# Patient Record
Sex: FEMALE | Race: WHITE | NOT HISPANIC OR LATINO | Employment: FULL TIME | URBAN - METROPOLITAN AREA
[De-identification: names, ages, dates, MRNs, and addresses within clinical notes are randomized per-mention and may not be internally consistent; named-entity substitution may affect disease eponyms.]

---

## 2017-01-26 ENCOUNTER — OFFICE VISIT (OUTPATIENT)
Dept: FAMILY MEDICINE | Facility: CLINIC | Age: 42
End: 2017-01-26

## 2017-01-26 VITALS
OXYGEN SATURATION: 95 % | HEART RATE: 119 BPM | HEIGHT: 70 IN | DIASTOLIC BLOOD PRESSURE: 74 MMHG | WEIGHT: 233.4 LBS | SYSTOLIC BLOOD PRESSURE: 107 MMHG | BODY MASS INDEX: 33.41 KG/M2

## 2017-01-26 DIAGNOSIS — G47.00 INSOMNIA, UNSPECIFIED TYPE: ICD-10-CM

## 2017-01-26 DIAGNOSIS — R11.2 NAUSEA AND VOMITING IN ADULT: ICD-10-CM

## 2017-01-26 DIAGNOSIS — F41.9 ANXIETY: ICD-10-CM

## 2017-01-26 DIAGNOSIS — M54.50 LUMBAGO: ICD-10-CM

## 2017-01-26 DIAGNOSIS — N10 ACUTE PYELONEPHRITIS: Primary | ICD-10-CM

## 2017-01-26 DIAGNOSIS — Z87.442 HISTORY OF RENAL STONE: ICD-10-CM

## 2017-01-26 LAB
BILIRUBIN UR: ABNORMAL
BLOOD UR: ABNORMAL
CLARITY, URINE: CLEAR
COLOR UR: YELLOW
GLUCOSE URINE: ABNORMAL
KETONES UR QL: NEGATIVE
LEUKOCYTE ESTERASE UR: NEGATIVE
NITRITE UR QL STRIP: POSITIVE
PH UR STRIP: 5.5 [PH] (ref 5–7)
PROTEIN UR: ABNORMAL
SP GR UR STRIP: >=1.03
UROBILINOGEN UR STRIP-ACNC: ABNORMAL

## 2017-01-26 RX ORDER — ZOLPIDEM TARTRATE 5 MG/1
5 TABLET ORAL
Qty: 30 TABLET | Refills: 1 | Status: SHIPPED | OUTPATIENT
Start: 2017-01-26 | End: 2017-04-11

## 2017-01-26 RX ORDER — HYDROXYZINE HYDROCHLORIDE 25 MG/1
25-50 TABLET, FILM COATED ORAL EVERY 6 HOURS PRN
Qty: 60 TABLET | Refills: 1 | Status: SHIPPED | OUTPATIENT
Start: 2017-01-26 | End: 2017-10-30

## 2017-01-26 ASSESSMENT — ANXIETY QUESTIONNAIRES
3. WORRYING TOO MUCH ABOUT DIFFERENT THINGS: MORE THAN HALF THE DAYS
6. BECOMING EASILY ANNOYED OR IRRITABLE: NOT AT ALL
5. BEING SO RESTLESS THAT IT IS HARD TO SIT STILL: NEARLY EVERY DAY
GAD7 TOTAL SCORE: 16
7. FEELING AFRAID AS IF SOMETHING AWFUL MIGHT HAPPEN: NEARLY EVERY DAY
IF YOU CHECKED OFF ANY PROBLEMS ON THIS QUESTIONNAIRE, HOW DIFFICULT HAVE THESE PROBLEMS MADE IT FOR YOU TO DO YOUR WORK, TAKE CARE OF THINGS AT HOME, OR GET ALONG WITH OTHER PEOPLE: VERY DIFFICULT
1. FEELING NERVOUS, ANXIOUS, OR ON EDGE: MORE THAN HALF THE DAYS
2. NOT BEING ABLE TO STOP OR CONTROL WORRYING: NEARLY EVERY DAY

## 2017-01-26 ASSESSMENT — ENCOUNTER SYMPTOMS
HEMATURIA: 0
FLANK PAIN: 1
CARDIOVASCULAR NEGATIVE: 1
ACTIVITY CHANGE: 1
COUGH: 1
VOMITING: 1
DIZZINESS: 1
BACK PAIN: 1
ALLERGIC/IMMUNOLOGIC NEGATIVE: 1
BLOOD IN STOOL: 0
APPETITE CHANGE: 1
ENDOCRINE NEGATIVE: 1
SLEEP DISTURBANCE: 1
FATIGUE: 1
DIFFICULTY URINATING: 0
FEVER: 1
NAUSEA: 1
EYES NEGATIVE: 1
NERVOUS/ANXIOUS: 1
DYSURIA: 0

## 2017-01-26 ASSESSMENT — PATIENT HEALTH QUESTIONNAIRE - PHQ9: 5. POOR APPETITE OR OVEREATING: NEARLY EVERY DAY

## 2017-01-26 NOTE — Clinical Note
MEDICAL EXCUSE FORM        Darlene Dasilvalivan saw me on 1/27/2017     The employee should be excused from work for the following day(s) because of illness:   1/26/17    COMMENTS: Can return to work when Darlene is feeling well enough.          Thank you for your understanding,           Sulema Meza,MSN,FNP [facility]  [address] [place]

## 2017-01-26 NOTE — PROGRESS NOTES
"      HPI:       Darlene Hudson is a 41 year old who presents for the following  Patient presents with:  Flu: flu, vommiting for 3 days, diarrhea, lower back pain      Most recently has not wanted to use opioids for pain management as she \"needs to be able to work, and those create a problem with too much fatigue.  The flexeril doesn't help, and the ibuprofen (600mg every 2 hours, she claims) just takes the edge off of the pain.\"   I'm having so much pain since I started this vomiting, I'm starting to wonder if I shouldn't take some stronger pain meds, anyway.\"        Problem, Medication and Allergy Lists were reviewed and are current.  Patient is an established patient of this clinic.    PMH: Chronic low back pain, substance abuse hx, chronic pain agreement, abnormal pap smear, moderate persistent asthma, tobacco abuse, episodic alcohol abuse, chronic sinusitis, allergic rhinitis    Medications include:  Fluticasone 250 mcg inhaler 2 puffs BID  Albuterol 108 mcg/ACT inhaler 2 puffs q 6 hours PRN'  Albuterol 2.5mg/3ML 0.083% nebulizer solution inhale 2.5mg q 4 hours as needed  Ibuprofen 600mg po q 6 hours PRN for moderate pain  zlpidem 5 mg tablet Take 1 tablet nightly as needed for sleep.       Review of Systems:   Review of Systems   Constitutional: Positive for fever, activity change, appetite change and fatigue.   HENT: Negative.    Eyes: Negative.    Respiratory: Positive for cough.    Cardiovascular: Negative.    Gastrointestinal: Positive for nausea and vomiting. Negative for blood in stool.   Endocrine: Negative.    Genitourinary: Positive for flank pain. Negative for dysuria, hematuria, vaginal discharge, difficulty urinating and pelvic pain.   Musculoskeletal: Positive for back pain.   Skin: Negative.    Allergic/Immunologic: Negative.    Neurological: Positive for dizziness. Negative for syncope.        Dizzy with position changes   Psychiatric/Behavioral: Positive for sleep disturbance. The patient is " "nervous/anxious.              Physical Exam:   Patient Vitals for the past 24 hrs:   BP Pulse SpO2 Height Weight   01/26/17 1420 107/74 mmHg 119 95 % 5' 9.5\" (176.5 cm) 233 lb 6.4 oz (105.87 kg)     Body mass index is 33.98 kg/(m^2).       Physical Exam   Constitutional: She is oriented to person, place, and time. She appears well-developed and well-nourished. She appears distressed.   HENT:   Head: Normocephalic.   Right Ear: External ear normal.   Left Ear: External ear normal.   Nose: Nose normal.   Eyes: EOM are normal. Pupils are equal, round, and reactive to light. Right eye exhibits no discharge. Left eye exhibits no discharge.   Neck: Normal range of motion. Neck supple. No thyromegaly present.   Cardiovascular: Normal rate, regular rhythm, normal heart sounds and intact distal pulses.  Exam reveals no gallop.    No murmur heard.  Pulmonary/Chest: Effort normal and breath sounds normal. No respiratory distress. She has no wheezes. She has no rales.   Abdominal: Soft. Bowel sounds are normal. She exhibits no distension. There is tenderness. There is no rebound and no guarding.   Generalized tenderness, BS normal and +.  No specific CVA tenderness, has bilateral flank pain.   Musculoskeletal: Normal range of motion. She exhibits no edema.   Lymphadenopathy:     She has no cervical adenopathy.   Neurological: She is alert and oriented to person, place, and time. She exhibits normal muscle tone. Coordination normal.   Skin: Skin is warm and dry. No rash noted. No erythema.   Psychiatric: Her behavior is normal.   PH2-2 score                                 1/26/17                                                           2  ACE-7 score                                1/26/17                                                          16     Making completing tasks and getting along with others                                        Very Difficult      Results:      Results from the last 24 hours  Results for orders " placed or performed in visit on 01/26/17 (from the past 24 hour(s))   Urinalysis, Micro If ([facility])   Result Value Ref Range    Specific Gravity Urine >=1.030 1.005 - 1.030    pH Urine 5.5 4.5 - 8.0    Leukocyte Esterase UR Negative NEGATIVE    Nitrite Urine Positive (A) NEGATIVE    Protein UR 1+ (A) NEGATIVE    Glucose Urine Trace (A) NEGATIVE    Ketones Urine Negative NEGATIVE    Urobilinogen mg/dL 1.0 E.U./dL (A) 0.2 E.U./dL    Bilirubin UR 1+ (A) NEGATIVE    Blood UR 3+ (A) NEGATIVE    Color Urine Yellow     Clarity, urine Clear      Assessment and Plan     (N10) Acute pyelonephritis  (primary encounter diagnosis)  Comment: UTI, suspect pyelonephritis or renal calculi. See result above, no micro or UC possible as quantity of urine insufficient.  Plan: Urinalysis, Micro If ([facility])        No oral treatment,i.e., antibiotics w/ extra fluids by mouth currently possible.  Patient acknowledges this and prefers to present to [facility] ED for assessment, IV fluids and initial treatment for infection-set up imaging to  Assess for renal calculi or other abnormalities.  Family will bring Darlene to [facility] ED now by car   NP phoned [facility] to report labs, symptoms and suspicions of upper tract infection and/or renal calculi, together with vomiting and inability to take po fluids/food without vomiting.  Triage staff will give report to covering MD prior to admit to ED.  (R11.2) Nausea and vomiting in adult  Comment: Urine concentrated, skin turgor poor, tachycardic.Paitne states she has been unable to keep water or food down for 3 days or more  Suggests dehydration, and secondary cause such as renal calculi, an/or upper tract  UTI. See result above, no micro or UC possible as quantity of urine insufficient.  Plan: Urinalysis, Micro If ([facility])            (M54.5) Lumbago  Comment: Described severe pain across the lower back, bilaterally. Has chronic pain in lower back, has hx substance abuse and chronic pain  contract.  Pain more severe past 3 days w/vomiting-cannot accurately describe her pain which varies in intensity, and she is accustomed to denying it's severity  Plan: Urinalysis, Micro If ([facility]), diclofenac         (VOLTAREN) 1 % GEL topical gel            (Z87.442) History of renal stone  Comment: Patient felt like this before, years ago w/kidney stone-is wondering if that is happening again.  Plan: Urinalysis, Micro If ([facility])        See results-     (F41.9) Anxiety  Comment: Experiences daily, hydroxyzine helps manage on PRN basis.  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  Low dose ambien helps her get 4 hours of sleep during the day consistently and she wishes ot continue it.  Plan: zolpidem (AMBIEN) 5 MG tablet, she agreed.      Refilled for #30, 1 refill. Suggest she plan to use this as a temporary measure only.      Medications Discontinued During This Encounter   Medication Reason     clarithromycin (BIAXIN) 500 MG tablet Therapy completed     hydrOXYzine (VISTARIL) 50 MG capsule Stopped by Patient     guaiFENesin-codeine (ROBITUSSIN AC) 100-10 MG/5ML SOLN solution Therapy completed     metroNIDAZOLE (METROGEL VAGINAL) 0.75 % vaginal gel Medication Reconciliation Clean Up     cyclobenzaprine (FLEXERIL) 5 MG tablet Not filled/taken by Patient     benzonatate (TESSALON) 100 MG capsule Medication Reconciliation Clean Up     ondansetron (ZOFRAN ODT) 4 MG disintegrating tablet Stopped by Patient     nicotine (NICOTROL) 10 MG inhaler Stopped by Patient     Options for treatment and follow-up care were reviewed with the patient. Darlene Hudson  engaged in the decision making process and verbalized understanding of the options discussed and agreed with the final plan.  30 minutes was spent on this visit, >50% counseling and  coordination of care re: UTI/kidney stones, pain and management, vomiting and dehydration, referral to ED.    Sulema Meza, PEPE CNP

## 2017-01-27 ENCOUNTER — TELEPHONE (OUTPATIENT)
Dept: FAMILY MEDICINE | Facility: CLINIC | Age: 42
End: 2017-01-27

## 2017-01-27 PROBLEM — M54.50 CHRONIC LOW BACK PAIN: Status: ACTIVE | Noted: 2017-01-27

## 2017-01-27 PROBLEM — J30.9 ATOPIC RHINITIS: Status: ACTIVE | Noted: 2017-01-27

## 2017-01-27 PROBLEM — Z72.0 TOBACCO USE: Status: ACTIVE | Noted: 2017-01-27

## 2017-01-27 PROBLEM — J45.40 MODERATE PERSISTENT ASTHMA: Status: ACTIVE | Noted: 2017-01-27

## 2017-01-27 PROBLEM — G89.29 CHRONIC LOW BACK PAIN: Status: ACTIVE | Noted: 2017-01-27

## 2017-01-27 ASSESSMENT — ANXIETY QUESTIONNAIRES: GAD7 TOTAL SCORE: 16

## 2017-01-27 NOTE — TELEPHONE ENCOUNTER
[facility] phone call message- general phone call:    Reason for call: Patient is calling stating that she is currently in [facility] still was admitted last night. States that they did a blood count for her there at hospital. Started her on antibiotics and fluids going through her via IV. Still waiting to see the doctor. Possibly kidney infection/bladder infection/pancreaitis. Blood pressure down and white blood count is up. Can call her at [phone] (hospital room). Please call the patient.     Return call needed: Yes    OK to leave a message on voice mail? Yes    Primary language: English      needed? No    Call taken on January 27, 2017 at 9:42 AM by Kirstie Gates

## 2017-02-02 ENCOUNTER — OFFICE VISIT (OUTPATIENT)
Dept: FAMILY MEDICINE | Facility: CLINIC | Age: 42
End: 2017-02-02

## 2017-02-02 VITALS — OXYGEN SATURATION: 94 % | HEART RATE: 136 BPM | TEMPERATURE: 98.2 F | WEIGHT: 240 LBS | BODY MASS INDEX: 34.95 KG/M2

## 2017-02-02 DIAGNOSIS — K85.00 IDIOPATHIC ACUTE PANCREATITIS, UNSPECIFIED COMPLICATION STATUS: Primary | ICD-10-CM

## 2017-02-02 LAB
% GRANULOCYTES: 82.7 %G (ref 40–75)
ALBUMIN SERPL BCP-MCNC: 3 G/DL (ref 3.5–5)
ALP SERPL-CCNC: 169 U/L (ref 45–120)
ALT SERPL W/O P-5'-P-CCNC: 108 U/L (ref 0–45)
AMYLASE SERPL-CCNC: 17 U/L (ref 5–120)
ANION GAP SERPL CALCULATED.3IONS-SCNC: 10 MMOL/L (ref 5–18)
AST SERPL-CCNC: 98 U/L (ref 0–40)
BILIRUB SERPL-MCNC: 0.5 MG/DL (ref 0–1)
BILIRUBIN UR: ABNORMAL
BLOOD UR: ABNORMAL
BUN SERPL-MCNC: 12 MG/DL (ref 8–22)
CALCIUM SERPL-MCNC: 9.2 MG/DL (ref 8.5–10.5)
CHLORIDE SERPL-SCNC: 108 MMOL/L (ref 98–107)
CO2 SERPL-SCNC: 20 MMOL/L (ref 22–31)
CREAT SERPL-MCNC: 0.81 MG/DL (ref 0.6–1.1)
GLUCOSE SERPL-MCNC: 106 MG/DL (ref 70–125)
GLUCOSE URINE: NEGATIVE
GRANULOCYTES #: 12.7 K/UL (ref 1.6–8.3)
HCT VFR BLD AUTO: 39.9 % (ref 35–47)
HEMOGLOBIN: 12.8 G/DL (ref 11.7–15.7)
KETONES UR QL: ABNORMAL
LEUKOCYTE ESTERASE UR: NEGATIVE
LIPASE SERPL-CCNC: 6 U/L (ref 0–52)
LYMPHOCYTES # BLD AUTO: 1.8 K/UL (ref 0.8–5.3)
LYMPHOCYTES NFR BLD AUTO: 11.5 %L (ref 20–48)
MCH RBC QN AUTO: 30.8 PG (ref 26.5–35)
MCHC RBC AUTO-ENTMCNC: 32.1 G/DL (ref 32–36)
MCV RBC AUTO: 95.8 FL (ref 78–100)
MID #: 0.9 K/UL (ref 0–2.2)
MID %: 5.8 %M (ref 0–20)
NITRITE UR QL STRIP: NEGATIVE
PH UR STRIP: 5 [PH] (ref 5–7)
PLATELET # BLD AUTO: 289 K/UL (ref 150–450)
POTASSIUM SERPL-SCNC: 3.9 MMOL/L (ref 3.5–5)
PROT SERPL-MCNC: 6.3 G/DL (ref 6–8)
PROTEIN UR: ABNORMAL
RBC # BLD AUTO: 4.16 M/UL (ref 3.8–5.2)
SODIUM SERPL-SCNC: 138 MMOL/L (ref 136–145)
SP GR UR STRIP: >=1.03
UROBILINOGEN UR STRIP-ACNC: ABNORMAL
WBC # BLD AUTO: 15.4 K/UL (ref 4–11)

## 2017-02-02 RX ORDER — OXYCODONE AND ACETAMINOPHEN 10; 325 MG/1; MG/1
1-2 TABLET ORAL EVERY 6 HOURS PRN
Qty: 30 TABLET | Refills: 0 | Status: SHIPPED | OUTPATIENT
Start: 2017-02-02 | End: 2017-02-24

## 2017-02-02 NOTE — PATIENT INSTRUCTIONS
Follow up in 1 week    Your medication list is printed, please keep this with you, it is helpful to bring this current list to any other medical appointments, the emergency room or hospital.    If you had lab testing today and your results are reassuring or normal they will be be mailed to you within 7 days.     If the lab tests need quick action we will call you with the results.   The phone number we will call with results is # [phone] (home) . If this is not the best number please call our clinic and change the number.    If you need any refills please call your pharmacy and they will contact us.    If you have any further concerns or wish to schedule another appointment you must call our office during normal business hours  [phone] (8-5:00 M-F)  If you have urgent medical questions that cannot wait  you may also call [phone] at any time of day.  If you have a medical emergency please call [phone].    Thank you for coming to [facility].

## 2017-02-02 NOTE — PROGRESS NOTES
Hospitalization Follow-up Visit         [facility]       Hospital Follow-up Visit:    Hospital:  [facility]   Date of Admission: 1/27/2017  Date of Discharge: 1/30/2017  Reason(s) for Admission: pancreatitis            Problems taking medications regularly:  Has finished the dilaudid in three days, had a four day prescription.        Post Discharge Medication Reconciliation: discharge medications reconciled, continue medications without change.       Problems adhering to non-medication therapy:  History of high narcotic use       Medications reviewed by: by myself. Findings include duilaudi four days supply from hospital.    Summary of hospitalization:  [facility] hospital discharge summary reviewed    Diagnostic Tests/Treatments reviewed.  Follow up needed: will recheck lipase, UA to ensure sucessful therapy  Other Healthcare Providers Involved in Patient s Care:         None  Update since discharge: Pt reports pain at thte time of discharge. This has continued without futher relief. Complains of some dizziness, but has been taking too much dilaudid.      Plan of care communicated with patient                       Review of Systems:   CONSTITUTIONAL: no fatigue, no unexpected change in weight  SKIN: no worrisome rashes, no worrisome moles, no worrisome lesions  EYES: no acute vision problems or changes  ENT: no ear problems, no mouth problems, no throat problems  RESP: no significant cough, no shortness of breath  CV: no chest pain, no palpitations, no new or worsening peripheral edema  GI: no nausea, no vomiting, no constipation, no diarrhea              PMHX:   Current Medications:   Current Outpatient Prescriptions   Medication Sig Dispense Refill     oxyCODONE-acetaminophen (PERCOCET)  MG per tablet Take 1-2 tablets by mouth every 6 hours as needed for moderate to severe pain maximum 8 tablet(s) per day 30 tablet 0     zolpidem (AMBIEN) 5 MG tablet Take 1 tablet (5 mg) by mouth nightly as needed for sleep 30 tablet  "1     hydrOXYzine (ATARAX) 25 MG tablet Take 1-2 tablets (25-50 mg) by mouth every 6 hours as needed for itching 60 tablet 1     diclofenac (VOLTAREN) 1 % GEL topical gel Apply 4 grams to knees or 2 grams to hands four times daily using enclosed dosing card. 100 g 1     fluticasone (FLOVENT DISKUS) 250 MCG/BLIST AEPB Inhaler Inhale 2 puffs into the lungs 2 times daily 1 Inhaler 0     ibuprofen (ADVIL/MOTRIN) 600 MG tablet Take 1 tablet (600 mg) by mouth every 6 hours as needed for moderate pain 100 tablet 0     albuterol (PROAIR HFA, PROVENTIL HFA, VENTOLIN HFA) 108 (90 BASE) MCG/ACT inhaler Inhale 2 puffs into the lungs every 6 hours as needed for shortness of breath / dyspnea or wheezing 3 Inhaler 1     zolpidem (AMBIEN) 5 MG tablet Take 1 tablet (5 mg) by mouth nightly as needed for sleep 30 tablet 3     albuterol (2.5 MG/3ML) 0.083% nebulizer solution Inhale 2.5 mg into the lungs         Existing Problems  Patient Active Problem List   Diagnosis     Abnormal cytology finding     Nondependent alcohol abuse, episodic drinking behavior     Anxiety state     Controlled substance agreement signed     Low back pain     Chronic sinusitis     Cocaine abuse     Major depressive disorder, recurrent episode, moderate (H)     Asthma     Tobacco use disorder     Noninflammatory disorder of vagina     Pap smear for cervical cancer screening     Abdominal pain     Abnormal cervical Papanicolaou smear     Panic disorder with agoraphobia     Atopic rhinitis     Chronic low back pain     Depression     Moderate persistent asthma     Other long term (current) drug therapy     Tobacco use       Allergies:  Allergies   Allergen Reactions     Lidocaine Other (See Comments)     \"my jaw stopped moving\"     Penicillins Hives, Rash and Shortness Of Breath     Lidocaine-Epinephrine [Epinephrine-Lidocaine-Na Metabisulfite] Other (See Comments)     Severe jaw cramping, double vision       Previous labs:  Lab Results   Component Value Date "    HGB 12.8 02/02/2017    HCT 39.9 02/02/2017               Review of Systems:    CONSTITUTIONAL: no fatigue, no unexpected change in weight  SKIN: no worrisome rashes, no worrisome moles, no worrisome lesions  EYES: no acute vision problems or changes  ENT: no ear problems, no mouth problems, no throat problems  RESP: no significant cough, no shortness of breath  CV: no chest pain, no palpitations, no new or worsening peripheral edema  GI: no nausea, no vomiting, no constipation, no diarrhea          Physical Exam:     Filed Vitals:    02/02/17 1110   Pulse: 136   Temp: 98.2  F (36.8  C)   TempSrc: Oral   Weight: 240 lb (108.863 kg)   SpO2: 94%     Body mass index is 34.95 kg/(m^2).    GENERAL:alert, well hydrated, sitting and standing due to continued back and flank pain.  EYES: Eyes grossly normal to inspection, extraocular movements - intact, and PERRL  HENT: Nose- normal; Mouth- no ulcers, no lesions  NECK: no tenderness, no adenopathy, no asymmetry, no masses, no stiffness;   RESP: lungs scattered rhonchi, no rales,no wheezes  CV: regular rates and rhythm, normal S1 S2, no S3 or S4 and no murmur, no click or rub -  ABDOMEN: soft, pain subjectively localizing to the right side along flank extending in a dermatome near T7. No urine pain. No CVA tenderness. No costochondral pain.              Labs and Procedures     Office Visit on 01/26/2017   Component Date Value Ref Range Status     Specific Gravity Urine 01/26/2017 >=1.030  1.005 - 1.030 Final     pH Urine 01/26/2017 5.5  4.5 - 8.0 Final     Leukocyte Esterase UR 01/26/2017 Negative  NEGATIVE Final     Nitrite Urine 01/26/2017 Positive* NEGATIVE Final     Protein UR 01/26/2017 1+* NEGATIVE Final     Glucose Urine 01/26/2017 Trace* NEGATIVE Final     Ketones Urine 01/26/2017 Negative  NEGATIVE Final     Urobilinogen mg/dL 01/26/2017 1.0 E.U./dL* 0.2 E.U./dL Final     Bilirubin UR 01/26/2017 1+* NEGATIVE Final     Blood UR 01/26/2017 3+* NEGATIVE Final      Color Urine 01/26/2017 Yellow   Final     Clarity, urine 01/26/2017 Clear   Final              Assessment and Plan     1.Acute pancreatitis while in hospital - with lipase over 650.  Pt denies drinking ETOH, and denies any other drug use.  Had a mild sore throat earlier in the week, but this got better.  Treated in the hospital with dilaudid, and sent home with four day supply.  Used this in 3 days, and back today with complain of pain. She states this is different from the previous history of chronic back pain.   2. Acute cystitis. Currently on levofoxin from hospital.    3. Will evaluate state of pancreatitis with repeat lipase/amylase.  This may not reflect severity of pain patient is experiencing.    4. WIll recheck urine to ensure over UTI.   5. Have provided percosett for out patient management of pancreatitis.  Will not refill dilaudid at this time. Provided 30 tablets, 1-2 every 6 hours. Continue resting, return to hosspital if pain uncontrolled.      6. Recheck in one week.         E&M code to be billed if TCM cannot be: 21736    Type of decision making: Moderate complexity (74164)      Options for treatment and follow-up care were reviewed with the patient  Darlene Hudson   engaged in the decision making process and verbalized understanding of the options discussed and agreed with the final plan.      Oscar Aparicio MD

## 2017-02-02 NOTE — MR AVS SNAPSHOT
After Visit Summary   2/2/2017    Darlene Hudson    MRN: [id number]           Patient Information     Date Of Birth          1975        Visit Information        Provider Department      2/2/2017 11:00 AM Oscar Aparicio MD [facility]        Today's Diagnoses     Idiopathic acute pancreatitis, unspecified complication status    -  1       Care Instructions    Follow up in 1 week    Your medication list is printed, please keep this with you, it is helpful to bring this current list to any other medical appointments, the emergency room or hospital.    If you had lab testing today and your results are reassuring or normal they will be be mailed to you within 7 days.     If the lab tests need quick action we will call you with the results.   The phone number we will call with results is # [phone] (home) . If this is not the best number please call our clinic and change the number.    If you need any refills please call your pharmacy and they will contact us.    If you have any further concerns or wish to schedule another appointment you must call our office during normal business hours  [phone] (8-5:00 M-F)  If you have urgent medical questions that cannot wait  you may also call [phone] at any time of day.  If you have a medical emergency please call [phone].    Thank you for coming to [facility].          Follow-ups after your visit        Who to contact     Please call your clinic at [phone] to:    Ask questions about your health    Make or cancel appointments    Discuss your medicines    Learn about your test results    Speak to your doctor   If you have compliments or concerns about an experience at your clinic, or if you wish to file a complaint, please contact [facility] Patient Relations at [phone] or email us at [email or web address]         Additional Information About Your Visit        Care EveryWhere ID      This is your Care EveryWhere ID. This could be used by other organizations to access your [facility] medical records  [id number]        Your Vitals Were     Pulse Temperature Pulse Oximetry             136 98.2  F (36.8  C) (Oral) 94%          Blood Pressure from Last 3 Encounters:   01/26/17 107/74   12/14/16 127/81   12/13/16 121/86    Weight from Last 3 Encounters:   02/02/17 240 lb (108.863 kg)   01/26/17 233 lb 6.4 oz (105.87 kg)   12/14/16 232 lb 3.2 oz (105.325 kg)              We Performed the Following     Amylase ([facility])     CBC with Diff Plt ([facility])     Comprehensive Metabolic ([facility]) - Results > 1 hr     Lipase ([facility])     Urinalysis([facility])     Urine Culture ([facility])          Today's Medication Changes          These changes are accurate as of: 2/2/17 12:02 PM.  If you have any questions, ask your nurse or doctor.               Start taking these medicines.        Dose/Directions    oxyCODONE-acetaminophen  MG per tablet   Commonly known as:  PERCOCET   Used for:  Idiopathic acute pancreatitis, unspecified complication status   Started by:  Oscar Aparicio MD        Dose:  1-2 tablet   Take 1-2 tablets by mouth every 6 hours as needed for moderate to severe pain maximum 8 tablet(s) per day   Quantity:  30 tablet   Refills:  0            Where to get your medicines      Some of these will need a paper prescription and others can be bought over the counter.  Ask your nurse if you have questions.     Bring a paper prescription for each of these medications    - oxyCODONE-acetaminophen  MG per tablet             Primary Care Provider Office Phone # Fax #    Anaid Vogel [phone]       [facility] [address] [place] [address]        Thank you!     Thank you for choosing [facility]  for your care. Our goal is always to provide you with excellent care. Hearing back from our patients is one way we can continue to  improve our services. Please take a few minutes to complete the written survey that you may receive in the mail after your visit with us. Thank you!             Your Updated Medication List - Protect others around you: Learn how to safely use, store and throw away your medicines at www.disposemymeds.org.          This list is accurate as of: 2/2/17 12:02 PM.  Always use your most recent med list.                   Brand Name Dispense Instructions for use    * albuterol (2.5 MG/3ML) 0.083% neb solution      Inhale 2.5 mg into the lungs       * albuterol 108 (90 BASE) MCG/ACT Inhaler    PROAIR HFA/PROVENTIL HFA/VENTOLIN HFA    3 Inhaler    Inhale 2 puffs into the lungs every 6 hours as needed for shortness of breath / dyspnea or wheezing       diclofenac 1 % Gel topical gel    VOLTAREN    100 g    Apply 4 grams to knees or 2 grams to hands four times daily using enclosed dosing card.       fluticasone 250 MCG/BLIST Aepb Inhaler    FLOVENT DISKUS    1 Inhaler    Inhale 2 puffs into the lungs 2 times daily       hydrOXYzine 25 MG tablet    ATARAX    60 tablet    Take 1-2 tablets (25-50 mg) by mouth every 6 hours as needed for itching       ibuprofen 600 MG tablet    ADVIL/MOTRIN    100 tablet    Take 1 tablet (600 mg) by mouth every 6 hours as needed for moderate pain       oxyCODONE-acetaminophen  MG per tablet    PERCOCET    30 tablet    Take 1-2 tablets by mouth every 6 hours as needed for moderate to severe pain maximum 8 tablet(s) per day       * zolpidem 5 MG tablet    AMBIEN    30 tablet    Take 1 tablet (5 mg) by mouth nightly as needed for sleep       * zolpidem 5 MG tablet    AMBIEN    30 tablet    Take 1 tablet (5 mg) by mouth nightly as needed for sleep       * Notice:  This list has 4 medication(s) that are the same as other medications prescribed for you. Read the directions carefully, and ask your doctor or other care provider to review them with you.

## 2017-02-03 LAB — CULTURE: NORMAL

## 2017-02-05 ENCOUNTER — TRANSFERRED RECORDS (OUTPATIENT)
Dept: HEALTH INFORMATION MANAGEMENT | Facility: CLINIC | Age: 42
End: 2017-02-05

## 2017-02-07 ENCOUNTER — TELEPHONE (OUTPATIENT)
Dept: FAMILY MEDICINE | Facility: CLINIC | Age: 42
End: 2017-02-07

## 2017-02-07 NOTE — TELEPHONE ENCOUNTER
I was calling patient about her lab results and to get her in clinic for a f/u on urine infection.  She said she is at [facility] due to some fluids in her heart.  She also said that [facility] was trying to see if her PCP can do down there to see her.  Informed patient I will tasked to Dr. Vogel since she wants to talk to her any ways.  Informed patient our provider might not have privilege to go down there.  CXIong

## 2017-02-07 NOTE — PROGRESS NOTES
Quick Note:    Please call patient and send results letter  The infection does not seem to be responding to the antibiotics very well. Please return to clinic to be rechecked.  ______

## 2017-02-08 NOTE — TELEPHONE ENCOUNTER
----- Message from Anaid Vogel MD sent at 2/7/2017  3:08 PM CST -----       Please let patient know that I do not have privelidges to see patients at [facility] but that I can see her records when she is discharged from the hospital.  i hope she is feeling better soon       2/8/17 LM on VM for patient as above. CXIong

## 2017-02-20 ENCOUNTER — OFFICE VISIT (OUTPATIENT)
Dept: FAMILY MEDICINE | Facility: CLINIC | Age: 42
End: 2017-02-20

## 2017-02-20 VITALS
SYSTOLIC BLOOD PRESSURE: 125 MMHG | HEART RATE: 112 BPM | OXYGEN SATURATION: 95 % | WEIGHT: 234 LBS | DIASTOLIC BLOOD PRESSURE: 87 MMHG | BODY MASS INDEX: 34.06 KG/M2 | TEMPERATURE: 97.9 F

## 2017-02-20 DIAGNOSIS — J18.9 PNEUMONIA OF RIGHT LOWER LOBE DUE TO INFECTIOUS ORGANISM: ICD-10-CM

## 2017-02-20 DIAGNOSIS — I31.39 PERICARDIAL EFFUSION: Primary | ICD-10-CM

## 2017-02-20 DIAGNOSIS — G89.18 CHEST WALL PAIN FOLLOWING SURGERY: ICD-10-CM

## 2017-02-20 DIAGNOSIS — R07.89 CHEST WALL PAIN FOLLOWING SURGERY: ICD-10-CM

## 2017-02-20 RX ORDER — AMITRIPTYLINE HYDROCHLORIDE 50 MG/1
50 TABLET ORAL AT BEDTIME
Qty: 30 TABLET | Refills: 11 | COMMUNITY
Start: 2017-02-20 | End: 2017-04-13

## 2017-02-20 RX ORDER — COLCHICINE 0.6 MG/1
0.6 TABLET ORAL 2 TIMES DAILY
Qty: 60 TABLET | Refills: 2 | COMMUNITY
Start: 2017-02-20 | End: 2017-03-20

## 2017-02-20 RX ORDER — AMOXICILLIN 250 MG
CAPSULE ORAL
Qty: 60 TABLET | Refills: 0 | COMMUNITY
Start: 2017-02-20 | End: 2017-03-02

## 2017-02-20 RX ORDER — GABAPENTIN 300 MG/1
CAPSULE ORAL
Qty: 180 CAPSULE | Refills: 5 | COMMUNITY
Start: 2017-02-20 | End: 2017-04-11

## 2017-02-20 RX ORDER — PROCHLORPERAZINE MALEATE 5 MG
5 TABLET ORAL EVERY 6 HOURS PRN
Qty: 20 TABLET | Refills: 0 | COMMUNITY
Start: 2017-02-20 | End: 2017-03-20

## 2017-02-20 RX ORDER — MECOBALAMIN 5000 MCG
TABLET,DISINTEGRATING ORAL
Qty: 30 CAPSULE | Refills: 1 | COMMUNITY
Start: 2017-02-20 | End: 2017-10-30

## 2017-02-20 RX ORDER — OXYCODONE AND ACETAMINOPHEN 5; 325 MG/1; MG/1
1 TABLET ORAL EVERY 4 HOURS PRN
Qty: 30 TABLET | Refills: 0 | Status: SHIPPED | OUTPATIENT
Start: 2017-02-20 | End: 2017-03-20

## 2017-02-20 NOTE — NURSING NOTE
ACT not given and done today due to recent discharge from hospital. Had issues with breathing due to heart and wasn't sure if was asthma or not.   ---Catalino,CMA

## 2017-02-20 NOTE — MR AVS SNAPSHOT
After Visit Summary   2/20/2017    Darlene Hudson    MRN: [id number]           Patient Information     Date Of Birth          1975        Visit Information        Provider Department      2/20/2017 1:00 PM Oscar Aparicio MD [facility]        Today's Diagnoses     Pericardial effusion    -  1    Chest wall pain following surgery          Care Instructions    ~ Make an appointment to see your primary doctor (Dr. Vogel) for a follow up of your asthma and PAP smear.     ~ Repeat chest x-ray today.     ~ Will refill your pain medications for you. Take one tablet every 6 hours as needed.     ~~~~~~~~~~~~~~~~~~~~~~~~~~~~~~~~~~~~~~~~~~~~~~~~~~~~  Your medication list is printed, please keep this with you, it is helpful to bring this current list to any other medical appointments, the emergency room or hospital.    If you had lab testing today and your results are reassuring or normal they will be be mailed to you within 7 days.     If the lab tests need quick action we will call you with the results.   The phone number we will call with results is # [phone] (home) . If this is not the best number please call our clinic and change the number.    If you need any refills please call your pharmacy and they will contact us.    If you have any further concerns or wish to schedule another appointment you must call our office during normal business hours  [phone] (8-5:00 M-F)  If you have urgent medical questions that cannot wait  you may also call [phone] at any time of day.  If you have a medical emergency please call [phone].    Thank you for coming to [facility].          Follow-ups after your visit        Who to contact     Please call your clinic at [phone] to:    Ask questions about your health    Make or cancel appointments    Discuss your medicines    Learn about your test results    Speak to your doctor   If you have compliments or concerns about an experience  at your clinic, or if you wish to file a complaint, please contact [facility] Patient Relations at [phone] or email us at [email or web address]         Additional Information About Your Visit        Care EveryWhere ID     This is your Care EveryWhere ID. This could be used by other organizations to access your [facility] medical records  [id number]        Your Vitals Were     Pulse Temperature Last Period Pulse Oximetry BMI (Body Mass Index)       112 97.9  F (36.6  C) (Oral) 02/09/2017 95% 34.06 kg/m2        Blood Pressure from Last 3 Encounters:   02/20/17 125/87   01/26/17 107/74   12/14/16 127/81    Weight from Last 3 Encounters:   02/20/17 234 lb (106.1 kg)   02/02/17 240 lb (108.9 kg)   01/26/17 233 lb 6.4 oz (105.9 kg)              We Performed the Following     XR CHEST 2 VW          Today's Medication Changes          These changes are accurate as of: 2/20/17  2:04 PM.  If you have any questions, ask your nurse or doctor.               These medicines have changed or have updated prescriptions.        Dose/Directions    albuterol 108 (90 BASE) MCG/ACT Inhaler   Commonly known as:  PROAIR HFA/PROVENTIL HFA/VENTOLIN HFA   This may have changed:  Another medication with the same name was removed. Continue taking this medication, and follow the directions you see here.   Used for:  Acute bronchitis, unspecified organism   Changed by:  Anaid Vogel MD        Dose:  2 puff   Inhale 2 puffs into the lungs every 6 hours as needed for shortness of breath / dyspnea or wheezing   Quantity:  3 Inhaler   Refills:  1       zolpidem 5 MG tablet   Commonly known as:  AMBIEN   This may have changed:  Another medication with the same name was removed. Continue taking this medication, and follow the directions you see here.   Used for:  Insomnia, unspecified type   Changed by:  Sulema Meza APRN CNP        Dose:  5 mg   Take 1 tablet (5 mg) by mouth nightly as  needed for sleep   Quantity:  30 tablet   Refills:  1                Primary Care Provider Office Phone # Fax #    Anaid Ann Vogel [phone]       [facility] [address] [place] [address]        Thank you!     Thank you for choosing [facility]  for your care. Our goal is always to provide you with excellent care. Hearing back from our patients is one way we can continue to improve our services. Please take a few minutes to complete the written survey that you may receive in the mail after your visit with us. Thank you!             Your Updated Medication List - Protect others around you: Learn how to safely use, store and throw away your medicines at www.disposemymeds.org.          This list is accurate as of: 2/20/17  2:04 PM.  Always use your most recent med list.                   Brand Name Dispense Instructions for use    albuterol 108 (90 BASE) MCG/ACT Inhaler    PROAIR HFA/PROVENTIL HFA/VENTOLIN HFA    3 Inhaler    Inhale 2 puffs into the lungs every 6 hours as needed for shortness of breath / dyspnea or wheezing       amitriptyline 50 MG tablet    ELAVIL    30 tablet    Take 1 tablet (50 mg) by mouth At Bedtime       COLCRYS 0.6 MG tablet   Generic drug:  colchicine     60 tablet    Take 1 tablet (0.6 mg) by mouth 2 times daily For 90 days       fluticasone 250 MCG/BLIST Aepb Inhaler    FLOVENT DISKUS    1 Inhaler    Inhale 2 puffs into the lungs 2 times daily       gabapentin 300 MG capsule    NEURONTIN    180 capsule    Take 2 capsules by mouth three times a day.       hydrOXYzine 25 MG tablet    ATARAX    60 tablet    Take 1-2 tablets (25-50 mg) by mouth every 6 hours as needed for itching       ibuprofen 600 MG tablet    ADVIL/MOTRIN    100 tablet    Take 1 tablet (600 mg) by mouth every 6 hours as needed for moderate pain       LANsoprazole 15 MG CR capsule    PREVACID    30 capsule    Take one capsule by mouth everyday for 30 days to prevent  gastritis from NSAIDS       oxyCODONE-acetaminophen  MG per tablet    PERCOCET    30 tablet    Take 1-2 tablets by mouth every 6 hours as needed for moderate to severe pain maximum 8 tablet(s) per day       prochlorperazine 5 MG tablet    COMPAZINE    20 tablet    Take 1 tablet (5 mg) by mouth every 6 hours as needed for nausea or vomiting       senna-docusate 8.6-50 MG per tablet    SENOKOT-S;PERICOLACE    60 tablet    Take one to two tablets by mouth twice a day. May take less or stop if stools are too loose or frequent. For constipation       zolpidem 5 MG tablet    AMBIEN    30 tablet    Take 1 tablet (5 mg) by mouth nightly as needed for sleep

## 2017-02-20 NOTE — LETTER
March 1, 2017      Darlene Hudson  [address] [place] [address]        Dear Darlene,    The previous CT was reviewed, and still showed no pericardial effusion, so this must have occurred very quickly. Still some fluid in right chest as discussed at visit. Will check this at next visit to ensure it improves.     If you have any questions, please call the clinic to make an appointment.    Sincerely,    Oscar Aparicio MD

## 2017-02-20 NOTE — PROGRESS NOTES
HPI:       Darlene Hudson is a 41 year old  female who presents for followup after recent hospitalization for pericardial effusion.  Patient was seen recently following discharge from the hospital for presumed pancreatitis of unknown cause. Patient was seen here for followup, and the following day began to have difficulty breathing.  She returned to the [facility] ER where she was found to have a pericardial effusion, and admitted for surgical drainage of the pericardial effusion.  A CT scan one week prior had demonstated no evidence of pericardial effusion according to hospital notes, and the patient was not complaining of any difficulty breathing ather last outpatient visit.   Patient had a rain placed into pericardial sac which stopped draining on the second day and the patient was released. Patient returns now feeling much better, and is here for routine followup from hospital.  Apparently patient was told yesterday that there was a bacterial growing from the culture, and she would be informed by [facility] if she needed to start an antibiotic. Patient has not heard anything further from the hospital.   No shortness of breath at this time per patient.  No fevers. No abdominal pain. Discussed blood tests we had from the previous visit, indicating an infection was present.    Patient complains of pain from the surgical wound for the pericardial window. This occurs intermittently, mostly when she is lifting or stressing the wound. Patient wants to return to work, and has agreed not to lift or stress wound. She has agreed that she will not use more than one Percoset every 4-6 hours, and will use further pain control only when necessary for sleeping and work.Patient has a hsitoryof substance abuse, but has not been abusing for many years. She is aware of the potential for abuse for the Perocset, but agrees that she needs to use this at this time.     Discussed her behavior at the hospital, which apparently nearly  resulted in leaving AMA.  Further discussed chronic pain clinics, and the problems she has had with attending their support.  Patient is at high risk for abuse, but with the history of recent acute post surgical pain, will provide pain relief for acute pain at this time.   Patient was apparently diagnosed with pneumonia in the hosptal and treated with antibiotics.  Is not on an additional antibiotic at this time.                PMHX:   Current Medications:   Current Outpatient Prescriptions   Medication Sig Dispense Refill     oxyCODONE-acetaminophen (PERCOCET)  MG per tablet Take 1-2 tablets by mouth every 6 hours as needed for moderate to severe pain maximum 8 tablet(s) per day 30 tablet 0     zolpidem (AMBIEN) 5 MG tablet Take 1 tablet (5 mg) by mouth nightly as needed for sleep 30 tablet 1     hydrOXYzine (ATARAX) 25 MG tablet Take 1-2 tablets (25-50 mg) by mouth every 6 hours as needed for itching 60 tablet 1     diclofenac (VOLTAREN) 1 % GEL topical gel Apply 4 grams to knees or 2 grams to hands four times daily using enclosed dosing card. 100 g 1     fluticasone (FLOVENT DISKUS) 250 MCG/BLIST AEPB Inhaler Inhale 2 puffs into the lungs 2 times daily 1 Inhaler 0     ibuprofen (ADVIL/MOTRIN) 600 MG tablet Take 1 tablet (600 mg) by mouth every 6 hours as needed for moderate pain 100 tablet 0     albuterol (PROAIR HFA, PROVENTIL HFA, VENTOLIN HFA) 108 (90 BASE) MCG/ACT inhaler Inhale 2 puffs into the lungs every 6 hours as needed for shortness of breath / dyspnea or wheezing 3 Inhaler 1     zolpidem (AMBIEN) 5 MG tablet Take 1 tablet (5 mg) by mouth nightly as needed for sleep 30 tablet 3     albuterol (2.5 MG/3ML) 0.083% nebulizer solution Inhale 2.5 mg into the lungs         Existing Problems  Patient Active Problem List   Diagnosis     Abnormal cytology finding     Nondependent alcohol abuse, episodic drinking behavior     Anxiety state     Controlled substance agreement signed     Low back pain      "Chronic sinusitis     Cocaine abuse     Major depressive disorder, recurrent episode, moderate (H)     Asthma     Tobacco use disorder     Noninflammatory disorder of vagina     Pap smear for cervical cancer screening     Abdominal pain     Abnormal cervical Papanicolaou smear     Panic disorder with agoraphobia     Atopic rhinitis     Chronic low back pain     Depression     Moderate persistent asthma     Other long term (current) drug therapy     Tobacco use       Allergies:  Allergies   Allergen Reactions     Lidocaine Other (See Comments)     \"my jaw stopped moving\"     Penicillins Hives, Rash and Shortness Of Breath     Lidocaine-Epinephrine [Epinephrine-Lidocaine-Na Metabisulfite] Other (See Comments)     Severe jaw cramping, double vision       Previous labs:  Lab Results   Component Value Date    HGB 12.8 02/02/2017    HCT 39.9 02/02/2017     02/02/2017    BUN 12 02/02/2017               Review of Systems:    CONSTITUTIONAL: no fatigue, no unexpected change in weight  SKIN: no worrisome rashes, no worrisome moles, no worrisome lesions  EYES: no acute vision problems or changes  ENT: no ear problems, no mouth problems, no throat problems  RESP: as above  CV: no chest pain, no palpitations, no new or worsening peripheral edema  GI: no nausea, no vomiting, no constipation, no diarrhea          Physical Exam:     Vitals:    02/20/17 1259   BP: 125/87   Pulse: 112   Temp: 97.9  F (36.6  C)   TempSrc: Oral   SpO2: 95%   Weight: 234 lb (106.1 kg)     Body mass index is 34.06 kg/(m^2).    GENERAL:alert, well hydrated, no distress  EYES: Eyes grossly normal to inspection, extraocular movements - intact, and PERRL  HENT: ear canals- normal; TMs- normal; Nose- normal; Mouth- no ulcers, no lesions  NECK: no tenderness, no adenopathy, no asymmetry, no masses, no stiffness; No evidence of JVD. No bruits.     RESP: lungs: decreased breath sounds right lower lobe.rhonchi with some rales bilaterally about 2/3 the way " up lung.  Recent wounds healing well without drainage.       - no rales, no rhonchi, no wheezes  CV: regular rates and rhythm, normal S1 S2, no S3 or S4 and no murmur, no click or rub -  ABDOMEN: soft, no tenderness, no  hepatosplenomegaly, no masses, normal bowel sounds             Labs and Procedures     Office Visit on 02/02/2017   Component Date Value Ref Range Status     Specific Gravity Urine 02/02/2017 >=1.030  1.005 - 1.030 Final     pH Urine 02/02/2017 5.0  4.5 - 8.0 Final     Leukocyte Esterase UR 02/02/2017 Negative  NEGATIVE Final     Nitrite Urine 02/02/2017 Negative  NEGATIVE Final     Protein UR 02/02/2017 2+* NEGATIVE Final     Glucose Urine 02/02/2017 Negative  NEGATIVE Final     Ketones Urine 02/02/2017 Trace* NEGATIVE Final     Urobilinogen mg/dL 02/02/2017 1.0 E.U./dL* 0.2 E.U./dL Final     Bilirubin UR 02/02/2017 1+* NEGATIVE Final     Blood UR 02/02/2017 3+* NEGATIVE Final     Culture 02/02/2017 SEE RESULTS BELOW   Final    Comment: CULTURE, URINE   SOURCE: Urine, Random   CULTURE RESULTS:    No Growth       Lipase 02/02/2017 6  0 - 52 U/L Final     WBC 02/02/2017 15.4* 4.0 - 11.0 K/uL Final     Lymphocytes # 02/02/2017 1.8  0.8 - 5.3 K/uL Final     % Lymphocytes 02/02/2017 11.5* 20.0 - 48.0 %L Final     Mid # 02/02/2017 0.9  0.0 - 2.2 K/uL Final     Mid % 02/02/2017 5.8  0.0 - 20.0 %M Final     GRANULOCYTES # 02/02/2017 12.7* 1.6 - 8.3 K/uL Final     % Granulocytes 02/02/2017 82.7* 40.0 - 75.0 %G Final     RBC 02/02/2017 4.16  3.80 - 5.20 M/uL Final     Hemoglobin 02/02/2017 12.8  11.7 - 15.7 g/dL Final     Hematocrit 02/02/2017 39.9  35.0 - 47.0 % Final     MCV 02/02/2017 95.8  78.0 - 100.0 fL Final     MCH 02/02/2017 30.8  26.5 - 35.0 pg Final     MCHC 02/02/2017 32.1  32.0 - 36.0 g/dL Final     Platelets 02/02/2017 289.0  150.0 - 450.0 K/uL Final     Sodium 02/02/2017 138  136 - 145 mmol/L Final     Potassium 02/02/2017 3.9  3.5 - 5.0 mmol/L Final     Chloride 02/02/2017 108* 98 - 107  mmol/L Final     CO2, Total 02/02/2017 20* 22 - 31 mmol/L Final     Anion Gap 02/02/2017 10  5 - 18 mmol/L Final     Glucose 02/02/2017 106  70 - 125 mg/dL Final     Urea Nitrogen 02/02/2017 12  8 - 22 mg/dL Final     Creatinine 02/02/2017 0.81  0.60 - 1.10 mg/dL Final     GFR Estimate If Black 02/02/2017 >60  >60 mL/min/1.73m2 Final     GFR Estimate 02/02/2017 >60  >60 mL/min/1.73m2 Final     Bilirubin Total 02/02/2017 0.5  0.0 - 1.0 mg/dL Final     Calcium 02/02/2017 9.2  8.5 - 10.5 mg/dL Final     Protein Total 02/02/2017 6.3  6.0 - 8.0 g/dL Final     Albumin 02/02/2017 3.0* 3.5 - 5.0 g/dL Final     Alkaline Phosphatase 02/02/2017 169* 45 - 120 U/L Final     AST (SGOT) 02/02/2017 98* 0 - 40 U/L Final     ALT (SGPT) 02/02/2017 108* 0 - 45 U/L Final     Amylase 02/02/2017 17  5 - 120 U/L Final              Assessment and Plan     1.Apprears to have had an acute pneumonia that subsequently led to a pericardial effusion, resulting in difficulty breathing. Patient underwent emergency drainage with pericardial window.  Etiology - still not identified from [facility], although cultures pending.   2. Doing better at this time.  Will treat for acute surgical pain.   3. Chest Xray today demonstrates left sided pleural effusion.  Patient said that some fluid ws present on the left at this hospital. Will recheck patient in one weeks time for improvement in the pleural effusion. Will consider drainage with original surgeons if this is not resolving.    4. Will not treat at this time for infection. This appears to have resolved, and was apparently treated in the hospital.  If patient develops fevers, chills, or shortness of breath, have advised patient to seek further help immediately.          Options for treatment and follow-up care were reviewed with the patient and/or guardian. Darlene Hudson and/or guardian engaged in the decision making process and verbalized understanding of the options discussed and agreed with the final  plan.    Oscar Aparicio MD

## 2017-02-20 NOTE — PATIENT INSTRUCTIONS
~ Make an appointment to see your primary doctor (Dr. Vogel) for a follow up of your asthma and PAP smear.     ~ Repeat chest x-ray today.     ~ Will refill your pain medications for you. Take one tablet every 6 hours as needed.     ~~~~~~~~~~~~~~~~~~~~~~~~~~~~~~~~~~~~~~~~~~~~~~~~~~~~  Your medication list is printed, please keep this with you, it is helpful to bring this current list to any other medical appointments, the emergency room or hospital.    If you had lab testing today and your results are reassuring or normal they will be be mailed to you within 7 days.     If the lab tests need quick action we will call you with the results.   The phone number we will call with results is # [phone] (home) . If this is not the best number please call our clinic and change the number.    If you need any refills please call your pharmacy and they will contact us.    If you have any further concerns or wish to schedule another appointment you must call our office during normal business hours  [phone] (8-5:00 M-F)  If you have urgent medical questions that cannot wait  you may also call [phone] at any time of day.  If you have a medical emergency please call [phone].    Thank you for coming to [facility].

## 2017-02-20 NOTE — NURSING NOTE
Patient will come back for asthma check. No ACT, AAP done today. Also due for PAP and patient also informed.   ---CThao,CMA

## 2017-02-24 ENCOUNTER — OFFICE VISIT (OUTPATIENT)
Dept: FAMILY MEDICINE | Facility: CLINIC | Age: 42
End: 2017-02-24

## 2017-02-24 VITALS
WEIGHT: 239 LBS | BODY MASS INDEX: 34.22 KG/M2 | HEART RATE: 114 BPM | HEIGHT: 70 IN | DIASTOLIC BLOOD PRESSURE: 92 MMHG | SYSTOLIC BLOOD PRESSURE: 127 MMHG | OXYGEN SATURATION: 96 % | TEMPERATURE: 98.5 F | RESPIRATION RATE: 20 BRPM

## 2017-02-24 DIAGNOSIS — N10 ACUTE PYELONEPHRITIS: ICD-10-CM

## 2017-02-24 DIAGNOSIS — K85.00 IDIOPATHIC ACUTE PANCREATITIS, UNSPECIFIED COMPLICATION STATUS: ICD-10-CM

## 2017-02-24 DIAGNOSIS — I30.0 IDIOPATHIC PERICARDITIS, UNSPECIFIED CHRONICITY: Primary | ICD-10-CM

## 2017-02-24 LAB
ALBUMIN SERPL BCP-MCNC: 2.9 G/DL (ref 3.5–5)
ALP SERPL-CCNC: 134 U/L (ref 45–120)
ALT SERPL W/O P-5'-P-CCNC: 13 U/L (ref 0–45)
AMPHETAMINES QUAL: NEGATIVE
AST SERPL-CCNC: 15 U/L (ref 0–40)
BARBITURATES QUAL URINE: NEGATIVE
BENZODIAZEPINE QUAL URINE: NEGATIVE
BILIRUB DIRECT SERPL-MCNC: <0.1 MG/DL (ref 0–0.5)
BILIRUB SERPL-MCNC: 0.2 MG/DL (ref 0–1)
BUN SERPL-MCNC: 11 MG/DL (ref 5–24)
BUPRENORPHINE QUAL URINE: NEGATIVE
CALCIUM SERPL-MCNC: 9.2 MG/DL (ref 8.5–10.4)
CANNABINOIDS UR QL SCN: NEGATIVE
CHLORIDE SERPLBLD-SCNC: 102 MMOL/L (ref 94–109)
CO2 SERPL-SCNC: 25 MMOL/L (ref 20–32)
COCAINE QUAL URINE: NEGATIVE
CREAT SERPL-MCNC: 0.7 MG/DL (ref 0.6–1.3)
EGFR CALCULATED (BLACK REFERENCE): 117.5 ML/MIN
EGFR CALCULATED (NON BLACK REFERENCE): 97.1 ML/MIN
GLUCOSE SERPL-MCNC: 96 MG/DL (ref 60–109)
HCT VFR BLD AUTO: 35.8 % (ref 35–47)
HEMOGLOBIN: 11.2 G/DL (ref 11.7–15.7)
LIPASE SERPL-CCNC: 14 U/L (ref 0–52)
MCH RBC QN AUTO: 29.5 PG (ref 26.5–35)
MCHC RBC AUTO-ENTMCNC: 31.3 G/DL (ref 32–36)
MCV RBC AUTO: 94.2 FL (ref 78–100)
METHAMPHETAMINE: NEGATIVE
METHODONE QUAL: NEGATIVE
MORPHINE QUAL: NEGATIVE
OXYCODONE QUAL: POSITIVE
PLATELET # BLD AUTO: 577 K/UL (ref 150–450)
POTASSIUM SERPL-SCNC: 4 MMOL/L (ref 3.4–5.3)
PROT SERPL-MCNC: 6.6 G/DL (ref 6–8)
RBC # BLD AUTO: 3.8 M/UL (ref 3.8–5.2)
SODIUM SERPL-SCNC: 141 MMOL/L (ref 133–144)
TEMPERATURE OF URINE WAS BETWEEN 90-100 DEGREES F: YES
WBC # BLD AUTO: 10.1 K/UL (ref 4–11)

## 2017-02-24 RX ORDER — OXYCODONE AND ACETAMINOPHEN 10; 325 MG/1; MG/1
1-2 TABLET ORAL EVERY 6 HOURS PRN
Qty: 30 TABLET | Refills: 0 | Status: SHIPPED | OUTPATIENT
Start: 2017-02-24 | End: 2017-04-11

## 2017-02-24 NOTE — PROGRESS NOTES
HPI:       Darlene Hudson is a 41 year old  female who presents to address the following concerns:    Patient presents to follow up hospitalization.  See note from Markus 2/20/17.  In short patient admitted to [facility] with SOB found to have a pericardial effusion and tamponade requiring pericardial window placement.  Patient continues to have some discomfort across her back, stomach upset.  She reports that her breathing is stable and in fact somewhat improved since visit with Dr Aparicio 2/20/17.  Patient has follow up scheduled with cardiology next  Week.  Denies fevers, sweats, chills, lightheadedness, presyncopal sx.  Patient has been off work since hospitalization and this has been very stressful for her.  Patient goal is to rehab and get back to work.    At visit with Dr Aparicio 2/20 patient had a follow up chest x-ray given known pleural effusion which was present at time of discharge from hospital.  Xray 2/20 demonstrates continued effusion.  No evidence of infection at that time and antibiotics were not initiated.  Patient denies sx of infection on today's visit.               PMHX:     Patient Active Problem List   Diagnosis     Abnormal cytology finding     Nondependent alcohol abuse, episodic drinking behavior     Anxiety state     Controlled substance agreement signed     Low back pain     Chronic sinusitis     Cocaine abuse     Major depressive disorder, recurrent episode, moderate (H)     Asthma     Tobacco use disorder     Noninflammatory disorder of vagina     Pap smear for cervical cancer screening     Abdominal pain     Abnormal cervical Papanicolaou smear     Panic disorder with agoraphobia     Atopic rhinitis     Chronic low back pain     Depression     Moderate persistent asthma     Other long term (current) drug therapy     Tobacco use       Current Outpatient Prescriptions   Medication Sig Dispense Refill     oxyCODONE-acetaminophen (PERCOCET) 5-325 MG per tablet Take 1  "tablet by mouth every 4 hours as needed for pain maximum 8 tablet(s) per day 30 tablet 0     gabapentin (NEURONTIN) 300 MG capsule Take 2 capsules by mouth three times a day. 180 capsule 5     senna-docusate (SENOKOT-S;PERICOLACE) 8.6-50 MG per tablet Take one to two tablets by mouth twice a day. May take less or stop if stools are too loose or frequent. For constipation 60 tablet 0     LANsoprazole (PREVACID) 15 MG CR capsule Take one capsule by mouth everyday for 30 days to prevent gastritis from NSAIDS 30 capsule 1     prochlorperazine (COMPAZINE) 5 MG tablet Take 1 tablet (5 mg) by mouth every 6 hours as needed for nausea or vomiting 20 tablet 0     colchicine (COLCRYS) 0.6 MG tablet Take 1 tablet (0.6 mg) by mouth 2 times daily For 90 days 60 tablet 2     amitriptyline (ELAVIL) 50 MG tablet Take 1 tablet (50 mg) by mouth At Bedtime 30 tablet 11     oxyCODONE-acetaminophen (PERCOCET)  MG per tablet Take 1-2 tablets by mouth every 6 hours as needed for moderate to severe pain maximum 8 tablet(s) per day 30 tablet 0     zolpidem (AMBIEN) 5 MG tablet Take 1 tablet (5 mg) by mouth nightly as needed for sleep 30 tablet 1     hydrOXYzine (ATARAX) 25 MG tablet Take 1-2 tablets (25-50 mg) by mouth every 6 hours as needed for itching 60 tablet 1     fluticasone (FLOVENT DISKUS) 250 MCG/BLIST AEPB Inhaler Inhale 2 puffs into the lungs 2 times daily 1 Inhaler 0     ibuprofen (ADVIL/MOTRIN) 600 MG tablet Take 1 tablet (600 mg) by mouth every 6 hours as needed for moderate pain 100 tablet 0     albuterol (PROAIR HFA, PROVENTIL HFA, VENTOLIN HFA) 108 (90 BASE) MCG/ACT inhaler Inhale 2 puffs into the lungs every 6 hours as needed for shortness of breath / dyspnea or wheezing 3 Inhaler 1          Allergies   Allergen Reactions     Lidocaine Other (See Comments)     \"my jaw stopped moving\"     Penicillins Hives, Rash and Shortness Of Breath     Lidocaine-Epinephrine [Epinephrine-Lidocaine-Na Metabisulfite] Other (See " "Comments)     Severe jaw cramping, double vision       No results found for this or any previous visit (from the past 24 hour(s)).    Current Outpatient Prescriptions   Medication     oxyCODONE-acetaminophen (PERCOCET) 5-325 MG per tablet     gabapentin (NEURONTIN) 300 MG capsule     senna-docusate (SENOKOT-S;PERICOLACE) 8.6-50 MG per tablet     LANsoprazole (PREVACID) 15 MG CR capsule     prochlorperazine (COMPAZINE) 5 MG tablet     colchicine (COLCRYS) 0.6 MG tablet     amitriptyline (ELAVIL) 50 MG tablet     oxyCODONE-acetaminophen (PERCOCET)  MG per tablet     zolpidem (AMBIEN) 5 MG tablet     hydrOXYzine (ATARAX) 25 MG tablet     fluticasone (FLOVENT DISKUS) 250 MCG/BLIST AEPB Inhaler     ibuprofen (ADVIL/MOTRIN) 600 MG tablet     albuterol (PROAIR HFA, PROVENTIL HFA, VENTOLIN HFA) 108 (90 BASE) MCG/ACT inhaler     No current facility-administered medications for this visit.               Review of Systems:   ROS as described above.           Physical Exam:     Vitals:    02/24/17 1326 02/24/17 1332   BP: (!) 126/91 (!) 127/92   Pulse: 114    Resp: 20    Temp: 98.5  F (36.9  C)    SpO2: 96%    Weight: 239 lb (108.4 kg)    Height: 5' 9.5\" (176.5 cm)      Body mass index is 34.79 kg/(m^2).    GEN: patient sitting comfortably in NAD  HEEN: Head is atraumatic, normocephalic, eyes anicteric, mucous membranes moist  CV: mild tachycardia.  HR regular without extra sounds  PULM: Decreased breath sound left lower lobe but otherwise clear.   ABD: soft, nontender, bowel sounds present  NEURO: Alert and oriented x3.  No focal motor abnormalities.  Face symmetric.  PSYCH: appropriate  SKIN: No rashes, bruising, or other lesions    Results for orders placed or performed in visit on 02/24/17   Rapid Urine Drug Screen ([facility])   Result Value Ref Range    Amphetamines Qual NEGATIVE NEGATIVE    Barbiturates Qual Urine NEGATIVE NEGATIVE    Buprenorphine Qual Urine NEGATIVE NEGATIVE    Benzodiazepine Qual Urine NEGATIVE " NEGATIVE    Cocaine Qual Urine NEGATIVE NEGATIVE    Cannabinoids Qual Urine NEGATIVE NEGATIVE    Methamphetamine Qual NEGATIVE NEGATIVE    Methadone Qual NEGATIVE NEGATIVE    Morphine Qual NEGATIVE NEGATIVE    Oxycodone Qual POSITIVE (A) NEGATIVE    Temperature of Urine was Between  Degrees F YES YES   Urine Culture ([facility])   Result Value Ref Range    Culture SEE RESULTS BELOW     Narrative    Test performed by:  [facility]  [address] [place] [address]   Basic Metabolic Panel ([facility]) - Results < 1 hr   Result Value Ref Range    Glucose 96.0 60.0 - 109.0 mg/dL    Urea Nitrogen 11.0 5.0 - 24.0 mg/dL    Creatinine 0.7 0.6 - 1.3 mg/dL    Sodium 141.0 133.0 - 144.0 mmol/L    Potassium 4.0 3.4 - 5.3 mmol/L    Chloride 102.0 94.0 - 109.0 mmol/L    Carbon Dioxide 25.0 20.0 - 32.0 mmol/L    Calcium 9.2 8.5 - 10.4 mg/dL    eGFR Calculated (Non Black Reference) 97.1 >60.0 mL/min    eGFR Calculated (Black Reference) 117.5 >60.0 mL/min   Lipase ([facility])   Result Value Ref Range    Lipase 14 0 - 52 U/L    Narrative    Test performed by:  [facility]  [address] [place] [address]   Hepatic Profile ([facility])   Result Value Ref Range    Bilirubin Total 0.2 0.0 - 1.0 mg/dL    Bilirubin Direct <0.1 <=0.5 mg/dL    Protein Total 6.6 6.0 - 8.0 g/dL    Albumin 2.9 (L) 3.5 - 5.0 g/dL    Alkaline Phosphatase 134 (H) 45 - 120 U/L    AST (SGOT) 15 0 - 40 U/L    ALT (SGPT) 13 0 - 45 U/L    Narrative    Test performed by:  [facility]  [address] [place] [address]   CBC with Plt ([facility])   Result Value Ref Range    WBC 10.1 4.0 - 11.0 K/uL    RBC 3.80 3.80 - 5.20 M/uL    Hemoglobin 11.2 (L) 11.7 - 15.7 g/dL    Hematocrit 35.8 35.0 - 47.0 %    MCV 94.2 78.0 - 100.0 fL    MCH 29.5 26.5 - 35.0 pg    MCHC 31.3 (L) 32.0 - 36.0 g/dL    Platelets 577.0 (H) 150.0 - 450.0 K/uL     CXR: sm right pericardial effusion.  Stable.      Assessment and Plan     1) Recent  pericardial effusion with tamponade s/p pericarial window: sx stable today.  Has repeat ECHO scheduled for next week with cardiology visit to follow.  Recommend that patient keep this appointment.   2) Pleural effusion: stable from visit 2/2017.  Likely 2/2 PC effusion s/p window.  Recommend follow up until resolution.  No sx of resp infection today requiring abx.  Hepatic profile and CBC WNL today.   3) Pain: patient with hx chronic pain.  Reporting ongoing pain s/p procedure.  U tox as expected and sm rx opiates prescribed given recent procedure.  Patient historically managed by pain clinic, with continued pain would need to go back to such arrangement.  High risk given concerns in hospital regarding alcohol use (denies current use) and reported hx dependence  4) UTI; screened for uti today given patient desire and hx UTI in hospital.  This was negative.     Options for treatment and follow-up care were reviewed with the patient and/or guardian. Darlene Hudson and/or guardian engaged in the decision making process and verbalized understanding of the options discussed and agreed with the final plan.    Anaid Vogel MD

## 2017-02-24 NOTE — MR AVS SNAPSHOT
After Visit Summary   2/24/2017    Darlene Hudson    MRN: [id number]           Patient Information     Date Of Birth          1975        Visit Information        Provider Department      2/24/2017 1:20 PM Anaid Vogel MD [facility]        Today's Diagnoses     Idiopathic pericarditis, unspecified chronicity    -  1    Acute pyelonephritis        Idiopathic acute pancreatitis, unspecified complication status          Care Instructions        Your medication list is printed, please keep this with you, it is helpful to bring this current list to any other medical appointments, the emergency room or hospital.    If you had lab testing today and your results are reassuring or normal they will be be mailed to you within 7 days.     If the lab tests need quick action we will call you with the results.   The phone number we will call with results is # [phone] (home) . If this is not the best number please call our clinic and change the number.    If you need any refills please call your pharmacy and they will contact us.    If you have any further concerns or wish to schedule another appointment you must call our office during normal business hours  [phone] (8-5:00 M-F)  If you have urgent medical questions that cannot wait  you may also call [phone] at any time of day.  If you have a medical emergency please call [phone].    Thank you for coming to [facility].          Follow-ups after your visit        Your next 10 appointments already scheduled     Mar 23, 2017 10:00 AM CDT   Return Visit with Oscar Aparicio MD   [facility] ([facility])    [address] [place] [id number]   [phone]              Who to contact     Please call your clinic at [phone] to:    Ask questions about your health    Make or cancel appointments    Discuss your medicines    Learn about your test results    Speak to your doctor   If you have  "compliments or concerns about an experience at your clinic, or if you wish to file a complaint, please contact [facility] Patient Relations at [phone] or email us at [email or web address]         Additional Information About Your Visit        Care EveryWhere ID     This is your Care EveryWhere ID. This could be used by other organizations to access your [facility] medical records  [id number]        Your Vitals Were     Pulse Temperature Respirations Height Last Period Pulse Oximetry    114 98.5  F (36.9  C) 20 5' 9.5\" (176.5 cm) 02/09/2017 96%    BMI (Body Mass Index)                   34.79 kg/m2            Blood Pressure from Last 3 Encounters:   03/16/17 (!) 138/97   03/02/17 117/85   02/24/17 (!) 127/92    Weight from Last 3 Encounters:   03/16/17 245 lb 12.8 oz (111.5 kg)   03/02/17 237 lb (107.5 kg)   02/24/17 239 lb (108.4 kg)              We Performed the Following     Basic Metabolic Panel ([facility]) - Results < 1 hr     CBC with Plt ([facility])     Hepatic Profile ([facility])     Lipase ([facility])     Rapid Urine Drug Screen ([facility] )     Urine Culture ([facility])     XR CHEST 2 VW          Where to get your medicines      Some of these will need a paper prescription and others can be bought over the counter.  Ask your nurse if you have questions.     Bring a paper prescription for each of these medications     oxyCODONE-acetaminophen  MG per tablet          Primary Care Provider Office Phone # Fax #    Anaid Ann Vogel [phone]       [facility] [address] [facility] [address]        Thank you!     Thank you for choosing [facility]  for your care. Our goal is always to provide you with excellent care. Hearing back from our patients is one way we can continue to improve our services. Please take a few minutes to complete the written survey that you may receive in the mail after your visit with us. Thank you!   "           Your Updated Medication List - Protect others around you: Learn how to safely use, store and throw away your medicines at www.disposemymeds.org.          This list is accurate as of: 2/24/17 11:59 PM.  Always use your most recent med list.                   Brand Name Dispense Instructions for use    amitriptyline 50 MG tablet    ELAVIL    30 tablet    Take 1 tablet (50 mg) by mouth At Bedtime       COLCRYS 0.6 MG tablet   Generic drug:  colchicine     60 tablet    Take 1 tablet (0.6 mg) by mouth 2 times daily For 90 days       gabapentin 300 MG capsule    NEURONTIN    180 capsule    Take 2 capsules by mouth three times a day.       hydrOXYzine 25 MG tablet    ATARAX    60 tablet    Take 1-2 tablets (25-50 mg) by mouth every 6 hours as needed for itching       ibuprofen 600 MG tablet    ADVIL/MOTRIN    100 tablet    Take 1 tablet (600 mg) by mouth every 6 hours as needed for moderate pain       LANsoprazole 15 MG CR capsule    PREVACID    30 capsule    Take one capsule by mouth everyday for 30 days to prevent gastritis from NSAIDS       * oxyCODONE-acetaminophen 5-325 MG per tablet    PERCOCET    30 tablet    Take 1 tablet by mouth every 4 hours as needed for pain maximum 8 tablet(s) per day       * oxyCODONE-acetaminophen  MG per tablet    PERCOCET    30 tablet    Take 1-2 tablets by mouth every 6 hours as needed for moderate to severe pain maximum 8 tablet(s) per day       prochlorperazine 5 MG tablet    COMPAZINE    20 tablet    Take 5 mg by mouth every 6 hours as needed for nausea or vomiting Reported on 3/2/2017       zolpidem 5 MG tablet    AMBIEN    30 tablet    Take 1 tablet (5 mg) by mouth nightly as needed for sleep       * Notice:  This list has 2 medication(s) that are the same as other medications prescribed for you. Read the directions carefully, and ask your doctor or other care provider to review them with you.

## 2017-02-24 NOTE — PATIENT INSTRUCTIONS
Your medication list is printed, please keep this with you, it is helpful to bring this current list to any other medical appointments, the emergency room or hospital.    If you had lab testing today and your results are reassuring or normal they will be be mailed to you within 7 days.     If the lab tests need quick action we will call you with the results.   The phone number we will call with results is # [phone] (home) . If this is not the best number please call our clinic and change the number.    If you need any refills please call your pharmacy and they will contact us.    If you have any further concerns or wish to schedule another appointment you must call our office during normal business hours  [phone] (8-5:00 M-F)  If you have urgent medical questions that cannot wait  you may also call [phone] at any time of day.  If you have a medical emergency please call [phone].    Thank you for coming to [facility].

## 2017-02-25 LAB — CULTURE: NORMAL

## 2017-02-25 ASSESSMENT — PATIENT HEALTH QUESTIONNAIRE - PHQ9: SUM OF ALL RESPONSES TO PHQ QUESTIONS 1-9: 15

## 2017-02-27 NOTE — PROGRESS NOTES
Please call patient with result.  Lab tests were reassuring.  Culture of the urine was negative  Kidneys working normally, hepatic panel improved from hospital, and no significant sign infection.  Recommend follow up with cardiology as planned [phone]

## 2017-02-28 DIAGNOSIS — J45.30 MILD PERSISTENT ASTHMA WITHOUT COMPLICATION: ICD-10-CM

## 2017-02-28 NOTE — PROGRESS NOTES
Please call patient with results and recommendations.  The previous CT was reviewed, and still showed no pericardial effusion, so this must have occurred very quickly. Still some fluid in right chest as discussed at visit. Will check this at next visit to ensure it improves.

## 2017-03-02 ENCOUNTER — TELEPHONE (OUTPATIENT)
Dept: FAMILY MEDICINE | Facility: CLINIC | Age: 42
End: 2017-03-02

## 2017-03-02 ENCOUNTER — OFFICE VISIT (OUTPATIENT)
Dept: FAMILY MEDICINE | Facility: CLINIC | Age: 42
End: 2017-03-02

## 2017-03-02 VITALS
OXYGEN SATURATION: 97 % | HEART RATE: 116 BPM | DIASTOLIC BLOOD PRESSURE: 85 MMHG | TEMPERATURE: 97.7 F | WEIGHT: 237 LBS | SYSTOLIC BLOOD PRESSURE: 117 MMHG | BODY MASS INDEX: 34.5 KG/M2

## 2017-03-02 DIAGNOSIS — G89.18 ACUTE POST-OPERATIVE PAIN: ICD-10-CM

## 2017-03-02 DIAGNOSIS — J20.9 ACUTE BRONCHITIS, UNSPECIFIED ORGANISM: ICD-10-CM

## 2017-03-02 DIAGNOSIS — R11.0 NAUSEA: ICD-10-CM

## 2017-03-02 DIAGNOSIS — F43.23 ADJUSTMENT DISORDER WITH MIXED ANXIETY AND DEPRESSED MOOD: ICD-10-CM

## 2017-03-02 DIAGNOSIS — J90 PLEURAL EFFUSION: Primary | ICD-10-CM

## 2017-03-02 RX ORDER — METOCLOPRAMIDE 5 MG/1
5 TABLET ORAL 2 TIMES DAILY PRN
Qty: 30 TABLET | Refills: 0 | Status: SHIPPED | OUTPATIENT
Start: 2017-03-02 | End: 2017-04-11

## 2017-03-02 RX ORDER — BUPROPION HYDROCHLORIDE 150 MG/1
150 TABLET, EXTENDED RELEASE ORAL 2 TIMES DAILY
Qty: 60 TABLET | Refills: 1 | Status: SHIPPED | OUTPATIENT
Start: 2017-03-02 | End: 2017-04-11

## 2017-03-02 RX ORDER — ALBUTEROL SULFATE 90 UG/1
2 AEROSOL, METERED RESPIRATORY (INHALATION) EVERY 6 HOURS PRN
Qty: 1 INHALER | Refills: 3 | Status: SHIPPED | OUTPATIENT
Start: 2017-03-02 | End: 2017-10-08

## 2017-03-02 RX ORDER — PROCHLORPERAZINE MALEATE 5 MG
5 TABLET ORAL EVERY 6 HOURS PRN
Refills: 0 | Status: CANCELLED | OUTPATIENT
Start: 2017-03-02

## 2017-03-02 RX ORDER — OXYCODONE AND ACETAMINOPHEN 5; 325 MG/1; MG/1
1 TABLET ORAL EVERY 4 HOURS PRN
Qty: 15 TABLET | Refills: 0 | Status: SHIPPED | OUTPATIENT
Start: 2017-03-02 | End: 2017-03-27

## 2017-03-02 NOTE — PROGRESS NOTES
HPI:       Darlene Hudson is a 41 year old  female who presents for followup of pleural and pericardial effusion.         Patient presents to day with increased anxiety about pleural effusion.  Had seen cardiologist who was recommending tapping and culture.  Patient was informed that the culture taken at [facility] was growing a fungus that was unidentified, but was not tuberculosis.  Cardiologist was referring patient to infectious disease for tapping and identification.      Patient jasmine previously had the effusion tapped, cultures pending as above. Infectious disease at [facility] was informed, and patient was called from [facility] and advised of the results. Was also notified at that time that no additional treatment was required at this time.      Patient feeling well, no fever, no SOB.  History of anxiety. Patient reports previou treatment with welbutrin, which she thought she responded to well.  Does not want an antidepressant, but would accept Wellbutrin.      Continued pain at night from the pericardial window wound keeps her awake at night. Otherwise pain is improving.          Patient finds that she is focusing on the diagnosis. Is becoming increasingly worried, although her condition is continuing to improve.               PMHX:   Current Medications:   Current Outpatient Prescriptions   Medication Sig Dispense Refill     fluticasone (FLOVENT DISKUS) 250 MCG/BLIST AEPB Inhaler Inhale 2 puffs into the lungs 2 times daily 60 Inhaler 3     albuterol (PROAIR HFA/PROVENTIL HFA/VENTOLIN HFA) 108 (90 BASE) MCG/ACT Inhaler Inhale 2 puffs into the lungs every 6 hours as needed for shortness of breath / dyspnea or wheezing 1 Inhaler 3     oxyCODONE-acetaminophen (PERCOCET) 5-325 MG per tablet Take 1 tablet by mouth every 4 hours as needed for pain maximum 4 tablet(s) per day 15 tablet 0     buPROPion (WELLBUTRIN SR) 150 MG 12 hr tablet Take 1 tablet (150 mg) by mouth 2 times daily 60 tablet 1      metoclopramide (REGLAN) 5 MG tablet Take 1 tablet (5 mg) by mouth 2 times daily as needed 30 tablet 0     gabapentin (NEURONTIN) 300 MG capsule Take 2 capsules by mouth three times a day. 180 capsule 5     LANsoprazole (PREVACID) 15 MG CR capsule Take one capsule by mouth everyday for 30 days to prevent gastritis from NSAIDS 30 capsule 1     colchicine (COLCRYS) 0.6 MG tablet Take 1 tablet (0.6 mg) by mouth 2 times daily For 90 days 60 tablet 2     amitriptyline (ELAVIL) 50 MG tablet Take 1 tablet (50 mg) by mouth At Bedtime 30 tablet 11     ibuprofen (ADVIL/MOTRIN) 600 MG tablet Take 1 tablet (600 mg) by mouth every 6 hours as needed for moderate pain 100 tablet 0     oxyCODONE-acetaminophen (PERCOCET)  MG per tablet Take 1-2 tablets by mouth every 6 hours as needed for moderate to severe pain maximum 8 tablet(s) per day (Patient not taking: Reported on 3/2/2017) 30 tablet 0     oxyCODONE-acetaminophen (PERCOCET) 5-325 MG per tablet Take 1 tablet by mouth every 4 hours as needed for pain maximum 8 tablet(s) per day (Patient not taking: Reported on 3/2/2017) 30 tablet 0     prochlorperazine (COMPAZINE) 5 MG tablet Take 5 mg by mouth every 6 hours as needed for nausea or vomiting Reported on 3/2/2017 20 tablet 0     zolpidem (AMBIEN) 5 MG tablet Take 1 tablet (5 mg) by mouth nightly as needed for sleep 30 tablet 1     hydrOXYzine (ATARAX) 25 MG tablet Take 1-2 tablets (25-50 mg) by mouth every 6 hours as needed for itching (Patient not taking: Reported on 3/2/2017) 60 tablet 1     [DISCONTINUED] albuterol (PROAIR HFA, PROVENTIL HFA, VENTOLIN HFA) 108 (90 BASE) MCG/ACT inhaler Inhale 2 puffs into the lungs every 6 hours as needed for shortness of breath / dyspnea or wheezing 3 Inhaler 1       Existing Problems  Patient Active Problem List   Diagnosis     Abnormal cytology finding     Nondependent alcohol abuse, episodic drinking behavior     Anxiety state     Controlled substance agreement signed     Low back  "pain     Chronic sinusitis     Cocaine abuse     Major depressive disorder, recurrent episode, moderate (H)     Asthma     Tobacco use disorder     Noninflammatory disorder of vagina     Pap smear for cervical cancer screening     Abdominal pain     Abnormal cervical Papanicolaou smear     Panic disorder with agoraphobia     Atopic rhinitis     Chronic low back pain     Depression     Moderate persistent asthma     Other long term (current) drug therapy     Tobacco use       Allergies:  Allergies   Allergen Reactions     Lidocaine Other (See Comments)     \"my jaw stopped moving\"     Penicillins Hives, Rash and Shortness Of Breath     Lidocaine-Epinephrine [Epinephrine-Lidocaine-Na Metabisulfite] Other (See Comments)     Severe jaw cramping, double vision       Previous labs:  Lab Results   Component Value Date    HGB 11.2 (L) 02/24/2017    HCT 35.8 02/24/2017    .0 02/24/2017    BUN 11.0 02/24/2017    CO2 25.0 02/24/2017               Review of Systems:    CONSTITUTIONAL: no fatigue, no unexpected change in weight  SKIN: no worrisome rashes, no worrisome moles, no worrisome lesions  EYES: no acute vision problems or changes  ENT: no ear problems, no mouth problems, no throat problems  RESP: no significant cough, no shortness of breath  CV: incisional pain, no palpitations, no new or worsening peripheral edema  GI: no nausea, no vomiting, no constipation, no diarrhea          Physical Exam:     Vitals:    03/02/17 1057 03/02/17 1106   BP: (!) 156/92 117/85   Pulse: 116    Temp: 97.7  F (36.5  C)    TempSrc: Oral    SpO2: 97%    Weight: 237 lb (107.5 kg)      Body mass index is 34.5 kg/(m^2).    GENERAL:alert, well hydrated, no distress  EYES: Eyes grossly normal to inspection, extraocular movements - intact, and PERRL  HENT: ear canals- normal; TMs- normal; Nose- normal; Mouth- no ulcers, no lesions  NECK: no tenderness, no adenopathy, no asymmetry, no masses, no stiffness; thyroid- normal to palpation  RESP: " lungs clear to auscultation - no rales, no rhonchi, previous absence of breath sounds on the right improved, and now just at most inferior aspect of right lung base.    CV: regular rates and rhythm, normal S1 S2, no S3 or S4 and no murmur, no click or rub -               Labs and Procedures     Office Visit on 02/24/2017   Component Date Value Ref Range Status     Amphetamines Qual 02/24/2017 NEGATIVE  NEGATIVE Final     Barbiturates Qual Urine 02/24/2017 NEGATIVE  NEGATIVE Final     Buprenorphine Qual Urine 02/24/2017 NEGATIVE  NEGATIVE Final     Benzodiazepine Qual Urine 02/24/2017 NEGATIVE  NEGATIVE Final     Cocaine Qual Urine 02/24/2017 NEGATIVE  NEGATIVE Final     Cannabinoids Qual Urine 02/24/2017 NEGATIVE  NEGATIVE Final     Methamphetamine Qual 02/24/2017 NEGATIVE  NEGATIVE Final     Methadone Qual 02/24/2017 NEGATIVE  NEGATIVE Final     Morphine Qual 02/24/2017 NEGATIVE  NEGATIVE Final     Oxycodone Qual 02/24/2017 POSITIVE* NEGATIVE Final     Temperature of Urine was Between 9* 02/24/2017 YES  YES Final    Comment: This is a preliminary screening test that detects drugs-of-abuse in urine at   specified detection levels.  To confirm preliminary results, a more specific   method such as Gas Chromatography/Mass Spectrometry (GC/MS) must be used.          Culture 02/24/2017 SEE RESULTS BELOW   Final    Comment: CULTURE, URINE   SOURCE: Urine, Random   CULTURE RESULTS:    No Growth       Glucose 02/24/2017 96.0  60.0 - 109.0 mg/dL Final     Urea Nitrogen 02/24/2017 11.0  5.0 - 24.0 mg/dL Final     Creatinine 02/24/2017 0.7  0.6 - 1.3 mg/dL Final     Sodium 02/24/2017 141.0  133.0 - 144.0 mmol/L Final     Potassium 02/24/2017 4.0  3.4 - 5.3 mmol/L Final     Chloride 02/24/2017 102.0  94.0 - 109.0 mmol/L Final     Carbon Dioxide 02/24/2017 25.0  20.0 - 32.0 mmol/L Final     Calcium 02/24/2017 9.2  8.5 - 10.4 mg/dL Final     eGFR Calculated (Non Black Referen* 02/24/2017 97.1  >60.0 mL/min Final     eGFR  Calculated (Black Reference) 02/24/2017 117.5  >60.0 mL/min Final     Lipase 02/24/2017 14  0 - 52 U/L Final     Bilirubin Total 02/24/2017 0.2  0.0 - 1.0 mg/dL Final     Bilirubin Direct 02/24/2017 <0.1  <=0.5 mg/dL Final     Protein Total 02/24/2017 6.6  6.0 - 8.0 g/dL Final     Albumin 02/24/2017 2.9* 3.5 - 5.0 g/dL Final     Alkaline Phosphatase 02/24/2017 134* 45 - 120 U/L Final     AST (SGOT) 02/24/2017 15  0 - 40 U/L Final     ALT (SGPT) 02/24/2017 13  0 - 45 U/L Final     WBC 02/24/2017 10.1  4.0 - 11.0 K/uL Final     RBC 02/24/2017 3.80  3.80 - 5.20 M/uL Final     Hemoglobin 02/24/2017 11.2* 11.7 - 15.7 g/dL Final     Hematocrit 02/24/2017 35.8  35.0 - 47.0 % Final     MCV 02/24/2017 94.2  78.0 - 100.0 fL Final     MCH 02/24/2017 29.5  26.5 - 35.0 pg Final     MCHC 02/24/2017 31.3* 32.0 - 36.0 g/dL Final     Platelets 02/24/2017 577.0* 150.0 - 450.0 K/uL Final              Assessment and Plan     1.CXR demonstrates substantial improvement in right pleural effusion, with less than 30% of the previous effusion present.  Will not pursue drainage or further culture at this time as we await existing culture results.   2. Consider possible histoplasmosis with pancreatitis, pneumonia, and pericarditis.  Will await culture.  Appears to be recovering well.  3. Will start wellbutrin in view of increasing anxiety and previous positive response  - change to celexa if no response.   4. Will provide fewer pain meds, only take at night.   5. Follow up with surgery as scheduled.   6. Recheck in three weeks.       Options for treatment and follow-up care were reviewed with the patient and/or guardian. Darlene Hudson and/or guardian engaged in the decision making process and verbalized understanding of the options discussed and agreed with the final plan.    Oscar Aparicio MD

## 2017-03-02 NOTE — MR AVS SNAPSHOT
After Visit Summary   3/2/2017    Darlene Hudson    MRN: [id number]           Patient Information     Date Of Birth          1975        Visit Information        Provider Department      3/2/2017 10:40 AM Oscar Aparicio MD [facility]        Today's Diagnoses     Pleural effusion    -  1    Acute bronchitis, unspecified organism        Acute post-operative pain        Adjustment disorder with mixed anxiety and depressed mood        Nausea          Care Instructions    ~ Recommend you to follow up with Infectious Disease to follow up with the fluid in your lungs.     ~ Take your Oxycodone only at bedtime to help you with your chest pain to help you sleep.     ~ Recommend you starting an anxiety medication. Will try Wellbutrin. Take once a day for the first 3 days then take one twice a day.     ~ Will change your nausea medication to Metoclopramide 1 tablet twice daily as needed.    ~ Refilled your albuterol inhaler.      Your medication list is printed, please keep this with you, it is helpful to bring this current list to any other medical appointments, the emergency room or hospital.    If you had lab testing today and your results are reassuring or normal they will be be mailed to you within 7 days.     If the lab tests need quick action we will call you with the results.   The phone number we will call with results is # [phone] (home) . If this is not the best number please call our clinic and change the number.    If you need any refills please call your pharmacy and they will contact us.    If you have any further concerns or wish to schedule another appointment you must call our office during normal business hours  [phone] (8-5:00 M-F)  If you have urgent medical questions that cannot wait  you may also call [phone] at any time of day.  If you have a medical emergency please call [phone].    Thank you for coming to [facility].           Follow-ups after  your visit        Your next 10 appointments already scheduled     Mar 03, 2017 10:00 AM CST   Return Visit with Anaid Vogel MD   [facility] ([facility])    [address] [place] [address]   [phone]              Who to contact     Please call your clinic at [phone] to:    Ask questions about your health    Make or cancel appointments    Discuss your medicines    Learn about your test results    Speak to your doctor   If you have compliments or concerns about an experience at your clinic, or if you wish to file a complaint, please contact [facility] Patient Relations at [phone] or email us at [email or web address]         Additional Information About Your Visit        Care EveryWhere ID     This is your Care EveryWhere ID. This could be used by other organizations to access your [facility] medical records  [id number]        Your Vitals Were     Pulse Temperature Last Period Pulse Oximetry BMI (Body Mass Index)       116 97.7  F (36.5  C) (Oral) 02/09/2017 97% 34.5 kg/m2        Blood Pressure from Last 3 Encounters:   03/02/17 117/85   02/24/17 (!) 127/92   02/20/17 125/87    Weight from Last 3 Encounters:   03/02/17 237 lb (107.5 kg)   02/24/17 239 lb (108.4 kg)   02/20/17 234 lb (106.1 kg)              We Performed the Following     XR CHEST 2 VW     XR CHEST 2 VW          Today's Medication Changes          These changes are accurate as of: 3/2/17 11:57 AM.  If you have any questions, ask your nurse or doctor.               Start taking these medicines.        Dose/Directions    buPROPion 150 MG 12 hr tablet   Commonly known as:  WELLBUTRIN SR   Used for:  Adjustment disorder with mixed anxiety and depressed mood   Started by:  Oscar Aparicio MD        Dose:  150 mg   Take 1 tablet (150 mg) by mouth 2 times daily   Quantity:  60 tablet   Refills:  1       metoclopramide 5 MG tablet   Commonly known as:  REGLAN   Used  for:  Nausea   Started by:  Oscar Aparicio MD        Dose:  5 mg   Take 1 tablet (5 mg) by mouth 2 times daily as needed   Quantity:  30 tablet   Refills:  0         These medicines have changed or have updated prescriptions.        Dose/Directions    * oxyCODONE-acetaminophen 5-325 MG per tablet   Commonly known as:  PERCOCET   This may have changed:  Another medication with the same name was added. Make sure you understand how and when to take each.   Used for:  Pericardial effusion, Chest wall pain following surgery   Changed by:  Oscar Aparicio MD        Dose:  1 tablet   Take 1 tablet by mouth every 4 hours as needed for pain maximum 8 tablet(s) per day   Quantity:  30 tablet   Refills:  0       * oxyCODONE-acetaminophen  MG per tablet   Commonly known as:  PERCOCET   This may have changed:  Another medication with the same name was added. Make sure you understand how and when to take each.   Used for:  Idiopathic acute pancreatitis, unspecified complication status   Changed by:  Anaid Vogel MD        Dose:  1-2 tablet   Take 1-2 tablets by mouth every 6 hours as needed for moderate to severe pain maximum 8 tablet(s) per day   Quantity:  30 tablet   Refills:  0       * oxyCODONE-acetaminophen 5-325 MG per tablet   Commonly known as:  PERCOCET   This may have changed:  You were already taking a medication with the same name, and this prescription was added. Make sure you understand how and when to take each.   Used for:  Acute post-operative pain   Changed by:  Oscar Aparicio MD        Dose:  1 tablet   Take 1 tablet by mouth every 4 hours as needed for pain maximum 4 tablet(s) per day   Quantity:  15 tablet   Refills:  0       * Notice:  This list has 3 medication(s) that are the same as other medications prescribed for you. Read the directions carefully, and ask your doctor or other care provider to review them with you.         Where to get your medicines      These medications  were sent to [id number] - [place], [place] - [address] AT [address] of [address]  [address], [place] [address]     Phone:  [phone]     albuterol 108 (90 BASE) MCG/ACT Inhaler    buPROPion 150 MG 12 hr tablet    metoclopramide 5 MG tablet         Some of these will need a paper prescription and others can be bought over the counter.  Ask your nurse if you have questions.     Bring a paper prescription for each of these medications     oxyCODONE-acetaminophen 5-325 MG per tablet                Primary Care Provider Office Phone # Fax #    Anaid Vogel [phone]       [facility] [address] [place] [address]        Thank you!     Thank you for choosing [facility]  for your care. Our goal is always to provide you with excellent care. Hearing back from our patients is one way we can continue to improve our services. Please take a few minutes to complete the written survey that you may receive in the mail after your visit with us. Thank you!             Your Updated Medication List - Protect others around you: Learn how to safely use, store and throw away your medicines at www.disposemymeds.org.          This list is accurate as of: 3/2/17 11:57 AM.  Always use your most recent med list.                   Brand Name Dispense Instructions for use    albuterol 108 (90 BASE) MCG/ACT Inhaler    PROAIR HFA/PROVENTIL HFA/VENTOLIN HFA    1 Inhaler    Inhale 2 puffs into the lungs every 6 hours as needed for shortness of breath / dyspnea or wheezing       amitriptyline 50 MG tablet    ELAVIL    30 tablet    Take 1 tablet (50 mg) by mouth At Bedtime       buPROPion 150 MG 12 hr tablet    WELLBUTRIN SR    60 tablet    Take 1 tablet (150 mg) by mouth 2 times daily       COLCRYS 0.6 MG tablet   Generic drug:  colchicine     60 tablet    Take 1 tablet (0.6 mg) by mouth 2 times daily For 90 days       fluticasone 250 MCG/BLIST Aepb  Inhaler    FLOVENT DISKUS    60 Inhaler    Inhale 2 puffs into the lungs 2 times daily       gabapentin 300 MG capsule    NEURONTIN    180 capsule    Take 2 capsules by mouth three times a day.       hydrOXYzine 25 MG tablet    ATARAX    60 tablet    Take 1-2 tablets (25-50 mg) by mouth every 6 hours as needed for itching       ibuprofen 600 MG tablet    ADVIL/MOTRIN    100 tablet    Take 1 tablet (600 mg) by mouth every 6 hours as needed for moderate pain       LANsoprazole 15 MG CR capsule    PREVACID    30 capsule    Take one capsule by mouth everyday for 30 days to prevent gastritis from NSAIDS       metoclopramide 5 MG tablet    REGLAN    30 tablet    Take 1 tablet (5 mg) by mouth 2 times daily as needed       * oxyCODONE-acetaminophen 5-325 MG per tablet    PERCOCET    30 tablet    Take 1 tablet by mouth every 4 hours as needed for pain maximum 8 tablet(s) per day       * oxyCODONE-acetaminophen  MG per tablet    PERCOCET    30 tablet    Take 1-2 tablets by mouth every 6 hours as needed for moderate to severe pain maximum 8 tablet(s) per day       * oxyCODONE-acetaminophen 5-325 MG per tablet    PERCOCET    15 tablet    Take 1 tablet by mouth every 4 hours as needed for pain maximum 4 tablet(s) per day       prochlorperazine 5 MG tablet    COMPAZINE    20 tablet    Take 5 mg by mouth every 6 hours as needed for nausea or vomiting Reported on 3/2/2017       zolpidem 5 MG tablet    AMBIEN    30 tablet    Take 1 tablet (5 mg) by mouth nightly as needed for sleep       * Notice:  This list has 3 medication(s) that are the same as other medications prescribed for you. Read the directions carefully, and ask your doctor or other care provider to review them with you.

## 2017-03-10 DIAGNOSIS — F41.9 ANXIETY: Primary | ICD-10-CM

## 2017-03-10 RX ORDER — HYDROXYZINE PAMOATE 50 MG/1
50 CAPSULE ORAL 3 TIMES DAILY PRN
Qty: 30 CAPSULE | Refills: 1 | Status: SHIPPED | OUTPATIENT
Start: 2017-03-10 | End: 2017-04-11

## 2017-03-16 ENCOUNTER — OFFICE VISIT (OUTPATIENT)
Dept: FAMILY MEDICINE | Facility: CLINIC | Age: 42
End: 2017-03-16

## 2017-03-16 VITALS
TEMPERATURE: 98.3 F | RESPIRATION RATE: 18 BRPM | DIASTOLIC BLOOD PRESSURE: 97 MMHG | OXYGEN SATURATION: 97 % | HEART RATE: 97 BPM | SYSTOLIC BLOOD PRESSURE: 138 MMHG | BODY MASS INDEX: 35.78 KG/M2 | WEIGHT: 245.8 LBS

## 2017-03-16 DIAGNOSIS — J90 PLEURAL EFFUSION: Primary | ICD-10-CM

## 2017-03-16 DIAGNOSIS — J18.9 PNEUMONIA OF RIGHT LUNG DUE TO INFECTIOUS ORGANISM, UNSPECIFIED PART OF LUNG: ICD-10-CM

## 2017-03-16 DIAGNOSIS — G89.18 ACUTE POST-OPERATIVE PAIN: ICD-10-CM

## 2017-03-16 DIAGNOSIS — R07.9 CHEST PAIN, UNSPECIFIED TYPE: ICD-10-CM

## 2017-03-16 DIAGNOSIS — I31.39 PERICARDIAL EFFUSION: ICD-10-CM

## 2017-03-16 LAB
% GRANULOCYTES: 69.3 %G (ref 40–75)
AMYLASE SERPL-CCNC: 42 U/L (ref 5–120)
BUN SERPL-MCNC: 15 MG/DL (ref 5–24)
CALCIUM SERPL-MCNC: 9.2 MG/DL (ref 8.5–10.4)
CHLORIDE SERPLBLD-SCNC: 104 MMOL/L (ref 94–109)
CO2 SERPL-SCNC: 24 MMOL/L (ref 20–32)
CREAT SERPL-MCNC: 0.9 MG/DL (ref 0.6–1.3)
EGFR CALCULATED (BLACK REFERENCE): 87.5 ML/MIN
EGFR CALCULATED (NON BLACK REFERENCE): 72.3 ML/MIN
GLUCOSE SERPL-MCNC: 91 MG/DL (ref 60–109)
GRANULOCYTES #: 5.8 K/UL (ref 1.6–8.3)
HCT VFR BLD AUTO: 38.2 % (ref 35–47)
HEMOGLOBIN: 11.9 G/DL (ref 11.7–15.7)
LIPASE SERPL-CCNC: 28 U/L (ref 0–52)
LYMPHOCYTES # BLD AUTO: 1.9 K/UL (ref 0.8–5.3)
LYMPHOCYTES NFR BLD AUTO: 22.6 %L (ref 20–48)
MCH RBC QN AUTO: 29.2 PG (ref 26.5–35)
MCHC RBC AUTO-ENTMCNC: 31.2 G/DL (ref 32–36)
MCV RBC AUTO: 93.6 FL (ref 78–100)
MID #: 0.7 K/UL (ref 0–2.2)
MID %: 8.1 %M (ref 0–20)
PLATELET # BLD AUTO: 382 K/UL (ref 150–450)
POTASSIUM SERPL-SCNC: 3.4 MMOL/L (ref 3.4–5.3)
RBC # BLD AUTO: 4.08 M/UL (ref 3.8–5.2)
SODIUM SERPL-SCNC: 142 MMOL/L (ref 133–144)
WBC # BLD AUTO: 8.4 K/UL (ref 4–11)

## 2017-03-16 RX ORDER — OXYCODONE HYDROCHLORIDE 5 MG/1
5 TABLET ORAL EVERY 4 HOURS PRN
Qty: 10 TABLET | Refills: 0 | Status: SHIPPED | OUTPATIENT
Start: 2017-03-16 | End: 2017-04-11

## 2017-03-16 NOTE — PATIENT INSTRUCTIONS
Your medication list is printed, please keep this with you, it is helpful to bring this current list to any other medical appointments, the emergency room or hospital.    If you had lab testing today and your results are reassuring or normal they will be be mailed to you within 7 days.     If the lab tests need quick action we will call you with the results.   The phone number we will call with results is # [phone] (home) . If this is not the best number please call our clinic and change the number.    If you need any refills please call your pharmacy and they will contact us.    If you have any further concerns or wish to schedule another appointment you must call our office during normal business hours  [phone] (8-5:00 M-F)  If you have urgent medical questions that cannot wait  you may also call [phone] at any time of day.  If you have a medical emergency please call [phone].    Thank you for coming to [facility].

## 2017-03-16 NOTE — NURSING NOTE
Due for:  AAP  ACT - pt not feeling well, did not want to fill this out today. Will do at next visit per pt.  Pap

## 2017-03-16 NOTE — MR AVS SNAPSHOT
After Visit Summary   3/16/2017    Darlene Hudson    MRN: [id number]           Patient Information     Date Of Birth          1975        Visit Information        Provider Department      3/16/2017 10:20 AM Oscar Aparicio MD [facility]        Today's Diagnoses     Pleural effusion    -  1    Chest pain, unspecified type        Acute post-operative pain        Pericardial effusion        Pneumonia of right lung due to infectious organism, unspecified part of lung          Care Instructions          Your medication list is printed, please keep this with you, it is helpful to bring this current list to any other medical appointments, the emergency room or hospital.    If you had lab testing today and your results are reassuring or normal they will be be mailed to you within 7 days.     If the lab tests need quick action we will call you with the results.   The phone number we will call with results is # [phone] (home) . If this is not the best number please call our clinic and change the number.    If you need any refills please call your pharmacy and they will contact us.    If you have any further concerns or wish to schedule another appointment you must call our office during normal business hours  [phone] (8-5:00 M-F)  If you have urgent medical questions that cannot wait  you may also call [phone] at any time of day.  If you have a medical emergency please call [phone].    Thank you for coming to [facility].          Follow-ups after your visit        Your next 10 appointments already scheduled     Mar 23, 2017 10:00 AM CDT   Return Visit with Oscar Aparicio MD   [facility] ([facility])    [address] [place] [id number]   [phone]              Who to contact     Please call your clinic at [phone] to:    Ask questions about your health    Make or cancel appointments    Discuss your medicines    Learn about your test  results    Speak to your doctor   If you have compliments or concerns about an experience at your clinic, or if you wish to file a complaint, please contact [facility] Patient Relations at [phone] or email us at [email or web address]         Additional Information About Your Visit        Care EveryWhere ID     This is your Care EveryWhere ID. This could be used by other organizations to access your [facility] medical records  [id number]        Your Vitals Were     Pulse Temperature Respirations Last Period Pulse Oximetry BMI (Body Mass Index)    97 98.3  F (36.8  C) (Oral) 18 02/09/2017 97% 35.78 kg/m2       Blood Pressure from Last 3 Encounters:   03/16/17 (!) 138/97   03/02/17 117/85   02/24/17 (!) 127/92    Weight from Last 3 Encounters:   03/16/17 245 lb 12.8 oz (111.5 kg)   03/02/17 237 lb (107.5 kg)   02/24/17 239 lb (108.4 kg)              We Performed the Following     Amylase ([facility])     Basic Metabolic Panel ([facility])  - Results < 1 hr     CBC with Diff Plt ([facility])     Lipase ([facility])     XR CHEST 2 VW          Today's Medication Changes          These changes are accurate as of: 3/16/17 12:11 PM.  If you have any questions, ask your nurse or doctor.               Start taking these medicines.        Dose/Directions    oxyCODONE 5 MG IR tablet   Commonly known as:  ROXICODONE   Used for:  Pleural effusion, Chest pain, unspecified type   Started by:  Oscar Aparicio MD        Dose:  5 mg   Take 1 tablet (5 mg) by mouth every 4 hours as needed for pain   Quantity:  10 tablet   Refills:  0            Where to get your medicines      Some of these will need a paper prescription and others can be bought over the counter.  Ask your nurse if you have questions.     Bring a paper prescription for each of these medications     oxyCODONE 5 MG IR tablet                Primary Care Provider Office Phone # Fax #    Anaid Vogel [phone]  [phone]       [facility] [address] [address]        Thank you!     Thank you for choosing [facility]  for your care. Our goal is always to provide you with excellent care. Hearing back from our patients is one way we can continue to improve our services. Please take a few minutes to complete the written survey that you may receive in the mail after your visit with us. Thank you!             Your Updated Medication List - Protect others around you: Learn how to safely use, store and throw away your medicines at www.disposemymeds.org.          This list is accurate as of: 3/16/17 12:11 PM.  Always use your most recent med list.                   Brand Name Dispense Instructions for use    albuterol 108 (90 BASE) MCG/ACT Inhaler    PROAIR HFA/PROVENTIL HFA/VENTOLIN HFA    1 Inhaler    Inhale 2 puffs into the lungs every 6 hours as needed for shortness of breath / dyspnea or wheezing       amitriptyline 50 MG tablet    ELAVIL    30 tablet    Take 1 tablet (50 mg) by mouth At Bedtime       buPROPion 150 MG 12 hr tablet    WELLBUTRIN SR    60 tablet    Take 1 tablet (150 mg) by mouth 2 times daily       COLCRYS 0.6 MG tablet   Generic drug:  colchicine     60 tablet    Take 1 tablet (0.6 mg) by mouth 2 times daily For 90 days       fluticasone 250 MCG/BLIST Aepb Inhaler    FLOVENT DISKUS    60 Inhaler    Inhale 2 puffs into the lungs 2 times daily       gabapentin 300 MG capsule    NEURONTIN    180 capsule    Take 2 capsules by mouth three times a day.       hydrOXYzine 25 MG tablet    ATARAX    60 tablet    Take 1-2 tablets (25-50 mg) by mouth every 6 hours as needed for itching       hydrOXYzine 50 MG capsule    VISTARIL    30 capsule    Take 1 capsule (50 mg) by mouth 3 times daily as needed for itching or anxiety       ibuprofen 600 MG tablet    ADVIL/MOTRIN    100 tablet    Take 1 tablet (600 mg) by mouth every 6 hours as needed for moderate pain       LANsoprazole 15 MG CR  capsule    PREVACID    30 capsule    Take one capsule by mouth everyday for 30 days to prevent gastritis from NSAIDS       metoclopramide 5 MG tablet    REGLAN    30 tablet    Take 1 tablet (5 mg) by mouth 2 times daily as needed       oxyCODONE 5 MG IR tablet    ROXICODONE    10 tablet    Take 1 tablet (5 mg) by mouth every 4 hours as needed for pain       * oxyCODONE-acetaminophen 5-325 MG per tablet    PERCOCET    30 tablet    Take 1 tablet by mouth every 4 hours as needed for pain maximum 8 tablet(s) per day       * oxyCODONE-acetaminophen  MG per tablet    PERCOCET    30 tablet    Take 1-2 tablets by mouth every 6 hours as needed for moderate to severe pain maximum 8 tablet(s) per day       * oxyCODONE-acetaminophen 5-325 MG per tablet    PERCOCET    15 tablet    Take 1 tablet by mouth every 4 hours as needed for pain maximum 4 tablet(s) per day       prochlorperazine 5 MG tablet    COMPAZINE    20 tablet    Take 5 mg by mouth every 6 hours as needed for nausea or vomiting Reported on 3/2/2017       zolpidem 5 MG tablet    AMBIEN    30 tablet    Take 1 tablet (5 mg) by mouth nightly as needed for sleep       * Notice:  This list has 3 medication(s) that are the same as other medications prescribed for you. Read the directions carefully, and ask your doctor or other care provider to review them with you.

## 2017-03-16 NOTE — LETTER
March 20, 2017      Darlene Hudson  [address] [place] [address]        Dear Darlene,    Blood tests are back to normal . Can discuss with Dr. Aparicio at next visit.     Please see below for your test results.    Resulted Orders   CBC with Diff Plt ([facility])   Result Value Ref Range    WBC 8.4 4.0 - 11.0 K/uL    Lymphocytes # 1.9 0.8 - 5.3 K/uL    % Lymphocytes 22.6 20.0 - 48.0 %L    Mid # 0.7 0.0 - 2.2 K/uL    Mid % 8.1 0.0 - 20.0 %M    GRANULOCYTES # 5.8 1.6 - 8.3 K/uL    % Granulocytes 69.3 40.0 - 75.0 %G    RBC 4.08 3.80 - 5.20 M/uL    Hemoglobin 11.9 11.7 - 15.7 g/dL    Hematocrit 38.2 35.0 - 47.0 %    MCV 93.6 78.0 - 100.0 fL    MCH 29.2 26.5 - 35.0 pg    MCHC 31.2 (L) 32.0 - 36.0 g/dL    Platelets 382.0 150.0 - 450.0 K/uL   Lipase ([facility])   Result Value Ref Range    Lipase 28 0 - 52 U/L    Narrative    Test performed by:  [facility]  [address] [place] [address]   Amylase ([facility])   Result Value Ref Range    Amylase 42 5 - 120 U/L    Narrative    Test performed by:  [facility] [address] [place] [address]   Basic Metabolic Panel ([facility])  - Results < 1 hr   Result Value Ref Range    Glucose 91.0 60.0 - 109.0 mg/dL    Urea Nitrogen 15.0 5.0 - 24.0 mg/dL    Creatinine 0.9 0.6 - 1.3 mg/dL    Sodium 142.0 133.0 - 144.0 mmol/L    Potassium 3.4 3.4 - 5.3 mmol/L    Chloride 104.0 94.0 - 109.0 mmol/L    Carbon Dioxide 24.0 20.0 - 32.0 mmol/L    Calcium 9.2 8.5 - 10.4 mg/dL    eGFR Calculated (Non Black Reference) 72.3 >60.0 mL/min    eGFR Calculated (Black Reference) 87.5 >60.0 mL/min       If you have any questions, please call the clinic to make an appointment.    Sincerely,    Oscar Aparicio MD

## 2017-03-16 NOTE — PROGRESS NOTES
HPI:       Darlene Hudson is a 42 year old  female who presents for recheck of pleural effusion and pericardial effusion. Patient also notes she started he rperiod two days ago, and it is heavier and more painful than normal.   Patient breathing is much easier, and does not seem to cause her any problem at this time.  Still having occasional pain with the incisions from the pericardial window surgery, but this is improving.  Does report some increased abdominal pain of the last two days, and is unsure if this is pain associated with her period, or if this is due to recurring pancreatitis.  No nausea, no vomiting, no fevers.    Having clots with her period, and heavier than normal. Also has been having some hot flashes that she think are suggestive of menopause.          PMHX:   Current Medications:   Current Outpatient Prescriptions   Medication Sig Dispense Refill     hydrOXYzine (VISTARIL) 50 MG capsule Take 1 capsule (50 mg) by mouth 3 times daily as needed for itching or anxiety 30 capsule 1     fluticasone (FLOVENT DISKUS) 250 MCG/BLIST AEPB Inhaler Inhale 2 puffs into the lungs 2 times daily 60 Inhaler 3     albuterol (PROAIR HFA/PROVENTIL HFA/VENTOLIN HFA) 108 (90 BASE) MCG/ACT Inhaler Inhale 2 puffs into the lungs every 6 hours as needed for shortness of breath / dyspnea or wheezing 1 Inhaler 3     oxyCODONE-acetaminophen (PERCOCET) 5-325 MG per tablet Take 1 tablet by mouth every 4 hours as needed for pain maximum 4 tablet(s) per day 15 tablet 0     buPROPion (WELLBUTRIN SR) 150 MG 12 hr tablet Take 1 tablet (150 mg) by mouth 2 times daily 60 tablet 1     metoclopramide (REGLAN) 5 MG tablet Take 1 tablet (5 mg) by mouth 2 times daily as needed 30 tablet 0     oxyCODONE-acetaminophen (PERCOCET)  MG per tablet Take 1-2 tablets by mouth every 6 hours as needed for moderate to severe pain maximum 8 tablet(s) per day (Patient not taking: Reported on 3/2/2017) 30 tablet 0     oxyCODONE-acetaminophen  "(PERCOCET) 5-325 MG per tablet Take 1 tablet by mouth every 4 hours as needed for pain maximum 8 tablet(s) per day (Patient not taking: Reported on 3/2/2017) 30 tablet 0     gabapentin (NEURONTIN) 300 MG capsule Take 2 capsules by mouth three times a day. 180 capsule 5     LANsoprazole (PREVACID) 15 MG CR capsule Take one capsule by mouth everyday for 30 days to prevent gastritis from NSAIDS 30 capsule 1     prochlorperazine (COMPAZINE) 5 MG tablet Take 5 mg by mouth every 6 hours as needed for nausea or vomiting Reported on 3/2/2017 20 tablet 0     colchicine (COLCRYS) 0.6 MG tablet Take 1 tablet (0.6 mg) by mouth 2 times daily For 90 days 60 tablet 2     amitriptyline (ELAVIL) 50 MG tablet Take 1 tablet (50 mg) by mouth At Bedtime 30 tablet 11     zolpidem (AMBIEN) 5 MG tablet Take 1 tablet (5 mg) by mouth nightly as needed for sleep 30 tablet 1     hydrOXYzine (ATARAX) 25 MG tablet Take 1-2 tablets (25-50 mg) by mouth every 6 hours as needed for itching (Patient not taking: Reported on 3/2/2017) 60 tablet 1     ibuprofen (ADVIL/MOTRIN) 600 MG tablet Take 1 tablet (600 mg) by mouth every 6 hours as needed for moderate pain 100 tablet 0       Existing Problems  Patient Active Problem List   Diagnosis     Abnormal cytology finding     Nondependent alcohol abuse, episodic drinking behavior     Anxiety state     Controlled substance agreement signed     Low back pain     Chronic sinusitis     Cocaine abuse     Major depressive disorder, recurrent episode, moderate (H)     Asthma     Tobacco use disorder     Noninflammatory disorder of vagina     Pap smear for cervical cancer screening     Abdominal pain     Abnormal cervical Papanicolaou smear     Panic disorder with agoraphobia     Atopic rhinitis     Chronic low back pain     Depression     Moderate persistent asthma     Other long term (current) drug therapy     Tobacco use       Allergies:  Allergies   Allergen Reactions     Lidocaine Other (See Comments)     \"my " "jaw stopped moving\"     Penicillins Hives, Rash and Shortness Of Breath     Lidocaine-Epinephrine [Epinephrine-Lidocaine-Na Metabisulfite] Other (See Comments)     Severe jaw cramping, double vision       Previous labs:  Lab Results   Component Value Date    HGB 11.2 (L) 02/24/2017    HCT 35.8 02/24/2017    .0 02/24/2017    BUN 11.0 02/24/2017    CO2 25.0 02/24/2017               Review of Systems:    CONSTITUTIONAL: no fatigue, no unexpected change in weight  SKIN: no worrisome rashes, no worrisome moles, no worrisome lesions  EYES: no acute vision problems or changes  ENT: no ear problems, no mouth problems, no throat problems  RESP: no significant cough, no shortness of breath  CV:  no palpitations, no new or worsening peripheral edema  GI: no nausea, no vomiting, no constipation, no diarrhea          Physical Exam:     Vitals:    03/16/17 1025   BP: (!) 138/97   BP Location: Right arm   Pulse: 97   Resp: 18   Temp: 98.3  F (36.8  C)   TempSrc: Oral   SpO2: 97%   Weight: 245 lb 12.8 oz (111.5 kg)     Body mass index is 35.78 kg/(m^2).    GENERAL:alert, well hydrated, no distress  EYES: Eyes grossly normal to inspection, extraocular movements - intact, and PERRL  HENT:Nose- normal; Mouth- no ulcers, no lesions  NECK: no tenderness, no adenopathy, no asymmetry, no masses, no stiffness;  RESP: lungs clear to auscultation - no rales, no rhonchi, no wheezes  CV: regular rates and rhythm, normal S1 S2, no S3 or S4 and no murmur, no click or rub -  ABDOMEN: soft,no  hepatosplenomegaly, mild subjective tenderness nonlocalizing. Seems worse in supraubic area than epigastric.  No rebound.              Labs and Procedures     Office Visit on 02/24/2017   Component Date Value Ref Range Status     Amphetamines Qual 02/24/2017 NEGATIVE  NEGATIVE Final     Barbiturates Qual Urine 02/24/2017 NEGATIVE  NEGATIVE Final     Buprenorphine Qual Urine 02/24/2017 NEGATIVE  NEGATIVE Final     Benzodiazepine Qual Urine 02/24/2017 " NEGATIVE  NEGATIVE Final     Cocaine Qual Urine 02/24/2017 NEGATIVE  NEGATIVE Final     Cannabinoids Qual Urine 02/24/2017 NEGATIVE  NEGATIVE Final     Methamphetamine Qual 02/24/2017 NEGATIVE  NEGATIVE Final     Methadone Qual 02/24/2017 NEGATIVE  NEGATIVE Final     Morphine Qual 02/24/2017 NEGATIVE  NEGATIVE Final     Oxycodone Qual 02/24/2017 POSITIVE* NEGATIVE Final     Temperature of Urine was Between 9* 02/24/2017 YES  YES Final    Comment: This is a preliminary screening test that detects drugs-of-abuse in urine at   specified detection levels.  To confirm preliminary results, a more specific   method such as Gas Chromatography/Mass Spectrometry (GC/MS) must be used.          Culture 02/24/2017 SEE RESULTS BELOW   Final    Comment: CULTURE, URINE   SOURCE: Urine, Random   CULTURE RESULTS:    No Growth       Glucose 02/24/2017 96.0  60.0 - 109.0 mg/dL Final     Urea Nitrogen 02/24/2017 11.0  5.0 - 24.0 mg/dL Final     Creatinine 02/24/2017 0.7  0.6 - 1.3 mg/dL Final     Sodium 02/24/2017 141.0  133.0 - 144.0 mmol/L Final     Potassium 02/24/2017 4.0  3.4 - 5.3 mmol/L Final     Chloride 02/24/2017 102.0  94.0 - 109.0 mmol/L Final     Carbon Dioxide 02/24/2017 25.0  20.0 - 32.0 mmol/L Final     Calcium 02/24/2017 9.2  8.5 - 10.4 mg/dL Final     eGFR Calculated (Non Black Referen* 02/24/2017 97.1  >60.0 mL/min Final     eGFR Calculated (Black Reference) 02/24/2017 117.5  >60.0 mL/min Final     Lipase 02/24/2017 14  0 - 52 U/L Final     Bilirubin Total 02/24/2017 0.2  0.0 - 1.0 mg/dL Final     Bilirubin Direct 02/24/2017 <0.1  <=0.5 mg/dL Final     Protein Total 02/24/2017 6.6  6.0 - 8.0 g/dL Final     Albumin 02/24/2017 2.9* 3.5 - 5.0 g/dL Final     Alkaline Phosphatase 02/24/2017 134* 45 - 120 U/L Final     AST (SGOT) 02/24/2017 15  0 - 40 U/L Final     ALT (SGPT) 02/24/2017 13  0 - 45 U/L Final     WBC 02/24/2017 10.1  4.0 - 11.0 K/uL Final     RBC 02/24/2017 3.80  3.80 - 5.20 M/uL Final     Hemoglobin  02/24/2017 11.2* 11.7 - 15.7 g/dL Final     Hematocrit 02/24/2017 35.8  35.0 - 47.0 % Final     MCV 02/24/2017 94.2  78.0 - 100.0 fL Final     MCH 02/24/2017 29.5  26.5 - 35.0 pg Final     MCHC 02/24/2017 31.3* 32.0 - 36.0 g/dL Final     Platelets 02/24/2017 577.0* 150.0 - 450.0 K/uL Final              Assessment and Plan     1.recovering well following episode of pancreatitis, peumonia, and pericardial effusion.  CXR today demonstrates marked improvement, and no evidence of pleural effusion.  No evidence of persistent pneumonia.   2. Menstrual cramping and heavy period.   3. Mild persistent post surgical pain. Will provide 10 percocet.   4. Return next week to check if the patient is fit to return to work.        Options for treatment and follow-up care were reviewed with the patient and/or guardian. Darlene Hudson and/or guardian engaged in the decision making process and verbalized understanding of the options discussed and agreed with the final plan.    Oscar Aparicio MD

## 2017-03-18 ENCOUNTER — TELEPHONE (OUTPATIENT)
Dept: FAMILY MEDICINE | Facility: CLINIC | Age: 42
End: 2017-03-18

## 2017-03-19 NOTE — TELEPHONE ENCOUNTER
"Clinic Triage Call    Was at [facility] ER just this evening for \"follow up from my heart surgery\" with chest pain and discharged to home. They did an \"ultrasound of the heart which was normal\" and a few labs which were elevated. She is concerned because her \"normal\" ultrasound was scary last time and \"I almost \".  Has right sided abdominal and chest pain. Taking ibuprofen and gabapentin. She feels her evaluation at [facility] was not thorough enough and is wondering what to do next. Talking quickly, hard to interrupt to ask questions, very anxious sounding.     On reviewing her clinic chart (unfortunately do not have access to [facility] ER visit record), she does have significant medical hx for which I recommended she be re-evaluated at [facility] or [facility]. At this time she feels like she wants to wait and \"see how things go\". I again reiterated my recommendation but offered that I will have clinic call her on Monday to set up appointment if sx are persisting.     Maria Isabel Jauregui MD    "

## 2017-03-20 ENCOUNTER — TELEPHONE (OUTPATIENT)
Dept: FAMILY MEDICINE | Facility: CLINIC | Age: 42
End: 2017-03-20

## 2017-03-20 ENCOUNTER — OFFICE VISIT (OUTPATIENT)
Dept: FAMILY MEDICINE | Facility: CLINIC | Age: 42
End: 2017-03-20

## 2017-03-20 VITALS
WEIGHT: 244 LBS | HEART RATE: 105 BPM | DIASTOLIC BLOOD PRESSURE: 105 MMHG | OXYGEN SATURATION: 97 % | BODY MASS INDEX: 35.52 KG/M2 | SYSTOLIC BLOOD PRESSURE: 150 MMHG

## 2017-03-20 DIAGNOSIS — R07.89 CHEST WALL PAIN FOLLOWING SURGERY: ICD-10-CM

## 2017-03-20 DIAGNOSIS — R07.1 CHEST PAIN ON BREATHING: Primary | ICD-10-CM

## 2017-03-20 DIAGNOSIS — G89.18 CHEST WALL PAIN FOLLOWING SURGERY: ICD-10-CM

## 2017-03-20 DIAGNOSIS — I31.39 PERICARDIAL EFFUSION: ICD-10-CM

## 2017-03-20 DIAGNOSIS — I30.0 ACUTE IDIOPATHIC PERICARDITIS: ICD-10-CM

## 2017-03-20 RX ORDER — OXYCODONE AND ACETAMINOPHEN 5; 325 MG/1; MG/1
1 TABLET ORAL EVERY 4 HOURS PRN
Qty: 30 TABLET | Refills: 0 | Status: SHIPPED | OUTPATIENT
Start: 2017-03-20 | End: 2017-04-11

## 2017-03-20 RX ORDER — IBUPROFEN 600 MG/1
600 TABLET, FILM COATED ORAL 2 TIMES DAILY
COMMUNITY
Start: 2017-03-18 | End: 2017-03-20

## 2017-03-20 RX ORDER — COLCHICINE 0.6 MG/1
0.6 TABLET ORAL
COMMUNITY
Start: 2017-03-01 | End: 2017-03-20

## 2017-03-20 RX ORDER — COLCHICINE 0.6 MG/1
0.6 TABLET ORAL 2 TIMES DAILY
Qty: 60 TABLET | Refills: 0 | Status: SHIPPED | OUTPATIENT
Start: 2017-03-20 | End: 2017-10-05

## 2017-03-20 RX ORDER — IBUPROFEN 600 MG/1
TABLET, FILM COATED ORAL
Qty: 60 TABLET | Refills: 0 | Status: SHIPPED | OUTPATIENT
Start: 2017-03-20 | End: 2017-10-05

## 2017-03-20 NOTE — PROGRESS NOTES
SUBJECTIVE:  This is a medically complex 42-year-old female presenting for follow up of ongoing chronic health complaints.  As can be reviewed in previous notes by myself and Dr. Aparicio.  Patient with recent admission to [facility], subsequently found to have acute pericarditis with pericardial effusion requiring a pericardial window.  Her primary cardiologist is Dr. Curtis Prather with [facility], Dr. Prather is following the case.  Patient is also connected to her hospitalist record; however, he will be transferring care to our office at this time.  At the time of hospitalization, there was a question of a possible atypical fungal infection.  After discussion with the hospitalist today he reports that there appears to have been a contaminant in the lab and that several specimens were positive for the same fungal contaminant and Rogina's was one of these submitted.  There is no ongoing concern for a fungal infection of the lungs and the patient has been seen by Infectious Disease in follow up and no further follow up was recommended.  Patient also found to have a right lower lobe effusion following discharge.  This was followed by our clinic to resolution last week.  Patient reports that on Saturday she began to feel some new chest pain again and she reports that the chest pain is on the right is worse with deep breathing.  It is worse with lying down.  It is not necessarily associated with shortness of breath and is a sharp pain, patient went to the Emergency Department for this pain after receiving this recommendation from our clinic.  From my review of this documentation an extensive metabolic and lab evaluation was performed.  BMP was within normal limits.  Hepatic evaluation with only mild abnormalities.  CBC with normal white count, normal hemoglobin and a lab workup was notable for an elevated CRP.  This was elevated to 6.7 as well as an elevated ESR of 85.  At discharge from [facility] CRP was  down to the 1 range, in hospital ESR was between 90 and 125.  All other labs were unremarkable, a bedside echo was also done or bedside cardiac evaluation was also done by emergency room provider this did not demonstrate a re-accumulation of pericardial fluid.  Given the elevated ESR and CRP, ultrasound of the abdomen and chest x-ray were recommended; however, patient did not feel she was receiving respectful care and had an altercation with providers and staff about pain control options and told ED provider that she would like to leave and she did not undergo recommended testing.  Since that time the pain has been unchanged, patient walked into clinic today.  She denies any fevers.  She is eating.  She denies   vomiting.  She does have a nausea with her colchicine and for this reason it has been taking it less than recommended taking it 1-2 times a day and missing some doses because of nausea caused by the colchicine and has been taking at least once today.  She denies any other systemic symptoms at this time.  She denies current shortness of breath.  She does report chest pain that is present with deep inspiration on the right side and points to her right lateral ribs as the location of her pain.     Patient Active Problem List   Diagnosis     Abnormal cytology finding     Nondependent alcohol abuse, episodic drinking behavior     Anxiety state     Controlled substance agreement signed     Low back pain     Chronic sinusitis     Cocaine abuse     Major depressive disorder, recurrent episode, moderate (H)     Asthma     Tobacco use disorder     Noninflammatory disorder of vagina     Pap smear for cervical cancer screening     Abdominal pain     Abnormal cervical Papanicolaou smear     Panic disorder with agoraphobia     Atopic rhinitis     Chronic low back pain     Depression     Moderate persistent asthma     Other long term (current) drug therapy     Tobacco use     Current Outpatient Prescriptions   Medication  "    oxyCODONE-acetaminophen (PERCOCET) 5-325 MG per tablet     colchicine 0.6 MG tablet     ibuprofen (ADVIL/MOTRIN) 600 MG tablet     fluticasone (FLONASE) 50 MCG/ACT spray     oxyCODONE-acetaminophen (PERCOCET) 5-325 MG per tablet     loratadine (CLARITIN) 10 MG tablet     oxyCODONE (ROXICODONE) 5 MG IR tablet     hydrOXYzine (VISTARIL) 50 MG capsule     fluticasone (FLOVENT DISKUS) 250 MCG/BLIST AEPB Inhaler     albuterol (PROAIR HFA/PROVENTIL HFA/VENTOLIN HFA) 108 (90 BASE) MCG/ACT Inhaler     buPROPion (WELLBUTRIN SR) 150 MG 12 hr tablet     metoclopramide (REGLAN) 5 MG tablet     oxyCODONE-acetaminophen (PERCOCET)  MG per tablet     gabapentin (NEURONTIN) 300 MG capsule     LANsoprazole (PREVACID) 15 MG CR capsule     amitriptyline (ELAVIL) 50 MG tablet     zolpidem (AMBIEN) 5 MG tablet     hydrOXYzine (ATARAX) 25 MG tablet     ibuprofen (ADVIL/MOTRIN) 600 MG tablet     No current facility-administered medications for this visit.         Allergies   Allergen Reactions     Lidocaine Other (See Comments)     \"my jaw stopped moving\"     Penicillins Hives, Rash and Shortness Of Breath     Lidocaine-Epinephrine [Epinephrine-Lidocaine-Na Metabisulfite] Other (See Comments)     Severe jaw cramping, double vision     BP (!) 150/105  Pulse 105  Wt 244 lb (110.7 kg)  LMP 02/09/2017  SpO2 97%  BMI 35.52 kg/m2   GEN: non-toxic appearing.  Conversant  CV: RRR w/o M/R/G, no rub  PULM: decreased breathsounds rll otherwise normal  HEENT: head atraumatic, normocephalic, eyes anicteric, mucous membranes moist.  ABD: non-tender  SKIN: warm and dry  PSYCH: appropriate    ASSESSMENT AND PLAN:  This is a 42-year-old female with a complicated past medical history presenting for ED follow up, recent hx right pleural effusion and pericardial effusion now returning with right sided chest pain, SOB, and new small right sided effusion.  I reviewed all the imaging and laboratory evaluation from the Emergency Department with the " patient.  I have also called the patient's cardiologist, Dr. Prather and the following plan has been recommended.   1.  Echo through [facility] that will occur this week.   2.  CT of the chest.  This will occur this week with possible tapping of the fluid for evaluation.  It is unclear if this is a transudate or exudate of the prosthesis or if this is due to infection or another inflammatory condition.  After discussion with Cardiology I agree with Rheumatology referral as well to rule out other inflammatory processes that may be causing symptoms including recurrent pleural effusions and endocarditis.  This referral has been placed.  The patient did have an TIMUR at the time of discharge from [facility] and this was normal.  I will leave further laboratory rheumatologic evaluation to Rheumatology.  I have called and discussed these plans with the patient, her concern over the phone as what she should do about her pleuritic pain.  She has had a number of small scripts for opiates given ongoing pain and recent procedures.  I did agree to a small refill of Percocet today till she can be reevaluated next Monday.  Plan is as follows, echo and a CT this week, follow up with our clinic next Monday.  Follow up with Dr. Prather on the 31st of this month and future Rheumatology referral.  Patient is in agreement with these plans and these referrals have been placed.  I discussed with patient indication to return to the hospital for a more comprehensive time sensitivity evaluation, although I do not believe she needs to be hospitalized currently as she was able to ambulate into clinic today can clearly state her concerns and has a reassuring exam and is nontoxic on examination today.  If patient develops increasing shortness of breath, chest pain that is not improved with pain measurements with pain control such as p.r.n. Percocet and schedule colchicine and ibuprofen, fevers or nausea, vomiting or any other systemic  complaints that she should go to the [facility] ED for further evaluation.  Patient is in agreement with this plan.  Patient will follow up on Monday with either myself or Dr. Aparicio as available for follow up echo and CT of the chest  Greater than 40 minutes spent in direct evaluation and review of records, conversations with specialists, coordination of care and direct management of current issues.

## 2017-03-20 NOTE — MR AVS SNAPSHOT
After Visit Summary   3/20/2017    Darlene Hudson    MRN: [id number]           Patient Information     Date Of Birth          1975        Visit Information        Provider Department      3/20/2017 10:40 AM Anaid Vogel MD [facility]        Today's Diagnoses     Chest pain on breathing    -  1    Pericardial effusion        Chest wall pain following surgery        Acute idiopathic pericarditis           Follow-ups after your visit        Your next 10 appointments already scheduled     Apr 10, 2017  3:20 PM CDT   Return Visit with Oscar Aparicio MD   [facility] ([facility])    [address] [place] [address]   [phone]              Who to contact     Please call your clinic at [phone] to:    Ask questions about your health    Make or cancel appointments    Discuss your medicines    Learn about your test results    Speak to your doctor   If you have compliments or concerns about an experience at your clinic, or if you wish to file a complaint, please contact [facility] Patient Relations at [phone] or email us at [email or web address]         Additional Information About Your Visit        Care EveryWhere ID     This is your Care EveryWhere ID. This could be used by other organizations to access your [facility] medical records  [id number]        Your Vitals Were     Pulse Last Period Pulse Oximetry BMI (Body Mass Index)          105 02/09/2017 97% 35.52 kg/m2         Blood Pressure from Last 3 Encounters:   03/27/17 133/86   03/20/17 (!) 150/105   03/16/17 (!) 138/97    Weight from Last 3 Encounters:   03/27/17 243 lb (110.2 kg)   03/20/17 244 lb (110.7 kg)   03/16/17 245 lb 12.8 oz (111.5 kg)              We Performed the Following     XR CHEST 2 VW          Today's Medication Changes          These changes are accurate as of: 3/20/17 11:59 PM.  If you have any questions, ask your nurse or  doctor.               These medicines have changed or have updated prescriptions.        Dose/Directions    colchicine 0.6 MG tablet   This may have changed:    - when to take this  - Another medication with the same name was removed. Continue taking this medication, and follow the directions you see here.   Used for:  Acute idiopathic pericarditis   Changed by:  Anaid Vogel MD        Dose:  0.6 mg   Take 1 tablet (0.6 mg) by mouth 2 times daily   Quantity:  60 tablet   Refills:  0       * ibuprofen 600 MG tablet   Commonly known as:  ADVIL/MOTRIN   This may have changed:  Another medication with the same name was changed. Make sure you understand how and when to take each.   Used for:  Sprain of neck, initial encounter   Changed by:  Anaid Vogel MD        Dose:  600 mg   Take 1 tablet (600 mg) by mouth every 6 hours as needed for moderate pain   Quantity:  100 tablet   Refills:  0       * ibuprofen 600 MG tablet   Commonly known as:  ADVIL/MOTRIN   This may have changed:    - how much to take  - how to take this  - when to take this  - additional instructions   Used for:  Acute idiopathic pericarditis   Changed by:  Anaid Vogel MD        Take 600mg twice daily for one week then decrease to once daily   Quantity:  60 tablet   Refills:  0       * oxyCODONE-acetaminophen  MG per tablet   Commonly known as:  PERCOCET   This may have changed:  Another medication with the same name was changed. Make sure you understand how and when to take each.   Used for:  Idiopathic acute pancreatitis, unspecified complication status   Changed by:  Anaid Vogel MD        Dose:  1-2 tablet   Take 1-2 tablets by mouth every 6 hours as needed for moderate to severe pain maximum 8 tablet(s) per day   Quantity:  30 tablet   Refills:  0       * oxyCODONE-acetaminophen 5-325 MG per tablet   Commonly known as:  PERCOCET   This may have changed:  additional instructions   Used for:   Pericardial effusion, Chest wall pain following surgery   Changed by:  Anaid Vogel MD        Dose:  1 tablet   Take 1 tablet by mouth every 4 hours as needed for pain maximum 4 tablet(s) per day   Quantity:  30 tablet   Refills:  0       * Notice:  This list has 4 medication(s) that are the same as other medications prescribed for you. Read the directions carefully, and ask your doctor or other care provider to review them with you.      Stop taking these medicines if you haven't already. Please contact your care team if you have questions.     prochlorperazine 5 MG tablet   Commonly known as:  COMPAZINE   Stopped by:  Anaid Vogel MD                Where to get your medicines      These medications were sent to [facility] [id number] - [place], [place] - [address] [facility] [address]  [address]  [address] [place] , [place] [address]     Phone:  [phone]     colchicine 0.6 MG tablet    ibuprofen 600 MG tablet         Some of these will need a paper prescription and others can be bought over the counter.  Ask your nurse if you have questions.     Bring a paper prescription for each of these medications     oxyCODONE-acetaminophen 5-325 MG per tablet                Primary Care Provider Office Phone # Fax #    Anaid Vogel [phone]       [facility] [address] [place] [address]        Thank you!     Thank you for choosing [facility]  for your care. Our goal is always to provide you with excellent care. Hearing back from our patients is one way we can continue to improve our services. Please take a few minutes to complete the written survey that you may receive in the mail after your visit with us. Thank you!             Your Updated Medication List - Protect others around you: Learn how to safely use, store and throw away your medicines at www.disposemymeds.org.          This list is accurate as of: 3/20/17 11:59  PM.  Always use your most recent med list.                   Brand Name Dispense Instructions for use    albuterol 108 (90 BASE) MCG/ACT Inhaler    PROAIR HFA/PROVENTIL HFA/VENTOLIN HFA    1 Inhaler    Inhale 2 puffs into the lungs every 6 hours as needed for shortness of breath / dyspnea or wheezing       amitriptyline 50 MG tablet    ELAVIL    30 tablet    Take 1 tablet (50 mg) by mouth At Bedtime       buPROPion 150 MG 12 hr tablet    WELLBUTRIN SR    60 tablet    Take 1 tablet (150 mg) by mouth 2 times daily       colchicine 0.6 MG tablet     60 tablet    Take 1 tablet (0.6 mg) by mouth 2 times daily       fluticasone 250 MCG/BLIST Aepb Inhaler    FLOVENT DISKUS    60 Inhaler    Inhale 2 puffs into the lungs 2 times daily       gabapentin 300 MG capsule    NEURONTIN    180 capsule    Take 2 capsules by mouth three times a day.       hydrOXYzine 25 MG tablet    ATARAX    60 tablet    Take 1-2 tablets (25-50 mg) by mouth every 6 hours as needed for itching       hydrOXYzine 50 MG capsule    VISTARIL    30 capsule    Take 1 capsule (50 mg) by mouth 3 times daily as needed for itching or anxiety       * ibuprofen 600 MG tablet    ADVIL/MOTRIN    100 tablet    Take 1 tablet (600 mg) by mouth every 6 hours as needed for moderate pain       * ibuprofen 600 MG tablet    ADVIL/MOTRIN    60 tablet    Take 600mg twice daily for one week then decrease to once daily       LANsoprazole 15 MG CR capsule    PREVACID    30 capsule    Take one capsule by mouth everyday for 30 days to prevent gastritis from NSAIDS       metoclopramide 5 MG tablet    REGLAN    30 tablet    Take 1 tablet (5 mg) by mouth 2 times daily as needed       oxyCODONE 5 MG IR tablet    ROXICODONE    10 tablet    Take 1 tablet (5 mg) by mouth every 4 hours as needed for pain       * oxyCODONE-acetaminophen  MG per tablet    PERCOCET    30 tablet    Take 1-2 tablets by mouth every 6 hours as needed for moderate to severe pain maximum 8 tablet(s) per day        * oxyCODONE-acetaminophen 5-325 MG per tablet    PERCOCET    30 tablet    Take 1 tablet by mouth every 4 hours as needed for pain maximum 4 tablet(s) per day       zolpidem 5 MG tablet    AMBIEN    30 tablet    Take 1 tablet (5 mg) by mouth nightly as needed for sleep       * Notice:  This list has 4 medication(s) that are the same as other medications prescribed for you. Read the directions carefully, and ask your doctor or other care provider to review them with you.

## 2017-03-20 NOTE — PROGRESS NOTES
Please call patient and send results letter  Blood tests are back to normal . Can discuss with Dr. Aparicio at next visit.

## 2017-03-20 NOTE — TELEPHONE ENCOUNTER
hospitalist calling to leave phone number for dr monterroso just in case questions come up. Feel free to call and it is ok to leave a message, however a call is not necessary.

## 2017-03-20 NOTE — NURSING NOTE
Controlled Substance Printed Prescription Pick-Up  Date: 03/20/17  Time: 250pm  [facility] prescription number: [id number]  Medication name: Percocet   Strength: 5/325mg  Prescription quantity: 30  Prescription picked up by: Darlene     Relation to patient: self  Photo ID verified: no  Second staff initial completed by: Kirstie Gates, call center staff  RN signature Abbie Aquino RN

## 2017-03-20 NOTE — PROGRESS NOTES
"S: Darlene Hudson is a 42 year old female with a PMH of asthma, pericarditis with pericardial and pleural effusion, UTI who presents with \"a lot of\" right thoracic roxane, Describes pleuritic chest pain (started on 3/18) and pain in chest when lying down. Last Echo results were normal. Patient has tried Ibuprofen, Tylenol, and Vicodin- all have not been helpful. Also feels SOB- different from asthma. Sleeping with pillow behind back to elevate in order to breath. Anxiety was \"out of control\"- currently on Wellbutrin and Hydroxyzine for anxiety, though have not helped with anxiety.    Diaphoresis, occasional dry cough, SOB, loss in appetite, feels fatigue, nauseous on and off  Denies hemoptysis, no changes in bowel movement, no changes in urination,  Review Of Systems  Skin: negative  Eyes: negative  Ears/Nose/Throat: negative  Respiratory: Shortness of breath, Dyspnea on exertion, Cough and No hemoptysis  ROS    AMP: moderately persistent asthma  PMH: MDD, polysubstance abuse  SurgHx:  SocialHx:  FamilyHx:  Health immunizations:  Allergies:  Risky Behaviors:   Medications: colchicine 1-2x/day, asthma meds     O:  BP (!) 150/105  Pulse 105  Wt 244 lb (110.7 kg)  LMP 02/09/2017  SpO2 97%  BMI 35.52 kg/m2    Physical Exam:  General: lying comfortably in bed  HEENT: PERRL, no sinus tenderness to palpation  Respiratory: decreased breath sounds in lower right lung field, normal respiratory effort, hypo-resonant to percussion on lower right lung field  Cardio: RRR, normal S1 and S2, no murmurs appreciated, no JVD, no peripheral edema  Abd: soft, NTND, normal bowel sounds  : NTTP  MSK: joints,   Skin: warm and well perfused extremities  Neuro: A&Ox3, CN III-XII grossly intact, strength and gait grossly intact  Psych: normal affect    Labs:  IN 72 HOURS  Office Visit on 03/16/2017   Component Date Value Ref Range Status     WBC 03/16/2017 8.4  4.0 - 11.0 K/uL Final     Lymphocytes # 03/16/2017 1.9  0.8 - 5.3 K/uL Final     % " Lymphocytes 03/16/2017 22.6  20.0 - 48.0 %L Final     Mid # 03/16/2017 0.7  0.0 - 2.2 K/uL Final     Mid % 03/16/2017 8.1  0.0 - 20.0 %M Final     GRANULOCYTES # 03/16/2017 5.8  1.6 - 8.3 K/uL Final     % Granulocytes 03/16/2017 69.3  40.0 - 75.0 %G Final     RBC 03/16/2017 4.08  3.80 - 5.20 M/uL Final     Hemoglobin 03/16/2017 11.9  11.7 - 15.7 g/dL Final     Hematocrit 03/16/2017 38.2  35.0 - 47.0 % Final     MCV 03/16/2017 93.6  78.0 - 100.0 fL Final     MCH 03/16/2017 29.2  26.5 - 35.0 pg Final     MCHC 03/16/2017 31.2* 32.0 - 36.0 g/dL Final     Platelets 03/16/2017 382.0  150.0 - 450.0 K/uL Final     Lipase 03/16/2017 28  0 - 52 U/L Final     Amylase 03/16/2017 42  5 - 120 U/L Final     Glucose 03/16/2017 91.0  60.0 - 109.0 mg/dL Final     Urea Nitrogen 03/16/2017 15.0  5.0 - 24.0 mg/dL Final     Creatinine 03/16/2017 0.9  0.6 - 1.3 mg/dL Final     Sodium 03/16/2017 142.0  133.0 - 144.0 mmol/L Final     Potassium 03/16/2017 3.4  3.4 - 5.3 mmol/L Final     Chloride 03/16/2017 104.0  94.0 - 109.0 mmol/L Final     Carbon Dioxide 03/16/2017 24.0  20.0 - 32.0 mmol/L Final     Calcium 03/16/2017 9.2  8.5 - 10.4 mg/dL Final     eGFR Calculated (Non Black Referen* 03/16/2017 72.3  >60.0 mL/min Final     eGFR Calculated (Black Reference) 03/16/2017 87.5  >60.0 mL/min Final       Imaging:  CXR 3/20/17 unnofficial interpretation: shows right-sided pleural effusion with cardiomegaly.    A&P:  1. Isolated right-sided thoracic pleural effusion with pleuritic chest pain. Most likely 2/2 pericardial window procedure due to tempo. Unlikely pneumonia due- patient is afebrile, no consistent cough with sputum, CXR does not show lobar consolidation or atypical appearances.  2. Orthopnea mentioned on history. Possibly 2/2 congestive heart failure. Cardiomegaly noted on CXR. Order BNP to rule out CHF.

## 2017-03-27 ENCOUNTER — OFFICE VISIT (OUTPATIENT)
Dept: FAMILY MEDICINE | Facility: CLINIC | Age: 42
End: 2017-03-27

## 2017-03-27 VITALS
DIASTOLIC BLOOD PRESSURE: 86 MMHG | BODY MASS INDEX: 35.37 KG/M2 | RESPIRATION RATE: 18 BRPM | WEIGHT: 243 LBS | OXYGEN SATURATION: 98 % | HEART RATE: 98 BPM | TEMPERATURE: 98.3 F | SYSTOLIC BLOOD PRESSURE: 133 MMHG

## 2017-03-27 DIAGNOSIS — J90 PLEURAL EFFUSION: Primary | ICD-10-CM

## 2017-03-27 DIAGNOSIS — G89.18 ACUTE POST-OPERATIVE PAIN: ICD-10-CM

## 2017-03-27 DIAGNOSIS — R09.81 CONGESTION OF PARANASAL SINUS: ICD-10-CM

## 2017-03-27 RX ORDER — LORATADINE 10 MG/1
10 TABLET ORAL DAILY
Qty: 90 TABLET | Refills: 1 | Status: SHIPPED | OUTPATIENT
Start: 2017-03-27 | End: 2017-07-05

## 2017-03-27 RX ORDER — FLUTICASONE PROPIONATE 50 MCG
1-2 SPRAY, SUSPENSION (ML) NASAL DAILY
Qty: 1 BOTTLE | Refills: 11 | Status: SHIPPED | OUTPATIENT
Start: 2017-03-27 | End: 2018-04-05

## 2017-03-27 RX ORDER — OXYCODONE AND ACETAMINOPHEN 5; 325 MG/1; MG/1
1 TABLET ORAL EVERY 4 HOURS PRN
Qty: 10 TABLET | Refills: 0 | Status: SHIPPED | OUTPATIENT
Start: 2017-03-27 | End: 2017-04-11

## 2017-03-27 NOTE — LETTER
RETURN TO WORK/SCHOOL FORM    3/27/2017    Re: Darlene Hudson  1975      To Whom It May Concern:     Darlene Hudson was seen in clinic today.  She may return to work on Monday 4/3/17.         Restrictions:  None      Oscar Aparicio MD  3/27/2017 4:42 PM

## 2017-03-27 NOTE — PROGRESS NOTES
HPI:       Darlnee Hudson is a 42 year old  female who presents for re-evaluation of the pulmonary effusion.   Patient was discharged from [facility] following pneumonia, pleural effusion, pericardial effusion, and pancreatitis.  Etiology uncertain at this time. Underwent pericardia window and pleural tap. Cultures grew nothing. Was seen here and noted to have development of a right pleural effusion. This was aparently worse last week.  Patient returns this week for a recheck of the pleural effusion.  This week also seen at [facility] and underwent a cardia echo which demonstrated no further evidence of effusion.    CT scan of the abdomen reviewed from [facility].   Patient feels better this week, although still getting some pains from the surgery.  Is a little upset at the cardiologist today, since she scheduled her for an Echo (patient had one two days ago), and is scheduled to tap the pleural effusion on Friday.               PMHX:   Current Medications:   Current Outpatient Prescriptions   Medication Sig Dispense Refill     oxyCODONE-acetaminophen (PERCOCET) 5-325 MG per tablet Take 1 tablet by mouth every 4 hours as needed for pain maximum 4 tablet(s) per day 30 tablet 0     colchicine 0.6 MG tablet Take 1 tablet (0.6 mg) by mouth 2 times daily 60 tablet 0     ibuprofen (ADVIL/MOTRIN) 600 MG tablet Take 600mg twice daily for one week then decrease to once daily 60 tablet 0     oxyCODONE (ROXICODONE) 5 MG IR tablet Take 1 tablet (5 mg) by mouth every 4 hours as needed for pain 10 tablet 0     hydrOXYzine (VISTARIL) 50 MG capsule Take 1 capsule (50 mg) by mouth 3 times daily as needed for itching or anxiety 30 capsule 1     fluticasone (FLOVENT DISKUS) 250 MCG/BLIST AEPB Inhaler Inhale 2 puffs into the lungs 2 times daily 60 Inhaler 3     albuterol (PROAIR HFA/PROVENTIL HFA/VENTOLIN HFA) 108 (90 BASE) MCG/ACT Inhaler Inhale 2 puffs into the lungs every 6 hours as needed for shortness of breath / dyspnea or  wheezing 1 Inhaler 3     oxyCODONE-acetaminophen (PERCOCET) 5-325 MG per tablet Take 1 tablet by mouth every 4 hours as needed for pain maximum 4 tablet(s) per day 15 tablet 0     buPROPion (WELLBUTRIN SR) 150 MG 12 hr tablet Take 1 tablet (150 mg) by mouth 2 times daily 60 tablet 1     metoclopramide (REGLAN) 5 MG tablet Take 1 tablet (5 mg) by mouth 2 times daily as needed 30 tablet 0     oxyCODONE-acetaminophen (PERCOCET)  MG per tablet Take 1-2 tablets by mouth every 6 hours as needed for moderate to severe pain maximum 8 tablet(s) per day (Patient not taking: Reported on 3/2/2017) 30 tablet 0     gabapentin (NEURONTIN) 300 MG capsule Take 2 capsules by mouth three times a day. 180 capsule 5     LANsoprazole (PREVACID) 15 MG CR capsule Take one capsule by mouth everyday for 30 days to prevent gastritis from NSAIDS 30 capsule 1     amitriptyline (ELAVIL) 50 MG tablet Take 1 tablet (50 mg) by mouth At Bedtime 30 tablet 11     zolpidem (AMBIEN) 5 MG tablet Take 1 tablet (5 mg) by mouth nightly as needed for sleep 30 tablet 1     hydrOXYzine (ATARAX) 25 MG tablet Take 1-2 tablets (25-50 mg) by mouth every 6 hours as needed for itching (Patient not taking: Reported on 3/2/2017) 60 tablet 1     ibuprofen (ADVIL/MOTRIN) 600 MG tablet Take 1 tablet (600 mg) by mouth every 6 hours as needed for moderate pain 100 tablet 0       Existing Problems  Patient Active Problem List   Diagnosis     Abnormal cytology finding     Nondependent alcohol abuse, episodic drinking behavior     Anxiety state     Controlled substance agreement signed     Low back pain     Chronic sinusitis     Cocaine abuse     Major depressive disorder, recurrent episode, moderate (H)     Asthma     Tobacco use disorder     Noninflammatory disorder of vagina     Pap smear for cervical cancer screening     Abdominal pain     Abnormal cervical Papanicolaou smear     Panic disorder with agoraphobia     Atopic rhinitis     Chronic low back pain      "Depression     Moderate persistent asthma     Other long term (current) drug therapy     Tobacco use       Allergies:  Allergies   Allergen Reactions     Lidocaine Other (See Comments)     \"my jaw stopped moving\"     Penicillins Hives, Rash and Shortness Of Breath     Lidocaine-Epinephrine [Epinephrine-Lidocaine-Na Metabisulfite] Other (See Comments)     Severe jaw cramping, double vision       Previous labs:  Lab Results   Component Value Date    HGB 11.9 03/16/2017    HCT 38.2 03/16/2017    .0 03/16/2017    BUN 15.0 03/16/2017    CO2 24.0 03/16/2017               Review of Systems:    CONSTITUTIONAL: no fatigue, no unexpected change in weight  SKIN: no worrisome rashes, no worrisome moles, no worrisome lesions  EYES: no acute vision problems or changes  ENT: no ear problems, no mouth problems, no throat problems  RESP: no significant cough, no shortness of breath  CV: no palpitations, no new or worsening peripheral edema  GI: no nausea, no vomiting, no constipation, no diarrhea          Physical Exam:     Vitals:    03/27/17 1538   BP: 133/86   Pulse: 98   Resp: 18   Temp: 98.3  F (36.8  C)   TempSrc: Oral   SpO2: 98%   Weight: 243 lb (110.2 kg)     Body mass index is 35.37 kg/(m^2).    GENERAL:alert, well hydrated, no distress  EYES: Eyes grossly normal to inspection, extraocular movements - intact, and PERRL  HENT: ear canals- normal; TMs- normal; Nose- normal; Mouth- no ulcers, no lesions  NECK: no tenderness, no adenopathy, no asymmetry, no masses, no stiffness  RESP: Mild decrease in breath sounds up 3 inches from base on the right. Normal on left.  Lungs clear to auscultation - no rales, no rhonchi, no wheezes  CV: regular rates and rhythm, normal S1 S2, no S3 or S4 and no murmur, no click or rub -               Labs and Procedures     Office Visit on 03/16/2017   Component Date Value Ref Range Status     WBC 03/16/2017 8.4  4.0 - 11.0 K/uL Final     Lymphocytes # 03/16/2017 1.9  0.8 - 5.3 K/uL Final "     % Lymphocytes 03/16/2017 22.6  20.0 - 48.0 %L Final     Mid # 03/16/2017 0.7  0.0 - 2.2 K/uL Final     Mid % 03/16/2017 8.1  0.0 - 20.0 %M Final     GRANULOCYTES # 03/16/2017 5.8  1.6 - 8.3 K/uL Final     % Granulocytes 03/16/2017 69.3  40.0 - 75.0 %G Final     RBC 03/16/2017 4.08  3.80 - 5.20 M/uL Final     Hemoglobin 03/16/2017 11.9  11.7 - 15.7 g/dL Final     Hematocrit 03/16/2017 38.2  35.0 - 47.0 % Final     MCV 03/16/2017 93.6  78.0 - 100.0 fL Final     MCH 03/16/2017 29.2  26.5 - 35.0 pg Final     MCHC 03/16/2017 31.2* 32.0 - 36.0 g/dL Final     Platelets 03/16/2017 382.0  150.0 - 450.0 K/uL Final     Lipase 03/16/2017 28  0 - 52 U/L Final     Amylase 03/16/2017 42  5 - 120 U/L Final     Glucose 03/16/2017 91.0  60.0 - 109.0 mg/dL Final     Urea Nitrogen 03/16/2017 15.0  5.0 - 24.0 mg/dL Final     Creatinine 03/16/2017 0.9  0.6 - 1.3 mg/dL Final     Sodium 03/16/2017 142.0  133.0 - 144.0 mmol/L Final     Potassium 03/16/2017 3.4  3.4 - 5.3 mmol/L Final     Chloride 03/16/2017 104.0  94.0 - 109.0 mmol/L Final     Carbon Dioxide 03/16/2017 24.0  20.0 - 32.0 mmol/L Final     Calcium 03/16/2017 9.2  8.5 - 10.4 mg/dL Final     eGFR Calculated (Non Black Referen* 03/16/2017 72.3  >60.0 mL/min Final     eGFR Calculated (Black Reference) 03/16/2017 87.5  >60.0 mL/min Final              Assessment and Plan     1.CXT - pleural effusion appears to have nearly completely resolved. Lateral is suggestive of small amount of fluid still present on the right, but much improved.  Patient feeling much better.    2. Discussed returning to work. Patient agrees, and seems anxious to go back to work.  I have agreed to let her go back in one week, and provided work slip.   3. I have advised patient to keep appointment on Friday for evaluation at [facility].  Would not pursue a pleural tap at this time, but suggest  they re-evaluate to ensure no effusion has re-developed by Friday.    4. Have provided ten percocet for pain. Advised  patient that these are the last ten, as she will need to go back to work. With history of addiction, patient may have difficulty coming of of prescription narcotics.  Will  be getting a drug test before going back to work. Have dicussed this with patient.  Patient disagrees, and says she is under control. She is anxious to go back to work.   5. Having increased difficulty with sinus congestion. Have provided loratidine and flonase for control of seasonal allergies.     Options for treatment and follow-up care were reviewed with the patient and/or guardian. Darlene Hudson and/or guardian engaged in the decision making process and verbalized understanding of the options discussed and agreed with the final plan.    Oscar Aparicio MD

## 2017-03-27 NOTE — PATIENT INSTRUCTIONS
Follow up in 2 weeks and we will recheck another x-ray.    Loratadine (Claritin) - take 1 tablet every day to help with your sinus    Flonase - Spray 1-2 sprays into both nostrils daily    Your medication list is printed, please keep this with you, it is helpful to bring this current list to any other medical appointments, the emergency room or hospital.    If you had lab testing today and your results are reassuring or normal they will be be mailed to you within 7 days.     If the lab tests need quick action we will call you with the results.   The phone number we will call with results is # [phone] (home) . If this is not the best number please call our clinic and change the number.    If you need any refills please call your pharmacy and they will contact us.    If you have any further concerns or wish to schedule another appointment you must call our office during normal business hours  [phone] (8-5:00 M-F)  If you have urgent medical questions that cannot wait  you may also call [phone] at any time of day.  If you have a medical emergency please call [phone].    Thank you for coming to [facility].

## 2017-03-27 NOTE — NURSING NOTE
Phq9-given to complete    Care everywhere signed for [facility]    3/27/2017 [facility] Previsit Plan   DUE FOR:  AAP  Pap  [place]?  F/u results if she already went to have it done, sign care everywhere-[facility]  BETTYE Haley

## 2017-03-27 NOTE — MR AVS SNAPSHOT
After Visit Summary   3/27/2017    Darlene Hudson    MRN: [id number]           Patient Information     Date Of Birth          1975        Visit Information        Provider Department      3/27/2017 3:40 PM Oscar Aparicio MD [facility]        Today's Diagnoses     Pleural effusion    -  1    Congestion of paranasal sinus        Acute post-operative pain          Care Instructions    Follow up in 2 weeks and we will recheck another x-ray.    Loratadine (Claritin) - take 1 tablet every day to help with your sinus    Flonase - Spray 1-2 sprays into both nostrils daily    Your medication list is printed, please keep this with you, it is helpful to bring this current list to any other medical appointments, the emergency room or hospital.    If you had lab testing today and your results are reassuring or normal they will be be mailed to you within 7 days.     If the lab tests need quick action we will call you with the results.   The phone number we will call with results is # [phone] (home) . If this is not the best number please call our clinic and change the number.    If you need any refills please call your pharmacy and they will contact us.    If you have any further concerns or wish to schedule another appointment you must call our office during normal business hours  [phone] (8-5:00 M-F)  If you have urgent medical questions that cannot wait  you may also call [phone] at any time of day.  If you have a medical emergency please call [phone].    Thank you for coming to [facility].          Follow-ups after your visit        Who to contact     Please call your clinic at [phone] to:    Ask questions about your health    Make or cancel appointments    Discuss your medicines    Learn about your test results    Speak to your doctor   If you have compliments or concerns about an experience at your clinic, or if you wish to file a complaint, please contact  [facility] Patient Relations at [phone] or email us at [email or web address]         Additional Information About Your Visit        Care EveryWhere ID     This is your Care EveryWhere ID. This could be used by other organizations to access your [place] medical records  [id number]        Your Vitals Were     Pulse Temperature Respirations Pulse Oximetry BMI (Body Mass Index)       98 98.3  F (36.8  C) (Oral) 18 98% 35.37 kg/m2        Blood Pressure from Last 3 Encounters:   03/27/17 133/86   03/20/17 (!) 150/105   03/16/17 (!) 138/97    Weight from Last 3 Encounters:   03/27/17 243 lb (110.2 kg)   03/20/17 244 lb (110.7 kg)   03/16/17 245 lb 12.8 oz (111.5 kg)              We Performed the Following     XR CHEST 2 VW          Today's Medication Changes          These changes are accurate as of: 3/27/17  4:50 PM.  If you have any questions, ask your nurse or doctor.               Start taking these medicines.        Dose/Directions    fluticasone 50 MCG/ACT spray   Commonly known as:  FLONASE   Used for:  Congestion of paranasal sinus   Started by:  Oscar Aparicio MD        Dose:  1-2 spray   Spray 1-2 sprays into both nostrils daily   Quantity:  1 Bottle   Refills:  11       loratadine 10 MG tablet   Commonly known as:  CLARITIN   Used for:  Congestion of paranasal sinus   Started by:  Oscar Aparicio MD        Dose:  10 mg   Take 1 tablet (10 mg) by mouth daily   Quantity:  90 tablet   Refills:  1            Where to get your medicines      These medications were sent to [id number] - [place], [place] - [address] AT [address] of [address]  [address], [place] [address]     Phone:  [phone]     fluticasone 50 MCG/ACT spray    loratadine 10 MG tablet         Some of these will need a paper prescription and others can be bought over the counter.  Ask your nurse if you have questions.     Bring a paper prescription for each  of these medications     oxyCODONE-acetaminophen 5-325 MG per tablet                Primary Care Provider Office Phone # Fax #    Anaid Ann Vogel [phone]       [facility] [address] [place] [address]        Thank you!     Thank you for choosing [facility]  for your care. Our goal is always to provide you with excellent care. Hearing back from our patients is one way we can continue to improve our services. Please take a few minutes to complete the written survey that you may receive in the mail after your visit with us. Thank you!             Your Updated Medication List - Protect others around you: Learn how to safely use, store and throw away your medicines at www.disposemymeds.org.          This list is accurate as of: 3/27/17  4:50 PM.  Always use your most recent med list.                   Brand Name Dispense Instructions for use    albuterol 108 (90 BASE) MCG/ACT Inhaler    PROAIR HFA/PROVENTIL HFA/VENTOLIN HFA    1 Inhaler    Inhale 2 puffs into the lungs every 6 hours as needed for shortness of breath / dyspnea or wheezing       amitriptyline 50 MG tablet    ELAVIL    30 tablet    Take 1 tablet (50 mg) by mouth At Bedtime       buPROPion 150 MG 12 hr tablet    WELLBUTRIN SR    60 tablet    Take 1 tablet (150 mg) by mouth 2 times daily       colchicine 0.6 MG tablet     60 tablet    Take 1 tablet (0.6 mg) by mouth 2 times daily       fluticasone 250 MCG/BLIST Aepb Inhaler    FLOVENT DISKUS    60 Inhaler    Inhale 2 puffs into the lungs 2 times daily       fluticasone 50 MCG/ACT spray    FLONASE    1 Bottle    Spray 1-2 sprays into both nostrils daily       gabapentin 300 MG capsule    NEURONTIN    180 capsule    Take 2 capsules by mouth three times a day.       hydrOXYzine 25 MG tablet    ATARAX    60 tablet    Take 1-2 tablets (25-50 mg) by mouth every 6 hours as needed for itching       hydrOXYzine 50 MG capsule    VISTARIL    30 capsule    Take 1  capsule (50 mg) by mouth 3 times daily as needed for itching or anxiety       * ibuprofen 600 MG tablet    ADVIL/MOTRIN    100 tablet    Take 1 tablet (600 mg) by mouth every 6 hours as needed for moderate pain       * ibuprofen 600 MG tablet    ADVIL/MOTRIN    60 tablet    Take 600mg twice daily for one week then decrease to once daily       LANsoprazole 15 MG CR capsule    PREVACID    30 capsule    Take one capsule by mouth everyday for 30 days to prevent gastritis from NSAIDS       loratadine 10 MG tablet    CLARITIN    90 tablet    Take 1 tablet (10 mg) by mouth daily       metoclopramide 5 MG tablet    REGLAN    30 tablet    Take 1 tablet (5 mg) by mouth 2 times daily as needed       oxyCODONE 5 MG IR tablet    ROXICODONE    10 tablet    Take 1 tablet (5 mg) by mouth every 4 hours as needed for pain       * oxyCODONE-acetaminophen  MG per tablet    PERCOCET    30 tablet    Take 1-2 tablets by mouth every 6 hours as needed for moderate to severe pain maximum 8 tablet(s) per day       * oxyCODONE-acetaminophen 5-325 MG per tablet    PERCOCET    30 tablet    Take 1 tablet by mouth every 4 hours as needed for pain maximum 4 tablet(s) per day       * oxyCODONE-acetaminophen 5-325 MG per tablet    PERCOCET    10 tablet    Take 1 tablet by mouth every 4 hours as needed for pain maximum 4 tablet(s) per day       zolpidem 5 MG tablet    AMBIEN    30 tablet    Take 1 tablet (5 mg) by mouth nightly as needed for sleep       * Notice:  This list has 5 medication(s) that are the same as other medications prescribed for you. Read the directions carefully, and ask your doctor or other care provider to review them with you.

## 2017-03-28 ASSESSMENT — PATIENT HEALTH QUESTIONNAIRE - PHQ9: SUM OF ALL RESPONSES TO PHQ QUESTIONS 1-9: 8

## 2017-04-11 ENCOUNTER — OFFICE VISIT (OUTPATIENT)
Dept: FAMILY MEDICINE | Facility: CLINIC | Age: 42
End: 2017-04-11

## 2017-04-11 VITALS
SYSTOLIC BLOOD PRESSURE: 133 MMHG | WEIGHT: 243 LBS | BODY MASS INDEX: 35.37 KG/M2 | TEMPERATURE: 98 F | DIASTOLIC BLOOD PRESSURE: 90 MMHG | OXYGEN SATURATION: 100 % | HEART RATE: 107 BPM

## 2017-04-11 DIAGNOSIS — I30.9 ACUTE PERICARDITIS, UNSPECIFIED TYPE: ICD-10-CM

## 2017-04-11 DIAGNOSIS — M54.6 ACUTE MIDLINE THORACIC BACK PAIN: Primary | ICD-10-CM

## 2017-04-11 DIAGNOSIS — M54.2 CERVICALGIA: ICD-10-CM

## 2017-04-11 DIAGNOSIS — R11.0 NAUSEA: ICD-10-CM

## 2017-04-11 RX ORDER — ALBUTEROL SULFATE 90 UG/1
2 AEROSOL, METERED RESPIRATORY (INHALATION)
COMMUNITY
End: 2018-04-05

## 2017-04-11 RX ORDER — IBUPROFEN 600 MG/1
600 TABLET, FILM COATED ORAL EVERY 6 HOURS PRN
Qty: 30 TABLET | Refills: 1 | Status: SHIPPED | OUTPATIENT
Start: 2017-04-11 | End: 2017-10-08

## 2017-04-11 RX ORDER — HYDROCODONE BITARTRATE AND ACETAMINOPHEN 7.5; 325 MG/1; MG/1
1-2 TABLET ORAL EVERY 4 HOURS PRN
Qty: 60 TABLET | Refills: 0 | Status: SHIPPED | OUTPATIENT
Start: 2017-04-18 | End: 2017-04-25

## 2017-04-11 RX ORDER — METOCLOPRAMIDE 5 MG/1
5 TABLET ORAL 2 TIMES DAILY PRN
Qty: 30 TABLET | Refills: 0 | Status: SHIPPED | OUTPATIENT
Start: 2017-04-11 | End: 2017-07-05

## 2017-04-11 RX ORDER — HYDROCODONE BITARTRATE AND ACETAMINOPHEN 7.5; 325 MG/1; MG/1
1-2 TABLET ORAL EVERY 6 HOURS PRN
Qty: 60 TABLET | Refills: 0 | Status: SHIPPED | OUTPATIENT
Start: 2017-04-11 | End: 2017-04-18

## 2017-04-11 RX ORDER — FLUTICASONE PROPIONATE 50 MCG
1 SPRAY, SUSPENSION (ML) NASAL
COMMUNITY
End: 2017-10-30
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
## 2017-04-12 ENCOUNTER — TELEPHONE (OUTPATIENT)
Dept: FAMILY MEDICINE | Facility: CLINIC | Age: 42
End: 2017-04-12

## 2017-04-12 PROBLEM — I30.9 ACUTE PERICARDIAL EFFUSION: Status: ACTIVE | Noted: 2017-02-06
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
## 2017-04-13 ENCOUNTER — TELEPHONE (OUTPATIENT)
Dept: FAMILY MEDICINE | Facility: CLINIC | Age: 42
End: 2017-04-13

## 2017-04-13 ENCOUNTER — OFFICE VISIT (OUTPATIENT)
Dept: FAMILY MEDICINE | Facility: CLINIC | Age: 42
End: 2017-04-13

## 2017-04-13 VITALS
OXYGEN SATURATION: 98 % | DIASTOLIC BLOOD PRESSURE: 78 MMHG | TEMPERATURE: 98.1 F | SYSTOLIC BLOOD PRESSURE: 108 MMHG | HEART RATE: 94 BPM | RESPIRATION RATE: 18 BRPM

## 2017-04-13 DIAGNOSIS — F43.23 ADJUSTMENT DISORDER WITH MIXED ANXIETY AND DEPRESSED MOOD: Primary | ICD-10-CM

## 2017-04-13 NOTE — MR AVS SNAPSHOT
After Visit Summary   4/13/2017    Darlene Hudson    MRN: [id number]           Patient Information     Date Of Birth          1975        Visit Information        Provider Department      4/13/2017 10:00 AM Oscar Aparicio MD [facility]        Today's Diagnoses     Adjustment disorder with mixed anxiety and depressed mood    -  1      Care Instructions    If you have an injury - Use a cold pack on area. Do not use heat.     Mild exercise, gentle stretching and strengthening is good for your back.    Can do warm baths, whirlpool, stretching and lose weight.      Medication changes:    STOP Wellbutrin.     Start Sertraline (Zoloft) 50 mg - Take 1 tablet by mouth daily. It will take at least 3 weeks to work.         Your medication list is printed, please keep this with you, it is helpful to bring this current list to any other medical appointments, the emergency room or hospital.    If you had lab testing today and your results are reassuring or normal they will be be mailed to you within 7 days.     If the lab tests need quick action we will call you with the results.   The phone number we will call with results is # [phone] (home) . If this is not the best number please call our clinic and change the number.    If you need any refills please call your pharmacy and they will contact us.    If you have any further concerns or wish to schedule another appointment you must call our office during normal business hours  [phone] (8-5:00 M-F)  If you have urgent medical questions that cannot wait  you may also call [phone] at any time of day.  If you have a medical emergency please call 166.    Thank you for coming to [facility].          Follow-ups after your visit        Who to contact     Please call your clinic at [phone] to:    Ask questions about your health    Make or cancel appointments    Discuss your medicines    Learn about your test  results    Speak to your doctor   If you have compliments or concerns about an experience at your clinic, or if you wish to file a complaint, please contact [facility] Patient Relations at [phone] or email us at [email or web address]         Additional Information About Your Visit        Care EveryWhere ID     This is your Care EveryWhere ID. This could be used by other organizations to access your [facility] medical records  [id number]        Your Vitals Were     Pulse Temperature Respirations Pulse Oximetry          94 98.1  F (36.7  C) (Oral) 18 98%         Blood Pressure from Last 3 Encounters:   04/13/17 108/78   04/11/17 133/90   03/27/17 133/86    Weight from Last 3 Encounters:   04/11/17 243 lb (110.2 kg)   03/27/17 243 lb (110.2 kg)   03/20/17 244 lb (110.7 kg)              Today, you had the following     No orders found for display         Today's Medication Changes          These changes are accurate as of: 4/13/17 11:10 AM.  If you have any questions, ask your nurse or doctor.               Start taking these medicines.        Dose/Directions    sertraline 50 MG tablet   Commonly known as:  ZOLOFT   Used for:  Adjustment disorder with mixed anxiety and depressed mood   Started by:  Oscar Aparicio MD        Dose:  50 mg   Take 1 tablet (50 mg) by mouth daily   Quantity:  90 tablet   Refills:  0            Where to get your medicines      These medications were sent to [id number] - [place], [place] - [address] AT [address]  [address] , [place] [address]     Phone:  [phone]     sertraline 50 MG tablet                Primary Care Provider Office Phone # Fax #    Anaid Vogel [phone]       [facility] [address] [place] [address]        Thank you!     Thank you for choosing [facility]  for your care. Our goal is always to provide you with  excellent care. Hearing back from our patients is one way we can continue to improve our services. Please take a few minutes to complete the written survey that you may receive in the mail after your visit with us. Thank you!             Your Updated Medication List - Protect others around you: Learn how to safely use, store and throw away your medicines at www.disposemymeds.org.          This list is accurate as of: 4/13/17 11:10 AM.  Always use your most recent med list.                   Brand Name Dispense Instructions for use    ADVAIR DISKUS 500-50 MCG/DOSE diskus inhaler   Generic drug:  fluticasone-salmeterol      Inhale 1 puff into the lungs       * albuterol 108 (90 BASE) MCG/ACT Inhaler    PROAIR HFA/PROVENTIL HFA/VENTOLIN HFA     Inhale 2 puffs into the lungs       * albuterol 108 (90 BASE) MCG/ACT Inhaler    PROAIR HFA/PROVENTIL HFA/VENTOLIN HFA    1 Inhaler    Inhale 2 puffs into the lungs every 6 hours as needed for shortness of breath / dyspnea or wheezing       colchicine 0.6 MG tablet     60 tablet    Take 1 tablet (0.6 mg) by mouth 2 times daily       fluticasone 250 MCG/BLIST Aepb Inhaler    FLOVENT DISKUS    60 Inhaler    Inhale 2 puffs into the lungs 2 times daily       * fluticasone 50 MCG/ACT spray    FLONASE     Spray 1 spray in nostril       * fluticasone 50 MCG/ACT spray    FLONASE    1 Bottle    Spray 1-2 sprays into both nostrils daily       * HYDROcodone-acetaminophen 7.5-325 MG per tablet    NORCO    60 tablet    Take 1-2 tablets by mouth every 6 hours as needed for moderate to severe pain maximum 8 tablet(s) per 24 hours.       * HYDROcodone-acetaminophen 7.5-325 MG per tablet   Start taking on:  4/18/2017    NORCO    60 tablet    Take 1-2 tablets by mouth every 4 hours as needed for moderate to severe pain maximum 8 tablet(s) per day       hydrOXYzine 25 MG tablet    ATARAX    60 tablet    Take 1-2 tablets (25-50 mg) by mouth every 6 hours as needed for itching       * ibuprofen 600  MG tablet    ADVIL/MOTRIN    100 tablet    Take 1 tablet (600 mg) by mouth every 6 hours as needed for moderate pain       * ibuprofen 600 MG tablet    ADVIL/MOTRIN    60 tablet    Take 600mg twice daily for one week then decrease to once daily       * ibuprofen 600 MG tablet    ADVIL/MOTRIN    30 tablet    Take 1 tablet (600 mg) by mouth every 6 hours as needed for moderate pain       LANsoprazole 15 MG CR capsule    PREVACID    30 capsule    Take one capsule by mouth everyday for 30 days to prevent gastritis from NSAIDS       loratadine 10 MG tablet    CLARITIN    90 tablet    Take 1 tablet (10 mg) by mouth daily       metoclopramide 5 MG tablet    REGLAN    30 tablet    Take 1 tablet (5 mg) by mouth 2 times daily as needed       sertraline 50 MG tablet    ZOLOFT    90 tablet    Take 1 tablet (50 mg) by mouth daily       * Notice:  This list has 9 medication(s) that are the same as other medications prescribed for you. Read the directions carefully, and ask your doctor or other care provider to review them with you.
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
## 2017-04-13 NOTE — PATIENT INSTRUCTIONS
If you have an injury - Use a cold pack on area. Do not use heat.     Mild exercise, gentle stretching and strengthening is good for your back.    Can do warm baths, whirlpool, stretching and lose weight.      Medication changes:    STOP Wellbutrin.     Start Sertraline (Zoloft) 50 mg - Take 1 tablet by mouth daily. It will take at least 3 weeks to work.         Your medication list is printed, please keep this with you, it is helpful to bring this current list to any other medical appointments, the emergency room or hospital.    If you had lab testing today and your results are reassuring or normal they will be be mailed to you within 7 days.     If the lab tests need quick action we will call you with the results.   The phone number we will call with results is # [phone] (home) . If this is not the best number please call our clinic and change the number.    If you need any refills please call your pharmacy and they will contact us.    If you have any further concerns or wish to schedule another appointment you must call our office during normal business hours  [phone] (8-5:00 M-F)  If you have urgent medical questions that cannot wait  you may also call [phone] at any time of day.  If you have a medical emergency please call [phone].    Thank you for coming to [facility].

## 2017-04-14 ASSESSMENT — ASTHMA QUESTIONNAIRES: ACT_TOTALSCORE: 20

## 2017-04-24 ENCOUNTER — TELEPHONE (OUTPATIENT)
Dept: FAMILY MEDICINE | Facility: CLINIC | Age: 42
End: 2017-04-24

## 2017-04-24 DIAGNOSIS — N61.1 ABSCESS OF BREAST: Primary | ICD-10-CM

## 2017-04-28 NOTE — PATIENT INSTRUCTIONS
Have left patient several VM to call back in regards to referral for general surgery but have received no call back.  Will mail referral along with contact numbers so patient can make appointment at her convenience.  JAREK

## 2017-05-04 ENCOUNTER — RECORDS - HEALTHEAST (OUTPATIENT)
Dept: ADMINISTRATIVE | Facility: OTHER | Age: 42
End: 2017-05-04

## 2017-05-04 ENCOUNTER — OFFICE VISIT (OUTPATIENT)
Dept: FAMILY MEDICINE | Facility: CLINIC | Age: 42
End: 2017-05-04

## 2017-05-04 VITALS
WEIGHT: 239 LBS | RESPIRATION RATE: 18 BRPM | OXYGEN SATURATION: 100 % | SYSTOLIC BLOOD PRESSURE: 122 MMHG | BODY MASS INDEX: 34.79 KG/M2 | DIASTOLIC BLOOD PRESSURE: 85 MMHG | TEMPERATURE: 98.2 F | HEART RATE: 111 BPM

## 2017-05-04 DIAGNOSIS — F43.23 ADJUSTMENT DISORDER WITH MIXED ANXIETY AND DEPRESSED MOOD: Primary | ICD-10-CM

## 2017-05-04 DIAGNOSIS — M54.50 CHRONIC BILATERAL LOW BACK PAIN WITHOUT SCIATICA: ICD-10-CM

## 2017-05-04 DIAGNOSIS — R07.89 CHEST WALL PAIN FOLLOWING SURGERY: ICD-10-CM

## 2017-05-04 DIAGNOSIS — G89.29 CHRONIC BILATERAL LOW BACK PAIN WITHOUT SCIATICA: ICD-10-CM

## 2017-05-04 DIAGNOSIS — G89.18 CHEST WALL PAIN FOLLOWING SURGERY: ICD-10-CM

## 2017-05-04 DIAGNOSIS — I31.39 PERICARDIAL EFFUSION: ICD-10-CM

## 2017-05-04 RX ORDER — CITALOPRAM HYDROBROMIDE 20 MG/1
20 TABLET ORAL DAILY
Qty: 90 TABLET | Refills: 1 | Status: SHIPPED | OUTPATIENT
Start: 2017-05-04 | End: 2017-10-05

## 2017-05-04 RX ORDER — OXYCODONE AND ACETAMINOPHEN 5; 325 MG/1; MG/1
1 TABLET ORAL EVERY 6 HOURS PRN
Qty: 30 TABLET | Refills: 0 | Status: SHIPPED | OUTPATIENT
Start: 2017-05-04 | End: 2017-07-20

## 2017-05-04 RX ORDER — ALPRAZOLAM 0.5 MG
TABLET ORAL
Qty: 2 TABLET | Refills: 0 | Status: SHIPPED | OUTPATIENT
Start: 2017-05-04 | End: 2017-07-20

## 2017-05-04 NOTE — PROGRESS NOTES
HPI:       Darlene Hudson is a 42 year old  female who presents for back pain.       Darlene is not doing well. She has been back to the [facility] ER for back pain, nothing further done.  She appears to becoming more depressed. Apparently she has now lost her insurance temporarily, but is waiting to get it back. Because of this, she did not start the zoloft - it was too expensive.    She is worried that something is wrong with her back. Has been seeing a chiropractor, who has apparently referred her to a neurologist - she has not had this appointment yet.         She has had to go to part time at her job, because of the back pain. She has been off of pain medications for three weeks, but she says she is not able to tolerate the continued pain.  No radiculopathy, no change in bowel or bladder problems.   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    No evidence for recurrence of her pancreatitis, or pericardial effusion, but etiology of these were not discovered.  In view of back pain and unusual previous infections, will proceed with lumbar MRI to evaluate for occult infection or other process.       Patient continues to have pain from a breast abscess for which she was referred to the surgeons for excision.  Patient was not able to see the surgeons because of insurance problems.                      PMHX:   Current Medications:   Current Outpatient Prescriptions   Medication Sig Dispense Refill     sertraline (ZOLOFT) 50 MG tablet Take 1 tablet (50 mg) by mouth daily 90 tablet 0     fluticasone-salmeterol (ADVAIR DISKUS) 500-50 MCG/DOSE diskus inhaler Inhale 1 puff into the lungs       albuterol (PROAIR HFA/PROVENTIL HFA/VENTOLIN HFA) 108 (90 BASE) MCG/ACT Inhaler Inhale 2 puffs into the lungs       fluticasone (FLONASE) 50 MCG/ACT spray Spray 1 spray in nostril       metoclopramide (REGLAN) 5 MG tablet Take 1 tablet (5 mg) by mouth 2 times daily  as needed 30 tablet 0     ibuprofen (ADVIL/MOTRIN) 600 MG tablet Take 1 tablet (600 mg) by mouth every 6 hours as needed for moderate pain 30 tablet 1     fluticasone (FLONASE) 50 MCG/ACT spray Spray 1-2 sprays into both nostrils daily 1 Bottle 11     loratadine (CLARITIN) 10 MG tablet Take 1 tablet (10 mg) by mouth daily 90 tablet 1     colchicine 0.6 MG tablet Take 1 tablet (0.6 mg) by mouth 2 times daily 60 tablet 0     ibuprofen (ADVIL/MOTRIN) 600 MG tablet Take 600mg twice daily for one week then decrease to once daily 60 tablet 0     fluticasone (FLOVENT DISKUS) 250 MCG/BLIST AEPB Inhaler Inhale 2 puffs into the lungs 2 times daily 60 Inhaler 3     albuterol (PROAIR HFA/PROVENTIL HFA/VENTOLIN HFA) 108 (90 BASE) MCG/ACT Inhaler Inhale 2 puffs into the lungs every 6 hours as needed for shortness of breath / dyspnea or wheezing 1 Inhaler 3     LANsoprazole (PREVACID) 15 MG CR capsule Take one capsule by mouth everyday for 30 days to prevent gastritis from NSAIDS 30 capsule 1     hydrOXYzine (ATARAX) 25 MG tablet Take 1-2 tablets (25-50 mg) by mouth every 6 hours as needed for itching (Patient not taking: Reported on 3/2/2017) 60 tablet 1     ibuprofen (ADVIL/MOTRIN) 600 MG tablet Take 1 tablet (600 mg) by mouth every 6 hours as needed for moderate pain 100 tablet 0       Existing Problems  Patient Active Problem List   Diagnosis     Abnormal cytology finding     Nondependent alcohol abuse, episodic drinking behavior     Anxiety state     Controlled substance agreement signed     Low back pain     Chronic sinusitis     Cocaine abuse     Major depressive disorder, recurrent episode, moderate (H)     Asthma     Tobacco use disorder     Noninflammatory disorder of vagina     Pap smear for cervical cancer screening     Abdominal pain     Abnormal cervical Papanicolaou smear     Panic disorder with agoraphobia     Atopic rhinitis     Chronic low back pain     Depression     Moderate persistent asthma     Other long  "term (current) drug therapy     Tobacco use     Acute pericardial effusion     Anxiety       Allergies:  Allergies   Allergen Reactions     Lidocaine Other (See Comments)     \"my jaw stopped moving\"     Penicillins Hives, Rash and Shortness Of Breath     Lidocaine-Epinephrine [Epinephrine-Lidocaine-Na Metabisulfite] Other (See Comments)     Severe jaw cramping, double vision       Previous labs:  Lab Results   Component Value Date    HGB 11.9 03/16/2017    HCT 38.2 03/16/2017    .0 03/16/2017    BUN 15.0 03/16/2017    CO2 24.0 03/16/2017               Review of Systems:    CONSTITUTIONAL: no unexpected change in weight  SKIN: no worrisome rashes, no worrisome moles, no worrisome lesions  EYES: no acute vision problems or changes  ENT: no ear problems, no mouth problems, no throat problems  RESP: no significant cough, no shortness of breath  CV: no chest pain, no palpitations, no new or worsening peripheral edema  GI: no nausea, no vomiting, no constipation, no diarrhea          Physical Exam:     Vitals:    05/04/17 1149   BP: 122/85   Pulse: 111   Resp: 18   Temp: 98.2  F (36.8  C)   TempSrc: Oral   SpO2: 100%   Weight: 239 lb (108.4 kg)     Body mass index is 34.79 kg/(m^2).    GENERAL:alert, anxious with intermittent crying  EYES: Eyes grossly normal to inspection, extraocular movements - intact, and PERRL  HENT: ear canals- normal; TMs- normal; Nose- normal; Mouth- no ulcers, no lesions  NECK: no tenderness, no adenopathy, no asymmetry, no masses, no stiffness; thyroid- normal to palpation  RESP: lungs clear to auscultation - no rales, no rhonchi, no wheezes  CV: regular rates and rhythm, normal S1 S2, no S3 or S4 and no murmur, no click or rub -  Back- pain localizing to the right sacroiliac, no bony pain, no paravertebral spasm, normal lordosis.               Labs and Procedures     Office Visit on 03/16/2017   Component Date Value Ref Range Status     WBC 03/16/2017 8.4  4.0 - 11.0 K/uL Final     " Lymphocytes # 03/16/2017 1.9  0.8 - 5.3 K/uL Final     % Lymphocytes 03/16/2017 22.6  20.0 - 48.0 %L Final     Mid # 03/16/2017 0.7  0.0 - 2.2 K/uL Final     Mid % 03/16/2017 8.1  0.0 - 20.0 %M Final     GRANULOCYTES # 03/16/2017 5.8  1.6 - 8.3 K/uL Final     % Granulocytes 03/16/2017 69.3  40.0 - 75.0 %G Final     RBC 03/16/2017 4.08  3.80 - 5.20 M/uL Final     Hemoglobin 03/16/2017 11.9  11.7 - 15.7 g/dL Final     Hematocrit 03/16/2017 38.2  35.0 - 47.0 % Final     MCV 03/16/2017 93.6  78.0 - 100.0 fL Final     MCH 03/16/2017 29.2  26.5 - 35.0 pg Final     MCHC 03/16/2017 31.2* 32.0 - 36.0 g/dL Final     Platelets 03/16/2017 382.0  150.0 - 450.0 K/uL Final     Lipase 03/16/2017 28  0 - 52 U/L Final     Amylase 03/16/2017 42  5 - 120 U/L Final     Glucose 03/16/2017 91.0  60.0 - 109.0 mg/dL Final     Urea Nitrogen 03/16/2017 15.0  5.0 - 24.0 mg/dL Final     Creatinine 03/16/2017 0.9  0.6 - 1.3 mg/dL Final     Sodium 03/16/2017 142.0  133.0 - 144.0 mmol/L Final     Potassium 03/16/2017 3.4  3.4 - 5.3 mmol/L Final     Chloride 03/16/2017 104.0  94.0 - 109.0 mmol/L Final     Carbon Dioxide 03/16/2017 24.0  20.0 - 32.0 mmol/L Final     Calcium 03/16/2017 9.2  8.5 - 10.4 mg/dL Final     eGFR Calculated (Non Black Referen* 03/16/2017 72.3  >60.0 mL/min Final     eGFR Calculated (Black Reference) 03/16/2017 87.5  >60.0 mL/min Final              Assessment and Plan     1.Will proceed to MRI of low back in this complex patient with recent pancreatitis and pericardial and lung effusions from occult cause.    2. Will provide with additional pain medication at this time.  Has not had pain meds for three weeks, but is finding it difficult to get along.   3. Anxiety disorder with depression - Have stopped the zoloft, and started celexa 20 mg in view of the substantial price reduction. Patient has agreed to purchase the celexa, and begin the 20 mg dose.   4. Patient still needs to see the surgeon for removal of the breast cyst.    5. Return following the MRI.            Options for treatment and follow-up care were reviewed with the patient and/or guardian. Darlene Hudson and/or guardian engaged in the decision making process and verbalized understanding of the options discussed and agreed with the final plan.    Oscar Aparicio MD

## 2017-05-04 NOTE — PATIENT INSTRUCTIONS
Start taking Citalopram 20 mg - take 1 tablet daily. It will take 2-3 weeks for medication to start working.    MRI lumbar spine referral.    Xanax 0.5 mg tablet - take 1 tablet 30 minutes before going to the MRI appointment.    Stop Wellbutrin.           Your medication list is printed, please keep this with you, it is helpful to bring this current list to any other medical appointments, the emergency room or hospital.    If you had lab testing today and your results are reassuring or normal they will be be mailed to you within 7 days.     If the lab tests need quick action we will call you with the results.   The phone number we will call with results is # [phone] (home) . If this is not the best number please call our clinic and change the number.    If you need any refills please call your pharmacy and they will contact us.    If you have any further concerns or wish to schedule another appointment you must call our office during normal business hours  [phone] (8-5:00 M-F)  If you have urgent medical questions that cannot wait  you may also call [phone] at any time of day.  If you have a medical emergency please call 1.    Thank you for coming to [facility].      Referral for ( TEST )  :      MR Lumbar Spine w/o Contrast  LOCATION/PLACE/Provider :    [address] [address]  DATE & TIME :     5-14-17 at 10:00am  PHONE :     [phone]  FAX :     [phone]  ADDITIONAL INFORMATION :     NA  Appointment made by clinic staff/:    JAREK

## 2017-05-04 NOTE — MR AVS SNAPSHOT
After Visit Summary   5/4/2017    Darlene Hudson    MRN: [id number]           Patient Information     Date Of Birth          1975        Visit Information        Provider Department      5/4/2017 11:40 AM Oscar Aparicio MD [facility]        Today's Diagnoses     Adjustment disorder with mixed anxiety and depressed mood    -  1    Chronic bilateral low back pain without sciatica        Pericardial effusion        Chest wall pain following surgery          Care Instructions    Start taking Citalopram 20 mg - take 1 tablet daily. It will take 2-3 weeks for medication to start working.    MRI lumbar spine referral.    Xanax 0.5 mg tablet - take 1 tablet 30 minutes before going to the MRI appointment.    Stop Wellbutrin.           Your medication list is printed, please keep this with you, it is helpful to bring this current list to any other medical appointments, the emergency room or hospital.    If you had lab testing today and your results are reassuring or normal they will be be mailed to you within 7 days.     If the lab tests need quick action we will call you with the results.   The phone number we will call with results is # [phone] (home) . If this is not the best number please call our clinic and change the number.    If you need any refills please call your pharmacy and they will contact us.    If you have any further concerns or wish to schedule another appointment you must call our office during normal business hours  [phone] (8-5:00 M-F)  If you have urgent medical questions that cannot wait  you may also call [phone] at any time of day.  If you have a medical emergency please call [phone].    Thank you for coming to [facility].          Follow-ups after your visit        Future tests that were ordered for you today     Open Future Orders        Priority Expected Expires Ordered    MR Lumbar Spine w/o Contrast Routine  5/4/2018 5/4/2017             Who to contact     Please call your clinic at [phone] to:    Ask questions about your health    Make or cancel appointments    Discuss your medicines    Learn about your test results    Speak to your doctor   If you have compliments or concerns about an experience at your clinic, or if you wish to file a complaint, please contact [facility] Patient Relations at [phone] or email us at [email or web address]         Additional Information About Your Visit        [employer] Information     Libersy is an electronic gateway that provides easy, online access to your medical records. With Libersy, you can request a clinic appointment, read your test results, renew a prescription or communicate with your care team.     To sign up for Libersy visit the website at [email or web address]   You will be asked to enter the access code listed below, as well as some personal information. Please follow the directions to create your username and password.     Your access code is: [email or web address]  Expires: 2017 12:28 PM     Your access code will  in 90 days. If you need help or a new code, please contact your [facility] or call [phone] for assistance.        Care EveryWhere ID     This is your Care EveryWhere ID. This could be used by other organizations to access your [place] medical records  [id number]        Your Vitals Were     Pulse Temperature Respirations Pulse Oximetry BMI (Body Mass Index)       111 98.2  F (36.8  C) (Oral) 18 100% 34.79 kg/m2        Blood Pressure from Last 3 Encounters:   17 122/85   17 108/78   17 133/90    Weight from Last 3 Encounters:   17 239 lb (108.4 kg)   17 243 lb (110.2 kg)   17 243 lb (110.2 kg)                 Today's Medication Changes          These changes are accurate as of: 17 12:28 PM.  If you have any questions, ask your nurse or doctor.                Start taking these medicines.        Dose/Directions    ALPRAZolam 0.5 MG tablet   Commonly known as:  XANAX   Used for:  Adjustment disorder with mixed anxiety and depressed mood   Started by:  Oscar Aparicio MD        Take one tablet 30 minutes before MRI scan.   Quantity:  2 tablet   Refills:  0       citalopram 20 MG tablet   Commonly known as:  celeXA   Used for:  Adjustment disorder with mixed anxiety and depressed mood   Started by:  Oscar Aparicio MD        Dose:  20 mg   Take 1 tablet (20 mg) by mouth daily   Quantity:  90 tablet   Refills:  1       oxyCODONE-acetaminophen 5-325 MG per tablet   Commonly known as:  PERCOCET   Used for:  Pericardial effusion, Chest wall pain following surgery   Started by:  Oscar Aparicio MD        Dose:  1 tablet   Take 1 tablet by mouth every 6 hours as needed for pain maximum 4 tablet(s) per day   Quantity:  30 tablet   Refills:  0            Where to get your medicines      These medications were sent to [id number] - [place], [place] - [address] AT [facility] [address]  [address] [place] [address] [address], [place] [address]     Phone:  [phone]     citalopram 20 MG tablet         Some of these will need a paper prescription and others can be bought over the counter.  Ask your nurse if you have questions.     Bring a paper prescription for each of these medications     ALPRAZolam 0.5 MG tablet    oxyCODONE-acetaminophen 5-325 MG per tablet                Primary Care Provider Office Phone # Fax #    Anaid Ann Vogel [phone]       [facility] [address] [place] [address]        Thank you!     Thank you for choosing [facility]  for your care. Our goal is always to provide you with excellent care. Hearing back from our patients is one way we can continue to improve our services. Please take a few minutes to complete the written survey that you may receive in the mail after your visit  with us. Thank you!             Your Updated Medication List - Protect others around you: Learn how to safely use, store and throw away your medicines at www.disposemymeds.org.          This list is accurate as of: 5/4/17 12:28 PM.  Always use your most recent med list.                   Brand Name Dispense Instructions for use    ADVAIR DISKUS 500-50 MCG/DOSE diskus inhaler   Generic drug:  fluticasone-salmeterol      Inhale 1 puff into the lungs       * albuterol 108 (90 BASE) MCG/ACT Inhaler    PROAIR HFA/PROVENTIL HFA/VENTOLIN HFA     Inhale 2 puffs into the lungs       * albuterol 108 (90 BASE) MCG/ACT Inhaler    PROAIR HFA/PROVENTIL HFA/VENTOLIN HFA    1 Inhaler    Inhale 2 puffs into the lungs every 6 hours as needed for shortness of breath / dyspnea or wheezing       ALPRAZolam 0.5 MG tablet    XANAX    2 tablet    Take one tablet 30 minutes before MRI scan.       citalopram 20 MG tablet    celeXA    90 tablet    Take 1 tablet (20 mg) by mouth daily       colchicine 0.6 MG tablet     60 tablet    Take 1 tablet (0.6 mg) by mouth 2 times daily       fluticasone 250 MCG/BLIST Aepb Inhaler    FLOVENT DISKUS    60 Inhaler    Inhale 2 puffs into the lungs 2 times daily       * fluticasone 50 MCG/ACT spray    FLONASE     Spray 1 spray in nostril       * fluticasone 50 MCG/ACT spray    FLONASE    1 Bottle    Spray 1-2 sprays into both nostrils daily       hydrOXYzine 25 MG tablet    ATARAX    60 tablet    Take 1-2 tablets (25-50 mg) by mouth every 6 hours as needed for itching       * ibuprofen 600 MG tablet    ADVIL/MOTRIN    100 tablet    Take 1 tablet (600 mg) by mouth every 6 hours as needed for moderate pain       * ibuprofen 600 MG tablet    ADVIL/MOTRIN    60 tablet    Take 600mg twice daily for one week then decrease to once daily       * ibuprofen 600 MG tablet    ADVIL/MOTRIN    30 tablet    Take 1 tablet (600 mg) by mouth every 6 hours as needed for moderate pain       LANsoprazole 15 MG CR capsule     PREVACID    30 capsule    Take one capsule by mouth everyday for 30 days to prevent gastritis from NSAIDS       loratadine 10 MG tablet    CLARITIN    90 tablet    Take 1 tablet (10 mg) by mouth daily       metoclopramide 5 MG tablet    REGLAN    30 tablet    Take 1 tablet (5 mg) by mouth 2 times daily as needed       oxyCODONE-acetaminophen 5-325 MG per tablet    PERCOCET    30 tablet    Take 1 tablet by mouth every 6 hours as needed for pain maximum 4 tablet(s) per day       sertraline 50 MG tablet    ZOLOFT    90 tablet    Take 1 tablet (50 mg) by mouth daily       * Notice:  This list has 7 medication(s) that are the same as other medications prescribed for you. Read the directions carefully, and ask your doctor or other care provider to review them with you.

## 2017-05-19 NOTE — TELEPHONE ENCOUNTER
Reviewed visits since the medication was ordered.Yue is not c/o abdominal pain, nausea or GI distress at this time, nor has she requested the refill due to distress.   Hence will not (at present) pursue the prior authorization.    Sulema Meza,CODY

## 2017-06-23 ENCOUNTER — OFFICE VISIT (OUTPATIENT)
Dept: FAMILY MEDICINE | Facility: CLINIC | Age: 42
End: 2017-06-23

## 2017-06-23 VITALS
DIASTOLIC BLOOD PRESSURE: 86 MMHG | WEIGHT: 240 LBS | TEMPERATURE: 98.4 F | OXYGEN SATURATION: 98 % | HEIGHT: 70 IN | BODY MASS INDEX: 34.36 KG/M2 | HEART RATE: 108 BPM | SYSTOLIC BLOOD PRESSURE: 122 MMHG

## 2017-06-23 DIAGNOSIS — F43.20 ADJUSTMENT DISORDER, UNSPECIFIED TYPE: ICD-10-CM

## 2017-06-23 DIAGNOSIS — G89.29 CHRONIC LEFT-SIDED LOW BACK PAIN WITH RIGHT-SIDED SCIATICA: Primary | ICD-10-CM

## 2017-06-23 DIAGNOSIS — M54.41 CHRONIC LEFT-SIDED LOW BACK PAIN WITH RIGHT-SIDED SCIATICA: Primary | ICD-10-CM

## 2017-06-23 RX ORDER — MELOXICAM 7.5 MG/1
7.5 TABLET ORAL DAILY
Qty: 30 TABLET | Refills: 1 | Status: SHIPPED | OUTPATIENT
Start: 2017-06-23 | End: 2017-07-05

## 2017-06-23 RX ORDER — ACETAMINOPHEN 500 MG
1000 TABLET ORAL 3 TIMES DAILY
Qty: 100 TABLET | Refills: 0 | Status: SHIPPED | OUTPATIENT
Start: 2017-06-23 | End: 2017-10-30

## 2017-06-23 RX ORDER — HYDROCODONE BITARTRATE AND ACETAMINOPHEN 5; 325 MG/1; MG/1
1 TABLET ORAL DAILY PRN
Qty: 18 TABLET | Refills: 0 | Status: SHIPPED | OUTPATIENT
Start: 2017-06-23 | End: 2017-07-20

## 2017-06-23 RX ORDER — TRAZODONE HYDROCHLORIDE 100 MG/1
100 TABLET ORAL AT BEDTIME
Qty: 90 TABLET | Refills: 0 | Status: SHIPPED | OUTPATIENT
Start: 2017-06-23 | End: 2017-07-05

## 2017-06-23 NOTE — MR AVS SNAPSHOT
After Visit Summary   6/23/2017    Darlene Hudson    MRN: [id number]           Patient Information     Date Of Birth          1975        Visit Information        Provider Department      6/23/2017 10:20 AM Anaid Vogel MD [facility]        Today's Diagnoses     Chronic left-sided low back pain with right-sided sciatica    -  1      Care Instructions    ~   Follow up on medications in two weeks.    Your medication list is printed, please keep this with you, it is helpful to bring this current list to any other medical appointments, the emergency room or hospital.    If you had lab testing today and your results are reassuring or normal they will be be mailed to you within 7 days.     If the lab tests need quick action we will call you with the results.   The phone number we will call with results is # [phone] (home) . If this is not the best number please call our clinic and change the number.    If you need any refills please call your pharmacy and they will contact us.    If you have any further concerns or wish to schedule another appointment you must call our office during normal business hours  [phone] (8-5:00 M-F)  If you have urgent medical questions that cannot wait  you may also call [phone] at any time of day.  If you have a medical emergency please call [phone].    Thank you for coming to [facility].            Follow-ups after your visit        Who to contact     Please call your clinic at [phone] to:    Ask questions about your health    Make or cancel appointments    Discuss your medicines    Learn about your test results    Speak to your doctor   If you have compliments or concerns about an experience at your clinic, or if you wish to file a complaint, please contact [facility] Patient Relations at [phone] or email us at [email or web address]         Additional Information About Your Visit    "     MyChart Information     [employer] is an electronic gateway that provides easy, online access to your medical records. With [employer], you can request a clinic appointment, read your test results, renew a prescription or communicate with your care team.     To sign up for [employer] visit the website at [email or web address]   You will be asked to enter the access code listed below, as well as some personal information. Please follow the directions to create your username and password.     Your access code is: [email or web address]  Expires: 2017 12:28 PM     Your access code will  in 90 days. If you need help or a new code, please contact your [facility] or call [phone] for assistance.        Care EveryWhere ID     This is your Care EveryWhere ID. This could be used by other organizations to access your [place] medical records  [id number]        Your Vitals Were     Pulse Temperature Height Pulse Oximetry BMI (Body Mass Index)       108 98.4  F (36.9  C) (Oral) 5' 9.5\" (176.5 cm) 98% 34.93 kg/m2        Blood Pressure from Last 3 Encounters:   17 122/86   17 122/85   17 108/78    Weight from Last 3 Encounters:   17 240 lb (108.9 kg)   17 239 lb (108.4 kg)   17 243 lb (110.2 kg)              Today, you had the following     No orders found for display         Today's Medication Changes          These changes are accurate as of: 17 10:59 AM.  If you have any questions, ask your nurse or doctor.               Start taking these medicines.        Dose/Directions    acetaminophen 500 MG tablet   Commonly known as:  TYLENOL   Used for:  Chronic left-sided low back pain with right-sided sciatica   Started by:  Anaid Vogel MD        Dose:  1000 mg   Take 2 tablets (1,000 mg) by mouth 3 times daily   Quantity:  100 tablet   Refills:  0       HYDROcodone-acetaminophen 5-325 MG per tablet   Commonly known as:  NORCO   Used for:  " Chronic left-sided low back pain with right-sided sciatica   Started by:  Anaid Vogel MD        Dose:  1 tablet   Take 1 tablet by mouth daily as needed for pain maximum 2 tablet(s) per day.  Must last 30 days   Quantity:  18 tablet   Refills:  0       meloxicam 7.5 MG tablet   Commonly known as:  MOBIC   Used for:  Chronic left-sided low back pain with right-sided sciatica   Started by:  Anaid Vogel MD        Dose:  7.5 mg   Take 1 tablet (7.5 mg) by mouth daily   Quantity:  30 tablet   Refills:  1       traZODone 100 MG tablet   Commonly known as:  DESYREL   Used for:  Chronic left-sided low back pain with right-sided sciatica   Started by:  Anaid Vogel MD        Dose:  100 mg   Take 1 tablet (100 mg) by mouth At Bedtime   Quantity:  90 tablet   Refills:  0            Where to get your medicines      These medications were sent to [id number] - [place], [place] - [address]  AT [facility] [address]  [address] [place] [address] [address] , [place] [address]     Phone:  [phone]     acetaminophen 500 MG tablet    meloxicam 7.5 MG tablet    traZODone 100 MG tablet         Some of these will need a paper prescription and others can be bought over the counter.  Ask your nurse if you have questions.     Bring a paper prescription for each of these medications     HYDROcodone-acetaminophen 5-325 MG per tablet                Primary Care Provider Office Phone # Fax #    Anaid Vogel [phone]       [facility] [address] [place] [address]        Equal Access to Services     [facility] AH: Hadii aad ku hadasho Soomaali, waaxda luqadaha, qaybta kaalmada adeegyada, waxay idiin hayaan adeeg kharash la'aan . So [facility] [phone].    ATENCIÓN: Si habla español, tiene a boswell disposición servicios gratuitos de asistencia lingüística. Llame al [phone].    We comply with applicable federal civil rights laws and [place] laws.  We do not discriminate on the basis of race, color, national origin, age, disability sex, sexual orientation or gender identity.            Thank you!     Thank you for choosing [facility]  for your care. Our goal is always to provide you with excellent care. Hearing back from our patients is one way we can continue to improve our services. Please take a few minutes to complete the written survey that you may receive in the mail after your visit with us. Thank you!             Your Updated Medication List - Protect others around you: Learn how to safely use, store and throw away your medicines at www.disposemymeds.org.          This list is accurate as of: 6/23/17 10:59 AM.  Always use your most recent med list.                   Brand Name Dispense Instructions for use Diagnosis    acetaminophen 500 MG tablet    TYLENOL    100 tablet    Take 2 tablets (1,000 mg) by mouth 3 times daily    Chronic left-sided low back pain with right-sided sciatica       ADVAIR DISKUS 500-50 MCG/DOSE diskus inhaler   Generic drug:  fluticasone-salmeterol      Inhale 1 puff into the lungs        * albuterol 108 (90 BASE) MCG/ACT Inhaler    PROAIR HFA/PROVENTIL HFA/VENTOLIN HFA     Inhale 2 puffs into the lungs        * albuterol 108 (90 BASE) MCG/ACT Inhaler    PROAIR HFA/PROVENTIL HFA/VENTOLIN HFA    1 Inhaler    Inhale 2 puffs into the lungs every 6 hours as needed for shortness of breath / dyspnea or wheezing    Acute bronchitis, unspecified organism       ALPRAZolam 0.5 MG tablet    XANAX    2 tablet    Take one tablet 30 minutes before MRI scan.    Adjustment disorder with mixed anxiety and depressed mood       citalopram 20 MG tablet    celeXA    90 tablet    Take 1 tablet (20 mg) by mouth daily    Adjustment disorder with mixed anxiety and depressed mood       colchicine 0.6 MG tablet     60 tablet    Take 1 tablet (0.6 mg) by mouth 2 times daily    Acute idiopathic pericarditis       fluticasone 250 MCG/BLIST Aepb  Inhaler    FLOVENT DISKUS    60 Inhaler    Inhale 2 puffs into the lungs 2 times daily    Mild persistent asthma without complication       * fluticasone 50 MCG/ACT spray    FLONASE     Spray 1 spray in nostril        * fluticasone 50 MCG/ACT spray    FLONASE    1 Bottle    Spray 1-2 sprays into both nostrils daily    Congestion of paranasal sinus       HYDROcodone-acetaminophen 5-325 MG per tablet    NORCO    18 tablet    Take 1 tablet by mouth daily as needed for pain maximum 2 tablet(s) per day.  Must last 30 days    Chronic left-sided low back pain with right-sided sciatica       hydrOXYzine 25 MG tablet    ATARAX    60 tablet    Take 1-2 tablets (25-50 mg) by mouth every 6 hours as needed for itching    Anxiety       * ibuprofen 600 MG tablet    ADVIL/MOTRIN    100 tablet    Take 1 tablet (600 mg) by mouth every 6 hours as needed for moderate pain    Sprain of neck, initial encounter       * ibuprofen 600 MG tablet    ADVIL/MOTRIN    60 tablet    Take 600mg twice daily for one week then decrease to once daily    Acute idiopathic pericarditis       * ibuprofen 600 MG tablet    ADVIL/MOTRIN    30 tablet    Take 1 tablet (600 mg) by mouth every 6 hours as needed for moderate pain    Acute pericarditis, unspecified type       LANsoprazole 15 MG CR capsule    PREVACID    30 capsule    Take one capsule by mouth everyday for 30 days to prevent gastritis from NSAIDS        loratadine 10 MG tablet    CLARITIN    90 tablet    Take 1 tablet (10 mg) by mouth daily    Congestion of paranasal sinus       meloxicam 7.5 MG tablet    MOBIC    30 tablet    Take 1 tablet (7.5 mg) by mouth daily    Chronic left-sided low back pain with right-sided sciatica       metoclopramide 5 MG tablet    REGLAN    30 tablet    Take 1 tablet (5 mg) by mouth 2 times daily as needed    Nausea       oxyCODONE-acetaminophen 5-325 MG per tablet    PERCOCET    30 tablet    Take 1 tablet by mouth every 6 hours as needed for pain maximum 4 tablet(s)  per day    Pericardial effusion, Chest wall pain following surgery       sertraline 50 MG tablet    ZOLOFT    90 tablet    Take 1 tablet (50 mg) by mouth daily    Adjustment disorder with mixed anxiety and depressed mood       traZODone 100 MG tablet    DESYREL    90 tablet    Take 1 tablet (100 mg) by mouth At Bedtime    Chronic left-sided low back pain with right-sided sciatica       * Notice:  This list has 7 medication(s) that are the same as other medications prescribed for you. Read the directions carefully, and ask your doctor or other care provider to review them with you.

## 2017-06-23 NOTE — PROGRESS NOTES
"       HPI:       Darlene Hudson is a 42 year old  female who presents to address the following concerns:    Depression:  -having trouble adjusting to life after hospitalization at [facility]  -when inpatient Cardiologist needed to \"wheel patient back\" for emergency surgery and told her young children that she didn't know if she would live  -reports that since hospitalization youngest son has been distant and Darlene believes that it is because of above incident  -increased worry    Insurance issues:  Doesn't have medical until August.  Open enrollment through employer starts august 1st.    Chronic Pain:  Worried about losing her job because of back pain.  Pain has become an ongoing issue.  Does not have insurance to go to specialist or pursue treatment  Patient has received a variety of small narcotic scripts through our clinic in the pastyear  Does not want to take chronically, but feels that nothing else works  Patients goal is to work.  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  Has been painful    Started smoking cigarettes again    Dr Aparicio wants              PMHX:     Patient Active Problem List   Diagnosis     Abnormal cytology finding     Nondependent alcohol abuse, episodic drinking behavior     Anxiety state     Controlled substance agreement signed     Low back pain     Chronic sinusitis     Cocaine abuse     Major depressive disorder, recurrent episode, moderate (H)     Asthma     Tobacco use disorder     Noninflammatory disorder of vagina     Pap smear for cervical cancer screening "     Abdominal pain     Abnormal cervical Papanicolaou smear     Panic disorder with agoraphobia     Atopic rhinitis     Chronic low back pain     Depression     Moderate persistent asthma     Other long term (current) drug therapy     Tobacco use     Acute pericardial effusion     Anxiety       Current Outpatient Prescriptions   Medication Sig Dispense Refill     citalopram (CELEXA) 20 MG tablet Take 1 tablet (20 mg) by mouth daily 90 tablet 1     oxyCODONE-acetaminophen (PERCOCET) 5-325 MG per tablet Take 1 tablet by mouth every 6 hours as needed for pain maximum 4 tablet(s) per day 30 tablet 0     ALPRAZolam (XANAX) 0.5 MG tablet Take one tablet 30 minutes before MRI scan. 2 tablet 0     sertraline (ZOLOFT) 50 MG tablet Take 1 tablet (50 mg) by mouth daily 90 tablet 0     fluticasone-salmeterol (ADVAIR DISKUS) 500-50 MCG/DOSE diskus inhaler Inhale 1 puff into the lungs       albuterol (PROAIR HFA/PROVENTIL HFA/VENTOLIN HFA) 108 (90 BASE) MCG/ACT Inhaler Inhale 2 puffs into the lungs       fluticasone (FLONASE) 50 MCG/ACT spray Spray 1 spray in nostril       metoclopramide (REGLAN) 5 MG tablet Take 1 tablet (5 mg) by mouth 2 times daily as needed 30 tablet 0     ibuprofen (ADVIL/MOTRIN) 600 MG tablet Take 1 tablet (600 mg) by mouth every 6 hours as needed for moderate pain 30 tablet 1     fluticasone (FLONASE) 50 MCG/ACT spray Spray 1-2 sprays into both nostrils daily 1 Bottle 11     loratadine (CLARITIN) 10 MG tablet Take 1 tablet (10 mg) by mouth daily 90 tablet 1     colchicine 0.6 MG tablet Take 1 tablet (0.6 mg) by mouth 2 times daily 60 tablet 0     ibuprofen (ADVIL/MOTRIN) 600 MG tablet Take 600mg twice daily for one week then decrease to once daily 60 tablet 0     fluticasone (FLOVENT DISKUS) 250 MCG/BLIST AEPB Inhaler Inhale 2 puffs into the lungs 2 times daily 60 Inhaler 3     albuterol (PROAIR HFA/PROVENTIL HFA/VENTOLIN HFA) 108 (90 BASE) MCG/ACT Inhaler Inhale 2 puffs into the lungs every 6 hours as  "needed for shortness of breath / dyspnea or wheezing 1 Inhaler 3     LANsoprazole (PREVACID) 15 MG CR capsule Take one capsule by mouth everyday for 30 days to prevent gastritis from NSAIDS 30 capsule 1     hydrOXYzine (ATARAX) 25 MG tablet Take 1-2 tablets (25-50 mg) by mouth every 6 hours as needed for itching (Patient not taking: Reported on 3/2/2017) 60 tablet 1     ibuprofen (ADVIL/MOTRIN) 600 MG tablet Take 1 tablet (600 mg) by mouth every 6 hours as needed for moderate pain 100 tablet 0          Allergies   Allergen Reactions     Lidocaine Other (See Comments)     \"my jaw stopped moving\"     Penicillins Hives, Rash and Shortness Of Breath     Lidocaine-Epinephrine [Epinephrine-Lidocaine-Na Metabisulfite] Other (See Comments)     Severe jaw cramping, double vision       No results found for this or any previous visit (from the past 24 hour(s)).    Current Outpatient Prescriptions   Medication     citalopram (CELEXA) 20 MG tablet     oxyCODONE-acetaminophen (PERCOCET) 5-325 MG per tablet     ALPRAZolam (XANAX) 0.5 MG tablet     sertraline (ZOLOFT) 50 MG tablet     fluticasone-salmeterol (ADVAIR DISKUS) 500-50 MCG/DOSE diskus inhaler     albuterol (PROAIR HFA/PROVENTIL HFA/VENTOLIN HFA) 108 (90 BASE) MCG/ACT Inhaler     fluticasone (FLONASE) 50 MCG/ACT spray     metoclopramide (REGLAN) 5 MG tablet     ibuprofen (ADVIL/MOTRIN) 600 MG tablet     fluticasone (FLONASE) 50 MCG/ACT spray     loratadine (CLARITIN) 10 MG tablet     colchicine 0.6 MG tablet     ibuprofen (ADVIL/MOTRIN) 600 MG tablet     fluticasone (FLOVENT DISKUS) 250 MCG/BLIST AEPB Inhaler     albuterol (PROAIR HFA/PROVENTIL HFA/VENTOLIN HFA) 108 (90 BASE) MCG/ACT Inhaler     LANsoprazole (PREVACID) 15 MG CR capsule     hydrOXYzine (ATARAX) 25 MG tablet     ibuprofen (ADVIL/MOTRIN) 600 MG tablet     No current facility-administered medications for this visit.               Review of Systems:   ROS as described above.  Denies F/S/C/N/V/SOB/CP          " Physical Exam:     There were no vitals filed for this visit.  There is no height or weight on file to calculate BMI.    GEN: patient sitting comfortably in NAD  HEEN: Head is atraumatic, normocephalic, eyes anicteric, mucous membranes moist  CV: RRR w/o M/R/G  PULM: CTAB without w/r/r  ABD: soft, nontender, bowel sounds present  NEURO: Alert and oriented x3.  No focal motor abnormalities.  Face symmetric.  PSYCH: appropriate  SKIN: No rashes, bruising, or other lesions    Results for orders placed or performed in visit on 03/16/17   CBC with Diff Plt ([facility])   Result Value Ref Range    WBC 8.4 4.0 - 11.0 K/uL    Lymphocytes # 1.9 0.8 - 5.3 K/uL    % Lymphocytes 22.6 20.0 - 48.0 %L    Mid # 0.7 0.0 - 2.2 K/uL    Mid % 8.1 0.0 - 20.0 %M    GRANULOCYTES # 5.8 1.6 - 8.3 K/uL    % Granulocytes 69.3 40.0 - 75.0 %G    RBC 4.08 3.80 - 5.20 M/uL    Hemoglobin 11.9 11.7 - 15.7 g/dL    Hematocrit 38.2 35.0 - 47.0 %    MCV 93.6 78.0 - 100.0 fL    MCH 29.2 26.5 - 35.0 pg    MCHC 31.2 (L) 32.0 - 36.0 g/dL    Platelets 382.0 150.0 - 450.0 K/uL   Lipase ([facility])   Result Value Ref Range    Lipase 28 0 - 52 U/L    Narrative    Test performed by:  [facility]  [address] [place] [address]   Amylase ([facility])   Result Value Ref Range    Amylase 42 5 - 120 U/L    Narrative    Test performed by:  [facility] [address] [place] [address]   Basic Metabolic Panel ([facility])  - Results < 1 hr   Result Value Ref Range    Glucose 91.0 60.0 - 109.0 mg/dL    Urea Nitrogen 15.0 5.0 - 24.0 mg/dL    Creatinine 0.9 0.6 - 1.3 mg/dL    Sodium 142.0 133.0 - 144.0 mmol/L    Potassium 3.4 3.4 - 5.3 mmol/L    Chloride 104.0 94.0 - 109.0 mmol/L    Carbon Dioxide 24.0 20.0 - 32.0 mmol/L    Calcium 9.2 8.5 - 10.4 mg/dL    eGFR Calculated (Non Black Reference) 72.3 >60.0 mL/min    eGFR Calculated (Black Reference) 87.5 >60.0 mL/min       Assessment and Plan     1. Chronic left-sided low back pain with  right-sided sciatica-discussed with patient that if refills continue she woulde need to be enrolled in chronic pain program or a private pain program.  Patient plans to enrol at a pain clinic  - meloxicam (MOBIC) 7.5 MG tablet; Take 1 tablet (7.5 mg) by mouth daily  Dispense: 30 tablet; Refill: 1  - traZODone (DESYREL) 100 MG tablet; Take 1 tablet (100 mg) by mouth At Bedtime  Dispense: 90 tablet; Refill: 0  - HYDROcodone-acetaminophen (NORCO) 5-325 MG per tablet; Take 1 tablet by mouth daily as needed for pain maximum 2 tablet(s) per day.  Must last 30 days  Dispense: 18 tablet; Refill: 0  - acetaminophen (TYLENOL) 500 MG tablet; Take 2 tablets (1,000 mg) by mouth 3 times daily  Dispense: 100 tablet; Refill: 0    Due for health maintenance. defered by patient.    Depression: due to above as well as other social stressors.  Recommend psych/talk therapy and possibly SSRI.  Patient to consider.     Options for treatment and follow-up care were reviewed with the patient and/or guardian. Darlene Hudson and/or guardian engaged in the decision making process and verbalized understanding of the options discussed and agreed with the final plan.    Anaid Vogel MD

## 2017-06-23 NOTE — PATIENT INSTRUCTIONS
~   Follow up on medications in two weeks.    Your medication list is printed, please keep this with you, it is helpful to bring this current list to any other medical appointments, the emergency room or hospital.    If you had lab testing today and your results are reassuring or normal they will be be mailed to you within 7 days.     If the lab tests need quick action we will call you with the results.   The phone number we will call with results is # [phone] (home) . If this is not the best number please call our clinic and change the number.    If you need any refills please call your pharmacy and they will contact us.    If you have any further concerns or wish to schedule another appointment you must call our office during normal business hours  [phone] (8-5:00 M-F)  If you have urgent medical questions that cannot wait  you may also call [phone] at any time of day.  If you have a medical emergency please call [phone].    Thank you for coming to [facility].

## 2017-07-05 ENCOUNTER — OFFICE VISIT (OUTPATIENT)
Dept: FAMILY MEDICINE | Facility: CLINIC | Age: 42
End: 2017-07-05

## 2017-07-05 VITALS
HEART RATE: 91 BPM | OXYGEN SATURATION: 98 % | DIASTOLIC BLOOD PRESSURE: 87 MMHG | TEMPERATURE: 98.1 F | SYSTOLIC BLOOD PRESSURE: 122 MMHG

## 2017-07-05 DIAGNOSIS — J30.1 CHRONIC ALLERGIC RHINITIS DUE TO POLLEN, UNSPECIFIED SEASONALITY: ICD-10-CM

## 2017-07-05 DIAGNOSIS — Z53.9 ERRONEOUS ENCOUNTER--DISREGARD: Primary | ICD-10-CM

## 2017-07-05 DIAGNOSIS — M54.50 LOWER BACK PAIN: Primary | ICD-10-CM

## 2017-07-05 DIAGNOSIS — J45.909 ASTHMA: ICD-10-CM

## 2017-07-05 DIAGNOSIS — J45.40 MODERATE PERSISTENT ASTHMA WITHOUT COMPLICATION: ICD-10-CM

## 2017-07-05 DIAGNOSIS — F17.200 TOBACCO USE DISORDER: ICD-10-CM

## 2017-07-05 LAB
AMPHETAMINES QUAL: NEGATIVE
BARBITURATES QUAL URINE: NEGATIVE
BENZODIAZEPINE QUAL URINE: NEGATIVE
BUPRENORPHINE QUAL URINE: NEGATIVE
CANNABINOIDS UR QL SCN: NEGATIVE
COCAINE QUAL URINE: NEGATIVE
METHAMPHETAMINE: NEGATIVE
METHODONE QUAL: NEGATIVE
MORPHINE QUAL: NEGATIVE
OXYCODONE QUAL: POSITIVE
TEMPERATURE OF URINE WAS BETWEEN 90-100 DEGREES F: NO

## 2017-07-05 RX ORDER — COLCHICINE 0.6 MG/1
0.6 TABLET ORAL
COMMUNITY
Start: 2017-06-04 | End: 2017-10-05

## 2017-07-05 RX ORDER — MONTELUKAST SODIUM 10 MG/1
10 TABLET ORAL AT BEDTIME
Qty: 90 TABLET | Refills: 1 | Status: SHIPPED | OUTPATIENT
Start: 2017-07-05 | End: 2018-02-21

## 2017-07-05 RX ORDER — OXYCODONE AND ACETAMINOPHEN 5; 325 MG/1; MG/1
TABLET ORAL
COMMUNITY
Start: 2017-06-04 | End: 2017-07-20

## 2017-07-05 RX ORDER — FLUTICASONE PROPIONATE 50 MCG
1-2 SPRAY, SUSPENSION (ML) NASAL DAILY
Qty: 1 BOTTLE | Refills: 1 | Status: SHIPPED | OUTPATIENT
Start: 2017-07-05 | End: 2017-10-05

## 2017-07-05 RX ORDER — HYDROCODONE BITARTRATE AND ACETAMINOPHEN 5; 325 MG/1; MG/1
1-2 TABLET ORAL EVERY 8 HOURS PRN
Qty: 18 TABLET | Refills: 0 | Status: SHIPPED | OUTPATIENT
Start: 2017-07-05 | End: 2017-07-20

## 2017-07-05 RX ORDER — IBUPROFEN 600 MG/1
600 TABLET, FILM COATED ORAL
COMMUNITY
Start: 2017-05-03 | End: 2017-10-05

## 2017-07-05 NOTE — PROGRESS NOTES
SUBJECTIVE:                                                    Darlene Hudson is a 42 year old female who presents to clinic today for the following health issues:    Darlene is not doing well, reports being very stressed 2/2 pain and lack of effective relief. Is not able to make the number of pain pills last for 30 days, and still be able to take the regimen as ordered, 1-2 pills daily.Was given additional pills 6/23/17 by her primary and now 2 weeks later is again out of her pain pills. 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 Can't afford to lose the job due to pain. Is unable to pursue injection at this point as her employer sponsored insurance policy is not effective until August. Does not think she can continue this way for very long.    Additionally Darlene has asthma. Restarted smoking recently due to increase in the stress level.   Problem list and histories reviewed & adjusted, as indicated.  Additional history: as documented.    ROS:  Constitutional, HEENT, cardiovascular, pulmonary, gi and gu systems are negative, except as otherwise noted.    OBJECTIVE:     /87  Pulse 91  Temp 98.1  F (36.7  C) (Oral)  LMP 06/22/2017 (Exact Date)  SpO2 98%  There is no height or weight on file to calculate BMI.   GENERAL: alert, moderate distress and fatigued  RESP: no rales  and rhonchi bilateral  CV: regular rate and rhythm, normal S1 S2, no S3 or S4, no murmur, click or rub, no peripheral edema and peripheral pulses strong  MS: decreased range of motion to back and lower spine, paraspinal tenderness  In lumbar region  SKIN: no suspicious lesions or rashes  NEURO: Normal strength and tone, mentation intact and speech normal  BACK: as above- paralumbar tenderness  PSYCH: anxious, fatigued. Speech pressured.  Denies feeling depressed, feels omewhat anxious.   Declined  PHQ-9/ACE-7 today.  PHQ-2 Score:     PHQ-2 ( 1999 [employer]) 7/5/2017  7/5/2017   Q1: Little interest or pleasure in doing things 1 1   Q2: Feeling down, depressed or hopeless 0 0   PHQ-2 Score 1 1       Diagnostic Test Results:  Results for orders placed or performed in visit on 07/05/17   Rapid Urine Drug Screen ([facility])   Result Value Ref Range    Amphetamines Qual NEGATIVE NEGATIVE    Barbiturates Qual Urine NEGATIVE NEGATIVE    Buprenorphine Qual Urine NEGATIVE NEGATIVE    Benzodiazepine Qual Urine NEGATIVE NEGATIVE    Cocaine Qual Urine NEGATIVE NEGATIVE    Cannabinoids Qual Urine NEGATIVE NEGATIVE    Methamphetamine Qual NEGATIVE NEGATIVE    Methadone Qual NEGATIVE NEGATIVE    Morphine Qual NEGATIVE NEGATIVE    Oxycodone Qual POSITIVE (A) NEGATIVE    Temperature of Urine was Between  Degrees F NO (A) YES       ASSESSMENT/PLAN:     (M54.5) Lower back pain  (primary encounter diagnosis)  Comment: Lower back pain at times excruciating. Wonders if it is typical to be given max of 2 tabs per day to treat pain of this nature.Afraid she will lose her job 2/2 pain.Unable to pursue an injection at this time as her insurance won't begin until August.Unable to wait for a ketorolac injection as needs to go to work   in an hour.  Plan: Drug screen urine, HYDROcodone-acetaminophen         (NORCO) 5-325 MG per tablet        1-2 tabs q 8 hours PRN-maximum 2 tabs per day.  Prescribed for patient prior to 30 days after her last refill due to ongoing   Uncontrolled pain. Needs options for better control such as injections or other therapies-main barrier at present is unfortunate and formidable-i.e., insurance interruption, coverage due in August.Will collaborate w/PCP re; ideas for now as patient is dissatisfied with her pain management. Darlene intends to seek pain specialty consult when coverage begins.  (J30.1) Chronic allergic rhinitis due to pollen, unspecified seasonality  Comment: congestion and intermittent SOB continue.  Plan: montelukast (SINGULAIR) 10 MG tablet,         fluticasone  (FLONASE) 50 MCG/ACT spray        Recommend smoking cessation, patient currently pre-contemplative  Re: stage of considering quitting again. Feels stress has been too great to stop with her current situation/pain.  (J45.139) Asthma  Comment: ACT indicates inadequate control  Plan: Add montelukast  Rewrite AAP-done.  RTC 2 weeks for reassessment, sooner if s/s fail to improve or worsen, or if new s/s distress occur.  (F17.200) Tobacco use disorder  Comment: as above, precontemplative  Plan: Urged risk vs benefit analysis  Recommend abstinence due to poorer control of asthma s/s.      FUTURE APPOINTMENTS:       - Follow-up visit in 2 weeks, sooner if needed.    40 minutes was spent on this visit, >50% counseling and coordination of care re:  Above concerns.  PEPE Valente CNP [facility]

## 2017-07-05 NOTE — Clinical Note
Dr.Brown  Darlene seems more desperate and wonders why we are restricting her pain medications to 2 max per day. Afraid she will lose her job 2/2 distraction from the pain. I question what will be gained by increasing the opioid use, however I assured her we will discuss her ongoing anxiety and limited relief at her current pain med level. I see she has made contact w/[facility] for MH consult appt and am glad she is going to follow up for the support. Thanks,  Sulema VOGEL

## 2017-07-05 NOTE — MR AVS SNAPSHOT
After Visit Summary   7/5/2017    Darlene Hudson    MRN: [id number]           Patient Information     Date Of Birth          1975        Visit Information        Provider Department      7/5/2017 11:20 AM Sulema Meza APRN CNP [facility]        Today's Diagnoses     Lower back pain    -  1    Chronic allergic rhinitis due to pollen, unspecified seasonality        Asthma        Tobacco use disorder           Follow-ups after your visit        Follow-up notes from your care team     Return in about 2 weeks (around 7/19/2017), or if symptoms worsen or fail to improve, for FU pain, anxiety, any worsening asthma s/s.      Your next 10 appointments already scheduled     Jul 14, 2017  9:20 AM CDT   Return Visit with Anaid Vogel MD   [facility] ([facility])    [address] [place] [address]   [phone]              Who to contact     Please call your clinic at [phone] to:    Ask questions about your health    Make or cancel appointments    Discuss your medicines    Learn about your test results    Speak to your doctor   If you have compliments or concerns about an experience at your clinic, or if you wish to file a complaint, please contact [facility] Patient Relations at [phone] or email us at [email or web address]         Additional Information About Your Visit        MyChart Information     ImmunoPhotonicst is an electronic gateway that provides easy, online access to your medical records. With [email or web address], you can request a clinic appointment, read your test results, renew a prescription or communicate with your care team.     To sign up for ImmunoPhotonicst visit the website at [email or web address]   You will be asked to enter the access code listed below, as well as some personal information. Please follow the directions to create your username and password.     Your access code is:  [email or web address]  Expires: 2017 12:28 PM     Your access code will  in 90 days. If you need help or a new code, please contact your [facility] or call [phone] for assistance.        Care EveryWhere ID     This is your Care EveryWhere ID. This could be used by other organizations to access your [place] medical records  [id number]        Your Vitals Were     Pulse Temperature Last Period Pulse Oximetry          91 98.1  F (36.7  C) (Oral) 2017 (Exact Date) 98%         Blood Pressure from Last 3 Encounters:   17 122/87   17 122/86   17 122/85    Weight from Last 3 Encounters:   17 240 lb (108.9 kg)   17 239 lb (108.4 kg)   17 243 lb (110.2 kg)              We Performed the Following     Rapid Urine Drug Screen (P FM)          Today's Medication Changes          These changes are accurate as of: 17 11:59 PM.  If you have any questions, ask your nurse or doctor.               Start taking these medicines.        Dose/Directions    montelukast 10 MG tablet   Commonly known as:  SINGULAIR   Used for:  Chronic allergic rhinitis due to pollen, unspecified seasonality   Started by:  Sulema Meza APRN CNP        Dose:  10 mg   Take 1 tablet (10 mg) by mouth At Bedtime   Quantity:  90 tablet   Refills:  1         These medicines have changed or have updated prescriptions.        Dose/Directions    * fluticasone 50 MCG/ACT spray   Commonly known as:  FLONASE   This may have changed:  Another medication with the same name was added. Make sure you understand how and when to take each.   Changed by:  Sulema Meza APRN CNP        Dose:  1 spray   Spray 1 spray in nostril   Refills:  0       * fluticasone 50 MCG/ACT spray   Commonly known as:  FLONASE   This may have changed:  Another medication with the same name was added. Make sure you understand how and when to take each.   Used for:  Congestion of paranasal sinus    Changed by:  Oscar Aparicio MD        Dose:  1-2 spray   Spray 1-2 sprays into both nostrils daily   Quantity:  1 Bottle   Refills:  11       * fluticasone 50 MCG/ACT spray   Commonly known as:  FLONASE   This may have changed:  You were already taking a medication with the same name, and this prescription was added. Make sure you understand how and when to take each.   Used for:  Chronic allergic rhinitis due to pollen, unspecified seasonality   Changed by:  Sulema Meza APRN CNP        Dose:  1-2 spray   Spray 1-2 sprays into both nostrils daily   Quantity:  1 Bottle   Refills:  1       * HYDROcodone-acetaminophen 5-325 MG per tablet   Commonly known as:  NORCO   This may have changed:  Another medication with the same name was added. Make sure you understand how and when to take each.   Used for:  Chronic left-sided low back pain with right-sided sciatica   Changed by:  Anaid Vogel MD        Dose:  1 tablet   Take 1 tablet by mouth daily as needed for pain maximum 2 tablet(s) per day.  Must last 30 days   Quantity:  18 tablet   Refills:  0       * HYDROcodone-acetaminophen 5-325 MG per tablet   Commonly known as:  NORCO   This may have changed:  You were already taking a medication with the same name, and this prescription was added. Make sure you understand how and when to take each.   Used for:  Lower back pain   Changed by:  Sulema Meza APRN CNP        Dose:  1-2 tablet   Take 1-2 tablets by mouth every 8 hours as needed for moderate to severe pain maximum 2 tablet(s) per day   Quantity:  18 tablet   Refills:  0       * Notice:  This list has 5 medication(s) that are the same as other medications prescribed for you. Read the directions carefully, and ask your doctor or other care provider to review them with you.      Stop taking these medicines if you haven't already. Please contact your care team if you have questions.     traZODone 100 MG tablet   Commonly known as:   MARSHALYREL   Stopped by:  Sulema Meza, PEPE CNP                Where to get your medicines      These medications were sent to [id number] - [place], [place] - [address] AT [address]  [address], [place] [address]     Phone:  [phone]     fluticasone 50 MCG/ACT spray    montelukast 10 MG tablet         Some of these will need a paper prescription and others can be bought over the counter.  Ask your nurse if you have questions.     Bring a paper prescription for each of these medications     HYDROcodone-acetaminophen 5-325 MG per tablet                Primary Care Provider Office Phone # Fax #    Anaid Vogel [phone]       [facility] [address] [place] [address]        Equal Access to Services     JAMIE ALBERTS : Hadii aad ku hadasho Soomaali, waaxda luqadaha, qaybta kaalmada adeegyada, delores gupta haylucho macdonald . So [facility] [phone].    ATENCIÓN: Si habla español, tiene a boswell disposición servicios gratuitos de asistencia lingüística. [facility] [phone].    We comply with applicable federal civil rights laws and [place] laws. We do not discriminate on the basis of race, color, national origin, age, disability sex, sexual orientation or gender identity.            Thank you!     Thank you for choosing [facility]  for your care. Our goal is always to provide you with excellent care. Hearing back from our patients is one way we can continue to improve our services. Please take a few minutes to complete the written survey that you may receive in the mail after your visit with us. Thank you!             Your Updated Medication List - Protect others around you: Learn how to safely use, store and throw away your medicines at www.disposemymeds.org.          This list is accurate as of: 7/5/17 11:59 PM.  Always use your most recent med list.                   Brand Name Dispense  Instructions for use Diagnosis    acetaminophen 500 MG tablet    TYLENOL    100 tablet    Take 2 tablets (1,000 mg) by mouth 3 times daily    Chronic left-sided low back pain with right-sided sciatica       ADVAIR DISKUS 500-50 MCG/DOSE diskus inhaler   Generic drug:  fluticasone-salmeterol      Inhale 1 puff into the lungs        * albuterol 108 (90 BASE) MCG/ACT Inhaler    PROAIR HFA/PROVENTIL HFA/VENTOLIN HFA     Inhale 2 puffs into the lungs        * albuterol 108 (90 BASE) MCG/ACT Inhaler    PROAIR HFA/PROVENTIL HFA/VENTOLIN HFA    1 Inhaler    Inhale 2 puffs into the lungs every 6 hours as needed for shortness of breath / dyspnea or wheezing    Acute bronchitis, unspecified organism       ALPRAZolam 0.5 MG tablet    XANAX    2 tablet    Take one tablet 30 minutes before MRI scan.    Adjustment disorder with mixed anxiety and depressed mood       citalopram 20 MG tablet    celeXA    90 tablet    Take 1 tablet (20 mg) by mouth daily    Adjustment disorder with mixed anxiety and depressed mood       * colchicine 0.6 MG tablet     60 tablet    Take 1 tablet (0.6 mg) by mouth 2 times daily    Acute idiopathic pericarditis       * colchicine 0.6 MG tablet      Take 0.6 mg by mouth        * fluticasone 50 MCG/ACT spray    FLONASE     Spray 1 spray in nostril        * fluticasone 50 MCG/ACT spray    FLONASE    1 Bottle    Spray 1-2 sprays into both nostrils daily    Congestion of paranasal sinus       * fluticasone 50 MCG/ACT spray    FLONASE    1 Bottle    Spray 1-2 sprays into both nostrils daily    Chronic allergic rhinitis due to pollen, unspecified seasonality       * HYDROcodone-acetaminophen 5-325 MG per tablet    NORCO    18 tablet    Take 1 tablet by mouth daily as needed for pain maximum 2 tablet(s) per day.  Must last 30 days    Chronic left-sided low back pain with right-sided sciatica       * HYDROcodone-acetaminophen 5-325 MG per tablet    NORCO    18 tablet    Take 1-2 tablets by mouth every 8 hours as  needed for moderate to severe pain maximum 2 tablet(s) per day    Lower back pain       hydrOXYzine 25 MG tablet    ATARAX    60 tablet    Take 1-2 tablets (25-50 mg) by mouth every 6 hours as needed for itching    Anxiety       * ibuprofen 600 MG tablet    ADVIL/MOTRIN    100 tablet    Take 1 tablet (600 mg) by mouth every 6 hours as needed for moderate pain    Sprain of neck, initial encounter       * ibuprofen 600 MG tablet    ADVIL/MOTRIN    60 tablet    Take 600mg twice daily for one week then decrease to once daily    Acute idiopathic pericarditis       * ibuprofen 600 MG tablet    ADVIL/MOTRIN    30 tablet    Take 1 tablet (600 mg) by mouth every 6 hours as needed for moderate pain    Acute pericarditis, unspecified type       * ibuprofen 600 MG tablet    ADVIL/MOTRIN     Take 600 mg by mouth        LANsoprazole 15 MG CR capsule    PREVACID    30 capsule    Take one capsule by mouth everyday for 30 days to prevent gastritis from NSAIDS        montelukast 10 MG tablet    SINGULAIR    90 tablet    Take 1 tablet (10 mg) by mouth At Bedtime    Chronic allergic rhinitis due to pollen, unspecified seasonality       * oxyCODONE-acetaminophen 5-325 MG per tablet    PERCOCET    30 tablet    Take 1 tablet by mouth every 6 hours as needed for pain maximum 4 tablet(s) per day    Pericardial effusion, Chest wall pain following surgery       * oxyCODONE-acetaminophen 5-325 MG per tablet    PERCOCET          sertraline 50 MG tablet    ZOLOFT    90 tablet    Take 1 tablet (50 mg) by mouth daily    Adjustment disorder with mixed anxiety and depressed mood       * Notice:  This list has 15 medication(s) that are the same as other medications prescribed for you. Read the directions carefully, and ask your doctor or other care provider to review them with you.

## 2017-07-06 ENCOUNTER — DOCUMENTATION ONLY (OUTPATIENT)
Dept: FAMILY MEDICINE | Facility: CLINIC | Age: 42
End: 2017-07-06

## 2017-07-06 NOTE — PROGRESS NOTES
Referral for (Test): Psychology   Location/Place/Provider: [facility]   Date/Time: 07/15/17 at 9:20am    Phone: [phone]  Fax: [phone]  Additional information/prep.:   Scheduled by: SMITH Whitaker

## 2017-07-06 NOTE — PROGRESS NOTES
Procedure Requested        9035     Mental Health Referral - [facility] [#[id number]]         Priority: Routine  Class: External referral         Comment:Use this form for behavioral health consults and assessments. The                  referral coordinator will help to determine whether patients are                  best served by clinic behavioral health staff or by community                  providers.                                    Type of referral(s) requested (indicate all that apply):                  Adult Psychotherapy--for diagnosis and non-pharmacological                   treatment                                    Reason for referral: see above                                    Currently receiving mental health services (if 'Yes', what                   services and why today's referral?): No                  Currently having suicidal thoughts: No                  Previous psych hospitalization: No                                    Please provide data for below screening tools if available.                                      needed: No                  Language: English       Associated Diagnoses         F43.20 Adjustment disorder, unspecified type               ANGELA LIM           [id number]               : 1975  [address]                               PCP: MARGO ARRINGTON     [place] [address]                                CTR: [facility]

## 2017-07-12 NOTE — PATIENT INSTRUCTIONS
My Asthma Action Plan  Name: Darlene Hudson  YOB: 1975  Date: 7/12/2017   My doctor: Anaid Vogel   My clinic:   [facility] [address] [place] [address]  [phone]    My Asthma Severity: moderate persistent Avoid your asthma triggers: smoke, upper respiratory infections, dust mites and pollens      GREEN ZONE   Good Control    I feel good    No cough or wheeze    Can work, sleep and play without asthma symptoms       Take your asthma control medicine every day.  Take the medications listed below daily.    Advair  Diskus 500/50 mcg 1 inhalation twice a day  Singulair  Tablet 10 mg once daily    1. If exercise triggers your asthma, take your rescue medication (2 puffs of albuterol, Ventolin/Pro-Air) 15 minutes before exercise or sports, and during exercise if you have asthma symptoms.  2. Spacer to use with inhaler: If you have a spacer, make sure to use it with your inhaler.              YELLOW ZONE Getting Worse  I have ANY of these:    I do not feel good    Cough or wheeze    Chest feels tight    Wake up at night   1. Keep taking your Green Zone medications.  2. Start taking your rescue medicine (1-2 puffs of albuterol - Ventolin/Pro-Air) every 4-6 hours as needed.  3. If symptoms are not controlled with above, can take 2 puffs every 20 minutes for up to 1 hour, then continue every 4 hours if needed.   4. If you do not return to the Green Zone in 12-24 hours or you get worse, call the clinic.         RED ZONE Medical Alert - Get Help  I have ANY of these:    I feel awful    Medicine is not helping    Breathing getting harder    Trouble walking or talking    Nose opens wide to breathe       1. Take your rescue medicine NOW (6-8 puffs of albuterol - Ventolin/Pro-Air) for every 20 minutes for up to 1 hour.  2. Call your doctor NOW.  3. If you are still in the Red Zone after 20 minutes and you have not reached your doctor:    Take your rescue medicine again (6-8  puffs of albuterol - Ventolin/Pro-Air) and    Call 911 or go to the emergency room right away    See your regular doctor within 1 weeks of an Emergency Room or Urgent Care visit for follow-up treatment.        This Asthma Action Plan provides authorization for the administration of medication described in the AAP.  YES      Electronically signed by: Sulema Meza    Annual Reminders:  Meet with Asthma Educator,  Flu Shot in the Fall, Pneumonia Shot  [id number] - [place], [place] - [address]

## 2017-07-13 ASSESSMENT — ASTHMA QUESTIONNAIRES: ACT_TOTALSCORE: 12

## 2017-07-20 ENCOUNTER — OFFICE VISIT (OUTPATIENT)
Dept: FAMILY MEDICINE | Facility: CLINIC | Age: 42
End: 2017-07-20

## 2017-07-20 VITALS
HEIGHT: 70 IN | BODY MASS INDEX: 34.79 KG/M2 | DIASTOLIC BLOOD PRESSURE: 81 MMHG | TEMPERATURE: 98.2 F | HEART RATE: 101 BPM | OXYGEN SATURATION: 100 % | RESPIRATION RATE: 16 BRPM | SYSTOLIC BLOOD PRESSURE: 115 MMHG | WEIGHT: 243 LBS

## 2017-07-20 DIAGNOSIS — M95.5 PELVIS TILTED: Primary | ICD-10-CM

## 2017-07-20 NOTE — MR AVS SNAPSHOT
After Visit Summary   2017    Darlene Hudson    MRN: [id number]           Patient Information     Date Of Birth          1975        Visit Information        Provider Department      2017 2:40 PM Chadwick Granados MD [facility]        Today's Diagnoses     Pelvis tilted    -  1       Follow-ups after your visit        Your next 10 appointments already scheduled     2017 10:20 AM CDT   Return Visit with Sulema Lechuga LMFT [facility] ([facility])    [address] [place] [address]   [phone]            2017 10:20 AM CDT   Return Visit with Anaid Vogel MD   [facility] ([facility])    [address] [place] [address]   [phone]              Who to contact     Please call your clinic at [phone] to:    Ask questions about your health    Make or cancel appointments    Discuss your medicines    Learn about your test results    Speak to your doctor   If you have compliments or concerns about an experience at your clinic, or if you wish to file a complaint, please contact [facility] Patient Relations at [phone] or email us at [email or web address]         Additional Information About Your Visit        MyChart Information     dineout is an electronic gateway that provides easy, online access to your medical records. With dineout, you can request a clinic appointment, read your test results, renew a prescription or communicate with your care team.     To sign up for [email or web address] visit the website at [email or web address]   You will be asked to enter the access code listed below, as well as some personal information. Please follow the directions to create your username and password.     Your access code is: [email or web address]  Expires: 2017 12:28 PM     Your access code will  in 90 days. If you need help or a new code, please contact your  "[facility] or call [phone] for assistance.        Care EveryWhere ID     This is your Care EveryWhere ID. This could be used by other organizations to access your [place] medical records  [id number]        Your Vitals Were     Pulse Temperature Respirations Height Last Period Pulse Oximetry    101 98.2  F (36.8  C) (Oral) 16 5' 9.5\" (176.5 cm) 07/15/2017 100%    Breastfeeding? BMI (Body Mass Index)                No 35.37 kg/m2           Blood Pressure from Last 3 Encounters:   07/20/17 115/81   07/05/17 122/87   06/23/17 122/86    Weight from Last 3 Encounters:   07/20/17 243 lb (110.2 kg)   06/23/17 240 lb (108.9 kg)   05/04/17 239 lb (108.4 kg)              Today, you had the following     No orders found for display         Today's Medication Changes          These changes are accurate as of: 7/20/17  4:38 PM.  If you have any questions, ask your nurse or doctor.               Stop taking these medicines if you haven't already. Please contact your care team if you have questions.     ALPRAZolam 0.5 MG tablet   Commonly known as:  XANAX   Stopped by:  Chadwick Granados MD           HYDROcodone-acetaminophen 5-325 MG per tablet   Commonly known as:  NORCO   Stopped by:  Chadwick Granados MD           oxyCODONE-acetaminophen 5-325 MG per tablet   Commonly known as:  PERCOCET   Stopped by:  Chadwick Granados MD                    Primary Care Provider Office Phone # Fax #    Hdokmfk Ann Vogel [phone]       [facility] [address] [place] [address]        Equal Access to Services     [facility] : Hadcandido Glass, waaxda luede, qaybta kaaldelores ortiz. So [facility] [phone].    ATENCIÓN: Si habla español, tiene a boswell disposición servicios gratuitos de asistencia lingüística. Llame al [phone].    We comply with applicable federal civil rights laws and [place] laws. We do not " discriminate on the basis of race, color, national origin, age, disability sex, sexual orientation or gender identity.            Thank you!     Thank you for choosing [facility]  for your care. Our goal is always to provide you with excellent care. Hearing back from our patients is one way we can continue to improve our services. Please take a few minutes to complete the written survey that you may receive in the mail after your visit with us. Thank you!             Your Updated Medication List - Protect others around you: Learn how to safely use, store and throw away your medicines at www.disposemymeds.org.          This list is accurate as of: 7/20/17  4:38 PM.  Always use your most recent med list.                   Brand Name Dispense Instructions for use Diagnosis    acetaminophen 500 MG tablet    TYLENOL    100 tablet    Take 2 tablets (1,000 mg) by mouth 3 times daily    Chronic left-sided low back pain with right-sided sciatica       ADVAIR DISKUS 500-50 MCG/DOSE diskus inhaler   Generic drug:  fluticasone-salmeterol      Inhale 1 puff into the lungs        * albuterol 108 (90 BASE) MCG/ACT Inhaler    PROAIR HFA/PROVENTIL HFA/VENTOLIN HFA     Inhale 2 puffs into the lungs        * albuterol 108 (90 BASE) MCG/ACT Inhaler    PROAIR HFA/PROVENTIL HFA/VENTOLIN HFA    1 Inhaler    Inhale 2 puffs into the lungs every 6 hours as needed for shortness of breath / dyspnea or wheezing    Acute bronchitis, unspecified organism       citalopram 20 MG tablet    celeXA    90 tablet    Take 1 tablet (20 mg) by mouth daily    Adjustment disorder with mixed anxiety and depressed mood       * colchicine 0.6 MG tablet     60 tablet    Take 1 tablet (0.6 mg) by mouth 2 times daily    Acute idiopathic pericarditis       * colchicine 0.6 MG tablet      Take 0.6 mg by mouth        * fluticasone 50 MCG/ACT spray    FLONASE     Spray 1 spray in nostril        * fluticasone 50 MCG/ACT spray    FLONASE    1 Bottle    Spray  1-2 sprays into both nostrils daily    Congestion of paranasal sinus       * fluticasone 50 MCG/ACT spray    FLONASE    1 Bottle    Spray 1-2 sprays into both nostrils daily    Chronic allergic rhinitis due to pollen, unspecified seasonality       hydrOXYzine 25 MG tablet    ATARAX    60 tablet    Take 1-2 tablets (25-50 mg) by mouth every 6 hours as needed for itching    Anxiety       * ibuprofen 600 MG tablet    ADVIL/MOTRIN    100 tablet    Take 1 tablet (600 mg) by mouth every 6 hours as needed for moderate pain    Sprain of neck, initial encounter       * ibuprofen 600 MG tablet    ADVIL/MOTRIN    60 tablet    Take 600mg twice daily for one week then decrease to once daily    Acute idiopathic pericarditis       * ibuprofen 600 MG tablet    ADVIL/MOTRIN    30 tablet    Take 1 tablet (600 mg) by mouth every 6 hours as needed for moderate pain    Acute pericarditis, unspecified type       * ibuprofen 600 MG tablet    ADVIL/MOTRIN     Take 600 mg by mouth        LANsoprazole 15 MG CR capsule    PREVACID    30 capsule    Take one capsule by mouth everyday for 30 days to prevent gastritis from NSAIDS        montelukast 10 MG tablet    SINGULAIR    90 tablet    Take 1 tablet (10 mg) by mouth At Bedtime    Chronic allergic rhinitis due to pollen, unspecified seasonality       sertraline 50 MG tablet    ZOLOFT    90 tablet    Take 1 tablet (50 mg) by mouth daily    Adjustment disorder with mixed anxiety and depressed mood       * Notice:  This list has 11 medication(s) that are the same as other medications prescribed for you. Read the directions carefully, and ask your doctor or other care provider to review them with you.

## 2017-07-20 NOTE — PROGRESS NOTES
Preceptor Attestation:  Patient's case reviewed and discussed with Chadwick Granados MD Patient seen and discussed with the resident.. I agree with assessment and plan of care.  Supervising Physician:  Zach Fan MD  [facility]
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
## 2017-07-24 ENCOUNTER — OFFICE VISIT (OUTPATIENT)
Dept: PSYCHOLOGY | Facility: CLINIC | Age: 42
End: 2017-07-24

## 2017-07-24 ENCOUNTER — OFFICE VISIT (OUTPATIENT)
Dept: URGENT CARE | Facility: URGENT CARE | Age: 42
End: 2017-07-24
Payer: COMMERCIAL

## 2017-07-24 ENCOUNTER — OFFICE VISIT (OUTPATIENT)
Dept: FAMILY MEDICINE | Facility: CLINIC | Age: 42
End: 2017-07-24

## 2017-07-24 VITALS
DIASTOLIC BLOOD PRESSURE: 83 MMHG | WEIGHT: 241.6 LBS | TEMPERATURE: 98 F | RESPIRATION RATE: 18 BRPM | OXYGEN SATURATION: 100 % | HEART RATE: 104 BPM | BODY MASS INDEX: 35.17 KG/M2 | SYSTOLIC BLOOD PRESSURE: 133 MMHG

## 2017-07-24 VITALS
WEIGHT: 238.4 LBS | OXYGEN SATURATION: 96 % | TEMPERATURE: 98 F | HEART RATE: 96 BPM | BODY MASS INDEX: 34.7 KG/M2 | SYSTOLIC BLOOD PRESSURE: 137 MMHG | DIASTOLIC BLOOD PRESSURE: 88 MMHG

## 2017-07-24 DIAGNOSIS — F41.9 ANXIETY: Primary | ICD-10-CM
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
## 2017-07-24 DIAGNOSIS — M54.50 LOW BACK PAIN WITHOUT SCIATICA, UNSPECIFIED BACK PAIN LATERALITY, UNSPECIFIED CHRONICITY: Primary | ICD-10-CM

## 2017-07-24 DIAGNOSIS — G89.29 CHRONIC BILATERAL LOW BACK PAIN WITH LEFT-SIDED SCIATICA: ICD-10-CM

## 2017-07-24 DIAGNOSIS — M54.42 CHRONIC BILATERAL LOW BACK PAIN WITH LEFT-SIDED SCIATICA: ICD-10-CM

## 2017-07-24 PROCEDURE — 99214 OFFICE O/P EST MOD 30 MIN: CPT | Performed by: FAMILY MEDICINE

## 2017-07-24 RX ORDER — TRAMADOL HYDROCHLORIDE 50 MG/1
50 TABLET ORAL EVERY 6 HOURS PRN
Qty: 20 TABLET | Refills: 1 | Status: SHIPPED | OUTPATIENT
Start: 2017-07-24 | End: 2017-10-08

## 2017-07-24 RX ORDER — ZOLPIDEM TARTRATE 10 MG/1
10 TABLET ORAL
Qty: 14 TABLET | Refills: 0 | Status: SHIPPED | OUTPATIENT
Start: 2017-07-24 | End: 2017-10-05
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
## 2017-07-24 NOTE — PATIENT INSTRUCTIONS
Your medication list is printed, please keep this with you, it is helpful to bring this current list to any other medical appointments, the emergency room or hospital.    If you had lab testing today and your results are reassuring or normal they will be be mailed to you within 7 days.     If the lab tests need quick action we will call you with the results.   The phone number we will call with results is # [phone] (home) . If this is not the best number please call our clinic and change the number.    If you need any refills please call your pharmacy and they will contact us.    If you have any further concerns or wish to schedule another appointment you must call our office during normal business hours  [phone] (8-5:00 M-F)  If you have urgent medical questions that cannot wait  you may also call [phone] at any time of day.  If you have a medical emergency please call [phone].    Thank you for coming to [facility].
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
## 2017-07-24 NOTE — MR AVS SNAPSHOT
After Visit Summary   7/24/2017    Darlene Hudson    MRN: [id number]           Patient Information     Date Of Birth          1975        Visit Information        Provider Department      7/24/2017 6:10 PM Carlton James MD [facility]        Today's Diagnoses     Low back pain without sciatica, unspecified back pain laterality, unspecified chronicity    -  1       Follow-ups after your visit        Additional Services     [facility]       Your provider has referred you to: [facility] for Comprehensive Pain Management. Please call [phone] to make an appointment.     Clinic is located: [facility] [id number] [place] [address]      Please complete the following questions:      What is your diagnosis for the patient's pain? Lumbar pain     What are your specific questions for the pain specialist?     Are there any red flags that may impact the assessment or management of the patient? None    REGARDING OPIOID MEDICATIONS:  We will always address appropriateness of opioid pain medications. We do not prescribe on the patient's first visit and generally will not take on a prescribing role for stable chronic pain. When we do take on prescribing of opioids for chronic pain, it is in collaboration with the referring physician for an determined period of time (usually weeks to months), with an expectation that the primary physician or provider will assume the prescribing role if medications are effective at stable doses with demonstrated compliance.  Therefore, please do not assume that your prescribing responsibilities end on the day of pain clinic consultation.  Is this agreeable to you? YES      Please be aware that coverage of these services is subject to the terms and limitations of your health insurance plan.  Call member services at your health plan with any benefit or  "coverage questions.      Please bring the following with you to your appointment or have sent to the [facility]:    (1) Any X-Rays, CTs or MRIs which have been performed that are not in Epic.  Contact the facility where they were done to arrange for  prior to your scheduled appointment.  Any new CT, MRI or other procedures ordered by your specialist must be performed at a [facility] facility or coordinated by your clinic's referral office.    (2) List of current medications   (3) This referral request   (4) Any documents/labs given to you for this referral                  Who to contact     If you have questions or need follow up information about today's clinic visit or your schedule please contact [facility] directly at [phone].  Normal or non-critical lab and imaging results will be communicated to you by MyChart, letter or phone within 4 business days after the clinic has received the results. If you do not hear from us within 7 days, please contact the clinic through MyChart or phone. If you have a critical or abnormal lab result, we will notify you by phone as soon as possible.  Submit refill requests through StudyRoom or call your pharmacy and they will forward the refill request to us. Please allow 3 business days for your refill to be completed.          Additional Information About Your Visit        StudyRoom Information     StudyRoom lets you send messages to your doctor, view your test results, renew your prescriptions, schedule appointments and more. To sign up, go to [email or web address] . Click on \"Log in\" on the left side of the screen, which will take you to the Welcome page. Then click on \"Sign up Now\" on the right side of the page.     You will be asked to enter the access code listed below, as well as some personal information. Please follow the directions to create your username and password.     Your access code is: [email or web address]  Expires: 8/2/2017 12:28 PM     Your " access code will  in 90 days. If you need help or a new code, please call your [facility] clinic or [phone].        Care EveryWhere ID     This is your Care EveryWhere ID. This could be used by other organizations to access your [facility] medical records  [id number]        Your Vitals Were     Pulse Temperature Last Period Pulse Oximetry BMI (Body Mass Index)       96 98  F (36.7  C) (Oral) 07/15/2017 96% 34.7 kg/m2        Blood Pressure from Last 3 Encounters:   17 137/88   17 133/83   17 115/81    Weight from Last 3 Encounters:   17 238 lb 6.4 oz (108.1 kg)   17 241 lb 9.6 oz (109.6 kg)   17 243 lb (110.2 kg)              We Performed the Following     [facility]          Today's Medication Changes          These changes are accurate as of: 17  7:42 PM.  If you have any questions, ask your nurse or doctor.               Start taking these medicines.        Dose/Directions    traMADol 50 MG tablet   Commonly known as:  ULTRAM   Used for:  Low back pain without sciatica, unspecified back pain laterality, unspecified chronicity   Started by:  Carlton James MD        Dose:  50 mg   Take 1 tablet (50 mg) by mouth every 6 hours as needed for moderate pain   Quantity:  20 tablet   Refills:  1       zolpidem 10 MG tablet   Commonly known as:  AMBIEN   Used for:  Low back pain without sciatica, unspecified back pain laterality, unspecified chronicity   Started by:  Calrton James MD        Dose:  10 mg   Take 1 tablet (10 mg) by mouth nightly as needed for sleep   Quantity:  14 tablet   Refills:  0            Where to get your medicines      Some of these will need a paper prescription and others can be bought over the counter.  Ask your nurse if you have questions.     Bring a paper prescription for each of these medications     traMADol 50 MG tablet    zolpidem 10 MG tablet                Primary Care Provider Office Phone #  Fax #    Anaid Ann Vogel [phone]       [facility] [address] [place] [address]        Equal Access to Services     EVANGELINAJAMIE ARISTEO : Hadii kvng ku hadlissetteo Sorobertali, waaxda luqadaha, qaybta kaalmada adeegyada, delores cr lajeanegina fred. So [facility] [phone].    ATENCIÓN: Si habla español, tiene a boswell disposición servicios gratuitos de asistencia lingüística. Llame al [phone].    We comply with applicable federal civil rights laws and [place] laws. We do not discriminate on the basis of race, color, national origin, age, disability sex, sexual orientation or gender identity.            Thank you!     Thank you for choosing [facility]  for your care. Our goal is always to provide you with excellent care. Hearing back from our patients is one way we can continue to improve our services. Please take a few minutes to complete the written survey that you may receive in the mail after your visit with us. Thank you!             Your Updated Medication List - Protect others around you: Learn how to safely use, store and throw away your medicines at www.disposemymeds.org.          This list is accurate as of: 7/24/17  7:42 PM.  Always use your most recent med list.                   Brand Name Dispense Instructions for use Diagnosis    acetaminophen 500 MG tablet    TYLENOL    100 tablet    Take 2 tablets (1,000 mg) by mouth 3 times daily    Chronic left-sided low back pain with right-sided sciatica       ADVAIR DISKUS 500-50 MCG/DOSE diskus inhaler   Generic drug:  fluticasone-salmeterol      Inhale 1 puff into the lungs        * albuterol 108 (90 BASE) MCG/ACT Inhaler    PROAIR HFA/PROVENTIL HFA/VENTOLIN HFA     Inhale 2 puffs into the lungs        * albuterol 108 (90 BASE) MCG/ACT Inhaler    PROAIR HFA/PROVENTIL HFA/VENTOLIN HFA    1 Inhaler    Inhale 2 puffs into the lungs every 6 hours as needed for shortness of breath / dyspnea or  wheezing    Acute bronchitis, unspecified organism       citalopram 20 MG tablet    celeXA    90 tablet    Take 1 tablet (20 mg) by mouth daily    Adjustment disorder with mixed anxiety and depressed mood       * colchicine 0.6 MG tablet     60 tablet    Take 1 tablet (0.6 mg) by mouth 2 times daily    Acute idiopathic pericarditis       * colchicine 0.6 MG tablet      Take 0.6 mg by mouth        * fluticasone 50 MCG/ACT spray    FLONASE     Spray 1 spray in nostril        * fluticasone 50 MCG/ACT spray    FLONASE    1 Bottle    Spray 1-2 sprays into both nostrils daily    Congestion of paranasal sinus       * fluticasone 50 MCG/ACT spray    FLONASE    1 Bottle    Spray 1-2 sprays into both nostrils daily    Chronic allergic rhinitis due to pollen, unspecified seasonality       hydrOXYzine 25 MG tablet    ATARAX    60 tablet    Take 1-2 tablets (25-50 mg) by mouth every 6 hours as needed for itching    Anxiety       * ibuprofen 600 MG tablet    ADVIL/MOTRIN    100 tablet    Take 1 tablet (600 mg) by mouth every 6 hours as needed for moderate pain    Sprain of neck, initial encounter       * ibuprofen 600 MG tablet    ADVIL/MOTRIN    60 tablet    Take 600mg twice daily for one week then decrease to once daily    Acute idiopathic pericarditis       * ibuprofen 600 MG tablet    ADVIL/MOTRIN    30 tablet    Take 1 tablet (600 mg) by mouth every 6 hours as needed for moderate pain    Acute pericarditis, unspecified type       * ibuprofen 600 MG tablet    ADVIL/MOTRIN     Take 600 mg by mouth        LANsoprazole 15 MG CR capsule    PREVACID    30 capsule    Take one capsule by mouth everyday for 30 days to prevent gastritis from NSAIDS        montelukast 10 MG tablet    SINGULAIR    90 tablet    Take 1 tablet (10 mg) by mouth At Bedtime    Chronic allergic rhinitis due to pollen, unspecified seasonality       sertraline 50 MG tablet    ZOLOFT    90 tablet    Take 1 tablet (50 mg) by mouth daily    Adjustment disorder with  mixed anxiety and depressed mood       traMADol 50 MG tablet    ULTRAM    20 tablet    Take 1 tablet (50 mg) by mouth every 6 hours as needed for moderate pain    Low back pain without sciatica, unspecified back pain laterality, unspecified chronicity       zolpidem 10 MG tablet    AMBIEN    14 tablet    Take 1 tablet (10 mg) by mouth nightly as needed for sleep    Low back pain without sciatica, unspecified back pain laterality, unspecified chronicity       * Notice:  This list has 11 medication(s) that are the same as other medications prescribed for you. Read the directions carefully, and ask your doctor or other care provider to review them with you.
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
## 2017-07-24 NOTE — MR AVS SNAPSHOT
After Visit Summary   2017    Darlene Hudson    MRN: [id number]           Patient Information     Date Of Birth          1975        Visit Information        Provider Department      2017 10:20 AM Sulema Lechuga LMFT [facility]        Today's Diagnoses     Anxiety    -  1       Follow-ups after your visit        Who to contact     Please call your clinic at [phone] to:    Ask questions about your health    Make or cancel appointments    Discuss your medicines    Learn about your test results    Speak to your doctor   If you have compliments or concerns about an experience at your clinic, or if you wish to file a complaint, please contact [facility] Patient Relations at [phone] or email us at [email or web address]         Additional Information About Your Visit        MyChart Information     eNovance is an electronic gateway that provides easy, online access to your medical records. With eNovance, you can request a clinic appointment, read your test results, renew a prescription or communicate with your care team.     To sign up for AddMyBestt visit the website at [email or web address]   You will be asked to enter the access code listed below, as well as some personal information. Please follow the directions to create your username and password.     Your access code is: [email or web address]  Expires: 2017 12:28 PM     Your access code will  in 90 days. If you need help or a new code, please contact your [facility] or call [phone] for assistance.        Care EveryWhere ID     This is your Care EveryWhere ID. This could be used by other organizations to access your [place] medical records  [id number]        Your Vitals Were     Last Period                   07/15/2017            Blood Pressure from Last 3 Encounters:   17 133/83   17 115/81   17 122/87    Weight from  Last 3 Encounters:   07/24/17 241 lb 9.6 oz (109.6 kg)   07/20/17 243 lb (110.2 kg)   06/23/17 240 lb (108.9 kg)              Today, you had the following     No orders found for display       Primary Care Provider Office Phone # Fax #    Anaid Vogel [phone]       [facility] [address] [place] [address]        Equal Access to Services     [facility] : Hadii aad ku hadasho Soomaali, waaxda luqadaha, qaybta kaalmada adeegyada, waxay idiin hayaan adeeg kharash la'aan . So [facility] [phone].    ATENCIÓN: Si habla español, tiene a boswell disposición servicios gratuitos de asistencia lingüística. [facility] [phone].    We comply with applicable federal civil rights laws and [place] laws. We do not discriminate on the basis of race, color, national origin, age, disability sex, sexual orientation or gender identity.            Thank you!     Thank you for choosing [facility]  for your care. Our goal is always to provide you with excellent care. Hearing back from our patients is one way we can continue to improve our services. Please take a few minutes to complete the written survey that you may receive in the mail after your visit with us. Thank you!             Your Updated Medication List - Protect others around you: Learn how to safely use, store and throw away your medicines at www.disposemymeds.org.          This list is accurate as of: 7/24/17 12:45 PM.  Always use your most recent med list.                   Brand Name Dispense Instructions for use Diagnosis    acetaminophen 500 MG tablet    TYLENOL    100 tablet    Take 2 tablets (1,000 mg) by mouth 3 times daily    Chronic left-sided low back pain with right-sided sciatica       ADVAIR DISKUS 500-50 MCG/DOSE diskus inhaler   Generic drug:  fluticasone-salmeterol      Inhale 1 puff into the lungs        * albuterol 108 (90 BASE) MCG/ACT Inhaler    PROAIR HFA/PROVENTIL HFA/VENTOLIN HFA     Inhale 2  puffs into the lungs        * albuterol 108 (90 BASE) MCG/ACT Inhaler    PROAIR HFA/PROVENTIL HFA/VENTOLIN HFA    1 Inhaler    Inhale 2 puffs into the lungs every 6 hours as needed for shortness of breath / dyspnea or wheezing    Acute bronchitis, unspecified organism       citalopram 20 MG tablet    celeXA    90 tablet    Take 1 tablet (20 mg) by mouth daily    Adjustment disorder with mixed anxiety and depressed mood       * colchicine 0.6 MG tablet     60 tablet    Take 1 tablet (0.6 mg) by mouth 2 times daily    Acute idiopathic pericarditis       * colchicine 0.6 MG tablet      Take 0.6 mg by mouth        * fluticasone 50 MCG/ACT spray    FLONASE     Spray 1 spray in nostril        * fluticasone 50 MCG/ACT spray    FLONASE    1 Bottle    Spray 1-2 sprays into both nostrils daily    Congestion of paranasal sinus       * fluticasone 50 MCG/ACT spray    FLONASE    1 Bottle    Spray 1-2 sprays into both nostrils daily    Chronic allergic rhinitis due to pollen, unspecified seasonality       hydrOXYzine 25 MG tablet    ATARAX    60 tablet    Take 1-2 tablets (25-50 mg) by mouth every 6 hours as needed for itching    Anxiety       * ibuprofen 600 MG tablet    ADVIL/MOTRIN    100 tablet    Take 1 tablet (600 mg) by mouth every 6 hours as needed for moderate pain    Sprain of neck, initial encounter       * ibuprofen 600 MG tablet    ADVIL/MOTRIN    60 tablet    Take 600mg twice daily for one week then decrease to once daily    Acute idiopathic pericarditis       * ibuprofen 600 MG tablet    ADVIL/MOTRIN    30 tablet    Take 1 tablet (600 mg) by mouth every 6 hours as needed for moderate pain    Acute pericarditis, unspecified type       * ibuprofen 600 MG tablet    ADVIL/MOTRIN     Take 600 mg by mouth        LANsoprazole 15 MG CR capsule    PREVACID    30 capsule    Take one capsule by mouth everyday for 30 days to prevent gastritis from NSAIDS        montelukast 10 MG tablet    SINGULAIR    90 tablet    Take 1  tablet (10 mg) by mouth At Bedtime    Chronic allergic rhinitis due to pollen, unspecified seasonality       sertraline 50 MG tablet    ZOLOFT    90 tablet    Take 1 tablet (50 mg) by mouth daily    Adjustment disorder with mixed anxiety and depressed mood       * Notice:  This list has 11 medication(s) that are the same as other medications prescribed for you. Read the directions carefully, and ask your doctor or other care provider to review them with you.

## 2017-07-24 NOTE — LETTER
RETURN TO WORK/SCHOOL FORM    7/24/2017    Re: Darlene Hudson  1975      To Whom It May Concern:     Darlene Hudson was seen in clinic today for medical care.          Restrictions:  None      Anaid Vogel MD  7/24/2017 10:52 AM

## 2017-07-25 NOTE — NURSING NOTE
"Chief Complaint   Patient presents with     Back Pain       Initial /88  Pulse 96  Temp 98  F (36.7  C) (Oral)  Wt 238 lb 6.4 oz (108.1 kg)  LMP 07/15/2017  SpO2 96%  BMI 34.7 kg/m2 Estimated body mass index is 34.7 kg/(m^2) as calculated from the following:    Height as of 7/20/17: 5' 9.5\" (1.765 m).    Weight as of this encounter: 238 lb 6.4 oz (108.1 kg).  Medication Reconciliation: complete     Kyra Watson MA    "

## 2017-08-22 ENCOUNTER — TRANSFERRED RECORDS (OUTPATIENT)
Dept: HEALTH INFORMATION MANAGEMENT | Facility: CLINIC | Age: 42
End: 2017-08-22

## 2017-08-28 ENCOUNTER — TRANSFERRED RECORDS (OUTPATIENT)
Dept: HEALTH INFORMATION MANAGEMENT | Facility: CLINIC | Age: 42
End: 2017-08-28

## 2017-09-12 ENCOUNTER — TRANSFERRED RECORDS (OUTPATIENT)
Dept: HEALTH INFORMATION MANAGEMENT | Facility: CLINIC | Age: 42
End: 2017-09-12

## 2017-09-24 ENCOUNTER — HEALTH MAINTENANCE LETTER (OUTPATIENT)
Age: 42
End: 2017-09-24

## 2017-10-03 ENCOUNTER — TRANSFERRED RECORDS (OUTPATIENT)
Dept: HEALTH INFORMATION MANAGEMENT | Facility: CLINIC | Age: 42
End: 2017-10-03

## 2017-10-05 ENCOUNTER — OFFICE VISIT (OUTPATIENT)
Dept: FAMILY MEDICINE | Facility: CLINIC | Age: 42
End: 2017-10-05

## 2017-10-05 ENCOUNTER — ALLIED HEALTH/NURSE VISIT (OUTPATIENT)
Dept: FAMILY MEDICINE | Facility: CLINIC | Age: 42
End: 2017-10-05

## 2017-10-05 ENCOUNTER — TELEPHONE (OUTPATIENT)
Dept: FAMILY MEDICINE | Facility: CLINIC | Age: 42
End: 2017-10-05

## 2017-10-05 VITALS
RESPIRATION RATE: 18 BRPM | DIASTOLIC BLOOD PRESSURE: 77 MMHG | OXYGEN SATURATION: 98 % | HEART RATE: 103 BPM | SYSTOLIC BLOOD PRESSURE: 118 MMHG | TEMPERATURE: 98 F | HEIGHT: 70 IN

## 2017-10-05 DIAGNOSIS — M54.50 LOW BACK PAIN WITHOUT SCIATICA, UNSPECIFIED BACK PAIN LATERALITY, UNSPECIFIED CHRONICITY: ICD-10-CM

## 2017-10-05 DIAGNOSIS — G89.29 CHRONIC LOW BACK PAIN, UNSPECIFIED BACK PAIN LATERALITY, WITH SCIATICA PRESENCE UNSPECIFIED: Primary | ICD-10-CM

## 2017-10-05 DIAGNOSIS — J31.0 CHRONIC RHINITIS: ICD-10-CM

## 2017-10-05 DIAGNOSIS — Z76.0 ENCOUNTER FOR MEDICATION REFILL: Primary | ICD-10-CM

## 2017-10-05 DIAGNOSIS — Z23 IMMUNIZATION DUE: Primary | ICD-10-CM

## 2017-10-05 DIAGNOSIS — G47.00 PERSISTENT DISORDER OF INITIATING OR MAINTAINING SLEEP: ICD-10-CM

## 2017-10-05 DIAGNOSIS — J30.1 CHRONIC ALLERGIC RHINITIS DUE TO POLLEN, UNSPECIFIED SEASONALITY: ICD-10-CM

## 2017-10-05 DIAGNOSIS — M54.5 CHRONIC LOW BACK PAIN, UNSPECIFIED BACK PAIN LATERALITY, WITH SCIATICA PRESENCE UNSPECIFIED: Primary | ICD-10-CM

## 2017-10-05 DIAGNOSIS — I30.0 ACUTE IDIOPATHIC PERICARDITIS: ICD-10-CM

## 2017-10-05 DIAGNOSIS — J45.40 MODERATE PERSISTENT ASTHMA: ICD-10-CM

## 2017-10-05 RX ORDER — GABAPENTIN 300 MG/1
CAPSULE ORAL
Qty: 150 CAPSULE | Refills: 1 | Status: SHIPPED | OUTPATIENT
Start: 2017-10-05 | End: 2017-10-31

## 2017-10-05 RX ORDER — IBUPROFEN 600 MG/1
600 TABLET, FILM COATED ORAL EVERY 6 HOURS PRN
Qty: 120 TABLET | Refills: 0 | Status: SHIPPED | OUTPATIENT
Start: 2017-10-05 | End: 2017-10-23

## 2017-10-05 RX ORDER — FLUTICASONE PROPIONATE 50 MCG
2 SPRAY, SUSPENSION (ML) NASAL DAILY
Qty: 2 BOTTLE | Refills: 1 | Status: SHIPPED | OUTPATIENT
Start: 2017-10-05 | End: 2017-10-30

## 2017-10-05 RX ORDER — FLUTICASONE PROPIONATE 50 MCG
1-2 SPRAY, SUSPENSION (ML) NASAL DAILY
Qty: 1 BOTTLE | Refills: 1 | Status: SHIPPED | OUTPATIENT
Start: 2017-10-05 | End: 2017-10-30

## 2017-10-05 RX ORDER — GABAPENTIN 300 MG/1
CAPSULE ORAL
Qty: 180 CAPSULE | Refills: 0 | Status: SHIPPED | OUTPATIENT
Start: 2017-10-05 | End: 2017-10-08

## 2017-10-05 RX ORDER — IBUPROFEN 600 MG/1
TABLET, FILM COATED ORAL
Qty: 60 TABLET | Refills: 0 | Status: SHIPPED | OUTPATIENT
Start: 2017-10-05 | End: 2017-10-30

## 2017-10-05 RX ORDER — ZOLPIDEM TARTRATE 10 MG/1
10 TABLET ORAL
Qty: 14 TABLET | Refills: 0 | Status: SHIPPED | OUTPATIENT
Start: 2017-10-05 | End: 2017-10-23

## 2017-10-05 NOTE — PROGRESS NOTES
Received after hours call regarding medications not at the pharmacy.   Refilled gabapentin, ibuprofen, and flonase.   Did not send in prescription for Ambien. To be discussed with provider tomorrow.     Ruby Kim, DO

## 2017-10-05 NOTE — MR AVS SNAPSHOT
"              After Visit Summary   10/5/2017    Darlene Hudson    MRN: [id number]           Patient Information     Date Of Birth          1975        Visit Information        Provider Department      10/5/2017 4:00 PM Sulema Meza APRN CNP [facility]        Today's Diagnoses     Encounter for medication refill    -  1    Low back pain without sciatica, unspecified back pain laterality, unspecified chronicity        Moderate persistent asthma        Chronic rhinitis        Persistent disorder of initiating or maintaining sleep           Follow-ups after your visit        Follow-up notes from your care team     Return in about 4 weeks (around 11/2/2017), or if symptoms worsen or fail to improve, for BP Recheck, Physical Exam, Pain, sleep, mood and astma assessment.      Your next 10 appointments already scheduled     Oct 25, 2017  9:00 AM CDT   (Arrive by 8:45 AM)   New Patient Visit with Reji Marc MD   [facility] ([facility])    [address] [place] [id number] [phone]              Who to contact     Please call your clinic at [phone] to:    Ask questions about your health    Make or cancel appointments    Discuss your medicines    Learn about your test results    Speak to your doctor   If you have compliments or concerns about an experience at your clinic, or if you wish to file a complaint, please contact [facility] Patient Relations at [phone] or email us at [email or web address]         Additional Information About Your Visit        Care EveryWhere ID     This is your Care EveryWhere ID. This could be used by other organizations to access your [employer] medical records  [id number]        Your Vitals Were     Pulse Temperature Respirations Height Pulse Oximetry       103 98  F (36.7  C) 18 5' 9.5\" (176.5 cm) 98%        " Blood Pressure from Last 3 Encounters:   10/05/17 118/77   07/24/17 137/88   07/24/17 133/83    Weight from Last 3 Encounters:   07/24/17 238 lb 6.4 oz (108.1 kg)   07/24/17 241 lb 9.6 oz (109.6 kg)   07/20/17 243 lb (110.2 kg)              Today, you had the following     No orders found for display         Today's Medication Changes          These changes are accurate as of: 10/5/17 11:59 PM.  If you have any questions, ask your nurse or doctor.               Start taking these medicines.        Dose/Directions    gabapentin 300 MG capsule   Commonly known as:  NEURONTIN   Used for:  Low back pain without sciatica, unspecified back pain laterality, unspecified chronicity, Encounter for medication refill   Started by:  Sulema Meza APRN CNP        Take 1 capsule as needed for pain 3 times daily. Jese increase to 2 tablets 3 times daily PRN if no side effects after 1 week.   Quantity:  150 capsule   Refills:  1         These medicines have changed or have updated prescriptions.        Dose/Directions    * fluticasone 50 MCG/ACT spray   Commonly known as:  FLONASE   This may have changed:  Another medication with the same name was added. Make sure you understand how and when to take each.   Changed by:  Sulema Meza APRN CNP        Dose:  1 spray   Spray 1 spray in nostril   Refills:  0       * fluticasone 50 MCG/ACT spray   Commonly known as:  FLONASE   This may have changed:  Another medication with the same name was added. Make sure you understand how and when to take each.   Used for:  Congestion of paranasal sinus   Changed by:  Oscar Aparicio MD        Dose:  1-2 spray   Spray 1-2 sprays into both nostrils daily   Quantity:  1 Bottle   Refills:  11       * fluticasone 50 MCG/ACT spray   Commonly known as:  FLONASE   This may have changed:  Another medication with the same name was added. Make sure you understand how and when to take each.   Used for:  Chronic allergic rhinitis due to  pollen, unspecified seasonality   Changed by:  Ruby Kim DO        Dose:  1-2 spray   Spray 1-2 sprays into both nostrils daily   Quantity:  1 Bottle   Refills:  1       * fluticasone 50 MCG/ACT spray   Commonly known as:  FLONASE   This may have changed:  You were already taking a medication with the same name, and this prescription was added. Make sure you understand how and when to take each.   Used for:  Chronic rhinitis, Encounter for medication refill   Changed by:  Sulema Meza APRN CNP        Dose:  2 spray   Spray 2 sprays into both nostrils daily   Quantity:  2 Bottle   Refills:  1       * ibuprofen 600 MG tablet   Commonly known as:  ADVIL/MOTRIN   This may have changed:  Another medication with the same name was changed. Make sure you understand how and when to take each.   Used for:  Sprain of neck, initial encounter   Changed by:  Anaid Vogel MD        Dose:  600 mg   Take 1 tablet (600 mg) by mouth every 6 hours as needed for moderate pain   Quantity:  100 tablet   Refills:  0       * ibuprofen 600 MG tablet   Commonly known as:  ADVIL/MOTRIN   This may have changed:    - when to take this  - reasons to take this   Used for:  Low back pain without sciatica, unspecified back pain laterality, unspecified chronicity, Encounter for medication refill   Changed by:  Sulema Meza APRN CNP        Dose:  600 mg   Take 1 tablet (600 mg) by mouth every 6 hours as needed for moderate pain   Quantity:  120 tablet   Refills:  0       * ibuprofen 600 MG tablet   Commonly known as:  ADVIL/MOTRIN   This may have changed:  Another medication with the same name was changed. Make sure you understand how and when to take each.   Used for:  Chronic low back pain, unspecified back pain laterality, with sciatica presence unspecified   Changed by:  Ruby Kim DO        Take 600mg twice daily for one week then decrease to once daily   Quantity:  60 tablet   Refills:  0       *  Notice:  This list has 7 medication(s) that are the same as other medications prescribed for you. Read the directions carefully, and ask your doctor or other care provider to review them with you.         Where to get your medicines      These medications were sent to [id number] - [place], [place] - [address] AT [address]  [address], [place] [address]     Phone:  [phone]     fluticasone 50 MCG/ACT spray    fluticasone 50 MCG/ACT spray    gabapentin 300 MG capsule    ibuprofen 600 MG tablet    ibuprofen 600 MG tablet         Some of these will need a paper prescription and others can be bought over the counter.  Ask your nurse if you have questions.     Bring a paper prescription for each of these medications     zolpidem 10 MG tablet                Primary Care Provider Office Phone # Fax #    Anaid Vogel [phone]       [facility] [address] [place] [address]        Equal Access to Services     [facility] HospitalSAGAR AH: Hadii aad ku hadasho Soomaali, waaxda luqadaha, qaybta kaalmada adeegyada, waxay idiin hayaan adeeg kharatereso macdonald . So [facility] [phone].    ATENCIÓN: Si habla español, tiene a boswell disposición servicios gratuitos de asistencia lingüística. Ll[facility] [phone].    We comply with applicable federal civil rights laws and [place] laws. We do not discriminate on the basis of race, color, national origin, age, disability, sex, sexual orientation, or gender identity.            Thank you!     Thank you for choosing [facility]  for your care. Our goal is always to provide you with excellent care. Hearing back from our patients is one way we can continue to improve our services. Please take a few minutes to complete the written survey that you may receive in the mail after your visit with us. Thank you!             Your Updated Medication List - Protect others around you: Learn how to safely use,  store and throw away your medicines at www.disposemymeds.org.          This list is accurate as of: 10/5/17 11:59 PM.  Always use your most recent med list.                   Brand Name Dispense Instructions for use Diagnosis    acetaminophen 500 MG tablet    TYLENOL    100 tablet    Take 2 tablets (1,000 mg) by mouth 3 times daily    Chronic left-sided low back pain with right-sided sciatica       ADVAIR DISKUS 500-50 MCG/DOSE diskus inhaler   Generic drug:  fluticasone-salmeterol      Inhale 1 puff into the lungs        albuterol 108 (90 BASE) MCG/ACT Inhaler    PROAIR HFA/PROVENTIL HFA/VENTOLIN HFA     Inhale 2 puffs into the lungs        * fluticasone 50 MCG/ACT spray    FLONASE     Spray 1 spray in nostril        * fluticasone 50 MCG/ACT spray    FLONASE    1 Bottle    Spray 1-2 sprays into both nostrils daily    Congestion of paranasal sinus       * fluticasone 50 MCG/ACT spray    FLONASE    1 Bottle    Spray 1-2 sprays into both nostrils daily    Chronic allergic rhinitis due to pollen, unspecified seasonality       * fluticasone 50 MCG/ACT spray    FLONASE    2 Bottle    Spray 2 sprays into both nostrils daily    Chronic rhinitis, Encounter for medication refill       gabapentin 300 MG capsule    NEURONTIN    150 capsule    Take 1 capsule as needed for pain 3 times daily. Jese increase to 2 tablets 3 times daily PRN if no side effects after 1 week.    Low back pain without sciatica, unspecified back pain laterality, unspecified chronicity, Encounter for medication refill       hydrOXYzine 25 MG tablet    ATARAX    60 tablet    Take 1-2 tablets (25-50 mg) by mouth every 6 hours as needed for itching    Anxiety       * ibuprofen 600 MG tablet    ADVIL/MOTRIN    100 tablet    Take 1 tablet (600 mg) by mouth every 6 hours as needed for moderate pain    Sprain of neck, initial encounter       * ibuprofen 600 MG tablet    ADVIL/MOTRIN    120 tablet    Take 1 tablet (600 mg) by mouth every 6 hours as needed for  moderate pain    Low back pain without sciatica, unspecified back pain laterality, unspecified chronicity, Encounter for medication refill       * ibuprofen 600 MG tablet    ADVIL/MOTRIN    60 tablet    Take 600mg twice daily for one week then decrease to once daily    Chronic low back pain, unspecified back pain laterality, with sciatica presence unspecified       LANsoprazole 15 MG CR capsule    PREVACID    30 capsule    Take one capsule by mouth everyday for 30 days to prevent gastritis from NSAIDS        montelukast 10 MG tablet    SINGULAIR    90 tablet    Take 1 tablet (10 mg) by mouth At Bedtime    Chronic allergic rhinitis due to pollen, unspecified seasonality       zolpidem 10 MG tablet    AMBIEN    14 tablet    Take 1 tablet (10 mg) by mouth nightly as needed for sleep    Persistent disorder of initiating or maintaining sleep, Encounter for medication refill       * Notice:  This list has 7 medication(s) that are the same as other medications prescribed for you. Read the directions carefully, and ask your doctor or other care provider to review them with you.

## 2017-10-05 NOTE — TELEPHONE ENCOUNTER
Due to her request of urgency, Dr Vogel is not in clinic today, last seen Darlene in July 2017. Left message on identified voicemail, recommending/ encouraging she be seen in clinic today to assist in fulfilling her request either this afternoon or tomorrow as we have appt availability for follow up. If she should have any further questions/ concerns can call me back.  Pia VÁSQUEZ

## 2017-10-05 NOTE — NURSING NOTE

## 2017-10-05 NOTE — TELEPHONE ENCOUNTER
[facility] phone call message- patient requesting a refill:    Full Medication Name: Ambien 10mg, Ibuprofen and Gabapentin    Dose:     Pharmacy confirmed as   [id number] - [place], [place] - [address] AT [address]  [address]  [place] [address]  Phone: [phone] Fax: [phone]  : Yes    Additional Comments: Patient is completely out of the medications and would like someone to call her today to see if they can refill them asap.      OK to leave a message on voice mail? Yes    Primary language: English      needed? No    Call taken on October 5, 2017 at 1:46 PM by Kirstie Gates

## 2017-10-05 NOTE — PROGRESS NOTES
"      HPI:       aDrlene Hudson is a 42 year old who presents for the following    Here for medication refill, specifically ambien, ibuprofen, flonase and  Gabapentin.   Is taking the medications for chronic back pain, allergies and insomnia.      Problem, Medication and Allergy Lists were reviewed and are current..  Active problems include moderate persistent asthma,   Chronic low back pain  Tobacco use disorder-unfortunately continues to smoke, often on a less than daily basis.  Patient is an established patient of this clinic..         Review of Systems:   CONSTITUTIONAL: admits to fatigue, no unexpected change in weight  SKIN: no worrisome rashes, no concerns for moles, no worrisome lesions  EYES: no acute vision problems or changes  ENT: no ear problems, no mouth problems, no throat problems  RESP: no significant cough, no shortness of breath  CV: no chest pain, no palpitations, no new or worsening peripheral edema  GI: no nausea, no vomiting, no constipation, no diarrhea  NEURO:Reduced ROM lower spine, paraspinal tenderness to palpation, alert, speech clear  PSYCH:Pleasant, anxious facies, clear speech       Physical Exam:     Vitals:    10/05/17 1612   BP: 118/77   Pulse: 103   Resp: 18   Temp: 98  F (36.7  C)   SpO2: 98%   Height: 5' 9.5\" (176.5 cm)     There is no height or weight on file to calculate BMI.  Vitals were reviewed and were normal, with exception of borderline tachycardia, rate 103.  GENERAL: healthy, alert, well nourished, well hydrated, no distress  EYES: Eyes grossly normal to inspection, extraocular movements - intact, and PERRL  HENT: ear canals- normal; TMs- normal; Nose- normal; Mouth- no ulcers, no lesions  NECK: no tenderness, no adenopathy, no asymmetry, no masses, no stiffness; thyroid- normal to palpation  RESP: lungs clear to auscultation - no rales, no rhonchi, no wheezes  CV: regular rates and rhythm, normal S1 S2, no S3 or S4 and no murmur, no click or rub -  ABDOMEN: soft, no " tenderness, no  hepatosplenomegaly, normal bowel sounds  MS: extremities- no gross deformities noted, no edema  SKIN: no suspicious lesions, no rashes  NEURO: strength and tone- normal, sensory exam- grossly normal, mentation- intact, speech- normal  PSYCH: Alert and oriented times 3; speech- coherent,  no hallucinations or delusions, affect- normal  PHQ-2=1     Results:   ACT assessment =13-feels her asthma is somewhat controlled.  Was at the ED w/asthma exacerbation x1 in the past year, no recent visits.  No other testing carried out today.    Assessment and Plan   (Z76.0) Encounter for medication refill  (primary encounter diagnosis)  Comment: currently no opioid therapy,   NSAIDS and gabapentin instead. Is not requesting opioids.Will refill   Current meds today, including a limited number of Ambien for sleep.   Plan: zolpidem (AMBIEN) 10 MG tablet, ibuprofen         (ADVIL/MOTRIN) 600 MG tablet, fluticasone         (FLONASE) 50 MCG/ACT spray, gabapentin         (NEURONTIN) 300 MG capsule    NSAIDS w/food and as needed.Report GI upset/burning  Promptly.        (M54.5) Low back pain without sciatica, unspecified back pain laterality, unspecified chronicity  Comment: Appears able to ambulate steadily ,ROM appears   reduced to lower spine  Plan: ibuprofen (ADVIL/MOTRIN) 600 MG tablet,         gabapentin (NEURONTIN) 300 MG capsule          (J45.40)  Moderate Persistent Asthma  ACT indicates inadequate control, Reviewed AAP and Darlene corrected number of times she uses her controller /ADVAIR  To  BID, correcting this in the record. Does not need an ADVAIR refill at this time. Offered to look a her rx to consider adjustment, med changes declined at this time.  (J31.0) Chronic rhinitis  Comment: Nasal congestion apparent, audible.   Will refill nasal steroid.  Plan: fluticasone (FLONASE) 50 MCG/ACT spray            (G47.00) Persistent disorder of initiating or maintaining sleep  Comment: Reports she sleeps poorly and  ambien makes a significant difference.  Reviewed [facility] and has had no outside prescriptions reported in recent months   Plan: zolpidem (AMBIEN) 10 MG tablet        Refill a limited number of sleeping pills.  Suggested Darlene discuss ongoing symptoms with her PCP should she desire another refill of the medication.  Insomnia - Relaxation    Stress, tension, and anxiety can be big factors contributing to insomnia. Discussed medications she has taken for mood disorders which can improve depression and anxiety and related sleep problems. Darlene declined offer to refill an                     Antidepressant, or refer for Behavioral Health at this time.        1. Encounter for medication refill    - zolpidem (AMBIEN) 10 MG tablet; Take 1 tablet (10 mg) by mouth nightly as needed for sleep  Dispense: 14 tablet; Refill: 0  - ibuprofen (ADVIL/MOTRIN) 600 MG tablet; Take 1 tablet (600 mg) by mouth every 6 hours as needed for moderate pain  Dispense: 120 tablet; Refill: 0  - fluticasone (FLONASE) 50 MCG/ACT spray; Spray 2 sprays into both nostrils daily  Dispense: 2 Bottle; Refill: 1  - gabapentin (NEURONTIN) 300 MG capsule; Take 1 capsule as needed for pain 3 times daily. Jese increase to 2 tablets 3 times daily PRN if no side effects after 1 week.  Dispense: 150 capsule; Refill: 1    2. Low back pain without sciatica, unspecified back pain laterality, unspecified chronicity  NSAIDS and gabapentin assist in managing her pain.  Refilled with reminder for caution w/ongoing NSAID use and to take with food while moniitoring for development of GI upset, which can include PUD.  - ibuprofen (ADVIL/MOTRIN) 600 MG tablet; Take 1 tablet (600 mg) by mouth every 6 hours as needed for moderate pain  Dispense: 120 tablet; Refill: 0  - gabapentin (NEURONTIN) 300 MG capsule; Take 1 capsule as needed for pain 3 times daily. Jese increase to 2 tablets 3 times daily PRN if no side effects after 1 week.  Dispense: 150 capsule; Refill: 1    3.  Chronic rhinitis  Active currently, needs refill or nasal steroid.  - fluticasone (FLONASE) 50 MCG/ACT spray; Spray 2 sprays into both nostrils daily  Dispense: 2 Bottle; Refill: 1    4. Persistent disorder of initiating or maintaining sleep  Darlene does not believe depression is playing a significant role in her symptoms.Does not want refills of sertraline or citalopram.  Desired sleeping pill refill.Granted limited number.  - zolpidem (AMBIEN) 10 MG tablet; Take 1 tablet (10 mg) by mouth nightly as needed for sleep  Dispense: 14 tablet; Refill: 0    Med list reviewed and citalopram, sertraline     Options for treatment and follow-up care were reviewed with the patient. Darlene Hudson  engaged in the decision making process and verbalized understanding of the options discussed and agreed with the final plan.    30 minutes was spent on this visit, >50% counseling and coordination of care re:chronic pain, asthma, and insomnia    PEPE Valente CNP

## 2017-10-05 NOTE — MR AVS SNAPSHOT
After Visit Summary   10/5/2017    Darlene Hudson    MRN: [id number]           Patient Information     Date Of Birth          1975        Visit Information        Provider Department      10/5/2017 3:00 PM Nurse, Mayur Ump [facility]        Today's Diagnoses     Immunization due    -  1       Follow-ups after your visit        Your next 10 appointments already scheduled     Oct 25, 2017  9:00 AM CDT   (Arrive by 8:45 AM)   New Patient Visit with Reji Marc MD   [facility] ([facility])    [address] [place] [address]  4th [facility] [address] [phone]              Who to contact     Please call your clinic at [phone] to:    Ask questions about your health    Make or cancel appointments    Discuss your medicines    Learn about your test results    Speak to your doctor   If you have compliments or concerns about an experience at your clinic, or if you wish to file a complaint, please contact [facility] Patient Relations at [phone] or email us at [email or web address]         Additional Information About Your Visit        Care EveryWhere ID     This is your Care EveryWhere ID. This could be used by other organizations to access your [facility] medical records  [id number]         Blood Pressure from Last 3 Encounters:   10/05/17 118/77   07/24/17 137/88   07/24/17 133/83    Weight from Last 3 Encounters:   07/24/17 238 lb 6.4 oz (108.1 kg)   07/24/17 241 lb 9.6 oz (109.6 kg)   07/20/17 243 lb (110.2 kg)              We Performed the Following     ADMIN VACCINE, INITIAL     FLU VAC QUADRIVLENT SPLIT VIRUS IM 0.5ml dosage        Primary Care Provider Office Phone # Fax #    Anaid Vogel [phone]       [facility] [address] [place] [address]        Equal Access to Services     ROLY ALBERTS AH: Matthewii kvng cardozo  ashlee Glass, waalekseyda luqadaha, qaybta kaalmada julian, delores ibarra. So [facility] [phone].    ATENCIÓN: Si habla monica, tiene a boswell disposición servicios gratuitos de asistencia lingüística. Darby al [phone].    We comply with applicable federal civil rights laws and [place] laws. We do not discriminate on the basis of race, color, national origin, age, disability, sex, sexual orientation, or gender identity.            Thank you!     Thank you for choosing [facility]  for your care. Our goal is always to provide you with excellent care. Hearing back from our patients is one way we can continue to improve our services. Please take a few minutes to complete the written survey that you may receive in the mail after your visit with us. Thank you!             Your Updated Medication List - Protect others around you: Learn how to safely use, store and throw away your medicines at www.disposemymeds.org.          This list is accurate as of: 10/5/17  4:42 PM.  Always use your most recent med list.                   Brand Name Dispense Instructions for use Diagnosis    acetaminophen 500 MG tablet    TYLENOL    100 tablet    Take 2 tablets (1,000 mg) by mouth 3 times daily    Chronic left-sided low back pain with right-sided sciatica       ADVAIR DISKUS 500-50 MCG/DOSE diskus inhaler   Generic drug:  fluticasone-salmeterol      Inhale 1 puff into the lungs        * albuterol 108 (90 BASE) MCG/ACT Inhaler    PROAIR HFA/PROVENTIL HFA/VENTOLIN HFA     Inhale 2 puffs into the lungs        * albuterol 108 (90 BASE) MCG/ACT Inhaler    PROAIR HFA/PROVENTIL HFA/VENTOLIN HFA    1 Inhaler    Inhale 2 puffs into the lungs every 6 hours as needed for shortness of breath / dyspnea or wheezing    Acute bronchitis, unspecified organism       * fluticasone 50 MCG/ACT spray    FLONASE     Spray 1 spray in nostril        * fluticasone 50 MCG/ACT spray    FLONASE    1 Bottle    Spray 1-2  sprays into both nostrils daily    Congestion of paranasal sinus       * fluticasone 50 MCG/ACT spray    FLONASE    1 Bottle    Spray 1-2 sprays into both nostrils daily    Chronic allergic rhinitis due to pollen, unspecified seasonality       hydrOXYzine 25 MG tablet    ATARAX    60 tablet    Take 1-2 tablets (25-50 mg) by mouth every 6 hours as needed for itching    Anxiety       * ibuprofen 600 MG tablet    ADVIL/MOTRIN    100 tablet    Take 1 tablet (600 mg) by mouth every 6 hours as needed for moderate pain    Sprain of neck, initial encounter       * ibuprofen 600 MG tablet    ADVIL/MOTRIN    60 tablet    Take 600mg twice daily for one week then decrease to once daily    Acute idiopathic pericarditis       * ibuprofen 600 MG tablet    ADVIL/MOTRIN    30 tablet    Take 1 tablet (600 mg) by mouth every 6 hours as needed for moderate pain    Acute pericarditis, unspecified type       * ibuprofen 600 MG tablet    ADVIL/MOTRIN     Take 600 mg by mouth        LANsoprazole 15 MG CR capsule    PREVACID    30 capsule    Take one capsule by mouth everyday for 30 days to prevent gastritis from NSAIDS        montelukast 10 MG tablet    SINGULAIR    90 tablet    Take 1 tablet (10 mg) by mouth At Bedtime    Chronic allergic rhinitis due to pollen, unspecified seasonality       traMADol 50 MG tablet    ULTRAM    20 tablet    Take 1 tablet (50 mg) by mouth every 6 hours as needed for moderate pain    Low back pain without sciatica, unspecified back pain laterality, unspecified chronicity       zolpidem 10 MG tablet    AMBIEN    14 tablet    Take 1 tablet (10 mg) by mouth nightly as needed for sleep    Low back pain without sciatica, unspecified back pain laterality, unspecified chronicity       * Notice:  This list has 9 medication(s) that are the same as other medications prescribed for you. Read the directions carefully, and ask your doctor or other care provider to review them with you.

## 2017-10-06 ENCOUNTER — TELEPHONE (OUTPATIENT)
Dept: FAMILY MEDICINE | Facility: CLINIC | Age: 42
End: 2017-10-06

## 2017-10-06 ASSESSMENT — ASTHMA QUESTIONNAIRES: ACT_TOTALSCORE: 13

## 2017-10-06 NOTE — TELEPHONE ENCOUNTER
C/o never recieved rx local print nor was it called into pharmacy as she was informed yesterday in clinic. Reviewed pt's records/ chart with Dr Irwin, Zolpidem 10mg #14 take 1 tablet by mouth nightly as needed for sleep phoned into [facility]. Patient informed of this completed action. Pia VÁSQUEZ

## 2017-10-16 ENCOUNTER — TRANSFERRED RECORDS (OUTPATIENT)
Dept: HEALTH INFORMATION MANAGEMENT | Facility: CLINIC | Age: 42
End: 2017-10-16

## 2017-10-19 NOTE — TELEPHONE ENCOUNTER
"APPT INFO    Date /Time: 10/25/17 9AM    Reason for Appt: Low back pain without sciatica, unspecified back pain laterality, unspecified chronicity   Ref Provider/Clinic: Erika JACKSON    Patient Contact (Y/N) & Call Details:    Action: None      RECORDS CLINIC NAME  (\"None\" if no records ) RECEIVED RECS & IMG? Y/N   (may include other helpful notes)   Internal Clinics: [facility] Erika JACKSON  Recs & referral in Epic / Images in PACS     [facility] Juan JACKSON & Jaspreet JACKSON Recs & referral in Epic / Images in PACS    External Clinics: None       "

## 2017-10-24 NOTE — TELEPHONE ENCOUNTER
"Date /Time: 10/27/17  7:20AM    Reason for Appt: Low back pain without sciatica, unspecified back pain laterality, unspecified chronicity   Ref Provider/Clinic: Erika JACKSON    Patient Contact (Y/N) & Call Details: No, pt was referred.    Action: None       RECORDS CLINIC NAME  (\"None\" if no records ) RECEIVED RECS & IMG? Y/N   (may include other helpful notes)   Internal Clinics: [facility] CraftonPadmini James MD  Recs & referral in Epic / Images in PACS      [facility] Juan JACKSON & Jaspreet JACKSON Recs & referral in Epic / Images in PACS    External Clinics: None          "

## 2017-10-25 ENCOUNTER — PRE VISIT (OUTPATIENT)
Dept: ANESTHESIOLOGY | Facility: CLINIC | Age: 42
End: 2017-10-25

## 2017-10-27 ENCOUNTER — PRE VISIT (OUTPATIENT)
Dept: ANESTHESIOLOGY | Facility: CLINIC | Age: 42
End: 2017-10-27

## 2017-10-30 ENCOUNTER — OFFICE VISIT (OUTPATIENT)
Dept: ANESTHESIOLOGY | Facility: CLINIC | Age: 42
End: 2017-10-30

## 2017-10-30 VITALS — DIASTOLIC BLOOD PRESSURE: 85 MMHG | WEIGHT: 251.4 LBS | BODY MASS INDEX: 36.59 KG/M2 | SYSTOLIC BLOOD PRESSURE: 123 MMHG

## 2017-10-30 DIAGNOSIS — G89.29 CHRONIC BILATERAL LOW BACK PAIN WITHOUT SCIATICA: Primary | ICD-10-CM

## 2017-10-30 DIAGNOSIS — M54.50 CHRONIC BILATERAL LOW BACK PAIN WITHOUT SCIATICA: Primary | ICD-10-CM

## 2017-10-30 RX ORDER — METHOCARBAMOL 750 MG/1
750 TABLET, FILM COATED ORAL 3 TIMES DAILY PRN
Qty: 90 TABLET | Refills: 0 | Status: SHIPPED | OUTPATIENT
Start: 2017-10-30 | End: 2018-02-08

## 2017-10-30 ASSESSMENT — PAIN SCALES - GENERAL: PAINLEVEL: SEVERE PAIN (7)

## 2017-10-30 NOTE — MR AVS SNAPSHOT
After Visit Summary   10/30/2017    Darlene Hudson    MRN: [id number]           Patient Information     Date Of Birth          1975        Visit Information        Provider Department      10/30/2017 8:20 AM Liberty Cyr APRN [facility]        Today's Diagnoses     Chronic bilateral low back pain without sciatica    -  1      Care Instructions    1. Make an appointment for a lumbar Xray    IMAGING SERVICES HOURS:    Please call [phone] to schedule imaging exams.    All imaging modalities are available from 6:30 a.m. - 8:30 p.m. Monday through Friday  X-ray, CT, and General Ultrasound appointments are available from 7 a.m. - 3:30 p.m. on Saturdays    X-ray and CT appointments are available from 8 a.m. - 4:30 p.m. on Sundays  MRI is available 7 - a.m. - 6 p.m. on Saturdays and Sundays      2. Take muscle relaxer, Robaxin as needed      Follow up:    Follow up after your lumbar xray       To speak with a nurse, schedule/reschedule/cancel a clinic appointment, or request a medication refill call: [phone]     You can also reach us by [employer]: [email or web address]    For refills, please call on Monday, 1 week before your medication runs out. The doctors are not always in clinic, so this gives us time to get your prescriptions ready.  Please let us know the name of the medication you are requesting a refill of.                                     Follow-ups after your visit        Your next 10 appointments already scheduled     Oct 31, 2017 11:40 AM CDT   Return Visit with Anaid Vogel MD   [facility] ([facility])    [address] [place] [address]   [phone]              Who to contact     Please call your clinic at [phone] to:    Ask questions about your health    Make or cancel appointments    Discuss your medicines    Learn about your test results    Speak to your doctor   If  you have compliments or concerns about an experience at your clinic, or if you wish to file a complaint, please contact [facility] Patient Relations at [phone] or email us at [email or web address]         Additional Information About Your Visit        Care EveryWhere ID     This is your Care EveryWhere ID. This could be used by other organizations to access your [facility] medical records  [id number]        Your Vitals Were     BMI (Body Mass Index)                   36.59 kg/m2            Blood Pressure from Last 3 Encounters:   10/30/17 123/85   10/05/17 118/77   07/24/17 137/88    Weight from Last 3 Encounters:   10/30/17 114 kg (251 lb 6.4 oz)   07/24/17 108.1 kg (238 lb 6.4 oz)   07/24/17 109.6 kg (241 lb 9.6 oz)              Today, you had the following     No orders found for display       Primary Care Provider Office Phone # Fax #    Anaid Ann Vogel [phone]       [facility] [address] [place] [address]        Equal Access to Services     [facility] Center[facility] : Hadii aad ku hadasho Soomaali, waaxda luqadaha, qaybta kaalmada adeegyada, delores macdonald . So [facility] [phone].    ATENCIÓN: Si habla español, tiene a boswell disposición servicios gratuitos de asistencia lingüística. Llame al [phone].    We comply with applicable federal civil rights laws and [place] laws. We do not discriminate on the basis of race, color, national origin, age, disability, sex, sexual orientation, or gender identity.            Thank you!     Thank you for choosing [facility]  for your care. Our goal is always to provide you with excellent care. Hearing back from our patients is one way we can continue to improve our services. Please take a few minutes to complete the written survey that you may receive in the mail after your visit with us. Thank you!             Your Updated Medication  List - Protect others around you: Learn how to safely use, store and throw away your medicines at www.disposemymeds.org.          This list is accurate as of: 10/30/17  9:23 AM.  Always use your most recent med list.                   Brand Name Dispense Instructions for use Diagnosis    ADVAIR DISKUS 500-50 MCG/DOSE diskus inhaler   Generic drug:  fluticasone-salmeterol      Inhale 1 puff into the lungs        albuterol 108 (90 BASE) MCG/ACT Inhaler    PROAIR HFA/PROVENTIL HFA/VENTOLIN HFA     Inhale 2 puffs into the lungs        fluticasone 50 MCG/ACT spray    FLONASE    1 Bottle    Spray 1-2 sprays into both nostrils daily    Congestion of paranasal sinus       gabapentin 300 MG capsule    NEURONTIN    150 capsule    Take 1 capsule as needed for pain 3 times daily. Jese increase to 2 tablets 3 times daily PRN if no side effects after 1 week.    Low back pain without sciatica, unspecified back pain laterality, unspecified chronicity, Encounter for medication refill       ibuprofen 600 MG tablet    ADVIL/MOTRIN    100 tablet    Take 1 tablet (600 mg) by mouth every 6 hours as needed for moderate pain    Sprain of neck, initial encounter       montelukast 10 MG tablet    SINGULAIR    90 tablet    Take 1 tablet (10 mg) by mouth At Bedtime    Chronic allergic rhinitis due to pollen, unspecified seasonality       zolpidem 5 MG tablet    AMBIEN    30 tablet    Take 2 tablets (10 mg) by mouth nightly as needed for sleep    Persistent disorder of initiating or maintaining sleep, Encounter for medication refill

## 2017-10-30 NOTE — PATIENT INSTRUCTIONS
1. Make an appointment for a lumbar Xray    IMAGING SERVICES HOURS:    Please call [phone] to schedule imaging exams.    All imaging modalities are available from 6:30 a.m.   8:30 p.m. Monday through Friday  X-ray, CT, and General Ultrasound appointments are available from 7 a.m.   3:30 p.m. on Saturdays    X-ray and CT appointments are available from 8 a.m. - 4:30 p.m. on Sundays  MRI is available 7   a.m.   6 p.m. on Saturdays and Sundays      2. Take muscle relaxer, Robaxin as needed      Follow up:    Follow up after your lumbar xray       To speak with a nurse, schedule/reschedule/cancel a clinic appointment, or request a medication refill call: [phone]     You can also reach us by [employer]: [email or web address]    For refills, please call on Monday, 1 week before your medication runs out. The doctors are not always in clinic, so this gives us time to get your prescriptions ready.  Please let us know the name of the medication you are requesting a refill of.
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
## 2017-10-31 ENCOUNTER — OFFICE VISIT (OUTPATIENT)
Dept: FAMILY MEDICINE | Facility: CLINIC | Age: 42
End: 2017-10-31

## 2017-10-31 VITALS — OXYGEN SATURATION: 99 % | DIASTOLIC BLOOD PRESSURE: 87 MMHG | HEART RATE: 104 BPM | SYSTOLIC BLOOD PRESSURE: 112 MMHG

## 2017-10-31 DIAGNOSIS — M54.50 CHRONIC LOW BACK PAIN WITHOUT SCIATICA, UNSPECIFIED BACK PAIN LATERALITY: Primary | ICD-10-CM

## 2017-10-31 DIAGNOSIS — G89.29 CHRONIC LOW BACK PAIN WITHOUT SCIATICA, UNSPECIFIED BACK PAIN LATERALITY: Primary | ICD-10-CM

## 2017-10-31 DIAGNOSIS — R11.0 NAUSEA: ICD-10-CM

## 2017-10-31 DIAGNOSIS — F51.01 PRIMARY INSOMNIA: ICD-10-CM

## 2017-10-31 DIAGNOSIS — R10.9 FLANK PAIN: ICD-10-CM

## 2017-10-31 DIAGNOSIS — R30.0 DYSURIA: ICD-10-CM

## 2017-10-31 LAB
BILIRUBIN UR: ABNORMAL
BLOOD UR: ABNORMAL
GLUCOSE URINE: NEGATIVE
KETONES UR QL: NEGATIVE
LEUKOCYTE ESTERASE UR: NEGATIVE
NITRITE UR QL STRIP: NEGATIVE
PH UR STRIP: 5.5 [PH] (ref 5–7)
PROTEIN UR: NEGATIVE
SP GR UR STRIP: >=1.03
UROBILINOGEN UR STRIP-ACNC: ABNORMAL

## 2017-10-31 RX ORDER — TRAZODONE HYDROCHLORIDE 100 MG/1
100 TABLET ORAL AT BEDTIME
Qty: 90 TABLET | Refills: 1 | Status: SHIPPED | OUTPATIENT
Start: 2017-10-31 | End: 2018-02-21

## 2017-10-31 NOTE — MR AVS SNAPSHOT
After Visit Summary   10/31/2017    Darlene Hudson    MRN: [id number]           Patient Information     Date Of Birth          1975        Visit Information        Provider Department      10/31/2017 11:40 AM Anaid Vogel MD [facility]        Today's Diagnoses     Chronic low back pain without sciatica, unspecified back pain laterality    -  1    Primary insomnia        Dysuria          Care Instructions    Take one tablet of Trazadone every night an hour before bed.  Trazadone will help with both your anxiety symptoms and insomnia and is safer than ambien  You can increase one tablet a week up to 3 tablets a night if needed to help with sleep and anxiety symptoms.    We are ordering a Urine screen and xray of your back today in clinic              Follow-ups after your visit        Future tests that were ordered for you today     Open Future Orders        Priority Expected Expires Ordered    XR Lumbar Spine 2/3 Views Routine 10/30/2017 10/30/2018 10/30/2017            Who to contact     Please call your clinic at [phone] to:    Ask questions about your health    Make or cancel appointments    Discuss your medicines    Learn about your test results    Speak to your doctor   If you have compliments or concerns about an experience at your clinic, or if you wish to file a complaint, please contact [facility] Patient Relations at [phone] or email us at [email or web address]         Additional Information About Your Visit        Care EveryWhere ID     This is your Care EveryWhere ID. This could be used by other organizations to access your [place] medical records  [id number]        Your Vitals Were     Pulse Pulse Oximetry                104 99%           Blood Pressure from Last 3 Encounters:   10/31/17 112/87   10/30/17 123/85   10/05/17 118/77    Weight from Last 3 Encounters:   10/30/17 251 lb 6.4 oz (114 kg)   07/24/17 238  lb 6.4 oz (108.1 kg)   07/24/17 241 lb 9.6 oz (109.6 kg)              We Performed the Following     Urinalysis([facility])     XR Lumbar Spine 2-3 Views*          Today's Medication Changes          These changes are accurate as of: 10/31/17 12:26 PM.  If you have any questions, ask your nurse or doctor.               Start taking these medicines.        Dose/Directions    traZODone 100 MG tablet   Commonly known as:  DESYREL   Used for:  Primary insomnia   Started by:  Anaid Vogel MD        Dose:  100 mg   Take 1 tablet (100 mg) by mouth At Bedtime Take 100mg nightly for sleep.   Quantity:  90 tablet   Refills:  1         Stop taking these medicines if you haven't already. Please contact your care team if you have questions.     gabapentin 300 MG capsule   Commonly known as:  NEURONTIN   Stopped by:  Anaid Vogel MD           zolpidem 5 MG tablet   Commonly known as:  AMBIEN   Stopped by:  Anaid Vogel MD                Where to get your medicines      These medications were sent to [id number] - [place], [place] - [address] [place] [address] AT [address]  [address], [place] [address]     Phone:  [phone]     traZODone 100 MG tablet                Primary Care Provider Office Phone # Fax #    Anaid Vogel [phone]       [facility] [address] [place] [address]        Equal Access to Services     [facility] Hospital[facility]AG[facility] AH: Hadii aad ku hadasho Soomaali, waaxda luqadaha, qaybta kaalmada adeegyada, waxay candy hayrose marien adeloco duboisaratereso la'lucho . So [facility] [phone].    ATENCIÓN: Si habla español, tiene a boswell disposición servicios gratuitos de asistencia lingüística. Darby al [phone].    We comply with applicable federal civil rights laws and [place] laws. We do not discriminate on the basis of race, color, national origin, age, disability, sex, sexual orientation, or gender identity.            Thank you!      Thank you for choosing [facility]  for your care. Our goal is always to provide you with excellent care. Hearing back from our patients is one way we can continue to improve our services. Please take a few minutes to complete the written survey that you may receive in the mail after your visit with us. Thank you!             Your Updated Medication List - Protect others around you: Learn how to safely use, store and throw away your medicines at www.disposemymeds.org.          This list is accurate as of: 10/31/17 12:26 PM.  Always use your most recent med list.                   Brand Name Dispense Instructions for use Diagnosis    ADVAIR DISKUS 500-50 MCG/DOSE diskus inhaler   Generic drug:  fluticasone-salmeterol      Inhale 1 puff into the lungs        albuterol 108 (90 BASE) MCG/ACT Inhaler    PROAIR HFA/PROVENTIL HFA/VENTOLIN HFA     Inhale 2 puffs into the lungs        fluticasone 50 MCG/ACT spray    FLONASE    1 Bottle    Spray 1-2 sprays into both nostrils daily    Congestion of paranasal sinus       ibuprofen 600 MG tablet    ADVIL/MOTRIN    100 tablet    Take 1 tablet (600 mg) by mouth every 6 hours as needed for moderate pain    Sprain of neck, initial encounter       methocarbamol 750 MG tablet    ROBAXIN    90 tablet    Take 1 tablet (750 mg) by mouth 3 times daily as needed for muscle spasms    Chronic bilateral low back pain without sciatica       montelukast 10 MG tablet    SINGULAIR    90 tablet    Take 1 tablet (10 mg) by mouth At Bedtime    Chronic allergic rhinitis due to pollen, unspecified seasonality       traZODone 100 MG tablet    DESYREL    90 tablet    Take 1 tablet (100 mg) by mouth At Bedtime Take 100mg nightly for sleep.    Primary insomnia

## 2017-10-31 NOTE — PATIENT INSTRUCTIONS
Take one tablet of Trazadone every night an hour before bed.  Trazadone will help with both your anxiety symptoms and insomnia and is safer than ambien  You can increase one tablet a week up to 3 tablets a night if needed to help with sleep and anxiety symptoms.    We are ordering a Urine screen and xray of your back today in clinic

## 2017-11-01 ENCOUNTER — RECORDS - HEALTHEAST (OUTPATIENT)
Dept: ADMINISTRATIVE | Facility: OTHER | Age: 42
End: 2017-11-01

## 2017-11-01 ENCOUNTER — HOSPITAL ENCOUNTER (OUTPATIENT)
Dept: CT IMAGING | Facility: HOSPITAL | Age: 42
Discharge: HOME | End: 2017-11-01
Attending: STUDENT IN AN ORGANIZED HEALTH CARE EDUCATION/TRAINING PROGRAM

## 2017-11-01 ENCOUNTER — TRANSFERRED RECORDS (OUTPATIENT)
Dept: HEALTH INFORMATION MANAGEMENT | Facility: CLINIC | Age: 42
End: 2017-11-01

## 2017-11-01 ENCOUNTER — TELEPHONE (OUTPATIENT)
Dept: FAMILY MEDICINE | Facility: CLINIC | Age: 42
End: 2017-11-01

## 2017-11-01 DIAGNOSIS — R10.9 FLANK PAIN: ICD-10-CM

## 2017-11-01 RX ORDER — ONDANSETRON 4 MG/1
4-8 TABLET, ORALLY DISINTEGRATING ORAL EVERY 8 HOURS PRN
Qty: 10 TABLET | Refills: 0 | Status: SHIPPED | OUTPATIENT
Start: 2017-11-01 | End: 2018-02-08

## 2017-11-01 NOTE — TELEPHONE ENCOUNTER
CT order faxed to [facility]. Scheduled CT Abdomen pelvis w/o Contrast for today at [facility] with arrival time of 325pm and 340pm for procedure. Scheduled as hold and call order.  Instruction of no eating and drinking at least 2 hours prior to procedure explained to patient.

## 2017-11-01 NOTE — TELEPHONE ENCOUNTER
Paged and spoke to Dr. Vogel. Stated she spoke to radiologist at [facility]. Dr. Vogel would like for me to assist with f/u appointment tomorrow.

## 2017-11-01 NOTE — TELEPHONE ENCOUNTER
[facility] phone call message- general phone call:    Reason for call: Please call Dr. Cheek from [facility] regarding this patient.     Return call needed: Yes    OK to leave a message on voice mail? Yes    Primary language: English      needed? No    Call taken on November 1, 2017 at 4:07 PM by Natalie Chavez

## 2017-11-01 NOTE — TELEPHONE ENCOUNTER
Called and informed Dr. Vogel sent her Zofran to [facility] on [address]. Coordinated scheduling appointment for tomorrow to be seen by Dr. Aparicio.

## 2017-11-02 ENCOUNTER — OFFICE VISIT (OUTPATIENT)
Dept: FAMILY MEDICINE | Facility: CLINIC | Age: 42
End: 2017-11-02

## 2017-11-02 VITALS
HEART RATE: 96 BPM | DIASTOLIC BLOOD PRESSURE: 82 MMHG | OXYGEN SATURATION: 99 % | SYSTOLIC BLOOD PRESSURE: 120 MMHG | TEMPERATURE: 97.7 F

## 2017-11-02 DIAGNOSIS — R30.0 DYSURIA: Primary | ICD-10-CM

## 2017-11-02 DIAGNOSIS — R10.84 ABDOMINAL PAIN, GENERALIZED: ICD-10-CM

## 2017-11-02 DIAGNOSIS — R19.7 DIARRHEA OF PRESUMED INFECTIOUS ORIGIN: ICD-10-CM

## 2017-11-02 DIAGNOSIS — G47.09 OTHER INSOMNIA: ICD-10-CM

## 2017-11-02 LAB
ALBUMIN SERPL-MCNC: 3.3 G/DL (ref 3.3–4.6)
ALP SERPL-CCNC: 69 U/L (ref 40–150)
ALT SERPL-CCNC: 20 U/L (ref 0–45)
AMYLASE SERPL-CCNC: 41 U/L (ref 5–120)
AST SERPL-CCNC: 16 U/L (ref 0–45)
BACTERIA: NORMAL
BASOPHILS # BLD AUTO: 0.1 THOU/UL (ref 0–0.2)
BASOPHILS NFR BLD AUTO: 1 % (ref 0–2)
BILIRUB SERPL-MCNC: 0.4 MG/DL (ref 0.2–1.3)
BILIRUBIN UR: NEGATIVE
BLOOD UR: ABNORMAL
BUN SERPL-MCNC: 13 MG/DL (ref 5–24)
CALCIUM SERPL-MCNC: 8.6 MG/DL (ref 8.5–10.4)
CASTS: NORMAL /LPF
CHLORIDE SERPLBLD-SCNC: 110 MMOL/L (ref 94–109)
CLARITY, URINE: CLEAR
CO2 SERPL-SCNC: 23 MMOL/L (ref 20–32)
COLOR UR: YELLOW
CREAT SERPL-MCNC: 0.9 MG/DL (ref 0.6–1.3)
CRYSTAL URINE: NORMAL /LPF
EGFR CALCULATED (BLACK REFERENCE): 87.5 ML/MIN
EGFR CALCULATED (NON BLACK REFERENCE): 72.3 ML/MIN
EOSINOPHIL # BLD AUTO: 0.4 THOU/UL (ref 0–0.4)
EOSINOPHIL NFR BLD AUTO: 5 % (ref 0–6)
EPITHELIAL CELLS UR: NORMAL /LPF (ref 0–2)
ERYTHROCYTE [DISTWIDTH] IN BLOOD BY AUTOMATED COUNT: 12.8 % (ref 11–14.5)
GLUCOSE SERPL-MCNC: 90 MG/DL (ref 60–109)
GLUCOSE URINE: NEGATIVE
HCT VFR BLD AUTO: 38.7 % (ref 35–47)
HGB BLD-MCNC: 13 G/DL (ref 12–16)
KETONES UR QL: NEGATIVE
LEUKOCYTE ESTERASE UR: ABNORMAL
LIPASE SERPL-CCNC: 27 U/L (ref 0–52)
LYMPHOCYTES # BLD AUTO: 2 THOU/UL (ref 0.8–4.4)
LYMPHOCYTES NFR BLD AUTO: 29 % (ref 20–40)
MCH RBC QN AUTO: 31.9 PG (ref 27–34)
MCHC RBC AUTO-ENTMCNC: 33.6 G/DL (ref 32–36)
MCV RBC AUTO: 95 FL (ref 80–100)
MONOCYTES # BLD AUTO: 0.5 THOU/UL (ref 0–0.9)
MONOCYTES NFR BLD AUTO: 7 % (ref 2–10)
MUCOUS URINE: NORMAL LPF
NEUTROPHILS # BLD AUTO: 4 THOU/UL (ref 2–7.7)
NEUTROPHILS NFR BLD AUTO: 58 % (ref 50–70)
NITRITE UR QL STRIP: NEGATIVE
PH UR STRIP: 5.5 [PH] (ref 5–7)
PLATELET # BLD AUTO: 311 THOU/UL (ref 140–440)
PMV BLD AUTO: 10.2 FL (ref 8.5–12.5)
POTASSIUM SERPL-SCNC: 4.5 MMOL/L (ref 3.4–5.3)
PROT SERPL-MCNC: 6.7 G/DL (ref 6.6–8.8)
PROTEIN UR: NEGATIVE
RBC # BLD AUTO: 4.07 MILL/UL (ref 3.8–5.4)
RBC URINE: NORMAL /HPF
SODIUM SERPL-SCNC: 137 MMOL/L (ref 133–144)
SP GR UR STRIP: >=1.03
UROBILINOGEN UR STRIP-ACNC: ABNORMAL
WBC # BLD AUTO: 7 THOU/UL (ref 4–11)
WBC URINE: NORMAL /HPF

## 2017-11-02 RX ORDER — QUETIAPINE FUMARATE 100 MG/1
100 TABLET, FILM COATED ORAL AT BEDTIME
Qty: 30 TABLET | Refills: 1 | Status: SHIPPED | OUTPATIENT
Start: 2017-11-02 | End: 2018-02-21

## 2017-11-02 RX ORDER — SULFAMETHOXAZOLE/TRIMETHOPRIM 800-160 MG
1 TABLET ORAL 2 TIMES DAILY
Qty: 14 TABLET | Refills: 0 | Status: SHIPPED | OUTPATIENT
Start: 2017-11-02 | End: 2018-02-08

## 2017-11-02 NOTE — PROGRESS NOTES
Please call patient and send results letter  The UA suggests a urinary tract infection. Make sure you take the Bactrim antibiotics.

## 2017-11-02 NOTE — NURSING NOTE
Patient did not bring in med bottles with today.     PAP due and patient informed. Will make appt with PCP.

## 2017-11-02 NOTE — PROGRESS NOTES
HPI:       Darlene Hudson is a 42 year old  female who presents for ollow up of back pain, dysuria.      Patient with multiple problems and an unusual history of pericardial effusion and pancreatitis that was not fully explained. Now presents with subacute onset of back pain that she is worried is similar to her previous epsode of pancreatitis. No shortness of breath. Pain is vague, seeming to localize mostly in the lower back, but this is different than her chronic low back pain apparently.        Over the last several days has had increase frequency of urine, now getting up at night several time. No pain on urination. Also onset of sweats over the last couple of days. Diarrhea last night. Mild nausea. No one else in family with similar problem.  Has a seven year old son.       No change in meds. Denies any alcohol. Denies any drugs. Has a history of anxiety. Given Zofran, but with little benefit.   Patient finding the trazadone has not helped sleep, and requests seroquel, which she has found in the past to provide relief.               PMHX:   Current Medications:   Current Outpatient Prescriptions   Medication Sig Dispense Refill     ondansetron (ZOFRAN ODT) 4 MG ODT tab Take 1-2 tablets (4-8 mg) by mouth every 8 hours as needed for nausea 10 tablet 0     traZODone (DESYREL) 100 MG tablet Take 1 tablet (100 mg) by mouth At Bedtime Take 100mg nightly for sleep. 90 tablet 1     fluticasone-salmeterol (ADVAIR DISKUS) 500-50 MCG/DOSE diskus inhaler Inhale 1 puff into the lungs       albuterol (PROAIR HFA/PROVENTIL HFA/VENTOLIN HFA) 108 (90 BASE) MCG/ACT Inhaler Inhale 2 puffs into the lungs       fluticasone (FLONASE) 50 MCG/ACT spray Spray 1-2 sprays into both nostrils daily 1 Bottle 11     ibuprofen (ADVIL/MOTRIN) 600 MG tablet Take 1 tablet (600 mg) by mouth every 6 hours as needed for moderate pain 100 tablet 0     methocarbamol (ROBAXIN) 750 MG tablet Take 1 tablet (750 mg) by mouth 3 times daily as  "needed for muscle spasms (Patient not taking: Reported on 11/2/2017) 90 tablet 0     montelukast (SINGULAIR) 10 MG tablet Take 1 tablet (10 mg) by mouth At Bedtime (Patient not taking: Reported on 11/2/2017) 90 tablet 1       Existing Problems  Patient Active Problem List   Diagnosis     Abnormal cytology finding     Nondependent alcohol abuse, episodic drinking behavior     Anxiety state     Controlled substance agreement signed     Low back pain     Chronic sinusitis     Cocaine abuse     Major depressive disorder, recurrent episode, moderate (H)     Asthma     Tobacco use disorder     Noninflammatory disorder of vagina     Pap smear for cervical cancer screening     Abdominal pain     Abnormal cervical Papanicolaou smear     Panic disorder with agoraphobia     Atopic rhinitis     Chronic low back pain     Depression     Moderate persistent asthma     Other long term (current) drug therapy     Tobacco use     Acute pericardial effusion     Anxiety       Allergies:  Allergies   Allergen Reactions     Lidocaine Other (See Comments)     \"my jaw stopped moving\"     Penicillins Hives, Rash and Shortness Of Breath     Lidocaine-Epinephrine [Epinephrine-Lidocaine-Na Metabisulfite] Other (See Comments)     Severe jaw cramping, double vision       Previous labs:  Lab Results   Component Value Date    HGB 11.9 03/16/2017    HCT 38.2 03/16/2017    .0 03/16/2017    BUN 15.0 03/16/2017    CO2 24.0 03/16/2017               Review of Systems:    CONSTITUTIONAL:  no unexpected change in weight  SKIN: no worrisome rashes, no worrisome moles, no worrisome lesions  EYES: no acute vision problems or changes  ENT: no ear problems, no mouth problems, no throat problems  RESP: no significant cough, no shortness of breath  CV: no chest pain, no palpitations, no new or worsening peripheral edema  GI: as above          Physical Exam:     Vitals:    11/02/17 0854   BP: 120/82   Pulse: 96   Temp: 97.7  F (36.5  C)   TempSrc: Oral "   SpO2: 99%     There is no height or weight on file to calculate BMI.    GENERAL:alert, well hydrated, no distress  EYES: Eyes grossly normal to inspection, extraocular movements - intact, and PERRL  HENT: ear canals- normal; Nose- normal; Mouth- no ulcers, no lesions  NECK: no tenderness, no adenopathy, no asymmetry, no masses, no stiffness  RESP: lungs clear to auscultation - no rales, no rhonchi, no wheezes  CV: regular rates and rhythm, normal S1 S2, no S3 or S4 and no murmur, no click or rub -  Back: Mild pain localizing to SI joints bilaterally. Other than that, has some upper back pain, but this is normal for her. No particular tenderness of CVA, but this is normal for her.              Labs and Procedures     Office Visit on 10/31/2017   Component Date Value Ref Range Status     Specific Gravity Urine 10/31/2017 >=1.030  1.005 - 1.030 Final     pH Urine 10/31/2017 5.5  4.5 - 8.0 Final     Leukocyte Esterase UR 10/31/2017 Negative  NEGATIVE Final     Nitrite Urine 10/31/2017 Negative  NEGATIVE Final     Protein UR 10/31/2017 Negative  NEGATIVE Final     Glucose Urine 10/31/2017 Negative  NEGATIVE Final     Ketones Urine 10/31/2017 Negative  NEGATIVE Final     Urobilinogen mg/dL 10/31/2017 0.2 E.U./dL  0.2 E.U./dL Final     Bilirubin UR 10/31/2017 1+* NEGATIVE Final     Blood UR 10/31/2017 2+* NEGATIVE Final              Assessment and Plan     1.Recent Abdominal CT scan shows no evidence for pancreatitis, although some inflammation of the large intestines. No evidence for stones. Consistent with gastroenteritis. Had one episode of diarrhea last night. No blood.   2. Recent UA demonstrates blood. No evidence for renal stone, and symptoms not compatible with stone. Suspect probable UTI with recent onset of dysuria and urgency. Will repeat UA/culture if abnormal. Will empirically treat with Bactrim at this time.   3. History of pancreatitis and pericarditis of uncertain etiology, but probably atypical  infection. No evidence of pancreatitis on CT, but will check amylase/lipase and CMP to check for possible recurrence. Have not visualized heart to rule out recurrent pericarditis, but would consider re-imaging in view of atypical history, or if symptoms persist or any shortness of breath develops.    4. Suspect mild gastroenteritis causing the mild diarrhea at this time, and would recheck this in a few days to see if it has improved.     5. Recent sprained ankel slowly improving following fall during basketball.        Options for treatment and follow-up care were reviewed with the patient and/or guardian. Darlene Hudson and/or guardian engaged in the decision making process and verbalized understanding of the options discussed and agreed with the final plan.    Oscar Aparicio MD

## 2017-11-02 NOTE — PATIENT INSTRUCTIONS
~ We will treat you with an antibiotic for a UTI.     ~ We will check some blood tests to see what's causing your symptoms as well.     ~ Discontinue Trazodone.    ~ Start Seroquel 100 mg. 1/2 tablet first night then 1 tablet at bedtime.     +++++++++++++++++++++++++++++++++++++++++++++++++++++++++++++++++++++  Your medication list is printed, please keep this with you, it is helpful to bring this current list to any other medical appointments, the emergency room or hospital.    If you had lab testing today and your results are reassuring or normal they will be be mailed to you within 7 days.     If the lab tests need quick action we will call you with the results.   The phone number we will call with results is # [phone] (home) . If this is not the best number please call our clinic and change the number.    If you need any refills please call your pharmacy and they will contact us.    If you have any further concerns or wish to schedule another appointment you must call our office during normal business hours  [phone] (8-5:00 M-F)  If you have urgent medical questions that cannot wait  you may also call [phone] at any time of day.  If you have a medical emergency please call 121.    Thank you for coming to [facility].

## 2017-11-02 NOTE — LETTER
November 2, 2017      Darlene Hudson  [address] [place] [address] [place] [id number]        Dear Darlene,    The UA suggests a urinary tract infection. Make sure you take the Bactrim antibiotics.     Please see below for your test results.    Resulted Orders   Urinalysis, Micro If ([facility])   Result Value Ref Range    Specific Gravity Urine >=1.030 1.005 - 1.030    pH Urine 5.5 4.5 - 8.0    Leukocyte Esterase UR Trace (A) NEGATIVE    Nitrite Urine Negative NEGATIVE    Protein UR Negative NEGATIVE    Glucose Urine Negative NEGATIVE    Ketones Urine Negative NEGATIVE    Urobilinogen mg/dL 0.2 E.U./dL 0.2 E.U./dL    Bilirubin UR Negative NEGATIVE    Blood UR 2+ (A) NEGATIVE    Color Urine Yellow     Clarity, urine Clear    Comprehensive Metabolic Panel ([facility]) - results <1hr Protocol   Result Value Ref Range    Glucose 90.0 60.0 - 109.0 mg/dL    Urea Nitrogen 13.0 5.0 - 24.0 mg/dL    Creatinine 0.9 0.6 - 1.3 mg/dL    Sodium 137.0 133.0 - 144.0 mmol/L    Potassium 4.5 3.4 - 5.3 mmol/L    Chloride 110.0 (H) 94.0 - 109.0 mmol/L    Carbon Dioxide 23.0 20.0 - 32.0 mmol/L    Calcium 8.6 8.5 - 10.4 mg/dL    Protein Total 6.7 6.6 - 8.8 g/dL    Albumin 3.3 3.3 - 4.6 g/dL    Alkaline Phosphatase 69.0 40.0 - 150.0 U/L    ALT 20.0 0.0 - 45.0 U/L    AST 16.0 0.0 - 45.0 U/L    Bilirubin Total 0.4 0.2 - 1.3 mg/dL    eGFR Calculated (Non Black Reference) 72.3 >60.0 mL/min    eGFR Calculated (Black Reference) 87.5 >60.0 mL/min   Urine Microscopic ([facility])   Result Value Ref Range    WBC Urine 10-25 <5 /hpf    RBC Urine 2-5 <5 /hpf    Epithelial Cells UR  0 - 2 /lpf    Mucous Urine None NONE lpf    Casts Urine None NONE /lpf    Crystal Urine None NONE /lpf    Bacteria Wet Prep Many None       If you have any questions, please call the clinic to make an appointment.    Sincerely,    Oscar Aparicio MD

## 2017-11-02 NOTE — MR AVS SNAPSHOT
After Visit Summary   11/2/2017    Darlene Hudson    MRN: [id number]           Patient Information     Date Of Birth          1975        Visit Information        Provider Department      11/2/2017 8:40 AM Oscar Aparicio MD [facility]        Today's Diagnoses     Dysuria    -  1    Abdominal pain, generalized        Other insomnia          Care Instructions    ~ We will treat you with an antibiotic for a UTI.     ~ We will check some blood tests to see what's causing your symptoms as well.     ~ Discontinue Trazodone.    ~ Start Seroquel 100 mg. 1/2 tablet first night then 1 tablet at bedtime.     +++++++++++++++++++++++++++++++++++++++++++++++++++++++++++++++++++++  Your medication list is printed, please keep this with you, it is helpful to bring this current list to any other medical appointments, the emergency room or hospital.    If you had lab testing today and your results are reassuring or normal they will be be mailed to you within 7 days.     If the lab tests need quick action we will call you with the results.   The phone number we will call with results is # [phone] (home) . If this is not the best number please call our clinic and change the number.    If you need any refills please call your pharmacy and they will contact us.    If you have any further concerns or wish to schedule another appointment you must call our office during normal business hours  [phone] (8-5:00 M-F)  If you have urgent medical questions that cannot wait  you may also call [phone] at any time of day.  If you have a medical emergency please call [phone].    Thank you for coming to [facility].            Follow-ups after your visit        Who to contact     Please call your clinic at [phone] to:    Ask questions about your health    Make or cancel appointments    Discuss your medicines    Learn about your test results    Speak to your doctor   If you have compliments  or concerns about an experience at your clinic, or if you wish to file a complaint, please contact [facility] Patient Relations at [phone] or email us at [email or web address]         Additional Information About Your Visit        Care EveryWhere ID     This is your Care EveryWhere ID. This could be used by other organizations to access your [facility] medical records  [id number]        Your Vitals Were     Pulse Temperature Last Period Pulse Oximetry          96 97.7  F (36.5  C) (Oral) 10/08/2017 99%         Blood Pressure from Last 3 Encounters:   11/02/17 120/82   10/31/17 112/87   10/30/17 123/85    Weight from Last 3 Encounters:   10/30/17 251 lb 6.4 oz (114 kg)   07/24/17 238 lb 6.4 oz (108.1 kg)   07/24/17 241 lb 9.6 oz (109.6 kg)              We Performed the Following     Amylase ([facility])     CBC w/ Diff and Plt ([facility])     Comprehensive Metabolic Panel ([facility]) - results <1hr Protocol     Lipase ([facility])     Urinalysis, Micro If ([facility])          Today's Medication Changes          These changes are accurate as of: 11/2/17  9:34 AM.  If you have any questions, ask your nurse or doctor.               Start taking these medicines.        Dose/Directions    QUEtiapine 100 MG tablet   Commonly known as:  SEROquel   Used for:  Other insomnia   Started by:  Oscar Aparicio MD        Dose:  100 mg   Take 1 tablet (100 mg) by mouth At Bedtime   Quantity:  30 tablet   Refills:  1       sulfamethoxazole-trimethoprim 800-160 MG per tablet   Commonly known as:  BACTRIM DS/SEPTRA DS   Used for:  Dysuria, Abdominal pain, generalized   Started by:  Oscar Aparicio MD        Dose:  1 tablet   Take 1 tablet by mouth 2 times daily   Quantity:  14 tablet   Refills:  0            Where to get your medicines      These medications were sent to [id number] - [place], [place] - [address] AT [facility] [address]  [address], [place] [address]     Phone:  [phone]     QUEtiapine 100 MG tablet    sulfamethoxazole-trimethoprim 800-160 MG per tablet                Primary Care Provider Office Phone # Fax #    Anaid Vogel [phone]       [facility] [address] [place] [phone]        Equal Access to Services     [facility]: Hadii aad ku hadasho Soomaali, waaxda luqadaha, qaybta kaalmada adeegyada, waxay idiin hayaan adeeg kharash la'aan . So [facility] [phone].    ATENCIÓN: Si habla español, tiene a boswell disposición servicios gratuitos de asistencia lingüística. [facility] [phone].    We comply with applicable federal civil rights laws and [place] laws. We do not discriminate on the basis of race, color, national origin, age, disability, sex, sexual orientation, or gender identity.            Thank you!     Thank you for choosing [facility]  for your care. Our goal is always to provide you with excellent care. Hearing back from our patients is one way we can continue to improve our services. Please take a few minutes to complete the written survey that you may receive in the mail after your visit with us. Thank you!             Your Updated Medication List - Protect others around you: Learn how to safely use, store and throw away your medicines at www.disposemymeds.org.          This list is accurate as of: 11/2/17  9:34 AM.  Always use your most recent med list.                   Brand Name Dispense Instructions for use Diagnosis    ADVAIR DISKUS 500-50 MCG/DOSE diskus inhaler   Generic drug:  fluticasone-salmeterol      Inhale 1 puff into the lungs        albuterol 108 (90 BASE) MCG/ACT Inhaler    PROAIR HFA/PROVENTIL HFA/VENTOLIN HFA     Inhale 2 puffs into the lungs        fluticasone 50 MCG/ACT spray    FLONASE    1 Bottle    Spray 1-2 sprays into both nostrils daily    Congestion of paranasal sinus       ibuprofen 600 MG tablet    ADVIL/MOTRIN    100 tablet    Take 1  tablet (600 mg) by mouth every 6 hours as needed for moderate pain    Sprain of neck, initial encounter       methocarbamol 750 MG tablet    ROBAXIN    90 tablet    Take 1 tablet (750 mg) by mouth 3 times daily as needed for muscle spasms    Chronic bilateral low back pain without sciatica       montelukast 10 MG tablet    SINGULAIR    90 tablet    Take 1 tablet (10 mg) by mouth At Bedtime    Chronic allergic rhinitis due to pollen, unspecified seasonality       ondansetron 4 MG ODT tab    ZOFRAN ODT    10 tablet    Take 1-2 tablets (4-8 mg) by mouth every 8 hours as needed for nausea    Nausea       QUEtiapine 100 MG tablet    SEROquel    30 tablet    Take 1 tablet (100 mg) by mouth At Bedtime    Other insomnia       sulfamethoxazole-trimethoprim 800-160 MG per tablet    BACTRIM DS/SEPTRA DS    14 tablet    Take 1 tablet by mouth 2 times daily    Dysuria, Abdominal pain, generalized       traZODone 100 MG tablet    DESYREL    90 tablet    Take 1 tablet (100 mg) by mouth At Bedtime Take 100mg nightly for sleep.    Primary insomnia

## 2017-11-02 NOTE — LETTER
November 8, 2017      Darlene Hudson  [address]  [facility] [address]        Dear Darlene,    No evidence of pancreatitis on blood tests. Lab suggests a urinary tract infection.      Please see below for your test results.    Resulted Orders   Urinalysis, Micro If ([facility])   Result Value Ref Range    Specific Gravity Urine >=1.030 1.005 - 1.030    pH Urine 5.5 4.5 - 8.0    Leukocyte Esterase UR Trace (A) NEGATIVE    Nitrite Urine Negative NEGATIVE    Protein UR Negative NEGATIVE    Glucose Urine Negative NEGATIVE    Ketones Urine Negative NEGATIVE    Urobilinogen mg/dL 0.2 E.U./dL 0.2 E.U./dL    Bilirubin UR Negative NEGATIVE    Blood UR 2+ (A) NEGATIVE    Color Urine Yellow     Clarity, urine Clear    Comprehensive Metabolic Panel ([facility]) - results <1hr Protocol   Result Value Ref Range    Glucose 90.0 60.0 - 109.0 mg/dL    Urea Nitrogen 13.0 5.0 - 24.0 mg/dL    Creatinine 0.9 0.6 - 1.3 mg/dL    Sodium 137.0 133.0 - 144.0 mmol/L    Potassium 4.5 3.4 - 5.3 mmol/L    Chloride 110.0 (H) 94.0 - 109.0 mmol/L    Carbon Dioxide 23.0 20.0 - 32.0 mmol/L    Calcium 8.6 8.5 - 10.4 mg/dL    Protein Total 6.7 6.6 - 8.8 g/dL    Albumin 3.3 3.3 - 4.6 g/dL    Alkaline Phosphatase 69.0 40.0 - 150.0 U/L    ALT 20.0 0.0 - 45.0 U/L    AST 16.0 0.0 - 45.0 U/L    Bilirubin Total 0.4 0.2 - 1.3 mg/dL    eGFR Calculated (Non Black Reference) 72.3 >60.0 mL/min    eGFR Calculated (Black Reference) 87.5 >60.0 mL/min   Lipase ([facility])   Result Value Ref Range    Lipase 27 0 - 52 U/L    Narrative    Test performed by:  [facility]  [address] [place] [address]   Amylase ([facility])   Result Value Ref Range    Amylase 41 5 - 120 U/L    Narrative    Test performed by:  [facility]  [address] [place] [address]   CBC w/ Diff and Plt ([facility])   Result Value Ref Range    WBC 7.0 4.0 - 11.0 thou/uL    RBC 4.07 3.80 - 5.40 mill/uL    Hemoglobin 13.0 12.0 - 16.0 g/dL    Hematocrit 38.7 35.0  - 47.0 %    MCV 95 80 - 100 fL    MCH 31.9 27.0 - 34.0 pg    MCHC 33.6 32.0 - 36.0 g/dL    RDW 12.8 11.0 - 14.5 %    Platelets 311 140 - 440 thou/uL    Mean Platelet Volume 10.2 8.5 - 12.5 fL    % Neutrophils 58 50 - 70 %    % Lymphocytes 29 20 - 40 %    % Monocytes 7 2 - 10 %    % Eosinophils 5 0 - 6 %    % Basophils 1 0 - 2 %    Neutrophils (Absolute) 4.0 2.0 - 7.7 thou/uL    Lymphs (Absolute) 2.0 0.8 - 4.4 thou/uL    Monocytes(Absolute) 0.5 0.0 - 0.9 thou/uL    Eos (Absolute) 0.4 0.0 - 0.4 thou/uL    Baso (Absolute) 0.1 0.0 - 0.2 thou/uL    Narrative    Test performed by:  [facility] [address] [place] [address]   Urine Microscopic ([facility])   Result Value Ref Range    WBC Urine 10-25 <5 /hpf    RBC Urine 2-5 <5 /hpf    Epithelial Cells UR  0 - 2 /lpf    Mucous Urine None NONE lpf    Casts Urine None NONE /lpf    Crystal Urine None NONE /lpf    Bacteria Wet Prep Many None       If you have any questions, please call the clinic to make an appointment.    Sincerely,    Oscar Aparicio MD

## 2017-11-07 NOTE — PROGRESS NOTES
Please call patient and send results letter  No evidence of pancreatitis on blood tests. Lab suggests a urinary tract infection.

## 2017-11-09 ENCOUNTER — OFFICE VISIT (OUTPATIENT)
Dept: FAMILY MEDICINE | Facility: CLINIC | Age: 42
End: 2017-11-09

## 2017-11-09 ENCOUNTER — TRANSFERRED RECORDS (OUTPATIENT)
Dept: HEALTH INFORMATION MANAGEMENT | Facility: CLINIC | Age: 42
End: 2017-11-09

## 2017-11-09 VITALS — DIASTOLIC BLOOD PRESSURE: 87 MMHG | TEMPERATURE: 97.7 F | HEART RATE: 109 BPM | SYSTOLIC BLOOD PRESSURE: 119 MMHG

## 2017-11-09 DIAGNOSIS — I31.39 PERICARDIAL EFFUSION: Primary | ICD-10-CM

## 2017-11-09 DIAGNOSIS — R30.0 DYSURIA: ICD-10-CM

## 2017-11-09 DIAGNOSIS — M54.50 LOW BACK PAIN WITHOUT SCIATICA, UNSPECIFIED BACK PAIN LATERALITY, UNSPECIFIED CHRONICITY: ICD-10-CM

## 2017-11-09 NOTE — MR AVS SNAPSHOT
After Visit Summary   11/9/2017    Darlene Hudson    MRN: [id number]           Patient Information     Date Of Birth          1975        Visit Information        Provider Department      11/9/2017 9:40 AM Oscar Aparicio MD [facility]        Today's Diagnoses     Pericardial effusion    -  1      Care Instructions    ~ PLEASE BRING IN ALL OF YOUR MEDICINE BOTTLES WITH YOU TO EVERY VISIT ~    Recommend you seeing a cardiologist to follow up on your pericardial effusion with [facility].     Return for a PAP visit.     ++++++++++++++++++++++++++++++++++++++++++++++++++++++++++++++++++++++  Your medication list is printed, please keep this with you, it is helpful to bring this current list to any other medical appointments, the emergency room or hospital.    If you had lab testing today and your results are reassuring or normal they will be be mailed to you within 7 days.     If the lab tests need quick action we will call you with the results.   The phone number we will call with results is # [phone] (home) . If this is not the best number please call our clinic and change the number.    If you need any refills please call your pharmacy and they will contact us.    If you have any further concerns or wish to schedule another appointment you must call our office during normal business hours  [phone] (8-5:00 M-F)  If you have urgent medical questions that cannot wait  you may also call [phone] at any time of day.  If you have a medical emergency please call [phone].    Thank you for coming to [facility].            Follow-ups after your visit        Additional Services     CARDIOLOGY EVAL ADULT REFERRAL       Recently seen with [facility] but prefers not to follow up with them.     Reason for Referral: Follow up on pericardial effusion     Referral Location: [facility] ([facility]): [phone], Fax:  [phone]     needed: No  Language: English    May leave message on voicemail: Yes    ([facility] Only) Referral should be tracked (Yes/No)?  YES                  Who to contact     Please call your clinic at [phone] to:    Ask questions about your health    Make or cancel appointments    Discuss your medicines    Learn about your test results    Speak to your doctor   If you have compliments or concerns about an experience at your clinic, or if you wish to file a complaint, please contact [facility] Patient Relations at [phone] or email us at [email or web address]         Additional Information About Your Visit        Care EveryWhere ID     This is your Care EveryWhere ID. This could be used by other organizations to access your [place] medical records  [id number]        Your Vitals Were     Pulse Temperature Last Period             109 97.7  F (36.5  C) (Oral) 11/07/2017          Blood Pressure from Last 3 Encounters:   11/09/17 119/87   11/02/17 120/82   10/31/17 112/87    Weight from Last 3 Encounters:   10/30/17 251 lb 6.4 oz (114 kg)   07/24/17 238 lb 6.4 oz (108.1 kg)   07/24/17 241 lb 9.6 oz (109.6 kg)              We Performed the Following     CARDIOLOGY EVAL ADULT REFERRAL          Today's Medication Changes          These changes are accurate as of: 11/9/17 10:25 AM.  If you have any questions, ask your nurse or doctor.               These medicines have changed or have updated prescriptions.        Dose/Directions    fluticasone 50 MCG/ACT spray   Commonly known as:  FLONASE   This may have changed:    - how much to take  - when to take this  - reasons to take this   Used for:  Congestion of paranasal sinus        Dose:  1-2 spray   Spray 1-2 sprays into both nostrils daily   Quantity:  1 Bottle   Refills:  11       QUEtiapine 100 MG tablet   Commonly known as:  SEROquel   This may have changed:  how much to take   Used for:  Other insomnia         Dose:  100 mg   Take 1 tablet (100 mg) by mouth At Bedtime   Quantity:  30 tablet   Refills:  1                Primary Care Provider Office Phone # Fax #    Anaid Vogel [phone]       [facility] [address] [place] [address]        Equal Access to Services     [facility]: Hadii aad ku hadasho Soomaali, waaxda luqadaha, qaybta kaalmada adeegyada, waxay idiin hayaan adeeg kharash la'aan . So [facility] [phone].    ATENCIÓN: Si habla español, tiene a boswell disposición servicios gratuitos de asistencia lingüística. [facility] [phone].    We comply with applicable federal civil rights laws and [place] laws. We do not discriminate on the basis of race, color, national origin, age, disability, sex, sexual orientation, or gender identity.            Thank you!     Thank you for choosing [facility]  for your care. Our goal is always to provide you with excellent care. Hearing back from our patients is one way we can continue to improve our services. Please take a few minutes to complete the written survey that you may receive in the mail after your visit with us. Thank you!             Your Updated Medication List - Protect others around you: Learn how to safely use, store and throw away your medicines at www.disposemymeds.org.          This list is accurate as of: 11/9/17 10:25 AM.  Always use your most recent med list.                   Brand Name Dispense Instructions for use Diagnosis    ADVAIR DISKUS 500-50 MCG/DOSE diskus inhaler   Generic drug:  fluticasone-salmeterol      Inhale 1 puff into the lungs 2 times daily        albuterol 108 (90 BASE) MCG/ACT Inhaler    PROAIR HFA/PROVENTIL HFA/VENTOLIN HFA     Inhale 2 puffs into the lungs        fluticasone 50 MCG/ACT spray    FLONASE    1 Bottle    Spray 1-2 sprays into both nostrils daily    Congestion of paranasal sinus       ibuprofen 600 MG tablet    ADVIL/MOTRIN    100 tablet    Take 1 tablet (600  mg) by mouth every 6 hours as needed for moderate pain    Sprain of neck, initial encounter       methocarbamol 750 MG tablet    ROBAXIN    90 tablet    Take 1 tablet (750 mg) by mouth 3 times daily as needed for muscle spasms    Chronic bilateral low back pain without sciatica       montelukast 10 MG tablet    SINGULAIR    90 tablet    Take 1 tablet (10 mg) by mouth At Bedtime    Chronic allergic rhinitis due to pollen, unspecified seasonality       ondansetron 4 MG ODT tab    ZOFRAN ODT    10 tablet    Take 1-2 tablets (4-8 mg) by mouth every 8 hours as needed for nausea    Nausea       QUEtiapine 100 MG tablet    SEROquel    30 tablet    Take 1 tablet (100 mg) by mouth At Bedtime    Other insomnia       sulfamethoxazole-trimethoprim 800-160 MG per tablet    BACTRIM DS/SEPTRA DS    14 tablet    Take 1 tablet by mouth 2 times daily    Dysuria, Abdominal pain, generalized       traZODone 100 MG tablet    DESYREL    90 tablet    Take 1 tablet (100 mg) by mouth At Bedtime Take 100mg nightly for sleep.    Primary insomnia

## 2017-11-09 NOTE — PROGRESS NOTES
HPI:       Darlene Hudson is a 42 year old  female who presents for recheck of UTI and back pain. Patient is feeling better after antibiotics, with resolution of the previous back pain. Now with persistent long term pain.        Has been having difficulty with caffeine addiction. She is drinking a lot of mountain dew every day. Has given up the Monsters.  Feels that she is unable to give up the caffeine because of the headache. Have discussed addiction counseling, but patient refuses at this time. She has had previous problems with both drug addiction and alcohol addiction.       Is worried today about a small lump on her back.  Patient was noted to have a small subcutaneous fat nodule noted on CT scan in February, 2013. Thought at that time to be a sebaceous cyst, or perhaps a lipoma.  Today the patient presents with pain in a small 1x1.5 cm subcutaneous nodule in the left posterior lumbar subcutaneous fat.         Was going to have PAP smear, but she began with he period today and wants to postpone.               PMHX:   Current Medications:   Current Outpatient Prescriptions   Medication Sig Dispense Refill     sulfamethoxazole-trimethoprim (BACTRIM DS/SEPTRA DS) 800-160 MG per tablet Take 1 tablet by mouth 2 times daily 14 tablet 0     QUEtiapine (SEROQUEL) 100 MG tablet Take 1 tablet (100 mg) by mouth At Bedtime (Patient taking differently: Take 50 mg by mouth At Bedtime ) 30 tablet 1     albuterol (PROAIR HFA/PROVENTIL HFA/VENTOLIN HFA) 108 (90 BASE) MCG/ACT Inhaler Inhale 2 puffs into the lungs       ibuprofen (ADVIL/MOTRIN) 600 MG tablet Take 1 tablet (600 mg) by mouth every 6 hours as needed for moderate pain 100 tablet 0     ondansetron (ZOFRAN ODT) 4 MG ODT tab Take 1-2 tablets (4-8 mg) by mouth every 8 hours as needed for nausea (Patient not taking: Reported on 11/9/2017) 10 tablet 0     traZODone (DESYREL) 100 MG tablet Take 1 tablet (100 mg) by mouth At Bedtime Take 100mg nightly for sleep.  "(Patient not taking: Reported on 11/9/2017) 90 tablet 1     methocarbamol (ROBAXIN) 750 MG tablet Take 1 tablet (750 mg) by mouth 3 times daily as needed for muscle spasms (Patient not taking: Reported on 11/2/2017) 90 tablet 0     montelukast (SINGULAIR) 10 MG tablet Take 1 tablet (10 mg) by mouth At Bedtime (Patient not taking: Reported on 11/2/2017) 90 tablet 1     fluticasone-salmeterol (ADVAIR DISKUS) 500-50 MCG/DOSE diskus inhaler Inhale 1 puff into the lungs 2 times daily        fluticasone (FLONASE) 50 MCG/ACT spray Spray 1-2 sprays into both nostrils daily (Patient taking differently: Spray 1 spray into both nostrils as needed ) 1 Bottle 11       Existing Problems  Patient Active Problem List   Diagnosis     Abnormal cytology finding     Nondependent alcohol abuse, episodic drinking behavior     Anxiety state     Controlled substance agreement signed     Low back pain     Chronic sinusitis     Cocaine abuse     Major depressive disorder, recurrent episode, moderate (H)     Asthma     Tobacco use disorder     Noninflammatory disorder of vagina     Pap smear for cervical cancer screening     Abdominal pain     Abnormal cervical Papanicolaou smear     Panic disorder with agoraphobia     Atopic rhinitis     Chronic low back pain     Depression     Moderate persistent asthma     Other long term (current) drug therapy     Tobacco use     Acute pericardial effusion     Anxiety       Allergies:  Allergies   Allergen Reactions     Lidocaine Other (See Comments)     \"my jaw stopped moving\"     Penicillins Hives, Rash and Shortness Of Breath     Lidocaine-Epinephrine [Epinephrine-Lidocaine-Na Metabisulfite] Other (See Comments)     Severe jaw cramping, double vision       Previous labs:  Lab Results   Component Value Date    HGB 13.0 11/02/2017    HCT 38.7 11/02/2017    ALT 20.0 11/02/2017    AST 16.0 11/02/2017    .0 11/02/2017    BUN 13.0 11/02/2017    CO2 23.0 11/02/2017               Review of Systems:    " CONSTITUTIONAL: no fatigue, no unexpected change in weight  SKIN: no worrisome rashes  EYES: no acute vision problems or changes  ENT: no ear problems, no mouth problems, no throat problems  RESP: no significant cough, no shortness of breath  CV: no palpitations, no new or worsening peripheral edema  GI: no nausea, no vomiting, no constipation, no diarrhea          Physical Exam:     Vitals:    11/09/17 0950   BP: 119/87   Pulse: 109   Temp: 97.7  F (36.5  C)   TempSrc: Oral     There is no height or weight on file to calculate BMI.    GENERAL:alert, well hydrated, no distress  EYES: Eyes grossly normal to inspection, extraocular movements - intact, and PERRL  HENT:  Nose- normal; Mouth- no ulcers, no lesions  NECK: no tenderness, no adenopathy, no asymmetry, no masses, no stiffness; thyroid- normal to palpation  RESP: lungs clear to auscultation - no rales, no rhonchi, no wheezes  CV: regular rates and rhythm, normal S1 S2, no S3 or S4 and no murmur, no click or rub -  Back - 1x1.5 cm left lumbar subcutaneous fat.  Mobile, firm. Very painful when palpated.  Deep in subq  tissue.              Labs and Procedures     Office Visit on 11/02/2017   Component Date Value Ref Range Status     Specific Gravity Urine 11/02/2017 >=1.030  1.005 - 1.030 Final     pH Urine 11/02/2017 5.5  4.5 - 8.0 Final     Leukocyte Esterase UR 11/02/2017 Trace* NEGATIVE Final     Nitrite Urine 11/02/2017 Negative  NEGATIVE Final     Protein UR 11/02/2017 Negative  NEGATIVE Final     Glucose Urine 11/02/2017 Negative  NEGATIVE Final     Ketones Urine 11/02/2017 Negative  NEGATIVE Final     Urobilinogen mg/dL 11/02/2017 0.2 E.U./dL  0.2 E.U./dL Final     Bilirubin UR 11/02/2017 Negative  NEGATIVE Final     Blood UR 11/02/2017 2+* NEGATIVE Final     Color Urine 11/02/2017 Yellow   Final     Clarity, urine 11/02/2017 Clear   Final     Glucose 11/02/2017 90.0  60.0 - 109.0 mg/dL Final     Urea Nitrogen 11/02/2017 13.0  5.0 - 24.0 mg/dL Final      Creatinine 11/02/2017 0.9  0.6 - 1.3 mg/dL Final     Sodium 11/02/2017 137.0  133.0 - 144.0 mmol/L Final     Potassium 11/02/2017 4.5  3.4 - 5.3 mmol/L Final     Chloride 11/02/2017 110.0* 94.0 - 109.0 mmol/L Final     Carbon Dioxide 11/02/2017 23.0  20.0 - 32.0 mmol/L Final     Calcium 11/02/2017 8.6  8.5 - 10.4 mg/dL Final     Protein Total 11/02/2017 6.7  6.6 - 8.8 g/dL Final     Albumin 11/02/2017 3.3  3.3 - 4.6 g/dL Final     Alkaline Phosphatase 11/02/2017 69.0  40.0 - 150.0 U/L Final     ALT 11/02/2017 20.0  0.0 - 45.0 U/L Final     AST 11/02/2017 16.0  0.0 - 45.0 U/L Final     Bilirubin Total 11/02/2017 0.4  0.2 - 1.3 mg/dL Final     eGFR Calculated (Non Black Referen* 11/02/2017 72.3  >60.0 mL/min Final     eGFR Calculated (Black Reference) 11/02/2017 87.5  >60.0 mL/min Final     Lipase 11/02/2017 27  0 - 52 U/L Final     Amylase 11/02/2017 41  5 - 120 U/L Final     WBC 11/02/2017 7.0  4.0 - 11.0 thou/uL Final     RBC 11/02/2017 4.07  3.80 - 5.40 mill/uL Final     Hemoglobin 11/02/2017 13.0  12.0 - 16.0 g/dL Final     Hematocrit 11/02/2017 38.7  35.0 - 47.0 % Final     MCV 11/02/2017 95  80 - 100 fL Final     MCH 11/02/2017 31.9  27.0 - 34.0 pg Final     MCHC 11/02/2017 33.6  32.0 - 36.0 g/dL Final     RDW 11/02/2017 12.8  11.0 - 14.5 % Final     Platelets 11/02/2017 311  140 - 440 thou/uL Final     Mean Platelet Volume 11/02/2017 10.2  8.5 - 12.5 fL Final     % Neutrophils 11/02/2017 58  50 - 70 % Final     % Lymphocytes 11/02/2017 29  20 - 40 % Final     % Monocytes 11/02/2017 7  2 - 10 % Final     % Eosinophils 11/02/2017 5  0 - 6 % Final     % Basophils 11/02/2017 1  0 - 2 % Final     Neutrophils (Absolute) 11/02/2017 4.0  2.0 - 7.7 thou/uL Final     Lymphs (Absolute) 11/02/2017 2.0  0.8 - 4.4 thou/uL Final     Monocytes(Absolute) 11/02/2017 0.5  0.0 - 0.9 thou/uL Final     Eos (Absolute) 11/02/2017 0.4  0.0 - 0.4 thou/uL Final     Baso (Absolute) 11/02/2017 0.1  0.0 - 0.2 thou/uL Final     WBC Urine  11/02/2017 10-25  <5 /hpf Final     RBC Urine 11/02/2017 2-5  <5 /hpf Final     Epithelial Cells UR 11/02/2017   0 - 2 /lpf Final     Mucous Urine 11/02/2017 None  NONE lpf Final     Casts Urine 11/02/2017 None  NONE /lpf Final     Crystal Urine 11/02/2017 None  NONE /lpf Final     Bacteria Wet Prep 11/02/2017 Many  None Final              Assessment and Plan     1.History of pericardial effusion,pancreatitis, and pleural effusion.  Does not want to follow up with her current cardiologist.  Will arrange for a new cardiologist.   2. Back pain - suspect lipoma or sebaceous cyst that has recently bleed.  .  Will wait 4-6 weeks. If pain continues, will re-evaluate at that time  3. Discussed stopping the caffeine, and ways to do that.   4. Anxiety - Continuing on current meds.   5. Chronic back pain - no change.    6. UTI - improved and resolved.       Options for treatment and follow-up care were reviewed with the patient and/or guardian. Darlene Hudson and/or guardian engaged in the decision making process and verbalized understanding of the options discussed and agreed with the final plan.    Oscar Aparicio MD

## 2017-11-10 NOTE — PROGRESS NOTES
SUBJECTIVE:  This is a 42-year-old female well known to myself presenting for care.  She reports that she had a pain visit yesterday with a chronic Pain Clinic at the [facility].  She reports that she is somewhat dissatisfied with this visit and does not feel that  the provider was listening to her.  She is very frustrated that it may take months to get to her goal, which is injections of steroids in the back.  She is frustrated by this and does not wish to follow treatment plan.  She is willing to go through imaging as recommended at the time of this visit.  She was wondering if she could have her lumbar spine film as previously recommended.  She reports she has hesitation about taking medications as prescribed at this visit.  Chart reviewed, shows that at the time of visit Robaxin was recommended.  The patient reports she does not want to take Robaxin as she has taken it in the past and it was not helpful for her.   Back pain.  The patient is reporting significant amount of back pain.  She believes that she is in a flare for this she is taking ibuprofen.  She reports she has been taking gabapentin in the past but does not like the response she has to gabapentin.  She reports that she does not want to take a mood-altering medications like a TCA.   She is wondering if she have a UA today to rule out urinary tract infection.     Patient Active Problem List   Diagnosis     Abnormal cytology finding     Nondependent alcohol abuse, episodic drinking behavior     Anxiety state     Controlled substance agreement signed     Low back pain     Chronic sinusitis     Cocaine abuse     Major depressive disorder, recurrent episode, moderate (H)     Asthma     Tobacco use disorder     Noninflammatory disorder of vagina     Pap smear for cervical cancer screening     Abdominal pain     Abnormal cervical Papanicolaou smear     Panic disorder with agoraphobia     Atopic rhinitis     Chronic low back pain     Depression  "    Moderate persistent asthma     Other long term (current) drug therapy     Tobacco use     Acute pericardial effusion     Anxiety     Current Outpatient Prescriptions   Medication     ondansetron (ZOFRAN ODT) 4 MG ODT tab     traZODone (DESYREL) 100 MG tablet     sulfamethoxazole-trimethoprim (BACTRIM DS/SEPTRA DS) 800-160 MG per tablet     QUEtiapine (SEROQUEL) 100 MG tablet     methocarbamol (ROBAXIN) 750 MG tablet     montelukast (SINGULAIR) 10 MG tablet     fluticasone-salmeterol (ADVAIR DISKUS) 500-50 MCG/DOSE diskus inhaler     albuterol (PROAIR HFA/PROVENTIL HFA/VENTOLIN HFA) 108 (90 BASE) MCG/ACT Inhaler     fluticasone (FLONASE) 50 MCG/ACT spray     ibuprofen (ADVIL/MOTRIN) 600 MG tablet     No current facility-administered medications for this visit.      Allergies   Allergen Reactions     Lidocaine Other (See Comments)     \"my jaw stopped moving\"     Penicillins Hives, Rash and Shortness Of Breath     Lidocaine-Epinephrine [Epinephrine-Lidocaine-Na Metabisulfite] Other (See Comments)     Severe jaw cramping, double vision       OBJECTIVE:   /87  Pulse 104  SpO2 99%    VITAL SIGNS:  Reviewed and stable.   HEENT:  Head is atraumatic, normocephalic.  Eyes are anicteric.   NECK:  Supple without lymphadenopathy.   GENERAL:  Patient is sitting in a chair.  She is appropriate in conversation today ,she is at calm.   CARDIOVASCULAR:  Regular rate and rhythm without murmurs, rubs or gallops.   LUNGS:  Clear to auscultation bilaterally without wheezes, rhonchi or rales.   ABDOMEN:  Soft and nontender without guarding or rigidity.   MUSCULOSKELETAL:  Patient ambulates without assistance.   EXTREMITIES:  Without swelling.   SKIN:  Warm and dry without bruises, rashes or other lesions     Results for orders placed or performed in visit on 10/31/17   Urinalysis(LabDAQ)   Result Value Ref Range    Specific Gravity Urine >=1.030 1.005 - 1.030    pH Urine 5.5 4.5 - 8.0    Leukocyte Esterase UR Negative NEGATIVE "    Nitrite Urine Negative NEGATIVE    Protein UR Negative NEGATIVE    Glucose Urine Negative NEGATIVE    Ketones Urine Negative NEGATIVE    Urobilinogen mg/dL 0.2 E.U./dL 0.2 E.U./dL    Bilirubin UR 1+ (A) NEGATIVE    Blood UR 2+ (A) NEGATIVE       Assessment and Plan:  1. Chronic low back pain without sciatica, unspecified back pain laterality-patient reports that she is unable to complete MRI due to claustrophobia.  Encouraged patient to continue to work with pain clinic.  Worked to reset expectations today.  COnfirming that patient will likely experience some level of pain on most days moving forward and the best we can to is to try to help improve function while trying to avoid med/procedure escalation.  She will think about this.    - XR Lumbar Spine 2-3 Views*    2. Primary insomnia-complicated by pain.  10mg ambien qhs in past.  Recommend trying trazadone again and patient is amenable.  Reports that in the past also too seroquel for sleep and that this helped a great jaspal.   - traZODone (DESYREL) 100 MG tablet; Take 1 tablet (100 mg) by mouth At Bedtime Take 100mg nightly for sleep. (Patient not taking: Reported on 11/9/2017)  Dispense: 90 tablet; Refill: 1    3. Dysuria and flank pain: UA + blood w/o evidence infection. Recommend CT to eval stone.  - Urinalysis(LabDAQ)  - CT ABDOMEN PELVIS W/O CONTRAST; Future    5. Nausea  - ondansetron (ZOFRAN ODT) 4 MG ODT tab; Take 1-2 tablets (4-8 mg) by mouth every 8 hours as needed for nausea (Patient not taking: Reported on 11/9/2017)  Dispense: 10 tablet; Refill: 0      Anaid Vogel MD

## 2017-11-27 ENCOUNTER — OFFICE VISIT (OUTPATIENT)
Dept: FAMILY MEDICINE | Facility: CLINIC | Age: 42
End: 2017-11-27

## 2017-11-27 VITALS
HEART RATE: 103 BPM | RESPIRATION RATE: 18 BRPM | SYSTOLIC BLOOD PRESSURE: 116 MMHG | DIASTOLIC BLOOD PRESSURE: 79 MMHG | TEMPERATURE: 97.4 F | OXYGEN SATURATION: 97 %

## 2017-11-27 DIAGNOSIS — J45.30 MILD PERSISTENT ASTHMA WITHOUT COMPLICATION: ICD-10-CM

## 2017-11-27 DIAGNOSIS — R05.9 COUGH: Primary | ICD-10-CM

## 2017-11-27 DIAGNOSIS — J20.9 ACUTE BRONCHITIS, UNSPECIFIED ORGANISM: ICD-10-CM

## 2017-11-27 RX ORDER — BENZONATATE 100 MG/1
200 CAPSULE ORAL 3 TIMES DAILY PRN
Qty: 42 CAPSULE | Refills: 0 | Status: SHIPPED | OUTPATIENT
Start: 2017-11-27 | End: 2018-02-08

## 2017-11-27 RX ORDER — AZITHROMYCIN 250 MG/1
TABLET, FILM COATED ORAL
Qty: 6 TABLET | Refills: 0 | Status: SHIPPED | OUTPATIENT
Start: 2017-11-27 | End: 2018-02-08

## 2017-11-27 RX ORDER — PREDNISONE 20 MG/1
40 TABLET ORAL DAILY
Qty: 10 TABLET | Refills: 0 | Status: SHIPPED | OUTPATIENT
Start: 2017-11-27 | End: 2017-12-02

## 2017-11-27 NOTE — PROGRESS NOTES
HPI:       Darlene Hudson is a 42 year old  female who presents for cough and worsening asthma.   Patient has recently had an exacerbation of asthma associated with a cough and fever.  Has been unable to go to work for the last three days. Taking robitussin for cough.  Has been having to take albuterol five times per day.  Intermittent fevers.  No vomiting. No diarrhea. Mild sore throat.              PMHX:   Current Medications:   Current Outpatient Prescriptions   Medication Sig Dispense Refill     azithromycin (ZITHROMAX) 250 MG tablet Two tablets first day, then one tablet daily for four days. 6 tablet 0     benzonatate (TESSALON) 100 MG capsule Take 2 capsules (200 mg) by mouth 3 times daily as needed for cough 42 capsule 0     predniSONE (DELTASONE) 20 MG tablet Take 2 tablets (40 mg) by mouth daily for 5 days 10 tablet 0     sulfamethoxazole-trimethoprim (BACTRIM DS/SEPTRA DS) 800-160 MG per tablet Take 1 tablet by mouth 2 times daily 14 tablet 0     QUEtiapine (SEROQUEL) 100 MG tablet Take 1 tablet (100 mg) by mouth At Bedtime (Patient taking differently: Take 50 mg by mouth At Bedtime ) 30 tablet 1     ondansetron (ZOFRAN ODT) 4 MG ODT tab Take 1-2 tablets (4-8 mg) by mouth every 8 hours as needed for nausea (Patient not taking: Reported on 11/9/2017) 10 tablet 0     traZODone (DESYREL) 100 MG tablet Take 1 tablet (100 mg) by mouth At Bedtime Take 100mg nightly for sleep. (Patient not taking: Reported on 11/9/2017) 90 tablet 1     methocarbamol (ROBAXIN) 750 MG tablet Take 1 tablet (750 mg) by mouth 3 times daily as needed for muscle spasms (Patient not taking: Reported on 11/2/2017) 90 tablet 0     montelukast (SINGULAIR) 10 MG tablet Take 1 tablet (10 mg) by mouth At Bedtime (Patient not taking: Reported on 11/2/2017) 90 tablet 1     fluticasone-salmeterol (ADVAIR DISKUS) 500-50 MCG/DOSE diskus inhaler Inhale 1 puff into the lungs 2 times daily        albuterol (PROAIR HFA/PROVENTIL HFA/VENTOLIN  "HFA) 108 (90 BASE) MCG/ACT Inhaler Inhale 2 puffs into the lungs       fluticasone (FLONASE) 50 MCG/ACT spray Spray 1-2 sprays into both nostrils daily (Patient taking differently: Spray 1 spray into both nostrils as needed ) 1 Bottle 11     ibuprofen (ADVIL/MOTRIN) 600 MG tablet Take 1 tablet (600 mg) by mouth every 6 hours as needed for moderate pain 100 tablet 0       Existing Problems  Patient Active Problem List   Diagnosis     Abnormal cytology finding     Nondependent alcohol abuse, episodic drinking behavior     Anxiety state     Controlled substance agreement signed     Low back pain     Chronic sinusitis     Cocaine abuse     Major depressive disorder, recurrent episode, moderate (H)     Asthma     Tobacco use disorder     Noninflammatory disorder of vagina     Pap smear for cervical cancer screening     Abdominal pain     Abnormal cervical Papanicolaou smear     Panic disorder with agoraphobia     Atopic rhinitis     Chronic low back pain     Depression     Moderate persistent asthma     Other long term (current) drug therapy     Tobacco use     Acute pericardial effusion     Anxiety       Allergies:  Allergies   Allergen Reactions     Lidocaine Other (See Comments)     \"my jaw stopped moving\"     Penicillins Hives, Rash and Shortness Of Breath     Lidocaine-Epinephrine [Epinephrine-Lidocaine-Na Metabisulfite] Other (See Comments)     Severe jaw cramping, double vision       Previous labs:  Lab Results   Component Value Date    HGB 13.0 11/02/2017    HCT 38.7 11/02/2017    ALT 20.0 11/02/2017    AST 16.0 11/02/2017    .0 11/02/2017    BUN 13.0 11/02/2017    CO2 23.0 11/02/2017               Review of Systems:    CONSTITUTIONAL: no fatigue, no unexpected change in weight  SKIN: no worrisome rashes, no worrisome moles, no worrisome lesions  EYES: no acute vision problems or changes  ENT: as above  RESP: mild SOB, as above  CV: no chest pain, no palpitations, no new or worsening peripheral edema  GI: " no nausea, no vomiting, no constipation, no diarrhea          Physical Exam:     Vitals:    11/27/17 1537   BP: 116/79   Pulse: 103   Resp: 18   Temp: 97.4  F (36.3  C)   TempSrc: Oral   SpO2: 97%     There is no height or weight on file to calculate BMI.    GENERAL:alert, well hydrated, no distress  EYES: Eyes grossly normal to inspection, extraocular movements - intact, and PERRL  HENT: ear canals- normal; TMs- normal; Nose- normal; Mouth- no ulcers, no exudates, mild cobblestoning.   NECK: no tenderness, no adenopathy, no asymmetry, no masses, no stiffness;  RESP:diffuse rhonchi detention up, no wheezes  CV: regular rates and rhythm, normal S1 S2, no S3 or S4 and no murmur, no click or rub -               Labs and Procedures     Office Visit on 11/02/2017   Component Date Value Ref Range Status     Specific Gravity Urine 11/02/2017 >=1.030  1.005 - 1.030 Final     pH Urine 11/02/2017 5.5  4.5 - 8.0 Final     Leukocyte Esterase UR 11/02/2017 Trace* NEGATIVE Final     Nitrite Urine 11/02/2017 Negative  NEGATIVE Final     Protein UR 11/02/2017 Negative  NEGATIVE Final     Glucose Urine 11/02/2017 Negative  NEGATIVE Final     Ketones Urine 11/02/2017 Negative  NEGATIVE Final     Urobilinogen mg/dL 11/02/2017 0.2 E.U./dL  0.2 E.U./dL Final     Bilirubin UR 11/02/2017 Negative  NEGATIVE Final     Blood UR 11/02/2017 2+* NEGATIVE Final     Color Urine 11/02/2017 Yellow   Final     Clarity, urine 11/02/2017 Clear   Final     Glucose 11/02/2017 90.0  60.0 - 109.0 mg/dL Final     Urea Nitrogen 11/02/2017 13.0  5.0 - 24.0 mg/dL Final     Creatinine 11/02/2017 0.9  0.6 - 1.3 mg/dL Final     Sodium 11/02/2017 137.0  133.0 - 144.0 mmol/L Final     Potassium 11/02/2017 4.5  3.4 - 5.3 mmol/L Final     Chloride 11/02/2017 110.0* 94.0 - 109.0 mmol/L Final     Carbon Dioxide 11/02/2017 23.0  20.0 - 32.0 mmol/L Final     Calcium 11/02/2017 8.6  8.5 - 10.4 mg/dL Final     Protein Total 11/02/2017 6.7  6.6 - 8.8 g/dL Final     Albumin  11/02/2017 3.3  3.3 - 4.6 g/dL Final     Alkaline Phosphatase 11/02/2017 69.0  40.0 - 150.0 U/L Final     ALT 11/02/2017 20.0  0.0 - 45.0 U/L Final     AST 11/02/2017 16.0  0.0 - 45.0 U/L Final     Bilirubin Total 11/02/2017 0.4  0.2 - 1.3 mg/dL Final     eGFR Calculated (Non Black Referen* 11/02/2017 72.3  >60.0 mL/min Final     eGFR Calculated (Black Reference) 11/02/2017 87.5  >60.0 mL/min Final     Lipase 11/02/2017 27  0 - 52 U/L Final     Amylase 11/02/2017 41  5 - 120 U/L Final     WBC 11/02/2017 7.0  4.0 - 11.0 thou/uL Final     RBC 11/02/2017 4.07  3.80 - 5.40 mill/uL Final     Hemoglobin 11/02/2017 13.0  12.0 - 16.0 g/dL Final     Hematocrit 11/02/2017 38.7  35.0 - 47.0 % Final     MCV 11/02/2017 95  80 - 100 fL Final     MCH 11/02/2017 31.9  27.0 - 34.0 pg Final     MCHC 11/02/2017 33.6  32.0 - 36.0 g/dL Final     RDW 11/02/2017 12.8  11.0 - 14.5 % Final     Platelets 11/02/2017 311  140 - 440 thou/uL Final     Mean Platelet Volume 11/02/2017 10.2  8.5 - 12.5 fL Final     % Neutrophils 11/02/2017 58  50 - 70 % Final     % Lymphocytes 11/02/2017 29  20 - 40 % Final     % Monocytes 11/02/2017 7  2 - 10 % Final     % Eosinophils 11/02/2017 5  0 - 6 % Final     % Basophils 11/02/2017 1  0 - 2 % Final     Neutrophils (Absolute) 11/02/2017 4.0  2.0 - 7.7 thou/uL Final     Lymphs (Absolute) 11/02/2017 2.0  0.8 - 4.4 thou/uL Final     Monocytes(Absolute) 11/02/2017 0.5  0.0 - 0.9 thou/uL Final     Eos (Absolute) 11/02/2017 0.4  0.0 - 0.4 thou/uL Final     Baso (Absolute) 11/02/2017 0.1  0.0 - 0.2 thou/uL Final     WBC Urine 11/02/2017 10-25  <5 /hpf Final     RBC Urine 11/02/2017 2-5  <5 /hpf Final     Epithelial Cells UR 11/02/2017   0 - 2 /lpf Final     Mucous Urine 11/02/2017 None  NONE lpf Final     Casts Urine 11/02/2017 None  NONE /lpf Final     Crystal Urine 11/02/2017 None  NONE /lpf Final     Bacteria Wet Prep 11/02/2017 Many  None Final              Assessment and Plan     1.Exacerbation of asthma  with bronchitis  2. Will treat with zpack, prednisone burst.  Tessalon Perles for cough.   3. Work slip until Thursday.      Options for treatment and follow-up care were reviewed with the patient and/or guardian. Darlene Hudson and/or guardian engaged in the decision making process and verbalized understanding of the options discussed and agreed with the final plan.    Oscar Aparicio MD

## 2017-11-27 NOTE — MR AVS SNAPSHOT
After Visit Summary   11/27/2017    Darlene Hudson    MRN: [id number]           Patient Information     Date Of Birth          1975        Visit Information        Provider Department      11/27/2017 3:40 PM Oscar Aparicio MD [facility]        Today's Diagnoses     Cough    -  1    Acute bronchitis, unspecified organism        Mild persistent asthma without complication           Follow-ups after your visit        Who to contact     Please call your clinic at [phone] to:    Ask questions about your health    Make or cancel appointments    Discuss your medicines    Learn about your test results    Speak to your doctor   If you have compliments or concerns about an experience at your clinic, or if you wish to file a complaint, please contact [facility] Patient Relations at [phone] or email us at [email or web address]         Additional Information About Your Visit        Care EveryWhere ID     This is your Care EveryWhere ID. This could be used by other organizations to access your [place] medical records  [id number]        Your Vitals Were     Pulse Temperature Respirations Last Period Pulse Oximetry       103 97.4  F (36.3  C) (Oral) 18 11/07/2017 97%        Blood Pressure from Last 3 Encounters:   11/27/17 116/79   11/09/17 119/87   11/02/17 120/82    Weight from Last 3 Encounters:   10/30/17 251 lb 6.4 oz (114 kg)   07/24/17 238 lb 6.4 oz (108.1 kg)   07/24/17 241 lb 9.6 oz (109.6 kg)              Today, you had the following     No orders found for display         Today's Medication Changes          These changes are accurate as of: 11/27/17  4:29 PM.  If you have any questions, ask your nurse or doctor.               Start taking these medicines.        Dose/Directions    azithromycin 250 MG tablet   Commonly known as:  ZITHROMAX   Used for:  Acute bronchitis, unspecified organism   Started by:  Oscar Aparicio MD        Two  tablets first day, then one tablet daily for four days.   Quantity:  6 tablet   Refills:  0       benzonatate 100 MG capsule   Commonly known as:  TESSALON   Used for:  Cough   Started by:  Oscar Aparicio MD        Dose:  200 mg   Take 2 capsules (200 mg) by mouth 3 times daily as needed for cough   Quantity:  42 capsule   Refills:  0       predniSONE 20 MG tablet   Commonly known as:  DELTASONE   Used for:  Acute bronchitis, unspecified organism, Mild persistent asthma without complication   Started by:  Oscar Aparicio MD        Dose:  40 mg   Take 2 tablets (40 mg) by mouth daily for 5 days   Quantity:  10 tablet   Refills:  0         These medicines have changed or have updated prescriptions.        Dose/Directions    fluticasone 50 MCG/ACT spray   Commonly known as:  FLONASE   This may have changed:    - how much to take  - when to take this  - reasons to take this   Used for:  Congestion of paranasal sinus        Dose:  1-2 spray   Spray 1-2 sprays into both nostrils daily   Quantity:  1 Bottle   Refills:  11       QUEtiapine 100 MG tablet   Commonly known as:  SEROquel   This may have changed:  how much to take   Used for:  Other insomnia        Dose:  100 mg   Take 1 tablet (100 mg) by mouth At Bedtime   Quantity:  30 tablet   Refills:  1            Where to get your medicines      These medications were sent to [id number] - [place], [place] - [address] [facility] [address]  [address] , [place] [address]     Phone:  [phone]     azithromycin 250 MG tablet    benzonatate 100 MG capsule    predniSONE 20 MG tablet                Primary Care Provider Office Phone # Fax #    Anaid Ann Vogel [phone]       [facility] [address] [place] [address]        Equal Access to Services     ROLY ALBERTS AH: Delia Glass, eleonora mathew, qaybta joselin jordan, delores ibarra.  So [facility] [phone].    ATENCIÓN: Si negin anderson, tiene a boswell disposición servicios gratuitos de asistencia lingüística. Darby shoemaker [phone].    We comply with applicable federal civil rights laws and [place] laws. We do not discriminate on the basis of race, color, national origin, age, disability, sex, sexual orientation, or gender identity.            Thank you!     Thank you for choosing [facility]  for your care. Our goal is always to provide you with excellent care. Hearing back from our patients is one way we can continue to improve our services. Please take a few minutes to complete the written survey that you may receive in the mail after your visit with us. Thank you!             Your Updated Medication List - Protect others around you: Learn how to safely use, store and throw away your medicines at www.disposemymeds.org.          This list is accurate as of: 11/27/17  4:29 PM.  Always use your most recent med list.                   Brand Name Dispense Instructions for use Diagnosis    ADVAIR DISKUS 500-50 MCG/DOSE diskus inhaler   Generic drug:  fluticasone-salmeterol      Inhale 1 puff into the lungs 2 times daily        albuterol 108 (90 BASE) MCG/ACT Inhaler    PROAIR HFA/PROVENTIL HFA/VENTOLIN HFA     Inhale 2 puffs into the lungs        azithromycin 250 MG tablet    ZITHROMAX    6 tablet    Two tablets first day, then one tablet daily for four days.    Acute bronchitis, unspecified organism       benzonatate 100 MG capsule    TESSALON    42 capsule    Take 2 capsules (200 mg) by mouth 3 times daily as needed for cough    Cough       fluticasone 50 MCG/ACT spray    FLONASE    1 Bottle    Spray 1-2 sprays into both nostrils daily    Congestion of paranasal sinus       ibuprofen 600 MG tablet    ADVIL/MOTRIN    100 tablet    Take 1 tablet (600 mg) by mouth every 6 hours as needed for moderate pain    Sprain of neck, initial encounter       methocarbamol 750 MG tablet    ROBAXIN    90  tablet    Take 1 tablet (750 mg) by mouth 3 times daily as needed for muscle spasms    Chronic bilateral low back pain without sciatica       montelukast 10 MG tablet    SINGULAIR    90 tablet    Take 1 tablet (10 mg) by mouth At Bedtime    Chronic allergic rhinitis due to pollen, unspecified seasonality       ondansetron 4 MG ODT tab    ZOFRAN ODT    10 tablet    Take 1-2 tablets (4-8 mg) by mouth every 8 hours as needed for nausea    Nausea       predniSONE 20 MG tablet    DELTASONE    10 tablet    Take 2 tablets (40 mg) by mouth daily for 5 days    Acute bronchitis, unspecified organism, Mild persistent asthma without complication       QUEtiapine 100 MG tablet    SEROquel    30 tablet    Take 1 tablet (100 mg) by mouth At Bedtime    Other insomnia       sulfamethoxazole-trimethoprim 800-160 MG per tablet    BACTRIM DS/SEPTRA DS    14 tablet    Take 1 tablet by mouth 2 times daily    Dysuria, Abdominal pain, generalized       traZODone 100 MG tablet    DESYREL    90 tablet    Take 1 tablet (100 mg) by mouth At Bedtime Take 100mg nightly for sleep.    Primary insomnia

## 2017-11-27 NOTE — LETTER
RETURN TO WORK/SCHOOL FORM    11/27/2017    Re: Darlene Hudson  1975      To Whom It May Concern:     Darlene Hudson was seen in clinic today.  She may return to work without restrictions on 11/30/17.        Restrictions:  None      Oscar Aparicio MD  11/27/2017 4:28 PM

## 2017-12-06 ENCOUNTER — TRANSFERRED RECORDS (OUTPATIENT)
Dept: HEALTH INFORMATION MANAGEMENT | Facility: CLINIC | Age: 42
End: 2017-12-06

## 2018-01-11 ENCOUNTER — TELEPHONE (OUTPATIENT)
Dept: FAMILY MEDICINE | Facility: CLINIC | Age: 43
End: 2018-01-11

## 2018-01-11 NOTE — TELEPHONE ENCOUNTER
[facility] phone call message- general phone call:    Reason for call: Patient is wondering if you would Rx or suggest a cough medication for her. States that she has had this dry cough since Sunday. Notified her that probably would have to be seen to get a medication Rx to her, states that she just don't want to have to pay to come in for just a cough. Have her scheduled for Monday with Dr. Aparicio. Please call and let patient know of outcome. Thank you! Pharmacy is the same one on file.     Return call needed: Yes    OK to leave a message on voice mail? Yes    Primary language: English      needed? No    Call taken on January 11, 2018 at 2:29 PM by Kirstie Gates

## 2018-01-11 NOTE — TELEPHONE ENCOUNTER
Darlene informed of message from Dr Samuels. She states understanding and will try recommendation, if cough is worse this evening she will call back tomorrow for an appt otherwise will wait to be seen on Monday with Dr Aparicio. Pia VÁSQUEZ

## 2018-01-11 NOTE — TELEPHONE ENCOUNTER
Spoke to Darlene, who would rather not have to come in for further assessment for her cough. Requesting a medication for her cough. She has tried the following medications- Robitussin, Theraflu, Dayquil, Nyquil and Tessalon Perles without relief. Cough- dry, worse at night time. + post nasal drainage. Denies rhinorrhea,nasal congestion, SOB,wheezing or fever. Local pharmacy verified as [facility] on [address] Pt requests I route this request to [facility]- Dr Samuels for further review and advise. Pia VÁSQUEZ

## 2018-01-15 ENCOUNTER — OFFICE VISIT (OUTPATIENT)
Dept: FAMILY MEDICINE | Facility: CLINIC | Age: 43
End: 2018-01-15
Payer: COMMERCIAL

## 2018-01-15 VITALS
OXYGEN SATURATION: 99 % | TEMPERATURE: 97.8 F | HEART RATE: 119 BPM | DIASTOLIC BLOOD PRESSURE: 84 MMHG | SYSTOLIC BLOOD PRESSURE: 117 MMHG

## 2018-01-15 DIAGNOSIS — J20.8 ACUTE BRONCHITIS DUE TO OTHER SPECIFIED ORGANISMS: ICD-10-CM

## 2018-01-15 DIAGNOSIS — J45.20 INTERMITTENT ASTHMA WITHOUT COMPLICATION, UNSPECIFIED ASTHMA SEVERITY: ICD-10-CM

## 2018-01-15 DIAGNOSIS — J11.1 INFLUENZA WITH RESPIRATORY MANIFESTATION OTHER THAN PNEUMONIA: ICD-10-CM

## 2018-01-15 DIAGNOSIS — R05.9 COUGH: Primary | ICD-10-CM

## 2018-01-15 RX ORDER — OSELTAMIVIR PHOSPHATE 75 MG/1
75 CAPSULE ORAL 2 TIMES DAILY
Qty: 10 CAPSULE | Refills: 0 | Status: SHIPPED | OUTPATIENT
Start: 2018-01-15 | End: 2018-02-08

## 2018-01-15 RX ORDER — CODEINE PHOSPHATE AND GUAIFENESIN 10; 100 MG/5ML; MG/5ML
1-2 SOLUTION ORAL EVERY 4 HOURS PRN
Qty: 120 ML | Refills: 0 | Status: SHIPPED | OUTPATIENT
Start: 2018-01-15 | End: 2018-02-08

## 2018-01-15 RX ORDER — AZITHROMYCIN 250 MG/1
TABLET, FILM COATED ORAL
Qty: 6 TABLET | Refills: 0 | Status: SHIPPED | OUTPATIENT
Start: 2018-01-15 | End: 2018-02-08

## 2018-01-15 NOTE — LETTER
RETURN TO WORK/SCHOOL FORM    1/15/2018    Re: Darlene Hudson  1975      To Whom It May Concern:     Darlene Hudson was seen in clinic today.  She may return to work on 1/22/18.      Oscar Aparicio MD  1/15/2018 2:08 PM     BETTYE Haley

## 2018-01-15 NOTE — MR AVS SNAPSHOT
After Visit Summary   1/15/2018    Darlene Hudson    MRN: [id number]           Patient Information     Date Of Birth          1975        Visit Information        Provider Department      1/15/2018 1:20 PM Oscar Aparicio MD [facility]        Today's Diagnoses     Cough    -  1    Acute bronchitis due to other specified organisms        Intermittent asthma without complication, unspecified asthma severity        Influenza with respiratory manifestation other than pneumonia           Follow-ups after your visit        Who to contact     Please call your clinic at [phone] to:    Ask questions about your health    Make or cancel appointments    Discuss your medicines    Learn about your test results    Speak to your doctor   If you have compliments or concerns about an experience at your clinic, or if you wish to file a complaint, please contact [facility] Patient Relations at [phone] or email us at [email or web address]         Additional Information About Your Visit        MyChart Information     mySugrt is an electronic gateway that provides easy, online access to your medical records. With OpenHatch, you can request a clinic appointment, read your test results, renew a prescription or communicate with your care team.     To sign up for mySugrt visit the website at [email or web address]   You will be asked to enter the access code listed below, as well as some personal information. Please follow the directions to create your username and password.     Your access code is: [email or web address]  Expires: 4/15/2018  2:10 PM     Your access code will  in 90 days. If you need help or a new code, please contact your [facility] or call [phone] for assistance.        Care EveryWhere ID     This is your Care EveryWhere ID. This could be used by other organizations to access your [facility]  records  [id number]        Your Vitals Were     Pulse Temperature Last Period Pulse Oximetry          119 97.8  F (36.6  C) (Oral) 12/05/2017 99%         Blood Pressure from Last 3 Encounters:   01/15/18 117/84   11/27/17 116/79   11/09/17 119/87    Weight from Last 3 Encounters:   10/30/17 251 lb 6.4 oz (114 kg)   07/24/17 238 lb 6.4 oz (108.1 kg)   07/24/17 241 lb 9.6 oz (109.6 kg)              Today, you had the following     No orders found for display         Today's Medication Changes          These changes are accurate as of: 1/15/18  2:10 PM.  If you have any questions, ask your nurse or doctor.               Start taking these medicines.        Dose/Directions    guaiFENesin-codeine 100-10 MG/5ML Soln solution   Commonly known as:  ROBITUSSIN AC   Used for:  Cough   Started by:  Oscar Aparicio MD        Dose:  1-2 tsp.   Take 5-10 mLs by mouth every 4 hours as needed for cough   Quantity:  120 mL   Refills:  0       oseltamivir 75 MG capsule   Commonly known as:  TAMIFLU   Used for:  Influenza with respiratory manifestation other than pneumonia   Started by:  Oscar Aparicio MD        Dose:  75 mg   Take 1 capsule (75 mg) by mouth 2 times daily   Quantity:  10 capsule   Refills:  0         These medicines have changed or have updated prescriptions.        Dose/Directions    * azithromycin 250 MG tablet   Commonly known as:  ZITHROMAX   This may have changed:  Another medication with the same name was added. Make sure you understand how and when to take each.   Used for:  Acute bronchitis, unspecified organism   Changed by:  Oscar Aparicio MD        Two tablets first day, then one tablet daily for four days.   Quantity:  6 tablet   Refills:  0       * azithromycin 250 MG tablet   Commonly known as:  ZITHROMAX   This may have changed:  You were already taking a medication with the same name, and this prescription was added. Make sure you understand how and when to take each.   Used for:  Acute  bronchitis due to other specified organisms, Intermittent asthma without complication, unspecified asthma severity   Changed by:  Oscar Aparicio MD        Two tablets first day, then one tablet daily for four days.   Quantity:  6 tablet   Refills:  0       fluticasone 50 MCG/ACT spray   Commonly known as:  FLONASE   This may have changed:    - how much to take  - when to take this  - reasons to take this   Used for:  Congestion of paranasal sinus        Dose:  1-2 spray   Spray 1-2 sprays into both nostrils daily   Quantity:  1 Bottle   Refills:  11       QUEtiapine 100 MG tablet   Commonly known as:  SEROquel   This may have changed:  how much to take   Used for:  Other insomnia        Dose:  100 mg   Take 1 tablet (100 mg) by mouth At Bedtime   Quantity:  30 tablet   Refills:  1       * Notice:  This list has 2 medication(s) that are the same as other medications prescribed for you. Read the directions carefully, and ask your doctor or other care provider to review them with you.         Where to get your medicines      These medications were sent to [facility] [address] - [place], [place] - [address] AT [facility] [address]  [address] , [place] [address]     Phone:  [phone]     azithromycin 250 MG tablet    oseltamivir 75 MG capsule         Some of these will need a paper prescription and others can be bought over the counter.  Ask your nurse if you have questions.     Bring a paper prescription for each of these medications     guaiFENesin-codeine 100-10 MG/5ML Soln solution                Primary Care Provider Office Phone # Fax #    Oscar Aparicio [phone]       [facility] [address] [place] [address]        Equal Access to Services     [facility] AH: Hadii kvng Glass, wadione mathew, qaybta delores moulton. So [facility] [phone].    ATENCIÓN: Galina anderson,  tiene a boswell disposición servicios gratuitos de asistencia lingüística. Darby shoemaker [phone].    We comply with applicable federal civil rights laws and [place] laws. We do not discriminate on the basis of race, color, national origin, age, disability, sex, sexual orientation, or gender identity.            Thank you!     Thank you for choosing [facility]  for your care. Our goal is always to provide you with excellent care. Hearing back from our patients is one way we can continue to improve our services. Please take a few minutes to complete the written survey that you may receive in the mail after your visit with us. Thank you!             Your Updated Medication List - Protect others around you: Learn how to safely use, store and throw away your medicines at www.disposemymeds.org.          This list is accurate as of: 1/15/18  2:10 PM.  Always use your most recent med list.                   Brand Name Dispense Instructions for use Diagnosis    ADVAIR DISKUS 500-50 MCG/DOSE diskus inhaler   Generic drug:  fluticasone-salmeterol      Inhale 1 puff into the lungs 2 times daily        albuterol 108 (90 BASE) MCG/ACT Inhaler    PROAIR HFA/PROVENTIL HFA/VENTOLIN HFA     Inhale 2 puffs into the lungs        * azithromycin 250 MG tablet    ZITHROMAX    6 tablet    Two tablets first day, then one tablet daily for four days.    Acute bronchitis, unspecified organism       * azithromycin 250 MG tablet    ZITHROMAX    6 tablet    Two tablets first day, then one tablet daily for four days.    Acute bronchitis due to other specified organisms, Intermittent asthma without complication, unspecified asthma severity       benzonatate 100 MG capsule    TESSALON    42 capsule    Take 2 capsules (200 mg) by mouth 3 times daily as needed for cough    Cough       fluticasone 50 MCG/ACT spray    FLONASE    1 Bottle    Spray 1-2 sprays into both nostrils daily    Congestion of paranasal sinus       guaiFENesin-codeine  100-10 MG/5ML Soln solution    ROBITUSSIN AC    120 mL    Take 5-10 mLs by mouth every 4 hours as needed for cough    Cough       ibuprofen 600 MG tablet    ADVIL/MOTRIN    100 tablet    Take 1 tablet (600 mg) by mouth every 6 hours as needed for moderate pain    Sprain of neck, initial encounter       methocarbamol 750 MG tablet    ROBAXIN    90 tablet    Take 1 tablet (750 mg) by mouth 3 times daily as needed for muscle spasms    Chronic bilateral low back pain without sciatica       montelukast 10 MG tablet    SINGULAIR    90 tablet    Take 1 tablet (10 mg) by mouth At Bedtime    Chronic allergic rhinitis due to pollen, unspecified seasonality       ondansetron 4 MG ODT tab    ZOFRAN ODT    10 tablet    Take 1-2 tablets (4-8 mg) by mouth every 8 hours as needed for nausea    Nausea       oseltamivir 75 MG capsule    TAMIFLU    10 capsule    Take 1 capsule (75 mg) by mouth 2 times daily    Influenza with respiratory manifestation other than pneumonia       QUEtiapine 100 MG tablet    SEROquel    30 tablet    Take 1 tablet (100 mg) by mouth At Bedtime    Other insomnia       sulfamethoxazole-trimethoprim 800-160 MG per tablet    BACTRIM DS/SEPTRA DS    14 tablet    Take 1 tablet by mouth 2 times daily    Dysuria, Abdominal pain, generalized       traZODone 100 MG tablet    DESYREL    90 tablet    Take 1 tablet (100 mg) by mouth At Bedtime Take 100mg nightly for sleep.    Primary insomnia       * Notice:  This list has 2 medication(s) that are the same as other medications prescribed for you. Read the directions carefully, and ask your doctor or other care provider to review them with you.

## 2018-01-15 NOTE — PROGRESS NOTES
HPI:       Darlene Hudson is a 42 year old  female who presents for cough, fever, body aches. Patient works taking care of young adults.   Two days ago began with runny nose and cough. Fever with chills. Began with cough, which has irritated a mild sore throat. Muscle aches. Feels sick.              PMHX:   Current Medications:   Current Outpatient Prescriptions   Medication Sig Dispense Refill     QUEtiapine (SEROQUEL) 100 MG tablet Take 1 tablet (100 mg) by mouth At Bedtime (Patient taking differently: Take 50 mg by mouth At Bedtime ) 30 tablet 1     montelukast (SINGULAIR) 10 MG tablet Take 1 tablet (10 mg) by mouth At Bedtime 90 tablet 1     fluticasone-salmeterol (ADVAIR DISKUS) 500-50 MCG/DOSE diskus inhaler Inhale 1 puff into the lungs 2 times daily        albuterol (PROAIR HFA/PROVENTIL HFA/VENTOLIN HFA) 108 (90 BASE) MCG/ACT Inhaler Inhale 2 puffs into the lungs       fluticasone (FLONASE) 50 MCG/ACT spray Spray 1-2 sprays into both nostrils daily (Patient taking differently: Spray 1 spray into both nostrils as needed ) 1 Bottle 11     ibuprofen (ADVIL/MOTRIN) 600 MG tablet Take 1 tablet (600 mg) by mouth every 6 hours as needed for moderate pain 100 tablet 0     azithromycin (ZITHROMAX) 250 MG tablet Two tablets first day, then one tablet daily for four days. (Patient not taking: Reported on 1/15/2018) 6 tablet 0     benzonatate (TESSALON) 100 MG capsule Take 2 capsules (200 mg) by mouth 3 times daily as needed for cough (Patient not taking: Reported on 1/15/2018) 42 capsule 0     sulfamethoxazole-trimethoprim (BACTRIM DS/SEPTRA DS) 800-160 MG per tablet Take 1 tablet by mouth 2 times daily (Patient not taking: Reported on 1/15/2018) 14 tablet 0     ondansetron (ZOFRAN ODT) 4 MG ODT tab Take 1-2 tablets (4-8 mg) by mouth every 8 hours as needed for nausea (Patient not taking: Reported on 11/9/2017) 10 tablet 0     traZODone (DESYREL) 100 MG tablet Take 1 tablet (100 mg) by mouth At Bedtime Take  "100mg nightly for sleep. (Patient not taking: Reported on 11/9/2017) 90 tablet 1     methocarbamol (ROBAXIN) 750 MG tablet Take 1 tablet (750 mg) by mouth 3 times daily as needed for muscle spasms (Patient not taking: Reported on 11/2/2017) 90 tablet 0       Existing Problems  Patient Active Problem List   Diagnosis     Abnormal cytology finding     Nondependent alcohol abuse, episodic drinking behavior     Anxiety state     Controlled substance agreement signed     Low back pain     Chronic sinusitis     Cocaine abuse     Major depressive disorder, recurrent episode, moderate (H)     Asthma     Tobacco use disorder     Noninflammatory disorder of vagina     Pap smear for cervical cancer screening     Abdominal pain     Abnormal cervical Papanicolaou smear     Panic disorder with agoraphobia     Atopic rhinitis     Chronic low back pain     Depression     Moderate persistent asthma     Other long term (current) drug therapy     Tobacco use     Acute pericardial effusion     Anxiety       Allergies:  Allergies   Allergen Reactions     Lidocaine Other (See Comments)     \"my jaw stopped moving\"     Penicillins Hives, Rash and Shortness Of Breath     Lidocaine-Epinephrine [Epinephrine-Lidocaine-Na Metabisulfite] Other (See Comments)     Severe jaw cramping, double vision       Previous labs:  Lab Results   Component Value Date    HGB 13.0 11/02/2017    HCT 38.7 11/02/2017    ALT 20.0 11/02/2017    AST 16.0 11/02/2017    .0 11/02/2017    BUN 13.0 11/02/2017    CO2 23.0 11/02/2017               Review of Systems:    CONSTITUTIONAL: no unexpected change in weight  SKIN: no worrisome rashes, no worrisome moles, no worrisome lesions  EYES: no acute vision problems or changes  ENT: as above  RESP: as above  CV: no chest pain, no palpitations, no new or worsening peripheral edema  GI: no nausea, no vomiting, no constipation, no diarrhea          Physical Exam:     Vitals:    01/15/18 1339   BP: 117/84   Pulse: 119 "   Temp: 97.8  F (36.6  C)   TempSrc: Oral   SpO2: 99%     There is no height or weight on file to calculate BMI.    GENERAL:alert, well hydrated, no distress  EYES: Eyes grossly normal to inspection, extraocular movements - intact, and PERRL  HENT: ear canals- normal; TMs- normal; Nose- normal; Mouth- no ulcers, ,omomal posterior inflammation, no exudates  NECK: no tenderness, no adenopathy, no asymmetry, no masses, no stiffness; thyroid- normal to palpation  RESP: lungs clear to auscultation - no rales, no rhonchi, no wheezes  CV: regular rates and rhythm, normal S1 S2, no S3 or S4 and no murmur, no click or rub -               Labs and Procedures     Office Visit on 11/02/2017   Component Date Value Ref Range Status     Specific Gravity Urine 11/02/2017 >=1.030  1.005 - 1.030 Final     pH Urine 11/02/2017 5.5  4.5 - 8.0 Final     Leukocyte Esterase UR 11/02/2017 Trace* NEGATIVE Final     Nitrite Urine 11/02/2017 Negative  NEGATIVE Final     Protein UR 11/02/2017 Negative  NEGATIVE Final     Glucose Urine 11/02/2017 Negative  NEGATIVE Final     Ketones Urine 11/02/2017 Negative  NEGATIVE Final     Urobilinogen mg/dL 11/02/2017 0.2 E.U./dL  0.2 E.U./dL Final     Bilirubin UR 11/02/2017 Negative  NEGATIVE Final     Blood UR 11/02/2017 2+* NEGATIVE Final     Color Urine 11/02/2017 Yellow   Final     Clarity, urine 11/02/2017 Clear   Final     Glucose 11/02/2017 90.0  60.0 - 109.0 mg/dL Final     Urea Nitrogen 11/02/2017 13.0  5.0 - 24.0 mg/dL Final     Creatinine 11/02/2017 0.9  0.6 - 1.3 mg/dL Final     Sodium 11/02/2017 137.0  133.0 - 144.0 mmol/L Final     Potassium 11/02/2017 4.5  3.4 - 5.3 mmol/L Final     Chloride 11/02/2017 110.0* 94.0 - 109.0 mmol/L Final     Carbon Dioxide 11/02/2017 23.0  20.0 - 32.0 mmol/L Final     Calcium 11/02/2017 8.6  8.5 - 10.4 mg/dL Final     Protein Total 11/02/2017 6.7  6.6 - 8.8 g/dL Final     Albumin 11/02/2017 3.3  3.3 - 4.6 g/dL Final     Alkaline Phosphatase 11/02/2017 69.0   40.0 - 150.0 U/L Final     ALT 11/02/2017 20.0  0.0 - 45.0 U/L Final     AST 11/02/2017 16.0  0.0 - 45.0 U/L Final     Bilirubin Total 11/02/2017 0.4  0.2 - 1.3 mg/dL Final     eGFR Calculated (Non Black Referen* 11/02/2017 72.3  >60.0 mL/min Final     eGFR Calculated (Black Reference) 11/02/2017 87.5  >60.0 mL/min Final     Lipase 11/02/2017 27  0 - 52 U/L Final     Amylase 11/02/2017 41  5 - 120 U/L Final     WBC 11/02/2017 7.0  4.0 - 11.0 thou/uL Final     RBC 11/02/2017 4.07  3.80 - 5.40 mill/uL Final     Hemoglobin 11/02/2017 13.0  12.0 - 16.0 g/dL Final     Hematocrit 11/02/2017 38.7  35.0 - 47.0 % Final     MCV 11/02/2017 95  80 - 100 fL Final     MCH 11/02/2017 31.9  27.0 - 34.0 pg Final     MCHC 11/02/2017 33.6  32.0 - 36.0 g/dL Final     RDW 11/02/2017 12.8  11.0 - 14.5 % Final     Platelets 11/02/2017 311  140 - 440 thou/uL Final     Mean Platelet Volume 11/02/2017 10.2  8.5 - 12.5 fL Final     % Neutrophils 11/02/2017 58  50 - 70 % Final     % Lymphocytes 11/02/2017 29  20 - 40 % Final     % Monocytes 11/02/2017 7  2 - 10 % Final     % Eosinophils 11/02/2017 5  0 - 6 % Final     % Basophils 11/02/2017 1  0 - 2 % Final     Neutrophils (Absolute) 11/02/2017 4.0  2.0 - 7.7 thou/uL Final     Lymphs (Absolute) 11/02/2017 2.0  0.8 - 4.4 thou/uL Final     Monocytes(Absolute) 11/02/2017 0.5  0.0 - 0.9 thou/uL Final     Eos (Absolute) 11/02/2017 0.4  0.0 - 0.4 thou/uL Final     Baso (Absolute) 11/02/2017 0.1  0.0 - 0.2 thou/uL Final     WBC Urine 11/02/2017 10-25  <5 /hpf Final     RBC Urine 11/02/2017 2-5  <5 /hpf Final     Epithelial Cells UR 11/02/2017   0 - 2 /lpf Final     Mucous Urine 11/02/2017 None  NONE lpf Final     Casts Urine 11/02/2017 None  NONE /lpf Final     Crystal Urine 11/02/2017 None  NONE /lpf Final     Bacteria Wet Prep 11/02/2017 Many  None Final              Assessment and Plan     1.Influenza like infection.  Will provide tamiflu for five days. 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   2. Asthma - no exacerbation at this time, but progressing IFI in patient with problematic asthma. Will provide azithromycin and cough medicine.  3. Rest and fluids.       Options for treatment and follow-up care were reviewed with the patient and/or guardian. Darlene Hudson and/or guardian engaged in the decision making process and verbalized understanding of the options discussed and agreed with the final plan.    Oscar Aparicio MD

## 2018-02-07 ENCOUNTER — TRANSFERRED RECORDS (OUTPATIENT)
Dept: HEALTH INFORMATION MANAGEMENT | Facility: CLINIC | Age: 43
End: 2018-02-07

## 2018-02-08 ENCOUNTER — OFFICE VISIT (OUTPATIENT)
Dept: FAMILY MEDICINE | Facility: CLINIC | Age: 43
End: 2018-02-08
Payer: COMMERCIAL

## 2018-02-08 VITALS
DIASTOLIC BLOOD PRESSURE: 79 MMHG | SYSTOLIC BLOOD PRESSURE: 112 MMHG | RESPIRATION RATE: 20 BRPM | OXYGEN SATURATION: 97 % | TEMPERATURE: 97.4 F | HEART RATE: 105 BPM

## 2018-02-08 DIAGNOSIS — J45.30 MILD PERSISTENT ASTHMA WITHOUT COMPLICATION: Primary | ICD-10-CM

## 2018-02-08 DIAGNOSIS — F51.02 TRANSIENT INSOMNIA: ICD-10-CM

## 2018-02-08 DIAGNOSIS — G89.18 ACUTE POST-OPERATIVE PAIN: ICD-10-CM

## 2018-02-08 DIAGNOSIS — F43.22 ADJUSTMENT DISORDER WITH ANXIOUS MOOD: ICD-10-CM

## 2018-02-08 RX ORDER — ZOLPIDEM TARTRATE 5 MG/1
5 TABLET ORAL
Qty: 30 TABLET | Refills: 0 | Status: SHIPPED | OUTPATIENT
Start: 2018-02-08 | End: 2018-03-23

## 2018-02-08 RX ORDER — OXYCODONE AND ACETAMINOPHEN 5; 325 MG/1; MG/1
2 TABLET ORAL 2 TIMES DAILY
Qty: 40 TABLET | Refills: 0 | Status: SHIPPED | OUTPATIENT
Start: 2018-02-08 | End: 2018-06-04

## 2018-02-08 NOTE — MR AVS SNAPSHOT
After Visit Summary   2018    Darlene Hudson    MRN: [id number]           Patient Information     Date Of Birth          1975        Visit Information        Provider Department      2018 11:20 AM Oscar Apraicio MD [facility]        Today's Diagnoses     Mild persistent asthma without complication    -  1    Acute post-operative pain        Transient insomnia        Adjustment disorder with anxious mood           Follow-ups after your visit        Who to contact     Please call your clinic at [phone] to:    Ask questions about your health    Make or cancel appointments    Discuss your medicines    Learn about your test results    Speak to your doctor            Additional Information About Your Visit        MyChart Information     [employer] is an electronic gateway that provides easy, online access to your medical records. With [employer], you can request a clinic appointment, read your test results, renew a prescription or communicate with your care team.     To sign up for upadt visit the website at [email or web address]   You will be asked to enter the access code listed below, as well as some personal information. Please follow the directions to create your username and password.     Your access code is: [email or web address]  Expires: 4/15/2018  2:10 PM     Your access code will  in 90 days. If you need help or a new code, please contact your [facility] or call [phone] for assistance.        Care EveryWhere ID     This is your Care EveryWhere ID. This could be used by other organizations to access your [place] medical records  [id number]        Your Vitals Were     Pulse Temperature Respirations Pulse Oximetry          105 97.4  F (36.3  C) (Oral) 20 97%         Blood Pressure from Last 3 Encounters:   18 112/79   01/15/18 117/84   17 116/79    Weight from Last 3 Encounters:   10/30/17 251 lb 6.4 oz (114 kg)    07/24/17 238 lb 6.4 oz (108.1 kg)   07/24/17 241 lb 9.6 oz (109.6 kg)              Today, you had the following     No orders found for display         Today's Medication Changes          These changes are accurate as of 2/8/18  1:31 PM.  If you have any questions, ask your nurse or doctor.               Start taking these medicines.        Dose/Directions    oxyCODONE-acetaminophen 5-325 MG per tablet   Commonly known as:  PERCOCET   Used for:  Acute post-operative pain   Started by:  Oscar Aparicio MD        Dose:  2 tablet   Take 2 tablets by mouth 2 times daily maximum 4 tablet(s) per day   Quantity:  40 tablet   Refills:  0       ranitidine 300 MG tablet   Commonly known as:  ZANTAC   Used for:  Transient insomnia   Started by:  Oscar Aparicio MD        Dose:  300 mg   Take 1 tablet (300 mg) by mouth At Bedtime   Quantity:  60 tablet   Refills:  1       zolpidem 5 MG tablet   Commonly known as:  AMBIEN   Used for:  Transient insomnia, Adjustment disorder with anxious mood   Started by:  Oscar Aparicio MD        Dose:  5 mg   Take 1 tablet (5 mg) by mouth nightly as needed for sleep   Quantity:  30 tablet   Refills:  0         These medicines have changed or have updated prescriptions.        Dose/Directions    fluticasone 50 MCG/ACT spray   Commonly known as:  FLONASE   This may have changed:    - how much to take  - when to take this  - reasons to take this   Used for:  Congestion of paranasal sinus        Dose:  1-2 spray   Spray 1-2 sprays into both nostrils daily   Quantity:  1 Bottle   Refills:  11            Where to get your medicines      These medications were sent to [id number] - [place], [place] - [address] AT [address] [place] [address]     Phone:  [phone]     ranitidine 300 MG tablet         Some of these will need a paper prescription and others can be bought over the counter.  Ask your nurse if you have  questions.     Bring a paper prescription for each of these medications     oxyCODONE-acetaminophen 5-325 MG per tablet    zolpidem 5 MG tablet                Primary Care Provider Office Phone # Fax #    Oscar Aparicio [phone]       [facility] [address] [place] [address]        Equal Access to Services     : Hadii aad ku hadasho Soomaali, waaxda luqadaha, qaybta kaalmada adeegyada, waxay idiin hayaan adeeg kharash lashagufta . So [facility] [phone].    ATENCIÓN: Si habla español, tiene a boswell disposición servicios gratuitos de asistencia lingüística. Ll[facility] [phone].    We comply with applicable federal civil rights laws and [place] laws. We do not discriminate on the basis of race, color, national origin, age, disability, sex, sexual orientation, or gender identity.            Thank you!     Thank you for choosing [facility]  for your care. Our goal is always to provide you with excellent care. Hearing back from our patients is one way we can continue to improve our services. Please take a few minutes to complete the written survey that you may receive in the mail after your visit with us. Thank you!             Your Updated Medication List - Protect others around you: Learn how to safely use, store and throw away your medicines at www.disposemymeds.org.          This list is accurate as of 2/8/18  1:31 PM.  Always use your most recent med list.                   Brand Name Dispense Instructions for use Diagnosis    ADVAIR DISKUS 500-50 MCG/DOSE diskus inhaler   Generic drug:  fluticasone-salmeterol      Inhale 1 puff into the lungs 2 times daily        albuterol 108 (90 BASE) MCG/ACT Inhaler    PROAIR HFA/PROVENTIL HFA/VENTOLIN HFA     Inhale 2 puffs into the lungs        * azithromycin 250 MG tablet    ZITHROMAX    6 tablet    Two tablets first day, then one tablet daily for four days.    Acute bronchitis, unspecified organism       *  azithromycin 250 MG tablet    ZITHROMAX    6 tablet    Two tablets first day, then one tablet daily for four days.    Acute bronchitis due to other specified organisms, Intermittent asthma without complication, unspecified asthma severity       benzonatate 100 MG capsule    TESSALON    42 capsule    Take 2 capsules (200 mg) by mouth 3 times daily as needed for cough    Cough       fluticasone 50 MCG/ACT spray    FLONASE    1 Bottle    Spray 1-2 sprays into both nostrils daily    Congestion of paranasal sinus       ibuprofen 600 MG tablet    ADVIL/MOTRIN    100 tablet    Take 1 tablet (600 mg) by mouth every 6 hours as needed for moderate pain    Sprain of neck, initial encounter       methocarbamol 750 MG tablet    ROBAXIN    90 tablet    Take 1 tablet (750 mg) by mouth 3 times daily as needed for muscle spasms    Chronic bilateral low back pain without sciatica       montelukast 10 MG tablet    SINGULAIR    90 tablet    Take 1 tablet (10 mg) by mouth At Bedtime    Chronic allergic rhinitis due to pollen, unspecified seasonality       ondansetron 4 MG ODT tab    ZOFRAN ODT    10 tablet    Take 1-2 tablets (4-8 mg) by mouth every 8 hours as needed for nausea    Nausea       oseltamivir 75 MG capsule    TAMIFLU    10 capsule    Take 1 capsule (75 mg) by mouth 2 times daily    Influenza with respiratory manifestation other than pneumonia       oxyCODONE-acetaminophen 5-325 MG per tablet    PERCOCET    40 tablet    Take 2 tablets by mouth 2 times daily maximum 4 tablet(s) per day    Acute post-operative pain       QUEtiapine 100 MG tablet    SEROquel    30 tablet    Take 1 tablet (100 mg) by mouth At Bedtime    Other insomnia       ranitidine 300 MG tablet    ZANTAC    60 tablet    Take 1 tablet (300 mg) by mouth At Bedtime    Transient insomnia       sulfamethoxazole-trimethoprim 800-160 MG per tablet    BACTRIM DS/SEPTRA DS    14 tablet    Take 1 tablet by mouth 2 times daily    Dysuria, Abdominal pain, generalized        traZODone 100 MG tablet    DESYREL    90 tablet    Take 1 tablet (100 mg) by mouth At Bedtime Take 100mg nightly for sleep.    Primary insomnia       zolpidem 5 MG tablet    AMBIEN    30 tablet    Take 1 tablet (5 mg) by mouth nightly as needed for sleep    Transient insomnia, Adjustment disorder with anxious mood       * Notice:  This list has 2 medication(s) that are the same as other medications prescribed for you. Read the directions carefully, and ask your doctor or other care provider to review them with you.

## 2018-02-08 NOTE — PROGRESS NOTES
HPI:       Darlene Hudson is a 42 year old  female who presents for new onset of ankle pain.   Patient states she was walking along the street and fell, injuring her ankle. Seen in ER, and referred to orthopedics. Saw [facility] yesterday, and had placement of a Splint while awaiting an MRI for possible tendon injury.    Patient has been in chronic pain clinic who has been providing patient with oxycodone, 4 tablets per day since December.  Patient has been taking 4 oxycodone tablets per day for about two months. Reviewed [place] Prescription Monitoring Program. Asked patient about what happened to Dr. Aguirre, chronic pain clinic. Patient states that she has been seeing pain clinic regularly, taking 4 oxycodone per day.  She said that her urine drug screen last Monday was positive for cocaine, and that Dr. Vaelntino stated that this broke contract. Patient denies taking cocaine, and got angry at clinic and decided to leave.    Patient asking for medication for pain for the new ankle injury.  Awaiting ortho evaluation.   Discussed the situation at length with the patient. Have told her that we do not want to continue with oxycodone. I will provide the patient with enough meds to cover the ankle injury, but will not refill the oxycodone for the back pain. Have advised patient that I will provide 40 oxycodone today, but that will be the last prescription from me. I have advised her to go back to a pain clinic, but patient is not anxious to do that.   Taking asthma meds regularly.  Complaining of continued insomnia, and reports that sometimes she takes the oxycodone to go to sleep. Have agreed to provide Ambien, have provided usual precautions concerning morning wakefulness.                 PMHX:   Current Medications:   Current Outpatient Prescriptions   Medication Sig Dispense Refill     montelukast (SINGULAIR) 10 MG tablet Take 1 tablet (10 mg) by mouth At Bedtime 90 tablet 1     fluticasone-salmeterol  (ADVAIR DISKUS) 500-50 MCG/DOSE diskus inhaler Inhale 1 puff into the lungs 2 times daily        albuterol (PROAIR HFA/PROVENTIL HFA/VENTOLIN HFA) 108 (90 BASE) MCG/ACT Inhaler Inhale 2 puffs into the lungs       fluticasone (FLONASE) 50 MCG/ACT spray Spray 1-2 sprays into both nostrils daily (Patient taking differently: Spray 1 spray into both nostrils as needed ) 1 Bottle 11     ibuprofen (ADVIL/MOTRIN) 600 MG tablet Take 1 tablet (600 mg) by mouth every 6 hours as needed for moderate pain 100 tablet 0     azithromycin (ZITHROMAX) 250 MG tablet Two tablets first day, then one tablet daily for four days. (Patient not taking: Reported on 2/8/2018) 6 tablet 0     guaiFENesin-codeine (ROBITUSSIN AC) 100-10 MG/5ML SOLN solution Take 5-10 mLs by mouth every 4 hours as needed for cough (Patient not taking: Reported on 2/8/2018) 120 mL 0     oseltamivir (TAMIFLU) 75 MG capsule Take 1 capsule (75 mg) by mouth 2 times daily (Patient not taking: Reported on 2/8/2018) 10 capsule 0     azithromycin (ZITHROMAX) 250 MG tablet Two tablets first day, then one tablet daily for four days. (Patient not taking: Reported on 2/8/2018) 6 tablet 0     benzonatate (TESSALON) 100 MG capsule Take 2 capsules (200 mg) by mouth 3 times daily as needed for cough (Patient not taking: Reported on 1/15/2018) 42 capsule 0     sulfamethoxazole-trimethoprim (BACTRIM DS/SEPTRA DS) 800-160 MG per tablet Take 1 tablet by mouth 2 times daily (Patient not taking: Reported on 1/15/2018) 14 tablet 0     QUEtiapine (SEROQUEL) 100 MG tablet Take 1 tablet (100 mg) by mouth At Bedtime (Patient not taking: Reported on 2/8/2018) 30 tablet 1     ondansetron (ZOFRAN ODT) 4 MG ODT tab Take 1-2 tablets (4-8 mg) by mouth every 8 hours as needed for nausea (Patient not taking: Reported on 2/8/2018) 10 tablet 0     traZODone (DESYREL) 100 MG tablet Take 1 tablet (100 mg) by mouth At Bedtime Take 100mg nightly for sleep. (Patient not taking: Reported on 11/9/2017) 90  "tablet 1     methocarbamol (ROBAXIN) 750 MG tablet Take 1 tablet (750 mg) by mouth 3 times daily as needed for muscle spasms (Patient not taking: Reported on 11/2/2017) 90 tablet 0       Existing Problems  Patient Active Problem List   Diagnosis     Abnormal cytology finding     Nondependent alcohol abuse, episodic drinking behavior     Anxiety state     Controlled substance agreement signed     Low back pain     Chronic sinusitis     Cocaine abuse     Major depressive disorder, recurrent episode, moderate (H)     Asthma     Tobacco use disorder     Noninflammatory disorder of vagina     Pap smear for cervical cancer screening     Abdominal pain     Abnormal cervical Papanicolaou smear     Panic disorder with agoraphobia     Atopic rhinitis     Chronic low back pain     Depression     Moderate persistent asthma     Other long term (current) drug therapy     Tobacco use     Acute pericardial effusion     Anxiety       Allergies:  Allergies   Allergen Reactions     Lidocaine Other (See Comments)     \"my jaw stopped moving\"     Penicillins Hives, Rash and Shortness Of Breath     Lidocaine-Epinephrine [Epinephrine-Lidocaine-Na Metabisulfite] Other (See Comments)     Severe jaw cramping, double vision       Previous labs:  Lab Results   Component Value Date    HGB 13.0 11/02/2017    HCT 38.7 11/02/2017    ALT 20.0 11/02/2017    AST 16.0 11/02/2017    .0 11/02/2017    BUN 13.0 11/02/2017    CO2 23.0 11/02/2017               Review of Systems:    CONSTITUTIONAL: no fatigue, no unexpected change in weight  SKIN: no worrisome rashes, no worrisome moles, no worrisome lesions  EYES: no acute vision problems or changes  ENT: no ear problems, no mouth problems, no throat problems  RESP: no significant cough, no shortness of breath  CV: no chest pain, no palpitations, no new or worsening peripheral edema  GI: no nausea, no vomiting, no constipation, no diarrhea          Physical Exam:     Vitals:    02/08/18 1133   BP: " 112/79   Pulse: 105   Resp: 20   Temp: 97.4  F (36.3  C)   TempSrc: Oral   SpO2: 97%     There is no height or weight on file to calculate BMI.    GENERAL:alert, well hydrated, ambulating on crutches with a right lower leg splint  EYES: Eyes grossly normal to inspection, extraocular movements - intact, and PERRL  HENT: ear canals- normal; TMs- normal; Nose- normal; Mouth- no ulcers, no lesions  NECK: no tenderness, no adenopathy, no asymmetry, no masses, no stiffness  RESP: lungs clear to auscultation - no rales, no rhonchi, no wheezes  CV: regular rates and rhythm, normal S1 S2, no S3 or S4 and no murmur, no click or rub -  Right ankle in splint - will not take apart the splint, which was put on yesterday by ortho.          Labs and Procedures     Office Visit on 11/02/2017   Component Date Value Ref Range Status     Specific Gravity Urine 11/02/2017 >=1.030  1.005 - 1.030 Final     pH Urine 11/02/2017 5.5  4.5 - 8.0 Final     Leukocyte Esterase UR 11/02/2017 Trace* NEGATIVE Final     Nitrite Urine 11/02/2017 Negative  NEGATIVE Final     Protein UR 11/02/2017 Negative  NEGATIVE Final     Glucose Urine 11/02/2017 Negative  NEGATIVE Final     Ketones Urine 11/02/2017 Negative  NEGATIVE Final     Urobilinogen mg/dL 11/02/2017 0.2 E.U./dL  0.2 E.U./dL Final     Bilirubin UR 11/02/2017 Negative  NEGATIVE Final     Blood UR 11/02/2017 2+* NEGATIVE Final     Color Urine 11/02/2017 Yellow   Final     Clarity, urine 11/02/2017 Clear   Final     Glucose 11/02/2017 90.0  60.0 - 109.0 mg/dL Final     Urea Nitrogen 11/02/2017 13.0  5.0 - 24.0 mg/dL Final     Creatinine 11/02/2017 0.9  0.6 - 1.3 mg/dL Final     Sodium 11/02/2017 137.0  133.0 - 144.0 mmol/L Final     Potassium 11/02/2017 4.5  3.4 - 5.3 mmol/L Final     Chloride 11/02/2017 110.0* 94.0 - 109.0 mmol/L Final     Carbon Dioxide 11/02/2017 23.0  20.0 - 32.0 mmol/L Final     Calcium 11/02/2017 8.6  8.5 - 10.4 mg/dL Final     Protein Total 11/02/2017 6.7  6.6 - 8.8 g/dL  Final     Albumin 11/02/2017 3.3  3.3 - 4.6 g/dL Final     Alkaline Phosphatase 11/02/2017 69.0  40.0 - 150.0 U/L Final     ALT 11/02/2017 20.0  0.0 - 45.0 U/L Final     AST 11/02/2017 16.0  0.0 - 45.0 U/L Final     Bilirubin Total 11/02/2017 0.4  0.2 - 1.3 mg/dL Final     eGFR Calculated (Non Black Referen* 11/02/2017 72.3  >60.0 mL/min Final     eGFR Calculated (Black Reference) 11/02/2017 87.5  >60.0 mL/min Final     Lipase 11/02/2017 27  0 - 52 U/L Final     Amylase 11/02/2017 41  5 - 120 U/L Final     WBC 11/02/2017 7.0  4.0 - 11.0 thou/uL Final     RBC 11/02/2017 4.07  3.80 - 5.40 mill/uL Final     Hemoglobin 11/02/2017 13.0  12.0 - 16.0 g/dL Final     Hematocrit 11/02/2017 38.7  35.0 - 47.0 % Final     MCV 11/02/2017 95  80 - 100 fL Final     MCH 11/02/2017 31.9  27.0 - 34.0 pg Final     MCHC 11/02/2017 33.6  32.0 - 36.0 g/dL Final     RDW 11/02/2017 12.8  11.0 - 14.5 % Final     Platelets 11/02/2017 311  140 - 440 thou/uL Final     Mean Platelet Volume 11/02/2017 10.2  8.5 - 12.5 fL Final     % Neutrophils 11/02/2017 58  50 - 70 % Final     % Lymphocytes 11/02/2017 29  20 - 40 % Final     % Monocytes 11/02/2017 7  2 - 10 % Final     % Eosinophils 11/02/2017 5  0 - 6 % Final     % Basophils 11/02/2017 1  0 - 2 % Final     Neutrophils (Absolute) 11/02/2017 4.0  2.0 - 7.7 thou/uL Final     Lymphs (Absolute) 11/02/2017 2.0  0.8 - 4.4 thou/uL Final     Monocytes(Absolute) 11/02/2017 0.5  0.0 - 0.9 thou/uL Final     Eos (Absolute) 11/02/2017 0.4  0.0 - 0.4 thou/uL Final     Baso (Absolute) 11/02/2017 0.1  0.0 - 0.2 thou/uL Final     WBC Urine 11/02/2017 10-25  <5 /hpf Final     RBC Urine 11/02/2017 2-5  <5 /hpf Final     Epithelial Cells UR 11/02/2017   0 - 2 /lpf Final     Mucous Urine 11/02/2017 None  NONE lpf Final     Casts Urine 11/02/2017 None  NONE /lpf Final     Crystal Urine 11/02/2017 None  NONE /lpf Final     Bacteria Wet Prep 11/02/2017 Many  None Final              Assessment and Plan      1.Chronic back pain - no longer seeing pain clinic  2. New right ankle injury - being evaluated by ortho for possible tendon damage.  3. More than 50% of this 30 minute visit was spent in counseling.  Counseling included discussion of use of long term analgesics such as oxycodone.  4. Informed patient that I will not provide oxycodone for her back pain, and will not provide another prescription for the ankle injury. Patient seems to understand.   5. History of asthma.   6. Mild insomnia - will provide ambien for now.         Options for treatment and follow-up care were reviewed with the patient and/or guardian. Darlene Hudson and/or guardian engaged in the decision making process and verbalized understanding of the options discussed and agreed with the final plan.    Oscar Aparicio MD

## 2018-02-21 ENCOUNTER — OFFICE VISIT (OUTPATIENT)
Dept: FAMILY MEDICINE | Facility: CLINIC | Age: 43
End: 2018-02-21
Payer: COMMERCIAL

## 2018-02-21 VITALS
OXYGEN SATURATION: 98 % | DIASTOLIC BLOOD PRESSURE: 74 MMHG | RESPIRATION RATE: 18 BRPM | TEMPERATURE: 97.6 F | SYSTOLIC BLOOD PRESSURE: 108 MMHG | HEART RATE: 99 BPM

## 2018-02-21 DIAGNOSIS — M54.50 CHRONIC BILATERAL LOW BACK PAIN WITHOUT SCIATICA: ICD-10-CM

## 2018-02-21 DIAGNOSIS — G89.29 CHRONIC BILATERAL LOW BACK PAIN WITHOUT SCIATICA: ICD-10-CM

## 2018-02-21 DIAGNOSIS — J45.41 MODERATE PERSISTENT ASTHMA WITH EXACERBATION: Primary | ICD-10-CM

## 2018-02-21 DIAGNOSIS — J30.1 CHRONIC ALLERGIC RHINITIS DUE TO POLLEN, UNSPECIFIED SEASONALITY: ICD-10-CM

## 2018-02-21 DIAGNOSIS — R05.9 COUGH: ICD-10-CM

## 2018-02-21 RX ORDER — PREDNISONE 20 MG/1
40 TABLET ORAL DAILY
Qty: 10 TABLET | Refills: 0 | Status: SHIPPED | OUTPATIENT
Start: 2018-02-21 | End: 2018-02-26

## 2018-02-21 RX ORDER — GABAPENTIN 300 MG/1
300 CAPSULE ORAL 3 TIMES DAILY
Qty: 90 CAPSULE | Refills: 0 | Status: SHIPPED | OUTPATIENT
Start: 2018-02-21 | End: 2018-03-23

## 2018-02-21 RX ORDER — MONTELUKAST SODIUM 10 MG/1
10 TABLET ORAL AT BEDTIME
Qty: 90 TABLET | Refills: 1 | Status: SHIPPED | OUTPATIENT
Start: 2018-02-21 | End: 2018-06-04

## 2018-02-21 RX ORDER — GABAPENTIN 300 MG/1
300 CAPSULE ORAL 3 TIMES DAILY
COMMUNITY
Start: 2017-02-14 | End: 2018-02-21

## 2018-02-21 RX ORDER — GUAIFENESIN/DEXTROMETHORPHAN 100-10MG/5
5 SYRUP ORAL EVERY 4 HOURS PRN
Qty: 560 ML | Refills: 0 | Status: SHIPPED | OUTPATIENT
Start: 2018-02-21 | End: 2018-04-05

## 2018-02-21 ASSESSMENT — ANXIETY QUESTIONNAIRES
7. FEELING AFRAID AS IF SOMETHING AWFUL MIGHT HAPPEN: NOT AT ALL
IF YOU CHECKED OFF ANY PROBLEMS ON THIS QUESTIONNAIRE, HOW DIFFICULT HAVE THESE PROBLEMS MADE IT FOR YOU TO DO YOUR WORK, TAKE CARE OF THINGS AT HOME, OR GET ALONG WITH OTHER PEOPLE: NOT DIFFICULT AT ALL
1. FEELING NERVOUS, ANXIOUS, OR ON EDGE: NOT AT ALL
2. NOT BEING ABLE TO STOP OR CONTROL WORRYING: NOT AT ALL
3. WORRYING TOO MUCH ABOUT DIFFERENT THINGS: NOT AT ALL
5. BEING SO RESTLESS THAT IT IS HARD TO SIT STILL: SEVERAL DAYS
6. BECOMING EASILY ANNOYED OR IRRITABLE: NOT AT ALL
GAD7 TOTAL SCORE: 2

## 2018-02-21 ASSESSMENT — PATIENT HEALTH QUESTIONNAIRE - PHQ9: 5. POOR APPETITE OR OVEREATING: SEVERAL DAYS

## 2018-02-21 NOTE — MR AVS SNAPSHOT
After Visit Summary   2018    Darlene Hudson    MRN: [id number]           Patient Information     Date Of Birth          1975        Visit Information        Provider Department      2018 3:20 PM David Francois MD [facility]        Today's Diagnoses     Moderate persistent asthma with exacerbation    -  1    Chronic allergic rhinitis due to pollen, unspecified seasonality        Chronic bilateral low back pain without sciatica        Cough           Follow-ups after your visit        Follow-up notes from your care team     Return in about 4 weeks (around 3/21/2018) for asthma control test.      Your next 10 appointments already scheduled     2018  3:00 PM CST   Return Visit with Oscar Aparicio MD   [facility] ([facility])    [address] [place] [address]   [phone]              Who to contact     Please call your clinic at [phone] to:    Ask questions about your health    Make or cancel appointments    Discuss your medicines    Learn about your test results    Speak to your doctor            Additional Information About Your Visit        MyChart Information     [employer] is an electronic gateway that provides easy, online access to your medical records. With [employer], you can request a clinic appointment, read your test results, renew a prescription or communicate with your care team.     To sign up for [employer] visit the website at [email or web address]   You will be asked to enter the access code listed below, as well as some personal information. Please follow the directions to create your username and password.     Your access code is: [email or web address]  Expires: 4/15/2018  2:10 PM     Your access code will  in 90 days. If you need help or a new code, please contact your [facility] or call [phone] for assistance.        Care EveryWhere ID     This is your Care EveryWhere  ID. This could be used by other organizations to access your [facility] medical records  [id number]        Your Vitals Were     Pulse Temperature Respirations Pulse Oximetry          99 97.6  F (36.4  C) (Oral) 18 98%         Blood Pressure from Last 3 Encounters:   02/21/18 108/74   02/08/18 112/79   01/15/18 117/84    Weight from Last 3 Encounters:   10/30/17 251 lb 6.4 oz (114 kg)   07/24/17 238 lb 6.4 oz (108.1 kg)   07/24/17 241 lb 9.6 oz (109.6 kg)              Today, you had the following     No orders found for display         Today's Medication Changes          These changes are accurate as of 2/21/18  4:06 PM.  If you have any questions, ask your nurse or doctor.               Start taking these medicines.        Dose/Directions    guaiFENesin-dextromethorphan 100-10 MG/5ML syrup   Commonly known as:  ROBITUSSIN DM   Used for:  Cough   Started by:  David Francois MD        Dose:  5 mL   Take 5 mLs by mouth every 4 hours as needed for cough   Quantity:  560 mL   Refills:  0       predniSONE 20 MG tablet   Commonly known as:  DELTASONE   Used for:  Moderate persistent asthma with exacerbation   Started by:  David Francois MD        Dose:  40 mg   Take 2 tablets (40 mg) by mouth daily for 5 days   Quantity:  10 tablet   Refills:  0         These medicines have changed or have updated prescriptions.        Dose/Directions    fluticasone 50 MCG/ACT spray   Commonly known as:  FLONASE   This may have changed:    - how much to take  - when to take this  - reasons to take this   Used for:  Congestion of paranasal sinus        Dose:  1-2 spray   Spray 1-2 sprays into both nostrils daily   Quantity:  1 Bottle   Refills:  11            Where to get your medicines      These medications were sent to [id number] - [place], [place] - [address] AT SEC of [address]  [address], [place] [address]     Phone:  [phone]     gabapentin 300 MG capsule     guaiFENesin-dextromethorphan 100-10 MG/5ML syrup    montelukast 10 MG tablet    predniSONE 20 MG tablet                Primary Care Provider Office Phone # Fax #    Oscar Aparicio [phone]       [facility] [address] [place] [address]        Equal Access to Services     [facility]: Hadii aad ku hadasho Soomaali, waaxda luqadaha, qaybta kaalmada adeegyada, waxay idiin hayaan adeeg kharash la'aan . So [facility] [phone].    ATENCIÓN: Si habla español, tiene a boswell disposición servicios gratuitos de asistencia lingüística. [facility] [phone].    We comply with applicable federal civil rights laws and [place] laws. We do not discriminate on the basis of race, color, national origin, age, disability, sex, sexual orientation, or gender identity.            Thank you!     Thank you for choosing [facility]  for your care. Our goal is always to provide you with excellent care. Hearing back from our patients is one way we can continue to improve our services. Please take a few minutes to complete the written survey that you may receive in the mail after your visit with us. Thank you!             Your Updated Medication List - Protect others around you: Learn how to safely use, store and throw away your medicines at www.disposemymeds.org.          This list is accurate as of 2/21/18  4:06 PM.  Always use your most recent med list.                   Brand Name Dispense Instructions for use Diagnosis    ADVAIR DISKUS 500-50 MCG/DOSE diskus inhaler   Generic drug:  fluticasone-salmeterol      Inhale 1 puff into the lungs 2 times daily        albuterol 108 (90 BASE) MCG/ACT Inhaler    PROAIR HFA/PROVENTIL HFA/VENTOLIN HFA     Inhale 2 puffs into the lungs        fluticasone 50 MCG/ACT spray    FLONASE    1 Bottle    Spray 1-2 sprays into both nostrils daily    Congestion of paranasal sinus       gabapentin 300 MG capsule    NEURONTIN    90 capsule    Take 1 capsule  (300 mg) by mouth 3 times daily    Chronic bilateral low back pain without sciatica       guaiFENesin-dextromethorphan 100-10 MG/5ML syrup    ROBITUSSIN DM    560 mL    Take 5 mLs by mouth every 4 hours as needed for cough    Cough       ibuprofen 600 MG tablet    ADVIL/MOTRIN    100 tablet    Take 1 tablet (600 mg) by mouth every 6 hours as needed for moderate pain    Sprain of neck, initial encounter       montelukast 10 MG tablet    SINGULAIR    90 tablet    Take 1 tablet (10 mg) by mouth At Bedtime    Chronic allergic rhinitis due to pollen, unspecified seasonality       oxyCODONE-acetaminophen 5-325 MG per tablet    PERCOCET    40 tablet    Take 2 tablets by mouth 2 times daily maximum 4 tablet(s) per day    Acute post-operative pain       predniSONE 20 MG tablet    DELTASONE    10 tablet    Take 2 tablets (40 mg) by mouth daily for 5 days    Moderate persistent asthma with exacerbation       ranitidine 300 MG tablet    ZANTAC    60 tablet    Take 1 tablet (300 mg) by mouth At Bedtime    Transient insomnia       zolpidem 5 MG tablet    AMBIEN    30 tablet    Take 1 tablet (5 mg) by mouth nightly as needed for sleep    Transient insomnia, Adjustment disorder with anxious mood

## 2018-02-21 NOTE — PROGRESS NOTES
Subjective       Darlene Hudson is a 42 year old  female with a significant past medical history of sleep disturbance, asthma, chronic pain, and anxiety who presents for the new concern(s) of    1. Cough  - returned illness about 4-5 days ago  - was previously sick 3 weeks ago, but got better after about 2 weeks for a short period of time  - Coughing, dry cough, +right ear pain, + right drainage with redness, sore throat, slight raspy voice, congestion, as well as shortness of breath  - using albuterol inhaler about 3-5 times per day - temporary relief  - neb x2 last night  - Feels wheezing  - Denies fever, chills, or chest pain    Pertinent ROS: 5-Point Review of Systems Negative -- Except as noted above.         Objective     Vitals:    02/21/18 1525   BP: 108/74   Pulse: 99   Resp: 18   Temp: 97.6  F (36.4  C)   TempSrc: Oral   SpO2: 98%     There is no height or weight on file to calculate BMI.    GENERAL APPEARANCE: healthy, alert and no distress  EYES: right eye erythematous with clear drainage  HENT: ear canals and TM's normal, nose and mouth without ulcers or lesions, oropharynx erythematous and oral mucous membranes moist  NECK: no adenopathy, no asymmetry, masses, or scars and thyroid normal to palpation  RESP: end expiratory wheezing lungs clear to auscultation - no rales, rhonchi  CV: regular rates and rhythm, normal S1 S2, no S3 or S4, no murmur, click or rub, no peripheral edema and peripheral pulses strong  SKIN: no suspicious lesions or rashes  NEURO: no focal deficits  PSYCH: mentation appears normal and flat affect         Assessment/Plan       (J45.41) Moderate persistent asthma with exacerbation  (primary encounter diagnosis)  Comment:   Plan: predniSONE (DELTASONE) 20 MG tablet        Asthma exacerbation suspected. No fevers, body aches or other influenza hallmark symptoms. Likely adenovirus with right eye viral conjunctivitis. Treat with 5 days of 40mg prednisone, continue albuterol  nebulizers.    (J30.1) Chronic allergic rhinitis due to pollen, unspecified seasonality  Comment:   Plan: montelukast (SINGULAIR) 10 MG tablet        Restart montelukast    (M54.5,  G89.29) Chronic bilateral low back pain without sciatica  Comment:   Plan: gabapentin (NEURONTIN) 300 MG capsule        Refill gabapentin for chronic pain.    (R05) Cough  Comment:   Plan: guaiFENesin-dextromethorphan (ROBITUSSIN DM)         100-10 MG/5ML syrup        Cough suppressant at night for symptom relief so she can sleep.    Patient requesting refill on ambien. I directed her to follow up with Dr. Aparicio, patients PCP for further refills. Offered ambien to help with sleep.      Options for treatment and follow-up care were reviewed with the patient and/or guardian. Darlene Hudson and/or guardian engaged in the decision making process and verbalized understanding of the options discussed and agreed with the final plan.    David Francois MD      Precepted today with: Dr. Jody Angel    This note was created using Dragon Dictation software. Any grammatical errors or word substitutions are unintentional, despite proofreading.

## 2018-02-21 NOTE — PROGRESS NOTES
Preceptor Attestation:  Patient's case reviewed and discussed with the resident, David Francois MD, and I personally evaluated the patient. I agree with written assessment and plan of care.    Supervising Physician:  Jody Angel   [facility]

## 2018-02-22 ASSESSMENT — PATIENT HEALTH QUESTIONNAIRE - PHQ9: SUM OF ALL RESPONSES TO PHQ QUESTIONS 1-9: 4

## 2018-02-22 ASSESSMENT — ASTHMA QUESTIONNAIRES: ACT_TOTALSCORE: 10

## 2018-02-22 ASSESSMENT — ANXIETY QUESTIONNAIRES: GAD7 TOTAL SCORE: 2

## 2018-03-19 ENCOUNTER — TRANSFERRED RECORDS (OUTPATIENT)
Dept: HEALTH INFORMATION MANAGEMENT | Facility: CLINIC | Age: 43
End: 2018-03-19

## 2018-04-02 ENCOUNTER — TRANSFERRED RECORDS (OUTPATIENT)
Dept: HEALTH INFORMATION MANAGEMENT | Facility: CLINIC | Age: 43
End: 2018-04-02

## 2018-04-05 ENCOUNTER — OFFICE VISIT (OUTPATIENT)
Dept: FAMILY MEDICINE | Facility: CLINIC | Age: 43
End: 2018-04-05
Payer: COMMERCIAL

## 2018-04-05 VITALS
TEMPERATURE: 98.4 F | DIASTOLIC BLOOD PRESSURE: 85 MMHG | HEART RATE: 98 BPM | HEIGHT: 70 IN | OXYGEN SATURATION: 97 % | SYSTOLIC BLOOD PRESSURE: 123 MMHG

## 2018-04-05 DIAGNOSIS — R09.81 CONGESTION OF PARANASAL SINUS: ICD-10-CM

## 2018-04-05 DIAGNOSIS — M25.571 PAIN IN JOINT, ANKLE AND FOOT, RIGHT: ICD-10-CM

## 2018-04-05 DIAGNOSIS — G89.29 CHRONIC BILATERAL LOW BACK PAIN WITHOUT SCIATICA: ICD-10-CM

## 2018-04-05 DIAGNOSIS — M54.50 CHRONIC BILATERAL LOW BACK PAIN WITHOUT SCIATICA: ICD-10-CM

## 2018-04-05 DIAGNOSIS — F41.9 ANXIETY: ICD-10-CM

## 2018-04-05 DIAGNOSIS — F51.02 TRANSIENT INSOMNIA: ICD-10-CM

## 2018-04-05 DIAGNOSIS — J45.40 MODERATE PERSISTENT ASTHMA WITHOUT COMPLICATION: Primary | ICD-10-CM

## 2018-04-05 DIAGNOSIS — J30.1 CHRONIC ALLERGIC RHINITIS DUE TO POLLEN, UNSPECIFIED SEASONALITY: ICD-10-CM

## 2018-04-05 RX ORDER — FLUTICASONE PROPIONATE 50 MCG
1-2 SPRAY, SUSPENSION (ML) NASAL DAILY
Qty: 1 BOTTLE | Refills: 11 | Status: SHIPPED | OUTPATIENT
Start: 2018-04-05 | End: 2018-06-04

## 2018-04-05 RX ORDER — ALBUTEROL SULFATE 90 UG/1
2 AEROSOL, METERED RESPIRATORY (INHALATION) EVERY 4 HOURS PRN
Qty: 1 INHALER | Refills: 3 | Status: SHIPPED | OUTPATIENT
Start: 2018-04-05 | End: 2018-06-04

## 2018-04-05 RX ORDER — ZOLPIDEM TARTRATE 10 MG/1
10 TABLET ORAL
Qty: 30 TABLET | Refills: 1 | Status: SHIPPED | OUTPATIENT
Start: 2018-04-05 | End: 2018-07-11

## 2018-04-05 RX ORDER — MONTELUKAST SODIUM 10 MG/1
10 TABLET ORAL AT BEDTIME
Qty: 60 TABLET | Refills: 1 | Status: SHIPPED | OUTPATIENT
Start: 2018-04-05 | End: 2018-06-04

## 2018-04-05 RX ORDER — GABAPENTIN 300 MG/1
900 CAPSULE ORAL 3 TIMES DAILY
Qty: 270 CAPSULE | Refills: 0 | Status: SHIPPED | OUTPATIENT
Start: 2018-04-05 | End: 2018-06-04

## 2018-04-05 NOTE — MR AVS SNAPSHOT
After Visit Summary   4/5/2018    Darlene Hudson    MRN: [id number]           Patient Information     Date Of Birth          1975        Visit Information        Provider Department      4/5/2018 10:40 AM Oscar Aparicio MD [facility]        Today's Diagnoses     Moderate persistent asthma without complication    -  1    Transient insomnia          Care Instructions        ~~~~~~~~~~~~~~~~~~~~~~~~~  Your medication list is printed, please keep this with you, it is helpful to bring this current list to any other medical appointments, the emergency room or hospital.    If you had lab testing today and your results are reassuring or normal they will be be mailed to you within 7 days.     If the lab tests need quick action we will call you with the results.   The phone number we will call with results is # [phone] (home) . If this is not the best number please call our clinic and change the number.    If you need any refills please call your pharmacy and they will contact us.    If you have any further concerns or wish to schedule another appointment you must call our office during normal business hours  [phone] (8-5:00 M-F)  If you have urgent medical questions that cannot wait  you may also call [phone] at any time of day.  If you have a medical emergency please call [phone].    Thank you for coming to [facility].            Follow-ups after your visit        Who to contact     Please call your clinic at [phone] to:    Ask questions about your health    Make or cancel appointments    Discuss your medicines    Learn about your test results    Speak to your doctor            Additional Information About Your Visit        [employer] Information     [employer] is an electronic gateway that provides easy, online access to your medical records. With [employer], you can request a clinic appointment, read your test results, renew a prescription or communicate with your care  "team.     To sign up for [email or web address] visit the website at [email or web address]   You will be asked to enter the access code listed below, as well as some personal information. Please follow the directions to create your username and password.     Your access code is: [email or web address]  Expires: 4/15/2018  3:10 PM     Your access code will  in 90 days. If you need help or a new code, please contact your [facility] or call [phone] for assistance.        Care EveryWhere ID     This is your Care EveryWhere ID. This could be used by other organizations to access your [place] medical records  [id number]        Your Vitals Were     Pulse Temperature Height Pulse Oximetry          98 98.4  F (36.9  C) (Oral) 5' 10\" (177.8 cm) 97%         Blood Pressure from Last 3 Encounters:   18 123/85   18 108/74   18 112/79    Weight from Last 3 Encounters:   10/30/17 251 lb 6.4 oz (114 kg)   17 238 lb 6.4 oz (108.1 kg)   17 241 lb 9.6 oz (109.6 kg)              Today, you had the following     No orders found for display         Today's Medication Changes          These changes are accurate as of 18 11:34 AM.  If you have any questions, ask your nurse or doctor.               These medicines have changed or have updated prescriptions.        Dose/Directions    fluticasone 50 MCG/ACT spray   Commonly known as:  FLONASE   This may have changed:    - how much to take  - when to take this  - reasons to take this   Used for:  Congestion of paranasal sinus        Dose:  1-2 spray   Spray 1-2 sprays into both nostrils daily   Quantity:  1 Bottle   Refills:  11                Primary Care Provider Office Phone # Fax #    Oscar Aparicio [phone]       [facility] [address] [place] [address]        Equal Access to Services     ROLY ALBERTS AH: Hadii kvng Glass, gonzálezda luede, qaybta kadelores cruz " candy palomoloco zamora'aan ah. So [facility] [phone].    ATENCIÓN: Si negin anderson, tiene a boswell disposición servicios gratuitos de asistencia lingüística. Darby al [phone].    We comply with applicable federal civil rights laws and [place] laws. We do not discriminate on the basis of race, color, national origin, age, disability, sex, sexual orientation, or gender identity.            Thank you!     Thank you for choosing [facility]  for your care. Our goal is always to provide you with excellent care. Hearing back from our patients is one way we can continue to improve our services. Please take a few minutes to complete the written survey that you may receive in the mail after your visit with us. Thank you!             Your Updated Medication List - Protect others around you: Learn how to safely use, store and throw away your medicines at www.disposemymeds.org.          This list is accurate as of 4/5/18 11:34 AM.  Always use your most recent med list.                   Brand Name Dispense Instructions for use Diagnosis    ADVAIR DISKUS 500-50 MCG/DOSE diskus inhaler   Generic drug:  fluticasone-salmeterol      Inhale 1 puff into the lungs 2 times daily        albuterol 108 (90 BASE) MCG/ACT Inhaler    PROAIR HFA/PROVENTIL HFA/VENTOLIN HFA     Inhale 2 puffs into the lungs        fluticasone 50 MCG/ACT spray    FLONASE    1 Bottle    Spray 1-2 sprays into both nostrils daily    Congestion of paranasal sinus       gabapentin 300 MG capsule    NEURONTIN    90 capsule    Take 1 capsule (300 mg) by mouth 3 times daily    Chronic bilateral low back pain without sciatica       montelukast 10 MG tablet    SINGULAIR    90 tablet    Take 1 tablet (10 mg) by mouth At Bedtime    Chronic allergic rhinitis due to pollen, unspecified seasonality       oxyCODONE-acetaminophen 5-325 MG per tablet    PERCOCET    40 tablet    Take 2 tablets by mouth 2 times daily maximum 4 tablet(s) per day    Acute  post-operative pain       ranitidine 300 MG tablet    ZANTAC    60 tablet    Take 1 tablet (300 mg) by mouth At Bedtime    Transient insomnia       zolpidem 5 MG tablet    AMBIEN    30 tablet    Take 1 tablet (5 mg) by mouth nightly as needed for sleep    Transient insomnia, Adjustment disorder with anxious mood

## 2018-04-05 NOTE — PATIENT INSTRUCTIONS
~~~~~~~~~~~~~~~~~~~~~~~~~  Your medication list is printed, please keep this with you, it is helpful to bring this current list to any other medical appointments, the emergency room or hospital.    If you had lab testing today and your results are reassuring or normal they will be be mailed to you within 7 days.     If the lab tests need quick action we will call you with the results.   The phone number we will call with results is # [phone] (home) . If this is not the best number please call our clinic and change the number.    If you need any refills please call your pharmacy and they will contact us.    If you have any further concerns or wish to schedule another appointment you must call our office during normal business hours  [phone] (8-5:00 M-F)  If you have urgent medical questions that cannot wait  you may also call [phone] at any time of day.  If you have a medical emergency please call [phone].    Thank you for coming to [facility].

## 2018-04-05 NOTE — PROGRESS NOTES
HPI:       Darlene Hudson is a 43 year old  female who presents for multiple questions.  1. Considering a 2nd opinion for her ankle. Patient had sprained ankle last summer. She was waiting for this to heal, when she slipped and injured it again. She more rectly slipped and injured her r9ght knee.  MRI has shown an ankle ligament tear (patient reprots a complete tear). Ortho is discussed surgical repair. Patient wondered if she should seek another opinion.   2. Patient unhappy with the current ambien dose of 5 mg.  She was well controlled on 10 mg before, and objects to the 5 mg dosing.  She states she never had problems with next morning drowsiness, but understands that it could be a problem. She states she is continuing to take two 5 mg tablets, but then she runs out too quickly.    3. Needs refill of her asthma meds.  4. Request Singulair because it is now coming up to allergy season.    5. The 100 mg gabapentin is not working well. She finds she can tolerate pain with 200 gabapentin, but during the day needs 300 mg.  She requests increase in dosage.    Accompanied by two children.                PMHX:   Current Medications:   Current Outpatient Prescriptions   Medication Sig Dispense Refill     zolpidem (AMBIEN) 5 MG tablet Take 1 tablet (5 mg) by mouth nightly as needed for sleep 30 tablet 0     gabapentin (NEURONTIN) 300 MG capsule Take 1 capsule (300 mg) by mouth 3 times daily 90 capsule 0     montelukast (SINGULAIR) 10 MG tablet Take 1 tablet (10 mg) by mouth At Bedtime 90 tablet 1     ranitidine (ZANTAC) 300 MG tablet Take 1 tablet (300 mg) by mouth At Bedtime 60 tablet 1     fluticasone-salmeterol (ADVAIR DISKUS) 500-50 MCG/DOSE diskus inhaler Inhale 1 puff into the lungs 2 times daily        albuterol (PROAIR HFA/PROVENTIL HFA/VENTOLIN HFA) 108 (90 BASE) MCG/ACT Inhaler Inhale 2 puffs into the lungs       fluticasone (FLONASE) 50 MCG/ACT spray Spray 1-2 sprays into both nostrils daily (Patient  "taking differently: Spray 1 spray into both nostrils as needed ) 1 Bottle 11     guaiFENesin-dextromethorphan (ROBITUSSIN DM) 100-10 MG/5ML syrup Take 5 mLs by mouth every 4 hours as needed for cough (Patient not taking: Reported on 4/5/2018) 560 mL 0     oxyCODONE-acetaminophen (PERCOCET) 5-325 MG per tablet Take 2 tablets by mouth 2 times daily maximum 4 tablet(s) per day 40 tablet 0     ibuprofen (ADVIL/MOTRIN) 600 MG tablet Take 1 tablet (600 mg) by mouth every 6 hours as needed for moderate pain (Patient not taking: Reported on 4/5/2018) 100 tablet 0       Existing Problems  Patient Active Problem List   Diagnosis     Abnormal cytology finding     Nondependent alcohol abuse, episodic drinking behavior     Anxiety state     Controlled substance agreement signed     Low back pain     Chronic sinusitis     Cocaine abuse     Major depressive disorder, recurrent episode, moderate (H)     Asthma     Tobacco use disorder     Noninflammatory disorder of vagina     Pap smear for cervical cancer screening     Abdominal pain     Abnormal cervical Papanicolaou smear     Panic disorder with agoraphobia     Atopic rhinitis     Chronic low back pain     Depression     Moderate persistent asthma     Other long term (current) drug therapy     Tobacco use     Acute pericardial effusion     Anxiety       Allergies:  Allergies   Allergen Reactions     Lidocaine Other (See Comments)     \"my jaw stopped moving\"     Penicillins Hives, Rash and Shortness Of Breath     Lidocaine-Epinephrine [Epinephrine-Lidocaine-Na Metabisulfite] Other (See Comments)     Severe jaw cramping, double vision       Previous labs:  Lab Results   Component Value Date    HGB 13.0 11/02/2017    HCT 38.7 11/02/2017    ALT 20.0 11/02/2017    AST 16.0 11/02/2017    .0 11/02/2017    BUN 13.0 11/02/2017    CO2 23.0 11/02/2017               Review of Systems:    CONSTITUTIONAL: no fatigue, no unexpected change in weight  SKIN: no worrisome rashes, no " "worrisome moles, no worrisome lesions  EYES: no acute vision problems or changes  ENT: no ear problems, no mouth problems, no throat problems  RESP: no significant cough, no shortness of breath  CV: no chest pain, no palpitations, no new or worsening peripheral edema  GI: no nausea, no vomiting, no constipation, no diarrhea          Physical Exam:     Vitals:    04/05/18 1053   BP: 123/85   Pulse: 98   Temp: 98.4  F (36.9  C)   TempSrc: Oral   SpO2: 97%   Height: 5' 10\" (177.8 cm)     There is no height or weight on file to calculate BMI.    GENERAL:alert, well hydrated, no distress  EYES: Eyes grossly normal to inspection, extraocular movements - intact, and PERRL  HENT: ear canals- normal; TMs- normal; Nose- normal; Mouth- no ulcers, no lesions  NECK: no tenderness, no adenopathy, no asymmetry, no masses, no stiffness;   RESP: lungs clear to auscultation - no rales, no rhonchi, no wheezes  CV: regular rates and rhythm, normal S1 S2, no S3 or S4 and no murmur, no click or rub -  EXTREM- patient is in a knee brace and an ankle brace, right leg.              Labs and Procedures     Office Visit on 11/02/2017   Component Date Value Ref Range Status     Specific Gravity Urine 11/02/2017 >=1.030  1.005 - 1.030 Final     pH Urine 11/02/2017 5.5  4.5 - 8.0 Final     Leukocyte Esterase UR 11/02/2017 Trace* NEGATIVE Final     Nitrite Urine 11/02/2017 Negative  NEGATIVE Final     Protein UR 11/02/2017 Negative  NEGATIVE Final     Glucose Urine 11/02/2017 Negative  NEGATIVE Final     Ketones Urine 11/02/2017 Negative  NEGATIVE Final     Urobilinogen mg/dL 11/02/2017 0.2 E.U./dL  0.2 E.U./dL Final     Bilirubin UR 11/02/2017 Negative  NEGATIVE Final     Blood UR 11/02/2017 2+* NEGATIVE Final     Color Urine 11/02/2017 Yellow   Final     Clarity, urine 11/02/2017 Clear   Final     Glucose 11/02/2017 90.0  60.0 - 109.0 mg/dL Final     Urea Nitrogen 11/02/2017 13.0  5.0 - 24.0 mg/dL Final     Creatinine 11/02/2017 0.9  0.6 - 1.3 " mg/dL Final     Sodium 11/02/2017 137.0  133.0 - 144.0 mmol/L Final     Potassium 11/02/2017 4.5  3.4 - 5.3 mmol/L Final     Chloride 11/02/2017 110.0* 94.0 - 109.0 mmol/L Final     Carbon Dioxide 11/02/2017 23.0  20.0 - 32.0 mmol/L Final     Calcium 11/02/2017 8.6  8.5 - 10.4 mg/dL Final     Protein Total 11/02/2017 6.7  6.6 - 8.8 g/dL Final     Albumin 11/02/2017 3.3  3.3 - 4.6 g/dL Final     Alkaline Phosphatase 11/02/2017 69.0  40.0 - 150.0 U/L Final     ALT 11/02/2017 20.0  0.0 - 45.0 U/L Final     AST 11/02/2017 16.0  0.0 - 45.0 U/L Final     Bilirubin Total 11/02/2017 0.4  0.2 - 1.3 mg/dL Final     eGFR Calculated (Non Black Referen* 11/02/2017 72.3  >60.0 mL/min Final     eGFR Calculated (Black Reference) 11/02/2017 87.5  >60.0 mL/min Final     Lipase 11/02/2017 27  0 - 52 U/L Final     Amylase 11/02/2017 41  5 - 120 U/L Final     WBC 11/02/2017 7.0  4.0 - 11.0 thou/uL Final     RBC 11/02/2017 4.07  3.80 - 5.40 mill/uL Final     Hemoglobin 11/02/2017 13.0  12.0 - 16.0 g/dL Final     Hematocrit 11/02/2017 38.7  35.0 - 47.0 % Final     MCV 11/02/2017 95  80 - 100 fL Final     MCH 11/02/2017 31.9  27.0 - 34.0 pg Final     MCHC 11/02/2017 33.6  32.0 - 36.0 g/dL Final     RDW 11/02/2017 12.8  11.0 - 14.5 % Final     Platelets 11/02/2017 311  140 - 440 thou/uL Final     Mean Platelet Volume 11/02/2017 10.2  8.5 - 12.5 fL Final     % Neutrophils 11/02/2017 58  50 - 70 % Final     % Lymphocytes 11/02/2017 29  20 - 40 % Final     % Monocytes 11/02/2017 7  2 - 10 % Final     % Eosinophils 11/02/2017 5  0 - 6 % Final     % Basophils 11/02/2017 1  0 - 2 % Final     Neutrophils (Absolute) 11/02/2017 4.0  2.0 - 7.7 thou/uL Final     Lymphs (Absolute) 11/02/2017 2.0  0.8 - 4.4 thou/uL Final     Monocytes(Absolute) 11/02/2017 0.5  0.0 - 0.9 thou/uL Final     Eos (Absolute) 11/02/2017 0.4  0.0 - 0.4 thou/uL Final     Baso (Absolute) 11/02/2017 0.1  0.0 - 0.2 thou/uL Final     WBC Urine 11/02/2017 10-25  <5 /hpf Final     RBC  Urine 11/02/2017 2-5  <5 /hpf Final     Epithelial Cells UR 11/02/2017   0 - 2 /lpf Final     Mucous Urine 11/02/2017 None  NONE lpf Final     Casts Urine 11/02/2017 None  NONE /lpf Final     Crystal Urine 11/02/2017 None  NONE /lpf Final     Bacteria Wet Prep 11/02/2017 Many  None Final              Assessment and Plan     1.Insomnia, - Will increase Ambien to 10 mg.  Discussed with patient that this is not recommended by the FDA. Patient understands, and agrees to avoid operating a car or machinery in the AM.    2. Chronic pain - Will increase gabapentin dose.  Cut back on taking the ibuprofen.   3. Refilled allergy meds and asthma meds.   4. Discussed second opinion. Patient will hold off for now.  5. Delayed healing of ruptured ankle ligament. Following with ortho - may require surgery.   6. Anxiety - actually seems under reasonable control at this time. Although patient remains worried, she is responding to realistic concerns.       Options for treatment and follow-up care were reviewed with the patient and/or guardian. Darlene Hudson and/or guardian engaged in the decision making process and verbalized understanding of the options discussed and agreed with the final plan.    Oscar Aparicio MD

## 2018-04-13 ENCOUNTER — TRANSFERRED RECORDS (OUTPATIENT)
Dept: HEALTH INFORMATION MANAGEMENT | Facility: CLINIC | Age: 43
End: 2018-04-13

## 2018-04-18 ENCOUNTER — TRANSFERRED RECORDS (OUTPATIENT)
Dept: HEALTH INFORMATION MANAGEMENT | Facility: CLINIC | Age: 43
End: 2018-04-18

## 2018-04-23 DIAGNOSIS — L70.0 ACNE VULGARIS: Primary | ICD-10-CM

## 2018-04-23 RX ORDER — METRONIDAZOLE 7.5 MG/G
GEL TOPICAL 2 TIMES DAILY
Qty: 45 G | Refills: 11 | Status: SHIPPED | OUTPATIENT
Start: 2018-04-23 | End: 2019-10-21

## 2018-05-23 ENCOUNTER — RECORDS - HEALTHEAST (OUTPATIENT)
Dept: ADMINISTRATIVE | Facility: OTHER | Age: 43
End: 2018-05-23

## 2018-06-04 ENCOUNTER — OFFICE VISIT (OUTPATIENT)
Dept: FAMILY MEDICINE | Facility: CLINIC | Age: 43
End: 2018-06-04
Payer: OTHER MISCELLANEOUS

## 2018-06-04 VITALS
HEART RATE: 101 BPM | OXYGEN SATURATION: 97 % | HEIGHT: 70 IN | SYSTOLIC BLOOD PRESSURE: 122 MMHG | TEMPERATURE: 98.5 F | DIASTOLIC BLOOD PRESSURE: 87 MMHG

## 2018-06-04 DIAGNOSIS — F51.02 TRANSIENT INSOMNIA: ICD-10-CM

## 2018-06-04 DIAGNOSIS — R09.81 CONGESTION OF PARANASAL SINUS: ICD-10-CM

## 2018-06-04 DIAGNOSIS — G89.18 ACUTE POST-OPERATIVE PAIN: ICD-10-CM

## 2018-06-04 DIAGNOSIS — J45.40 MODERATE PERSISTENT ASTHMA WITHOUT COMPLICATION: ICD-10-CM

## 2018-06-04 DIAGNOSIS — M54.50 CHRONIC BILATERAL LOW BACK PAIN WITHOUT SCIATICA: ICD-10-CM

## 2018-06-04 DIAGNOSIS — M25.571 PAIN IN JOINT INVOLVING ANKLE AND FOOT, RIGHT: Primary | ICD-10-CM

## 2018-06-04 DIAGNOSIS — G89.29 CHRONIC BILATERAL LOW BACK PAIN WITHOUT SCIATICA: ICD-10-CM

## 2018-06-04 RX ORDER — ALBUTEROL SULFATE 90 UG/1
2 AEROSOL, METERED RESPIRATORY (INHALATION) EVERY 4 HOURS PRN
Qty: 1 INHALER | Refills: 3 | Status: SHIPPED | OUTPATIENT
Start: 2018-06-04 | End: 2019-01-03

## 2018-06-04 RX ORDER — MONTELUKAST SODIUM 10 MG/1
10 TABLET ORAL AT BEDTIME
Qty: 60 TABLET | Refills: 1 | Status: SHIPPED | OUTPATIENT
Start: 2018-06-04 | End: 2019-01-03

## 2018-06-04 RX ORDER — OXYCODONE AND ACETAMINOPHEN 5; 325 MG/1; MG/1
2 TABLET ORAL 2 TIMES DAILY
Qty: 40 TABLET | Refills: 0 | Status: SHIPPED | OUTPATIENT
Start: 2018-06-04 | End: 2018-07-23

## 2018-06-04 RX ORDER — GABAPENTIN 300 MG/1
900 CAPSULE ORAL 3 TIMES DAILY
Qty: 270 CAPSULE | Refills: 0 | Status: SHIPPED | OUTPATIENT
Start: 2018-06-04 | End: 2019-10-21

## 2018-06-04 RX ORDER — FLUTICASONE PROPIONATE 50 MCG
1-2 SPRAY, SUSPENSION (ML) NASAL DAILY
Qty: 1 BOTTLE | Refills: 11 | Status: SHIPPED | OUTPATIENT
Start: 2018-06-04 | End: 2019-01-03

## 2018-06-04 RX ORDER — ZOLPIDEM TARTRATE 10 MG/1
10 TABLET ORAL
Qty: 30 TABLET | Refills: 1 | Status: CANCELLED | OUTPATIENT
Start: 2018-06-04

## 2018-06-04 RX ORDER — ZOLPIDEM TARTRATE 10 MG/1
10 TABLET ORAL
Qty: 30 TABLET | Refills: 0 | Status: SHIPPED | OUTPATIENT
Start: 2018-06-04 | End: 2018-07-11

## 2018-06-04 NOTE — MR AVS SNAPSHOT
"              After Visit Summary   6/4/2018    Darlene Hudson    MRN: [id number]           Patient Information     Date Of Birth          1975        Visit Information        Provider Department      6/4/2018 2:00 PM Oscar Aparicio MD [facility]        Today's Diagnoses     Pain in joint involving ankle and foot, right    -  1    Transient insomnia        Moderate persistent asthma without complication        Chronic bilateral low back pain without sciatica        Acute post-operative pain        Congestion of paranasal sinus           Follow-ups after your visit        Who to contact     Please call your clinic at [phone] to:    Ask questions about your health    Make or cancel appointments    Discuss your medicines    Learn about your test results    Speak to your doctor            Additional Information About Your Visit        Care EveryWhere ID     This is your Care EveryWhere ID. This could be used by other organizations to access your [facility] medical records  [id number]        Your Vitals Were     Pulse Temperature Height Last Period Pulse Oximetry       101 98.5  F (36.9  C) (Oral) 5' 9.5\" (176.5 cm) 05/14/2018 97%        Blood Pressure from Last 3 Encounters:   06/04/18 122/87   04/05/18 123/85   02/21/18 108/74    Weight from Last 3 Encounters:   10/30/17 251 lb 6.4 oz (114 kg)   07/24/17 238 lb 6.4 oz (108.1 kg)   07/24/17 241 lb 9.6 oz (109.6 kg)              Today, you had the following     No orders found for display         Today's Medication Changes          These changes are accurate as of 6/4/18  3:45 PM.  If you have any questions, ask your nurse or doctor.               These medicines have changed or have updated prescriptions.        Dose/Directions    * zolpidem 10 MG tablet   Commonly known as:  AMBIEN   This may have changed:  Another medication with the same name was changed. Make sure you understand how and when to take each.   Used for:  Transient insomnia "   Changed by:  Oscar Aparicio MD        Dose:  10 mg   Take 1 tablet (10 mg) by mouth nightly as needed for sleep   Quantity:  30 tablet   Refills:  1       * zolpidem 10 MG tablet   Commonly known as:  AMBIEN   This may have changed:    - medication strength  - how much to take   Used for:  Transient insomnia   Changed by:  Oscar Aparicio MD        Dose:  10 mg   Take 1 tablet (10 mg) by mouth nightly as needed for sleep   Quantity:  30 tablet   Refills:  0       * Notice:  This list has 2 medication(s) that are the same as other medications prescribed for you. Read the directions carefully, and ask your doctor or other care provider to review them with you.         Where to get your medicines      [facility] [address] [place] [address] AT [facility]  [address] [place] [id number]     Phone:  [phone]     albuterol 108 (90 Base) MCG/ACT Inhaler    fluticasone 50 MCG/ACT spray    fluticasone-salmeterol 500-50 MCG/DOSE diskus inhaler    gabapentin 300 MG capsule    montelukast 10 MG tablet         Some of these will need a paper prescription and others can be bought over the counter.  Ask your nurse if you have questions.     Bring a paper prescription for each of these medications     oxyCODONE-acetaminophen 5-325 MG per tablet    zolpidem 10 MG tablet               Information about OPIOIDS     PRESCRIPTION OPIOIDS: WHAT YOU NEED TO KNOW   You have a prescription for an opioid (narcotic) pain medicine. Opioids can cause addiction. If you have a history of chemical dependency of any type, you are at a higher risk of becoming addicted to opioids. Only take this medicine after all other options have been tried. Take it for as short a time and as few doses as possible.     Do not:    Drive. If you drive while taking these medicines, you could be arrested for driving under the influence (DUI).    Operate heavy machinery    Do any other  dangerous activities while taking these medicines.     Drink any alcohol while taking these medicines.      Take with any other medicines that contain acetaminophen. Read all labels carefully. Look for the word  acetaminophen  or  Tylenol.  Ask your pharmacist if you have questions or are unsure.    Store your pills in a secure place, locked if possible. We will not replace any lost or stolen medicine. If you don t finish your medicine, please throw away (dispose) as directed by your pharmacist. The [place] Pollution Control Agency has more information about safe disposal: https://www.pca.[facility].[place].us/living-green/managing-unwanted-medications    All opioids tend to cause constipation. Drink plenty of water and eat foods that have a lot of fiber, such as fruits, vegetables, prune juice, apple juice and high-fiber cereal. Take a laxative (Miralax, milk of magnesia, Colace, Senna) if you don t move your bowels at least every other day.          Primary Care Provider Office Phone # Fax #    Oscar Aparicio [phone]       [facility] [place] [address]        Equal Access to Services     [facility]: Hadii aad ku hadasho Soomaali, waaxda luqadaha, qaybta kaalmada adececelia, delores macdonald . So [facility] [place] [phone].    ATENCIÓN: Si habla español, tiene a boswell disposición servicios gratuitos de asistencia lingüística. Darby al [phone].    We comply with applicable federal civil rights laws and [place] laws. We do not discriminate on the basis of race, color, national origin, age, disability, sex, sexual orientation, or gender identity.            Thank you!     Thank you for choosing [facility]  for your care. Our goal is always to provide you with excellent care. Hearing back from our patients is one way we can continue to improve our services. Please take a few minutes to complete the written survey that you may receive in the mail after your  visit with us. Thank you!             Your Updated Medication List - Protect others around you: Learn how to safely use, store and throw away your medicines at www.disposemymeds.org.          This list is accurate as of 6/4/18  3:45 PM.  Always use your most recent med list.                   Brand Name Dispense Instructions for use Diagnosis    albuterol 108 (90 Base) MCG/ACT Inhaler    PROAIR HFA/PROVENTIL HFA/VENTOLIN HFA    1 Inhaler    Inhale 2 puffs into the lungs every 4 hours as needed for shortness of breath / dyspnea or wheezing    Moderate persistent asthma without complication       fluticasone 50 MCG/ACT spray    FLONASE    1 Bottle    Spray 1-2 sprays into both nostrils daily    Congestion of paranasal sinus       fluticasone-salmeterol 500-50 MCG/DOSE diskus inhaler    ADVAIR DISKUS    60 Inhaler    Inhale 1 puff into the lungs 2 times daily    Moderate persistent asthma without complication       gabapentin 300 MG capsule    NEURONTIN    270 capsule    Take 3 capsules (900 mg) by mouth 3 times daily    Chronic bilateral low back pain without sciatica       metroNIDAZOLE 0.75 % topical gel    METROGEL    45 g    Apply topically 2 times daily    Acne vulgaris       montelukast 10 MG tablet    SINGULAIR    60 tablet    Take 1 tablet (10 mg) by mouth At Bedtime    Moderate persistent asthma without complication       oxyCODONE-acetaminophen 5-325 MG per tablet    PERCOCET    40 tablet    Take 2 tablets by mouth 2 times daily maximum 4 tablet(s) per day    Acute post-operative pain       ranitidine 300 MG tablet    ZANTAC    60 tablet    Take 1 tablet (300 mg) by mouth At Bedtime    Transient insomnia       * zolpidem 10 MG tablet    AMBIEN    30 tablet    Take 1 tablet (10 mg) by mouth nightly as needed for sleep    Transient insomnia       * zolpidem 10 MG tablet    AMBIEN    30 tablet    Take 1 tablet (10 mg) by mouth nightly as needed for sleep    Transient insomnia       * Notice:  This list has 2  medication(s) that are the same as other medications prescribed for you. Read the directions carefully, and ask your doctor or other care provider to review them with you.

## 2018-06-04 NOTE — PROGRESS NOTES
HPI:       Darlene Hudson is a 43 year old  female who presents for continued right ankle pain.        Patient injured ankle last August, and has had difficulty since that time. Wore a boot for 7 months, but no additional benefit. Finally saw another orthopedic doctor about 3 weeks ago. He has suggested an operation. Patient awaiting surgery. Unable to go to surgery immediately due to a recent change in workers compensation coverage at work.         Patient has fallen several times. She is now refusing to wear her supportive boot. She also refused to use crutches.  As a result, patient intermittently falls. Has been to ER about 7 times. Most recently seen at [facility], but left against medical advice because it took too long to read the Xray.       Now presents with continued pain in the leg.  More than 50% of this 30 minute visit was spent in counseling.  Counseling included discussion of depression, management of temper, progression of the injury, and how the patient is coping with the injury.                PMHX:   Current Medications:   Current Outpatient Prescriptions   Medication Sig Dispense Refill     albuterol (PROAIR HFA/PROVENTIL HFA/VENTOLIN HFA) 108 (90 BASE) MCG/ACT Inhaler Inhale 2 puffs into the lungs every 4 hours as needed for shortness of breath / dyspnea or wheezing 1 Inhaler 3     fluticasone (FLONASE) 50 MCG/ACT spray Spray 1-2 sprays into both nostrils daily 1 Bottle 11     fluticasone-salmeterol (ADVAIR DISKUS) 500-50 MCG/DOSE diskus inhaler Inhale 1 puff into the lungs 2 times daily 60 Inhaler 1     metroNIDAZOLE (METROGEL) 0.75 % topical gel Apply topically 2 times daily 45 g 11     ranitidine (ZANTAC) 300 MG tablet Take 1 tablet (300 mg) by mouth At Bedtime 60 tablet 1     gabapentin (NEURONTIN) 300 MG capsule Take 3 capsules (900 mg) by mouth 3 times daily 270 capsule 0     montelukast (SINGULAIR) 10 MG tablet Take 1 tablet (10 mg) by mouth At Bedtime 60 tablet 1      "oxyCODONE-acetaminophen (PERCOCET) 5-325 MG per tablet Take 2 tablets by mouth 2 times daily maximum 4 tablet(s) per day (Patient not taking: Reported on 6/4/2018) 40 tablet 0     zolpidem (AMBIEN) 10 MG tablet Take 1 tablet (10 mg) by mouth nightly as needed for sleep 30 tablet 1     [DISCONTINUED] montelukast (SINGULAIR) 10 MG tablet Take 1 tablet (10 mg) by mouth At Bedtime 90 tablet 1       Existing Problems  Patient Active Problem List   Diagnosis     Abnormal cytology finding     Nondependent alcohol abuse, episodic drinking behavior     Anxiety state     Controlled substance agreement signed     Low back pain     Chronic sinusitis     Cocaine abuse     Major depressive disorder, recurrent episode, moderate (H)     Asthma     Tobacco use disorder     Noninflammatory disorder of vagina     Pap smear for cervical cancer screening     Abdominal pain     Abnormal cervical Papanicolaou smear     Panic disorder with agoraphobia     Atopic rhinitis     Chronic low back pain     Depression     Moderate persistent asthma     Other long term (current) drug therapy     Tobacco use     Acute pericardial effusion     Anxiety       Allergies:  Allergies   Allergen Reactions     Lidocaine Other (See Comments)     \"my jaw stopped moving\"     Penicillins Hives, Rash and Shortness Of Breath     Lidocaine-Epinephrine [Epinephrine-Lidocaine-Na Metabisulfite] Other (See Comments)     Severe jaw cramping, double vision       Previous labs:  Lab Results   Component Value Date    HGB 13.0 11/02/2017    HCT 38.7 11/02/2017    ALT 20.0 11/02/2017    AST 16.0 11/02/2017    .0 11/02/2017    BUN 13.0 11/02/2017    CO2 23.0 11/02/2017               Review of Systems:    CONSTITUTIONAL: no fatigue, no unexpected change in weight  SKIN: no worrisome rashes, no worrisome moles, no worrisome lesions  EYES: no acute vision problems or changes  ENT: no ear problems, no mouth problems, no throat problems  RESP: no significant cough, no " "shortness of breath  CV: no chest pain, no palpitations, no new or worsening peripheral edema  GI: no nausea, no vomiting, no constipation, no diarrhea          Physical Exam:     Vitals:    06/04/18 1404   BP: 122/87   Pulse: 101   Temp: 98.5  F (36.9  C)   TempSrc: Oral   SpO2: 97%   Height: 5' 9.5\" (176.5 cm)     There is no height or weight on file to calculate BMI.    GENERAL:alert, well hydrated, reports continued right ankle pain.   EYES: Eyes grossly normal to inspection, extraocular movements - intact, and PERRL  HENT: ear canals- normal; TMs- normal; Nose- normal; Mouth- no ulcers, no lesions  NECK: no tenderness, no adenopathy, no asymmetry, no masses, no stiffness; thyroid- normal to palpation  RESP: lungs clear to auscultation - no rales, no rhonchi, no wheezes  CV: regular rates and rhythm, normal S1 S2, no S3 or S4 and no murmur, no click or rub -  Ankle - No edema. Ecchymosis right calf due to the latest fall last week.  Minimal edema. Subjective discomfort across right ankle. Walking with a limp, no crutches, no cane, no boot, no ankle support.              Labs and Procedures     Office Visit on 11/02/2017   Component Date Value Ref Range Status     Specific Gravity Urine 11/02/2017 >=1.030  1.005 - 1.030 Final     pH Urine 11/02/2017 5.5  4.5 - 8.0 Final     Leukocyte Esterase UR 11/02/2017 Trace* NEGATIVE Final     Nitrite Urine 11/02/2017 Negative  NEGATIVE Final     Protein UR 11/02/2017 Negative  NEGATIVE Final     Glucose Urine 11/02/2017 Negative  NEGATIVE Final     Ketones Urine 11/02/2017 Negative  NEGATIVE Final     Urobilinogen mg/dL 11/02/2017 0.2 E.U./dL  0.2 E.U./dL Final     Bilirubin UR 11/02/2017 Negative  NEGATIVE Final     Blood UR 11/02/2017 2+* NEGATIVE Final     Color Urine 11/02/2017 Yellow   Final     Clarity, urine 11/02/2017 Clear   Final     Glucose 11/02/2017 90.0  60.0 - 109.0 mg/dL Final     Urea Nitrogen 11/02/2017 13.0  5.0 - 24.0 mg/dL Final     Creatinine " 11/02/2017 0.9  0.6 - 1.3 mg/dL Final     Sodium 11/02/2017 137.0  133.0 - 144.0 mmol/L Final     Potassium 11/02/2017 4.5  3.4 - 5.3 mmol/L Final     Chloride 11/02/2017 110.0* 94.0 - 109.0 mmol/L Final     Carbon Dioxide 11/02/2017 23.0  20.0 - 32.0 mmol/L Final     Calcium 11/02/2017 8.6  8.5 - 10.4 mg/dL Final     Protein Total 11/02/2017 6.7  6.6 - 8.8 g/dL Final     Albumin 11/02/2017 3.3  3.3 - 4.6 g/dL Final     Alkaline Phosphatase 11/02/2017 69.0  40.0 - 150.0 U/L Final     ALT 11/02/2017 20.0  0.0 - 45.0 U/L Final     AST 11/02/2017 16.0  0.0 - 45.0 U/L Final     Bilirubin Total 11/02/2017 0.4  0.2 - 1.3 mg/dL Final     eGFR Calculated (Non Black Referen* 11/02/2017 72.3  >60.0 mL/min Final     eGFR Calculated (Black Reference) 11/02/2017 87.5  >60.0 mL/min Final     Lipase 11/02/2017 27  0 - 52 U/L Final     Amylase 11/02/2017 41  5 - 120 U/L Final     WBC 11/02/2017 7.0  4.0 - 11.0 thou/uL Final     RBC 11/02/2017 4.07  3.80 - 5.40 mill/uL Final     Hemoglobin 11/02/2017 13.0  12.0 - 16.0 g/dL Final     Hematocrit 11/02/2017 38.7  35.0 - 47.0 % Final     MCV 11/02/2017 95  80 - 100 fL Final     MCH 11/02/2017 31.9  27.0 - 34.0 pg Final     MCHC 11/02/2017 33.6  32.0 - 36.0 g/dL Final     RDW 11/02/2017 12.8  11.0 - 14.5 % Final     Platelets 11/02/2017 311  140 - 440 thou/uL Final     Mean Platelet Volume 11/02/2017 10.2  8.5 - 12.5 fL Final     % Neutrophils 11/02/2017 58  50 - 70 % Final     % Lymphocytes 11/02/2017 29  20 - 40 % Final     % Monocytes 11/02/2017 7  2 - 10 % Final     % Eosinophils 11/02/2017 5  0 - 6 % Final     % Basophils 11/02/2017 1  0 - 2 % Final     Neutrophils (Absolute) 11/02/2017 4.0  2.0 - 7.7 thou/uL Final     Lymphs (Absolute) 11/02/2017 2.0  0.8 - 4.4 thou/uL Final     Monocytes(Absolute) 11/02/2017 0.5  0.0 - 0.9 thou/uL Final     Eos (Absolute) 11/02/2017 0.4  0.0 - 0.4 thou/uL Final     Baso (Absolute) 11/02/2017 0.1  0.0 - 0.2 thou/uL Final     WBC Urine 11/02/2017  10-25  <5 /hpf Final     RBC Urine 11/02/2017 2-5  <5 /hpf Final     Epithelial Cells UR 11/02/2017   0 - 2 /lpf Final     Mucous Urine 11/02/2017 None  NONE lpf Final     Casts Urine 11/02/2017 None  NONE /lpf Final     Crystal Urine 11/02/2017 None  NONE /lpf Final     Bacteria Wet Prep 11/02/2017 Many  None Final              Assessment and Plan     1.Have advised patient to see a behavioral therapist to work on her ability to control her emotions, and in dealing with the current illness.  Patient states she will bring this up with a new provider she is scheduled to see.  2. Will continue pain relief for now, pending surgery. Patient needs a preop- unable to do that today  3. Refilled medications. .       Options for treatment and follow-up care were reviewed with the patient and/or guardian. Darlene Hudson and/or guardian engaged in the decision making process and verbalized understanding of the options discussed and agreed with the final plan.    Oscar Aparicio MD

## 2018-07-11 ENCOUNTER — OFFICE VISIT (OUTPATIENT)
Dept: FAMILY MEDICINE | Facility: CLINIC | Age: 43
End: 2018-07-11
Payer: OTHER MISCELLANEOUS

## 2018-07-11 VITALS
WEIGHT: 252 LBS | OXYGEN SATURATION: 95 % | DIASTOLIC BLOOD PRESSURE: 77 MMHG | SYSTOLIC BLOOD PRESSURE: 107 MMHG | BODY MASS INDEX: 36.68 KG/M2 | TEMPERATURE: 98 F | HEART RATE: 97 BPM

## 2018-07-11 DIAGNOSIS — Z01.818 PREOP GENERAL PHYSICAL EXAM: Primary | ICD-10-CM

## 2018-07-11 DIAGNOSIS — F51.02 TRANSIENT INSOMNIA: ICD-10-CM

## 2018-07-11 RX ORDER — ZOLPIDEM TARTRATE 10 MG/1
10 TABLET ORAL
Qty: 30 TABLET | Refills: 0 | Status: SHIPPED | OUTPATIENT
Start: 2018-07-11 | End: 2019-01-03

## 2018-07-11 NOTE — PROGRESS NOTES
[facility] [address] [place] [address]  Phone: [phone]  Fax: [phone]    7/11/2018    Adult PRE-OP Evaluation:    Darlene Hudson, 1975 presents for pre-operative evaluation and assessment as requested by Dr. Hernández, prior to undergoing surgery/procedure for treatment of R ankle ligamental injury .    Proposed procedure: R ankle ligament repair with scar tissue cleanout    Date of Surgery/ Procedure: 7/16/2018  Hospital/Surgical Facility:  [facility]     Primary Physician: Oscar Aparicio  Type of Anesthesia Anticipated: General  History of anesthesia complications: YES: Personal HX Patient reports low blood pressure and oxygen saturation after heart surgery.  History of  abnormal bleeding: NONE   History of blood transfusions: NO  Patient has a Health Care Directive or Living Will:  YES    Preoperative Questions   1. NO - Do you have a history of heart attack, stroke, stent, bypass or surgery on an artery in the head, neck, heart or legs?  2. YES - Do you ever have any pain or discomfort in your chest? Pain while laying flat or taking deep breath, states this has been the case since heart surgery in February 2017.  3. YES - Have you ever had a severe pain across the front of your chest lasting for half an hour or more? See above.  4. NO - Do you have a history of Congestive Heart Failure?  5. YES - Are you troubled by shortness of breath when: walking on the level/ up a slight hill/ at night? Patient reports this is due to her asthma.  6. YES - Does your chest ever sound wheezy or whistling? Patient has a history of asthma.  7. NO - Do you currently have a cold, bronchitis or other respiratory infection?  8. NO - Have you had a cold, bronchitis or other respiratory infection within the last 2 weeks?  9. NO - Do you usually have a cough?  10. YES - Do you sometimes get pains in the calves of your legs when you walk? Patient states this is from wearing a walking boot  for several months, only on R leg and intermittent cramping.  11. NO - Do you or anyone in your family have previous history of blood clots?  12. NO - Do you or does anyone in your family have a serious bleeding problem such as prolonged bleeding following surgeries or cuts?  13. YES - Have you ever had problems with anemia or been told to take iron pills? Has taken iron pills in the past, but Hgb was 13.0 in 11/2017.  14. NO - Have you had any abnormal blood loss such as black, tarry or bloody stools, or abnormal vaginal bleeding?  15. NO - Have you ever had a blood transfusion?  16. NO - Have you or any of your relatives ever had problems with anesthesia?  17. NO - Do you have sleep apnea, excessive snoring or daytime drowsiness?  18. NO - Do you have any prosthetic heart valves?  19. NO - Do you have prosthetic joints?  20. NO - Is there any chance that you may be pregnant?    Patient Active Problem List   Diagnosis     Abnormal cytology finding     Nondependent alcohol abuse, episodic drinking behavior     Anxiety state     Controlled substance agreement signed     Low back pain     Chronic sinusitis     Cocaine abuse     Major depressive disorder, recurrent episode, moderate (H)     Asthma     Tobacco use disorder     Noninflammatory disorder of vagina     Pap smear for cervical cancer screening     Abdominal pain     Abnormal cervical Papanicolaou smear     Panic disorder with agoraphobia     Atopic rhinitis     Chronic low back pain     Depression     Moderate persistent asthma     Other long term (current) drug therapy     Tobacco use     Acute pericardial effusion     Anxiety       Current Outpatient Prescriptions on File Prior to Visit:  albuterol (PROAIR HFA/PROVENTIL HFA/VENTOLIN HFA) 108 (90 Base) MCG/ACT Inhaler Inhale 2 puffs into the lungs every 4 hours as needed for shortness of breath / dyspnea or wheezing   fluticasone (FLONASE) 50 MCG/ACT spray Spray 1-2 sprays into both nostrils daily  "  fluticasone-salmeterol (ADVAIR DISKUS) 500-50 MCG/DOSE diskus inhaler Inhale 1 puff into the lungs 2 times daily   gabapentin (NEURONTIN) 300 MG capsule Take 3 capsules (900 mg) by mouth 3 times daily   metroNIDAZOLE (METROGEL) 0.75 % topical gel Apply topically 2 times daily   montelukast (SINGULAIR) 10 MG tablet Take 1 tablet (10 mg) by mouth At Bedtime   oxyCODONE-acetaminophen (PERCOCET) 5-325 MG per tablet Take 2 tablets by mouth 2 times daily maximum 4 tablet(s) per day   ranitidine (ZANTAC) 300 MG tablet Take 1 tablet (300 mg) by mouth At Bedtime   zolpidem (AMBIEN) 10 MG tablet Take 1 tablet (10 mg) by mouth nightly as needed for sleep   zolpidem (AMBIEN) 10 MG tablet Take 1 tablet (10 mg) by mouth nightly as needed for sleep     No current facility-administered medications on file prior to visit.     OTC products: NSAIDS    Allergies   Allergen Reactions     Lidocaine Other (See Comments)     \"my jaw stopped moving\"     Penicillins Hives, Rash and Shortness Of Breath     Lidocaine-Epinephrine [Epinephrine-Lidocaine-Na Metabisulfite] Other (See Comments)     Severe jaw cramping, double vision     Latex Allergy: NO    Social History     Social History     Marital status:      Spouse name: N/A     Number of children: N/A     Years of education: N/A     Social History Main Topics     Smoking status: Current Some Day Smoker     Packs/day: 0.25     Types: Cigarettes     Smokeless tobacco: Former User     Quit date: 12/3/2016      Comment: just 2 or 3 per day     Alcohol use 0.0 oz/week     0 Standard drinks or equivalent per week      Comment: drink once every 6 months     Drug use: No     Sexual activity: Not Asked     Other Topics Concern     None     Social History Narrative     REVIEW OF SYSTEMS:   Constitutional, HEENT, cardiovascular, pulmonary, GI, , musculoskeletal, neuro, skin, endocrine and psych systems are negative, except as otherwise noted.    EXAM:     Patient Vitals for the past 24 " hrs:   BP Temp Temp src Pulse SpO2 Weight   07/11/18 1432 107/77 98  F (36.7  C) Oral 97 95 % 252 lb (114.3 kg)     Body mass index is 36.68 kg/(m^2).  GENERAL: healthy, alert and no distress  EYES: Eyes grossly normal to inspection  HENT: Nose- normal; Mouth- no ulcers, no lesions  NECK: no tenderness, no adenopathy, no asymmetry, no masses, no stiffness  RESP: lungs clear to auscultation - no rales, no rhonchi, no wheezes  CV: regular rates and rhythm, normal S1 S2, no S3 or S4 and no murmur, no click or rub   ABDOMEN: soft, no tenderness, no  hepatosplenomegaly, no masses, normal bowel sounds  MS: extremities- no gross deformities noted, no edema  SKIN: no suspicious lesions, no rashes  NEURO: strength and tone- normal, sensory exam- grossly normal, mentation- intact, speech- normal, reflexes- symmetric  BACK: no CVA tenderness, no paralumbar tenderness  PSYCH: Alert and oriented times 3; speech- coherent , normal rate and volume; able to articulate logical thoughts  LYMPHATICS: ant. cervical- normal, post. cervical- normal    DIAGNOSTICS:      EKG: Not indicated due to non-vascular surgery and low risk of event (age <65 and without cardiac risk factors)    RISK ASSESSMENT:     Cardiovascular Risk:  -Patient is able to do heavy housework without chest pain.  -The patient does not have chest pain at rest.  -Patient does not have a history of congestive heart failure.    -The patient does not have a history of stroke and does not have a history of valvular disease.    Pulmonary Risk:  -In terms of risk factors for pulmonary complication, the patient has moderate persistent asthma on Advair and Albuterol PRN.    Perioperative Complications:  -The patient does not have a history of bleeding or clotting problems in the past.    -The patient has not had complications from surgeries.    -The patient does not have a family history of any anesthesia or surgical complications.      IMPRESSION:     Reason for  surgery/procedure: chronic R ankle injury and instability with recurrent falls    The proposed surgical procedure is considered INTERMEDIATE risk.    For above listed surgery and anesthesia:   Patient is at low risk for surgery/procedure and perioperative/procedure complications.    RECOMMENDATIONS:     Labs:  None    Fasting:  NPO @ midnight the night prior to surgery    Preop Plan:  --Approval given to proceed with proposed procedure, without further diagnostic evaluation  --Patient has below average risk per ACS NSQIP surgical risk calculator    Pulmonary Risk  Patient with moderate persistent asthma - taking daily Advair and PRN Albuterol but no Albuterol use within the last week.    Medications:  Patient should hold their regular medications the morning of surgery unless otherwise instructed.    Hold ibuprofen for 5 days prior.    Chadwick Granados MD  [facility], PGY-2    Precepted with Dr. Garcia.    Please contact our office if there are any further questions or information required about this patient.

## 2018-07-12 NOTE — PROGRESS NOTES
Preceptor Attestation:   Patient seen, evaluated and discussed with the resident. I have verified the content of the note, which accurately reflects my assessment of the patient and the plan of care.    Supervising Physician:Arnaud Garcia MD    [facility]

## 2018-07-23 ENCOUNTER — TELEPHONE (OUTPATIENT)
Dept: FAMILY MEDICINE | Facility: CLINIC | Age: 43
End: 2018-07-23

## 2018-07-23 ENCOUNTER — OFFICE VISIT (OUTPATIENT)
Dept: FAMILY MEDICINE | Facility: CLINIC | Age: 43
End: 2018-07-23
Payer: OTHER MISCELLANEOUS

## 2018-07-23 VITALS
DIASTOLIC BLOOD PRESSURE: 79 MMHG | HEART RATE: 96 BPM | TEMPERATURE: 97.8 F | OXYGEN SATURATION: 97 % | RESPIRATION RATE: 18 BRPM | SYSTOLIC BLOOD PRESSURE: 118 MMHG

## 2018-07-23 DIAGNOSIS — F43.22 ADJUSTMENT DISORDER WITH ANXIOUS MOOD: ICD-10-CM

## 2018-07-23 DIAGNOSIS — F41.1 GENERALIZED ANXIETY DISORDER: Primary | ICD-10-CM

## 2018-07-23 DIAGNOSIS — M54.50 LOW BACK PAIN WITHOUT SCIATICA, UNSPECIFIED BACK PAIN LATERALITY, UNSPECIFIED CHRONICITY: ICD-10-CM

## 2018-07-23 DIAGNOSIS — G89.18 ACUTE POST-OPERATIVE PAIN: ICD-10-CM

## 2018-07-23 RX ORDER — VENLAFAXINE HYDROCHLORIDE 75 MG/1
75 CAPSULE, EXTENDED RELEASE ORAL DAILY
Qty: 90 CAPSULE | Refills: 3 | Status: SHIPPED | OUTPATIENT
Start: 2018-07-23 | End: 2019-01-28 | Stop reason: ALTCHOICE

## 2018-07-23 RX ORDER — OXYCODONE AND ACETAMINOPHEN 5; 325 MG/1; MG/1
2 TABLET ORAL 2 TIMES DAILY
Qty: 40 TABLET | Refills: 0 | Status: SHIPPED | OUTPATIENT
Start: 2018-07-23 | End: 2018-08-06

## 2018-07-23 ASSESSMENT — PATIENT HEALTH QUESTIONNAIRE - PHQ9: 5. POOR APPETITE OR OVEREATING: SEVERAL DAYS

## 2018-07-23 ASSESSMENT — ANXIETY QUESTIONNAIRES
3. WORRYING TOO MUCH ABOUT DIFFERENT THINGS: NOT AT ALL
6. BECOMING EASILY ANNOYED OR IRRITABLE: SEVERAL DAYS
7. FEELING AFRAID AS IF SOMETHING AWFUL MIGHT HAPPEN: NOT AT ALL
5. BEING SO RESTLESS THAT IT IS HARD TO SIT STILL: NOT AT ALL
2. NOT BEING ABLE TO STOP OR CONTROL WORRYING: NOT AT ALL
1. FEELING NERVOUS, ANXIOUS, OR ON EDGE: SEVERAL DAYS
GAD7 TOTAL SCORE: 3
IF YOU CHECKED OFF ANY PROBLEMS ON THIS QUESTIONNAIRE, HOW DIFFICULT HAVE THESE PROBLEMS MADE IT FOR YOU TO DO YOUR WORK, TAKE CARE OF THINGS AT HOME, OR GET ALONG WITH OTHER PEOPLE: VERY DIFFICULT

## 2018-07-23 NOTE — MR AVS SNAPSHOT
After Visit Summary   7/23/2018    Darlene Hudson    MRN: [id number]           Patient Information     Date Of Birth          1975        Visit Information        Provider Department      7/23/2018 2:00 PM Oscar Aparicio MD [facility]        Today's Diagnoses     Generalized anxiety disorder    -  1    Acute post-operative pain           Follow-ups after your visit        Your next 10 appointments already scheduled     Aug 13, 2018  1:20 PM CDT   Return Visit with Oscar Aparicio MD   [facility] ([facility])    [address] [place] [id number]   [phone]              Who to contact     Please call your clinic at [phone] to:    Ask questions about your health    Make or cancel appointments    Discuss your medicines    Learn about your test results    Speak to your doctor            Additional Information About Your Visit        Care EveryWhere ID     This is your Care EveryWhere ID. This could be used by other organizations to access your [facility] medical records  [id number]        Your Vitals Were     Pulse Temperature Respirations Pulse Oximetry          96 97.8  F (36.6  C) (Oral) 18 97%         Blood Pressure from Last 3 Encounters:   07/23/18 118/79   07/11/18 107/77   06/04/18 122/87    Weight from Last 3 Encounters:   07/11/18 252 lb (114.3 kg)   10/30/17 251 lb 6.4 oz (114 kg)   07/24/17 238 lb 6.4 oz (108.1 kg)              Today, you had the following     No orders found for display         Today's Medication Changes          These changes are accurate as of 7/23/18  2:44 PM.  If you have any questions, ask your nurse or doctor.               Start taking these medicines.        Dose/Directions    venlafaxine 75 MG 24 hr capsule   Commonly known as:  EFFEXOR-XR   Used for:  Generalized anxiety disorder   Started by:  Oscar Aparicio MD        Dose:  75 mg   Take 1 capsule (75 mg) by mouth daily   Quantity:  90 capsule   Refills:   3            Where to get your medicines      [facility] [id number] [address] AT [facility]  [address] [place] [id number]     Phone:  [phone]     venlafaxine 75 MG 24 hr capsule         Some of these will need a paper prescription and others can be bought over the counter.  Ask your nurse if you have questions.     Bring a paper prescription for each of these medications     oxyCODONE-acetaminophen 5-325 MG per tablet               Information about OPIOIDS     PRESCRIPTION OPIOIDS: WHAT YOU NEED TO KNOW   We gave you an opioid (narcotic) pain medicine. It is important to manage your pain, but opioids are not always the best choice. You should first try all the other options your care team gave you. Take this medicine for as short a time (and as few doses) as possible.     These medicines have risks:    DO NOT drive when on new or higher doses of pain medicine. These medicines can affect your alertness and reaction times, and you could be arrested for driving under the influence (DUI). If you need to use opioids long-term, talk to your care team about driving.    DO NOT operate heave machinery    DO NOT do any other dangerous activities while taking these medicines.     DO NOT drink any alcohol while taking these medicines.      If the opioid prescribed includes acetaminophen, DO NOT take with any other medicines that contain acetaminophen. Read all labels carefully. Look for the word  acetaminophen  or  Tylenol.  Ask your pharmacist if you have questions or are unsure.    You can get addicted to pain medicines, especially if you have a history of addiction (chemical, alcohol or substance dependence). Talk to your care team about ways to reduce this risk.    Store your pills in a secure place, locked if possible. We will not replace any lost or stolen medicine. If you don t finish your medicine, please throw away (dispose) as directed by  your pharmacist. The [place] Pollution Control Agency has more information about safe disposal: https://www.pca.[facility].[place].us/living-green/managing-unwanted-medications.     All opioids tend to cause constipation. Drink plenty of water and eat foods that have a lot of fiber, such as fruits, vegetables, prune juice, apple juice and high-fiber cereal. Take a laxative (Miralax, milk of magnesia, Colace, Senna) if you don t move your bowels at least every other day.          Primary Care Provider Office Phone # Fax #    Oscar Aparicio [phone]       [facility] [place] [address]        Equal Access to Services     ROLY ALBERTS : Hadii aad ku hadasho Soalbert, waaxda priyanka, qaybta kaalmada julian, delores macdonald . So [facility] [phone].    ATENCIÓN: Si habla español, tiene a boswell disposición servicios gratuitos de asistencia lingüística. Llame al [phone].    We comply with applicable federal civil rights laws and [place] laws. We do not discriminate on the basis of race, color, national origin, age, disability, sex, sexual orientation, or gender identity.            Thank you!     Thank you for choosing [facility]  for your care. Our goal is always to provide you with excellent care. Hearing back from our patients is one way we can continue to improve our services. Please take a few minutes to complete the written survey that you may receive in the mail after your visit with us. Thank you!             Your Updated Medication List - Protect others around you: Learn how to safely use, store and throw away your medicines at www.disposemymeds.org.          This list is accurate as of 7/23/18  2:44 PM.  Always use your most recent med list.                   Brand Name Dispense Instructions for use Diagnosis    albuterol 108 (90 Base) MCG/ACT Inhaler    PROAIR HFA/PROVENTIL HFA/VENTOLIN HFA    1 Inhaler    Inhale 2 puffs into the lungs every 4  hours as needed for shortness of breath / dyspnea or wheezing    Moderate persistent asthma without complication       fluticasone 50 MCG/ACT spray    FLONASE    1 Bottle    Spray 1-2 sprays into both nostrils daily    Congestion of paranasal sinus       fluticasone-salmeterol 500-50 MCG/DOSE diskus inhaler    ADVAIR DISKUS    60 Inhaler    Inhale 1 puff into the lungs 2 times daily    Moderate persistent asthma without complication       gabapentin 300 MG capsule    NEURONTIN    270 capsule    Take 3 capsules (900 mg) by mouth 3 times daily    Chronic bilateral low back pain without sciatica       metroNIDAZOLE 0.75 % topical gel    METROGEL    45 g    Apply topically 2 times daily    Acne vulgaris       montelukast 10 MG tablet    SINGULAIR    60 tablet    Take 1 tablet (10 mg) by mouth At Bedtime    Moderate persistent asthma without complication       oxyCODONE-acetaminophen 5-325 MG per tablet    PERCOCET    40 tablet    Take 2 tablets by mouth 2 times daily maximum 4 tablet(s) per day    Acute post-operative pain       ranitidine 300 MG tablet    ZANTAC    60 tablet    Take 1 tablet (300 mg) by mouth At Bedtime    Transient insomnia       venlafaxine 75 MG 24 hr capsule    EFFEXOR-XR    90 capsule    Take 1 capsule (75 mg) by mouth daily    Generalized anxiety disorder       zolpidem 10 MG tablet    AMBIEN    30 tablet    Take 1 tablet (10 mg) by mouth nightly as needed for sleep    Transient insomnia

## 2018-07-23 NOTE — NURSING NOTE
Date of Surgery/ Procedure: 7/16/2018  Hospital/Surgical Facility:  [facility]    PHQ9-given to complete

## 2018-07-23 NOTE — TELEPHONE ENCOUNTER
Prior Authorization needed on:  7/23/18    Medication:  Zolpidem Dose:  10 mg     Pharmacy confirmed as   [facility]  [address] [place] [address] [phone]    : Yes    Insurance Name:  [employer]  Insurance Phone: [phone]  Insurance Patient ID: [id number]    Alternatives Suggested:  Max of 45 tabs in 75 days per insurance limits - will need pa if patient is taking everyday    Marsha Taylor July 23, 2018 at 11:11 AM

## 2018-07-23 NOTE — PROGRESS NOTES
HPI:       Darlene Hudson is a 43 year old  female who presents for follow up surgery on the right ankle.   Patient is one week out from surgery. Sh is increasingly impatient, and has started eating. Not moving around, and reports that she has gained weight.   Wt Readings from Last 4 Encounters:   07/11/18 252 lb (114.3 kg)   10/30/17 251 lb 6.4 oz (114 kg)   07/24/17 238 lb 6.4 oz (108.1 kg)   07/24/17 241 lb 9.6 oz (109.6 kg)   She says she is eating all the time, and is short tempredd. Unable to cope with the surgery.  Scores 11 on the PHQ-9 depression scale.   Would like to start anti-depression medicine.  It appears the patients anxiety is worsening. This increases her risk of addictive reliance on pain killers.  She has used all of the pain meds given by her surgeon one week ago.  Will agree to provide one last prescription.    The leg is still wrapped in a splint. Non weight bearing, but patient is on a scooter taking weight on the knee. reports haveing fallen over twice - with pain to the ankle, but no appearance of bleeding or markedly increased pain.             PMHX:   Current Medications:   Current Outpatient Prescriptions   Medication Sig Dispense Refill     albuterol (PROAIR HFA/PROVENTIL HFA/VENTOLIN HFA) 108 (90 Base) MCG/ACT Inhaler Inhale 2 puffs into the lungs every 4 hours as needed for shortness of breath / dyspnea or wheezing 1 Inhaler 3     fluticasone (FLONASE) 50 MCG/ACT spray Spray 1-2 sprays into both nostrils daily 1 Bottle 11     fluticasone-salmeterol (ADVAIR DISKUS) 500-50 MCG/DOSE diskus inhaler Inhale 1 puff into the lungs 2 times daily 60 Inhaler 1     gabapentin (NEURONTIN) 300 MG capsule Take 3 capsules (900 mg) by mouth 3 times daily 270 capsule 0     metroNIDAZOLE (METROGEL) 0.75 % topical gel Apply topically 2 times daily 45 g 11     montelukast (SINGULAIR) 10 MG tablet Take 1 tablet (10 mg) by mouth At Bedtime 60 tablet 1     oxyCODONE-acetaminophen (PERCOCET) 5-325 MG  "per tablet Take 2 tablets by mouth 2 times daily maximum 4 tablet(s) per day 40 tablet 0     ranitidine (ZANTAC) 300 MG tablet Take 1 tablet (300 mg) by mouth At Bedtime 60 tablet 1     zolpidem (AMBIEN) 10 MG tablet Take 1 tablet (10 mg) by mouth nightly as needed for sleep 30 tablet 0       Existing Problems  Patient Active Problem List   Diagnosis     Abnormal cytology finding     Nondependent alcohol abuse, episodic drinking behavior     Anxiety state     Controlled substance agreement signed     Low back pain     Chronic sinusitis     Cocaine abuse     Major depressive disorder, recurrent episode, moderate (H)     Asthma     Tobacco use disorder     Noninflammatory disorder of vagina     Pap smear for cervical cancer screening     Abdominal pain     Abnormal cervical Papanicolaou smear     Panic disorder with agoraphobia     Atopic rhinitis     Chronic low back pain     Depression     Moderate persistent asthma     Other long term (current) drug therapy     Tobacco use     Acute pericardial effusion     Anxiety       Allergies:  Allergies   Allergen Reactions     Lidocaine Other (See Comments)     \"my jaw stopped moving\"     Penicillins Hives, Rash and Shortness Of Breath     Lidocaine-Epinephrine [Epinephrine-Lidocaine-Na Metabisulfite] Other (See Comments)     Severe jaw cramping, double vision       Previous labs:  Lab Results   Component Value Date    HGB 13.0 11/02/2017    HCT 38.7 11/02/2017    ALT 20.0 11/02/2017    AST 16.0 11/02/2017    .0 11/02/2017    BUN 13.0 11/02/2017    CO2 23.0 11/02/2017               Review of Systems:    CONSTITUTIONAL: as above  SKIN: no worrisome rashes, no worrisome moles, no worrisome lesions  EYES: no acute vision problems or changes  ENT: no ear problems, no mouth problems, no throat problems  RESP: no significant cough, no shortness of breath  CV: no chest pain, no palpitations, no new or worsening peripheral edema  GI: no nausea, no vomiting, no constipation, no " diarrhea          Physical Exam:     Vitals:    07/23/18 1400   BP: 118/79   Pulse: 96   Resp: 18   Temp: 97.8  F (36.6  C)   TempSrc: Oral   SpO2: 97%     There is no height or weight on file to calculate BMI.    GENERAL:slumping down in chair, well hydrated, responding with negative attitude  EYES: Eyes grossly normal to inspection, extraocular movements - intact, and PERRL  HENT: ear canals- normal; TMs- normal; Nose- normal; Mouth- no ulcers, no lesions  NECK: no tenderness, no adenopathy, no asymmetry, no masses, no stiffness;   RESP: lungs clear to auscultation - no rales, no rhonchi, no wheezes  CV: regular rates and rhythm, normal S1 S2, no S3 or S4 and no murmur, no click or rub -  EXT: right leg in splint - was able to remove the ace wrap, but no blood or other problem seen. Did not remove the cotton wrap, since we do not have cotton wrap to replace it with. No stains, or drainage coming through the cotton wrap. Toes - intact to light touch. Vascular- good capillary refill.               Labs and Procedures     Office Visit on 11/02/2017   Component Date Value Ref Range Status     Specific Gravity Urine 11/02/2017 >=1.030  1.005 - 1.030 Final     pH Urine 11/02/2017 5.5  4.5 - 8.0 Final     Leukocyte Esterase UR 11/02/2017 Trace* NEGATIVE Final     Nitrite Urine 11/02/2017 Negative  NEGATIVE Final     Protein UR 11/02/2017 Negative  NEGATIVE Final     Glucose Urine 11/02/2017 Negative  NEGATIVE Final     Ketones Urine 11/02/2017 Negative  NEGATIVE Final     Urobilinogen mg/dL 11/02/2017 0.2 E.U./dL  0.2 E.U./dL Final     Bilirubin UR 11/02/2017 Negative  NEGATIVE Final     Blood UR 11/02/2017 2+* NEGATIVE Final     Color Urine 11/02/2017 Yellow   Final     Clarity, urine 11/02/2017 Clear   Final     Glucose 11/02/2017 90.0  60.0 - 109.0 mg/dL Final     Urea Nitrogen 11/02/2017 13.0  5.0 - 24.0 mg/dL Final     Creatinine 11/02/2017 0.9  0.6 - 1.3 mg/dL Final     Sodium 11/02/2017 137.0  133.0 - 144.0 mmol/L  Final     Potassium 11/02/2017 4.5  3.4 - 5.3 mmol/L Final     Chloride 11/02/2017 110.0* 94.0 - 109.0 mmol/L Final     Carbon Dioxide 11/02/2017 23.0  20.0 - 32.0 mmol/L Final     Calcium 11/02/2017 8.6  8.5 - 10.4 mg/dL Final     Protein Total 11/02/2017 6.7  6.6 - 8.8 g/dL Final     Albumin 11/02/2017 3.3  3.3 - 4.6 g/dL Final     Alkaline Phosphatase 11/02/2017 69.0  40.0 - 150.0 U/L Final     ALT 11/02/2017 20.0  0.0 - 45.0 U/L Final     AST 11/02/2017 16.0  0.0 - 45.0 U/L Final     Bilirubin Total 11/02/2017 0.4  0.2 - 1.3 mg/dL Final     eGFR Calculated (Non Black Referen* 11/02/2017 72.3  >60.0 mL/min Final     eGFR Calculated (Black Reference) 11/02/2017 87.5  >60.0 mL/min Final     Lipase 11/02/2017 27  0 - 52 U/L Final     Amylase 11/02/2017 41  5 - 120 U/L Final     WBC 11/02/2017 7.0  4.0 - 11.0 thou/uL Final     RBC 11/02/2017 4.07  3.80 - 5.40 mill/uL Final     Hemoglobin 11/02/2017 13.0  12.0 - 16.0 g/dL Final     Hematocrit 11/02/2017 38.7  35.0 - 47.0 % Final     MCV 11/02/2017 95  80 - 100 fL Final     MCH 11/02/2017 31.9  27.0 - 34.0 pg Final     MCHC 11/02/2017 33.6  32.0 - 36.0 g/dL Final     RDW 11/02/2017 12.8  11.0 - 14.5 % Final     Platelets 11/02/2017 311  140 - 440 thou/uL Final     Mean Platelet Volume 11/02/2017 10.2  8.5 - 12.5 fL Final     % Neutrophils 11/02/2017 58  50 - 70 % Final     % Lymphocytes 11/02/2017 29  20 - 40 % Final     % Monocytes 11/02/2017 7  2 - 10 % Final     % Eosinophils 11/02/2017 5  0 - 6 % Final     % Basophils 11/02/2017 1  0 - 2 % Final     Neutrophils (Absolute) 11/02/2017 4.0  2.0 - 7.7 thou/uL Final     Lymphs (Absolute) 11/02/2017 2.0  0.8 - 4.4 thou/uL Final     Monocytes(Absolute) 11/02/2017 0.5  0.0 - 0.9 thou/uL Final     Eos (Absolute) 11/02/2017 0.4  0.0 - 0.4 thou/uL Final     Baso (Absolute) 11/02/2017 0.1  0.0 - 0.2 thou/uL Final     WBC Urine 11/02/2017 10-25  <5 /hpf Final     RBC Urine 11/02/2017 2-5  <5 /hpf Final     Epithelial Cells UR  11/02/2017   0 - 2 /lpf Final     Mucous Urine 11/02/2017 None  NONE lpf Final     Casts Urine 11/02/2017 None  NONE /lpf Final     Crystal Urine 11/02/2017 None  NONE /lpf Final     Bacteria Wet Prep 11/02/2017 Many  None Final              Assessment and Plan     1.epression with anxiety - Will start effexor - 75 mg 1 qd  2. Post operative continued pain - not able to effectively deal with lonnie due to the depression - will provide Percodan 40 tablets. This should take the patient through surgery, with no need for additional meds after that time. Patient does complain of back pain, but these have been long standing issues.  Principle problem is #1   3. Continuing on unemployment - not worked for six months.         Options for treatment and follow-up care were reviewed with the patient and/or guardian. Darlene Hudson and/or guardian engaged in the decision making process and verbalized understanding of the options discussed and agreed with the final plan.    Oscar Aparicio MD

## 2018-07-24 ASSESSMENT — ANXIETY QUESTIONNAIRES: GAD7 TOTAL SCORE: 3

## 2018-07-24 ASSESSMENT — PATIENT HEALTH QUESTIONNAIRE - PHQ9: SUM OF ALL RESPONSES TO PHQ QUESTIONS 1-9: 11

## 2018-07-24 ASSESSMENT — ASTHMA QUESTIONNAIRES: ACT_TOTALSCORE: 19

## 2018-08-01 NOTE — TELEPHONE ENCOUNTER
Routed to PCP for assistance in PA completion.     No need for PA. The limit of 45 tablets over 75 days is reasonable. We will provide at this rate.  JULIO CESAR.

## 2018-08-06 ENCOUNTER — OFFICE VISIT (OUTPATIENT)
Dept: FAMILY MEDICINE | Facility: CLINIC | Age: 43
End: 2018-08-06
Payer: OTHER MISCELLANEOUS

## 2018-08-06 VITALS
OXYGEN SATURATION: 98 % | SYSTOLIC BLOOD PRESSURE: 108 MMHG | HEART RATE: 83 BPM | TEMPERATURE: 97.9 F | DIASTOLIC BLOOD PRESSURE: 75 MMHG

## 2018-08-06 DIAGNOSIS — R05.9 COUGH: ICD-10-CM

## 2018-08-06 DIAGNOSIS — G89.18 ACUTE POST-OPERATIVE PAIN: Primary | ICD-10-CM

## 2018-08-06 RX ORDER — DEXTROMETHORPHAN POLISTIREX 30 MG/5ML
30 SUSPENSION ORAL 2 TIMES DAILY
Qty: 89 ML | Refills: 0 | Status: SHIPPED | OUTPATIENT
Start: 2018-08-06 | End: 2019-02-25

## 2018-08-06 RX ORDER — OXYCODONE AND ACETAMINOPHEN 5; 325 MG/1; MG/1
2 TABLET ORAL 2 TIMES DAILY
Qty: 28 TABLET | Refills: 0 | Status: SHIPPED | OUTPATIENT
Start: 2018-08-06 | End: 2018-08-23

## 2018-08-06 RX ORDER — ERYTHROMYCIN ETHYLSUCCINATE 400 MG/1
400 TABLET ORAL 3 TIMES DAILY
Qty: 21 TABLET | Refills: 0 | Status: SHIPPED | OUTPATIENT
Start: 2018-08-06 | End: 2018-08-13

## 2018-08-06 NOTE — MR AVS SNAPSHOT
After Visit Summary   8/6/2018    Darlene Hudson    MRN: [id number]           Patient Information     Date Of Birth          1975        Visit Information        Provider Department      8/6/2018 3:00 PM Oscar Aparicio MD [facility]        Today's Diagnoses     Acute post-operative pain    -  1    Cough           Follow-ups after your visit        Who to contact     Please call your clinic at [phone] to:    Ask questions about your health    Make or cancel appointments    Discuss your medicines    Learn about your test results    Speak to your doctor            Additional Information About Your Visit        Care EveryWhere ID     This is your Care EveryWhere ID. This could be used by other organizations to access your [facility] medical records  [id number]        Your Vitals Were     Pulse Temperature Pulse Oximetry             83 97.9  F (36.6  C) (Oral) 98%          Blood Pressure from Last 3 Encounters:   08/06/18 108/75   07/23/18 118/79   07/11/18 107/77    Weight from Last 3 Encounters:   07/11/18 252 lb (114.3 kg)   10/30/17 251 lb 6.4 oz (114 kg)   07/24/17 238 lb 6.4 oz (108.1 kg)              Today, you had the following     No orders found for display         Today's Medication Changes          These changes are accurate as of 8/6/18  6:12 PM.  If you have any questions, ask your nurse or doctor.               Start taking these medicines.        Dose/Directions    dextromethorphan 30 MG/5ML liquid   Commonly known as:  DELSYM   Used for:  Cough   Started by:  Oscar Aparicio MD        Dose:  30 mg   Take 5 mLs (30 mg) by mouth 2 times daily   Quantity:  89 mL   Refills:  0       erythromycin ethylsuccinate 400 MG tablet   Commonly known as:  EES   Used for:  Cough, Acute post-operative pain   Started by:  Oscar Aparicio MD        Dose:  400 mg   Take 1 tablet (400 mg) by mouth 3 times daily for 7 days   Quantity:  21 tablet   Refills:  0            Where to  get your medicines      These medications were sent to [id number] - [facility], [place] - [address]  [address], [place] [address]     Phone:  [phone]     dextromethorphan 30 MG/5ML liquid    erythromycin ethylsuccinate 400 MG tablet         Some of these will need a paper prescription and others can be bought over the counter.  Ask your nurse if you have questions.     Bring a paper prescription for each of these medications     oxyCODONE-acetaminophen 5-325 MG per tablet               Information about OPIOIDS     PRESCRIPTION OPIOIDS: WHAT YOU NEED TO KNOW   We gave you an opioid (narcotic) pain medicine. It is important to manage your pain, but opioids are not always the best choice. You should first try all the other options your care team gave you. Take this medicine for as short a time (and as few doses) as possible.     These medicines have risks:    DO NOT drive when on new or higher doses of pain medicine. These medicines can affect your alertness and reaction times, and you could be arrested for driving under the influence (DUI). If you need to use opioids long-term, talk to your care team about driving.    DO NOT operate heave machinery    DO NOT do any other dangerous activities while taking these medicines.     DO NOT drink any alcohol while taking these medicines.      If the opioid prescribed includes acetaminophen, DO NOT take with any other medicines that contain acetaminophen. Read all labels carefully. Look for the word  acetaminophen  or  Tylenol.  Ask your pharmacist if you have questions or are unsure.    You can get addicted to pain medicines, especially if you have a history of addiction (chemical, alcohol or substance dependence). Talk to your care team about ways to reduce this risk.    Store your pills in a secure place, locked if possible. We will not replace any lost or stolen medicine. If you don t finish your medicine, please throw away  (dispose) as directed by your pharmacist. The [place] Pollution Control Agency has more information about safe disposal: https://www.pca.[email or web address].[place]/living-green/managing-unwanted-medications.     All opioids tend to cause constipation. Drink plenty of water and eat foods that have a lot of fiber, such as fruits, vegetables, prune juice, apple juice and high-fiber cereal. Take a laxative (Miralax, milk of magnesia, Colace, Senna) if you don t move your bowels at least every other day.          Primary Care Provider Office Phone # Fax #    Oscar Aparicio [phone]       [facility] [place] [address]        Equal Access to Services     ROLY ALBERTS : Hadii kvng peraltao Soalbert, waaxda luqadaha, qaybta kaalmada adelocoyada, delores macdonald . So [facility] [phone].    ATENCIÓN: Si habla español, tiene a boswell disposición servicios gratuitos de asistencia lingüística. Llame al [phone].    We comply with applicable federal civil rights laws and [place] laws. We do not discriminate on the basis of race, color, national origin, age, disability, sex, sexual orientation, or gender identity.            Thank you!     Thank you for choosing [facility]  for your care. Our goal is always to provide you with excellent care. Hearing back from our patients is one way we can continue to improve our services. Please take a few minutes to complete the written survey that you may receive in the mail after your visit with us. Thank you!             Your Updated Medication List - Protect others around you: Learn how to safely use, store and throw away your medicines at www.disposemymeds.org.          This list is accurate as of 8/6/18  6:12 PM.  Always use your most recent med list.                   Brand Name Dispense Instructions for use Diagnosis    albuterol 108 (90 Base) MCG/ACT Inhaler    PROAIR HFA/PROVENTIL HFA/VENTOLIN HFA    1 Inhaler    Inhale 2 puffs  into the lungs every 4 hours as needed for shortness of breath / dyspnea or wheezing    Moderate persistent asthma without complication       dextromethorphan 30 MG/5ML liquid    DELSYM    89 mL    Take 5 mLs (30 mg) by mouth 2 times daily    Cough       erythromycin ethylsuccinate 400 MG tablet    EES    21 tablet    Take 1 tablet (400 mg) by mouth 3 times daily for 7 days    Cough, Acute post-operative pain       fluticasone 50 MCG/ACT spray    FLONASE    1 Bottle    Spray 1-2 sprays into both nostrils daily    Congestion of paranasal sinus       fluticasone-salmeterol 500-50 MCG/DOSE diskus inhaler    ADVAIR DISKUS    60 Inhaler    Inhale 1 puff into the lungs 2 times daily    Moderate persistent asthma without complication       gabapentin 300 MG capsule    NEURONTIN    270 capsule    Take 3 capsules (900 mg) by mouth 3 times daily    Chronic bilateral low back pain without sciatica       metroNIDAZOLE 0.75 % topical gel    METROGEL    45 g    Apply topically 2 times daily    Acne vulgaris       montelukast 10 MG tablet    SINGULAIR    60 tablet    Take 1 tablet (10 mg) by mouth At Bedtime    Moderate persistent asthma without complication       oxyCODONE-acetaminophen 5-325 MG per tablet    PERCOCET    28 tablet    Take 2 tablets by mouth 2 times daily maximum 4 tablet(s) per day    Acute post-operative pain       ranitidine 300 MG tablet    ZANTAC    60 tablet    Take 1 tablet (300 mg) by mouth At Bedtime    Transient insomnia       venlafaxine 75 MG 24 hr capsule    EFFEXOR-XR    90 capsule    Take 1 capsule (75 mg) by mouth daily    Generalized anxiety disorder       zolpidem 10 MG tablet    AMBIEN    30 tablet    Take 1 tablet (10 mg) by mouth nightly as needed for sleep    Transient insomnia

## 2018-08-06 NOTE — PROGRESS NOTES
HPI:       Darlene Hudson is a 43 year old  female who presents for further evaluation of pain in the right foot.    Patient was in ER last week for evaluation of cough and pain in the foot.  Missed the ortho evaluation last week because she had a headache and cough. Has continued to have pain in the right foot, and she is concerned that she may have broken the foot either albert she fell of the scooter or during surgery.                PMHX:   Current Medications:   Current Outpatient Prescriptions   Medication Sig Dispense Refill     albuterol (PROAIR HFA/PROVENTIL HFA/VENTOLIN HFA) 108 (90 Base) MCG/ACT Inhaler Inhale 2 puffs into the lungs every 4 hours as needed for shortness of breath / dyspnea or wheezing 1 Inhaler 3     fluticasone (FLONASE) 50 MCG/ACT spray Spray 1-2 sprays into both nostrils daily 1 Bottle 11     fluticasone-salmeterol (ADVAIR DISKUS) 500-50 MCG/DOSE diskus inhaler Inhale 1 puff into the lungs 2 times daily 60 Inhaler 1     gabapentin (NEURONTIN) 300 MG capsule Take 3 capsules (900 mg) by mouth 3 times daily 270 capsule 0     metroNIDAZOLE (METROGEL) 0.75 % topical gel Apply topically 2 times daily 45 g 11     montelukast (SINGULAIR) 10 MG tablet Take 1 tablet (10 mg) by mouth At Bedtime 60 tablet 1     oxyCODONE-acetaminophen (PERCOCET) 5-325 MG per tablet Take 2 tablets by mouth 2 times daily maximum 4 tablet(s) per day 40 tablet 0     ranitidine (ZANTAC) 300 MG tablet Take 1 tablet (300 mg) by mouth At Bedtime 60 tablet 1     venlafaxine (EFFEXOR-XR) 75 MG 24 hr capsule Take 1 capsule (75 mg) by mouth daily 90 capsule 3     zolpidem (AMBIEN) 10 MG tablet Take 1 tablet (10 mg) by mouth nightly as needed for sleep 30 tablet 0       Existing Problems  Patient Active Problem List   Diagnosis     Abnormal cytology finding     Nondependent alcohol abuse, episodic drinking behavior     Anxiety state     Controlled substance agreement signed     Low back pain     Chronic sinusitis      "Cocaine abuse     Major depressive disorder, recurrent episode, moderate (H)     Asthma     Tobacco use disorder     Noninflammatory disorder of vagina     Pap smear for cervical cancer screening     Abdominal pain     Abnormal cervical Papanicolaou smear     Panic disorder with agoraphobia     Atopic rhinitis     Chronic low back pain     Depression     Moderate persistent asthma     Other long term (current) drug therapy     Tobacco use     Acute pericardial effusion     Anxiety       Allergies:  Allergies   Allergen Reactions     Lidocaine Other (See Comments)     \"my jaw stopped moving\"     Penicillins Hives, Rash and Shortness Of Breath     Lidocaine-Epinephrine [Epinephrine-Lidocaine-Na Metabisulfite] Other (See Comments)     Severe jaw cramping, double vision       Previous labs:  Lab Results   Component Value Date    HGB 13.0 11/02/2017    HCT 38.7 11/02/2017    ALT 20.0 11/02/2017    AST 16.0 11/02/2017    .0 11/02/2017    BUN 13.0 11/02/2017    CO2 23.0 11/02/2017               Review of Systems:    CONSTITUTIONAL: no fatigue, no unexpected change in weight  SKIN: no worrisome rashes, no worrisome moles, no worrisome lesions  EYES: no acute vision problems or changes  ENT: no ear problems, no mouth problems, no throat problems  RESP: see above  CV: no chest pain, no palpitations, no new or worsening peripheral edema  GI: no nausea, no vomiting, no constipation, no diarrhea          Physical Exam:     Vitals:    08/06/18 1506   BP: 108/75   Pulse: 83   Temp: 97.9  F (36.6  C)   TempSrc: Oral   SpO2: 98%     There is no height or weight on file to calculate BMI.    GENERAL:alert, well hydrated, no distress  EYES: Eyes grossly normal to inspection, extraocular movements - intact, and PERRL  HENT: ear canals- normal; Nose- normal; Mouth- no ulcers, no lesions  NECK: no tenderness, no adenopathy, no asymmetry, no masses, no stiffness  RESP: lungs clear to auscultation - no rales, no rhonchi, no " wheezes  CV: regular rates and rhythm, normal S1 S2, no S3 or S4 and no murmur, no click or rub -  Right leg: knee high medical boot on.  Removed and evaluated scar. Staples still in place.  No erythema, no drainage. No evidence for infection or fluid collection.  Patient complains of pain at the forefoot at the MTP joints.  No edema, no ecchymosis.  Vascular intact. Mild foot edema.              Labs and Procedures     Office Visit on 11/02/2017   Component Date Value Ref Range Status     Specific Gravity Urine 11/02/2017 >=1.030  1.005 - 1.030 Final     pH Urine 11/02/2017 5.5  4.5 - 8.0 Final     Leukocyte Esterase UR 11/02/2017 Trace* NEGATIVE Final     Nitrite Urine 11/02/2017 Negative  NEGATIVE Final     Protein UR 11/02/2017 Negative  NEGATIVE Final     Glucose Urine 11/02/2017 Negative  NEGATIVE Final     Ketones Urine 11/02/2017 Negative  NEGATIVE Final     Urobilinogen mg/dL 11/02/2017 0.2 E.U./dL  0.2 E.U./dL Final     Bilirubin UR 11/02/2017 Negative  NEGATIVE Final     Blood UR 11/02/2017 2+* NEGATIVE Final     Color Urine 11/02/2017 Yellow   Final     Clarity, urine 11/02/2017 Clear   Final     Glucose 11/02/2017 90.0  60.0 - 109.0 mg/dL Final     Urea Nitrogen 11/02/2017 13.0  5.0 - 24.0 mg/dL Final     Creatinine 11/02/2017 0.9  0.6 - 1.3 mg/dL Final     Sodium 11/02/2017 137.0  133.0 - 144.0 mmol/L Final     Potassium 11/02/2017 4.5  3.4 - 5.3 mmol/L Final     Chloride 11/02/2017 110.0* 94.0 - 109.0 mmol/L Final     Carbon Dioxide 11/02/2017 23.0  20.0 - 32.0 mmol/L Final     Calcium 11/02/2017 8.6  8.5 - 10.4 mg/dL Final     Protein Total 11/02/2017 6.7  6.6 - 8.8 g/dL Final     Albumin 11/02/2017 3.3  3.3 - 4.6 g/dL Final     Alkaline Phosphatase 11/02/2017 69.0  40.0 - 150.0 U/L Final     ALT 11/02/2017 20.0  0.0 - 45.0 U/L Final     AST 11/02/2017 16.0  0.0 - 45.0 U/L Final     Bilirubin Total 11/02/2017 0.4  0.2 - 1.3 mg/dL Final     eGFR Calculated (Non Black Referen* 11/02/2017 72.3  >60.0  mL/min Final     eGFR Calculated (Black Reference) 11/02/2017 87.5  >60.0 mL/min Final     Lipase 11/02/2017 27  0 - 52 U/L Final     Amylase 11/02/2017 41  5 - 120 U/L Final     WBC 11/02/2017 7.0  4.0 - 11.0 thou/uL Final     RBC 11/02/2017 4.07  3.80 - 5.40 mill/uL Final     Hemoglobin 11/02/2017 13.0  12.0 - 16.0 g/dL Final     Hematocrit 11/02/2017 38.7  35.0 - 47.0 % Final     MCV 11/02/2017 95  80 - 100 fL Final     MCH 11/02/2017 31.9  27.0 - 34.0 pg Final     MCHC 11/02/2017 33.6  32.0 - 36.0 g/dL Final     RDW 11/02/2017 12.8  11.0 - 14.5 % Final     Platelets 11/02/2017 311  140 - 440 thou/uL Final     Mean Platelet Volume 11/02/2017 10.2  8.5 - 12.5 fL Final     % Neutrophils 11/02/2017 58  50 - 70 % Final     % Lymphocytes 11/02/2017 29  20 - 40 % Final     % Monocytes 11/02/2017 7  2 - 10 % Final     % Eosinophils 11/02/2017 5  0 - 6 % Final     % Basophils 11/02/2017 1  0 - 2 % Final     Neutrophils (Absolute) 11/02/2017 4.0  2.0 - 7.7 thou/uL Final     Lymphs (Absolute) 11/02/2017 2.0  0.8 - 4.4 thou/uL Final     Monocytes(Absolute) 11/02/2017 0.5  0.0 - 0.9 thou/uL Final     Eos (Absolute) 11/02/2017 0.4  0.0 - 0.4 thou/uL Final     Baso (Absolute) 11/02/2017 0.1  0.0 - 0.2 thou/uL Final     WBC Urine 11/02/2017 10-25  <5 /hpf Final     RBC Urine 11/02/2017 2-5  <5 /hpf Final     Epithelial Cells UR 11/02/2017   0 - 2 /lpf Final     Mucous Urine 11/02/2017 None  NONE lpf Final     Casts Urine 11/02/2017 None  NONE /lpf Final     Crystal Urine 11/02/2017 None  NONE /lpf Final     Bacteria Wet Prep 11/02/2017 Many  None Final              Assessment and Plan     1.Continued right foot pain following sugery. Will provide analgesics, and ensure appointment with ortho.  2. Followup from ER visit.  Records reviewed. CT scan chest with no evidence for pulmonary embolism. Inflammation and edema c/w pneumonia.   3. Follow up appointment with ortho for suture removal.        Options for treatment and  follow-up care were reviewed with the patient and/or guardian. Darlene Hudson and/or guardian engaged in the decision making process and verbalized understanding of the options discussed and agreed with the final plan.    Oscar Aparicio MD

## 2018-08-07 ENCOUNTER — RECORDS - HEALTHEAST (OUTPATIENT)
Dept: ADMINISTRATIVE | Facility: OTHER | Age: 43
End: 2018-08-07

## 2018-08-23 ENCOUNTER — OFFICE VISIT (OUTPATIENT)
Dept: FAMILY MEDICINE | Facility: CLINIC | Age: 43
End: 2018-08-23
Payer: OTHER MISCELLANEOUS

## 2018-08-23 VITALS
HEART RATE: 108 BPM | OXYGEN SATURATION: 100 % | RESPIRATION RATE: 18 BRPM | TEMPERATURE: 98.1 F | DIASTOLIC BLOOD PRESSURE: 80 MMHG | SYSTOLIC BLOOD PRESSURE: 111 MMHG

## 2018-08-23 DIAGNOSIS — M54.6 ACUTE LEFT-SIDED THORACIC BACK PAIN: ICD-10-CM

## 2018-08-23 DIAGNOSIS — F41.1 GENERALIZED ANXIETY DISORDER: ICD-10-CM

## 2018-08-23 DIAGNOSIS — L29.9 PRURITIC DISORDER: ICD-10-CM

## 2018-08-23 DIAGNOSIS — F43.22 ADJUSTMENT DISORDER WITH ANXIOUS MOOD: Primary | ICD-10-CM

## 2018-08-23 RX ORDER — ALPRAZOLAM 0.5 MG
0.5 TABLET ORAL 3 TIMES DAILY PRN
Qty: 40 TABLET | Refills: 0 | Status: SHIPPED | OUTPATIENT
Start: 2018-08-23 | End: 2019-01-03

## 2018-08-23 RX ORDER — ESCITALOPRAM OXALATE 10 MG/1
10 TABLET ORAL DAILY
Qty: 30 TABLET | Refills: 1 | Status: SHIPPED | OUTPATIENT
Start: 2018-08-23 | End: 2019-01-03

## 2018-08-23 RX ORDER — CAPSAICIN 0.75 MG/G
CREAM TOPICAL 3 TIMES DAILY
Qty: 60 G | Refills: 1 | Status: SHIPPED | OUTPATIENT
Start: 2018-08-23 | End: 2019-10-09

## 2018-08-23 RX ORDER — CYCLOBENZAPRINE HCL 5 MG
5 TABLET ORAL 3 TIMES DAILY PRN
Qty: 42 TABLET | Refills: 0 | Status: SHIPPED | OUTPATIENT
Start: 2018-08-23 | End: 2019-02-25

## 2018-08-23 NOTE — MR AVS SNAPSHOT
After Visit Summary   8/23/2018    Darlene Hudson    MRN: [id number]           Patient Information     Date Of Birth          1975        Visit Information        Provider Department      8/23/2018 11:40 AM Oscar Aparicio MD [facility]        Today's Diagnoses     Adjustment disorder with anxious mood    -  1    Acute left-sided thoracic back pain        Pruritic disorder           Follow-ups after your visit        Additional Services     MENTAL HEALTH REFERRAL  -       Use this form for behavioral health consults and assessments. The referral coordinator will help to determine whether patients are best served by clinic behavioral health staff or by community providers.    Type of referral(s) requested (indicate all that apply):  Adult Psychiatry--for diagnosis and medication management  Reason for referral: anxiety and depression, increased stress from recent nonhealing bone fracture in foot. Now stressed out, anxious, depressed.  Recently started lexapro.  Needs more awareness of anxiety and tools to deal with anxiety.     Currently receiving mental health services (if 'Yes', what services and why today's referral?): No  Currently having suicidal thoughts: No  Previous psych hospitalization: No    Please provide data for below screening tools if available.   PHQ-9 Score:   GAD7 Score:  PC-PTSD Score:  Bipolar Screen:  Fina (ADHD):   MCHAT (Autism Screen):   Pediatric Symptom Checklist (PSC):      needed: No  Language: English                  Who to contact     Please call your clinic at [phone] to:    Ask questions about your health    Make or cancel appointments    Discuss your medicines    Learn about your test results    Speak to your doctor            Additional Information About Your Visit        Care EveryWhere ID     This is your Care EveryWhere ID. This could be used by other organizations to access your [facility] medical records  [id number]         Your Vitals Were     Pulse Temperature Respirations Pulse Oximetry          108 98.1  F (36.7  C) (Oral) 18 100%         Blood Pressure from Last 3 Encounters:   08/23/18 111/80   08/06/18 108/75   07/23/18 118/79    Weight from Last 3 Encounters:   07/11/18 252 lb (114.3 kg)   10/30/17 251 lb 6.4 oz (114 kg)   07/24/17 238 lb 6.4 oz (108.1 kg)              We Performed the Following     MENTAL HEALTH REFERRAL  -          Today's Medication Changes          These changes are accurate as of 8/23/18 12:53 PM.  If you have any questions, ask your nurse or doctor.               Start taking these medicines.        Dose/Directions    ALPRAZolam 0.5 MG tablet   Commonly known as:  XANAX   Used for:  Adjustment disorder with anxious mood   Started by:  Oscar Aparicio MD        Dose:  0.5 mg   Take 1 tablet (0.5 mg) by mouth 3 times daily as needed for anxiety   Quantity:  40 tablet   Refills:  0       capsaicin 0.075 % cream   Commonly known as:  ZOSTRIX   Used for:  Pruritic disorder   Started by:  Oscar Aparicio MD        Apply topically 3 times daily   Quantity:  60 g   Refills:  1       cyclobenzaprine 5 MG tablet   Commonly known as:  FLEXERIL   Used for:  Acute left-sided thoracic back pain   Started by:  Oscar Aparicio MD        Dose:  5 mg   Take 1 tablet (5 mg) by mouth 3 times daily as needed for muscle spasms   Quantity:  42 tablet   Refills:  0       escitalopram 10 MG tablet   Commonly known as:  LEXAPRO   Used for:  Adjustment disorder with anxious mood   Started by:  Oscar Aparicio MD        Dose:  10 mg   Take 1 tablet (10 mg) by mouth daily   Quantity:  30 tablet   Refills:  1            Where to get your medicines      These medications were sent to [facility] [id number] [facility], [place] - [address]  [address], [facility] [address]     Phone:  [phone]     capsaicin 0.075 % cream    cyclobenzaprine 5 MG tablet    escitalopram 10 MG tablet         Some  of these will need a paper prescription and others can be bought over the counter.  Ask your nurse if you have questions.     Bring a paper prescription for each of these medications     ALPRAZolam 0.5 MG tablet                Primary Care Provider Office Phone # Fax #    Oscar Aparicio [phone]       [facility] [place] [address]        Equal Access to Services     [facility] : Hadii aad ku hadasho Soomaali, waaxda luqadaha, qaybta kaalmada adeegyada, waxay idiin hayaan adeeg kharash la'aan ah. So [facility] [phone].    ATENCIÓN: Si habla español, tiene a boswell disposición servicios gratuitos de asistencia lingüística. Llame al [phone].    We comply with applicable federal civil rights laws and [place] laws. We do not discriminate on the basis of race, color, national origin, age, disability, sex, sexual orientation, or gender identity.            Thank you!     Thank you for choosing [facility]  for your care. Our goal is always to provide you with excellent care. Hearing back from our patients is one way we can continue to improve our services. Please take a few minutes to complete the written survey that you may receive in the mail after your visit with us. Thank you!             Your Updated Medication List - Protect others around you: Learn how to safely use, store and throw away your medicines at www.disposemymeds.org.          This list is accurate as of 8/23/18 12:53 PM.  Always use your most recent med list.                   Brand Name Dispense Instructions for use Diagnosis    albuterol 108 (90 Base) MCG/ACT inhaler    PROAIR HFA/PROVENTIL HFA/VENTOLIN HFA    1 Inhaler    Inhale 2 puffs into the lungs every 4 hours as needed for shortness of breath / dyspnea or wheezing    Moderate persistent asthma without complication       ALPRAZolam 0.5 MG tablet    XANAX    40 tablet    Take 1 tablet (0.5 mg) by mouth 3 times daily as needed for anxiety    Adjustment  disorder with anxious mood       capsaicin 0.075 % cream    ZOSTRIX    60 g    Apply topically 3 times daily    Pruritic disorder       cyclobenzaprine 5 MG tablet    FLEXERIL    42 tablet    Take 1 tablet (5 mg) by mouth 3 times daily as needed for muscle spasms    Acute left-sided thoracic back pain       dextromethorphan 30 MG/5ML liquid    DELSYM    89 mL    Take 5 mLs (30 mg) by mouth 2 times daily    Cough       escitalopram 10 MG tablet    LEXAPRO    30 tablet    Take 1 tablet (10 mg) by mouth daily    Adjustment disorder with anxious mood       fluticasone 50 MCG/ACT spray    FLONASE    1 Bottle    Spray 1-2 sprays into both nostrils daily    Congestion of paranasal sinus       fluticasone-salmeterol 500-50 MCG/DOSE diskus inhaler    ADVAIR DISKUS    60 Inhaler    Inhale 1 puff into the lungs 2 times daily    Moderate persistent asthma without complication       gabapentin 300 MG capsule    NEURONTIN    270 capsule    Take 3 capsules (900 mg) by mouth 3 times daily    Chronic bilateral low back pain without sciatica       metroNIDAZOLE 0.75 % topical gel    METROGEL    45 g    Apply topically 2 times daily    Acne vulgaris       montelukast 10 MG tablet    SINGULAIR    60 tablet    Take 1 tablet (10 mg) by mouth At Bedtime    Moderate persistent asthma without complication       ranitidine 300 MG tablet    ZANTAC    60 tablet    Take 1 tablet (300 mg) by mouth At Bedtime    Transient insomnia       venlafaxine 75 MG 24 hr capsule    EFFEXOR-XR    90 capsule    Take 1 capsule (75 mg) by mouth daily    Generalized anxiety disorder       zolpidem 10 MG tablet    AMBIEN    30 tablet    Take 1 tablet (10 mg) by mouth nightly as needed for sleep    Transient insomnia

## 2018-08-23 NOTE — PROGRESS NOTES
HPI:       Darlene Hudson is a 43 year old  female who presents for fall off of scooter.         Patient has been having a difficult time coping with the current situation. She is still getting over the foot surgery, and is non weight bearing. She is unable to use crutches because of her back pain, and fell of her scooter on Monday. She went to an urgent care where xrays showed no evidence of fracture. The urgent care doctor mentioned that perhaps she would get a CT scan if she came to the clinic.  She has been having vague poorly localizing discomfort on the left side where she fell.  No ecchymosis.      Patient is clearly having difficult time with an adjustment disorder with predominating anxiety.  She is resistant to medication for depression, since she worries about addiction She is no longer on percocet. More than 50% of this 30 minute visit was spent in counseling.  Counseling included discussion of   Medications, side effects, and addiction.     Patient also complains of foot itching - this is a reactiIon around her surgery. She would like something for this.      Her side pain from the fall is keeping her awake at night. I do not want to giver her a pain medication, but agreed to provide flexeril to help her with the muscle spasm and the sleep.               PMHX:   Current Medications:   Current Outpatient Prescriptions   Medication Sig Dispense Refill     albuterol (PROAIR HFA/PROVENTIL HFA/VENTOLIN HFA) 108 (90 Base) MCG/ACT Inhaler Inhale 2 puffs into the lungs every 4 hours as needed for shortness of breath / dyspnea or wheezing 1 Inhaler 3     dextromethorphan (DELSYM) 30 MG/5ML liquid Take 5 mLs (30 mg) by mouth 2 times daily 89 mL 0     fluticasone (FLONASE) 50 MCG/ACT spray Spray 1-2 sprays into both nostrils daily 1 Bottle 11     fluticasone-salmeterol (ADVAIR DISKUS) 500-50 MCG/DOSE diskus inhaler Inhale 1 puff into the lungs 2 times daily 60 Inhaler 1     gabapentin (NEURONTIN) 300 MG  "capsule Take 3 capsules (900 mg) by mouth 3 times daily 270 capsule 0     metroNIDAZOLE (METROGEL) 0.75 % topical gel Apply topically 2 times daily 45 g 11     montelukast (SINGULAIR) 10 MG tablet Take 1 tablet (10 mg) by mouth At Bedtime 60 tablet 1     oxyCODONE-acetaminophen (PERCOCET) 5-325 MG per tablet Take 2 tablets by mouth 2 times daily maximum 4 tablet(s) per day 28 tablet 0     ranitidine (ZANTAC) 300 MG tablet Take 1 tablet (300 mg) by mouth At Bedtime 60 tablet 1     venlafaxine (EFFEXOR-XR) 75 MG 24 hr capsule Take 1 capsule (75 mg) by mouth daily 90 capsule 3     zolpidem (AMBIEN) 10 MG tablet Take 1 tablet (10 mg) by mouth nightly as needed for sleep 30 tablet 0       Existing Problems  Patient Active Problem List   Diagnosis     Abnormal cytology finding     Nondependent alcohol abuse, episodic drinking behavior     Anxiety state     Controlled substance agreement signed     Low back pain     Chronic sinusitis     Cocaine abuse     Major depressive disorder, recurrent episode, moderate (H)     Asthma     Tobacco use disorder     Noninflammatory disorder of vagina     Pap smear for cervical cancer screening     Abdominal pain     Abnormal cervical Papanicolaou smear     Panic disorder with agoraphobia     Atopic rhinitis     Chronic low back pain     Depression     Moderate persistent asthma     Other long term (current) drug therapy     Tobacco use     Acute pericardial effusion     Anxiety       Allergies:  Allergies   Allergen Reactions     Lidocaine Other (See Comments)     \"my jaw stopped moving\"     Penicillins Hives, Rash and Shortness Of Breath     Lidocaine-Epinephrine [Epinephrine-Lidocaine-Na Metabisulfite] Other (See Comments)     Severe jaw cramping, double vision       Previous labs:  Lab Results   Component Value Date    HGB 13.0 11/02/2017    HCT 38.7 11/02/2017    ALT 20.0 11/02/2017    AST 16.0 11/02/2017    .0 11/02/2017    BUN 13.0 11/02/2017    CO2 23.0 11/02/2017           "     Review of Systems:    CONSTITUTIONAL: gaining weight, unale to exercise per patient  SKIN: no worrisome rashes, no worrisome moles, no worrisome lesions  EYES: no acute vision problems or changes  ENT: no ear problems, no mouth problems, no throat problems  RESP: no significant cough, no shortness of breath  CV: no chest pain, no palpitations, no new or worsening peripheral edema  GI: no nausea, no vomiting, no constipation, no diarrhea          Physical Exam:     Vitals:    08/23/18 1159   BP: 111/80   Pulse: 108   Resp: 18   Temp: 98.1  F (36.7  C)   TempSrc: Oral   SpO2: 100%     There is no height or weight on file to calculate BMI.    GENERAL:alert, well hydrated, no distress  EYES: Eyes grossly normal to inspection, extraocular movements - intact, and PERRL  HENT: ear canals- normal; TMs- normal; Nose- normal; Mouth- no ulcers, no lesions  NECK: no tenderness, no adenopathy, no asymmetry, no masses, no stiffness; thyroid- normal to palpation  RESP: lungs clear to auscultation - no rales, no rhonchi, no wheezes  CV: regular rates and rhythm, normal S1 S2, no S3 or S4 and no murmur, no click or rub -  BACK: mild pain poorly localizing to the right lateral flank.  No ecchymosis. No abrasion. Sensory/motor exam intact.            Labs and Procedures     Office Visit on 11/02/2017   Component Date Value Ref Range Status     Specific Gravity Urine 11/02/2017 >=1.030  1.005 - 1.030 Final     pH Urine 11/02/2017 5.5  4.5 - 8.0 Final     Leukocyte Esterase UR 11/02/2017 Trace* NEGATIVE Final     Nitrite Urine 11/02/2017 Negative  NEGATIVE Final     Protein UR 11/02/2017 Negative  NEGATIVE Final     Glucose Urine 11/02/2017 Negative  NEGATIVE Final     Ketones Urine 11/02/2017 Negative  NEGATIVE Final     Urobilinogen mg/dL 11/02/2017 0.2 E.U./dL  0.2 E.U./dL Final     Bilirubin UR 11/02/2017 Negative  NEGATIVE Final     Blood UR 11/02/2017 2+* NEGATIVE Final     Color Urine 11/02/2017 Yellow   Final     Clarity,  urine 11/02/2017 Clear   Final     Glucose 11/02/2017 90.0  60.0 - 109.0 mg/dL Final     Urea Nitrogen 11/02/2017 13.0  5.0 - 24.0 mg/dL Final     Creatinine 11/02/2017 0.9  0.6 - 1.3 mg/dL Final     Sodium 11/02/2017 137.0  133.0 - 144.0 mmol/L Final     Potassium 11/02/2017 4.5  3.4 - 5.3 mmol/L Final     Chloride 11/02/2017 110.0* 94.0 - 109.0 mmol/L Final     Carbon Dioxide 11/02/2017 23.0  20.0 - 32.0 mmol/L Final     Calcium 11/02/2017 8.6  8.5 - 10.4 mg/dL Final     Protein Total 11/02/2017 6.7  6.6 - 8.8 g/dL Final     Albumin 11/02/2017 3.3  3.3 - 4.6 g/dL Final     Alkaline Phosphatase 11/02/2017 69.0  40.0 - 150.0 U/L Final     ALT 11/02/2017 20.0  0.0 - 45.0 U/L Final     AST 11/02/2017 16.0  0.0 - 45.0 U/L Final     Bilirubin Total 11/02/2017 0.4  0.2 - 1.3 mg/dL Final     eGFR Calculated (Non Black Referen* 11/02/2017 72.3  >60.0 mL/min Final     eGFR Calculated (Black Reference) 11/02/2017 87.5  >60.0 mL/min Final     Lipase 11/02/2017 27  0 - 52 U/L Final     Amylase 11/02/2017 41  5 - 120 U/L Final     WBC 11/02/2017 7.0  4.0 - 11.0 thou/uL Final     RBC 11/02/2017 4.07  3.80 - 5.40 mill/uL Final     Hemoglobin 11/02/2017 13.0  12.0 - 16.0 g/dL Final     Hematocrit 11/02/2017 38.7  35.0 - 47.0 % Final     MCV 11/02/2017 95  80 - 100 fL Final     MCH 11/02/2017 31.9  27.0 - 34.0 pg Final     MCHC 11/02/2017 33.6  32.0 - 36.0 g/dL Final     RDW 11/02/2017 12.8  11.0 - 14.5 % Final     Platelets 11/02/2017 311  140 - 440 thou/uL Final     Mean Platelet Volume 11/02/2017 10.2  8.5 - 12.5 fL Final     % Neutrophils 11/02/2017 58  50 - 70 % Final     % Lymphocytes 11/02/2017 29  20 - 40 % Final     % Monocytes 11/02/2017 7  2 - 10 % Final     % Eosinophils 11/02/2017 5  0 - 6 % Final     % Basophils 11/02/2017 1  0 - 2 % Final     Neutrophils (Absolute) 11/02/2017 4.0  2.0 - 7.7 thou/uL Final     Lymphs (Absolute) 11/02/2017 2.0  0.8 - 4.4 thou/uL Final     Monocytes(Absolute) 11/02/2017 0.5  0.0 - 0.9  thou/uL Final     Eos (Absolute) 11/02/2017 0.4  0.0 - 0.4 thou/uL Final     Baso (Absolute) 11/02/2017 0.1  0.0 - 0.2 thou/uL Final     WBC Urine 11/02/2017 10-25  <5 /hpf Final     RBC Urine 11/02/2017 2-5  <5 /hpf Final     Epithelial Cells UR 11/02/2017   0 - 2 /lpf Final     Mucous Urine 11/02/2017 None  NONE lpf Final     Casts Urine 11/02/2017 None  NONE /lpf Final     Crystal Urine 11/02/2017 None  NONE /lpf Final     Bacteria Wet Prep 11/02/2017 Many  None Final              Assessment and Plan     1.Anxiety predominate depression. Will reer to mental health. Start lexapro 10mg, recheck in one month.  2. Will start xanax 0.5 mg for the first few weeks to help control anxiety while the lexapro begins to work.   3. Capsaicin cream for the itching around the foot.  Hopefull this may help decrease her need to itch the foot.   4. Flexeril for flank muscle spasm, and o help her sleep following her fall from the scooter.    Recheck in 3-4 weeks to consider increasing lexapro, decreasing xanax.       Options for treatment and follow-up care were reviewed with the patient and/or guardian. Darlene Hudson and/or guardian engaged in the decision making process and verbalized understanding of the options discussed and agreed with the final plan.    Oscar Aparicio MD

## 2018-08-31 ENCOUNTER — DOCUMENTATION ONLY (OUTPATIENT)
Dept: FAMILY MEDICINE | Facility: CLINIC | Age: 43
End: 2018-08-31

## 2018-08-31 NOTE — PROGRESS NOTES
Order Date:8/23/2018      Ordering User:LINDY GALLAGHER [[id number]]     Encounter Provider:Lindy Gallagher MD [[id number]]     Authorizing Provider: Lindy Gallagher MD [[id number]]     Department:[facility][[id number]]          Ordering Provider NPI: [id number]  Lindy Gallagher     [facility]~[address] [id number]     Phone: [phone]                    Procedure Requested       9035     MENTAL HEALTH REFERRAL  -             [#[id number]]         Priority: Routine  Class: External referral         Comment:Use this form for behavioral health consults and assessments. The                  referral coordinator will help to determine whether patients are                  best served by clinic behavioral health staff or by community                  providers.                                    Type of referral(s) requested (indicate all that apply):                  Adult Psychiatry--for diagnosis and medication management                  Reason for referral: anxiety and depression, increased stress from                   recent nonhealing bone fracture in foot. Now stressed out,                   anxious, depressed.  Recently started lexapro.  Needs more                   awareness of anxiety and tools to deal with anxiety.                                     Currently receiving mental health services (if 'Yes', what                   services and why today's referral?): No                  Currently having suicidal thoughts: No                  Previous psych hospitalization: No                                    Please provide data for below screening tools if available.                   PHQ-9 Score:                   GAD7 Score:                  PC-PTSD Score:                  Bipolar Screen:                  Fina (ADHD):                   MCHAT (Autism Screen):                   Pediatric Symptom Checklist (PSC):                                      needed: No                   Language: English       Associated Diagnoses         F43.22 Adjustment disorder with anxious mood         Adult or Child/Adolescent:  Adult        Referral for (Test): Psychiatry with therapy needed to attain DA first   Location/Place/Provider: [facility] [address] [place] [address] [facility]    Date/Time: 9/18/18 at 230 with Gilbert Santos for DA. Patient can see preferred (male) Psychiatrist at [facility] location should she choose.    Phone: [phone]  Fax: [phone]  Additional information/prep.: Patient is not allowed to RS or cancel this appointment or will be discharged. Patient not allowed to schedule at [facility] due to NS rate. Patient prefers male provider.   Scheduled by: JR  Attempted to reach patient to inform of this. VM full, will attempt again. Informed patient. Informed that she is not allowed to RS or cancel this appointment, she will not be able to be scheduled again. Patient states that this should work for her and she has transportation to get to appointment. Patient advised that there is a male provider at the [facility] location for Psychiatry once her DA is done.

## 2018-09-05 ENCOUNTER — RECORDS - HEALTHEAST (OUTPATIENT)
Dept: ADMINISTRATIVE | Facility: OTHER | Age: 43
End: 2018-09-05

## 2018-11-06 ENCOUNTER — RECORDS - HEALTHEAST (OUTPATIENT)
Dept: ADMINISTRATIVE | Facility: OTHER | Age: 43
End: 2018-11-06

## 2018-12-18 ENCOUNTER — RECORDS - HEALTHEAST (OUTPATIENT)
Dept: ADMINISTRATIVE | Facility: OTHER | Age: 43
End: 2018-12-18

## 2019-01-03 ENCOUNTER — OFFICE VISIT (OUTPATIENT)
Dept: FAMILY MEDICINE | Facility: CLINIC | Age: 44
End: 2019-01-03
Payer: MEDICAID

## 2019-01-03 VITALS
RESPIRATION RATE: 18 BRPM | SYSTOLIC BLOOD PRESSURE: 113 MMHG | HEART RATE: 84 BPM | OXYGEN SATURATION: 98 % | DIASTOLIC BLOOD PRESSURE: 81 MMHG | TEMPERATURE: 97.8 F

## 2019-01-03 DIAGNOSIS — Z23 NEEDS FLU SHOT: Primary | ICD-10-CM

## 2019-01-03 DIAGNOSIS — F43.22 ADJUSTMENT DISORDER WITH ANXIOUS MOOD: ICD-10-CM

## 2019-01-03 DIAGNOSIS — F51.02 TRANSIENT INSOMNIA: ICD-10-CM

## 2019-01-03 DIAGNOSIS — R07.0 THROAT PAIN: ICD-10-CM

## 2019-01-03 DIAGNOSIS — J45.40 MODERATE PERSISTENT ASTHMA WITHOUT COMPLICATION: ICD-10-CM

## 2019-01-03 DIAGNOSIS — R09.81 CONGESTION OF PARANASAL SINUS: ICD-10-CM

## 2019-01-03 LAB — S PYO AG THROAT QL IA.RAPID: NEGATIVE

## 2019-01-03 RX ORDER — ZOLPIDEM TARTRATE 10 MG/1
10 TABLET ORAL
Qty: 30 TABLET | Refills: 0 | Status: SHIPPED | OUTPATIENT
Start: 2019-01-03 | End: 2019-10-09

## 2019-01-03 RX ORDER — ALPRAZOLAM 0.5 MG
0.5 TABLET ORAL 3 TIMES DAILY PRN
Qty: 40 TABLET | Refills: 0 | Status: SHIPPED | OUTPATIENT
Start: 2019-01-03 | End: 2019-02-25 | Stop reason: DRUGHIGH

## 2019-01-03 RX ORDER — MONTELUKAST SODIUM 10 MG/1
10 TABLET ORAL AT BEDTIME
Qty: 60 TABLET | Refills: 1 | Status: SHIPPED | OUTPATIENT
Start: 2019-01-03 | End: 2019-07-17

## 2019-01-03 RX ORDER — ESCITALOPRAM OXALATE 10 MG/1
10 TABLET ORAL DAILY
Qty: 30 TABLET | Refills: 1 | Status: SHIPPED | OUTPATIENT
Start: 2019-01-03 | End: 2019-01-28

## 2019-01-03 RX ORDER — ALBUTEROL SULFATE 90 UG/1
2 AEROSOL, METERED RESPIRATORY (INHALATION) EVERY 4 HOURS PRN
Qty: 1 INHALER | Refills: 3 | Status: SHIPPED | OUTPATIENT
Start: 2019-01-03 | End: 2019-06-10

## 2019-01-03 RX ORDER — FLUTICASONE PROPIONATE 50 MCG
1-2 SPRAY, SUSPENSION (ML) NASAL DAILY
Qty: 1 BOTTLE | Refills: 11 | Status: SHIPPED | OUTPATIENT
Start: 2019-01-03 | End: 2019-07-17

## 2019-01-03 ASSESSMENT — PATIENT HEALTH QUESTIONNAIRE - PHQ9: SUM OF ALL RESPONSES TO PHQ QUESTIONS 1-9: 7

## 2019-01-03 NOTE — NURSING NOTE
1/2/19 PCS Previsit Plan   DUE FOR:  Spirometry  HIV screen - Declined  Tdap-declined in past - Will plan to get in April 2019  Pap - Scheduled CPE  Lipid  ACT - Completed   AAP  UDS  BETTYE Haley

## 2019-01-03 NOTE — PROGRESS NOTES
HPI:       Darlene Hudson is a 43 year old  female who presents for medications  check.   Patient lost her insurance three monthago, and has been off all of her medications. She ran out of her anxiety meds a months or two ago, and the asthma meds ran out last week. She is not having a difficult time dealing with anxiety.  Crying during exam.    Her daughter had strep throat over Jennifer, and patient now has a sore throat. No fever.  No cough.     She is able to walk, and her foot is now feeling better. She dos have some loss of sensation over the top of her foot.              PMHX:   Current Medications:   Current Outpatient Medications   Medication Sig Dispense Refill     albuterol (PROAIR HFA/PROVENTIL HFA/VENTOLIN HFA) 108 (90 Base) MCG/ACT Inhaler Inhale 2 puffs into the lungs every 4 hours as needed for shortness of breath / dyspnea or wheezing 1 Inhaler 3     ALPRAZolam (XANAX) 0.5 MG tablet Take 1 tablet (0.5 mg) by mouth 3 times daily as needed for anxiety 40 tablet 0     capsaicin (ZOSTRIX) 0.075 % cream Apply topically 3 times daily 60 g 1     cyclobenzaprine (FLEXERIL) 5 MG tablet Take 1 tablet (5 mg) by mouth 3 times daily as needed for muscle spasms 42 tablet 0     dextromethorphan (DELSYM) 30 MG/5ML liquid Take 5 mLs (30 mg) by mouth 2 times daily 89 mL 0     escitalopram (LEXAPRO) 10 MG tablet Take 1 tablet (10 mg) by mouth daily 30 tablet 1     fluticasone (FLONASE) 50 MCG/ACT spray Spray 1-2 sprays into both nostrils daily 1 Bottle 11     fluticasone-salmeterol (ADVAIR DISKUS) 500-50 MCG/DOSE diskus inhaler Inhale 1 puff into the lungs 2 times daily 60 Inhaler 1     gabapentin (NEURONTIN) 300 MG capsule Take 3 capsules (900 mg) by mouth 3 times daily 270 capsule 0     metroNIDAZOLE (METROGEL) 0.75 % topical gel Apply topically 2 times daily 45 g 11     montelukast (SINGULAIR) 10 MG tablet Take 1 tablet (10 mg) by mouth At Bedtime 60 tablet 1     ranitidine (ZANTAC) 300 MG tablet Take 1  "tablet (300 mg) by mouth At Bedtime 60 tablet 1     venlafaxine (EFFEXOR-XR) 75 MG 24 hr capsule Take 1 capsule (75 mg) by mouth daily 90 capsule 3     zolpidem (AMBIEN) 10 MG tablet Take 1 tablet (10 mg) by mouth nightly as needed for sleep 30 tablet 0       Existing Problems  Patient Active Problem List   Diagnosis     Abnormal cytology finding     Nondependent alcohol abuse, episodic drinking behavior     Anxiety state     Controlled substance agreement signed     Low back pain     Chronic sinusitis     Cocaine abuse (H)     Major depressive disorder, recurrent episode, moderate (H)     Asthma     Tobacco use disorder     Noninflammatory disorder of vagina     Pap smear for cervical cancer screening     Abdominal pain     Abnormal cervical Papanicolaou smear     Panic disorder with agoraphobia     Atopic rhinitis     Chronic low back pain     Depression     Moderate persistent asthma     Other long term (current) drug therapy     Tobacco use     Acute pericardial effusion     Anxiety       Allergies:  Allergies   Allergen Reactions     Lidocaine Other (See Comments)     \"my jaw stopped moving\"     Penicillins Hives, Rash and Shortness Of Breath     Lidocaine-Epinephrine [Epinephrine-Lidocaine-Na Metabisulfite] Other (See Comments)     Severe jaw cramping, double vision       Previous labs:  Lab Results   Component Value Date    HGB 13.0 11/02/2017    HCT 38.7 11/02/2017    ALT 20.0 11/02/2017    AST 16.0 11/02/2017    .0 11/02/2017    BUN 13.0 11/02/2017    CO2 23.0 11/02/2017               Review of Systems:    CONSTITUTIONAL: no fatigue, no unexpected change in weight  SKIN: no worrisome rashes, no worrisome moles, no worrisome lesions  EYES: no acute vision problems or changes  ENT: no ear problems, no mouth problems, no throat problems  RESP: no significant cough, no shortness of breath  CV: no chest pain, no palpitations, no new or worsening peripheral edema  GI: no nausea, no vomiting, no " constipation, no diarrhea          Physical Exam:     Vitals:    01/03/19 0919   Resp: 18     There is no height or weight on file to calculate BMI.    GENERAL:alert, well hydrated, no distress  EYES: Eyes grossly normal to inspection, extraocular movements - intact, and PERRL  HENT: ear canals- normal; TMs- normal; Nose- normal; Mouth- no ulcers, no lesions  NECK: no tenderness, no adenopathy, no asymmetry, no masses, no stiffness; thyroid- normal to palpation  RESP: lungs clear to auscultation - no rales, no rhonchi, no wheezes  CV: regular rates and rhythm, normal S1 S2, no S3 or S4 and no murmur, no click or rub -               Labs and Procedures     Office Visit on 11/02/2017   Component Date Value Ref Range Status     Specific Gravity Urine 11/02/2017 >=1.030  1.005 - 1.030 Final     pH Urine 11/02/2017 5.5  4.5 - 8.0 Final     Leukocyte Esterase UR 11/02/2017 Trace* NEGATIVE Final     Nitrite Urine 11/02/2017 Negative  NEGATIVE Final     Protein UR 11/02/2017 Negative  NEGATIVE Final     Glucose Urine 11/02/2017 Negative  NEGATIVE Final     Ketones Urine 11/02/2017 Negative  NEGATIVE Final     Urobilinogen mg/dL 11/02/2017 0.2 E.U./dL  0.2 E.U./dL Final     Bilirubin UR 11/02/2017 Negative  NEGATIVE Final     Blood UR 11/02/2017 2+* NEGATIVE Final     Color Urine 11/02/2017 Yellow   Final     Clarity, urine 11/02/2017 Clear   Final     Glucose 11/02/2017 90.0  60.0 - 109.0 mg/dL Final     Urea Nitrogen 11/02/2017 13.0  5.0 - 24.0 mg/dL Final     Creatinine 11/02/2017 0.9  0.6 - 1.3 mg/dL Final     Sodium 11/02/2017 137.0  133.0 - 144.0 mmol/L Final     Potassium 11/02/2017 4.5  3.4 - 5.3 mmol/L Final     Chloride 11/02/2017 110.0* 94.0 - 109.0 mmol/L Final     Carbon Dioxide 11/02/2017 23.0  20.0 - 32.0 mmol/L Final     Calcium 11/02/2017 8.6  8.5 - 10.4 mg/dL Final     Protein Total 11/02/2017 6.7  6.6 - 8.8 g/dL Final     Albumin 11/02/2017 3.3  3.3 - 4.6 g/dL Final     Alkaline Phosphatase 11/02/2017 69.0   40.0 - 150.0 U/L Final     ALT 11/02/2017 20.0  0.0 - 45.0 U/L Final     AST 11/02/2017 16.0  0.0 - 45.0 U/L Final     Bilirubin Total 11/02/2017 0.4  0.2 - 1.3 mg/dL Final     eGFR Calculated (Non Black Referen* 11/02/2017 72.3  >60.0 mL/min Final     eGFR Calculated (Black Reference) 11/02/2017 87.5  >60.0 mL/min Final     Lipase 11/02/2017 27  0 - 52 U/L Final     Amylase 11/02/2017 41  5 - 120 U/L Final     WBC 11/02/2017 7.0  4.0 - 11.0 thou/uL Final     RBC 11/02/2017 4.07  3.80 - 5.40 mill/uL Final     Hemoglobin 11/02/2017 13.0  12.0 - 16.0 g/dL Final     Hematocrit 11/02/2017 38.7  35.0 - 47.0 % Final     MCV 11/02/2017 95  80 - 100 fL Final     MCH 11/02/2017 31.9  27.0 - 34.0 pg Final     MCHC 11/02/2017 33.6  32.0 - 36.0 g/dL Final     RDW 11/02/2017 12.8  11.0 - 14.5 % Final     Platelets 11/02/2017 311  140 - 440 thou/uL Final     Mean Platelet Volume 11/02/2017 10.2  8.5 - 12.5 fL Final     % Neutrophils 11/02/2017 58  50 - 70 % Final     % Lymphocytes 11/02/2017 29  20 - 40 % Final     % Monocytes 11/02/2017 7  2 - 10 % Final     % Eosinophils 11/02/2017 5  0 - 6 % Final     % Basophils 11/02/2017 1  0 - 2 % Final     Neutrophils (Absolute) 11/02/2017 4.0  2.0 - 7.7 thou/uL Final     Lymphs (Absolute) 11/02/2017 2.0  0.8 - 4.4 thou/uL Final     Monocytes(Absolute) 11/02/2017 0.5  0.0 - 0.9 thou/uL Final     Eos (Absolute) 11/02/2017 0.4  0.0 - 0.4 thou/uL Final     Baso (Absolute) 11/02/2017 0.1  0.0 - 0.2 thou/uL Final     WBC Urine 11/02/2017 10-25  <5 /hpf Final     RBC Urine 11/02/2017 2-5  <5 /hpf Final     Epithelial Cells UR 11/02/2017   0 - 2 /lpf Final     Mucous Urine 11/02/2017 None  NONE lpf Final     Casts Urine 11/02/2017 None  NONE /lpf Final     Crystal Urine 11/02/2017 None  NONE /lpf Final     Bacteria Wet Prep 11/02/2017 Many  None Final              Assessment and Plan     1.recheck in 3-4 weeks.   2. Have restarted all of the patients meds. This includes anxiety meds, and  asthma meds.   3. Will check a rapid strep test.  Strep test negative.       Options for treatment and follow-up care were reviewed with the patient and/or guardian. Darlene Hudson and/or guardian engaged in the decision making process and verbalized understanding of the options discussed and agreed with the final plan.    Oscar Aparicio MD

## 2019-01-03 NOTE — NURSING NOTE
Injectable influenza vaccine documentation    1. Has the patient received the information for the influenza vaccine? YES    2. Does the patient have a severe allergy to eggs (Patients with a severe egg allergy should be assessed by a medical provider, RN, or clinical pharmacist. If they receive the influenza vaccine, please have them observed for 15 minutes.)? No    3. Has the patient had an allergic reaction to previous influenza vaccines? No    4. Has the patient had any severe allergic reactions to past influenza vaccines ? No       5. Does patient have a history of Guillain-Manilla syndrome? No      Based on responses above, I administered the influenza vaccine.  BETTYE Haley

## 2019-01-04 ASSESSMENT — ASTHMA QUESTIONNAIRES: ACT_TOTALSCORE: 14

## 2019-01-05 LAB — CULTURE: NORMAL

## 2019-01-07 ENCOUNTER — OFFICE VISIT (OUTPATIENT)
Dept: FAMILY MEDICINE | Facility: CLINIC | Age: 44
End: 2019-01-07
Payer: MEDICAID

## 2019-01-07 VITALS
OXYGEN SATURATION: 97 % | RESPIRATION RATE: 20 BRPM | SYSTOLIC BLOOD PRESSURE: 130 MMHG | TEMPERATURE: 97.6 F | HEART RATE: 107 BPM | DIASTOLIC BLOOD PRESSURE: 85 MMHG

## 2019-01-07 DIAGNOSIS — K52.9 GASTROENTERITIS: ICD-10-CM

## 2019-01-07 DIAGNOSIS — R30.0 DYSURIA: ICD-10-CM

## 2019-01-07 DIAGNOSIS — F41.9 ANXIETY: Primary | ICD-10-CM

## 2019-01-07 DIAGNOSIS — F43.22 ADJUSTMENT DISORDER WITH ANXIOUS MOOD: ICD-10-CM

## 2019-01-07 DIAGNOSIS — J45.40 MODERATE PERSISTENT ASTHMA WITHOUT COMPLICATION: ICD-10-CM

## 2019-01-07 LAB
BILIRUBIN UR: NEGATIVE
BLOOD UR: ABNORMAL
GLUCOSE URINE: NEGATIVE
KETONES UR QL: NEGATIVE
LEUKOCYTE ESTERASE UR: ABNORMAL
NITRITE UR QL STRIP: NEGATIVE
PH UR STRIP: 5.5 [PH] (ref 5–7)
PROTEIN UR: NEGATIVE
SP GR UR STRIP: >=1.03
UROBILINOGEN UR STRIP-ACNC: ABNORMAL

## 2019-01-07 RX ORDER — SULFAMETHOXAZOLE/TRIMETHOPRIM 800-160 MG
1 TABLET ORAL 2 TIMES DAILY
Qty: 14 TABLET | Refills: 0 | Status: SHIPPED | OUTPATIENT
Start: 2019-01-07 | End: 2019-01-14

## 2019-01-07 RX ORDER — ALPRAZOLAM 1 MG
1 TABLET ORAL 3 TIMES DAILY PRN
Qty: 60 TABLET | Refills: 1 | Status: SHIPPED | OUTPATIENT
Start: 2019-01-10 | End: 2019-02-25

## 2019-01-07 ASSESSMENT — PAIN SCALES - GENERAL: PAINLEVEL: WORST PAIN (10)

## 2019-01-07 NOTE — PROGRESS NOTES
HPI:       Darlene Hudson is a 43 year old  female who presents for nausea and vomiting, and stomach pain.   Patient developed mild stomach pain last night, and began vomiting.  Went to ER where labs were drawn and US done.  Was unable to provide a UA last night.   Describes onset of vague abdominal pain, followed by nausea and vomiting. Reviewed labs and tests from ER. Wet prep negative. Patient was discharged from ER without stated diagnosis other than some clue cells.               PMHX:   Current Medications:   Current Outpatient Medications   Medication Sig Dispense Refill     albuterol (PROAIR HFA/PROVENTIL HFA/VENTOLIN HFA) 108 (90 Base) MCG/ACT inhaler Inhale 2 puffs into the lungs every 4 hours as needed for shortness of breath / dyspnea or wheezing 1 Inhaler 3     ALPRAZolam (XANAX) 0.5 MG tablet Take 1 tablet (0.5 mg) by mouth 3 times daily as needed for anxiety 40 tablet 0     capsaicin (ZOSTRIX) 0.075 % cream Apply topically 3 times daily 60 g 1     cyclobenzaprine (FLEXERIL) 5 MG tablet Take 1 tablet (5 mg) by mouth 3 times daily as needed for muscle spasms 42 tablet 0     dextromethorphan (DELSYM) 30 MG/5ML liquid Take 5 mLs (30 mg) by mouth 2 times daily 89 mL 0     escitalopram (LEXAPRO) 10 MG tablet Take 1 tablet (10 mg) by mouth daily 30 tablet 1     fluticasone (FLONASE) 50 MCG/ACT nasal spray Spray 1-2 sprays into both nostrils daily 1 Bottle 11     fluticasone-salmeterol (ADVAIR DISKUS) 500-50 MCG/DOSE inhaler Inhale 1 puff into the lungs 2 times daily 60 Inhaler 1     gabapentin (NEURONTIN) 300 MG capsule Take 3 capsules (900 mg) by mouth 3 times daily 270 capsule 0     metroNIDAZOLE (METROGEL) 0.75 % topical gel Apply topically 2 times daily 45 g 11     montelukast (SINGULAIR) 10 MG tablet Take 1 tablet (10 mg) by mouth At Bedtime 60 tablet 1     ranitidine (ZANTAC) 300 MG tablet Take 1 tablet (300 mg) by mouth At Bedtime 60 tablet 1     venlafaxine (EFFEXOR-XR) 75 MG 24 hr capsule  "Take 1 capsule (75 mg) by mouth daily 90 capsule 3     zolpidem (AMBIEN) 10 MG tablet Take 1 tablet (10 mg) by mouth nightly as needed for sleep 30 tablet 0       Existing Problems  Patient Active Problem List   Diagnosis     Abnormal cytology finding     Nondependent alcohol abuse, episodic drinking behavior     Anxiety state     Controlled substance agreement signed     Low back pain     Chronic sinusitis     Cocaine abuse (H)     Major depressive disorder, recurrent episode, moderate (H)     Asthma     Tobacco use disorder     Noninflammatory disorder of vagina     Pap smear for cervical cancer screening     Abdominal pain     Abnormal cervical Papanicolaou smear     Panic disorder with agoraphobia     Atopic rhinitis     Chronic low back pain     Depression     Moderate persistent asthma     Other long term (current) drug therapy     Tobacco use     Acute pericardial effusion     Anxiety       Allergies:  Allergies   Allergen Reactions     Lidocaine Other (See Comments)     \"my jaw stopped moving\"     Penicillins Hives, Rash and Shortness Of Breath     Lidocaine-Epinephrine [Epinephrine-Lidocaine-Na Metabisulfite] Other (See Comments)     Severe jaw cramping, double vision       Previous labs:  Lab Results   Component Value Date    HGB 13.0 11/02/2017    HCT 38.7 11/02/2017    ALT 20.0 11/02/2017    AST 16.0 11/02/2017    .0 11/02/2017    BUN 13.0 11/02/2017    CO2 23.0 11/02/2017               Review of Systems:    CONSTITUTIONAL: no fatigue, no unexpected change in weight  SKIN: no worrisome rashes, no worrisome moles, no worrisome lesions  EYES: no acute vision problems or changes  ENT: no ear problems, no mouth problems, no throat problems  RESP: no significant cough, no shortness of breath  CV: no chest pain, no palpitations, no new or worsening peripheral edema  GI: no nausea, no vomiting, no constipation, no diarrhea          Physical Exam:     Vitals:    01/07/19 1425   BP: 130/85   Pulse: 107 "   Resp: 20   Temp: 97.6  F (36.4  C)   TempSrc: Oral   SpO2: 97%     There is no height or weight on file to calculate BMI.    GENERAL:alert, well hydrated, no distress  EYES: Eyes grossly normal to inspection, extraocular movements - intact, and PERRL  HENT: ear canals- normal; TMs- normal; Nose- normal; Mouth- no ulcers, no lesions  NECK: no tenderness, no adenopathy, no asymmetry, no masses, no stiffness; thyroid- normal to palpation  RESP: lungs clear to auscultation - no rales, no rhonchi, no wheezes  CV: regular rates and rhythm, normal S1 S2, no S3 or S4 and no murmur, no click or rub -  ABDOMEN:vague nonlocalizing discomfort at the right lower quadrant.  No tenderness to deep compression. Soft. No rebound. No suprapubic tenderness.  Other quadrants without pain.            Labs and Procedures     Office Visit on 01/03/2019   Component Date Value Ref Range Status     Rapid Strep A Screen 01/03/2019 NEGATIVE  Negative Final     Culture 01/03/2019 SEE RESULTS BELOW   Final    Comment: CULTURE, THROAT   CULTURE RESULTS:    Usual [place]                Assessment and Plan     1.Appears to be viral gastroenteritis. Advised patient to rest and drink fluids, small amount frequently.    2. Would expect resolution in 24-48 hours.   3. Return if problems persist or further problems develop.    4. UA - blood, but otherwise negative at this time.        Options for treatment and follow-up care were reviewed with the patient and/or guardian. Darlene Hudson and/or guardian engaged in the decision making process and verbalized understanding of the options discussed and agreed with the final plan.    Oscar Aparicio MD

## 2019-01-08 ENCOUNTER — TELEPHONE (OUTPATIENT)
Dept: FAMILY MEDICINE | Facility: CLINIC | Age: 44
End: 2019-01-08

## 2019-01-08 NOTE — RESULT ENCOUNTER NOTE
Please call patient and send results letter  Final result of throat culture was normal, no strep throat.

## 2019-01-08 NOTE — RESULT ENCOUNTER NOTE
Please call patient and send results letter. I have written a prescription, so please call her an inform.   The urine test suggests an infection is possible, so for safety I called in a prescription for an antibiotic (Bactrim). One tablet twice a day for 7 days.

## 2019-01-09 ENCOUNTER — RECORDS - HEALTHEAST (OUTPATIENT)
Dept: ADMINISTRATIVE | Facility: OTHER | Age: 44
End: 2019-01-09

## 2019-01-17 ENCOUNTER — OFFICE VISIT (OUTPATIENT)
Dept: FAMILY MEDICINE | Facility: CLINIC | Age: 44
End: 2019-01-17
Payer: MEDICAID

## 2019-01-17 VITALS
OXYGEN SATURATION: 98 % | DIASTOLIC BLOOD PRESSURE: 55 MMHG | RESPIRATION RATE: 18 BRPM | TEMPERATURE: 98 F | SYSTOLIC BLOOD PRESSURE: 103 MMHG | HEART RATE: 101 BPM

## 2019-01-17 DIAGNOSIS — R07.9 CHEST PAIN, UNSPECIFIED TYPE: Primary | ICD-10-CM

## 2019-01-17 DIAGNOSIS — F41.9 ANXIETY: ICD-10-CM

## 2019-01-17 DIAGNOSIS — I31.39 PERICARDIAL EFFUSION: ICD-10-CM

## 2019-01-17 RX ORDER — HYDROCODONE BITARTRATE AND ACETAMINOPHEN 5; 325 MG/1; MG/1
1-2 TABLET ORAL EVERY 6 HOURS PRN
Qty: 20 TABLET | Refills: 0 | Status: SHIPPED | OUTPATIENT
Start: 2019-01-17 | End: 2019-01-21 | Stop reason: SINTOL

## 2019-01-17 ASSESSMENT — PATIENT HEALTH QUESTIONNAIRE - PHQ9: SUM OF ALL RESPONSES TO PHQ QUESTIONS 1-9: 5

## 2019-01-17 NOTE — PROGRESS NOTES
HPI:       Darlene Hudson is a 43 year old  female who presents for evaluation following recent syncopal attack and fall.   Patient was seen in the ED 2 days ago following syncopal attack. Patient had come out of shower and was sitting in bed.  Had not eaten all day. Had taken xanax in the AM, but not for at least eight hours. When she stood up, she fell onto the right side, injuring her forehead, right wrist, and right side.  Had previously twisted her ankle, sustaining a re-injury of the right ankle. Patient was in a boot to help resolve the right ankle sprain.   Seen in ER where she underwent a CT of head, chest CT, xrays of the right shoulder, right wrist, and right ankle.   Patient does complain of right snuffbox tenderness, and a re-xray is recommended in a few days.             PMHX:   Current Medications:   Current Outpatient Medications   Medication Sig Dispense Refill     albuterol (PROAIR HFA/PROVENTIL HFA/VENTOLIN HFA) 108 (90 Base) MCG/ACT inhaler Inhale 2 puffs into the lungs every 4 hours as needed for shortness of breath / dyspnea or wheezing 1 Inhaler 3     ALPRAZolam (XANAX) 0.5 MG tablet Take 1 tablet (0.5 mg) by mouth 3 times daily as needed for anxiety 40 tablet 0     ALPRAZolam (XANAX) 1 MG tablet Take 1 tablet (1 mg) by mouth 3 times daily as needed for anxiety 60 tablet 1     capsaicin (ZOSTRIX) 0.075 % cream Apply topically 3 times daily 60 g 1     cyclobenzaprine (FLEXERIL) 5 MG tablet Take 1 tablet (5 mg) by mouth 3 times daily as needed for muscle spasms 42 tablet 0     dextromethorphan (DELSYM) 30 MG/5ML liquid Take 5 mLs (30 mg) by mouth 2 times daily 89 mL 0     escitalopram (LEXAPRO) 10 MG tablet Take 1 tablet (10 mg) by mouth daily 30 tablet 1     fluticasone (FLONASE) 50 MCG/ACT nasal spray Spray 1-2 sprays into both nostrils daily 1 Bottle 11     fluticasone-salmeterol (ADVAIR DISKUS) 500-50 MCG/DOSE inhaler Inhale 1 puff into the lungs 2 times daily 60 Inhaler 1      "gabapentin (NEURONTIN) 300 MG capsule Take 3 capsules (900 mg) by mouth 3 times daily 270 capsule 0     metroNIDAZOLE (METROGEL) 0.75 % topical gel Apply topically 2 times daily 45 g 11     montelukast (SINGULAIR) 10 MG tablet Take 1 tablet (10 mg) by mouth At Bedtime 60 tablet 1     ranitidine (ZANTAC) 300 MG tablet Take 1 tablet (300 mg) by mouth At Bedtime 60 tablet 1     venlafaxine (EFFEXOR-XR) 75 MG 24 hr capsule Take 1 capsule (75 mg) by mouth daily 90 capsule 3     zolpidem (AMBIEN) 10 MG tablet Take 1 tablet (10 mg) by mouth nightly as needed for sleep 30 tablet 0       Existing Problems  Patient Active Problem List   Diagnosis     Abnormal cytology finding     Nondependent alcohol abuse, episodic drinking behavior     Anxiety state     Controlled substance agreement signed     Low back pain     Chronic sinusitis     Cocaine abuse (H)     Major depressive disorder, recurrent episode, moderate (H)     Asthma     Tobacco use disorder     Noninflammatory disorder of vagina     Pap smear for cervical cancer screening     Abdominal pain     Abnormal cervical Papanicolaou smear     Panic disorder with agoraphobia     Atopic rhinitis     Chronic low back pain     Depression     Moderate persistent asthma     Other long term (current) drug therapy     Tobacco use     Acute pericardial effusion     Anxiety       Allergies:  Allergies   Allergen Reactions     Lidocaine Other (See Comments)     \"my jaw stopped moving\"     Penicillins Hives, Rash and Shortness Of Breath     Lidocaine-Epinephrine [Epinephrine-Lidocaine-Na Metabisulfite] Other (See Comments)     Severe jaw cramping, double vision       Previous labs:  Lab Results   Component Value Date    HGB 13.0 11/02/2017    HCT 38.7 11/02/2017    ALT 20.0 11/02/2017    AST 16.0 11/02/2017    .0 11/02/2017    BUN 13.0 11/02/2017    CO2 23.0 11/02/2017               Review of Systems:    CONSTITUTIONAL: no fatigue, no unexpected change in weight  SKIN: no " worrisome rashes, no worrisome moles, no worrisome lesions  EYES: no acute vision problems or changes  ENT: no ear problems, no mouth problems, no throat problems  RESP: no significant cough, no shortness of breath  CV: no chest pain, no palpitations, no new or worsening peripheral edema  GI: no nausea, no vomiting, no constipation, no diarrhea          Physical Exam:     Vitals:    01/17/19 1140   BP: 103/55   Pulse: 101   Resp: 18   Temp: 98  F (36.7  C)   TempSrc: Oral   SpO2: 98%     There is no height or weight on file to calculate BMI.    GENERAL:alert, well hydrated, no distress  EYES: Eyes grossly normal to inspection, extraocular movements - intact, and PERRL  Contusion to the forehead with ecchymosis.   HENT: ear canals- normal; TMs- normal; Nose- normal; Mouth- no ulcers, no lesions  NECK: no tenderness, no adenopathy,FROM  no asymmetry, no masses, no stiffness; thyroid- normal to palpation  RESP: lungs clear to auscultation - no rales, no rhonchi, no wheezes  CV: regular rates and rhythm, normal S1 S2, no S3 or S4 and no murmur, no click or rub -  Tenderness to AP compression localizing at the anterior midline. No tenderness to lateral compression.   EXTREMITIES: Mild contusion at the right snuffbox with minimal pain to compression at that site.  Will repeat the xray on Monday to evaluate for navicular fracture.   Ankle - in boot. No additional injury noted from the recent  fall.                 Labs and Procedures     Office Visit on 01/07/2019   Component Date Value Ref Range Status     Specific Gravity Urine 01/07/2019 >=1.030  1.005 - 1.030 Final     pH Urine 01/07/2019 5.5  4.5 - 8.0 Final     Leukocyte Esterase UR 01/07/2019 Trace* NEGATIVE Final     Nitrite Urine 01/07/2019 Negative  NEGATIVE Final     Protein UR 01/07/2019 Negative  NEGATIVE Final     Glucose Urine 01/07/2019 Negative  NEGATIVE Final     Ketones Urine 01/07/2019 Negative  NEGATIVE Final     Urobilinogen mg/dL 01/07/2019 0.2  E.U./dL  0.2 E.U./dL Final     Bilirubin UR 01/07/2019 Negative  NEGATIVE Final     Blood UR 01/07/2019 3+* NEGATIVE Final              Assessment and Plan     1.Syncopal Will episode - etiology unclear. Multiple contributing causes.  No evidence of recurrent pericardial effusion.  2. Pain control for 3-5 days for the multiple contusions of acute injury.   3. Repeat right wrist xray on Monday, 5 days after the accident.   4. Refer to mental health - patients anxiety is not very manageable at this time.  Patient could use behavioral tips for controlling anxiety. Has recently started on Lexapro one week ago - taking medications regularly.   5. Recheck on Monday. .        Options for treatment and follow-up care were reviewed with the patient and/or guardian. Darlene Hudson and/or guardian engaged in the decision making process and verbalized understanding of the options discussed and agreed with the final plan.    Oscar Aparicio MD

## 2019-01-21 ENCOUNTER — OFFICE VISIT (OUTPATIENT)
Dept: FAMILY MEDICINE | Facility: CLINIC | Age: 44
End: 2019-01-21
Payer: MEDICAID

## 2019-01-21 VITALS
TEMPERATURE: 98.2 F | RESPIRATION RATE: 16 BRPM | DIASTOLIC BLOOD PRESSURE: 74 MMHG | OXYGEN SATURATION: 98 % | HEART RATE: 83 BPM | SYSTOLIC BLOOD PRESSURE: 116 MMHG

## 2019-01-21 DIAGNOSIS — M25.531 RIGHT WRIST PAIN: Primary | ICD-10-CM

## 2019-01-21 DIAGNOSIS — R07.9 CHEST PAIN, UNSPECIFIED TYPE: ICD-10-CM

## 2019-01-21 DIAGNOSIS — J90 PLEURAL EFFUSION: ICD-10-CM

## 2019-01-21 DIAGNOSIS — F41.9 ANXIETY: ICD-10-CM

## 2019-01-21 RX ORDER — OXYCODONE HYDROCHLORIDE 5 MG/1
5 TABLET ORAL EVERY 6 HOURS PRN
Qty: 20 TABLET | Refills: 0 | Status: SHIPPED | OUTPATIENT
Start: 2019-01-21 | End: 2019-01-28

## 2019-01-21 NOTE — PROGRESS NOTES
HPI:       Darlene Hudson is a 43 year old  female who presents for recheck of right wrist.       Patient has a history of right wrist pain with physical symptoms suggestive of a navicular fracture.  Will re-xray at this time to check for the appearance of right navicular fracture. THe hand feels better, but she is still wearing a wrist splint.  Does complain of some persistent right shoulder discomfort.  Patient was unable to tolerate the hydrocodone-acetaminophen. She has had trouble with acetaminophen in the past.  Tried to give her hydrocodone barbitrate, but this was not covered, and cost more than $300 dollars.  Have instead switched the prescription for oxycodone.  Cautioned the patient about the addictive potential. Patient understands and agrees.                                  PMHX:   Current Medications:   Current Outpatient Medications   Medication Sig Dispense Refill     albuterol (PROAIR HFA/PROVENTIL HFA/VENTOLIN HFA) 108 (90 Base) MCG/ACT inhaler Inhale 2 puffs into the lungs every 4 hours as needed for shortness of breath / dyspnea or wheezing 1 Inhaler 3     ALPRAZolam (XANAX) 0.5 MG tablet Take 1 tablet (0.5 mg) by mouth 3 times daily as needed for anxiety 40 tablet 0     ALPRAZolam (XANAX) 1 MG tablet Take 1 tablet (1 mg) by mouth 3 times daily as needed for anxiety 60 tablet 1     capsaicin (ZOSTRIX) 0.075 % cream Apply topically 3 times daily 60 g 1     cyclobenzaprine (FLEXERIL) 5 MG tablet Take 1 tablet (5 mg) by mouth 3 times daily as needed for muscle spasms 42 tablet 0     dextromethorphan (DELSYM) 30 MG/5ML liquid Take 5 mLs (30 mg) by mouth 2 times daily 89 mL 0     escitalopram (LEXAPRO) 10 MG tablet Take 1 tablet (10 mg) by mouth daily 30 tablet 1     fluticasone (FLONASE) 50 MCG/ACT nasal spray Spray 1-2 sprays into both nostrils daily 1 Bottle 11     fluticasone-salmeterol (ADVAIR DISKUS) 500-50 MCG/DOSE inhaler Inhale 1 puff into the lungs 2 times daily 60 Inhaler 1      "gabapentin (NEURONTIN) 300 MG capsule Take 3 capsules (900 mg) by mouth 3 times daily 270 capsule 0     HYDROcodone-acetaminophen (NORCO) 5-325 MG tablet Take 1-2 tablets by mouth every 6 hours as needed for pain 20 tablet 0     metroNIDAZOLE (METROGEL) 0.75 % topical gel Apply topically 2 times daily 45 g 11     montelukast (SINGULAIR) 10 MG tablet Take 1 tablet (10 mg) by mouth At Bedtime 60 tablet 1     ranitidine (ZANTAC) 300 MG tablet Take 1 tablet (300 mg) by mouth At Bedtime 60 tablet 1     venlafaxine (EFFEXOR-XR) 75 MG 24 hr capsule Take 1 capsule (75 mg) by mouth daily 90 capsule 3     zolpidem (AMBIEN) 10 MG tablet Take 1 tablet (10 mg) by mouth nightly as needed for sleep 30 tablet 0       Existing Problems  Patient Active Problem List   Diagnosis     Abnormal cytology finding     Nondependent alcohol abuse, episodic drinking behavior     Anxiety state     Controlled substance agreement signed     Low back pain     Chronic sinusitis     Cocaine abuse (H)     Major depressive disorder, recurrent episode, moderate (H)     Asthma     Tobacco use disorder     Noninflammatory disorder of vagina     Pap smear for cervical cancer screening     Abdominal pain     Abnormal cervical Papanicolaou smear     Panic disorder with agoraphobia     Atopic rhinitis     Chronic low back pain     Depression     Moderate persistent asthma     Other long term (current) drug therapy     Tobacco use     Acute pericardial effusion     Anxiety       Allergies:  Allergies   Allergen Reactions     Lidocaine Other (See Comments)     \"my jaw stopped moving\"     Penicillins Hives, Rash and Shortness Of Breath     Lidocaine-Epinephrine [Epinephrine-Lidocaine-Na Metabisulfite] Other (See Comments)     Severe jaw cramping, double vision       Previous labs:  Lab Results   Component Value Date    HGB 13.0 11/02/2017    HCT 38.7 11/02/2017    ALT 20.0 11/02/2017    AST 16.0 11/02/2017    .0 11/02/2017    BUN 13.0 11/02/2017    CO2 " 23.0 11/02/2017               Review of Systems:    CONSTITUTIONAL: no fatigue, no unexpected change in weight  SKIN: no worrisome rashes, no worrisome moles, no worrisome lesions  EYES: no acute vision problems or changes  ENT: no ear problems, no mouth problems, no throat problems  RESP: no significant cough, no shortness of breath  CV: no chest pain, no palpitations, no new or worsening peripheral edema  GI: no nausea, no vomiting, no constipation, no diarrhea          Physical Exam:     Vitals:    01/21/19 1451   BP: 116/74   Pulse: 83   Resp: 16   Temp: 98.2  F (36.8  C)   TempSrc: Oral   SpO2: 98%     There is no height or weight on file to calculate BMI.    GENERAL:alert, well hydrated, no distress  EYES: Eyes grossly normal to inspection, extraocular movements - intact, and PERRL  HENT: ear canals- normal; TMs- normal; Nose- normal; Mouth- no ulcers, no lesions  NECK: no tenderness, no adenopathy, no asymmetry, no masses, no stiffness; thyroid- normal to palpation  RESP: lungs clear to auscultation - no rales, no rhonchi, no wheezes  CV: regular rates and rhythm, normal S1 S2, no S3 or S4 and no murmur, no click or rub -  Extremities - small bruise at the snuffbox, right wrist.  Minimally tender.              Labs and Procedures     Office Visit on 01/07/2019   Component Date Value Ref Range Status     Specific Gravity Urine 01/07/2019 >=1.030  1.005 - 1.030 Final     pH Urine 01/07/2019 5.5  4.5 - 8.0 Final     Leukocyte Esterase UR 01/07/2019 Trace* NEGATIVE Final     Nitrite Urine 01/07/2019 Negative  NEGATIVE Final     Protein UR 01/07/2019 Negative  NEGATIVE Final     Glucose Urine 01/07/2019 Negative  NEGATIVE Final     Ketones Urine 01/07/2019 Negative  NEGATIVE Final     Urobilinogen mg/dL 01/07/2019 0.2 E.U./dL  0.2 E.U./dL Final     Bilirubin UR 01/07/2019 Negative  NEGATIVE Final     Blood UR 01/07/2019 3+* NEGATIVE Final              Assessment and Plan     1.Right wrist - no evidence of  navicular fracture.    2. Oxycodone for pain.   3. Return in three weeks for recheck of anxiety medication.       Options for treatment and follow-up care were reviewed with the patient and/or guardian. Darlene Hudson and/or guardian engaged in the decision making process and verbalized understanding of the options discussed and agreed with the final plan.    Oscar Aparicio MD

## 2019-01-28 ENCOUNTER — OFFICE VISIT (OUTPATIENT)
Dept: FAMILY MEDICINE | Facility: CLINIC | Age: 44
End: 2019-01-28
Payer: MEDICAID

## 2019-01-28 VITALS
RESPIRATION RATE: 18 BRPM | TEMPERATURE: 97.8 F | HEART RATE: 103 BPM | OXYGEN SATURATION: 99 % | SYSTOLIC BLOOD PRESSURE: 144 MMHG | DIASTOLIC BLOOD PRESSURE: 84 MMHG

## 2019-01-28 DIAGNOSIS — F43.22 ADJUSTMENT DISORDER WITH ANXIOUS MOOD: ICD-10-CM

## 2019-01-28 DIAGNOSIS — J90 PLEURAL EFFUSION: ICD-10-CM

## 2019-01-28 DIAGNOSIS — R07.9 CHEST PAIN, UNSPECIFIED TYPE: ICD-10-CM

## 2019-01-28 DIAGNOSIS — F41.1 GENERALIZED ANXIETY DISORDER: ICD-10-CM

## 2019-01-28 DIAGNOSIS — M25.511 ACUTE PAIN OF RIGHT SHOULDER: ICD-10-CM

## 2019-01-28 DIAGNOSIS — R10.11 RUQ ABDOMINAL PAIN: Primary | ICD-10-CM

## 2019-01-28 LAB
% GRANULOCYTES: 62.6 %G (ref 40–75)
ALBUMIN SERPL BCP-MCNC: 3.4 G/DL (ref 3.5–5)
ALP SERPL-CCNC: 89 U/L (ref 45–120)
ALT SERPL W/O P-5'-P-CCNC: 15 U/L (ref 0–45)
AST SERPL-CCNC: 15 U/L (ref 0–40)
BILIRUB DIRECT SERPL-MCNC: <0.1 MG/DL (ref 0–0.5)
BILIRUB SERPL-MCNC: 0.2 MG/DL (ref 0–1)
GRANULOCYTES #: 4.8 K/UL (ref 1.6–8.3)
HCT VFR BLD AUTO: 43.6 % (ref 35–47)
HEMOGLOBIN: 13.2 G/DL (ref 11.7–15.7)
LIPASE SERPL-CCNC: 36 U/L (ref 0–52)
LYMPHOCYTES # BLD AUTO: 2.3 K/UL (ref 0.8–5.3)
LYMPHOCYTES NFR BLD AUTO: 29.6 %L (ref 20–48)
MCH RBC QN AUTO: 30.2 PG (ref 26.5–35)
MCHC RBC AUTO-ENTMCNC: 30.3 G/DL (ref 32–36)
MCV RBC AUTO: 99.7 FL (ref 78–100)
MID #: 0.6 K/UL (ref 0–2.2)
MID %: 7.8 %M (ref 0–20)
PLATELET # BLD AUTO: 352 K/UL (ref 150–450)
PROT SERPL-MCNC: 7 G/DL (ref 6–8)
RBC # BLD AUTO: 4.37 M/UL (ref 3.8–5.2)
WBC # BLD AUTO: 7.7 K/UL (ref 4–11)

## 2019-01-28 RX ORDER — ONDANSETRON 4 MG/1
8 TABLET, FILM COATED ORAL EVERY 8 HOURS PRN
Qty: 40 TABLET | Refills: 0 | Status: SHIPPED | OUTPATIENT
Start: 2019-01-28 | End: 2019-02-04

## 2019-01-28 RX ORDER — OXYCODONE HYDROCHLORIDE 5 MG/1
5-10 TABLET ORAL 2 TIMES DAILY
Qty: 40 TABLET | Refills: 0 | Status: SHIPPED | OUTPATIENT
Start: 2019-01-28 | End: 2019-02-25

## 2019-01-28 RX ORDER — ESCITALOPRAM OXALATE 10 MG/1
20 TABLET ORAL DAILY
Qty: 60 TABLET | Refills: 1 | Status: SHIPPED | OUTPATIENT
Start: 2019-01-28 | End: 2019-02-25

## 2019-01-28 NOTE — LETTER
January 31, 2019      Darlene Hudson  [address]  [place] [address]        Dear Darlene,    Blood tests show no evidence of liver or pancreas problem.      Please see below for your test results.    Resulted Orders   Hepatic Profile ([facility])   Result Value Ref Range    Bilirubin Total 0.2 0.0 - 1.0 mg/dL    Bilirubin Direct <0.1 <=0.5 mg/dL    Protein Total 7.0 6.0 - 8.0 g/dL    Albumin 3.4 (L) 3.5 - 5.0 g/dL    Alkaline Phosphatase 89 45 - 120 U/L    AST (SGOT) 15 0 - 40 U/L    ALT (SGPT) 15 0 - 45 U/L    Narrative    Test performed by:  [facility] [address] [place] [address]   CBC with Diff Plt ([facility])   Result Value Ref Range    WBC 7.7 4.0 - 11.0 K/uL    Lymphocytes # 2.3 0.8 - 5.3 K/uL    % Lymphocytes 29.6 20.0 - 48.0 %L    Mid # 0.6 0.0 - 2.2 K/uL    Mid % 7.8 0.0 - 20.0 %M    GRANULOCYTES # 4.8 1.6 - 8.3 K/uL    % Granulocytes 62.6 40.0 - 75.0 %G    RBC 4.37 3.80 - 5.20 M/uL    Hemoglobin 13.2 11.7 - 15.7 g/dL    Hematocrit 43.6 35.0 - 47.0 %    MCV 99.7 78.0 - 100.0 fL    MCH 30.2 26.5 - 35.0 pg    MCHC 30.3 (L) 32.0 - 36.0 g/dL    Platelets 352.0 150.0 - 450.0 K/uL   Lipase ([facility])   Result Value Ref Range    Lipase 36 0 - 52 U/L    Narrative    Test performed by:  [facility] [address] [place] [address]       If you have any questions, please call the clinic to make an appointment.    Sincerely,    Oscar Aparicio MD

## 2019-01-28 NOTE — PROGRESS NOTES
HPI:       Darlene Hudson is a 43 year old  female who presents for presents with persistent complaints of right shoulder pain, intermittent right foot pain, and persistent nausea. Patient realizes that she is having a difficult time controlling her anxiety.  Patient has fall about three weeks ago, sustaining injury to the riht shoulder. Previous xray demonstrated no bony injury, but in presence of persistent pain, will check MRI.   2. Patient complaining of persistent nausea. She has previously been treated in the ER with zofran, which apparently helps.  Unclear for the etiology of the nausea.    Anxiety - patient was begun on Lexapro two weeks, ut appears to have had little effect. Will increase the dose today. Patient has still not made an appointment with mental health for evaluation and treatment.   Patient continues to wear a boot at recommendation of surgeon, following ankle ligament repair.  Patient states it causes intermittent pain.                    PMHX:   Current Medications:   Current Outpatient Medications   Medication Sig Dispense Refill     albuterol (PROAIR HFA/PROVENTIL HFA/VENTOLIN HFA) 108 (90 Base) MCG/ACT inhaler Inhale 2 puffs into the lungs every 4 hours as needed for shortness of breath / dyspnea or wheezing 1 Inhaler 3     ALPRAZolam (XANAX) 0.5 MG tablet Take 1 tablet (0.5 mg) by mouth 3 times daily as needed for anxiety 40 tablet 0     ALPRAZolam (XANAX) 1 MG tablet Take 1 tablet (1 mg) by mouth 3 times daily as needed for anxiety 60 tablet 1     capsaicin (ZOSTRIX) 0.075 % cream Apply topically 3 times daily 60 g 1     cyclobenzaprine (FLEXERIL) 5 MG tablet Take 1 tablet (5 mg) by mouth 3 times daily as needed for muscle spasms 42 tablet 0     dextromethorphan (DELSYM) 30 MG/5ML liquid Take 5 mLs (30 mg) by mouth 2 times daily 89 mL 0     escitalopram (LEXAPRO) 10 MG tablet Take 1 tablet (10 mg) by mouth daily 30 tablet 1     fluticasone (FLONASE) 50 MCG/ACT nasal spray Spray  "1-2 sprays into both nostrils daily 1 Bottle 11     fluticasone-salmeterol (ADVAIR DISKUS) 500-50 MCG/DOSE inhaler Inhale 1 puff into the lungs 2 times daily 60 Inhaler 1     gabapentin (NEURONTIN) 300 MG capsule Take 3 capsules (900 mg) by mouth 3 times daily 270 capsule 0     metroNIDAZOLE (METROGEL) 0.75 % topical gel Apply topically 2 times daily 45 g 11     montelukast (SINGULAIR) 10 MG tablet Take 1 tablet (10 mg) by mouth At Bedtime 60 tablet 1     oxyCODONE (ROXICODONE) 5 MG tablet Take 1 tablet (5 mg) by mouth every 6 hours as needed for pain 20 tablet 0     ranitidine (ZANTAC) 300 MG tablet Take 1 tablet (300 mg) by mouth At Bedtime 60 tablet 1     venlafaxine (EFFEXOR-XR) 75 MG 24 hr capsule Take 1 capsule (75 mg) by mouth daily 90 capsule 3     zolpidem (AMBIEN) 10 MG tablet Take 1 tablet (10 mg) by mouth nightly as needed for sleep 30 tablet 0       Existing Problems  Patient Active Problem List   Diagnosis     Abnormal cytology finding     Nondependent alcohol abuse, episodic drinking behavior     Anxiety state     Controlled substance agreement signed     Low back pain     Chronic sinusitis     Cocaine abuse (H)     Major depressive disorder, recurrent episode, moderate (H)     Asthma     Tobacco use disorder     Noninflammatory disorder of vagina     Pap smear for cervical cancer screening     Abdominal pain     Abnormal cervical Papanicolaou smear     Panic disorder with agoraphobia     Atopic rhinitis     Chronic low back pain     Depression     Moderate persistent asthma     Other long term (current) drug therapy     Tobacco use     Acute pericardial effusion     Anxiety       Allergies:  Allergies   Allergen Reactions     Lidocaine Other (See Comments)     \"my jaw stopped moving\"     Penicillins Hives, Rash and Shortness Of Breath     Lidocaine-Epinephrine [Epinephrine-Lidocaine-Na Metabisulfite] Other (See Comments)     Severe jaw cramping, double vision       Previous labs:  Lab Results "   Component Value Date    HGB 13.0 11/02/2017    HCT 38.7 11/02/2017    ALT 20.0 11/02/2017    AST 16.0 11/02/2017    .0 11/02/2017    BUN 13.0 11/02/2017    CO2 23.0 11/02/2017               Review of Systems:    CONSTITUTIONAL: no fatigue, no unexpected change in weight  SKIN: no worrisome rashes, no worrisome moles, no worrisome lesions  EYES: no acute vision problems or changes  ENT: no ear problems, no mouth problems, no throat problems  RESP: no significant cough, no shortness of breath  CV: no chest pain, no palpitations, no new or worsening peripheral edema  GI: no nausea, no vomiting, no constipation, no diarrhea          Physical Exam:     Vitals:    01/28/19 1435   BP: 144/84   Pulse: 103   Resp: 18   Temp: 97.8  F (36.6  C)   TempSrc: Oral   SpO2: 99%     There is no height or weight on file to calculate BMI.    GENERAL:alert, well hydrated, no distress  EYES: Eyes grossly normal to inspection, extraocular movements - intact, and PERRL  HENT: ear canals- normal; TMs- normal; Nose- normal; Mouth- no ulcers, no lesions  NECK: no tenderness, no adenopathy, no asymmetry, no masses, no stiffness; thyroid- normal to palpation  Right shoulder - no localizing pain, but pain when lifting above 90 degrees.    RESP: lungs clear to auscultation - no rales, no rhonchi, no wheezes  CV: regular rates and rhythm, normal S1 S2, no S3 or S4 and no murmur, no click or rub -  ABDOMEN: soft, no tenderness, no  hepatosplenomegaly, no masses, normal bowel sounds.  Patient compalins of subjective pain at the right upper quadrant - she has a previous CT in November 2019 that demonstrates a possible liver lesion of unknown etiology.  Will recheck abdominal CT.               Labs and Procedures     Office Visit on 01/07/2019   Component Date Value Ref Range Status     Specific Gravity Urine 01/07/2019 >=1.030  1.005 - 1.030 Final     pH Urine 01/07/2019 5.5  4.5 - 8.0 Final     Leukocyte Esterase UR 01/07/2019 Trace*  NEGATIVE Final     Nitrite Urine 01/07/2019 Negative  NEGATIVE Final     Protein UR 01/07/2019 Negative  NEGATIVE Final     Glucose Urine 01/07/2019 Negative  NEGATIVE Final     Ketones Urine 01/07/2019 Negative  NEGATIVE Final     Urobilinogen mg/dL 01/07/2019 0.2 E.U./dL  0.2 E.U./dL Final     Bilirubin UR 01/07/2019 Negative  NEGATIVE Final     Blood UR 01/07/2019 3+* NEGATIVE Final              Assessment and Plan     1.Anxiety - will increase lexapro today.  2. Shoulder pain - will schedule PT  3. Persistent nausea - this could be irritation from analgesics, but patient denies that.  Possible gastritis from meds.  Patient has had persistent nausea and intermittent pain, will refer to Gastro for evaluation fo possible endoscopy.   4. Will check labs for possible hepatic infection or pancreatic change.  5. After further discussion with patient, will provide oxycodone for 2 tablets twice per day.  Patient is using this to cover the pain in the morning from her shoulder, , and denies taking it with the ambien. I have advised her that she needs to get off the narcotic pain relievers.       Options for treatment and follow-up care were reviewed with the patient and/or guardian. Darlene Hudson and/or guardian engaged in the decision making process and verbalized understanding of the options discussed and agreed with the final plan.    Oscar Aparicio MD

## 2019-01-29 ENCOUNTER — RECORDS - HEALTHEAST (OUTPATIENT)
Dept: ADMINISTRATIVE | Facility: OTHER | Age: 44
End: 2019-01-29

## 2019-01-29 ENCOUNTER — AMBULATORY - HEALTHEAST (OUTPATIENT)
Dept: ADMINISTRATIVE | Facility: REHABILITATION | Age: 44
End: 2019-01-29

## 2019-01-29 DIAGNOSIS — F41.1 GENERALIZED ANXIETY DISORDER: ICD-10-CM

## 2019-01-29 DIAGNOSIS — J90 PLEURAL EFFUSION: ICD-10-CM

## 2019-01-29 DIAGNOSIS — R07.9 CHEST PAIN, UNSPECIFIED: ICD-10-CM

## 2019-01-29 DIAGNOSIS — M25.511 RIGHT SHOULDER PAIN: ICD-10-CM

## 2019-01-29 DIAGNOSIS — F43.22 ADJUSTMENT DISORDER WITH ANXIOUS MOOD: ICD-10-CM

## 2019-01-30 ENCOUNTER — DOCUMENTATION ONLY (OUTPATIENT)
Dept: FAMILY MEDICINE | Facility: CLINIC | Age: 44
End: 2019-01-30

## 2019-01-30 NOTE — PROGRESS NOTES
Procedure Requested     9035        MENTAL HEALTH REFERRAL  -             [#[id number]]       Priority: Routine  Class: External referral       Comment:Use this form for behavioral health consults and assessments. The                referral coordinator will help to determine whether patients are                best served by clinic behavioral health staff or by community                providers.                                Type of referral(s) requested (indicat   e all that apply):                Adult Psychiatry--for diagnosis and medication management                                Reason for referral: anxiety, patient would like to address ways                 to better cope with anxiety                                Currently receiving mental health services (if 'Yes', what                 services and why today's referral?): No                Currently having suicidal thoughts: No                Previous psy   ch hospitalization: No                                Please provide data for below screening tools if available.                 PHQ-9 Score: 5                GAD7 Score:                                 PC-PTSD Score:                Bipolar Screen:                Fina (ADHD):                 MCHAT (Autism Screen):                 Pediatric Symptom Checklist (PSC):                                  needed: No                Language: English        Associated Diagnoses       F41.9 Anxiety       Adult or Child/Adolescent:  Adult           Location:  [facility]           [id number]               : 1975  F      [address]                            PCP: [place] [id number]                                 CTR: [facility]

## 2019-01-30 NOTE — PROGRESS NOTES
Referral for (Test): Psychology   Location/Place/Provider: [facility], [address]   [address]  [place] [address]   Date/Time:    Phone:[phone]   Fax:   Additional information/prep.: I called the pt, gave her Mental Health Services phone number to call and schedule this appointment.  Pt will call and schedule it and let me know.    Scheduled by: SMITH Whitaker

## 2019-01-31 NOTE — RESULT ENCOUNTER NOTE
Please call patient and send results letter  Blood tests show no evidence of liver or pancreas problem.

## 2019-02-01 NOTE — PATIENT INSTRUCTIONS
Referral for Physical Therapy faxed to [facility]  [phone]  [phone]  Patient will be contacted to schedule appointment.         Referral for ( TEST )  :      CT Abdomen w/o and w contrast   LOCATION/PLACE/Provider :    [facility]   DATE & TIME :     2-6-2019 at 12:20  PHONE :     [phone]  FAX :     [phone]  Appointment made by clinic staff/:    Sulema        Referral for ( TEST )  :      MRI Shoulder (R) w/o Contrast   LOCATION/PLACE/Provider :    [facility]   DATE & TIME :     2-6-2019 at 1:15  PHONE :     [phone]  FAX :     [phone]  Appointment made by clinic staff/:    Sulema

## 2019-02-05 ENCOUNTER — DOCUMENTATION ONLY (OUTPATIENT)
Dept: PSYCHOLOGY | Facility: CLINIC | Age: 44
End: 2019-02-05

## 2019-02-05 NOTE — PROGRESS NOTES
Patient was recently contacted about a Pain Psychology referral to Mental Health Services. Need to determine if patient has contacted them to schedule.     Secondly, this is the referral for psychiatry. Let's schedule patient with consulting psychiatrist, Dr. Bullard.        ===View-only below this line===    ----- Message -----  From: Lindy Gallagher MD  Sent: 2019   3:37 PM  To:  Mental Health  Subject: Order for ANGELA LIM           [id number]               : 1975  F     [address]                            PCP: [id number] CABRERA*  [facility] [address]                                 CTR: [facility]        Name: ANGELA LIM Date: 2019    Home: [phone]    Payor:              [employer]ICA[employer] [place]  Mirian  n:               [place]ICA[place]  Sponsor Code:       [id number]  Subscriber ID:      [id number]  Subscriber Name:    ANGELA LIM  Subscriber Address: [address] [place] [address] [address]                      [place] [address]    Effective From:     19  Effective To:         Group Number: Not Available  Group Name  : Not Available      Date       Provider                   Department   Center         2019  [id number]-XZNYAIBPLINDY KENDALL   [facility] Owned    Order Date:2019  Ordering User:LINDY GALLAGHER [[id number]]  Encounter Provider:Lindy Gallagher MD [[id number]]  Authorizing Provider: Lindy Gallagher MD [[id number]]  Department:[facility] FAMILY MED[[id number]]    Ordering Provider NPI: [id number]  Lindy RAHMAN Gallagher  [facility]~[address] [facility] [address]  Phone: [phone]        Procedure Requested    9065       MENTAL HEALTH REFERRAL  -             [#[id number]]      Priority: Routine  Class: External referral      Comment:Use this form for behavioral health consults and assessments. The               referral coordinator will help to determine whether patients are               best served by clinic  behavioral health staff or by community               providers.                              Type of referral(s) requested (indicat  e all that apply):               Adult Psychiatry--for diagnosis and medication management                              Reason for referral: persistent and increasing anxiety. Started on                lexapro.               Currently receiving mental health services (if 'Yes', what                services and why today's referral?): No               Currently having suicidal thoughts: No               Previous psych hospitalization: No                                Please provide data for below screening tools if available.                PHQ-9 Score: 0ver 16               GAD7 Score:                PC-PTSD Score:               Bipolar Screen:               Gardner (ADHD):                MCHAT (Autism Screen):                Pediatric Symptom Checklist (PSC):                                needed: No               Language: English    Associated Diagnoses      F41.1 General  ized anxiety disorder      F43.22 Adjustment disorder with anxious mood      J90 Pleural effusion      R07.9 Chest pain, unspecified type      Adult or Child/Adolescent:  Adult         Location:  [facility]           [id number]               : 1975  F     [address] [facility] [address]                            PCP: [place] [address]                                 CTR: [facility]        Z0 Comments

## 2019-02-06 ENCOUNTER — RECORDS - HEALTHEAST (OUTPATIENT)
Dept: ADMINISTRATIVE | Facility: OTHER | Age: 44
End: 2019-02-06

## 2019-02-07 ENCOUNTER — TELEPHONE (OUTPATIENT)
Dept: FAMILY MEDICINE | Facility: CLINIC | Age: 44
End: 2019-02-07

## 2019-02-07 NOTE — TELEPHONE ENCOUNTER
"Outgoing call made to Karoline, Per patient report she will make contact tomorrow 2/7/19 with Pain Psychology. Karoline reports that she had to defer scheduling with Pain Psychology due to \"situational arrangements with her child\".   Patient is agreeable with scheduling her  Psychiatrist  appointment here at [facility] with Dr. Bullard. At this time Dr. Velásquez template for March is not available, discussed with Sulema Lechuga,  tentatively  Dr. Bullard will be in clinic 3/4/19 and 3/18/19.   Karoline would like to schedule the 3/4/19 appt@ 10:00am if available.  Once I can confirm Dr. Velásquez template I will contact patient to schedule.  "

## 2019-02-08 ENCOUNTER — RECORDS - HEALTHEAST (OUTPATIENT)
Dept: ADMINISTRATIVE | Facility: OTHER | Age: 44
End: 2019-02-08

## 2019-02-11 ENCOUNTER — TELEPHONE (OUTPATIENT)
Dept: FAMILY MEDICINE | Facility: CLINIC | Age: 44
End: 2019-02-11

## 2019-02-11 NOTE — TELEPHONE ENCOUNTER
Outgoing call made to f/u with Karoline, Next upcoming appt with Dr. Bullard would be on 3/4/19 @ 8:30 or 10:00. LMTCB to discuss times.

## 2019-02-11 NOTE — TELEPHONE ENCOUNTER
Out going call made to f/u with regards to scheduling Mental health [facility]

## 2019-02-12 NOTE — TELEPHONE ENCOUNTER
2/12/19 11:43 am  Outgoing call made to Karoline following up on Referral Mental Health. LMTCB to discuss and assist if needed scheduling. LMTCB and again offered clinic visit with Dr. Bullard for 3/4/19.

## 2019-02-14 NOTE — TELEPHONE ENCOUNTER
Outgoing call made to Karoline to assist w/scheduling with Dr[facility] Bullard. [facility]

## 2019-02-15 ENCOUNTER — AMBULATORY - HEALTHEAST (OUTPATIENT)
Dept: CARDIOLOGY | Facility: CLINIC | Age: 44
End: 2019-02-15

## 2019-02-15 NOTE — TELEPHONE ENCOUNTER
Voicemail received today, requesting a return call to discuss scheduling. Outgoing call made 2/15/19 8:10 am. TINOB

## 2019-02-18 NOTE — TELEPHONE ENCOUNTER
2/18/19  Outgoing call made to Karoline to f/u and assist with scheduling Karoline with Dr. Bullard. A detailed message left requesting patient contact our main clinic appointment line to schedule should she decide she wants to be seen. Due to not being able to connect with patient, a letter will be mailed out with this information.

## 2019-02-21 ENCOUNTER — TRANSFERRED RECORDS (OUTPATIENT)
Dept: HEALTH INFORMATION MANAGEMENT | Facility: CLINIC | Age: 44
End: 2019-02-21

## 2019-02-21 ENCOUNTER — OFFICE VISIT - HEALTHEAST (OUTPATIENT)
Dept: CARDIOLOGY | Facility: CLINIC | Age: 44
End: 2019-02-21

## 2019-02-21 DIAGNOSIS — I31.9: ICD-10-CM

## 2019-02-21 ASSESSMENT — MIFFLIN-ST. JEOR: SCORE: 1896.41

## 2019-02-22 ENCOUNTER — COMMUNICATION - HEALTHEAST (OUTPATIENT)
Dept: CARDIOLOGY | Facility: CLINIC | Age: 44
End: 2019-02-22

## 2019-02-22 DIAGNOSIS — I31.9: ICD-10-CM

## 2019-02-25 ENCOUNTER — OFFICE VISIT (OUTPATIENT)
Dept: FAMILY MEDICINE | Facility: CLINIC | Age: 44
End: 2019-02-25
Payer: MEDICAID

## 2019-02-25 VITALS
TEMPERATURE: 97.8 F | HEART RATE: 90 BPM | RESPIRATION RATE: 18 BRPM | DIASTOLIC BLOOD PRESSURE: 84 MMHG | SYSTOLIC BLOOD PRESSURE: 114 MMHG | OXYGEN SATURATION: 98 %

## 2019-02-25 DIAGNOSIS — F43.22 ADJUSTMENT DISORDER WITH ANXIOUS MOOD: ICD-10-CM

## 2019-02-25 DIAGNOSIS — J45.40 MODERATE PERSISTENT ASTHMA WITHOUT COMPLICATION: ICD-10-CM

## 2019-02-25 DIAGNOSIS — F41.1 GENERALIZED ANXIETY DISORDER: Primary | ICD-10-CM

## 2019-02-25 DIAGNOSIS — F41.9 ANXIETY: ICD-10-CM

## 2019-02-25 DIAGNOSIS — R07.9 CHEST PAIN, UNSPECIFIED TYPE: ICD-10-CM

## 2019-02-25 RX ORDER — ALPRAZOLAM 1 MG
1 TABLET ORAL 3 TIMES DAILY PRN
Qty: 90 TABLET | Refills: 0 | Status: SHIPPED | OUTPATIENT
Start: 2019-02-25 | End: 2019-03-26

## 2019-02-25 RX ORDER — ESCITALOPRAM OXALATE 10 MG/1
20 TABLET ORAL DAILY
Qty: 60 TABLET | Refills: 1 | Status: SHIPPED | OUTPATIENT
Start: 2019-02-25 | End: 2019-04-04

## 2019-02-25 ASSESSMENT — PATIENT HEALTH QUESTIONNAIRE - PHQ9: SUM OF ALL RESPONSES TO PHQ QUESTIONS 1-9: 13

## 2019-02-25 NOTE — PROGRESS NOTES
HPI:       Darlene Hudson is a 43 year old  female who presents for follow up of depression, shortness of breath.      Patient had been doing well on the Lexapro, but two weeks ago she began having 'hot flashes' or possibly these were lightheaded episodes. She attibuted these to the medicine, and stopped taking it. Until that time she was feeling better, and her family had noted that she was acting better, less anger, more willing to do things around the house. She was also beginning to go to the grocery store again - something she had previously avoided.        She had at least one episode of shortness of breath when walking to the car in the parking lot - this was relieved with albuterol.  She is under evaluation by the cardiologist - who ordered an MRI. Patient was unable to undergo the MRI since this took 2 hours of lying on her back, and the patient finds that lying on her back causes pain.  Was not able to take a sedative for this, because the patient needs to be able to follow directions during the MRI.   Is scheduled for an ECHO again this week.        It has been two weeks since she last took the lexapro. She is again feeling anxious, and worried about things.  We discussed the symptoms, and the problem with stopping the medicine.               PMHX:   Current Medications:   Current Outpatient Medications   Medication Sig Dispense Refill     albuterol (PROAIR HFA/PROVENTIL HFA/VENTOLIN HFA) 108 (90 Base) MCG/ACT inhaler Inhale 2 puffs into the lungs every 4 hours as needed for shortness of breath / dyspnea or wheezing 1 Inhaler 3     ALPRAZolam (XANAX) 0.5 MG tablet Take 1 tablet (0.5 mg) by mouth 3 times daily as needed for anxiety 40 tablet 0     ALPRAZolam (XANAX) 1 MG tablet Take 1 tablet (1 mg) by mouth 3 times daily as needed for anxiety 60 tablet 1     capsaicin (ZOSTRIX) 0.075 % cream Apply topically 3 times daily 60 g 1     cyclobenzaprine (FLEXERIL) 5 MG tablet Take 1 tablet (5 mg) by  "mouth 3 times daily as needed for muscle spasms 42 tablet 0     dextromethorphan (DELSYM) 30 MG/5ML liquid Take 5 mLs (30 mg) by mouth 2 times daily 89 mL 0     escitalopram (LEXAPRO) 10 MG tablet Take 2 tablets (20 mg) by mouth daily (Patient not taking: Reported on 2/25/2019) 60 tablet 1     fluticasone (FLONASE) 50 MCG/ACT nasal spray Spray 1-2 sprays into both nostrils daily 1 Bottle 11     fluticasone-salmeterol (ADVAIR DISKUS) 500-50 MCG/DOSE inhaler Inhale 1 puff into the lungs 2 times daily 60 Inhaler 1     gabapentin (NEURONTIN) 300 MG capsule Take 3 capsules (900 mg) by mouth 3 times daily 270 capsule 0     metroNIDAZOLE (METROGEL) 0.75 % topical gel Apply topically 2 times daily 45 g 11     montelukast (SINGULAIR) 10 MG tablet Take 1 tablet (10 mg) by mouth At Bedtime 60 tablet 1     oxyCODONE (ROXICODONE) 5 MG tablet Take 1-2 tablets (5-10 mg) by mouth 2 times daily 40 tablet 0     ranitidine (ZANTAC) 300 MG tablet Take 1 tablet (300 mg) by mouth At Bedtime 60 tablet 1     zolpidem (AMBIEN) 10 MG tablet Take 1 tablet (10 mg) by mouth nightly as needed for sleep 30 tablet 0       Existing Problems  Patient Active Problem List   Diagnosis     Abnormal cytology finding     Nondependent alcohol abuse, episodic drinking behavior     Anxiety state     Controlled substance agreement signed     Low back pain     Chronic sinusitis     Cocaine abuse (H)     Major depressive disorder, recurrent episode, moderate (H)     Asthma     Tobacco use disorder     Noninflammatory disorder of vagina     Pap smear for cervical cancer screening     Abdominal pain     Abnormal cervical Papanicolaou smear     Panic disorder with agoraphobia     Atopic rhinitis     Chronic low back pain     Depression     Moderate persistent asthma     Other long term (current) drug therapy     Tobacco use     Acute pericardial effusion     Anxiety       Allergies:  Allergies   Allergen Reactions     Lidocaine Other (See Comments)     \"my jaw " "stopped moving\"     Penicillins Hives, Rash and Shortness Of Breath     Lidocaine-Epinephrine [Epinephrine-Lidocaine-Na Metabisulfite] Other (See Comments)     Severe jaw cramping, double vision       Previous labs:  Lab Results   Component Value Date    HGB 13.2 01/28/2019    HCT 43.6 01/28/2019    ALT 20.0 11/02/2017    AST 16.0 11/02/2017    .0 11/02/2017    BUN 13.0 11/02/2017    CO2 23.0 11/02/2017               Review of Systems:    CONSTITUTIONAL: no fatigue, no unexpected change in weight  SKIN: no worrisome rashes, no worrisome moles, no worrisome lesions  EYES: no acute vision problems or changes  ENT: no ear problems, no mouth problems, no throat problems  RESP: complains of mild cough, breathing easily at this time  CV: no chest pain, no palpitations, no new or worsening peripheral edema  GI: no nausea, no vomiting, no constipation, no diarrhea          Physical Exam:     Vitals:    02/25/19 1303   BP: 114/84   Pulse: 90   Resp: 18   Temp: 97.8  F (36.6  C)   TempSrc: Oral   SpO2: 98%     There is no height or weight on file to calculate BMI.    GENERAL:alert, well hydrated, no distress  EYES: Eyes grossly normal to inspection, extraocular movements - intact, and PERRL  HENT: Nose- normal; Mouth- no ulcers, no lesions  NECK: no tenderness, no adenopathy, no asymmetry, no masses, no stiffness; thyroid- normal to palpation  RESP: lungs clear to auscultation - no rales, no rhonchi, no wheezes  CV: regular rates and rhythm, normal S1 S2, no S3 or S4 and no murmur, no click or rub -               Labs and Procedures     Office Visit on 01/28/2019   Component Date Value Ref Range Status     Bilirubin Total 01/28/2019 0.2  0.0 - 1.0 mg/dL Final     Bilirubin Direct 01/28/2019 <0.1  <=0.5 mg/dL Final     Protein Total 01/28/2019 7.0  6.0 - 8.0 g/dL Final     Albumin 01/28/2019 3.4* 3.5 - 5.0 g/dL Final     Alkaline Phosphatase 01/28/2019 89  45 - 120 U/L Final     AST (SGOT) 01/28/2019 15  0 - 40 U/L Final "     ALT (SGPT) 01/28/2019 15  0 - 45 U/L Final     WBC 01/28/2019 7.7  4.0 - 11.0 K/uL Final     Lymphocytes # 01/28/2019 2.3  0.8 - 5.3 K/uL Final     % Lymphocytes 01/28/2019 29.6  20.0 - 48.0 %L Final     Mid # 01/28/2019 0.6  0.0 - 2.2 K/uL Final     Mid % 01/28/2019 7.8  0.0 - 20.0 %M Final     GRANULOCYTES # 01/28/2019 4.8  1.6 - 8.3 K/uL Final     % Granulocytes 01/28/2019 62.6  40.0 - 75.0 %G Final     RBC 01/28/2019 4.37  3.80 - 5.20 M/uL Final     Hemoglobin 01/28/2019 13.2  11.7 - 15.7 g/dL Final     Hematocrit 01/28/2019 43.6  35.0 - 47.0 % Final     MCV 01/28/2019 99.7  78.0 - 100.0 fL Final     MCH 01/28/2019 30.2  26.5 - 35.0 pg Final     MCHC 01/28/2019 30.3* 32.0 - 36.0 g/dL Final     Platelets 01/28/2019 352.0  150.0 - 450.0 K/uL Final     Lipase 01/28/2019 36  0 - 52 U/L Final              Assessment and Plan     1.Depression - have discussed management with patient. We will continue the Xanax for now, as long as the patient is willing to henrique a long term anxiolytic.  She will restart the Lexapro at 10 mg, and then advance to 20 MG.  Will provide one month of xanax.     2. Intermittent chest pain in patient with previous history of pericarditis and pericardial window. Under evaluation by Cardiology.  Will see patient following evaluation by Cards.   3. Recheck in one months time.       Options for treatment and follow-up care were reviewed with the patient and/or guardian. Darlene Hudson and/or guardian engaged in the decision making process and verbalized understanding of the options discussed and agreed with the final plan.    Oscar Aparicio MD

## 2019-02-25 NOTE — TELEPHONE ENCOUNTER
Patient seen in clinic for f/u anxiety and requested to speak with me to schedule her mental health with Dr. Bullard. Patient has been scheduled for 3/4/19 @ 10:00am. Patient verbalized complete understanding and is agreeable with appt date, time location and provider.

## 2019-02-28 ENCOUNTER — COMMUNICATION - HEALTHEAST (OUTPATIENT)
Dept: CARDIOLOGY | Facility: CLINIC | Age: 44
End: 2019-02-28

## 2019-03-25 DIAGNOSIS — F41.9 ANXIETY: ICD-10-CM

## 2019-03-27 ENCOUNTER — RECORDS - HEALTHEAST (OUTPATIENT)
Dept: ADMINISTRATIVE | Facility: OTHER | Age: 44
End: 2019-03-27

## 2019-03-28 DIAGNOSIS — Z53.9 ERRONEOUS ENCOUNTER--DISREGARD: Primary | ICD-10-CM

## 2019-03-28 RX ORDER — ALPRAZOLAM 1 MG
1 TABLET ORAL 3 TIMES DAILY PRN
Qty: 90 TABLET | Refills: 0 | OUTPATIENT
Start: 2019-03-28

## 2019-03-28 RX ORDER — ALPRAZOLAM 1 MG
1 TABLET ORAL 3 TIMES DAILY PRN
Qty: 30 TABLET | Refills: 0 | Status: SHIPPED | OUTPATIENT
Start: 2019-03-28 | End: 2019-04-04

## 2019-03-28 NOTE — TELEPHONE ENCOUNTER
Patient calling about medication refill to see if Dr. Aparicio will refill it. She called to cancel her appt today with him due to her being sick and reschedule it for Monday 4/1.

## 2019-03-29 NOTE — TELEPHONE ENCOUNTER
Rx picked up on March 29, 2019 5:17 PM by ANGELA LIM   Rx given by Flaca Benitez  Photo ID verified and signature obtained?: Yes

## 2019-03-29 NOTE — TELEPHONE ENCOUNTER
Rx has been filed on March 29, 2019 3:57 PM  By: Marsha Tyalor  Patient has been notified Rx ready for .

## 2019-04-01 ENCOUNTER — RECORDS - HEALTHEAST (OUTPATIENT)
Dept: ADMINISTRATIVE | Facility: OTHER | Age: 44
End: 2019-04-01

## 2019-04-04 ENCOUNTER — OFFICE VISIT (OUTPATIENT)
Dept: FAMILY MEDICINE | Facility: CLINIC | Age: 44
End: 2019-04-04
Payer: OTHER MISCELLANEOUS

## 2019-04-04 VITALS
OXYGEN SATURATION: 95 % | TEMPERATURE: 98 F | RESPIRATION RATE: 16 BRPM | DIASTOLIC BLOOD PRESSURE: 70 MMHG | HEART RATE: 73 BPM | SYSTOLIC BLOOD PRESSURE: 103 MMHG

## 2019-04-04 DIAGNOSIS — F43.22 ADJUSTMENT DISORDER WITH ANXIOUS MOOD: ICD-10-CM

## 2019-04-04 DIAGNOSIS — F41.9 ANXIETY: ICD-10-CM

## 2019-04-04 RX ORDER — METRONIDAZOLE 7.5 MG/G
1 GEL VAGINAL DAILY
Qty: 25 G | Refills: 1 | Status: SHIPPED | OUTPATIENT
Start: 2019-04-04 | End: 2019-04-09

## 2019-04-04 RX ORDER — OXYCODONE HYDROCHLORIDE 5 MG/1
5-10 TABLET ORAL EVERY 6 HOURS PRN
Qty: 30 TABLET | Refills: 0 | Status: SHIPPED | OUTPATIENT
Start: 2019-04-04 | End: 2019-06-12

## 2019-04-04 RX ORDER — ESCITALOPRAM OXALATE 10 MG/1
30 TABLET ORAL DAILY
Qty: 90 TABLET | Refills: 3 | Status: SHIPPED | OUTPATIENT
Start: 2019-04-04 | End: 2019-06-06

## 2019-04-04 RX ORDER — ESCITALOPRAM OXALATE 20 MG/1
30 TABLET ORAL DAILY
Qty: 90 TABLET | Refills: 3 | Status: SHIPPED | OUTPATIENT
Start: 2019-04-04 | End: 2019-06-06

## 2019-04-04 RX ORDER — ALPRAZOLAM 1 MG
1 TABLET ORAL 3 TIMES DAILY PRN
Qty: 90 TABLET | Refills: 3 | Status: SHIPPED | OUTPATIENT
Start: 2019-04-04 | End: 2019-09-04

## 2019-04-04 NOTE — PROGRESS NOTES
HPI:       Darlene Hudson is a 44 year old  female who presents for recent injury.   Patient was getting out of bed and twisted ankle on Monday.  Injured her right ankle, which has had a recent operation.    Saw [facility] - urgent care for evaluation. Xray at that time showed no fracture.  Moderate edema, and the patient was put into a fracture boot, and referred back to ortho for possible ligament injury.      Patient has been taking 20 mg lexapro for one month now. She finds that she is in better shape than previosly, and tinks the medication has helped. She is still finding herself very anxious, and believes that she is not able to control her anxiety.    She also reports ta[place] [facility] [place] was given metronidazole oral medication about one month ago. She was unable to take the medication because of the taste, and asks me to fill a vaginal preparation.             PMHX:   Current Medications:   Current Outpatient Medications   Medication Sig Dispense Refill     albuterol (PROAIR HFA/PROVENTIL HFA/VENTOLIN HFA) 108 (90 Base) MCG/ACT inhaler Inhale 2 puffs into the lungs every 4 hours as needed for shortness of breath / dyspnea or wheezing 1 Inhaler 3     ALPRAZolam (XANAX) 1 MG tablet Take 1 tablet (1 mg) by mouth 3 times daily as needed for anxiety 30 tablet 0     capsaicin (ZOSTRIX) 0.075 % cream Apply topically 3 times daily 60 g 1     escitalopram (LEXAPRO) 10 MG tablet Take 2 tablets (20 mg) by mouth daily 60 tablet 1     fluticasone (FLONASE) 50 MCG/ACT nasal spray Spray 1-2 sprays into both nostrils daily 1 Bottle 11     fluticasone-salmeterol (ADVAIR DISKUS) 500-50 MCG/DOSE inhaler Inhale 1 puff into the lungs 2 times daily 60 Inhaler 1     gabapentin (NEURONTIN) 300 MG capsule Take 3 capsules (900 mg) by mouth 3 times daily 270 capsule 0     metroNIDAZOLE (METROGEL) 0.75 % topical gel Apply topically 2 times daily 45 g 11     montelukast (SINGULAIR) 10 MG tablet  "Take 1 tablet (10 mg) by mouth At Bedtime 60 tablet 1     ranitidine (ZANTAC) 300 MG tablet Take 1 tablet (300 mg) by mouth At Bedtime 60 tablet 1     zolpidem (AMBIEN) 10 MG tablet Take 1 tablet (10 mg) by mouth nightly as needed for sleep 30 tablet 0       Existing Problems  Patient Active Problem List   Diagnosis     Abnormal cytology finding     Nondependent alcohol abuse, episodic drinking behavior     Anxiety state     Controlled substance agreement signed     Low back pain     Chronic sinusitis     Cocaine abuse (H)     Major depressive disorder, recurrent episode, moderate (H)     Asthma     Tobacco use disorder     Noninflammatory disorder of vagina     Pap smear for cervical cancer screening     Abdominal pain     Abnormal cervical Papanicolaou smear     Panic disorder with agoraphobia     Atopic rhinitis     Chronic low back pain     Depression     Moderate persistent asthma     Other long term (current) drug therapy     Tobacco use     Acute pericardial effusion     Anxiety       Allergies:  Allergies   Allergen Reactions     Lidocaine Other (See Comments)     \"my jaw stopped moving\"     Penicillins Hives, Rash and Shortness Of Breath     Lidocaine-Epinephrine [Epinephrine-Lidocaine-Na Metabisulfite] Other (See Comments)     Severe jaw cramping, double vision       Previous labs:  Lab Results   Component Value Date    HGB 13.2 01/28/2019    HCT 43.6 01/28/2019    ALT 20.0 11/02/2017    AST 16.0 11/02/2017    .0 11/02/2017    BUN 13.0 11/02/2017    CO2 23.0 11/02/2017               Review of Systems:    CONSTITUTIONAL: no fatigue, no unexpected change in weight  SKIN: no worrisome rashes, no worrisome moles, no worrisome lesions  EYES: no acute vision problems or changes  ENT: no ear problems, no mouth problems, no throat problems  RESP: no significant cough, no shortness of breath  CV: no chest pain, no palpitations, no new or worsening peripheral edema  GI: no nausea, no vomiting, no constipation, " no diarrhea          Physical Exam:     Vitals:    04/04/19 1103   BP: 103/70   Pulse: 73   Resp: 16   Temp: 98  F (36.7  C)   TempSrc: Oral   SpO2: 95%     There is no height or weight on file to calculate BMI.    GENERAL:alert, well hydrated, denies depression, mood staable  EYES: Eyes grossly normal to inspection, extraocular movements - intact, and PERRL  HENT: ear canals- normal; TMs- normal; Nose- normal; Mouth- no ulcers, no lesions  NECK: no tenderness, no adenopathy, no asymmetry, no masses, no stiffness; thyroid- normal to palpation  RESP: lungs clear to auscultation - no rales, no rhonchi, no wheezes  CV: regular rates and rhythm, normal S1 S2, no S3 or S4 and no murmur, no click or rub   Right foot- in fracture boot.               Labs and Procedures     Office Visit on 01/28/2019   Component Date Value Ref Range Status     Bilirubin Total 01/28/2019 0.2  0.0 - 1.0 mg/dL Final     Bilirubin Direct 01/28/2019 <0.1  <=0.5 mg/dL Final     Protein Total 01/28/2019 7.0  6.0 - 8.0 g/dL Final     Albumin 01/28/2019 3.4* 3.5 - 5.0 g/dL Final     Alkaline Phosphatase 01/28/2019 89  45 - 120 U/L Final     AST (SGOT) 01/28/2019 15  0 - 40 U/L Final     ALT (SGPT) 01/28/2019 15  0 - 45 U/L Final     WBC 01/28/2019 7.7  4.0 - 11.0 K/uL Final     Lymphocytes # 01/28/2019 2.3  0.8 - 5.3 K/uL Final     % Lymphocytes 01/28/2019 29.6  20.0 - 48.0 %L Final     Mid # 01/28/2019 0.6  0.0 - 2.2 K/uL Final     Mid % 01/28/2019 7.8  0.0 - 20.0 %M Final     GRANULOCYTES # 01/28/2019 4.8  1.6 - 8.3 K/uL Final     % Granulocytes 01/28/2019 62.6  40.0 - 75.0 %G Final     RBC 01/28/2019 4.37  3.80 - 5.20 M/uL Final     Hemoglobin 01/28/2019 13.2  11.7 - 15.7 g/dL Final     Hematocrit 01/28/2019 43.6  35.0 - 47.0 % Final     MCV 01/28/2019 99.7  78.0 - 100.0 fL Final     MCH 01/28/2019 30.2  26.5 - 35.0 pg Final     MCHC 01/28/2019 30.3* 32.0 - 36.0 g/dL Final     Platelets 01/28/2019 352.0  150.0 - 450.0 K/uL Final     Lipase  01/28/2019 36  0 - 52 U/L Final              Assessment and Plan     1.Recent right foot injury - scheduled to see ortho to follow up the effect on the recent ankle surgery.   2. Anxiety - improving on the Celexa. Continuing xanax 3 tabs per day.  Will increase Celexa to 30 mg every day.  Will refill the xanax at this point.  3. Recheck in one months time.    4. Discussed my senior living, and that patient will need to find another primary care provider. She is happy to identify someone at the [facility].    5. Provide metronidazole vaginal preparation one time per day for five days. This has been a long standing problems with the patient, and I provided 1 refill to be used if the problem recurs.       Options for treatment and follow-up care were reviewed with the patient and/or guardian. Darlene Hudson and/or guardian engaged in the decision making process and verbalized understanding of the options discussed and agreed with the final plan.    Oscar Aparicio MD

## 2019-04-04 NOTE — TELEPHONE ENCOUNTER
Message to physician: INSURANCE WONT COVERED FOR 3 TAB A DAY.PLEASE RESEND FOR 20 MG OR 10 MG TABS.    Date of last visit: 4/4/2019     Date of next visit if scheduled: Visit date 04-15-19    Last Comprehensive Metabolic Panel:  Sodium   Date Value Ref Range Status   11/02/2017 137.0 133.0 - 144.0 mmol/L Final     Potassium   Date Value Ref Range Status   11/02/2017 4.5 3.4 - 5.3 mmol/L Final     Chloride   Date Value Ref Range Status   11/02/2017 110.0 (H) 94.0 - 109.0 mmol/L Final     Carbon Dioxide   Date Value Ref Range Status   11/02/2017 23.0 20.0 - 32.0 mmol/L Final     Glucose   Date Value Ref Range Status   11/02/2017 90.0 60.0 - 109.0 mg/dL Final     Urea Nitrogen   Date Value Ref Range Status   11/02/2017 13.0 5.0 - 24.0 mg/dL Final     Creatinine   Date Value Ref Range Status   11/02/2017 0.9 0.6 - 1.3 mg/dL Final     GFR Estimate   Date Value Ref Range Status   02/02/2017 >60 >60 mL/min/1.73m2 Final     Calcium   Date Value Ref Range Status   11/02/2017 8.6 8.5 - 10.4 mg/dL Final       BP Readings from Last 3 Encounters:   04/04/19 103/70   02/25/19 114/84   01/28/19 144/84       No results found for: A1C             Please complete refill and CLOSE ENCOUNTER.  Closing the encounter signifies the refill is complete.

## 2019-04-10 ENCOUNTER — RECORDS - HEALTHEAST (OUTPATIENT)
Dept: ADMINISTRATIVE | Facility: OTHER | Age: 44
End: 2019-04-10

## 2019-04-14 ENCOUNTER — RECORDS - HEALTHEAST (OUTPATIENT)
Dept: ADMINISTRATIVE | Facility: OTHER | Age: 44
End: 2019-04-14

## 2019-04-24 ENCOUNTER — RECORDS - HEALTHEAST (OUTPATIENT)
Dept: ADMINISTRATIVE | Facility: OTHER | Age: 44
End: 2019-04-24

## 2019-05-02 ENCOUNTER — TRANSFERRED RECORDS (OUTPATIENT)
Dept: HEALTH INFORMATION MANAGEMENT | Facility: CLINIC | Age: 44
End: 2019-05-02
Payer: MEDICAID

## 2019-05-08 ENCOUNTER — RECORDS - HEALTHEAST (OUTPATIENT)
Dept: ADMINISTRATIVE | Facility: OTHER | Age: 44
End: 2019-05-08

## 2019-05-11 ENCOUNTER — RECORDS - HEALTHEAST (OUTPATIENT)
Dept: ADMINISTRATIVE | Facility: OTHER | Age: 44
End: 2019-05-11

## 2019-05-13 ENCOUNTER — RECORDS - HEALTHEAST (OUTPATIENT)
Dept: ADMINISTRATIVE | Facility: OTHER | Age: 44
End: 2019-05-13

## 2019-05-29 ENCOUNTER — OFFICE VISIT (OUTPATIENT)
Dept: FAMILY MEDICINE | Facility: CLINIC | Age: 44
End: 2019-05-29
Payer: OTHER MISCELLANEOUS

## 2019-05-29 ENCOUNTER — ALLIED HEALTH/NURSE VISIT (OUTPATIENT)
Dept: FAMILY MEDICINE | Facility: CLINIC | Age: 44
End: 2019-05-29
Payer: COMMERCIAL

## 2019-05-29 VITALS
SYSTOLIC BLOOD PRESSURE: 119 MMHG | WEIGHT: 255.4 LBS | HEART RATE: 71 BPM | DIASTOLIC BLOOD PRESSURE: 79 MMHG | OXYGEN SATURATION: 97 % | RESPIRATION RATE: 20 BRPM | TEMPERATURE: 98.5 F | BODY MASS INDEX: 37.18 KG/M2

## 2019-05-29 VITALS
HEART RATE: 71 BPM | WEIGHT: 255.4 LBS | TEMPERATURE: 98.5 F | DIASTOLIC BLOOD PRESSURE: 79 MMHG | RESPIRATION RATE: 20 BRPM | SYSTOLIC BLOOD PRESSURE: 119 MMHG | BODY MASS INDEX: 37.18 KG/M2 | OXYGEN SATURATION: 97 %

## 2019-05-29 DIAGNOSIS — Z01.818 PREOP GENERAL PHYSICAL EXAM: Primary | ICD-10-CM

## 2019-05-29 DIAGNOSIS — Z23 ENCOUNTER FOR IMMUNIZATION: Primary | ICD-10-CM

## 2019-05-29 DIAGNOSIS — M25.571 PAIN IN JOINT, ANKLE AND FOOT, RIGHT: ICD-10-CM

## 2019-05-29 NOTE — PROGRESS NOTES
[facility] [address]  [facility] [address]  Phone: [phone]  Fax: [phone]    5/29/2019    Adult PRE-OP Evaluation:    Darlene Hudson, 1975 presents for pre-operative evaluation and assessment as requested by Dr. Mcgarry, prior to undergoing surgery/procedure for treatment of  R ankle instability .    Proposed procedure: Ankle scope    Date of Surgery/ Procedure: 5/30/19  Hospital/Surgical Facility: [facility]     Primary Physician: Arnaud Garcia  Type of Anesthesia Anticipated: General  History of anesthesia complications: NONE  History of  abnormal bleeding: NONE   History of blood transfusions: NO  Patient has a Health Care Directive or Living Will:  NO    Preoperative Questions   1. NO - Do you have a history of heart attack, stroke, stent, bypass or surgery on an artery in the head, neck, heart or legs?  2. YES - Do you ever have any pain or discomfort in your chest? Previous history of pericarditis in 2017  3. YES - Have you ever had a severe pain across the front of your chest lasting for half an hour or more? See above  4. NO - Do you have a history of Congestive Heart Failure?  5. YES - Are you troubled by shortness of breath when: walking on the level/ up a slight hill/ at night? Due to asthma  6. YES - Does your chest ever sound wheezy or whistling? See above  7. NO - Do you currently have a cold, bronchitis or other respiratory infection?  8. NO - Have you had a cold, bronchitis or other respiratory infection within the last 2 weeks?  9. NO - Do you usually have a cough?  10. NO - Do you sometimes get pains in the calves of your legs when you walk?  11. YES - Do you or anyone in your family have previous history of blood clots? Mom - hx of CVA 5 yrs ago   12. NO - Do you or does anyone in your family have a serious bleeding problem such as prolonged bleeding following surgeries or cuts?  13. NO - Have you ever had problems with anemia or been told to take iron  pills?  14. NO - Have you had any abnormal blood loss such as black, tarry or bloody stools, or abnormal vaginal bleeding?  15. NO - Have you ever had a blood transfusion?  16. NO - Have you or any of your relatives ever had problems with anesthesia?  17. NO - Do you have sleep apnea, excessive snoring or daytime drowsiness?  18. NO - Do you have any prosthetic heart valves?  19. NO - Do you have prosthetic joints?  20. NO - Is there any chance that you may be pregnant?    Patient Active Problem List   Diagnosis     Abnormal cytology finding     Nondependent alcohol abuse, episodic drinking behavior     Anxiety state     Controlled substance agreement signed     Low back pain     Chronic sinusitis     Cocaine abuse (H)     Major depressive disorder, recurrent episode, moderate (H)     Asthma     Tobacco use disorder     Noninflammatory disorder of vagina     Pap smear for cervical cancer screening     Abdominal pain     Abnormal cervical Papanicolaou smear     Panic disorder with agoraphobia     Atopic rhinitis     Chronic low back pain     Depression     Moderate persistent asthma     Other long term (current) drug therapy     Tobacco use     Acute pericardial effusion     Anxiety         Current Outpatient Medications on File Prior to Visit:  albuterol (PROAIR HFA/PROVENTIL HFA/VENTOLIN HFA) 108 (90 Base) MCG/ACT inhaler Inhale 2 puffs into the lungs every 4 hours as needed for shortness of breath / dyspnea or wheezing   ALPRAZolam (XANAX) 1 MG tablet Take 1 tablet (1 mg) by mouth 3 times daily as needed for anxiety   capsaicin (ZOSTRIX) 0.075 % cream Apply topically 3 times daily   escitalopram (LEXAPRO) 10 MG tablet Take 3 tablets (30 mg) by mouth daily   escitalopram (LEXAPRO) 20 MG tablet Take 1.5 tablets (30 mg) by mouth daily   fluticasone (FLONASE) 50 MCG/ACT nasal spray Spray 1-2 sprays into both nostrils daily   fluticasone-salmeterol (ADVAIR DISKUS) 500-50 MCG/DOSE inhaler Inhale 1 puff into the lungs 2  "times daily   gabapentin (NEURONTIN) 300 MG capsule Take 3 capsules (900 mg) by mouth 3 times daily   metroNIDAZOLE (METROGEL) 0.75 % topical gel Apply topically 2 times daily   montelukast (SINGULAIR) 10 MG tablet Take 1 tablet (10 mg) by mouth At Bedtime   ranitidine (ZANTAC) 300 MG tablet Take 1 tablet (300 mg) by mouth At Bedtime   [] metroNIDAZOLE (METROGEL) 0.75 % vaginal gel Place 1 applicator (5 g) vaginally daily for 5 days   [] ondansetron (ZOFRAN) 4 MG tablet Take 2 tablets (8 mg) by mouth every 8 hours as needed for nausea   oxyCODONE (ROXICODONE) 5 MG tablet Take 1-2 tablets (5-10 mg) by mouth every 6 hours as needed for severe pain (Patient not taking: Reported on 2019)   [] sulfamethoxazole-trimethoprim (BACTRIM DS/SEPTRA DS) 800-160 MG tablet Take 1 tablet by mouth 2 times daily for 7 days   zolpidem (AMBIEN) 10 MG tablet Take 1 tablet (10 mg) by mouth nightly as needed for sleep (Patient not taking: Reported on 2019)     No current facility-administered medications on file prior to visit.     OTC products: ibuprofen - last use 9am    Allergies   Allergen Reactions     Lidocaine Other (See Comments)     \"my jaw stopped moving\"     Penicillins Hives, Rash and Shortness Of Breath     Lidocaine-Epinephrine [Epinephrine-Lidocaine-Na Metabisulfite] Other (See Comments)     Severe jaw cramping, double vision     Latex Allergy: NO    Social History     Socioeconomic History     Marital status:      Spouse name: Not on file     Number of children: Not on file     Years of education: Not on file     Highest education level: Not on file   Occupational History     Not on file   Social Needs     Financial resource strain: Not on file     Food insecurity:     Worry: Not on file     Inability: Not on file     Transportation needs:     Medical: Not on file     Non-medical: Not on file   Tobacco Use     Smoking status: Former Smoker     Packs/day: 0.25     Types: Cigarettes     " Last attempt to quit: 2018     Years since quittin.5     Smokeless tobacco: Former User     Quit date: 12/3/2016   Substance and Sexual Activity     Alcohol use: Yes     Alcohol/week: 0.0 oz     Comment: 1 drink/day     Drug use: No     Sexual activity: Not on file   Lifestyle     Physical activity:     Days per week: Not on file     Minutes per session: Not on file     Stress: Not on file   Relationships     Social connections:     Talks on phone: Not on file     Gets together: Not on file     Attends Sikhism service: Not on file     Active member of club or organization: Not on file     Attends meetings of clubs or organizations: Not on file     Relationship status: Not on file     Intimate partner violence:     Fear of current or ex partner: Not on file     Emotionally abused: Not on file     Physically abused: Not on file     Forced sexual activity: Not on file   Other Topics Concern     Parent/sibling w/ CABG, MI or angioplasty before 65F 55M? Not Asked   Social History Narrative     Not on file       REVIEW OF SYSTEMS:   Constitutional, HEENT, cardiovascular, pulmonary, GI, , musculoskeletal, neuro, skin, endocrine and psych systems are negative, except as otherwise noted.    EXAM:     Patient Vitals for the past 24 hrs:   BP Temp Temp src Pulse Resp SpO2 Weight   19 1600 119/79 98.5  F (36.9  C) Oral 71 20 97 % 115.8 kg (255 lb 6.4 oz)     Body mass index is 37.18 kg/m .  GENERAL: healthy, alert and no distress  EYES: Eyes grossly normal to inspection, extraocular movements - intact, and PERRL  HENT: ear canals- normal; TMs- normal; Nose- normal; Mouth- no ulcers, no lesions  NECK: no tenderness, no adenopathy, no asymmetry, no masses, no stiffness; thyroid- normal to palpation  RESP: lungs clear to auscultation - no rales, no rhonchi, no wheezes  CV: regular rates and rhythm, normal S1 S2, no S3 or S4 and no murmur, no click or rub -  ABDOMEN: soft, no tenderness, no  hepatosplenomegaly,  no masses, normal bowel sounds  MS: extremities- no gross deformities noted, no edema  SKIN: no suspicious lesions, no rashes  NEURO: strength and tone- normal, sensory exam- grossly normal, mentation- intact, speech- normal, reflexes- symmetric  BACK: no CVA tenderness, no paralumbar tenderness  PSYCH: Alert and oriented times 3; speech- coherent , normal rate and volume; able to articulate logical thoughts  LYMPHATICS: ant. cervical- normal, post. cervical- normal    DIAGNOSTICS:      No labs or EKG required for low risk surgery (cataract, skin procedure, breast biopsy, etc)    RISK ASSESSMENT:     Cardiovascular Risk:  -Patient is able to participate in strenuous activities without chest pain.  -The patient does not have chest pain with exertion.  -Patient does not have a history of congestive heart failure.    -The patient does have a history of stroke and does not have a history of valvular disease.    Pulmonary Risk:  -In terms of risk factors for pulmonary complication, the patient has asthma    Perioperative Complications:  -The patient does not have a history of bleeding or clotting problems in the past.    -The patient has not had complications from surgeries.    -The patient does not have a family history of any anesthesia or surgical complications.      IMPRESSION:   Reason for surgery/procedure: R ankle instability    The proposed surgical procedure is considered LOW risk.    For above listed surgery and anesthesia:   Patient is at low risk for surgery/procedure and perioperative/procedure complications.    RECOMMENDATIONS:     Labs:  None    Fasting:  Must be NPO for 6 hours preoperatively.    Preop Plan:  --Approval given to proceed with proposed procedure, without further diagnostic evaluation    Medications:  Patient should hold their regular medications the morning of surgery unless otherwise instructed with the exception of her inhaler.        (Z01.818) Preop general physical exam  (primary  encounter diagnosis)  Comment:   Plan: Pt is deemed low risk for low risk procedure and can proceed with surgery tomorrow without additional testing    (M25.571) Pain in joint, ankle and foot, right  Comment:   Plan: see above    Robbie Bailon MD  May 29, 2019  4:28 PM

## 2019-05-29 NOTE — PATIENT INSTRUCTIONS
Preop Faxed:   Location/Place/Provider: [facility] with Dr. Hernández   Date/Time: 5/30/19  Phone: [phone]  Fax: [phone]      Presurgery Checklist  You are scheduled to have surgery. The healthcare staff will try to make your stay comfortable. Use the guidelines below to remind yourself what to do before surgery. Be sure to follow any specific pre-op instructions from your surgeon or nurse.   Preparing for Surgery  Ask your surgeon if you ll need a blood transfusion during surgery and if so, how to prepare for it. In some cases, you can donate blood before surgery. If needed, this blood can be given back (transfused) to you during or after surgery.  If you are having abdominal surgery, ask what you need to do to clear your bowel.  Tell your surgeon if you have allergies to any medications or foods.  Arrange for an adult family member or friend to drive you home after surgery. If possible, have someone ready to help you at home as you recover.  Call the surgeon if you get a cold, fever, sore throat, diarrhea, or other health problem just before surgery. Your surgeon can decide whether or not to postpone the surgery.  Medications  Tell your surgeon about all medications you take, including prescription and over-the-counter products such as herbal remedies and vitamins. Ask if you should continue taking them.  If you take ibuprofen, naproxen, or  blood thinners  such as aspirin, clopidogrel (Plavix), or warfarin (Coumadin), ask your surgeon whether you should stop taking them and how long before surgery you should stop.  You may be told to take antibiotics just before surgery to prevent infection. If so, follow instructions carefully on how to take them.  If you are told to take medications called anticoagulants to prevent blood clots after surgery, be sure to follow the instructions on how to take them.  Stop Smoking  If you smoke, healing may take longer. So at least 2 week(s) before surgery,  stop smoking.  Bathing or Showering Before Surgery  If instructed, wash with antibacterial soap. Afterward, do not use lotions or powders.  If you are having surgery on the head, you may be asked to shampoo with antibacterial soap. Follow instructions for doing so.  Do Not Remove Hair from the Surgery Site  Do not shave hair from the incision site, unless you are given specific instructions to do so. Usually, if hair needs to be removed, it will be done at the hospital right before surgery.  Don t Eat or Drink  Your doctor will tell you when to stop eating and drinking. If you do not follow your doctor's instructions, your procedure may be postponed or rescheduled for another day.  If your surgeon tells you to continue any medications, take them with small sips of water.  You can brush your teeth and rinse your mouth, but don t swallow any water.  Day of Surgery  Do not wear makeup. Do not use perfume, deodorant, or hairspray. Remove nail polish and artificial nails.  Leave jewelry (including rings), watches, and other valuables at home.  Be sure to bring health insurance cards or forms and a photo ID.  Bring a list of your medications (include the name, dose, how often you take them, and the time last dose was taken).  Arrive on time at the hospital or surgery facility.

## 2019-05-30 ENCOUNTER — TRANSFERRED RECORDS (OUTPATIENT)
Dept: HEALTH INFORMATION MANAGEMENT | Facility: CLINIC | Age: 44
End: 2019-05-30
Payer: MEDICAID

## 2019-05-30 ASSESSMENT — ASTHMA QUESTIONNAIRES: ACT_TOTALSCORE: 19

## 2019-06-06 ENCOUNTER — TELEPHONE (OUTPATIENT)
Dept: FAMILY MEDICINE | Facility: CLINIC | Age: 44
End: 2019-06-06

## 2019-06-06 DIAGNOSIS — F43.22 ADJUSTMENT DISORDER WITH ANXIOUS MOOD: Primary | ICD-10-CM

## 2019-06-06 RX ORDER — ESCITALOPRAM OXALATE 20 MG/1
20 TABLET ORAL DAILY
Qty: 90 TABLET | Refills: 3 | Status: SHIPPED | OUTPATIENT
Start: 2019-06-06 | End: 2019-09-04

## 2019-06-06 RX ORDER — ESCITALOPRAM OXALATE 10 MG/1
10 TABLET ORAL DAILY
Qty: 90 TABLET | Refills: 3 | Status: SHIPPED | OUTPATIENT
Start: 2019-06-06 | End: 2019-09-04

## 2019-06-06 NOTE — TELEPHONE ENCOUNTER
Escitalopram Rx requiring PA. Need to change dosage form from 20 mg tablets to 20 mg + 10 mg tablets. Sent per CPA.     Sharifa Wills, PharmD  [facility]  Phone: [phone]  June 6, 2019 at 4:44 PM

## 2019-06-10 DIAGNOSIS — J45.40 MODERATE PERSISTENT ASTHMA WITHOUT COMPLICATION: ICD-10-CM

## 2019-06-10 RX ORDER — ALBUTEROL SULFATE 90 UG/1
2 AEROSOL, METERED RESPIRATORY (INHALATION) EVERY 4 HOURS PRN
Qty: 8.5 G | Refills: 3 | Status: SHIPPED | OUTPATIENT
Start: 2019-06-10 | End: 2020-01-01

## 2019-06-12 ENCOUNTER — RECORDS - HEALTHEAST (OUTPATIENT)
Dept: ADMINISTRATIVE | Facility: OTHER | Age: 44
End: 2019-06-12

## 2019-06-12 ENCOUNTER — OFFICE VISIT (OUTPATIENT)
Dept: FAMILY MEDICINE | Facility: CLINIC | Age: 44
End: 2019-06-12
Payer: COMMERCIAL

## 2019-06-12 VITALS
RESPIRATION RATE: 18 BRPM | HEART RATE: 95 BPM | SYSTOLIC BLOOD PRESSURE: 107 MMHG | TEMPERATURE: 98 F | DIASTOLIC BLOOD PRESSURE: 75 MMHG | OXYGEN SATURATION: 98 %

## 2019-06-12 DIAGNOSIS — Z98.890 S/P SURGICAL MANIPULATION OF ANKLE JOINT: Primary | ICD-10-CM

## 2019-06-12 DIAGNOSIS — F41.1 GENERALIZED ANXIETY DISORDER: ICD-10-CM

## 2019-06-12 RX ORDER — OXYCODONE HYDROCHLORIDE 5 MG/1
5 TABLET ORAL 2 TIMES DAILY PRN
Qty: 28 TABLET | Refills: 0 | Status: SHIPPED | OUTPATIENT
Start: 2019-06-12 | End: 2019-06-24

## 2019-06-12 NOTE — PROGRESS NOTES
Pt is a 44 year old female last seen on 5/29/19 here today for:     R foot pain -   States that the [facility] doc who operated on her recommended seeing her PCP for pain control   Was given #20 of oxycodone - ran out last week   Alternating between ibuprofen and oxycodone  Taking up to 20 ibuprofen/ day    Anxiety - requesting stronger medication for anxiety  On Xanax tid  Has a pain mgmt appt pending   No counseling  Is on lexapro      Past Medical History:   Diagnosis Date     Controlled substance agreement signed 6/30/2015    Overview:  Patient has chronic pain and is seen at [facility] for this.  Has controlled substance agreement with them.  On Vicodin, Valium, Klonopin prescribed only from there.        NO ACTIVE PROBLEMS       Past Surgical History:   Procedure Laterality Date     NO HISTORY OF SURGERY        Current Outpatient Medications   Medication Sig Dispense Refill     albuterol (PROAIR HFA/PROVENTIL HFA/VENTOLIN HFA) 108 (90 Base) MCG/ACT inhaler Inhale 2 puffs into the lungs every 4 hours as needed for shortness of breath / dyspnea or wheezing 8.5 g 3     ALPRAZolam (XANAX) 1 MG tablet Take 1 tablet (1 mg) by mouth 3 times daily as needed for anxiety 90 tablet 3     capsaicin (ZOSTRIX) 0.075 % cream Apply topically 3 times daily 60 g 1     escitalopram (LEXAPRO) 10 MG tablet Take 1 tablet (10 mg) by mouth daily , in addition to 20 mg tablet for total of 30 mg daily 90 tablet 3     escitalopram (LEXAPRO) 20 MG tablet Take 1 tablet (20 mg) by mouth daily , in addition to 10 mg tablet for total of 30 mg daily 90 tablet 3     fluticasone (FLONASE) 50 MCG/ACT nasal spray Spray 1-2 sprays into both nostrils daily 1 Bottle 11     fluticasone-salmeterol (ADVAIR DISKUS) 500-50 MCG/DOSE inhaler Inhale 1 puff into the lungs 2 times daily 60 Inhaler 1     gabapentin (NEURONTIN) 300 MG capsule Take 3 capsules (900 mg) by mouth 3 times daily 270 capsule 0     metroNIDAZOLE (METROGEL) 0.75 % topical gel Apply  "topically 2 times daily 45 g 11     montelukast (SINGULAIR) 10 MG tablet Take 1 tablet (10 mg) by mouth At Bedtime 60 tablet 1     oxyCODONE (ROXICODONE) 5 MG tablet Take 1-2 tablets (5-10 mg) by mouth every 6 hours as needed for severe pain (Patient not taking: Reported on 5/29/2019) 30 tablet 0     ranitidine (ZANTAC) 300 MG tablet Take 1 tablet (300 mg) by mouth At Bedtime 60 tablet 1     zolpidem (AMBIEN) 10 MG tablet Take 1 tablet (10 mg) by mouth nightly as needed for sleep (Patient not taking: Reported on 5/29/2019) 30 tablet 0      Allergies   Allergen Reactions     Lidocaine Other (See Comments)     \"my jaw stopped moving\"     Penicillins Hives, Rash and Shortness Of Breath     Lidocaine-Epinephrine [Epinephrine-Lidocaine-Na Metabisulfite] Other (See Comments)     Severe jaw cramping, double vision      ROS:   Gen- no weight change, no fevers/chills   Neuro - no numbness, no tingling   Remainder of ROS negative.     Exam:   /75   Pulse 95   Temp 98  F (36.7  C) (Oral)   Resp 18   SpO2 98%    Alert and oriented x 3; No acute distress   MSK: R lateral ankle surgical scar; C/D/I. Mild erythema and swelling but no fluctuance or drainage  Neuro: no focal deficits   Derm: no rashes       (Z98.890) S/P surgical manipulation of ankle joint  (primary encounter diagnosis)  Comment: reviewed her pain mgmt regimen at length; will take 800mg ibuprofen tid w/ meals and 1 oxycodone bid in between; can also use tylenol 1g tid prn pain; 2-week supply given w/ explicit plan to start wean at next visit; pt amenable to plan  Plan: oxyCODONE (ROXICODONE) 5 MG tablet            (F41.1) Generalized anxiety disorder  Comment: will start wean off benzos at next visit; encouraged initiating counseling for her anxiety asap  Plan: MENTAL HEALTH REFERRAL  -            Rbobie Bailon MD  June 12, 2019  12:06 PM    "

## 2019-06-17 ENCOUNTER — DOCUMENTATION ONLY (OUTPATIENT)
Dept: PSYCHOLOGY | Facility: CLINIC | Age: 44
End: 2019-06-17

## 2019-06-17 NOTE — PROGRESS NOTES
Referral for psychotherapy to help with anxiety. Consider any of the following:    [facility]  [address]  [address]  [place], [place] [address]  [phone] Phone  [phone] Fax  Hours: M-F 7:30-5:30pm    [facility] [place] [facility]  [address]   [address]  [place] [address]  [phone] (for appointments)  Fax: [phone]  7:30 am - 5 pm M-F, appointments available on     [facility]  [address]  [address]  [place] [address]  [phone] Phone  [phone] Fax  - 7:30AM-7PM  Saturday 8AM-2PM      [facility]  [address] [place] [address]  [phone] Phone  [phone] Fax  Monday-Friday: 9am -9pm  : 9am- 2pm    [facility]  [address] [place] [address]  [phone] Phone  [phone] Fax  - 8-8pm  F 8-4:30pm      ===View-only below this line===    ----- Message -----  From: Julieth Bailon MD  Sent: 2019  11:56 AM  To:  Mental Health  Subject: Order for ANGELA LIM           [id number]               : 1975  F     [address] [place] [address] [address]                            PCP: [id number]    [facility] [address]                                 CTR: [facility]        Name: RAPHAELMAYLINGUILHERME Date: 2019    Home: [phone]    Payor:              [employer]US  Plan:                 [employer] MA  Sponsor Code:       [id number]  Subscriber ID:      [id number]  Subscriber Name:    ANGELA LIM  Subscriber Address: [address] [place] [address] [address]                      [place] [address]    Effective From:     19  Effective To:         Group Number:       [id number]  Group Name  : Not Available      Date       Provider                   Department   Center         2019  [id number]-SYBTHJULIETH BAILONAM        P Owned    Order  Date:2019  Ordering User:JULIETH BAILON [[email or web address]]  Encounter Provider:Julieth Bailon MD [[id number]]  Authorizing Provider: Julieth Bailon MD [[id number]]  Department:[facility][[id number]]    Ordering Provider NPI: [id number]  Julieth Bailon  [facility]~[address] [address]  Phone: [phone]        Procedure Requested    9035     MENTAL   HEALTH REFERRAL  -             [#[id number]]      Priority: Routine  Class: External referral    Future Order Information      Expires on:2019                Comment:Use this form for in clinic and community psychiatry and behavioral               health consults. The referral coordinator will help to determine               whether patients are best served by clinic behavioral health staff                 or by community providers.                              Reason for referral: Severe anxiety/ depression - needs to wean                off benzos                              Please provide data for below screening tools if available.                PHQ-9 Score:                Bipolar Screen:                ACE & Score:               PC-PTSD Score:                MMSE:                [place] (ADHD):                MCHAT (Autism Screen):                 Pediatric Symptom Checklist (PSC):                Other Screening measures used:                               Type of referral requested (indicate all that apply):                              Adult Psychotherapy--for diagnosis and non-pharmacological                treatment                               needed: No               Language: English               ([facility] Only) Referral should be tracked (Yes/No)?    Associated D  iagnoses      F41.1 Generalized anxiety disorder      Adult or Child/Adolescent:  Adult         Location:  [facility]           [id number]               : 1975  F     [address]                            PCP: [id number],  [place] [address]                                 CTR: [facility]      Comments

## 2019-06-18 ENCOUNTER — TELEPHONE (OUTPATIENT)
Dept: FAMILY MEDICINE | Facility: CLINIC | Age: 44
End: 2019-06-18

## 2019-06-18 NOTE — TELEPHONE ENCOUNTER
"Call made to Karoline for review of mental health referral. At this time patient is deferring this referral due to being scheduled for an assessment with the  \"Pain psychiatrist\" due to work comp.     "

## 2019-06-18 NOTE — TELEPHONE ENCOUNTER
"During out going call to discuss a recent referral patient has complaints/concerns of a cough, extreme fatigue, slight loss of voice and chest heaviness for the last day or so.  Patient denies SOB but reports she \"can feel her asthma acting up\", denies fever, denies vomit but is experiencing some nausea.  Karoline has concerns of possibly having Pneumonia due to a recent surgery.     Karoline has been scheduled for a clinic visit f/u With PCP 6/19/19. Patient advised should she become worse, short of breath, chest pain/pressure or fever she should seek medical treatment immediately. Patient verbalized complete understanding and is agreeable with this plan.  "

## 2019-06-19 ENCOUNTER — OFFICE VISIT (OUTPATIENT)
Dept: FAMILY MEDICINE | Facility: CLINIC | Age: 44
End: 2019-06-19
Payer: COMMERCIAL

## 2019-06-19 VITALS
OXYGEN SATURATION: 96 % | RESPIRATION RATE: 16 BRPM | TEMPERATURE: 98 F | SYSTOLIC BLOOD PRESSURE: 118 MMHG | HEART RATE: 68 BPM | DIASTOLIC BLOOD PRESSURE: 82 MMHG

## 2019-06-19 DIAGNOSIS — J20.9 ACUTE BRONCHITIS, UNSPECIFIED ORGANISM: Primary | ICD-10-CM

## 2019-06-19 DIAGNOSIS — J45.41 MODERATE PERSISTENT ASTHMA WITH EXACERBATION: ICD-10-CM

## 2019-06-19 RX ORDER — CODEINE PHOSPHATE/GUAIFENESIN 10-100MG/5
10 LIQUID (ML) ORAL
Qty: 180 ML | Refills: 0 | Status: SHIPPED | OUTPATIENT
Start: 2019-06-19 | End: 2019-06-24

## 2019-06-19 RX ORDER — PREDNISONE 50 MG/1
50 TABLET ORAL DAILY
Qty: 5 TABLET | Refills: 0 | Status: SHIPPED | OUTPATIENT
Start: 2019-06-19 | End: 2019-06-24

## 2019-06-19 NOTE — PROGRESS NOTES
"Pt is a 44 year old female last seen on 6/12/19 here today for:     Cough, cold, HA, chest congestion  Mild wheeze, less today  Using inhaler - 4 to 5 times yesterday  No F/C    Per triage call:  \" During out going call to discuss a recent referral patient has complaints/concerns of a cough, extreme fatigue, slight loss of voice and chest heaviness for the last day or so.  Patient denies SOB but reports she \"can feel her asthma acting up\", denies fever, denies vomit but is experiencing some nausea.Karoline has concerns of possibly having Pneumonia due to a recent surgery.    Karoline has been scheduled for a clinic visit f/u With PCP 6/19/19. Patient advised should she become worse, short of breath, chest pain/pressure or fever she should seek medical treatment immediately. Patient verbalized complete understanding and is agreeable with this plan.\"      Past Medical History:   Diagnosis Date     Controlled substance agreement signed 6/30/2015    Overview:  Patient has chronic pain and is seen at [facility] for this.  Has controlled substance agreement with them.  On Vicodin, Valium, Klonopin prescribed only from there.        NO ACTIVE PROBLEMS       Past Surgical History:   Procedure Laterality Date     NO HISTORY OF SURGERY        Current Outpatient Medications   Medication Sig Dispense Refill     albuterol (PROAIR HFA/PROVENTIL HFA/VENTOLIN HFA) 108 (90 Base) MCG/ACT inhaler Inhale 2 puffs into the lungs every 4 hours as needed for shortness of breath / dyspnea or wheezing 8.5 g 3     ALPRAZolam (XANAX) 1 MG tablet Take 1 tablet (1 mg) by mouth 3 times daily as needed for anxiety 90 tablet 3     capsaicin (ZOSTRIX) 0.075 % cream Apply topically 3 times daily 60 g 1     escitalopram (LEXAPRO) 10 MG tablet Take 1 tablet (10 mg) by mouth daily , in addition to 20 mg tablet for total of 30 mg daily 90 tablet 3     escitalopram (LEXAPRO) 20 MG tablet Take 1 tablet (20 mg) by mouth daily , in addition to 10 mg tablet " "for total of 30 mg daily 90 tablet 3     fluticasone (FLONASE) 50 MCG/ACT nasal spray Spray 1-2 sprays into both nostrils daily 1 Bottle 11     fluticasone-salmeterol (ADVAIR DISKUS) 500-50 MCG/DOSE inhaler Inhale 1 puff into the lungs 2 times daily 60 Inhaler 1     gabapentin (NEURONTIN) 300 MG capsule Take 3 capsules (900 mg) by mouth 3 times daily 270 capsule 0     metroNIDAZOLE (METROGEL) 0.75 % topical gel Apply topically 2 times daily 45 g 11     montelukast (SINGULAIR) 10 MG tablet Take 1 tablet (10 mg) by mouth At Bedtime 60 tablet 1     oxyCODONE (ROXICODONE) 5 MG tablet Take 1 tablet (5 mg) by mouth 2 times daily as needed for severe pain 28 tablet 0     ranitidine (ZANTAC) 300 MG tablet Take 1 tablet (300 mg) by mouth At Bedtime 60 tablet 1     zolpidem (AMBIEN) 10 MG tablet Take 1 tablet (10 mg) by mouth nightly as needed for sleep (Patient not taking: Reported on 5/29/2019) 30 tablet 0      Allergies   Allergen Reactions     Lidocaine Other (See Comments)     \"my jaw stopped moving\"     Penicillins Hives, Rash and Shortness Of Breath     Lidocaine-Epinephrine [Epinephrine-Lidocaine-Na Metabisulfite] Other (See Comments)     Severe jaw cramping, double vision        ROS:   Gen- no weight change, no fevers/chills   Head/ Eyes- no blurred vision, no headaches   ENT- + cough, + congestion, + URI symptoms   Cardiac - no palpitations, no chest pain   Respiratory - + shortness of breath , + wheezing   GI - no nausea, no vomiting, no diarrhea, no constipation   Remainder of ROS negative.     Exam:   /82   Pulse 68   Temp 98  F (36.7  C) (Oral)   Resp 16   SpO2 96%    Alert and oriented x 3; No acute distress   HEENT: extraocular movements intact, tympanic membranes clear, oropharynx erythematous  Neck: no masses, no lymphadenopathy   Resp: +faint wheezing bilateral  CV: rate/rhythm regular, no murmurs/rubs/gallops   Derm: no rashes       (J20.9) Acute bronchitis, unspecified organism  (primary " encounter diagnosis)  Comment: margo viral bronchitis flaring her underlying asthma; short pred course; precautions given; already has follow-up w/ me in 1 wk  Plan: predniSONE (DELTASONE) 50 MG tablet,         guaiFENesin-codeine (GUAIFENESIN AC) 100-10         MG/5ML syrup            (J45.41) Moderate persistent asthma with exacerbation  Comment:  Plan: predniSONE (DELTASONE) 50 MG tablet            Robbie Bailon MD  June 19, 2019  1:08 PM

## 2019-06-19 NOTE — PATIENT INSTRUCTIONS
Patient Education     Acute Bronchitis  Your healthcare provider has told you that you have acute bronchitis. Bronchitis is infection or inflammation of the bronchial tubes (airways in the lungs). Normally, air moves easily in and out of the airways. Bronchitis narrows the airways, making it harder for air to flow in and out of the lungs. This causes symptoms such as shortness of breath, coughing up yellow or green mucus, and wheezing. Bronchitis can be acute or chronic. Acute means the condition comes on quickly and goes away in a short time, usually within 3 to 10 days. Chronic means a condition lasts a long time and often comes back.    What causes acute bronchitis?  Acute bronchitis almost always starts as a viral respiratory infection, such as a cold or the flu. Certain factors make it more likely for a cold or flu to turn into bronchitis. These include being very young, being elderly, having a heart or lung problem, or having a weak immune system. Cigarette smoking also makes bronchitis more likely.  When bronchitis develops, the airways become swollen. The airways may also become infected with bacteria. This is known as a secondary infection.  Diagnosing acute bronchitis  Your healthcare provider will examine you and ask about your symptoms and health history. You may also have a sputum culture to test the fluid in your lungs. Chest X-rays may be done to look for infection in the lungs.  Treating acute bronchitis  Bronchitis usually clears up as the cold or flu goes away. You can help feel better faster by doing the following:    Take medicine as directed. You may be told to take ibuprofen or other over-the-counter medicines. These help relieve inflammation in your bronchial tubes. Your healthcare provider may prescribe an inhaler to help open up the bronchial tubes. Most of the time, acute bronchitis is caused by a viral infection. Antibiotics are usually not prescribed for viral infections.    Drink plenty  of fluids, such as water, juice, or warm soup. Fluids loosen mucus so that you can cough it up. This helps you breathe more easily. Fluids also prevent dehydration.    Make sure you get plenty of rest.    Do not smoke. Do not allow anyone else to smoke in your home.  Recovery and follow-up  Follow up with your doctor as you are told. You will likely feel better in a week or two. But a dry cough can linger beyond that time. Let your doctor know if you still have symptoms (other than a dry cough) after 2 weeks, or if you re prone to getting bronchial infections. Take steps to protect yourself from future infections. These steps include stopping smoking and avoiding tobacco smoke, washing your hands often, and getting a yearly flu shot.  When to call your healthcare provider  Call the healthcare provider if you have any of the following:    Fever of 100.4 F (38.0 C) or higher, or as advised    Symptoms that get worse, or new symptoms    Trouble breathing    Symptoms that don t start to improve within a week, or within 3 days of taking antibiotics   Date Last Reviewed: 12/1/2016 2000-2018 [employer]. [address] [place] [address], [place] [address]. All rights reserved. This information is not intended as a substitute for professional medical care. Always follow your healthcare professional's instructions.

## 2019-06-24 ENCOUNTER — OFFICE VISIT (OUTPATIENT)
Dept: FAMILY MEDICINE | Facility: CLINIC | Age: 44
End: 2019-06-24
Payer: COMMERCIAL

## 2019-06-24 VITALS
HEART RATE: 109 BPM | OXYGEN SATURATION: 96 % | SYSTOLIC BLOOD PRESSURE: 126 MMHG | RESPIRATION RATE: 18 BRPM | DIASTOLIC BLOOD PRESSURE: 70 MMHG

## 2019-06-24 DIAGNOSIS — J20.9 ACUTE BRONCHITIS, UNSPECIFIED ORGANISM: ICD-10-CM

## 2019-06-24 DIAGNOSIS — Z79.899 CONTROLLED SUBSTANCE AGREEMENT SIGNED: ICD-10-CM

## 2019-06-24 DIAGNOSIS — Z98.890 S/P SURGICAL MANIPULATION OF ANKLE JOINT: Primary | ICD-10-CM

## 2019-06-24 RX ORDER — CODEINE PHOSPHATE/GUAIFENESIN 10-100MG/5
10 LIQUID (ML) ORAL 2 TIMES DAILY PRN
Qty: 236 ML | Refills: 0 | Status: SHIPPED | OUTPATIENT
Start: 2019-06-24 | End: 2019-09-04

## 2019-06-24 RX ORDER — OXYCODONE HYDROCHLORIDE 5 MG/1
5 TABLET ORAL 2 TIMES DAILY PRN
Qty: 42 TABLET | Refills: 0 | Status: SHIPPED | OUTPATIENT
Start: 2019-06-24 | End: 2019-07-17

## 2019-06-24 NOTE — PROGRESS NOTES
Pt is a 44 year old female last seen on 6/19/19 here today for:     R Ankle pain - following treatment plan  Starts PT in 2 days  Still swollen     Has noted inc numbness in foot after surgery   Scheduled to see her surgeon on 7/10/19      Cough - s/p pred course w/ minimal relief     Past Medical History:   Diagnosis Date     Controlled substance agreement signed 6/30/2015    Overview:  Patient has chronic pain and is seen at [facility] for this.  Has controlled substance agreement with them.  On Vicodin, Valium, Klonopin prescribed only from there.        NO ACTIVE PROBLEMS       Past Surgical History:   Procedure Laterality Date     NO HISTORY OF SURGERY        Current Outpatient Medications   Medication Sig Dispense Refill     oxyCODONE (ROXICODONE) 5 MG tablet Take 1 tablet (5 mg) by mouth 2 times daily as needed for severe pain 42 tablet 0     albuterol (PROAIR HFA/PROVENTIL HFA/VENTOLIN HFA) 108 (90 Base) MCG/ACT inhaler Inhale 2 puffs into the lungs every 4 hours as needed for shortness of breath / dyspnea or wheezing 8.5 g 3     ALPRAZolam (XANAX) 1 MG tablet Take 1 tablet (1 mg) by mouth 3 times daily as needed for anxiety 90 tablet 3     capsaicin (ZOSTRIX) 0.075 % cream Apply topically 3 times daily 60 g 1     escitalopram (LEXAPRO) 10 MG tablet Take 1 tablet (10 mg) by mouth daily , in addition to 20 mg tablet for total of 30 mg daily 90 tablet 3     escitalopram (LEXAPRO) 20 MG tablet Take 1 tablet (20 mg) by mouth daily , in addition to 10 mg tablet for total of 30 mg daily 90 tablet 3     fluticasone (FLONASE) 50 MCG/ACT nasal spray Spray 1-2 sprays into both nostrils daily 1 Bottle 11     fluticasone-salmeterol (ADVAIR DISKUS) 500-50 MCG/DOSE inhaler Inhale 1 puff into the lungs 2 times daily 60 Inhaler 1     gabapentin (NEURONTIN) 300 MG capsule Take 3 capsules (900 mg) by mouth 3 times daily 270 capsule 0     guaiFENesin-codeine (GUAIFENESIN AC) 100-10 MG/5ML syrup Take 10 mLs  "by mouth nightly as needed for congestion or cough 180 mL 0     metroNIDAZOLE (METROGEL) 0.75 % topical gel Apply topically 2 times daily 45 g 11     montelukast (SINGULAIR) 10 MG tablet Take 1 tablet (10 mg) by mouth At Bedtime 60 tablet 1     ranitidine (ZANTAC) 300 MG tablet Take 1 tablet (300 mg) by mouth At Bedtime 60 tablet 1     zolpidem (AMBIEN) 10 MG tablet Take 1 tablet (10 mg) by mouth nightly as needed for sleep (Patient not taking: Reported on 5/29/2019) 30 tablet 0      Allergies   Allergen Reactions     Lidocaine Other (See Comments)     \"my jaw stopped moving\"     Penicillins Hives, Rash and Shortness Of Breath     Lidocaine-Epinephrine [Epinephrine-Lidocaine-Na Metabisulfite] Other (See Comments)     Severe jaw cramping, double vision        ROS:   Gen- no weight change, no fevers/chills   Head/ Eyes- no blurred vision, no headaches   ENT- no cough, no congestion, no URI symptoms   Cardiac - no palpitations, no chest pain   Respiratory - no shortness of breath , no wheezing   GI - no nausea, no vomiting, no diarrhea, no constipation   Urinary - no dysuria, no polyuria   Neuro - no numbness, no tingling   Remainder of ROS negative.     Exam:   /70   Pulse 109   Resp 18   SpO2 96%    Alert and oriented x 3; No acute distress   Resp: clear to auscultation bilaterally, no wheezing/ronchi   CV: rate/rhythm regular, no murmurs/rubs/gallops   MSK: healing scar of R lateral ankle; mild soft tissue swelling  Limited ROM in all planes            (Z98.890) S/P surgical manipulation of ankle joint  Comment: will cont PT and pain control regimen; f/u in 3 wks; has appt w/ ortho on 7/10  Plan: oxyCODONE (ROXICODONE) 5 MG tablet            (J20.9) Acute bronchitis, unspecified organism  Comment: counseled to wean off codeine cough syrup  Plan: guaiFENesin-codeine (GUAIFENESIN AC) 100-10 MG/5ML syrup            (Z79.899) Controlled substance agreement signed  Comment:   Plan: Rapid Urine Drug Screen ([facility] " FM)    Robbie Bailon MD  June 24, 2019  12:25 PM

## 2019-06-26 ENCOUNTER — TELEPHONE (OUTPATIENT)
Dept: FAMILY MEDICINE | Facility: CLINIC | Age: 44
End: 2019-06-26

## 2019-06-26 NOTE — TELEPHONE ENCOUNTER
REQUEST ID#  [id number] when calling    Caller was arguing that it is not their job to contact [facility] for images even if we send it to them. Caller states that they request images from us, so we need to find a way to get the CD information to them. Please call and advise. Thanks

## 2019-06-26 NOTE — TELEPHONE ENCOUNTER
Called back, [facility] is out, try again tomorrow per Jennifer.  I need to know which xrays she wanted.  CXiong, A

## 2019-06-27 NOTE — TELEPHONE ENCOUNTER
Spoke to Teresa and she said they need the rt ankle and foot xray sent to [address] [place] [address] [address], [place] [address]. Fax [phone].  Informed Teresa that we don't have in pt's chart that shows she have had xray of foot and ankle of the right side done here.  CXiong, A

## 2019-07-09 ENCOUNTER — TELEPHONE (OUTPATIENT)
Dept: FAMILY MEDICINE | Facility: CLINIC | Age: 44
End: 2019-07-09

## 2019-07-09 NOTE — TELEPHONE ENCOUNTER
[facility] phone call message-patient reporting a symptom:     Symptom: Pink Eye    Same Day Visit Offered: Yes, declined, unable to make it     Additional comments: Patient states she would like to know if there is an over the counter medication she is able to use for pink eye. She is unable to come in today for an appointment and unable to tomorrow as well due to another appointment. Patient is on the same day next day appointments. Please call and advise.     OK to leave message on voice mail? Yes    Primary language: English      needed? No    Call taken on July 9, 2019 at 2:33 PM by Gloria Juarez

## 2019-07-09 NOTE — TELEPHONE ENCOUNTER
"Called patient to discuss concerns. Patient experiencing redness, irritation, swelling, and increased drainage from her eyes. Patient stated she has tried flushing her eye and warm compress. Advised nothing OTC will \"cure\" pink eye but can help decrease the symptoms she is experiencing. Advised of warm compress which patient is already doing and lubricating eye drops. [facility] Neo VÁSQUEZ  "

## 2019-07-10 ENCOUNTER — RECORDS - HEALTHEAST (OUTPATIENT)
Dept: ADMINISTRATIVE | Facility: OTHER | Age: 44
End: 2019-07-10

## 2019-07-10 NOTE — TELEPHONE ENCOUNTER
Elisa from [facility]  Office requesting for imaging again, informed her I spoke to Teresa on 6.26.19 and informed her we don't have the imaging that she requesting for.  She specifically wanted xray done on rt ankle and foot only.  Elisa informed now as well and she said she will fax me a form to note that we don't have those images for her record.  Ref#[id number]  Phone: [phone].  7.10.19 3:54pm

## 2019-07-11 NOTE — TELEPHONE ENCOUNTER
7.11.19 1:43pm form/manolo received and checked off that we don't have the images they requested and faxed back to [facility] [phone].  Forms to be scanned in [facility] Rec.  BETTYE Escobar

## 2019-07-17 ENCOUNTER — OFFICE VISIT (OUTPATIENT)
Dept: FAMILY MEDICINE | Facility: CLINIC | Age: 44
End: 2019-07-17
Payer: COMMERCIAL

## 2019-07-17 VITALS
SYSTOLIC BLOOD PRESSURE: 108 MMHG | DIASTOLIC BLOOD PRESSURE: 79 MMHG | RESPIRATION RATE: 16 BRPM | HEART RATE: 111 BPM | OXYGEN SATURATION: 100 % | TEMPERATURE: 97.6 F

## 2019-07-17 DIAGNOSIS — Z79.899 CONTROLLED SUBSTANCE AGREEMENT SIGNED: Primary | ICD-10-CM

## 2019-07-17 DIAGNOSIS — Z98.890 S/P SURGICAL MANIPULATION OF ANKLE JOINT: ICD-10-CM

## 2019-07-17 DIAGNOSIS — J45.40 MODERATE PERSISTENT ASTHMA WITHOUT COMPLICATION: ICD-10-CM

## 2019-07-17 DIAGNOSIS — R09.81 CONGESTION OF PARANASAL SINUS: ICD-10-CM

## 2019-07-17 RX ORDER — FLUTICASONE PROPIONATE 50 MCG
2 SPRAY, SUSPENSION (ML) NASAL DAILY
Qty: 9.9 ML | Refills: 11 | Status: SHIPPED | OUTPATIENT
Start: 2019-07-17 | End: 2020-10-09

## 2019-07-17 RX ORDER — MONTELUKAST SODIUM 10 MG/1
10 TABLET ORAL AT BEDTIME
Qty: 30 TABLET | Refills: 11 | Status: SHIPPED | OUTPATIENT
Start: 2019-07-17 | End: 2020-02-13

## 2019-07-17 RX ORDER — OXYCODONE HYDROCHLORIDE 5 MG/1
5 TABLET ORAL 2 TIMES DAILY PRN
Qty: 60 TABLET | Refills: 0 | Status: SHIPPED | OUTPATIENT
Start: 2019-07-17 | End: 2019-10-09

## 2019-07-17 NOTE — PATIENT INSTRUCTIONS
Patient Education     Coping with Symptoms of Menopause  What symptoms of menopause may occur with my treatment?  Chemotherapy drugs or medicines that block hormones may bring on menopause.  These changes may include:    Hot flashes    Vaginal dryness    Trouble falling asleep (insomnia)    Headache    Depression.  How are hot flashes treated?  Your doctor may suggest medicine to control the hot flashes. Vitamins E, B6 or C may also help. Talk to your doctor about how much to take.  Some herbs or foods may help: primrose oil, garlic, chamomile, ginseng root, royal jelly or soy.  What else can I do to cope with hot flashes?    Wear clothes made of natural fabric such as cotton.    Dress in layers. You can remove them when you feel too warm.    Avoid hot drinks (coffee or tea) and spicy foods.    Keep the room temperature low if you can.    Control your stress. Exercise and relaxation techniques may help.    Keep track of when and how often hot flashes occur. Note what is happening right before a hot flash. This may give you some control over future hot flashes.  How is vaginal dryness treated?    You can try drugstore products such as Replens, Gyne-Moistrin or Lubrin. They last for about three days.    Use water-based lubricants during sex. These include Astroglide and K-Y Jelly. Do not use Vaseline or petroleum jelly because they are not good for vaginal tissues.    Use low-dose estrogen cream in the vagina. Your doctor must prescribe this medicine.  How can I cope if I have trouble sleeping?    Get in bed when you are ready to go to sleep. Do not watch TV or read in bed.    Follow a sleeping routine: Go to bed and get up at the same times. Get up on time even if you did not sleep well.    If you do not fall asleep within 30 minutes, get up.    Get regular exercise. Do not exercise right before bedtime.    Avoid alcohol. It may disrupt your sleep.    Try natural remedies just before bedtime such as warm milk,  chamomile tea or verbena tea.  How can I treat headache?  Ask your doctor if it is safe to take aspirin, Tylenol (acetaminophen) or Advil (ibuprofen). They may cause side effects when mixed with chemotherapy.  How can I cope with depression?  Mild depression    Start an exercise program. Exercise with a friend. Any activity is better than none. Walk to the mailbox, to see your neighbors or in the mall.    Share your feelings with family and friends.    Don't give in to negative feelings.  Severe depression  If your depression lasts longer than two weeks, talk to your care team. They may suggest counseling or medicine.  Comments:  __________________________________________  __________________________________________  __________________________________________  __________________________________________  __________________________________________  __________________________________________  __________________________________________  __________________________________________  For informational purposes only. Not to replace the advice of your health care provider.  Copyright   2010 [employer]. All rights reserved. [employer] 448617 - 12/15.  For informational purposes only. Not to replace the advice of your health care provider.  Copyright   2018 [facility]. All rights reserved.

## 2019-07-17 NOTE — PROGRESS NOTES
"Pt is a 44 year old female last seen on 6/24/19 here today for:     1) R ankle pain   Was to see her ankle surgeon on 7/10/19  Still out of work -> has [employer] in 2 days for worker's comp  Has received paperwork from state saying it is a permanent partial disability - previous job was in [email or web address]  Doing PT - YES  Taking gabapentin - makes her tired  Taking Ibuprofen   Ran out of oxycodone 2 days ago     2) Chest cold again -   No fevers  +\"nasty cough\" and short of breath  +coughing so hard she has vomited  8 yr old has asthma flare  She is using Advair BID  Has not been taking Singulair    3) Mood - is depressed because she doesn't want to work behind a desk and is bummed because she doesn't want to stop her job. 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   Is seeing a pain psychiatrist on 7/22/19  Is on Lexapro 30mg daily     Past Medical History:   Diagnosis Date     Controlled substance agreement signed 6/30/2015    Overview:  Patient has chronic pain and is seen at [facility] for this.  Has controlled substance agreement with them.  On Vicodin, Valium, Klonopin prescribed only from there.        NO ACTIVE PROBLEMS       Past Surgical History:   Procedure Laterality Date     NO HISTORY OF SURGERY        Current Outpatient Medications   Medication Sig Dispense Refill     albuterol (PROAIR HFA/PROVENTIL HFA/VENTOLIN HFA) 108 (90 Base) MCG/ACT inhaler Inhale 2 puffs into the lungs every 4 hours as needed for shortness of breath / dyspnea or wheezing 8.5 g 3     ALPRAZolam (XANAX) 1 MG tablet Take 1 tablet (1 mg) by mouth 3 times daily as needed for anxiety 90 tablet 3     capsaicin (ZOSTRIX) 0.075 % cream Apply topically 3 times daily 60 g 1     escitalopram (LEXAPRO) 10 MG tablet Take 1 tablet (10 mg) by mouth daily , in addition to 20 mg tablet for total of 30 mg daily 90 tablet 3     escitalopram (LEXAPRO) 20 MG tablet Take 1 tablet (20 mg) by " "mouth daily , in addition to 10 mg tablet for total of 30 mg daily 90 tablet 3     fluticasone (FLONASE) 50 MCG/ACT nasal spray Spray 1-2 sprays into both nostrils daily 1 Bottle 11     fluticasone-salmeterol (ADVAIR DISKUS) 500-50 MCG/DOSE inhaler Inhale 1 puff into the lungs 2 times daily 60 Inhaler 1     gabapentin (NEURONTIN) 300 MG capsule Take 3 capsules (900 mg) by mouth 3 times daily 270 capsule 0     guaiFENesin-codeine (GUAIFENESIN AC) 100-10 MG/5ML syrup Take 10 mLs by mouth 2 times daily as needed for congestion or cough 236 mL 0     metroNIDAZOLE (METROGEL) 0.75 % topical gel Apply topically 2 times daily 45 g 11     montelukast (SINGULAIR) 10 MG tablet Take 1 tablet (10 mg) by mouth At Bedtime 60 tablet 1     ranitidine (ZANTAC) 300 MG tablet Take 1 tablet (300 mg) by mouth At Bedtime 60 tablet 1     zolpidem (AMBIEN) 10 MG tablet Take 1 tablet (10 mg) by mouth nightly as needed for sleep (Patient not taking: Reported on 5/29/2019) 30 tablet 0      Allergies   Allergen Reactions     Lidocaine Other (See Comments)     \"my jaw stopped moving\"     Penicillins Hives, Rash and Shortness Of Breath     Lidocaine-Epinephrine [Epinephrine-Lidocaine-Na Metabisulfite] Other (See Comments)     Severe jaw cramping, double vision        ROS:   Gen- no weight change, no fevers/chills   Head/ Eyes- no blurred vision, no headaches   ENT- + cough, + congestion, + URI symptoms   Cardiac - no palpitations, no chest pain   Respiratory - + shortness of breath , + wheezing   GI - no nausea, no vomiting, no diarrhea, no constipation   Neuro - no numbness, no tingling   Remainder of ROS negative.     Exam:   /79   Pulse 111   Temp 97.6  F (36.4  C) (Oral)   Resp 16   SpO2 100%    Alert and oriented x 3; No acute distress   HEENT: oropharynx clear   Neck: no masses, no lymphadenopathy   Resp: clear to auscultation bilaterally, no wheezing/ronchi   Neuro: no focal deficits   Derm: no rashes       (Z79.899) Controlled " substance agreement signed  (primary encounter diagnosis)  Comment: pt left before leaving a urine sample or picking up oxycodone script; I left script at  w/ instructions only to dispense after pt leaves urine  Plan: Rapid Urine Drug Screen (UMP ), oxyCODONE         (ROXICODONE) 5 MG tablet           (J45.40) Moderate persistent asthma without complication  Comment: reviewed regimen; she has been off her flonase and singulair which may be contributing to her recurrent sinus and cold symptoms  Plan: fluticasone-salmeterol (ADVAIR DISKUS) 500-50         MCG/DOSE inhaler, montelukast (SINGULAIR) 10 MG        tablet            (R09.81) Congestion of paranasal sinus  Comment: see above  Plan: fluticasone (FLONASE) 50 MCG/ACT nasal spray           (Z98.890) S/P surgical manipulation of ankle joint  Comment: see above; pt ran out stating her son was sick and she needed to leave  Plan: oxyCODONE (ROXICODONE) 5 MG tablet            Robbie Bailon MD  July 17, 2019  5:07 PM

## 2019-07-18 ENCOUNTER — TELEPHONE (OUTPATIENT)
Dept: ORTHOPEDICS | Facility: CLINIC | Age: 44
End: 2019-07-18

## 2019-07-18 NOTE — TELEPHONE ENCOUNTER
LVM for pt to call back. Pt is due for UDS and script. Please have pt come in for her UDS first, then she can receive her script.  MARISELA Mccarty

## 2019-08-20 ENCOUNTER — TELEPHONE (OUTPATIENT)
Dept: ORTHOPEDICS | Facility: CLINIC | Age: 44
End: 2019-08-20

## 2019-08-20 NOTE — TELEPHONE ENCOUNTER
Spoke to pt in regards to f/u ACT appt. Pt is restricted to same day or next day appt. Pt understands and will call to schedule appt.  MARISELA Mccarty

## 2019-09-03 NOTE — PROGRESS NOTES
"Pt is a 44 year old female last seen on 7/17/19 here today for:     1) R ankle pain - improved; saw her surgeon yesterday and noted continued swelling -> stated it will take 4-6 months to improve  [place] the fair [place]  Has been non-compliant w/ PT; \"my anxiety was too bad\"  Has been off pain medication   Taking 12-16 200 mg ibuprofen/day    2) Mood - was seen by psychologist   Diagnoses: depression, anxiety, panic disorder, agorophobia  +hx of alcohol abuse in past \"because of anxiety\"  Pt states she had no EtOH in a couple months  Prior to that, was daily for 6 weeks -> was drinking 1-6 beers/day   Insomnia/ anxiety - melatonin helps sleep for 3-4 hrs  Not taking Lexapro at all   Feels like she needs 3 xanax/day    3) Asthma - states that asthma has been fine   Used albuterol only 1-2x/month  Has been completely off meds while she was drinking  But has not resumed and has not needed  No nighttime symptoms due to cough or shortness of breath    4) menstrual bleeding x past 10 days; no cycles x past 2 months but now is heavier and had large clots.  None since yesterday       Past Medical History:   Diagnosis Date     Controlled substance agreement signed 6/30/2015    Overview:  Patient has chronic pain and is seen at [facility] for this.  Has controlled substance agreement with them.  On Vicodin, Valium, Klonopin prescribed only from there.        NO ACTIVE PROBLEMS       Past Surgical History:   Procedure Laterality Date     NO HISTORY OF SURGERY        Current Outpatient Medications   Medication Sig Dispense Refill     acetaminophen-codeine (TYLENOL #3) 300-30 MG tablet Take 1 tablet by mouth nightly as needed for severe pain 30 tablet 1     ALPRAZolam (XANAX) 1 MG tablet Take 1 tablet (1 mg) by mouth 3 times daily as needed for anxiety 90 tablet 1     escitalopram (LEXAPRO) 20 MG tablet Take 1 tablet (20 mg) by mouth daily 30 tablet 1     albuterol (PROAIR HFA/PROVENTIL HFA/VENTOLIN HFA) 108 (90 " "Base) MCG/ACT inhaler Inhale 2 puffs into the lungs every 4 hours as needed for shortness of breath / dyspnea or wheezing 8.5 g 3     capsaicin (ZOSTRIX) 0.075 % cream Apply topically 3 times daily 60 g 1     fluticasone (FLONASE) 50 MCG/ACT nasal spray Spray 2 sprays into both nostrils daily 9.9 mL 11     gabapentin (NEURONTIN) 300 MG capsule Take 3 capsules (900 mg) by mouth 3 times daily 270 capsule 0     metroNIDAZOLE (METROGEL) 0.75 % topical gel Apply topically 2 times daily 45 g 11     montelukast (SINGULAIR) 10 MG tablet Take 1 tablet (10 mg) by mouth At Bedtime 30 tablet 11     oxyCODONE (ROXICODONE) 5 MG tablet Take 1 tablet (5 mg) by mouth 2 times daily as needed for severe pain 60 tablet 0     ranitidine (ZANTAC) 300 MG tablet Take 1 tablet (300 mg) by mouth At Bedtime 60 tablet 1     zolpidem (AMBIEN) 10 MG tablet Take 1 tablet (10 mg) by mouth nightly as needed for sleep (Patient not taking: Reported on 5/29/2019) 30 tablet 0      Allergies   Allergen Reactions     Lidocaine Other (See Comments)     \"my jaw stopped moving\"     Penicillins Hives, Rash and Shortness Of Breath     Lidocaine-Epinephrine [Epinephrine-Lidocaine-Na Metabisulfite] Other (See Comments)     Severe jaw cramping, double vision        ROS:   Gen- no weight change, no fevers/chills   Head/ Eyes- no blurred vision, no headaches   ENT- no cough, no congestion, no URI symptoms   Cardiac - no palpitations, no chest pain   Respiratory - no shortness of breath , no wheezing   GI - no nausea, no vomiting, no diarrhea, no constipation   Urinary - no dysuria, no polyuria   Neuro - no numbness, no tingling   Remainder of ROS negative.     Exam:   BP (!) 129/94   Pulse 76   Temp 97.7  F (36.5  C) (Oral)   Resp 20   SpO2 99%    Alert and oriented x 3; No acute distress   Neuro: no focal deficits   Derm: no rashes       (F43.22) Adjustment disorder with anxious mood  Comment: recommended she restart her lexapro; will titrate up if needed at " next visit  Plan: escitalopram (LEXAPRO) 20 MG tablet            (F41.9) Anxiety  Comment: refilled for now; long-term plan is to start to wean her from benzos once stable on lexapro. Pt amenable w/ plan  Plan: ALPRAZolam (XANAX) 1 MG tablet            (Z98.890) S/P surgical manipulation of ankle joint  Comment: off oxycodone now; will only use T#3 at night; plan to wean at next visit  Plan: acetaminophen-codeine (TYLENOL #3) 300-30 MG tablet            (Z79.899) Controlled substance agreement signed  Comment: see above  Plan: acetaminophen-codeine (TYLENOL #3) 300-30 MG tablet          Robbie Bailon MD  September 4, 2019  11:35 AM

## 2019-09-04 ENCOUNTER — OFFICE VISIT (OUTPATIENT)
Dept: FAMILY MEDICINE | Facility: CLINIC | Age: 44
End: 2019-09-04
Payer: COMMERCIAL

## 2019-09-04 VITALS
SYSTOLIC BLOOD PRESSURE: 129 MMHG | TEMPERATURE: 97.7 F | RESPIRATION RATE: 20 BRPM | DIASTOLIC BLOOD PRESSURE: 94 MMHG | HEART RATE: 76 BPM | OXYGEN SATURATION: 99 %

## 2019-09-04 DIAGNOSIS — Z98.890 S/P SURGICAL MANIPULATION OF ANKLE JOINT: ICD-10-CM

## 2019-09-04 DIAGNOSIS — F41.9 ANXIETY: ICD-10-CM

## 2019-09-04 DIAGNOSIS — Z79.899 CONTROLLED SUBSTANCE AGREEMENT SIGNED: ICD-10-CM

## 2019-09-04 DIAGNOSIS — F43.22 ADJUSTMENT DISORDER WITH ANXIOUS MOOD: ICD-10-CM

## 2019-09-04 RX ORDER — ALPRAZOLAM 1 MG
1 TABLET ORAL 3 TIMES DAILY PRN
Qty: 90 TABLET | Refills: 1 | Status: SHIPPED | OUTPATIENT
Start: 2019-09-04 | End: 2019-10-23

## 2019-09-04 RX ORDER — ESCITALOPRAM OXALATE 20 MG/1
20 TABLET ORAL DAILY
Qty: 30 TABLET | Refills: 1 | Status: SHIPPED | OUTPATIENT
Start: 2019-09-04 | End: 2019-11-27

## 2019-09-04 ASSESSMENT — ANXIETY QUESTIONNAIRES
GAD7 TOTAL SCORE: 16
3. WORRYING TOO MUCH ABOUT DIFFERENT THINGS: MORE THAN HALF THE DAYS
5. BEING SO RESTLESS THAT IT IS HARD TO SIT STILL: NEARLY EVERY DAY
7. FEELING AFRAID AS IF SOMETHING AWFUL MIGHT HAPPEN: NOT AT ALL
6. BECOMING EASILY ANNOYED OR IRRITABLE: NEARLY EVERY DAY
IF YOU CHECKED OFF ANY PROBLEMS ON THIS QUESTIONNAIRE, HOW DIFFICULT HAVE THESE PROBLEMS MADE IT FOR YOU TO DO YOUR WORK, TAKE CARE OF THINGS AT HOME, OR GET ALONG WITH OTHER PEOPLE: SOMEWHAT DIFFICULT
1. FEELING NERVOUS, ANXIOUS, OR ON EDGE: NEARLY EVERY DAY
2. NOT BEING ABLE TO STOP OR CONTROL WORRYING: MORE THAN HALF THE DAYS

## 2019-09-04 ASSESSMENT — PATIENT HEALTH QUESTIONNAIRE - PHQ9: 5. POOR APPETITE OR OVEREATING: NEARLY EVERY DAY

## 2019-09-05 ASSESSMENT — ANXIETY QUESTIONNAIRES: GAD7 TOTAL SCORE: 16

## 2019-10-04 ENCOUNTER — OFFICE VISIT (OUTPATIENT)
Dept: FAMILY MEDICINE | Facility: CLINIC | Age: 44
End: 2019-10-04
Payer: COMMERCIAL

## 2019-10-04 VITALS
HEART RATE: 89 BPM | TEMPERATURE: 97.7 F | DIASTOLIC BLOOD PRESSURE: 77 MMHG | RESPIRATION RATE: 24 BRPM | OXYGEN SATURATION: 97 % | SYSTOLIC BLOOD PRESSURE: 115 MMHG

## 2019-10-04 DIAGNOSIS — J45.21 MILD INTERMITTENT ASTHMA WITH EXACERBATION: Primary | ICD-10-CM

## 2019-10-04 RX ORDER — CODEINE PHOSPHATE AND GUAIFENESIN 10; 100 MG/5ML; MG/5ML
1-2 SOLUTION ORAL EVERY 4 HOURS PRN
Qty: 180 ML | Refills: 0 | Status: SHIPPED | OUTPATIENT
Start: 2019-10-04 | End: 2019-10-21

## 2019-10-04 SDOH — HEALTH STABILITY: MENTAL HEALTH: HOW OFTEN DO YOU HAVE A DRINK CONTAINING ALCOHOL?: NOT ASKED

## 2019-10-04 NOTE — PROGRESS NOTES
HPI:       Darlene Hudson is a 44 year old  female with a significant past medical history of asthma who presents for the new concern(s) of    1) URI symptoms  -Onset: 3 days ago  -Story: dry cough, chest discomfort and dyspnea were first symptoms.  She then developed headaches, chest pain (bilateral, soreness, coughing and deep breaths activates it), sore throat, body aches, poor appetite.  A few episodes of post-tussive vomiting.   She developed fever and chills yesterday.  -Symptoms have continued to persist  -No sick contacts  -No flu shot yet  -She actively smokes cigarettes  -hard to sleep because of the coughing           PMHX:     Patient Active Problem List   Diagnosis     Abnormal cytology finding     Nondependent alcohol abuse, episodic drinking behavior     Anxiety state     Controlled substance agreement signed     Low back pain     Chronic sinusitis     Cocaine abuse (H)     Major depressive disorder, recurrent episode, moderate (H)     Asthma     Tobacco use disorder     Noninflammatory disorder of vagina     Pap smear for cervical cancer screening     Abdominal pain     Abnormal cervical Papanicolaou smear     Panic disorder with agoraphobia     Atopic rhinitis     Chronic low back pain     Depression     Moderate persistent asthma     Other long term (current) drug therapy     Tobacco use     Acute pericardial effusion     Anxiety       Past Surgical History:   Procedure Laterality Date     NO HISTORY OF SURGERY         Current Outpatient Medications   Medication Sig Dispense Refill     acetaminophen-codeine (TYLENOL #3) 300-30 MG tablet Take 1 tablet by mouth nightly as needed for severe pain 30 tablet 1     albuterol (PROAIR HFA/PROVENTIL HFA/VENTOLIN HFA) 108 (90 Base) MCG/ACT inhaler Inhale 2 puffs into the lungs every 4 hours as needed for shortness of breath / dyspnea or wheezing 8.5 g 3     ALPRAZolam (XANAX) 1 MG tablet Take 1 tablet (1 mg) by mouth 3 times daily as needed for  "anxiety 90 tablet 1     capsaicin (ZOSTRIX) 0.075 % cream Apply topically 3 times daily 60 g 1     escitalopram (LEXAPRO) 20 MG tablet Take 1 tablet (20 mg) by mouth daily 30 tablet 1     fluticasone (FLONASE) 50 MCG/ACT nasal spray Spray 2 sprays into both nostrils daily 9.9 mL 11     gabapentin (NEURONTIN) 300 MG capsule Take 3 capsules (900 mg) by mouth 3 times daily 270 capsule 0     metroNIDAZOLE (METROGEL) 0.75 % topical gel Apply topically 2 times daily 45 g 11     montelukast (SINGULAIR) 10 MG tablet Take 1 tablet (10 mg) by mouth At Bedtime 30 tablet 11     oxyCODONE (ROXICODONE) 5 MG tablet Take 1 tablet (5 mg) by mouth 2 times daily as needed for severe pain 60 tablet 0     ranitidine (ZANTAC) 300 MG tablet Take 1 tablet (300 mg) by mouth At Bedtime 60 tablet 1     zolpidem (AMBIEN) 10 MG tablet Take 1 tablet (10 mg) by mouth nightly as needed for sleep (Patient not taking: Reported on 5/29/2019) 30 tablet 0          Allergies   Allergen Reactions     Lidocaine Other (See Comments)     \"my jaw stopped moving\"     Penicillins Hives, Rash and Shortness Of Breath     Lidocaine-Epinephrine [Epinephrine-Lidocaine-Na Metabisulfite] Other (See Comments)     Severe jaw cramping, double vision       Social History     Socioeconomic History     Marital status:      Spouse name: Not on file     Number of children: Not on file     Years of education: Not on file     Highest education level: Not on file   Occupational History     Not on file   Social Needs     Financial resource strain: Not on file     Food insecurity:     Worry: Not on file     Inability: Not on file     Transportation needs:     Medical: Not on file     Non-medical: Not on file   Tobacco Use     Smoking status: Current Every Day Smoker     Packs/day: 0.25     Types: Cigarettes     Smokeless tobacco: Never Used   Substance and Sexual Activity     Alcohol use: Not Currently     Alcohol/week: 0.0 standard drinks     Drug use: Yes     Types: " Codeine     Sexual activity: Not on file   Lifestyle     Physical activity:     Days per week: Not on file     Minutes per session: Not on file     Stress: Not on file   Relationships     Social connections:     Talks on phone: Not on file     Gets together: Not on file     Attends Restorationism service: Not on file     Active member of club or organization: Not on file     Attends meetings of clubs or organizations: Not on file     Relationship status: Not on file     Intimate partner violence:     Fear of current or ex partner: Not on file     Emotionally abused: Not on file     Physically abused: Not on file     Forced sexual activity: Not on file   Other Topics Concern     Parent/sibling w/ CABG, MI or angioplasty before 65F 55M? Not Asked   Social History Narrative     Not on file              Review of Systems:     5-point ROS reviewed and negative unless otherwise noted in HPI          Physical Exam:     Vitals:    10/04/19 1448   BP: 115/77   Pulse: 89   Resp: 24   Temp: 97.7  F (36.5  C)   TempSrc: Oral   SpO2: 97%     There is no height or weight on file to calculate BMI.    GENERAL: healthy, alert, sick appearing, but not toxic  EYES: PERRL, conjunctivae and sclerae normal and EOMI  HENT: mouth without ulcers or lesions, mildly swollen turbinates, oral mucous membranes moist  NECK: no asymmetry, masses, or scars, thyroid normal to palpation and trachea midline and normal to palpation  RESP: normal RR, no accessory muscle use, mildly diminished breath sounds, no wheezes/crackles, speaks full sentences  CV: regular rates and rhythm, normal S1 S2, no S3 or S4, no murmur, click or rub and radial pulses 2+  MS: gait normal, no varicosities, normal muscle tone and no peripheral edema  SKIN: no suspicious lesions or rashes      Assessment and Plan     (J45.21) Mild intermittent asthma with exacerbation  (primary encounter diagnosis)  Comment: 4-day history of URI symptoms with dyspnea, chest congestion, pleuritic  chest pain, and cough.  VS WNL.  Exam as above.  This is likely a viral URI exacerbating her asthma.  Cough is most problematic item for her, and is requesting codeine cough syrup.   shows no suspicious activity, but patient does take Tylenol#3 and Xanax.  Instructed patient to not take either of these medications with cough syrup due to concern for overdose.  Patient agreed to this.  Plan:   -Continue albuterol Q4H PRN dyspnea  -refilled fluticasone-salmeterol (ADVAIR) 100-50 MCG/DOSE inhaler -Ordered guaiFENesin-codeine (ROBITUSSIN AC) 100-10 MG/5ML solution, at bedtime PRN coughing (do not take with Tylenol#3 or Xanax)      Options for treatment and follow-up care were reviewed with the patient and/or guardian. Darlene Hudson and/or guardian engaged in the decision making process and verbalized understanding of the options discussed and agreed with the final plan.      Misha Monterroso MD    Precepted today with: Swathi Ward MD

## 2019-10-04 NOTE — NURSING NOTE
Chief Complaint   Patient presents with     Sick     coughing, chest hurts, throat hurts, headaches.      Medication Reconciliation     Completed.        /77   Pulse 89   Temp 97.7  F (36.5  C) (Oral)   Resp 24   SpO2 97%

## 2019-10-04 NOTE — PATIENT INSTRUCTIONS
My Asthma Action Plan  Name: Darlene Hudson  YOB: 1975  Date: 10/4/2019   My doctor: Robbie Bailon   My clinic:   [facility] [address] [place] [address]  [phone]    My Asthma Severity: Intermittent / Exercise Induced Avoid your asthma triggers: upper respiratory infections, pollens and humidity      GREEN ZONE   Good Control    I feel good    No cough or wheeze    Can work, sleep and play without asthma symptoms       Take your asthma control medicine every day.  Take the medications listed below daily.    Flovent   mcg 2 puffs twice a day    1. If exercise triggers your asthma, take your rescue medication (2 puffs of albuterol, Ventolin/Pro-Air) 15 minutes before exercise or sports, and during exercise if you have asthma symptoms.  2. Spacer to use with inhaler: If you have a spacer, make sure to use it with your inhaler.              YELLOW ZONE Getting Worse  I have ANY of these:    I do not feel good    Cough or wheeze    Chest feels tight    Wake up at night   1. Keep taking your Green Zone medications.  2. Start taking your rescue medicine (1-2 puffs of albuterol - Ventolin/Pro-Air) every 4-6 hours as needed.  3. If symptoms are not controlled with above, can take 2 puffs every 20 minutes for up to 1 hour, then continue every 4 hours if needed.   4. If you do not return to the Green Zone in 12-24 hours or you get worse, call the clinic.         RED ZONE Medical Alert - Get Help  I have ANY of these:    I feel awful    Medicine is not helping    Breathing getting harder    Trouble walking or talking    Nose opens wide to breathe       1. Take your rescue medicine NOW (6-8 puffs of albuterol - Ventolin/Pro-Air) for every 20 minutes for up to 1 hour.  2. Call your doctor NOW.  3. If you are still in the Red Zone after 20 minutes and you have not reached your doctor:    Take your rescue medicine again (6-8 puffs of albuterol - Ventolin/Pro-Air) and    Call 361  or go to the emergency room right away    See your regular doctor within 1 weeks of an Emergency Room or Urgent Care visit for follow-up treatment.        This Asthma Action Plan provides authorization for the administration of medication described in the AAP.  YES  This child has the knowledge and skills to self-administer rescue medication at school or  with approval of the school nurse.  N/A    Electronically signed by: Misha Monterroso MD    Annual Reminders:  Meet with Asthma Educator,  Flu Shot in the Fall, Pneumonia Shot  Pharmacy:    [id number] - [place], [place] - [address]  [facility] - [place], [place] - [address]  [id number] - [place], [place] - [address] [facility] [address]

## 2019-10-07 ASSESSMENT — ASTHMA QUESTIONNAIRES
QUESTION_1 LAST FOUR WEEKS HOW MUCH OF THE TIME DID YOUR ASTHMA KEEP YOU FROM GETTING AS MUCH DONE AT WORK, SCHOOL OR AT HOME: SOME OF THE TIME
EMERGENCY_ROOM_LAST_YEAR_TOTAL: TWO
ACUTE_EXACERBATION_TODAY: NO
QUESTION_4 LAST FOUR WEEKS HOW OFTEN HAVE YOU USED YOUR RESCUE INHALER OR NEBULIZER MEDICATION (SUCH AS ALBUTEROL): ONE OR TWO TIMES PER DAY
QUESTION_3 LAST FOUR WEEKS HOW OFTEN DID YOUR ASTHMA SYMPTOMS (WHEEZING, COUGHING, SHORTNESS OF BREATH, CHEST TIGHTNESS OR PAIN) WAKE YOU UP AT NIGHT OR EARLIER THAN USUAL IN THE MORNING: TWO OR THREE NIGHTS A WEEK
ACT_TOTALSCORE: 13
QUESTION_2 LAST FOUR WEEKS HOW OFTEN HAVE YOU HAD SHORTNESS OF BREATH: THREE TO SIX TIMES A WEEK
QUESTION_5 LAST FOUR WEEKS HOW WOULD YOU RATE YOUR ASTHMA CONTROL: SOMEWHAT CONTROLLED

## 2019-10-08 ENCOUNTER — OFFICE VISIT (OUTPATIENT)
Dept: FAMILY MEDICINE | Facility: CLINIC | Age: 44
End: 2019-10-08
Payer: COMMERCIAL

## 2019-10-08 VITALS
HEIGHT: 69 IN | HEART RATE: 89 BPM | TEMPERATURE: 98.4 F | SYSTOLIC BLOOD PRESSURE: 124 MMHG | OXYGEN SATURATION: 97 % | DIASTOLIC BLOOD PRESSURE: 81 MMHG | BODY MASS INDEX: 37.72 KG/M2 | RESPIRATION RATE: 20 BRPM

## 2019-10-08 DIAGNOSIS — J30.2 SEASONAL ALLERGIC RHINITIS, UNSPECIFIED TRIGGER: Primary | ICD-10-CM

## 2019-10-08 RX ORDER — CETIRIZINE HYDROCHLORIDE 10 MG/1
10 TABLET ORAL DAILY
Qty: 20 TABLET | Refills: 0 | Status: SHIPPED | OUTPATIENT
Start: 2019-10-08 | End: 2019-12-18

## 2019-10-08 RX ORDER — GUAIFENESIN/DEXTROMETHORPHAN 100-10MG/5
5 SYRUP ORAL EVERY 4 HOURS PRN
Qty: 118 ML | Refills: 0 | Status: SHIPPED | OUTPATIENT
Start: 2019-10-08 | End: 2019-10-23

## 2019-10-08 ASSESSMENT — ASTHMA QUESTIONNAIRES: ACT_TOTALSCORE: 13

## 2019-10-08 NOTE — PROGRESS NOTES
HPI:       Darlene Hudson is a 44 year old female with PMH of polysubstance abuse, asthma, depression, anxiety was last seen on 10/04/2019 for mild intermittent asthma with exacerbation most likely 2/2 URI symptoms. Darlene follows-up today for similar symptoms of increased nasal congestions, watery eyes, and sneezing, but denies chest pain.    - Darlene experiences the following symptoms which have persisted: congestion, coughing, rhinorrhea, sneezing, watery eyes and wheezing  -Reports a history of allergic type symptoms of moderate severity and long standing duration during the spring and summer months; rarely in the fall.  -Admits to using her inhalers more often: rescue 3-4 x/day; Advair 2x/day (restarted 10/04)  -Robitussin AC has helped the most, making her rest and more relaxed; but she ran out of it last night  -Singular and Flonase have not been successful in controlling the symptoms  -Denies fever, sore throat, shortness of breath, palpitations, lower limb swelling, and chest pain  -Admits to active tobacco use despite knowing that its probably not helping her symptoms  -The patient has had allergy evaluation in the past. Allergy shots when younger    Medication Reconciliation completed           Review of Systems:     C: NEGATIVE for fatigue, unexpected change in weight  E: NEGATIVE for acute vision problems or changes  R: NEGATIVE for significant cough or shortness of breath  CV: NEGATIVE for chest pain, palpitations or new or worsening peripheral edema  P: NEGATIVE for changes in mood or affect              PMHX:     Patient Active Problem List   Diagnosis     Abnormal cytology finding     Nondependent alcohol abuse, episodic drinking behavior     Anxiety state     Controlled substance agreement signed     Low back pain     Chronic sinusitis     Cocaine abuse (H)     Major depressive disorder, recurrent episode, moderate (H)     Asthma     Tobacco use disorder     Noninflammatory disorder  of vagina     Pap smear for cervical cancer screening     Abdominal pain     Abnormal cervical Papanicolaou smear     Panic disorder with agoraphobia     Atopic rhinitis     Chronic low back pain     Depression     Moderate persistent asthma     Other long term (current) drug therapy     Tobacco use     Acute pericardial effusion     Anxiety     Chronic infectious pericarditis       Current Outpatient Medications   Medication Sig Dispense Refill     acetaminophen-codeine (TYLENOL #3) 300-30 MG tablet Take 1 tablet by mouth nightly as needed for severe pain 30 tablet 1     albuterol (PROAIR HFA/PROVENTIL HFA/VENTOLIN HFA) 108 (90 Base) MCG/ACT inhaler Inhale 2 puffs into the lungs every 4 hours as needed for shortness of breath / dyspnea or wheezing 8.5 g 3     ALPRAZolam (XANAX) 1 MG tablet Take 1 tablet (1 mg) by mouth 3 times daily as needed for anxiety 90 tablet 1     cetirizine (ZYRTEC) 10 MG tablet Take 1 tablet (10 mg) by mouth daily 20 tablet 0     escitalopram (LEXAPRO) 20 MG tablet Take 1 tablet (20 mg) by mouth daily 30 tablet 1     fluticasone (FLONASE) 50 MCG/ACT nasal spray Spray 2 sprays into both nostrils daily 9.9 mL 11     fluticasone-salmeterol (ADVAIR) 100-50 MCG/DOSE inhaler Inhale 1 puff into the lungs every 12 hours 1 Inhaler 2     gabapentin (NEURONTIN) 300 MG capsule Take 3 capsules (900 mg) by mouth 3 times daily 270 capsule 0     guaiFENesin-codeine (ROBITUSSIN AC) 100-10 MG/5ML solution Take 5-10 mLs by mouth every 4 hours as needed for cough Do not take with tylenol #3 180 mL 0     guaiFENesin-dextromethorphan (ROBITUSSIN DM) 100-10 MG/5ML syrup Take 5 mLs by mouth every 4 hours as needed for cough 118 mL 0     metroNIDAZOLE (METROGEL) 0.75 % topical gel Apply topically 2 times daily 45 g 11     montelukast (SINGULAIR) 10 MG tablet Take 1 tablet (10 mg) by mouth At Bedtime 30 tablet 11     ranitidine (ZANTAC) 300 MG tablet Take 1 tablet (300 mg) by mouth At Bedtime 60 tablet 1      "gabapentin (NEURONTIN) 600 MG tablet TK 1 T PO BID  1              Allergies   Allergen Reactions     Lidocaine Other (See Comments)     \"my jaw stopped moving\"  Other reaction(s): Dystonia     Penicillins Hives, Rash and Shortness Of Breath     Epinephrine-Lidocaine-Na Metabisulfite Other (See Comments) and Muscle Pain (Myalgia)     Severe jaw cramping, double vision  Jaw locking       No results found for this or any previous visit (from the past 24 hour(s)).              Physical Exam:     Vitals:    10/08/19 1519   BP: 124/81   Pulse: 89   Resp: 20   Temp: 98.4  F (36.9  C)   SpO2: 97%   Height: 1.753 m (5' 9\")     Body mass index is 37.72 kg/m .    GENERAL APPEARANCE: healthy, alert and no distress,  HENT: ear canals and TM's normal and nose and mouth without ulcers or lesions  NECK: no adenopathy, no asymmetry, masses, or scars and thyroid normal to palpation  RESP: lungs clear to auscultation - no rales, rhonchi or wheezes  CV: regular rate and rhythm,  and no murmur, click,  rub or gallop  SKIN: no suspicious lesions or rashes  PSYCH: mood and affect normal/bright      Assessment and Plan     (J30.2) Seasonal allergic rhinitis, unspecified trigger  (primary encounter diagnosis)  Comment: HPI and PE is consistent with the above. Unlikely pneumonia in the absence of fever and absent auscultation findings of consolidation. Discussed the natural course of seasonal allergies and patient verbalized understanding. Questions were asked an answered.    Plan:   -guaiFENesin-dextromethorphan (ROBITUSSIN DM) 100-10 MG/5ML syrup   cetirizine (ZYRTEC) 10 MG tablet  -Encouraged tobacco cessation, as smoking may exacerbate her asthma and allergies  -Recommended that if symptoms worsen or she develops fever she should go to the nearest health facility to rule out bacterial process.    Options for treatment and follow-up care were reviewed with the patient and/or guardian. Pt and/or guardian engaged in the decision making " process and verbalized understanding of the options discussed and agreed with the final plan.    Precepted today with: MD Nikki Montejo MD, MPH (PGY 2)  [facility] Resident  Pager: [phone]

## 2019-10-08 NOTE — PROGRESS NOTES
Preceptor Attestation:   Patient seen, evaluated and discussed with the resident. I have verified the content of the note, which accurately reflects my assessment of the patient and the plan of care.  Supervising Physician:Raul Jauregui MD  [facility]

## 2019-10-09 PROBLEM — I31.9: Status: ACTIVE | Noted: 2019-02-21

## 2019-10-09 RX ORDER — GABAPENTIN 600 MG/1
TABLET ORAL
Refills: 1 | COMMUNITY
Start: 2019-10-03 | End: 2019-10-21

## 2019-10-11 NOTE — PROGRESS NOTES
Preceptor Attestation:  Patient's case reviewed and discussed with Misha Monterroso MD resident and I evaluated the patient. I agree with written assessment and plan of care.  Supervising Physician:  RAMON WILSON MD  [facility]

## 2019-10-14 ENCOUNTER — TELEPHONE (OUTPATIENT)
Dept: FAMILY MEDICINE | Facility: CLINIC | Age: 44
End: 2019-10-14

## 2019-10-14 NOTE — TELEPHONE ENCOUNTER
[facility] Medicine phone call message- general phone call:    Reason for call: Pt was admitted to [facility] on 10/11/19 was found to have kidney infection. She was informed by hospital that it started from a bladder infection which she had no symptoms from and stated why our clinic did not catch the infection 10/8/19. Pt forced discharged because she did not want to stay in hospital anymore. Pt also stated that her IV kept popping out while she was hospitalized. Pt would also like to know if any medication is needed for her condition. Notified that she would need a follow and has an appt sched with Dr. Bailon on Wednesday. Pt only wants to speak with Dr. Bailon and would like him to call her back. Pls call and advise.    Return call needed: Yes    OK to leave a message on voice mail? Yes    Primary language: English      needed? No    Call taken on October 14, 2019 at 10:09 AM by Landon Doll

## 2019-10-16 ENCOUNTER — TELEPHONE (OUTPATIENT)
Dept: FAMILY MEDICINE | Facility: CLINIC | Age: 44
End: 2019-10-16

## 2019-10-16 ENCOUNTER — TELEPHONE (OUTPATIENT)
Dept: ORTHOPEDICS | Facility: CLINIC | Age: 44
End: 2019-10-16

## 2019-10-16 ENCOUNTER — OFFICE VISIT (OUTPATIENT)
Dept: FAMILY MEDICINE | Facility: CLINIC | Age: 44
End: 2019-10-16
Payer: COMMERCIAL

## 2019-10-16 VITALS
DIASTOLIC BLOOD PRESSURE: 91 MMHG | RESPIRATION RATE: 20 BRPM | OXYGEN SATURATION: 96 % | TEMPERATURE: 97.8 F | HEART RATE: 66 BPM | SYSTOLIC BLOOD PRESSURE: 140 MMHG

## 2019-10-16 DIAGNOSIS — J45.998 POST VIRAL ASTHMA: ICD-10-CM

## 2019-10-16 DIAGNOSIS — N10 PYELONEPHRITIS, ACUTE: Primary | ICD-10-CM

## 2019-10-16 RX ORDER — PREDNISONE 50 MG/1
50 TABLET ORAL DAILY
Qty: 5 TABLET | Refills: 0 | Status: SHIPPED | OUTPATIENT
Start: 2019-10-16 | End: 2019-10-21

## 2019-10-16 RX ORDER — CEFPODOXIME PROXETIL 200 MG/1
200 TABLET, FILM COATED ORAL 2 TIMES DAILY
Qty: 22 TABLET | Refills: 0 | Status: SHIPPED | OUTPATIENT
Start: 2019-10-16 | End: 2019-10-21 | Stop reason: ALTCHOICE

## 2019-10-16 NOTE — TELEPHONE ENCOUNTER
Prior Authorization needed on:  10/16/2019    Medication: cefpodoxime (VANTIN)  Dose:   200 MG tablet    Pharmacy confirmed as   [id number] - [place], [place] - [address]  [address]  [facility] [address]  Phone: [phone] Fax: [phone]    [id number] - [place], [place] - [address] [address]  Phone: [phone] Fax: [phone]    [id number] [place], [place] - [address] [facility] [address]  [address] [facility] [address]  Phone: [phone] Fax: [phone]  : Yes    Insurance Name:  [employer]  Insurance Phone: [phone]  Insurance Patient ID: [id number]    Alternatives Suggested:      Gloria Juarez October 16, 2019 at 4:22 PM

## 2019-10-16 NOTE — TELEPHONE ENCOUNTER
[facility] Medicine phone call message- general phone call:    Reason for call: Patient states she was told by pharmacy that the medication cefpodoxime needs a prior authorization since insurance won't cover it. Notified patient PA can take up tp 7 days. She states she cannot pay out of pocket and needs this medication. Patient states she needs a call back to know what she is suppose to do. She states if she can't get this medication then what is she suppose to do. Patient states if she should go to the emergency room since she needs medication to help her kidney infection. Please call and advise.     Return call needed: Yes    OK to leave a message on voice mail? Yes    Primary language: English      needed? No    Call taken on October 16, 2019 at 4:29 PM by Gloria Juarez

## 2019-10-16 NOTE — PROGRESS NOTES
Pt is a 44 year old female last seen on 10/8/19 by Dr Castañeda here today for:     1) kidney infection - admitted last week for pyelonepritis; treated w/ Abx but states that she never picked them up   Has not been out of bed since she came home from hospital  See phone call and hospital discharge doc below    2) SOB -  Still persistent  Allergies still bothering her    Reason for call: Pt was admitted to [facility] on 10/11/19 was found to have kidney infection. She was informed by hospital that it started from a bladder infection which she had no symptoms from and stated why our clinic did not catch the infection 10/8/19. Pt forced discharged because she did not want to stay in hospital anymore. Pt also stated that her IV kept popping out while she was hospitalized. Pt would also like to know if any medication is needed for her condition. Notified that she would need a follow and has an appt sched with Dr. Bailon on Wednesday. Pt only wants to speak with Dr. Bailon and would like him to call her back. Pls call and advise.    Assessment and Plan:   44 y.o. old female with PMH significant for asthma, allergies, polysubstance abuse, depression, anxiety, obesity, chronic pain reportedly on controlled substance agreement, who presented with concerns for left flank pain, abd pain, in addition to asthma concerns. In the ED, the patient was noted to be tachycardic, in distress and having labored tachypneic breathing which was felt possibly r/t flank/abdominal pain, however, pt reportedly had been in a splint/walker boot, and had considered PE. A D-dimer was obtained which was elevated. Her lungs were clear; not hypoxic on RA (100% SpO2). An EKG was obtained which was without acute changes; Troponin WNL. Was felt less likely PE with clinical presentation. Was noted to have significant left flank pain; UA and CT abd stone were obtained which were suspicious for pyelonephritis. Presented very symptomatic in the ED, and was  admitted for observation.     Pyelonephritis  -Pt presented with left flank/abd pain; UA, CT Abdomen/Stone suspicious for pyelonephritis. CT abd/stone showed left perinephric haziness and wall thickening of the left ureter; no visible calculus findings could represent recently passed calculus or UTI; clinical correlation. Subcentimeter left renal hypodensity small for characterization  -UC obtained- shows E Coli  -Reports ongoing but improved left lower flank pain but now with increased left back pain  -Obtained repeat CT abd/stone for f/u given wall thickening of left ureter and rec for f/u to resolution- this was completely normal today- resolved. This likely does not explain her pain  -Stop Ceftriaxone, start cefpodoxime 200 mg BID (14 day total course)  -Has been using 10 mg oxycodone every 4 H; given significant improvement on CT, will reduce oxycodone to 5 mg Q 6H  -Continue Toradol for 1 more dose, then DC  -PRN Tyelenol  -Try heat  -Nausea better  -Stable and ready to d/c from this standpoint, but having SOB-- see below    SOB  Asthma  Allergies  D-dimer elevated  -Reports feeling more SOB this AM. Her SpO2 is lower than what it has been- 92% RA.   -D Dimer was elevated in the ED and was not felt likely PE with clinical picture. However, given reports of increased SOB and lower SpO2, obtained CT PE study, which was neg for PE; no other infectious process; some atelectasis  -Advised to mobilize, Cough and Deep Breathe - Nsg will give IS, encourage ambulation  -Does not feel safe going home; states she nearly fell due to being SOB with activity- will keep overnight; plan d/c in AM; reassured re: CT results  -Will schedule duonebs QID overnight to see if this helps- states alb nebs were helpful for a very short time  -She reports having had issues with her asthma/allergies recently. PTA meds appear to be flonase nasal spray, PRN albuterol inhaler  -Continue Singulair 10 mg/day  -Consider outpatient PFT's- hx of  smoking 30 years, recently quit    Chronic Issues:   -Anxiety/Depression; She is on Xanax 1 mg TID PRN for anxiety- this was continued here, and pt has been requesting this regularly. I told her that her Xanax and oxycodone would not be administered at the same time and discussed rationale/concern for oversedation- pt was fine with this. Continue PTA Lexapro 20 mg/day and Gabapentin 600 mg BID  -Chronic pain; states she takes either Oxycodone or Tylenol #, once daily- Alternates between the two. Will not continue these while here as she is receiving oxycodone 10 mg for pain   -Obesity; The patient has morbid obesity with BMI > 40, affecting the patient s current medical condition. This has required use of extra resources, including treatment, assistance from extra staff, and the usage of bariatric equipment (wheelchair, bed, BP cuff, scales, etc.).  -Hx polysubstance abuse; would recommend limiting narcotics, however, appears to have pain. Will continue PRN oxycodone, but would like to avoid IV narcotics if possible.       Past Medical History:   Diagnosis Date     Controlled substance agreement signed 6/30/2015    Overview:  Patient has chronic pain and is seen at [facility] for this.  Has controlled substance agreement with them.  On Vicodin, Valium, Klonopin prescribed only from there.        NO ACTIVE PROBLEMS       Past Surgical History:   Procedure Laterality Date     ANKLE SURGERY Right 05/30/2019      Current Outpatient Medications   Medication Sig Dispense Refill     acetaminophen-codeine (TYLENOL #3) 300-30 MG tablet Take 1 tablet by mouth nightly as needed for severe pain 30 tablet 1     albuterol (PROAIR HFA/PROVENTIL HFA/VENTOLIN HFA) 108 (90 Base) MCG/ACT inhaler Inhale 2 puffs into the lungs every 4 hours as needed for shortness of breath / dyspnea or wheezing 8.5 g 3     ALPRAZolam (XANAX) 1 MG tablet Take 1 tablet (1 mg) by mouth 3 times daily as needed for anxiety 90 tablet 1      "cefpodoxime (VANTIN) 200 MG tablet Take 1 tablet (200 mg) by mouth 2 times daily for 11 days 22 tablet 0     cetirizine (ZYRTEC) 10 MG tablet Take 1 tablet (10 mg) by mouth daily 20 tablet 0     escitalopram (LEXAPRO) 20 MG tablet Take 1 tablet (20 mg) by mouth daily 30 tablet 1     fluticasone (FLONASE) 50 MCG/ACT nasal spray Spray 2 sprays into both nostrils daily 9.9 mL 11     fluticasone-salmeterol (ADVAIR) 100-50 MCG/DOSE inhaler Inhale 1 puff into the lungs every 12 hours 1 Inhaler 2     gabapentin (NEURONTIN) 300 MG capsule Take 3 capsules (900 mg) by mouth 3 times daily 270 capsule 0     gabapentin (NEURONTIN) 600 MG tablet TK 1 T PO BID  1     guaiFENesin-codeine (ROBITUSSIN AC) 100-10 MG/5ML solution Take 5-10 mLs by mouth every 4 hours as needed for cough Do not take with tylenol #3 180 mL 0     guaiFENesin-dextromethorphan (ROBITUSSIN DM) 100-10 MG/5ML syrup Take 5 mLs by mouth every 4 hours as needed for cough 118 mL 0     metroNIDAZOLE (METROGEL) 0.75 % topical gel Apply topically 2 times daily 45 g 11     montelukast (SINGULAIR) 10 MG tablet Take 1 tablet (10 mg) by mouth At Bedtime 30 tablet 11     predniSONE (DELTASONE) 50 MG tablet Take 1 tablet (50 mg) by mouth daily for 5 days 5 tablet 0     ranitidine (ZANTAC) 300 MG tablet Take 1 tablet (300 mg) by mouth At Bedtime 60 tablet 1      Allergies   Allergen Reactions     Lidocaine Other (See Comments)     \"my jaw stopped moving\"  Other reaction(s): Dystonia     Penicillins Hives, Rash and Shortness Of Breath     Epinephrine-Lidocaine-Na Metabisulfite Other (See Comments) and Muscle Pain (Myalgia)     Severe jaw cramping, double vision  Jaw locking        ROS:   Gen- no weight change, no fevers/chills   Head/ Eyes- no blurred vision, no headaches   ENT- see HPI  Cardiac - no palpitations, no chest pain   Respiratory - + shortness of breath , + wheezing; see HPI  GI - no nausea, no vomiting, no diarrhea, no constipation   Remainder of ROS negative. "     Exam:   BP (!) 140/91   Pulse 66   Temp 97.8  F (36.6  C) (Oral)   Resp 20   SpO2 96%    Alert and oriented x 3; No acute distress   HEENT: oropharynx clear   Neck: no masses, no lymphadenopathy   Resp: + wheeze   CV: rate/rhythm regular, no murmurs/rubs/gallops   ABd: soft, + bowel sounds, nontender/nondistended, no rebound/guarding   No CVAT       (N10) Pyelonephritis, acute  (primary encounter diagnosis)  Comment: needs to restart her Abx asap!  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

## 2019-10-17 ENCOUNTER — TRANSFERRED RECORDS (OUTPATIENT)
Dept: HEALTH INFORMATION MANAGEMENT | Facility: CLINIC | Age: 44
End: 2019-10-17

## 2019-10-17 RX ORDER — CIPROFLOXACIN 500 MG/1
500 TABLET, FILM COATED ORAL 2 TIMES DAILY
Qty: 14 TABLET | Refills: 0 | Status: SHIPPED | OUTPATIENT
Start: 2019-10-17 | End: 2019-10-21 | Stop reason: ALTCHOICE

## 2019-10-17 ASSESSMENT — ASTHMA QUESTIONNAIRES: ACT_TOTALSCORE: 8

## 2019-10-21 ENCOUNTER — OFFICE VISIT (OUTPATIENT)
Dept: FAMILY MEDICINE | Facility: CLINIC | Age: 44
End: 2019-10-21
Payer: COMMERCIAL

## 2019-10-21 ENCOUNTER — OFFICE VISIT (OUTPATIENT)
Dept: PHARMACY | Facility: PHYSICIAN GROUP | Age: 44
End: 2019-10-21
Payer: COMMERCIAL

## 2019-10-21 ENCOUNTER — TELEPHONE (OUTPATIENT)
Dept: FAMILY MEDICINE | Facility: CLINIC | Age: 44
End: 2019-10-21

## 2019-10-21 VITALS
RESPIRATION RATE: 16 BRPM | SYSTOLIC BLOOD PRESSURE: 119 MMHG | OXYGEN SATURATION: 96 % | HEART RATE: 90 BPM | TEMPERATURE: 97.5 F | DIASTOLIC BLOOD PRESSURE: 84 MMHG

## 2019-10-21 DIAGNOSIS — J06.9 VIRAL UPPER RESPIRATORY TRACT INFECTION: ICD-10-CM

## 2019-10-21 DIAGNOSIS — L70.0 ACNE VULGARIS: ICD-10-CM

## 2019-10-21 DIAGNOSIS — N12 PYELONEPHRITIS: Primary | ICD-10-CM

## 2019-10-21 DIAGNOSIS — F51.02 TRANSIENT INSOMNIA: ICD-10-CM

## 2019-10-21 DIAGNOSIS — J45.30 MILD PERSISTENT ASTHMA WITHOUT COMPLICATION: ICD-10-CM

## 2019-10-21 DIAGNOSIS — J45.40 MODERATE PERSISTENT ASTHMA WITHOUT COMPLICATION: ICD-10-CM

## 2019-10-21 PROCEDURE — 99605 MTMS BY PHARM NP 15 MIN: CPT | Performed by: PHARMACIST

## 2019-10-21 PROCEDURE — 99607 MTMS BY PHARM ADDL 15 MIN: CPT | Performed by: PHARMACIST

## 2019-10-21 RX ORDER — CEFUROXIME AXETIL 500 MG/1
500 TABLET ORAL 2 TIMES DAILY
COMMUNITY
Start: 2019-10-20 | End: 2020-01-31

## 2019-10-21 RX ORDER — METRONIDAZOLE 7.5 MG/G
GEL TOPICAL 2 TIMES DAILY PRN
COMMUNITY
Start: 2019-10-21 | End: 2020-01-03

## 2019-10-21 RX ORDER — IPRATROPIUM BROMIDE 21 UG/1
2 SPRAY, METERED NASAL 2 TIMES DAILY
Qty: 30 ML | Refills: 1 | Status: SHIPPED | OUTPATIENT
Start: 2019-10-21 | End: 2020-01-31

## 2019-10-21 RX ORDER — GABAPENTIN 600 MG/1
600 TABLET ORAL 3 TIMES DAILY PRN
COMMUNITY
Start: 2019-10-21 | End: 2020-12-16

## 2019-10-21 RX ORDER — CEFUROXIME AXETIL 500 MG/1
500 TABLET ORAL 2 TIMES DAILY
Qty: 16 TABLET | Refills: 0 | Status: SHIPPED | OUTPATIENT
Start: 2019-10-21 | End: 2020-01-31

## 2019-10-21 NOTE — TELEPHONE ENCOUNTER
[facility] Family Medicine phone call message- general phone call:    Reason for call: Patient was seen in clinic on 10/21/2019 with Dr. Irwin. Before leaving patient stop up front and would like   PCP to know she was admitted in the hospital on 10/17 and discharge on 10/20. Patient would like PCP to call her back and discuss further as she state she is not happy with the provider she saw today.    Return call needed: Yes    OK to leave a message on voice mail? Yes    Primary language: English      needed? No    Call taken on October 21, 2019 at 3:48 PM by Raúl Santoyo

## 2019-10-21 NOTE — PROGRESS NOTES
"  Hospitalization Follow-up Visit         HPI       Hospital Follow-up Visit:    Hospital:  [facility]   Date of Admission: 10/17/19  Date of Discharge: 10/20/19  Reason(s) for Admission: Pyelonephritis            Problems taking medications regularly:  None       Post Discharge Medication Reconciliation: discharge medications reconciled and changed, per note/orders (see AVS).       Problems adhering to non-medication therapy:  None       Medications reviewed by: by PharmD. Was only given 2 days of antibiotics upon leaving the hospital which would be a total of 6 days of treatment.     Summary of hospitalization:  CareEverywhere information obtained and reviewed. Pt had interrupted treatment for pyelo and we are now following up her second hospitalization for this. She also had sore throat in the hospital thought to be from URI per their assessment.   Diagnostic Tests/Treatments reviewed.  Follow up needed: none   Other Healthcare Providers Involved in Patient s Care:         None  Update since discharge: improved. See below.   Plan of care communicated with patient    Got a cough and runny nose at the hospital. Using robutussin DM, vicks, and chloraseptic on throat. None of this helps her. Has been feeling short of breath. Using albuterol three or 4 times per day which helps for about 1 hr each time. Hasn't been smoking much, just takes \"a puff every once in a while.\" No fever. She would like codeine for her cough. She states she is always prescribed codeine for her cough by her primary.     Not currently having any urinary symptoms.     Back pain: Has had low back pain chronically but had new left back pain along with the pyelonephritis. Appetite is poor. Is drinking lots of fluids. Is taking 800 mg Ibuprofen TID for pain. Would like tylenol #3.                   Review of Systems:   10 point ROS negative except as noted above.             Physical Exam:     Vitals:    10/21/19 1511   BP: 119/84   Pulse: 90   Resp: " 16   Temp: 97.5  F (36.4  C)   TempSrc: Oral   SpO2: 96%     There is no height or weight on file to calculate BMI.    GENERAL: Patient is dissheveled and appears tired and speaks in a somewhat slurred fashion. She has casual clothing and disorganized cosmetic application.  HENT: ear canals- normal; TMs- normal; Nose- rhinorrhea present; Mouth- no ulcers, no lesions  NECK: no tenderness, no adenopathy, no asymmetry, no masses, no stiffness; thyroid- normal to palpation  RESP: No tachypnea. lungs clear to auscultation - no rales, no rhonchi, no wheezes  CV: regular rates and rhythm, normal S1 S2, no S3 or S4 and no murmur, no click or rub -  ABDOMEN: soft, no tenderness, no  hepatosplenomegaly, no masses, normal bowel sounds  MS: extremities- no gross deformities noted, no edema  SKIN: no suspicious lesions, no rashes  BACK: no CVA tenderness, +tenderness at left low back over the sacrum. No tenderness over spinous processes.          Results:   Results from the last 24 hours No results found for this or any previous visit (from the past 24 hour(s)).    Assessment and Plan     1. Pyelonephritis  Patient was only given enough for 6 total days of treatment. Will extend her course to cover 2 weeks. Her CVA tenderness is resolved and the back pain she has now is consistent with her chronic back pain. She was requesting narcotic pain medicine for this and she should still have her October pills left and so I deferred this to her primary who manages chronic pain. I do not think there is an element of acute pain at this time. I cautioned her about her use of NSAIDS and I recommended decreasing dose to 2 times per day.   - cefuroxime (CEFTIN) 500 MG tablet; Take 1 tablet (500 mg) by mouth 2 times daily for 8 days  Dispense: 16 tablet; Refill: 0    2. Viral upper respiratory tract infection  Pt requesting codeine for her cough and I explained that there is not good evidence to use codeine for cough. Reassuringly, she is not  tachypnic, not using accessory muscles, not wheezing and so I do not think she is having an acute exacerbation of her asthma. I recommended she can try atrovent to relieve nasal and cough symptoms. Continue advair and albuterol.   - ipratropium (ATROVENT) 0.03 % nasal spray; Spray 2 sprays into both nostrils 2 times daily  Dispense: 30 mL; Refill: 1      E&M code to be billed if TCM cannot be: 67989    Type of decision making: High complexity (52370)      Options for treatment and follow-up care were reviewed with the patient  Toddtiffany Tristan   engaged in the decision making process and verbalized understanding of the options discussed and agreed with the final plan.      Latha Irwin MD

## 2019-10-21 NOTE — PROGRESS NOTES
"SUBJECTIVE/OBJECTIVE:                Darlene Hudson is a 44 year old female coming in for a transitions of care visit.  She was discharged from [facility] on 10/20/2019 for recurrent pyelonephritis, thought due to incomplete treatment. Yue has a past medical history significant for asthma, allergies, polysubstance abuse, depression, anxiety, obesity, chronic pain.    Allergies/ADRs: Reviewed in Epic  Tobacco:  reports that she has been smoking cigarettes. She has been smoking about 0.25 packs per day. She has never used smokeless tobacco.  Alcohol: Social History    Substance and Sexual Activity      Alcohol use: Not Currently        Alcohol/week: 0.0 standard drinks    Medication Adherence/Access: No medicines brought to clinic today. Reconciled today per patient report.    Pyelonephritis/Back Paiin: Karoline reports she started antibiotic, took 1 dose of antibiotic last night and again this morning. No concerns for upset stomach as a result of antibiotic.She was given 2 days of at-home antibiotic therapy after discharge and given IV antibiotics in-patient. Continues to have any pain in side and back. Taking Ibuprofen 800 mg 3-4 times per day.       Cold: Karoline reports not getting relief with throat spray left hospital with or with cough medicine (Robitussin DM). Normal allergy medicines not helping symptoms either.    Asthma: Prednisone burst that was prescribed prior to admission was stopped at hospital discharge. Taking Advair BID and Albuterol PRN. Needing more doses of PRN than usual.    Today's Vitals:   BP Readings from Last 1 Encounters:   10/16/19 (!) 140/91     Pulse Readings from Last 1 Encounters:   10/16/19 66     Wt Readings from Last 1 Encounters:   05/29/19 255 lb 6.4 oz (115.8 kg)     Ht Readings from Last 1 Encounters:   10/08/19 5' 9\" (1.753 m)     Estimated body mass index is 37.72 kg/m  as calculated from the following:    Height as of 10/8/19: 5' 9\" (1.753 m).    Weight as of 5/29/19: " 255 lb 6.4 oz (115.8 kg).    Temp Readings from Last 1 Encounters:   10/16/19 97.8  F (36.6  C) (Oral)     ASSESSMENT:                 Medication Adherence: Poor, per patient report. Karoline has not been adherent to previous courses of antibiotics.    Pyelonephritis: Adherence - Karoline has not been adherent to antibiotics necessary to treat pyelonephritis. Per IDSA guidelines she should be treated for a total of 10-14 days for pyleonephritis. Restarting treatment from this admission, patient needs another 4-8 days of antibiotic therapy. Current antibiotic therapy appropriate per available culture data. Unclear if back pain due to pyelo vs chronic back pain. To avoid adverse effects of Ibuprofen, should use no more than 3200 mg daily and Karoline is close to this threshold.    Cold / Asthma: Indication - Need physician assessment to determine if symptoms patient experiencing from cold or from under treated asthma, despite patient reported adherence to maintenance medicines. .    PLAN:                  Post Discharge Medication Reconciliation Status: discharge medications reconciled, continue medications without change.    1) Continue cefuroxime 500 mg twice daily for 8 more days (14 days total).  2) Refereral to Dr. Irwin for discussion on cold/cough treatment and prednisone burst.        I spent 20 minutes with this patient today. I offer these suggestions for consideration by Dr. Irwin. A copy of the visit note was provided to the provider.    Will follow up as needed.    The patient was given a summary of these recommendations as an after visit summary.    Nyla Kimball, Pharmacy Student    Preceptor Attestation:  I was present with the pharmacy student who participated in the service and in the documentation of this note. I have verified the history, personally performed the medical decision making, and have verified the content of the note, which accurately reflects my assessment of the patient and the plan of care.      Sharifa Albrecht, PharmD, BCACP, CDE  [facility]  Phone: [phone]  October 25, 2019 at 7:15 PM

## 2019-10-21 NOTE — Clinical Note
This patient was not happy with me because I did not give her codeine for her cough and I did not give an early refill for her chronic low back pain. She wanted me to send you a message letting you know that this happened. Overall I think her pyelo is improving- we extended her abx course. I offered symptomatic cares for URI.

## 2019-10-22 NOTE — TELEPHONE ENCOUNTER
Called patient and advised  will discuss her concerns at their upcoming appointment tomorrow. Patient did not recall having an appointment, reminded of 2:20 appointment with  for tomorrow. Patient verbalized understanding and agreed with plan. Neo VÁSQUEZ

## 2019-10-23 ENCOUNTER — OFFICE VISIT (OUTPATIENT)
Dept: FAMILY MEDICINE | Facility: CLINIC | Age: 44
End: 2019-10-23
Payer: COMMERCIAL

## 2019-10-23 VITALS
HEART RATE: 118 BPM | DIASTOLIC BLOOD PRESSURE: 75 MMHG | TEMPERATURE: 97.9 F | RESPIRATION RATE: 24 BRPM | SYSTOLIC BLOOD PRESSURE: 104 MMHG | OXYGEN SATURATION: 100 %

## 2019-10-23 DIAGNOSIS — R10.84 ABDOMINAL PAIN, GENERALIZED: ICD-10-CM

## 2019-10-23 DIAGNOSIS — R68.89 FLU-LIKE SYMPTOMS: Primary | ICD-10-CM

## 2019-10-23 DIAGNOSIS — R11.2 INTRACTABLE VOMITING WITH NAUSEA, UNSPECIFIED VOMITING TYPE: ICD-10-CM

## 2019-10-23 DIAGNOSIS — F41.9 ANXIETY: ICD-10-CM

## 2019-10-23 DIAGNOSIS — Z79.899 CONTROLLED SUBSTANCE AGREEMENT SIGNED: ICD-10-CM

## 2019-10-23 LAB
% GRANULOCYTES: 89 %G (ref 40–75)
ALBUMIN SERPL-MCNC: 3.6 G/DL (ref 3.3–4.6)
ALP SERPL-CCNC: 106 U/L (ref 40–150)
ALT SERPL-CCNC: 25 U/L (ref 0–45)
AST SERPL-CCNC: 24 U/L (ref 0–45)
BILIRUB SERPL-MCNC: 0.6 MG/DL (ref 0.2–1.3)
BUN SERPL-MCNC: 13 MG/DL (ref 5–24)
CALCIUM SERPL-MCNC: 9 MG/DL (ref 8.5–10.4)
CHLORIDE SERPLBLD-SCNC: 106 MMOL/L (ref 94–109)
CO2 SERPL-SCNC: 22 MMOL/L (ref 20–32)
CREAT SERPL-MCNC: 1 MG/DL (ref 0.6–1.3)
EGFR CALCULATED (BLACK REFERENCE): 76.8 ML/MIN
EGFR CALCULATED (NON BLACK REFERENCE): 63.4 ML/MIN
FLUAV AG UPPER RESP QL IA.RAPID: NEGATIVE
FLUBV AG UPPER RESP QL IA.RAPID: NEGATIVE
GLUCOSE SERPL-MCNC: 119 MG/DL (ref 60–109)
GRANULOCYTES #: 9.2 K/UL (ref 1.6–8.3)
HCT VFR BLD AUTO: 46.9 % (ref 35–47)
HEMOGLOBIN: 14.3 G/DL (ref 11.7–15.7)
LYMPHOCYTES # BLD AUTO: 0.8 K/UL (ref 0.8–5.3)
LYMPHOCYTES NFR BLD AUTO: 7.6 %L (ref 20–48)
MCH RBC QN AUTO: 29.9 PG (ref 26.5–35)
MCHC RBC AUTO-ENTMCNC: 30.5 G/DL (ref 32–36)
MCV RBC AUTO: 98 FL (ref 78–100)
MID #: 0.4 K/UL (ref 0–2.2)
MID %: 3.4 %M (ref 0–20)
PLATELET # BLD AUTO: 415 K/UL (ref 150–450)
POTASSIUM SERPL-SCNC: 3.7 MMOL/L (ref 3.4–5.3)
PROT SERPL-MCNC: 7.8 G/DL (ref 6.6–8.8)
RBC # BLD AUTO: 4.79 M/UL (ref 3.8–5.2)
SODIUM SERPL-SCNC: 141 MMOL/L (ref 133–144)
WBC # BLD AUTO: 10.3 K/UL (ref 4–11)

## 2019-10-23 RX ORDER — ALPRAZOLAM 1 MG
1 TABLET ORAL 3 TIMES DAILY PRN
Qty: 90 TABLET | Refills: 0 | Status: SHIPPED | OUTPATIENT
Start: 2019-11-04 | End: 2019-11-27

## 2019-10-23 RX ORDER — ONDANSETRON 4 MG/1
4 TABLET, ORALLY DISINTEGRATING ORAL EVERY 8 HOURS PRN
Qty: 30 TABLET | Refills: 1 | Status: SHIPPED | OUTPATIENT
Start: 2019-10-23 | End: 2019-11-27

## 2019-10-23 NOTE — PROGRESS NOTES
Pt is a 44 year old female last seen on 10/21/19 by Dr Irwin here today for:     Severe Abdominal pain -  All across my stomach and back  Not able to keep anything down since her hospital admission  Severe nausea  +chills and hot flashes  Last BM this morning  +body aches  +sick contact - son w/ ?flu    Past Medical History:   Diagnosis Date     Controlled substance agreement signed 6/30/2015    Overview:  Patient has chronic pain and is seen at [facility] for this.  Has controlled substance agreement with them.  On Vicodin, Valium, Klonopin prescribed only from there.        NO ACTIVE PROBLEMS       Past Surgical History:   Procedure Laterality Date     ANKLE SURGERY Right 05/30/2019      Current Outpatient Medications   Medication Sig Dispense Refill     acetaminophen-codeine (TYLENOL #3) 300-30 MG tablet Take 1 tablet by mouth nightly as needed for severe pain 30 tablet 1     albuterol (PROAIR HFA/PROVENTIL HFA/VENTOLIN HFA) 108 (90 Base) MCG/ACT inhaler Inhale 2 puffs into the lungs every 4 hours as needed for shortness of breath / dyspnea or wheezing 8.5 g 3     ALPRAZolam (XANAX) 1 MG tablet Take 1 tablet (1 mg) by mouth 3 times daily as needed for anxiety 90 tablet 1     cefuroxime (CEFTIN) 500 MG tablet Take 1 tablet (500 mg) by mouth 2 times daily for 8 days 16 tablet 0     cetirizine (ZYRTEC) 10 MG tablet Take 1 tablet (10 mg) by mouth daily 20 tablet 0     escitalopram (LEXAPRO) 20 MG tablet Take 1 tablet (20 mg) by mouth daily 30 tablet 1     fluticasone (FLONASE) 50 MCG/ACT nasal spray Spray 2 sprays into both nostrils daily 9.9 mL 11     fluticasone-salmeterol (ADVAIR) 100-50 MCG/DOSE inhaler Inhale 1 puff into the lungs every 12 hours 1 Inhaler 2     gabapentin (NEURONTIN) 600 MG tablet Take 1 tablet (600 mg) by mouth 3 times daily as needed (pain)       guaiFENesin-dextromethorphan (ROBITUSSIN DM) 100-10 MG/5ML syrup Take 5 mLs by mouth every 4 hours as needed for cough 118 mL 0      "ipratropium (ATROVENT) 0.03 % nasal spray Spray 2 sprays into both nostrils 2 times daily 30 mL 1     metroNIDAZOLE (METROGEL) 0.75 % external gel Apply topically 2 times daily as needed (bacterial infection) for 5 day treatment       montelukast (SINGULAIR) 10 MG tablet Take 1 tablet (10 mg) by mouth At Bedtime 30 tablet 11     phenol (CHLORASEPTIC) 1.4 % spray Take 5 sprays by mouth every 2 hours as needed for pain       ranitidine (ZANTAC) 300 MG tablet Take 1 tablet (300 mg) by mouth daily as needed for heartburn        Allergies   Allergen Reactions     Lidocaine Other (See Comments)     \"my jaw stopped moving\"  Other reaction(s): Dystonia     Penicillins Hives, Rash and Shortness Of Breath     Epinephrine-Lidocaine-Na Metabisulfite Other (See Comments) and Muscle Pain (Myalgia)     Severe jaw cramping, double vision  Jaw locking        ROS:   Gen- no weight change, + fevers/chills   ENT- + cough, + congestion, + URI symptoms   Cardiac - no palpitations, no chest pain   Respiratory - no shortness of breath , no wheezing   GI - + nausea, + vomiting, no diarrhea, no constipation   Urinary - no dysuria, no polyuria   Remainder of ROS negative.     Exam:   /75   Pulse 118   Temp 97.9  F (36.6  C) (Oral)   Resp 24   SpO2 100%    Alert and oriented x 3; No acute distress   HEENT:  oropharynx clear w/ dry mucus membranes  Neck: no masses, no lymphadenopathy   Resp: clear to auscultation bilaterally, no wheezing/ronchi   CV: rate/rhythm regular, no murmurs/rubs/gallops   ABd: soft, + bowel sounds, +LLQ/ suprapubic tenderness; no rebound/guarding; no CVAT  Extrem: no clubbing/cyanosis/edema     Rapid flu - negative  CBC/CMP - normal      (R68.89) Flu-like symptoms  (primary encounter diagnosis)  Comment: normal labs and flu swab; may have viral gastro (see below)  Plan: CBC with Diff Plt ([facility]), Influenza A/B         Antigen ([facility])            (R11.2) Intractable vomiting with nausea, unspecified vomiting " type  Comment: I am concerned she is dehydrated (tachy, dry mucus membranes); she declined to go to the ED (though she was at urgent care this morning and left?) to get an IV or any imaging; Non-specific abd pain on exam; precautions given; I am away for 2 wks so she will see me in 3 wks  Plan: CBC with Diff Plt ([facility]), Comprehensive         Metabolic Panel ([facility]) - results <1hr         Protocol, Influenza A/B Antigen ([facility]),         ondansetron (ZOFRAN-ODT) 4 MG ODT tab            (R10.84) Abdominal pain, generalized  Comment:   Plan: CBC with Diff Plt ([facility]), Comprehensive         Metabolic Panel ([facility]) - results <1hr         Protocol, Influenza A/B Antigen ([facility]),         acetaminophen-codeine (TYLENOL #3) 300-30 MG         tablet, ondansetron (ZOFRAN-ODT) 4 MG ODT tab            (F41.9) Anxiety  Comment: I will be away when she is due for refill so printed script given today; will f/u in 3 wks  Plan: ALPRAZolam (XANAX) 1 MG tablet, MENTAL HEALTH         REFERRAL  -            Robbie Bailon MD  October 23, 2019  2:55 PM

## 2019-10-24 ENCOUNTER — TELEPHONE (OUTPATIENT)
Dept: FAMILY MEDICINE | Facility: CLINIC | Age: 44
End: 2019-10-24

## 2019-10-24 ASSESSMENT — ASTHMA QUESTIONNAIRES: ACT_TOTALSCORE: 14

## 2019-10-24 NOTE — TELEPHONE ENCOUNTER
Out going call made to Karoline to f/u and discuss mental health referral. Patient declines to discuss at this time due to being ill. Patient reports she will call back later today or tomorrow.

## 2019-10-24 NOTE — LETTER
October 25, 2019       TO: Darlene Hudson  [address]  [place] [facility] [address]         Dear Karoline,        Here is the Referral information for psychotherapy to help with anxiety. Please feel free to contact myself directly with any questions, concerns or if you would like some assistance.     [facility]  [address]  [address]  [place], [place] [address]  [phone] Phone  [phone] Fax  Hours: M-F 7:30-5:30 pm     [facility] [place] [facility]  [address]   [address]  [place] [address]  [phone] (for appointments)  Fax: [phone]  7:30 am - 5 pm M-F, appointments available on Saturdays     [facility]  [address]  [address]  [place] [address]  [phone] Phone  [phone] Fax  M-F 7:30AM-7PM  Saturday 8AM-2PM          [facility]  [address] [place] [address]  [phone] Phone  [phone] Fax  Monday-Friday: 9am -9pm  Saturdays: 9am- 2pm     [facility]  [address] [place] [address]  [phone] Phone  [phone] Fax  M-Th 8-8pm  F 8-4:30pm      Sincerely,      Ksenia Townsend CMA,  Patient Care Coordinator  Direct Phone: [phone]

## 2019-10-25 NOTE — TELEPHONE ENCOUNTER
Out going call made to F/u mental health referral. LMTCB  Letter with referral information mailed to home address listed in chart.

## 2019-11-25 NOTE — PROGRESS NOTES
Pt is a 44 year old female last seen on 10/23/19 here today for:     1) Asthma - stable  Worse w/ weather changes  Using albuterol 1-2x/day  ACT - 14  Advair bid - sometimes misses twice daily, rosa at night   Smoking couple times/day; says she just takes a couple puffs  Takes singulair at night - she feels like it makes her super tired    2) Mood - is good   Using treadmill   Stopped lexapro due to hot flashes  Taking less xanax; was taking 3x/day, now down to 2x/day  Anxious when she is out in public  Would like to setup counseling     3)HM - scheduled for full physical on 1/15/20    Past Medical History:   Diagnosis Date     Anxiety      Asthma in adult, moderate persistent, uncomplicated      Controlled substance agreement signed 6/30/2015    Overview:  Patient has chronic pain and is seen at [facility] for this.  Has controlled substance agreement with them.  On Vicodin, Valium, Klonopin prescribed only from there.         Past Surgical History:   Procedure Laterality Date     ANKLE SURGERY Right 05/30/2019      Current Outpatient Medications   Medication Sig Dispense Refill     [START ON 12/4/2019] ALPRAZolam (XANAX) 1 MG tablet Take 1 tablet (1 mg) by mouth 3 times daily as needed for anxiety 75 tablet 0     acetaminophen-codeine (TYLENOL #3) 300-30 MG tablet Take 1 tablet by mouth 2 times daily as needed for severe pain 60 tablet 0     albuterol (PROAIR HFA/PROVENTIL HFA/VENTOLIN HFA) 108 (90 Base) MCG/ACT inhaler Inhale 2 puffs into the lungs every 4 hours as needed for shortness of breath / dyspnea or wheezing 8.5 g 3     cetirizine (ZYRTEC) 10 MG tablet Take 1 tablet (10 mg) by mouth daily 20 tablet 0     fluticasone (FLONASE) 50 MCG/ACT nasal spray Spray 2 sprays into both nostrils daily 9.9 mL 11     fluticasone-salmeterol (ADVAIR) 100-50 MCG/DOSE inhaler Inhale 1 puff into the lungs every 12 hours 1 Inhaler 2     gabapentin (NEURONTIN) 600 MG tablet Take 1 tablet (600 mg) by mouth 3 times daily  "as needed (pain)       ipratropium (ATROVENT) 0.03 % nasal spray Spray 2 sprays into both nostrils 2 times daily 30 mL 1     metroNIDAZOLE (METROGEL) 0.75 % external gel Apply topically 2 times daily as needed (bacterial infection) for 5 day treatment       montelukast (SINGULAIR) 10 MG tablet Take 1 tablet (10 mg) by mouth At Bedtime 30 tablet 11     phenol (CHLORASEPTIC) 1.4 % spray Take 5 sprays by mouth every 2 hours as needed for pain       ranitidine (ZANTAC) 300 MG tablet Take 1 tablet (300 mg) by mouth daily as needed for heartburn        Allergies   Allergen Reactions     Lidocaine Other (See Comments)     \"my jaw stopped moving\"  Other reaction(s): Dystonia     Penicillins Hives, Rash and Shortness Of Breath     Epinephrine-Lidocaine-Na Metabisulfite Other (See Comments) and Muscle Pain (Myalgia)     Severe jaw cramping, double vision  Jaw locking        ROS:   Gen- no weight change, no fevers/chills   ENT- no cough, no congestion, no URI symptoms   Cardiac - no palpitations, no chest pain   Respiratory - see HPI   GI - no nausea, no vomiting, no diarrhea, no constipation   Urinary - no dysuria, no polyuria   Remainder of ROS negative.     Exam:   /83   Pulse 85   Temp 98.2  F (36.8  C) (Oral)   Resp 20   SpO2 98%    Alert and oriented x 3; No acute distress   Resp: + scattered wheezing  CV: rate/rhythm regular, no murmurs/rubs/gallops   Extrem: no clubbing/cyanosis/edema   Neuro: no focal deficits   Derm: no rashes     (F41.9) Anxiety  (primary encounter diagnosis)  Comment: improved; will dec alprazolam from 90 to 75 tabs/ month; cont wean; care coordinator facilitated getting her into counseling as well today  Plan: ALPRAZolam (XANAX) 1 MG tablet            (J45.40) Moderate persistent asthma without complication  Comment:   Plan: poor ACT primarily due to poor compliance w/ controller med; discussed the option of increasing her Advair dosage but she will try to be better with it before we " change the regimen; precautions given; smoking cessation encouraged    (Z79.899) Controlled substance agreement signed  Comment: UDS/  appropriate   Plan: Rapid Urine Drug Screen ([facility]), ALPRAZolam         (XANAX) 1 MG tablet            (Z12.31) Encounter for screening mammogram for breast cancer  Comment: has well visit scheduled with me in January for pap  Plan: MA SCREENING DIGITAL LOWAT              Robbie Bailon MD  November 27, 2019  11:34 AM

## 2019-11-27 ENCOUNTER — OFFICE VISIT (OUTPATIENT)
Dept: FAMILY MEDICINE | Facility: CLINIC | Age: 44
End: 2019-11-27
Payer: COMMERCIAL

## 2019-11-27 ENCOUNTER — TELEPHONE (OUTPATIENT)
Dept: FAMILY MEDICINE | Facility: CLINIC | Age: 44
End: 2019-11-27

## 2019-11-27 ENCOUNTER — RECORDS - HEALTHEAST (OUTPATIENT)
Dept: ADMINISTRATIVE | Facility: OTHER | Age: 44
End: 2019-11-27

## 2019-11-27 VITALS
TEMPERATURE: 98.2 F | HEART RATE: 85 BPM | OXYGEN SATURATION: 98 % | SYSTOLIC BLOOD PRESSURE: 126 MMHG | RESPIRATION RATE: 20 BRPM | DIASTOLIC BLOOD PRESSURE: 83 MMHG

## 2019-11-27 DIAGNOSIS — Z12.31 ENCOUNTER FOR SCREENING MAMMOGRAM FOR BREAST CANCER: ICD-10-CM

## 2019-11-27 DIAGNOSIS — F41.9 ANXIETY: Primary | ICD-10-CM

## 2019-11-27 DIAGNOSIS — Z79.899 CONTROLLED SUBSTANCE AGREEMENT SIGNED: ICD-10-CM

## 2019-11-27 DIAGNOSIS — J45.40 MODERATE PERSISTENT ASTHMA WITHOUT COMPLICATION: ICD-10-CM

## 2019-11-27 LAB
AMPHETAMINES QUAL: NEGATIVE
BARBITURATES QUAL URINE: NEGATIVE
BENZODIAZEPINE QUAL URINE: POSITIVE
BUPRENORPHINE QUAL URINE: NEGATIVE
CANNABINOIDS UR QL SCN: NEGATIVE
COCAINE QUAL URINE: NEGATIVE
METHAMPHETAMINE: NEGATIVE
METHODONE QUAL: NEGATIVE
MORPHINE QUAL: NEGATIVE
OXYCODONE QUAL: NEGATIVE
PHENCYCLIDINE: NEGATIVE
PROPOXYPHENE: NEGATIVE
TEMPERATURE OF URINE WAS BETWEEN 90-100 DEGREES F: NO
TRICYCLIC ANTIDEPRESSANTS: NEGATIVE

## 2019-11-27 RX ORDER — ALPRAZOLAM 1 MG
1 TABLET ORAL 3 TIMES DAILY PRN
Qty: 75 TABLET | Refills: 0 | Status: SHIPPED | OUTPATIENT
Start: 2019-12-04 | End: 2020-01-15

## 2019-11-27 NOTE — PATIENT INSTRUCTIONS
Referral for :     Mammogram    LOCATION/PLACE/Provider :    [facility]   DATE & TIME :    Jan. 14 at 12:30  PHONE :     [phone]  FAX :    [phone]  Appointment made by clinic staff/:    Kirstie

## 2019-11-27 NOTE — TELEPHONE ENCOUNTER
Referral for (Test): Mental Health   Location/Place/Provider: [facility]    Date/Time: 12/06/2019 at 11:00am with     Phone: [phone]  Fax:   Additional information/prep.:   Scheduled by: SMITH Whitaker

## 2019-11-28 ASSESSMENT — ASTHMA QUESTIONNAIRES: ACT_TOTALSCORE: 15

## 2019-12-18 DIAGNOSIS — J30.2 SEASONAL ALLERGIC RHINITIS, UNSPECIFIED TRIGGER: ICD-10-CM

## 2019-12-18 RX ORDER — CETIRIZINE HYDROCHLORIDE 10 MG/1
10 TABLET ORAL DAILY
Qty: 30 TABLET | Refills: 11 | Status: SHIPPED | OUTPATIENT
Start: 2019-12-18 | End: 2020-02-13

## 2019-12-20 ENCOUNTER — TELEPHONE (OUTPATIENT)
Dept: ORTHOPEDICS | Facility: CLINIC | Age: 44
End: 2019-12-20

## 2019-12-20 NOTE — TELEPHONE ENCOUNTER
Notified by pharmacy that Advair not covered. Dose doesn't match medication list. AND drug on preferred coverage list per [employer]. Unclear barrier. Call to pharmacy.     Wixela (generic) for Advair covered. Pharmacist at [facility] closed Rx for Advair 500/50.     Sharifa Wills, PharmD, CDE  [facility]  Phone: [phone]  December 20, 2019 at 2:25 PM

## 2019-12-31 DIAGNOSIS — J45.40 MODERATE PERSISTENT ASTHMA WITHOUT COMPLICATION: ICD-10-CM

## 2019-12-31 NOTE — TELEPHONE ENCOUNTER
Message to physician:     Date of last visit: 11/27/2019     Date of next visit if scheduled: Visit date not found       Last Comprehensive Metabolic Panel:  Sodium   Date Value Ref Range Status   10/23/2019 141.0 133.0 - 144.0 mmol/L Final     Potassium   Date Value Ref Range Status   10/23/2019 3.7 3.4 - 5.3 mmol/L Final     Chloride   Date Value Ref Range Status   10/23/2019 106.0 94.0 - 109.0 mmol/L Final     Carbon Dioxide   Date Value Ref Range Status   10/23/2019 22.0 20.0 - 32.0 mmol/L Final     Glucose   Date Value Ref Range Status   10/23/2019 119.0 (H) 60.0 - 109.0 mg/dL Final     Urea Nitrogen   Date Value Ref Range Status   10/23/2019 13.0 5.0 - 24.0 mg/dL Final     Creatinine   Date Value Ref Range Status   10/23/2019 1.0 0.6 - 1.3 mg/dL Final     GFR Estimate   Date Value Ref Range Status   02/02/2017 >60 >60 mL/min/1.73m2 Final     Calcium   Date Value Ref Range Status   10/23/2019 9.0 8.5 - 10.4 mg/dL Final       BP Readings from Last 3 Encounters:   11/27/19 126/83   10/23/19 104/75   10/21/19 119/84       No results found for: A1C             Please complete refill and CLOSE ENCOUNTER.  Closing the encounter signifies the refill is complete.

## 2020-01-01 RX ORDER — ALBUTEROL SULFATE 90 UG/1
2 AEROSOL, METERED RESPIRATORY (INHALATION) EVERY 4 HOURS PRN
Qty: 8.5 G | Refills: 3 | Status: SHIPPED | OUTPATIENT
Start: 2020-01-01 | End: 2020-03-12

## 2020-01-03 ENCOUNTER — OFFICE VISIT (OUTPATIENT)
Dept: FAMILY MEDICINE | Facility: CLINIC | Age: 45
End: 2020-01-03
Payer: COMMERCIAL

## 2020-01-03 ENCOUNTER — TELEPHONE (OUTPATIENT)
Dept: ORTHOPEDICS | Facility: CLINIC | Age: 45
End: 2020-01-03

## 2020-01-03 VITALS
SYSTOLIC BLOOD PRESSURE: 125 MMHG | RESPIRATION RATE: 18 BRPM | TEMPERATURE: 97.6 F | OXYGEN SATURATION: 99 % | DIASTOLIC BLOOD PRESSURE: 87 MMHG | HEART RATE: 86 BPM

## 2020-01-03 DIAGNOSIS — L03.113 CELLULITIS OF RIGHT UPPER EXTREMITY: Primary | ICD-10-CM

## 2020-01-03 DIAGNOSIS — L70.0 ACNE VULGARIS: ICD-10-CM

## 2020-01-03 DIAGNOSIS — J45.21 MILD INTERMITTENT ASTHMA WITH EXACERBATION: ICD-10-CM

## 2020-01-03 DIAGNOSIS — F51.02 TRANSIENT INSOMNIA: ICD-10-CM

## 2020-01-03 RX ORDER — CEPHALEXIN 500 MG/1
500 CAPSULE ORAL 4 TIMES DAILY
Qty: 20 CAPSULE | Refills: 0 | Status: SHIPPED | OUTPATIENT
Start: 2020-01-03 | End: 2020-02-04

## 2020-01-03 RX ORDER — METRONIDAZOLE 7.5 MG/G
GEL TOPICAL 2 TIMES DAILY PRN
Qty: 45 G | Refills: 0 | Status: SHIPPED | OUTPATIENT
Start: 2020-01-03 | End: 2020-02-13

## 2020-01-03 NOTE — PROGRESS NOTES
HPI:   Darlene Hudson is a 44 year old  female with PMH of pericarditis who presents for:    Chief Complaint   Patient presents with     Infection     tattoo is possibly infected, right forearm, started hurting/burning 3 days ago, getting worse, more painful and blisters are forming     Medication Reconciliation     needs attention     Skin Infection   Tattoo on right forearm 10 days ago, at reputable tattoo parlor   Burning pain woke her up 3 days ago  Redness around tattoo  Pain with movement and touch  Has been using aquiphor and neosporin without significant improvement  No drainage   Denies fevers, chills            PMHX:     Patient Active Problem List   Diagnosis     Abnormal cytology finding     Nondependent alcohol abuse, episodic drinking behavior     Controlled substance agreement signed     Low back pain     Chronic sinusitis     Cocaine abuse (H)     Major depressive disorder, recurrent episode, moderate (H)     Tobacco use disorder     Noninflammatory disorder of vagina     Pap smear for cervical cancer screening     Abdominal pain     Abnormal cervical Papanicolaou smear     Panic disorder with agoraphobia     Atopic rhinitis     Chronic low back pain     Moderate persistent asthma     Other long term (current) drug therapy     Acute pericardial effusion     Anxiety     Chronic infectious pericarditis       Current Outpatient Medications   Medication Sig Dispense Refill     albuterol (PROAIR HFA/PROVENTIL HFA/VENTOLIN HFA) 108 (90 Base) MCG/ACT inhaler Inhale 2 puffs into the lungs every 4 hours as needed for shortness of breath / dyspnea or wheezing 8.5 g 3     ALPRAZolam (XANAX) 1 MG tablet Take 1 tablet (1 mg) by mouth 3 times daily as needed for anxiety 75 tablet 0     cephALEXin (KEFLEX) 500 MG capsule Take 1 capsule (500 mg) by mouth 4 times daily for 5 days 20 capsule 0     cetirizine (ZYRTEC) 10 MG tablet Take 1 tablet (10 mg) by mouth daily 30 tablet 11     fluticasone (FLONASE) 50  "MCG/ACT nasal spray Spray 2 sprays into both nostrils daily 9.9 mL 11     fluticasone-salmeterol (ADVAIR) 100-50 MCG/DOSE inhaler Inhale 1 puff into the lungs every 12 hours 1 Inhaler 2     metroNIDAZOLE (METROGEL) 0.75 % external gel Apply topically 2 times daily as needed (bacterial infection) for 5 day treatment 45 g 0     montelukast (SINGULAIR) 10 MG tablet Take 1 tablet (10 mg) by mouth At Bedtime 30 tablet 11     ranitidine (ZANTAC) 300 MG tablet Take 1 tablet (300 mg) by mouth daily as needed for heartburn 30 tablet 1     acetaminophen-codeine (TYLENOL #3) 300-30 MG tablet Take 1 tablet by mouth 2 times daily as needed for severe pain (Patient not taking: Reported on 1/3/2020) 60 tablet 0     gabapentin (NEURONTIN) 600 MG tablet Take 1 tablet (600 mg) by mouth 3 times daily as needed (pain)       ipratropium (ATROVENT) 0.03 % nasal spray Spray 2 sprays into both nostrils 2 times daily (Patient not taking: Reported on 1/3/2020) 30 mL 1     phenol (CHLORASEPTIC) 1.4 % spray Take 5 sprays by mouth every 2 hours as needed for pain         Social History     Tobacco Use     Smoking status: Current Some Day Smoker     Packs/day: 0.25     Types: Cigarettes     Smokeless tobacco: Never Used     Tobacco comment: About 2 cig/day   Substance Use Topics     Alcohol use: Not Currently     Alcohol/week: 0.0 standard drinks     Drug use: Yes     Types: Codeine       Social History     Social History Narrative     Not on file       Allergies   Allergen Reactions     Lidocaine Other (See Comments)     \"my jaw stopped moving\"  Other reaction(s): Dystonia     Penicillins Hives, Rash and Shortness Of Breath     Epinephrine-Lidocaine-Na Metabisulfite Other (See Comments) and Muscle Pain (Myalgia)     Severe jaw cramping, double vision  Jaw locking       No results found for this or any previous visit (from the past 24 hour(s)).         Review of Systems:     12 point review of systems is negative, except for as noted in the " HPI         Physical Exam:     Vitals:    01/03/20 1509   BP: 125/87   Pulse: 86   Resp: 18   Temp: 97.6  F (36.4  C)   TempSrc: Oral   SpO2: 99%     There is no height or weight on file to calculate BMI.    General: Alert,  HEENT: Head is free of trauma.   Sclerae non-icteric.  Skin: Right forearm with healing tattoo. Area of erythema about 5cm by 2 cm. Exquisitely tender to palpation. Not warm.  Some swelling around erythema and towards elbow.   Psych: Mood appropriate     Assessment and Plan   1. Mild intermittent asthma with exacerbation  Refill  - fluticasone-salmeterol (ADVAIR) 100-50 MCG/DOSE inhaler; Inhale 1 puff into the lungs every 12 hours  Dispense: 1 Inhaler; Refill: 2    2. Acne vulgaris  Refill  - metroNIDAZOLE (METROGEL) 0.75 % external gel; Apply topically 2 times daily as needed (bacterial infection) for 5 day treatment  Dispense: 45 g; Refill: 0    3. Transient insomnia  Refill  - ranitidine (ZANTAC) 300 MG tablet; Take 1 tablet (300 mg) by mouth daily as needed for heartburn  Dispense: 30 tablet; Refill: 1    4. Cellulitis of right upper extremity  Area of erythema is confined to tattoo margins, no significant swelling or raised lesion making abscess unlikely. She is exquisitely tender to palpation and with movement of the forearm, which is not consistent with a normal healing course. Will treat for cellulitis. Reviewed reasons to return or seek emergency care including not improving after 3 days on antibiotics, fevers, chills, drainage or worsening pain and redness.   - cephALEXin (KEFLEX) 500 MG capsule; Take 1 capsule (500 mg) by mouth 4 times daily for 5 days  Dispense: 20 capsule; Refill: 0    Joseph Lion, MS3     The medical student acted as a scribe and the encounter documented above was performed completely by me and the documentation accurately reflects the work I have performed today. MD Dr. Aniceto Kearns,    Precepted with Dr. Urbano

## 2020-01-07 ENCOUNTER — TELEPHONE (OUTPATIENT)
Dept: FAMILY MEDICINE | Facility: CLINIC | Age: 45
End: 2020-01-07

## 2020-01-07 NOTE — TELEPHONE ENCOUNTER
Spoke w/ pt, who said she was doing better since 1/4 ED visit. Declined EDF remind pt of upcoming appt w/ Dr. Bailon.

## 2020-01-09 ENCOUNTER — OFFICE VISIT (OUTPATIENT)
Dept: FAMILY MEDICINE | Facility: CLINIC | Age: 45
End: 2020-01-09
Payer: COMMERCIAL

## 2020-01-09 VITALS — RESPIRATION RATE: 20 BRPM | TEMPERATURE: 97.5 F | OXYGEN SATURATION: 95 % | HEART RATE: 104 BPM

## 2020-01-09 DIAGNOSIS — J06.9 VIRAL UPPER RESPIRATORY TRACT INFECTION: Primary | ICD-10-CM

## 2020-01-09 RX ORDER — CODEINE PHOSPHATE AND GUAIFENESIN 10; 100 MG/5ML; MG/5ML
1-2 SOLUTION ORAL EVERY 4 HOURS PRN
Qty: 118 ML | Refills: 0 | Status: SHIPPED | OUTPATIENT
Start: 2020-01-09 | End: 2020-01-15
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
## 2020-01-09 NOTE — PROGRESS NOTES
Preceptor Attestation:   Patient seen, evaluated and discussed with the resident. I have verified the content of the note, which accurately reflects my assessment of the patient and the plan of care.  Supervising Physician:Radha Mckeon MD  [facility]

## 2020-01-09 NOTE — LETTER
RETURN TO WORK/SCHOOL FORM    1/9/2020    Re: Darlene Hudson  1975      To Whom It May Concern:     Darlene Hudson was seen in clinic today..  She may return to work without restrictions on 1/13/20              Aniceto Phan MD  1/9/2020 12:25 PM

## 2020-01-14 ENCOUNTER — TELEPHONE (OUTPATIENT)
Dept: FAMILY MEDICINE | Facility: CLINIC | Age: 45
End: 2020-01-14

## 2020-01-14 NOTE — TELEPHONE ENCOUNTER
Pt informed rx was just renewed 1/3/20, asked pt to check with pharmacy and let me know if there is a problem.  She is seeing Dr. Bailon tomorrow too. CXIong, RMA
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
## 2020-01-14 NOTE — TELEPHONE ENCOUNTER
[facility] phone call message- patient requesting a refill:    Full Medication Name: ranitidine (ZANTAC)    Dose: 300 mg tablet    Pharmacy confirmed as   [facility] #[id number] [address] AT [facility] [address]  [address] [place] [id number]  Phone: [phone] Fax: [phone]  : Yes    Additional Comments: PATIENT IS OUT AND NEEDING REFILLS.     OK to leave a message on voice mail? Yes    Primary language: English      needed? No    Call taken on January 14, 2020 at 4:22 PM by Lauren Hdez

## 2020-01-14 NOTE — PROGRESS NOTES
Preceptor Attestation:   Patient seen, evaluated and discussed with the resident. I have verified the content of the note, which accurately reflects my assessment of the patient and the plan of care.  Supervising Physician:Maria Isabel Urbano DO [facility]

## 2020-01-15 ENCOUNTER — OFFICE VISIT (OUTPATIENT)
Dept: FAMILY MEDICINE | Facility: CLINIC | Age: 45
End: 2020-01-15
Payer: COMMERCIAL

## 2020-01-15 VITALS
OXYGEN SATURATION: 98 % | HEART RATE: 81 BPM | RESPIRATION RATE: 22 BRPM | DIASTOLIC BLOOD PRESSURE: 81 MMHG | TEMPERATURE: 97.8 F | SYSTOLIC BLOOD PRESSURE: 126 MMHG

## 2020-01-15 DIAGNOSIS — F41.9 ANXIETY: ICD-10-CM

## 2020-01-15 DIAGNOSIS — J20.9 ACUTE BRONCHITIS, UNSPECIFIED ORGANISM: ICD-10-CM

## 2020-01-15 DIAGNOSIS — J45.40 MODERATE PERSISTENT ASTHMA WITHOUT COMPLICATION: Primary | ICD-10-CM

## 2020-01-15 DIAGNOSIS — Z79.899 CONTROLLED SUBSTANCE AGREEMENT SIGNED: ICD-10-CM

## 2020-01-15 RX ORDER — CODEINE PHOSPHATE AND GUAIFENESIN 10; 100 MG/5ML; MG/5ML
2 SOLUTION ORAL DAILY PRN
Qty: 180 ML | Refills: 0 | Status: SHIPPED | OUTPATIENT
Start: 2020-01-15 | End: 2020-01-31

## 2020-01-15 RX ORDER — PREDNISONE 20 MG/1
TABLET ORAL
Qty: 30 TABLET | Refills: 0 | Status: SHIPPED | OUTPATIENT
Start: 2020-01-15 | End: 2020-01-31

## 2020-01-15 RX ORDER — ALPRAZOLAM 1 MG
1 TABLET ORAL 3 TIMES DAILY PRN
Qty: 75 TABLET | Refills: 0 | Status: SHIPPED | OUTPATIENT
Start: 2020-01-15 | End: 2020-02-13

## 2020-01-16 ASSESSMENT — ASTHMA QUESTIONNAIRES: ACT_TOTALSCORE: 9

## 2020-01-22 ENCOUNTER — OFFICE VISIT (OUTPATIENT)
Dept: FAMILY MEDICINE | Facility: CLINIC | Age: 45
End: 2020-01-22
Payer: COMMERCIAL

## 2020-01-22 VITALS
TEMPERATURE: 98 F | HEART RATE: 105 BPM | DIASTOLIC BLOOD PRESSURE: 76 MMHG | OXYGEN SATURATION: 95 % | RESPIRATION RATE: 18 BRPM | SYSTOLIC BLOOD PRESSURE: 111 MMHG

## 2020-01-22 DIAGNOSIS — F41.9 ANXIETY: Primary | ICD-10-CM

## 2020-01-22 DIAGNOSIS — F40.01 PANIC DISORDER WITH AGORAPHOBIA: ICD-10-CM

## 2020-01-22 RX ORDER — ALPRAZOLAM 2 MG
2 TABLET ORAL 3 TIMES DAILY PRN
Qty: 12 TABLET | Refills: 0 | Status: SHIPPED | OUTPATIENT
Start: 2020-01-26 | End: 2020-01-31

## 2020-01-22 RX ORDER — CITALOPRAM HYDROBROMIDE 10 MG/1
10 TABLET ORAL DAILY
Qty: 30 TABLET | Refills: 0 | Status: SHIPPED | OUTPATIENT
Start: 2020-01-22 | End: 2020-02-13

## 2020-01-22 RX ORDER — ALPRAZOLAM 2 MG
2 TABLET ORAL 2 TIMES DAILY PRN
Qty: 4 TABLET | Refills: 0 | Status: SHIPPED | OUTPATIENT
Start: 2020-01-30 | End: 2020-02-13

## 2020-01-22 ASSESSMENT — ANXIETY QUESTIONNAIRES
6. BECOMING EASILY ANNOYED OR IRRITABLE: NEARLY EVERY DAY
2. NOT BEING ABLE TO STOP OR CONTROL WORRYING: NEARLY EVERY DAY
5. BEING SO RESTLESS THAT IT IS HARD TO SIT STILL: NEARLY EVERY DAY
GAD7 TOTAL SCORE: 21
1. FEELING NERVOUS, ANXIOUS, OR ON EDGE: NEARLY EVERY DAY
7. FEELING AFRAID AS IF SOMETHING AWFUL MIGHT HAPPEN: NEARLY EVERY DAY
3. WORRYING TOO MUCH ABOUT DIFFERENT THINGS: NEARLY EVERY DAY

## 2020-01-22 ASSESSMENT — PATIENT HEALTH QUESTIONNAIRE - PHQ9
5. POOR APPETITE OR OVEREATING: NEARLY EVERY DAY
SUM OF ALL RESPONSES TO PHQ QUESTIONS 1-9: 21

## 2020-01-22 NOTE — PROGRESS NOTES
"       HPI:   Darlene Hudson is a 44 year old  female who presents for:    Chief Complaint   Patient presents with     Anxiety     f/u anxiety     Medication Reconciliation     complete     Anxiety:  Recent life changer. Son recently diagnosed with ADHD, developmental delay, \"mental health issues\". They told her this is very severe.  This \"study\" was done on anxiety. She is having lots of symptoms such as mood changes, can't sit still, can't sleep. Right now for anxiety she is prescribed xanax 1 mg 3 times a day but is taking 3 mg 3 times daily. She has had anxiety for years. SHe notes though it got much worse when she had pericarditis I February 2017 and had to have a \"window\" placed. She has only had xanax for anxiety and never tried serotonin specific reuptake inhibitor she stated initially but on review of some of these medication names she thinks she may have tried 1-2. Rating PHQ-9 and ACE in 20's. No suicidal ideation.         PMHX:     Patient Active Problem List   Diagnosis     Abnormal cytology finding     Nondependent alcohol abuse, episodic drinking behavior     Controlled substance agreement signed     Low back pain     Chronic sinusitis     Cocaine abuse (H)     Major depressive disorder, recurrent episode, moderate (H)     Tobacco use disorder     Noninflammatory disorder of vagina     Pap smear for cervical cancer screening     Abdominal pain     Abnormal cervical Papanicolaou smear     Panic disorder with agoraphobia     Atopic rhinitis     Chronic low back pain     Moderate persistent asthma     Other long term (current) drug therapy     Acute pericardial effusion     Anxiety     Chronic infectious pericarditis       Current Outpatient Medications   Medication Sig Dispense Refill     albuterol (PROAIR HFA/PROVENTIL HFA/VENTOLIN HFA) 108 (90 Base) MCG/ACT inhaler Inhale 2 puffs into the lungs every 4 hours as needed for shortness of breath / dyspnea or wheezing 8.5 g 3     ALPRAZolam (XANAX) 1 MG " tablet Take 1 tablet (1 mg) by mouth 3 times daily as needed for anxiety 75 tablet 0     cetirizine (ZYRTEC) 10 MG tablet Take 1 tablet (10 mg) by mouth daily 30 tablet 11     fluticasone (FLONASE) 50 MCG/ACT nasal spray Spray 2 sprays into both nostrils daily 9.9 mL 11     fluticasone-salmeterol (ADVAIR) 100-50 MCG/DOSE inhaler Inhale 1 puff into the lungs every 12 hours 1 Inhaler 2     gabapentin (NEURONTIN) 600 MG tablet Take 1 tablet (600 mg) by mouth 3 times daily as needed (pain)       guaiFENesin-codeine (ROBITUSSIN AC) 100-10 MG/5ML solution Take 10 mLs by mouth daily as needed for cough 180 mL 0     metroNIDAZOLE (METROGEL) 0.75 % external gel Apply topically 2 times daily as needed (bacterial infection) for 5 day treatment 45 g 0     montelukast (SINGULAIR) 10 MG tablet Take 1 tablet (10 mg) by mouth At Bedtime 30 tablet 11     phenol (CHLORASEPTIC) 1.4 % spray Take 5 sprays by mouth every 2 hours as needed for pain       predniSONE (DELTASONE) 20 MG tablet Take 3 tablets (60 mg) by mouth daily for 5 days, THEN 2 tablets (40 mg) daily for 5 days, THEN 1 tablet (20 mg) daily for 5 days. 30 tablet 0     ranitidine (ZANTAC) 300 MG tablet Take 1 tablet (300 mg) by mouth daily as needed for heartburn 30 tablet 1     acetaminophen-codeine (TYLENOL #3) 300-30 MG tablet Take 1 tablet by mouth 2 times daily as needed for severe pain (Patient not taking: Reported on 1/3/2020) 60 tablet 0     ipratropium (ATROVENT) 0.03 % nasal spray Spray 2 sprays into both nostrils 2 times daily (Patient not taking: Reported on 1/3/2020) 30 mL 1       Social History     Tobacco Use     Smoking status: Current Some Day Smoker     Packs/day: 0.25     Types: Cigarettes     Smokeless tobacco: Never Used     Tobacco comment: About 2 cig/day   Substance Use Topics     Alcohol use: Not Currently     Alcohol/week: 0.0 standard drinks     Drug use: Yes     Types: Codeine       Social History     Social History Narrative     Not on file  "      Allergies   Allergen Reactions     Lidocaine Other (See Comments)     \"my jaw stopped moving\"  Other reaction(s): Dystonia     Penicillins Hives, Rash and Shortness Of Breath     Epinephrine-Lidocaine-Na Metabisulfite Other (See Comments) and Muscle Pain (Myalgia)     Severe jaw cramping, double vision  Jaw locking       No results found for this or any previous visit (from the past 24 hour(s)).         Review of Systems:    ROS: 10 point ROS neg other than the symptoms noted above in the HPI.         Physical Exam:     Vitals:    01/22/20 1517   BP: 111/76   Pulse: 105   Resp: 18   Temp: 98  F (36.7  C)   TempSrc: Oral   SpO2: 95%     There is no height or weight on file to calculate BMI.    General: Alert, well-appearing female in NAD  HEENT: PERRL, moist oral mucus membranes  Pulm: CTA BL, no tachypnea  CV: RRR, no murmur  Psych: anxious appearing, normal hygiene, normal thought content, somewhat fast speech but not pressed, non tangential, non circumferential, does not apear to be responding to internal stimuli    Assessment and Plan   (F41.9) Anxiety  (primary encounter diagnosis)  (F40.01) Panic disorder with agoraphobia  Comment: Patient follows with Dr. Bailon normally but he is now on paternity leave for 3 months and so I will be assuming care of patient's benxodiazapines/anxiety. Patient was inherited on these medications for anxiety. Have been attempting to reduce overtime but difficult. She is now to 1 mg TID however she has been taking 3 times this as above due to recent life stressors. Shared decision making about reducing this over the next two weeks to 6 mg daily and then 4 mg daily then back to 3 mg daily. She has another appointment with me at the end of this titration to continue this and so should not receive medication from any other provider. Will start her on citalopram as well and place mental health referral to start counseling.   Plan: ALPRAZolam (XANAX) 2 MG tablet, ALPRAZolam         " (XANAX) 2 MG tablet, citalopram (CELEXA) 10 MG         tablet, [facility]        -          Follow up: with me in approximately 2 weeks  Options for treatment and follow-up care were reviewed with the patient and/or guardian. Darelne Hudson and/or guardian engaged in the decision making process and verbalized understanding of the options discussed and agreed with the final plan.    Dr. Aniceto Phan, PGY3    Precepted with: Dr. Garcia

## 2020-01-23 ASSESSMENT — ANXIETY QUESTIONNAIRES: GAD7 TOTAL SCORE: 21

## 2020-01-27 ENCOUNTER — DOCUMENTATION ONLY (OUTPATIENT)
Dept: PSYCHOLOGY | Facility: CLINIC | Age: 45
End: 2020-01-27

## 2020-01-27 NOTE — PROGRESS NOTES
This is a referral for newer onset anxiety following recent assessment/diagnosis of severe mental health conditions. Please assist patient in scheduling with any of the following for psychotherapy:    [facility]  [address], [place] [address]  [phone] Phone  [phone] Fax  Hours: M-F 7:30-5:30pm    [facility] ([facility]  [address]  [place] [address]  [phone] (for appointments)  Fax: [phone]  7:30 am - 5 pm M-F, appointments available on     [facility] ([facility]) [facility]  [address]  [place] [address]  [phone] [place]  [phone] Fax  Hours:  Monday - Friday 8:30 - 5:00pm  8:00am - 5:00pm Saturday    [facility]  [address]  [place] [address]  [phone] Phone  [phone] Fax  M-F 7:30AM-7PM  Saturday 8AM-2PM    [facility] ([facility]  [address] [place] [address] [address],   [place] [address]  [phone] Phone  [phone] Fax  M-Th: 8:30-5pm  F: 8:30-4:30pm    [facility]  [address] [place] [address]  [phone] Phone  [phone] Fax  Monday-Friday: 9am -9pm  : 9am- 2pm    [address] [address]  [phone] Phone  M-Sat 8-8pm          ===View-only below this line===  ----- Message -----  From: Aniceto Phan MD  Sent: 2020   4:01 PM CST  To: [facility]  Subject: Order for ANGELA LIM           [id number]               : 1975  F     [address]                            PCP: [id number] ISAACS [id number]                                 CTR: [facility]        Name: ANGELA LIM Date: 2020    Home: [phone]    Payor:               BLUE PLUS  Plan:                 [employer]US ADVANTAGE MA  Sponsor Code:       [id number]  Subscriber ID:      [id number]  Subscriber Name:    ANGELA HUDSON  Subscriber Address: [address] [place] [address] [address]                      [place] [id number]    Effective From:     19  Effective To:         Group Number:       [id number]  Group Name  : Not Available      Date       Provider                   Department   Center         2020  [id number]-KMKHGODMIKE GONZALEZ  VFAM        [facility] Owned    Order Date:2020  Ordering User:MIKE GONZALEZ [[email or web address]]  Encounter Provider:Mike Gonzalez MD [[id number]]  Authorizing Provider: Mike Gonzalez MD [[id number]]  Department:[facility][[id number]]    Ordering Provider NPI: [id number]  Mike Gonzalez  [facility]~[address], [place] [address]  Phone: [phone]        Procedure Requested      5240.327 [facility]* [#[id number]]      Priority: Routine  Class: External referral      Comment: needed: No               Language: English    Associated Diagnoses      F40.01 Panic disorder with agoraphobia      Reason for Referral:  Anxiety         Adult or Child/Adolescent:  Adult         Type of Referral (Indicate all that apply):  Psychotherapy - for diagnosis                                                     and non-pharmacological                                                   treatment         Currently receiving mental health services (if 'Yes', use free zhou box -   what services and why today's referral?)  No         Previous psych hospitalization:  Unknown         Angela Hudson           [id number]               : 1975  F     [address] [facility] [address]                            PCP: [id number] [id number]         [facility] [id number]                                 CTR: [facility]      Comments         [facility]

## 2020-01-31 ENCOUNTER — RECORDS - HEALTHEAST (OUTPATIENT)
Dept: ADMINISTRATIVE | Facility: OTHER | Age: 45
End: 2020-01-31

## 2020-01-31 ENCOUNTER — OFFICE VISIT (OUTPATIENT)
Dept: FAMILY MEDICINE | Facility: CLINIC | Age: 45
End: 2020-01-31
Payer: COMMERCIAL

## 2020-01-31 VITALS
OXYGEN SATURATION: 100 % | HEART RATE: 84 BPM | RESPIRATION RATE: 18 BRPM | SYSTOLIC BLOOD PRESSURE: 117 MMHG | TEMPERATURE: 97.9 F | DIASTOLIC BLOOD PRESSURE: 80 MMHG

## 2020-01-31 DIAGNOSIS — F40.01 PANIC DISORDER WITH AGORAPHOBIA: ICD-10-CM

## 2020-01-31 DIAGNOSIS — F19.10 POLYSUBSTANCE ABUSE (H): ICD-10-CM

## 2020-01-31 DIAGNOSIS — F41.9 ANXIETY: ICD-10-CM

## 2020-01-31 DIAGNOSIS — Z79.899 CONTROLLED SUBSTANCE AGREEMENT SIGNED: Primary | ICD-10-CM

## 2020-01-31 DIAGNOSIS — J45.21 MILD INTERMITTENT ASTHMA WITH EXACERBATION: ICD-10-CM

## 2020-01-31 RX ORDER — ALPRAZOLAM 2 MG
2 TABLET ORAL 2 TIMES DAILY PRN
Qty: 10 TABLET | Refills: 0 | Status: SHIPPED | OUTPATIENT
Start: 2020-01-31 | End: 2020-02-13

## 2020-01-31 RX ORDER — ALPRAZOLAM 1 MG
1 TABLET ORAL 3 TIMES DAILY PRN
Qty: 30 TABLET | Refills: 0 | Status: SHIPPED | OUTPATIENT
Start: 2020-02-06 | End: 2020-02-13

## 2020-01-31 NOTE — PATIENT INSTRUCTIONS
My Asthma Action Plan  Name: Darlene Hudson  YOB: 1975  Date: 1/31/2020   My doctor: Robbie Bailon   My clinic:   [facility] [address] [place] [address]  [phone]    My Asthma Severity: Moderate Persistent Avoid your asthma triggers: smoke, humidity and cold air      GREEN ZONE   Good Control    I feel good    No cough or wheeze    Can work, sleep and play without asthma symptoms       Take your asthma control medicine every day.  Take the medications listed below daily.    Advair  Diskus 250/50 mcg 1 inhalation twice a day    1. If exercise triggers your asthma, take your rescue medication (2 puffs of albuterol, Ventolin/Pro-Air) 15 minutes before exercise or sports, and during exercise if you have asthma symptoms.  2. Spacer to use with inhaler: If you have a spacer, make sure to use it with your inhaler.              YELLOW ZONE Getting Worse  I have ANY of these:    I do not feel good    Cough or wheeze    Chest feels tight    Wake up at night   1. Keep taking your Green Zone medications.  2. Start taking your rescue medicine (1-2 puffs of albuterol - Ventolin/Pro-Air) every 4-6 hours as needed.  3. If symptoms are not controlled with above, can take 2 puffs every 20 minutes for up to 1 hour, then continue every 4 hours if needed.   4. If you do not return to the Green Zone in 12-24 hours or you get worse, call the clinic.         RED ZONE Medical Alert - Get Help  I have ANY of these:    I feel awful    Medicine is not helping    Breathing getting harder    Trouble walking or talking    Nose opens wide to breathe       1. Take your rescue medicine NOW (6-8 puffs of albuterol - Ventolin/Pro-Air) for every 20 minutes for up to 1 hour.  2. Call your doctor NOW.  3. If you are still in the Red Zone after 20 minutes and you have not reached your doctor:    Take your rescue medicine again (6-8 puffs of albuterol - Ventolin/Pro-Air) and    Call 911 or go to the emergency  room right away    See your regular doctor within 1 weeks of an Emergency Room or Urgent Care visit for follow-up treatment.        This Asthma Action Plan provides authorization for the administration of medication described in the AAP.  YES    Electronically signed by: Aniceto Phan MD    Annual Reminders:  Meet with Asthma Educator,  Flu Shot in the Fall, Pneumonia Shot  Pharmacy: [facility] #[id number] [address] AT [facility] [address] [facility]    Referral for :     Pulmonary (PFT)   LOCATION/PLACE/Provider :    [facility]   DATE & TIME :    Location will call patient   PHONE :     [phone]  FAX :    [phone]  Appointment made by clinic staff/:    Kirstie
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
## 2020-01-31 NOTE — PROGRESS NOTES
Preceptor Attestation:  Patient's case reviewed and discussed with  Patient seen and discussed with the resident..  I agree with written assessment and plan of care.  Supervising Physician:  Anaid Vogel MD  [facility]

## 2020-02-04 ENCOUNTER — AMBULATORY - HEALTHEAST (OUTPATIENT)
Dept: PULMONOLOGY | Facility: OTHER | Age: 45
End: 2020-02-04

## 2020-02-04 DIAGNOSIS — J45.909 ASTHMA: ICD-10-CM

## 2020-02-04 DIAGNOSIS — J45.21 EXACERBATION OF INTERMITTENT ASTHMA, UNSPECIFIED ASTHMA SEVERITY: ICD-10-CM

## 2020-02-05 ASSESSMENT — ASTHMA QUESTIONNAIRES: ACT_TOTALSCORE: 19

## 2020-02-06 ENCOUNTER — TELEPHONE (OUTPATIENT)
Dept: FAMILY MEDICINE | Facility: CLINIC | Age: 45
End: 2020-02-06

## 2020-02-06 NOTE — TELEPHONE ENCOUNTER
Called and spoke to pt to inform her of MD's recommendation. Pt states understanding and will wait til her next appt to discuss medication taste with MD and will bring in script as well for Md to change. --Gaurav, CMA

## 2020-02-06 NOTE — TELEPHONE ENCOUNTER
No, I am not in clinic and cannot make this change. She should continue our titration plan as we previously discussed. She may take only 2 mg a day by taking 2 pills at one time. She should not take more than 3 mg a day however. Please call and inform.    Dr. Aniceto Phan

## 2020-02-06 NOTE — TELEPHONE ENCOUNTER
[facility] phone call message- medication clarification/question:    Full Medication Name: Alprazolam 1 mg   Strength:     Have you contacted your pharmacy about this refill request?     If  Yes,  which pharmacy?    When did you contact the pharmacy?    Additional comments/concerns from call to pharmacy:    Reason for call to clinic: Calling to see if Dr. Fernandez can change the new Rx sent to 2 mg a day instead of the 1 mg 3 times a day as the 2 mg a day works better for her. She is wondering if she brings back the script in to get a new script? She states she doesn't want to fill it because she was the Rx changed. Told her it could take up to two business days for a response. Please call and advise.       OK to leave a message on voice mail?     Primary language: English      needed? No    Call taken on February 6, 2020 at 11:55 AM by John Hdez

## 2020-02-07 ENCOUNTER — TELEPHONE (OUTPATIENT)
Dept: FAMILY MEDICINE | Facility: CLINIC | Age: 45
End: 2020-02-07

## 2020-02-07 NOTE — TELEPHONE ENCOUNTER
Darlene calling to schedule an appointment, hospital follow up no appt time adequate available for today. Admitted from ED for further observation, patient left AMA.  After further speaking with Darlene, recommendation given was to return to ED for re-evaluation. C/o chest burning, shortness of breath, wheezing.  RLchaparro RN

## 2020-02-13 ENCOUNTER — TELEPHONE (OUTPATIENT)
Dept: FAMILY MEDICINE | Facility: CLINIC | Age: 45
End: 2020-02-13

## 2020-02-13 ENCOUNTER — OFFICE VISIT (OUTPATIENT)
Dept: FAMILY MEDICINE | Facility: CLINIC | Age: 45
End: 2020-02-13
Payer: COMMERCIAL

## 2020-02-13 VITALS
TEMPERATURE: 97.8 F | RESPIRATION RATE: 16 BRPM | SYSTOLIC BLOOD PRESSURE: 129 MMHG | OXYGEN SATURATION: 98 % | HEART RATE: 74 BPM | DIASTOLIC BLOOD PRESSURE: 87 MMHG

## 2020-02-13 DIAGNOSIS — R09.81 CONGESTION OF PARANASAL SINUS: ICD-10-CM

## 2020-02-13 DIAGNOSIS — L70.0 ACNE VULGARIS: ICD-10-CM

## 2020-02-13 DIAGNOSIS — N76.0 BACTERIAL VAGINOSIS: Primary | ICD-10-CM

## 2020-02-13 DIAGNOSIS — J45.40 MODERATE PERSISTENT ASTHMA WITHOUT COMPLICATION: ICD-10-CM

## 2020-02-13 DIAGNOSIS — J30.2 SEASONAL ALLERGIC RHINITIS, UNSPECIFIED TRIGGER: ICD-10-CM

## 2020-02-13 DIAGNOSIS — F40.01 PANIC DISORDER WITH AGORAPHOBIA: ICD-10-CM

## 2020-02-13 DIAGNOSIS — B96.89 BACTERIAL VAGINOSIS: Primary | ICD-10-CM

## 2020-02-13 PROBLEM — R05.9 COUGH: Status: ACTIVE | Noted: 2020-02-09

## 2020-02-13 RX ORDER — CETIRIZINE HYDROCHLORIDE 10 MG/1
10 TABLET ORAL DAILY
Qty: 30 TABLET | Refills: 11 | Status: SHIPPED | OUTPATIENT
Start: 2020-02-13 | End: 2020-12-16

## 2020-02-13 RX ORDER — CITALOPRAM HYDROBROMIDE 20 MG/1
20 TABLET ORAL DAILY
Qty: 30 TABLET | Refills: 0 | Status: SHIPPED | OUTPATIENT
Start: 2020-02-13 | End: 2020-03-12

## 2020-02-13 RX ORDER — METRONIDAZOLE 7.5 MG/G
GEL VAGINAL
Qty: 70 G | Refills: 0 | Status: SHIPPED | OUTPATIENT
Start: 2020-02-13 | End: 2020-05-28

## 2020-02-13 RX ORDER — HYDROXYZINE PAMOATE 25 MG/1
25 CAPSULE ORAL
COMMUNITY
Start: 2020-02-09 | End: 2020-06-03

## 2020-02-13 RX ORDER — ALPRAZOLAM 2 MG/1
2 TABLET, ORALLY DISINTEGRATING ORAL DAILY PRN
Qty: 30 TABLET | Refills: 0 | Status: SHIPPED | OUTPATIENT
Start: 2020-02-13 | End: 2020-03-12

## 2020-02-13 RX ORDER — MONTELUKAST SODIUM 10 MG/1
10 TABLET ORAL AT BEDTIME
Qty: 30 TABLET | Refills: 11 | Status: SHIPPED | OUTPATIENT
Start: 2020-02-13 | End: 2020-10-09

## 2020-02-13 ASSESSMENT — PATIENT HEALTH QUESTIONNAIRE - PHQ9: SUM OF ALL RESPONSES TO PHQ QUESTIONS 1-9: 5

## 2020-02-13 NOTE — LETTER
February 13, 2020       TO: Darlene Hudson  [address]  [place] [address]         Dear Darlene,      Here is the mental health referral information we discussed 2/13/2020. I have faxed over all of the referral information to [facility]. Please feel free to contact myself for any questions, concern's or would like additional assistance.    [facility]  [address] [place] [address]  [phone] Phone  [phone] Fax  Monday-Friday: 9am -9pm  Saturdays: 9am- 2pm     [facility] ([facility] Office  [address]   [address]  [place] [address]  [phone] (for appointments)  Fax: [phone]  7:30 am - 5 pm M-F, appointments available on Saturdays     [facility] ([facility]) [address]  [address]  [place] [address]  [phone] [place]  [phone] Fax  Hours:  Monday - Friday 8:30 - 5:00pm  8:00am - 5:00pm Saturday     [facility]  [address]  [place] [address]  [phone] Phone  [phone] Fax  M-F 7:30AM-7PM  Saturday 8AM-2PM     [facility] ([facility])  [address] [place] [address] [address],   [place] [address]  [phone] Phone  [phone] Fax  M-Th: 8:30-5pm  F: 8:30-4:30pm       [address],  [place] [address]  [phone] Phone  M-Sat 8-8pm     [facility]  [address]  [place], [place] [address]  [phone] Phone  [phone] Fax  Hours: M-F 7:30-5:30pm     Sincerely,      Ksenia Townsend [facility], Care Coordinator  Direct Phone: [phone]

## 2020-02-13 NOTE — TELEPHONE ENCOUNTER
Called patient and lvm. Patient appointment on 2/18/2020 was scheduled wrong, patient needs procedure. No current openings with alex and Esther preceptor if patient is ok seeing sumanthgen on 2/19 ras and cook will be precepting in the PM.

## 2020-02-13 NOTE — PROGRESS NOTES
HPI:   Darlene Hudson is a 44 year old  female who presents for:    Chief Complaint   Patient presents with     Asthma     f/u     Anxiety     f/u     Medication Reconciliation     complete       On Chart Review  Recently discharged from [facility] due to Pyelonephritis, Discharge Summary Copied Below:     Hospital Summary:   Darlene Hudson is a 44 y.o. female with PMH significant for asthma, chronic infectious pericarditis who presents for evaluation after leaving AMA from [facility], where she was being treated for pyelonephritis. She was treated for UTI here.     #Flank Pain - improved  Cystitis - improved  Patient with UA that shows positive LE, many bacteria, some leukocytes and many squamous cells.  Positive left CVA tenderness.  She was recently admitted at [facility] for pyelonephritis and received 1 dose of Rocephin.  She received her second dose in the ED.  UA appears contaminated, so unclear what yield blood culture will bring.  Patient was not septic during admission.  Differential diagnosis also includes nephrolithiasis given blood on the UA and flank tenderness.  CT abd/pelvis w/o contrast did not show any acute pathology. Urine culture from [facility] showed <10,000 CFU of mixed bacteria. Pain approximately the same. Influenza rapid test was negative. No acute pathology determined to be cause of flank pain. Will still treat patient for cystitis with Bactrim, for a total of 7 days of antibiotics.     #Asthma  Cough - improved  Currently on Advair, Singulair, as needed albuterol.  Reports a congestive cough.  Chest x-ray was negative for infection.  Suspect likely viral illness.  Influenza rapid was negative. Received albuterol nebs as inpatient.     #Anxiety  Follows with Dr. Fernandez.  Patient is currently on 1 mg Xanax 3 times daily and is being weaned off this medication.  Per Dr. Fernandez's instruction will decrease to 2 mg once daily as needed.  This is to be continued on  "discharge until she can meet with him on 2/13/2020. Patient did well on new regimen of 2mg Ativan daily. Sent home with script for 5 days of Ativan oral.    #Recurrent BV  Multiple months of repeat BV per patient. She usually just gets vaginal metronidazole, which seems to help. However, it keeps returning. She has had the same sexual partner during this time, and they don't use condoms. She does use douching. Recommended that patient stop douching. Then, recommended that she come into clinic during next flare for work up.    #Subcutaneous nodule  Multiple months of subcutaneous nodule that has changed in size and does cause discomfort with pressure. Mentioned in the past by breast surgeon that she should have that taken out. She was told by previous PCP that it was a lipoma. Stated that patient could have this taken out by new PCP whenever is convienent as likely is benign soft tissue tumor.    #Cavernous hemangioma  Incidental finding on CT abd/pelvis    Today:  Cold like Symptoms: is feeling congested with a sore throat and bad cough. She has used her albuterol rescue inhaler 3 times today which helped but only for a short time. Some wheezing, rhinorrhea, congestion. No fevers or ear pain.  No rashes    Xanax titration:   Brief review of patient's hx- She has had anxiety for years. She noted though it got much worse when she had pericarditis I February 2017 and had to have a \"window\" placed. She has only had xanax for anxiety and never tried serotonin specific reuptake inhibitor. I assumed care of patient when her PCP Dr. Bailon went on paternity leave. He inherited this patient from a previous provider who had placed patient on chronic benzodiazepines. He was attempting to titrate patient off these. I am continuing this titration. Currently she is taking 2 mg daily PRN of xanax, was  Previously on 1 mg TID PRN xanax.     Anxiety and Depression Follow-up:   Started citalopram as above. Currently on 10 mg daily and " she feels it is not doing anything for her anxiety. Has taken approximately 2-3 weeks of this.          PMHX:     Patient Active Problem List   Diagnosis     Abnormal cytology finding     Nondependent alcohol abuse, episodic drinking behavior     Controlled substance agreement signed     Low back pain     Chronic sinusitis     Major depressive disorder, recurrent episode, moderate (H)     Tobacco use disorder     Noninflammatory disorder of vagina     Pap smear for cervical cancer screening     Abdominal pain     Abnormal cervical Papanicolaou smear     Panic disorder with agoraphobia     Atopic rhinitis     Chronic low back pain     Moderate persistent asthma     Other long term (current) drug therapy     Acute pericardial effusion     Anxiety     Chronic infectious pericarditis     Polysubstance abuse (H)       Current Outpatient Medications   Medication Sig Dispense Refill     albuterol (PROAIR HFA/PROVENTIL HFA/VENTOLIN HFA) 108 (90 Base) MCG/ACT inhaler Inhale 2 puffs into the lungs every 4 hours as needed for shortness of breath / dyspnea or wheezing 8.5 g 3     ALPRAZolam (XANAX) 1 MG tablet Take 1 tablet (1 mg) by mouth 3 times daily as needed for anxiety 30 tablet 0     ALPRAZolam (XANAX) 1 MG tablet Take 1 tablet (1 mg) by mouth 3 times daily as needed for anxiety 75 tablet 0     cetirizine (ZYRTEC) 10 MG tablet Take 1 tablet (10 mg) by mouth daily 30 tablet 11     citalopram (CELEXA) 10 MG tablet Take 1 tablet (10 mg) by mouth daily 30 tablet 0     fluticasone (FLONASE) 50 MCG/ACT nasal spray Spray 2 sprays into both nostrils daily 9.9 mL 11     fluticasone-salmeterol (ADVAIR) 250-50 MCG/DOSE inhaler Inhale 1 puff into the lungs every 12 hours 1 Inhaler 2     gabapentin (NEURONTIN) 600 MG tablet Take 1 tablet (600 mg) by mouth 3 times daily as needed (pain)       metroNIDAZOLE (METROGEL) 0.75 % external gel Apply topically 2 times daily as needed (bacterial infection) for 5 day treatment 45 g 0      "montelukast (SINGULAIR) 10 MG tablet Take 1 tablet (10 mg) by mouth At Bedtime 30 tablet 11     phenol (CHLORASEPTIC) 1.4 % spray Take 5 sprays by mouth every 2 hours as needed for pain       ranitidine (ZANTAC) 300 MG tablet Take 1 tablet (300 mg) by mouth daily as needed for heartburn 30 tablet 1       Social History     Tobacco Use     Smoking status: Current Some Day Smoker     Packs/day: 0.25     Types: Cigarettes     Smokeless tobacco: Never Used     Tobacco comment: About 2 cig/day   Substance Use Topics     Alcohol use: Not Currently     Alcohol/week: 0.0 standard drinks     Drug use: Yes     Types: Codeine       Social History     Social History Narrative     Not on file       Allergies   Allergen Reactions     Lidocaine Other (See Comments)     \"my jaw stopped moving\"  Other reaction(s): Dystonia     Penicillins Hives, Rash and Shortness Of Breath     Epinephrine-Lidocaine-Na Metabisulfite Other (See Comments) and Muscle Pain (Myalgia)     Severe jaw cramping, double vision  Jaw locking       No results found for this or any previous visit (from the past 24 hour(s)).         Review of Systems:    ROS: 10 point ROS neg other than the symptoms noted above in the HPI.         Physical Exam:     Vitals:    02/13/20 1003   BP: 129/87   Pulse: 74   Resp: 16   Temp: 97.8  F (36.6  C)   TempSrc: Oral   SpO2: 98%     There is no height or weight on file to calculate BMI.    General: Alert, well-appearing female in NAD  HEENT: PERRL, moist oral mucus membranes, some rhinorrhea, TM's gray dull with structures seen bilaterally, oropharynx without exudates, mild cervical adenopathy present  Pulm: CTA BL, no tachypnea  CV: RRR, no murmur  Abd: soft, NTND, no masses  Ext: Warm, well perfused, no LE edema  Skin: No rash on limited skin exam  Psych: Anxious mood, full affect, rational thought content and process    Assessment and Plan   (N76.0,  B96.89) Bacterial vaginosis  (primary encounter diagnosis)  Comment: Refilled " PRN metrogel today.  Recurrent BV. Unable to address more thoroughly today due to time constraints.  Plan: metroNIDAZOLE (METROGEL) 0.75 % vaginal gel    (J45.40) Moderate persistent asthma without complication  Comment: Reffiled  Plan: montelukast (SINGULAIR) 10 MG tablet            (J30.2) Seasonal allergic rhinitis, unspecified trigger  Comment: Refilled  Plan: cetirizine (ZYRTEC) 10 MG tablet      (F40.01) Panic disorder with agoraphobia  Comment: Increase Citalopram from 10 to 20 mg daily for anxiety, agoraphobia, depression. Will continue Xanax titration to 2 mg daily xanax PRN. With plans to consider going to 1.5 mg in 1 month  Plan: citalopram (CELEXA) 20 MG tablet, ALPRAZolam         (XANAX) 2 MG ODT    Follow up: 1 month for anxiety/xanax taper, discuss incidental findings of incidental cavernous hemangioma CT abdomen, Separate follow up for procedure Lipoma removal    Options for treatment and follow-up care were reviewed with the patient and/or guardian. Darlene Hudson and/or guardian engaged in the decision making process and verbalized understanding of the options discussed and agreed with the final plan.    Dr. Aniceto Phan, PGY3    Precepted with: Dr. Garcia

## 2020-02-13 NOTE — TELEPHONE ENCOUNTER
Out going call made to review mental health referral. Patient is requesting referral to be sent to [facility]. Karoline has been provided with scheduling phone number, per request letter with complete referral information to be mailed to confirmed home address listed in chart.   Referral, face sheet and most recent clinic visit note faxed/confirmed to [facility] [phone].

## 2020-02-14 ASSESSMENT — ASTHMA QUESTIONNAIRES: ACT_TOTALSCORE: 9

## 2020-02-14 NOTE — TELEPHONE ENCOUNTER
Message received requesting patient insurance and demographic information was missing. Demographics re-faxed/confirmed [phone] to [facility]. Clinic advised requested demographics are actually on the actual referral form they received 2/13/2020.

## 2020-02-24 ENCOUNTER — RECORDS - HEALTHEAST (OUTPATIENT)
Dept: ADMINISTRATIVE | Facility: OTHER | Age: 45
End: 2020-02-24

## 2020-03-03 ENCOUNTER — HOSPITAL ENCOUNTER (OUTPATIENT)
Dept: RESPIRATORY THERAPY | Facility: HOSPITAL | Age: 45
Discharge: HOME | End: 2020-03-03

## 2020-03-03 DIAGNOSIS — J45.909 ASTHMA: ICD-10-CM

## 2020-03-12 ENCOUNTER — OFFICE VISIT (OUTPATIENT)
Dept: FAMILY MEDICINE | Facility: CLINIC | Age: 45
End: 2020-03-12
Payer: COMMERCIAL

## 2020-03-12 VITALS
DIASTOLIC BLOOD PRESSURE: 85 MMHG | HEART RATE: 86 BPM | RESPIRATION RATE: 18 BRPM | SYSTOLIC BLOOD PRESSURE: 130 MMHG | OXYGEN SATURATION: 99 % | TEMPERATURE: 98.1 F

## 2020-03-12 DIAGNOSIS — L30.9 ECZEMA, UNSPECIFIED TYPE: ICD-10-CM

## 2020-03-12 DIAGNOSIS — F40.01 PANIC DISORDER WITH AGORAPHOBIA: ICD-10-CM

## 2020-03-12 DIAGNOSIS — N39.46 URINARY INCONTINENCE, MIXED: Primary | ICD-10-CM

## 2020-03-12 DIAGNOSIS — R06.02 SOB (SHORTNESS OF BREATH) ON EXERTION: ICD-10-CM

## 2020-03-12 PROBLEM — J45.40 MODERATE PERSISTENT ASTHMA: Status: RESOLVED | Noted: 2017-01-27 | Resolved: 2020-03-12

## 2020-03-12 RX ORDER — DIAPER,BRIEF,INFANT-TODD,DISP
EACH MISCELLANEOUS 2 TIMES DAILY
Qty: 30 G | Refills: 0 | Status: SHIPPED | OUTPATIENT
Start: 2020-03-12 | End: 2020-09-29

## 2020-03-12 RX ORDER — ALPRAZOLAM 2 MG/1
2 TABLET, ORALLY DISINTEGRATING ORAL DAILY PRN
Qty: 30 TABLET | Refills: 0 | Status: SHIPPED | OUTPATIENT
Start: 2020-03-13 | End: 2020-04-08

## 2020-03-12 RX ORDER — OXYBUTYNIN CHLORIDE 5 MG/1
5 TABLET ORAL 2 TIMES DAILY
Qty: 60 TABLET | Refills: 0 | Status: SHIPPED | OUTPATIENT
Start: 2020-03-12 | End: 2020-04-09

## 2020-03-12 ASSESSMENT — ANXIETY QUESTIONNAIRES
1. FEELING NERVOUS, ANXIOUS, OR ON EDGE: SEVERAL DAYS
5. BEING SO RESTLESS THAT IT IS HARD TO SIT STILL: SEVERAL DAYS
6. BECOMING EASILY ANNOYED OR IRRITABLE: NOT AT ALL
GAD7 TOTAL SCORE: 3
2. NOT BEING ABLE TO STOP OR CONTROL WORRYING: NOT AT ALL
3. WORRYING TOO MUCH ABOUT DIFFERENT THINGS: NOT AT ALL
7. FEELING AFRAID AS IF SOMETHING AWFUL MIGHT HAPPEN: NOT AT ALL

## 2020-03-12 ASSESSMENT — PATIENT HEALTH QUESTIONNAIRE - PHQ9
SUM OF ALL RESPONSES TO PHQ QUESTIONS 1-9: 1
5. POOR APPETITE OR OVEREATING: SEVERAL DAYS

## 2020-03-12 NOTE — PROGRESS NOTES
"       HPI:   Darlene Hudson is a 44 year old  female who presents for:    Chief Complaint   Patient presents with     Anxiety     f/u anxiety. quit taking citalopram     Medication Reconciliation     complete     Results     respiratory testing on 3/3/20     Rash:  Right lateral malleolus  Going on a couple days  Burning and swelling with a lot of pruritis    ACE  Panic disorder with agoraphobia  She has had anxiety for years. She noted though it got much worse when she had pericarditis I February 2017 and had to have a \"window\" placed. She has only had xanax for anxiety and never tried serotonin specific reuptake inhibitor. I assumed care of patient when her PCP Dr. Bailon went on paternity leave. He inherited this patient from a previous provider who had placed patient on chronic benzodiazepines. He was attempting to titrate patient off these. I am continuing this titration. Currently she is taking 2 mg daily PRN of xanax, was  Previously on 1 mg TID PRN xanax. Plan on last visit was to consider titration to 1.5 mg daily today. Also increased citalopram from 10 to 20 mg daily. Today notes Stopped ctialopram because she states it was making her nauseous. Nausea has resolved since stopping. She feels her mood is good. She recently got a new job in [place]. She is really enjoying this. Has been working out.  She recently had an alcohol binge drinking episode. Now is sober for the last 27 days. PHQ-9 of 1 today.       SOB:  - Notices problems breathing all the time  - She notes she started getting SOB after heart surgery  - February 2017 she had a window placed for pericarditis   - Worse with walling and climbing stairs  - Sometimes has associated chest pain  - sometimes has a pressure sensation   - Risk factors for heart disease- 7.5 pack year smoking Hx, no HTN, no diabetes, BMI 37    Urinary frequency:  - urinary frequency and feels she has to go to the bathroom and if she doesn't she will wetr " herself  - Some leakage of urine if she coughs or laughs         PMHX:     Patient Active Problem List   Diagnosis     Abnormal cytology finding     Nondependent alcohol abuse, episodic drinking behavior     Controlled substance agreement signed     Low back pain     Chronic sinusitis     Major depressive disorder, recurrent episode, moderate (H)     Tobacco use disorder     Noninflammatory disorder of vagina     Pap smear for cervical cancer screening     Abdominal pain     Abnormal cervical Papanicolaou smear     Panic disorder with agoraphobia     Atopic rhinitis     Chronic low back pain     Moderate persistent asthma     Other long term (current) drug therapy     Acute pericardial effusion     Anxiety     Chronic infectious pericarditis     Polysubstance abuse (H)     Cough       Current Outpatient Medications   Medication Sig Dispense Refill     albuterol (PROAIR HFA/PROVENTIL HFA/VENTOLIN HFA) 108 (90 Base) MCG/ACT inhaler Inhale 2 puffs into the lungs every 4 hours as needed for shortness of breath / dyspnea or wheezing 8.5 g 3     ALPRAZolam (XANAX) 2 MG ODT Take 1 tablet (2 mg) by mouth daily as needed (anxiety) 30 tablet 0     cetirizine (ZYRTEC) 10 MG tablet Take 1 tablet (10 mg) by mouth daily 30 tablet 11     fluticasone (FLONASE) 50 MCG/ACT nasal spray Spray 2 sprays into both nostrils daily 9.9 mL 11     fluticasone-salmeterol (ADVAIR) 250-50 MCG/DOSE inhaler Inhale 1 puff into the lungs every 12 hours 1 Inhaler 2     gabapentin (NEURONTIN) 600 MG tablet Take 1 tablet (600 mg) by mouth 3 times daily as needed (pain)       hydrOXYzine (VISTARIL) 25 MG capsule Take 25 mg by mouth nightly as needed       metroNIDAZOLE (METROGEL) 0.75 % vaginal gel Apply 1 applicator daily for 5 days as needed for bacterial vaginosis symptoms 70 g 0     montelukast (SINGULAIR) 10 MG tablet Take 1 tablet (10 mg) by mouth At Bedtime 30 tablet 11     phenol (CHLORASEPTIC) 1.4 % spray Take 5 sprays by mouth every 2 hours as  "needed for pain       ranitidine (ZANTAC) 300 MG tablet Take 1 tablet (300 mg) by mouth daily as needed for heartburn 30 tablet 1     citalopram (CELEXA) 20 MG tablet Take 1 tablet (20 mg) by mouth daily (Patient not taking: Reported on 3/12/2020) 30 tablet 0       Social History     Tobacco Use     Smoking status: Current Some Day Smoker     Packs/day: 0.25     Types: Cigarettes     Smokeless tobacco: Never Used     Tobacco comment: About 2 cig/day   Substance Use Topics     Alcohol use: Not Currently     Alcohol/week: 0.0 standard drinks     Drug use: Yes     Types: Codeine       Social History     Social History Narrative     Not on file       Allergies   Allergen Reactions     Lidocaine Other (See Comments)     \"my jaw stopped moving\"  Other reaction(s): Dystonia     Penicillins Hives, Rash and Shortness Of Breath     Epinephrine-Lidocaine-Na Metabisulfite Other (See Comments) and Muscle Pain (Myalgia)     Severe jaw cramping, double vision  Jaw locking       No results found for this or any previous visit (from the past 24 hour(s)).         Review of Systems:    ROS: 10 point ROS neg other than the symptoms noted above in the HPI.         Physical Exam:     Vitals:    03/12/20 1526   BP: 130/85   Pulse: 86   Resp: 18   Temp: 98.1  F (36.7  C)   TempSrc: Oral   SpO2: 99%     There is no height or weight on file to calculate BMI.    General: Alert, well-appearing female in NAD  HEENT: PERRL, moist oral mucus membranes  Pulm: CTA BL, no tachypnea  CV: RRR, no murmur  Skin: erythematous/scaly/xerotic appearing macular rash over lateral malleolus of right foot, some mild swelling underneath     Assessment and Plan   (N39.46) Urinary incontinence, mixed  (primary encounter diagnosis)  Comment: Will refer for kegel therapy education and attempt to start ditropan for urgency symptoms  Plan: PHYSICAL THERAPY REFERRAL, oxybutynin         (DITROPAN) 5 MG tablet            (F40.01) Panic disorder with agoraphobia  Comment: " Continuing chronic xanax taper. Will keep dose at 2 mg daily for now.  Plan: ALPRAZolam (XANAX) 2 MG ODT            (R06.02) SOB (shortness of breath) on exertion  Comment: Originally diagnosed with asthma but inhalers not improving symptoms.Obtained PFT's which were normal and description more concerning for exertional dyspnea. Will continue to workup with stress test.  Plan: Exercise Stress Test - Adult            (L30.9) Eczema, unspecified type  Comment: Does not have Hx but by exam most concerning for this. Does not appear cellulitic or concerning for deeper infection  Plan: hydrocortisone (CORTAID) 0.5 % external cream           Follow up: PRN or 1 month  Options for treatment and follow-up care were reviewed with the patient and/or guardian. Darlene Hudson and/or guardian engaged in the decision making process and verbalized understanding of the options discussed and agreed with the final plan.    Dr. Aniceto Phan, PGY3    Precepted with: Maria Isabel Urbano DO

## 2020-03-13 ENCOUNTER — TELEPHONE (OUTPATIENT)
Dept: FAMILY MEDICINE | Facility: CLINIC | Age: 45
End: 2020-03-13

## 2020-03-13 ENCOUNTER — AMBULATORY - HEALTHEAST (OUTPATIENT)
Dept: ADMINISTRATIVE | Facility: REHABILITATION | Age: 45
End: 2020-03-13

## 2020-03-13 ENCOUNTER — OFFICE VISIT (OUTPATIENT)
Dept: FAMILY MEDICINE | Facility: CLINIC | Age: 45
End: 2020-03-13
Payer: COMMERCIAL

## 2020-03-13 ENCOUNTER — HOSPITAL ENCOUNTER (OUTPATIENT)
Dept: CT IMAGING | Facility: HOSPITAL | Age: 45
Discharge: HOME | End: 2020-03-13
Attending: STUDENT IN AN ORGANIZED HEALTH CARE EDUCATION/TRAINING PROGRAM

## 2020-03-13 ENCOUNTER — RECORDS - HEALTHEAST (OUTPATIENT)
Dept: ADMINISTRATIVE | Facility: OTHER | Age: 45
End: 2020-03-13

## 2020-03-13 VITALS
DIASTOLIC BLOOD PRESSURE: 79 MMHG | SYSTOLIC BLOOD PRESSURE: 110 MMHG | TEMPERATURE: 97.4 F | OXYGEN SATURATION: 99 % | HEART RATE: 97 BPM | RESPIRATION RATE: 20 BRPM

## 2020-03-13 DIAGNOSIS — N39.46 MIXED STRESS AND URGE URINARY INCONTINENCE: ICD-10-CM

## 2020-03-13 DIAGNOSIS — M25.571 PAIN IN JOINT INVOLVING ANKLE AND FOOT, RIGHT: Primary | ICD-10-CM

## 2020-03-13 DIAGNOSIS — M25.571 PAIN IN JOINT INVOLVING ANKLE AND FOOT, RIGHT: ICD-10-CM

## 2020-03-13 RX ORDER — METRONIDAZOLE 7.5 MG/G
GEL TOPICAL
COMMUNITY
Start: 2020-01-03 | End: 2020-05-15

## 2020-03-13 RX ORDER — CITALOPRAM HYDROBROMIDE 20 MG/1
TABLET ORAL
COMMUNITY
Start: 2020-02-13 | End: 2020-06-03

## 2020-03-13 RX ORDER — OXYCODONE HYDROCHLORIDE 5 MG/1
5 TABLET ORAL
COMMUNITY
Start: 2020-03-13 | End: 2020-06-03

## 2020-03-13 RX ORDER — FAMOTIDINE 20 MG/1
20 TABLET, FILM COATED ORAL DAILY PRN
COMMUNITY
Start: 2020-02-26 | End: 2020-12-16

## 2020-03-13 RX ORDER — ALBUTEROL SULFATE 90 UG/1
AEROSOL, METERED RESPIRATORY (INHALATION)
COMMUNITY
Start: 2020-02-06 | End: 2020-04-23

## 2020-03-13 RX ORDER — ONDANSETRON 4 MG/1
TABLET, ORALLY DISINTEGRATING ORAL PRN
COMMUNITY
Start: 2020-02-26 | End: 2021-08-04

## 2020-03-13 RX ORDER — ALPRAZOLAM 2 MG
TABLET ORAL
COMMUNITY
Start: 2020-02-13 | End: 2020-04-08

## 2020-03-13 RX ORDER — TRIAMCINOLONE ACETONIDE 1 MG/G
CREAM TOPICAL 2 TIMES DAILY
Qty: 1 G | Refills: 1 | Status: SHIPPED | OUTPATIENT
Start: 2020-03-13 | End: 2022-03-03

## 2020-03-13 RX ORDER — SULFAMETHOXAZOLE/TRIMETHOPRIM 800-160 MG
TABLET ORAL
COMMUNITY
Start: 2020-02-26 | End: 2020-03-24

## 2020-03-13 ASSESSMENT — ANXIETY QUESTIONNAIRES: GAD7 TOTAL SCORE: 3

## 2020-03-13 NOTE — PROGRESS NOTES
Assessment and Plan   (M25.571) Pain in joint involving ankle and foot, right  (primary encounter diagnosis)  Comment: Her history and examination findings are concerning for joint infection vs painful effusion or bursitis.  XR showed effusion.  She is afebrile and declined CBC and BMP.  We will hold off on antibiotics for now but ordered CT of her ankle to be performed tonight to evaluate for infection.  Recommended patient go to ED if significant concern for infection.  If not, recommended triamcinolone cream, benadryl, ice and follow up early next week.    XR read: no acute pathology aside from what appears to be mild joint effusion.  She does not have any visible fracture, dislocation, trauma, gas, or abscess.    Addendum: patient followed up in the emergency room after her CT scan in the evening, which showed no trauma or evidence of acute infection.  ED doc prescribed her some oxycodone and recommended follow up with orthopedist early next week.  I called the patient and discussed this plan with her.    Plan: XR ANKLE RT G/E 3 VW, triamcinolone (KENALOG)         0.1 % external cream, CT Ankle Right w/o & w         Contrast, CANCELED: CBC with Plt ([facility]),         CANCELED: Basic Metabolic Panel ([facility]) -         Results < 1 hr    Follow up in 1 Week(s).    Options for treatment and follow-up care were reviewed with the patient and/or guardian. Darlene Hudson and/or guardian engaged in the decision making process and verbalized understanding of the options discussed and agreed with the final plan.    Dagoberto Palmer MD  [facility], PGY-1    Precepted patient with Dr. Anaid Vogel       HPI:   Darlene Hudson is a 44 year old female who presents to clinic today for   Chief Complaint   Patient presents with     Follow Up     right ankle pain. Hard to walk on it. Icing helps a little bit.      Medication Reconciliation     Needs  attention     R ankle burning/throbbing.  This started 3 days ago.  Rash started at the same time.  Has never had anything like this before.  Rash has gotten worse, including overnight.  She has been putting ice on it.  She was going to try hydrocortisone but pharmacies have not had it.  She is concerned it is due to the internal brace.  It hurts to bear weight.  No trauma.  Just woke up in the middle of the night with it burning and itching.  She feels like it is painful inside the ankle too.    No fevers/chills.  No unusual chest pain or SOB.         Review of Systems:     10 point ROS negative except as noted in HPI.         PMHX:   Active Problems List  Patient Active Problem List   Diagnosis     Abnormal cytology finding     Nondependent alcohol abuse, episodic drinking behavior     Controlled substance agreement signed     Low back pain     Chronic sinusitis     Major depressive disorder, recurrent episode, moderate (H)     Tobacco use disorder     Noninflammatory disorder of vagina     Pap smear for cervical cancer screening     Abdominal pain     Abnormal cervical Papanicolaou smear     Panic disorder with agoraphobia     Atopic rhinitis     Chronic low back pain     Other long term (current) drug therapy     Acute pericardial effusion     Anxiety     Chronic infectious pericarditis     Polysubstance abuse (H)     Cough     Active problem list reviewed and updated.    Current Medications  Current Outpatient Medications   Medication Sig Dispense Refill     famotidine (PEPCID) 20 MG tablet Take 20 mg by mouth       triamcinolone (KENALOG) 0.1 % external cream Apply topically 2 times daily 1 g 1     ALPRAZolam (XANAX) 2 MG ODT Take 1 tablet (2 mg) by mouth daily as needed (anxiety) 30 tablet 0     ALPRAZolam (XANAX) 2 MG tablet        cetirizine (ZYRTEC) 10 MG tablet Take 1 tablet (10 mg) by mouth daily 30 tablet 11     citalopram (CELEXA) 20 MG tablet        fluticasone (FLONASE) 50 MCG/ACT nasal spray Spray 2  sprays into both nostrils daily 9.9 mL 11     gabapentin (NEURONTIN) 600 MG tablet Take 1 tablet (600 mg) by mouth 3 times daily as needed (pain)       hydrocortisone (CORTAID) 0.5 % external cream Apply topically 2 times daily 30 g 0     hydrOXYzine (VISTARIL) 25 MG capsule Take 25 mg by mouth nightly as needed       metroNIDAZOLE (METROGEL) 0.75 % external gel MORALES AA BID FOR 5 DAYS PRF BACTERIAL INFECTION.       metroNIDAZOLE (METROGEL) 0.75 % vaginal gel Apply 1 applicator daily for 5 days as needed for bacterial vaginosis symptoms 70 g 0     montelukast (SINGULAIR) 10 MG tablet Take 1 tablet (10 mg) by mouth At Bedtime 30 tablet 11     ondansetron (ZOFRAN-ODT) 4 MG ODT tab        oxybutynin (DITROPAN) 5 MG tablet Take 1 tablet (5 mg) by mouth 2 times daily 60 tablet 0     phenol (CHLORASEPTIC) 1.4 % spray Take 5 sprays by mouth every 2 hours as needed for pain       PROAIR  (90 Base) MCG/ACT inhaler INL 2 PFS ITL Q 4 H PRF SOB OR DIFFICULT BREATHING OR WHZ       ranitidine (ZANTAC) 300 MG tablet Take 1 tablet (300 mg) by mouth daily as needed for heartburn 30 tablet 1     sulfamethoxazole-trimethoprim (BACTRIM DS) 800-160 MG tablet        WIXELA INHUB 250-50 MCG/DOSE inhaler        Medication list reviewed and updated.    Social History  Social History     Tobacco Use     Smoking status: Former Smoker     Packs/day: 0.25     Years: 30.00     Pack years: 7.50     Types: Cigarettes     Smokeless tobacco: Never Used     Tobacco comment: None per patient 3/13/2020   Substance Use Topics     Alcohol use: Not Currently     Alcohol/week: 0.0 standard drinks     Drug use: Not Currently     History   Drug Use Unknown       Family History  Family History   Problem Relation Age of Onset     Diabetes Brother      Hypertension Mother      Other Cancer Mother         cervical cancer     Other Cancer Father         lung cancer     Asthma Father      Breast Cancer Maternal Grandmother      Asthma Child   "      Allergies  Allergies   Allergen Reactions     Lidocaine Other (See Comments)     \"my jaw stopped moving\"  Other reaction(s): Dystonia     Penicillins Hives, Rash and Shortness Of Breath     Epinephrine-Lidocaine-Na Metabisulfite Other (See Comments) and Muscle Pain (Myalgia)     Severe jaw cramping, double vision  Jaw locking            Physical Exam:     Vitals:    03/13/20 1539   BP: 110/79   Pulse: 97   Resp: 20   Temp: 97.4  F (36.3  C)   TempSrc: Oral   SpO2: 99%     There is no height or weight on file to calculate BMI.    GENERAL APPEARANCE: alert, appears stated age, no acute distress  HEENT: Eyes grossly normal to inspection, nares normal, and mouth and throat without erythema, ulcers, or lesions  RESP: no increased WOB  CV: well-perfused  ABDOMEN: nondistended   MSK: LLE normal, no gross deformities noted, no edema, R ankle with visible effusion and red scab overlying, tender to palpation, painful with ROM, painful with weight bearing, visible limp when walking  SKIN: no suspicious lesions or rashes   NEURO: Normal strength and tone, sensory exam grossly normal, mentation appears intact and speech normal  PSYCH: mood and affect appropriate    GENERAL APPEARANCE: healthy, alert and no distress,  "

## 2020-03-13 NOTE — TELEPHONE ENCOUNTER
Called patient to discuss. Advised to come into clinic for appointment today r/t worsening of symptoms. Patient scheduled appointment with . Requested encounter be routed to  for LA NENA Larson RN

## 2020-03-13 NOTE — TELEPHONE ENCOUNTER
Calling to speak to Ernesto stating that ankle is burning back and not able to put pressure on it. She is sure if she should see her ortho or come in for an appt? Please call and advise.

## 2020-03-13 NOTE — TELEPHONE ENCOUNTER
Called patient to discuss. Advised to purchase OTC. Patient stated they are completely out of the medication OTC. Advised can try other stores with pharmacies. I advised I will route to  but cannot guarantee he will see it r/t not in clinic. Patient then requested xanax prescription be switched to tablet and not ODT. Reviewed chart, okay to take oral tablet r/t previous prescriptions. Called pharmacy and provided verbal order for tablet rather than ODT with same strength. Will route encounter to  to discontinue ODT xanax and switch to tablet. Also for review of unable to obtain hydrocortisone cream OTC or via prescription. Neo VÁSQUEZ

## 2020-03-13 NOTE — TELEPHONE ENCOUNTER
[facility] phone call message- medication clarification/question:    Full Medication Name: hydrocortisone   Strength:     Have you contacted your pharmacy about this refill request?     If  Yes,  which pharmacy?    When did you contact the pharmacy?    Additional comments/concerns from call to pharmacy:    Reason for call to clinic: Calling to speak to Dr. Fernandez because medication above the pharmacy doesn't have it and another kenneth don't have. Wondering if she can get something else because her ankle is very irritated today, red and puffy. Told her that Dr. Fernandez is not in today when she asked. Please call and advise.       Pharmacy confirmed as    [id number] [address] AT [facility] [address]: Yes    OK to leave a message on voice mail?     Primary language: English      needed? No    Call taken on March 13, 2020 at 12:40 PM by John Hdez

## 2020-03-13 NOTE — PATIENT INSTRUCTIONS
Referral for :     Physical Therapy    LOCATION/PLACE/Provider :    [facility]   DATE & TIME :    Faxed referral, location will call patient   PHONE :     [phone]  FAX :    [phone]  Appointment made by clinic staff/:    Kirstie

## 2020-03-13 NOTE — NURSING NOTE
Chief Complaint   Patient presents with     Follow Up     right ankle pain. Hard to walk on it. Icing helps a little bit.      Medication Reconciliation     Needs attention       /79   Pulse 97   Temp 97.4  F (36.3  C) (Oral)   Resp 20   SpO2 99%  Declines weight and height.       ~ Stew Santoyo CMA ([place])  [facility]  Phone: [phone]

## 2020-03-14 ENCOUNTER — TELEPHONE (OUTPATIENT)
Dept: FAMILY MEDICINE | Facility: CLINIC | Age: 45
End: 2020-03-14

## 2020-03-14 DIAGNOSIS — M25.571 PAIN IN JOINT, ANKLE AND FOOT, RIGHT: Primary | ICD-10-CM

## 2020-03-14 NOTE — TELEPHONE ENCOUNTER
"On call paged x2:    Patient was seen in ED last night - discharged with follow up with ortho on Monday (2d), crutches, oxycodone.     Calling because \"it's swelled up to a softball\"  Thinks this is worse compared to yesterday  Has not been walking on it  Pain is the same  Maybe a bit more red?     She is asking for a diagnosis over the phone and reassurance.     Explained to patient without doing an exam I cannot do this. Most concerning on differential would be infected joint. I stated two providers saw patient yesterday, and CT was done with low suspicion for joint infection and she was deemed appropriate to dc. I cannot give her the reassurance she wants without examining her. Explained that if she feels it is getting worse then she should be evaluated. Pain is well controlled now. She does not want to go to ED d/t COVID.     Dylan Cronin MD   [facility]  Pager #: [phone]         "

## 2020-03-16 ENCOUNTER — OFFICE VISIT (OUTPATIENT)
Dept: FAMILY MEDICINE | Facility: CLINIC | Age: 45
End: 2020-03-16
Payer: COMMERCIAL

## 2020-03-16 ENCOUNTER — RECORDS - HEALTHEAST (OUTPATIENT)
Dept: ADMINISTRATIVE | Facility: OTHER | Age: 45
End: 2020-03-16

## 2020-03-16 VITALS — TEMPERATURE: 97.5 F | RESPIRATION RATE: 18 BRPM | HEART RATE: 110 BPM | OXYGEN SATURATION: 97 %

## 2020-03-16 DIAGNOSIS — M25.571 PAIN IN JOINT, ANKLE AND FOOT, RIGHT: Primary | ICD-10-CM

## 2020-03-16 RX ORDER — OXYCODONE HYDROCHLORIDE 5 MG/1
5-10 TABLET ORAL 2 TIMES DAILY PRN
Qty: 28 TABLET | Refills: 0 | Status: SHIPPED | OUTPATIENT
Start: 2020-03-16 | End: 2020-06-11

## 2020-03-16 NOTE — TELEPHONE ENCOUNTER
Patient calling to ask if Dr. Fernandez is in today, she would like to leave a message for him regarding her ankle? She wants to speak to him about that. Please call and advise.

## 2020-03-16 NOTE — TELEPHONE ENCOUNTER
"Returned call to patient.     Complaining of burning, pain, swelling in her ankle \"like a softball.\" Denies any trauma to her ankle. Swelling improves with elevation and ice. Worsens with any movement, and it hurts to even put ice on it. Per previous notes, has been seen in the ED where CT scan performed with low suspicion for joint infection. Patient states that she cannot take the pain anymore and needs \"something done about it.\"      Reports chills off and on yesterday but no fever, cough, shortness of breath, erythema of affected area, or drainage from affected area. Patient is able to move ankle and bear weight on that extremity, but it is quite painful. Repeatedly complains of burning pain.    Patient has been alternating ibuprofen and tylenol, though this has not been doing much for her pain. Recommended that patient either be seen in the ED or call clinic to schedule an appointment with Dr. Fernandez, her PCP. She wants to avoid being seen due to concerns for exposure to COVID-19, but she would be willing to make an appointment in clinic within the next few days. Patient stresses that, due to her complex medical history, she is really only comfortable seeing Dr. Fernandez since he is her doctor and knows her medical history, and he has seen the ankle.    Recommended continuing ice, elevation, ambulation and range of motion exercises when able to preserve ankle range of motion, and continuing to alternate ice and ibuprofen. Recommended that patient go to the ED immediately if she were to develop high fevers, other signs of infection, or shortness of breath as concern for either septic joint/other soft tissue infection or DVT/PE.     As patient was adamant about speaking to Dr. Fernandez, will route this conversation to him to discuss when available.    Violette Burris MD  [facility] Resident    "

## 2020-03-16 NOTE — PROGRESS NOTES
HPI:   Darlene Hudson is a 45 year old  female who presents for:    Chief Complaint   Patient presents with     Musculoskeletal Problem     F/U RIGHT ANKLE SWELLING. REVIEW CT RESULTS-DONE ON 3/13/20     Medication Reconciliation     COMPLETE     Right ankle swelling  - Patient seen originally by myself on 3/12/20  - Some swelling over lateral malleolus with associated erythematous rash with scaling.   - I diagnosed her with eczema and prescribed a hydrocortisone cream  - She was unable to pick this up due to multiple reasons   - She states that since then her ankle has only worsened with more swelling and pain  - She saw my partner Dr. Palmer on 3/13/20 who was concerned about a septic arthritis and obtained a foot x-ray which was normal and then sent the patient for a CT of her foot.  - CT ankle was only remarkable for mild soft tissue swelling of the lateral malleolus  - Was also seen by ER and did not know exactly what pain represented but thought it was not septic arthritis  - Patient does have a Hx of surgical repair of the ankle and states she has an internal brace for chronic ankle problems done by [facility], hardware seen on CT scan  - Requesting more pain control. Currently taking oxycodone 5 mg twice daily and NSAIDs.         PMHX:     Patient Active Problem List   Diagnosis     Abnormal cytology finding     Nondependent alcohol abuse, episodic drinking behavior     Controlled substance agreement signed     Low back pain     Chronic sinusitis     Major depressive disorder, recurrent episode, moderate (H)     Tobacco use disorder     Noninflammatory disorder of vagina     Pap smear for cervical cancer screening     Abdominal pain     Abnormal cervical Papanicolaou smear     Panic disorder with agoraphobia     Atopic rhinitis     Chronic low back pain     Other long term (current) drug therapy     Acute pericardial effusion     Anxiety     Chronic infectious pericarditis      Polysubstance abuse (H)     Cough       Current Outpatient Medications   Medication Sig Dispense Refill     ALPRAZolam (XANAX) 2 MG ODT Take 1 tablet (2 mg) by mouth daily as needed (anxiety) 30 tablet 0     ALPRAZolam (XANAX) 2 MG tablet        cetirizine (ZYRTEC) 10 MG tablet Take 1 tablet (10 mg) by mouth daily 30 tablet 11     citalopram (CELEXA) 20 MG tablet        famotidine (PEPCID) 20 MG tablet Take 20 mg by mouth       fluticasone (FLONASE) 50 MCG/ACT nasal spray Spray 2 sprays into both nostrils daily 9.9 mL 11     gabapentin (NEURONTIN) 600 MG tablet Take 1 tablet (600 mg) by mouth 3 times daily as needed (pain)       hydrocortisone (CORTAID) 0.5 % external cream Apply topically 2 times daily 30 g 0     hydrOXYzine (VISTARIL) 25 MG capsule Take 25 mg by mouth nightly as needed       metroNIDAZOLE (METROGEL) 0.75 % external gel MORALES AA BID FOR 5 DAYS PRF BACTERIAL INFECTION.       metroNIDAZOLE (METROGEL) 0.75 % vaginal gel Apply 1 applicator daily for 5 days as needed for bacterial vaginosis symptoms 70 g 0     montelukast (SINGULAIR) 10 MG tablet Take 1 tablet (10 mg) by mouth At Bedtime 30 tablet 11     ondansetron (ZOFRAN-ODT) 4 MG ODT tab        oxybutynin (DITROPAN) 5 MG tablet Take 1 tablet (5 mg) by mouth 2 times daily 60 tablet 0     oxyCODONE (ROXICODONE) 5 MG tablet Take 5 mg by mouth       phenol (CHLORASEPTIC) 1.4 % spray Take 5 sprays by mouth every 2 hours as needed for pain       PROAIR  (90 Base) MCG/ACT inhaler INL 2 PFS ITL Q 4 H PRF SOB OR DIFFICULT BREATHING OR WHZ       ranitidine (ZANTAC) 300 MG tablet Take 1 tablet (300 mg) by mouth daily as needed for heartburn 30 tablet 1     sulfamethoxazole-trimethoprim (BACTRIM DS) 800-160 MG tablet        triamcinolone (KENALOG) 0.1 % external cream Apply topically 2 times daily 1 g 1     WIXELA INHUB 250-50 MCG/DOSE inhaler          Social History     Tobacco Use     Smoking status: Former Smoker     Packs/day: 0.25     Years: 30.00      "Pack years: 7.50     Types: Cigarettes     Smokeless tobacco: Never Used     Tobacco comment: None per patient 3/13/2020   Substance Use Topics     Alcohol use: Not Currently     Alcohol/week: 0.0 standard drinks     Drug use: Not Currently       Social History     Social History Narrative     Not on file       Allergies   Allergen Reactions     Lidocaine Other (See Comments)     \"my jaw stopped moving\"  Other reaction(s): Dystonia     Penicillins Hives, Rash and Shortness Of Breath     Epinephrine-Lidocaine-Na Metabisulfite Other (See Comments) and Muscle Pain (Myalgia)     Severe jaw cramping, double vision  Jaw locking       No results found for this or any previous visit (from the past 24 hour(s)).         Review of Systems:    ROS: 10 point ROS neg other than the symptoms noted above in the HPI.         Physical Exam:     Vitals:    03/16/20 1625   Pulse: 110   Resp: 18   Temp: 97.5  F (36.4  C)   TempSrc: Oral   SpO2: 97%     There is no height or weight on file to calculate BMI.    General: Alert, well-appearing female in NAD, anxious  HEENT: PERRL, moist oral mucus membranes  Pulm: CTA BL, no tachypnea  CV: RRR, no murmur  Right ankle:  Foot/Ankle: Right  Inspection: Swelling - Yes over lateral malleolus and talonavicular joint, no redness other than rash, rash is erythematous with scaling and xerotic appearing over lateral malleolus; Bruising - no   Sensation: intact in peroneal, tibial, sural, and saphenous distribution   ROM: Dorsiflexion - limited by pain; Plantarflexion - Full; Inversion -Limited by pain; Eversion - limited by pain;    Strength: 5/5 in all motions; Pain w/ resisted 5/5 when patient pressed but she has poor effort due to pain  Bony tenderness: Medial malleolus? - no; Lateral malleolus? - yes, pain to even very light touch; Navicular? - No; 5th Metatarsal? - NO;   Ligaments Tenderness: ATFL/CFL -Yes;   Tendons: Posterior Tibialis - No; Peroneals - No; Achilles - Yes       Assessment and " Plan   (M25.571) Pain in joint, ankle and foot, right  (primary encounter diagnosis)  Comment: Patient's pain and exam has worsened since originally seen. CT foot in ED nor concerning for septic arthritis and does not appear so on exam but given Hx of hardware in place in ankle with worsening symptoms and exam will refer to [facility] for further workup. Wrapped with Ace bandages today and placed order for CAM Walker boot to limit pain and worsening of injury. Given 28 tabs of oxycodone 5 mg which I am very hesitant to do given chronic benzodiazapine use, Hx of alcohol abuse, and recent codeine use. However, patient has been very forthcoming with addictions in the past and has passed all previous drug screens with myself. Discussed risks of overdose given chronic benzodiazapine use and opioids and explained very specifically I WILL NOT FILL  MORE OPIOIDs as this at that point with become more subacute/chronic in nature. Offered Narcan prescription but patient refused.   Plan: order for DME, ORTHOPEDICS ADULT REFERRAL,         oxyCODONE (ROXICODONE) 5 MG tablet            Follow up: PRN  Options for treatment and follow-up care were reviewed with the patient and/or guardian. Darlene Hudson and/or guardian engaged in the decision making process and verbalized understanding of the options discussed and agreed with the final plan.    Dr. Aniceto Phan, PGY3    Precepted with: Dr. Garcia

## 2020-03-16 NOTE — TELEPHONE ENCOUNTER
Please see patient call note. Patient is adamant that she speak to her PCP. I did my best with advice, but she is not comfortable with doctors who are unfamiliar with her medical history. Thanks!

## 2020-03-16 NOTE — PATIENT INSTRUCTIONS
Faxed referral to [facility] imaging just in case if needed. Patient had it done on 3-13-20

## 2020-03-17 NOTE — PATIENT INSTRUCTIONS
Referral for :     Orthopedics    LOCATION/PLACE/Provider :    [facility]- [place]  DATE & TIME :    Faxed referral, location will contact patient  PHONE :     [phone]  FAX :   [phone]  Appointment made by clinic staff/:    Kirstie

## 2020-03-21 ENCOUNTER — RECORDS - HEALTHEAST (OUTPATIENT)
Dept: ADMINISTRATIVE | Facility: OTHER | Age: 45
End: 2020-03-21

## 2020-03-24 ENCOUNTER — VIRTUAL VISIT (OUTPATIENT)
Dept: FAMILY MEDICINE | Facility: CLINIC | Age: 45
End: 2020-03-24
Payer: COMMERCIAL

## 2020-03-24 DIAGNOSIS — M25.571 PAIN IN JOINT INVOLVING ANKLE AND FOOT, RIGHT: Primary | ICD-10-CM

## 2020-03-24 NOTE — PROGRESS NOTES
"Family Medicine Telephone Visit Note         Telephone Visit Consent   Patient was verbally read the following and verbal consent was obtained.  \"I understand that I may revoke this request for a phone visit at any time.  This consent will automatically  3 months from the signed date and time.\"    Name person giving consent:  Patient   Date verbal consent given:  3/24/2020  Time verbal consent given:  3:24 PM    Chief Complaint   Patient presents with     Musculoskeletal Problem     f/u ankle. still swollen, saw orthopedic surgeon, irritated skin, severe pain.     Medication Reconciliation     Current Outpatient Medications   Medication Sig Dispense Refill     ALPRAZolam (XANAX) 2 MG tablet        cetirizine (ZYRTEC) 10 MG tablet Take 1 tablet (10 mg) by mouth daily 30 tablet 11     fluticasone (FLONASE) 50 MCG/ACT nasal spray Spray 2 sprays into both nostrils daily 9.9 mL 11     gabapentin (NEURONTIN) 600 MG tablet Take 1 tablet (600 mg) by mouth 3 times daily as needed (pain)       hydrocortisone (CORTAID) 0.5 % external cream Apply topically 2 times daily 30 g 0     metroNIDAZOLE (METROGEL) 0.75 % vaginal gel Apply 1 applicator daily for 5 days as needed for bacterial vaginosis symptoms 70 g 0     montelukast (SINGULAIR) 10 MG tablet Take 1 tablet (10 mg) by mouth At Bedtime 30 tablet 11     ondansetron (ZOFRAN-ODT) 4 MG ODT tab        order for DME Equipment being ordered: CAM Walker Boot 1 Units 0     oxybutynin (DITROPAN) 5 MG tablet Take 1 tablet (5 mg) by mouth 2 times daily 60 tablet 0     PROAIR  (90 Base) MCG/ACT inhaler INL 2 PFS ITL Q 4 H PRF SOB OR DIFFICULT BREATHING OR WHZ       triamcinolone (KENALOG) 0.1 % external cream Apply topically 2 times daily 1 g 1     WIXELA INHUB 250-50 MCG/DOSE inhaler        ALPRAZolam (XANAX) 2 MG ODT Take 1 tablet (2 mg) by mouth daily as needed (anxiety) (Patient not taking: Reported on 3/24/2020) 30 tablet 0     citalopram (CELEXA) 20 MG tablet        " "famotidine (PEPCID) 20 MG tablet Take 20 mg by mouth       hydrOXYzine (VISTARIL) 25 MG capsule Take 25 mg by mouth nightly as needed       metroNIDAZOLE (METROGEL) 0.75 % external gel MORALES AA BID FOR 5 DAYS PRF BACTERIAL INFECTION.       oxyCODONE (ROXICODONE) 5 MG tablet Take 5 mg by mouth       phenol (CHLORASEPTIC) 1.4 % spray Take 5 sprays by mouth every 2 hours as needed for pain       Allergies   Allergen Reactions     Lidocaine Other (See Comments)     \"my jaw stopped moving\"  Other reaction(s): Dystonia     Penicillins Hives, Rash and Shortness Of Breath     Epinephrine-Lidocaine-Na Metabisulfite Other (See Comments) and Muscle Pain (Myalgia)     Severe jaw cramping, double vision  Jaw locking          HPI   Patients name: Karoline  Appointment start time:  3:44 PM    Chief Complaint   Patient presents with     Musculoskeletal Problem     f/u ankle. still swollen, saw orthopedic surgeon, irritated skin, severe pain.     Medication Reconciliation     Right ankle swelling  - Patient seen originally by myself on 3/12/20  - Some swelling over lateral malleolus with associated erythematous rash with scaling.   - I diagnosed her with eczema and prescribed a hydrocortisone cream  - She was unable to pick this up due to multiple reasons   - She states that since then her ankle has only worsened with more swelling and pain  - She saw my partner Dr. Palmer on 3/13/20 who was concerned about a septic arthritis and obtained a foot x-ray which was normal and then sent the patient for a CT of her foot.  - CT ankle was only remarkable for mild soft tissue swelling of the lateral malleolus  - Was also seen by ER and did not know exactly what pain represented but thought it was not septic arthritis  - Patient does have a Hx of surgical repair of the ankle and states she has an internal brace for chronic ankle problems done by [facility], hardware seen on CT scan  - Saw again by myself  3/16/20  - Requested more " pain control. Was taking oxycodone 5 mg twice daily and NSAIDs.  - I gave her additional oxycodone with caveat I would not prescribe more and significant counseling regarding overdose risk given her Hx and other meds  - I also referred her back to her orthopedic physician who performed her previous right ankle operation and place a DME order for a CAM walker boot  - She saw her orthopedic surgeon who did the surgery in a an urgent care visit and she states that physician believed this was a skin issue and prescribed some hydrocortisone cream  - She notes this did not help how her skin looked and the cream we prescribed worked better  - Since then her foot has continued to be extremely painful constantly  - NSAIDS and tylenol have not been working for the pain and only oxycodone has helped  - She feels the pain is an emergency and needs immediate relief  - She would be interested in seeing another orthopedic surgeon for a second opinion but believes she cannot wait due to pain  - Discussed that as we previously talked about I am unwilling to prescribe further opioids given her Hx and risk of overdose        Assessment and Plan   (M25.624) Pain in joint involving ankle and foot, right  (primary encounter diagnosis)  Comment: Will refer for a different orthopedic opinion.  Patient feeling her pain is out of control at this point now though and feels it's an emergency. All of her workup has been benign to this point and no objective findings as of yet. Advised that if her pain is an emergency and she cannot wait for orthopedic consultation with tylenol or ibuprofen that she go to the ER for urgent care.   Plan: ORTHOPEDICS ADULT REFERRAL    Appointment end time: 4:08  This is a telephone visit that took 24 minutes.    Clinician location:  [facility]    MIKE GONZALEZ    Precepted with Dr. Woodall  Preceptor Attestation:  I discussed the patient with the resident. I have verified the content of the note, which  accurately reflects my assessment of the patient and the plan of care.  Supervising Physician:  Gokul Woodall MD.

## 2020-03-25 NOTE — PATIENT INSTRUCTIONS
Referral for :     orthopedics   LOCATION/PLACE/Provider :    [facility]  DATE & TIME :    Faxed referral, will be called   PHONE :     [phone]  FAX :    [phone]  Appointment made by clinic staff/:    Kirstie

## 2020-03-28 ENCOUNTER — TRANSFERRED RECORDS (OUTPATIENT)
Dept: HEALTH INFORMATION MANAGEMENT | Facility: CLINIC | Age: 45
End: 2020-03-28

## 2020-03-28 ENCOUNTER — TELEPHONE (OUTPATIENT)
Dept: FAMILY MEDICINE | Facility: CLINIC | Age: 45
End: 2020-03-28

## 2020-03-29 ENCOUNTER — TRANSFERRED RECORDS (OUTPATIENT)
Dept: HEALTH INFORMATION MANAGEMENT | Facility: CLINIC | Age: 45
End: 2020-03-29

## 2020-03-29 NOTE — TELEPHONE ENCOUNTER
Patient states that she had the onset of back pain - on the right side of her back that felt like an acute on chronic exacerbation of her chronic right back pain. It raveled to the right side, under her rib cage, and has migrated to her epigastrium. She tells me she is sweating profusely, and the pain is only getting worse. She has not taken her temperature. She denies dysuria, polyuria. Surgical hx of tubal ligation. Pain is a little worse after food.    A/P:  Given my inability to perform an exam and see the patient in person, I believe she warrants evaluation in the ED, especially if the pain is worsening, she is diaphoretic, and she has a hx of pancreatitis and pyelo. She voiced understanding and will be coming in.    Hansel Lopez MD  [facility] Resident, PGY2

## 2020-04-01 ENCOUNTER — VIRTUAL VISIT (OUTPATIENT)
Dept: FAMILY MEDICINE | Facility: CLINIC | Age: 45
End: 2020-04-01
Payer: COMMERCIAL

## 2020-04-01 VITALS — HEIGHT: 69 IN | BODY MASS INDEX: 38.06 KG/M2 | WEIGHT: 257 LBS

## 2020-04-01 DIAGNOSIS — R07.89 CHEST WALL PAIN: ICD-10-CM

## 2020-04-01 DIAGNOSIS — R07.81 PLEURITIC CHEST PAIN: Primary | ICD-10-CM

## 2020-04-01 RX ORDER — PREDNISONE 50 MG/1
50 TABLET ORAL DAILY
Qty: 5 TABLET | Refills: 0 | Status: SHIPPED | OUTPATIENT
Start: 2020-04-01 | End: 2020-05-08

## 2020-04-01 ASSESSMENT — MIFFLIN-ST. JEOR: SCORE: 1865.12

## 2020-04-01 NOTE — PROGRESS NOTES
"Family Medicine Telephone Visit Note         Telephone Visit Consent   Patient was verbally read the following and verbal consent was obtained.  \"I understand that I may revoke this request for a phone visit at any time.  This consent will automatically  3 months from the signed date and time.\"    Name person giving consent:  Patient   Date verbal consent given:  2020  Time verbal consent given:  10:19 AM      No chief complaint on file.    Current Outpatient Medications   Medication Sig Dispense Refill     ALPRAZolam (XANAX) 2 MG ODT Take 1 tablet (2 mg) by mouth daily as needed (anxiety) (Patient not taking: Reported on 3/24/2020) 30 tablet 0     ALPRAZolam (XANAX) 2 MG tablet        cetirizine (ZYRTEC) 10 MG tablet Take 1 tablet (10 mg) by mouth daily 30 tablet 11     citalopram (CELEXA) 20 MG tablet        famotidine (PEPCID) 20 MG tablet Take 20 mg by mouth       fluticasone (FLONASE) 50 MCG/ACT nasal spray Spray 2 sprays into both nostrils daily 9.9 mL 11     gabapentin (NEURONTIN) 600 MG tablet Take 1 tablet (600 mg) by mouth 3 times daily as needed (pain)       hydrocortisone (CORTAID) 0.5 % external cream Apply topically 2 times daily 30 g 0     hydrOXYzine (VISTARIL) 25 MG capsule Take 25 mg by mouth nightly as needed       metroNIDAZOLE (METROGEL) 0.75 % external gel MORALES AA BID FOR 5 DAYS PRF BACTERIAL INFECTION.       metroNIDAZOLE (METROGEL) 0.75 % vaginal gel Apply 1 applicator daily for 5 days as needed for bacterial vaginosis symptoms 70 g 0     montelukast (SINGULAIR) 10 MG tablet Take 1 tablet (10 mg) by mouth At Bedtime 30 tablet 11     ondansetron (ZOFRAN-ODT) 4 MG ODT tab        order for DME Equipment being ordered: CAM Walker Boot 1 Units 0     oxybutynin (DITROPAN) 5 MG tablet Take 1 tablet (5 mg) by mouth 2 times daily 60 tablet 0     oxyCODONE (ROXICODONE) 5 MG tablet Take 5 mg by mouth       phenol (CHLORASEPTIC) 1.4 % spray Take 5 sprays by mouth every 2 hours as needed for pain " "      PROAIR  (90 Base) MCG/ACT inhaler INL 2 PFS ITL Q 4 H PRF SOB OR DIFFICULT BREATHING OR WHZ       triamcinolone (KENALOG) 0.1 % external cream Apply topically 2 times daily 1 g 1     WIXELA INHUB 250-50 MCG/DOSE inhaler        Allergies   Allergen Reactions     Lidocaine Other (See Comments)     \"my jaw stopped moving\"  Other reaction(s): Dystonia     Penicillins Hives, Rash and Shortness Of Breath     Epinephrine-Lidocaine-Na Metabisulfite Other (See Comments) and Muscle Pain (Myalgia)     Severe jaw cramping, double vision  Jaw locking              HPI   Patients name: Karoline  Appointment start time:  10:29 AM    ED/UC Followup:     Facility:  [facility]  Date of visit: 3/28/20  Reason for visit: chest wall pain/ flank pain   Current Status: continues to complain of chest wall pain and low back pain      Her main complaint is that she is stable unable to lie flat w/o feeling chest wall pain  Pain w/ deep breath and coughing  Is worried something is not right. \"I know my body\"  Was seen on 3/12/20 by Dr Phan for chest pain/ shortness of breath; +hx of pericardial window in 2017  Had stress test ordered; not performed  Currently no palpitations/ pre-syncope  No fevers    Has been seen in our office and in ED 3 times this past month      Per ED note on 3/28/20:  Brief HPI:  Darlene Hudson is a 45 y.o. female with history of pyelonephritis presents with right sided flank pain that radiates to the right lower abdomen. Endorses associated diaphoresis and nausea with pain. Also complains of chest pressure that worsens with lying down; no cough or shortness of breath. No pleurisy.    Labs returned unremarkable without significant leukocytosis nor LFT or lipase elevation. UA without evidence of infection though could be stone. CT imaging abdomen chest and pelvis obtained though noncontrast as multiple attempts unsuccessful at placing IV. Patient refusing further IV stick. CT is unremarkable.    With " reproducible tenderness on my evaluation I did obtain right upper quadrant ultrasound imaging which is reassuring as well. Patient agreeable to repeat troponin and EKG per discussions with Dr. hCavez. These are obtained and EKG is sinus rhythm at a rate of 72, QRS 82, QTc 488. No acute ischemic evidence and no significant change compared to prior.    Ultimately through ED course with reassuring work-up I believe appropriate for discharge with continued outpatient follow-up. Etiology of symptoms remains somewhat unclear though low suspicion for more malicious cause.      Labs  Results for orders placed or performed during the hospital encounter of 03/28/20   HM2 (CBC W/O DIFF)   Result Value Ref Range   WBC 8.2 4.0 - 11.0 thou/uL   RBC 4.29 3.80 - 5.40 mill/uL   Hemoglobin 13.3 12.0 - 16.0 g/dL   Hematocrit 40.1 35.0 - 47.0 %   MCV 94 80 - 100 fL   MCH 31.0 27.0 - 34.0 pg   MCHC 33.2 32.0 - 36.0 g/dL   RDW 12.6 11.0 - 14.5 %   Platelets 303 140 - 440 thou/uL   MPV 9.2 8.5 - 12.5 fL   Basic Metabolic Panel   Result Value Ref Range   Sodium 142 136 - 145 mmol/L   Potassium 3.5 3.5 - 5.0 mmol/L   Chloride 108 (H) 98 - 107 mmol/L   CO2 25 22 - 31 mmol/L   Anion Gap, Calculation 9 5 - 18 mmol/L   Glucose 86 70 - 125 mg/dL   Calcium 9.0 8.5 - 10.5 mg/dL   BUN 14 8 - 22 mg/dL   Creatinine 0.81 0.60 - 1.10 mg/dL   GFR MDRD Af Amer >60 >60 mL/min/1.73m2   GFR MDRD Non Af Amer >60 >60 mL/min/1.73m2   Hepatic Profile   Result Value Ref Range   Bilirubin, Total 0.1 0.0 - 1.0 mg/dL   Bilirubin, Direct <0.1 <=0.5 mg/dL   Protein, Total 7.6 6.0 - 8.0 g/dL   Albumin 3.4 (L) 3.5 - 5.0 g/dL   Alkaline Phosphatase 87 45 - 120 U/L   AST 13 0 - 40 U/L   ALT 12 0 - 45 U/L   Lipase   Result Value Ref Range   Lipase 35 0 - 52 U/L   Urinalysis-UC if Indicated   Result Value Ref Range   Color, UA Yellow Colorless, Yellow, Straw, Light Yellow   Clarity, UA Turbid (!) Clear   Glucose, UA Negative Negative   Bilirubin, UA Negative Negative    Ketones, UA Trace (!) Negative, 60 mg/dL   Specific Gravity, UA 1.034 (H) 1.001 - 1.030   Blood, UA Small (!) Negative   pH, UA 6.0 4.5 - 8.0   Protein, UA 50 mg/dL (!) Negative mg/dL   Urobilinogen, UA 2.0 E.U./dL <2.0 E.U./dL, 2.0 E.U./dL   Nitrite, UA Negative Negative   Leukocytes, UA Small (!) Negative   Bacteria, UA None Seen None Seen hpf   RBC, UA 5-10 (!) None Seen, 0-2 hpf   WBC, UA 0-5 None Seen, 0-5 hpf   Squam Epithel, UA >100 (!) None Seen, 0-5 lpf   Mucus, UA Many (!) None Seen lpf   Troponin I   Result Value Ref Range   Troponin I <0.01 0.00 - 0.29 ng/mL   BNP(B-type Natriuretic Peptide)   Result Value Ref Range   BNP 19 0 - 67 pg/mL   Troponin I   Result Value Ref Range   Troponin I <0.01 0.00 - 0.29 ng/mL   POCT pregnancy, urine   Result Value Ref Range   POC Preg, Urine Negative Negative   POCT Kit Lot Number [id number]   POCT Kit Expiration Date 2021-06-30   Pos Control Valid Control Valid Control   Neg Control Valid Control Valid Control       Ct Chest Abdomen Pelvis Without Oral Without Iv Contrast    Result Date: 3/29/2020  EXAM: CT CHEST ABDOMEN PELVIS WO ORAL WO IV CONTRAST LOCATION: [facility] DATE/TIME: 3/29/2020 2:53 AM INDICATION: Right flank pain. COMPARISON: 10/13/2019. TECHNIQUE: CT scan of the chest, abdomen, and pelvis was performed without IV contrast. Multiplanar reformats were obtained. Dose reduction techniques were used. CONTRAST: None. FINDINGS: LUNGS AND PLEURA: No pneumothorax. Motion. Shallow inspiration. Bilateral atelectasis accentuated due to level of inspiration. No pleural fluid. MEDIASTINUM/AXILLAE: Heart is normal in size. No pericardial fluid. No mediastinal or hilar adenopathy. No axillary adenopathy. HEPATOBILIARY: Unremarkable. PANCREAS: Unremarkable. SPLEEN: Normal in size. ADRENAL GLANDS: No adrenal nodule. KIDNEYS/BLADDER: No hydronephrosis. No renal lithiasis. No distal obstructing ureteral calculi. Bladder partially filled. BOWEL: Stomach partially  filled with gastric contents. Appendix partially air-filled and normal in caliber. No evidence of obstruction. No focal inflammatory change. LYMPH NODES: No adenopathy. VASCULATURE: Unremarkable. PELVIC ORGANS: Phleboliths in pelvis MUSCULOSKELETAL: No acute osseous abnormality.     1. No hydronephrosis or hydroureter. 2. No distal obstructing ureteral calculi. 3. No CT evidence of appendicitis. 4. Bilateral areas of atelectasis. 5. No focal inflammatory change. 6. No evidence of obstruction.    Us Abdomen Limited    Result Date: 3/29/2020  EXAM: US ABDOMEN LIMITED LOCATION: [facility] DATE/TIME: 3/29/2020 3:54 AM INDICATION: abd pain, flank pian COMPARISON: None. TECHNIQUE: Limited abdominal ultrasound. FINDINGS: GALLBLADDER: No evidence of cholelithiasis. Gallbladder wall thickness measures 2.4 mm. Negative Martinez sign. BILE DUCTS: No biliary dilatation. The common duct measures 4.7 mm. LIVER: Mild diffuse fatty infiltration. No focal mass. The liver measures 17 cm. RIGHT KIDNEY: No hydronephrosis. PANCREAS: Pancreas and visualized due to bowel gas. Difficult examination. Patient unable to lay supine.   1. No cholelithiasis. 2. No gallbladder wall thickening. 3. No ductal dilatation. 4. No right hydronephrosis.            Assessment and Plan   (R07.81) Pleuritic chest pain  (primary encounter diagnosis)  Comment: had labs/EKG and CT chest/abd/pelvis in ED on 3/28/20; will check echo; trial of pred burst  Plan: Echocardiogram Complete            (R07.89) Chest wall pain  Comment: see above; my sense is that this is pleuritis but I did give her strict precautions to return to ED for worsening symptoms  Plan: Echocardiogram Complete, predniSONE (DELTASONE)        50 MG tablet          Refilled medications that would be required in the next 3 months.     After Visit Information:  Patient declined AVS     Appointment end time: 10:49 AM  This is a telephone visit that took 21 minutes.      Clinician location:   Phalen KAPUR, RAHUL Rahul Kapur, MD  April 1, 2020  11:00 AM

## 2020-04-02 NOTE — PATIENT INSTRUCTIONS
Referral for :     Echocardiogram    LOCATION/PLACE/Provider :    [facility]   DATE & TIME :    Faxed referral, location will reach out to the patient   PHONE :     [phone]  FAX :    [phone]  Appointment made by clinic staff/:    Kirstie

## 2020-04-07 NOTE — PROGRESS NOTES
"Family Medicine Telephone Visit Note           Telephone Visit Consent   Patient was verbally read the following and verbal consent was obtained.  \"I understand that I may revoke this request for a phone visit at any time.  This consent will automatically  3 months from the signed date and time.\"    Name person giving consent:  Patient   Date verbal consent given:  2020  Time verbal consent given:  11:03 AM            HPI   Patients name: Karoline  Appointment start time:  11:31 AM      Follow-up from last week.  Chest wall pain -> pred burst ordered at last visit -> used it for 5 days   Echocardiogram ordered  Mild improvement, but still not able to lay flat or take a deep breath  Now taking ibuprofen again   No fevers  +fatigue    Current Outpatient Medications   Medication Sig Dispense Refill     ALPRAZolam (XANAX) 2 MG ODT Take 1 tablet (2 mg) by mouth daily as needed (anxiety) (Patient not taking: Reported on 3/24/2020) 30 tablet 0     ALPRAZolam (XANAX) 2 MG tablet        cetirizine (ZYRTEC) 10 MG tablet Take 1 tablet (10 mg) by mouth daily 30 tablet 11     citalopram (CELEXA) 20 MG tablet        famotidine (PEPCID) 20 MG tablet Take 20 mg by mouth daily as needed        fluticasone (FLONASE) 50 MCG/ACT nasal spray Spray 2 sprays into both nostrils daily 9.9 mL 11     gabapentin (NEURONTIN) 600 MG tablet Take 1 tablet (600 mg) by mouth 3 times daily as needed (pain)       hydrocortisone (CORTAID) 0.5 % external cream Apply topically 2 times daily (Patient not taking: Reported on 2020) 30 g 0     hydrOXYzine (VISTARIL) 25 MG capsule Take 25 mg by mouth nightly as needed       metroNIDAZOLE (METROGEL) 0.75 % external gel MORALES AA BID FOR 5 DAYS PRF BACTERIAL INFECTION.       metroNIDAZOLE (METROGEL) 0.75 % vaginal gel Apply 1 applicator daily for 5 days as needed for bacterial vaginosis symptoms 70 g 0     montelukast (SINGULAIR) 10 MG tablet Take 1 tablet (10 mg) by mouth At Bedtime 30 tablet 11     " "ondansetron (ZOFRAN-ODT) 4 MG ODT tab as needed        order for DME Equipment being ordered: CAM Walker Boot 1 Units 0     oxybutynin (DITROPAN) 5 MG tablet Take 1 tablet (5 mg) by mouth 2 times daily (Patient not taking: Reported on 4/1/2020) 60 tablet 0     oxyCODONE (ROXICODONE) 5 MG tablet Take 5 mg by mouth       phenol (CHLORASEPTIC) 1.4 % spray Take 5 sprays by mouth every 2 hours as needed for pain       predniSONE (DELTASONE) 50 MG tablet Take 1 tablet (50 mg) by mouth daily 5 tablet 0     PROAIR  (90 Base) MCG/ACT inhaler INL 2 PFS ITL Q 4 H PRF SOB OR DIFFICULT BREATHING OR WHZ       triamcinolone (KENALOG) 0.1 % external cream Apply topically 2 times daily 1 g 1     WIXELA INHUB 250-50 MCG/DOSE inhaler        Allergies   Allergen Reactions     Lidocaine Other (See Comments)     \"my jaw stopped moving\"  Other reaction(s): Dystonia     Penicillins Hives, Rash and Shortness Of Breath     Epinephrine-Lidocaine-Na Metabisulfite Other (See Comments) and Muscle Pain (Myalgia)     Severe jaw cramping, double vision  Jaw locking              Review of Systems:     ROS COMP: CONSTITUTIONAL: NEGATIVE for fever, chills, change in weight  ENT/MOUTH: NEGATIVE for ear, mouth and throat problems  RESP: NEGATIVE for significant cough or SOB  CV: NEGATIVE for chest pain, palpitations or peripheral edema  PSYCHIATRIC: +anxiety         Physical Exam:     N/A        Assessment and Plan     (R07.89) Chest wall pain  (primary encounter diagnosis)  Comment:   Plan: stable/ improving; precautions given; would consider repeat CXR/ EKG if symptoms persist; will need office visit for this.     (F40.01) Panic disorder with agoraphobia  Comment: adjusted her med regimen to allow for additional 0.5 mg tab at night  Plan: ALPRAZolam (XANAX) 2 MG ODT, ALPRAZolam (XANAX) 0.5 MG tablet            (Z71.6) Encounter for tobacco use cessation counseling  Comment:   Plan: nicotine (NICORETTE) 2 MG gum         Refilled medications that " would be required in the next 3 months.     After Visit Information:  Patient declined AVS     No follow-ups on file.    Appointment end time: 11:54 AM  This is a telephone visit that took 23 minutes.      Clinician location:  Phalen Rahul Kapur, MD  April 8, 2020  2:34 PM

## 2020-04-08 ENCOUNTER — TELEPHONE (OUTPATIENT)
Dept: FAMILY MEDICINE | Facility: CLINIC | Age: 45
End: 2020-04-08

## 2020-04-08 ENCOUNTER — VIRTUAL VISIT (OUTPATIENT)
Dept: FAMILY MEDICINE | Facility: CLINIC | Age: 45
End: 2020-04-08
Payer: COMMERCIAL

## 2020-04-08 DIAGNOSIS — R07.89 CHEST WALL PAIN: Primary | ICD-10-CM

## 2020-04-08 DIAGNOSIS — Z71.6 ENCOUNTER FOR TOBACCO USE CESSATION COUNSELING: ICD-10-CM

## 2020-04-08 DIAGNOSIS — F40.01 PANIC DISORDER WITH AGORAPHOBIA: ICD-10-CM

## 2020-04-08 RX ORDER — ALPRAZOLAM 0.5 MG
0.5 TABLET ORAL
Qty: 30 TABLET | Refills: 0 | Status: SHIPPED | OUTPATIENT
Start: 2020-04-08 | End: 2020-05-07

## 2020-04-08 RX ORDER — ALPRAZOLAM 2 MG/1
2 TABLET, ORALLY DISINTEGRATING ORAL DAILY PRN
Qty: 30 TABLET | Refills: 0 | Status: SHIPPED | OUTPATIENT
Start: 2020-04-08 | End: 2020-05-07

## 2020-04-08 NOTE — TELEPHONE ENCOUNTER
Called pharmacy and spoke with pharmacist. Advised okay to switch to regular tablet/pill for patient. Pharmacist verbalized understanding. Neo VÁSQUEZ

## 2020-04-08 NOTE — TELEPHONE ENCOUNTER
[facility] phone call message- medication clarification/question:    Full Medication Name: Xanax   Strength:     Have you contacted your pharmacy about this refill request?     If  Yes,  which pharmacy?    When did you contact the pharmacy?    Additional comments/concerns from call to pharmacy:    Reason for call to clinic: Calling to have the doctor switch medication to the pill instead of the dissolvable. She states the one sent to the pharmacy was dissolvable and she had always have the pill. She states the pharmacy contacted her to let her know that they didn't have the dissolvable. Please call and advise.       Pharmacy confirmed as    [id number] - [place], [place] - [address] [address] AT [facility] [address]  [id number] - [place], [place] - [address]: Yes    OK to leave a message on voice mail?     Primary language: English      needed? No    Call taken on April 8, 2020 at 1:21 PM by John Hdez

## 2020-04-09 DIAGNOSIS — N39.46 URINARY INCONTINENCE, MIXED: ICD-10-CM

## 2020-04-09 RX ORDER — OXYBUTYNIN CHLORIDE 5 MG/1
5 TABLET ORAL 2 TIMES DAILY
Qty: 60 TABLET | Refills: 11 | Status: SHIPPED | OUTPATIENT
Start: 2020-04-09 | End: 2020-06-03

## 2020-04-16 ENCOUNTER — VIRTUAL VISIT (OUTPATIENT)
Dept: FAMILY MEDICINE | Facility: OTHER | Age: 45
End: 2020-04-16

## 2020-04-16 ENCOUNTER — NURSE TRIAGE (OUTPATIENT)
Dept: FAMILY MEDICINE | Facility: CLINIC | Age: 45
End: 2020-04-16

## 2020-04-16 NOTE — PROGRESS NOTES
"Date: 2020 15:28:56  Clinician: Addi Menjivar  Clinician NPI: [id number]  Patient: Darlene Hudson  Patient : 1975  Patient Address: [address] [place] [address] [address], [place] [address]  Patient Phone: [phone]  Visit Protocol: URI  Patient Summary:  Darlene is a 45 year old ( : 1975 ) female who initiated a Visit for COVID-19 (Coronavirus) evaluation and screening. When asked the question \"Please sign me up to receive news, health information and promotions. \", Darlene responded \"Yes\".    Darlene states her symptoms started 1-2 days ago.   Her symptoms consist of a sore throat, a cough, malaise, and ear pain. She is experiencing mild difficulty breathing with activities but can speak normally in full sentences.   Symptom details     Cough: Darlene coughs a few times an hour and her cough is not more bothersome at night. Phlegm does not come into her throat when she coughs. She does not believe her cough is caused by post-nasal drip.     Sore throat: Darlene reports having mild throat pain (1-3 on a 10 point pain scale), does not have exudate on her tonsils, and can swallow liquids. The lymph nodes in her neck are not enlarged. A rash has not appeared on the skin since the sore throat started.      Darlene denies having rhinitis, enlarged lymph nodes, chills, diarrhea, facial pain or pressure, myalgias, vomiting, nausea, teeth pain, headache, wheezing, nasal congestion, and fever. She also denies taking antibiotic medication for the symptoms and having recent facial or sinus surgery in the past 60 days.   Precipitating events  Within the past week, Darlene has not been exposed to someone with strep throat. She has not recently been exposed to someone with influenza. Darlene has been in close contact with the following high risk individuals: people with asthma, heart disease or diabetes.   Pertinent COVID-19 (Coronavirus) information  Darlene has not traveled internationally or to the areas where " COVID-19 (Coronavirus) is widespread, including cruise ship travel in the last 14 days before the start of her symptoms.   Darlene does not work or volunteer as healthcare worker or a  and does not work or volunteer in a healthcare facility.   She does not live with a healthcare worker.   Darlene has not had a close contact with a laboratory-confirmed COVID-19 patient within 14 days of symptom onset. She also has not had a close contact with a suspected COVID-19 patient within 14 days of symptom onset.   Pertinent medical history  Darlene does not get yeast infections when she takes antibiotics.   Darlene does not need a return to work/school note.   Weight: 250 lbs   Darlene does not smoke or use smokeless tobacco.   She denies pregnancy and denies breastfeeding. Her last period was over a month ago.   Additional information as reported by the patient (free text): lidocaine allergy. Asthmatic. Accute pericardial effusion 2017   Weight: 250 lbs    MEDICATIONS: oxybutynin chloride oral, Xanax oral, Pepcid oral, gabapentin oral, Zofran oral, Flonase Allergy Relief nasal, Zyrtec oral, Singulair oral, Wixela Inhub inhalation, Ventolin HFA inhalation, ALLERGIES: Penicillins  Clinician Response:  Dear Darlene,   Dear Toddtiffany  Your symptoms show that you may have coronavirus (COVID-19). This illness can cause fever, cough and trouble breathing. Many people get a mild case and get better on their own. Some people can get very sick.  Will I be tested for COVID-19?  Because the virus is spreading, we are no longer testing most patients. You may request testing if:   You are very ill. For example, you're on chemotherapy, dialysis or home hospice care. (Contact your specialty clinic or program.)   You live in a nursing home or other long-term care facility. (Talk to your nurse manager or medical director.)   You're a health care worker. (Contact your employee health office.)   How can I protect others?  Without a test,  we can't know for sure that you have COVID-19. For safety, it's very important to follow these rules.  First, stay home and away from others (self-isolate) until:   You've had no fever---and no medicine that reduces fever---for 3 full days (72 hours). And...    Your other symptoms have gotten better. For example, your cough or breathing has improved. And...   At least 7 days have passed since your symptoms started.   During this time:   Don't go to work, school or anywhere else.    Stay away from others in your home. No hugging, kissing or shaking hands.   Don't let anyone visit.   Cover your mouth and nose with a mask, tissue or wash cloth to avoid spreading germs.   Wash your hands and face often. Use soap and water.   How can I take care of myself?   1.Take Tylenol (acetaminophen) for fever or pain. If you have liver or kidney problems, ask your family doctor if it's okay to take Tylenol.        Adults can take either:    650 mg (two 325 mg pills) every 4 to 6 hours, or...   1,000 mg (two 500 mg pills) every 8 hours as needed.    Note: Don't take more than 3,000 mg in one day.   For children, check the Tylenol bottle for the right dose. The dose is based on the child's age or weight.   2.If you have other health problems (like cancer, heart failure, an organ transplant or severe kidney disease): Call your specialty clinic if you don't feel better in the next 2 days.       3.Know when to call 911: If your breathing is so bad that it keeps you from doing normal activities, call 911 or go to the emergency room. Tell them that you've been staying home and may have COVID-19.       4.Sign up for [employer]. We know it's scary to hear that you might have COVID-19. We want to track your symptoms to make sure you're okay over the next 2 weeks. Please look for an email from [employer]---this is a free, online program that we'll use to keep in touch. To sign up, follow the link in the email. Learn more at  [email or web address].    Additional Symptomatic Treatment Recommendations:   Push fluids  Get Plenty of rest  Tylenol to help with pain or fever  Salt water gargles or lozenges to help with sore throat  Humidified air can help with congestion.   Over the counter nasal spray such as Fluticasone or Afrin nasal spray to help with sinus congestion  Over the counter cough/cold medication such as Delsym, Dayquil, Nyquil, Mucinex DM, or store brand equivalents as needed.&nbsp;  Over the counter decongestant such as Sudafed as needed (if have high blood pressure ask pharmacist about decongestants that are safe with high blood pressure)  Can do a cool compress to the forehead or back of neck to help with headache      Where can I get more information?  To learn more about COVID-19 and how to care for yourself at home, please visit the CDC website at https://www.cdc.gov/coronavirus/2019-ncov/about/steps-when-sick.html.  For more options for care at [facility], please visit our website at [email or web address]        Diagnosis: Cough  Diagnosis ICD: R05

## 2020-04-16 NOTE — TELEPHONE ENCOUNTER
"    Reason for Disposition    [1] COVID-19 suspected (e.g., cough, fever, shortness of breath) AND [2] public health department recommends testing    MILD difficulty breathing (e.g., minimal/no SOB at rest, SOB with walking, pulse <100)    Additional Information    Negative: SEVERE difficulty breathing (e.g., struggling for each breath, speaks in single words)    Negative: Difficult to awaken or acting confused (e.g., disoriented, slurred speech)    Negative: Bluish (or gray) lips or face now    Negative: Shock suspected (e.g., cold/pale/clammy skin, too weak to stand, low BP, rapid pulse)    Negative: Sounds like a life-threatening emergency to the triager    Negative: [1] COVID-19 exposure AND [2] no symptoms    Negative: COVID-19 and Breastfeeding, questions about    Negative: SEVERE or constant chest pain (Exception: mild central chest pain, present only when coughing)    Negative: MODERATE difficulty breathing (e.g., speaks in phrases, SOB even at rest, pulse 100-120)    Negative: Patient sounds very sick or weak to the triager    Answer Assessment - Initial Assessment Questions  1. COVID-19 DIAGNOSIS: \"Who made your Coronavirus (COVID-19) diagnosis?\" \"Was it confirmed by a positive lab test?\" If not diagnosed by a HCP, ask \"Are there lots of cases (community spread) where you live?\" (See public health department website, if unsure)    * MAJOR community spread: high number of cases; numbers of cases are increasing; many people hospitalized.    * MINOR community spread: low number of cases; not increasing; few or no people hospitalized      Major  2. ONSET: \"When did the COVID-19 symptoms start?\"       Yesterday  3. WORST SYMPTOM: \"What is your worst symptom?\" (e.g., cough, fever, shortness of breath, muscle aches)      cough  4. COUGH: \"How bad is the cough?\"        Severe, coughing multiple times on the phone  5. FEVER: \"Do you have a fever?\" If so, ask: \"What is your temperature, how was it measured, and when " "did it start?\"      Denies  6. RESPIRATORY STATUS: \"Describe your breathing?\" (e.g., shortness of breath, wheezing, unable to speak)       Wheezing  7. BETTER-SAME-WORSE: \"Are you getting better, staying the same or getting worse compared to yesterday?\"  If getting worse, ask, \"In what way?\"      Worsening  8. HIGH RISK DISEASE: \"Do you have any chronic medical problems?\" (e.g., asthma, heart or lung disease, weak immune system, etc.)      asthma  9. PREGNANCY: \"Is there any chance you are pregnant?\" \"When was your last menstrual period?\"      Denies  10. OTHER SYMPTOMS: \"Do you have any other symptoms?\"  (e.g., runny nose, headache, sore throat, loss of smell)        Wheezing and fatigue    Protocols used: CORONAVIRUS (COVID-19) DIAGNOSED OR [id number] 3.30.20      "

## 2020-04-16 NOTE — TELEPHONE ENCOUNTER
Advised patient to use [facility] service r/t suspect COVID-19. Assisted patient through creating a visit with [facility] and patient stated she will await the phone call. Requested patient call the clinic by 5pm if she has not heard from [facility], patient verbalized understanding. Neo VÁSQUEZ

## 2020-04-22 ENCOUNTER — TELEPHONE (OUTPATIENT)
Dept: URGENT CARE | Facility: CLINIC | Age: 45
End: 2020-04-22

## 2020-04-22 ENCOUNTER — VIRTUAL VISIT (OUTPATIENT)
Dept: FAMILY MEDICINE | Facility: CLINIC | Age: 45
End: 2020-04-22
Payer: COMMERCIAL

## 2020-04-22 DIAGNOSIS — M79.641 PAIN OF RIGHT HAND: Primary | ICD-10-CM

## 2020-04-22 NOTE — TELEPHONE ENCOUNTER
CC: Hand pain    Called patient due to a comment she made on the [employer] about her hand. Initially screened her for respiratory symptoms. Patient reports her symptoms have resolved. She does not have a cough, fever, or shortness of breath. She reports she thought she tried to reach her primary doctor when she made the comment about her hand. She states she was putting up a shelf in her bathroom and it fell off the wall. She tried to catch it with her hand. She said her hand is swollen, but that icing it has helped some. She feels concerned that it may be broken. Patient was instructed to set up a virtual visit and that if necessary the visit would lead to an in person visit. She states that she is overwhelmed right now and has too many obligations and feels she needs to be seen in person. She was offered the number for the urgent care to try to set up an appointment. She declined. She stated she was going to try to call her clinic and see how they directed her to go about getting seen.     I was present during the key/critical portion of the telephone visit. The patient acknowledged that I participated in the telephone conversation. I discussed the case with the nurse practitioner student and agree with the note as documented by the nurse practitioner student      PEPE Abarca CNP  4/22/2020 at 10:14 AM

## 2020-04-22 NOTE — PROGRESS NOTES
Preceptor Attestation:  I discussed the patient with the resident. I have verified the content of the note, which accurately reflects my assessment of the patient and the plan of care.  Supervising Physician:Zach Fan MD  [facility]

## 2020-04-22 NOTE — PROGRESS NOTES
"Family Medicine Telephone Visit Note         Telephone Visit Consent   Patient was verbally read the following and verbal consent was obtained.    \"This telephone visit will be conducted via a call between you and your physician/provider. We have found that certain health care needs can be provided without the need for a physical exam.  This service lets us provide the care you need with a short phone conversation.  If a prescription is necessary we can send it directly to your pharmacy.  If lab work is needed we can place an order for that and you can then stop by our lab to have the test done at a later time.    Telephone visits are billed at different rates depending on your insurance coverage. During this emergency period, for some insurers they may be billed the same as an in-person visit.  Please reach out to your insurance provider with any questions.    If during the course of the call the physician/provider feels a telephone visit is not appropriate, you will not be charged for this service.\"    Name person giving consent:  Patient   Date verbal consent given:  4/22/2020  Time verbal consent given:  3:34 PM     Chief Complaint   Patient presents with     Musculoskeletal Problem     right hand injury shelf fell on hand Monday night.  Swelling did go down, but there is bruising.       Medication Reconciliation     Needs attention           HPI   Patients name: Karoline  Appointment start time:  3:35 PM    Hand Pain:  - Patient has shelf in her bathroom with cleaning supplies and she went to grab some cleaning supplies and everything fell off the shelf and hit her hand  - she states the \"bone between her index finger and her thumb\" on her right hand is bruised  - She states she can barely make a fist with it now though she can use the hand, but is just painful  - she states it is extremely painful and swollen now  - She has tried ibuprofen for pain control but this is not working  - she has not tried " tylenol    Current Outpatient Medications   Medication Sig Dispense Refill     ALPRAZolam (XANAX) 0.5 MG tablet Take 1 tablet (0.5 mg) by mouth nightly as needed for anxiety 30 tablet 0     ALPRAZolam (XANAX) 2 MG ODT Take 1 tablet (2 mg) by mouth daily as needed (anxiety) 30 tablet 0     cetirizine (ZYRTEC) 10 MG tablet Take 1 tablet (10 mg) by mouth daily 30 tablet 11     citalopram (CELEXA) 20 MG tablet        famotidine (PEPCID) 20 MG tablet Take 20 mg by mouth daily as needed        fluticasone (FLONASE) 50 MCG/ACT nasal spray Spray 2 sprays into both nostrils daily 9.9 mL 11     gabapentin (NEURONTIN) 600 MG tablet Take 1 tablet (600 mg) by mouth 3 times daily as needed (pain)       hydrocortisone (CORTAID) 0.5 % external cream Apply topically 2 times daily 30 g 0     hydrOXYzine (VISTARIL) 25 MG capsule Take 25 mg by mouth nightly as needed       metroNIDAZOLE (METROGEL) 0.75 % external gel MORALES AA BID FOR 5 DAYS PRF BACTERIAL INFECTION.       metroNIDAZOLE (METROGEL) 0.75 % vaginal gel Apply 1 applicator daily for 5 days as needed for bacterial vaginosis symptoms 70 g 0     montelukast (SINGULAIR) 10 MG tablet Take 1 tablet (10 mg) by mouth At Bedtime 30 tablet 11     nicotine (NICORETTE) 2 MG gum Take 1 each (2 mg) by mouth as needed for smoking cessation 30 each 1     ondansetron (ZOFRAN-ODT) 4 MG ODT tab as needed        order for DME Equipment being ordered: CAM Walker Boot 1 Units 0     oxybutynin (DITROPAN) 5 MG tablet Take 1 tablet (5 mg) by mouth 2 times daily 60 tablet 11     oxyCODONE (ROXICODONE) 5 MG tablet Take 5 mg by mouth       phenol (CHLORASEPTIC) 1.4 % spray Take 5 sprays by mouth every 2 hours as needed for pain       predniSONE (DELTASONE) 50 MG tablet Take 1 tablet (50 mg) by mouth daily 5 tablet 0     PROAIR  (90 Base) MCG/ACT inhaler INL 2 PFS ITL Q 4 H PRF SOB OR DIFFICULT BREATHING OR WHZ       triamcinolone (KENALOG) 0.1 % external cream Apply topically 2 times daily 1 g 1  "    WIXELA INHUB 250-50 MCG/DOSE inhaler        Allergies   Allergen Reactions     Lidocaine Other (See Comments)     \"my jaw stopped moving\"  Other reaction(s): Dystonia     Penicillins Hives, Rash and Shortness Of Breath     Epinephrine-Lidocaine-Na Metabisulfite Other (See Comments) and Muscle Pain (Myalgia)     Severe jaw cramping, double vision  Jaw locking            Physical Exam:     There were no vitals taken for this visit.  Estimated body mass index is 37.95 kg/m  as calculated from the following:    Height as of 4/1/20: 1.753 m (5' 9\").    Weight as of 4/1/20: 116.6 kg (257 lb).        Assessment and Plan   (M79.641) Pain of right hand  (primary encounter diagnosis)  Comment: May just be bruised with a muscle or bone contusion. However, cannot rule out fracture via telephone visit though seems unlikely given mechanism.   Plan: Will have patient return to clinic on Monday April 27 with me for examination and decide if imaging warranted    Appointment end time: 3:44 PM  This is a telephone visit that took 9 minutes.    Clinician location:  [facility]    Aniceto Phan MD  I precepted today with Dr. Fan.      "

## 2020-04-23 DIAGNOSIS — R06.02 SOB (SHORTNESS OF BREATH) ON EXERTION: Primary | ICD-10-CM

## 2020-04-23 RX ORDER — ALBUTEROL SULFATE 90 UG/1
2 AEROSOL, METERED RESPIRATORY (INHALATION) EVERY 4 HOURS PRN
Qty: 1 INHALER | Refills: 11 | Status: SHIPPED | OUTPATIENT
Start: 2020-04-23 | End: 2021-06-25

## 2020-05-06 NOTE — PROGRESS NOTES
"  Family Medicine Telephone Visit Note           Telephone Visit Consent   Patient was verbally read the following and verbal consent was obtained.    \"Telephone visits are billed at different rates depending on your insurance coverage. During this emergency period, for some insurers they may be billed the same as an in-person visit.  Please reach out to your insurance provider with any questions.  If during the course of the call the physician/provider feels a telephone visit is not appropriate, you will not be charged for this service.\"    Name person giving consent:  Patient   Date verbal consent given:  5/8/2020  Time verbal consent given:  10:32 AM           [facility] [place]   Patients name: Karoline  Appointment start time:  11:00 AM    Last seen by me via virtual visit on 4/8/20. Since then, three encounters for medical concerns:    Has been up al night. States that her bleeding is worse and she has abd cramps  Not dizzy now; still very hot    5/7/20 - seen yesterday in office by Dr Grimes w/ me precepting for menorrhagia; Hgb stable  Is overdue for pap  Was having perimenopausal hot flashes and noted dizziness after blood draw  Could consider     4/16/20 - virtual visit w/ Quintin NP for eval for COVID. Not tested. Recommended self-quarantine  Pt last evaluated via virtual visit by Dr Phan on 4/22/20 for hand pain. Was scheduled for office visit for xray on 4/27 but was a no-show.        Current Outpatient Medications   Medication Sig Dispense Refill     ALPRAZolam (XANAX) 0.5 MG tablet Take 1 tablet (0.5 mg) by mouth nightly as needed for anxiety 30 tablet 0     ALPRAZolam (XANAX) 2 MG ODT Take 1 tablet (2 mg) by mouth daily as needed (anxiety) 30 tablet 0     cetirizine (ZYRTEC) 10 MG tablet Take 1 tablet (10 mg) by mouth daily 30 tablet 11     citalopram (CELEXA) 20 MG tablet        famotidine (PEPCID) 20 MG tablet Take 20 mg by mouth daily as needed        fluticasone (FLONASE) 50 MCG/ACT nasal spray Spray 2 sprays " "into both nostrils daily 9.9 mL 11     gabapentin (NEURONTIN) 600 MG tablet Take 1 tablet (600 mg) by mouth 3 times daily as needed (pain)       hydrocortisone (CORTAID) 0.5 % external cream Apply topically 2 times daily 30 g 0     hydrOXYzine (VISTARIL) 25 MG capsule Take 25 mg by mouth nightly as needed       metroNIDAZOLE (METROGEL) 0.75 % external gel MORALES AA BID FOR 5 DAYS PRF BACTERIAL INFECTION.       metroNIDAZOLE (METROGEL) 0.75 % vaginal gel Apply 1 applicator daily for 5 days as needed for bacterial vaginosis symptoms (Patient not taking: Reported on 5/7/2020) 70 g 0     montelukast (SINGULAIR) 10 MG tablet Take 1 tablet (10 mg) by mouth At Bedtime 30 tablet 11     nicotine (NICORETTE) 2 MG gum Take 1 each (2 mg) by mouth as needed for smoking cessation 30 each 1     ondansetron (ZOFRAN-ODT) 4 MG ODT tab as needed        order for DME Equipment being ordered: CAM Walker Boot 1 Units 0     oxybutynin (DITROPAN) 5 MG tablet Take 1 tablet (5 mg) by mouth 2 times daily 60 tablet 11     oxyCODONE (ROXICODONE) 5 MG tablet Take 5 mg by mouth       phenol (CHLORASEPTIC) 1.4 % spray Take 5 sprays by mouth every 2 hours as needed for pain       predniSONE (DELTASONE) 50 MG tablet Take 1 tablet (50 mg) by mouth daily (Patient not taking: Reported on 5/7/2020) 5 tablet 0     PROAIR  (90 Base) MCG/ACT inhaler Inhale 2 puffs into the lungs every 4 hours as needed for shortness of breath / dyspnea or wheezing 1 Inhaler 11     triamcinolone (KENALOG) 0.1 % external cream Apply topically 2 times daily (Patient not taking: Reported on 5/7/2020) 1 g 1     WIXELA INHUB 250-50 MCG/DOSE inhaler        Allergies   Allergen Reactions     Lidocaine Other (See Comments)     \"my jaw stopped moving\"  Other reaction(s): Dystonia     Penicillins Hives, Rash and Shortness Of Breath     Epinephrine-Lidocaine-Na Metabisulfite Other (See Comments) and Muscle Pain (Myalgia)     Severe jaw cramping, double vision  Jaw locking          " "    Review of Systems:     ROS COMP: CONSTITUTIONAL: positive for hot flashes, no fevers. No wt loss  RESP: NEGATIVE for significant cough or SOB  CV: NEGATIVE for chest pain, palpitations or peripheral edema  GI: positive for abdominal cramping; no vomiting/ diarrhea/ constipation  : positive for abnormal menstrual cycles and bleeding         Physical Exam:     There were no vitals taken for this visit.  Estimated body mass index is 37.95 kg/m  as calculated from the following:    Height as of 4/1/20: 1.753 m (5' 9\").    Weight as of 4/1/20: 116.6 kg (257 lb).    Exam:  Constitutional: healthy, alert and mild distress  Psychiatric: mentation appears normal and affect normal/bright            Assessment and Plan   (N92.4) Excessive bleeding in premenopausal period  (primary encounter diagnosis)  Comment: dominique check pelvic U/S to eval for fibroid; once bleeding resolves, may consider endometrial bx if bleeding persists beyond 5 days; she will follow w/ Dr Phan on Monday  Plan: US Pelvic Complete with Transvaginal              After Visit Information:  Patient declined AVS     No follow-ups on file.    Appointment end time: 11:09 AM  This is a telephone visit that took 9 minutes.      Clinician location:  [facility], MD  May 8, 2020  11:09 AM    "

## 2020-05-07 ENCOUNTER — OFFICE VISIT (OUTPATIENT)
Dept: FAMILY MEDICINE | Facility: CLINIC | Age: 45
End: 2020-05-07
Payer: COMMERCIAL

## 2020-05-07 ENCOUNTER — NURSE TRIAGE (OUTPATIENT)
Dept: FAMILY MEDICINE | Facility: CLINIC | Age: 45
End: 2020-05-07

## 2020-05-07 VITALS
RESPIRATION RATE: 16 BRPM | DIASTOLIC BLOOD PRESSURE: 86 MMHG | OXYGEN SATURATION: 97 % | SYSTOLIC BLOOD PRESSURE: 126 MMHG | TEMPERATURE: 98.6 F | HEART RATE: 87 BPM

## 2020-05-07 DIAGNOSIS — N92.4 EXCESSIVE BLEEDING IN PREMENOPAUSAL PERIOD: Primary | ICD-10-CM

## 2020-05-07 DIAGNOSIS — F40.01 PANIC DISORDER WITH AGORAPHOBIA: ICD-10-CM

## 2020-05-07 DIAGNOSIS — R87.610 ATYPICAL SQUAMOUS CELLS OF UNDETERMINED SIGNIFICANCE ON CYTOLOGIC SMEAR OF CERVIX (ASC-US): ICD-10-CM

## 2020-05-07 LAB
HCT VFR BLD AUTO: 45.9 % (ref 35–47)
HEMOGLOBIN: 13.8 G/DL (ref 11.7–15.7)
MCH RBC QN AUTO: 30.3 PG (ref 26.5–35)
MCHC RBC AUTO-ENTMCNC: 30.1 G/DL (ref 32–36)
MCV RBC AUTO: 100.7 FL (ref 78–100)
PLATELET # BLD AUTO: 305 K/UL (ref 150–450)
RBC # BLD AUTO: 4.56 M/UL (ref 3.8–5.2)
WBC # BLD AUTO: 6.4 K/UL (ref 4–11)

## 2020-05-07 RX ORDER — ALPRAZOLAM 2 MG/1
2 TABLET, ORALLY DISINTEGRATING ORAL DAILY PRN
Qty: 30 TABLET | Refills: 0 | Status: SHIPPED | OUTPATIENT
Start: 2020-05-07 | End: 2020-06-11

## 2020-05-07 RX ORDER — ALPRAZOLAM 0.5 MG
0.5 TABLET ORAL
Qty: 30 TABLET | Refills: 0 | Status: SHIPPED | OUTPATIENT
Start: 2020-05-07 | End: 2020-06-03

## 2020-05-07 NOTE — NURSING NOTE
Chief Complaint   Patient presents with     Vaginal Bleeding     since last night. Passing large blood clots, frequently. Painful cramps.      Medication Reconciliation     completed.        /86   Pulse 87   Temp 98.6  F (37  C) (Oral)   Resp 16   SpO2 97%       ~ Stew (Razia Santoyo CMA  [facility]  Phone: [phone]

## 2020-05-07 NOTE — PROGRESS NOTES
HPI:       Darlene Hudson is a 45 year old female with a significant past medical history of alcohol abuse, chronic low back pain, MDD, and anxiety who presents for the following concern:    Vaginal bleeding   - Excessive vaginal bleeding   - Denies syncope, chest pain or shortness of breath   - Heavy bleeding and cramps since last night   - Has not had her period for 6 months   - Woke up soaked in blood   - Passing clots from the size of quarter to bigger   - Has gone through 20 tampons since this morning   - Has had heavy periods in the past, but states this is the worst she has ever had   - Has been getting periods every 3 months for about 1.5 year   - Prior to this had her periods regularly.   - Lightheadedness since coming to clinic. Tingling of hands.   - No family history of uterine cancer   - Does have a family history of cervical cancer (Mother and Grandmother). Patient does have a history of ASCUS and HPV positive in 2014, and does not appear it has been done since that time   - Bleeding getting worse throughout the day today   - Has been taking Ibuprofen for the cramping          PMHX:     Patient Active Problem List   Diagnosis     Abnormal cytology finding     Nondependent alcohol abuse, episodic drinking behavior     Controlled substance agreement signed     Low back pain     Chronic sinusitis     Major depressive disorder, recurrent episode, moderate (H)     Tobacco use disorder     Noninflammatory disorder of vagina     Pap smear for cervical cancer screening     Abdominal pain     Abnormal cervical Papanicolaou smear     Panic disorder with agoraphobia     Atopic rhinitis     Chronic low back pain     Other long term (current) drug therapy     Acute pericardial effusion     Anxiety     Chronic infectious pericarditis     Polysubstance abuse (H)     Cough       Current Outpatient Medications   Medication Sig Dispense Refill     ALPRAZolam (XANAX) 0.5 MG tablet Take 1 tablet (0.5 mg) by mouth  nightly as needed for anxiety 30 tablet 0     ALPRAZolam (XANAX) 2 MG ODT Take 1 tablet (2 mg) by mouth daily as needed (anxiety) 30 tablet 0     cetirizine (ZYRTEC) 10 MG tablet Take 1 tablet (10 mg) by mouth daily 30 tablet 11     famotidine (PEPCID) 20 MG tablet Take 20 mg by mouth daily as needed        fluticasone (FLONASE) 50 MCG/ACT nasal spray Spray 2 sprays into both nostrils daily 9.9 mL 11     gabapentin (NEURONTIN) 600 MG tablet Take 1 tablet (600 mg) by mouth 3 times daily as needed (pain)       hydrocortisone (CORTAID) 0.5 % external cream Apply topically 2 times daily 30 g 0     montelukast (SINGULAIR) 10 MG tablet Take 1 tablet (10 mg) by mouth At Bedtime 30 tablet 11     nicotine (NICORETTE) 2 MG gum Take 1 each (2 mg) by mouth as needed for smoking cessation 30 each 1     oxybutynin (DITROPAN) 5 MG tablet Take 1 tablet (5 mg) by mouth 2 times daily 60 tablet 11     PROAIR  (90 Base) MCG/ACT inhaler Inhale 2 puffs into the lungs every 4 hours as needed for shortness of breath / dyspnea or wheezing 1 Inhaler 11     WIXELA INHUB 250-50 MCG/DOSE inhaler        citalopram (CELEXA) 20 MG tablet        hydrOXYzine (VISTARIL) 25 MG capsule Take 25 mg by mouth nightly as needed       metroNIDAZOLE (METROGEL) 0.75 % external gel MORALES AA BID FOR 5 DAYS PRF BACTERIAL INFECTION.       metroNIDAZOLE (METROGEL) 0.75 % vaginal gel Apply 1 applicator daily for 5 days as needed for bacterial vaginosis symptoms (Patient not taking: Reported on 5/7/2020) 70 g 0     ondansetron (ZOFRAN-ODT) 4 MG ODT tab as needed        order for DME Equipment being ordered: CAM Ladarius Boot 1 Units 0     oxyCODONE (ROXICODONE) 5 MG tablet Take 5 mg by mouth       phenol (CHLORASEPTIC) 1.4 % spray Take 5 sprays by mouth every 2 hours as needed for pain       predniSONE (DELTASONE) 50 MG tablet Take 1 tablet (50 mg) by mouth daily (Patient not taking: Reported on 5/7/2020) 5 tablet 0     triamcinolone (KENALOG) 0.1 % external cream  "Apply topically 2 times daily (Patient not taking: Reported on 5/7/2020) 1 g 1       Social History     Tobacco Use     Smoking status: Light Tobacco Smoker     Packs/day: 0.25     Years: 30.00     Pack years: 7.50     Types: Cigarettes     Smokeless tobacco: Never Used     Tobacco comment: Rarely   Substance Use Topics     Alcohol use: Not Currently     Alcohol/week: 0.0 standard drinks     Drug use: Not Currently          Allergies   Allergen Reactions     Lidocaine Other (See Comments)     \"my jaw stopped moving\"  Other reaction(s): Dystonia     Penicillins Hives, Rash and Shortness Of Breath     Epinephrine-Lidocaine-Na Metabisulfite Other (See Comments) and Muscle Pain (Myalgia)     Severe jaw cramping, double vision  Jaw locking       Office Visit on 05/07/2020   Component Date Value Ref Range Status     WBC 05/07/2020 6.4  4.0 - 11.0 K/uL Final     RBC 05/07/2020 4.56  3.80 - 5.20 M/uL Final     Hemoglobin 05/07/2020 13.8  11.7 - 15.7 g/dL Final     Hematocrit 05/07/2020 45.9  35.0 - 47.0 % Final     MCV 05/07/2020 100.7* 78.0 - 100.0 fL Final     MCH 05/07/2020 30.3  26.5 - 35.0 pg Final     MCHC 05/07/2020 30.1* 32.0 - 36.0 g/dL Final     Platelets 05/07/2020 305.0  150.0 - 450.0 K/uL Final         Family History   Problem Relation Age of Onset     Diabetes Brother      Hypertension Mother      Other Cancer Mother         cervical cancer     Other Cancer Father         lung cancer     Asthma Father      Breast Cancer Maternal Grandmother      Asthma Child             Review of Systems:       10 point review of systems negative except for noted in HPI             Physical Exam:     Vitals:    05/07/20 1554   BP: 126/86   Pulse: 87   Resp: 16   Temp: 98.6  F (37  C)   TempSrc: Oral   SpO2: 97%     There is no height or weight on file to calculate BMI.    Exam:  Constitutional: Anxious, alert and no distress  Head: Normocephalic. Atraumatic   Cardiovascular: Regular rate and rhythm. No murmurs, clicks gallops " or rub  Respiratory: Lungs clear to auscultation. No wheezing or crackles present   Abdomen:  Abdomen soft, non-tender. BS normal. No masses, organomegaly  PSYCH: Alert and oriented times 3; speech- coherent , normal rate and volume; able to articulate logical thoughts, able to abstract reason, no tangential thoughts, no hallucinations or delusions, very anxious during visit today       Assessment and Plan     1. Excessive bleeding in perimenopausal period  Bleeding likely related to being in perimenopausal period and not having menses for 6 months prior. Hemoglobin unchanged from last check in March at 13.8 today, so feel like it would be unlikely that she is truly going through 20 tampons in a day. Patient is quite anxious in clinic today and anxiety likely leading to lightheadedness. Normal BP, no tachycardia and normal Hb which is all quite reassuring. Of note she does have a history of ASCUS on her last pap with HPV positive and a family history of cervical cancer, so after bleeding has improved, will need a pap to follow up on this. Additionally, If bleeding continues for >5 days or recurs, would recommend starting workup with US   - CBC with [facility] ([facility])  - Needs pap in the near future   - If bleeding >5 days will order US to evaluate for causes including fibroids   - Discussed precautions and indications to present to the ED including shortness of breath, chest pain, syncope.   - Has follow up with PCP tomorrow (previously scheduled) but will keep due to severe anxiety today     2. Panic disorder with agoraphobia  PCP refilled Xanax prescriptions. See Dr. Bailon's note for further information   - ALPRAZolam (XANAX) 2 MG ODT; Take 1 tablet (2 mg) by mouth daily as needed (anxiety)  Dispense: 30 tablet; Refill: 0  - ALPRAZolam (XANAX) 0.5 MG tablet; Take 1 tablet (0.5 mg) by mouth nightly as needed for anxiety  Dispense: 30 tablet; Refill: 0    Follow up with PCP in 1 week     Options for treatment and  follow-up care were reviewed with the patient and/or guardian. Darlene Hudson and/or guardian engaged in the decision making process and verbalized understanding of the options discussed and agreed with the final plan.    Lori Grimes DO (PGY3)   Pager #[phone]    Precepted today with: Robbie Bailon MD

## 2020-05-07 NOTE — TELEPHONE ENCOUNTER
Patient declined ED/UCC/Office visit. Only wishes to speak with Adamaris Vann or . Will route to PCP and verbal handoff provided to PCP to call patient to discuss. Neo VÁSQUEZ    Additional Information    Negative: SEVERE vaginal bleeding (e.g., continuous red blood from vagina, or large blood clots) and very weak (can't stand)    Negative: Passed out (i.e., fainted, collapsed and was not responding)    Negative: Difficult to awaken or acting confused (e.g., disoriented, slurred speech)    Negative: Shock suspected (e.g., cold/pale/clammy skin, too weak to stand, low BP, rapid pulse)    Negative: Sounds like a life-threatening emergency to the triager    Negative: Pregnant > 20 weeks (5 months or more)    Negative: Pregnant < 20 weeks (less than 5 months)    Negative: Postpartum < 1 month since delivery ('Did you recently give birth?')    Negative: Vaginal discharge is the main symptom and bleeding is slight    Negative: SEVERE dizziness (e.g., unable to stand, requires support to walk, feels like passing out now)    Negative: SEVERE abdominal pain (e.g., excruciating)    SEVERE vaginal bleeding (i.e., soaking 2 pads or tampons per hour and present 2 or more hours; 1 menstrual cup every 2 hours)    Negative: Patient sounds very sick or weak to the triager    Negative: Constant abdominal pain lasting > 2 hours    Negative: Pale skin (pallor) of new onset or worsening    Negative: MODERATE vaginal bleeding (i.e., soaking pad or tampon per hour and present > 6 hours; 1 menstrual cup every 6 hours)    Protocols used: VAGINAL BLEEDING - [id number]

## 2020-05-07 NOTE — TELEPHONE ENCOUNTER
Spoke with  who declined to call patient r/t currently precepting and limited time available.  recommended patient be seen today, will need CBC r/t age of patient and how frequently she is soaking through tampons. Called patient, no answer. Left detailed VM requesting she call clinic and schedule appointment for today. Did advise  would be precepting and therefore would still be assisting with her careplan. Neo VÁSQUEZ

## 2020-05-07 NOTE — PATIENT INSTRUCTIONS
Patient Education     Coping with Symptoms of Menopause  What symptoms of menopause may occur with my treatment?  Chemotherapy drugs or medicines that block hormones may bring on menopause.  These changes may include:    Hot flashes    Vaginal dryness    Trouble falling asleep (insomnia)    Headache    Depression.  How are hot flashes treated?  Your doctor may suggest medicine to control the hot flashes. Vitamins E, B6 or C may also help. Talk to your doctor about how much to take.  Some herbs or foods may help: primrose oil, garlic, chamomile, ginseng root, royal jelly or soy.  What else can I do to cope with hot flashes?    Wear clothes made of natural fabric such as cotton.    Dress in layers. You can remove them when you feel too warm.    Avoid hot drinks (coffee or tea) and spicy foods.    Keep the room temperature low if you can.    Control your stress. Exercise and relaxation techniques may help.    Keep track of when and how often hot flashes occur. Note what is happening right before a hot flash. This may give you some control over future hot flashes.  How is vaginal dryness treated?    You can try drugstore products such as Replens, Gyne-Moistrin or Lubrin. They last for about three days.    Use water-based lubricants during sex. These include Astroglide and K-Y Jelly. Do not use Vaseline or petroleum jelly because they are not good for vaginal tissues.    Use low-dose estrogen cream in the vagina. Your doctor must prescribe this medicine.  How can I cope if I have trouble sleeping?    Get in bed when you are ready to go to sleep. Do not watch TV or read in bed.    Follow a sleeping routine: Go to bed and get up at the same times. Get up on time even if you did not sleep well.    If you do not fall asleep within 30 minutes, get up.    Get regular exercise. Do not exercise right before bedtime.    Avoid alcohol. It may disrupt your sleep.    Try natural remedies just before bedtime such as warm milk,  chamomile tea or verbena tea.  How can I treat headache?  Ask your doctor if it is safe to take aspirin, Tylenol (acetaminophen) or Advil (ibuprofen). They may cause side effects when mixed with chemotherapy.  How can I cope with depression?  Mild depression    Start an exercise program. Exercise with a friend. Any activity is better than none. Walk to the mailbox, to see your neighbors or in the mall.    Share your feelings with family and friends.    Don't give in to negative feelings.  Severe depression  If your depression lasts longer than two weeks, talk to your care team. They may suggest counseling or medicine.  Comments:  __________________________________________  __________________________________________  __________________________________________  __________________________________________  __________________________________________  __________________________________________  __________________________________________  __________________________________________  For informational purposes only. Not to replace the advice of your health care provider.  Copyright   2010 [employer]. All rights reserved. [employer] 779500 - 12/15.  For informational purposes only. Not to replace the advice of your health care provider.  Copyright   2018 [facility]. All rights reserved.

## 2020-05-08 ENCOUNTER — RECORDS - HEALTHEAST (OUTPATIENT)
Dept: ADMINISTRATIVE | Facility: OTHER | Age: 45
End: 2020-05-08

## 2020-05-08 ENCOUNTER — TELEPHONE (OUTPATIENT)
Dept: FAMILY MEDICINE | Facility: CLINIC | Age: 45
End: 2020-05-08

## 2020-05-08 ENCOUNTER — VIRTUAL VISIT (OUTPATIENT)
Dept: FAMILY MEDICINE | Facility: CLINIC | Age: 45
End: 2020-05-08
Payer: COMMERCIAL

## 2020-05-08 DIAGNOSIS — N92.4 EXCESSIVE BLEEDING IN PREMENOPAUSAL PERIOD: Primary | ICD-10-CM

## 2020-05-09 NOTE — TELEPHONE ENCOUNTER
"Patient called after hours clinic line regarding increasing pain. She has been seen on Thursday and Friday in clinic and virtually due to menorrhagia thought to be related to perimenopausal period. Continues to have bleeding and passing clots. Reports that it is very painful to pass the clots. Taking ibuprofen 200mg, 4 tablets every 4-5 hours. Cannot take acetaminophen due to the pain. Using hot pads. All of which are not touching her pain. No chest pain, shortness of breath, lightheadedness. She is very tearful on the phone and very anxious about her pain. Stating that she \"just doesn't know what to do anymore\". Pain is so severe that she has not been able to sleep for days, does not feel alternative NSAID would be helpful. Additionally feel it would be unsafe to prescribe higher potency NSAID due to recent abuse of ibuprofen over the last couple days. Given significant pain, patient needs to be seen and evaluated. Unfortunately due to clinic being closed the best option for her at this point is the ED. She is understanding and will have her SO drive her to the ED.     Lori Grimes DO (PGY3)  [facility] Resident  Pager: [phone]    "

## 2020-05-11 NOTE — PROGRESS NOTES
I have personally reviewed the history and examination as documented by Dr. Grimes.  I was present during key portions of the visit and agree with the assessment and plan as documented for 45 yr old female w/ anxiety disorder here for heavy menstrual bleeding. Hgb is stable.  Consider pelvic U/S if bleeding persists. Precautions given. Anticipatory guidance given.     Robbie Bailon MD  May 11, 2020  11:39 AM

## 2020-05-11 NOTE — PATIENT INSTRUCTIONS
Referral for :     US pelvis complete    LOCATION/PLACE/Provider :    [facility]   DATE & TIME :    May 12th at 2:15pm   PHONE :     [phone]  FAX :   [phone]  Appointment made by clinic staff/:    Kirstie

## 2020-05-15 DIAGNOSIS — B96.89 BACTERIAL VAGINOSIS: Primary | ICD-10-CM

## 2020-05-15 DIAGNOSIS — N76.0 BACTERIAL VAGINOSIS: Primary | ICD-10-CM

## 2020-05-18 ENCOUNTER — VIRTUAL VISIT (OUTPATIENT)
Dept: FAMILY MEDICINE | Facility: CLINIC | Age: 45
End: 2020-05-18
Payer: COMMERCIAL

## 2020-05-18 ENCOUNTER — VIRTUAL VISIT (OUTPATIENT)
Dept: FAMILY MEDICINE | Facility: OTHER | Age: 45
End: 2020-05-18

## 2020-05-18 ENCOUNTER — AMBULATORY - HEALTHEAST (OUTPATIENT)
Dept: FAMILY MEDICINE | Facility: CLINIC | Age: 45
End: 2020-05-18

## 2020-05-18 ENCOUNTER — OFFICE VISIT - HEALTHEAST (OUTPATIENT)
Dept: FAMILY MEDICINE | Facility: CLINIC | Age: 45
End: 2020-05-18

## 2020-05-18 DIAGNOSIS — R05.9 COUGH: Primary | ICD-10-CM

## 2020-05-18 DIAGNOSIS — Z20.822 SUSPECTED COVID-19 VIRUS INFECTION: ICD-10-CM

## 2020-05-18 DIAGNOSIS — Z20.822 EXPOSURE TO COVID-19 VIRUS: ICD-10-CM

## 2020-05-18 RX ORDER — METRONIDAZOLE 7.5 MG/G
GEL TOPICAL
Qty: 45 G | Refills: 0 | Status: SHIPPED | OUTPATIENT
Start: 2020-05-18 | End: 2020-05-28

## 2020-05-18 NOTE — PROGRESS NOTES
"Date: 2020 11:11:02  Clinician: Jack Maher  Clinician NPI: [id number]  Patient: Darlene Hudson  Patient : 1975  Patient Address: [address] [place] [address] [address], [place] [address]  Patient Phone: [phone]  Visit Protocol: URI  Patient Summary:  Darlene is a 45 year old ( : 1975 ) female who initiated a Visit for COVID-19 (Coronavirus) evaluation and screening. When asked the question \"Please sign me up to receive news, health information and promotions. \", Darlene responded \"No\".    Darlene states her symptoms started gradually 3-4 days ago.   Her symptoms consist of facial pain or pressure, a cough, nasal congestion, rhinitis, and malaise. She is experiencing difficulty breathing due to nasal congestion but she is not short of breath.   Symptom details     Nasal secretions: The color of her mucus is clear.    Cough: Darlene coughs a few times an hour and her cough is more bothersome at night. Phlegm does not come into her throat when she coughs. She does not believe her cough is caused by post-nasal drip.     Facial pain or pressure: The facial pain or pressure does not feel worse when bending or leaning forward.      Darlene denies having nausea, teeth pain, ageusia, diarrhea, chills, sore throat, wheezing, fever, vomiting, ear pain, headache, myalgias, and anosmia. She also denies having recent facial or sinus surgery in the past 60 days, double sickening (worsening symptoms after initial improvement), and taking antibiotic medication for the symptoms.   Precipitating events  She has not recently been exposed to someone with influenza. Darlene has been in close contact with the following high risk individuals: people with asthma, heart disease or diabetes and adults 65 or older.   Pertinent COVID-19 (Coronavirus) information  In the past 14 days, Darlene has not worked in a congregate living setting.   She does not work or volunteer as healthcare worker or a  and does not work " or volunteer in a healthcare facility.   Darlene also has not lived in a congregate living setting in the past 14 days. She does not live with a healthcare worker.   Darlene has not had a close contact with a laboratory-confirmed COVID-19 patient within 14 days of symptom onset.   Pertinent medical history  Darlene does not get yeast infections when she takes antibiotics.   Darlene needs a return to work/school note.   Weight: 245 lbs   Darlene smokes or uses smokeless tobacco.   She denies pregnancy and denies breastfeeding. She has menstruated in the past month.   Additional information as reported by the patient (free text): I am asthmatic with environmental allergies and these symptoms are not unusual for me. I get these symptoms 3-4 times a year.   Weight: 245 lbs    MEDICATIONS: Xanax oral, Pepcid oral, gabapentin oral, Zofran oral, Flonase Allergy Relief nasal, Zyrtec oral, Singulair oral, Wixela Inhub inhalation, Ventolin HFA inhalation, ALLERGIES: Penicillins  Clinician Response:  Dear Darlene,   Dear Darlene  Your symptoms show that you may have coronavirus (COVID-19). This illness can cause fever, cough and trouble breathing. Many people get a mild case and get better on their own. Some people can get very sick.  What should I do?  We would like to test you for this virus. This will be a curbside test done outside the clinic.  Please call [phone] to schedule your visit. Explain that you were referred by [facility] to have a COVID-19 test. Be ready to share your [facility] visit ID number.  Starting now:  Stay at least 6 feet away from others. (If someone will drive you to your test, stay in the backseat, as far away from the  as you can.)   Don't go to work, school or anywhere else. When it's time for your test, go straight to the testing site. Don't make any stops on the way there or back.   Wash your hands and face often. Use soap and water.   Cover your mouth and nose with a mask, tissue or washcloth.    "Don't touch anyone. No hugging, kissing or handshakes.  While at home   Stay home and away from others (self-isolate) until:  You've had no fever---and no medicine that reduces fever---for 3 full days (72 hours). And...  Your other symptoms have gotten better. For example, your cough or breathing has improved. And...  At least 10 days have passed since your symptoms started.  During this time:  Stay in your own room (and use your own bathroom), if you can.  Don't go to work, school or anywhere else.  Stay away from others in your home. No hugging, kissing or shaking hands.  Don't let anyone visit.  Cover your mouth and nose with a mask, tissue or washcloth to avoid spreading germs.  Clean \"high touch\" surfaces often (doorknobs, counters, handles, etc.). Use a household cleaning spray or wipes.  Wash your hands and face often. Use soap and water.  How can I take care of myself?  1. Get lots of rest. Drink extra fluids (unless your doctor has told you not to).  2. Take Tylenol (acetaminophen) for fever or pain. If you have liver or kidney problems, ask your family doctor if it's okay to take Tylenol.  Adults can take either:   650 mg (two 325 mg pills) every 4 to 6 hours, or...  1,000 mg (two 500 mg pills) every 8 hours as needed.   Note: Don't take more than 3,000 mg in one day.   Acetaminophen is found in many medicines (both prescribed and over-the-counter medicines). Read all labels to be sure you don't take too much.   For children, check the Tylenol bottle for the right dose. The dose is based on the child's age or weight.  3. If you have other health problems (like cancer, heart failure, an organ transplant or severe kidney disease): Call your specialty clinic if you don't feel better in the next 2 days.  4. Know when to call 911: If your breathing is so bad that it keeps you from doing normal activities, call 911 or go to the emergency room. Tell them that you've been staying home and may have COVID-19.  5. " Sign up for [employer]. We know it's scary to hear that you might have COVID-19. We want to track your symptoms to make sure you're okay over the next 2 weeks. Please look for an email from [employer]---this is a free, online program that we'll use to keep in touch. To sign up, follow the link in the email. Learn more at [email or web address].  6. The following will serve as your written order for this Covid Test ordered by me for the indication of suspected Covid [Z20.828]: The test will be ordered in [employer], our electronic health record after you are scheduled and will show as ordered and authorized by Low Aquino MD   Order: Covid-19 (Coronavirus) PCR [facility]  Where can I get more information?  To learn more about COVID-19 and how to care for yourself at home, please visit the CDC website at https://www.cdc.gov/coronavirus/2019-ncov/about/steps-when-sick.html.  For more about your care at [facility], please visit [email or web address]  If you'd like to be part of a COVID-19 clinical trial (research study) at the [facility], go to [email or web address] for details.    Diagnosis: Other asthma  Diagnosis ICD: J45.998

## 2020-05-18 NOTE — PROGRESS NOTES
I have personally reviewed the telephone encounter as documented by Dr. Cronin.  I agree with the assessment and plan as documented for 45 yr old female evaluated for cough. Work note written. Recommended COVID triage through [email or web address]. Precautions given. Anticipatory guidance given.     Robbie Bailon MD  May 18, 2020  11:16 AM

## 2020-05-18 NOTE — PROGRESS NOTES
"Family Medicine Telephone Visit Note               Telephone Visit Consent   Patient was verbally read the following and verbal consent was obtained.    \"Telephone visits are billed at different rates depending on your insurance coverage. During this emergency period, for some insurers they may be billed the same as an in-person visit.  Please reach out to your insurance provider with any questions.  If during the course of the call the physician/provider feels a telephone visit is not appropriate, you will not be charged for this service.\"    Name person giving consent:  Patient   Date verbal consent given:  5/18/2020  Time verbal consent given:  10:50 AM           Chief Complaint   Patient presents with     Cough     Cough/cold  - pt needs work              HPI   Patients name: Karoline  Appointment start time:  10:55 AM    Symptoms started on Friday - mostly cough  \"I have a bad cold\"  Cough and congestion  Having some sinus pressure  No fevers  Has been using OTC medications - \"I bought like 100 dollars worth of meds\"  Claims she is working 12 hour shifts 7 days a week  Works in [employer] and has a lot of contacts  Says she had allergies every year that present like this  Needs a note for work  She may have had some exposures at work but unsure if any confirmed      Current Outpatient Medications   Medication Sig Dispense Refill     ALPRAZolam (XANAX) 0.5 MG tablet Take 1 tablet (0.5 mg) by mouth nightly as needed for anxiety 30 tablet 0     ALPRAZolam (XANAX) 2 MG ODT Take 1 tablet (2 mg) by mouth daily as needed (anxiety) 30 tablet 0     cetirizine (ZYRTEC) 10 MG tablet Take 1 tablet (10 mg) by mouth daily 30 tablet 11     citalopram (CELEXA) 20 MG tablet        famotidine (PEPCID) 20 MG tablet Take 20 mg by mouth daily as needed        fluticasone (FLONASE) 50 MCG/ACT nasal spray Spray 2 sprays into both nostrils daily 9.9 mL 11     gabapentin (NEURONTIN) 600 MG tablet Take 1 tablet (600 mg) by mouth 3 times " "daily as needed (pain)       hydrocortisone (CORTAID) 0.5 % external cream Apply topically 2 times daily 30 g 0     hydrOXYzine (VISTARIL) 25 MG capsule Take 25 mg by mouth nightly as needed       metroNIDAZOLE (METROGEL) 0.75 % external gel MORALES AA BID FOR 5 DAYS PRF BACTERIAL INFECTION. 45 g 0     metroNIDAZOLE (METROGEL) 0.75 % vaginal gel Apply 1 applicator daily for 5 days as needed for bacterial vaginosis symptoms (Patient not taking: Reported on 5/7/2020) 70 g 0     montelukast (SINGULAIR) 10 MG tablet Take 1 tablet (10 mg) by mouth At Bedtime 30 tablet 11     nicotine (NICORETTE) 2 MG gum Take 1 each (2 mg) by mouth as needed for smoking cessation 30 each 1     ondansetron (ZOFRAN-ODT) 4 MG ODT tab as needed        order for DME Equipment being ordered: [employer] Walker [employer] 1 Units 0     oxybutynin (DITROPAN) 5 MG tablet Take 1 tablet (5 mg) by mouth 2 times daily 60 tablet 11     oxyCODONE (ROXICODONE) 5 MG tablet Take 5 mg by mouth       phenol (CHLORASEPTIC) 1.4 % spray Take 5 sprays by mouth every 2 hours as needed for pain       PROAIR  (90 Base) MCG/ACT inhaler Inhale 2 puffs into the lungs every 4 hours as needed for shortness of breath / dyspnea or wheezing 1 Inhaler 11     triamcinolone (KENALOG) 0.1 % external cream Apply topically 2 times daily (Patient not taking: Reported on 5/7/2020) 1 g 1     WIXELA INHUB 250-50 MCG/DOSE inhaler        Allergies   Allergen Reactions     Lidocaine Other (See Comments)     \"my jaw stopped moving\"  Other reaction(s): Dystonia     Penicillins Hives, Rash and Shortness Of Breath     Epinephrine-Lidocaine-Na Metabisulfite Other (See Comments) and Muscle Pain (Myalgia)     Severe jaw cramping, double vision  Jaw locking              Review of Systems:     Complete ROS negative unless noted above         Physical Exam:     There were no vitals taken for this visit.  Estimated body mass index is 37.95 kg/m  as calculated from the following:    Height as of 4/1/20: " "1.753 m (5' 9\").    Weight as of 4/1/20: 116.6 kg (257 lb).    Exam:  Constitutional: healthy, alert and no distress  Psychiatric: normal mentation, flat affect  Resp: No coughing during visit, talking in complete sentences            Assessment and Plan   1. Cough  2. Exposure to Covid-19 Virus  Given patient has possible exposures and is symptomatic with cough, she should ultimately be tested for COVID with PCR. Directed [facility] to [facility] for this. Work notes provided excusing her until testing results with CDC recommendations as well. Her daughter will come get these as she self isolates. Instructions on which to go to ED provided.      Appointment end time: 11:11 AM  This is a telephone visit that took 16 minutes.      Clinician location:  [facility]  I precepted today with Dr. Bailon.              "

## 2020-05-18 NOTE — LETTER
RETURN TO WORK/SCHOOL FORM    5/18/2020    Re: Darlene Hudson  1975      To Whom It May Concern:     Darlene Hudson was seen in clinic today via a telephone visit. Due to her complaint of a cough, she should be tested for COVID -19. [facility]. At this point CDC recommendations would be for her to be excused for 10 days from symptom onset, so through 5/25/2020 and for her to check her temperature at least twice daily. The providers at [facility] may adjust that pending test results.           Dylan Cronin MD  5/18/2020 11:04 AM

## 2020-05-19 ENCOUNTER — VIRTUAL VISIT (OUTPATIENT)
Dept: FAMILY MEDICINE | Facility: OTHER | Age: 45
End: 2020-05-19

## 2020-05-19 ENCOUNTER — TELEPHONE (OUTPATIENT)
Dept: FAMILY MEDICINE | Facility: CLINIC | Age: 45
End: 2020-05-19

## 2020-05-19 NOTE — TELEPHONE ENCOUNTER
[facility] phone call message- general phone call:    Reason for call: Patient indicate  Seeing Dr. Cronin yesterday in which he sent patient to get Covid19 test. Patient states she knows she has really bad sinus infection and would just like for PCP to call her back and discuss further. Patient declined visit at this time although inform PCP is in clinic to oversee resident.  Return call needed: Yes    OK to leave a message on voice mail? Yes      Primary language: English      needed? No    Call taken on May 19, 2020 at 9:02 AM by Raúl Santoyo

## 2020-05-19 NOTE — PROGRESS NOTES
"Date: 2020 04:35:22  Clinician: Justina Rubio  Clinician NPI: [id number]  Patient: Darlene Hudson  Patient : 1975  Patient Address: [address] [place] [address] [address], [place] [address]  Patient Phone: [phone]  Visit Protocol: URI  Patient Summary:  Darlene is a 45 year old ( : 1975 ) female who initiated a Visit for cold, sinus infection, or influenza. When asked the question \"Please sign me up to receive news, health information and promotions. \", Darlene responded \"Yes\".    Darlene states her symptoms started gradually 3-4 days ago.   Her symptoms consist of facial pain or pressure, a cough, nasal congestion, rhinitis, a headache, and malaise. She is experiencing mild difficulty breathing with activities but can speak normally in full sentences.   Symptom details     Nasal secretions: The color of her mucus is clear.    Cough: Darlene coughs every 5-10 minutes and her cough is more bothersome at night. Phlegm does not come into her throat when she coughs. She does not believe her cough is caused by post-nasal drip.     Facial pain or pressure: The facial pain or pressure does not feel worse when bending or leaning forward.     Headache: She states the headache is moderate (4-6 on a 10 point pain scale).      Darlene denies having nausea, teeth pain, ageusia, diarrhea, chills, sore throat, wheezing, enlarged lymph nodes, fever, vomiting, ear pain, myalgias, and anosmia. She also denies having recent facial or sinus surgery in the past 60 days, double sickening (worsening symptoms after initial improvement), and taking antibiotic medication for the symptoms.   Precipitating events  She has not recently been exposed to someone with influenza. Darlene has been in close contact with the following high risk individuals: people with asthma, heart disease or diabetes and adults 65 or older.   Pertinent COVID-19 (Coronavirus) information  In the past 14 days, Darlene has not worked in a congregate living " setting.   She does not work or volunteer as healthcare worker or a  and does not work or volunteer in a healthcare facility.   Darlene also has not lived in a congregate living setting in the past 14 days. She does not live with a healthcare worker.   Darlene has not had a close contact with a laboratory-confirmed COVID-19 patient within 14 days of symptom onset.   Pertinent medical history  Darlene does not get yeast infections when she takes antibiotics.   Darlene does not need a return to work/school note.   Weight: 245 lbs   Darlene smokes or uses smokeless tobacco.   She denies pregnancy and denies breastfeeding. She has menstruated in the past month.   Additional information as reported by the patient (free text): I was tested for covid 19 today but I believe this is a horrible sinus infection and cold...allergies. i am miserable from coughing and the facial pain in my face..this is normal for me but I'm not understanding why I can be treated for my symptoms in the mean time...this vacial pain is causing e bad headaches a d i can't wait for results. I cant even sleep from the coughing and face pain.   Weight: 245 lbs    MEDICATIONS: Xanax oral, Pepcid oral, gabapentin oral, Zofran oral, Flonase Allergy Relief nasal, Zyrtec oral, Singulair oral, Wixela Inhub inhalation, Ventolin HFA inhalation, ALLERGIES: Penicillins  Clinician Response:  Dear Darlene,  Based on the information provided, you have viral sinusitis, also known as a sinus infection. Sinus infections are caused by bacteria or a virus and symptoms are almost always identical. The difference between the 2 types of infections is timing.  Sinus infections start as viral infections and symptoms improve on their own in about 7 days. If symptoms have not improved after 7 days or have even worsened, a bacterial infection may have developed.  Medication information  Because you have a viral infection, antibiotics will not help you get better.  Treating a viral infection with antibiotics could actually make you feel worse.  For more information on why I am not prescribing antibiotics, please watch this video: Antibiotics Aren't Always the Answer.  I am prescribing:       Fluticasone 50 mcg/actuation nasal spray. Inhale 2 sprays in each nostril 1 time per day; after 1 week, may adjust to 1 - 2 sprays in each nostril 1 time per day. This medication takes several days to start working, so keep taking it even if it doesn't help right away. There are no refills with this prescription.      Benzonatate (Tessalon Perles) 100 mg oral capsule. Take 1-2 capsules by mouth 3 times per day as needed for your cough. There are no refills with this prescription.     Unless you are allergic to the over-the-counter medication(s) below, I recommend using:       Acetaminophen (Tylenol or store brand) oral tablet. Take 1-2 tablets by mouth every 4-6 hours to help with the discomfort.      Ibuprofen (Advil or store brand) 200 mg oral tablet. Take 1-3 tablets (200-600 mg) by mouth every 8 hours to help with the discomfort. Make sure to take the ibuprofen with food. Do not exceed 2400 mg in 24 hours.    A decongestant such as Sudafed PE or store brand.      Dextro polistirex (Delsym or store brand). This medication is a cough suppressant that works by decreasing the feeling of needing to cough. Adults and children over 12 years old, take 10 ml by mouth every 12 hours as needed. Children ages 6 - 11, take 5 ml by mouth every 12 hours as needed. Children ages 4 - 5, take 2.5 ml by mouth every 12 hours as needed.      Saline nasal spray or drops(Ocean or store brand). Use 1-2 drops or sprays in each nostril as needed for congestion.     Over-the-counter medications do not require a prescription. Ask the pharmacist if you have any questions.  Self care  Steps you can take to be as comfortable as possible:     Rest.    Drink plenty of fluids.    Take a warm shower to loosen congestion     Use a cool-mist humidifier.    Take a spoonful of honey to reduce your cough.     Also, as your provider, I need you to know that becoming tobacco-free is the most important thing you can do to protect your current and future health.  When to seek care  Please be seen in a clinic or urgent care if any of the following occur:   New symptoms develop, or symptoms become worse   Call ahead before going to the clinic or urgent care.  COVID-19 (Coronavirus) General Information  Because there is currently no vaccine to prevent infection, the best way to protect yourself is to avoid being exposed to this virus. Common symptoms of COVID-19 include but are not limited to fever, cough, and shortness of breath. These symptoms appear 2-14 days after you are exposed to the virus that causes COVID-19. Click here for more information from the CDC on how to protect yourself.  If you are sick with COVID-19 or suspect you are infected with the virus that causes COVID-19, follow the steps here from the CDC to help prevent the disease from spreading to people in your home and community.  Click here for general information from the CDC on testing.  If you develop any of these emergency warning signs for COVID-19, get medical attention immediately:     Trouble breathing    Persistent pain or pressure in the chest    New confusion or inability to arouse    Bluish lips or face      Call your doctor or clinic before going in. Call 911 if you have a medical emergency and notify the  you have or think you may have COVID-19.  For more detailed and up to date information on COVID-19 (Coronavirus), please visit the CDC website.   Diagnosis: Viral sinusitis  Diagnosis ICD: J01.90  Prescription: benzonatate (Tessalon Perles) 100 mg oral capsule 30 capsule, 5 days supply. Take 1-2 capsules by mouth 3 times per day as needed. Refills: 0, Refill as needed: no, Allow substitutions: yes  Prescription: fluticasone 50 mcg/actuation nasal  spray,suspension 1 120 spray aerosol with adapter (grams), 30 days supply. Inhale 2 sprays in each nostril 1 time per day; after 1 week, may adjust to 1 - 2 sprays in each nostril 1 time per day.. Refills: 0, Refill as needed: no, Allow substitutions: yes

## 2020-05-20 ENCOUNTER — COMMUNICATION - HEALTHEAST (OUTPATIENT)
Dept: FAMILY MEDICINE | Facility: CLINIC | Age: 45
End: 2020-05-20

## 2020-05-20 ENCOUNTER — VIRTUAL VISIT (OUTPATIENT)
Dept: FAMILY MEDICINE | Facility: CLINIC | Age: 45
End: 2020-05-20
Payer: COMMERCIAL

## 2020-05-20 DIAGNOSIS — D17.1 LIPOMA OF BACK: ICD-10-CM

## 2020-05-20 DIAGNOSIS — N39.46 MIXED INCONTINENCE: ICD-10-CM

## 2020-05-20 DIAGNOSIS — J01.00 ACUTE NON-RECURRENT MAXILLARY SINUSITIS: Primary | ICD-10-CM

## 2020-05-20 RX ORDER — DOXYCYCLINE HYCLATE 100 MG
100 TABLET ORAL 2 TIMES DAILY
Qty: 14 TABLET | Refills: 0 | Status: SHIPPED | OUTPATIENT
Start: 2020-05-20 | End: 2020-06-11

## 2020-05-20 RX ORDER — PREDNISONE 50 MG/1
50 TABLET ORAL DAILY
Qty: 5 TABLET | Refills: 0 | Status: SHIPPED | OUTPATIENT
Start: 2020-05-20 | End: 2020-06-03

## 2020-05-20 NOTE — LETTER
WORK NOTE    5/20/2020    Re: Darlene Hudson  1975      To Whom It May Concern:     Darlene Hudson was evaluated via telephone visit today. She is currently being treated for sinusitis. She had a NEGATIVE COVID Test on 5/18/20.  Per CDC guidelines, she is to be excused from work through 5/25/20 and is ok to return to work without restrictions on Tuesday 5/26/20.    Please contact us with any questions or concerns.          Robbie Bailon MD  5/20/2020 2:10 PM

## 2020-05-20 NOTE — PROGRESS NOTES
"Family Medicine Telephone Visit Note           Telephone Visit Consent   Patient was verbally read the following and verbal consent was obtained.    \"Telephone visits are billed at different rates depending on your insurance coverage. During this emergency period, for some insurers they may be billed the same as an in-person visit.  Please reach out to your insurance provider with any questions.  If during the course of the call the physician/provider feels a telephone visit is not appropriate, you will not be charged for this service.\"    Name person giving consent:  Patient   Date verbal consent given:  5/20/2020  Time verbal consent given:  1:20 PM    Chief Complaint: sinus infection            HPI   Patients name: Karoline  Appointment start time:  1:50 PM    1) Sinus infection -   Pt last seen via virtual visit on 5/18/20 by Dr Cronin and recommended to get COVID testing.   Testing performed and was negative.  Then she called the next day concerned about allergies and a sinus infection. Offered appointment today. Was upset she had to wait 1 day so called [facility] (formerly [facility]) and had virtual visit yesterday. Diagnosed with viral sinusitis. Treated w/ Flonase, tessalon, delsym, tylenol/ ibuprofen - never picked up any of these meds     2) Lipoma on back - would like to schedule for excision     3) Incontinence - stress incontinence/ urge incontinence -> was prescribed ditropan w/o relief - she stopped  Has not been doing Kegels      Current Outpatient Medications   Medication Sig Dispense Refill     ALPRAZolam (XANAX) 0.5 MG tablet Take 1 tablet (0.5 mg) by mouth nightly as needed for anxiety 30 tablet 0     ALPRAZolam (XANAX) 2 MG ODT Take 1 tablet (2 mg) by mouth daily as needed (anxiety) 30 tablet 0     cetirizine (ZYRTEC) 10 MG tablet Take 1 tablet (10 mg) by mouth daily 30 tablet 11     citalopram (CELEXA) 20 MG tablet        famotidine (PEPCID) 20 MG tablet Take 20 mg by mouth daily as needed        fluticasone " "(FLONASE) 50 MCG/ACT nasal spray Spray 2 sprays into both nostrils daily 9.9 mL 11     gabapentin (NEURONTIN) 600 MG tablet Take 1 tablet (600 mg) by mouth 3 times daily as needed (pain)       hydrocortisone (CORTAID) 0.5 % external cream Apply topically 2 times daily 30 g 0     hydrOXYzine (VISTARIL) 25 MG capsule Take 25 mg by mouth nightly as needed       metroNIDAZOLE (METROGEL) 0.75 % external gel MORALES AA BID FOR 5 DAYS PRF BACTERIAL INFECTION. 45 g 0     metroNIDAZOLE (METROGEL) 0.75 % vaginal gel Apply 1 applicator daily for 5 days as needed for bacterial vaginosis symptoms (Patient not taking: Reported on 5/7/2020) 70 g 0     montelukast (SINGULAIR) 10 MG tablet Take 1 tablet (10 mg) by mouth At Bedtime 30 tablet 11     nicotine (NICORETTE) 2 MG gum Take 1 each (2 mg) by mouth as needed for smoking cessation 30 each 1     ondansetron (ZOFRAN-ODT) 4 MG ODT tab as needed        order for DME Equipment being ordered: CAM Walker Boot 1 Units 0     oxybutynin (DITROPAN) 5 MG tablet Take 1 tablet (5 mg) by mouth 2 times daily 60 tablet 11     oxyCODONE (ROXICODONE) 5 MG tablet Take 5 mg by mouth       phenol (CHLORASEPTIC) 1.4 % spray Take 5 sprays by mouth every 2 hours as needed for pain       PROAIR  (90 Base) MCG/ACT inhaler Inhale 2 puffs into the lungs every 4 hours as needed for shortness of breath / dyspnea or wheezing 1 Inhaler 11     triamcinolone (KENALOG) 0.1 % external cream Apply topically 2 times daily (Patient not taking: Reported on 5/7/2020) 1 g 1     WIXELA INHUB 250-50 MCG/DOSE inhaler        Allergies   Allergen Reactions     Lidocaine Other (See Comments)     \"my jaw stopped moving\"  Other reaction(s): Dystonia     Penicillins Hives, Rash and Shortness Of Breath     Epinephrine-Lidocaine-Na Metabisulfite Other (See Comments) and Muscle Pain (Myalgia)     Severe jaw cramping, double vision  Jaw locking            Review of Systems:              Physical Exam:     There were no vitals " "taken for this visit.  Estimated body mass index is 37.95 kg/m  as calculated from the following:    Height as of 4/1/20: 1.753 m (5' 9\").    Weight as of 4/1/20: 116.6 kg (257 lb).    Exam:  Constitutional: healthy, alert and no distress  Psychiatric: mentation appears normal and affect normal/bright          Assessment and Plan     (J01.00) Acute non-recurrent maxillary sinusitis  (primary encounter diagnosis)  Comment: explained that this is viral unless symptoms don't resolve after 7-10 days. 7 days will be on Saturday so instead of her calling on-call doctor this weekend which is very likely, I will prescribe her Abx/steroids to fill only if no better in 3 days; precautions given  Plan: predniSONE (DELTASONE) 50 MG tablet,         doxycycline hyclate (VIBRA-TABS) 100 MG tablet            (D17.1) Lipoma of back  Comment:   Plan: will schedule next week w/ Dr Phan for procedure    (N39.46) Mixed incontinence  Comment:   Plan: will start Kegel exercises; is overdue for pap so I will examine in office in 2 weeks for any prolapse      After Visit Information:  Patient declined AVS       Appointment end time: 2:03 PM  This is a telephone visit that took 13 minutes.      Clinician location:  [facility] Kapur, MD  May 20, 2020  2:08 PM    "

## 2020-05-27 ENCOUNTER — RECORDS - HEALTHEAST (OUTPATIENT)
Dept: ADMINISTRATIVE | Facility: OTHER | Age: 45
End: 2020-05-27

## 2020-05-27 ENCOUNTER — OFFICE VISIT (OUTPATIENT)
Dept: FAMILY MEDICINE | Facility: CLINIC | Age: 45
End: 2020-05-27
Payer: COMMERCIAL

## 2020-05-27 VITALS
SYSTOLIC BLOOD PRESSURE: 126 MMHG | RESPIRATION RATE: 18 BRPM | HEART RATE: 87 BPM | OXYGEN SATURATION: 98 % | DIASTOLIC BLOOD PRESSURE: 76 MMHG

## 2020-05-27 DIAGNOSIS — D17.30 LIPOMA OF SKIN AND SUBCUTANEOUS TISSUE: Primary | ICD-10-CM

## 2020-05-27 NOTE — PROGRESS NOTES
I have personally reviewed the history and examination as documented by Dr. Phan.  I was present during key portions of the visit and agree with the assessment and plan as documented for 45 yr old female here for eval of ?soft tissue mass in L lower back. Bedside U/S performed. Mass not easily visualized or amenable to excision today. Recommended formal U/S and consideration for removal by surgeon if indicated. Precautions given. Anticipatory guidance given.     Robbie Bailon MD  May 27, 2020  11:24 AM

## 2020-05-27 NOTE — LETTER
RETURN TO WORK/SCHOOL FORM    5/27/2020    Re: Darlene Hudson  1975      To Whom It May Concern:     Darlene Hudson was seen in clinic today..  She may return to work without restrictions on 5/28/20          Restrictions:  Nonefull duty      Aniceto Phan MD  5/27/2020 9:21 AM

## 2020-05-27 NOTE — PROGRESS NOTES
HPI:   Darlene Hudson is a 45 year old  female who presents for:    Chief Complaint   Patient presents with     Procedure     Lipoma removal     - Patient presents for lipoma removal  - Referred from Dr. Bailon due to previous discussion I had with patient regarding lipoma  - Patient states the lipoma has been changing recently and moving locations  - Is now in her left low back  - She notes it is annoying and painful with bra straps         PMHX:     Patient Active Problem List   Diagnosis     Abnormal cytology finding     Nondependent alcohol abuse, episodic drinking behavior     Controlled substance agreement signed     Low back pain     Chronic sinusitis     Major depressive disorder, recurrent episode, moderate (H)     Tobacco use disorder     Noninflammatory disorder of vagina     Pap smear for cervical cancer screening     Abdominal pain     Abnormal cervical Papanicolaou smear     Panic disorder with agoraphobia     Atopic rhinitis     Chronic low back pain     Other long term (current) drug therapy     Acute pericardial effusion     Anxiety     Chronic infectious pericarditis     Polysubstance abuse (H)     Cough       Current Outpatient Medications   Medication Sig Dispense Refill     ALPRAZolam (XANAX) 0.5 MG tablet Take 1 tablet (0.5 mg) by mouth nightly as needed for anxiety 30 tablet 0     ALPRAZolam (XANAX) 2 MG ODT Take 1 tablet (2 mg) by mouth daily as needed (anxiety) 30 tablet 0     cetirizine (ZYRTEC) 10 MG tablet Take 1 tablet (10 mg) by mouth daily 30 tablet 11     citalopram (CELEXA) 20 MG tablet        doxycycline hyclate (VIBRA-TABS) 100 MG tablet Take 1 tablet (100 mg) by mouth 2 times daily for 7 days 14 tablet 0     famotidine (PEPCID) 20 MG tablet Take 20 mg by mouth daily as needed        fluticasone (FLONASE) 50 MCG/ACT nasal spray Spray 2 sprays into both nostrils daily 9.9 mL 11     gabapentin (NEURONTIN) 600 MG tablet Take 1 tablet (600 mg) by mouth 3 times daily as needed  (pain)       hydrocortisone (CORTAID) 0.5 % external cream Apply topically 2 times daily 30 g 0     hydrOXYzine (VISTARIL) 25 MG capsule Take 25 mg by mouth nightly as needed       metroNIDAZOLE (METROGEL) 0.75 % external gel MORALES AA BID FOR 5 DAYS PRF BACTERIAL INFECTION. 45 g 0     metroNIDAZOLE (METROGEL) 0.75 % vaginal gel Apply 1 applicator daily for 5 days as needed for bacterial vaginosis symptoms (Patient not taking: Reported on 5/7/2020) 70 g 0     montelukast (SINGULAIR) 10 MG tablet Take 1 tablet (10 mg) by mouth At Bedtime 30 tablet 11     nicotine (NICORETTE) 2 MG gum Take 1 each (2 mg) by mouth as needed for smoking cessation 30 each 1     ondansetron (ZOFRAN-ODT) 4 MG ODT tab as needed        order for DME Equipment being ordered: CAM Walker Boot 1 Units 0     oxybutynin (DITROPAN) 5 MG tablet Take 1 tablet (5 mg) by mouth 2 times daily 60 tablet 11     oxyCODONE (ROXICODONE) 5 MG tablet Take 5 mg by mouth       phenol (CHLORASEPTIC) 1.4 % spray Take 5 sprays by mouth every 2 hours as needed for pain       predniSONE (DELTASONE) 50 MG tablet Take 1 tablet (50 mg) by mouth daily 5 tablet 0     PROAIR  (90 Base) MCG/ACT inhaler Inhale 2 puffs into the lungs every 4 hours as needed for shortness of breath / dyspnea or wheezing 1 Inhaler 11     triamcinolone (KENALOG) 0.1 % external cream Apply topically 2 times daily (Patient not taking: Reported on 5/7/2020) 1 g 1     WIXELA INHUB 250-50 MCG/DOSE inhaler          Social History     Tobacco Use     Smoking status: Light Tobacco Smoker     Packs/day: 0.25     Years: 30.00     Pack years: 7.50     Types: Cigarettes     Smokeless tobacco: Never Used     Tobacco comment: Rarely - Trying Nicotene gum   Substance Use Topics     Alcohol use: Yes     Alcohol/week: 0.0 standard drinks     Comment: Occasiaonlly.      Drug use: Not Currently       Social History     Social History Narrative     Not on file       Allergies   Allergen Reactions     Lidocaine  "Other (See Comments)     \"my jaw stopped moving\"  Other reaction(s): Dystonia     Penicillins Hives, Rash and Shortness Of Breath     Epinephrine-Lidocaine-Na Metabisulfite Other (See Comments) and Muscle Pain (Myalgia)     Severe jaw cramping, double vision  Jaw locking       No results found for this or any previous visit (from the past 24 hour(s)).         Review of Systems:    ROS: 10 point ROS neg other than the symptoms noted above in the HPI.         Physical Exam:     Vitals:    05/27/20 0830   BP: 126/76   Pulse: 87   Resp: 18   SpO2: 98%     There is no height or weight on file to calculate BMI.    General: Alert, well-appearing female in NAD  HEENT: PERRL, moist oral mucus membranes  Pulm: CTA BL, no tachypnea  CV: RRR, no murmur  Skin: No rash on limited skin exam, right lower back has approximately 2 cm mass deep that is slightly mobile and firm.  POCUS reveals a hypoechoic 1 cm by 0.5 cm area that shows no flow with color.     Assessment and Plan   (D17.30) Lipoma of skin and subcutaneous tissue  (primary encounter diagnosis)  Comment: Differential includes lipoma, encapsulated sebacieous cyst. Bothersome to patient but due to depth and uncertainty of diagnosis with workup with formal US and refer to Gen Surg to consider procedural removal.   Plan: GENERAL SURG ADULT REFERRAL, US Extremity Non         Vascular Left    Follow up: PRN  Options for treatment and follow-up care were reviewed with the patient and/or guardian. Darlene Hudson and/or guardian engaged in the decision making process and verbalized understanding of the options discussed and agreed with the final plan.    Dr. Aniceto Phan, PGY3    Precepted with: Dr. Bailon            "

## 2020-05-27 NOTE — PATIENT INSTRUCTIONS
Referral for :     General Surgery    [facility]/Provider [facility]   DATE & TIME :    Faxed referral   PHONE :     [phone]  FAX :    [phone]  Appointment made by clinic staff/:    Kirstie    Referral for :         LOCATION/PLACE/Provider :    [facility]   DATE & TIME :    Faxed   PHONE :     [phone]  FAX :    [phone]  Appointment made by clinic staff/:    Kirstie

## 2020-05-28 DIAGNOSIS — N76.0 BACTERIAL VAGINOSIS: ICD-10-CM

## 2020-05-28 DIAGNOSIS — B96.89 BACTERIAL VAGINOSIS: ICD-10-CM

## 2020-05-28 RX ORDER — METRONIDAZOLE 7.5 MG/G
GEL VAGINAL
Qty: 70 G | Refills: 0 | Status: SHIPPED | OUTPATIENT
Start: 2020-05-28 | End: 2020-09-29

## 2020-05-29 ENCOUNTER — OFFICE VISIT (OUTPATIENT)
Dept: FAMILY MEDICINE | Facility: CLINIC | Age: 45
End: 2020-05-29
Payer: COMMERCIAL

## 2020-05-29 ENCOUNTER — AMBULATORY - HEALTHEAST (OUTPATIENT)
Dept: ADMINISTRATIVE | Facility: CLINIC | Age: 45
End: 2020-05-29

## 2020-05-29 VITALS
SYSTOLIC BLOOD PRESSURE: 136 MMHG | HEART RATE: 93 BPM | OXYGEN SATURATION: 97 % | TEMPERATURE: 98.2 F | RESPIRATION RATE: 20 BRPM | DIASTOLIC BLOOD PRESSURE: 84 MMHG

## 2020-05-29 DIAGNOSIS — M25.561 ARTHRALGIA OF BOTH LOWER LEGS: Primary | ICD-10-CM

## 2020-05-29 DIAGNOSIS — M25.562 ARTHRALGIA OF BOTH LOWER LEGS: Primary | ICD-10-CM

## 2020-05-29 DIAGNOSIS — D17.30 LIPOMA OF SKIN AND SUBCUTANEOUS TISSUE (EXCLUDING FACE): ICD-10-CM

## 2020-05-29 RX ORDER — LIDOCAINE HYDROCHLORIDE 10 MG/ML
2 INJECTION, SOLUTION INFILTRATION; PERINEURAL ONCE
Status: DISCONTINUED | OUTPATIENT
Start: 2020-05-29 | End: 2020-05-29

## 2020-05-29 RX ORDER — TRIAMCINOLONE ACETONIDE 40 MG/ML
40 INJECTION, SUSPENSION INTRA-ARTICULAR; INTRAMUSCULAR ONCE
Status: COMPLETED | OUTPATIENT
Start: 2020-05-29 | End: 2020-05-29

## 2020-05-29 RX ADMIN — TRIAMCINOLONE ACETONIDE 40 MG: 40 INJECTION, SUSPENSION INTRA-ARTICULAR; INTRAMUSCULAR at 11:21

## 2020-05-29 RX ADMIN — TRIAMCINOLONE ACETONIDE 40 MG: 40 INJECTION, SUSPENSION INTRA-ARTICULAR; INTRAMUSCULAR at 11:19

## 2020-05-29 NOTE — PROGRESS NOTES
Preceptor Attestation:   Patient seen, evaluated and discussed with the resident. I was present for and supervised the entire procedure. I have verified the content of the note, which accurately reflects my assessment of the patient and the plan of care.  Supervising Physician:Latha Irwin MD  [facility]
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
## 2020-05-29 NOTE — NURSING NOTE
Medication given by provider: 2 Kenalog 40 mg  NDC:[address]-5  Lot:[id number]  Exp:10/2021    MARISELA Mccarty  
physiological causes vs inadequate protein energy intake

## 2020-05-29 NOTE — PROGRESS NOTES
HPI:   Darlene Hudson is a 45 year old  female who presents for:    Chief Complaint   Patient presents with     Imm/Inj     Knee injection     - Patient here for bilateral knee injection for chronic knee pain most likely related to osteoarthritis          PMHX:     Patient Active Problem List   Diagnosis     Abnormal cytology finding     Nondependent alcohol abuse, episodic drinking behavior     Controlled substance agreement signed     Low back pain     Chronic sinusitis     Major depressive disorder, recurrent episode, moderate (H)     Tobacco use disorder     Noninflammatory disorder of vagina     Pap smear for cervical cancer screening     Abdominal pain     Abnormal cervical Papanicolaou smear     Panic disorder with agoraphobia     Atopic rhinitis     Chronic low back pain     Other long term (current) drug therapy     Acute pericardial effusion     Anxiety     Chronic infectious pericarditis     Polysubstance abuse (H)     Cough       Current Outpatient Medications   Medication Sig Dispense Refill     ALPRAZolam (XANAX) 0.5 MG tablet Take 1 tablet (0.5 mg) by mouth nightly as needed for anxiety 30 tablet 0     ALPRAZolam (XANAX) 2 MG ODT Take 1 tablet (2 mg) by mouth daily as needed (anxiety) 30 tablet 0     cetirizine (ZYRTEC) 10 MG tablet Take 1 tablet (10 mg) by mouth daily 30 tablet 11     citalopram (CELEXA) 20 MG tablet        famotidine (PEPCID) 20 MG tablet Take 20 mg by mouth daily as needed        fluticasone (FLONASE) 50 MCG/ACT nasal spray Spray 2 sprays into both nostrils daily 9.9 mL 11     gabapentin (NEURONTIN) 600 MG tablet Take 1 tablet (600 mg) by mouth 3 times daily as needed (pain)       hydrocortisone (CORTAID) 0.5 % external cream Apply topically 2 times daily 30 g 0     hydrOXYzine (VISTARIL) 25 MG capsule Take 25 mg by mouth nightly as needed       metroNIDAZOLE (METROGEL) 0.75 % vaginal gel Apply 1 applicator daily for 5 days as needed for bacterial vaginosis symptoms 70 g 0  "    montelukast (SINGULAIR) 10 MG tablet Take 1 tablet (10 mg) by mouth At Bedtime 30 tablet 11     nicotine (NICORETTE) 2 MG gum Take 1 each (2 mg) by mouth as needed for smoking cessation 30 each 1     ondansetron (ZOFRAN-ODT) 4 MG ODT tab as needed        order for DME Equipment being ordered: CAM Walker Boot 1 Units 0     oxybutynin (DITROPAN) 5 MG tablet Take 1 tablet (5 mg) by mouth 2 times daily 60 tablet 11     oxyCODONE (ROXICODONE) 5 MG tablet Take 5 mg by mouth       phenol (CHLORASEPTIC) 1.4 % spray Take 5 sprays by mouth every 2 hours as needed for pain       predniSONE (DELTASONE) 50 MG tablet Take 1 tablet (50 mg) by mouth daily 5 tablet 0     PROAIR  (90 Base) MCG/ACT inhaler Inhale 2 puffs into the lungs every 4 hours as needed for shortness of breath / dyspnea or wheezing 1 Inhaler 11     triamcinolone (KENALOG) 0.1 % external cream Apply topically 2 times daily 1 g 1     WIXELA INHUB 250-50 MCG/DOSE inhaler          Social History     Tobacco Use     Smoking status: Light Tobacco Smoker     Packs/day: 0.25     Years: 30.00     Pack years: 7.50     Types: Cigarettes     Smokeless tobacco: Never Used     Tobacco comment: Rarely - Trying Nicotene gum   Substance Use Topics     Alcohol use: Yes     Alcohol/week: 0.0 standard drinks     Comment: Occasiaonlly.      Drug use: Not Currently       Social History     Social History Narrative     Not on file       Allergies   Allergen Reactions     Lidocaine Other (See Comments)     \"my jaw stopped moving\"  Other reaction(s): Dystonia     Penicillins Hives, Rash and Shortness Of Breath     Epinephrine-Lidocaine-Na Metabisulfite Other (See Comments) and Muscle Pain (Myalgia)     Severe jaw cramping, double vision  Jaw locking       No results found for this or any previous visit (from the past 24 hour(s)).         Review of Systems:    ROS: 10 point ROS neg other than the symptoms noted above in the HPI.         Physical Exam:     Vitals:    05/29/20 " 1007   BP: 136/84   Pulse: 93   Resp: 20   Temp: 98.2  F (36.8  C)   TempSrc: Oral   SpO2: 97%     There is no height or weight on file to calculate BMI.    General: Alert, well-appearing female in NAD  HEENT: PERRL, moist oral mucus membranes  Pulm: CTA BL, no tachypnea  CV: RRR, no murmur      Knee Injection Note    Darlene Hudson is a patient of Robbie Esparza    Consent: Affirmation of informed consent was signed and scanned into the medical record. Risks, benefits and alternatives were discussed. Patient's questions were elicited and answered.   Procedure safety checklist was completed:  Yes  Time Out (Pause for the Cause) completed: Yes    The both knees were prepped  in the usual fashion.    INJECTION:    Using 2 cc of 1% lidocaine  1 cc of bupivicaine mixed with 40 mg of Kenalog, the knee joint was successfully injected  without complication.  This was repeated for the other knee. Patient experienced relief of pain following injection.  Bandaid applied.  Routine care discussed.    Was entire vial of medication used? Yes    Follow up: Patient was instructed to call if severe pain, redness, warmth or other concerns    Aniceto Phan MD    Preceptor:  Dr. Dc MD present for and supervised this procedure.    Assessment and Plan   (M25.561,  M25.562) Arthralgia of both lower legs  (primary encounter diagnosis)  Comment: Bilateral achy knee pain that is chronic in nature going on for years. Most likely osteoarthritis in knees related to obesity. Previously injected in both knees with good relief. Injected again today as above.  Plan: triamcinolone (KENALOG-40) injection 40 mg,         lidocaine 1 % injection 2 mL, DRAIN/INJECT         MEDIUM JOINT/BURSA, triamcinolone (KENALOG-40)         injection 40 mg          Follow up: PRN  Options for treatment and follow-up care were reviewed with the patient and/or guardian. Darlene Hudson and/or guardian engaged in the decision making process and verbalized  understanding of the options discussed and agreed with the final plan.    Dr. Aniceto Phan, PGY3    Precepted with: Dr. Irwin

## 2020-06-01 ENCOUNTER — TELEPHONE (OUTPATIENT)
Dept: FAMILY MEDICINE | Facility: CLINIC | Age: 45
End: 2020-06-01

## 2020-06-01 NOTE — TELEPHONE ENCOUNTER
[facility] phone call message - order or referral request for patient:     Order or referral being requested: Ultrasound      Additional Comments: Patient states she is suppose to schedule a ultrasound. Patient states she would like a call back to discuss scheduling for referral. Please call and advise.     OK to leave a message on voice mail? Yes    Primary language: English      needed? No    Call taken on June 1, 2020 at 3:33 PM by Gloria Juarez

## 2020-06-03 ENCOUNTER — VIRTUAL VISIT (OUTPATIENT)
Dept: FAMILY MEDICINE | Facility: CLINIC | Age: 45
End: 2020-06-03
Payer: COMMERCIAL

## 2020-06-03 DIAGNOSIS — M25.473 ANKLE SWELLING, UNSPECIFIED LATERALITY: Primary | ICD-10-CM

## 2020-06-03 DIAGNOSIS — F40.01 PANIC DISORDER WITH AGORAPHOBIA: ICD-10-CM

## 2020-06-03 RX ORDER — ALPRAZOLAM 2 MG/1
2 TABLET, ORALLY DISINTEGRATING ORAL DAILY PRN
Qty: 30 TABLET | Refills: 0 | Status: CANCELLED | OUTPATIENT
Start: 2020-06-03

## 2020-06-03 RX ORDER — CITALOPRAM HYDROBROMIDE 10 MG/1
10 TABLET ORAL DAILY
Qty: 30 TABLET | Refills: 3 | Status: SHIPPED | OUTPATIENT
Start: 2020-06-03 | End: 2020-09-29

## 2020-06-03 RX ORDER — ALPRAZOLAM 2 MG
2 TABLET ORAL 3 TIMES DAILY PRN
Qty: 30 TABLET | Refills: 1 | Status: SHIPPED | OUTPATIENT
Start: 2020-06-03 | End: 2020-07-29

## 2020-06-03 RX ORDER — ALPRAZOLAM 0.5 MG
0.5 TABLET ORAL
Qty: 30 TABLET | Refills: 1 | Status: SHIPPED | OUTPATIENT
Start: 2020-06-03 | End: 2020-07-29

## 2020-06-03 NOTE — PROGRESS NOTES
DME (Durable Medical Equipment) Orders and Documentation  Orders Placed This Encounter   Procedures     Compression Sleeve/Stocking Order      The patient was assessed and it was determined the patient is in need of the following listed DME Supplies/Equipment. Please complete supporting documentation below to demonstrate medical necessity.      Compression Sleeve/Stocking(s) Supplies Documentation  For lower extremity edema.
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
## 2020-06-11 ENCOUNTER — VIRTUAL VISIT (OUTPATIENT)
Dept: FAMILY MEDICINE | Facility: CLINIC | Age: 45
End: 2020-06-11
Payer: COMMERCIAL

## 2020-06-11 DIAGNOSIS — J20.9 ACUTE BRONCHITIS, UNSPECIFIED ORGANISM: ICD-10-CM

## 2020-06-11 RX ORDER — CODEINE PHOSPHATE AND GUAIFENESIN 10; 100 MG/5ML; MG/5ML
1-2 SOLUTION ORAL DAILY PRN
Qty: 180 ML | Refills: 1 | Status: SHIPPED | OUTPATIENT
Start: 2020-06-11 | End: 2020-07-02

## 2020-06-11 NOTE — LETTER
June 11, 2020      Darlene Hudson  [address]  [place] [id number]        To whom it may concern,    Please excuse Darlene Hudson from work June 11-12 2020 due to worsening of her chronic cough. May return to work on June 13 2020 without restrictions.     Sincerely,    Rhea Angel MD

## 2020-06-11 NOTE — PROGRESS NOTES
I have personally reviewed the telephone encounter as documented by Dr. Carmenza Angel.  I agree with the assessment and plan as documented for 45 yr old female with asthma, anxiety evaluated for cough x 1 day. Pt angry and requesting prednisone, codeine cough syrup, antibiotics. Explained that she needed to take her controller med as indicated and repeated steroid courses should be avoided if possible. No indication for antibiotics. Agreed to give cough medication. Work note written. She has an appointment with me next week. Precautions given. Anticipatory guidance given.     Robbie Bailon MD  June 11, 2020  2:53 PM

## 2020-06-11 NOTE — PROGRESS NOTES
"Family Medicine Telephone Visit Note           Telephone Visit Consent   Patient was verbally read the following and verbal consent was obtained.    \"Telephone visits are billed at different rates depending on your insurance coverage. During this emergency period, for some insurers they may be billed the same as an in-person visit.  Please reach out to your insurance provider with any questions.  If during the course of the call the physician/provider feels a telephone visit is not appropriate, you will not be charged for this service.\"    Name person giving consent:  Patient   Date verbal consent given:  6/11/2020  Time verbal consent given:  1:31 PM           Chief Complaint   Patient presents with     Allergies     Chest Pain     fells like it's on \"fire\" x1 day     Medication Reconciliation     Needs attention      Cough            HPI   Patients name: Karoline  Appointment start time:  1:36 PM      Concern: asthma and allergy flares   Description of the problem:  - 1 day history of dry cough, watery eyes, running nose  - thinks the recent humidity and pollen has worsened her asthma and allergies.   - only taking her controller inhaler wixela once a day maybe (supposed to take two puffs twice a day), said she is too busy to remember to take it twice a day.   - using pro air 4 times over the last day.   - last week felt fine  - per chart review patient has been on prednisone multiple times in the last few months for asthma flares: 5/20, 4/1, 1/15  - patient demanding cough medicine with codeine   - patient began yelling on the phone when offered tessalon for cough. Patient then yelled on the phone at attending physician and complaining about not getting cough medicine with codeine. Yelling that she is being \"labled\"            Current Outpatient Medications   Medication Sig Dispense Refill     guaiFENesin-codeine (ROBITUSSIN AC) 100-10 MG/5ML solution Take 5-10 mLs by mouth daily as needed for cough 180 mL 1     " "ALPRAZolam (XANAX) 0.5 MG tablet Take 1 tablet (0.5 mg) by mouth nightly as needed for anxiety 30 tablet 1     ALPRAZolam (XANAX) 2 MG tablet Take 1 tablet (2 mg) by mouth 3 times daily as needed for anxiety 30 tablet 1     cetirizine (ZYRTEC) 10 MG tablet Take 1 tablet (10 mg) by mouth daily 30 tablet 11     citalopram (CELEXA) 10 MG tablet Take 1 tablet (10 mg) by mouth daily 30 tablet 3     famotidine (PEPCID) 20 MG tablet Take 20 mg by mouth daily as needed        fluticasone (FLONASE) 50 MCG/ACT nasal spray Spray 2 sprays into both nostrils daily 9.9 mL 11     gabapentin (NEURONTIN) 600 MG tablet Take 1 tablet (600 mg) by mouth 3 times daily as needed (pain)       hydrocortisone (CORTAID) 0.5 % external cream Apply topically 2 times daily 30 g 0     metroNIDAZOLE (METROGEL) 0.75 % vaginal gel Apply 1 applicator daily for 5 days as needed for bacterial vaginosis symptoms 70 g 0     montelukast (SINGULAIR) 10 MG tablet Take 1 tablet (10 mg) by mouth At Bedtime 30 tablet 11     ondansetron (ZOFRAN-ODT) 4 MG ODT tab as needed        PROAIR  (90 Base) MCG/ACT inhaler Inhale 2 puffs into the lungs every 4 hours as needed for shortness of breath / dyspnea or wheezing 1 Inhaler 11     triamcinolone (KENALOG) 0.1 % external cream Apply topically 2 times daily 1 g 1     WIXELA INHUB 250-50 MCG/DOSE inhaler        Allergies   Allergen Reactions     Lidocaine Other (See Comments)     \"my jaw stopped moving\"  Other reaction(s): Dystonia     Penicillins Hives, Rash and Shortness Of Breath     Epinephrine-Lidocaine-Na Metabisulfite Other (See Comments) and Muscle Pain (Myalgia)     Severe jaw cramping, double vision  Jaw locking              Review of Systems:     Negative unless noted in HPI          Physical Exam:     There were no vitals taken for this visit.  Estimated body mass index is 37.95 kg/m  as calculated from the following:    Height as of 4/1/20: 1.753 m (5' 9\").    Weight as of 4/1/20: 116.6 kg (257 " lb).    Exam:  Constitutional: alert  Psychiatric: quickly switched from pleasant to angry and yelling, poor insight, in appropriately yelling at staff on the phone           Assessment and Plan     (J20.9) Acute bronchitis, unspecified organism  Comment: could be due to viral infection vs non compliance with asthma medications vs allergies.   Plan: guaiFENesin-codeine (ROBITUSSIN AC) 100-10         MG/5ML solution  - educated on the risks of frequent prednisone use  - cough medicine as above  - already has appt in 1 week with her PCP Dr. Bailon   - educated on her controller inhaler use       Refilled medications that would be required in the next 3 months.     After Visit Information:  Will print and mail AVS     No follow-ups on file.    Appointment end time: 1:58 PM  This is a telephone visit that took 22 minutes.      Clinician location:  [facility]    Rhea Angel MD  I precepted today with Dr. Bailon

## 2020-06-19 ENCOUNTER — TELEPHONE (OUTPATIENT)
Dept: FAMILY MEDICINE | Facility: CLINIC | Age: 45
End: 2020-06-19

## 2020-06-19 NOTE — TELEPHONE ENCOUNTER
If not better, needs an appointment to be seen. Emilie Monday. Thanks!     Message text        Called patient to schedule Monday appointment, someone else answered stating patient unavailable. Requested she let patient know I am returning her call and to have her call the clinic back. Neo VÁSQUEZ

## 2020-06-19 NOTE — TELEPHONE ENCOUNTER
[facility] Family Medicine phone call message- medication clarification/question:    Full Medication Name:    Strength:     Have you contacted your pharmacy about this refill request?     If  Yes,  which pharmacy?    When did you contact the pharmacy?    Additional comments/concerns from call to pharmacy:    Reason for call to clinic: Patient is calling stating that she is still not better from her last appt 6/11. She states she has a cough medication prescribed by him and still has a refill but wanted to get his consent to fill it? Please call and advise.       Pharmacy confirmed as    [id number] [address]  AT [facility] [address]: Yes    OK to leave a message on voice mail?     Primary language: English      needed? No    Call taken on June 19, 2020 at 11:05 AM by John Hdez

## 2020-06-30 DIAGNOSIS — J45.40 MODERATE PERSISTENT ASTHMA WITHOUT COMPLICATION: Primary | ICD-10-CM

## 2020-07-01 ENCOUNTER — TELEPHONE (OUTPATIENT)
Dept: FAMILY MEDICINE | Facility: CLINIC | Age: 45
End: 2020-07-01

## 2020-07-02 ENCOUNTER — OFFICE VISIT (OUTPATIENT)
Dept: FAMILY MEDICINE | Facility: CLINIC | Age: 45
End: 2020-07-02
Payer: OTHER MISCELLANEOUS

## 2020-07-02 DIAGNOSIS — J20.9 ACUTE BRONCHITIS, UNSPECIFIED ORGANISM: ICD-10-CM

## 2020-07-02 DIAGNOSIS — L25.9 CONTACT DERMATITIS, UNSPECIFIED CONTACT DERMATITIS TYPE, UNSPECIFIED TRIGGER: ICD-10-CM

## 2020-07-02 DIAGNOSIS — L03.818 CELLULITIS OF OTHER SPECIFIED SITE: Primary | ICD-10-CM

## 2020-07-02 RX ORDER — CODEINE PHOSPHATE AND GUAIFENESIN 10; 100 MG/5ML; MG/5ML
1-2 SOLUTION ORAL DAILY PRN
Qty: 180 ML | Refills: 1 | Status: SHIPPED | OUTPATIENT
Start: 2020-07-02 | End: 2020-09-29

## 2020-07-02 RX ORDER — PREDNISONE 20 MG/1
40 TABLET ORAL DAILY
Qty: 10 TABLET | Refills: 0 | Status: SHIPPED | OUTPATIENT
Start: 2020-07-02 | End: 2020-07-07

## 2020-07-02 RX ORDER — SULFAMETHOXAZOLE/TRIMETHOPRIM 800-160 MG
1 TABLET ORAL 2 TIMES DAILY
Qty: 14 TABLET | Refills: 0 | Status: SHIPPED | OUTPATIENT
Start: 2020-07-02 | End: 2020-07-09

## 2020-07-02 NOTE — PROGRESS NOTES
HPI:       Darlene Hudson is a 45 year old female presents for the new concern(s) of.    Chief Complaint   Patient presents with     WC     Workers Comp R ankle Pt stated that it has been burning for that past couple of days       Right Ankle pain    When?: Initial right ankle injury on 2017; [facility].     This past week the injury was aggravated due to unknown trigger. No recent trauma to the ankle, or rolling on her ankle    Location:lateral aspect of the right ankle, onset 07/01/2020, unsure of trigger, non-radiating    Character: burning    Radicular pain?  unsure    Intensity: 10/10 at worst,  5/10 at best    Muscle weakness? no    Pain is slowly progressive    Provocative Factors:prolonged standing, being active    Palliative Factors: heat, ice, rest and Tylenol    Therapies Tried and outcome: heat, ice, rest, NSAIDS, otc use at prescription doses with food in stomach and Acetaminophen    No history of fever, tick bites, scratching, or exposure to allergins     History of right ankle surgery done by Dr. Hernández at [facility]           Review of Systems:     C: NEGATIVE for fatigue, unexpected change in weight  E: NEGATIVE for acute vision problems or changes  R: NEGATIVE for significant cough or shortness of breath  CV: NEGATIVE for chest pain, palpitations or new or worsening peripheral edema  P: NEGATIVE for changes in mood or affect          PMHX:     Patient Active Problem List   Diagnosis     Abnormal cytology finding     Nondependent alcohol abuse, episodic drinking behavior     Controlled substance agreement signed     Low back pain     Chronic sinusitis     Major depressive disorder, recurrent episode, moderate (H)     Tobacco use disorder     Noninflammatory disorder of vagina     Pap smear for cervical cancer screening     Abdominal pain     Abnormal cervical Papanicolaou smear     Panic disorder with agoraphobia     Atopic rhinitis      Chronic low back pain     Other long term (current) drug therapy     Acute pericardial effusion     Anxiety     Chronic infectious pericarditis     Polysubstance abuse (H)     Cough     Arthralgia of both lower legs       Current Outpatient Medications   Medication Sig Dispense Refill     guaiFENesin-codeine (ROBITUSSIN AC) 100-10 MG/5ML solution Take 5-10 mLs by mouth daily as needed for cough 180 mL 1     predniSONE (DELTASONE) 20 MG tablet Take 2 tablets (40 mg) by mouth daily for 5 days 10 tablet 0     sulfamethoxazole-trimethoprim (BACTRIM DS) 800-160 MG tablet Take 1 tablet by mouth 2 times daily for 7 days 14 tablet 0     ALPRAZolam (XANAX) 0.5 MG tablet Take 1 tablet (0.5 mg) by mouth nightly as needed for anxiety 30 tablet 1     ALPRAZolam (XANAX) 2 MG tablet Take 1 tablet (2 mg) by mouth 3 times daily as needed for anxiety 30 tablet 1     cetirizine (ZYRTEC) 10 MG tablet Take 1 tablet (10 mg) by mouth daily 30 tablet 11     citalopram (CELEXA) 10 MG tablet Take 1 tablet (10 mg) by mouth daily 30 tablet 3     famotidine (PEPCID) 20 MG tablet Take 20 mg by mouth daily as needed        fluticasone (FLONASE) 50 MCG/ACT nasal spray Spray 2 sprays into both nostrils daily 9.9 mL 11     gabapentin (NEURONTIN) 600 MG tablet Take 1 tablet (600 mg) by mouth 3 times daily as needed (pain)       hydrocortisone (CORTAID) 0.5 % external cream Apply topically 2 times daily 30 g 0     metroNIDAZOLE (METROGEL) 0.75 % vaginal gel Apply 1 applicator daily for 5 days as needed for bacterial vaginosis symptoms 70 g 0     montelukast (SINGULAIR) 10 MG tablet Take 1 tablet (10 mg) by mouth At Bedtime 30 tablet 11     ondansetron (ZOFRAN-ODT) 4 MG ODT tab as needed        PROAIR  (90 Base) MCG/ACT inhaler Inhale 2 puffs into the lungs every 4 hours as needed for shortness of breath / dyspnea or wheezing 1 Inhaler 11     triamcinolone (KENALOG) 0.1 % external cream Apply topically 2 times daily 1 g 1     WIXELA INHUB 250-50  "MCG/DOSE inhaler Inhale 1 puff into the lungs 2 times daily 1 Inhaler 11              Allergies   Allergen Reactions     Lidocaine Other (See Comments)     \"my jaw stopped moving\"  Other reaction(s): Dystonia     Penicillins Hives, Rash and Shortness Of Breath     Epinephrine-Lidocaine-Na Metabisulfite Other (See Comments) and Muscle Pain (Myalgia)     Severe jaw cramping, double vision  Jaw locking       No results found for this or any previous visit (from the past 24 hour(s)).              Physical Exam:       GENERAL APPEARANCE: healthy, alert and no distress,  RESP: lungs clear to auscultation - no rales, rhonchi or wheezes  CV: regular rate and rhythm,  and no murmur, click,  rub or gallop  PSYCH: mood and affect normal/bright  Right Ankle - lateral malleolus:  SKIN - demarcated, erythema, excoriation, weeping, of the skin of the lateral malleolus. Ankle with effusion, erythema, or warmth. Tenderness to palpation over the lateral joint lines. No able to ambulate          Assessment and Plan     (L03.818) Cellulitis of other specified site  (primary encounter diagnosis) vs  (L25.9) Contact dermatitis, unspecified contact dermatitis type, unspecified trigger  Comment: HPI and physical examination concerning of the above. Discussed the management of these conditions. Will treat empirically for the above. Anticipatory guidance given.   Plan:   -sulfamethoxazole-trimethoprim (BACTRIM DS) 800-160 MG tablet,   -predniSONE (DELTASONE) 20 MG tablet  -Anticipatory guidance givien  -Encouraged to go to the ED if condition worsens  -Follow-up to our clinic on 07/09/2020    Options for treatment and follow-up care were reviewed with the patient and/or guardian. Pt and/or guardian engaged in the decision making process and verbalized understanding of the options discussed and agreed with the final plan.    Precepted today with: MD Nikki Bonilla MD, MPH (PGY 2)  [facility] " Family Medicine Resident  Pager: [phone]

## 2020-07-02 NOTE — PROGRESS NOTES
I have personally reviewed the history and examination as documented by Dr. Castañeda.  I was present during key portions of the visit and agree with the assessment and plan as documented for 45 yr old female here for ankle contact dermatitis vs cellulitis. Will presumptively tx for both w/ Abx and steroid. Precautions given. Anticipatory guidance given.     Robbie Bailon MD  July 2, 2020  6:18 PM

## 2020-07-02 NOTE — LETTER
RETURN TO WORK/SCHOOL FORM    7/2/2020    Re: Darlene Hudson  1975      To Whom It May Concern:     Darlene Hudson was seen in clinic today.  She may return to work without restrictions on 7/3/20.        Feel free to call with any questions and concerns.      Sincerely,       Nikki Castañeda MD, MPH (PGY 2)  [facility] Family Medicine Resident  Pager: [phone]    7/2/2020 4:41 PM

## 2020-07-02 NOTE — LETTER
RETURN TO WORK/SCHOOL FORM    7/2/2020    Re: Darlene Hudson  1975      To Whom It May Concern:     Darlene Hudson was seen in clinic today..  Please excuse Darlene from work 7/1 - 7/3. She may return to work without restrictions on 7/4/20.      KAREY BATRES MD  7/2/2020 5:04 PM

## 2020-07-02 NOTE — PATIENT INSTRUCTIONS
Patient Education     Cellulitis  Cellulitis is an infection of the deep layers of skin. A break in the skin, such as a cut or scratch, can let bacteria under the skin. If the bacteria get to deep layers of the skin, it can be serious. If not treated, cellulitis can get into the bloodstream and lymph nodes. The infection can then spread throughout the body. This causes serious illness.  Cellulitis causes the affected skin to become red, swollen, warm, and sore. The reddened areas have a visible border. An open sore may leak fluid (pus). You may have a fever, chills, and pain.  Cellulitis is treated with antibiotics taken for 7 to 10 days. An open sore may be cleaned and covered with cool wet gauze. Symptoms should get better 1 to 2 days after treatment is started. Make sure to take all the antibiotics for the full number of days until they are gone. Keep taking the medicine even if your symptoms go away.  Home care  Follow these tips:    Limit the use of the part of your body with cellulitis.     If the infection is on your leg, keep your leg raised while sitting. This will help to reduce swelling.    Take all of the antibiotic medicine exactly as directed until it is gone. Do not miss any doses, especially during the first 7 days. Don t stop taking the medicine when your symptoms get better.    Keep the affected area clean and dry.    Wash your hands with soap and warm water before and after touching your skin. Anyone else who touches your skin should also wash his or her hands. Don't share towels.  Follow-up care  Follow up with your healthcare provider, or as advised. If your infection does not go away on the first antibiotic, your healthcare provider will prescribe a different one.  When to seek medical advice  Call your healthcare provider right away if any of these occur:    Red areas that spread    Swelling or pain that gets worse    Fluid leaking from the skin (pus)    Fever higher of 100.4  F (38.0  C) or  higher after 2 days on antibiotics  Date Last Reviewed: 9/1/2016 2000-2019 [employer]. [address] [place] [address], [place] [address]. All rights reserved. This information is not intended as a substitute for professional medical care. Always follow your healthcare professional's instructions.

## 2020-07-03 ENCOUNTER — TELEPHONE (OUTPATIENT)
Dept: FAMILY MEDICINE | Facility: CLINIC | Age: 45
End: 2020-07-03

## 2020-07-03 NOTE — TELEPHONE ENCOUNTER
Patient returned call while Tiarra was out of office, advise will let him know to return call for follow up.

## 2020-07-03 NOTE — TELEPHONE ENCOUNTER
I got the pt ED discharge paperwork, I called to check up on the pt and help the pt setup a ED follow up. The pt did not answer her phone, so I left a vm for the pt to give me a call back.

## 2020-07-03 NOTE — TELEPHONE ENCOUNTER
I called the pt back, she stated that she is still in a lot of pain. She stated that her foot is swollen, and very painful. Pt stated that she was able to get some medication to help with the pt a little. Pt also asked about her cough medication. It was prescribed to her yesterday, but when she picked it up, they did not tighten the cap on tightly, so it spilled in her purse. Pt stated that she wanted a new cough medication, but her insurance will not pay for it. The only way that her insurance will pay for it, if only the doctor changed the dosage of the medication. I told her that I will send a message to , and see what he can do.

## 2020-07-04 ENCOUNTER — TELEPHONE (OUTPATIENT)
Dept: FAMILY MEDICINE | Facility: CLINIC | Age: 45
End: 2020-07-04

## 2020-07-04 NOTE — TELEPHONE ENCOUNTER
"Diagnosed with cellulitis recently. Now, skin is burning, swelled up again, infection appears to be going \"over the line\". Has taken 3 pills of bactrim. Discussed that she should go into urgent care to have it looked at formally. She will go now.     Hansel Lopez MD  [facility] Resident, PGY3    "

## 2020-07-06 NOTE — TELEPHONE ENCOUNTER
Just MIGUE for Souk. I was on call rounding in the hospital 7/3-7/5.     Pt was treated w/ steroids on 7/2/20 and this is for pain and swelling. Typically takes more than a day to take affect.    I called her pharmacy and she picked up a new bottle of cough syrup on 7/3/2020. Would not refill if she spilled that entire bottle as that was her excuse when she got her initial prescription on 6/30/20.      Robbie Bailon MD  July 6, 2020  12:35 PM

## 2020-07-29 ENCOUNTER — VIRTUAL VISIT (OUTPATIENT)
Dept: FAMILY MEDICINE | Facility: CLINIC | Age: 45
End: 2020-07-29
Payer: COMMERCIAL

## 2020-07-29 DIAGNOSIS — F40.01 PANIC DISORDER WITH AGORAPHOBIA: Primary | ICD-10-CM

## 2020-07-29 DIAGNOSIS — F41.1 GENERALIZED ANXIETY DISORDER: ICD-10-CM

## 2020-07-29 RX ORDER — ESCITALOPRAM OXALATE 10 MG/1
10 TABLET ORAL DAILY
Qty: 30 TABLET | Refills: 1 | Status: SHIPPED | OUTPATIENT
Start: 2020-07-29 | End: 2020-09-29

## 2020-07-29 RX ORDER — ALPRAZOLAM 0.5 MG
0.5 TABLET ORAL
Qty: 30 TABLET | Refills: 1 | Status: SHIPPED | OUTPATIENT
Start: 2020-07-29 | End: 2020-09-28

## 2020-07-29 RX ORDER — ALPRAZOLAM 2 MG
2 TABLET ORAL 3 TIMES DAILY PRN
Qty: 30 TABLET | Refills: 1 | Status: SHIPPED | OUTPATIENT
Start: 2020-07-29 | End: 2020-09-28

## 2020-07-29 NOTE — PROGRESS NOTES
"Family Medicine Telephone Visit Note             Telephone Visit Consent   Patient was verbally read the following and verbal consent was obtained.    \"Telephone visits are billed at different rates depending on your insurance coverage. During this emergency period, for some insurers they may be billed the same as an in-person visit.  Please reach out to your insurance provider with any questions.  If during the course of the call the physician/provider feels a telephone visit is not appropriate, you will not be charged for this service.\"    Name person giving consent:  Patient   Date verbal consent given:  7/29/2020  Time verbal consent given:  1:34 PM     Chief Complaint   Patient presents with     Refill Request     xanax and rixela     Medication Reconciliation     completed              HPI   Patients name: Karoline  Appointment start time:  1:56 PM    1) Mood -   needs refill on xanax  Quit taking celexa; would like to switch to lexapro    2) asthma -   Works in [employer] and wearing a mask 8 hours is very difficult   Would like to discuss possibility of mask exemption       Current Outpatient Medications   Medication Sig Dispense Refill     ALPRAZolam (XANAX) 0.5 MG tablet Take 1 tablet (0.5 mg) by mouth nightly as needed for anxiety 30 tablet 1     ALPRAZolam (XANAX) 2 MG tablet Take 1 tablet (2 mg) by mouth 3 times daily as needed for anxiety 30 tablet 1     cetirizine (ZYRTEC) 10 MG tablet Take 1 tablet (10 mg) by mouth daily 30 tablet 11     citalopram (CELEXA) 10 MG tablet Take 1 tablet (10 mg) by mouth daily 30 tablet 3     famotidine (PEPCID) 20 MG tablet Take 20 mg by mouth daily as needed        fluticasone (FLONASE) 50 MCG/ACT nasal spray Spray 2 sprays into both nostrils daily 9.9 mL 11     gabapentin (NEURONTIN) 600 MG tablet Take 1 tablet (600 mg) by mouth 3 times daily as needed (pain)       guaiFENesin-codeine (ROBITUSSIN AC) 100-10 MG/5ML solution Take 5-10 mLs by mouth daily as needed for cough " "180 mL 1     hydrocortisone (CORTAID) 0.5 % external cream Apply topically 2 times daily 30 g 0     metroNIDAZOLE (METROGEL) 0.75 % vaginal gel Apply 1 applicator daily for 5 days as needed for bacterial vaginosis symptoms 70 g 0     montelukast (SINGULAIR) 10 MG tablet Take 1 tablet (10 mg) by mouth At Bedtime 30 tablet 11     ondansetron (ZOFRAN-ODT) 4 MG ODT tab as needed        PROAIR  (90 Base) MCG/ACT inhaler Inhale 2 puffs into the lungs every 4 hours as needed for shortness of breath / dyspnea or wheezing 1 Inhaler 11     triamcinolone (KENALOG) 0.1 % external cream Apply topically 2 times daily 1 g 1     WIXELA INHUB 250-50 MCG/DOSE inhaler Inhale 1 puff into the lungs 2 times daily 1 Inhaler 11     Allergies   Allergen Reactions     Lidocaine Other (See Comments)     \"my jaw stopped moving\"  Other reaction(s): Dystonia     Penicillins Hives, Rash and Shortness Of Breath     Epinephrine-Lidocaine-Na Metabisulfite Other (See Comments) and Muscle Pain (Myalgia)     Severe jaw cramping, double vision  Jaw locking              Review of Systems:     Constitutional, HEENT, cardiovascular, pulmonary, gi and gu systems are negative, except as otherwise noted.         Physical Exam:     There were no vitals taken for this visit.  Estimated body mass index is 37.95 kg/m  as calculated from the following:    Height as of 4/1/20: 1.753 m (5' 9\").    Weight as of 4/1/20: 116.6 kg (257 lb).    Exam:  Constitutional: healthy, alert and no distress  Psychiatric: mentation appears normal and affect normal/bright            Assessment and Plan     (F40.01) Panic disorder with agoraphobia  (primary encounter diagnosis)  Comment: refilled xanax; switched from celexa to lexapro; precautions given; will have her schedule in-office visit to discuss possibility of note for work modifications regarding her mask.  She is in a high-risk work environment so my sense is that she would be at significant risk without a " mask  Plan: escitalopram (LEXAPRO) 10 MG tablet, ALPRAZolam        (XANAX) 2 MG tablet, ALPRAZolam (XANAX) 0.5 MG tablet            (F41.1) Generalized anxiety disorder  Comment: see above  Plan: escitalopram (LEXAPRO) 10 MG tablet            After Visit Information:  Patient declined AVS     Follow-up in 1 week    Appointment end time: 2:05 PM  This is a telephone visit that took 9 minutes.      Clinician location:  [facility]     Robbie Bailon MD  July 29, 2020  2:07 PM

## 2020-08-05 ENCOUNTER — OFFICE VISIT (OUTPATIENT)
Dept: FAMILY MEDICINE | Facility: CLINIC | Age: 45
End: 2020-08-05
Payer: COMMERCIAL

## 2020-08-05 VITALS
HEART RATE: 93 BPM | SYSTOLIC BLOOD PRESSURE: 100 MMHG | DIASTOLIC BLOOD PRESSURE: 70 MMHG | RESPIRATION RATE: 24 BRPM | TEMPERATURE: 97.9 F | OXYGEN SATURATION: 95 %

## 2020-08-05 DIAGNOSIS — J45.31 MILD PERSISTENT ASTHMA WITH EXACERBATION: Primary | ICD-10-CM

## 2020-08-05 RX ORDER — PREDNISONE 20 MG/1
40 TABLET ORAL DAILY
Qty: 10 TABLET | Refills: 0 | Status: SHIPPED | OUTPATIENT
Start: 2020-08-05 | End: 2020-08-10

## 2020-08-05 NOTE — PROGRESS NOTES
I have personally reviewed the history and examination as documented by Dr. Mckeon.  I was present during key portions of the visit and agree with the assessment and plan as documented for 45 yr old female with moderate persistent asthma here for asthma exacerbation. Will begin steroid burst if symptoms persist/ worsen. Precautions given. Anticipatory guidance given.     Robbie Bailon MD  August 5, 2020  3:55 PM

## 2020-08-05 NOTE — PROGRESS NOTES
CHIEF COMPLAINT                                                      Chief Complaint   Patient presents with     Breathing Problem     Chest and left side hurts when breathing FAX WORK LETTER  [phone] attention Carmine and Layee     Medication Reconciliation     Needs attention     SUBJECTIVE:                                                    Darlene Hudson is a 45 year old year old female who presents to clinic today for the following health issues:    Side pain:  Started last evening. 8/10   Feeling short of breath  +chest pain  +wheezing  Has been using her diskus  No cough  Used the albuterol twice today  No fevers  No nasal congestion     Mood has been OK       Patient is an established patient of this clinic.    ----------------------------------------------------------------------------------------------------------------------  Patient Active Problem List   Diagnosis     Abnormal cytology finding     Nondependent alcohol abuse, episodic drinking behavior     Controlled substance agreement signed     Low back pain     Chronic sinusitis     Major depressive disorder, recurrent episode, moderate (H)     Tobacco use disorder     Noninflammatory disorder of vagina     Pap smear for cervical cancer screening     Abdominal pain     Abnormal cervical Papanicolaou smear     Panic disorder with agoraphobia     Atopic rhinitis     Chronic low back pain     Other long term (current) drug therapy     Acute pericardial effusion     Anxiety     Chronic infectious pericarditis     Polysubstance abuse (H)     Cough     Arthralgia of both lower legs     Past Surgical History:   Procedure Laterality Date     ANKLE SURGERY Right 2019       Social History     Tobacco Use     Smoking status: Light Tobacco Smoker     Packs/day: 0.10     Years: 30.00     Pack years: 3.00     Types: Cigarettes     Last attempt to quit: 2020     Years since quittin.1     Smokeless tobacco: Never Used     Tobacco comment:  Recently quit   Substance Use Topics     Alcohol use: Yes     Alcohol/week: 0.0 standard drinks     Comment: Occasiaonlly.      Family History   Problem Relation Age of Onset     Diabetes Brother      Hypertension Mother      Other Cancer Mother         cervical cancer     Other Cancer Father         lung cancer     Asthma Father      Breast Cancer Maternal Grandmother      Asthma Child          Problem list and past medical, surgical, social, and family histories reviewed & adjusted, as indicated.    Current Outpatient Medications   Medication Sig Dispense Refill     predniSONE (DELTASONE) 20 MG tablet Take 2 tablets (40 mg) by mouth daily for 5 days 10 tablet 0     ALPRAZolam (XANAX) 0.5 MG tablet Take 1 tablet (0.5 mg) by mouth nightly as needed for anxiety 30 tablet 1     ALPRAZolam (XANAX) 2 MG tablet Take 1 tablet (2 mg) by mouth 3 times daily as needed for anxiety 30 tablet 1     cetirizine (ZYRTEC) 10 MG tablet Take 1 tablet (10 mg) by mouth daily 30 tablet 11     citalopram (CELEXA) 10 MG tablet Take 1 tablet (10 mg) by mouth daily (Patient not taking: Reported on 7/29/2020) 30 tablet 3     escitalopram (LEXAPRO) 10 MG tablet Take 1 tablet (10 mg) by mouth daily 30 tablet 1     famotidine (PEPCID) 20 MG tablet Take 20 mg by mouth daily as needed        fluticasone (FLONASE) 50 MCG/ACT nasal spray Spray 2 sprays into both nostrils daily 9.9 mL 11     gabapentin (NEURONTIN) 600 MG tablet Take 1 tablet (600 mg) by mouth 3 times daily as needed (pain)       guaiFENesin-codeine (ROBITUSSIN AC) 100-10 MG/5ML solution Take 5-10 mLs by mouth daily as needed for cough 180 mL 1     hydrocortisone (CORTAID) 0.5 % external cream Apply topically 2 times daily 30 g 0     metroNIDAZOLE (METROGEL) 0.75 % vaginal gel Apply 1 applicator daily for 5 days as needed for bacterial vaginosis symptoms 70 g 0     montelukast (SINGULAIR) 10 MG tablet Take 1 tablet (10 mg) by mouth At Bedtime 30 tablet 11     ondansetron (ZOFRAN-ODT)  "4 MG ODT tab as needed        oxyCODONE-acetaminophen (PERCOCET) 5-325 MG tablet Take 1 tablet by mouth 2 times daily as needed for moderate to severe pain 10 tablet 0     PROAIR  (90 Base) MCG/ACT inhaler Inhale 2 puffs into the lungs every 4 hours as needed for shortness of breath / dyspnea or wheezing 1 Inhaler 11     spacer (WHMSOFTBER BINA) holding chamber For use w/ rescue inhaler 1 each 0     triamcinolone (KENALOG) 0.1 % external cream Apply topically 2 times daily 1 g 1     WIXELA INHUB 250-50 MCG/DOSE inhaler Inhale 1 puff into the lungs 2 times daily 1 Inhaler 11     Medication list reviewed and updated as indicated.    Allergies   Allergen Reactions     Lidocaine Other (See Comments)     \"my jaw stopped moving\"  Other reaction(s): Dystonia     Penicillins Hives, Rash and Shortness Of Breath     Epinephrine-Lidocaine-Na Metabisulfite Other (See Comments) and Muscle Pain (Myalgia)     Severe jaw cramping, double vision  Jaw locking     Allergies reviewed and updated as indicated.  ----------------------------------------------------------------------------------------------------------------------  ROS:   ROS: 10 point ROS neg other than the symptoms noted above in the HPI.      OBJECTIVE:     /70 (BP Location: Right arm, Patient Position: Sitting, Cuff Size: Adult Large)   Pulse 93   Temp 97.9  F (36.6  C) (Oral)   Resp 24   SpO2 95%   There is no height or weight on file to calculate BMI.  GENERAL: Appears well and in no acute distress.  CARDIOVASCULAR: Regular rate and rhythm, normal S1 and S2 without murmur. No extra heartsounds or friction rub.   RESPIRATORY: Lungs clear to auscultation bilaterally. No wheezing or crackles. No prolonged expiration. Symmetrical chest rise.  MSK:  No gross extremity deformities. No peripheral edema. Normal muscle bulk.    ASSESSMENT/PLAN:     1. Mild persistent asthma with exacerbation  Patient with 1 day of shortness of breath. No nasal congestion, " fever or chills. Works as a . Feels like chest is tight and has used the albuterol inhaler twice today. Continues to use her Diskus. ACT score of 8.  Concerning for possible asthma exacerbation. Works as a  so in high risk position given COVID-19 pandemic. Would recommend COVID-19 test if symptoms worsen, develops nasal congestion, cough, fevers, sore throat, loss of sense of taste or smell. Gave prescription for 40 mg prednisone with instructions to fill prescription if her symptoms do not improve in the next 2-3 days or they significantly worsen. Recommended she present to the ED if she develops worsening shortness of breath not improved with albuterol. She does have some chest pain that is worse with deep inspiration. On left side chest wall that radiates to abdomen. PERC and Wells negative. Not tachycardic, tachypnic or hypoxic so low suspicion for PE. Suspect MSK.   - predniSONE (DELTASONE) 20 MG tablet; Take 2 tablets (40 mg) by mouth daily for 5 days  Dispense: 10 tablet; Refill: 0        Schedule follow-up appointment in 1 week.       There are no discontinued medications.    Elmo Mckeon MD  [facility] Medicine Resident  Pager# [phone]    Precepted with: Dr. Bailon.     Options for treatment and follow-up care were reviewed with the patient and/or guardian. Darlene Hudson and/or guardian engaged in the decision making process and verbalized understanding of the options discussed and agreed with the final plan

## 2020-08-05 NOTE — LETTER
RETURN TO WORK/SCHOOL FORM    8/5/2020    Re: Darlene Hudson  1975      To Whom It May Concern:     Darlene Hudson was seen in clinic today..  She may return to work without restrictions on 8/7/20. She was seen for an asthma exacerbation. Her current symptoms are not consistent with COVID-19.       Restrictions:  None      Elmo Mckeon MD  8/5/2020 2:37 PM

## 2020-08-06 ENCOUNTER — TELEPHONE (OUTPATIENT)
Dept: FAMILY MEDICINE | Facility: CLINIC | Age: 45
End: 2020-08-06

## 2020-08-06 ENCOUNTER — NURSE TRIAGE (OUTPATIENT)
Dept: FAMILY MEDICINE | Facility: CLINIC | Age: 45
End: 2020-08-06

## 2020-08-06 ENCOUNTER — OFFICE VISIT (OUTPATIENT)
Dept: FAMILY MEDICINE | Facility: CLINIC | Age: 45
End: 2020-08-06
Payer: COMMERCIAL

## 2020-08-06 ENCOUNTER — DOCUMENTATION ONLY (OUTPATIENT)
Dept: FAMILY MEDICINE | Facility: CLINIC | Age: 45
End: 2020-08-06

## 2020-08-06 VITALS
OXYGEN SATURATION: 97 % | DIASTOLIC BLOOD PRESSURE: 70 MMHG | RESPIRATION RATE: 20 BRPM | SYSTOLIC BLOOD PRESSURE: 102 MMHG | HEART RATE: 94 BPM | TEMPERATURE: 97.6 F

## 2020-08-06 DIAGNOSIS — M79.10 MYALGIA: ICD-10-CM

## 2020-08-06 DIAGNOSIS — Z20.822 SUSPECTED COVID-19 VIRUS INFECTION: Primary | ICD-10-CM

## 2020-08-06 DIAGNOSIS — J45.41 MODERATE PERSISTENT ASTHMA WITH ACUTE EXACERBATION: ICD-10-CM

## 2020-08-06 LAB
COVID-19 VIRUS PCR TO U OF MN - SOURCE: NORMAL
SARS-COV-2 RNA SPEC QL NAA+PROBE: NOT DETECTED

## 2020-08-06 RX ORDER — INHALER, ASSIST DEVICES
SPACER (EA) MISCELLANEOUS
Qty: 1 EACH | Refills: 0 | Status: SHIPPED | OUTPATIENT
Start: 2020-08-06

## 2020-08-06 RX ORDER — OXYCODONE AND ACETAMINOPHEN 5; 325 MG/1; MG/1
1 TABLET ORAL 2 TIMES DAILY PRN
Qty: 10 TABLET | Refills: 0 | Status: SHIPPED | OUTPATIENT
Start: 2020-08-06 | End: 2021-03-17

## 2020-08-06 NOTE — PROGRESS NOTES
Sore throat/ shortness of breath.         SUBJECTIVE       Darlene Hudson is a 45 year old  female with a PMH significant for   Patient Active Problem List   Diagnosis     Abnormal cytology finding     Nondependent alcohol abuse, episodic drinking behavior     Controlled substance agreement signed     Low back pain     Chronic sinusitis     Major depressive disorder, recurrent episode, moderate (H)     Tobacco use disorder     Noninflammatory disorder of vagina     Pap smear for cervical cancer screening     Abdominal pain     Abnormal cervical Papanicolaou smear     Panic disorder with agoraphobia     Atopic rhinitis     Chronic low back pain     Other long term (current) drug therapy     Acute pericardial effusion     Anxiety     Chronic infectious pericarditis     Polysubstance abuse (H)     Cough     Arthralgia of both lower legs      who presents to clinic with worsening symptoms for the last 1 day/s. Patient is experiencing shortness of breath. Patient notes  sore throat, body aches and fatigue. Their course is worsening.  Patient has tried OTC cold meds and nebulizer tz. Patient is considered high risk based on medical history. Patient denies any travel, denies exposure to persons with known travel, and denies exposure to patients with known COVID19. Patient employed as  .    Tried nebulizer tx x 3 and still struggling to breathe   O2 sats at 97%          REVIEW OF SYSTEMS     Head: No headache or facial pain  Neck: Yes throat pain, No swallowing problems  ENT: No ear pain and nasal congestion   Chest: No chest pain   GI: No constipation, diarrhea, no nausea or vomiting  Skin: No rash        OBJECTIVE     Vitals:    08/06/20 1502   BP: 102/70   BP Location: Right arm   Patient Position: Sitting   Cuff Size: Adult Large   Pulse: 94   Resp: 20   Temp: 97.6  F (36.4  C)   TempSrc: Oral   SpO2: 97%       Constitutional: Awake, alert, cooperative, + apparent distress, and appears stated age.  Appears well hydrated  Eyes: Lids and lashes normal, sclera clear, conjunctiva normal.  ENT: Normocephalic, without obvious abnormality, atramatic, no lymphadenopathy    Lungs: + increased work of breathing but good air exchange, lungs clear to auscultation bilaterally, no crackles or wheezing.  Cardiovascular: Regular rate and rhythm, normal S1 and S2, no S3 or S4, and no murmur noted.  Neurologic: Awake, alert, oriented to name, place and time.  Cranial nerves II-XII are grossly intact.  Skin: No rash    No results found for this or any previous visit (from the past 24 hour(s)).    ASSESSMENT AND PLAN     (Z20.828) Suspected COVID-19 virus infection  (primary encounter diagnosis)  Comment: will re-swab today given new worsening symptoms  Plan: COVID-19 Virus PCR [facility] ([facility])            (J45.41) Moderate persistent asthma with acute exacerbation  Comment: given concern for covid, she is not able to start steroids or Abx; likely needs a CXR as well; after swabbing her and discussing options, pt requested to go to ED as she feels like she needs admission to hospital and can't wait two days for COVID results  Plan: spacer (OPTICHAMBER BINA) holding chamber            (M79.10) Myalgia  Comment:   Plan: oxyCODONE-acetaminophen (PERCOCET) 5-325 MG tablet          - Order placed for COVID19 PCR (normal) - [facility] only Rahul Kapur, MD  August 6, 2020  3:52 PM

## 2020-08-06 NOTE — TELEPHONE ENCOUNTER
Letter written for her to be out of work til her scheduled follow-up in 1 week w/ Dr Mckeon.      Robbie Bailon MD  August 6, 2020  4:02 PM

## 2020-08-06 NOTE — TELEPHONE ENCOUNTER
[facility] Medicine phone call message-patient reporting a symptom:     Symptom: Asthma, wheezing, trouble walking    Same Day Visit Offered: Yes, scheduled 8/6/20    Additional comments: Patient states she needs to know if she should go to the emergency department or keep appointment for today. Patient states she can barely walk, shes wheezing and asthma is being difficult. Please call and advise.     OK to leave message on voice mail? Yes    Primary language: English      needed? No    Call taken on August 6, 2020 at 12:39 PM by Gloria Juarez

## 2020-08-06 NOTE — PATIENT INSTRUCTIONS
{nstructions for Patients  Your symptoms show that you may have coronavirus (COVID-19). You need to go to the emergency department.    1. Leave now. Follow these instructions.    Wash your hands and face with soap and water before you leave.     Go to the closest emergency department (ED). Don t go to urgent care.    Another adult should drive you to the ED. Drive carefully.    Bring your smart devices (phones, tablets, laptops) and their chargers with you. This will help you stay in touch with your care team and others during your visit.     Go straight to the ED. Don t make any stops along the way.  2. Starting now, until you arrive at the ED:    Stay at least 6 feet away from others. (In the car, stay in the backseat, as far away from the  as you can.)     Cover your mouth and nose with a mask, tissue or washcloth.     Don t touch anyone. No hugging, kissing or handshakes.    Note: The ED won t test for COVID-19 unless you need to stay in the hospital.     Whether or not you re tested for COVID-19     When you get back home, stay home and away from others (self-isolate) until:    At least 10 days have passed since your symptoms started. And     You ve had no fever--and no medicine that reduces fever--for 3 full days (72 hours). And      Your other symptoms have resolved (gotten better).     During this time:    Stay in your own room (and use your own bathroom), if you can.    Stay away from others in your home. No hugging, kissing or shaking hands.    No visitors.    Don t go to work, school or anywhere else.     Clean  high touch  surfaces often (doorknobs, counters, handles, etc.). Use a household cleaning spray or wipes.    Cover your mouth and nose with a mask, tissue or wash cloth to avoid spreading germs.    Wash your hands and face often. Use soap and water.    For more tips, go to https://www.cdc.gov/coronavirus/2019-ncov/downloads/10Things.pdf      Thank you for limiting contact with others,  wearing a simple mask to cover your cough, practice good hand hygiene habits and accessing our virtual services where possible to limit the spread of this virus.    For more information about COVID19 and options for caring for yourself at home, please visit the CDC website at https://www.cdc.gov/coronavirus/2019-ncov/about/steps-when-sick.html  For more options for care at [facility], please visit our website at [email or web address]

## 2020-08-06 NOTE — LETTER
MEDICAL NOTE - RETURN TO WORK FORM    8/6/2020    Re: Darlene Hudson  1975      To Whom It May Concern:     Darlene Hudson was seen in clinic today for worsening shortness of breath. Please excuse her from work from 8/6/20 til her follow-up visit with us on 8/13/20.  She has severe asthma and is getting re-evaluated to ensure she does not have COVID.            Robbie Bailon MD  8/6/2020 3:58 PM

## 2020-08-06 NOTE — TELEPHONE ENCOUNTER
[facility] Medicine phone call message- general phone call:    Reason for call: Calling to get a letter from her PCP Dr. Bailon stating she was seen today and needs letter stating an estimate min and max out of work so her Manager know how to find coverage for her shift. She would like the letter be done today. She states she is heading to the ER and that Dr. Bailon knows about it.  [phone] attn: Carmine.     Return call needed: Yes    OK to leave a message on voice mail?     Primary language: English      needed? No    Call taken on August 6, 2020 at 3:45 PM by John Hdez

## 2020-08-06 NOTE — TELEPHONE ENCOUNTER
"  Additional Information    Negative: Breathing stopped and hasn't returned    Negative: Choking on something    Negative: SEVERE difficulty breathing (e.g., struggling for each breath, speaks in single words, pulse > 120)    Negative: Bluish (or gray) lips or face    Negative: Difficult to awaken or acting confused (e.g., disoriented, slurred speech)    Negative: Passed out (i.e., fainted, collapsed and was not responding)    Negative: Wheezing started suddenly after medicine, an allergic food, or bee sting    Negative: Stridor    Negative: Slow, shallow and weak breathing    Negative: Sounds like a life-threatening emergency to the triager    Negative: Chest pain    Negative: Wheezing (high pitched whistling sound) and previous asthma attacks or use of asthma medicines    Negative: Difficulty breathing and only present when coughing    Negative: Difficulty breathing and only from stuffy or runny nose    Negative: MODERATE difficulty breathing (e.g., speaks in phrases, SOB even at rest, pulse 100-120) of new onset or worse than normal    Negative: Wheezing can be heard across the room    Negative: Drooling or spitting out saliva (because can't swallow)    Negative: Any history of prior \"blood clot\" in leg or lungs    Negative: Recent illness requiring prolonged bedrest (i.e., immobilization)    Negative: Hip or leg fracture in past 2 months (e.g., or had cast on leg or ankle)    Negative: Major surgery in the past month    Negative: Recent long-distance travel with prolonged time in car, bus, plane, or train (i.e., within past 2 weeks; 6 or  more hours duration)    Negative: Extra heart beats OR irregular heart beating   (i.e., \"palpitations\")    Negative: Fever > 103 F (39.4 C)    Negative: Fever > 101 F (38.3 C) and over 60 years of age    Negative: Fever > 100.0 F (37.8 C) and bedridden (e.g., nursing home patient, stroke, chronic illness, recovering from surgery)    Negative: Fever > 100.0 F (37.8 C) and " "diabetes mellitus or weak immune system (e.g., HIV positive, cancer chemo, splenectomy, organ transplant, chronic steroids)    Negative: Periods where breathing stops and then resumes normally and bedridden (e.g., nursing home patient, CVA)    Negative: Pregnant or postpartum (from 0 to 6 weeks after delivery)    Negative: Patient sounds very sick or weak to the triager    Longstanding difficulty breathing (e.g., CHF, COPD, emphysema) and worse than normal    Answer Assessment - Initial Assessment Questions  1. RESPIRATORY STATUS: \"Describe your breathing?\" (e.g., wheezing, shortness of breath, unable to speak, severe coughing)       Wheezing, shortness of breath  2. ONSET: \"When did this breathing problem begin?\"       yesterday  3. PATTERN \"Does the difficult breathing come and go, or has it been constant since it started?\"       Constant but gets better after using nebulizer  4. SEVERITY: \"How bad is your breathing?\" (e.g., mild, moderate, severe)     - MILD: No SOB at rest, mild SOB with walking, speaks normally in sentences, can lay down, no retractions, pulse < 100.     - MODERATE: SOB at rest, SOB with minimal exertion and prefers to sit, cannot lie down flat, speaks in phrases, mild retractions, audible wheezing, pulse 100-120.     - SEVERE: Very SOB at rest, speaks in single words, struggling to breathe, sitting hunched forward, retractions, pulse > 120       Mild, patient able to speak in complete sentences, able to lay down, minimal SOB at rest and no SOB at rest after nebulizer treatment  5. RECURRENT SYMPTOM: \"Have you had difficulty breathing before?\" If so, ask: \"When was the last time?\" and \"What happened that time?\"       Yes, history of asthma.  6. CARDIAC HISTORY: \"Do you have any history of heart disease?\" (e.g., heart attack, angina, bypass surgery, angioplasty)       Denies  7. LUNG HISTORY: \"Do you have any history of lung disease?\"  (e.g., pulmonary embolus, asthma, emphysema)      Denies  8. " "CAUSE: \"What do you think is causing the breathing problem?\"       Illness  9. OTHER SYMPTOMS: \"Do you have any other symptoms? (e.g., dizziness, runny nose, cough, chest pain, fever)      Sore throat  10. PREGNANCY: \"Is there any chance you are pregnant?\" \"When was your last menstrual period?\"        Denies  11. TRAVEL: \"Have you traveled out of the country in the last month?\" (e.g., travel history, exposures)        Denies    Protocols used: BREATHING DIFFICULTY-A-OH    "

## 2020-08-07 DIAGNOSIS — Z11.59 SCREENING FOR VIRAL DISEASE: ICD-10-CM

## 2020-08-07 NOTE — TELEPHONE ENCOUNTER
HOUSE OFFICER PAGE NOTE 2020  8:03 PM    Situation: House Officer was paged via clinic line about Darlene Hudson,  1975, MRN [id number] regarding the following significant event (s):     -[facility] currently in [facility] Department requesting to call the House Officer for questions she has.    Background: Briefly per chart review, Darlene Hudson 45 year old old female who was seen in clinic earlier this afternoon for:    -(Z20.828) Suspected COVID-19 virus infection  (primary encounter diagnosis) -  COVID-19 Virus PCR order placed; results pending.  -(J45.41) Moderate persistent asthma with acute exacerbation  -(M79.10) Myalgia - oxyCODONE-acetaminophen (PERCOCET) 5-325 MG tablet prescribed    Subjectively:   First I spoke with ED Provider (PEPE Gallagher, CNP at 2007 hrs)   -ED provider received report that PCP and patient agreed she should come to the ED for further evaluation  -After clinic patient presented to the ED with increased shortness of breath over past 3 days.   -Currently, work-up is pending per ED provider; no evidence of pneumonia and the moment; Chest pain most likely MSK, per ED provider      Spoke with Darlene at 2017 hrs who is in  room #18 in the ED  -She is concerned that the ED provider wants to give steroids yet she received instructions from Dr. Bailon to not take steroids until COVID-19 testing results came back.      Objectively - vitals from earlier today:   Vital signs in last 24 hours Temp:  [97.6  F (36.4  C)] 97.6  F (36.4  C)  Pulse:  [94] 94  Resp:  [20] 20  BP: (102)/(70) 102/70  SpO2:  [97 %] 97 %     Weight:      RESP: no audible wheeze, cough, or visible cyanosis.  No visible retractions or increased work of breathing.  Able to speak fully in complete sentences.  PSYCH: mentation appears normal,      Assessment/Plan:  Darlene Hudson is a 45 year old old female called clinic page while in the ED concerned about being given steroids  while COVID-19 testing is pending.    -Spoke with ED provider, he reports that he spoke with the patient about the benefits vs. risk of steroid use if there are concerns about an asthma exacerbation vs. Pneumonia.  -Reassured the patient that the ED provider will reiterate the purpose of the steroid use  -ED provider will call me in the event patient requires an admission      Nikki Castañeda MD, MPH (PGY 3)  [facility] Family Medicine Resident  Pager: [phone]

## 2020-08-10 ENCOUNTER — OFFICE VISIT (OUTPATIENT)
Dept: FAMILY MEDICINE | Facility: CLINIC | Age: 45
End: 2020-08-10
Payer: COMMERCIAL

## 2020-08-10 ENCOUNTER — TELEPHONE (OUTPATIENT)
Dept: FAMILY MEDICINE | Facility: CLINIC | Age: 45
End: 2020-08-10

## 2020-08-10 VITALS
TEMPERATURE: 97.4 F | DIASTOLIC BLOOD PRESSURE: 70 MMHG | HEART RATE: 78 BPM | BODY MASS INDEX: 37.95 KG/M2 | SYSTOLIC BLOOD PRESSURE: 113 MMHG | OXYGEN SATURATION: 100 % | RESPIRATION RATE: 20 BRPM | HEIGHT: 69 IN

## 2020-08-10 DIAGNOSIS — R07.82 INTERCOSTAL PAIN: Primary | ICD-10-CM

## 2020-08-10 DIAGNOSIS — R53.83 OTHER FATIGUE: ICD-10-CM

## 2020-08-10 NOTE — PROGRESS NOTES
Preceptor Attestation:  Patient seen, examined, and discussed with the resident..  I agree with written assessment and plan of care.  Supervising Physician:  Anaid Vogel MD  [facility]

## 2020-08-10 NOTE — PROGRESS NOTES
CHIEF COMPLAINT                                                      Chief Complaint   Patient presents with     Sick     stills sick, leftside pain, cough, and SOB     Medication Reconciliation     Needs attention     SUBJECTIVE:                                                    Darlene Hudson is a 45 year old year old female who presents to clinic today for the following health issues:    Shortness of breath/fatigue/left sided chest pain:  -Was seen for symptoms on 8/6/2020 by Dr. Bailon  -Had tried home nebulizer with limited relief  -Swabbed for COVID and referrd to the ED for CXR and ruling out acute infection such as pneumonia  -ED labs were significant for elevated D dimer with a negative CTA PE, negative cardiac labs, negative lipase, and no other significant findings on workup.    -Sent home with likely exacerbation of asthma and started on prednisone burst.  -Patient has not felt improved since that time with continued body aches/fatigue/chest pain.    -Denies any fevers, chills, vomiting, constipation, numbness, tingling, headache, vision changes, or other concerns.      Patient is an established patient of this clinic.    ----------------------------------------------------------------------------------------------------------------------  Patient Active Problem List   Diagnosis     Abnormal cytology finding     Nondependent alcohol abuse, episodic drinking behavior     Controlled substance agreement signed     Low back pain     Chronic sinusitis     Major depressive disorder, recurrent episode, moderate (H)     Tobacco use disorder     Noninflammatory disorder of vagina     Pap smear for cervical cancer screening     Abdominal pain     Abnormal cervical Papanicolaou smear     Panic disorder with agoraphobia     Atopic rhinitis     Chronic low back pain     Other long term (current) drug therapy     Acute pericardial effusion     Anxiety     Chronic infectious pericarditis     Polysubstance abuse (H)      Cough     Arthralgia of both lower legs     Past Surgical History:   Procedure Laterality Date     ANKLE SURGERY Right 2019       Social History     Tobacco Use     Smoking status: Light Tobacco Smoker     Packs/day: 0.10     Years: 30.00     Pack years: 3.00     Types: Cigarettes     Last attempt to quit: 2020     Years since quittin.2     Smokeless tobacco: Never Used     Tobacco comment: Recently quit   Substance Use Topics     Alcohol use: Yes     Alcohol/week: 0.0 standard drinks     Comment: Occasiaonlly.      Family History   Problem Relation Age of Onset     Diabetes Brother      Hypertension Mother      Other Cancer Mother         cervical cancer     Other Cancer Father         lung cancer     Asthma Father      Breast Cancer Maternal Grandmother      Asthma Child          Problem list and past medical, surgical, social, and family histories reviewed & adjusted, as indicated.    Current Outpatient Medications   Medication Sig Dispense Refill     ALPRAZolam (XANAX) 0.5 MG tablet Take 1 tablet (0.5 mg) by mouth nightly as needed for anxiety 30 tablet 1     ALPRAZolam (XANAX) 2 MG tablet Take 1 tablet (2 mg) by mouth 3 times daily as needed for anxiety 30 tablet 1     cetirizine (ZYRTEC) 10 MG tablet Take 1 tablet (10 mg) by mouth daily 30 tablet 11     citalopram (CELEXA) 10 MG tablet Take 1 tablet (10 mg) by mouth daily 30 tablet 3     escitalopram (LEXAPRO) 10 MG tablet Take 1 tablet (10 mg) by mouth daily 30 tablet 1     famotidine (PEPCID) 20 MG tablet Take 20 mg by mouth daily as needed        fluticasone (FLONASE) 50 MCG/ACT nasal spray Spray 2 sprays into both nostrils daily 9.9 mL 11     gabapentin (NEURONTIN) 600 MG tablet Take 1 tablet (600 mg) by mouth 3 times daily as needed (pain)       guaiFENesin-codeine (ROBITUSSIN AC) 100-10 MG/5ML solution Take 5-10 mLs by mouth daily as needed for cough 180 mL 1     hydrocortisone (CORTAID) 0.5 % external cream Apply topically 2 times  "daily 30 g 0     metroNIDAZOLE (METROGEL) 0.75 % vaginal gel Apply 1 applicator daily for 5 days as needed for bacterial vaginosis symptoms 70 g 0     montelukast (SINGULAIR) 10 MG tablet Take 1 tablet (10 mg) by mouth At Bedtime 30 tablet 11     ondansetron (ZOFRAN-ODT) 4 MG ODT tab as needed        oxyCODONE-acetaminophen (PERCOCET) 5-325 MG tablet Take 1 tablet by mouth 2 times daily as needed for moderate to severe pain 10 tablet 0     predniSONE (DELTASONE) 20 MG tablet Take 2 tablets (40 mg) by mouth daily for 5 days 10 tablet 0     PROAIR  (90 Base) MCG/ACT inhaler Inhale 2 puffs into the lungs every 4 hours as needed for shortness of breath / dyspnea or wheezing 1 Inhaler 11     spacer (OPTICHAMBER BINA) holding chamber For use w/ rescue inhaler 1 each 0     triamcinolone (KENALOG) 0.1 % external cream Apply topically 2 times daily 1 g 1     WIXELA INHUB 250-50 MCG/DOSE inhaler Inhale 1 puff into the lungs 2 times daily 1 Inhaler 11     Medication list reviewed and updated as indicated.    Allergies   Allergen Reactions     Lidocaine Other (See Comments)     \"my jaw stopped moving\"  Other reaction(s): Dystonia     Penicillins Hives, Rash and Shortness Of Breath     Epinephrine-Lidocaine-Na Metabisulfite Other (See Comments) and Muscle Pain (Myalgia)     Severe jaw cramping, double vision  Jaw locking     Allergies reviewed and updated as indicated.  ----------------------------------------------------------------------------------------------------------------------  ROS:  Constitutional, HEENT, cardiovascular, pulmonary, GI, musculoskeletal, neuro, skin, and psych systems are negative, except as otherwise noted.    OBJECTIVE:     /70   Pulse 78   Temp 97.4  F (36.3  C)   Resp 20   Ht 1.753 m (5' 9\")   SpO2 100%   BMI 37.95 kg/m    Body mass index is 37.95 kg/m .  Exam:  Constitutional: healthy, alert and no distress  Head: Normocephalic. No masses, lesions, tenderness or " abnormalities  ENT: ENT exam normal, no neck nodes or sinus tenderness  Cardiovascular: RRR. No murmurs, clicks gallops or rub  Respiratory: CTAB, no wheezing, rales, or rhonchi   Gastrointestinal: Abdomen soft, non-tender. BS normal. No masses, organomegaly  Musculoskeletal: Tender to palpation on the left sided chest, along the 3-4th rib from her back into her chest.    Skin: no suspicious lesions or rashes  Neurologic: Gait normal. Reflexes normal and symmetric. Sensation grossly WNL.  Psychiatric: mentation appears normal and affect normal/bright  Hematologic/Lymphatic/Immunologic: Normal cervical lymph nodes    Diagnostic Test Results:  none     ASSESSMENT/PLAN:     (R07.82) Intercostal pain  (primary encounter diagnosis)  (R53.83) Other fatigue  -Patient remains hemodynamically stable but is fatigued and continues to have chest pain.   -Appears to have some rib discomfort at this point in time, may have a subluxed/displaced rib  -Recommended following up with a physician who specializes in MSK issues or chiropractor to help get rib back into place  -Offered repeating some lab work today or CXR to ensure no evolving process, but the patient would rather wait and she has scheduled follow up in 3 days.  -WIll follow up on 8/13/2020 with Dr. Mckeon.    -Will call or present to ED with worsening symptoms.      There are no discontinued medications.    Options for treatment and follow-up care were reviewed with the patient and/or guardian. Darlene Hudson and/or guardian engaged in the decision making process and verbalized understanding of the options discussed and agreed with the final plan    Precepted with Dr. Vogel.    Zach Ye MD on 8/10/2020 at 3:02 PM

## 2020-08-10 NOTE — TELEPHONE ENCOUNTER
Normal results from 08/06/2020 given to patient. She stats she is still not better so made an appt for today 8/10 with Dr. Ye at 240 pm.     Goal:	delivery  Assessment and plan of treatment:	follow up in 6 wks Principal Discharge DX:	Vaginal delivery  Goal:	delivery  Assessment and plan of treatment:	follow up in 6 wks

## 2020-09-25 DIAGNOSIS — F40.01 PANIC DISORDER WITH AGORAPHOBIA: ICD-10-CM

## 2020-09-25 NOTE — TELEPHONE ENCOUNTER
[facility] Medicine phone call message- patient requesting a refill:     Full Medication Name: ALPRAZolam (XANAX) 0.5 MG tablet , ALPRAZolam (XANAX) 2 MG tablet      Dose:     Pharmacy confirmed as   [id number] - [place], [place] - [address] [place] [address] AT [facility] [address]  [address] [facility] [facility] [address]  Phone: [phone] Fax: [phone]    [id number] [address]  [address]  [facility] [address]  Phone: [phone] Fax: [phone]  : Yes    Additional Comments: Refill to [facility]_ Patient does not want dissolvable ones.    OK to leave a message on voice mail? Yes    Primary language: English      needed? No    Call taken on September 25, 2020 at 1:47 PM by Raúl Santoyo

## 2020-09-28 DIAGNOSIS — F40.01 PANIC DISORDER WITH AGORAPHOBIA: ICD-10-CM

## 2020-09-28 RX ORDER — ALPRAZOLAM 2 MG
2 TABLET ORAL 3 TIMES DAILY PRN
Qty: 30 TABLET | Refills: 1 | Status: SHIPPED | OUTPATIENT
Start: 2020-09-28 | End: 2020-10-28

## 2020-09-28 RX ORDER — ALPRAZOLAM 0.5 MG
0.5 TABLET ORAL
Qty: 30 TABLET | Refills: 1 | Status: SHIPPED | OUTPATIENT
Start: 2020-09-28 | End: 2020-11-25

## 2020-09-28 NOTE — TELEPHONE ENCOUNTER
[facility] [[email or web address]] Family Medicine phone call message- patient requesting a refill:    Full Medication Name: ALPRAZolam (XANAX)     Dose: 2 MG    Pharmacy confirmed as   [facility] #[id number] - [place], [place] - [address] AT [facility] [place] [place] [address]  [address] [facility] [address]  Phone: [phone] Fax: [phone]    [id number] - [place], [place] - [address] [place] [address] [address]  [address]  [facility] [address]  Phone: [phone] Fax: [phone]  : Yes    Additional Comments: SEND TO [facility]- Patient state she needs the 2MG for daily.    OK to leave a message on voice mail? Yes    Primary language: English      needed? No    Call taken on September 28, 2020 at 9:41 AM by Raúl Santoyo

## 2020-09-29 ENCOUNTER — VIRTUAL VISIT (OUTPATIENT)
Dept: FAMILY MEDICINE | Facility: CLINIC | Age: 45
End: 2020-09-29
Payer: COMMERCIAL

## 2020-09-29 ENCOUNTER — TELEPHONE (OUTPATIENT)
Dept: FAMILY MEDICINE | Facility: CLINIC | Age: 45
End: 2020-09-29

## 2020-09-29 DIAGNOSIS — F33.1 MAJOR DEPRESSIVE DISORDER, RECURRENT EPISODE, MODERATE (H): ICD-10-CM

## 2020-09-29 DIAGNOSIS — J45.40 MODERATE PERSISTENT ASTHMA WITHOUT COMPLICATION: Primary | ICD-10-CM

## 2020-09-29 DIAGNOSIS — J30.1 SEASONAL ALLERGIC RHINITIS DUE TO POLLEN: ICD-10-CM

## 2020-09-29 DIAGNOSIS — J20.9 ACUTE BRONCHITIS, UNSPECIFIED ORGANISM: ICD-10-CM

## 2020-09-29 RX ORDER — CODEINE PHOSPHATE AND GUAIFENESIN 10; 100 MG/5ML; MG/5ML
1-2 SOLUTION ORAL DAILY PRN
Qty: 180 ML | Refills: 1 | Status: SHIPPED | OUTPATIENT
Start: 2020-09-29 | End: 2020-11-30

## 2020-09-29 NOTE — TELEPHONE ENCOUNTER
"C/o dry cough and states \"not covid.\"   Per patient this is her usual symptom flare up around this time of the year. Requesting Promethazine w/codeine. No shortness of breath, wheezing or fever. Recommend a video appt today with Dr Fan. Patient agreed and an appt has been scheduled. Pia VÁSQUEZ  "

## 2020-09-29 NOTE — PATIENT INSTRUCTIONS
[place] [facility] Initial Evaluation  Subjective:  HPI   C/C: Patient is a 52-year-old woman with complaints of relatively constant lateral right foottight/ache pain as well as right lateral heel pain and Achilles.  Intensity of pain is 2-4/10.  Is aggravated with walking, especially toe off.  Worse first thing in the morning.  Better with exercise in pool.    Hx:  9/2020-patient ran a race.  States that race was on a hilly course as opposed to a flatter course which may have played a factor.  States she noted onset of right foot pain as well as some medial right leg pain post in addition to some of the normal soreness she has post races.  Patient recovery from races includes mainly pool exercise which she has been doing since that time.  However did need to go on a hiking with her daughter which also aggravated further last week.  Will be seeing Dr. Garvin this week.    PMH: Patient gives history of right hamstring, low back, Achilles problems on the right side.  Injuries were associated with repetitive microtrauma during running.  Has been able to manage with conservative treatment including ART, other soft tissue work, and exercise.   General health: Excellent.  Patient likes to run long distances.  Has done ultra marathons.  Works as a .              Objective:  Standing Alignment:                Ankle/foot deviations: Right foot in > supination in quiet stance.              Ankle/Foot Evaluation  ROM:      PROM:    Dorsiflexion:  Left:    25     Right:   10-15                         EDEMA: Edema ankle: May have observed. some swelling in peroneal tendons at level of lat maleolous on the right.          MOBILITY TESTING: Mobility testing ankle: Decreased change in midfoot with CKC great toe ext on the right.      Talocrural Left: normal    Talocrural Right: hypomobile  Subtalar Left: normal    Subtalar Right: hypomobile  Midtarsal Left: normal    Midtarsal Right: hypomobile  First  ~ Recommend you to follow up with Infectious Disease to follow up with the fluid in your lungs.     ~ Take your Oxycodone only at bedtime to help you with your chest pain to help you sleep.     ~ Recommend you starting an anxiety medication. Will try Wellbutrin. Take once a day for the first 3 days then take one twice a day.     ~ Will change your nausea medication to Metoclopramide 1 tablet twice daily as needed.    ~ Refilled your albuterol inhaler.      Your medication list is printed, please keep this with you, it is helpful to bring this current list to any other medical appointments, the emergency room or hospital.    If you had lab testing today and your results are reassuring or normal they will be be mailed to you within 7 days.     If the lab tests need quick action we will call you with the results.   The phone number we will call with results is # [phone] (home) . If this is not the best number please call our clinic and change the number.    If you need any refills please call your pharmacy and they will contact us.    If you have any further concerns or wish to schedule another appointment you must call our office during normal business hours  [phone] (8-5:00 M-F)  If you have urgent medical questions that cannot wait  you may also call [phone] at any time of day.  If you have a medical emergency please call 362.    Thank you for coming to [facility].      Ray Left: normal    First Ray Right: hypomobile                                                                                         Musculoskeletal:        Feet:        ROS    Assessment/Plan:    Patient is a 52 year old female with right side ankle complaints.    Patient has the following significant findings with corresponding treatment plan.                Diagnosis 1:  Right foot pain  Pain -  self management, education and home program  Decreased ROM/flexibility - manual therapy and therapeutic exercise  Decreased joint mobility - manual therapy and therapeutic exercise  Inflammation - cold therapy and self management/home program  Impaired gait - gait training  Impaired muscle performance - neuro re-education  Decreased function - therapeutic activities    Therapy Evaluation Codes:   1) History comprised of:   Personal factors that impact the plan of care:    Cumulative Therapy Evaluation is: Low complexity.    Previous and current functional limitations:  (See Goal Flow Sheet for this information)    Short term and Long term goals: (See Goal Flow Sheet for this information)     Communication ability:  Patient appears to be able to clearly communicate and understand verbal and written communication and follow directions correctly.  Treatment Explanation - The following has been discussed with the patient:   RX ordered/plan of care  Anticipated outcomes  Possible risks and side effects  This patient would benefit from PT intervention to resume normal activities.   Rehab potential is excellent.    Frequency:  1 X week, once daily  Duration:  for 8 visits  Discharge Plan:  Achieve all LTG.  Independent in home treatment program.  Reach maximal therapeutic benefit.    Please refer to the daily flowsheet for treatment today, total treatment time and time spent performing 1:1 timed codes.

## 2020-09-29 NOTE — PROGRESS NOTES
"Family Medicine Telephone Visit Note               Telephone Visit Consent   Patient was verbally read the following and verbal consent was obtained.    \"Telephone visits are billed at different rates depending on your insurance coverage. During this emergency period, for some insurers they may be billed the same as an in-person visit.  Please reach out to your insurance provider with any questions.  If during the course of the call the physician/provider feels a telephone visit is not appropriate, you will not be charged for this service.\"    Name person giving consent:  Patient   Date verbal consent given:  9/29/2020  Time verbal consent given:  11:15 AM           Chief Complaint   Patient presents with     Medication Request     Refill on cough medicine (w/ codeine).  NOT COVID RELATED, GOT TESTED THROUGH WORK LAST THURSDAY AND RESULTS ARE NEGATIVE.      Medication Reconciliation     Needs attention             HPI   Patients name: Karoline  Appointment start time:  11:29 AM    SUBJECTIVE:  A 45-year-old woman developed a cough.  She has this every year around this time of year and often ends up in the hospital.  She is on medications for asthma and is using her inhalers.  She also has a stuffy nose.  The cough is mostly at night.  She has missed a couple of weeks of work in the past month, and she cannot afford to miss any more.  She is requesting cough syrup with codeine, which she has had in the past.      She is not having fever or chills.  She has recently been tested for COVID-19 and is negative.  She is not having any other COVID-19 symptoms.      We discussed night cough, asthma, allergies.  We will have her continue with her current medications.  I have given her a prescription for the codeine to use as needed to control the cough, and she is planning to follow up with Dr. Bailon in the near future.      The patient involved in medical decision making throughout the visit.           Current Outpatient " "Medications   Medication Sig Dispense Refill     guaiFENesin-codeine (ROBITUSSIN AC) 100-10 MG/5ML solution Take 5-10 mLs by mouth daily as needed for cough 180 mL 1     ALPRAZolam (XANAX) 0.5 MG tablet Take 1 tablet (0.5 mg) by mouth nightly as needed for anxiety 30 tablet 1     ALPRAZolam (XANAX) 2 MG tablet Take 1 tablet (2 mg) by mouth 3 times daily as needed for anxiety 30 tablet 1     cetirizine (ZYRTEC) 10 MG tablet Take 1 tablet (10 mg) by mouth daily 30 tablet 11     famotidine (PEPCID) 20 MG tablet Take 20 mg by mouth daily as needed        fluticasone (FLONASE) 50 MCG/ACT nasal spray Spray 2 sprays into both nostrils daily 9.9 mL 11     gabapentin (NEURONTIN) 600 MG tablet Take 1 tablet (600 mg) by mouth 3 times daily as needed (pain)       montelukast (SINGULAIR) 10 MG tablet Take 1 tablet (10 mg) by mouth At Bedtime 30 tablet 11     ondansetron (ZOFRAN-ODT) 4 MG ODT tab as needed        PROAIR  (90 Base) MCG/ACT inhaler Inhale 2 puffs into the lungs every 4 hours as needed for shortness of breath / dyspnea or wheezing 1 Inhaler 11     spacer (OPTICHAMBER BINA) holding chamber For use w/ rescue inhaler 1 each 0     triamcinolone (KENALOG) 0.1 % external cream Apply topically 2 times daily 1 g 1     WIXELA INHUB 250-50 MCG/DOSE inhaler Inhale 1 puff into the lungs 2 times daily 1 Inhaler 11     Allergies   Allergen Reactions     Lidocaine Other (See Comments)     \"my jaw stopped moving\"  Other reaction(s): Dystonia     Penicillins Hives, Rash and Shortness Of Breath     Epinephrine-Lidocaine-Na Metabisulfite Other (See Comments) and Muscle Pain (Myalgia)     Severe jaw cramping, double vision  Jaw locking              Review of Systems:     Constitutional, HEENT, cardiovascular, pulmonary, gi and gu systems are negative, except as otherwise noted.         Physical Exam:     There were no vitals taken for this visit.  Estimated body mass index is 37.95 kg/m  as calculated from the following:    " "Height as of 8/10/20: 1.753 m (5' 9\").    Weight as of 4/1/20: 116.6 kg (257 lb).    Exam:  Constitutional: healthy, alert and no distress, mild cough  Psychiatric: mentation appears normal and affect normal/bright            Assessment and Plan       ICD-10-CM    1. Moderate persistent asthma without complication  J45.40    2. Acute bronchitis, unspecified organism  J20.9 guaiFENesin-codeine (ROBITUSSIN AC) 100-10 MG/5ML solution   3. Seasonal allergic rhinitis due to pollen  J30.1    4. Major depressive disorder, recurrent episode, moderate (H)  F33.1        Refilled medications that would be required in the next 3 months.     After Visit Information:  Will print and mail AVS     Return in about 1 month (around 10/29/2020) for asthma.    Appointment end time: 11:40 AM  This is a telephone visit that took 11 minutes.      Clinician location:  [facility]     Zach Fan MD  "

## 2020-09-29 NOTE — PATIENT INSTRUCTIONS
We updated your medication list and prescribed the cough mediation to see if we can keep you working.  Use your medications for your asthma and allergies.

## 2020-09-29 NOTE — TELEPHONE ENCOUNTER
[facility] Family Medicine phone call message-patient reporting a symptom:     Symptom: cough and yearly sickness     Same Day Visit Offered: Yes, declined    Additional comments: Patient is calling to leave a message to speak to Dr. Bailon or the Nurse regarding above. She states she thought she had an upcoming appt but there is nothing scheduled. She states she took a day off of work for an appt that was scheduled with Dr. Bailon. Offered to make an appt but she refuse as she don't remember the date she took off work. Did let her know that Dr. bailon is not in clinic. Told her it could take up to two business days for a response. Please call and advise.     OK to leave message on voice mail?     Primary language: English      needed? No    Call taken on September 29, 2020 at 10:09 AM by John Hdez

## 2020-10-08 ENCOUNTER — TELEPHONE (OUTPATIENT)
Dept: FAMILY MEDICINE | Facility: CLINIC | Age: 45
End: 2020-10-08

## 2020-10-08 NOTE — TELEPHONE ENCOUNTER
[facility] Family Medicine phone call message- medication clarification/question:    Full Medication Name: montelukast 10 mg and fluticasone 50 MCG/ACT   Strength:     Have you contacted your pharmacy about this refill request?     If  Yes,  which pharmacy?    When did you contact the pharmacy?    Additional comments/concerns from call to pharmacy:    Reason for call to clinic: Calling asking if Dr. Bailon is in clinic today, told her he is but he will not be in this afternoon and not sure if he is gone for the day yet. Patient is requesting for refills above as her allergies are getting bad. Told her that refills can take up to two business days for a response. Please call and advise.       Pharmacy confirmed as [facility] #[id number] [facility] [address] AT [facility] [address]: Yes    OK to leave a message on voice mail?     Primary language: English      needed? No    Call taken on October 8, 2020 at 11:54 AM by John Hdez

## 2020-10-08 NOTE — TELEPHONE ENCOUNTER
Patient is calling back to see if Dr. Bailon got her message before he left for the day, told her that he didn't. She then asked if Dr. Bailon is in tomorrow, told her he is not. She states if another doctor can refill her medication today for her since she really needs it. Told her that it has to be her PCP and that it could take up to two business days for a response. Also did let her know that even if Dr. Bailon is not in clinic they are to check their messages regularly and that they get two business days for a response. Offered her an appt today but she asked when Dr. Bailon has openings and he doesn't til the end of the month which she can't schedule that far out as she is on the same day or next day for to many no shows, did let her know to call the day before of Dr. Bailon's next avail. She mention that she thought she had an upcoming appt scheduled but old her nothing is scheduled. She then ask if Dr. Mckeon is in since she has seen him before, told her he is here today and tomorrow but refills always goes to patient's PCP. She insist to send another message to see if Dr. Mckeon will refill his medication and to have a Nurse call her back. Please call and advise.

## 2020-10-09 DIAGNOSIS — J45.40 MODERATE PERSISTENT ASTHMA WITHOUT COMPLICATION: ICD-10-CM

## 2020-10-09 DIAGNOSIS — R09.81 CONGESTION OF PARANASAL SINUS: ICD-10-CM

## 2020-10-09 RX ORDER — FLUTICASONE PROPIONATE 50 MCG
2 SPRAY, SUSPENSION (ML) NASAL DAILY
Qty: 9.9 ML | Refills: 11 | Status: SHIPPED | OUTPATIENT
Start: 2020-10-09 | End: 2020-12-16

## 2020-10-09 RX ORDER — MONTELUKAST SODIUM 10 MG/1
10 TABLET ORAL AT BEDTIME
Qty: 30 TABLET | Refills: 11 | Status: SHIPPED | OUTPATIENT
Start: 2020-10-09 | End: 2022-03-03

## 2020-10-23 ENCOUNTER — OFFICE VISIT (OUTPATIENT)
Dept: FAMILY MEDICINE | Facility: CLINIC | Age: 45
End: 2020-10-23
Payer: COMMERCIAL

## 2020-10-23 VITALS
HEART RATE: 85 BPM | DIASTOLIC BLOOD PRESSURE: 77 MMHG | RESPIRATION RATE: 18 BRPM | OXYGEN SATURATION: 97 % | SYSTOLIC BLOOD PRESSURE: 117 MMHG | TEMPERATURE: 98 F

## 2020-10-23 DIAGNOSIS — Z80.0 FAMILY HISTORY OF COLON CANCER: ICD-10-CM

## 2020-10-23 DIAGNOSIS — J45.40 MODERATE PERSISTENT ASTHMA WITHOUT COMPLICATION: Primary | ICD-10-CM

## 2020-10-23 DIAGNOSIS — Z02.89 ENCOUNTER FOR COMPLETION OF FORM WITH PATIENT: ICD-10-CM

## 2020-10-23 DIAGNOSIS — Z23 NEED FOR PROPHYLACTIC VACCINATION AND INOCULATION AGAINST INFLUENZA: ICD-10-CM

## 2020-10-23 PROCEDURE — 99214 OFFICE O/P EST MOD 30 MIN: CPT | Mod: GC | Performed by: STUDENT IN AN ORGANIZED HEALTH CARE EDUCATION/TRAINING PROGRAM

## 2020-10-23 PROCEDURE — 90471 IMMUNIZATION ADMIN: CPT | Performed by: STUDENT IN AN ORGANIZED HEALTH CARE EDUCATION/TRAINING PROGRAM

## 2020-10-23 PROCEDURE — 90686 IIV4 VACC NO PRSV 0.5 ML IM: CPT | Performed by: STUDENT IN AN ORGANIZED HEALTH CARE EDUCATION/TRAINING PROGRAM

## 2020-10-23 NOTE — LETTER
RETURN TO WORK/SCHOOL FORM    10/23/2020    Re: Darlene Hudson  1975      To Whom It May Concern:     Darlene Hudson was seen in clinic today..  She was off from 10/20 till 10/23 due to asthma exacerbation.  She may return to work without restrictions on 10/24/20.       Restrictions:  None      Misha Monterroso MD  10/23/2020 11:32 AM

## 2020-10-23 NOTE — PROGRESS NOTES
HPI:       Darlene Hudson is a 45 year old  female with a significant past medical history of asthma who presents for follow up of concern(s) listed below    1) asthma  -ACT 11 today  -decreased Wixela at previous visit in an attempt to see if lower dose would suffice  -Since then, multiple flares of asthma  -patient willing to increase dose again    2) FH of colon  -dad  at 53 yo from colon cancer  -wants screening    3) PAP due  -hasn't had one in over 5 years  -willing to get it done at next visit    4) letter  -needs letter to return to work           PMHX:     Patient Active Problem List   Diagnosis     Abnormal cytology finding     Nondependent alcohol abuse, episodic drinking behavior     Controlled substance agreement signed     Low back pain     Chronic sinusitis     Major depressive disorder, recurrent episode, moderate (H)     Tobacco use disorder     Noninflammatory disorder of vagina     Pap smear for cervical cancer screening     Abdominal pain     Abnormal cervical Papanicolaou smear     Panic disorder with agoraphobia     Atopic rhinitis     Chronic low back pain     Other long term (current) drug therapy     Acute pericardial effusion     Anxiety     Chronic infectious pericarditis     Polysubstance abuse (H)     Cough     Arthralgia of both lower legs     Moderate persistent asthma without complication       Past Surgical History:   Procedure Laterality Date     ANKLE SURGERY Right 2019       Current Outpatient Medications   Medication Sig Dispense Refill     ALPRAZolam (XANAX) 0.5 MG tablet Take 1 tablet (0.5 mg) by mouth nightly as needed for anxiety 30 tablet 1     ALPRAZolam (XANAX) 2 MG tablet Take 1 tablet (2 mg) by mouth 3 times daily as needed for anxiety 30 tablet 1     cetirizine (ZYRTEC) 10 MG tablet Take 1 tablet (10 mg) by mouth daily 30 tablet 11     famotidine (PEPCID) 20 MG tablet Take 20 mg by mouth daily as needed        fluticasone (FLONASE) 50 MCG/ACT nasal  "spray Spray 2 sprays into both nostrils daily 9.9 mL 11     gabapentin (NEURONTIN) 600 MG tablet Take 1 tablet (600 mg) by mouth 3 times daily as needed (pain)       guaiFENesin-codeine (ROBITUSSIN AC) 100-10 MG/5ML solution Take 5-10 mLs by mouth daily as needed for cough 180 mL 1     montelukast (SINGULAIR) 10 MG tablet Take 1 tablet (10 mg) by mouth At Bedtime 30 tablet 11     ondansetron (ZOFRAN-ODT) 4 MG ODT tab as needed        PROAIR  (90 Base) MCG/ACT inhaler Inhale 2 puffs into the lungs every 4 hours as needed for shortness of breath / dyspnea or wheezing 1 Inhaler 11     spacer (OPTICHAMBER BINA) holding chamber For use w/ rescue inhaler 1 each 0     triamcinolone (KENALOG) 0.1 % external cream Apply topically 2 times daily 1 g 1     WIXELA INHUB 250-50 MCG/DOSE inhaler Inhale 1 puff into the lungs 2 times daily 1 Inhaler 11          Allergies   Allergen Reactions     Lidocaine Other (See Comments)     \"my jaw stopped moving\"  Other reaction(s): Dystonia     Penicillins Hives, Rash and Shortness Of Breath     Epinephrine-Lidocaine-Na Metabisulfite Other (See Comments) and Muscle Pain (Myalgia)     Severe jaw cramping, double vision  Jaw locking       Social History     Socioeconomic History     Marital status:      Spouse name: Not on file     Number of children: Not on file     Years of education: Not on file     Highest education level: Not on file   Occupational History     Not on file   Social Needs     Financial resource strain: Not on file     Food insecurity     Worry: Not on file     Inability: Not on file     Transportation needs     Medical: Not on file     Non-medical: Not on file   Tobacco Use     Smoking status: Light Tobacco Smoker     Packs/day: 0.10     Years: 30.00     Pack years: 3.00     Types: Cigarettes     Last attempt to quit: 2020     Years since quittin.4     Smokeless tobacco: Never Used     Tobacco comment: OCCASIONALLY.   Substance and Sexual Activity "     Alcohol use: Yes     Alcohol/week: 0.0 standard drinks     Comment: Occasiaonlly.      Drug use: Not Currently     Sexual activity: Yes     Partners: Male   Lifestyle     Physical activity     Days per week: Not on file     Minutes per session: Not on file     Stress: Not on file   Relationships     Social connections     Talks on phone: Not on file     Gets together: Not on file     Attends Yarsani service: Not on file     Active member of club or organization: Not on file     Attends meetings of clubs or organizations: Not on file     Relationship status: Not on file     Intimate partner violence     Fear of current or ex partner: Not on file     Emotionally abused: Not on file     Physically abused: Not on file     Forced sexual activity: Not on file   Other Topics Concern     Parent/sibling w/ CABG, MI or angioplasty before 65F 55M? Not Asked   Social History Narrative     Not on file              Review of Systems:     5-point ROS reviewed and negative unless otherwise noted in HPI          Physical Exam:     Vitals:    10/23/20 1056   BP: 117/77   BP Location: Right arm   Pulse: 85   Resp: 18   Temp: 98  F (36.7  C)   TempSrc: Oral   SpO2: 97%     There is no height or weight on file to calculate BMI.    GENERAL: healthy, alert and no distress  EYES: Eyes grossly normal to inspection, pupils equal  HENT: nose and mouth without ulcers or lesions  NECK: no asymmetry, masses, or scars  RESP: breathing and speaking comfortably, normal RR  CV: acyanotic  MS: extremities normal- no gross deformities noted  SKIN: no suspicious lesions or rashes  NEURO: mentation intact, speech normal and A&Ox3  PSYCH: affect/mood appropriate      Assessment and Plan     (J45.40) Moderate persistent asthma without complication  (primary encounter diagnosis)  Comment: ACT 11.  Recurrent flares since decreased Wixela dose.  Will place patient back on higher dose.  Plan:   -increase to fluticasone-salmeterol (ADVAIR) 500-50  MCG/DOSE inhaler  -Continue ProAir and Singulair 10mg daily  -Follow up appointment with Dr. Bailon    (Z02.89) Encounter for completion of form with patient  Comment: completed this note to return to work.  Plan:   -note completed    (Z80.0) Family history of colon cancer  Comment: father  of colon cancer at age 53 yo.  She is due for scope considering this FH.  Shoot for  since there is an opening and writer will be performing scopes then.  Plan:   -GASTROENTEROLOGY ADULT REF PROCEDURE ONLY - Dr. Garcia    (Z23) Need for prophylactic vaccination and inoculation against influenza  Comment: Due for this.  Plan:   -INFLUENZA VACCINE IM > 6 MONTHS VALENT IIV4 [93959]      Options for treatment and follow-up care were reviewed with the patient and/or guardian. Darlene Hudson and/or guardian engaged in the decision making process and verbalized understanding of the options discussed and agreed with the final plan.      Misha Monterroso MD    Precepted today with: Swathi Ward MD

## 2020-10-24 PROBLEM — R68.89 PHYSIOLOGIC DISTURBANCE OF TEMPERATURE REGULATION: Status: ACTIVE | Noted: 2020-10-24

## 2020-10-24 PROBLEM — Z80.0 FAMILY HISTORY OF COLON CANCER: Status: ACTIVE | Noted: 2020-10-24

## 2020-10-24 ASSESSMENT — ASTHMA QUESTIONNAIRES: ACT_TOTALSCORE: 11

## 2020-10-27 ENCOUNTER — AMBULATORY - HEALTHEAST (OUTPATIENT)
Dept: SURGERY | Facility: AMBULATORY SURGERY CENTER | Age: 45
End: 2020-10-27

## 2020-10-27 ENCOUNTER — TELEPHONE (OUTPATIENT)
Dept: FAMILY MEDICINE | Facility: CLINIC | Age: 45
End: 2020-10-27

## 2020-10-27 DIAGNOSIS — Z11.59 ENCOUNTER FOR SCREENING FOR OTHER VIRAL DISEASES: ICD-10-CM

## 2020-10-27 DIAGNOSIS — Z12.11 ENCOUNTER FOR SCREENING COLONOSCOPY: Primary | ICD-10-CM

## 2020-10-27 NOTE — TELEPHONE ENCOUNTER
Spoke to patient in regards to her colonoscopy that was discussed with Dr. Monterroso on Friday Oct. 23 while I was out. Patient is wondering if you can put in a order for her for the prep medications and send them to [facility] in her chart. Thank you!

## 2020-10-27 NOTE — PATIENT INSTRUCTIONS
Referral for :     Colonoscopy   LOCATION/PLACE/Provider :    [facility]  DATE & TIME :    Nov. 5th at 3pm   PHONE :     [phone]  FAX :    [phone]  Appointment made by clinic staff/:    Kirstie

## 2020-10-28 ENCOUNTER — RECORDS - HEALTHEAST (OUTPATIENT)
Dept: ADMINISTRATIVE | Facility: OTHER | Age: 45
End: 2020-10-28

## 2020-10-28 DIAGNOSIS — F40.01 PANIC DISORDER WITH AGORAPHOBIA: ICD-10-CM

## 2020-10-28 RX ORDER — ALPRAZOLAM 2 MG
2 TABLET ORAL DAILY PRN
Qty: 30 TABLET | Refills: 1 | Status: SHIPPED | OUTPATIENT
Start: 2020-10-28 | End: 2020-11-25

## 2020-10-28 NOTE — TELEPHONE ENCOUNTER
Message to physician:     Date of last visit: 10/23/2020     Date of next visit if scheduled: none    Last Comprehensive Metabolic Panel:  Sodium   Date Value Ref Range Status   10/23/2019 141.0 133.0 - 144.0 mmol/L Final     Potassium   Date Value Ref Range Status   10/23/2019 3.7 3.4 - 5.3 mmol/L Final     Chloride   Date Value Ref Range Status   10/23/2019 106.0 94.0 - 109.0 mmol/L Final     Carbon Dioxide   Date Value Ref Range Status   10/23/2019 22.0 20.0 - 32.0 mmol/L Final     Glucose   Date Value Ref Range Status   10/23/2019 119.0 (H) 60.0 - 109.0 mg/dL Final     Urea Nitrogen   Date Value Ref Range Status   10/23/2019 13.0 5.0 - 24.0 mg/dL Final     Creatinine   Date Value Ref Range Status   10/23/2019 1.0 0.6 - 1.3 mg/dL Final     GFR Estimate   Date Value Ref Range Status   02/02/2017 >60 >60 mL/min/1.73m2 Final     Calcium   Date Value Ref Range Status   10/23/2019 9.0 8.5 - 10.4 mg/dL Final       BP Readings from Last 3 Encounters:   10/23/20 117/77   08/10/20 113/70   08/06/20 102/70       No results found for: A1C             Please complete refill and CLOSE ENCOUNTER.  Closing the encounter signifies the refill is complete.

## 2020-10-28 NOTE — TELEPHONE ENCOUNTER
[facility] phone call message- medication clarification/question:    Full Medication Name:ALPRAZolam (XANAX)     Dose: 2 MG    Question: Patient had an appointment today 10/28 at 8:20 am that she missed as she stated she overslept due to working 17 hours. Patient would like to request for refill and has reschedule appt to  11/05 9:40 am.    Pharmacy confirmed as    [facility] #[id number] [facility] [address] AT [facility] [address]  : Yes    OK to leave a message on voice mail? Yes    Primary language: English      needed? No    Call taken on October 28, 2020 at 8:52 AM by Raúl Santoyo

## 2020-10-29 RX ORDER — BISACODYL 5 MG/1
TABLET, DELAYED RELEASE ORAL
Qty: 2 TABLET | Refills: 0 | Status: SHIPPED | OUTPATIENT
Start: 2020-10-29 | End: 2020-11-27

## 2020-10-29 RX ORDER — POLYETHYLENE GLYCOL 3350 17 G/17G
POWDER, FOR SOLUTION ORAL
Qty: 507 G | Refills: 0 | Status: SHIPPED | OUTPATIENT
Start: 2020-10-29 | End: 2022-03-03

## 2020-11-03 ENCOUNTER — AMBULATORY - HEALTHEAST (OUTPATIENT)
Dept: SURGERY | Facility: AMBULATORY SURGERY CENTER | Age: 45
End: 2020-11-03

## 2020-11-03 ENCOUNTER — RECORDS - HEALTHEAST (OUTPATIENT)
Dept: ADMINISTRATIVE | Facility: OTHER | Age: 45
End: 2020-11-03

## 2020-11-03 DIAGNOSIS — Z11.59 ENCOUNTER FOR SCREENING FOR OTHER VIRAL DISEASES: ICD-10-CM

## 2020-11-03 NOTE — PROGRESS NOTES
"  Darlene Hudson is a 45 year old female who presents for annual gyn exam and follow-up of asthma/ allergies.      Menses - irregular, has been spotting x past month   Infrequent heavy menses  Sometimes gets cramps  Renae-menopausal symptoms - - maybe hot flashes but unsure if that was meds  Last pap was abnormal -> 10 yrs ago; had colpo which was normal; recommended 5-yr interval; \"I rescheduled like 100 times\"    Pap smear:  Colonoscopy: rescheduled colonoscopy because it was today   Lipid:  HIV:  Flu shot: done  Mammogram: UTD. Last in 2019      2) asthma  - Evaluated on 10/23. ACT score 11. Inhaler increased to Advair 500-50 mcg/dose -> using twice daily   Has been struggling; missed work a few days  Gets out of breath w/ doing rounds every hr  Up and down 4 flights of steps every hour      3) allergies - on Singulair, zyrtec., flonase    HISTORIES:  Patient Active Problem List   Diagnosis     Abnormal cytology finding     Nondependent alcohol abuse, episodic drinking behavior     Controlled substance agreement signed     Low back pain     Chronic sinusitis     Major depressive disorder, recurrent episode, moderate (H)     Tobacco use disorder     Noninflammatory disorder of vagina     Pap smear for cervical cancer screening     Abdominal pain     Abnormal cervical Papanicolaou smear     Panic disorder with agoraphobia     Atopic rhinitis     Chronic low back pain     Other long term (current) drug therapy     Acute pericardial effusion     Anxiety     Chronic infectious pericarditis     Polysubstance abuse (H)     Cough     Arthralgia of both lower legs     Moderate persistent asthma without complication     Physiologic disturbance of temperature regulation     Family history of colon cancer     Past Medical History:   Diagnosis Date     Anxiety      Asthma in adult, moderate persistent, uncomplicated      Controlled substance agreement signed 6/30/2015    Overview:  Patient has chronic pain and is seen at " [facility] for this.  Has controlled substance agreement with them.  On Vicodin, Valium, Klonopin prescribed only from there.        Past Surgical History:   Procedure Laterality Date     ANKLE SURGERY Right 05/30/2019     Current Outpatient Medications   Medication Sig Dispense Refill     ALPRAZolam (XANAX) 0.5 MG tablet Take 1 tablet (0.5 mg) by mouth nightly as needed for anxiety 30 tablet 1     ALPRAZolam (XANAX) 2 MG tablet Take 1 tablet (2 mg) by mouth daily as needed for anxiety 30 tablet 1     bisacodyl (DULCOLAX) 5 MG EC tablet Take 2 tablets at 12pm on the day prior to the colonoscopy 2 tablet 0     cetirizine (ZYRTEC) 10 MG tablet Take 1 tablet (10 mg) by mouth daily 30 tablet 11     famotidine (PEPCID) 20 MG tablet Take 20 mg by mouth daily as needed        fluticasone (FLONASE) 50 MCG/ACT nasal spray Spray 2 sprays into both nostrils daily 9.9 mL 11     fluticasone-salmeterol (ADVAIR) 500-50 MCG/DOSE inhaler Inhale 1 puff into the lungs every 12 hours 60 Inhaler 2     gabapentin (NEURONTIN) 600 MG tablet Take 1 tablet (600 mg) by mouth 3 times daily as needed (pain)       guaiFENesin-codeine (ROBITUSSIN AC) 100-10 MG/5ML solution Take 5-10 mLs by mouth daily as needed for cough 180 mL 1     magnesium citrate solution Drink enitre bottle 4 hours prior to the colonoscopy 296 mL 0     montelukast (SINGULAIR) 10 MG tablet Take 1 tablet (10 mg) by mouth At Bedtime 30 tablet 11     ondansetron (ZOFRAN-ODT) 4 MG ODT tab as needed        polyethylene glycol (MIRALAX) 17 GM/Dose powder Mix entire bottle into 64 oz of electrolyte drink.  At 4 pm on the day prior to colonoscopy, drink 8 ounces every 15-20 minutes until finished 507 g 0     PROAIR  (90 Base) MCG/ACT inhaler Inhale 2 puffs into the lungs every 4 hours as needed for shortness of breath / dyspnea or wheezing 1 Inhaler 11     spacer (OPTICHAMBER BINA) holding chamber For use w/ rescue inhaler 1 each 0     triamcinolone (KENALOG) 0.1  "% external cream Apply topically 2 times daily 1 g 1     Allergies   Allergen Reactions     Lidocaine Other (See Comments)     \"my jaw stopped moving\"  Other reaction(s): Dystonia     Penicillins Hives, Rash and Shortness Of Breath     Epinephrine-Lidocaine-Na Metabisulfite Other (See Comments) and Muscle Pain (Myalgia)     Severe jaw cramping, double vision  Jaw locking     Social History     Socioeconomic History     Marital status:    Tobacco Use     Smoking status: Light Tobacco Smoker     Packs/day: 0.10     Years: 30.00     Pack years: 3.00     Types: Cigarettes     Last attempt to quit: 2020     Years since quittin.4     Smokeless tobacco: Never Used     Tobacco comment: OCCASIONALLY.   Substance and Sexual Activity     Alcohol use: Yes     Alcohol/week: 0.0 standard drinks     Comment: Occasiaonlly.      Drug use: Not Currently     Sexual activity: Yes     Partners: Male     Family History   Problem Relation Age of Onset     Diabetes Brother      Hypertension Mother      Other Cancer Mother         cervical cancer     Other Cancer Father         lung cancer     Asthma Father      Breast Cancer Maternal Grandmother      Asthma Child        HEALTH MAINTENANCE:  Health Maintenance   Topic Date Due     PREVENTIVE CARE VISIT  1973     SPIROMETRY  1973     DEPRESSION ACTION PLAN  1973     HIV SCREENING  03/15/1988     HEPATITIS C SCREENING  03/15/1991     PAP  2013     LIPID  03/15/2018     URINE DRUG SCREEN  2020     ASTHMA ACTION PLAN  2021     PHQ-9  2021     ASTHMA CONTROL TEST  2021     DTAP/TDAP/TD IMMUNIZATION (4 - Td) 2029     INFLUENZA VACCINE  Completed     Pneumococcal Vaccine: Pediatrics (0 to 5 Years) and At-Risk Patients (6 to 64 Years)  Completed     IPV IMMUNIZATION  Aged Out     MENINGITIS IMMUNIZATION  Aged Out     HEPATITIS B IMMUNIZATION  Aged Out       REVIEW OF OUTSIDE RECORDS: NO    OBJECTIVE:  /86 (BP Location: " "Right arm, Patient Position: Sitting, Cuff Size: Adult Large)   Pulse 94   Temp 98.2  F (36.8  C) (Tympanic)   Resp 22   Ht 1.778 m (5' 10\")   Wt 110.1 kg (242 lb 12.8 oz)   SpO2 99%   BMI 34.84 kg/m    EXAM:   (female): external genitalia normal; no cervical motion tenderness; introitus was very small and pap testing required the thin speculum; even with this, visualization of the cervix was very difficult         (Z12.4) Screening for cervical cancer  (primary encounter diagnosis)  Comment: pap test was extremely difficult; I would not be surprised if the specimen was not adequate; I informed pt of this at time of visit and she was very understanding; if she needs a repeat, would refer to gyn   Plan: GYN Cytology ([facility])            (N93.9) Abnormal uterine bleeding  Comment: likely perimenopausal; will check Hgb/ TSH; consider further labs or imaging  Plan: Hemoglobin (HGB) (P ), TSH  Sensitive ([facility])    (J45.40) Moderate persistent asthma without complication  Comment:   Plan: poorly controlled despite max dosage of advair and singulair; will discuss possibility of adding spiriva w/ our clinical pharmacist            I have discussed with patient the risks, benefits, medications, treatment options and modalities.   I have instructed the patient to call or schedule a follow-up appointment if any problems or failure to improve.          Robbie Bailon MD  November 5, 2020  10:53 AM      "

## 2020-11-05 ENCOUNTER — OFFICE VISIT (OUTPATIENT)
Dept: FAMILY MEDICINE | Facility: CLINIC | Age: 45
End: 2020-11-05
Payer: COMMERCIAL

## 2020-11-05 VITALS
TEMPERATURE: 98.2 F | BODY MASS INDEX: 34.76 KG/M2 | OXYGEN SATURATION: 99 % | SYSTOLIC BLOOD PRESSURE: 128 MMHG | WEIGHT: 242.8 LBS | HEART RATE: 94 BPM | RESPIRATION RATE: 22 BRPM | HEIGHT: 70 IN | DIASTOLIC BLOOD PRESSURE: 86 MMHG

## 2020-11-05 DIAGNOSIS — Z12.4 SCREENING FOR CERVICAL CANCER: Primary | ICD-10-CM

## 2020-11-05 DIAGNOSIS — J45.40 MODERATE PERSISTENT ASTHMA WITHOUT COMPLICATION: ICD-10-CM

## 2020-11-05 DIAGNOSIS — N93.9 ABNORMAL UTERINE BLEEDING: ICD-10-CM

## 2020-11-05 LAB
HEMOGLOBIN: 13.6 G/DL (ref 11.7–15.7)
TSH SERPL DL<=0.05 MIU/L-ACNC: 0.56 UIU/ML (ref 0.3–5)

## 2020-11-05 PROCEDURE — 36415 COLL VENOUS BLD VENIPUNCTURE: CPT | Performed by: FAMILY MEDICINE

## 2020-11-05 PROCEDURE — 99214 OFFICE O/P EST MOD 30 MIN: CPT | Performed by: FAMILY MEDICINE

## 2020-11-05 PROCEDURE — 85018 HEMOGLOBIN: CPT | Performed by: FAMILY MEDICINE

## 2020-11-05 ASSESSMENT — MIFFLIN-ST. JEOR: SCORE: 1816.58

## 2020-11-06 LAB
FINAL DIAGNOSIS: NORMAL
HPV 16 DNA: NEGATIVE
HPV 18 DNA: NEGATIVE
HPV SOURCE: NORMAL
OTHER HR HPV: NEGATIVE
SPECIMEN DESCRIPTION: NORMAL

## 2020-11-06 ASSESSMENT — ASTHMA QUESTIONNAIRES: ACT_TOTALSCORE: 14

## 2020-11-11 ENCOUNTER — TELEPHONE (OUTPATIENT)
Dept: FAMILY MEDICINE | Facility: CLINIC | Age: 45
End: 2020-11-11

## 2020-11-11 NOTE — TELEPHONE ENCOUNTER
Please see note below. I have helped rescheduled patient for her colonoscopy. Will wait till after she talks to PCP and will be able to stick to her appointment for her scope.

## 2020-11-11 NOTE — TELEPHONE ENCOUNTER
Patient is returning a call, not sure who called her. She states it might of been Dr. Bailon since she is waiting on results. Went to talk to Dr. Adamaris Nath;kala PERALTA today and she said she is not sure. Kirstie heard conversation and states she called Patient earlier to see if she had a covid test done and if she is going to her appt tomorrow for colonoscopy. Came back on the phone and gave her info that Kirstie called her, she states she is not going to that appt tomorrow as she is sick and no covid test done so she would need to reschedule. Did inform Kirstie of patient's response and did let Patient know that Kirstie will call her back to reschedule. Please call and advise.

## 2020-11-16 ENCOUNTER — RECORDS - HEALTHEAST (OUTPATIENT)
Dept: ADMINISTRATIVE | Facility: OTHER | Age: 45
End: 2020-11-16

## 2020-11-16 LAB
CYTOLOGY CVX/VAG DOC THIN PREP: NORMAL
HIGH RISK?: NO
HPV REFLEX?: NORMAL
LAB AP ABNORMAL BLEEDING: YES
LAB AP BIRTH CONTROL/HORMONES: NORMAL
LAB AP CERVICAL APPEARANCE: NORMAL
LAB AP GYN OTHER FINDINGS: NORMAL
LAB AP PATIENT STATUS: NORMAL
LAB AP PREVIOUS ABNL: NORMAL
LAB AP PREVIOUS NORMAL: NORMAL
LAST MENS PERIOD: NORMAL
PATH REPORT.COMMENTS IMP SPEC: NORMAL
PATH REPORT.COMMENTS IMP SPEC: NORMAL
SPECIMEN ADEQUACY:: NORMAL

## 2020-11-20 ENCOUNTER — TELEPHONE (OUTPATIENT)
Dept: FAMILY MEDICINE | Facility: CLINIC | Age: 45
End: 2020-11-20

## 2020-11-20 DIAGNOSIS — Z20.822 EXPOSURE TO COVID-19 VIRUS: Primary | ICD-10-CM

## 2020-11-20 NOTE — TELEPHONE ENCOUNTER
[facility] phone call message- general phone call:    Reason for call: Patient advise being exposed to Covid19+ at work as she works [facility] the [employer] [facility] and unable to return until her test comes back negative. Patient advise no opening until Monday PM the earliest. Asymptomatic.    Return call needed: Yes    OK to leave a message on voice mail? Yes    Primary language: English      needed? No    Call taken on November 20, 2020 at 11:36 AM by Raúl Santoyo

## 2020-11-23 DIAGNOSIS — Z20.822 EXPOSURE TO COVID-19 VIRUS: ICD-10-CM

## 2020-11-23 LAB
COVID-19 VIRUS PCR TO U OF MN - SOURCE: NORMAL
SARS-COV-2 RNA SPEC QL NAA+PROBE: NOT DETECTED

## 2020-11-23 PROCEDURE — 87635 SARS-COV-2 COVID-19 AMP PRB: CPT | Mod: 90

## 2020-11-23 PROCEDURE — 99207 PR NO BILLABLE SERVICE THIS VISIT: CPT

## 2020-11-23 NOTE — LETTER
November 25, 2020      Darlene Hudson  [address]  [place] [address]        Dear ,    We are writing to inform you of your test results.    You tested negative for COVID19. We recommend you call the clinic if you should experience any of the following symptoms: headache, sore throat, cough, shortness of breath, increase fatigue without known origin, loss of taste/smell    Resulted Orders   COVID-19 Virus PCR MRF ([facility])   Result Value Ref Range    COVID-19 Virus PCR to [facility] - Source Nasopharyngeal     COVID-19 Virus PCR to [facility] - Result Not Detected       Comment:      Collection of multiple specimens from the same patient may be necessary to  detect the virus. The possibility of a false negative should be considered if  the patient's recent exposure or clinical presentation suggests 2019 nCOV  infection and diagnostic tests for other causes of illness are negative.   Repeat  testing may be considered in this setting.  Patient sample was heat inactivated and amplified using the HDPCR SARS-CoV-2  assay ([employer] The HDPCRTM SARS-CoV-2 assay is a reverse  transcription real-time polymerase chain reaction (qRT-PCR) test intended for  the qualitative detection of nucleic acid  from SARS-CoV-2 in human nasopharyngeal swabs, oropharyngeal swabs, anterior  nasal swabs, mid-turbinate nasal swabs as well as nasal aspirate, nasal wash,  and bronchoalveolar lavage (BAL) specimens from individuals who are suspected  of COVID-19 by their healthcare provider.  A negative result does not rule out the presence of real-time PCR inhibitors   in  the specimen  or COVID-19 RNA in concentrations below the limit of detection of  the assay. The possibility of a false negative should be considered if the  patients recent exposure or clinical presentation suggests COVID-19.   Additional  testing or repeat testing requires consultation with the laboratory.  Nasopharyngeal specimen is the  preferred choice for swab-based SARS CoV2  testing. When collection of a nasopharyngeal swab is not possible the   following  are acceptable alternatives:  an oropharyngeal (OP) specimen collected by a healthcare professional, or a  nasal mid-turbinate (NMT) swab collected by a healthcare professional or by  onsite self-collection (using a flocked tapered swab), or an anterior nares  specimen collected by a healthcare professional or by onsite self-collection  (using a round foam swab). (Centers for Disease Control)  Testing performed by [facility] ([facility]) [address] [place] [address]   The test performance characteristics were determined by . It has not been  cleared or approved by the FDA.  The laboratory is regulated under the Clinical Laboratory Improvement  Amendments of 1988 (CLIA-88) as qualified to perform high-complexity testing.  This test is used for clinical purposes. It should not be regarded as  investigational or for research.  Performed and/or entered by:  [facility]  [address] [place] [address]      Narrative    Test performed by:  [facility]  [address], [place] [address]       If you have any questions or concerns, please call the clinic at the number listed above.       Sincerely,        Dr. Bailon

## 2020-11-24 NOTE — PROGRESS NOTES
"Family Medicine Telephone Visit Note           Telephone Visit Consent   Patient was verbally read the following and verbal consent was obtained.    \"Telephone visits are billed at different rates depending on your insurance coverage. During this emergency period, for some insurers they may be billed the same as an in-person visit.  Please reach out to your insurance provider with any questions.  If during the course of the call the physician/provider feels a telephone visit is not appropriate, you will not be charged for this service.\"    Name person giving consent:  Patient   Date verbal consent given:  11/25/2020  Time verbal consent given:  11:35 AM      Chief Complaint   Patient presents with     Medication Request     xanax     Medication Reconciliation     Completed             HPI   Patients name: Karoline  Appointment start time:  11:44 AM    Follow-up mood/anxiety - needs refill of xanax   reviewed  Mood is stable    Pulm - asthma has been stable w/ humidifeir  Needs refill of wixela    Needs order for mammogram         Current Outpatient Medications   Medication Sig Dispense Refill     ALPRAZolam (XANAX) 0.5 MG tablet Take 1 tablet (0.5 mg) by mouth nightly as needed for anxiety 30 tablet 1     ALPRAZolam (XANAX) 2 MG tablet Take 1 tablet (2 mg) by mouth daily as needed for anxiety 30 tablet 1     bisacodyl (DULCOLAX) 5 MG EC tablet Take 2 tablets at 12pm on the day prior to the colonoscopy 2 tablet 0     cetirizine (ZYRTEC) 10 MG tablet Take 1 tablet (10 mg) by mouth daily 30 tablet 11     famotidine (PEPCID) 20 MG tablet Take 20 mg by mouth daily as needed        fluticasone (FLONASE) 50 MCG/ACT nasal spray Spray 2 sprays into both nostrils daily 9.9 mL 11     fluticasone-salmeterol (ADVAIR) 500-50 MCG/DOSE inhaler Inhale 1 puff into the lungs every 12 hours 60 Inhaler 2     gabapentin (NEURONTIN) 600 MG tablet Take 1 tablet (600 mg) by mouth 3 times daily as needed (pain)       guaiFENesin-codeine " "(ROBITUSSIN AC) 100-10 MG/5ML solution Take 5-10 mLs by mouth daily as needed for cough 180 mL 1     magnesium citrate solution Drink enitre bottle 4 hours prior to the colonoscopy 296 mL 0     montelukast (SINGULAIR) 10 MG tablet Take 1 tablet (10 mg) by mouth At Bedtime 30 tablet 11     ondansetron (ZOFRAN-ODT) 4 MG ODT tab as needed        polyethylene glycol (MIRALAX) 17 GM/Dose powder Mix entire bottle into 64 oz of electrolyte drink.  At 4 pm on the day prior to colonoscopy, drink 8 ounces every 15-20 minutes until finished 507 g 0     PROAIR  (90 Base) MCG/ACT inhaler Inhale 2 puffs into the lungs every 4 hours as needed for shortness of breath / dyspnea or wheezing 1 Inhaler 11     spacer (OPTICHAMBER BINA) holding chamber For use w/ rescue inhaler 1 each 0     triamcinolone (KENALOG) 0.1 % external cream Apply topically 2 times daily 1 g 1     Allergies   Allergen Reactions     Lidocaine Other (See Comments)     \"my jaw stopped moving\"  Other reaction(s): Dystonia     Penicillins Hives, Rash and Shortness Of Breath     Epinephrine-Lidocaine-Na Metabisulfite Other (See Comments) and Muscle Pain (Myalgia)     Severe jaw cramping, double vision  Jaw locking              Review of Systems:     Constitutional, HEENT, cardiovascular, pulmonary, gi and gu systems are negative, except as otherwise noted.         Physical Exam:     There were no vitals taken for this visit.  Estimated body mass index is 34.84 kg/m  as calculated from the following:    Height as of 11/5/20: 1.778 m (5' 10\").    Weight as of 11/5/20: 110.1 kg (242 lb 12.8 oz).    Exam:  Constitutional: healthy, alert and no distress  Psychiatric: mentation appears normal and affect normal/bright          Assessment and Plan     (F40.01) Panic disorder with agoraphobia  (primary encounter diagnosis)  Comment:  reviewed; stable on current dosage; refills sent  Plan: ALPRAZolam (XANAX) 2 MG tablet, ALPRAZolam         (XANAX) 0.5 MG tablet    "         (Z12.31) Encounter for screening mammogram for breast cancer  Comment:   Plan: MA SCREENING DIGITAL BILAT            (J45.40) Moderate persistent asthma without complication  Comment: stable on wixela; refill sent  Plan: fluticasone-salmeterol (ADVAIR) 500-50 MCG/DOSE inhaler              Appointment end time: 11:54 AM  This is a telephone visit that took 10 minutes.      Clinician location:  [facility]     Robbie Bailon MD  November 25, 2020  11:55 AM

## 2020-11-25 ENCOUNTER — RECORDS - HEALTHEAST (OUTPATIENT)
Dept: ADMINISTRATIVE | Facility: OTHER | Age: 45
End: 2020-11-25

## 2020-11-25 ENCOUNTER — TELEPHONE (OUTPATIENT)
Dept: FAMILY MEDICINE | Facility: CLINIC | Age: 45
End: 2020-11-25

## 2020-11-25 ENCOUNTER — VIRTUAL VISIT (OUTPATIENT)
Dept: FAMILY MEDICINE | Facility: CLINIC | Age: 45
End: 2020-11-25
Payer: COMMERCIAL

## 2020-11-25 ENCOUNTER — RECORDS - HEALTHEAST (OUTPATIENT)
Dept: SCHEDULING | Facility: CLINIC | Age: 45
End: 2020-11-25

## 2020-11-25 DIAGNOSIS — F40.01 PANIC DISORDER WITH AGORAPHOBIA: Primary | ICD-10-CM

## 2020-11-25 DIAGNOSIS — J45.40 MODERATE PERSISTENT ASTHMA WITHOUT COMPLICATION: ICD-10-CM

## 2020-11-25 DIAGNOSIS — Z12.31 VISIT FOR SCREENING MAMMOGRAM: ICD-10-CM

## 2020-11-25 DIAGNOSIS — Z12.31 ENCOUNTER FOR SCREENING MAMMOGRAM FOR BREAST CANCER: ICD-10-CM

## 2020-11-25 PROCEDURE — 99213 OFFICE O/P EST LOW 20 MIN: CPT | Mod: 95 | Performed by: FAMILY MEDICINE

## 2020-11-25 RX ORDER — ALPRAZOLAM 2 MG
2 TABLET ORAL DAILY PRN
Qty: 30 TABLET | Refills: 3 | Status: SHIPPED | OUTPATIENT
Start: 2020-11-25 | End: 2020-12-16

## 2020-11-25 RX ORDER — ALPRAZOLAM 0.5 MG
0.5 TABLET ORAL
Qty: 30 TABLET | Refills: 3 | Status: SHIPPED | OUTPATIENT
Start: 2020-11-25 | End: 2020-12-16

## 2020-11-25 NOTE — PATIENT INSTRUCTIONS
Referral for :     Mammogram    LOCATION/PLACE/Provider :    [facility]   DATE & TIME :    Faxed referral, location will call   PHONE :     [phone]  FAX :    [phone]  Appointment made by clinic staff/:    Kirstie

## 2020-11-25 NOTE — TELEPHONE ENCOUNTER
Spoke with Everardo (Elwin, [place] Cabrera) spoke with Walter and gave him a verbal order of Xanax 0.5mg, Xanax 2mg, and the Advair inhaler.

## 2020-11-25 NOTE — TELEPHONE ENCOUNTER
[facility] Family Medicine phone call message- medication clarification/question:    Full Medication Name: Alprazolam 0.5 mg, Alprazolam 2 mg, and fluticasone-salmeterol 500-50 mcg/dose inhaler     Strength:     Have you contacted your pharmacy about this refill request?     If  Yes,  which pharmacy?    When did you contact the pharmacy?    Additional comments/concerns from call to pharmacy:    Reason for call to clinic: Patient is calling stating she is at the pharmacy and they told her they have not received the medications that was sent today. She states she had a phone visit with Dr. Bailon but Rx didn't go through. She checked with the pharmacy again while I was on the phone and they didn't receive it. She states Patient still has a refill on the 2 mg but she needs the 0.5 mg. She would still need all medication resend today or call it into the pharmacy. Please call and advise.       [id number] [address] AT [facility] [address]: Yes    OK to leave a message on voice mail?     Primary language: English      needed? No    Call taken on November 25, 2020 at 1:00 PM by John Hdez

## 2020-11-25 NOTE — RESULT ENCOUNTER NOTE
Please call to inform patient of negative COVID19 result. Recommend to call clinic if experience symptoms: headache,sore throat, cough, shortness of breath, increase fatigue without known origin, loss of taste/smell. Thank you. Pia VÁSQUEZ

## 2020-11-27 ENCOUNTER — OFFICE VISIT (OUTPATIENT)
Dept: FAMILY MEDICINE | Facility: CLINIC | Age: 45
End: 2020-11-27
Payer: COMMERCIAL

## 2020-11-27 VITALS
RESPIRATION RATE: 20 BRPM | OXYGEN SATURATION: 100 % | SYSTOLIC BLOOD PRESSURE: 112 MMHG | TEMPERATURE: 97.8 F | HEART RATE: 107 BPM | DIASTOLIC BLOOD PRESSURE: 80 MMHG

## 2020-11-27 DIAGNOSIS — G44.209 TENSION HEADACHE: ICD-10-CM

## 2020-11-27 DIAGNOSIS — J45.31 MILD PERSISTENT ASTHMA WITH EXACERBATION: Primary | ICD-10-CM

## 2020-11-27 PROCEDURE — 99214 OFFICE O/P EST MOD 30 MIN: CPT | Mod: GC | Performed by: STUDENT IN AN ORGANIZED HEALTH CARE EDUCATION/TRAINING PROGRAM

## 2020-11-27 RX ORDER — PREDNISONE 50 MG/1
50 TABLET ORAL DAILY
Qty: 5 TABLET | Refills: 0 | Status: SHIPPED | OUTPATIENT
Start: 2020-11-27 | End: 2020-12-02

## 2020-11-27 NOTE — PROGRESS NOTES
CHIEF COMPLAINT                                                      Chief Complaint   Patient presents with     Asthma     chest wall hurts, short of breath     Headache     started yesterday, no fevers, had negative COVID test yesterday     Medication Reconciliation     needs attention     SUBJECTIVE:                                                    Darlene Hudson is a 45 year old year old female who presents to clinic today for the following health issues:    Chest tightness/headache  -Was testred for COVID last week asymptgomatically  -Had positive exposure from a work colleauge, though they all wear PPE  -Tested negative at that time  -Had been feeling at her baseline  -Yesterday evening had the onset of some shortness of breath more in line with an exacerbation of her shortness of breath  -Today while at work was quite fatigued and short of breath  -Developed a headache across her forehead  -Has some pleurtic discomfort and tightness in chest  -Not necessarily worse with exertion but is worse with palpation of the chest wall  -Has had asthma exacerbations like this in the past as well as costochondritis  -Here today for reevaluation and work note    Patient is an established patient of this clinic.  ----------------------------------------------------------------------------------------------------------------------  Patient Active Problem List   Diagnosis     Abnormal cytology finding     Nondependent alcohol abuse, episodic drinking behavior     Controlled substance agreement signed     Low back pain     Chronic sinusitis     Major depressive disorder, recurrent episode, moderate (H)     Tobacco use disorder     Noninflammatory disorder of vagina     Pap smear for cervical cancer screening     Abdominal pain     Abnormal cervical Papanicolaou smear     Panic disorder with agoraphobia     Atopic rhinitis     Chronic low back pain     Other long term (current) drug therapy     Acute pericardial effusion      Anxiety     Chronic infectious pericarditis     Polysubstance abuse (H)     Cough     Arthralgia of both lower legs     Moderate persistent asthma without complication     Physiologic disturbance of temperature regulation     Family history of colon cancer     Past Surgical History:   Procedure Laterality Date     ANKLE SURGERY Right 2019       Social History     Tobacco Use     Smoking status: Light Tobacco Smoker     Packs/day: 0.10     Years: 30.00     Pack years: 3.00     Types: Cigarettes     Last attempt to quit: 2020     Years since quittin.5     Smokeless tobacco: Never Used     Tobacco comment: OCCASIONALLY.   Substance Use Topics     Alcohol use: Yes     Alcohol/week: 0.0 standard drinks     Comment: Occasiaonlly.      Family History   Problem Relation Age of Onset     Diabetes Brother      Hypertension Mother      Other Cancer Mother         cervical cancer     Other Cancer Father         lung cancer     Asthma Father      Breast Cancer Maternal Grandmother      Asthma Child          Problem list and past medical, surgical, social, and family histories reviewed & adjusted, as indicated.    Current Outpatient Medications   Medication Sig Dispense Refill     ALPRAZolam (XANAX) 0.5 MG tablet Take 1 tablet (0.5 mg) by mouth nightly as needed for anxiety 30 tablet 3     ALPRAZolam (XANAX) 2 MG tablet Take 1 tablet (2 mg) by mouth daily as needed for anxiety 30 tablet 3     cetirizine (ZYRTEC) 10 MG tablet Take 1 tablet (10 mg) by mouth daily 30 tablet 11     famotidine (PEPCID) 20 MG tablet Take 20 mg by mouth daily as needed        fluticasone (FLONASE) 50 MCG/ACT nasal spray Spray 2 sprays into both nostrils daily 9.9 mL 11     fluticasone-salmeterol (ADVAIR) 500-50 MCG/DOSE inhaler Inhale 1 puff into the lungs every 12 hours 60 Inhaler 11     gabapentin (NEURONTIN) 600 MG tablet Take 1 tablet (600 mg) by mouth 3 times daily as needed (pain)       montelukast (SINGULAIR) 10 MG tablet Take  "1 tablet (10 mg) by mouth At Bedtime 30 tablet 11     ondansetron (ZOFRAN-ODT) 4 MG ODT tab as needed        polyethylene glycol (MIRALAX) 17 GM/Dose powder Mix entire bottle into 64 oz of electrolyte drink.  At 4 pm on the day prior to colonoscopy, drink 8 ounces every 15-20 minutes until finished 507 g 0     PROAIR  (90 Base) MCG/ACT inhaler Inhale 2 puffs into the lungs every 4 hours as needed for shortness of breath / dyspnea or wheezing 1 Inhaler 11     spacer (OPTICHAMBER BINA) holding chamber For use w/ rescue inhaler 1 each 0     triamcinolone (KENALOG) 0.1 % external cream Apply topically 2 times daily 1 g 1     bisacodyl (DULCOLAX) 5 MG EC tablet Take 2 tablets at 12pm on the day prior to the colonoscopy (Patient not taking: Reported on 11/27/2020) 2 tablet 0     guaiFENesin-codeine (ROBITUSSIN AC) 100-10 MG/5ML solution Take 5-10 mLs by mouth daily as needed for cough 180 mL 1     magnesium citrate solution Drink enitre bottle 4 hours prior to the colonoscopy (Patient not taking: Reported on 11/27/2020) 296 mL 0     Medication list reviewed and updated as indicated.    Allergies   Allergen Reactions     Lidocaine Other (See Comments)     \"my jaw stopped moving\"  Other reaction(s): Dystonia     Penicillins Hives, Rash and Shortness Of Breath     Epinephrine-Lidocaine-Na Metabisulfite Other (See Comments) and Muscle Pain (Myalgia)     Severe jaw cramping, double vision  Jaw locking     Allergies reviewed and updated as indicated.  ----------------------------------------------------------------------------------------------------------------------  ROS:  Constitutional, HEENT, cardiovascular, pulmonary, GI, musculoskeletal, neuro, skin, and psych systems are negative, except as otherwise noted.    OBJECTIVE:     /80 (BP Location: Right arm, Cuff Size: Adult Large)   Pulse 107   Temp 97.8  F (36.6  C) (Oral)   Resp 20   SpO2 100%   There is no height or weight on file to calculate " BMI.  Exam:  Constitutional: healthy, alert and no distress  ENT: Tenderness to percussion of the maxillary sinuses bilaterally, no tenderness on palpation of the frontal sinuses  Cardiovascular: Mildly tachycardic, regular rhythm, no murmurs appreciated  Respiratory: CTAB, no wheezing, rales or rhonchi.  Anterior chest wall tender to palpation  Gastrointestinal: Abdomen soft, non-tender. BS normal. No masses, organomegaly  Musculoskeletal: extremities normal- no gross deformities noted, gait normal and normal muscle tone  Skin: no suspicious lesions or rashes  Neurologic: Gait normal. Reflexes normal and symmetric. Sensation grossly WNL.  Hematologic/Lymphatic/Immunologic: Normal cervical lymph nodes    Diagnostic Test Results:  none     ASSESSMENT/PLAN:     (J45.31) Mild persistent asthma with exacerbation  (primary encounter diagnosis)  -Patient states this may feel like one of her typical asthma exacerbations  -Prior to this she believed her asthma was fairly well controlled  -She has had some chest tightness associated with this, it is pleuritic in nature and worse when palpating on the chest wall  -Patient could potentially have COVID and just developed symptoms after an exposure last week, and her previous COVID test two days ago was too early  -At this point, given the fact the patient has had similar symptoms before, we will treat with a prednisone burst to see if this helps (50 mg x 5 days)   -Given appropriate return precautions for both COVID and asthma exacerbations  -Will follow up with PMD for phone visit in one week to check in and see how things are going  -Could potentially consider altering asthma regimen at that time, though she is overdue for spirometry and given the current state of affairs with COVID this is just being done at certain Pulm clinics.       (G44.680) Tension headache  -Patient describes a tension type headache today  -It sounds like patient has been stressed at work and at her  home life with the ongoing pandemic  -Headache has not changed in quality, and is not worse than typical for her  -Ibuprofen as been helping  -Will follow up as needed for worsening symptoms   -Discussed how steroids can potentially worsen headaches, patient states this has not happened before but will monitor      There are no discontinued medications.    Options for treatment and follow-up care were reviewed with the patient and/or guardian. Darlene Hudson and/or guardian engaged in the decision making process and verbalized understanding of the options discussed and agreed with the final plan    Precepted with Dr. Bailon.    Zach Ye MD on 11/27/2020 at 3:17 PM

## 2020-11-27 NOTE — PROGRESS NOTES
I have personally reviewed the history and examination as documented by Dr. Ye.  I was present during key portions of the visit and agree with the assessment and plan as documented for 45 yr old female with poorly-controlled asthma here for likely flare. Recent negative COVID test. Will tx w/ pred burst. Precautions given. Anticipatory guidance given.     Robbie Bailon MD  November 27, 2020  5:10 PM

## 2020-11-27 NOTE — LETTER
RETURN TO WORK/SCHOOL FORM    11/27/2020    Re: Darlene Hudson  1975      To Whom It May Concern:     Darlene Hudson was seen in clinic today.  She may return to work without restrictions when she has gone 72 hours without symptoms.       Zach Ye MD  11/27/2020 3:35 PM

## 2020-11-28 ASSESSMENT — ASTHMA QUESTIONNAIRES: ACT_TOTALSCORE: 18

## 2020-11-30 DIAGNOSIS — J20.9 ACUTE BRONCHITIS, UNSPECIFIED ORGANISM: ICD-10-CM

## 2020-11-30 NOTE — TELEPHONE ENCOUNTER
[facility] phone call message- medication clarification/question:    Full Medication Name: cough medication with codeine   Strength:     Have you contacted your pharmacy about this refill request?     If  Yes,  which pharmacy?    When did you contact the pharmacy?    Additional comments/concerns from call to pharmacy:    Reason for call to clinic: Patient is calling to see if  Adamaris is in today, told her he is not. She is requesting refill for medication above, she states cough medication with codeine. Told her it could take up to two business days for a response. She then states she can't wait two business days, Offered an appt but she states she didn't need an appt that Dr. Bailon refilled it for her last time when she asked for it, told her that I am not sure an appt might be needed. She then asked to speak to a Nurse, maybe they can have another Doctor who is here refill for her. Told her that the Nurses won't be able to refill the medication it would have to be the Doctor, she states to leave a message for the Nurse to see if another Doctor would fill it, then asked to see if Pham is in clinic, told her she is. She then said disregard sending the message to the Nurses, send it to Pham since she knows her health history. Jaqueline call and advise.       Pharmacy confirmed as    [id number] [address] AT [facility] [address] : Yes    OK to leave a message on voice mail?     Primary language: English      needed? No    Call taken on November 30, 2020 at 12:14 PM by John Hdez

## 2020-12-01 NOTE — TELEPHONE ENCOUNTER
Patient call to check if Dr. Bailon had got her message, told her it is still pending and that her message was sent to Pham. Did let her know that we had power outage. Please call and advise.

## 2020-12-01 NOTE — TELEPHONE ENCOUNTER
Patient calling to check on message if Dr. Bailon had received her message, told her it was sent to him and they get two business days for a response, she states she knows. She then states she can't wait two business days she needs another doctor to fill her Rx for her, told her normally it goes to her PCP for refills. She states she can't keep missing work. She then asked if there is any openings, was looking at the schedule and asked if she was okay with any doctor she states not really then asked to put a message for Pham to call her back. Please call and advise.

## 2020-12-01 NOTE — TELEPHONE ENCOUNTER
Called and LVM for patient stating that I did send the Rx to Dr. Bailon yesterday and that he won't be in until tomorrow, so I'll update her if anything happens sooner.

## 2020-12-02 RX ORDER — CODEINE PHOSPHATE AND GUAIFENESIN 10; 100 MG/5ML; MG/5ML
1-2 SOLUTION ORAL DAILY PRN
Qty: 180 ML | Refills: 0 | Status: SHIPPED | OUTPATIENT
Start: 2020-12-02 | End: 2020-12-16

## 2020-12-02 NOTE — TELEPHONE ENCOUNTER
Called and informed patient of Rx info. And rescheduled her to an in person visit per. Patient request.

## 2020-12-03 ENCOUNTER — RECORDS - HEALTHEAST (OUTPATIENT)
Dept: ADMINISTRATIVE | Facility: OTHER | Age: 45
End: 2020-12-03

## 2020-12-03 ENCOUNTER — ANCILLARY PROCEDURE (OUTPATIENT)
Dept: GENERAL RADIOLOGY | Facility: CLINIC | Age: 45
End: 2020-12-03
Attending: FAMILY MEDICINE
Payer: COMMERCIAL

## 2020-12-03 ENCOUNTER — OFFICE VISIT (OUTPATIENT)
Dept: FAMILY MEDICINE | Facility: CLINIC | Age: 45
End: 2020-12-03
Payer: COMMERCIAL

## 2020-12-03 ENCOUNTER — AMBULATORY - HEALTHEAST (OUTPATIENT)
Dept: PULMONOLOGY | Facility: OTHER | Age: 45
End: 2020-12-03

## 2020-12-03 VITALS
DIASTOLIC BLOOD PRESSURE: 85 MMHG | HEART RATE: 101 BPM | TEMPERATURE: 97.8 F | SYSTOLIC BLOOD PRESSURE: 122 MMHG | RESPIRATION RATE: 22 BRPM | OXYGEN SATURATION: 100 %

## 2020-12-03 DIAGNOSIS — J20.9 ACUTE BRONCHITIS, UNSPECIFIED ORGANISM: ICD-10-CM

## 2020-12-03 DIAGNOSIS — J45.909 ASTHMA: ICD-10-CM

## 2020-12-03 DIAGNOSIS — J45.41 MODERATE PERSISTENT ASTHMA WITH (ACUTE) EXACERBATION: ICD-10-CM

## 2020-12-03 DIAGNOSIS — J20.9 ACUTE BRONCHITIS, UNSPECIFIED ORGANISM: Primary | ICD-10-CM

## 2020-12-03 DIAGNOSIS — J45.51 SEVERE PERSISTENT ASTHMA WITH ACUTE EXACERBATION (H): ICD-10-CM

## 2020-12-03 PROCEDURE — 71046 X-RAY EXAM CHEST 2 VIEWS: CPT | Mod: FY | Performed by: RADIOLOGY

## 2020-12-03 PROCEDURE — 99214 OFFICE O/P EST MOD 30 MIN: CPT | Performed by: FAMILY MEDICINE

## 2020-12-03 RX ORDER — AZITHROMYCIN 250 MG/1
TABLET, FILM COATED ORAL
Qty: 6 TABLET | Refills: 0 | Status: SHIPPED | OUTPATIENT
Start: 2020-12-03 | End: 2020-12-08

## 2020-12-03 NOTE — PROGRESS NOTES
"Pt is a 45 year old female last seen on 11/27/20 by Dr Ye here today for:     1) asthma - \"my chest is on fire\"  Only 1 day of relief w/ steroids  Still using inhaler  Treated last week w/ pred and cough medication  Is on Wixela (Advair) 500/50 and Singulair  PFT's?  Smoking 1/2 cigarette per day so way down       Past Medical History:   Diagnosis Date     Anxiety      Asthma in adult, moderate persistent, uncomplicated      Controlled substance agreement signed 6/30/2015    Overview:  Patient has chronic pain and is seen at [facility] for this.  Has controlled substance agreement with them.  On Vicodin, Valium, Klonopin prescribed only from there.         Past Surgical History:   Procedure Laterality Date     ANKLE SURGERY Right 05/30/2019      Current Outpatient Medications   Medication Sig Dispense Refill     azithromycin (ZITHROMAX) 250 MG tablet Take 2 tablets (500 mg) by mouth daily for 1 day, THEN 1 tablet (250 mg) daily for 4 days. 6 tablet 0     tiotropium (SPIRIVA RESPIMAT) 2.5 MCG/ACT inhaler Inhale 2 puffs into the lungs daily 1 Inhaler 3     ALPRAZolam (XANAX) 0.5 MG tablet Take 1 tablet (0.5 mg) by mouth nightly as needed for anxiety 30 tablet 3     ALPRAZolam (XANAX) 2 MG tablet Take 1 tablet (2 mg) by mouth daily as needed for anxiety 30 tablet 3     cetirizine (ZYRTEC) 10 MG tablet Take 1 tablet (10 mg) by mouth daily 30 tablet 11     famotidine (PEPCID) 20 MG tablet Take 20 mg by mouth daily as needed        fluticasone (FLONASE) 50 MCG/ACT nasal spray Spray 2 sprays into both nostrils daily 9.9 mL 11     fluticasone-salmeterol (ADVAIR) 500-50 MCG/DOSE inhaler Inhale 1 puff into the lungs every 12 hours 60 Inhaler 11     gabapentin (NEURONTIN) 600 MG tablet Take 1 tablet (600 mg) by mouth 3 times daily as needed (pain)       guaiFENesin-codeine (ROBITUSSIN AC) 100-10 MG/5ML solution Take 5-10 mLs by mouth daily as needed for cough 180 mL 0     montelukast (SINGULAIR) 10 MG tablet Take " "1 tablet (10 mg) by mouth At Bedtime 30 tablet 11     ondansetron (ZOFRAN-ODT) 4 MG ODT tab as needed        polyethylene glycol (MIRALAX) 17 GM/Dose powder Mix entire bottle into 64 oz of electrolyte drink.  At 4 pm on the day prior to colonoscopy, drink 8 ounces every 15-20 minutes until finished 507 g 0     PROAIR  (90 Base) MCG/ACT inhaler Inhale 2 puffs into the lungs every 4 hours as needed for shortness of breath / dyspnea or wheezing 1 Inhaler 11     spacer (OPTICHAMBER BINA) holding chamber For use w/ rescue inhaler 1 each 0     triamcinolone (KENALOG) 0.1 % external cream Apply topically 2 times daily 1 g 1      Allergies   Allergen Reactions     Lidocaine Other (See Comments)     \"my jaw stopped moving\"  Other reaction(s): Dystonia     Penicillins Hives, Rash and Shortness Of Breath     Epinephrine-Lidocaine-Na Metabisulfite Other (See Comments) and Muscle Pain (Myalgia)     Severe jaw cramping, double vision  Jaw locking        ROS:   Gen- no weight change, no fevers/chills   Head/ Eyes- no blurred vision, no headaches   ENT- no cough, no congestion, no URI symptoms   Cardiac - no palpitations, no chest pain   Respiratory - no shortness of breath , no wheezing   GI - no nausea, no vomiting, no diarrhea, no constipation   Urinary - no dysuria, no polyuria   Neuro - no numbness, no tingling   Remainder of ROS negative.     Exam:   /85 (BP Location: Right arm, Patient Position: Sitting, Cuff Size: Adult Regular)   Pulse 101   Temp 97.8  F (36.6  C) (Oral)   Resp 22   SpO2 100%      Alert and oriented x 3; No acute distress   HEENT: extraocular movements intact, tympanic membranes clear, oropharynx clear   Neck: no masses, no lymphadenopathy   Resp: clear to auscultation bilaterally, no wheezing/ronchi   CV: rate/rhythm regular, no murmurs/rubs/gallops   ABd: soft, + bowel sounds, nontender/nondistended, no rebound/guarding   Extrem: no clubbing/cyanosis/edema   MSK: full range of motion, " nontender   Neuro: no focal deficits   Derm: no rashes     CXR - 12/3/20 at [facility]  Neg      (J20.9) Acute bronchitis, unspecified organism  (primary encounter diagnosis)  Comment: may be secondary infection/ atypical pneumonia; will tx w/ Abx; f/u in 1 wk; if no better, I would insist that she pursue formal PFTs and a pulm consult; orders placed  Plan: XR CHEST 2 VW, azithromycin (ZITHROMAX) 250 MG tablet            (J45.51) Severe persistent asthma with acute exacerbation  Comment: she is on max Wixela and Singulair; added a LAMA to see if this will help w/ her symptoms; see above regarding PFTs/ pulm referral  Plan: XR CHEST 2 VW, tiotropium (SPIRIVA RESPIMAT)         2.5 MCG/ACT inhaler, Pulmonary Function Test         (PFT) Referral, PULMONARY MEDICINE REFERRAL            Robbie Bailon MD  December 3, 2020  4:03 PM

## 2020-12-03 NOTE — PATIENT INSTRUCTIONS
Referral for :     Pulmonary medicine & PFT    LOCATION/PLACE/Provider :    [facility]   DATE & TIME :    Faxed   PHONE :     [phone]  FAX :    [phone]  Appointment made by clinic staff/:    Kirstie

## 2020-12-04 ENCOUNTER — COMMUNICATION - HEALTHEAST (OUTPATIENT)
Dept: PULMONOLOGY | Facility: OTHER | Age: 45
End: 2020-12-04

## 2020-12-04 ASSESSMENT — ASTHMA QUESTIONNAIRES: ACT_TOTALSCORE: 12

## 2020-12-09 NOTE — PROGRESS NOTES
"Darlene Hudson is a 45 year old female who is being evaluated via a billable telephone visit.      The patient has been notified of following:     \"This telephone visit will be conducted via a call between you and your physician/provider. We have found that certain health care needs can be provided without the need for a physical exam.  This service lets us provide the care you need with a short phone conversation.  If a prescription is necessary we can send it directly to your pharmacy.  If lab work is needed we can place an order for that and you can then stop by our lab to have the test done at a later time.    Telephone visits are billed at different rates depending on your insurance coverage. During this emergency period, for some insurers they may be billed the same as an in-person visit.  Please reach out to your insurance provider with any questions.    If during the course of the call the physician/provider feels a telephone visit is not appropriate, you will not be charged for this service.\"    Patient has given verbal consent for Telephone visit?  Yes    What phone number would you like to be contacted at? [phone]    How would you like to obtain your AVS? Darudar   Call start: 11:25 AM  Call stop: 11:30 AM  Duration: 5 minutes    Subjective     Darlene Hudson is a 45 year old female who presents via phone visit today for the following health issues:    HPI     Last seen on 12/3/20 for asthma and bronchitis  Has been treated w/ pred  At last visit was prescribed a Z-pack -> scripts still weren't ready when she went the next day! Will try today  Normal CXR  Was starting to feel better and yesterday noted acute worsening again  +cough and wheeze  No F/C    Added LAMA inhaler to asthma regimen as she is maxed on Wixela and is on singulair  PFTs and pulm consult pending   Was unable to pick this up as well    Has changed her pharmacy -> [facility] in [place]     Per my last note:  (J20.9) Acute " bronchitis, unspecified organism  (primary encounter diagnosis)  Comment: may be secondary infection/ atypical pneumonia; will tx w/ Abx; f/u in 1 wk; if no better, I would insist that she pursue formal PFTs and a pulm consult; orders placed  Plan: XR CHEST 2 VW, azithromycin (ZITHROMAX) 250 MG tablet             (J45.51) Severe persistent asthma with acute exacerbation  Comment: she is on max Wixela and Singulair; added a LAMA to see if this will help w/ her symptoms; see above regarding PFTs/ pulm referral  Plan: XR CHEST 2 VW, tiotropium (SPIRIVA RESPIMAT)         2.5 MCG/ACT inhaler, Pulmonary Function Test         (PFT) Referral, PULMONARY MEDICINE REFERRAL              Review of Systems   CONSTITUTIONAL: NEGATIVE for fever, chills, change in weight  ENT/MOUTH: NEGATIVE for ear, mouth and throat problems  RESP: see HPI  CV: NEGATIVE for chest pain, palpitations or peripheral edema       Objective          Vitals:  No vitals were obtained today due to virtual visit.    healthy, alert and mild distress  PSYCH: Alert and oriented times 3; coherent speech, normal   rate and volume, able to articulate logical thoughts, able   to abstract reason, no tangential thoughts, no hallucinations   or delusions  Her affect is normal  RESP: No cough, no audible wheezing, able to talk in full sentences  Remainder of exam unable to be completed due to telephone visits          Assessment/Plan:    (J20.9) Acute bronchitis, unspecified organism  (primary encounter diagnosis)  Comment:   Plan: stable; needs to  Abx and LAMA; will do phone call again in 6 days    (J45.51) Severe persistent asthma with acute exacerbation  Comment:   Plan: see above    Robbie Bailon MD  December 10, 2020  11:32 AM

## 2020-12-10 ENCOUNTER — VIRTUAL VISIT (OUTPATIENT)
Dept: FAMILY MEDICINE | Facility: CLINIC | Age: 45
End: 2020-12-10
Payer: COMMERCIAL

## 2020-12-10 DIAGNOSIS — J20.9 ACUTE BRONCHITIS, UNSPECIFIED ORGANISM: Primary | ICD-10-CM

## 2020-12-10 DIAGNOSIS — J45.51 SEVERE PERSISTENT ASTHMA WITH ACUTE EXACERBATION (H): ICD-10-CM

## 2020-12-10 PROCEDURE — 99213 OFFICE O/P EST LOW 20 MIN: CPT | Mod: 95 | Performed by: FAMILY MEDICINE

## 2020-12-10 NOTE — PROGRESS NOTES
"Family Medicine Telephone Visit Note               Telephone Visit Consent   Patient was verbally read the following and verbal consent was obtained.    \"Telephone visits are billed at different rates depending on your insurance coverage. During this emergency period, for some insurers they may be billed the same as an in-person visit.  Please reach out to your insurance provider with any questions.  If during the course of the call the physician/provider feels a telephone visit is not appropriate, you will not be charged for this service.\"    Name person giving consent:  Patient   Date verbal consent given:  12/10/2020  Time verbal consent given:  11:24 AM       "

## 2020-12-15 NOTE — PROGRESS NOTES
"Family Medicine Telephone Visit Note           Telephone Visit Consent   Patient was verbally read the following and verbal consent was obtained.    \"Telephone visits are billed at different rates depending on your insurance coverage. During this emergency period, for some insurers they may be billed the same as an in-person visit.  Please reach out to your insurance provider with any questions.  If during the course of the call the physician/provider feels a telephone visit is not appropriate, you will not be charged for this service.\"    Name person giving consent:  Patient   Date verbal consent given:  12/16/2020  Time verbal consent given:  2:55 PM         Chief Complaint   Patient presents with     Asthma     Still the same      Medication Reconciliation              HPI   Patients name: Karoline  Appointment start time:  3:02 PM    Asthma - has seen a lot of improvement since she moved  Previous condo was poorly ventilated  Now in a two-story house, better ventilation and better breathing  Would still like to try spiriva to see if this will help control symptoms  Still having nighttime symptoms    Bronchitis - never filled abx  Does not feel she needs them    Lost meds - lost all her meds during her move  Needs me to refill them, including her xanax  She has been off x 5 days and is getting inc anxiety  Was afraid I would yell at her      Current Outpatient Medications   Medication Sig Dispense Refill     ALPRAZolam (XANAX) 0.5 MG tablet Take 1 tablet (0.5 mg) by mouth nightly as needed for anxiety 30 tablet 3     ALPRAZolam (XANAX) 2 MG tablet Take 1 tablet (2 mg) by mouth daily as needed for anxiety 30 tablet 3     cetirizine (ZYRTEC) 10 MG tablet Take 1 tablet (10 mg) by mouth daily 30 tablet 11     famotidine (PEPCID) 20 MG tablet Take 20 mg by mouth daily as needed        fluticasone (FLONASE) 50 MCG/ACT nasal spray Spray 2 sprays into both nostrils daily 9.9 mL 11     fluticasone-salmeterol (ADVAIR) 500-50 " "MCG/DOSE inhaler Inhale 1 puff into the lungs every 12 hours 60 Inhaler 11     gabapentin (NEURONTIN) 600 MG tablet Take 1 tablet (600 mg) by mouth 3 times daily as needed (pain)       guaiFENesin-codeine (ROBITUSSIN AC) 100-10 MG/5ML solution Take 5-10 mLs by mouth daily as needed for cough 180 mL 0     montelukast (SINGULAIR) 10 MG tablet Take 1 tablet (10 mg) by mouth At Bedtime 30 tablet 11     ondansetron (ZOFRAN-ODT) 4 MG ODT tab as needed        polyethylene glycol (MIRALAX) 17 GM/Dose powder Mix entire bottle into 64 oz of electrolyte drink.  At 4 pm on the day prior to colonoscopy, drink 8 ounces every 15-20 minutes until finished 507 g 0     PROAIR  (90 Base) MCG/ACT inhaler Inhale 2 puffs into the lungs every 4 hours as needed for shortness of breath / dyspnea or wheezing 1 Inhaler 11     spacer (OPTICHAMBER BINA) holding chamber For use w/ rescue inhaler 1 each 0     tiotropium (SPIRIVA RESPIMAT) 2.5 MCG/ACT inhaler Inhale 2 puffs into the lungs daily 1 Inhaler 3     triamcinolone (KENALOG) 0.1 % external cream Apply topically 2 times daily 1 g 1     Allergies   Allergen Reactions     Lidocaine Other (See Comments)     \"my jaw stopped moving\"  Other reaction(s): Dystonia     Penicillins Hives, Rash and Shortness Of Breath     Epinephrine-Lidocaine-Na Metabisulfite Other (See Comments) and Muscle Pain (Myalgia)     Severe jaw cramping, double vision  Jaw locking              Review of Systems:     CONSTITUTIONAL: NEGATIVE for fever, chills, change in weight  ENT/MOUTH: NEGATIVE for ear, mouth and throat problems  RESP: NEGATIVE for significant cough or SOB  CV: NEGATIVE for chest pain, palpitations or peripheral edema         Physical Exam:     There were no vitals taken for this visit.  Estimated body mass index is 34.84 kg/m  as calculated from the following:    Height as of 11/5/20: 1.778 m (5' 10\").    Weight as of 11/5/20: 110.1 kg (242 lb 12.8 oz).    Exam:  Constitutional: healthy, alert " and no distress  Psychiatric: mentation appears normal and affect normal/bright            Assessment and Plan     (J45.51) Severe persistent asthma with acute exacerbation  Comment: stable; inhaler refills sent; precautions given  Plan: fluticasone-salmeterol (ADVAIR) 500-50 MCG/DOSE        inhaler, tiotropium (SPIRIVA RESPIMAT) 2.5         MCG/ACT inhaler            (J30.2) Seasonal allergic rhinitis, unspecified trigger  Comment: refills sent  Plan: cetirizine (ZYRTEC) 10 MG tablet, fluticasone         (FLONASE) 50 MCG/ACT nasal spray            (F40.01) Panic disorder with agoraphobia  Comment: spoke w/ pharmacist and gave verbal authorization for early fill; Reviewed   Plan: ALPRAZolam (XANAX) 0.5 MG tablet, ALPRAZolam         (XANAX) 2 MG tablet          Refilled medications that would be required in the next 3 months.     After Visit Information:  Patient chose to view AVS via [employer]    Appointment end time: 3:08 PM  This is a telephone visit that took 6 minutes.      Clinician location:  [facility]       Robbie Bailon MD  December 16, 2020  3:19 PM

## 2020-12-16 ENCOUNTER — VIRTUAL VISIT (OUTPATIENT)
Dept: FAMILY MEDICINE | Facility: CLINIC | Age: 45
End: 2020-12-16
Payer: COMMERCIAL

## 2020-12-16 DIAGNOSIS — J30.2 SEASONAL ALLERGIC RHINITIS, UNSPECIFIED TRIGGER: ICD-10-CM

## 2020-12-16 DIAGNOSIS — J45.51 SEVERE PERSISTENT ASTHMA WITH ACUTE EXACERBATION (H): ICD-10-CM

## 2020-12-16 DIAGNOSIS — F40.01 PANIC DISORDER WITH AGORAPHOBIA: ICD-10-CM

## 2020-12-16 PROCEDURE — 99213 OFFICE O/P EST LOW 20 MIN: CPT | Mod: 95 | Performed by: FAMILY MEDICINE

## 2020-12-16 RX ORDER — FLUTICASONE PROPIONATE 50 MCG
2 SPRAY, SUSPENSION (ML) NASAL DAILY
Qty: 9.9 ML | Refills: 11 | Status: SHIPPED | OUTPATIENT
Start: 2020-12-16 | End: 2022-03-03

## 2020-12-16 RX ORDER — ALPRAZOLAM 2 MG
2 TABLET ORAL DAILY PRN
Qty: 30 TABLET | Refills: 1
Start: 2020-12-16 | End: 2021-02-04

## 2020-12-16 RX ORDER — CETIRIZINE HYDROCHLORIDE 10 MG/1
10 TABLET ORAL DAILY
Qty: 30 TABLET | Refills: 11 | Status: SHIPPED | OUTPATIENT
Start: 2020-12-16 | End: 2022-03-03

## 2020-12-16 RX ORDER — ALPRAZOLAM 0.5 MG
0.5 TABLET ORAL
Qty: 30 TABLET | Refills: 1
Start: 2020-12-16 | End: 2021-04-09

## 2020-12-18 ENCOUNTER — TELEPHONE (OUTPATIENT)
Dept: FAMILY MEDICINE | Facility: CLINIC | Age: 45
End: 2020-12-18

## 2020-12-18 NOTE — TELEPHONE ENCOUNTER
[facility] Family Medicine phone call message- general phone call:    Reason for call: Patient state she has no fever but has a cough. After taking medication for allergy and asthma the cough has got a little better but patient throat is on fire. Patient advise she is unable to keep taking off work to come in and would like for PCP to call something in or call her at work at [phone].    Return call needed: Yes    OK to leave a message on voice mail? Yes    Primary language: English      needed? No    Call taken on December 18, 2020 at 11:08 AM by Raúl Santoyo

## 2020-12-18 NOTE — TELEPHONE ENCOUNTER
Attempted to call patient at work number, no answer. Left VM to call clinic back. Recommend at home treatments, salt water gargle and throat lozenges with a f/u appointment due to new onset of symptom to rule out strep throat. Need further assessment to see if patient is experiencing other symptoms and onset of throat pain. Neo VÁSQUEZ

## 2021-01-13 DIAGNOSIS — N89.9 NONINFLAMMATORY DISORDER OF VAGINA: ICD-10-CM

## 2021-01-14 RX ORDER — METRONIDAZOLE 7.5 MG/G
1 GEL VAGINAL AT BEDTIME
Qty: 70 G | Refills: 1 | Status: SHIPPED | OUTPATIENT
Start: 2021-01-14 | End: 2021-08-02

## 2021-01-21 ENCOUNTER — TELEPHONE (OUTPATIENT)
Dept: FAMILY MEDICINE | Facility: CLINIC | Age: 46
End: 2021-01-21

## 2021-01-21 DIAGNOSIS — R05.9 COUGH: ICD-10-CM

## 2021-01-21 NOTE — TELEPHONE ENCOUNTER
[facility] Family Medicine phone call message- medication clarification/question:    Full Medication Name: COUGH MEDICINE WITH CODEINE   Strength:     Have you contacted your pharmacy about this refill request?     If  Yes,  which pharmacy?    When did you contact the pharmacy?    Additional comments/concerns from call to pharmacy:    Reason for call to clinic: Patient is calling asking to see if Dr. Bailon is in today, told her he is not in until next week but has no more openings til Feb. Did let her know that even if he is not in clinic they do check their messages that it could take up to two business days for a response. She then asked if Pham is here, told her she is. She then asked to leave a message for Pham to call her back. When asked what it is regarding, she states she is needing medication above as she is having a very bad cough. Told her that an appt may be needed, she states she has never had an appt needed for this medication that Dr. Bailon usually prescribe it for her. She states if message is being sent to Pham, told her that message is being sent but Pham cannot prescribe medications she would have to wait for the Doctor. Patient states she knows but to have Pham call her. Patient refused an appt when offered, states to leave a message for Pham. Please call and advise.       Pharmacy confirmed as [facility] #[id number] [place] [id number] AT [facility]: Yes    OK to leave a message on voice mail?     Primary language: English      needed? No    Call taken on January 21, 2021 at 8:41 AM by John Hdez

## 2021-01-22 RX ORDER — CODEINE PHOSPHATE AND GUAIFENESIN 10; 100 MG/5ML; MG/5ML
1-2 SOLUTION ORAL EVERY 4 HOURS PRN
Qty: 180 ML | Refills: 0 | Status: SHIPPED | OUTPATIENT
Start: 2021-01-22 | End: 2021-02-04

## 2021-01-22 NOTE — TELEPHONE ENCOUNTER
Refill sent. Please inform pt she needs to schedule a follow-up regarding her asthma. Thank you!    Robbie Bailon MD  January 22, 2021  8:13 AM

## 2021-02-04 ENCOUNTER — OFFICE VISIT (OUTPATIENT)
Dept: FAMILY MEDICINE | Facility: CLINIC | Age: 46
End: 2021-02-04
Payer: COMMERCIAL

## 2021-02-04 VITALS
DIASTOLIC BLOOD PRESSURE: 88 MMHG | HEART RATE: 87 BPM | TEMPERATURE: 97.7 F | SYSTOLIC BLOOD PRESSURE: 133 MMHG | RESPIRATION RATE: 20 BRPM | OXYGEN SATURATION: 96 %

## 2021-02-04 DIAGNOSIS — R05.9 COUGH: ICD-10-CM

## 2021-02-04 DIAGNOSIS — F40.01 PANIC DISORDER WITH AGORAPHOBIA: ICD-10-CM

## 2021-02-04 DIAGNOSIS — F41.1 GENERALIZED ANXIETY DISORDER: Primary | ICD-10-CM

## 2021-02-04 DIAGNOSIS — J45.50 SEVERE PERSISTENT ASTHMA WITHOUT COMPLICATION (H): ICD-10-CM

## 2021-02-04 PROCEDURE — 99214 OFFICE O/P EST MOD 30 MIN: CPT | Performed by: FAMILY MEDICINE

## 2021-02-04 RX ORDER — ALPRAZOLAM 2 MG
2 TABLET ORAL 2 TIMES DAILY PRN
Qty: 60 TABLET | Refills: 1 | Status: SHIPPED | OUTPATIENT
Start: 2021-02-04 | End: 2021-03-30

## 2021-02-04 RX ORDER — CODEINE PHOSPHATE AND GUAIFENESIN 10; 100 MG/5ML; MG/5ML
1-2 SOLUTION ORAL EVERY 4 HOURS PRN
Qty: 180 ML | Refills: 1 | Status: SHIPPED | OUTPATIENT
Start: 2021-02-04 | End: 2021-03-17

## 2021-02-04 RX ORDER — ESCITALOPRAM OXALATE 10 MG/1
10 TABLET ORAL DAILY
Qty: 30 TABLET | Refills: 11 | Status: SHIPPED | OUTPATIENT
Start: 2021-02-04 | End: 2021-11-04

## 2021-02-04 SDOH — HEALTH STABILITY: MENTAL HEALTH: HOW OFTEN DO YOU HAVE 6 OR MORE DRINKS ON ONE OCCASION?: NOT ASKED

## 2021-02-04 SDOH — HEALTH STABILITY: MENTAL HEALTH: HOW MANY STANDARD DRINKS CONTAINING ALCOHOL DO YOU HAVE ON A TYPICAL DAY?: NOT ASKED

## 2021-02-04 ASSESSMENT — ANXIETY QUESTIONNAIRES
1. FEELING NERVOUS, ANXIOUS, OR ON EDGE: NEARLY EVERY DAY
IF YOU CHECKED OFF ANY PROBLEMS ON THIS QUESTIONNAIRE, HOW DIFFICULT HAVE THESE PROBLEMS MADE IT FOR YOU TO DO YOUR WORK, TAKE CARE OF THINGS AT HOME, OR GET ALONG WITH OTHER PEOPLE: SOMEWHAT DIFFICULT
GAD7 TOTAL SCORE: 5
2. NOT BEING ABLE TO STOP OR CONTROL WORRYING: NOT AT ALL
6. BECOMING EASILY ANNOYED OR IRRITABLE: NOT AT ALL
7. FEELING AFRAID AS IF SOMETHING AWFUL MIGHT HAPPEN: NOT AT ALL
3. WORRYING TOO MUCH ABOUT DIFFERENT THINGS: NOT AT ALL
5. BEING SO RESTLESS THAT IT IS HARD TO SIT STILL: SEVERAL DAYS

## 2021-02-04 ASSESSMENT — PATIENT HEALTH QUESTIONNAIRE - PHQ9: 5. POOR APPETITE OR OVEREATING: SEVERAL DAYS

## 2021-02-04 NOTE — NURSING NOTE
Rx has been filed on February 4, 2021 3:01 PM  By: Kaitlynn Mccurdy  Patient has been notified Rx ready for .

## 2021-02-04 NOTE — PROGRESS NOTES
"A/P:    (F41.1) Generalized anxiety disorder  (primary encounter diagnosis)  Comment: lengthy discussion regarding her worsening anxiety and tx plan. Restated on lexapro; counseled that effects can take up to 6 weeks; in the interim, will refill her alprazolam early by increasing frequency; explained that she will follow-up in 6 weeks and will return to her original regimen; pt was in agreement w/ plan  Plan: escitalopram (LEXAPRO) 10 MG tablet, ALPRAZolam (XANAX) 2 MG tablet            (R05) Cough  Comment: med refilled for future exacerbations to avoid length debates w/ our staff regarding \"urgent\" refills  Plan: guaiFENesin-codeine (ROBITUSSIN AC) 100-10 MG/5ML solution            (J45.50) Severe persistent asthma without complication  Comment:   Plan: improved s/p acute illness; explained that she needs to start her LAMA in addition to her ICS/LABA; f/u in 6 wks    (F40.01) Panic disorder with agoraphobia  Comment: see above  Plan: ALPRAZolam (XANAX) 2 MG tablet            Robbie Bailon MD  February 4, 2021  2:37 PM        Pt is a 45 year old female last seen on 12/16/20 vis virtual visit here today for:     1) Asthma - has been using her Wixela twice daily  When she is not sick, does not need albuterol  Has not started using the spiriva yet    Recovered from recent illness     ACT Total Scores 11/27/2020 12/3/2020 2/4/2021   ACT TOTAL SCORE (Goal Greater than or Equal to 20) 18 12 15   In the past 12 months, how many times did you visit the emergency room for your asthma without being admitted to the hospital? 0 1 0   In the past 12 months, how many times were you hospitalized overnight because of your asthma? 1 1 0     2) Mood - bosses had a meeting with her about her anxiety   Anxiety has been \"out of control\"   No SI    ACE-7 SCORE 1/22/2020 3/12/2020 2/4/2021   Total Score 21 3 5           Past Medical History:   Diagnosis Date     Anxiety      Asthma in adult, moderate persistent, uncomplicated      " "Controlled substance agreement signed 6/30/2015    Overview:  Patient has chronic pain and is seen at [facility] for this.  Has controlled substance agreement with them.  On Vicodin, Valium, Klonopin prescribed only from there.         Past Surgical History:   Procedure Laterality Date     ANKLE SURGERY Right 05/30/2019      Current Outpatient Medications   Medication Sig Dispense Refill     ALPRAZolam (XANAX) 0.5 MG tablet Take 1 tablet (0.5 mg) by mouth nightly as needed for anxiety 30 tablet 1     ALPRAZolam (XANAX) 2 MG tablet Take 1 tablet (2 mg) by mouth daily as needed for anxiety 30 tablet 1     cetirizine (ZYRTEC) 10 MG tablet Take 1 tablet (10 mg) by mouth daily 30 tablet 11     fluticasone (FLONASE) 50 MCG/ACT nasal spray Spray 2 sprays into both nostrils daily 9.9 mL 11     fluticasone-salmeterol (ADVAIR) 500-50 MCG/DOSE inhaler Inhale 1 puff into the lungs every 12 hours 60 Inhaler 11     guaiFENesin-codeine (ROBITUSSIN AC) 100-10 MG/5ML solution Take 5-10 mLs by mouth every 4 hours as needed for cough 180 mL 0     montelukast (SINGULAIR) 10 MG tablet Take 1 tablet (10 mg) by mouth At Bedtime 30 tablet 11     ondansetron (ZOFRAN-ODT) 4 MG ODT tab as needed        polyethylene glycol (MIRALAX) 17 GM/Dose powder Mix entire bottle into 64 oz of electrolyte drink.  At 4 pm on the day prior to colonoscopy, drink 8 ounces every 15-20 minutes until finished 507 g 0     PROAIR  (90 Base) MCG/ACT inhaler Inhale 2 puffs into the lungs every 4 hours as needed for shortness of breath / dyspnea or wheezing 1 Inhaler 11     spacer (OPTICHAMBER BINA) holding chamber For use w/ rescue inhaler 1 each 0     tiotropium (SPIRIVA RESPIMAT) 2.5 MCG/ACT inhaler Inhale 2 puffs into the lungs daily 1 Inhaler 3     triamcinolone (KENALOG) 0.1 % external cream Apply topically 2 times daily 1 g 1      Allergies   Allergen Reactions     Lidocaine Other (See Comments)     \"my jaw stopped moving\"  Other " reaction(s): Dystonia     Penicillins Hives, Rash and Shortness Of Breath     Epinephrine-Lidocaine-Na Metabisulfite Other (See Comments) and Muscle Pain (Myalgia)     Severe jaw cramping, double vision  Jaw locking        ROS:   Gen- no weight change, no fevers/chills   Head/ Eyes- no blurred vision, no headaches   ENT- + cough, no congestion, no URI symptoms   Cardiac - no palpitations, no chest pain   Respiratory - see HPI  GI - no nausea, no vomiting, no diarrhea, no constipation   Urinary - no dysuria, no polyuria   Remainder of ROS negative.     Exam:   /88 (BP Location: Right arm, Patient Position: Sitting, Cuff Size: Adult Regular)   Pulse 87   Temp 97.7  F (36.5  C) (Oral)   Resp 20   SpO2 96%    Alert and oriented x 3; No acute distress   Resp: clear to auscultation bilaterally, no wheezing/ronchi   CV: rate/rhythm regular, no murmurs/rubs/gallops   Neuro: no focal deficits   Derm: no rashes

## 2021-02-05 ASSESSMENT — ASTHMA QUESTIONNAIRES: ACT_TOTALSCORE: 15

## 2021-02-05 ASSESSMENT — ANXIETY QUESTIONNAIRES: GAD7 TOTAL SCORE: 5

## 2021-02-08 ENCOUNTER — TELEPHONE (OUTPATIENT)
Dept: FAMILY MEDICINE | Facility: CLINIC | Age: 46
End: 2021-02-08

## 2021-02-08 DIAGNOSIS — M25.571 ACUTE RIGHT ANKLE PAIN: Primary | ICD-10-CM

## 2021-02-08 NOTE — TELEPHONE ENCOUNTER
Pt is returning call back, gave her info. She did schedule appt with Dr. Bailon on Friday 2/12 and states if she needs to cancel it she will call back.

## 2021-02-08 NOTE — TELEPHONE ENCOUNTER
[facility] phone call message- medication clarification/question:    Full Medication Name: Pain Medication       Question: Patient states she sprained her ankle at work and was seen at [facility]. She states [facility] was unable to give her any pain medication and told to follow up with primary care. Patient declined to schedule appt. Patient is wondering if Dr. Bailon will prescribe any pain medication since she is in pain. Please call and advise.      [id number] [address]  AT [facility]: Yes    OK to leave a message on voice mail? Yes    Primary language: English      needed? No    Call taken on February 8, 2021 at 10:59 AM by Gloria Juarez

## 2021-02-09 ENCOUNTER — TELEPHONE (OUTPATIENT)
Dept: FAMILY MEDICINE | Facility: CLINIC | Age: 46
End: 2021-02-09

## 2021-02-10 NOTE — TELEPHONE ENCOUNTER
Overnight House Officer Telephone Note  -Patient states that she was previously seen earlier this week for swelling in her ankle and was diagnosed with an ankle sprain. She states that prior to this she did not have a known injury.   -Called into clinic yesterday and received prescription for tylenol #3, takes the edge off  -Now noticing left hand swelling and tenderness, states her hand is so swollen it is deformed and unable to see the knuckles. Red and painful.  -No fevers but reports chills  -Patient states she feels very fatigued and this is abnormal for her    Ddx includes inflammatory arthritis, gout, rheumatoid, SLE, septic joint, cellulitis. Given the new left hand swelling causing significant deformity and systemic symptoms of chills and fatigue raising the concern for posisble infection recommended patient be evaluated at urgent care.     Elmo Mckeon MD

## 2021-02-12 ENCOUNTER — OFFICE VISIT (OUTPATIENT)
Dept: FAMILY MEDICINE | Facility: CLINIC | Age: 46
End: 2021-02-12
Payer: COMMERCIAL

## 2021-02-12 VITALS
SYSTOLIC BLOOD PRESSURE: 120 MMHG | RESPIRATION RATE: 22 BRPM | DIASTOLIC BLOOD PRESSURE: 84 MMHG | TEMPERATURE: 97.3 F | HEART RATE: 95 BPM | OXYGEN SATURATION: 98 %

## 2021-02-12 DIAGNOSIS — M25.571 ACUTE RIGHT ANKLE PAIN: Primary | ICD-10-CM

## 2021-02-12 PROCEDURE — 99214 OFFICE O/P EST MOD 30 MIN: CPT | Mod: GC | Performed by: STUDENT IN AN ORGANIZED HEALTH CARE EDUCATION/TRAINING PROGRAM

## 2021-02-12 RX ORDER — INDOMETHACIN 25 MG/1
25 CAPSULE ORAL 2 TIMES DAILY WITH MEALS
Qty: 60 CAPSULE | Refills: 0 | Status: SHIPPED | OUTPATIENT
Start: 2021-02-12 | End: 2021-03-17

## 2021-02-12 NOTE — PROGRESS NOTES
I have personally reviewed the history and examination as documented by Dr. Ye.  I was present during key portions of the visit and agree with the assessment and plan as documented for 45 yr old female here for refractory ankle pain and swelling. Will place in walking boot/ tx w/ meds. Precautions given. Anticipatory guidance given.     Robbie Bailon MD  February 12, 2021  4:59 PM

## 2021-02-12 NOTE — PROGRESS NOTES
CHIEF COMPLAINT                                                      Chief Complaint   Patient presents with     Foot Problems     right ankle pain     Medication Reconciliation     Needs attention     SUBJECTIVE:                                                    Darlene Hudson is a 45 year old year old female who presents to clinic today for the following health issues:    Concern - Right ankle pain  Onset: 2/7/2021    Description:   Pain started spontaneously while she was at work.      Intensity: moderate    Progression of Symptoms:  worsening    Accompanying Signs & Symptoms:  Has significant pain to the lateral aspect of her right foot, does not radiate into any other .      Previous history of similar problem:   Multiple times int he past    Precipitating factors:   Worsened by: Movement, palpation, standing     Alleviating factors:  Improved by: sitting back, Tylenol #3    Therapies Tried and outcome: Tylenol #3    Patient is an established patient of this clinic.  ----------------------------------------------------------------------------------------------------------------------  Patient Active Problem List   Diagnosis     Abnormal cytology finding     Nondependent alcohol abuse, episodic drinking behavior     Controlled substance agreement signed     Low back pain     Chronic sinusitis     Major depressive disorder, recurrent episode, moderate (H)     Tobacco use disorder     Noninflammatory disorder of vagina     Pap smear for cervical cancer screening     Abdominal pain     Abnormal cervical Papanicolaou smear     Panic disorder with agoraphobia     Atopic rhinitis     Chronic low back pain     Other long term (current) drug therapy     Acute pericardial effusion     Anxiety     Chronic infectious pericarditis     Polysubstance abuse (H)     Cough     Arthralgia of both lower legs     Moderate persistent asthma without complication     Physiologic disturbance of temperature regulation     Family  history of colon cancer     Past Surgical History:   Procedure Laterality Date     ANKLE SURGERY Right 2019       Social History     Tobacco Use     Smoking status: Current Every Day Smoker     Packs/day: 0.10     Years: 30.00     Pack years: 3.00     Types: Cigarettes     Last attempt to quit: 2020     Years since quittin.7     Smokeless tobacco: Never Used     Tobacco comment: 2-3 cig/day   Substance Use Topics     Alcohol use: Not Currently     Comment: Occasiaonlly.      Family History   Problem Relation Age of Onset     Diabetes Brother      Hypertension Mother      Other Cancer Mother         cervical cancer     Other Cancer Father         lung cancer     Asthma Father      Breast Cancer Maternal Grandmother      Asthma Child          Problem list and past medical, surgical, social, and family histories reviewed & adjusted, as indicated.    Current Outpatient Medications   Medication Sig Dispense Refill     acetaminophen-codeine (TYLENOL #3) 300-30 MG tablet Take 1 tablet by mouth 2 times daily as needed for severe pain 25 tablet 0     ALPRAZolam (XANAX) 0.5 MG tablet Take 1 tablet (0.5 mg) by mouth nightly as needed for anxiety 30 tablet 1     ALPRAZolam (XANAX) 2 MG tablet Take 1 tablet (2 mg) by mouth 2 times daily as needed for anxiety 60 tablet 1     cetirizine (ZYRTEC) 10 MG tablet Take 1 tablet (10 mg) by mouth daily 30 tablet 11     escitalopram (LEXAPRO) 10 MG tablet Take 1 tablet (10 mg) by mouth daily 30 tablet 11     fluticasone (FLONASE) 50 MCG/ACT nasal spray Spray 2 sprays into both nostrils daily 9.9 mL 11     fluticasone-salmeterol (ADVAIR) 500-50 MCG/DOSE inhaler Inhale 1 puff into the lungs every 12 hours 60 Inhaler 11     guaiFENesin-codeine (ROBITUSSIN AC) 100-10 MG/5ML solution Take 5-10 mLs by mouth every 4 hours as needed for cough 180 mL 1     indomethacin (INDOCIN) 25 MG capsule Take 1 capsule (25 mg) by mouth 2 times daily (with meals) 60 capsule 0     montelukast  "(SINGULAIR) 10 MG tablet Take 1 tablet (10 mg) by mouth At Bedtime 30 tablet 11     ondansetron (ZOFRAN-ODT) 4 MG ODT tab as needed        polyethylene glycol (MIRALAX) 17 GM/Dose powder Mix entire bottle into 64 oz of electrolyte drink.  At 4 pm on the day prior to colonoscopy, drink 8 ounces every 15-20 minutes until finished 507 g 0     PROAIR  (90 Base) MCG/ACT inhaler Inhale 2 puffs into the lungs every 4 hours as needed for shortness of breath / dyspnea or wheezing 1 Inhaler 11     spacer (OPTICHAMBER BINA) holding chamber For use w/ rescue inhaler 1 each 0     tiotropium (SPIRIVA RESPIMAT) 2.5 MCG/ACT inhaler Inhale 2 puffs into the lungs daily 1 Inhaler 3     triamcinolone (KENALOG) 0.1 % external cream Apply topically 2 times daily 1 g 1     Medication list reviewed and updated as indicated.    Allergies   Allergen Reactions     Lidocaine Other (See Comments)     \"my jaw stopped moving\"  Other reaction(s): Dystonia     Penicillins Hives, Rash and Shortness Of Breath     Epinephrine-Lidocaine-Na Metabisulfite Other (See Comments) and Muscle Pain (Myalgia)     Severe jaw cramping, double vision  Jaw locking     Allergies reviewed and updated as indicated.  ----------------------------------------------------------------------------------------------------------------------  ROS:  Constitutional, HEENT, cardiovascular, pulmonary, GI, musculoskeletal, neuro, skin, and psych systems are negative, except as otherwise noted.    OBJECTIVE:     /84   Pulse 95   Temp 97.3  F (36.3  C)   Resp 22   SpO2 98%   There is no height or weight on file to calculate BMI.  Exam:  Constitutional: healthy, alert and no distress  Cardiovascular: negative, PMI normal. No lifts, heaves, or thrills. RRR. No murmurs, clicks gallops or rub  Respiratory: negative, Percussion normal. Good diaphragmatic excursion. Lungs clear  Musculoskeletal: extremities normal- no gross deformities noted, gait normal and normal " muscle tone.  FOCUSED LOWER EXTREMITY EXAM: Pain diffusely around the ankle, worse in the lateral malleolus on the right.  There is some edema noted around the lateral malleolus with a raised erythematous plaque with some crusting.  THe joint is not warm to palpation and there is no sign of infection.  There is no calf tenderness bilaterally.  LLE has normal ROM of all joints without significant changes.  No significant change in gait.    Skin: no suspicious lesions or rashes  Neurologic: Gait normal. Reflexes normal and symmetric. Sensation grossly WNL.  Psychiatric: mentation appears normal and affect normal/bright  Hematologic/Lymphatic/Immunologic: Normal cervical lymph nodes    Diagnostic Test Results:  none     ASSESSMENT/PLAN:     (M25.571) Acute right ankle pain  (primary encounter diagnosis)  -Patient had the spontaneous onset of RLE ankle pain with some swelling on 2/7  -Patient denies any acute injury at that point in time  -She does have a history of two previous surguries in this ankle and has been instructed to wear a boot in the past to help with this, which she has refused to do.    -Since the onset of her pain she has been seen in the ED/UC twice and undergone two xrays that show inflammation but no other acute changes.  Patient also underwent basic lab workup including a CBC, CMP, CRP, and uric acid.  The CRP was mildly elevated at 3.1 but otherwise the lab workup was unremarkable  -There is a rash over the ankle concerning for possible psoriatic arthritis, but this is unlikely given the normal CRP  -Patient states this does feel similar to when her ankle has hurt in the past   -At this point will start on indomethacin and tylenol 3  -Also given boot for her RLE to help keep it stabilized  -We will follow up in two weeks if not improving  -Given appropriate return precautions.        Medications Discontinued During This Encounter   Medication Reason     acetaminophen-codeine (TYLENOL #3) 300-30 MG  tablet Reorder       Options for treatment and follow-up care were reviewed with the patient and/or guardian. Darlene Hudson and/or guardian engaged in the decision making process and verbalized understanding of the options discussed and agreed with the final plan    Precepted with Dr. Bailon.    Zach Ye MD on 2/12/2021 at 4:32 PM

## 2021-02-12 NOTE — LETTER
RETURN TO WORK/SCHOOL FORM    2/12/2021    Re: Darlene Hudson  1975      To Whom It May Concern:     Darlene Hudson was seen in clinic today.  She may return to work with restrictions on 2/25/21.   She should be wearing a boot to help keep the ankle stable until further notice.        Zach Ye MD  2/12/2021 4:47 PM

## 2021-02-15 ENCOUNTER — TELEPHONE (OUTPATIENT)
Dept: FAMILY MEDICINE | Facility: CLINIC | Age: 46
End: 2021-02-15

## 2021-02-15 NOTE — TELEPHONE ENCOUNTER
Rx picked up on February 12, 2021 5:09 PM by Darlene Hudson  Rx given by Gloria Juarez  Photo ID verified and signature obtained?: Yes

## 2021-02-18 ENCOUNTER — TELEPHONE (OUTPATIENT)
Dept: FAMILY MEDICINE | Facility: CLINIC | Age: 46
End: 2021-02-18

## 2021-02-18 NOTE — LETTER
RETURN TO WORK/SCHOOL FORM    2/12/2021    Re: Darlene Hudson  1975      To Whom It May Concern:     Darlene Hudson was seen in clinic today.  She may return to work with restrictions on 2/19/21.   She should be wearing a boot to help keep the ankle stable until further notice and should avoid stairs/steps.        Zach Ye MD  2/12/2021 4:47 PM

## 2021-02-18 NOTE — TELEPHONE ENCOUNTER
"[facility] phone call message- general phone call:    Reason for call: Patient state she was in last week and saw Dr. Bailon/Cassi. She was informed she will be out of work until 2/25 but if she feels better she can return and she just needs to call in for Dr. Ye to write her a new letter. Patient is looking to return to work tomorrow 2/19 but her note must also indicate restirction for \"no stairs/step\" per their conversation last week.    Return call needed: Yes    OK to leave a message on voice mail? Yes    Primary language: English      needed? No    Call taken on February 18, 2021 at 11:06 AM by Raúl Santoyo    "

## 2021-02-19 NOTE — TELEPHONE ENCOUNTER
Called to notify letter is ready for  but no answer, lm on vm letter is ready for , letter in the  drawer.

## 2021-03-01 ENCOUNTER — DOCUMENTATION ONLY (OUTPATIENT)
Dept: PHARMACY | Facility: CLINIC | Age: 46
End: 2021-03-01

## 2021-03-01 NOTE — PHARMACY
Chart Review per Insurance    ASSESSMENT:                            Adherence: Patient appears to be nonadherent to asthma medications. It does not appear that she has filled Advair, montelukast or Spiriva since the start of the new year. It is possible she is filling it and using a coupon card so it does not appear in dispense report. The patient would benefit from an interview and assesment of why the patient may struggle with adherence.    CNS: Patient was flagged by insurance for being on both opiates and benzodiazepines. This puts the patient at an increased risk of opoid overdose. The patient was given a short supply of tylenol #3 and is likely out by now. Both the benzo and opiate were prescribed by the same provider so they are aware.       PLAN:                            1. Talk to patient about adherence with Advair, montelukast and Spiriva. Assess any issues the patient has and educate the patients on importance to take these regularly.  2. No intervention needed for the opiate benzodiazapine concurrent use.    Follow-up: Next provider visit 3/17/21    Chart Review:                            Reason for review: Insurance data from [employer] outlines the following concern:      - Therapy Duplication:         non-adherent         to asthma medication         Adherence: It appears the patient has not filled montelukast, Advair or Spiriva this year according to dispense history. It is unclear why.    CNS: Patient is on alprazolam and was given a short course of tylenol #3 for some pain. This was a 12 day supply in early february and is likely out now.    ----------------    A copy of this note will be routed to the patient's primary care provider.      Misha De Jesus    Preceptor Attestation:  I was present with the pharmacy student who participated in the service and in the documentation of this note. I have verified the history, personally performed the medical decision making, and have  verified the content of the note, which accurately reflects my assessment of the patient and the plan of care.     Sharifa Albrecht, PharmD, BCACP, CDE  [facility]  Phone: [phone]  April 12, 2021 at 11:37 AM

## 2021-03-04 ENCOUNTER — TELEPHONE (OUTPATIENT)
Dept: FAMILY MEDICINE | Facility: CLINIC | Age: 46
End: 2021-03-04

## 2021-03-04 NOTE — TELEPHONE ENCOUNTER
In between my call and patient calling back was within 5 mins, documentation was not completed yet in chart to indicate, I made call that patient had missed.  Spoke with Karoline who would like to speak with Dr Bailon in regards to generalized pain throughout body. At work, needing to walk rounds every 15 mins and it has been difficult to walk well secondary to pain. Taking 4 tablets of Ibuprofen 200 mg, per Karoline about 20 tablets during work shift in order to get through work shift. RLchaparro RN

## 2021-03-04 NOTE — TELEPHONE ENCOUNTER
Patient is stating that got 2 missed calls, that someone here called her at 12:01 pm. Told her I don't see any documentation of who called and asked if they left a voice mail and she states her voice mail is full. She then asked for Pham, told her Pham is at lunch. She request to leave a message for Pham to call her back. Please call and advise.

## 2021-03-04 NOTE — TELEPHONE ENCOUNTER
[facility] phone call message- general phone call:    Reason for call: Patient is calling asking to speak to Dr. Bailon to have him call her back, when asked what it is regarding she states it's regarding her pain that she is not able to get out of bed. Told her that Dr. Bailon is in today but not for the afternoon shortly here. Offered an appt but patient refused to see anyone else. Told her it could take up to two business days for a response, she understood. Please call and advise.     Return call needed: Yes    OK to leave a message on voice mail?     Primary language: English      needed? No    Call taken on March 4, 2021 at 11:49 AM by John Hdez

## 2021-03-16 NOTE — PROGRESS NOTES
ASSESSMENT/PLAN:     (M25.571) Acute right ankle pain  (primary encounter diagnosis)  Comment: will give small supply of pain med for ankle for her upcoming trip; f/u in 5 wks  Plan: oxyCODONE-acetaminophen (PERCOCET) 5-325 MG tablet, ibuprofen (ADVIL/MOTRIN) 600 MG tablet          (N39.46) Mixed incontinence urge and stress (male)(female)  Comment: has failed Kegels and anticholinergics  Plan: UROLOGY ADULT REFERRAL            (J45.50) Severe persistent asthma without complication  Comment:   Plan: stable on inhalers    Robbie Bailon MD  March 18, 2021  9:08 AM      Pt is a 46 year old female last seen on 2/12/2021 by Dr Ye here today for:     1) ankle pain - placed in walking boot at last visit   Took boot off 1 wk ago but not really bothering her now  Will be hiking and riding bikes -> takes ibuprofen just to get out of bed      2) asthma - allergies are bad w/ weather change  +congestion   Using flonase and inhalers  Leaving to [place] from 3/21-3/27   Then a[place] 4/23-4/27  [place]  5/9-5/14  Got a promotion and is now a !     3) anxiety -   Has not been anxious one bit     4) urinary incontinence  +coughing/ sneezing incontinence  +urge incontinence as well   Says she does Kegel's exercises daily   Has also tried anticholinergics w/o relief        Past Medical History:   Diagnosis Date     Anxiety      Asthma in adult, moderate persistent, uncomplicated      Controlled substance agreement signed 6/30/2015    Overview:  Patient has chronic pain and is seen at [facility] for this.  Has controlled substance agreement with them.  On Vicodin, Valium, Klonopin prescribed only from there.         Past Surgical History:   Procedure Laterality Date     ANKLE SURGERY Right 05/30/2019      Current Outpatient Medications   Medication Sig Dispense Refill     ibuprofen (ADVIL/MOTRIN) 600 MG tablet Take 1 tablet (600 mg) by mouth 3 times daily as needed for moderate pain 90 tablet 1      "oxyCODONE-acetaminophen (PERCOCET) 5-325 MG tablet Take 1 tablet by mouth 2 times daily as needed for moderate to severe pain 12 tablet 0     ALPRAZolam (XANAX) 0.5 MG tablet Take 1 tablet (0.5 mg) by mouth nightly as needed for anxiety 30 tablet 1     ALPRAZolam (XANAX) 2 MG tablet Take 1 tablet (2 mg) by mouth 2 times daily as needed for anxiety 60 tablet 1     cetirizine (ZYRTEC) 10 MG tablet Take 1 tablet (10 mg) by mouth daily 30 tablet 11     escitalopram (LEXAPRO) 10 MG tablet Take 1 tablet (10 mg) by mouth daily 30 tablet 11     fluticasone (FLONASE) 50 MCG/ACT nasal spray Spray 2 sprays into both nostrils daily 9.9 mL 11     fluticasone-salmeterol (ADVAIR) 500-50 MCG/DOSE inhaler Inhale 1 puff into the lungs every 12 hours 60 Inhaler 11     montelukast (SINGULAIR) 10 MG tablet Take 1 tablet (10 mg) by mouth At Bedtime 30 tablet 11     ondansetron (ZOFRAN-ODT) 4 MG ODT tab as needed        polyethylene glycol (MIRALAX) 17 GM/Dose powder Mix entire bottle into 64 oz of electrolyte drink.  At 4 pm on the day prior to colonoscopy, drink 8 ounces every 15-20 minutes until finished 507 g 0     PROAIR  (90 Base) MCG/ACT inhaler Inhale 2 puffs into the lungs every 4 hours as needed for shortness of breath / dyspnea or wheezing 1 Inhaler 11     spacer (OPTICHAMBER BINA) holding chamber For use w/ rescue inhaler 1 each 0     tiotropium (SPIRIVA RESPIMAT) 2.5 MCG/ACT inhaler Inhale 2 puffs into the lungs daily 1 Inhaler 3     triamcinolone (KENALOG) 0.1 % external cream Apply topically 2 times daily 1 g 1      Allergies   Allergen Reactions     Lidocaine Other (See Comments)     \"my jaw stopped moving\"  Other reaction(s): Dystonia     Penicillins Hives, Rash and Shortness Of Breath     Epinephrine-Lidocaine-Na Metabisulfite Other (See Comments) and Muscle Pain (Myalgia)     Severe jaw cramping, double vision  Jaw locking        ROS:   Gen- no weight change, no fevers/chills   Head/ Eyes- no blurred vision, " no headaches   ENT- no cough, no congestion, no URI symptoms   Cardiac - no palpitations, no chest pain   Respiratory - no shortness of breath , no wheezing   GI - no nausea, no vomiting, no diarrhea, no constipation   Urinary - no dysuria, no polyuria   Neuro - no numbness, no tingling     Remainder of ROS negative.     Exam:   /82 (BP Location: Right arm, Patient Position: Sitting, Cuff Size: Adult Large)   Pulse 113   Temp 97.9  F (36.6  C) (Oral)   Resp 22   SpO2 100%    Alert and oriented x 3; No acute distress   HEENT: extraocular movements intact, tympanic membranes clear, oropharynx clear   Neck: no masses, no lymphadenopathy   Resp: clear to auscultation bilaterally, no wheezing/ronchi   CV: rate/rhythm regular, no murmurs/rubs/gallops   ABd: soft, + bowel sounds, nontender/nondistended, no rebound/guarding   Extrem: no clubbing/cyanosis/edema   MSK: full range of motion, nontender   Neuro: no focal deficits   Derm: no rashes

## 2021-03-17 ENCOUNTER — OFFICE VISIT (OUTPATIENT)
Dept: FAMILY MEDICINE | Facility: CLINIC | Age: 46
End: 2021-03-17
Payer: COMMERCIAL

## 2021-03-17 VITALS
RESPIRATION RATE: 22 BRPM | SYSTOLIC BLOOD PRESSURE: 119 MMHG | HEART RATE: 113 BPM | TEMPERATURE: 97.9 F | DIASTOLIC BLOOD PRESSURE: 82 MMHG | OXYGEN SATURATION: 100 %

## 2021-03-17 DIAGNOSIS — N39.46 MIXED INCONTINENCE URGE AND STRESS (MALE)(FEMALE): ICD-10-CM

## 2021-03-17 DIAGNOSIS — J45.50 SEVERE PERSISTENT ASTHMA WITHOUT COMPLICATION (H): ICD-10-CM

## 2021-03-17 DIAGNOSIS — M25.571 ACUTE RIGHT ANKLE PAIN: Primary | ICD-10-CM

## 2021-03-17 PROCEDURE — 99214 OFFICE O/P EST MOD 30 MIN: CPT | Performed by: FAMILY MEDICINE

## 2021-03-17 RX ORDER — IBUPROFEN 600 MG/1
600 TABLET, FILM COATED ORAL 3 TIMES DAILY PRN
Qty: 90 TABLET | Refills: 1 | Status: SHIPPED | OUTPATIENT
Start: 2021-03-17 | End: 2021-07-30

## 2021-03-17 RX ORDER — OXYCODONE AND ACETAMINOPHEN 5; 325 MG/1; MG/1
1 TABLET ORAL 2 TIMES DAILY PRN
Qty: 12 TABLET | Refills: 0 | Status: SHIPPED | OUTPATIENT
Start: 2021-03-17 | End: 2021-04-29

## 2021-03-30 DIAGNOSIS — F41.1 GENERALIZED ANXIETY DISORDER: ICD-10-CM

## 2021-03-30 DIAGNOSIS — F40.01 PANIC DISORDER WITH AGORAPHOBIA: ICD-10-CM

## 2021-03-30 NOTE — TELEPHONE ENCOUNTER
[facility] phone call message- medication clarification/question:    Full Medication Name: ALPRAZolam (XANAX) 2 MG tablet       Question: Patient state PCP forgot to refill medication.     Pharmacy confirmed as [id number] [place] [id number] [facility]  AT [facility]: Yes    OK to leave a message on voice mail? Yes    Primary language: English      needed? No    Call taken on March 30, 2021 at 10:38 AM by Raúl Santoyo

## 2021-03-31 RX ORDER — ALPRAZOLAM 2 MG
2 TABLET ORAL 2 TIMES DAILY PRN
Qty: 60 TABLET | Refills: 1 | Status: SHIPPED | OUTPATIENT
Start: 2021-03-31 | End: 2021-04-29

## 2021-04-01 ENCOUNTER — OFFICE VISIT (OUTPATIENT)
Dept: FAMILY MEDICINE | Facility: CLINIC | Age: 46
End: 2021-04-01
Payer: COMMERCIAL

## 2021-04-01 VITALS
RESPIRATION RATE: 20 BRPM | OXYGEN SATURATION: 100 % | HEART RATE: 113 BPM | DIASTOLIC BLOOD PRESSURE: 88 MMHG | SYSTOLIC BLOOD PRESSURE: 121 MMHG

## 2021-04-01 DIAGNOSIS — M25.471 RIGHT ANKLE SWELLING: ICD-10-CM

## 2021-04-01 DIAGNOSIS — J45.50 SEVERE PERSISTENT ASTHMA WITHOUT COMPLICATION (H): Primary | ICD-10-CM

## 2021-04-01 PROCEDURE — 99214 OFFICE O/P EST MOD 30 MIN: CPT | Performed by: FAMILY MEDICINE

## 2021-04-01 RX ORDER — PREDNISONE 50 MG/1
50 TABLET ORAL DAILY
Qty: 5 TABLET | Refills: 0 | Status: SHIPPED | OUTPATIENT
Start: 2021-04-01 | End: 2021-08-04

## 2021-04-01 ASSESSMENT — ASTHMA QUESTIONNAIRES
QUESTION_1 LAST FOUR WEEKS HOW MUCH OF THE TIME DID YOUR ASTHMA KEEP YOU FROM GETTING AS MUCH DONE AT WORK, SCHOOL OR AT HOME: ALL OF THE TIME
ACT_TOTALSCORE: 7
QUESTION_4 LAST FOUR WEEKS HOW OFTEN HAVE YOU USED YOUR RESCUE INHALER OR NEBULIZER MEDICATION (SUCH AS ALBUTEROL): THREE OR MORE TIMES PER DAY
QUESTION_3 LAST FOUR WEEKS HOW OFTEN DID YOUR ASTHMA SYMPTOMS (WHEEZING, COUGHING, SHORTNESS OF BREATH, CHEST TIGHTNESS OR PAIN) WAKE YOU UP AT NIGHT OR EARLIER THAN USUAL IN THE MORNING: FOUR OR MORE NIGHTS A WEEK
QUESTION_2 LAST FOUR WEEKS HOW OFTEN HAVE YOU HAD SHORTNESS OF BREATH: MORE THAN ONCE A DAY
QUESTION_5 LAST FOUR WEEKS HOW WOULD YOU RATE YOUR ASTHMA CONTROL: SOMEWHAT CONTROLLED

## 2021-04-01 NOTE — LETTER
WORK NOTE    4/1/2021    Re: Darlene Hudson  1975      To Whom It May Concern:     Darlene Hudson was seen in clinic today. Please excuse her from work from 4/1/2021-4/4/2021.  She may return to work without restrictions on 4/5/2021            Robbie Bailon MD  April 1, 2021  5:03 PM

## 2021-04-01 NOTE — PROGRESS NOTES
ASSESSMENT/PLAN:    (J45.50) Severe persistent asthma without complication  (primary encounter diagnosis)  Comment: will change rescue inhaler to dulera from albuterol and change to spiriva as he primary inhaler; f/u in 1 wk; short pred burst for acute flare; precautions given  Plan: mometasone-formoterol (DULERA) 100-5 MCG/ACT inhaler, predniSONE (DELTASONE) 50 MG tablet          (M25.471) Right ankle swelling  Comment: unclear etiology; could have psoriatic arthritis w/ large effusion and overlying rash; consider labwork; will tx w/ steroid burst for both asthma and ankle; precautions given  Plan: predniSONE (DELTASONE) 50 MG tablet            Robbie Bailon MD  April 2, 2021  4:34 PM        Pt is a 46 year old female last seen on 3/17/2021 here today for:     1) allergies are out of control   short of breath  +sinus pressure  Eyes watering     2) asthma   ACT - 11   Inhalers -> has not started the   Taking Advair twice daily   Using albuterol all the time   Does not feel like advair helps  Would be ok if she changed to spiriva instead    3) R ankle -> inc swelling and rash      Past Medical History:   Diagnosis Date     Anxiety      Asthma in adult, moderate persistent, uncomplicated      Controlled substance agreement signed 6/30/2015    Overview:  Patient has chronic pain and is seen at [facility] for this.  Has controlled substance agreement with them.  On Vicodin, Valium, Klonopin prescribed only from there.         Past Surgical History:   Procedure Laterality Date     ANKLE SURGERY Right 05/30/2019      Current Outpatient Medications   Medication Sig Dispense Refill     ALPRAZolam (XANAX) 0.5 MG tablet Take 1 tablet (0.5 mg) by mouth nightly as needed for anxiety 30 tablet 1     ALPRAZolam (XANAX) 2 MG tablet Take 1 tablet (2 mg) by mouth 2 times daily as needed for anxiety 60 tablet 1     cetirizine (ZYRTEC) 10 MG tablet Take 1 tablet (10 mg) by mouth daily 30 tablet 11     escitalopram (LEXAPRO) 10  "MG tablet Take 1 tablet (10 mg) by mouth daily 30 tablet 11     fluticasone (FLONASE) 50 MCG/ACT nasal spray Spray 2 sprays into both nostrils daily 9.9 mL 11     fluticasone-salmeterol (ADVAIR) 500-50 MCG/DOSE inhaler Inhale 1 puff into the lungs every 12 hours 60 Inhaler 11     ibuprofen (ADVIL/MOTRIN) 600 MG tablet Take 1 tablet (600 mg) by mouth 3 times daily as needed for moderate pain 90 tablet 1     montelukast (SINGULAIR) 10 MG tablet Take 1 tablet (10 mg) by mouth At Bedtime 30 tablet 11     ondansetron (ZOFRAN-ODT) 4 MG ODT tab as needed        oxyCODONE-acetaminophen (PERCOCET) 5-325 MG tablet Take 1 tablet by mouth 2 times daily as needed for moderate to severe pain 12 tablet 0     polyethylene glycol (MIRALAX) 17 GM/Dose powder Mix entire bottle into 64 oz of electrolyte drink.  At 4 pm on the day prior to colonoscopy, drink 8 ounces every 15-20 minutes until finished 507 g 0     PROAIR  (90 Base) MCG/ACT inhaler Inhale 2 puffs into the lungs every 4 hours as needed for shortness of breath / dyspnea or wheezing 1 Inhaler 11     spacer (OPTICHAMBER BINA) holding chamber For use w/ rescue inhaler 1 each 0     tiotropium (SPIRIVA RESPIMAT) 2.5 MCG/ACT inhaler Inhale 2 puffs into the lungs daily 1 Inhaler 3     triamcinolone (KENALOG) 0.1 % external cream Apply topically 2 times daily 1 g 1      Allergies   Allergen Reactions     Lidocaine Other (See Comments)     \"my jaw stopped moving\"  Other reaction(s): Dystonia     Penicillins Hives, Rash and Shortness Of Breath     Epinephrine-Lidocaine-Na Metabisulfite Other (See Comments) and Muscle Pain (Myalgia)     Severe jaw cramping, double vision  Jaw locking        ROS:   Gen- no weight change, no fevers/chills   ENT-see HPI  Cardiac - no palpitations, no chest pain   Respiratory - see HPI   GI - no nausea, no vomiting, no diarrhea, no constipation   Remainder of ROS negative.     Exam:   /88   Pulse 113   Resp 20   SpO2 100%    Alert and " oriented x 3; No acute distress   HEENT: oropharynx clear   Neck: no masses, no lymphadenopathy   Resp: clear to auscultation bilaterally, no wheezing/ronchi   CV: rate/rhythm regular, no murmurs/rubs/gallops   MSK: +large ankle effusion noted on R w/ overlying rash on lateral malleolus   Neuro: no focal deficits   Derm: erythematous raises plaque overlying lateral malleolus

## 2021-04-02 ASSESSMENT — ASTHMA QUESTIONNAIRES: ACT_TOTALSCORE: 7

## 2021-04-09 ENCOUNTER — VIRTUAL VISIT (OUTPATIENT)
Dept: FAMILY MEDICINE | Facility: CLINIC | Age: 46
End: 2021-04-09
Payer: COMMERCIAL

## 2021-04-09 DIAGNOSIS — J45.50 SEVERE PERSISTENT ASTHMA WITHOUT COMPLICATION (H): Primary | ICD-10-CM

## 2021-04-09 DIAGNOSIS — J30.1 SEASONAL ALLERGIC RHINITIS DUE TO POLLEN: ICD-10-CM

## 2021-04-09 PROCEDURE — 99213 OFFICE O/P EST LOW 20 MIN: CPT | Mod: 95 | Performed by: STUDENT IN AN ORGANIZED HEALTH CARE EDUCATION/TRAINING PROGRAM

## 2021-04-09 RX ORDER — CODEINE PHOSPHATE AND GUAIFENESIN 10; 100 MG/5ML; MG/5ML
1-2 SOLUTION ORAL EVERY 4 HOURS PRN
Qty: 180 ML | Refills: 0 | Status: SHIPPED | OUTPATIENT
Start: 2021-04-09 | End: 2021-04-21

## 2021-04-09 NOTE — PROGRESS NOTES
"Family Medicine Video Visit Note  Karoline is being evaluated via a billable video visit.           Video Visit Consent     Patient was verbally read the following and verbal consent was obtained.  \"Video visits are billed at different rates depending on your insurance coverage. During this emergency period, for some insurers they may be billed the same as an in-person visit.  Please reach out to your insurance provider with any questions.  If during the course of the call the physician/provider feels a video visit is not appropriate, you will not be charged for this service.\"     (Name person giving consent:  Patient   Date verbal consent given:  4/9/2021  Time verbal consent given:  2:48 PM)    Patient would like the video invitation sent by: Text to cell phone: [phone]    Chief Complaint   Patient presents with     Refill Request     Needs refills on Virtussin ACSY     Medication Reconciliation     Needs attention            HPI     Video Start Time: 3:01 PM    Karoline presents to clinic today for the following health issues:    Patient called to request refill for robitussin AC.    -States that Dr. Bailon will fill it for her when she returns from [place]  -It does appear that he will typically send her month long prescriptions at a time to help with her allergies/breathing issues related to asthma  -This was last filled for a month in 2/2021  -Patient denies any acute concerns or complaints today.      Current Outpatient Medications   Medication Sig Dispense Refill     ALPRAZolam (XANAX) 2 MG tablet Take 1 tablet (2 mg) by mouth 2 times daily as needed for anxiety 60 tablet 1     cetirizine (ZYRTEC) 10 MG tablet Take 1 tablet (10 mg) by mouth daily 30 tablet 11     escitalopram (LEXAPRO) 10 MG tablet Take 1 tablet (10 mg) by mouth daily 30 tablet 11     fluticasone (FLONASE) 50 MCG/ACT nasal spray Spray 2 sprays into both nostrils daily 9.9 mL 11     fluticasone-salmeterol (ADVAIR) 500-50 MCG/DOSE inhaler " "Inhale 1 puff into the lungs every 12 hours 60 Inhaler 11     guaiFENesin-codeine (ROBITUSSIN AC) 100-10 MG/5ML solution Take 5-10 mLs by mouth every 4 hours as needed for cough 180 mL 0     ibuprofen (ADVIL/MOTRIN) 600 MG tablet Take 1 tablet (600 mg) by mouth 3 times daily as needed for moderate pain 90 tablet 1     mometasone-formoterol (DULERA) 100-5 MCG/ACT inhaler 1 puffs every 4 hours as needed for wheezing and shortness of breath 8.8 g 3     montelukast (SINGULAIR) 10 MG tablet Take 1 tablet (10 mg) by mouth At Bedtime 30 tablet 11     ondansetron (ZOFRAN-ODT) 4 MG ODT tab as needed        oxyCODONE-acetaminophen (PERCOCET) 5-325 MG tablet Take 1 tablet by mouth 2 times daily as needed for moderate to severe pain 12 tablet 0     polyethylene glycol (MIRALAX) 17 GM/Dose powder Mix entire bottle into 64 oz of electrolyte drink.  At 4 pm on the day prior to colonoscopy, drink 8 ounces every 15-20 minutes until finished 507 g 0     predniSONE (DELTASONE) 50 MG tablet Take 1 tablet (50 mg) by mouth daily 5 tablet 0     PROAIR  (90 Base) MCG/ACT inhaler Inhale 2 puffs into the lungs every 4 hours as needed for shortness of breath / dyspnea or wheezing 1 Inhaler 11     spacer (OPTICHAMBER BINA) holding chamber For use w/ rescue inhaler 1 each 0     tiotropium (SPIRIVA RESPIMAT) 2.5 MCG/ACT inhaler Inhale 2 puffs into the lungs daily 1 Inhaler 3     triamcinolone (KENALOG) 0.1 % external cream Apply topically 2 times daily 1 g 1     ALPRAZolam (XANAX) 0.5 MG tablet Take 1 tablet (0.5 mg) by mouth nightly as needed for anxiety (Patient not taking: Reported on 4/9/2021) 30 tablet 1     Allergies   Allergen Reactions     Lidocaine Other (See Comments)     \"my jaw stopped moving\"  Other reaction(s): Dystonia     Penicillins Hives, Rash and Shortness Of Breath     Epinephrine-Lidocaine-Na Metabisulfite Other (See Comments) and Muscle Pain (Myalgia)     Severe jaw cramping, double vision  Jaw locking             " " Review of Systems:     Constitutional, HEENT, cardiovascular, pulmonary, gi and gu systems are negative, except as otherwise noted.         Physical Exam:     There were no vitals taken for this visit.  Estimated body mass index is 34.84 kg/m  as calculated from the following:    Height as of 11/5/20: 1.778 m (5' 10\").    Weight as of 11/5/20: 110.1 kg (242 lb 12.8 oz).    GENERAL: Healthy, alert and no distress  EYES: Eyes grossly normal to inspection.  No discharge or erythema, or obvious scleral/conjunctival abnormalities.  RESP: No audible wheeze, cough, or visible cyanosis.  No visible retractions or increased work of breathing.    SKIN: Visible skin clear. No significant rash, abnormal pigmentation or lesions.  NEURO: Cranial nerves grossly intact.  Mentation and speech appropriate for age.  PSYCH: Mentation appears normal, affect normal/bright, judgement and insight intact, normal speech and appearance well-groomed.        Assessment and Plan       ICD-10-CM    1. Severe persistent asthma without complication  J45.50 guaiFENesin-codeine (ROBITUSSIN AC) 100-10 MG/5ML solution   2. Seasonal allergic rhinitis due to pollen  J30.1    -Discussed with patient that robitussin AC on top of all of her orther medications is not typically recommended  -However her primary care doctor has continued to fill this regularly  -Because of this it was felt appropriate to refill the medication at this time  -Will be following up with Dr. Bailon in a couple of weeks   -Denied any other acute questions or complaints today.      Refilled medications that would be required in the next 3 months.     After Visit Information:  Patient declined AVS     No follow-ups on file.      Video-Visit Details    Type of service:  Video Visit    Video End Time (time video stopped): 3:15 PM    Originating Location (pt. Location): Other [facility]    Distant Location (provider location):  [facility]     Platform used for Video " Visit: Shelley Ye MD  I precepted today with Dr. Angel

## 2021-04-09 NOTE — PROGRESS NOTES
Preceptor Attestation:  Patient's case reviewed and discussed with the resident, Zach Ye MD. I agree with written assessment and plan of care.    Supervising Physician:  Jody Angel MD   [facility]

## 2021-04-14 NOTE — PATIENT INSTRUCTIONS
Referral for :    Urology (Uro gyn)    LOCATION/PLACE/Provider :    [facility]   DATE & TIME :    Faxed referral, they will call patient   PHONE :     [phone]  FAX :    [phone]  Appointment made by clinic staff/:    Kirstie

## 2021-04-20 NOTE — PROGRESS NOTES
"ASSESSMENT/PLAN:    (F41.1) Generalized anxiety disorder  (primary encounter diagnosis)  Comment:   Plan: stable; will refill Xanax when due    (J45.50) Severe persistent asthma without complication  Comment: instructed her to restart her Advair AND use the spiriva AND use the dulera prn  Plan: guaiFENesin-codeine (ROBITUSSIN AC) 100-10 MG/5ML solution            (M25.471) Right ankle swelling  Comment:   Plan: stable    Robbie Bailon MD  April 23, 2021  8:23 AM        Pt is a 46 year old female last seen on 4/9/2021 by Dr Ye via video visit here today for:     Follow-up:    1)  asthma - been sick since the last two days in [place]   Thinks it is the weather change from [place] to [place] and here the recent few days, feels bad; has been bad x 2 weeks   Now using spiriva instead of advair; may need to restart the advair, rosa during [place] trip   Started prednisone 2 days ago  No F/C  +stuffy nose, cold, asthma   +wheezing   \"I'm sure I have a sinus infection\"  Using nasal spray     ACT Total Scores 2/4/2021 4/1/2021 4/21/2021   ACT TOTAL SCORE (Goal Greater than or Equal to 20) 15 7 6   In the past 12 months, how many times did you visit the emergency room for your asthma without being admitted to the hospital? 0 0 0   In the past 12 months, how many times were you hospitalized overnight because of your asthma? 0 0 0         Per Dr Ye's note:  1 Severe persistent asthma without complication (J45.50) -> guaiFENesin-codeine (ROBITUSSIN AC) 100-10 MG/5ML solution  2. Seasonal allergic rhinitis due to pollen (J30.1)  -Discussed with patient that robitussin AC on top of all of her orther medications is not typically recommended  -However her primary care doctor has continued to fill this regularly  -Because of this it was felt appropriate to refill the medication at this time  -Will be following up with Dr. Bailon in a couple of weeks   -Denied any other acute questions or complaints today.        2) anxiety - " stable      3) ankle pain - normal ankle xray on 3/13/20  Is better -> swelling improved  Pain and swelling comes and goes        Past Medical History:   Diagnosis Date     Anxiety      Asthma in adult, moderate persistent, uncomplicated      Controlled substance agreement signed 6/30/2015    Overview:  Patient has chronic pain and is seen at [facility] for this.  Has controlled substance agreement with them.  On Vicodin, Valium, Klonopin prescribed only from there.         Past Surgical History:   Procedure Laterality Date     ANKLE SURGERY Right 05/30/2019      Current Outpatient Medications   Medication Sig Dispense Refill     ALPRAZolam (XANAX) 2 MG tablet Take 1 tablet (2 mg) by mouth 2 times daily as needed for anxiety 60 tablet 1     cetirizine (ZYRTEC) 10 MG tablet Take 1 tablet (10 mg) by mouth daily 30 tablet 11     escitalopram (LEXAPRO) 10 MG tablet Take 1 tablet (10 mg) by mouth daily 30 tablet 11     fluticasone (FLONASE) 50 MCG/ACT nasal spray Spray 2 sprays into both nostrils daily 9.9 mL 11     fluticasone-salmeterol (ADVAIR) 500-50 MCG/DOSE inhaler Inhale 1 puff into the lungs every 12 hours 60 Inhaler 11     guaiFENesin-codeine (ROBITUSSIN AC) 100-10 MG/5ML solution Take 5-10 mLs by mouth every 4 hours as needed for cough 180 mL 0     ibuprofen (ADVIL/MOTRIN) 600 MG tablet Take 1 tablet (600 mg) by mouth 3 times daily as needed for moderate pain 90 tablet 1     mometasone-formoterol (DULERA) 100-5 MCG/ACT inhaler 1 puffs every 4 hours as needed for wheezing and shortness of breath 8.8 g 3     montelukast (SINGULAIR) 10 MG tablet Take 1 tablet (10 mg) by mouth At Bedtime 30 tablet 11     ondansetron (ZOFRAN-ODT) 4 MG ODT tab as needed        oxyCODONE-acetaminophen (PERCOCET) 5-325 MG tablet Take 1 tablet by mouth 2 times daily as needed for moderate to severe pain 12 tablet 0     polyethylene glycol (MIRALAX) 17 GM/Dose powder Mix entire bottle into 64 oz of electrolyte drink.  At 4 pm on  "the day prior to colonoscopy, drink 8 ounces every 15-20 minutes until finished 507 g 0     predniSONE (DELTASONE) 50 MG tablet Take 1 tablet (50 mg) by mouth daily 5 tablet 0     PROAIR  (90 Base) MCG/ACT inhaler Inhale 2 puffs into the lungs every 4 hours as needed for shortness of breath / dyspnea or wheezing 1 Inhaler 11     spacer ([employer]BER BINA) holding chamber For use w/ rescue inhaler 1 each 0     tiotropium (SPIRIVA RESPIMAT) 2.5 MCG/ACT inhaler Inhale 2 puffs into the lungs daily 1 Inhaler 3     triamcinolone (KENALOG) 0.1 % external cream Apply topically 2 times daily 1 g 1      Allergies   Allergen Reactions     Lidocaine Other (See Comments)     \"my jaw stopped moving\"  Other reaction(s): Dystonia     Penicillins Hives, Rash and Shortness Of Breath     Epinephrine-Lidocaine-Na Metabisulfite Other (See Comments) and Muscle Pain (Myalgia)     Severe jaw cramping, double vision  Jaw locking        ROS:   Gen- no weight change, no fevers/chills   Head/ Eyes- no blurred vision, no headaches   ENT-see HPI   Cardiac - no palpitations, no chest pain   Respiratory - see HPI   GI - no nausea, no vomiting, no diarrhea, no constipation     Remainder of ROS negative.     Exam:   /83 (BP Location: Right arm, Patient Position: Sitting, Cuff Size: Adult Large)   Pulse 111   Temp 98  F (36.7  C) (Oral)   Resp 22   SpO2 98%    Alert and oriented x 3; No acute distress   Extrem: no clubbing/cyanosis/edema   MSK: full range of motion, nontender   Neuro: no focal deficits   Derm: no rashes       "

## 2021-04-21 ENCOUNTER — OFFICE VISIT (OUTPATIENT)
Dept: FAMILY MEDICINE | Facility: CLINIC | Age: 46
End: 2021-04-21
Payer: COMMERCIAL

## 2021-04-21 VITALS
SYSTOLIC BLOOD PRESSURE: 123 MMHG | TEMPERATURE: 98 F | DIASTOLIC BLOOD PRESSURE: 83 MMHG | OXYGEN SATURATION: 98 % | HEART RATE: 111 BPM | RESPIRATION RATE: 22 BRPM

## 2021-04-21 DIAGNOSIS — F41.1 GENERALIZED ANXIETY DISORDER: Primary | ICD-10-CM

## 2021-04-21 DIAGNOSIS — M25.471 RIGHT ANKLE SWELLING: ICD-10-CM

## 2021-04-21 DIAGNOSIS — J45.50 SEVERE PERSISTENT ASTHMA WITHOUT COMPLICATION (H): ICD-10-CM

## 2021-04-21 PROCEDURE — 99213 OFFICE O/P EST LOW 20 MIN: CPT | Performed by: FAMILY MEDICINE

## 2021-04-21 RX ORDER — CODEINE PHOSPHATE AND GUAIFENESIN 10; 100 MG/5ML; MG/5ML
1-2 SOLUTION ORAL EVERY 4 HOURS PRN
Qty: 180 ML | Refills: 0 | Status: SHIPPED | OUTPATIENT
Start: 2021-04-21 | End: 2021-04-29

## 2021-04-21 ASSESSMENT — PATIENT HEALTH QUESTIONNAIRE - PHQ9: SUM OF ALL RESPONSES TO PHQ QUESTIONS 1-9: 0

## 2021-04-21 NOTE — LETTER
RETURN TO WORK NOTE    4/21/2021    Re: Darlene Hudson  1975      To Whom It May Concern:     Darlene Hudson was seen in clinic today. Please excuse her from work for severe asthma exacerbation from 4/19/21-4/25/21.  She may return to work without restrictions on 4/26/21.              Robbie Bailon MD  4/21/2021 1:27 PM

## 2021-04-22 ASSESSMENT — ASTHMA QUESTIONNAIRES: ACT_TOTALSCORE: 6

## 2021-04-29 ENCOUNTER — OFFICE VISIT (OUTPATIENT)
Dept: FAMILY MEDICINE | Facility: CLINIC | Age: 46
End: 2021-04-29
Payer: COMMERCIAL

## 2021-04-29 VITALS
TEMPERATURE: 98 F | DIASTOLIC BLOOD PRESSURE: 81 MMHG | OXYGEN SATURATION: 99 % | HEART RATE: 90 BPM | SYSTOLIC BLOOD PRESSURE: 123 MMHG | RESPIRATION RATE: 16 BRPM

## 2021-04-29 DIAGNOSIS — F41.1 GENERALIZED ANXIETY DISORDER: ICD-10-CM

## 2021-04-29 DIAGNOSIS — M25.571 ACUTE RIGHT ANKLE PAIN: ICD-10-CM

## 2021-04-29 DIAGNOSIS — J45.50 SEVERE PERSISTENT ASTHMA WITHOUT COMPLICATION (H): ICD-10-CM

## 2021-04-29 DIAGNOSIS — F40.01 PANIC DISORDER WITH AGORAPHOBIA: ICD-10-CM

## 2021-04-29 PROCEDURE — 99214 OFFICE O/P EST MOD 30 MIN: CPT | Performed by: FAMILY MEDICINE

## 2021-04-29 RX ORDER — CODEINE PHOSPHATE AND GUAIFENESIN 10; 100 MG/5ML; MG/5ML
1-2 SOLUTION ORAL EVERY 4 HOURS PRN
Qty: 180 ML | Refills: 0 | Status: SHIPPED | OUTPATIENT
Start: 2021-04-29 | End: 2021-07-15

## 2021-04-29 RX ORDER — ALPRAZOLAM 2 MG
2 TABLET ORAL 2 TIMES DAILY PRN
Qty: 60 TABLET | Refills: 1 | Status: SHIPPED | OUTPATIENT
Start: 2021-04-29 | End: 2021-06-24

## 2021-04-29 RX ORDER — OXYCODONE AND ACETAMINOPHEN 5; 325 MG/1; MG/1
1 TABLET ORAL 2 TIMES DAILY PRN
Qty: 12 TABLET | Refills: 0 | Status: SHIPPED | OUTPATIENT
Start: 2021-04-29 | End: 2021-08-04

## 2021-04-29 NOTE — PROGRESS NOTES
ASSESSMENT/PLAN:    (F40.01) Panic disorder with agoraphobia  Comment: PDMP reviewed and is appropriate; med refilled  Plan: ALPRAZolam (XANAX) 2 MG tablet          (F41.1) Generalized anxiety disorder  Comment: see above  Plan: ALPRAZolam (XANAX) 2 MG tablet          (J45.50) Severe persistent asthma without complication  Comment: will cont two controller inhalers and rescue inhaler prn; she declined pulmonology referral  Plan: guaiFENesin-codeine (ROBITUSSIN AC) 100-10 MG/5ML solution          (M25.571) Acute right ankle pain  Comment: short supply of pain medication for her upcoming trip to [place] as she will be walking all day; she again had psoriatic appearing rash on lateral ankle; could have an inflammatory arthropathy; she declined further workup at this time  Plan: oxyCODONE-acetaminophen (PERCOCET) 5-325 MG tablet            Robbie Bailon MD  April 29, 2021  2:22 PM        Pt is a 46 year old female last seen on 4/21/2021 here today for:     1) asthma - just started feeling better yesterday  Cough starting to get better  Wheezing at night; turns on humidifier w/ fan and it helps  Allergies are bad  On Advair, Spiriva, and symbicort as rescue inhaler  Using Singulair and zyrtec and Flonase  Not interested in seeing a pulmonologist or allergist   Thinks her body doesn't adjust well to weather changes  Much worse after her heart surgery -> 2/2017; procedure was for pericarditis       Last PFTs were in 3/2020:  FEV1/FVC is 80 and is normal.  FEV1 is 89% predicted and is normal.  FVC is 90% predicted and is normal.  There was no improvement in spirometry after a single inhaled dose of   bronchodilator.  TLC is 94% predicted and is normal.  RV is 107% predicted and is normal.  DLCO is 90% predicted and is normal when it   is corrected for hemoglobin.  The flow volume loop is normal Yes.    Impression:  Full Pulmonary Function Test is normal.    Per my last note:  (F41.1) Generalized anxiety disorder  (primary  encounter diagnosis)  Comment:   Plan: stable; will refill Xanax when due     (J45.50) Severe persistent asthma without complication  Comment: instructed her to restart her Advair AND use the spiriva AND use the dulera prn  Plan: guaiFENesin-codeine (ROBITUSSIN AC) 100-10 MG/5ML solution    Past Medical History:   Diagnosis Date     Anxiety      Asthma in adult, moderate persistent, uncomplicated      Controlled substance agreement signed 6/30/2015    Overview:  Patient has chronic pain and is seen at [facility] for this.  Has controlled substance agreement with them.  On Vicodin, Valium, Klonopin prescribed only from there.         Past Surgical History:   Procedure Laterality Date     ANKLE SURGERY Right 05/30/2019      Patient Active Problem List   Diagnosis     Abnormal cytology finding     Nondependent alcohol abuse, episodic drinking behavior     Controlled substance agreement signed     Low back pain     Chronic sinusitis     Major depressive disorder, recurrent episode, moderate (H)     Tobacco use disorder     Noninflammatory disorder of vagina     Pap smear for cervical cancer screening     Abdominal pain     Abnormal cervical Papanicolaou smear     Panic disorder with agoraphobia     Atopic rhinitis     Chronic low back pain     Other long term (current) drug therapy     Acute pericardial effusion     Anxiety     Chronic infectious pericarditis     Polysubstance abuse (H)     Cough     Arthralgia of both lower legs     Moderate persistent asthma without complication     Physiologic disturbance of temperature regulation     Family history of colon cancer       Current Outpatient Medications   Medication Sig Dispense Refill     ALPRAZolam (XANAX) 2 MG tablet Take 1 tablet (2 mg) by mouth 2 times daily as needed for anxiety 60 tablet 1     cetirizine (ZYRTEC) 10 MG tablet Take 1 tablet (10 mg) by mouth daily 30 tablet 11     escitalopram (LEXAPRO) 10 MG tablet Take 1 tablet (10 mg) by mouth daily 30  "tablet 11     fluticasone (FLONASE) 50 MCG/ACT nasal spray Spray 2 sprays into both nostrils daily 9.9 mL 11     fluticasone-salmeterol (ADVAIR) 500-50 MCG/DOSE inhaler Inhale 1 puff into the lungs every 12 hours 60 Inhaler 11     guaiFENesin-codeine (ROBITUSSIN AC) 100-10 MG/5ML solution Take 5-10 mLs by mouth every 4 hours as needed for cough 180 mL 0     ibuprofen (ADVIL/MOTRIN) 600 MG tablet Take 1 tablet (600 mg) by mouth 3 times daily as needed for moderate pain 90 tablet 1     mometasone-formoterol (DULERA) 100-5 MCG/ACT inhaler 1 puffs every 4 hours as needed for wheezing and shortness of breath 8.8 g 3     montelukast (SINGULAIR) 10 MG tablet Take 1 tablet (10 mg) by mouth At Bedtime 30 tablet 11     ondansetron (ZOFRAN-ODT) 4 MG ODT tab as needed        polyethylene glycol (MIRALAX) 17 GM/Dose powder Mix entire bottle into 64 oz of electrolyte drink.  At 4 pm on the day prior to colonoscopy, drink 8 ounces every 15-20 minutes until finished 507 g 0     predniSONE (DELTASONE) 50 MG tablet Take 1 tablet (50 mg) by mouth daily 5 tablet 0     PROAIR  (90 Base) MCG/ACT inhaler Inhale 2 puffs into the lungs every 4 hours as needed for shortness of breath / dyspnea or wheezing 1 Inhaler 11     spacer ([employer] BINA) holding chamber For use w/ rescue inhaler 1 each 0     tiotropium (SPIRIVA RESPIMAT) 2.5 MCG/ACT inhaler Inhale 2 puffs into the lungs daily 1 Inhaler 3     triamcinolone (KENALOG) 0.1 % external cream Apply topically 2 times daily 1 g 1      Allergies   Allergen Reactions     Lidocaine Other (See Comments)     \"my jaw stopped moving\"  Other reaction(s): Dystonia     Penicillins Hives, Rash and Shortness Of Breath     Epinephrine-Lidocaine-Na Metabisulfite Other (See Comments) and Muscle Pain (Myalgia)     Severe jaw cramping, double vision  Jaw locking        ROS:   Gen- no weight change, no fevers/chills   Head/ Eyes- no blurred vision, no headaches   ENT- see HPI   Cardiac - no " palpitations, no chest pain   Respiratory - see HPI  GI - no nausea, no vomiting, no diarrhea, no constipation   Remainder of ROS negative.     Exam:   /81   Pulse 90   Temp 98  F (36.7  C) (Oral)   Resp 16   SpO2 99%    Alert and oriented x 3; No acute distress   Neuro: no focal deficits   Derm: circular raised erythematous patch over lateral ankle

## 2021-04-29 NOTE — LETTER
MEDICAL NOTE    4/29/2021    Re: Darlene Hudson  1975      To Whom It May Concern:     Darlene Hudson is under my care. This note is to verify that she has severe asthma and allergies. This may cause her to have to remove her mask briefly during travel to address symptoms. This is NOT a note saying she is exempt from mask wearing. This is simply a note confirming her medical conditions and making you aware of her symptom management needs.            Robbie Bailon MD  April 29, 2021  2:24 PM

## 2021-05-05 ENCOUNTER — TELEPHONE (OUTPATIENT)
Dept: FAMILY MEDICINE | Facility: CLINIC | Age: 46
End: 2021-05-05

## 2021-05-05 ENCOUNTER — MEDICAL CORRESPONDENCE (OUTPATIENT)
Dept: HEALTH INFORMATION MANAGEMENT | Facility: CLINIC | Age: 46
End: 2021-05-05

## 2021-05-05 NOTE — TELEPHONE ENCOUNTER
Called Karoline to discuss, no answer. Left VM to call clinic back and scheduled, advised concern for cellulitis which would require antibiotic. Recommended appointment in order to start antibiotic prior to trip if it is cellulitis. Advised  is not in today to return call. Please schedule patient for an appointment if she calls back. Neo VÁSQUEZ

## 2021-05-05 NOTE — TELEPHONE ENCOUNTER
[facility] phone call message- general phone call:    Reason for call: Patient states she would like a call back form Dr. Bailon, she states she leaves on Saturday morning at 6am to [facility]. Patient's leg is ref and swollen. She states she is wondering if there is something she can take to help since she's not able to walk. Patient declined appt until she speaks with pcp. Please call and advise. Notified may take 2 business days for response.     Return call needed: Yes    OK to leave a message on voice mail? Yes    Primary language: English      needed? No    Call taken on May 5, 2021 at 12:06 PM by Gloria Juarez

## 2021-05-07 NOTE — TELEPHONE ENCOUNTER
Patient called in regards previous message. Notified an appt will be needed, no openings. Patient states she leaves at 5am tomorrow and needs medication since she doesn't come back until Thursday. Please call and advise. Patient would like to know if Dr. Bailon willing to prescribe percocet until she returns from trip.

## 2021-05-19 NOTE — TELEPHONE ENCOUNTER
Called patient. She said that she was seen in ER and scheduled for drainage of right breast abscess. When the radiologist saw her, he wouldn't do the drainage because he wanted her on antibiotics for two weeks first. Patient is having significant pain.  I have ordered a referral to a surgeon.  Please get her to see a surgeon in the next day or two for drainage of the breast abscess. JULIO CESAR   
Spoke with Darlene who is very adamant about reaching Dr Aparicio, for him to fit her into his already full schedule for this afternoon. Firmly offered her an appt for tomorrow in clinic, refused this. Strongly requests to be seen today, no exceptions. Currently already being seen at [facility] for abscess of right breast, being prescribed antibiotics for 2 weeks, then they recommend following up with primary for referral to see surgeon for surgical intervention. Refuses to follow Region's physician's recommendation of waiting after completion of antibiotic course. Want to be seen today in clinic as area is severely painful. Again reiterated Dr Aparicio has a full scheduled this afternoon and may or may not have an opportunity to contact her via phone. Pia VÁSQUEZ  
[facility] phone call message- general phone call:    Reason for call: Patient is calling stating that she was at the [facility] Sunday April 23rd and they found another abcess in her breast. The doctor would have her take antibiotics for 2 weeks and then she would have the surgery. States that she is a lot of pain and can't wait.     Return call needed: Yes    OK to leave a message on voice mail? Yes    Primary language: English      needed? No    Call taken on April 24, 2017 at 1:14 PM by Kirstie Gates    
No

## 2021-05-27 ENCOUNTER — RECORDS - HEALTHEAST (OUTPATIENT)
Dept: ADMINISTRATIVE | Facility: CLINIC | Age: 46
End: 2021-05-27

## 2021-05-28 ENCOUNTER — RECORDS - HEALTHEAST (OUTPATIENT)
Dept: ADMINISTRATIVE | Facility: CLINIC | Age: 46
End: 2021-05-28

## 2021-05-29 ENCOUNTER — RECORDS - HEALTHEAST (OUTPATIENT)
Dept: ADMINISTRATIVE | Facility: CLINIC | Age: 46
End: 2021-05-29

## 2021-05-30 ENCOUNTER — RECORDS - HEALTHEAST (OUTPATIENT)
Dept: ADMINISTRATIVE | Facility: CLINIC | Age: 46
End: 2021-05-30

## 2021-05-31 ENCOUNTER — RECORDS - HEALTHEAST (OUTPATIENT)
Dept: ADMINISTRATIVE | Facility: CLINIC | Age: 46
End: 2021-05-31

## 2021-06-01 ENCOUNTER — RECORDS - HEALTHEAST (OUTPATIENT)
Dept: ADMINISTRATIVE | Facility: CLINIC | Age: 46
End: 2021-06-01

## 2021-06-02 ENCOUNTER — RECORDS - HEALTHEAST (OUTPATIENT)
Dept: ADMINISTRATIVE | Facility: CLINIC | Age: 46
End: 2021-06-02

## 2021-06-02 VITALS — HEIGHT: 69 IN | WEIGHT: 265 LBS | BODY MASS INDEX: 39.25 KG/M2

## 2021-06-06 NOTE — PROGRESS NOTES
RESPIRATORY CARE NOTE    Complete Pulmonary Function Test completed by patient.  Good patient effort and cooperation. Albuterol 2.5 mg neb given for bronchodilation.  The results of this test meet the ATS standards for acceptability and repeatability. PT returned to baseline and left in no distress.    Alex Corona, LRT  3/3/2020

## 2021-06-09 ENCOUNTER — DOCUMENTATION ONLY (OUTPATIENT)
Dept: PHARMACY | Facility: CLINIC | Age: 46
End: 2021-06-09

## 2021-06-14 ENCOUNTER — DOCUMENTATION ONLY (OUTPATIENT)
Dept: PHARMACY | Facility: CLINIC | Age: 46
End: 2021-06-14

## 2021-06-14 NOTE — PROGRESS NOTES
Chart Review per Insurance    ASSESSMENT:                            Multiple CNS Medications: The patient is currently taking multiple CNS medications which increase the risk of side effects. These medications are alprazolam, oxycodone/APAP, Virtussin AC, and escitalopram. According to past progress notes, the patient has been counseled on the risks of taking these medications together. All of the patient's CNS medications are prescribed by the same provider and filled at the same pharmacy. The refills appear to be appropriate. The patient may benefit from a discussion about the increased risk of respiratory and CNS depression and serotonin syndrome.    Adherence: It appears as though the patient is not adherent with their anti-anxiety medication, escitalopram. This has not been filled since 4/10/21 for a 30 DS. The patient would benefit from an assessment of potential barriers to adherence.       PLAN:                            Have adherence discussion with the patient regarding their escitalopram. A review of CNS medication use would be beneficial at this time as well.     Follow-up: At next appointment on 6/24/21 with Dr. Bailon.    Chart Review:                            Reason for review: Insurance data from [employer] outlines the following concern:    Drug Therapy Opportunity per insurance:    - Non-adherent         to anti-anxiety medication    - Potential safety concern:        multiple CNS medications        concurrent opioid and benzodiazepine    According to [employer], the patient has filled multiple CNS medications over the past 90 days and requested an evaluation of the patient's CNS drug use and evaluation of drug refill patterns. Additionally, [employer] reports that the patient's PDC for escitalopram is 43% and requested an additional evaluation of drug refill patterns.     ----------------    A copy of this note will be routed to the patient's primary care provider.      Justina  Gray  4th Year PharmD Student

## 2021-06-16 NOTE — TELEPHONE ENCOUNTER
Telephone Encounter by Shraddha Perez RN at 2/22/2019 10:29 AM     Author: Shraddha Perez RN Service: -- Author Type: Registered Nurse    Filed: 2/22/2019 10:44 AM Encounter Date: 2/22/2019 Status: Signed    : Shraddha Perez RN (Registered Nurse)       Leslie Martinez Mallory J, RN   Caller: Patient (Today,  9:53 AM)             Caller: Darlene     Primary cardiologist: Dr. Dickens     Detailed reason for call: Darlene states she's had an anxiety attack regarding the thought of laying on her back for two hours for the MR MYOCARDIUM W WO CONTRAST and that she already takes Xanax Rx and knows that Xanax won't work for the pain during this procedure. What does Dr. Dickens recommend? Please call back.     Best phone number: [phone]     Best time to contact: Today     Ok to leave a detailed message? No

## 2021-06-16 NOTE — TELEPHONE ENCOUNTER
Telephone Encounter by Shraddha Perez RN at 2/28/2019 10:15 AM     Author: Shraddha Perez RN Service: -- Author Type: Registered Nurse    Filed: 2/28/2019 11:03 AM Encounter Date: 2/28/2019 Status: Signed    : Shraddha Perez, RN (Registered Nurse)       Mike Lucio Mallory J, RN   Caller: Darlene (Today,  9:17 AM)             General phone call:     Caller: Darlene   Primary cardiologist: Dr. Dickens   Detailed reason for call: Patient is still having chest pain and is wanting to know what the next step is   New or active symptoms? Active   Best phone number: [phone]   Best time to contact: anytime   Ok to leave a detailed message? yes   Device? no     Additional Info:

## 2021-06-20 NOTE — LETTER
Letter by Nika Rubi RN at      Author: Nika Rubi RN Service: -- Author Type: --    Filed:  Encounter Date: 5/20/2020 Status: (Other)       5/20/2020        Darlene Hudson  [address] [address]    This letter provides a written record that you were tested for COVID-19 on 5/18/2020.     Your result was negative.    This means that we didnt find the virus that causes COVID-19 in your sample. A test may show negative when you do actually have the virus. This can happen when the virus is in the early stages of infection, before you feel illness symptoms.    Even if you dont have symptoms, they may still appear. For safety, its very important to follow these rules.    Keep yourself away from others (self-isolation):      Stay home. Dont go to work, school or anywhere else.     Stay in your own room (and use your own bathroom), if you can.    Stay away from others in your home. No hugging, kissing or shaking hands. No visitors.    Clean high touch surfaces often (doorknobs, counters, handles, etc.). Use a household cleaning spray or wipes.    Cover your mouth and nose with a mask, tissue or washcloth to avoid spreading germs.    Wash your hands and face often with soap and water.    Stay in self-isolation until you meet ALL of the guidelines below:    1. You have had no fever for at least 72 hours (that is 3 full days of no fever without the use of medicine that reduces fevers), AND  2. other symptoms (such as cough, shortness of breath) have gotten better, AND  3. at least 10 days have passed since your symptoms first appeared.    Going back to work  Check with your employer for any guidelines to follow for going back to work.    Employers: This document serves as formal notice that your employee tested negative for COVID-19, as of the testing date shown above.    For questions regarding this letter or your Negative COVID-19 result, call [phone] between 8A to 6:30P (M-F) and 10A to 6:30P  (weekends).

## 2021-06-24 DIAGNOSIS — F41.1 GENERALIZED ANXIETY DISORDER: ICD-10-CM

## 2021-06-24 DIAGNOSIS — F40.01 PANIC DISORDER WITH AGORAPHOBIA: ICD-10-CM

## 2021-06-24 RX ORDER — ALPRAZOLAM 2 MG
2 TABLET ORAL 2 TIMES DAILY PRN
Qty: 60 TABLET | Refills: 3 | Status: SHIPPED | OUTPATIENT
Start: 2021-06-24 | End: 2021-10-19

## 2021-06-24 NOTE — TELEPHONE ENCOUNTER
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 Please call and advise.      Pharmacy confirmed as [id number] [place] [id number] AT [facility]: Yes    OK to leave a message on voice mail? Yes    Primary language: English      needed? No    Call taken on June 24, 2021 at 9:08 AM by Lauren Hdez

## 2021-06-24 NOTE — TELEPHONE ENCOUNTER
Called patient to share with her Dr. Dickens's recommendations. Pt stated that echocardiogram has been scheduled already. However, no appointment is able to be viewed, suggested that we talk to schedulers to make sure that the appointment gets made. Pt stated she is willing to talk to her PCP about following through with rheumatologist recommendations however she doesn't have an appointment made with her PCP till next month. Pt began to get upset and raised her voice stating that she is in significant amount of pain and that something needs to be done. She further complains that she needs to see another cardiologist because she can't wait this long to figure out what is going on. Empathized with patient and apologized that she is experiencing significant pain. Attempted to explain further Dr. Dickens's recommendations and advised her to seek ED services in order to be assess more quickly. Patient continued to express anger stating that she has to be able to take care of her son and that she is unable to do so with this pain. She states that the ED has not been able to help her without knowing her history. Pt abruptly hung up the phone. Was unable to transfer her to schedulers to arrange echocardiogram procedure. Message sent to schedulers to call to arrange echo prior to upcomming follow-up appointment.

## 2021-06-24 NOTE — PATIENT INSTRUCTIONS - HE
Ms. Darlene Hudson,  It certainly was nice to meet you today.  Per our conversation you had a fluid around the heart and the reason I am checking the MRI of the heart.  We will call you the results of these tests.   I want to make sure that rheumatological evaluation was done.  Alphonso Dickens

## 2021-06-24 NOTE — PROGRESS NOTES
[facility] Heart Care Office Consult     Assessment:     1. Chronic infectious pericarditis -she was diagnosed with what appears to be viral pericarditis with a significant effusion, sounds like tamponade and subsequent pericardial window.  She was seen by infectious disease due to a question of Mycobacterium but cultures of essentially were negative and infectious disease assume this was viral.  She did have increased CRPs and she tells me she was evaluated by rheumatology but I do not see any records.  I would strongly recommend a rheumatological evaluation given apparent pleuritis as well to rule out lupus.  In any event, I will obtain an MRI to rule out constriction since she still complains of some shortness of breath and chest discomfort on lying down.  If unable to perform MRI due to anxiety or claustrophobia or back pain or insurance verification we will then attempt echocardiogram.      Plan:   1.  Cardiac MRI as above to evaluate for pericardial constriction.  2.  If unable to perform we will get echo.  3.  Weight loss and strongly recommend rule out sleep apnea.    History of Present Illness:   Thank you for asking the [facility] Heart Care team to see Darlene Hudson a 43 y.o.  female  in consultation  to evaluate pericardial effusion.   Around February 2017 patient was extremely short of breath and felt like she was suffocating and could not breathe.  She presented to [facility] and asthma was being evaluated but subsequently noted to have a large pericardial effusion with impending tamponade.  She underwent pericardial window.  She is seen by [facility] and they referred her here for further evaluation.  She complains of chest discomfort, when lying down or turning in position or coughing.  She describes it is just an ache sensation that slowly gets better with movement.  She describes shortness of breath on minimal activity.  May be some PND and orthopnea.  There is no syncope,  "dizziness or significant pericardial effusion.    Past Medical History:     Past Medical History:   Diagnosis Date     Agoraphobia with panic attacks      Anxiety      Arthritis     of back     Asthma      Chronic pain      Chronic sinusitis      Cocaine abuse (H)      Depression      Hx of seasonal allergies      Lipoma      Tobacco abuse  Pericardial effusion with viral pericarditis      Past history is negative for cancer, tuberculosis, diabetes mellitus, myocardial infarction,  rheumatic fever, hypertension, cerebrovascular accident, chronic kidney disease, peptic ulcer disease, chronic obstructive pulmonary disease, or thyroid disorder  and no lipid disorder.    Past Surgical History:     Past Surgical History:   Procedure Laterality Date     BREAST BIOPSY Right 1990    benign     HEMORROIDECTOMY       TUMOR REMOVAL      Has had 3. In the right breast and \"inside of rib cage.\"     Family History:   Family history negative for coronary artery disease.    Social History:   She is single, lives at home independently with her children, drinks rare alcohol, reports that she has been smoking cigarettes.  She has been smoking about 0.25 packs per day, always less than a pack a day for about 25 years, works in [employer] but currently not working secondary to ankle injury. she has never used smokeless tobacco. She reports that she uses drugs. Drugs: Benzodiazepines and Hydrocodone. The primary care physician is Oscar Aparicio MD    Meds:   Scheduled Meds:  Current Outpatient Medications   Medication Sig Dispense Refill     ADVAIR DISKUS 500-50 mcg/dose DISKUS Inhale 1 puff 2 (two) times a day.        albuterol (PROVENTIL HFA;VENTOLIN HFA) 90 mcg/actuation inhaler Inhale 2 puffs every 6 (six) hours as needed for wheezing.       ALPRAZolam (XANAX) 1 MG tablet Take 1 mg by mouth as needed.  1     fluticasone (FLONASE) 50 mcg/actuation nasal spray 1 spray into each nostril daily.       gabapentin (NEURONTIN) " "300 MG capsule Take 900 mg by mouth as needed.       hydrOXYzine (VISTARIL) 50 MG capsule Take 50 mg by mouth 3 (three) times a day as needed for itching.       ibuprofen (ADVIL,MOTRIN) 200 MG tablet Take 600-800 mg by mouth every 6 (six) hours as needed for pain.       montelukast (SINGULAIR) 10 mg tablet Take 10 mg by mouth at bedtime.       diazePAM (VALIUM) 5 MG tablet Take 1-2 tablets (5-10 mg total) by mouth once in imaging for anxiety. Fill at preferred pharmacy and bring to MRI appointment. Do not take prior to arriving. You will need a  to bring you home after your appointment. 2 tablet 0     No current facility-administered medications for this visit.      PRN Meds:.    Allergies:   Penicillins and Lidocaine-epinephrine    Objective:      Physical Exam  (!) 265 lb (120.2 kg)  5' 9\" (1.753 m)  Body mass index is 39.13 kg/m .  /86 (Patient Site: Left Arm, Patient Position: Sitting, Cuff Size: Adult Large)   Pulse (!) 104   Resp 16   Ht 5' 9\" (1.753 m)   Wt (!) 265 lb (120.2 kg)   BMI 39.13 kg/m      General Appearance:   Alert, cooperative and in no acute distress.  Moderately obese   HEENT:  No scleral icterus; the mucous membranes were pink and moist.   Neck: JVP normal. No thyromegaly. No HJR   Chest: The spine was straight. The chest was symmetric.   Lungs:   Respirations unlabored; the lungs are clear to auscultation.   Cardiovascular:   S1 and S2 without murmur, clicks or rubs. Brachial, radial, carotid and posterior tibial pulses are intact and symetrical.  No carotid bruits noted   Abdomen:  No organomegaly, masses, bruits, or tenderness. Bowels sounds are present   Extremities: No cyanosis, clubbing, or edema.   Skin: No xanthelasma.   Neurologic: Mood and affect are appropriate.         Lab Reviewed Personally by myself  Lab Results   Component Value Date     02/02/2017    K 3.9 02/02/2017     (H) 02/02/2017    CO2 20 (L) 02/02/2017    BUN 12 02/02/2017    CREATININE " 0.81 02/02/2017    CALCIUM 9.2 02/02/2017     Lab Results   Component Value Date    WBC 7.0 11/02/2017    WBC 8.2 06/29/2015    HGB 13.0 11/02/2017    HCT 38.7 11/02/2017    MCV 95 11/02/2017     11/02/2017     No results found for: CHOL, TRIG, HDL, LDLDIRECT  No results found for: BNP    ECG personally reviewed by myself shows sinus tachycardia within normal limits     Review of Systems:     Review of Systems:   General: Weight Gain  Eyes: WNL  Ears/Nose/Throat: WNL  Lungs: Cough, Shortness of Breath, Snoring, Wheezing  Heart: Chest Pain, Fainting  Stomach: Heartburn, Nausea  Bladder: WNL  Muscle/Joints: Muscle Pain  Skin: WNL  Nervous System: WNL  Mental Health: Depression, Anxiety     Blood: Easy Bruising

## 2021-06-24 NOTE — TELEPHONE ENCOUNTER
Darlene calls with complaints of chest discomfort she describes as an on/off pressure that has gotten worse over the last couple weeks. Last evening the pain was so severe that she went to the ED at [facility] (see 2/27/19 ED encounter). Pt received pain medication that briefly relieved pain but now today she continues to be in pain. She wonders what the plan is.        Dr. Dickens, Please review pt status. She continues to complain of chest discomfort. I did schedule her to see you in follow-up 3/6. Any new recommendations beforehand?

## 2021-06-24 NOTE — TELEPHONE ENCOUNTER
Noted that patient is scheduled for echocardiogram 3/5/19 and to see LBF 3/6/19. Shraddha's encounter closed. -[facility] [place]

## 2021-06-24 NOTE — TELEPHONE ENCOUNTER
Dr. Dickens, Pt calls with concerns regarding upcomming MR myocardium. She states she won't be able to lay flat for 2 hours due to significant pain in her chest. She stated that the oral valium has not worked for her in the past. Per your   2/21 note -   If unable to perform MRI due to anxiety or claustrophobia or back pain or insurance verification we will then attempt echocardiogram.    Would you like me to place order for echocardiogram? What are your recommendations?

## 2021-06-24 NOTE — TELEPHONE ENCOUNTER
From: Alycia Dickens MD  Sent: 2/22/2019  12:51 PM  To: Shraddha Perez, THALIA    Agree, she does not even want to try MRI and we should pursue echo.  Reason rule out pericardial constriction.  LF      Order placed for Echo, patient agreeable to plan. Transferred pt to schedulers to arrange echo. -kcl

## 2021-06-25 DIAGNOSIS — R06.02 SOB (SHORTNESS OF BREATH) ON EXERTION: ICD-10-CM

## 2021-06-25 RX ORDER — ALBUTEROL SULFATE 90 UG/1
2 AEROSOL, METERED RESPIRATORY (INHALATION) EVERY 4 HOURS PRN
Qty: 8 G | Refills: 11 | Status: SHIPPED | OUTPATIENT
Start: 2021-06-25 | End: 2022-03-03

## 2021-07-15 ENCOUNTER — VIRTUAL VISIT (OUTPATIENT)
Dept: FAMILY MEDICINE | Facility: CLINIC | Age: 46
End: 2021-07-15
Payer: COMMERCIAL

## 2021-07-15 ENCOUNTER — IMMUNIZATION (OUTPATIENT)
Dept: FAMILY MEDICINE | Facility: CLINIC | Age: 46
End: 2021-07-15
Payer: COMMERCIAL

## 2021-07-15 ENCOUNTER — LAB (OUTPATIENT)
Dept: LAB | Facility: CLINIC | Age: 46
End: 2021-07-15

## 2021-07-15 DIAGNOSIS — N92.4 EXCESSIVE BLEEDING IN PREMENOPAUSAL PERIOD: ICD-10-CM

## 2021-07-15 DIAGNOSIS — J45.50 SEVERE PERSISTENT ASTHMA WITHOUT COMPLICATION (H): ICD-10-CM

## 2021-07-15 DIAGNOSIS — N92.4 EXCESSIVE BLEEDING IN PREMENOPAUSAL PERIOD: Primary | ICD-10-CM

## 2021-07-15 DIAGNOSIS — R11.0 NAUSEA: ICD-10-CM

## 2021-07-15 LAB
ERYTHROCYTE [DISTWIDTH] IN BLOOD BY AUTOMATED COUNT: 12.8 % (ref 10–15)
FERRITIN SERPL-MCNC: 32 NG/ML (ref 10–130)
HCT VFR BLD AUTO: 39.8 % (ref 35–47)
HGB BLD-MCNC: 13.6 G/DL (ref 11.7–15.7)
MCH RBC QN AUTO: 31.6 PG (ref 26.5–33)
MCHC RBC AUTO-ENTMCNC: 34.2 G/DL (ref 31.5–36.5)
MCV RBC AUTO: 93 FL (ref 78–100)
PLATELET # BLD AUTO: 291 10E3/UL (ref 150–450)
RBC # BLD AUTO: 4.3 10E6/UL (ref 3.8–5.2)
TSH SERPL DL<=0.005 MIU/L-ACNC: 0.74 UIU/ML (ref 0.3–5)
WBC # BLD AUTO: 8.1 10E3/UL (ref 4–11)

## 2021-07-15 PROCEDURE — 36415 COLL VENOUS BLD VENIPUNCTURE: CPT

## 2021-07-15 PROCEDURE — 99212 OFFICE O/P EST SF 10 MIN: CPT | Mod: 95 | Performed by: FAMILY MEDICINE

## 2021-07-15 PROCEDURE — 91301 PR COVID VAC MODERNA 100 MCG/0.5 ML IM: CPT

## 2021-07-15 PROCEDURE — 85027 COMPLETE CBC AUTOMATED: CPT

## 2021-07-15 PROCEDURE — 82728 ASSAY OF FERRITIN: CPT

## 2021-07-15 PROCEDURE — 84443 ASSAY THYROID STIM HORMONE: CPT

## 2021-07-15 PROCEDURE — 0011A PR COVID VAC MODERNA 100 MCG/0.5 ML IM: CPT

## 2021-07-15 RX ORDER — CODEINE PHOSPHATE AND GUAIFENESIN 10; 100 MG/5ML; MG/5ML
1-2 SOLUTION ORAL EVERY 4 HOURS PRN
Qty: 180 ML | Refills: 1 | Status: SHIPPED | OUTPATIENT
Start: 2021-07-15 | End: 2021-08-04

## 2021-07-15 RX ORDER — ONDANSETRON 4 MG/1
4 TABLET, ORALLY DISINTEGRATING ORAL EVERY 6 HOURS PRN
Qty: 12 TABLET | Refills: 0 | Status: SHIPPED | OUTPATIENT
Start: 2021-07-15 | End: 2022-03-03

## 2021-07-15 NOTE — PROGRESS NOTES
Family Medicine Telephone Visit Note     Chief Complaint   Patient presents with     Abnormal Bleeding Problem     ongoing heavy bleeding for more than 30 days, abdominal cramps. taking ibuprofen for cramps     Medication Reconciliation            HPI   Patients name: Karoline  Appointment start time:  10:00 AM    1) Menstrual bleeding - over 1 month now  + heavy bleeding and clots and cramping  Having to change tampon every time she goes to the bathroom  No lightheadedness or dizziness  Severe cramps like she feels she is in labor  +nausea  No vomiting/ diarrhea  No Fevers; + chills - perimenopausal      Has gotten a job w/ [employer] so unable to come to work today        Current Outpatient Medications   Medication Sig Dispense Refill     ALPRAZolam (XANAX) 2 MG tablet Take 1 tablet (2 mg) by mouth 2 times daily as needed for anxiety 60 tablet 3     cetirizine (ZYRTEC) 10 MG tablet Take 1 tablet (10 mg) by mouth daily 30 tablet 11     escitalopram (LEXAPRO) 10 MG tablet Take 1 tablet (10 mg) by mouth daily 30 tablet 11     fluticasone (FLONASE) 50 MCG/ACT nasal spray Spray 2 sprays into both nostrils daily 9.9 mL 11     fluticasone-salmeterol (ADVAIR) 500-50 MCG/DOSE inhaler Inhale 1 puff into the lungs every 12 hours 60 Inhaler 11     guaiFENesin-codeine (ROBITUSSIN AC) 100-10 MG/5ML solution Take 5-10 mLs by mouth every 4 hours as needed for cough 180 mL 0     ibuprofen (ADVIL/MOTRIN) 600 MG tablet Take 1 tablet (600 mg) by mouth 3 times daily as needed for moderate pain 90 tablet 1     mometasone-formoterol (DULERA) 100-5 MCG/ACT inhaler 1 puffs every 4 hours as needed for wheezing and shortness of breath 8.8 g 3     montelukast (SINGULAIR) 10 MG tablet Take 1 tablet (10 mg) by mouth At Bedtime 30 tablet 11     ondansetron (ZOFRAN-ODT) 4 MG ODT tab as needed        oxyCODONE-acetaminophen (PERCOCET) 5-325 MG tablet Take 1 tablet by mouth 2 times daily as needed for moderate to severe pain 12 tablet 0      "polyethylene glycol (MIRALAX) 17 GM/Dose powder Mix entire bottle into 64 oz of electrolyte drink.  At 4 pm on the day prior to colonoscopy, drink 8 ounces every 15-20 minutes until finished 507 g 0     predniSONE (DELTASONE) 50 MG tablet Take 1 tablet (50 mg) by mouth daily 5 tablet 0     PROAIR  (90 Base) MCG/ACT inhaler Inhale 2 puffs into the lungs every 4 hours as needed for shortness of breath / dyspnea or wheezing 8 g 11     spacer (OPTICHAMBER BINA) holding chamber For use w/ rescue inhaler 1 each 0     tiotropium (SPIRIVA RESPIMAT) 2.5 MCG/ACT inhaler Inhale 2 puffs into the lungs daily 1 Inhaler 3     triamcinolone (KENALOG) 0.1 % external cream Apply topically 2 times daily 1 g 1     Allergies   Allergen Reactions     Lidocaine Other (See Comments)     \"my jaw stopped moving\"  Other reaction(s): Dystonia     Penicillins Hives, Rash and Shortness Of Breath     Epinephrine-Lidocaine-Na Metabisulfite Other (See Comments) and Muscle Pain (Myalgia)     Severe jaw cramping, double vision  Jaw locking              Review of Systems:     RESP: NEGATIVE for significant cough or SOB  CV: NEGATIVE for chest pain, palpitations or peripheral edema  : see HPI         Physical Exam:     LMP 06/08/2021   Estimated body mass index is 32.49 kg/m  as calculated from the following:    Height as of 2/9/21: 1.753 m (5' 9\").    Weight as of 2/9/21: 99.8 kg (220 lb).    Exam:  Constitutional: healthy, alert and no distress  Psychiatric: mentation appears normal and affect normal/bright          Assessment and Plan     (N92.4) Excessive bleeding in premenopausal period  (primary encounter diagnosis)  Comment: pt to come in today for lab draw and pelvic U/S; may need referral for endometrial bx to gyn  Plan: CBC with platelets, US Pelvic Complete with         Transvaginal, TSH with free T4 reflex, Ferritin            Refilled medications that would be required in the next 3 months.     After Visit Information:  Patient " chose to view AVS via [employer]    No follow-ups on file.    Appointment end time: 10:10 AM  This is a telephone visit that took 10 minutes.      Clinician location:  [facility]       Robbie Bailon MD  July 15, 2021  10:11 AM

## 2021-07-22 ENCOUNTER — ANCILLARY PROCEDURE (OUTPATIENT)
Dept: ULTRASOUND IMAGING | Facility: CLINIC | Age: 46
End: 2021-07-22
Attending: FAMILY MEDICINE

## 2021-07-22 DIAGNOSIS — N92.4 EXCESSIVE BLEEDING IN PREMENOPAUSAL PERIOD: ICD-10-CM

## 2021-07-22 PROCEDURE — 76856 US EXAM PELVIC COMPLETE: CPT | Performed by: RADIOLOGY

## 2021-07-30 DIAGNOSIS — M25.571 ACUTE RIGHT ANKLE PAIN: ICD-10-CM

## 2021-07-30 RX ORDER — IBUPROFEN 600 MG/1
600 TABLET, FILM COATED ORAL 3 TIMES DAILY PRN
Qty: 90 TABLET | Refills: 4 | Status: ON HOLD | OUTPATIENT
Start: 2021-07-30 | End: 2023-01-14

## 2021-07-30 NOTE — TELEPHONE ENCOUNTER
Message to physician:     Date of last visit: 4/29/2021     Date of next visit if scheduled: none    Last Comprehensive Metabolic Panel:  Sodium   Date Value Ref Range Status   02/09/2021 141 136 - 145 mmol/L Final   10/23/2019 141.0 133.0 - 144.0 mmol/L Final     Potassium   Date Value Ref Range Status   02/09/2021 3.6 3.5 - 5.0 mmol/L Final   10/23/2019 3.7 3.4 - 5.3 mmol/L Final     Chloride   Date Value Ref Range Status   02/09/2021 108 (H) 98 - 107 mmol/L Final   10/23/2019 106.0 94.0 - 109.0 mmol/L Final     Carbon Dioxide   Date Value Ref Range Status   10/23/2019 22.0 20.0 - 32.0 mmol/L Final     Carbon Dioxide (CO2)   Date Value Ref Range Status   02/09/2021 24 22 - 31 mmol/L Final     Anion Gap   Date Value Ref Range Status   02/09/2021 9 5 - 18 mmol/L Final     Glucose   Date Value Ref Range Status   02/09/2021 95 70 - 125 mg/dL Final   10/23/2019 119.0 (H) 60.0 - 109.0 mg/dL Final     Urea Nitrogen   Date Value Ref Range Status   02/09/2021 15 8 - 22 mg/dL Final   10/23/2019 13.0 5.0 - 24.0 mg/dL Final     Creatinine   Date Value Ref Range Status   02/09/2021 0.85 0.60 - 1.10 mg/dL Final   10/23/2019 1.0 0.6 - 1.3 mg/dL Final     GFR Estimate   Date Value Ref Range Status   02/09/2021 >60 >60 mL/min/1.73m2 Final   02/02/2017 >60 >60 mL/min/1.73m2 Final     Calcium   Date Value Ref Range Status   02/09/2021 8.6 8.5 - 10.5 mg/dL Final   10/23/2019 9.0 8.5 - 10.4 mg/dL Final       BP Readings from Last 3 Encounters:   04/29/21 123/81   04/21/21 123/83   04/01/21 121/88       No results found for: A1C             Please complete refill and CLOSE ENCOUNTER.  Closing the encounter signifies the refill is complete.

## 2021-08-02 DIAGNOSIS — N89.9 NONINFLAMMATORY DISORDER OF VAGINA: ICD-10-CM

## 2021-08-03 RX ORDER — METRONIDAZOLE 7.5 MG/G
1 GEL VAGINAL AT BEDTIME
Qty: 70 G | Refills: 1 | Status: SHIPPED | OUTPATIENT
Start: 2021-08-03 | End: 2022-03-03

## 2021-08-03 NOTE — PROGRESS NOTES
"ASSESSMENT/PLAN:    (M25.186) Acute right ankle pain  Comment: I am concerned about the severe recurrent swelling; will send rheum labs and consider rheum consult  Plan: Rheumatoid factor, Cyclic Citrullinated Peptide        Antibody IgG, Anti Nuclear Apolonia IgG by IFA with         Reflex, Erythrocyte sedimentation rate auto,         C-Reactive Protein, oxyCODONE-acetaminophen         (PERCOCET) 5-325 MG tablet            (M25.201) Right ankle swelling  Comment: prescribed steroid to have in case she needs for her upcoming trip  Plan: predniSONE (DELTASONE) 50 MG tablet          (J45.50) Severe persistent asthma without complication  Comment: prescribed medication to have in case of flare only  Plan: predniSONE (DELTASONE) 50 MG tablet,         mometasone-formoterol (DULERA) 100-5 MCG/ACT         inhaler, guaiFENesin-codeine (ROBITUSSIN AC)         100-10 MG/5ML solution          (J45.51) Severe persistent asthma with acute exacerbation  Comment: see above  Plan: tiotropium (SPIRIVA RESPIMAT) 2.5 MCG/ACT inhaler            Robbie Bailon MD  August 4, 2021  2:43 PM      Pt is a 46 year old female last seen on 7/15/2021 via virtual visit here for :     1) menstrual bleeding - normal U/S and CBC  Pt is adamant that she does not want an endometrial biopsy   Is perimenopausal; bleeding has stopped now but was persistent for 5 weeks prior  Last normal menses was over 1 yr ago    2) Diffuse joint pains - \"can't deal with pain anymore\"  R ankle swelling again  Feels like it hurts in the joints     3) Asthma - needs refill of spiriva and rescue inhaler; allergies have been crazy and making breathing worse    Past Medical History:   Diagnosis Date     Agoraphobia with panic attacks      Anxiety      Anxiety      Arthritis     of back     Asthma      Asthma in adult, moderate persistent, uncomplicated      Chronic pain      Chronic sinusitis      Cocaine abuse (H)      Controlled substance agreement signed 6/30/2015    Overview:  " Patient has chronic pain and is seen at [facility] for this.  Has controlled substance agreement with them.  On Vicodin, Valium, Klonopin prescribed only from there.        Depression      Hx of seasonal allergies      Infectious pericarditis      Lipoma      Tobacco abuse       Current Outpatient Medications   Medication Sig Dispense Refill     ibuprofen (ADVIL/MOTRIN) 600 MG tablet Take 1 tablet (600 mg) by mouth 3 times daily as needed for moderate pain 90 tablet 4     ALPRAZolam (XANAX) 2 MG tablet Take 1 tablet (2 mg) by mouth 2 times daily as needed for anxiety 60 tablet 3     cetirizine (ZYRTEC) 10 MG tablet Take 1 tablet (10 mg) by mouth daily 30 tablet 11     escitalopram (LEXAPRO) 10 MG tablet Take 1 tablet (10 mg) by mouth daily 30 tablet 11     fluticasone (FLONASE) 50 MCG/ACT nasal spray Spray 2 sprays into both nostrils daily 9.9 mL 11     fluticasone-salmeterol (ADVAIR) 500-50 MCG/DOSE inhaler Inhale 1 puff into the lungs every 12 hours 60 Inhaler 11     guaiFENesin-codeine (ROBITUSSIN AC) 100-10 MG/5ML solution Take 5-10 mLs by mouth every 4 hours as needed for cough 180 mL 1     metroNIDAZOLE (METROGEL-VAGINAL) 0.75 % vaginal gel Place 0.25 applicators (1 g) vaginally At Bedtime 70 g 1     mometasone-formoterol (DULERA) 100-5 MCG/ACT inhaler 1 puffs every 4 hours as needed for wheezing and shortness of breath 8.8 g 3     montelukast (SINGULAIR) 10 MG tablet Take 1 tablet (10 mg) by mouth At Bedtime 30 tablet 11     ondansetron (ZOFRAN-ODT) 4 MG ODT tab Take 1 tablet (4 mg) by mouth every 6 hours as needed for nausea 12 tablet 0     ondansetron (ZOFRAN-ODT) 4 MG ODT tab as needed        oxyCODONE-acetaminophen (PERCOCET) 5-325 MG tablet Take 1 tablet by mouth 2 times daily as needed for moderate to severe pain 12 tablet 0     polyethylene glycol (MIRALAX) 17 GM/Dose powder Mix entire bottle into 64 oz of electrolyte drink.  At 4 pm on the day prior to colonoscopy, drink 8 ounces every 15-20  "minutes until finished 507 g 0     predniSONE (DELTASONE) 50 MG tablet Take 1 tablet (50 mg) by mouth daily 5 tablet 0     PROAIR  (90 Base) MCG/ACT inhaler Inhale 2 puffs into the lungs every 4 hours as needed for shortness of breath / dyspnea or wheezing 8 g 11     spacer (OPTICHAMBER BINA) holding chamber For use w/ rescue inhaler 1 each 0     tiotropium (SPIRIVA RESPIMAT) 2.5 MCG/ACT inhaler Inhale 2 puffs into the lungs daily 1 Inhaler 3     triamcinolone (KENALOG) 0.1 % external cream Apply topically 2 times daily 1 g 1      Allergies   Allergen Reactions     Lidocaine Other (See Comments)     \"my jaw stopped moving\"  Other reaction(s): Dystonia     Penicillins Hives, Rash and Shortness Of Breath     Epinephrine-Lidocaine-Na Metabisulfite Other (See Comments) and Muscle Pain (Myalgia)     Severe jaw cramping, double vision  Jaw locking      ROS:   Gen- no fevers/chills   Remainder of ROS negative.     Exam:   /85 (BP Location: Right arm, Patient Position: Sitting, Cuff Size: Adult Large)   Pulse 95   Temp 97.9  F (36.6  C) (Oral)   Resp 20   SpO2 96%         "

## 2021-08-04 ENCOUNTER — OFFICE VISIT (OUTPATIENT)
Dept: FAMILY MEDICINE | Facility: CLINIC | Age: 46
End: 2021-08-04
Payer: COMMERCIAL

## 2021-08-04 VITALS
TEMPERATURE: 97.9 F | SYSTOLIC BLOOD PRESSURE: 126 MMHG | HEART RATE: 95 BPM | DIASTOLIC BLOOD PRESSURE: 85 MMHG | RESPIRATION RATE: 20 BRPM | OXYGEN SATURATION: 96 %

## 2021-08-04 DIAGNOSIS — M25.571 ACUTE RIGHT ANKLE PAIN: ICD-10-CM

## 2021-08-04 DIAGNOSIS — M25.471 RIGHT ANKLE SWELLING: ICD-10-CM

## 2021-08-04 DIAGNOSIS — J45.51 SEVERE PERSISTENT ASTHMA WITH ACUTE EXACERBATION (H): ICD-10-CM

## 2021-08-04 DIAGNOSIS — J45.50 SEVERE PERSISTENT ASTHMA WITHOUT COMPLICATION (H): ICD-10-CM

## 2021-08-04 PROCEDURE — 99214 OFFICE O/P EST MOD 30 MIN: CPT | Performed by: FAMILY MEDICINE

## 2021-08-04 RX ORDER — CODEINE PHOSPHATE AND GUAIFENESIN 10; 100 MG/5ML; MG/5ML
1-2 SOLUTION ORAL EVERY 4 HOURS PRN
Qty: 180 ML | Refills: 1 | Status: SHIPPED | OUTPATIENT
Start: 2021-08-04 | End: 2021-11-17

## 2021-08-04 RX ORDER — OXYCODONE AND ACETAMINOPHEN 5; 325 MG/1; MG/1
1 TABLET ORAL EVERY 6 HOURS PRN
Qty: 12 TABLET | Refills: 0 | Status: SHIPPED | OUTPATIENT
Start: 2021-08-04 | End: 2021-09-08

## 2021-08-04 RX ORDER — PREDNISONE 50 MG/1
50 TABLET ORAL DAILY
Qty: 5 TABLET | Refills: 0 | Status: SHIPPED | OUTPATIENT
Start: 2021-08-04 | End: 2021-11-04

## 2021-08-11 ENCOUNTER — TELEPHONE (OUTPATIENT)
Dept: FAMILY MEDICINE | Facility: CLINIC | Age: 46
End: 2021-08-11

## 2021-08-11 NOTE — TELEPHONE ENCOUNTER
Called patient to discuss, phone went immediately to . Need to see if patient was tested for COVID in the emergency room, appears a test was done though this RN cannot see a lab result or pending result. Recommend waiting for COVID result prior to any labs being done in clinic. Can do a sick pod visit if wanting to be seen, symptoms congruent with COVID19 infection given vaccination status, recent travel, and CP with cough and headache. Need to monitor oxygen levels, 100% in ED. Patient at risk due to asthma diagnosis, tobacco use, substance use disorder. Neo VÁSQUEZ

## 2021-08-11 NOTE — TELEPHONE ENCOUNTER
Karoline returned the phone call. She is difficult to hear on the phone due to her voice going in and out.  Karoline coughing while on the phone as well, she endorses the cough as sometimes productive and sometimes dry.    Karoline endorses chest burning, EKG yesterday in ED reassuring. Karoline stated it is most difficult to breathe when she is coughing. She endorses having COVID swab done at emergency room, still awaiting results. CXR not completed, she stated the ED physician said it wasn't necessary and she should follow up in clinic. Encouraged increased fluids, Karoline stated she vomits from coughing when she drinks water. Encouraged small sips all throughout the day to be able to tolerate. Encouraged humidifier, Karoline endorses using humidifier and had it on during conversation.     Karoline wanting 's advice on what to do next, when to come in for future labs. Advised he likely wants to wait for COVID result as that will determine next course of action. Karoline endorses nebs and rescue inhaler effective, encouraged use throughout the day to manage breathing symptoms.She does not have a way to check oxygen at home, reassuring 100% on RA in the ED yesterday. Advised if neb or rescue becomes ineffective, she needs to go to the emergency room for care. If symptoms worsen, go to ED. Karoline verbalized understanding.     Neo VÁSQUEZ

## 2021-08-11 NOTE — TELEPHONE ENCOUNTER
[facility] phone call message- general phone call:    Reason for call: Patient called stating she would like a call back from  or nurses, it was very difficult to completely understand patient as she has loss her voice so only things I was able to retreive from patient was that she was recent;y in ER last night as she got back from [place] and just started feeling very sick, her chest feels like it's on fire and making it difficult to breath, patient does not think it's her asthma. I also caught a little bit about he needing or forgetting to do some labs that was required by  and patient also mentioned something about covid testing but could not fully understand as se lost her voice. Please call and advise if possible.      Return call needed: Yes    OK to leave a message on voice mail? Yes    Primary language: English      needed? No    Call taken on August 11, 2021 at 8:32 AM by Lauren Hdez

## 2021-08-25 NOTE — PROGRESS NOTES
ASSESSMENT/PLAN:    (J45.50) Severe persistent asthma without complication  (primary encounter diagnosis)  Comment:   Plan: stable/ improved on spiriva!     (M25.471) Right ankle swelling  Comment: given refractory symptoms and hx of ankle surgery, will check labwork and MRI w/ contrast; f/u after MRI  Plan: MR Ankle Right w Contrast            (Z98.890) S/P surgical manipulation of ankle joint  Comment: see above  Plan: MR Ankle Right w Contrast            (Z23) High priority for 2019-nCoV vaccine  Comment: covid vaccine today!  Plan: COVID-19,PF,MODERNA            Robbie Bailon MD  August 26, 2021  4:27 PM        Pt is a 46 year old female last seen on 8/4/2021 here today for:     1) ED follow-up - seen on 8/10/2021 (see below)  Just starting to feel better now  Spiriva is working wonders for her!   Took pred for 5 days and also felt better    2) Moderna vaccine today     3) Ankle pain - throbs at night  Swells intermittently   Rash overlying lateral malleolus  Had previous reconstruction of lateral ligaments in 5/2019      IMPRESSION: Xray 2/20201  Significant soft tissue swelling overlying the lateral malleolus. Again seen is a tiny bone fragment adjacent to the tip of the medial malleolus compatible with avulsive fracture, age indeterminant. Ankle mortise is symmetric      ED note - 8/10/2021  Chief Complaint: Cough, Chest Pain, and SOB (SHORTNESS OF BREATH)    HPI  46 year old female with history of asthma who presents with 1 day of illness     She just returned from [place] and as soon as she got home, she developed runny nose, cough, fatigue, headache, chills. No measured fevers. She did try some of her home nebs which she thought helped her breathing. No sick contacts that she is aware of. Lots of nasal congestion and runny nose, sore throat.   MDM:   46 year old female with symptoms suggestive of viral URI  Vitals reassuring except for slight tachycardia on arrival  Exam reassuring. No wheezes, crackles,  and good air movement    COVID swab taken, should be resulted in 40 min. Pt would like to discharge home. Discussed getting results on mychart. CXR offered but deferred by patient - I feel this is reasonable as symptoms more upper respiratory and viral. Low suspicion for bacterial pneumonia. Very low suspicion for ACS, PE, dissection.     Discharged in stable condition       From phone call on 8/11/2021:  Note     Karoline returned the phone call. She is difficult to hear on the phone due to her voice going in and out.  Karoline coughing while on the phone as well, she endorses the cough as sometimes productive and sometimes dry.     Karoline endorses chest burning, EKG yesterday in ED reassuring. Karoline stated it is most difficult to breathe when she is coughing. She endorses having COVID swab done at emergency room, still awaiting results. CXR not completed, she stated the ED physician said it wasn't necessary and she should follow up in clinic. Encouraged increased fluids, Karoline stated she vomits from coughing when she drinks water. Encouraged small sips all throughout the day to be able to tolerate. Encouraged humidifier, Karoline endorses using humidifier and had it on during conversation.      Karoline wanting 's advice on what to do next, when to come in for future labs. Advised he likely wants to wait for COVID result as that will determine next course of action. Karoline endorses nebs and rescue inhaler effective, encouraged use throughout the day to manage breathing symptoms.She does not have a way to check oxygen at home, reassuring 100% on RA in the ED yesterday. Advised if neb or rescue becomes ineffective, she needs to go to the emergency room for care. If symptoms worsen, go to ED. Karoline verbalized understanding.      Neo V[place]SQUEZ        2) Follow-up foot/ankle/ rheum eval  Needs labwork      Per my last visit:     (M29.391) Acute right ankle pain  Comment: I am concerned about the severe recurrent swelling; will send rheum  "labs and consider rheum consult  Plan: Rheumatoid factor, Cyclic Citrullinated Peptide        Antibody IgG, Anti Nuclear Apolonia IgG by IFA with         Reflex, Erythrocyte sedimentation rate auto,         C-Reactive Protein, oxyCODONE-acetaminophen         (PERCOCET) 5-325 MG tablet           (M25.471) Right ankle swelling  Comment: prescribed steroid to have in case she needs for her upcoming trip  Plan: predniSONE (DELTASONE) 50 MG tablet           (J45.50) Severe persistent asthma without complication  Comment: prescribed medication to have in case of flare only  Plan: predniSONE (DELTASONE) 50 MG tablet,         mometasone-formoterol (DULERA) 100-5 MCG/ACT         inhaler, guaiFENesin-codeine (ROBITUSSIN AC)         100-10 MG/5ML solution           (J45.51) Severe persistent asthma with acute exacerbation  Comment: see above  Plan: tiotropium (SPIRIVA RESPIMAT) 2.5 MCG/ACT inhaler    Past Medical History:   Diagnosis Date     Agoraphobia with panic attacks      Anxiety      Anxiety      Arthritis     of back     Asthma      Asthma in adult, moderate persistent, uncomplicated      Chronic pain      Chronic sinusitis      Cocaine abuse (H)      Controlled substance agreement signed 6/30/2015    Overview:  Patient has chronic pain and is seen at [facility] for this.  Has controlled substance agreement with them.  On Vicodin, Valium, Klonopin prescribed only from there.        Depression      Hx of seasonal allergies      Infectious pericarditis      Lipoma      Tobacco abuse       Past Surgical History:   Procedure Laterality Date     ANKLE SURGERY Right 05/30/2019     BIOPSY BREAST Right 1990    benign     CREATION PERICARDIAL WINDOW  02/10/2017    [facility]     HEMORRHOIDECTOMY EXTERNAL       TUMOR REMOVAL      Has had 3. In the right breast and \"inside of rib cage.\"      Current Outpatient Medications   Medication Sig Dispense Refill     ibuprofen (ADVIL/MOTRIN) 600 MG tablet Take 1 tablet (600 mg) " by mouth 3 times daily as needed for moderate pain 90 tablet 4     ALPRAZolam (XANAX) 2 MG tablet Take 1 tablet (2 mg) by mouth 2 times daily as needed for anxiety 60 tablet 3     cetirizine (ZYRTEC) 10 MG tablet Take 1 tablet (10 mg) by mouth daily 30 tablet 11     escitalopram (LEXAPRO) 10 MG tablet Take 1 tablet (10 mg) by mouth daily 30 tablet 11     fluticasone (FLONASE) 50 MCG/ACT nasal spray Spray 2 sprays into both nostrils daily 9.9 mL 11     fluticasone-salmeterol (ADVAIR) 500-50 MCG/DOSE inhaler Inhale 1 puff into the lungs every 12 hours 60 Inhaler 11     guaiFENesin-codeine (ROBITUSSIN AC) 100-10 MG/5ML solution Take 5-10 mLs by mouth every 4 hours as needed for cough 180 mL 1     metroNIDAZOLE (METROGEL-VAGINAL) 0.75 % vaginal gel Place 0.25 applicators (1 g) vaginally At Bedtime 70 g 1     mometasone-formoterol (DULERA) 100-5 MCG/ACT inhaler 1 puffs every 4 hours as needed for wheezing and shortness of breath 8.8 g 5     montelukast (SINGULAIR) 10 MG tablet Take 1 tablet (10 mg) by mouth At Bedtime 30 tablet 11     ondansetron (ZOFRAN-ODT) 4 MG ODT tab Take 1 tablet (4 mg) by mouth every 6 hours as needed for nausea 12 tablet 0     oxyCODONE-acetaminophen (PERCOCET) 5-325 MG tablet Take 1 tablet by mouth every 6 hours as needed for severe pain 12 tablet 0     polyethylene glycol (MIRALAX) 17 GM/Dose powder Mix entire bottle into 64 oz of electrolyte drink.  At 4 pm on the day prior to colonoscopy, drink 8 ounces every 15-20 minutes until finished 507 g 0     predniSONE (DELTASONE) 50 MG tablet Take 1 tablet (50 mg) by mouth daily 5 tablet 0     PROAIR  (90 Base) MCG/ACT inhaler Inhale 2 puffs into the lungs every 4 hours as needed for shortness of breath / dyspnea or wheezing 8 g 11     spacer (OPTICHAMBER BINA) holding chamber For use w/ rescue inhaler 1 each 0     tiotropium (SPIRIVA RESPIMAT) 2.5 MCG/ACT inhaler Inhale 2 puffs into the lungs daily 4 g 5     triamcinolone (KENALOG) 0.1 %  "external cream Apply topically 2 times daily 1 g 1      Allergies   Allergen Reactions     Lidocaine Other (See Comments)     \"my jaw stopped moving\"  Other reaction(s): Dystonia     Penicillins Hives, Rash and Shortness Of Breath     Epinephrine-Lidocaine-Na Metabisulfite Other (See Comments) and Muscle Pain (Myalgia)     Severe jaw cramping, double vision  Jaw locking        ROS:   Gen- no weight change, no fevers/chills   ENT- improved URI symptoms - see HPI  Cardiac - no palpitations, no chest pain   Respiratory - + shortness of breath , + wheezing - improved  Remainder of ROS negative.     Exam:   /86 (BP Location: Right arm, Patient Position: Sitting, Cuff Size: Adult Regular)   Pulse 86   Temp 97.5  F (36.4  C) (Oral)   SpO2 98%    Alert and oriented x 3; No acute distress   MSK: prominent soft tissue swelling over lateral ankle    Neuro: no focal deficits   Derm: erythematous ?psoriatic dermatitis over lateral malleolus       "

## 2021-08-26 ENCOUNTER — OFFICE VISIT (OUTPATIENT)
Dept: FAMILY MEDICINE | Facility: CLINIC | Age: 46
End: 2021-08-26
Payer: COMMERCIAL

## 2021-08-26 VITALS
TEMPERATURE: 97.5 F | OXYGEN SATURATION: 98 % | DIASTOLIC BLOOD PRESSURE: 86 MMHG | SYSTOLIC BLOOD PRESSURE: 123 MMHG | HEART RATE: 86 BPM

## 2021-08-26 DIAGNOSIS — Z98.890 S/P SURGICAL MANIPULATION OF ANKLE JOINT: ICD-10-CM

## 2021-08-26 DIAGNOSIS — J45.50 SEVERE PERSISTENT ASTHMA WITHOUT COMPLICATION (H): Primary | ICD-10-CM

## 2021-08-26 DIAGNOSIS — M25.471 RIGHT ANKLE SWELLING: ICD-10-CM

## 2021-08-26 DIAGNOSIS — Z23 HIGH PRIORITY FOR 2019-NCOV VACCINE: ICD-10-CM

## 2021-08-26 LAB
C REACTIVE PROTEIN LHE: 0.6 MG/DL (ref 0–0.8)
ERYTHROCYTE [SEDIMENTATION RATE] IN BLOOD BY WESTERGREN METHOD: 61 MM/HR (ref 0–20)
RHEUMATOID FACT SER NEPH-ACNC: <15 IU/ML (ref 0–30)

## 2021-08-26 PROCEDURE — 85652 RBC SED RATE AUTOMATED: CPT | Performed by: FAMILY MEDICINE

## 2021-08-26 PROCEDURE — 99214 OFFICE O/P EST MOD 30 MIN: CPT | Performed by: FAMILY MEDICINE

## 2021-08-26 PROCEDURE — 86141 C-REACTIVE PROTEIN HS: CPT | Performed by: FAMILY MEDICINE

## 2021-08-26 PROCEDURE — 86431 RHEUMATOID FACTOR QUANT: CPT | Performed by: FAMILY MEDICINE

## 2021-08-26 PROCEDURE — 36415 COLL VENOUS BLD VENIPUNCTURE: CPT | Performed by: FAMILY MEDICINE

## 2021-08-26 PROCEDURE — 86200 CCP ANTIBODY: CPT | Performed by: FAMILY MEDICINE

## 2021-08-26 PROCEDURE — 91301 COVID-19,PF,MODERNA: CPT | Performed by: FAMILY MEDICINE

## 2021-08-26 PROCEDURE — 0012A COVID-19,PF,MODERNA: CPT | Performed by: FAMILY MEDICINE

## 2021-08-26 PROCEDURE — 86038 ANTINUCLEAR ANTIBODIES: CPT | Performed by: FAMILY MEDICINE

## 2021-08-26 NOTE — LETTER
WORK NOTE    8/26/2021    Re: Darlene Hudson  1975      To Whom It May Concern:     Darlene Hudson was vaccinated against COVID today. Please excuse her from work for her upcoming 2am-10pm shift starting on 9/28 due to expected side effects from the vaccine. She may return to work without restrictions for her next scheduled shift.            Robbie Bailon MD  8/26/2021 11:56 AM

## 2021-08-27 LAB — CCP AB SER IA-ACNC: 0.9 U/ML

## 2021-08-31 LAB — ANA SER QL IF: NEGATIVE

## 2021-09-07 NOTE — PROGRESS NOTES
ASSESSMENT/PLAN:    (M25.471) Right ankle swelling  (primary encounter diagnosis)  Comment: reviewed labwork which was not suggestive of inflammatory arthropathy; MRI scheduled for 9/23; will follow-up after; pain med script for at bedtime prn only written til we have a more definitive plan; if ok, would consider ankle injection   Plan: oxyCODONE-acetaminophen (PERCOCET) 5-325 MG tablet          (M25.571) Acute right ankle pain  Comment: see above  Plan: oxyCODONE-acetaminophen (PERCOCET) 5-325 MG tablet          (Z98.890) S/P surgical manipulation of ankle joint  Comment: see above  Plan: oxyCODONE-acetaminophen (PERCOCET) 5-325 MG tablet            Robibe Bailon MD  September 8, 2021  11:36 AM        Pt is a 46 year old female last seen on 8/26/2021 here today for:     1) R ankle swelling:  Labs - Neg RF, TIMUR, anti-CCP, CRP; sed rate - 61  MRI ankle - ? Not scheduled yet  Working 50 hours/week on her feet    2) Asthma - found out that the house she moved into had mold in the basement, so emergency move-out today. Only lived there 3 weeks but her son's asthma got so much worse and he is very allergic to mold    Per my last visit:  (J45.50) Severe persistent asthma without complication  (primary encounter diagnosis)  Comment:   Plan: stable/ improved on spiriva!      (M25.471) Right ankle swelling  Comment: given refractory symptoms and hx of ankle surgery, will check labwork and MRI w/ contrast; f/u after MRI  Plan: MR Ankle Right w Contrast           (Z98.890) S/P surgical manipulation of ankle joint  Comment: see above  Plan: MR Ankle Right w Contrast           (Z23) High priority for 2019-nCoV vaccine  Comment: covid vaccine today!  Plan: COVID-19,PF,MODERNA    Past Medical History:   Diagnosis Date     Agoraphobia with panic attacks      Anxiety      Anxiety      Arthritis     of back     Asthma      Asthma in adult, moderate persistent, uncomplicated      Chronic pain      Chronic sinusitis      Cocaine abuse  "(H)      Controlled substance agreement signed 6/30/2015    Overview:  Patient has chronic pain and is seen at [facility] for this.  Has controlled substance agreement with them.  On Vicodin, Valium, Klonopin prescribed only from there.        Depression      Hx of seasonal allergies      Infectious pericarditis      Lipoma      Tobacco abuse       Past Surgical History:   Procedure Laterality Date     ANKLE SURGERY Right 05/30/2019     BIOPSY BREAST Right 1990    benign     CREATION PERICARDIAL WINDOW  02/10/2017    [facility]     HEMORRHOIDECTOMY EXTERNAL       TUMOR REMOVAL      Has had 3. In the right breast and \"inside of rib cage.\"      Current Outpatient Medications   Medication Sig Dispense Refill     ibuprofen (ADVIL/MOTRIN) 600 MG tablet Take 1 tablet (600 mg) by mouth 3 times daily as needed for moderate pain 90 tablet 4     ALPRAZolam (XANAX) 2 MG tablet Take 1 tablet (2 mg) by mouth 2 times daily as needed for anxiety 60 tablet 3     cetirizine (ZYRTEC) 10 MG tablet Take 1 tablet (10 mg) by mouth daily 30 tablet 11     escitalopram (LEXAPRO) 10 MG tablet Take 1 tablet (10 mg) by mouth daily 30 tablet 11     fluticasone (FLONASE) 50 MCG/ACT nasal spray Spray 2 sprays into both nostrils daily 9.9 mL 11     fluticasone-salmeterol (ADVAIR) 500-50 MCG/DOSE inhaler Inhale 1 puff into the lungs every 12 hours 60 Inhaler 11     guaiFENesin-codeine (ROBITUSSIN AC) 100-10 MG/5ML solution Take 5-10 mLs by mouth every 4 hours as needed for cough 180 mL 1     metroNIDAZOLE (METROGEL-VAGINAL) 0.75 % vaginal gel Place 0.25 applicators (1 g) vaginally At Bedtime 70 g 1     mometasone-formoterol (DULERA) 100-5 MCG/ACT inhaler 1 puffs every 4 hours as needed for wheezing and shortness of breath 8.8 g 5     montelukast (SINGULAIR) 10 MG tablet Take 1 tablet (10 mg) by mouth At Bedtime 30 tablet 11     ondansetron (ZOFRAN-ODT) 4 MG ODT tab Take 1 tablet (4 mg) by mouth every 6 hours as needed for nausea 12 " "tablet 0     oxyCODONE-acetaminophen (PERCOCET) 5-325 MG tablet Take 1 tablet by mouth every 6 hours as needed for severe pain 12 tablet 0     polyethylene glycol (MIRALAX) 17 GM/Dose powder Mix entire bottle into 64 oz of electrolyte drink.  At 4 pm on the day prior to colonoscopy, drink 8 ounces every 15-20 minutes until finished 507 g 0     predniSONE (DELTASONE) 50 MG tablet Take 1 tablet (50 mg) by mouth daily (Patient not taking: Reported on 9/8/2021) 5 tablet 0     PROAIR  (90 Base) MCG/ACT inhaler Inhale 2 puffs into the lungs every 4 hours as needed for shortness of breath / dyspnea or wheezing 8 g 11     spacer (OPTICHAMBER BINA) holding chamber For use w/ rescue inhaler 1 each 0     tiotropium (SPIRIVA RESPIMAT) 2.5 MCG/ACT inhaler Inhale 2 puffs into the lungs daily 4 g 5     triamcinolone (KENALOG) 0.1 % external cream Apply topically 2 times daily 1 g 1      Allergies   Allergen Reactions     Lidocaine Other (See Comments)     \"my jaw stopped moving\"  Other reaction(s): Dystonia     Penicillins Hives, Rash and Shortness Of Breath     Epinephrine-Lidocaine-Na Metabisulfite Other (See Comments) and Muscle Pain (Myalgia)     Severe jaw cramping, double vision  Jaw locking        ROS:   Gen- no weight change, no fevers/chills   Remainder of ROS negative.     Exam:   /89   Pulse 94   Resp 16   SpO2 98%    Alert and oriented x 3; No acute distress   MSK: R ankle w/ persistent swelling over lateral malleolus          "

## 2021-09-08 ENCOUNTER — OFFICE VISIT (OUTPATIENT)
Dept: FAMILY MEDICINE | Facility: CLINIC | Age: 46
End: 2021-09-08
Payer: COMMERCIAL

## 2021-09-08 VITALS
OXYGEN SATURATION: 98 % | RESPIRATION RATE: 16 BRPM | DIASTOLIC BLOOD PRESSURE: 89 MMHG | SYSTOLIC BLOOD PRESSURE: 129 MMHG | HEART RATE: 94 BPM

## 2021-09-08 DIAGNOSIS — Z98.890 S/P SURGICAL MANIPULATION OF ANKLE JOINT: ICD-10-CM

## 2021-09-08 DIAGNOSIS — M25.571 ACUTE RIGHT ANKLE PAIN: ICD-10-CM

## 2021-09-08 DIAGNOSIS — M25.471 RIGHT ANKLE SWELLING: Primary | ICD-10-CM

## 2021-09-08 PROCEDURE — 99213 OFFICE O/P EST LOW 20 MIN: CPT | Performed by: FAMILY MEDICINE

## 2021-09-08 RX ORDER — OXYCODONE AND ACETAMINOPHEN 5; 325 MG/1; MG/1
1 TABLET ORAL
Qty: 30 TABLET | Refills: 0 | Status: SHIPPED | OUTPATIENT
Start: 2021-09-08 | End: 2021-10-04

## 2021-09-08 RX ORDER — OXYCODONE AND ACETAMINOPHEN 5; 325 MG/1; MG/1
1 TABLET ORAL
Qty: 30 TABLET | Refills: 0 | Status: SHIPPED | OUTPATIENT
Start: 2021-09-08 | End: 2021-09-08

## 2021-09-16 ENCOUNTER — TELEPHONE (OUTPATIENT)
Dept: FAMILY MEDICINE | Facility: CLINIC | Age: 46
End: 2021-09-16

## 2021-09-16 NOTE — TELEPHONE ENCOUNTER
I got a message from Adri about the pt. The pt is a restricted pt, and needs some referrals. The pt is having a MRI on 09/23/2021 at [facility], and will need a referral. I filled out a referral for the pt for [facility], and all of our faculty providers here at [facility], and faxed it to [facility].

## 2021-10-04 DIAGNOSIS — Z98.890 S/P SURGICAL MANIPULATION OF ANKLE JOINT: ICD-10-CM

## 2021-10-04 DIAGNOSIS — M25.571 ACUTE RIGHT ANKLE PAIN: ICD-10-CM

## 2021-10-04 DIAGNOSIS — M25.471 RIGHT ANKLE SWELLING: ICD-10-CM

## 2021-10-04 RX ORDER — OXYCODONE AND ACETAMINOPHEN 5; 325 MG/1; MG/1
1 TABLET ORAL
Qty: 30 TABLET | Refills: 0 | Status: SHIPPED | OUTPATIENT
Start: 2021-10-08 | End: 2021-11-04

## 2021-10-04 RX ORDER — OXYCODONE AND ACETAMINOPHEN 5; 325 MG/1; MG/1
1 TABLET ORAL
Qty: 30 TABLET | Status: SHIPPED
Start: 2021-10-04 | End: 2021-10-04

## 2021-10-04 NOTE — TELEPHONE ENCOUNTER
[facility] phone call message- medication clarification/question:    Full Medication Name: oxyCODONE-acetaminophen (PERCOCET)        Dose: 5-325 mg tablet    Question:      Pharmacy confirmed as    [id number] [address] [facility]  AT [facility]  : Yes    OK to leave a message on voice mail? Yes    Primary language: English      needed? No    Call taken on October 4, 2021 at 10:26 AM by Lauren Hdez

## 2021-10-23 ENCOUNTER — TELEPHONE (OUTPATIENT)
Dept: FAMILY MEDICINE | Facility: CLINIC | Age: 46
End: 2021-10-23

## 2021-10-23 NOTE — TELEPHONE ENCOUNTER
Called patient to discuss medication questions. She states she has had a headache for multiple days now. Her mother passed away a few days ago secondary to COVID and the  is in a few days. Patient states she is very stressed. Patient states headache is constant, in the front of her head, she denies any photophobia, denies any nausea or vomiting. Patient has been taking Excedrin headache and ibuprofen. Instructed patient to keep taking these and if headache does not improve by mid to late week would follow-up with an appointment in clinic. Believe that headache is secondary to stress that patient is currently going through. Does not sound like a migraine headache, likely tension headache.    Angela Weber MD

## 2021-11-04 ENCOUNTER — OFFICE VISIT (OUTPATIENT)
Dept: FAMILY MEDICINE | Facility: CLINIC | Age: 46
End: 2021-11-04
Payer: COMMERCIAL

## 2021-11-04 ENCOUNTER — TELEPHONE (OUTPATIENT)
Dept: FAMILY MEDICINE | Facility: CLINIC | Age: 46
End: 2021-11-04

## 2021-11-04 VITALS
TEMPERATURE: 97.6 F | OXYGEN SATURATION: 96 % | HEART RATE: 89 BPM | RESPIRATION RATE: 20 BRPM | SYSTOLIC BLOOD PRESSURE: 120 MMHG | DIASTOLIC BLOOD PRESSURE: 86 MMHG

## 2021-11-04 DIAGNOSIS — M25.571 ACUTE RIGHT ANKLE PAIN: ICD-10-CM

## 2021-11-04 DIAGNOSIS — F43.21 GRIEF REACTION: ICD-10-CM

## 2021-11-04 DIAGNOSIS — F40.01 PANIC DISORDER WITH AGORAPHOBIA: ICD-10-CM

## 2021-11-04 DIAGNOSIS — F41.1 GENERALIZED ANXIETY DISORDER: ICD-10-CM

## 2021-11-04 DIAGNOSIS — F41.1 GENERALIZED ANXIETY DISORDER: Primary | ICD-10-CM

## 2021-11-04 PROCEDURE — 99215 OFFICE O/P EST HI 40 MIN: CPT | Performed by: FAMILY MEDICINE

## 2021-11-04 RX ORDER — MIRTAZAPINE 30 MG/1
30 TABLET, FILM COATED ORAL AT BEDTIME
Qty: 30 TABLET | Refills: 1 | Status: SHIPPED | OUTPATIENT
Start: 2021-12-02 | End: 2021-11-04

## 2021-11-04 RX ORDER — OXYCODONE AND ACETAMINOPHEN 5; 325 MG/1; MG/1
1 TABLET ORAL
Qty: 30 TABLET | Refills: 0 | Status: SHIPPED | OUTPATIENT
Start: 2021-11-07 | End: 2021-12-09

## 2021-11-04 RX ORDER — OXYCODONE AND ACETAMINOPHEN 5; 325 MG/1; MG/1
1 TABLET ORAL
Qty: 30 TABLET | Refills: 0 | Status: SHIPPED | OUTPATIENT
Start: 2021-11-07 | End: 2021-11-04

## 2021-11-04 RX ORDER — MIRTAZAPINE 30 MG/1
30 TABLET, FILM COATED ORAL AT BEDTIME
Qty: 30 TABLET | Refills: 1 | Status: SHIPPED | OUTPATIENT
Start: 2021-12-02 | End: 2022-03-03

## 2021-11-04 RX ORDER — MIRTAZAPINE 15 MG/1
TABLET, FILM COATED ORAL
Qty: 49 TABLET | Refills: 0 | Status: SHIPPED | OUTPATIENT
Start: 2021-11-04 | End: 2022-03-03

## 2021-11-04 ASSESSMENT — ASTHMA QUESTIONNAIRES
QUESTION_5 LAST FOUR WEEKS HOW WOULD YOU RATE YOUR ASTHMA CONTROL: COMPLETELY CONTROLLED
ACT_TOTALSCORE: 25
QUESTION_2 LAST FOUR WEEKS HOW OFTEN HAVE YOU HAD SHORTNESS OF BREATH: NOT AT ALL
QUESTION_3 LAST FOUR WEEKS HOW OFTEN DID YOUR ASTHMA SYMPTOMS (WHEEZING, COUGHING, SHORTNESS OF BREATH, CHEST TIGHTNESS OR PAIN) WAKE YOU UP AT NIGHT OR EARLIER THAN USUAL IN THE MORNING: NOT AT ALL
QUESTION_1 LAST FOUR WEEKS HOW MUCH OF THE TIME DID YOUR ASTHMA KEEP YOU FROM GETTING AS MUCH DONE AT WORK, SCHOOL OR AT HOME: NONE OF THE TIME
QUESTION_4 LAST FOUR WEEKS HOW OFTEN HAVE YOU USED YOUR RESCUE INHALER OR NEBULIZER MEDICATION (SUCH AS ALBUTEROL): NOT AT ALL

## 2021-11-04 NOTE — PROGRESS NOTES
ASSESSMENT/PLAN:     (F41.1) Generalized anxiety disorder  (primary encounter diagnosis)  Comment: we reviewed normal stages of grief as well as need for counseling and med modification; she stopped her selective serotonin reuptake inhibitor and was reluctant to restart. Will start remeron to help w/ sleep; will start at 15mg at bedtime x 1 week, then increase to 30mg at bedtime; f/u in 4 wks; placed mental health referral for her to start counseling. Would benefit from CBT  Plan: [facility]  -,         mirtazapine (REMERON) 15 MG tablet, mirtazapine (REMERON) 30 MG tablet          (F40.01) Panic disorder with agoraphobia  Comment: see above  Plan: [facility]  -,         mirtazapine (REMERON) 15 MG tablet, mirtazapine (REMERON) 30 MG tablet          (F43.21) Grief reaction  Comment: see above  Plan: [facility]  -,         mirtazapine (REMERON) 15 MG tablet, mirtazapine (REMERON) 30 MG tablet          (M25.571) Acute right ankle pain  Comment: will reschedule MRI; meds refilled x 1 month  Plan: oxyCODONE-acetaminophen (PERCOCET) 5-325 MG tablet          >41 min spent in review of records, direct patient care, documentation, and care coordination    Robbie Bailon MD  November 4, 2021  9:28 AM          Pt is a 46 year old female last seen on 9/8/2021 here today for:      1) R ankle pain - MRI? Had to reschedule as mom was in the hospital; jessenia reschedule     2) Anxiety - can't sleep; waking up w/ panic attacks; with her mom she is at peace but with her boyfriend she is angry that he was that selfish   Stopped taking lexapro long time ago; saw a counselor 5-6 yrs ago  Needs to get kids into counseling too     3) Asthma - stable; using Spiriva     4) Grief -   Mom passed away on 10/16/2021 of COVID; massive stroke and brain tumor -> was in assisted living x 1 wk, then tested positive and ended up on a respirator; they withdrew cares  "shortly after (was 67 yrs old)  Boyfriend just turned 46 -> unvaccinated; got COVID while her mom was still in the hospital; she started masking around him; last week was hospitalized and passed away on 11/1/2021  Is fully vaccinated (finished Moderna x 2 in August)    Quit her job in [place]; is going to work at [employer] -> start date on hold          Per my last note:  (M24.1) Right ankle swelling  (primary encounter diagnosis)  Comment: reviewed labwork which was not suggestive of inflammatory arthropathy; MRI scheduled for 9/23; will follow-up after; pain med script for at bedtime prn only written til we have a more definitive plan; if ok, would consider ankle injection   Plan: oxyCODONE-acetaminophen (PERCOCET) 5-325 MG tablet    Past Medical History:   Diagnosis Date     Agoraphobia with panic attacks      Anxiety      Anxiety      Arthritis     of back     Asthma      Asthma in adult, moderate persistent, uncomplicated      Chronic pain      Chronic sinusitis      Cocaine abuse (H)      Controlled substance agreement signed 6/30/2015    Overview:  Patient has chronic pain and is seen at [facility] for this.  Has controlled substance agreement with them.  On Vicodin, Valium, Klonopin prescribed only from there.        Depression      Hx of seasonal allergies      Infectious pericarditis      Lipoma      Tobacco abuse       Past Surgical History:   Procedure Laterality Date     ANKLE SURGERY Right 05/30/2019     BIOPSY BREAST Right 1990    benign     CREATION PERICARDIAL WINDOW  02/10/2017    [facility]     HEMORRHOIDECTOMY EXTERNAL       TUMOR REMOVAL      Has had 3. In the right breast and \"inside of rib cage.\"      Current Outpatient Medications   Medication Sig Dispense Refill     ibuprofen (ADVIL/MOTRIN) 600 MG tablet Take 1 tablet (600 mg) by mouth 3 times daily as needed for moderate pain 90 tablet 4     ALPRAZolam (XANAX) 2 MG tablet Take 1 tablet (2 mg) by mouth " 2 times daily as needed for anxiety 60 tablet 3     cetirizine (ZYRTEC) 10 MG tablet Take 1 tablet (10 mg) by mouth daily 30 tablet 11     escitalopram (LEXAPRO) 10 MG tablet Take 1 tablet (10 mg) by mouth daily 30 tablet 11     fluticasone (FLONASE) 50 MCG/ACT nasal spray Spray 2 sprays into both nostrils daily 9.9 mL 11     fluticasone-salmeterol (ADVAIR) 500-50 MCG/DOSE inhaler Inhale 1 puff into the lungs every 12 hours 60 Inhaler 11     guaiFENesin-codeine (ROBITUSSIN AC) 100-10 MG/5ML solution Take 5-10 mLs by mouth every 4 hours as needed for cough 180 mL 1     metroNIDAZOLE (METROGEL-VAGINAL) 0.75 % vaginal gel Place 0.25 applicators (1 g) vaginally At Bedtime 70 g 1     mometasone-formoterol (DULERA) 100-5 MCG/ACT inhaler 1 puffs every 4 hours as needed for wheezing and shortness of breath 8.8 g 5     montelukast (SINGULAIR) 10 MG tablet Take 1 tablet (10 mg) by mouth At Bedtime 30 tablet 11     ondansetron (ZOFRAN-ODT) 4 MG ODT tab Take 1 tablet (4 mg) by mouth every 6 hours as needed for nausea 12 tablet 0     oxyCODONE-acetaminophen (PERCOCET) 5-325 MG tablet Take 1 tablet by mouth nightly as needed for severe pain 30 tablet 0     polyethylene glycol (MIRALAX) 17 GM/Dose powder Mix entire bottle into 64 oz of electrolyte drink.  At 4 pm on the day prior to colonoscopy, drink 8 ounces every 15-20 minutes until finished 507 g 0     predniSONE (DELTASONE) 50 MG tablet Take 1 tablet (50 mg) by mouth daily (Patient not taking: Reported on 9/8/2021) 5 tablet 0     PROAIR  (90 Base) MCG/ACT inhaler Inhale 2 puffs into the lungs every 4 hours as needed for shortness of breath / dyspnea or wheezing 8 g 11     spacer (OPTICHAMBER BINA) holding chamber For use w/ rescue inhaler 1 each 0     tiotropium (SPIRIVA RESPIMAT) 2.5 MCG/ACT inhaler Inhale 2 puffs into the lungs daily 4 g 5     triamcinolone (KENALOG) 0.1 % external cream Apply topically 2 times daily 1 g 1      Allergies   Allergen Reactions      "Lidocaine Other (See Comments)     \"my jaw stopped moving\"  Other reaction(s): Dystonia     Penicillins Hives, Rash and Shortness Of Breath     Epinephrine-Lidocaine-Na Metabisulfite Other (See Comments) and Muscle Pain (Myalgia)     Severe jaw cramping, double vision  Jaw locking        ROS:   Gen- no fevers/chills   Remainder of ROS negative.     Exam:   /86 (BP Location: Right arm, Patient Position: Sitting, Cuff Size: Adult Large)   Pulse 89   Temp 97.6  F (36.4  C) (Oral)   Resp 20   SpO2 96%    Alert and oriented x 3; No acute distress     "

## 2021-11-04 NOTE — TELEPHONE ENCOUNTER
Scripts from today sent to alternate pharmacy as requested by pt.    Robbie Bailon MD  November 4, 2021  12:22 PM

## 2021-11-04 NOTE — TELEPHONE ENCOUNTER
[facility] phone call message- medication clarification/question:    Full Medication Name: ALL MEDICATION   Dose:     Question: Patient called wanting to know if  is able to send all her medications back to the [facility] on [address] which I will list below as she states she is having trouble going through processes with her health plan when going to the [facility], call and advise if needed.     Pharmacy confirmed as [facility] #[id number] [address]  AT [facility]: Yes    OK to leave a message on voice mail? Yes    Primary language: English      needed? Not on file    Call taken on November 4, 2021 at 10:27 AM by Lauren Hdez

## 2021-11-04 NOTE — TELEPHONE ENCOUNTER
Called and reached the patient. Relayed message. They expressed understanding. No further questions.    Itzel Medeiros, EMT  November 4, 2021  1:43 PM

## 2021-11-04 NOTE — TELEPHONE ENCOUNTER
Called patient and advised she will need to transfer her medications to the pharmacy she requested, patient declined and stated  needs to transfer the medications. Patient first stated it was due to narcotics but then stated it was due to restricted access. [facility], sending new prescriptions will trigger new refills. Medications will need to be transferred rather than new prescriptions sent. Patient declined numerous times to have her medications transferred to [facility] by calling them herself.

## 2021-11-05 ASSESSMENT — ASTHMA QUESTIONNAIRES: ACT_TOTALSCORE: 25

## 2021-11-17 ENCOUNTER — OFFICE VISIT (OUTPATIENT)
Dept: FAMILY MEDICINE | Facility: CLINIC | Age: 46
End: 2021-11-17
Payer: COMMERCIAL

## 2021-11-17 ENCOUNTER — TELEPHONE (OUTPATIENT)
Dept: FAMILY MEDICINE | Facility: CLINIC | Age: 46
End: 2021-11-17

## 2021-11-17 VITALS
SYSTOLIC BLOOD PRESSURE: 119 MMHG | DIASTOLIC BLOOD PRESSURE: 89 MMHG | HEART RATE: 97 BPM | OXYGEN SATURATION: 100 % | RESPIRATION RATE: 16 BRPM | TEMPERATURE: 97.9 F

## 2021-11-17 DIAGNOSIS — Z23 NEED FOR PROPHYLACTIC VACCINATION AND INOCULATION AGAINST INFLUENZA: ICD-10-CM

## 2021-11-17 DIAGNOSIS — J45.50 SEVERE PERSISTENT ASTHMA WITHOUT COMPLICATION (H): ICD-10-CM

## 2021-11-17 DIAGNOSIS — Z20.822 EXPOSURE TO 2019 NOVEL CORONAVIRUS: ICD-10-CM

## 2021-11-17 DIAGNOSIS — N95.1 MENOPAUSAL SYNDROME (HOT FLASHES): ICD-10-CM

## 2021-11-17 DIAGNOSIS — R05.9 COUGH: Primary | ICD-10-CM

## 2021-11-17 LAB
CHOLEST SERPL-MCNC: 162 MG/DL
FASTING STATUS PATIENT QL REPORTED: ABNORMAL
HDLC SERPL-MCNC: 40 MG/DL
LDLC SERPL CALC-MCNC: 107 MG/DL
TRIGL SERPL-MCNC: 76 MG/DL

## 2021-11-17 PROCEDURE — 90471 IMMUNIZATION ADMIN: CPT | Performed by: FAMILY MEDICINE

## 2021-11-17 PROCEDURE — 99214 OFFICE O/P EST MOD 30 MIN: CPT | Mod: 25 | Performed by: FAMILY MEDICINE

## 2021-11-17 PROCEDURE — U0005 INFEC AGEN DETEC AMPLI PROBE: HCPCS | Performed by: FAMILY MEDICINE

## 2021-11-17 PROCEDURE — 36415 COLL VENOUS BLD VENIPUNCTURE: CPT | Performed by: FAMILY MEDICINE

## 2021-11-17 PROCEDURE — 90686 IIV4 VACC NO PRSV 0.5 ML IM: CPT | Performed by: FAMILY MEDICINE

## 2021-11-17 PROCEDURE — 80061 LIPID PANEL: CPT | Performed by: FAMILY MEDICINE

## 2021-11-17 PROCEDURE — U0003 INFECTIOUS AGENT DETECTION BY NUCLEIC ACID (DNA OR RNA); SEVERE ACUTE RESPIRATORY SYNDROME CORONAVIRUS 2 (SARS-COV-2) (CORONAVIRUS DISEASE [COVID-19]), AMPLIFIED PROBE TECHNIQUE, MAKING USE OF HIGH THROUGHPUT TECHNOLOGIES AS DESCRIBED BY CMS-2020-01-R: HCPCS | Performed by: FAMILY MEDICINE

## 2021-11-17 RX ORDER — CODEINE PHOSPHATE AND GUAIFENESIN 10; 100 MG/5ML; MG/5ML
1-2 SOLUTION ORAL EVERY 4 HOURS PRN
Qty: 180 ML | Refills: 1 | Status: SHIPPED | OUTPATIENT
Start: 2021-11-17 | End: 2022-03-03

## 2021-11-17 NOTE — LETTER
RETURN TO WORK/SCHOOL FORM    11/17/2021    Re: Darlene Hudson  1975      To Whom It May Concern:     Darlene Hudson was seen in clinic today.  She may return to work without restrictions on 11/22/21.           Robbie Bailon MD  11/17/2021 12:20 PM

## 2021-11-17 NOTE — TELEPHONE ENCOUNTER
[facility] phone call message- medication clarification/question:    Full Medication Name: guaiFENesin-codeine (ROBITUSSIN AC)   Dose: 100-10 MG/5ML solution    Question: Patient saw  today  11/17 at 11:40 am and forgot to ask  to refill her cough medicine. Patient would like medication refilled and sent to pharmacy if possible.     Pharmacy confirmed as [facility] #[id number] [address]  AT [facility]: Yes    OK to leave a message on voice mail? Yes    Primary language: English      needed? No    Call taken on November 17, 2021 at 1:06 PM by Lauren Hdez

## 2021-11-17 NOTE — PROGRESS NOTES
ASSESSMENT/PLAN:    (R05.9) Cough  (primary encounter diagnosis)  Comment: swabbed for covid; precautions given  Plan: Symptomatic COVID-19 Virus (Coronavirus) by PCR Nose          (Z20.822) Exposure to 2019 novel coronavirus  Comment: see above  Plan: Symptomatic COVID-19 Virus (Coronavirus) by PCR Nose          (N95.1) Menopausal syndrome (hot flashes)  Comment: reviewed tx options at length; pt is low risk for HRT (no blood clots, low ascvd, no breast CA or FH); she has a uterus so will use combined estrogen/ progesterone; script sent; precautions given; will f/u in 4 wks  Plan: estrogen conj-medroxyPROGESTERone (PREMPRO) 0.3-1.5 MG tablet, Lipid Profile          (Z23) Need for prophylactic vaccination and inoculation against influenza  Comment:   Plan: INFLUENZA VACCINE IM > 6 MONTHS VALENT IIV4 (AFLURIA/FLUZONE)          34 min spent in review of records, direct patient care, documentation, and care coordination    Robbie Bailon MD  November 17, 2021  12:27 PM    Pt is a 46 year old female last seen on 11/4/2021 here today for:            1) cough - on and off   Worse this past week but better today; took theraflu yesterday and it helped  Is concerned she has covid  Anxious given she has lost two family members to covid    2) hot flashes - severe symptoms x past few months  Is lila-menopausal ; LMP was over 4 months ago    3) desires flu shot    Past Medical History:   Diagnosis Date     Agoraphobia with panic attacks      Anxiety      Anxiety      Arthritis     of back     Asthma      Asthma in adult, moderate persistent, uncomplicated      Chronic pain      Chronic sinusitis      Cocaine abuse (H)      Controlled substance agreement signed 6/30/2015    Overview:  Patient has chronic pain and is seen at [facility] for this.  Has controlled substance agreement with them.  On Vicodin, Valium, Klonopin prescribed only from there.        Depression      Hx of seasonal allergies      Infectious pericarditis  "     Lipoma      Tobacco abuse       Past Surgical History:   Procedure Laterality Date     ANKLE SURGERY Right 05/30/2019     BIOPSY BREAST Right 1990    benign     CREATION PERICARDIAL WINDOW  02/10/2017    [facility]     HEMORRHOIDECTOMY EXTERNAL       TUMOR REMOVAL      Has had 3. In the right breast and \"inside of rib cage.\"      Current Outpatient Medications   Medication Sig Dispense Refill     ibuprofen (ADVIL/MOTRIN) 600 MG tablet Take 1 tablet (600 mg) by mouth 3 times daily as needed for moderate pain 90 tablet 4     ALPRAZolam (XANAX) 2 MG tablet Take 1 tablet (2 mg) by mouth 2 times daily as needed for anxiety 60 tablet 3     cetirizine (ZYRTEC) 10 MG tablet Take 1 tablet (10 mg) by mouth daily 30 tablet 11     fluticasone (FLONASE) 50 MCG/ACT nasal spray Spray 2 sprays into both nostrils daily 9.9 mL 11     fluticasone-salmeterol (ADVAIR) 500-50 MCG/DOSE inhaler Inhale 1 puff into the lungs every 12 hours 60 Inhaler 11     guaiFENesin-codeine (ROBITUSSIN AC) 100-10 MG/5ML solution Take 5-10 mLs by mouth every 4 hours as needed for cough 180 mL 1     metroNIDAZOLE (METROGEL-VAGINAL) 0.75 % vaginal gel Place 0.25 applicators (1 g) vaginally At Bedtime 70 g 1     mirtazapine (REMERON) 15 MG tablet Take 1 tablet (15 mg) by mouth At Bedtime for 7 days, THEN 2 tablets (30 mg) At Bedtime for 21 days. 49 tablet 0     [START ON 12/2/2021] mirtazapine (REMERON) 30 MG tablet Take 1 tablet (30 mg) by mouth At Bedtime 30 tablet 1     mometasone-formoterol (DULERA) 100-5 MCG/ACT inhaler 1 puffs every 4 hours as needed for wheezing and shortness of breath 8.8 g 5     montelukast (SINGULAIR) 10 MG tablet Take 1 tablet (10 mg) by mouth At Bedtime 30 tablet 11     ondansetron (ZOFRAN-ODT) 4 MG ODT tab Take 1 tablet (4 mg) by mouth every 6 hours as needed for nausea 12 tablet 0     oxyCODONE-acetaminophen (PERCOCET) 5-325 MG tablet Take 1 tablet by mouth nightly as needed for severe pain 30 tablet 0     " "polyethylene glycol (MIRALAX) 17 GM/Dose powder Mix entire bottle into 64 oz of electrolyte drink.  At 4 pm on the day prior to colonoscopy, drink 8 ounces every 15-20 minutes until finished 507 g 0     PROAIR  (90 Base) MCG/ACT inhaler Inhale 2 puffs into the lungs every 4 hours as needed for shortness of breath / dyspnea or wheezing 8 g 11     spacer (OPTICHAMBER BINA) holding chamber For use w/ rescue inhaler 1 each 0     tiotropium (SPIRIVA RESPIMAT) 2.5 MCG/ACT inhaler Inhale 2 puffs into the lungs daily 4 g 5     triamcinolone (KENALOG) 0.1 % external cream Apply topically 2 times daily 1 g 1      Allergies   Allergen Reactions     Lidocaine Other (See Comments)     \"my jaw stopped moving\"  Other reaction(s): Dystonia     Penicillins Hives, Rash and Shortness Of Breath     Epinephrine-Lidocaine-Na Metabisulfite Other (See Comments) and Muscle Pain (Myalgia)     Severe jaw cramping, double vision  Jaw locking        ROS:   Gen- no weight change, no fevers/chills   ENT- + cough, no congestion, no URI symptoms   Cardiac - no palpitations, no chest pain   Respiratory - no shortness of breath , no wheezing   Remainder of ROS negative.     Exam:   /89 (BP Location: Right arm, Patient Position: Sitting, Cuff Size: Adult Regular)   Pulse 97   Temp 97.9  F (36.6  C) (Oral)   Resp 16   SpO2 100%    Alert and oriented x 3; No acute distress     "

## 2021-11-17 NOTE — TELEPHONE ENCOUNTER
I called and spoke to patient, she said she is willing to return the Oxycodone here at the clinic for Dr. Bailon to review sometime tomorrow.

## 2021-11-17 NOTE — PATIENT INSTRUCTIONS
Patient Education     Coping with Symptoms of Menopause  What symptoms of menopause may occur with my treatment?  Chemotherapy drugs or medicines that block hormones may bring on menopause.  These changes may include:    Hot flashes    Vaginal dryness    Trouble falling asleep (insomnia)    Headache    Depression.  How are hot flashes treated?  Your doctor may suggest medicine to control the hot flashes. Vitamins E, B6 or C may also help. Talk to your doctor about how much to take.  Some herbs or foods may help: primrose oil, garlic, chamomile, ginseng root, royal jelly or soy.  What else can I do to cope with hot flashes?    Wear clothes made of natural fabric such as cotton.    Dress in layers. You can remove them when you feel too warm.    Avoid hot drinks (coffee or tea) and spicy foods.    Keep the room temperature low if you can.    Control your stress. Exercise and relaxation techniques may help.    Keep track of when and how often hot flashes occur. Note what is happening right before a hot flash. This may give you some control over future hot flashes.  How is vaginal dryness treated?    You can try drugstore products such as Replens, Gyne-Moistrin or Lubrin. They last for about three days.    Use water-based lubricants during sex. These include Astroglide and K-Y Jelly. Do not use Vaseline or petroleum jelly because they are not good for vaginal tissues.    Use low-dose estrogen cream in the vagina. Your doctor must prescribe this medicine.  How can I cope if I have trouble sleeping?    Get in bed when you are ready to go to sleep. Do not watch TV or read in bed.    Follow a sleeping routine: Go to bed and get up at the same times. Get up on time even if you did not sleep well.    If you do not fall asleep within 30 minutes, get up.    Get regular exercise. Do not exercise right before bedtime.    Avoid alcohol. It may disrupt your sleep.    Try natural remedies just before bedtime such as warm milk,  chamomile tea or verbena tea.  How can I treat headache?  Ask your doctor if it is safe to take aspirin, Tylenol (acetaminophen) or Advil (ibuprofen). They may cause side effects when mixed with chemotherapy.  How can I cope with depression?  Mild depression    Start an exercise program. Exercise with a friend. Any activity is better than none. Walk to the mailbox, to see your neighbors or in the mall.    Share your feelings with family and friends.    Don't give in to negative feelings.  Severe depression  If your depression lasts longer than two weeks, talk to your care team. They may suggest counseling or medicine.  Comments:  __________________________________________  __________________________________________  __________________________________________  __________________________________________  __________________________________________  __________________________________________  __________________________________________  __________________________________________  For informational purposes only. Not to replace the advice of your health care provider.  Copyright   2010 [employer]. All rights reserved. [employer] 151939 - 12/15.

## 2021-11-17 NOTE — TELEPHONE ENCOUNTER
Pt cannot be provided with a refill of cough medication at this has codeine in it and she is on oxycodone for pain. She can return her opiates and I will call in cough medication. Otherwise, she can manage w/ OTC cough medication.    Robbie Bailon MD  November 17, 2021  2:21 PM

## 2021-11-18 ENCOUNTER — TELEPHONE (OUTPATIENT)
Dept: FAMILY MEDICINE | Facility: CLINIC | Age: 46
End: 2021-11-18
Payer: MEDICAID

## 2021-11-18 DIAGNOSIS — M25.471 RIGHT ANKLE SWELLING: Primary | ICD-10-CM

## 2021-11-18 DIAGNOSIS — Z98.890 S/P SURGICAL MANIPULATION OF ANKLE JOINT: ICD-10-CM

## 2021-11-18 LAB — SARS-COV-2 RNA RESP QL NAA+PROBE: NEGATIVE

## 2021-11-18 NOTE — TELEPHONE ENCOUNTER
It appears patient no-showed for her scheduled MR, so will hold off on the order for now. Will forward message to Dr. Bailon in case needs to be ordered in the future.

## 2021-11-18 NOTE — TELEPHONE ENCOUNTER
[facility] phone call message - order or referral request for patient:     Order or referral being requested: XR Arthrogram Rt ankle       Additional Comments: Needing order for patient to go with MRI that was ordered by Dr. Adamaris MCKEON to leave a message on voice mail?     Primary language: English      needed? No    Call taken on November 18, 2021 at 2:11 PM by Kirstie Gates

## 2021-12-08 NOTE — PROGRESS NOTES
ASSESSMENT/PLAN:    (F43.21) Grief reaction  (primary encounter diagnosis)  Comment:   Plan: stable; form filled    (F41.1) Generalized anxiety disorder  Comment:   Plan: see above    (M25.571) Acute right ankle pain  Comment: last refill; plan to wean over next month  Plan: oxyCODONE-acetaminophen (PERCOCET) 5-325 MG tablet          Robbie Bailon MD  December 9, 2021  9:05 AM      Pt is a 46 year old female last seen on 11/17/2021 here today for:      Medical hardship - mom got sick over the summer and passed away 10/16/21; Boyfriend passed 11/1/2021  She resigned from work on 9/21/2021 and has not returned to work since due to prolonged grief reaction  Is trying to see a therapist but unable to get an appointment as they are all booked up  Is on anxiety medication chronically     Is planning to move to [place]  Has been working out        Per my last note:  (R05.9) Cough  (primary encounter diagnosis)  Comment: swabbed for covid; precautions given  Plan: Symptomatic COVID-19 Virus (Coronavirus) by PCR Nose          (Z20.822) Exposure to 2019 novel coronavirus  Comment: see above  Plan: Symptomatic COVID-19 Virus (Coronavirus) by PCR Nose          (N95.1) Menopausal syndrome (hot flashes)  Comment: reviewed tx options at length; pt is low risk for HRT (no blood clots, low ascvd, no breast CA or FH); she has a uterus so will use combined estrogen/ progesterone; script sent; precautions given; will f/u in 4 wks  Plan: estrogen conj-medroxyPROGESTERone (PREMPRO) 0.3-1.5 MG tablet, Lipid Profile          (Z23) Need for prophylactic vaccination and inoculation against influenza  Comment:   Plan: INFLUENZA VACCINE IM > 6 MONTHS VALENT IIV4 (AFLURIA/FLUZONE)    Past Medical History:   Diagnosis Date     Agoraphobia with panic attacks      Anxiety      Anxiety      Arthritis     of back     Asthma      Asthma in adult, moderate persistent, uncomplicated      Chronic pain      Chronic sinusitis      Cocaine abuse (H)       "Controlled substance agreement signed 6/30/2015    Overview:  Patient has chronic pain and is seen at [facility] for this.  Has controlled substance agreement with them.  On Vicodin, Valium, Klonopin prescribed only from there.        Depression      Hx of seasonal allergies      Infectious pericarditis      Lipoma      Tobacco abuse       Past Surgical History:   Procedure Laterality Date     ANKLE SURGERY Right 05/30/2019     BIOPSY BREAST Right 1990    benign     CREATION PERICARDIAL WINDOW  02/10/2017    [facility]     HEMORRHOIDECTOMY EXTERNAL       TUMOR REMOVAL      Has had 3. In the right breast and \"inside of rib cage.\"      Current Outpatient Medications   Medication Sig Dispense Refill     ibuprofen (ADVIL/MOTRIN) 600 MG tablet Take 1 tablet (600 mg) by mouth 3 times daily as needed for moderate pain 90 tablet 4     oxyCODONE-acetaminophen (PERCOCET) 5-325 MG tablet Take 1 tablet by mouth nightly as needed for severe pain 30 tablet 0     ALPRAZolam (XANAX) 2 MG tablet Take 1 tablet (2 mg) by mouth 2 times daily as needed for anxiety 60 tablet 3     cetirizine (ZYRTEC) 10 MG tablet Take 1 tablet (10 mg) by mouth daily 30 tablet 11     estrogen conj-medroxyPROGESTERone (PREMPRO) 0.3-1.5 MG tablet Take 1 tablet by mouth daily 30 tablet 11     fluticasone (FLONASE) 50 MCG/ACT nasal spray Spray 2 sprays into both nostrils daily 9.9 mL 11     fluticasone-salmeterol (ADVAIR) 500-50 MCG/DOSE inhaler Inhale 1 puff into the lungs every 12 hours 60 Inhaler 11     guaiFENesin-codeine (ROBITUSSIN AC) 100-10 MG/5ML solution Take 5-10 mLs by mouth every 4 hours as needed for cough 180 mL 1     metroNIDAZOLE (METROGEL-VAGINAL) 0.75 % vaginal gel Place 0.25 applicators (1 g) vaginally At Bedtime 70 g 1     mirtazapine (REMERON) 15 MG tablet Take 1 tablet (15 mg) by mouth At Bedtime for 7 days, THEN 2 tablets (30 mg) At Bedtime for 21 days. 49 tablet 0     mirtazapine (REMERON) 30 MG tablet Take 1 tablet (30 " "mg) by mouth At Bedtime 30 tablet 1     mometasone-formoterol (DULERA) 100-5 MCG/ACT inhaler 1 puffs every 4 hours as needed for wheezing and shortness of breath 8.8 g 5     montelukast (SINGULAIR) 10 MG tablet Take 1 tablet (10 mg) by mouth At Bedtime 30 tablet 11     ondansetron (ZOFRAN-ODT) 4 MG ODT tab Take 1 tablet (4 mg) by mouth every 6 hours as needed for nausea 12 tablet 0     polyethylene glycol (MIRALAX) 17 GM/Dose powder Mix entire bottle into 64 oz of electrolyte drink.  At 4 pm on the day prior to colonoscopy, drink 8 ounces every 15-20 minutes until finished 507 g 0     PROAIR  (90 Base) MCG/ACT inhaler Inhale 2 puffs into the lungs every 4 hours as needed for shortness of breath / dyspnea or wheezing 8 g 11     spacer (LearnSharkBER BINA) holding chamber For use w/ rescue inhaler 1 each 0     tiotropium (SPIRIVA RESPIMAT) 2.5 MCG/ACT inhaler Inhale 2 puffs into the lungs daily 4 g 5     triamcinolone (KENALOG) 0.1 % external cream Apply topically 2 times daily 1 g 1      Allergies   Allergen Reactions     Lidocaine Other (See Comments)     \"my jaw stopped moving\"  Other reaction(s): Dystonia     Penicillins Hives, Rash and Shortness Of Breath     Epinephrine-Lidocaine-Na Metabisulfite Other (See Comments) and Muscle Pain (Myalgia)     Severe jaw cramping, double vision  Jaw locking          Exam:   /84 (BP Location: Right arm, Patient Position: Sitting, Cuff Size: Adult Large)   Pulse 106   Temp 97.6  F (36.4  C) (Oral)   Resp 16   SpO2 98%    Alert and oriented x 3; No acute distress     "

## 2021-12-09 ENCOUNTER — OFFICE VISIT (OUTPATIENT)
Dept: FAMILY MEDICINE | Facility: CLINIC | Age: 46
End: 2021-12-09
Payer: COMMERCIAL

## 2021-12-09 VITALS
HEART RATE: 106 BPM | SYSTOLIC BLOOD PRESSURE: 130 MMHG | OXYGEN SATURATION: 98 % | RESPIRATION RATE: 16 BRPM | DIASTOLIC BLOOD PRESSURE: 84 MMHG | TEMPERATURE: 97.6 F

## 2021-12-09 DIAGNOSIS — F43.21 GRIEF REACTION: Primary | ICD-10-CM

## 2021-12-09 DIAGNOSIS — F41.1 GENERALIZED ANXIETY DISORDER: ICD-10-CM

## 2021-12-09 DIAGNOSIS — M25.571 ACUTE RIGHT ANKLE PAIN: ICD-10-CM

## 2021-12-09 PROCEDURE — 99213 OFFICE O/P EST LOW 20 MIN: CPT | Performed by: FAMILY MEDICINE

## 2021-12-09 RX ORDER — OXYCODONE AND ACETAMINOPHEN 5; 325 MG/1; MG/1
1 TABLET ORAL
Qty: 30 TABLET | Refills: 0 | Status: SHIPPED | OUTPATIENT
Start: 2021-12-09 | End: 2022-01-12

## 2021-12-10 ENCOUNTER — TELEPHONE (OUTPATIENT)
Dept: FAMILY MEDICINE | Facility: CLINIC | Age: 46
End: 2021-12-10
Payer: MEDICAID

## 2021-12-10 DIAGNOSIS — N95.1 MENOPAUSAL SYNDROME (HOT FLASHES): Primary | ICD-10-CM

## 2021-12-10 NOTE — TELEPHONE ENCOUNTER
Prior Authorization needed on:  12/10/21    Medication:  Estrogen conj-medroxyprogesterone Dose:  0.3mg/1.5mg tab    Pharmacy confirmed as   [id number] - [place] [address] [address] AT [address] [place] [address]  Phone: [phone] Fax: [phone]  : Yes    Insurance Name:  [employer]  Insurance Phone: [phone]  Insurance Patient ID: [id number]    Alternatives Suggested:      BRYAN CHRISTIAN CMA December 10, 2021 at 10:55 AM

## 2021-12-13 RX ORDER — ESTRADIOL AND NORETHINDRONE ACETATE 1; .5 MG/1; MG/1
1 TABLET ORAL DAILY
Qty: 30 TABLET | Refills: 11 | Status: SHIPPED | OUTPATIENT
Start: 2021-12-13 | End: 2022-03-03

## 2021-12-13 NOTE — TELEPHONE ENCOUNTER
PRIOR AUTHORIZATION DENIED    Medication: estrogen conj-medroxyprogesterone 0.3mg/1.5mg tab-DENIED    Denial Date: 12/13/2021    Denial Rational: Patient must have a history of trial & failure to at least 3 of the formulary alternative(s) or have a contraindication or intolerance to the formulary alternatives:              Appeal Information:     If provider would like to appeal please provide a letter of medical necessity stating why formulary alternatives would not be clinically appropriate for patient and route back to the PA team.

## 2021-12-13 NOTE — TELEPHONE ENCOUNTER
Central Prior Authorization Team   Phone: [phone]      PA Initiation    Medication: estrogen conj-medroxyprogesterone 0.3mg/1.5mg tab-Initiated  Insurance Company: [employer] [facility] - Phone [phone] Fax [phone]  Pharmacy Filling the Rx: [id number] [place], [place] - [address] AT [facility]  Filling Pharmacy Phone: [phone]  Filling Pharmacy Fax:    Start Date: 12/13/2021

## 2021-12-14 NOTE — TELEPHONE ENCOUNTER
Formulary alternative called in. Please inform patient. Thanks!    Robbie Bailon MD  December 13, 2021  8:53 PM

## 2021-12-20 ENCOUNTER — TELEPHONE (OUTPATIENT)
Dept: FAMILY MEDICINE | Facility: CLINIC | Age: 46
End: 2021-12-20

## 2021-12-20 ENCOUNTER — VIRTUAL VISIT (OUTPATIENT)
Dept: FAMILY MEDICINE | Facility: CLINIC | Age: 46
End: 2021-12-20
Payer: COMMERCIAL

## 2021-12-20 DIAGNOSIS — F13.20 BENZODIAZEPINE DEPENDENCE (H): ICD-10-CM

## 2021-12-20 DIAGNOSIS — F33.1 MAJOR DEPRESSIVE DISORDER, RECURRENT EPISODE, MODERATE (H): ICD-10-CM

## 2021-12-20 DIAGNOSIS — Z02.89 ENCOUNTER FOR COMPLETION OF FORM WITH PATIENT: Primary | ICD-10-CM

## 2021-12-20 PROCEDURE — 99212 OFFICE O/P EST SF 10 MIN: CPT | Mod: 95 | Performed by: STUDENT IN AN ORGANIZED HEALTH CARE EDUCATION/TRAINING PROGRAM

## 2021-12-20 RX ORDER — METHYLPREDNISOLONE 4 MG
TABLET, DOSE PACK ORAL
COMMUNITY
Start: 2021-02-10 | End: 2022-03-03

## 2021-12-20 NOTE — TELEPHONE ENCOUNTER
[facility] phone call message- general phone call:    Reason for call:  Patient is requesting to speak to you directly regarding a form you had printed off and given to her so  could fill it out. Apparently,  filled it out incorrectly so the form needs to be completed again.    Please call her back at your earliest convenience.     Return call needed: Yes    OK to leave a message on voice mail? Yes    Primary language: English      needed? No    Call taken on December 20, 2021 at 11:32 AM by Meredith Doll

## 2021-12-20 NOTE — PROGRESS NOTES
Family Medicine Telephone Visit Note  Chief Complaint   Patient presents with     Forms     forms          HPI   Patients name: Karoline  Appointment start time: 4:20 PM    Patient calling for form completion.  Needs more time off than was given initially.  Still suffering from grief reaction and having a hard time getting in with therapy so thinks she will be unable to go back to work at least until the end of January if not longer.    Current Outpatient Medications   Medication Sig Dispense Refill     ibuprofen (ADVIL/MOTRIN) 600 MG tablet Take 1 tablet (600 mg) by mouth 3 times daily as needed for moderate pain 90 tablet 4     ALPRAZolam (XANAX) 2 MG tablet Take 1 tablet (2 mg) by mouth 2 times daily as needed for anxiety 60 tablet 3     cetirizine (ZYRTEC) 10 MG tablet Take 1 tablet (10 mg) by mouth daily 30 tablet 11     estradiol-norethindrone (ACTIVELLA) 1-0.5 MG tablet Take 1 tablet by mouth daily 30 tablet 11     fluticasone (FLONASE) 50 MCG/ACT nasal spray Spray 2 sprays into both nostrils daily 9.9 mL 11     fluticasone-salmeterol (ADVAIR) 500-50 MCG/DOSE inhaler Inhale 1 puff into the lungs every 12 hours 60 Inhaler 11     guaiFENesin-codeine (ROBITUSSIN AC) 100-10 MG/5ML solution Take 5-10 mLs by mouth every 4 hours as needed for cough 180 mL 1     methylPREDNISolone (MEDROL DOSEPAK) 4 MG tablet therapy pack        metroNIDAZOLE (METROGEL-VAGINAL) 0.75 % vaginal gel Place 0.25 applicators (1 g) vaginally At Bedtime 70 g 1     mirtazapine (REMERON) 15 MG tablet Take 1 tablet (15 mg) by mouth At Bedtime for 7 days, THEN 2 tablets (30 mg) At Bedtime for 21 days. 49 tablet 0     mirtazapine (REMERON) 30 MG tablet Take 1 tablet (30 mg) by mouth At Bedtime 30 tablet 1     mometasone-formoterol (DULERA) 100-5 MCG/ACT inhaler 1 puffs every 4 hours as needed for wheezing and shortness of breath 8.8 g 5     montelukast (SINGULAIR) 10 MG tablet Take 1 tablet (10 mg) by mouth At Bedtime 30 tablet 11     ondansetron  "(ZOFRAN-ODT) 4 MG ODT tab Take 1 tablet (4 mg) by mouth every 6 hours as needed for nausea 12 tablet 0     oxyCODONE-acetaminophen (PERCOCET) 5-325 MG tablet Take 1 tablet by mouth nightly as needed for severe pain 30 tablet 0     polyethylene glycol (MIRALAX) 17 GM/Dose powder Mix entire bottle into 64 oz of electrolyte drink.  At 4 pm on the day prior to colonoscopy, drink 8 ounces every 15-20 minutes until finished 507 g 0     PROAIR  (90 Base) MCG/ACT inhaler Inhale 2 puffs into the lungs every 4 hours as needed for shortness of breath / dyspnea or wheezing 8 g 11     spacer (OPTICHAMBER BINA) holding chamber For use w/ rescue inhaler 1 each 0     tiotropium (SPIRIVA RESPIMAT) 2.5 MCG/ACT inhaler Inhale 2 puffs into the lungs daily 4 g 5     triamcinolone (KENALOG) 0.1 % external cream Apply topically 2 times daily 1 g 1     Allergies   Allergen Reactions     Lidocaine Other (See Comments)     \"my jaw stopped moving\"  Other reaction(s): Dystonia     Penicillins Hives, Rash and Shortness Of Breath     Epinephrine-Lidocaine-Na Metabisulfite Other (See Comments) and Muscle Pain (Myalgia)     Severe jaw cramping, double vision  Jaw locking              Review of Systems:     Constitutional, HEENT, cardiovascular, pulmonary, gi and gu systems are negative, except as otherwise noted.         Physical Exam:     There were no vitals taken for this visit.  Estimated body mass index is 32.49 kg/m  as calculated from the following:    Height as of 2/9/21: 1.753 m (5' 9\").    Weight as of 2/9/21: 99.8 kg (220 lb).    Exam:  Constitutional: healthy, alert and no distress  Psychiatric: mentation appears normal and affect normal/bright     Results from the last 24 hoursNo results found for this or any previous visit (from the past 24 hour(s)).        Assessment and Plan   (Z02.89) Encounter for completion of form with patient  (primary encounter diagnosis)  Comment: Will complete form for patient and give off work " through 2/1/2022.  Recommend follow-up with PCP in 1 month to reassess and determine whether she requires additional extension at that time.  Plan: As above.    (F13.20) Benzodiazepine dependence (H)  Comment: Historical condition.  Not currently asking for refill.  Plan: As above.    (F33.1) Major depressive disorder, recurrent episode, moderate (H)  Comment: Currently treating acute grief reaction with PCP.  Plan: Forms as above.    Refilled medications that would be required in the next 3 months.     After Visit Information:  Patient chose to view AVS via [employer]    No follow-ups on file.    Appointment end time: 4:30 PM  This is a telephone visit that took 10 minutes.      Clinician location:  [facility]     Edilson Palmer MD  I precepted today with Dr. Arnaud Garcia.

## 2021-12-22 ENCOUNTER — TELEPHONE (OUTPATIENT)
Dept: FAMILY MEDICINE | Facility: CLINIC | Age: 46
End: 2021-12-22
Payer: MEDICAID

## 2021-12-22 NOTE — TELEPHONE ENCOUNTER
Received an incoming call today from Lauren who is with [facility]. She explained that this patient has applied for benefits through the state and they're trying to determine if she's eligible. On 12/9, they received a completed form signed by  stating she is able to work. However, on 12/20, they received a second form from  stating she's actually not able to work till Feb 2022. Therefore, they need to know which is the correct and valid conclusion. Would like an update so they know how to proceed and move forward with the decision of her application.    Please assist and provide further action.      Thank you,    Meredith Doll

## 2021-12-23 NOTE — TELEPHONE ENCOUNTER
RN called and left detailed message on identified voice mail for [facility] as per Dr Palmer's message. His completion of form is correct one,most recent to go by any further questions, for Lauren to call clinic back. Pia VÁSQUEZ

## 2021-12-27 NOTE — PROGRESS NOTES
Preceptor Attestation:    I discussed the patient with the resident. I have verified the content of the note, which accurately reflects my assessment of the patient and the plan of care.   Supervising Physician:  Arnaud Garcia.

## 2022-01-05 DIAGNOSIS — M25.571 ACUTE RIGHT ANKLE PAIN: ICD-10-CM

## 2022-01-05 NOTE — TELEPHONE ENCOUNTER
[facility] phone call message- patient requesting a refill:    Full Medication Name:   oxyCODONE-acetaminophen (PERCOCET) 5-325 MG tablet    Dose:   Sig - Route: Take 1 tablet by mouth nightly as needed for severe pain - Oral    Pharmacy confirmed as   [id number] - [place] [address] [address] AT [address] [place] [address]  Phone: [phone] Fax: [phone]  : Yes    Additional Comments:   Patient claims she may have covid as she is currently having symptoms. She refuses to come into the clinic to get tested and stated she's trying to purchase one at the store but they're all out of stock. Therefore, shes requesting for this med to be refilled to help elevate her symptoms. Please advise and provide next coursr of action     OK to leave a message on voice mail? Yes       Primary language: English      needed? No    Call taken on January 5, 2022 at 2:32 PM by Meredith Doll

## 2022-01-06 RX ORDER — OXYCODONE AND ACETAMINOPHEN 5; 325 MG/1; MG/1
1 TABLET ORAL
Qty: 30 TABLET | OUTPATIENT
Start: 2022-01-06

## 2022-01-11 ENCOUNTER — HOSPITAL ENCOUNTER (EMERGENCY)
Facility: CLINIC | Age: 47
Discharge: HOME | End: 2022-01-11
Attending: EMERGENCY MEDICINE | Admitting: EMERGENCY MEDICINE
Payer: COMMERCIAL

## 2022-01-11 ENCOUNTER — APPOINTMENT (OUTPATIENT)
Dept: RADIOLOGY | Facility: CLINIC | Age: 47
End: 2022-01-11
Attending: EMERGENCY MEDICINE
Payer: COMMERCIAL

## 2022-01-11 ENCOUNTER — TELEPHONE (OUTPATIENT)
Dept: FAMILY MEDICINE | Facility: CLINIC | Age: 47
End: 2022-01-11
Payer: COMMERCIAL

## 2022-01-11 VITALS
HEART RATE: 88 BPM | BODY MASS INDEX: 32.58 KG/M2 | WEIGHT: 220 LBS | TEMPERATURE: 98.4 F | RESPIRATION RATE: 16 BRPM | OXYGEN SATURATION: 100 % | DIASTOLIC BLOOD PRESSURE: 83 MMHG | SYSTOLIC BLOOD PRESSURE: 140 MMHG | HEIGHT: 69 IN

## 2022-01-11 DIAGNOSIS — Z20.822 SUSPECTED COVID-19 VIRUS INFECTION: ICD-10-CM

## 2022-01-11 LAB — DEPRECATED S PYO AG THROAT QL EIA: NEGATIVE

## 2022-01-11 PROCEDURE — 71046 X-RAY EXAM CHEST 2 VIEWS: CPT

## 2022-01-11 PROCEDURE — C9803 HOPD COVID-19 SPEC COLLECT: HCPCS

## 2022-01-11 PROCEDURE — 87651 STREP A DNA AMP PROBE: CPT | Performed by: EMERGENCY MEDICINE

## 2022-01-11 PROCEDURE — 93005 ELECTROCARDIOGRAM TRACING: CPT | Performed by: EMERGENCY MEDICINE

## 2022-01-11 PROCEDURE — 99285 EMERGENCY DEPT VISIT HI MDM: CPT | Mod: 25

## 2022-01-11 PROCEDURE — 87636 SARSCOV2 & INF A&B AMP PRB: CPT | Performed by: EMERGENCY MEDICINE

## 2022-01-11 ASSESSMENT — ENCOUNTER SYMPTOMS
NAUSEA: 0
DIARRHEA: 1
VOMITING: 0
ABDOMINAL PAIN: 0
FEVER: 1
MYALGIAS: 1
SORE THROAT: 1
COUGH: 1
SHORTNESS OF BREATH: 1
FATIGUE: 1

## 2022-01-11 ASSESSMENT — MIFFLIN-ST. JEOR: SCORE: 1692.29

## 2022-01-11 NOTE — PROGRESS NOTES
"ASSESSMENT/PLAN:    (J02.0) Streptococcal pharyngitis  (primary encounter diagnosis)  Comment: tested positive in ED yesterday; was unaware of results; is PCN allergic so azithro prescribed  Plan: azithromycin (ZITHROMAX) 250 MG tablet, predniSONE (DELTASONE) 50 MG tablet          (J01.00) Subacute maxillary sinusitis  Comment: will tx w/ doxy given azithro is not ideal for sinusitis and pt is pcn allergic  Plan: doxycycline hyclate (VIBRA-TABS) 100 MG tablet, predniSONE (DELTASONE) 50 MG tablet          (M25.571) Acute right ankle pain  Comment: plan to start wean at next visit  Plan: oxyCODONE-acetaminophen (PERCOCET) 5-325 MG tablet          Robbie Bailon MD  January 12, 2022  2:49 PM        Pt is a 46 year old female last seen on 12/9/2021 here today for:     1) Sore throat and sinus congestion x 1-2 weeks; Multiple negative covid tests which have been negative. Was in ED yesterday. Rapid strep negative but PCR positive. Pt was unaware of results. Notes significant sinus pressure, cough, fever/chills/sweats, wheezing and shortness of breath. \"I always get sick like this in the winter\"  All her kids also have sore throats        Past Medical History:   Diagnosis Date     Agoraphobia with panic attacks      Anxiety      Anxiety      Arthritis     of back     Asthma      Asthma in adult, moderate persistent, uncomplicated      Chronic pain      Chronic sinusitis      Cocaine abuse (H)      Controlled substance agreement signed 6/30/2015    Overview:  Patient has chronic pain and is seen at [facility] for this.  Has controlled substance agreement with them.  On Vicodin, Valium, Klonopin prescribed only from there.        Depression      Hx of seasonal allergies      Infectious pericarditis      Lipoma      Tobacco abuse       Past Surgical History:   Procedure Laterality Date     ANKLE SURGERY Right 05/30/2019     BIOPSY BREAST Right 1990    benign     CREATION PERICARDIAL WINDOW  02/10/2017    [facility]     HEMORRHOIDECTOMY EXTERNAL       TUMOR REMOVAL      Has had 3. In the right breast and \"inside of rib cage.\"      Current Outpatient Medications   Medication Sig Dispense Refill     [START ON 2/10/2022] ALPRAZolam (XANAX) 2 MG tablet Take 1 tablet (2 mg) by mouth 2 times daily as needed for anxiety 60 tablet 3     azithromycin (ZITHROMAX) 250 MG tablet Take 2 tablets (500 mg) by mouth daily for 1 day, THEN 1 tablet (250 mg) daily for 4 days. 6 tablet 0     doxycycline hyclate (VIBRA-TABS) 100 MG tablet Take 1 tablet (100 mg) by mouth 2 times daily for 5 days 10 tablet 0     ibuprofen (ADVIL/MOTRIN) 600 MG tablet Take 1 tablet (600 mg) by mouth 3 times daily as needed for moderate pain 90 tablet 4     oxyCODONE-acetaminophen (PERCOCET) 5-325 MG tablet Take 1 tablet by mouth nightly as needed for severe pain 30 tablet 0     predniSONE (DELTASONE) 50 MG tablet Take 1 tablet (50 mg) by mouth daily 5 tablet 0     cetirizine (ZYRTEC) 10 MG tablet Take 1 tablet (10 mg) by mouth daily 30 tablet 11     estradiol-norethindrone (ACTIVELLA) 1-0.5 MG tablet Take 1 tablet by mouth daily 30 tablet 11     fluticasone (FLONASE) 50 MCG/ACT nasal spray Spray 2 sprays into both nostrils daily 9.9 mL 11     fluticasone-salmeterol (ADVAIR) 500-50 MCG/DOSE inhaler Inhale 1 puff into the lungs every 12 hours 60 Inhaler 11     guaiFENesin-codeine (ROBITUSSIN AC) 100-10 MG/5ML solution Take 5-10 mLs by mouth every 4 hours as needed for cough 180 mL 1     methylPREDNISolone (MEDROL DOSEPAK) 4 MG tablet therapy pack        metroNIDAZOLE (METROGEL-VAGINAL) 0.75 % vaginal gel Place 0.25 applicators (1 g) vaginally At Bedtime 70 g 1     mirtazapine (REMERON) 15 MG tablet Take 1 tablet (15 mg) by mouth At Bedtime for 7 days, THEN 2 tablets (30 mg) At Bedtime for 21 days. 49 tablet 0     mirtazapine (REMERON) 30 MG tablet Take 1 tablet (30 mg) by mouth At Bedtime 30 tablet 1     mometasone-formoterol (DULERA) 100-5 MCG/ACT inhaler 1 " "puffs every 4 hours as needed for wheezing and shortness of breath 8.8 g 5     montelukast (SINGULAIR) 10 MG tablet Take 1 tablet (10 mg) by mouth At Bedtime 30 tablet 11     ondansetron (ZOFRAN-ODT) 4 MG ODT tab Take 1 tablet (4 mg) by mouth every 6 hours as needed for nausea 12 tablet 0     polyethylene glycol (MIRALAX) 17 GM/Dose powder Mix entire bottle into 64 oz of electrolyte drink.  At 4 pm on the day prior to colonoscopy, drink 8 ounces every 15-20 minutes until finished 507 g 0     PROAIR  (90 Base) MCG/ACT inhaler Inhale 2 puffs into the lungs every 4 hours as needed for shortness of breath / dyspnea or wheezing 8 g 11     spacer (OPTICHAMBER BINA) holding chamber For use w/ rescue inhaler 1 each 0     tiotropium (SPIRIVA RESPIMAT) 2.5 MCG/ACT inhaler Inhale 2 puffs into the lungs daily 4 g 5     triamcinolone (KENALOG) 0.1 % external cream Apply topically 2 times daily 1 g 1      Allergies   Allergen Reactions     Lidocaine Other (See Comments)     \"my jaw stopped moving\"  Other reaction(s): Dystonia     Penicillins Hives, Rash and Shortness Of Breath     Epinephrine-Lidocaine-Na Metabisulfite Other (See Comments) and Muscle Pain (Myalgia)     Severe jaw cramping, double vision  Jaw locking        ROS:   Gen- no weight change, no fevers/chills   Head/ Eyes- no blurred vision, no headaches   ENT-see HPI   Cardiac - no palpitations, no chest pain   Respiratory -see HPI   GI - no nausea, no vomiting, no diarrhea, no constipation   Remainder of ROS negative.     Exam:   /88   Pulse 94   Temp 97  F (36.1  C) (Oral)   Resp 18   SpO2 100%    Alert and oriented x 3; No acute distress   HEENT: extraocular movements intact, tympanic membranes clear, oropharynx erythematous; +maxillary and frontal sinus tenderness  Neck: no masses, +ant cervical lymphadenopathy   Resp: clear to auscultation bilaterally, no wheezing/ronchi   CV: rate/rhythm regular, no murmurs/rubs/gallops  Extrem: no " clubbing/cyanosis/edema   Neuro: no focal deficits   Derm: no rashes

## 2022-01-12 ENCOUNTER — OFFICE VISIT (OUTPATIENT)
Dept: FAMILY MEDICINE | Facility: CLINIC | Age: 47
End: 2022-01-12
Payer: COMMERCIAL

## 2022-01-12 ENCOUNTER — PATIENT OUTREACH (OUTPATIENT)
Dept: CARE COORDINATION | Facility: CLINIC | Age: 47
End: 2022-01-12

## 2022-01-12 VITALS
DIASTOLIC BLOOD PRESSURE: 88 MMHG | TEMPERATURE: 97 F | OXYGEN SATURATION: 100 % | HEART RATE: 94 BPM | RESPIRATION RATE: 18 BRPM | SYSTOLIC BLOOD PRESSURE: 129 MMHG

## 2022-01-12 DIAGNOSIS — J02.0 STREPTOCOCCAL PHARYNGITIS: Primary | ICD-10-CM

## 2022-01-12 DIAGNOSIS — F41.1 GENERALIZED ANXIETY DISORDER: ICD-10-CM

## 2022-01-12 DIAGNOSIS — F40.01 PANIC DISORDER WITH AGORAPHOBIA: ICD-10-CM

## 2022-01-12 DIAGNOSIS — J01.00 SUBACUTE MAXILLARY SINUSITIS: ICD-10-CM

## 2022-01-12 DIAGNOSIS — M25.571 ACUTE RIGHT ANKLE PAIN: ICD-10-CM

## 2022-01-12 LAB
ATRIAL RATE - MUSE: 109 BPM
DIASTOLIC BLOOD PRESSURE - MUSE: NORMAL MMHG
FLUAV RNA SPEC QL NAA+PROBE: NEGATIVE
FLUBV RNA RESP QL NAA+PROBE: NEGATIVE
GROUP A STREP BY PCR: DETECTED
INTERPRETATION ECG - MUSE: NORMAL
P AXIS - MUSE: 63 DEGREES
PR INTERVAL - MUSE: 162 MS
QRS DURATION - MUSE: 76 MS
QT - MUSE: 344 MS
QTC - MUSE: 463 MS
R AXIS - MUSE: 58 DEGREES
SARS-COV-2 RNA RESP QL NAA+PROBE: NEGATIVE
SYSTOLIC BLOOD PRESSURE - MUSE: NORMAL MMHG
T AXIS - MUSE: 31 DEGREES
VENTRICULAR RATE- MUSE: 109 BPM

## 2022-01-12 PROCEDURE — 99214 OFFICE O/P EST MOD 30 MIN: CPT | Performed by: FAMILY MEDICINE

## 2022-01-12 RX ORDER — DOXYCYCLINE HYCLATE 100 MG
100 TABLET ORAL 2 TIMES DAILY
Qty: 10 TABLET | Refills: 0 | Status: SHIPPED | OUTPATIENT
Start: 2022-01-12 | End: 2022-01-17

## 2022-01-12 RX ORDER — AZITHROMYCIN 250 MG/1
TABLET, FILM COATED ORAL
Qty: 6 TABLET | Refills: 0 | Status: SHIPPED | OUTPATIENT
Start: 2022-01-12 | End: 2022-01-17

## 2022-01-12 RX ORDER — ALPRAZOLAM 2 MG
2 TABLET ORAL 2 TIMES DAILY PRN
Qty: 60 TABLET | Refills: 3 | Status: SHIPPED | OUTPATIENT
Start: 2022-02-10 | End: 2022-03-03

## 2022-01-12 RX ORDER — PREDNISONE 50 MG/1
50 TABLET ORAL DAILY
Qty: 5 TABLET | Refills: 0 | Status: SHIPPED | OUTPATIENT
Start: 2022-01-12 | End: 2022-02-23

## 2022-01-12 RX ORDER — OXYCODONE AND ACETAMINOPHEN 5; 325 MG/1; MG/1
1 TABLET ORAL
Qty: 30 TABLET | Refills: 0 | Status: SHIPPED | OUTPATIENT
Start: 2022-01-12 | End: 2022-02-14

## 2022-01-12 NOTE — DISCHARGE INSTRUCTIONS
I suspect that you have COVID-19.     If you have symptoms: stay home and away from others (self-isolate) until:  You've had no fever--and no medicine that reduces fever--for 1 full day (24 hours). AND   Your other symptoms have gotten better. For example, yourcough or breathing has improved. AND   At least 10 days have passed since your symptoms started. (If you ve been told by a doctor that you have a weak immune system, wait20 days.)      During this time:  Stay in your own room, including for meals. Use your own bathroom if you can.  Stay away fromothers in your home. No hugging, kissing or shaking hands. No visitors.   Don't go to work, school or anywhere else.   Clean  high touch  surfaces often (doorknobs,counters, handles, etc.). Use a household cleaning spray or wipes. You'll find a full list on the EPA website at www.epa.gov/pesticide-registration/list-n-disinfectants-use-against-sars-cov-2.   Cover your mouth andnose with a mask, tissue or other face covering to avoid spreading germs.  Wash your hands and face often with soap and water.  Caregivers in these groups are at risk for severe illness due toCOVID-19:  People 65 years and older  People who live in a nursing home or long-term care facility  People with chronic disease (lung, heart, cancer, diabetes, kidney,liver, immunologic)  People who have a weakened immune system, including those who:  Are in cancer treatment  Take medicine that weakens the immune system, such ascorticosteroids  Had a bone marrow or organ transplant  Have an immune deficiency  Have poorly controlled HIV or AIDS  Are obese (body mass index of 40 or higher)  Smokeregularly  Caregivers should wear gloves while washing dishes, handling laundry and cleaning bedrooms and bathrooms.  Wash and dry laundry with special caution. Don't shake dirtylaundry, and use the warmest water setting you can.  If you have a weakened immune system, ask your doctor about other actions you should  take.  For more tips, go to www.cdc.gov/coronavirus/2019-ncov/downloads/10Things.pdf.       How can Itake care of myself?  Get lots of rest. Drink extra fluids (unless a doctor has told you not to).  Take Tylenol (acetaminophen) for fever or pain. If you have liver or kidney problems, ask your family doctor if it's okay to take Tylenol.   Take either:   650 mg (two 325 mgpills) every 4 to 6 hours, or   1,000 mg (two 500 mg pills) every 8 hours as needed.   Note: Don't take more than 3,000 mg in one day. Acetaminophen is found in manymedicines (both prescribed and over-the-counter medicines). Read all labels to be sure you don't take too much.    For children, check the Tylenol bottle for the rightdose (based on their age or weight).     If you have other health problems (like cancer, heart failure, an organ transplant or severe kidney disease): Call your specialty clinic if you don't feel better in the next 2 days.  Know when to call 911: Emergency warning signs include:  Trouble breathing orshortness of breath  Pain or pressure in the chest that doesn't go away  Feeling confused like you haven't felt before, or not being able to wake up  Bluish-coloredlips or face    =================================================================================  You should not go back to work until you meet the guidelines for ending yourhome isolation.  Stay home and away from others (self-isolate) until:  You've had no fever--and no medicine that reduces fever--for 1full day (24 hours). AND   Your other symptoms have gotten better. For example, your cough or breathing has improved. AND   At least 10 dayshave passed since your symptoms started. (If you ve been told by a doctor that you have a weak immune system, wait 20 days.)     You don't need to be retested forCOVID-19 before going back to work--studies show that you won't spread the virus if it's been at least 10 days since your symptoms started (or 20 days, if you have a  weak immune system).      ~~~~~~~~~~~~~~~~~~~~~~~~~~~~~~~~~~~~~~~~~~~~~~~~~~~~~~~~~~~~~~~~~~~~~~~~~~~~~~~~~~~~~~~~~~~~~~~~~~~~~~~~~~~~~~~~~~~~~~~~~~~~~~~~~~~~  If you test positive for COVID-19 you may be a candidate for treatment with monoclonal antibodies.  Monoclonal antibody treatment is a limited resource and patients must go through a randomization process and, if selected, can then receive infusion of monoclonal antibody.  You can submit your name for consideration of randomization through the [place] [employer] [place] Resource [employer]ocation Platform (MNRAP) by going to the website listed below and following enrollment instructions.      [email or web address]/coronavirus/mnrap1.html    Monoclonal antibody treatment can be used in people 12 years of age and older who weigh at least 88 pounds (40 kg) and:    1. Test positive for COVID-19  2. Are within 10 days of the start of their symptoms.  3. NOT BE HOSPITALIZED      What is monoclonal antibody treatment?    Antibodies are proteins that people's bodies make to fight viruses, such as the virus that causes COVID-19.  Antibodies made in a laboratory act a lot like natural antibodies to limit the amount of virus in your body.  They are called monoclonal antibodies.  Antibodies must be given into a vein by intravenous (IV) infusion.  Antibodies may be administered only in settings where health care providers have immediate access to medications to treat any reactions and where emergency medical systems are available, if needed.  Monoclonal antibody treatment with bamlanivimab and etesevimab or with casirivimab and imdevimab are for people who have tested positive for COVID-19 and have mild to moderate symptoms. These treatments are allowed by the U.S. Food and Drug Administration (FDA) under an Emergency Use Authorization (EUA) while clinical studies continue to look at their usefulness and safety.    What are the benefits?    Clinical  trials for monoclonal antibodies against COVID-19 have shown a decrease in hospitalizations and emergency room visits and a decrease in the amount of virus carried by an infected person. Studies are still ongoing.    What is Emergency Use Authorization?    Drug treatments available through [facility] are authorized by the FDA under an emergency use authorization (EUA), meaning they have not been fully FDA-approved. In an emergency, like a pandemic, it may not be possible to have all the evidence that the FDA would usually have before approving a treatment. In this situation, the FDA can make a judgment to release drug treatments for use before it's fully approved. If there's evidence that strongly suggests that patients have benefited from a treatment, the agency can issue an EUA to make it available. An EUA does not mean that a treatment has not been studied, or that a treatment is experimental. Receiving a drug under an EUA is different than participating in a clinical trial.       Employers: This document serves as formal notice of your employee's medical guidelines for going back to work. They must meet the above guidelines before going back to work in person.

## 2022-01-12 NOTE — CONFIDENTIAL NOTE
"01/11/22    Received on-call page from patient regarding \"shortness of breath\".   I called within 5 minutes of receiving page.  She is already at [facility].   I encouraged her to seek appropriate cares there.    Nathan Frias DO   [facility] Medicine Resident   Pager#[phone]          "

## 2022-01-12 NOTE — ED TRIAGE NOTES
Cold symptoms x 1 week. Sore throat and shortness of breath that started today. Patient also complains of increasing fatigue. In triage oxygen sats are 97%, respirations are even and unlabored.

## 2022-01-12 NOTE — PROGRESS NOTES
Clinic Care Coordination Contact    Care Coordination ED Discharge Follow up Note    Patient referred for Virtual Home Monitoring Program for COVID-19 following recent [facility] ED visit.    RN has confirmed a [facility] referral is in place.    Criteria for Virtual Home Monitoring telephone outreach is not met after review of ED encounter/ED provider note because:    1) ED provider note indicates assessment was negative for respiratory distress. O2 sats were stable throughout course of ED visit per chart review.      2) Patient was not discharged with new supplemental oxygen.    Per notes, ED provider and/or ED team discussed appropriate follow up guidelines with patient prior to discharge, or reflected these instructions on AVS.       [facility] team remains available to support patient via [employer] account once activated by patient.     Patriica Manuel RN  [facility]  - Clinic Care Coordinator

## 2022-01-15 ENCOUNTER — HEALTH MAINTENANCE LETTER (OUTPATIENT)
Age: 47
End: 2022-01-15

## 2022-01-19 ENCOUNTER — OFFICE VISIT (OUTPATIENT)
Dept: FAMILY MEDICINE | Facility: CLINIC | Age: 47
End: 2022-01-19
Payer: COMMERCIAL

## 2022-01-19 VITALS
TEMPERATURE: 98.2 F | OXYGEN SATURATION: 100 % | RESPIRATION RATE: 18 BRPM | DIASTOLIC BLOOD PRESSURE: 75 MMHG | SYSTOLIC BLOOD PRESSURE: 117 MMHG | HEART RATE: 88 BPM

## 2022-01-19 DIAGNOSIS — Z20.822 COVID-19 RULED OUT: Primary | ICD-10-CM

## 2022-01-19 PROCEDURE — U0003 INFECTIOUS AGENT DETECTION BY NUCLEIC ACID (DNA OR RNA); SEVERE ACUTE RESPIRATORY SYNDROME CORONAVIRUS 2 (SARS-COV-2) (CORONAVIRUS DISEASE [COVID-19]), AMPLIFIED PROBE TECHNIQUE, MAKING USE OF HIGH THROUGHPUT TECHNOLOGIES AS DESCRIBED BY CMS-2020-01-R: HCPCS | Performed by: FAMILY MEDICINE

## 2022-01-19 PROCEDURE — 99213 OFFICE O/P EST LOW 20 MIN: CPT | Mod: CS | Performed by: FAMILY MEDICINE

## 2022-01-19 PROCEDURE — U0005 INFEC AGEN DETEC AMPLI PROBE: HCPCS | Performed by: FAMILY MEDICINE

## 2022-01-19 NOTE — LETTER
RETURN TO WORK NOTE    1/19/2022    Re: Darlene Hudson  1975      To Whom It May Concern:     Darlene Hudson was seen in clinic today for known covid exposure and new symptoms. She was tested for COVID today. She will isolate x 5 more days. She may return to work without restrictions on 1/25/22                  Robbie Bailon MD  1/19/2022 4:24 PM   
Yes

## 2022-01-19 NOTE — PROGRESS NOTES
Chief Complaint   Patient presents with     Covid 19 Testing     son tested positive for covid 1/14/22- sore throat started agin this morning      Medication Reconciliation   .         SUBJECTIVE         Darlene Hudson is a 46 year old  female with a PMH significant for   Patient Active Problem List   Diagnosis     Abnormal cytology finding     Nondependent alcohol abuse, episodic drinking behavior     Controlled substance agreement signed     Low back pain     Chronic sinusitis     Major depressive disorder, recurrent episode, moderate (H)     Tobacco use disorder     Noninflammatory disorder of vagina     Pap smear for cervical cancer screening     Abdominal pain     Abnormal cervical Papanicolaou smear     Panic disorder with agoraphobia     Atopic rhinitis     Chronic low back pain     Other long term (current) drug therapy     Acute pericardial effusion     Anxiety     Chronic infectious pericarditis     Polysubstance abuse (H)     Cough     Arthralgia of both lower legs     Moderate persistent asthma without complication     Physiologic disturbance of temperature regulation     Family history of colon cancer      who presents to clinic with   Patient endorses symptoms for the last 1 day/s. Patient is experiencing runny nose, ear pain bilateral and sore throat. Patient denies fever, cough - non-productive, cough - productive, wheezing, shortness of breath, body aches and fatigue. Their course is worsening.  Patient has tried nyquil and ibuprofen. Patient is not considered high risk based on medical history. Patient denies any travel, denies exposure to persons with known travel, and reports exposure to patients with known COVID19. Son tested positive for COVID 5 days ago.    She was seen in clinic last week after positive strep test and sinus infection (see below); felt better until yesterday; son also tested positive for strep throat    Per my last note on 1/12/2022:    (J02.0) Streptococcal pharyngitis   (primary encounter diagnosis)  Comment: tested positive in ED yesterday; was unaware of results; is PCN allergic so azithro prescribed  Plan: azithromycin (ZITHROMAX) 250 MG tablet, predniSONE (DELTASONE) 50 MG tablet          (J01.00) Subacute maxillary sinusitis  Comment: will tx w/ doxy given azithro is not ideal for sinusitis and pt is pcn allergic  Plan: doxycycline hyclate (VIBRA-TABS) 100 MG tablet, predniSONE (DELTASONE) 50 MG tablet          (M25.571) Acute right ankle pain  Comment: plan to start wean at next visit  Plan: oxyCODONE-acetaminophen (PERCOCET) 5-325 MG tablet        REVIEW OF SYSTEMS     Head: No headache or facial pain  Neck: Yes throat pain, No swallowing problems  ENT: No ear pain and nasal congestion   Chest: No chest pain   GI: No constipation, diarrhea, no nausea or vomiting  Skin: No rash        OBJECTIVE     Vitals:    01/19/22 1558   BP: 117/75   Pulse: 88   Resp: 18   Temp: 98.2  F (36.8  C)   TempSrc: Oral   SpO2: 100%       Constitutional: Awake, alert, cooperative, no apparent distress, and appears stated age. Appears well hydrated  Eyes: Lids and lashes normal, sclera clear, conjunctiva normal.  ENT: Normocephalic, without obvious abnormality, atramatic, tonsils 2+ with no erythema or exudate, no lymphadenopathy    Lungs: No increased work of breathing, good air exchange, lungs clear to auscultation bilaterally, no crackles or wheezing.  Cardiovascular: Regular rate and rhythm, normal S1 and S2, no S3 or S4, and no murmur noted.  Musculoskeletal: No redness, warmth, or swelling of the joints.  Full range of motion noted.  Neurologic: Awake, alert, oriented to name, place and time.  Cranial nerves II-XII are grossly intact.  Skin: No rash    No results found for this or any previous visit (from the past 24 hour(s)).    ASSESSMENT AND PLAN      Darlene was seen today for covid 19 testing and medication reconciliation.    Diagnoses and all orders for this visit:    COVID-19 ruled  out  -     Symptomatic; Unknown COVID-19 Virus (Coronavirus) by PCR; Future  -     Symptomatic; Unknown COVID-19 Virus (Coronavirus) by PCR Nose      - Patient deemed to be safe to continue supportive cares at home and self-isolation  - Patient must isolate until test results return.    - Encouraged family to limit contact with others, wearing masks, and practice good hand hygiene habits.    - Order placed for COVID19 PCR (normal) - [facility]    The patient was swabbed in clinic by staff for a nasal PCR test.    Robbie Bailon MD  January 19, 2022  5:03 PM

## 2022-01-20 LAB — SARS-COV-2 RNA RESP QL NAA+PROBE: NEGATIVE

## 2022-02-14 ENCOUNTER — TELEPHONE (OUTPATIENT)
Dept: FAMILY MEDICINE | Facility: CLINIC | Age: 47
End: 2022-02-14
Payer: COMMERCIAL

## 2022-02-14 DIAGNOSIS — M25.571 ACUTE RIGHT ANKLE PAIN: ICD-10-CM

## 2022-02-14 NOTE — TELEPHONE ENCOUNTER
[facility] phone call message- patient requesting a refill:    Full Medication Name: oxyCODONE-acetaminophen (PERCOCET) 5-325 MG tablet      Pharmacy confirmed as   [id number] - [place] [address] [address] AT [address] [place] [address]  Phone: [phone] Fax: [phone]  : Yes    Additional Comments:      OK to leave a message on voice mail? Yes    Primary language: English      needed? No    Call taken on February 14, 2022 at 2:52 PM by Christine Hyde

## 2022-02-15 RX ORDER — OXYCODONE AND ACETAMINOPHEN 5; 325 MG/1; MG/1
1 TABLET ORAL
Qty: 30 TABLET | Refills: 0 | Status: SHIPPED | OUTPATIENT
Start: 2022-02-15 | End: 2022-03-03

## 2022-02-16 ENCOUNTER — OFFICE VISIT (OUTPATIENT)
Dept: FAMILY MEDICINE | Facility: CLINIC | Age: 47
End: 2022-02-16
Payer: COMMERCIAL

## 2022-02-16 VITALS
RESPIRATION RATE: 16 BRPM | HEART RATE: 89 BPM | OXYGEN SATURATION: 99 % | SYSTOLIC BLOOD PRESSURE: 102 MMHG | TEMPERATURE: 97.8 F | DIASTOLIC BLOOD PRESSURE: 76 MMHG

## 2022-02-16 DIAGNOSIS — Z20.822 SUSPECTED 2019 NOVEL CORONAVIRUS INFECTION: ICD-10-CM

## 2022-02-16 DIAGNOSIS — J06.9 VIRAL URI: Primary | ICD-10-CM

## 2022-02-16 DIAGNOSIS — Z23 HIGH PRIORITY FOR 2019-NCOV VACCINE: ICD-10-CM

## 2022-02-16 LAB
FLUAV AG SPEC QL IA: NEGATIVE
FLUBV AG SPEC QL IA: NEGATIVE

## 2022-02-16 PROCEDURE — 91306 COVID-19,PF,MODERNA (18+ YRS BOOSTER .25ML): CPT | Performed by: FAMILY MEDICINE

## 2022-02-16 PROCEDURE — U0003 INFECTIOUS AGENT DETECTION BY NUCLEIC ACID (DNA OR RNA); SEVERE ACUTE RESPIRATORY SYNDROME CORONAVIRUS 2 (SARS-COV-2) (CORONAVIRUS DISEASE [COVID-19]), AMPLIFIED PROBE TECHNIQUE, MAKING USE OF HIGH THROUGHPUT TECHNOLOGIES AS DESCRIBED BY CMS-2020-01-R: HCPCS | Performed by: FAMILY MEDICINE

## 2022-02-16 PROCEDURE — U0005 INFEC AGEN DETEC AMPLI PROBE: HCPCS | Performed by: FAMILY MEDICINE

## 2022-02-16 PROCEDURE — 0064A COVID-19,PF,MODERNA (18+ YRS BOOSTER .25ML): CPT | Performed by: FAMILY MEDICINE

## 2022-02-16 PROCEDURE — 87804 INFLUENZA ASSAY W/OPTIC: CPT | Performed by: FAMILY MEDICINE

## 2022-02-16 PROCEDURE — 99214 OFFICE O/P EST MOD 30 MIN: CPT | Mod: 25 | Performed by: FAMILY MEDICINE

## 2022-02-16 NOTE — PROGRESS NOTES
Assessment & Plan   Darlene Hudson is a 46 year old female with pmhx including asthma who has received [id number] vaccine and yearly influenza vaccine who presents for viral respiratory illness.     Diagnosis Comments   1. Viral URI  Symptomatic; Unknown COVID-19 Virus (Coronavirus) by PCR, Influenza A & B Antigen    2. Suspected 2019 novel coronavirus infection  Symptomatic; Unknown COVID-19 Virus (Coronavirus) by PCR, Influenza A & B Antigen    3. High priority for 2019-nCoV vaccine  COVID-19,PF,MODERNA (18+ Yrs BOOSTER .25mL)      Suspect viral URI, potentially COVID19, though abrupt onset also suggest influenza, vs other. Also consider viral gastroenteritis with early GI symptoms. COVID19 and influenza testing today. Sent with ranitidine due to vomiting and esophagitis. She was interested in COVID19 booster today as she will be off work due to her current symptoms.     I spent a total of 28 minutes on the day of the visit.   Time spent doing chart review, history and exam, documentation and further activities per the note    Benjamin Rosenstein, MD, MA  [facility] Subjective Gina is a 46 year old who presents for the following     Chief Complaint   Patient presents with     Pharyngitis     sore throat     HPI       Symptoms started approximately yesterday afternoonm 2/15/22. These include cough, fever, sore throat, headache, no loss of taste/smell. They have also experienced nausea, vomiting, and diarrhea. They started with nausea and diarrhea and progressed to sore throat and additional sytmptoms. They have continued to have symptoms including sore throat and cough. Diarrhea has resolved. They have not felt shortness of breath. Appetite has been poor with nausea. Fluid intake is good. They have not been exposed to someone with COVID19. States her son had COVID19 about 1 month ago but resolved. He had similar symtpoms of diarrhea, cough,  sore throat recently though rapid test was negative. Therapies tried include ibuprofen as needed for fevers.    Vaccination status: COVID19 - x2, Influenza - yes  Interested in COVID19 booster today          Objective    /76   Pulse 89   Temp 97.8  F (36.6  C) (Oral)   Resp 16   SpO2 99%   There is no height or weight on file to calculate BMI.  Physical Exam   General: Awake, seated comfortably, appears well and NAD   HEENT:  - Head: Atraumatic, normocephalic  - Eyes: Corneas clear, sclera without injection, no discharge, EOMI, PERRLA  - Nose: Nares patent, clear rhinorrhea, no congestion  - Throat: Oropharynx clear without lesions, tonsils normal  CV: RRR, normal S1/S2, no murmurs/rubs/gallops  Pulm: Normal WOB, lungs CTAB, no wheezes or crackles, good air movement   Neuro: Alert, answering questions appropriately, normal thought processes; Normal Gait

## 2022-02-17 LAB — SARS-COV-2 RNA RESP QL NAA+PROBE: NEGATIVE

## 2022-02-18 ENCOUNTER — MEDICAL CORRESPONDENCE (OUTPATIENT)
Dept: HEALTH INFORMATION MANAGEMENT | Facility: CLINIC | Age: 47
End: 2022-02-18
Payer: COMMERCIAL

## 2022-02-23 ENCOUNTER — OFFICE VISIT (OUTPATIENT)
Dept: FAMILY MEDICINE | Facility: CLINIC | Age: 47
End: 2022-02-23
Payer: COMMERCIAL

## 2022-02-23 VITALS
SYSTOLIC BLOOD PRESSURE: 116 MMHG | DIASTOLIC BLOOD PRESSURE: 81 MMHG | RESPIRATION RATE: 16 BRPM | HEART RATE: 90 BPM | OXYGEN SATURATION: 95 % | TEMPERATURE: 97.6 F

## 2022-02-23 DIAGNOSIS — R53.83 OTHER FATIGUE: Primary | ICD-10-CM

## 2022-02-23 DIAGNOSIS — K21.9 GASTROESOPHAGEAL REFLUX DISEASE WITHOUT ESOPHAGITIS: ICD-10-CM

## 2022-02-23 DIAGNOSIS — J06.9 VIRAL URI: ICD-10-CM

## 2022-02-23 PROCEDURE — 99214 OFFICE O/P EST MOD 30 MIN: CPT | Mod: GC | Performed by: STUDENT IN AN ORGANIZED HEALTH CARE EDUCATION/TRAINING PROGRAM

## 2022-02-23 NOTE — LETTER
RETURN TO WORK/SCHOOL FORM    2/23/2022    Re: Darlene Hudson  1975      To Whom It May Concern:     Darlene Hudson was seen in clinic today.  She may return to work without restrictions on 2/25/22.          Restrictions:  Nonefull duty      Zach Ye MD  2/23/2022 2:33 PM

## 2022-02-23 NOTE — PROGRESS NOTES
Preceptor Attestation:  Patient's case reviewed and discussed with Zach Ye MD resident and I evaluated the patient. I agree with written assessment and plan of care.  Supervising Physician:  RAMON WILSON MD  [facility]

## 2022-02-23 NOTE — PROGRESS NOTES
CHIEF COMPLAINT                                                      Chief Complaint   Patient presents with     Follow Up     Headaches and sore throat, covid test neg 2/16     Patient Request for Note/Letter     Been out of work x1 week and like to get a excuse note for work     SUBJECTIVE:                                                    Darlene Hudson is a 46 year old year old female who presents to clinic today for the following health issues:    Viral URI  -Patient was seen on 2/16 for possibly viral URI vs viral gastro enteritis  -Flu and COVID tests were negative from that day  -Patient started to feel better after about two days, but since she got her booster shot she has started to feel quite fatigued  -Heart burn has been out of control the last two days  -With that has noticed she has some increased phlegm and cough, but it does not feel like an asthma flare as she is not short of breath and has no wheezing  -Feels like she has no energy  -Lays in bed and contemplates getting out of her house but is too tired  -Has not been eating a lot but is keeping well hydrated with this   -Does not take any medication for heart burn at baseline, was started on ranitidine her last visit but was unable to get this filled  -Tums up to four times a day have not been helping for this   -Wants that to go to the [facility] on [address] as that wont have issues hopefully   -Here today to make sure that we are not missing something    Patient is an established patient of this clinic.  ----------------------------------------------------------------------------------------------------------------------  Patient Active Problem List   Diagnosis     Abnormal cytology finding     Nondependent alcohol abuse, episodic drinking behavior     Controlled substance agreement signed     Low back pain     Chronic sinusitis     Major depressive disorder, recurrent episode, moderate (H)     Tobacco use  "disorder     Noninflammatory disorder of vagina     Pap smear for cervical cancer screening     Abdominal pain     Abnormal cervical Papanicolaou smear     Panic disorder with agoraphobia     Atopic rhinitis     Chronic low back pain     Other long term (current) drug therapy     Acute pericardial effusion     Anxiety     Chronic infectious pericarditis     Polysubstance abuse (H)     Cough     Arthralgia of both lower legs     Moderate persistent asthma without complication     Physiologic disturbance of temperature regulation     Family history of colon cancer     Past Surgical History:   Procedure Laterality Date     ANKLE SURGERY Right 2019     BIOPSY BREAST Right     benign     CREATION PERICARDIAL WINDOW  02/10/2017    [facility]     HEMORRHOIDECTOMY EXTERNAL       TUMOR REMOVAL      Has had 3. In the right breast and \"inside of rib cage.\"       Social History     Tobacco Use     Smoking status: Former Smoker     Packs/day: 0.10     Years: 30.00     Pack years: 3.00     Types: Cigarettes     Quit date: 2020     Years since quittin.7     Smokeless tobacco: Current User     Tobacco comment: 2-3 cig/day   Substance Use Topics     Alcohol use: Not Currently     Comment: Occasiaonlly.      Family History   Problem Relation Age of Onset     Diabetes Brother      Hypertension Mother      Other Cancer Mother         cervical cancer     Other Cancer Father         lung cancer     Asthma Father      Breast Cancer Maternal Grandmother      Asthma Child      Diabetes Brother      Cancer Mother         cervical          Problem list and past medical, surgical, social, and family histories reviewed & adjusted, as indicated.    Current Outpatient Medications   Medication Sig Dispense Refill     ALPRAZolam (XANAX) 2 MG tablet Take 1 tablet (2 mg) by mouth 2 times daily as needed for anxiety 60 tablet 3     cetirizine (ZYRTEC) 10 MG tablet Take 1 tablet (10 mg) by mouth daily 30 tablet 11     " estradiol-norethindrone (ACTIVELLA) 1-0.5 MG tablet Take 1 tablet by mouth daily 30 tablet 11     fluticasone (FLONASE) 50 MCG/ACT nasal spray Spray 2 sprays into both nostrils daily 9.9 mL 11     fluticasone-salmeterol (ADVAIR) 500-50 MCG/DOSE inhaler Inhale 1 puff into the lungs every 12 hours 60 Inhaler 11     guaiFENesin-codeine (ROBITUSSIN AC) 100-10 MG/5ML solution Take 5-10 mLs by mouth every 4 hours as needed for cough 180 mL 1     ibuprofen (ADVIL/MOTRIN) 600 MG tablet Take 1 tablet (600 mg) by mouth 3 times daily as needed for moderate pain 90 tablet 4     methylPREDNISolone (MEDROL DOSEPAK) 4 MG tablet therapy pack        metroNIDAZOLE (METROGEL-VAGINAL) 0.75 % vaginal gel Place 0.25 applicators (1 g) vaginally At Bedtime 70 g 1     mirtazapine (REMERON) 15 MG tablet Take 1 tablet (15 mg) by mouth At Bedtime for 7 days, THEN 2 tablets (30 mg) At Bedtime for 21 days. 49 tablet 0     mirtazapine (REMERON) 30 MG tablet Take 1 tablet (30 mg) by mouth At Bedtime 30 tablet 1     mometasone-formoterol (DULERA) 100-5 MCG/ACT inhaler 1 puffs every 4 hours as needed for wheezing and shortness of breath 8.8 g 5     montelukast (SINGULAIR) 10 MG tablet Take 1 tablet (10 mg) by mouth At Bedtime 30 tablet 11     ondansetron (ZOFRAN-ODT) 4 MG ODT tab Take 1 tablet (4 mg) by mouth every 6 hours as needed for nausea 12 tablet 0     oxyCODONE-acetaminophen (PERCOCET) 5-325 MG tablet Take 1 tablet by mouth nightly as needed for severe pain 30 tablet 0     polyethylene glycol (MIRALAX) 17 GM/Dose powder Mix entire bottle into 64 oz of electrolyte drink.  At 4 pm on the day prior to colonoscopy, drink 8 ounces every 15-20 minutes until finished 507 g 0     predniSONE (DELTASONE) 50 MG tablet Take 1 tablet (50 mg) by mouth daily (Patient not taking: Reported on 1/19/2022) 5 tablet 0     PROAIR  (90 Base) MCG/ACT inhaler Inhale 2 puffs into the lungs every 4 hours as needed for shortness of breath / dyspnea or wheezing 8  "g 11     ranitidine (ZANTAC) 150 MG tablet Take 1 tablet (150 mg) by mouth 2 times daily as needed for heartburn 30 tablet 0     spacer (OPTICHAMBER BINA) holding chamber For use w/ rescue inhaler 1 each 0     tiotropium (SPIRIVA RESPIMAT) 2.5 MCG/ACT inhaler Inhale 2 puffs into the lungs daily 4 g 5     triamcinolone (KENALOG) 0.1 % external cream Apply topically 2 times daily 1 g 1     Medication list reviewed and updated as indicated.    Allergies   Allergen Reactions     Lidocaine Other (See Comments)     \"my jaw stopped moving\"  Other reaction(s): Dystonia     Penicillins Hives, Rash and Shortness Of Breath     Epinephrine-Lidocaine-Na Metabisulfite Other (See Comments) and Muscle Pain (Myalgia)     Severe jaw cramping, double vision  Jaw locking     Allergies reviewed and updated as indicated.  ----------------------------------------------------------------------------------------------------------------------  ROS:  Constitutional, HEENT, cardiovascular, pulmonary, GI, musculoskeletal, neuro, skin, and psych systems are negative, except as otherwise noted.    OBJECTIVE:     /81 (BP Location: Right arm, Patient Position: Sitting, Cuff Size: Adult Large)   Pulse 90   Temp 97.6  F (36.4  C) (Oral)   Resp 16   SpO2 95%   There is no height or weight on file to calculate BMI.  Exam:  Constitutional: healthy, alert and no distress  Head: Normocephalic. No masses, lesions, tenderness or abnormalities  ENT: ENT exam normal, no neck nodes or sinus tenderness  Cardiovascular: negative, PMI normal. No lifts, heaves, or thrills. RRR. No murmurs, clicks gallops or rub  Respiratory: negative, Percussion normal. Good diaphragmatic excursion. Lungs clear  Gastrointestinal: Abdomen soft, non-tender. BS normal. No masses, organomegaly  Musculoskeletal: extremities normal- no gross deformities noted, gait normal and normal muscle tone  Skin: no suspicious lesions or rashes  Neurologic: Gait normal. Reflexes normal " and symmetric. Sensation grossly WNL.  Psychiatric: mentation appears normal and affect normal/bright  Hematologic/Lymphatic/Immunologic: Normal cervical lymph nodes    Diagnostic Test Results:  Ordered but patient had to leave for family emergency, will return for lab only appointment to help with this    ASSESSMENT/PLAN:     (R53.83) Other fatigue  (primary encounter diagnosis)  (J06.9) Viral URI  (K21.9) Gastroesophageal reflux disease without esophagitis  -Patient here following up some fatigue that was first noticed during a likely viral URI that got better and has now gotten worse over the last two days   -Discussed symptoms and patient denies any night sweats, unexplained weight loss, new concerning lumps/bumps, or other concerns  -Discussed seasonal affective disorder and sometimes feeling overwhelmed especially during this time of the season  -However to be safe will obtain some routine blood work to see if there are any obvious reasons that could contribute to her fatigue or help guide diagnosis  -Ordered CBC with platelets, CMP, and TSH  -Patient unfortunately had a family emergency and could not stay to collect these, she will return for a lab only visit   -Does have a family history of colonoscopy and it appears we attempted to get her set up with one in 2020 but she was unable to follow through with this  -If symptoms persist and blood work does not show any concerning findings could work on getting this scheduled   -Otherwise knows symptoms to return for  -Of note, patient has history of GERD, would like some medication sent for that  -Ordered some omeprazole as it appears this may be covered, ranitidine was not    There are no discontinued medications.    Options for treatment and follow-up care were reviewed with the patient and/or guardian. Darlene Hudson and/or guardian engaged in the decision making process and verbalized understanding of the options discussed and agreed with the final  plan    Precepted with Dr. Ward.    Zach Ye MD on 2/23/2022 at 2:22 PM

## 2022-03-02 NOTE — PROGRESS NOTES
ASSESSMENT/PLAN:    ***      Pt is a 46 year old female last seen on 2/23/2022 by Dr Ye here today for:     Med reconciliation - all scripts sent to new pharmacy    Fatigue - felt sluggish post-booster      Per Dr Ye's note:     (R53.83) Other fatigue  (primary encounter diagnosis)  (J06.9) Viral URI  (K21.9) Gastroesophageal reflux disease without esophagitis  -Patient here following up some fatigue that was first noticed during a likely viral URI that got better and has now gotten worse over the last two days   -Discussed symptoms and patient denies any night sweats, unexplained weight loss, new concerning lumps/bumps, or other concerns  -Discussed seasonal affective disorder and sometimes feeling overwhelmed especially during this time of the season  -However to be safe will obtain some routine blood work to see if there are any obvious reasons that could contribute to her fatigue or help guide diagnosis  -Ordered CBC with platelets, CMP, and TSH  -Patient unfortunately had a family emergency and could not stay to collect these, she will return for a lab only visit   -Does have a family history of colonoscopy and it appears we attempted to get her set up with one in 2020 but she was unable to follow through with this  -If symptoms persist and blood work does not show any concerning findings could work on getting this scheduled   -Otherwise knows symptoms to return for  -Of note, patient has history of GERD, would like some medication sent for that  -Ordered some omeprazole as it appears this may be covered, ranitidine was not    Per recent phone message:  sEmer Chavez, [facility]  Robbie Bailon MD  Hi Karoline Esparza in this afternoon for an office visit. She ask that I let you know to switch her pharmacy to [facility] [address] , [place], [place] due to her restricted. State she's having issue picking up her medication from the [facility] [address] [place]

## 2022-03-03 ENCOUNTER — VIRTUAL VISIT (OUTPATIENT)
Dept: FAMILY MEDICINE | Facility: CLINIC | Age: 47
End: 2022-03-03
Payer: COMMERCIAL

## 2022-03-03 DIAGNOSIS — J45.40 MODERATE PERSISTENT ASTHMA WITHOUT COMPLICATION: ICD-10-CM

## 2022-03-03 DIAGNOSIS — N95.1 MENOPAUSAL SYNDROME (HOT FLASHES): ICD-10-CM

## 2022-03-03 DIAGNOSIS — F40.01 PANIC DISORDER WITH AGORAPHOBIA: ICD-10-CM

## 2022-03-03 DIAGNOSIS — J45.51 SEVERE PERSISTENT ASTHMA WITH ACUTE EXACERBATION (H): ICD-10-CM

## 2022-03-03 DIAGNOSIS — J30.2 SEASONAL ALLERGIC RHINITIS, UNSPECIFIED TRIGGER: ICD-10-CM

## 2022-03-03 DIAGNOSIS — F41.1 GENERALIZED ANXIETY DISORDER: ICD-10-CM

## 2022-03-03 DIAGNOSIS — M25.571 ACUTE RIGHT ANKLE PAIN: ICD-10-CM

## 2022-03-03 PROCEDURE — 99213 OFFICE O/P EST LOW 20 MIN: CPT | Mod: 95 | Performed by: FAMILY MEDICINE

## 2022-03-03 RX ORDER — OXYCODONE AND ACETAMINOPHEN 5; 325 MG/1; MG/1
1 TABLET ORAL
Qty: 30 TABLET | Refills: 0 | Status: SHIPPED | OUTPATIENT
Start: 2022-03-03 | End: 2022-03-15

## 2022-03-03 RX ORDER — MONTELUKAST SODIUM 10 MG/1
10 TABLET ORAL AT BEDTIME
Qty: 30 TABLET | Refills: 11 | Status: SHIPPED | OUTPATIENT
Start: 2022-03-03 | End: 2022-03-16

## 2022-03-03 RX ORDER — FLUTICASONE PROPIONATE 50 MCG
2 SPRAY, SUSPENSION (ML) NASAL DAILY
Qty: 9.9 ML | Refills: 11 | Status: SHIPPED | OUTPATIENT
Start: 2022-03-03 | End: 2023-03-22

## 2022-03-03 RX ORDER — ALBUTEROL SULFATE 90 UG/1
2 AEROSOL, METERED RESPIRATORY (INHALATION) EVERY 4 HOURS PRN
Qty: 8 G | Refills: 11 | Status: SHIPPED | OUTPATIENT
Start: 2022-03-03 | End: 2022-03-15

## 2022-03-03 RX ORDER — CETIRIZINE HYDROCHLORIDE 10 MG/1
10 TABLET ORAL DAILY
Qty: 30 TABLET | Refills: 11 | Status: SHIPPED | OUTPATIENT
Start: 2022-03-03 | End: 2022-03-16

## 2022-03-03 RX ORDER — ALPRAZOLAM 2 MG
2 TABLET ORAL 2 TIMES DAILY PRN
Qty: 60 TABLET | Refills: 3 | Status: SHIPPED | OUTPATIENT
Start: 2022-03-03 | End: 2022-06-22

## 2022-03-03 RX ORDER — ESTRADIOL AND NORETHINDRONE ACETATE 1; .5 MG/1; MG/1
1 TABLET ORAL DAILY
Qty: 30 TABLET | Refills: 11 | Status: SHIPPED | OUTPATIENT
Start: 2022-03-03 | End: 2022-06-22

## 2022-03-03 NOTE — PROGRESS NOTES
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  Working long hours  No time to even take off work        Per Dr Ye's note:     (R53.83) Other fatigue  (primary encounter diagnosis)  (J06.9) Viral URI  (K21.9) Gastroesophageal reflux disease without esophagitis  -Patient here following up some fatigue that was first noticed during a likely viral URI that got better and has now gotten worse over the last two days   -Discussed symptoms and patient denies any night sweats, unexplained weight loss, new concerning lumps/bumps, or other concerns  -Discussed seasonal affective disorder and sometimes feeling overwhelmed especially during this time of the season  -However to be safe will obtain some routine blood work to see if there are any obvious reasons that could contribute to her fatigue or help guide diagnosis  -Ordered CBC with platelets, CMP, and TSH  -Patient unfortunately had a family emergency and could not stay to collect these, she will return for a lab only visit   -Does have a family history of colonoscopy and it appears we attempted to get her set up with one in 2020 but she was unable to follow through with this  -If symptoms persist and blood work does not show any concerning findings could work on getting this scheduled   -Otherwise knows symptoms to return for  -Of note, patient has history of GERD, would like some medication sent for that  -Ordered some omeprazole as it appears this may be  "covered, ranitidine was not    Per recent phone message:  Esmer Chavez, [facility]  Robbie Bailon MD  Hi Karoline Esparza in this afternoon for an office visit. She ask that I let you know to switch her pharmacy to [facility] on [address] [place] due to her restricted. State she's having issue picking up her medication from the other [facility] on [address] [place]       Current Outpatient Medications   Medication Sig Dispense Refill     ALPRAZolam (XANAX) 2 MG tablet Take 1 tablet (2 mg) by mouth 2 times daily as needed for anxiety 60 tablet 3     cetirizine (ZYRTEC) 10 MG tablet Take 1 tablet (10 mg) by mouth daily 30 tablet 11     estradiol-norethindrone (ACTIVELLA) 1-0.5 MG tablet Take 1 tablet by mouth daily 30 tablet 11     fluticasone (FLONASE) 50 MCG/ACT nasal spray Spray 2 sprays into both nostrils daily 9.9 mL 11     fluticasone-salmeterol (ADVAIR) 500-50 MCG/DOSE inhaler Inhale 1 puff into the lungs every 12 hours 60 each 11     montelukast (SINGULAIR) 10 MG tablet Take 1 tablet (10 mg) by mouth At Bedtime 30 tablet 11     oxyCODONE-acetaminophen (PERCOCET) 5-325 MG tablet Take 1 tablet by mouth nightly as needed for severe pain 30 tablet 0     PROAIR  (90 Base) MCG/ACT inhaler Inhale 2 puffs into the lungs every 4 hours as needed for shortness of breath / dyspnea or wheezing 8 g 11     ibuprofen (ADVIL/MOTRIN) 600 MG tablet Take 1 tablet (600 mg) by mouth 3 times daily as needed for moderate pain 90 tablet 4     omeprazole (PRILOSEC) 20 MG DR capsule Take 1 capsule (20 mg) by mouth daily 30 capsule 0     spacer (OPTICHAMBER BINA) holding chamber For use w/ rescue inhaler 1 each 0     tiotropium (SPIRIVA RESPIMAT) 2.5 MCG/ACT inhaler Inhale 2 puffs into the lungs daily 4 g 5     Allergies   Allergen Reactions     Lidocaine Other (See Comments)     \"my jaw stopped moving\"  Other reaction(s): Dystonia     Penicillins Hives, Rash and Shortness Of Breath     Epinephrine-Lidocaine-Na " "Metabisulfite Other (See Comments) and Muscle Pain (Myalgia)     Severe jaw cramping, double vision  Jaw locking              Review of Systems:     Constitutional, HEENT, cardiovascular, pulmonary, gi and gu systems are negative, except as otherwise noted.         Physical Exam:     There were no vitals taken for this visit.  Estimated body mass index is 32.49 kg/m  as calculated from the following:    Height as of 1/11/22: 1.753 m (5' 9\").    Weight as of 1/11/22: 99.8 kg (220 lb).    Exam:  Constitutional: healthy, alert and no distress  Psychiatric: mentation appears normal and anxious          Assessment and Plan     (F40.01) Panic disorder with agoraphobia  Comment: refill sent to new pharmacy  Plan: ALPRAZolam (XANAX) 2 MG tablet            (F41.1) Generalized anxiety disorder  Comment:   Plan: ALPRAZolam (XANAX) 2 MG tablet           (J30.2) Seasonal allergic rhinitis, unspecified trigger  Comment: see above  Plan: cetirizine (ZYRTEC) 10 MG tablet, fluticasone (FLONASE) 50 MCG/ACT nasal spray          (N95.1) Menopausal syndrome (hot flashes)  Comment: never filled; resent to new pharmacy  Plan: estradiol-norethindrone (ACTIVELLA) 1-0.5 MG tablet          (J45.51) Severe persistent asthma with acute exacerbation  Comment:   Plan: fluticasone-salmeterol (ADVAIR) 500-50 MCG/DOSE inhaler          (J45.40) Moderate persistent asthma without complication  Comment:   Plan: montelukast (SINGULAIR) 10 MG tablet          (M25.571) Acute right ankle pain  Comment:  reviewed  Plan: oxyCODONE-acetaminophen (PERCOCET) 5-325 MG tablet              Refilled medications that would be required in the next 3 months.     After Visit Information:  Patient declined AVS     No follow-ups on file.    Appointment end time: 4:48 PM  This is a telephone visit that took 18 minutes.      Clinician location:  [facility]     Robbie Bailon MD  March 3, 2022  4:48 PM    "

## 2022-03-03 NOTE — LETTER
RETURN TO WORK/SCHOOL FORM    3/3/2022    Re: Darlene Hudson  1975      To Whom It May Concern:     Darlene Hudson was evaluated by our clinic today for follow-up. She noted COVID symptoms on 2/15/2022 and was tested on 2/16/2022. That test was negative but she has continued to have significant symptoms including fatigue since then. She was unable to attend work from 2/15/2022 until cleared back to work after her appointment with Dr Ye on 2/23/2022. Her return to work date was 2/25/2022 without restrictions. She has labwork pending to continue the evaluation of her fatigue.               Robbie Bailon MD  3/3/2022 4:43 PM

## 2022-03-08 ENCOUNTER — LAB (OUTPATIENT)
Dept: LAB | Facility: CLINIC | Age: 47
End: 2022-03-08
Payer: COMMERCIAL

## 2022-03-08 DIAGNOSIS — R53.83 OTHER FATIGUE: ICD-10-CM

## 2022-03-08 LAB
ALBUMIN SERPL-MCNC: 3.3 G/DL (ref 3.5–5)
ALP SERPL-CCNC: 74 U/L (ref 45–120)
ALT SERPL W P-5'-P-CCNC: 14 U/L (ref 0–45)
ANION GAP SERPL CALCULATED.3IONS-SCNC: 9 MMOL/L (ref 5–18)
AST SERPL W P-5'-P-CCNC: 17 U/L (ref 0–40)
BILIRUB SERPL-MCNC: 0.3 MG/DL (ref 0–1)
BUN SERPL-MCNC: 8 MG/DL (ref 8–22)
CALCIUM SERPL-MCNC: 8.9 MG/DL (ref 8.5–10.5)
CHLORIDE BLD-SCNC: 107 MMOL/L (ref 98–107)
CO2 SERPL-SCNC: 21 MMOL/L (ref 22–31)
CREAT SERPL-MCNC: 0.69 MG/DL (ref 0.6–1.1)
ERYTHROCYTE [DISTWIDTH] IN BLOOD BY AUTOMATED COUNT: 12.8 % (ref 10–15)
GFR SERPL CREATININE-BSD FRML MDRD: >90 ML/MIN/1.73M2
GLUCOSE BLD-MCNC: 84 MG/DL (ref 70–125)
HCT VFR BLD AUTO: 40.3 % (ref 35–47)
HGB BLD-MCNC: 13.6 G/DL (ref 11.7–15.7)
MCH RBC QN AUTO: 31.2 PG (ref 26.5–33)
MCHC RBC AUTO-ENTMCNC: 33.7 G/DL (ref 31.5–36.5)
MCV RBC AUTO: 92 FL (ref 78–100)
PLATELET # BLD AUTO: 283 10E3/UL (ref 150–450)
POTASSIUM BLD-SCNC: 3.9 MMOL/L (ref 3.5–5)
PROT SERPL-MCNC: 7 G/DL (ref 6–8)
RBC # BLD AUTO: 4.36 10E6/UL (ref 3.8–5.2)
SODIUM SERPL-SCNC: 137 MMOL/L (ref 136–145)
TSH SERPL DL<=0.005 MIU/L-ACNC: 0.6 UIU/ML (ref 0.3–5)
WBC # BLD AUTO: 7.4 10E3/UL (ref 4–11)

## 2022-03-08 PROCEDURE — 85027 COMPLETE CBC AUTOMATED: CPT | Performed by: STUDENT IN AN ORGANIZED HEALTH CARE EDUCATION/TRAINING PROGRAM

## 2022-03-08 PROCEDURE — 36415 COLL VENOUS BLD VENIPUNCTURE: CPT | Performed by: STUDENT IN AN ORGANIZED HEALTH CARE EDUCATION/TRAINING PROGRAM

## 2022-03-08 PROCEDURE — 80053 COMPREHEN METABOLIC PANEL: CPT | Performed by: STUDENT IN AN ORGANIZED HEALTH CARE EDUCATION/TRAINING PROGRAM

## 2022-03-08 PROCEDURE — 84443 ASSAY THYROID STIM HORMONE: CPT

## 2022-03-15 ENCOUNTER — TELEPHONE (OUTPATIENT)
Dept: FAMILY MEDICINE | Facility: CLINIC | Age: 47
End: 2022-03-15
Payer: COMMERCIAL

## 2022-03-15 ENCOUNTER — TELEPHONE (OUTPATIENT)
Dept: FAMILY MEDICINE | Facility: CLINIC | Age: 47
End: 2022-03-15

## 2022-03-15 DIAGNOSIS — M25.571 ACUTE RIGHT ANKLE PAIN: ICD-10-CM

## 2022-03-15 DIAGNOSIS — J45.40 MODERATE PERSISTENT ASTHMA WITHOUT COMPLICATION: ICD-10-CM

## 2022-03-15 DIAGNOSIS — J30.2 SEASONAL ALLERGIC RHINITIS, UNSPECIFIED TRIGGER: ICD-10-CM

## 2022-03-15 DIAGNOSIS — J45.51 SEVERE PERSISTENT ASTHMA WITH ACUTE EXACERBATION (H): ICD-10-CM

## 2022-03-15 RX ORDER — ALBUTEROL SULFATE 90 UG/1
2 AEROSOL, METERED RESPIRATORY (INHALATION) EVERY 4 HOURS PRN
Qty: 8 G | Refills: 11 | Status: SHIPPED | OUTPATIENT
Start: 2022-03-15 | End: 2022-09-07

## 2022-03-15 RX ORDER — OXYCODONE AND ACETAMINOPHEN 5; 325 MG/1; MG/1
1 TABLET ORAL
Qty: 30 TABLET | Refills: 0 | Status: SHIPPED | OUTPATIENT
Start: 2022-03-15 | End: 2022-03-21

## 2022-03-15 NOTE — TELEPHONE ENCOUNTER
[facility] phone call message- medication clarification/question:    Full Medication Name: all medication sent on 3/3/2022       Dose:     Question: Patient called and states due to being restricted unable to have [facility] pull medication to another [facility]. Patient really needing inhaler and would like Dr. Bailon or a faculty to send prescription to the [facility] on [address]  Once okay and send please call and advised, if no answer okay to leave detail message.     Pharmacy confirmed as    [id number] [address] [facility]  AT [facility]  : Yes    OK to leave a message on voice mail? Yes    Primary language: English      needed? No    Call taken on March 15, 2022 at 1:04 PM by Meredith Holder

## 2022-03-15 NOTE — TELEPHONE ENCOUNTER
Multiple rxs authorized on 3/3/2022, due to classification of a couple of medications will route to faculty physician in clinic to review and complete or further advise. Dr Bailon is not expected in clinic today.  Please review and send all prescriptions approved for fill on 3/3/2022 to [address] as per patient request. Encounter routed to Dr Vogel to review.  Thank you. Pia VÁSQUEZ

## 2022-03-16 RX ORDER — MONTELUKAST SODIUM 10 MG/1
10 TABLET ORAL AT BEDTIME
Qty: 30 TABLET | Refills: 11 | Status: SHIPPED | OUTPATIENT
Start: 2022-03-16 | End: 2023-09-19

## 2022-03-16 RX ORDER — CETIRIZINE HYDROCHLORIDE 10 MG/1
10 TABLET ORAL DAILY
Qty: 30 TABLET | Refills: 11 | Status: SHIPPED | OUTPATIENT
Start: 2022-03-16 | End: 2023-03-22

## 2022-03-16 NOTE — TELEPHONE ENCOUNTER
Pharmacy state they have not received this medication and would like to know if theres missing information needed to fill medication. Inform caller medication has been approve but has not been sent, will inform PCP to check if other information is needed to send medication to pharmacy, caller understood.

## 2022-03-16 NOTE — TELEPHONE ENCOUNTER
Patient called the on call service for refills of her meds. She needs advair, proair, spiriva and oxycodone. I checked  last time she got oxycodone was  February 15, 30 day supply. Dr. Bailon ordered her more oxycodone on 3/3/22 but she wasn't able to  because it was at the wrong pharmacy. I changed her meds to her new pharmacy.     It was her [birth year] and she's working at the [employer].    Latha Irwin MD  Family Medicine

## 2022-03-16 NOTE — TELEPHONE ENCOUNTER
Spoke to pharmacy. They had a glitch in the computer system and needed the script re-entered. Issue has been resolved and patient already picked up the prescription.    Robbie Bailon MD  March 16, 2022  2:24 PM

## 2022-03-17 ENCOUNTER — TELEPHONE (OUTPATIENT)
Dept: FAMILY MEDICINE | Facility: CLINIC | Age: 47
End: 2022-03-17

## 2022-03-17 ENCOUNTER — OFFICE VISIT (OUTPATIENT)
Dept: FAMILY MEDICINE | Facility: CLINIC | Age: 47
End: 2022-03-17
Payer: COMMERCIAL

## 2022-03-17 VITALS
SYSTOLIC BLOOD PRESSURE: 130 MMHG | DIASTOLIC BLOOD PRESSURE: 85 MMHG | TEMPERATURE: 98 F | RESPIRATION RATE: 12 BRPM | OXYGEN SATURATION: 95 % | HEART RATE: 84 BPM

## 2022-03-17 DIAGNOSIS — M25.571 ACUTE RIGHT ANKLE PAIN: ICD-10-CM

## 2022-03-17 DIAGNOSIS — M25.471 RIGHT ANKLE SWELLING: Primary | ICD-10-CM

## 2022-03-17 PROCEDURE — 99213 OFFICE O/P EST LOW 20 MIN: CPT | Performed by: FAMILY MEDICINE

## 2022-03-17 NOTE — TELEPHONE ENCOUNTER
Prior Authorization needed on:  3/17/22      Medication:  Oxycodone/acetaminophen Dose:  5-325    Pharmacy confirmed as  [id number] - [place] - [address] [facility]  AT [address] [place] [address]  Phone: [phone] Fax: [phone]     : Yes    Insurance Name:    Insurance Phone: [phone]  Insurance Patient ID: [id number]    Alternatives Suggested:      Marsha Taylor MA March 17, 2022 at 11:59 AM

## 2022-03-17 NOTE — PROGRESS NOTES
ASSESSMENT/PLAN:    (M25.471) Right ankle swelling  (primary encounter diagnosis)  Comment: needs to get MRI as her inflammatory labwork was normal and xray just showed old trauma and swelling; f/u after MRI  Plan: MR Ankle Right w/o Contrast          Robbie Bailon MD  March 17, 2022  11:53 AM      Pt is a 47 year old female last seen on 3/3/2022 via virtual visit here for follow up of:     1) R ankle pain  Swollen again -> may be related to being on her feet so much for work  No new injury  Pain and swelling getting worse  No numbness/tingling  Not wearing a brace or boot     Past Medical History:   Diagnosis Date     Agoraphobia with panic attacks      Anxiety      Anxiety      Arthritis     of back     Asthma      Asthma in adult, moderate persistent, uncomplicated      Chronic pain      Chronic sinusitis      Cocaine abuse (H)      Controlled substance agreement signed 6/30/2015    Overview:  Patient has chronic pain and is seen at [facility] for this.  Has controlled substance agreement with them.  On Vicodin, Valium, Klonopin prescribed only from there.        Depression      Hx of seasonal allergies      Infectious pericarditis      Lipoma      Tobacco abuse       Current Outpatient Medications   Medication Sig Dispense Refill     ALPRAZolam (XANAX) 2 MG tablet Take 1 tablet (2 mg) by mouth 2 times daily as needed for anxiety 60 tablet 3     cetirizine (ZYRTEC) 10 MG tablet Take 1 tablet (10 mg) by mouth daily 30 tablet 11     estradiol-norethindrone (ACTIVELLA) 1-0.5 MG tablet Take 1 tablet by mouth daily 30 tablet 11     fluticasone (FLONASE) 50 MCG/ACT nasal spray Spray 2 sprays into both nostrils daily 9.9 mL 11     fluticasone-salmeterol (ADVAIR) 500-50 MCG/DOSE inhaler Inhale 1 puff into the lungs every 12 hours 60 each 11     ibuprofen (ADVIL/MOTRIN) 600 MG tablet Take 1 tablet (600 mg) by mouth 3 times daily as needed for moderate pain 90 tablet 4     montelukast (SINGULAIR) 10 MG tablet Take  "1 tablet (10 mg) by mouth At Bedtime 30 tablet 11     omeprazole (PRILOSEC) 20 MG DR capsule Take 1 capsule (20 mg) by mouth daily 30 capsule 0     oxyCODONE-acetaminophen (PERCOCET) 5-325 MG tablet Take 1 tablet by mouth nightly as needed for severe pain 30 tablet 0     PROAIR  (90 Base) MCG/ACT inhaler Inhale 2 puffs into the lungs every 4 hours as needed for shortness of breath / dyspnea or wheezing 8 g 11     spacer (OPTICHAMBER BINA) holding chamber For use w/ rescue inhaler 1 each 0     tiotropium (SPIRIVA RESPIMAT) 2.5 MCG/ACT inhaler Inhale 2 puffs into the lungs daily 4 g 5      Allergies   Allergen Reactions     Lidocaine Other (See Comments)     \"my jaw stopped moving\"  Other reaction(s): Dystonia     Penicillins Hives, Rash and Shortness Of Breath     Epinephrine-Lidocaine-Na Metabisulfite Other (See Comments) and Muscle Pain (Myalgia)     Severe jaw cramping, double vision  Jaw locking      ROS:   Gen- no fevers/chills   Rheum - no morning stiffness   Derm - no rash/ redness   Neuro - no numbness, no tingling   Remainder of ROS negative.     Exam:   /85 (BP Location: Right arm, Patient Position: Sitting, Cuff Size: Adult Large)   Pulse 84   Temp 98  F (36.7  C) (Oral)   Resp 12   SpO2 95%     R ankle:  +lateral scar  +large effusion medial and lateral  Lateral malleolus is tender to light palpation   "

## 2022-03-17 NOTE — PATIENT INSTRUCTIONS
Referral for :     MRI ankle    LOCATION/PLACE/Provider :    [facility]   DATE & TIME :    March 24th at 8:30 am   PHONE :     [phone]  FAX :    [phone]  Appointment made by clinic staff/:    Kirstie

## 2022-03-21 ENCOUNTER — TELEPHONE (OUTPATIENT)
Dept: FAMILY MEDICINE | Facility: CLINIC | Age: 47
End: 2022-03-21
Payer: COMMERCIAL

## 2022-03-21 RX ORDER — OXYCODONE AND ACETAMINOPHEN 5; 325 MG/1; MG/1
1 TABLET ORAL
Qty: 23 TABLET | Refills: 0 | Status: SHIPPED | OUTPATIENT
Start: 2022-03-21 | End: 2022-04-13

## 2022-03-21 NOTE — TELEPHONE ENCOUNTER
New script for remainder of supply sent (#23). Thanks!    Robbie Bailon MD  March 21, 2022  12:47 PM

## 2022-03-21 NOTE — TELEPHONE ENCOUNTER
Patient called back stated that she contacted [employer] one insurance and was told that the insurance does not do PA so her medication can be put through to pharmacy so patient can get her medication. I did ask if she informed her insurance to send a response but she stated that the insurance said that they do not need to respond. Please call patient if needed. Thank you!

## 2022-03-21 NOTE — TELEPHONE ENCOUNTER
Prior Authorization Not Needed per Insurance    Medication: oxycodone/acetaminophen  Insurance Company: [employer] - Phone [phone] Fax [phone]  Expected CoPay:      Pharmacy Filling the Rx: [facility] #[id number] [address]  AT [facility]  Pharmacy Notified: Yes  Patient Notified: Yes    Called pharmacy regarding request.     Patient received the required 7 days supply fill last week.  The remaining qty on original Rx is now void due to the 7 days supply filled from that Rx.     No additional PA needed, pharmacy needs a new Rx for the next fill due to original Rx now void because it was used for the initial 7 days supply fill.     Routing back to clinic for new Rx request.

## 2022-03-21 NOTE — TELEPHONE ENCOUNTER
Patient called stated that she has been waiting for her medication (Oxycodone) and was told that it would be sent on 03/17/22. I notified her that her medication was sent on 03/17/22 for approval from her insurance.

## 2022-03-22 NOTE — PROGRESS NOTES
ASSESSMENT/PLAN:    (Q54.695) Right ankle swelling  (primary encounter diagnosis)  Comment: fitted for walking boot; short course of pred for swelling; note for work written; f/u in 1 wk to review MRI and discuss tx plan  Plan: predniSONE (DELTASONE) 50 MG tablet          Robbie Bailon MD  March 23, 2022  9:27 AM      Pt is a 47 year old female last seen on 3/17/2022 here for follow up of:     R ankle pain - feels worse  Took off work yesterday and had to leave work last week due to pain and swelling.   No longer has a boot  MRI scheduled for tomorrow morning at [facility] in [place] at 8:30 am       Per my last note:  (U98.124) Right ankle swelling  (primary encounter diagnosis)  Comment: needs to get MRI as her inflammatory labwork was normal and xray just showed old trauma and swelling; f/u after MRI  Plan: MR Ankle Right w/o Contrast    Past Medical History:   Diagnosis Date     Agoraphobia with panic attacks      Anxiety      Anxiety      Arthritis     of back     Asthma      Asthma in adult, moderate persistent, uncomplicated      Chronic pain      Chronic sinusitis      Cocaine abuse (H)      Controlled substance agreement signed 6/30/2015    Overview:  Patient has chronic pain and is seen at [facility] for this.  Has controlled substance agreement with them.  On Vicodin, Valium, Klonopin prescribed only from there.        Depression      Hx of seasonal allergies      Infectious pericarditis      Lipoma      Tobacco abuse       Current Outpatient Medications   Medication Sig Dispense Refill     ALPRAZolam (XANAX) 2 MG tablet Take 1 tablet (2 mg) by mouth 2 times daily as needed for anxiety 60 tablet 3     cetirizine (ZYRTEC) 10 MG tablet Take 1 tablet (10 mg) by mouth daily 30 tablet 11     estradiol-norethindrone (ACTIVELLA) 1-0.5 MG tablet Take 1 tablet by mouth daily 30 tablet 11     fluticasone (FLONASE) 50 MCG/ACT nasal spray Spray 2 sprays into both nostrils daily 9.9 mL 11      "fluticasone-salmeterol (ADVAIR) 500-50 MCG/DOSE inhaler Inhale 1 puff into the lungs every 12 hours 60 each 11     ibuprofen (ADVIL/MOTRIN) 600 MG tablet Take 1 tablet (600 mg) by mouth 3 times daily as needed for moderate pain 90 tablet 4     montelukast (SINGULAIR) 10 MG tablet Take 1 tablet (10 mg) by mouth At Bedtime 30 tablet 11     omeprazole (PRILOSEC) 20 MG DR capsule Take 1 capsule (20 mg) by mouth daily 30 capsule 0     oxyCODONE-acetaminophen (PERCOCET) 5-325 MG tablet Take 1 tablet by mouth nightly as needed for severe pain 23 tablet 0     PROAIR  (90 Base) MCG/ACT inhaler Inhale 2 puffs into the lungs every 4 hours as needed for shortness of breath / dyspnea or wheezing 8 g 11     spacer (OPTICHAMBER BINA) holding chamber For use w/ rescue inhaler 1 each 0     tiotropium (SPIRIVA RESPIMAT) 2.5 MCG/ACT inhaler Inhale 2 puffs into the lungs daily 4 g 5      Allergies   Allergen Reactions     Lidocaine Other (See Comments)     \"my jaw stopped moving\"  Other reaction(s): Dystonia     Penicillins Hives, Rash and Shortness Of Breath     Epinephrine-Lidocaine-Na Metabisulfite Other (See Comments) and Muscle Pain (Myalgia)     Severe jaw cramping, double vision  Jaw locking      ROS:   Gen- no fevers/chills   Remainder of ROS negative.     Exam:   /70 (BP Location: Right arm, Patient Position: Sitting, Cuff Size: Adult Large)   Pulse 94   Temp 98.3  F (36.8  C) (Oral)   Resp 12   SpO2 97%     R ankle:  2+ swelling and tenderness at R lateral ankle  "

## 2022-03-23 ENCOUNTER — TELEPHONE (OUTPATIENT)
Dept: FAMILY MEDICINE | Facility: CLINIC | Age: 47
End: 2022-03-23

## 2022-03-23 ENCOUNTER — OFFICE VISIT (OUTPATIENT)
Dept: FAMILY MEDICINE | Facility: CLINIC | Age: 47
End: 2022-03-23
Payer: COMMERCIAL

## 2022-03-23 VITALS
HEART RATE: 94 BPM | RESPIRATION RATE: 12 BRPM | TEMPERATURE: 98.3 F | OXYGEN SATURATION: 97 % | DIASTOLIC BLOOD PRESSURE: 70 MMHG | SYSTOLIC BLOOD PRESSURE: 102 MMHG

## 2022-03-23 DIAGNOSIS — J45.51 SEVERE PERSISTENT ASTHMA WITH ACUTE EXACERBATION (H): Primary | ICD-10-CM

## 2022-03-23 DIAGNOSIS — M25.471 RIGHT ANKLE SWELLING: Primary | ICD-10-CM

## 2022-03-23 PROCEDURE — 99213 OFFICE O/P EST LOW 20 MIN: CPT | Performed by: FAMILY MEDICINE

## 2022-03-23 RX ORDER — PREDNISONE 50 MG/1
50 TABLET ORAL DAILY
Qty: 5 TABLET | Refills: 0 | Status: SHIPPED | OUTPATIENT
Start: 2022-03-23 | End: 2022-03-28

## 2022-03-23 NOTE — LETTER
2022    Regarding:   Darlene Hudson  [facility] [address] [place] [address]  : 3/15/1976      To whom it may concern,    I am writing to request coverage for Spiriva for treatment of Ms. Hudson's asthma. This, together with Advair and Montelukast, has allowed us to achieve control of the patient's asthma symptoms, that is an ACT score of >19. While Spiriva has not been shown to decrease risk of asthma exacerbation it is FDA approved for the treatment of asthma and has evidence to support it's ability to decrease symptom burden.     ACT Total Scores 2021   ACT TOTAL SCORE (Goal Greater than or Equal to 20) 7 6 25   In the past 12 months, how many times did you visit the emergency room for your asthma without being admitted to the hospital? 0 0 0   In the past 12 months, how many times were you hospitalized overnight because of your asthma? 0 0 0           Sincerely,    Robbie Bailon MD

## 2022-03-23 NOTE — LETTER
Return to Work  2022     Seen today: yes    Patient:  Darlene Hudson  :   1975  MRN:     [id number]  Physician: JULIETH BAILON    Darlene Hudson was seen today in follow-up of her R ankle pain and swelling. She has an MRI pending tomorrow. I have placed her in a walking boot today. Please excuse her from work on 3/17/2022 when she had to leave early due to pain. Please excuse her from work from Wednesday 3/22/2022 to Baljit 3/27/2022 as she will need to stay in her boot x 5 days and I am giving her 5 days of steroids to help decrease her swelling and pain. She is ok to return to work on Monday 3/28/2022 WITH restrictions. She is cleared for sedentary work only. She will be seeing me back on Wednesday 3/30/2022 to review her MRI and determine whether she can work without restrictions.         The next clinic appointment is scheduled for (date/time) 3/30/2022.    Patient limitations:  See above            Julieth Bailon MD  2022  9:26 AM

## 2022-03-23 NOTE — TELEPHONE ENCOUNTER
Prior Authorization needed on:  3/23/22    Medication:  Spririva  Dose:  2.5mcg    Pharmacy confirmed as   [email or web address] #[id number] - [place] - [address] AT [address] [place] [address]  Phone: [phone] Fax: [phone]    : Yes    Insurance Name:    Insurance Phone:   Insurance Patient ID:     CMM:  KEY: [id number]  SO SOMERS [birth year]    Alternatives Suggested:      Marsha Taylor MA 2022 at 8:55 AM

## 2022-03-23 NOTE — TELEPHONE ENCOUNTER
Central Prior Authorization Team   Phone: [phone]    PA Initiation    Medication: Spriva  Insurance Company: [employer] - Phone [phone] Fax [phone]  Pharmacy Filling the Rx: [facility][id number] [place], [place] - [address] [place] [address]  AT [facility]  Filling Pharmacy Phone: [phone]  Filling Pharmacy Fax:    Start Date: 3/23/2022

## 2022-03-24 ENCOUNTER — TRANSFERRED RECORDS (OUTPATIENT)
Dept: HEALTH INFORMATION MANAGEMENT | Facility: CLINIC | Age: 47
End: 2022-03-24
Payer: COMMERCIAL

## 2022-03-25 NOTE — TELEPHONE ENCOUNTER
PRIOR AUTHORIZATION DENIED    Medication: Spriva    Denial Date: 3/25/2022    Denial Rational: Per insurance Spiriva 2.5mcg has not been FDA approved to treat asthma    Per [employer] the Spiriva 1.25mcg is FDA approved for asthma       Appeal Information: This medication was denied. If physician would like to appeal because patient has contraindication or allergy to covered medication please write letter of medical necessity and route back to PA team to initiate.  If no further action is needed please close encounter thank you.

## 2022-03-25 NOTE — TELEPHONE ENCOUNTER
Called Siddhartha, checked on status of request.  This is still in progress.  Let rep know this was an urgent request. Will check later for updates.

## 2022-03-28 RX ORDER — TIOTROPIUM BROMIDE INHALATION SPRAY 1.56 UG/1
2 SPRAY, METERED RESPIRATORY (INHALATION) DAILY
Qty: 12 G | Refills: 3 | Status: SHIPPED | OUTPATIENT
Start: 2022-03-28 | End: 2023-03-22

## 2022-03-28 NOTE — TELEPHONE ENCOUNTER
Dr. Bailon, which would you like to pursue  1) Appeal - I have drafted a letter.   2) Use the FDA approved dose for asthma, 1.25 mcg, 2 puffs once daily? Order is pended.     Please delete letter or order of option not pursued.     Sharifa Wills, Pharm.D., [facility]  Phone: [phone]  March 28, 2022 at 10:40 AM

## 2022-03-28 NOTE — TELEPHONE ENCOUNTER
Approved dose for asthma sent to pharmacy. If this does not work for pt, will send appeal letter. Appreciate all the help from Emmy and Dr CLEMENT.     Robbie Bailon MD  March 28, 2022  12:06 PM

## 2022-03-29 NOTE — PROGRESS NOTES
ASSESSMENT/PLAN:    ***    Pt is a 47 year old female last seen on 3/23/2022 here for follow up of:     R ankle pain - ***    MRI [place] radiology - 3/24/2022  Evidence of prior ligament repair  Full-thickness recurrent or residual tear of ATFL   Mild post tib tendonopathy    Past Medical History:   Diagnosis Date     Agoraphobia with panic attacks      Anxiety      Anxiety      Arthritis     of back     Asthma      Asthma in adult, moderate persistent, uncomplicated      Chronic pain      Chronic sinusitis      Cocaine abuse (H)      Controlled substance agreement signed 6/30/2015    Overview:  Patient has chronic pain and is seen at [facility] for this.  Has controlled substance agreement with them.  On Vicodin, Valium, Klonopin prescribed only from there.        Depression      Hx of seasonal allergies      Infectious pericarditis      Lipoma      Tobacco abuse       Current Outpatient Medications   Medication Sig Dispense Refill     ALPRAZolam (XANAX) 2 MG tablet Take 1 tablet (2 mg) by mouth 2 times daily as needed for anxiety 60 tablet 3     cetirizine (ZYRTEC) 10 MG tablet Take 1 tablet (10 mg) by mouth daily 30 tablet 11     estradiol-norethindrone (ACTIVELLA) 1-0.5 MG tablet Take 1 tablet by mouth daily 30 tablet 11     fluticasone (FLONASE) 50 MCG/ACT nasal spray Spray 2 sprays into both nostrils daily 9.9 mL 11     fluticasone-salmeterol (ADVAIR) 500-50 MCG/DOSE inhaler Inhale 1 puff into the lungs every 12 hours 60 each 11     ibuprofen (ADVIL/MOTRIN) 600 MG tablet Take 1 tablet (600 mg) by mouth 3 times daily as needed for moderate pain 90 tablet 4     montelukast (SINGULAIR) 10 MG tablet Take 1 tablet (10 mg) by mouth At Bedtime 30 tablet 11     omeprazole (PRILOSEC) 20 MG DR capsule Take 1 capsule (20 mg) by mouth daily 30 capsule 0     oxyCODONE-acetaminophen (PERCOCET) 5-325 MG tablet Take 1 tablet by mouth nightly as needed for severe pain 23 tablet 0     PROAIR  (90 Base) MCG/ACT  "inhaler Inhale 2 puffs into the lungs every 4 hours as needed for shortness of breath / dyspnea or wheezing 8 g 11     spacer (OPTICHAMBER BINA) holding chamber For use w/ rescue inhaler 1 each 0     tiotropium (SPIRIVA RESPIMAT) 1.25 MCG/ACT inhaler Inhale 2 puffs into the lungs daily 12 g 3     tiotropium (SPIRIVA RESPIMAT) 2.5 MCG/ACT inhaler Inhale 2 puffs into the lungs daily 4 g 5      Allergies   Allergen Reactions     Lidocaine Other (See Comments)     \"my jaw stopped moving\"  Other reaction(s): Dystonia     Penicillins Hives, Rash and Shortness Of Breath     Epinephrine-Lidocaine-Na Metabisulfite Other (See Comments) and Muscle Pain (Myalgia)     Severe jaw cramping, double vision  Jaw locking      ROS:   Gen- no fevers/chills   Rheum - no morning stiffness   Derm - no rash/ redness   Neuro - no numbness, no tingling   Remainder of ROS negative.     Exam:     "

## 2022-03-30 ENCOUNTER — TELEPHONE (OUTPATIENT)
Dept: FAMILY MEDICINE | Facility: CLINIC | Age: 47
End: 2022-03-30

## 2022-03-30 ENCOUNTER — VIRTUAL VISIT (OUTPATIENT)
Dept: FAMILY MEDICINE | Facility: CLINIC | Age: 47
End: 2022-03-30
Payer: COMMERCIAL

## 2022-03-30 DIAGNOSIS — Z98.890 HISTORY OF ANKLE SURGERY: ICD-10-CM

## 2022-03-30 DIAGNOSIS — M25.471 RIGHT ANKLE SWELLING: Primary | ICD-10-CM

## 2022-03-30 DIAGNOSIS — R05.9 COUGH: ICD-10-CM

## 2022-03-30 PROCEDURE — 99213 OFFICE O/P EST LOW 20 MIN: CPT | Mod: 95 | Performed by: FAMILY MEDICINE

## 2022-03-30 NOTE — PROGRESS NOTES
Family Medicine Telephone Visit Note    Chief Complaint   Patient presents with     Follow Up     MRI results     Cough     Cough with headach and congestion     Refill Request     Cough medicine     Medication Reconciliation     Recently seen last week            HPI   Patients name: Karoline  Appointment start time:  9:00AM    Pt is a 47 year old female last seen on 3/23/2022 here for follow up of:     1) R ankle pain - call today to discuss MRI results and next steps    MRI [place] radiology - 3/24/2022  Evidence of prior ligament repair  Full-thickness recurrent or residual tear of ATFL   Mild post tib tendonopathy    2) Cough - pt requesting cough medication  I informed her she can only be on either codeine or oxycodone; she will stay on her nightly oxycodone  I informed her that her Spiriva was approved; she will go pick that up  She recently completed a pred burst so would not send that again    Current Outpatient Medications   Medication Sig Dispense Refill     ALPRAZolam (XANAX) 2 MG tablet Take 1 tablet (2 mg) by mouth 2 times daily as needed for anxiety 60 tablet 3     cetirizine (ZYRTEC) 10 MG tablet Take 1 tablet (10 mg) by mouth daily 30 tablet 11     estradiol-norethindrone (ACTIVELLA) 1-0.5 MG tablet Take 1 tablet by mouth daily 30 tablet 11     fluticasone (FLONASE) 50 MCG/ACT nasal spray Spray 2 sprays into both nostrils daily 9.9 mL 11     fluticasone-salmeterol (ADVAIR) 500-50 MCG/DOSE inhaler Inhale 1 puff into the lungs every 12 hours 60 each 11     ibuprofen (ADVIL/MOTRIN) 600 MG tablet Take 1 tablet (600 mg) by mouth 3 times daily as needed for moderate pain 90 tablet 4     montelukast (SINGULAIR) 10 MG tablet Take 1 tablet (10 mg) by mouth At Bedtime 30 tablet 11     omeprazole (PRILOSEC) 20 MG DR capsule Take 1 capsule (20 mg) by mouth daily 30 capsule 0     oxyCODONE-acetaminophen (PERCOCET) 5-325 MG tablet Take 1 tablet by mouth nightly as needed for severe pain 23 tablet 0     PROAIR   "(90 Base) MCG/ACT inhaler Inhale 2 puffs into the lungs every 4 hours as needed for shortness of breath / dyspnea or wheezing 8 g 11     spacer (OPTICHAMBER BINA) holding chamber For use w/ rescue inhaler 1 each 0     tiotropium (SPIRIVA RESPIMAT) 1.25 MCG/ACT inhaler Inhale 2 puffs into the lungs daily 12 g 3     tiotropium (SPIRIVA RESPIMAT) 2.5 MCG/ACT inhaler Inhale 2 puffs into the lungs daily 4 g 5     Allergies   Allergen Reactions     Lidocaine Other (See Comments)     \"my jaw stopped moving\"  Other reaction(s): Dystonia     Penicillins Hives, Rash and Shortness Of Breath     Epinephrine-Lidocaine-Na Metabisulfite Other (See Comments) and Muscle Pain (Myalgia)     Severe jaw cramping, double vision  Jaw locking              Review of Systems:     Constitutional, HEENT, cardiovascular, pulmonary, gi and gu systems are negative, except as otherwise noted.  +cough -> see HPI         Physical Exam:     There were no vitals taken for this visit.  Estimated body mass index is 32.49 kg/m  as calculated from the following:    Height as of 1/11/22: 1.753 m (5' 9\").    Weight as of 1/11/22: 99.8 kg (220 lb).    Exam:  Constitutional: healthy, alert and no distress  Psychiatric: mentation appears normal and affect normal/bright            Assessment and Plan   (M25.471) Right ankle swelling  (primary encounter diagnosis)  Comment: reviewed MRI; she will contact her foot/ankle surgeon at [facility] for follow-up; I will write a work note for her once we have a date for that appiontment  Plan: Orthopedic  Referral            (Z98.890) History of ankle surgery  Comment: see above  Plan: Orthopedic  Referral            (R05.9) Cough  Comment:   Plan: see HPI; precautions given to get retested for COVID if symptoms do not improve after restarting her Spiriva        Refilled medications that would be required in the next 3 months.     After Visit Information:  Patient declined AVS     No follow-ups on " file.    Appointment end time: 9:09 AM  This is a telephone visit that took 9 minutes.      Clinician location:  [facility]       Robbie Bailon MD  March 30, 2022  9:09 AM

## 2022-04-12 ENCOUNTER — TRANSFERRED RECORDS (OUTPATIENT)
Dept: HEALTH INFORMATION MANAGEMENT | Facility: CLINIC | Age: 47
End: 2022-04-12
Payer: COMMERCIAL

## 2022-04-13 ENCOUNTER — TELEPHONE (OUTPATIENT)
Dept: FAMILY MEDICINE | Facility: CLINIC | Age: 47
End: 2022-04-13
Payer: COMMERCIAL

## 2022-04-13 DIAGNOSIS — M25.571 ACUTE RIGHT ANKLE PAIN: ICD-10-CM

## 2022-04-13 RX ORDER — OXYCODONE AND ACETAMINOPHEN 5; 325 MG/1; MG/1
1 TABLET ORAL
Qty: 30 TABLET | Refills: 0 | Status: SHIPPED | OUTPATIENT
Start: 2022-04-13 | End: 2022-05-04

## 2022-04-13 NOTE — TELEPHONE ENCOUNTER
[facility] phone call message- general phone call:    Reason for call: Patient  Would like to update Dr. Bailon. Per patient Surgery  gave patient cortisone injection and wanting to see how patient is doing with the injection with in 2 weeks. After 2 weeks will know if patient will be going through surgery or not. Per Patient also going to do a lot of dental procedure, so please be aware. Made an appointment for patient to Follow up with Dr. Bailon after 2 weeks of the injection on 04/29/2022 at 9:00 AM, thanks    Return call needed: No    OK to leave a message on voice mail? No    Primary language: English      needed? No    Call taken on April 13, 2022 at 4:09 PM by Meredith Holder

## 2022-04-13 NOTE — TELEPHONE ENCOUNTER
[facility] phone call message- medication clarification/question:    Full Medication Name: oxyCODONE-acetaminophen (PERCOCET) 5-325 MG tablet    Question: Patient requesting for refill. Stating she needs it before the 4/15.     Pharmacy confirmed as    [id number] [address] [facility] [address]  AT [facility]  : Yes    OK to leave a message on voice mail? Yes    Primary language: English      needed? No    Call taken on April 13, 2022 at 10:20 AM by Christine Hyde

## 2022-04-13 NOTE — TELEPHONE ENCOUNTER
Spoke to pt. Confirmed tx plan for ankle. Will see me in follow-up.     Dental work - Two extractions, two root canals planned; deep cleaning today.     Robbie Bailon MD  April 13, 2022  4:59 PM

## 2022-04-14 ENCOUNTER — TELEPHONE (OUTPATIENT)
Dept: FAMILY MEDICINE | Facility: CLINIC | Age: 47
End: 2022-04-14
Payer: COMMERCIAL

## 2022-04-14 NOTE — LETTER
Return to Work  2022     Seen today: no    Patient:  Darlene Hudson  :   1975  MRN:     [id number]  Physician: JULIETH BAILON    Darlene Hudson is under my care for her persistent R ankle swelling and pain.  She is still unable to return to work as her pain has not improved. She received cortisone injection with her ankle surgeon which did not work. We will be seeking a 2nd opinion from another foot/ankle surgeon in hopes of next steps for treatment. She will see me on 22 for follow-up. Please extend her leave until 22 and I will write an updated letter at that time.     The next clinic appointment is scheduled for (date/time) 2022.    Patient limitations:  Unable to stand/walk on R foot        Julieth Bailon MD  2022  1:14 PM

## 2022-04-14 NOTE — TELEPHONE ENCOUNTER
[facility] phone call message- general phone call:    Reason for call: Patient called wanting to know if  is able to lift restriction off so she can return to work. Patient states her employer will not allow her to return back until they receive a letter stating that she can return back to work without restrictions , per patient her surgeon doctor states she is ok to go back to work wearing the brace but patient employer won't let if she is wearing a brace and so she would like to know if  will write her a note to return to work as she would like to try it out with out wearing her brace. If ok to write note or have questions please call and advise.      Return call needed: Yes    OK to leave a message on voice mail? Yes    Primary language: English      needed? No    Call taken on April 14, 2022 at 10:37 AM by Lauren Hdez

## 2022-04-20 NOTE — TELEPHONE ENCOUNTER
Spoke to pt. Short term disability form filled and new work note written. Pt scheduled to see me on Friday 4/22    Robbie Bailon MD  April 20, 2022  1:44 PM

## 2022-04-22 ENCOUNTER — VIRTUAL VISIT (OUTPATIENT)
Dept: FAMILY MEDICINE | Facility: CLINIC | Age: 47
End: 2022-04-22
Payer: COMMERCIAL

## 2022-04-22 ENCOUNTER — TELEPHONE (OUTPATIENT)
Dept: FAMILY MEDICINE | Facility: CLINIC | Age: 47
End: 2022-04-22

## 2022-04-22 DIAGNOSIS — M25.471 RIGHT ANKLE SWELLING: Primary | ICD-10-CM

## 2022-04-22 DIAGNOSIS — Z98.890 HISTORY OF ANKLE SURGERY: ICD-10-CM

## 2022-04-22 PROCEDURE — 99442 PR PHYSICIAN TELEPHONE EVALUATION 11-20 MIN: CPT | Mod: 95 | Performed by: FAMILY MEDICINE

## 2022-04-22 NOTE — TELEPHONE ENCOUNTER
Fyi: letter/note for patient employer faxed to [employer] fax # [phone] on 4/22/2022.    -Lauren

## 2022-04-22 NOTE — PROGRESS NOTES
Family Medicine Telephone Visit Note    Chief Complaint   Patient presents with     Follow Up     ankle            HPI   Patients name: Karoline  Appointment start time:  9:46AM    Appointment end time: 10:01AM  This is a telephone visit that took 15 minutes.      Clinician location:  [facility]

## 2022-04-22 NOTE — PROGRESS NOTES
ASSESSMENT/PLAN:    (M25.471) Right ankle swelling  (primary encounter diagnosis)  Comment: will have her see Dr Bashir at [facility] for 2nd opinion given her refractory pain and swelling; note for work written  Plan: Orthopedic  Referral          (Z98.890) History of ankle surgery  Comment: see above  Plan: Orthopedic  Referral          Call start : 9:46AM  Call stop: 10:01 AM    Robbie Bailon MD  April 22, 2022  10:00 AM      Pt is a 47 year old female last seen on 3/30/22 via virtual visit here for follow up of:     R ankle pain/swelling - currently swollen  Still unable to tolerate much standing  Currently mild erythema and irritated   No F/C    Has a flat tire so needed to change to telephone encounter    Past Medical History:   Diagnosis Date     Agoraphobia with panic attacks      Anxiety      Anxiety      Arthritis     of back     Asthma      Asthma in adult, moderate persistent, uncomplicated      Chronic pain      Chronic sinusitis      Cocaine abuse (H)      Controlled substance agreement signed 6/30/2015    Overview:  Patient has chronic pain and is seen at [facility] for this.  Has controlled substance agreement with them.  On Vicodin, Valium, Klonopin prescribed only from there.        Depression      Hx of seasonal allergies      Infectious pericarditis      Lipoma      Tobacco abuse       Current Outpatient Medications   Medication Sig Dispense Refill     ALPRAZolam (XANAX) 2 MG tablet Take 1 tablet (2 mg) by mouth 2 times daily as needed for anxiety 60 tablet 3     cetirizine (ZYRTEC) 10 MG tablet Take 1 tablet (10 mg) by mouth daily 30 tablet 11     estradiol-norethindrone (ACTIVELLA) 1-0.5 MG tablet Take 1 tablet by mouth daily 30 tablet 11     fluticasone (FLONASE) 50 MCG/ACT nasal spray Spray 2 sprays into both nostrils daily 9.9 mL 11     fluticasone-salmeterol (ADVAIR) 500-50 MCG/DOSE inhaler Inhale 1 puff into the lungs every 12 hours 60 each 11     ibuprofen (ADVIL/MOTRIN)  "600 MG tablet Take 1 tablet (600 mg) by mouth 3 times daily as needed for moderate pain 90 tablet 4     montelukast (SINGULAIR) 10 MG tablet Take 1 tablet (10 mg) by mouth At Bedtime 30 tablet 11     omeprazole (PRILOSEC) 20 MG DR capsule Take 1 capsule (20 mg) by mouth daily 30 capsule 0     oxyCODONE-acetaminophen (PERCOCET) 5-325 MG tablet Take 1 tablet by mouth nightly as needed for severe pain 30 tablet 0     PROAIR  (90 Base) MCG/ACT inhaler Inhale 2 puffs into the lungs every 4 hours as needed for shortness of breath / dyspnea or wheezing 8 g 11     spacer (OPTICHAMBER BINA) holding chamber For use w/ rescue inhaler 1 each 0     tiotropium (SPIRIVA RESPIMAT) 1.25 MCG/ACT inhaler Inhale 2 puffs into the lungs daily 12 g 3     tiotropium (SPIRIVA RESPIMAT) 2.5 MCG/ACT inhaler Inhale 2 puffs into the lungs daily 4 g 5      Allergies   Allergen Reactions     Lidocaine Other (See Comments)     \"my jaw stopped moving\"  Other reaction(s): Dystonia     Penicillins Hives, Rash and Shortness Of Breath     Epinephrine-Lidocaine-Na Metabisulfite Other (See Comments) and Muscle Pain (Myalgia)     Severe jaw cramping, double vision  Jaw locking      ROS:   Gen- no fevers/chills   Derm - see HPI   Neuro - no numbness, no tingling   Remainder of ROS negative.     Exam:   Deferred as phone call  "

## 2022-04-22 NOTE — LETTER
WORK MEDICAL NOTE    2022     Seen today: yes    Patient:  Darlene Hudson  :   1975  MRN:     [id number]  Physician: JULIETH BAILON    Darlene Hudson is under my care for her persistent R ankle swelling and pain.  She is unfortunately still unable to return to work as her pain has not improved and swelling has persisted. She is awaiting a 2nd opinion from another foot/ankle surgeon in hopes of next steps for treatment. She will see me on 22 for follow-up. Please extend her leave until 22 and I will write an updated letter at that time.     The next clinic appointment is scheduled for (date/time) 2022.    Patient limitations:  Unable to stand/walk on R foot            Julieth Bailon MD  2022  9:58 AM    
Name band;

## 2022-04-26 NOTE — TELEPHONE ENCOUNTER
Action Ann 3, 2022 3:31 PM MT   Action Taken CSS sent a req for imgs from [facility] [phone] and [facility] [phone] and [phone] and [facility] [phone].      Action June 6, 2022 4:03 PM MT   Action Taken Called [phone] , rep states they will expedite the req for imgs and the OP report.      Action June 7, 2022 9:33 AM MT   Action Taken Called [facility] and tt: [place] [phone], rep will request for STAT fax for OP report. Recvd OP notes, sent to HIM urgent scan.        DIAGNOSIS: R ankle swelling/h/o ankle surgery/Dr. Bailon/[facility]/MRI./ortho con   APPOINTMENT DATE: 6/7/22   NOTES STATUS DETAILS   OFFICE NOTE from referring provider Internal 06/01/2022, 04/29/2022, 04/22/2022, 03/30/2022, 03/23/2022, 03/17/2022, 09/08/2021, 08/04/2021, 03/17/2021, more in EPIC - Robbie Bailon MD - [facility]   OFFICE NOTE from other specialist Care Everywhere        Media Tab  Media Tab 03/16/2020 - Aniceto Phan MD - [facility]  03/13/2020 - Edilson Palmer MD - [facility]  06/04/2018 - Oscar Aparicio MD - [facility]    07/10/2019 to 05/23/2018  - [facility]/Office Notes    05/07/2018 to 11/10/2016 -   [facility]/Office Notes     DISCHARGE SUMMARY from hospital Care Everywhere    DISCHARGE REPORT from the ER Care Everywhere 02/09/2021 - [facility]  02/07/2021 -  ED  07/12/2020 - [facility]  07/03/2020 - [facility]  03/13/2020 - [facility]  05/24/2019 - [facility]  05/08/2019 - [facility] ED  04/13/2019 -  ED  06/03/2018 - [facility] ER  More in [facility]...   OPERATIVE REPORT Received OP Notes 06/07/2022 05/30/2019 - [facility] Ankle Surgery - [facility] ([facility] [place] [facility] Urgent Scan)   MEDICATION LIST Internal    LABS     INJECTIONS DONE IN RADIOLOGY Care Everywhere [facility]   MRI PACS 03/24/2022 - [facility]   CT SCAN PACS 03/13/2020 - [facility]     XRAYS (IMAGES & REPORTS) PACS            IN PROCESS 02/09/2021, 07/12/2020, 06/03/2018, 08/01/2015- [facility]      02/07/2021, 05/24/2019, 04/13/2019, 01/09/2019,  03/31/2018, 02/04/2018   - [facility] Ankle ([facility])- [facility] ED    10/13/2017 (Emergency phys) , 08/04/2017 Emergency Phys), 05/22/2016 ([place] [facility]) ,  - Ilya

## 2022-04-27 ENCOUNTER — TELEPHONE (OUTPATIENT)
Dept: FAMILY MEDICINE | Facility: CLINIC | Age: 47
End: 2022-04-27
Payer: COMMERCIAL

## 2022-04-27 NOTE — PROGRESS NOTES
ASSESSMENT/PLAN:    (M25.911) Right ankle swelling  (primary encounter diagnosis)  Comment:   Plan: stable/improve today; letter written to return to work    (Z98.890) History of ankle surgery  Comment:   Plan: 2nd opinion appt pending w/ Dr Bashir in June    Robbie Bailon MD  April 29, 2022  9:31 AM      Pt is a 47 year old female last seen on 4/22/2022 here for follow up of:     R ankle - no swelling since Monday  Pain is improved but still intermittent  No rash     Per my last note:  (M25.560) Right ankle swelling  (primary encounter diagnosis)  Comment: will have her see Dr Bashir at [facility] for 2nd opinion given her refractory pain and swelling; note for work written  Plan: Orthopedic  Referral          (Z98.890) History of ankle surgery  Comment: see above  Plan: Orthopedic  Referral    Past Medical History:   Diagnosis Date     Agoraphobia with panic attacks      Anxiety      Anxiety      Arthritis     of back     Asthma      Asthma in adult, moderate persistent, uncomplicated      Chronic pain      Chronic sinusitis      Cocaine abuse (H)      Controlled substance agreement signed 6/30/2015    Overview:  Patient has chronic pain and is seen at [facility] for this.  Has controlled substance agreement with them.  On Vicodin, Valium, Klonopin prescribed only from there.        Depression      Hx of seasonal allergies      Infectious pericarditis      Lipoma      Tobacco abuse       Current Outpatient Medications   Medication Sig Dispense Refill     ALPRAZolam (XANAX) 2 MG tablet Take 1 tablet (2 mg) by mouth 2 times daily as needed for anxiety 60 tablet 3     cetirizine (ZYRTEC) 10 MG tablet Take 1 tablet (10 mg) by mouth daily 30 tablet 11     estradiol-norethindrone (ACTIVELLA) 1-0.5 MG tablet Take 1 tablet by mouth daily 30 tablet 11     fluticasone (FLONASE) 50 MCG/ACT nasal spray Spray 2 sprays into both nostrils daily 9.9 mL 11     fluticasone-salmeterol (ADVAIR) 500-50 MCG/DOSE inhaler  "Inhale 1 puff into the lungs every 12 hours 60 each 11     ibuprofen (ADVIL/MOTRIN) 600 MG tablet Take 1 tablet (600 mg) by mouth 3 times daily as needed for moderate pain 90 tablet 4     montelukast (SINGULAIR) 10 MG tablet Take 1 tablet (10 mg) by mouth At Bedtime 30 tablet 11     omeprazole (PRILOSEC) 20 MG DR capsule Take 1 capsule (20 mg) by mouth daily 30 capsule 0     oxyCODONE-acetaminophen (PERCOCET) 5-325 MG tablet Take 1 tablet by mouth nightly as needed for severe pain 30 tablet 0     PROAIR  (90 Base) MCG/ACT inhaler Inhale 2 puffs into the lungs every 4 hours as needed for shortness of breath / dyspnea or wheezing 8 g 11     spacer (OPTICHAMBER BINA) holding chamber For use w/ rescue inhaler 1 each 0     tiotropium (SPIRIVA RESPIMAT) 1.25 MCG/ACT inhaler Inhale 2 puffs into the lungs daily 12 g 3     tiotropium (SPIRIVA RESPIMAT) 2.5 MCG/ACT inhaler Inhale 2 puffs into the lungs daily 4 g 5      Allergies   Allergen Reactions     Lidocaine Other (See Comments)     \"my jaw stopped moving\"  Other reaction(s): Dystonia     Penicillins Hives, Rash and Shortness Of Breath     Epinephrine-Lidocaine-Na Metabisulfite Other (See Comments) and Muscle Pain (Myalgia)     Severe jaw cramping, double vision  Jaw locking      ROS:   Gen- no fevers/chills    Derm - no rash/ redness   Neuro - no numbness, no tingling   Remainder of ROS negative.     Exam:   /88   Pulse 99   Resp 16   SpO2 99%     R ankle:   Mild lateral pain w/ inversion  +TTP at ATFL  +mild soft tissue swelling  No rash today  "

## 2022-04-29 ENCOUNTER — OFFICE VISIT (OUTPATIENT)
Dept: FAMILY MEDICINE | Facility: CLINIC | Age: 47
End: 2022-04-29
Payer: COMMERCIAL

## 2022-04-29 VITALS
HEART RATE: 99 BPM | SYSTOLIC BLOOD PRESSURE: 132 MMHG | OXYGEN SATURATION: 99 % | DIASTOLIC BLOOD PRESSURE: 88 MMHG | RESPIRATION RATE: 16 BRPM

## 2022-04-29 DIAGNOSIS — Z98.890 HISTORY OF ANKLE SURGERY: ICD-10-CM

## 2022-04-29 DIAGNOSIS — M25.471 RIGHT ANKLE SWELLING: Primary | ICD-10-CM

## 2022-04-29 PROCEDURE — 99213 OFFICE O/P EST LOW 20 MIN: CPT | Performed by: FAMILY MEDICINE

## 2022-04-29 NOTE — LETTER
WORK MEDICAL NOTE  2022     Seen today: yes    Patient:  Darlene Hudson  :   1975  MRN:     [id number]  Physician: ROBBIE BAILONtiffany RAMOS Hudson may return to work on Date: 2022 WITHOUT restrictions.      The next clinic appointment is scheduled for (date/time) 4 weeks.    Patient limitations:  None        Robbie Bailon MD  2022  9:29 AM

## 2022-05-04 DIAGNOSIS — M25.571 ACUTE RIGHT ANKLE PAIN: ICD-10-CM

## 2022-05-04 RX ORDER — OXYCODONE AND ACETAMINOPHEN 5; 325 MG/1; MG/1
1 TABLET ORAL
Qty: 30 TABLET | Refills: 0 | Status: SHIPPED | OUTPATIENT
Start: 2022-05-04 | End: 2022-06-01

## 2022-05-04 NOTE — TELEPHONE ENCOUNTER
[facility] phone call message- medication clarification/question:    Full Medication Name: oxyCODONE-acetaminophen (PERCOCET)    Dose: 5-325 MG tablet    Question: Patient called and states she was informed to call today to request medication due to her leaving on vacation this Saturday and is working up until Friday and pharmacy closes early and would like it before she leaves. Patient requesting urgently.     Pharmacy confirmed as [facility] #[id number] [place] [id number] AT [facility]: Yes    OK to leave a message on voice mail? Yes    Primary language: English      needed? No    Call taken on May 4, 2022 at 3:44 PM by Lauren Hdez

## 2022-05-11 ENCOUNTER — TELEPHONE (OUTPATIENT)
Dept: FAMILY MEDICINE | Facility: CLINIC | Age: 47
End: 2022-05-11

## 2022-05-11 NOTE — TELEPHONE ENCOUNTER
Called patient to discuss. Advised patient she can be seen in other states, but should check with insurance on where she can go. Advised patient to seek care due to prolonged menstrual bleeding along with dizziness and increased fatigue per patient. Karoline verbalized understanding and agreeable to plan. Routing to PCP for [place] NENA Larson RN

## 2022-05-11 NOTE — TELEPHONE ENCOUNTER
[facility] phone call message- general phone call:    Reason for call: Patient called 2x wanted to speak with  in regards to her bleeding for over 2 1/2 weeks now and would bleed through her tampon in about 10 min or less, would get blood clot and minor pain. Patient was unable to be triaged by THALIA Orozco due to patient currently being in [place] on vacation and was recommenced to see urgent care or go to the ER, due to RN not being licensed in [place] to triage. Patient states when she contacted urgent care in [place] they told her they could not help her due to her not being a [place] resident and that she needs to contact her primary care and never heard that a primary care was not able to help assist with a patient who is out of state on vacation, if ot that she would need to go to the ER. Patient not understanding why she is getting run around and thinks there is probably miscommunication. Patient informs she wants to avoid going to the ER and just need advice if this is something serious to go to the ER of if it can wait out. Call and advise if possible.     Return call needed: Yes    OK to leave a message on voice mail? Yes    Primary language: English      needed? No    Call taken on May 11, 2022 at 9:14 AM by Lauren Hdez

## 2022-05-17 NOTE — PROGRESS NOTES
ASSESSMENT/PLAN:    (N93.9) Vaginal bleeding  (primary encounter diagnosis)  -Patient had not had a menstrual cycle for 6 months  -When it started on 5/4 it was an exceptionally heavy flow and she had severe craps with it   -Was seen in an ED while she was down in [place] after she bled through her shorts, had a normal Hgb at that time, US did show a left ovarian cyst but no other acute findings were reported by the patient (left ovary was not visualized)  -Finally stopped bleeding this AM  -Is currently in the perimenopausal age, and has a history of hot flashes for which she has been seen in this clinic  -At this time discussed further laboratory work/hormone levels  -Has had a TSH recently in 3/2022 which was normal  -We will recheck a hgb today to ensure this is not low  -Patient would like to talk to OBGYN regarding the change in her periods and discuss hysterectomy  -Did state that they were unlikely to do this at this point in time, but she was welcome to discuss with them    -Understands reasons to return for further follow up    (L55.9) Sunburn  -Patient went to [place] and got a sun burn to her bilateral arms up to her shoulders  -There is some peeling of skin and erythema at this time  -Had been placing hydrocortisone cream on the area which did not help  -Discussed appropriate treatment with aloe vera or other topical creams to help with this     (L03.012) Infection of nail bed of finger of right hand  Felon of finger on right hand  -Patient has had two weeks of discomfort and swelling to her right third finger  -Has an artifical nail on top of her nail, hard to see  -Obvious swelling and discoloration consistent with felon  -Quite painful on palpation   -Performed an incision and drainage at this visit to some relief of her discomfort  -Also started the patient on seven days of bactrim  -Will follow up in 7 days to ensure improvement  -Did recommend getting artifical nail removed  -Understands reasons  "to return to clinic    Pt is a 47 year old female last seen on 4/29/2022 here today for:     Vaginal bleeding  -Vaginal bleeding started on 5/4 started to have mensuration  -Had bad cramps with this   -Was heavier than baseline, was not sure if she wanted to go on her trip to [place] or not  -Travelled on 5/7, felt quite fatigued after her flight down  -Around 5/8-5/9 started to have \"contraction like\" cramp pain  -Increaded vaginal bleeding, large clots, going through pads, soaked through a pair of shorts  -Took a percocet which did help with her discomfort  -Went to the ER after soaking through her shorts  -Underwent an US of the abdomen, had a cyst by the left ovary, could not see the right  -Hgb was stable at that point in time  -Since that time her bleeding started to improve, today is the first day she has no blood  -Cramps continue to cause problems, go into her back   -Has not had a period for 6 months, irregular periods for the last year to a year and a half  -Fatigued, no appetite with this     Per phone messages on 5/11/2022:  Reason for call: Patient called 2x wanted to speak with  in regards to her bleeding for over 2 1/2 weeks now and would bleed through her tampon in about 10 min or less, would get blood clot and minor pain. Patient was unable to be triaged by THALIA Orozco due to patient currently being in [place] on vacation and was recommenced to see urgent care or go to the ER, due to RN not being licensed in [place] to triage. Patient states when she contacted urgent care in [place] they told her they could not help her due to her not being a [place] resident and that she needs to contact her primary care and never heard that a primary care was not able to help assist with a patient who is out of state on vacation, if ot that she would need to go to the ER. Patient not understanding why she is getting run around and thinks there is probably miscommunication. Patient informs she wants to avoid " "going to the ER and just need advice if this is something serious to go to the ER of if it can wait out. Call and advise if possible.     Called patient to discuss. Advised patient she can be seen in other states, but should check with insurance on where she can go. Advised patient to seek care due to prolonged menstrual bleeding along with dizziness and increased fatigue per patient. Karoline verbalized understanding and agreeable to plan. Routing to PCP for LA NENA Larson RN    Past Medical History:   Diagnosis Date     Agoraphobia with panic attacks      Anxiety      Anxiety      Arthritis     of back     Asthma      Asthma in adult, moderate persistent, uncomplicated      Chronic pain      Chronic sinusitis      Cocaine abuse (H)      Controlled substance agreement signed 6/30/2015    Overview:  Patient has chronic pain and is seen at [facility] for this.  Has controlled substance agreement with them.  On Vicodin, Valium, Klonopin prescribed only from there.        Depression      Hx of seasonal allergies      Infectious pericarditis      Lipoma      Tobacco abuse       Past Surgical History:   Procedure Laterality Date     ANKLE SURGERY Right 05/30/2019     BIOPSY BREAST Right 1990    benign     CREATION PERICARDIAL WINDOW  02/10/2017    [facility]     HEMORRHOIDECTOMY EXTERNAL       TUMOR REMOVAL      Has had 3. In the right breast and \"inside of rib cage.\"      Current Outpatient Medications   Medication Sig Dispense Refill     sulfamethoxazole-trimethoprim (BACTRIM DS) 800-160 MG tablet Take 1 tablet by mouth 2 times daily 14 tablet 0     ALPRAZolam (XANAX) 2 MG tablet Take 1 tablet (2 mg) by mouth 2 times daily as needed for anxiety 60 tablet 3     cetirizine (ZYRTEC) 10 MG tablet Take 1 tablet (10 mg) by mouth daily 30 tablet 11     estradiol-norethindrone (ACTIVELLA) 1-0.5 MG tablet Take 1 tablet by mouth daily 30 tablet 11     fluticasone (FLONASE) 50 MCG/ACT nasal spray Spray 2 sprays into both " "nostrils daily 9.9 mL 11     fluticasone-salmeterol (ADVAIR) 500-50 MCG/DOSE inhaler Inhale 1 puff into the lungs every 12 hours 60 each 11     ibuprofen (ADVIL/MOTRIN) 600 MG tablet Take 1 tablet (600 mg) by mouth 3 times daily as needed for moderate pain 90 tablet 4     montelukast (SINGULAIR) 10 MG tablet Take 1 tablet (10 mg) by mouth At Bedtime 30 tablet 11     omeprazole (PRILOSEC) 20 MG DR capsule Take 1 capsule (20 mg) by mouth daily 30 capsule 0     oxyCODONE-acetaminophen (PERCOCET) 5-325 MG tablet Take 1 tablet by mouth nightly as needed for severe pain 30 tablet 0     PROAIR  (90 Base) MCG/ACT inhaler Inhale 2 puffs into the lungs every 4 hours as needed for shortness of breath / dyspnea or wheezing 8 g 11     spacer (OPTICHAMBER BINA) holding chamber For use w/ rescue inhaler 1 each 0     tiotropium (SPIRIVA RESPIMAT) 1.25 MCG/ACT inhaler Inhale 2 puffs into the lungs daily 12 g 3     tiotropium (SPIRIVA RESPIMAT) 2.5 MCG/ACT inhaler Inhale 2 puffs into the lungs daily 4 g 5      Allergies   Allergen Reactions     Lidocaine Other (See Comments)     \"my jaw stopped moving\"  Other reaction(s): Dystonia     Penicillins Hives, Rash and Shortness Of Breath     Epinephrine-Lidocaine-Na Metabisulfite Other (See Comments) and Muscle Pain (Myalgia)     Severe jaw cramping, double vision  Jaw locking        ROS:   Gen- no weight change, no fevers/chills   Head/ Eyes- no blurred vision, no headaches   ENT- no cough, no congestion, no URI symptoms   Cardiac - no palpitations, no chest pain   Respiratory - no shortness of breath , no wheezing   GI - no nausea, no vomiting, no diarrhea, no constipation   Urinary - no dysuria, no polyuria   Neuro - no numbness, no tingling   Remainder of ROS negative.     Exam:   /88 (BP Location: Right arm, Patient Position: Sitting, Cuff Size: Adult Large)   Pulse 88   Temp 97.7  F (36.5  C) (Oral)   Ht 1.77 m (5' 9.69\")   LMP  (LMP Unknown)   SpO2 (!) 88%   " BMI 31.85 kg/m     Alert and oriented x 3; No acute distress   Extrem: no clubbing/cyanosis/edema   MSK: full range of motion, nontender Right third finger: artificial nail obscuring nail bed there is swelling to the finger with palpablue fluctuance and discoloration just under the tip of the finger on the nail bed  Neuro: no focal deficits   Derm: Has erythematous macular rash consistent w/ sunburn on bilateral arms up to the shoulders     Procedure:  Pt verbally consented. Effected area cleaned with betadine x 3 then wiped away with alcoholol.  1 percent lidocaine with epineprhine used to provide a digital block of the right third finger.  Number 11 scapel used to make a stab incion.  Purlent drainage was removed . No gauze was needed as area was small. Appropriate wound care dressing applied.  Pt tolerated preocedure well.    Precepted with Dr. Adamaris Bailon MD on 5/18/2022 at 9:58 AM

## 2022-05-18 ENCOUNTER — OFFICE VISIT (OUTPATIENT)
Dept: FAMILY MEDICINE | Facility: CLINIC | Age: 47
End: 2022-05-18
Payer: OTHER MISCELLANEOUS

## 2022-05-18 VITALS
BODY MASS INDEX: 31.85 KG/M2 | OXYGEN SATURATION: 88 % | TEMPERATURE: 97.7 F | DIASTOLIC BLOOD PRESSURE: 88 MMHG | HEIGHT: 70 IN | HEART RATE: 88 BPM | SYSTOLIC BLOOD PRESSURE: 122 MMHG

## 2022-05-18 DIAGNOSIS — L55.9 SUNBURN: ICD-10-CM

## 2022-05-18 DIAGNOSIS — L03.011 FELON OF FINGER OF RIGHT HAND: ICD-10-CM

## 2022-05-18 DIAGNOSIS — N93.9 VAGINAL BLEEDING: Primary | ICD-10-CM

## 2022-05-18 DIAGNOSIS — L03.011 INFECTION OF NAIL BED OF FINGER OF RIGHT HAND: ICD-10-CM

## 2022-05-18 LAB
ERYTHROCYTE [DISTWIDTH] IN BLOOD BY AUTOMATED COUNT: 13.3 % (ref 10–15)
HCT VFR BLD AUTO: 37.4 % (ref 35–47)
HGB BLD-MCNC: 12.6 G/DL (ref 11.7–15.7)
MCH RBC QN AUTO: 31 PG (ref 26.5–33)
MCHC RBC AUTO-ENTMCNC: 33.7 G/DL (ref 31.5–36.5)
MCV RBC AUTO: 92 FL (ref 78–100)
PLATELET # BLD AUTO: 285 10E3/UL (ref 150–450)
RBC # BLD AUTO: 4.06 10E6/UL (ref 3.8–5.2)
WBC # BLD AUTO: 7.4 10E3/UL (ref 4–11)

## 2022-05-18 PROCEDURE — 10060 I&D ABSCESS SIMPLE/SINGLE: CPT | Mod: GC | Performed by: STUDENT IN AN ORGANIZED HEALTH CARE EDUCATION/TRAINING PROGRAM

## 2022-05-18 PROCEDURE — 85027 COMPLETE CBC AUTOMATED: CPT | Performed by: STUDENT IN AN ORGANIZED HEALTH CARE EDUCATION/TRAINING PROGRAM

## 2022-05-18 PROCEDURE — 36415 COLL VENOUS BLD VENIPUNCTURE: CPT | Performed by: STUDENT IN AN ORGANIZED HEALTH CARE EDUCATION/TRAINING PROGRAM

## 2022-05-18 PROCEDURE — 99214 OFFICE O/P EST MOD 30 MIN: CPT | Mod: 25 | Performed by: STUDENT IN AN ORGANIZED HEALTH CARE EDUCATION/TRAINING PROGRAM

## 2022-05-18 RX ORDER — SULFAMETHOXAZOLE/TRIMETHOPRIM 800-160 MG
1 TABLET ORAL 2 TIMES DAILY
Qty: 14 TABLET | Refills: 0 | Status: SHIPPED | OUTPATIENT
Start: 2022-05-18 | End: 2022-05-25

## 2022-05-18 ASSESSMENT — ASTHMA QUESTIONNAIRES: ACT_TOTALSCORE: 25

## 2022-05-18 ASSESSMENT — PATIENT HEALTH QUESTIONNAIRE - PHQ9: SUM OF ALL RESPONSES TO PHQ QUESTIONS 1-9: 0

## 2022-05-18 NOTE — PROGRESS NOTES
I have personally reviewed the history and examination as documented by Dr. Ye.  I was present during key portions of the visit including the entire procedure and agree with the assessment and plan as documented for 47 yr old female here for perimenopausal bleeding, sun burn, and finger felon. I&D performed today for the finger infection. Will check CBC and U/S for her vaginal bleeding. Recommended appropriate topical tx for sunburn. I will see her back in 1 wk. Precautions given. Anticipatory guidance given.     Robbie Bailon MD  May 18, 2022  11:24 AM

## 2022-05-19 ENCOUNTER — TELEPHONE (OUTPATIENT)
Dept: FAMILY MEDICINE | Facility: CLINIC | Age: 47
End: 2022-05-19
Payer: COMMERCIAL

## 2022-05-19 NOTE — LETTER
WORK MEDICAL NOTE   May 19, 2022     Seen today: no; Visit was on 2022    Patient:  Darlene Hudson  :   1975  MRN:     [id number]  Physician: JULIETH BAILON    Darlene Hudson was seen on 2022 for a severe finger infection. She had a procedure performed and was started on antibiotics. She has additional treatment and follow-up schedule for tomorrow. Please allow her to miss work for continued care on 2022.       The next clinic appointment is scheduled for (date/time) 2022.        Julieth Bailon MD  May 19, 2022  4:12 PM

## 2022-05-19 NOTE — TELEPHONE ENCOUNTER
Routing to physician for review due to patient just starting antibiotic and recent procedure. Neo VÁSQUEZ

## 2022-05-19 NOTE — TELEPHONE ENCOUNTER
[facility] phone call message- general phone call:    Reason for call: Patient called and wants to speak with , patient informs her finger has swelled up again and has white stuff again and is puffy. Patient said yesterday 5/18 when  cut it and removed all the white stuff it looked better but now it's santo puffy again and wants to know if that is normal and supposed to be like that. Please call and advise.     Return call needed: Yes    OK to leave a message on voice mail? Yes    Primary language: English      needed? No    Call taken on May 19, 2022 at 2:16 PM by Lauren Hdez

## 2022-05-19 NOTE — TELEPHONE ENCOUNTER
Per Dr Bailon, spoke with patient. Karoline reports her finger has not gotten worse but remains the same as when was seen yesterday. She is scheduled for fake nail removal at the nail salon for 9am tomorrow morning. Karoline is scheduled thereafter to come into clinic at 11:40am for reassessment of finger/ nail bed.     Karoline requests, if Dr Bailon can write a note for work to excuse for tomorrow for appointments above. Please place into communications via [employer] in order for Karoline to be able to access it and send to her supervisor. Thank you. Pia VÁSQUEZ

## 2022-05-20 ENCOUNTER — OFFICE VISIT (OUTPATIENT)
Dept: FAMILY MEDICINE | Facility: CLINIC | Age: 47
End: 2022-05-20
Payer: COMMERCIAL

## 2022-05-20 VITALS
TEMPERATURE: 97.6 F | HEART RATE: 91 BPM | OXYGEN SATURATION: 100 % | DIASTOLIC BLOOD PRESSURE: 81 MMHG | RESPIRATION RATE: 18 BRPM | SYSTOLIC BLOOD PRESSURE: 122 MMHG

## 2022-05-20 DIAGNOSIS — N93.9 ABNORMAL UTERINE BLEEDING: Primary | ICD-10-CM

## 2022-05-20 PROCEDURE — 99207 PR NO CHARGE LOS: CPT | Performed by: STUDENT IN AN ORGANIZED HEALTH CARE EDUCATION/TRAINING PROGRAM

## 2022-05-20 NOTE — LETTER
RETURN TO WORK/SCHOOL FORM    5/20/2022    Re: Darlene Hudson  1975      To Whom It May Concern:     Darlene Hudson was seen in clinic today.  She needs to be out of work until 5/25/22 at which time we have a clinic visit scheduled to reassess.       Latha Irwin MD  5/20/2022 11:48 AM    The patient is a 83y Female complaining of wound check.

## 2022-05-24 NOTE — PROGRESS NOTES
"ASSESSMENT/PLAN:    (L03.011) Infection of nail bed of finger of right hand  (primary encounter diagnosis)  -Area continuing to heal  -Patient is quite nervous about recurrent infection  -Will limit work the next week and provide another week of antibiotics to help prevent recurrent infeciton while she is healing  -Stressed importance of continued warm soaks at least twice a day while she is healing  -Discussed signs and symptoms to return to clinic vs present to the ED  -Will follow up in one week    (L55.9) Sunburn  -Doing better after using aloe vera    Pt is a 47 year old female last seen on 5/20/22 by Dr Irwin here today for follow-up:     1) Finger infection -   -Feeling better but still hurts  -fFnished abx Sunday or Monday   -Noticing the pain when she grabs her phone or is particularly active  -Tender, but overall pain is markedly improved  -Has a tingle in it occasionally, but not chronically     2) Sunburn -   -Improving with aloe vera  -Skin still a little dry/irritated    Past Medical History:   Diagnosis Date     Agoraphobia with panic attacks      Anxiety      Anxiety      Arthritis     of back     Asthma      Asthma in adult, moderate persistent, uncomplicated      Chronic pain      Chronic sinusitis      Cocaine abuse (H)      Controlled substance agreement signed 6/30/2015    Overview:  Patient has chronic pain and is seen at [facility] for this.  Has controlled substance agreement with them.  On Vicodin, Valium, Klonopin prescribed only from there.        Depression      Hx of seasonal allergies      Infectious pericarditis      Lipoma      Tobacco abuse      Past Surgical History:   Procedure Laterality Date     ANKLE SURGERY Right 05/30/2019     BIOPSY BREAST Right 1990    benign     CREATION PERICARDIAL WINDOW  02/10/2017    [facility]     HEMORRHOIDECTOMY EXTERNAL       TUMOR REMOVAL      Has had 3. In the right breast and \"inside of rib cage.\"     Current Outpatient " "Medications   Medication Sig Dispense Refill     sulfamethoxazole-trimethoprim (BACTRIM DS) 800-160 MG tablet Take 1 tablet by mouth 2 times daily 14 tablet 0     ALPRAZolam (XANAX) 2 MG tablet Take 1 tablet (2 mg) by mouth 2 times daily as needed for anxiety 60 tablet 3     cetirizine (ZYRTEC) 10 MG tablet Take 1 tablet (10 mg) by mouth daily 30 tablet 11     estradiol-norethindrone (ACTIVELLA) 1-0.5 MG tablet Take 1 tablet by mouth daily 30 tablet 11     fluticasone (FLONASE) 50 MCG/ACT nasal spray Spray 2 sprays into both nostrils daily 9.9 mL 11     fluticasone-salmeterol (ADVAIR) 500-50 MCG/DOSE inhaler Inhale 1 puff into the lungs every 12 hours 60 each 11     ibuprofen (ADVIL/MOTRIN) 600 MG tablet Take 1 tablet (600 mg) by mouth 3 times daily as needed for moderate pain 90 tablet 4     montelukast (SINGULAIR) 10 MG tablet Take 1 tablet (10 mg) by mouth At Bedtime 30 tablet 11     omeprazole (PRILOSEC) 20 MG DR capsule Take 1 capsule (20 mg) by mouth daily 30 capsule 0     oxyCODONE-acetaminophen (PERCOCET) 5-325 MG tablet Take 1 tablet by mouth nightly as needed for severe pain 30 tablet 0     PROAIR  (90 Base) MCG/ACT inhaler Inhale 2 puffs into the lungs every 4 hours as needed for shortness of breath / dyspnea or wheezing 8 g 11     spacer (OPTICHAMBER BINA) holding chamber For use w/ rescue inhaler 1 each 0     tiotropium (SPIRIVA RESPIMAT) 1.25 MCG/ACT inhaler Inhale 2 puffs into the lungs daily 12 g 3     tiotropium (SPIRIVA RESPIMAT) 2.5 MCG/ACT inhaler Inhale 2 puffs into the lungs daily 4 g 5        Allergies   Allergen Reactions     Lidocaine Other (See Comments)     \"my jaw stopped moving\"  Other reaction(s): Dystonia     Penicillins Hives, Rash and Shortness Of Breath     Epinephrine-Lidocaine-Na Metabisulfite Other (See Comments) and Muscle Pain (Myalgia)     Severe jaw cramping, double vision  Jaw locking       ROS:   Gen- no weight change, no fevers/chills   Remainder of ROS negative. "     Exam:   /79 (BP Location: Right arm, Patient Position: Sitting, Cuff Size: Adult Large)   Pulse 99   Temp 98.3  F (36.8  C) (Oral)   Resp 16   LMP  (LMP Unknown)   SpO2 98%    Alert and oriented x 3; No acute distress     Extrem:   Right third digit, at the nailbed can visualize having from the previous incision and drainage.  Nail bed has changes about half way up the nail.  No palpable fluctuance, no warmth to palpation, remains mildly tender on palpation, sensation intact  MSK: full range of motion at finger  Neuro: no focal deficits     Precepted with Dr. Adamaris Ye MD on 5/25/2022

## 2022-05-25 ENCOUNTER — OFFICE VISIT (OUTPATIENT)
Dept: FAMILY MEDICINE | Facility: CLINIC | Age: 47
End: 2022-05-25
Payer: OTHER MISCELLANEOUS

## 2022-05-25 VITALS
HEART RATE: 99 BPM | RESPIRATION RATE: 16 BRPM | TEMPERATURE: 98.3 F | SYSTOLIC BLOOD PRESSURE: 111 MMHG | OXYGEN SATURATION: 98 % | DIASTOLIC BLOOD PRESSURE: 79 MMHG

## 2022-05-25 DIAGNOSIS — L03.011 INFECTION OF NAIL BED OF FINGER OF RIGHT HAND: Primary | ICD-10-CM

## 2022-05-25 DIAGNOSIS — L55.9 SUNBURN: ICD-10-CM

## 2022-05-25 PROCEDURE — 99207 PR NO CHARGE LOS: CPT | Mod: GC | Performed by: STUDENT IN AN ORGANIZED HEALTH CARE EDUCATION/TRAINING PROGRAM

## 2022-05-25 RX ORDER — SULFAMETHOXAZOLE/TRIMETHOPRIM 800-160 MG
1 TABLET ORAL 2 TIMES DAILY
Qty: 14 TABLET | Refills: 0 | Status: SHIPPED | OUTPATIENT
Start: 2022-05-25 | End: 2022-06-22

## 2022-05-25 ASSESSMENT — PATIENT HEALTH QUESTIONNAIRE - PHQ9
SUM OF ALL RESPONSES TO PHQ QUESTIONS 1-9: 0
SUM OF ALL RESPONSES TO PHQ QUESTIONS 1-9: 0
10. IF YOU CHECKED OFF ANY PROBLEMS, HOW DIFFICULT HAVE THESE PROBLEMS MADE IT FOR YOU TO DO YOUR WORK, TAKE CARE OF THINGS AT HOME, OR GET ALONG WITH OTHER PEOPLE: NOT DIFFICULT AT ALL

## 2022-05-25 ASSESSMENT — ANXIETY QUESTIONNAIRES
7. FEELING AFRAID AS IF SOMETHING AWFUL MIGHT HAPPEN: NOT AT ALL
7. FEELING AFRAID AS IF SOMETHING AWFUL MIGHT HAPPEN: NOT AT ALL
GAD7 TOTAL SCORE: 5
GAD7 TOTAL SCORE: 5
4. TROUBLE RELAXING: SEVERAL DAYS
8. IF YOU CHECKED OFF ANY PROBLEMS, HOW DIFFICULT HAVE THESE MADE IT FOR YOU TO DO YOUR WORK, TAKE CARE OF THINGS AT HOME, OR GET ALONG WITH OTHER PEOPLE?: SOMEWHAT DIFFICULT
3. WORRYING TOO MUCH ABOUT DIFFERENT THINGS: SEVERAL DAYS
6. BECOMING EASILY ANNOYED OR IRRITABLE: NOT AT ALL
GAD7 TOTAL SCORE: 5
1. FEELING NERVOUS, ANXIOUS, OR ON EDGE: SEVERAL DAYS
2. NOT BEING ABLE TO STOP OR CONTROL WORRYING: SEVERAL DAYS
5. BEING SO RESTLESS THAT IT IS HARD TO SIT STILL: SEVERAL DAYS

## 2022-05-25 NOTE — PROGRESS NOTES
I have personally reviewed the history and examination as documented by Dr. Ye.  I was present during key portions of the visit and agree with the assessment and plan as documented for 47 yr old female with finger infection here for follow-up. Clinically improving. Will extend Abx course and follow-up in 1 wk. Note for work written. Precautions given. Anticipatory guidance given.     Robbie Bailon MD  May 25, 2022  1:15 PM

## 2022-05-25 NOTE — LETTER
RETURN TO WORK/SCHOOL FORM    5/25/2022    Re: Darlene Hudson  1975      To Whom It May Concern:     Darlene Hudson was seen in clinic today..  She may return to work with restrictions on 5/26/22          Restrictions:  No griping, twisting or lifting with the right hand.      Zach Ye MD  5/25/2022 12:09 PM

## 2022-05-25 NOTE — LETTER
RETURN TO WORK/SCHOOL FORM    5/25/2022    Re: Darlene Hudson  1975      To Whom It May Concern:     Darlene Hudson was seen in clinic today..  She may return to work with restrictions on 5/26/22          Restrictions:  Please limit the amount of movement of the right hand at work due to injury      Zach Ye MD  5/25/2022 12:09 PM

## 2022-05-31 NOTE — PROGRESS NOTES
HPI:     Darlene Hudson is a 47 year old  female with PMH of asthma, tobacco use, and MDD who presents to follow up right third finger felon.    Darlene Hudson is here alone.    She had an I&D of the right third finger felon on 5/18 with Dr. Ye and Dr. Bailon. Since then it is  but the swelling has gone down. No active discharge. No fever/chills. She can move her finger. She thinks this happened because of how hard it is to turn the key of the cells at the [employer] where she works.              PMHX:     Patient Active Problem List   Diagnosis     Abnormal cytology finding     Nondependent alcohol abuse, episodic drinking behavior     Controlled substance agreement signed     Low back pain     Chronic sinusitis     Major depressive disorder, recurrent episode, moderate (H)     Tobacco use disorder     Noninflammatory disorder of vagina     Pap smear for cervical cancer screening     Abdominal pain     Abnormal cervical Papanicolaou smear     Panic disorder with agoraphobia     Atopic rhinitis     Chronic low back pain     Other long term (current) drug therapy     Acute pericardial effusion     Anxiety     Chronic infectious pericarditis     Polysubstance abuse (H)     Cough     Arthralgia of both lower legs     Moderate persistent asthma without complication     Physiologic disturbance of temperature regulation     Family history of colon cancer       Current Outpatient Medications   Medication Sig Dispense Refill     ALPRAZolam (XANAX) 2 MG tablet Take 1 tablet (2 mg) by mouth 2 times daily as needed for anxiety 60 tablet 3     cetirizine (ZYRTEC) 10 MG tablet Take 1 tablet (10 mg) by mouth daily 30 tablet 11     estradiol-norethindrone (ACTIVELLA) 1-0.5 MG tablet Take 1 tablet by mouth daily 30 tablet 11     fluticasone (FLONASE) 50 MCG/ACT nasal spray Spray 2 sprays into both nostrils daily 9.9 mL 11     fluticasone-salmeterol (ADVAIR) 500-50 MCG/DOSE inhaler Inhale  "1 puff into the lungs every 12 hours 60 each 11     ibuprofen (ADVIL/MOTRIN) 600 MG tablet Take 1 tablet (600 mg) by mouth 3 times daily as needed for moderate pain 90 tablet 4     montelukast (SINGULAIR) 10 MG tablet Take 1 tablet (10 mg) by mouth At Bedtime 30 tablet 11     omeprazole (PRILOSEC) 20 MG DR capsule Take 1 capsule (20 mg) by mouth daily 30 capsule 0     oxyCODONE-acetaminophen (PERCOCET) 5-325 MG tablet Take 1 tablet by mouth nightly as needed for severe pain 30 tablet 0     PROAIR  (90 Base) MCG/ACT inhaler Inhale 2 puffs into the lungs every 4 hours as needed for shortness of breath / dyspnea or wheezing 8 g 11     spacer (OPTICHAMBER BINA) holding chamber For use w/ rescue inhaler 1 each 0     sulfamethoxazole-trimethoprim (BACTRIM DS) 800-160 MG tablet Take 1 tablet by mouth 2 times daily 14 tablet 0     tiotropium (SPIRIVA RESPIMAT) 1.25 MCG/ACT inhaler Inhale 2 puffs into the lungs daily 12 g 3     tiotropium (SPIRIVA RESPIMAT) 2.5 MCG/ACT inhaler Inhale 2 puffs into the lungs daily 4 g 5       Social History     Tobacco Use     Smoking status: Light Tobacco Smoker     Packs/day: 0.10     Years: 30.00     Pack years: 3.00     Types: Cigarettes     Last attempt to quit: 2020     Years since quittin.0     Smokeless tobacco: Current User     Tobacco comment: 2-3 cig/day   Substance Use Topics     Alcohol use: Not Currently     Comment: Occasiaonlly.      Drug use: Not Currently       Social History     Social History Narrative     Not on file       Allergies   Allergen Reactions     Lidocaine Other (See Comments)     \"my jaw stopped moving\"  Other reaction(s): Dystonia     Penicillins Hives, Rash and Shortness Of Breath     Epinephrine-Lidocaine-Na Metabisulfite Other (See Comments) and Muscle Pain (Myalgia)     Severe jaw cramping, double vision  Jaw locking       No results found for this or any previous visit (from the past 24 hour(s)).         Review of Systems:   7 point ROS " negative except as noted.           Physical Exam:     Vitals:    05/20/22 1132   BP: 122/81   Pulse: 91   Resp: 18   Temp: 97.6  F (36.4  C)   SpO2: 100%     There is no height or weight on file to calculate BMI.    General: Alert, well-appearing female in NAD  Ext: Warm, well perfused, 2+ BL radial pulses. Right third finger with redness just distal to the end of the nail and the area right next to the nail is the most tender, less tender along the palmar surface of the finger. She can flex and extend all the fingers of that hand. There is no redness or swelling of the rest of the finger.  Skin: No rash on limited skin exam  Psych: Mood appropriate to visit content, full affect, rational thought content and process      Assessment and Plan     1. Abnormal uterine bleeding  TV ultrasound ordered last visit but never scheduled, I replaced an order today.  - US Transvaginal Non OB; Future    2. Right third finger felon  Improving today, no collection of fluid, worst tenderness is immediately adjacent to nail and not on the pad of the finger. She already has a visit scheduled on 5/25 with Dr. Bailon and he will take a look at it again at that time.      There are no discontinued medications.    Options for treatment and follow-up care were reviewed with the patient and/or guardian. Darlene Hudson and/or guardian engaged in the decision making process and verbalized understanding of the options discussed and agreed with the final plan.    Latha Irwin MD  [facility]   Pager: [phone]

## 2022-06-01 ENCOUNTER — OFFICE VISIT (OUTPATIENT)
Dept: FAMILY MEDICINE | Facility: CLINIC | Age: 47
End: 2022-06-01
Payer: OTHER MISCELLANEOUS

## 2022-06-01 VITALS
DIASTOLIC BLOOD PRESSURE: 70 MMHG | SYSTOLIC BLOOD PRESSURE: 129 MMHG | OXYGEN SATURATION: 99 % | RESPIRATION RATE: 16 BRPM | HEART RATE: 87 BPM

## 2022-06-01 DIAGNOSIS — M25.571 ACUTE RIGHT ANKLE PAIN: ICD-10-CM

## 2022-06-01 DIAGNOSIS — N93.9 ABNORMAL UTERINE BLEEDING: ICD-10-CM

## 2022-06-01 DIAGNOSIS — L03.011 INFECTION OF NAIL BED OF FINGER OF RIGHT HAND: Primary | ICD-10-CM

## 2022-06-01 LAB — HGB BLD-MCNC: 12.3 G/DL (ref 11.7–15.7)

## 2022-06-01 PROCEDURE — 36415 COLL VENOUS BLD VENIPUNCTURE: CPT | Performed by: FAMILY MEDICINE

## 2022-06-01 PROCEDURE — 99214 OFFICE O/P EST MOD 30 MIN: CPT | Performed by: FAMILY MEDICINE

## 2022-06-01 PROCEDURE — 85018 HEMOGLOBIN: CPT | Performed by: FAMILY MEDICINE

## 2022-06-01 RX ORDER — OXYCODONE AND ACETAMINOPHEN 5; 325 MG/1; MG/1
1 TABLET ORAL
Qty: 30 TABLET | Refills: 0 | Status: SHIPPED | OUTPATIENT
Start: 2022-06-01 | End: 2022-06-22

## 2022-06-01 NOTE — LETTER
WORK MEDICAL NOTE  2022     Seen today: yes    Patient:  Darlene Hudson  :   1975  MRN:     [id number]  Physician: JULIETH BAILON    Darlene Hudson was seen in follow-up of her finger infection. She is still unable to /twist/push/ pull with her R hand. I would like to continue modified duty x 2 more weeks    The next clinic appointment is scheduled for (date/time) 6/15/2022.    Patient limitations:  No lifting/gripping/ twisting/ pushing/ pulling w/ R hand          Julieth Bailon MD  2022  2:11 PM

## 2022-06-01 NOTE — PROGRESS NOTES
"ASSESSMENT/PLAN:    (L03.011) Infection of nail bed of finger of right hand  (primary encounter diagnosis)  Comment:   Plan: stable/ improved; will maintain work restrictions x 2 wks more; anticipate clearing back to full duty then    (N93.9) Abnormal uterine bleeding  Comment: U/S pending tomorrow; Gyn appt pending as well; given latest episode will check Hgb  Plan: Hemoglobin          (M25.571) Acute right ankle pain  Comment: appt pending w/ Dr Bashir next week  Plan: oxyCODONE-acetaminophen (PERCOCET) 5-325 MG tablet          Robbie Bailon MD  June 1, 2022  2:16 PM        Pt is a 47 year old female last seen on 5/25/22 here today for:     Finger infection follow-up :  ; less red and swollen but still unable to  and twist  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  Has controlled substance agreement with them.  On Vicodin, Valium, Klonopin prescribed only from there.        Depression      Hx of seasonal allergies      Infectious pericarditis      Lipoma      Tobacco abuse       Past Surgical History:   Procedure Laterality Date     ANKLE SURGERY Right 05/30/2019     BIOPSY BREAST Right 1990    benign     CREATION PERICARDIAL WINDOW  02/10/2017    [facility]     HEMORRHOIDECTOMY EXTERNAL       TUMOR REMOVAL      Has had 3. In the right breast and \"inside of rib " "cage.\"      Current Outpatient Medications   Medication Sig Dispense Refill     ALPRAZolam (XANAX) 2 MG tablet Take 1 tablet (2 mg) by mouth 2 times daily as needed for anxiety 60 tablet 3     cetirizine (ZYRTEC) 10 MG tablet Take 1 tablet (10 mg) by mouth daily 30 tablet 11     estradiol-norethindrone (ACTIVELLA) 1-0.5 MG tablet Take 1 tablet by mouth daily 30 tablet 11     fluticasone (FLONASE) 50 MCG/ACT nasal spray Spray 2 sprays into both nostrils daily 9.9 mL 11     fluticasone-salmeterol (ADVAIR) 500-50 MCG/DOSE inhaler Inhale 1 puff into the lungs every 12 hours 60 each 11     ibuprofen (ADVIL/MOTRIN) 600 MG tablet Take 1 tablet (600 mg) by mouth 3 times daily as needed for moderate pain 90 tablet 4     montelukast (SINGULAIR) 10 MG tablet Take 1 tablet (10 mg) by mouth At Bedtime 30 tablet 11     omeprazole (PRILOSEC) 20 MG DR capsule Take 1 capsule (20 mg) by mouth daily 30 capsule 0     oxyCODONE-acetaminophen (PERCOCET) 5-325 MG tablet Take 1 tablet by mouth nightly as needed for severe pain 30 tablet 0     PROAIR  (90 Base) MCG/ACT inhaler Inhale 2 puffs into the lungs every 4 hours as needed for shortness of breath / dyspnea or wheezing 8 g 11     spacer (OPTICHAMBER BINA) holding chamber For use w/ rescue inhaler 1 each 0     sulfamethoxazole-trimethoprim (BACTRIM DS) 800-160 MG tablet Take 1 tablet by mouth 2 times daily 14 tablet 0     tiotropium (SPIRIVA RESPIMAT) 1.25 MCG/ACT inhaler Inhale 2 puffs into the lungs daily 12 g 3     tiotropium (SPIRIVA RESPIMAT) 2.5 MCG/ACT inhaler Inhale 2 puffs into the lungs daily 4 g 5      Allergies   Allergen Reactions     Lidocaine Other (See Comments)     \"my jaw stopped moving\"  Other reaction(s): Dystonia     Penicillins Hives, Rash and Shortness Of Breath     Epinephrine-Lidocaine-Na Metabisulfite Other (See Comments) and Muscle Pain (Myalgia)     Severe jaw cramping, double vision  Jaw locking        ROS:   Gen- no weight change, no " fevers/chills   GI - no nausea, no vomiting, no diarrhea, no constipation   Urinary - no dysuria, no polyuria   Remainder of ROS negative.     Exam:   /70   Pulse 87   Resp 16   LMP  (LMP Unknown)   SpO2 99%    Alert and oriented x 3; No acute distress   Derm: no rashes     R middle finger w/ full ROM at PIP and DIP  Nail bed and distal phalanx w/ mild tenderness

## 2022-06-02 ENCOUNTER — ANCILLARY PROCEDURE (OUTPATIENT)
Dept: ULTRASOUND IMAGING | Facility: CLINIC | Age: 47
End: 2022-06-02
Attending: FAMILY MEDICINE
Payer: COMMERCIAL

## 2022-06-02 DIAGNOSIS — N93.9 VAGINAL BLEEDING: ICD-10-CM

## 2022-06-02 PROCEDURE — 76830 TRANSVAGINAL US NON-OB: CPT | Mod: TC | Performed by: RADIOLOGY

## 2022-06-02 PROCEDURE — 76856 US EXAM PELVIC COMPLETE: CPT | Mod: TC | Performed by: RADIOLOGY

## 2022-06-03 ENCOUNTER — TELEPHONE (OUTPATIENT)
Dept: FAMILY MEDICINE | Facility: CLINIC | Age: 47
End: 2022-06-03
Payer: COMMERCIAL

## 2022-06-03 NOTE — TELEPHONE ENCOUNTER
[facility] phone call message- patient requesting results:    Test: Ultrasound    Date of test: 6/2/2022    Additional Comments: Darlene called requesting if Dr. Bailon can call with her US results.     OK to leave a message on voice mail? No    Primary language: English      needed? No    Call taken on Ann 3, 2022 at 9:17 AM by Christine Hyde

## 2022-06-03 NOTE — TELEPHONE ENCOUNTER
Spoke to pt regarding U/S results showing ovarian cyst. She will proceed w/ gyn appt for perimenopausal bleeding. No further imaging needed for cyst.    Pt also notes that her sunburn rash has come back?  I am away next week so will forward to Dr Ye just as an FYI in case Karoline needs to be seen next week. I asked her to try PO benadryl prn and see if that helps in case this is a contact dermatitis and not the sunburn which was improving w/ emollient therapy.    Robbie Bailon MD  Ann 3, 2022  3:15 PM

## 2022-06-03 NOTE — TELEPHONE ENCOUNTER
Caller advised recvd results and would like for Dr. Bailon to go over result with patient. Please call patient work phone number.    Work phone number [phone]

## 2022-06-07 ENCOUNTER — OFFICE VISIT (OUTPATIENT)
Dept: ORTHOPEDICS | Facility: CLINIC | Age: 47
End: 2022-06-07
Payer: OTHER MISCELLANEOUS

## 2022-06-07 ENCOUNTER — PRE VISIT (OUTPATIENT)
Dept: ORTHOPEDICS | Facility: CLINIC | Age: 47
End: 2022-06-07
Payer: COMMERCIAL

## 2022-06-07 VITALS — HEIGHT: 70 IN | WEIGHT: 251 LBS | BODY MASS INDEX: 35.93 KG/M2

## 2022-06-07 DIAGNOSIS — Z98.890 HISTORY OF ANKLE SURGERY: ICD-10-CM

## 2022-06-07 DIAGNOSIS — M25.471 RIGHT ANKLE SWELLING: ICD-10-CM

## 2022-06-07 PROCEDURE — 99203 OFFICE O/P NEW LOW 30 MIN: CPT | Performed by: ORTHOPAEDIC SURGERY

## 2022-06-07 RX ORDER — ALPRAZOLAM 1 MG
TABLET ORAL
COMMUNITY
Start: 2022-02-10 | End: 2022-06-22

## 2022-06-07 NOTE — PROGRESS NOTES
CHIEF COMPLAINT:  Right ankle pain.    HISTORY OF PRESENT ILLNESS:  Ms. Hudson is a 47-year-old female who presents today for evaluation of her right ankle.  The patient reports to have chronic swelling with some discomfort and pain.  She reports to be unable to work secondary to the pain and the condition of the ankle joint.  The patient reports to work as a , and this seems to be very difficult for her to accomplish her job.    The patient has had 2 surgeries by Dr. Hernández in [id number], respectively.  Apparently, the patient underwent a lateral ligament reconstruction per patient's report.    The patient reports to have a healthy left ankle.  She denies to have any problems with instability in the right, and she would just like to improve the pain and swelling.    She reports, also, to have some nerve damage secondary to the surgery and still to have some numbness and tingling.    She had another evaluation by Dr. Bailon, who recommended her to visit with us to discuss surgical options.    The patient also has undergone a corticosteroid injection into the ankle, which apparently was quite successful in improving her symptoms from a lidocaine perspective; however, the cortisone never kicked in.    PAST MEDICAL HISTORY:  Quite extensive, and it was reviewed today as well as past surgical history.    DRUG ALLERGIES:  Lidocaine, penicillin and epinephrine.    CURRENT MEDICATIONS:  Please refer to encounter form.    PHYSICAL EXAMINATION:  On today's visit, she presents as a pleasant female in no apparent distress with a height of 5 feet 10 inches and a weight of 251 pounds.  She denies to have any constitutional symptoms.    On today's visit, she presents today with full range of motion of the right ankle, hindfoot and mid foot joints.  CMS is intact.  Skin is intact.  She presents with some diffuse pain across the ankle joint, which is very nonspecific.  Peroneal tendons are minimally tender.   The ankle is stable to anterior drawer and talar tilt, which is drastically better compared to the opposite side.    IMAGING:  An MRI of the ankle was also reviewed today, which is significant for showing no obvious pathology with healthy-looking peroneal tendons along the tip of the fibula, most likely from the ligament reconstructive surgery.    ASSESSMENT:    1.  Status post right ankle lateral ligament reconstruction by an outside provider.  2.  Right ankle pain, most likely soft tissue impingement.    PLAN:  I discussed with the patient that given the fact that she had such a positive response short term from the injection, my recommendation will be to undergo an ankle arthroscopic debridement for a soft tissue impingement.  I discussed with her the most likely postoperative course and implications from such intervention, which include, but are not limited to, infection, bleeding, nerve damage, residual pain and stiffness.    I also discussed with the patient that it is a little bit unclear how much she will improve, but I also discussed with her that it is highly unlikely that she will be in a worse situation.  Given the short surgery with a short recovery, I think this is worth trying.  I also discussed with the patient that the soft tissue impingement cannot be visualized by MRI; therefore, we are just going by our experience and the way she responded to the corticosteroid injection.    All questions were answered.  The patient was pleased with the discussion.  She has no activity restrictions.  She will schedule surgery at the best of her convenience.    TT:  30 minutes.  CT:  20 minutes.

## 2022-06-07 NOTE — LETTER
6/7/2022         RE: Darlene Hudson  [facility] [place] [address]        Dear Colleague,    Thank you for referring your patient, Darlene Hudson, to the [facility]. Please see a copy of my visit note below.    CHIEF COMPLAINT:  Right ankle pain.    HISTORY OF PRESENT ILLNESS:  Ms. Hudson is a 47-year-old female who presents today for evaluation of her right ankle.  The patient reports to have chronic swelling with some discomfort and pain.  She reports to be unable to work secondary to the pain and the condition of the ankle joint.  The patient reports to work as a , and this seems to be very difficult for her to accomplish her job.    The patient has had 2 surgeries by Dr. Hernández in [id number], respectively.  Apparently, the patient underwent a lateral ligament reconstruction per patient's report.    The patient reports to have a healthy left ankle.  She denies to have any problems with instability in the right, and she would just like to improve the pain and swelling.    She reports, also, to have some nerve damage secondary to the surgery and still to have some numbness and tingling.    She had another evaluation by Dr. Bailon, who recommended her to visit with us to discuss surgical options.    The patient also has undergone a corticosteroid injection into the ankle, which apparently was quite successful in improving her symptoms from a lidocaine perspective; however, the cortisone never kicked in.    PAST MEDICAL HISTORY:  Quite extensive, and it was reviewed today as well as past surgical history.    DRUG ALLERGIES:  Lidocaine, penicillin and epinephrine.    CURRENT MEDICATIONS:  Please refer to encounter form.    PHYSICAL EXAMINATION:  On today's visit, she presents as a pleasant female in no apparent distress with a height of 5 feet 10 inches and a weight of 251 pounds.  She denies to have any constitutional symptoms.    On today's  visit, she presents today with full range of motion of the right ankle, hindfoot and mid foot joints.  CMS is intact.  Skin is intact.  She presents with some diffuse pain across the ankle joint, which is very nonspecific.  Peroneal tendons are minimally tender.  The ankle is stable to anterior drawer and talar tilt, which is drastically better compared to the opposite side.    IMAGING:  An MRI of the ankle was also reviewed today, which is significant for showing no obvious pathology with healthy-looking peroneal tendons along the tip of the fibula, most likely from the ligament reconstructive surgery.    ASSESSMENT:    1.  Status post right ankle lateral ligament reconstruction by an outside provider.  2.  Right ankle pain, most likely soft tissue impingement.    PLAN:  I discussed with the patient that given the fact that she had such a positive response short term from the injection, my recommendation will be to undergo an ankle arthroscopic debridement for a soft tissue impingement.  I discussed with her the most likely postoperative course and implications from such intervention, which include, but are not limited to, infection, bleeding, nerve damage, residual pain and stiffness.    I also discussed with the patient that it is a little bit unclear how much she will improve, but I also discussed with her that it is highly unlikely that she will be in a worse situation.  Given the short surgery with a short recovery, I think this is worth trying.  I also discussed with the patient that the soft tissue impingement cannot be visualized by MRI; therefore, we are just going by our experience and the way she responded to the corticosteroid injection.    All questions were answered.  The patient was pleased with the discussion.  She has no activity restrictions.  She will schedule surgery at the best of her convenience.    TT:  30 minutes.  CT:  20 minutes.        Kareem Bashir MD

## 2022-06-07 NOTE — NURSING NOTE
"Reason For Visit:   Chief Complaint   Patient presents with     Consult     Surgery at [facility] in 2018 and 2019 - Dr Hernández \"ankle reconstruction\". Saw him 2 months ago for a cortisone shot. Dr. Bailon thinks she needs surgery again. Dr. Hernández thinks she should go conservative route. Works in correctional facility, concrete floors. Ankle swells and \"turns colors\" after long shift, long walking, worse for the last 1.5 years.       Ht 1.778 m (5' 10\")   Wt 113.9 kg (251 lb)   LMP  (LMP Unknown)   BMI 36.01 kg/m           Wanda Hunter, ATC    "

## 2022-06-07 NOTE — NURSING NOTE
Teaching Flowsheet   Relevant Diagnosis: Ankle arthroscopy with debridement , right  Teaching Topic: preop     Person(s) involved in teaching:   Patient     Motivation Level:  Asks Questions: Yes  Eager to Learn: Yes  Cooperative: Yes  Receptive (willing/able to accept information): Yes  Any cultural factors/Quaker beliefs that may influence understanding or compliance? No       Patient demonstrates understanding of the following:  Reason for the appointment, diagnosis and treatment plan: Yes  Knowledge of proper use of medications and conditions for which they are ordered (with special attention to potential side effects or drug interactions): Yes  Which situations necessitate calling provider and whom to contact: Yes       Teaching Concerns Addressed:        Proper use and care of soap (medical equip, care aids, etc.): Yes  Nutritional needs and diet plan: Yes  Pain management techniques: Yes  Wound Care: Yes  How and/when to access community resources: Yes     Instructional Materials Used/Given: surgery packet reviewed with patient.  She will have her daughter drive her home from her same day surgery and take care of her after surgery.  She will obtain H&P with PCP.  She will provide a photo of covid test.  She denies any positive covid test within the last 30 days.   RN verified that the RIGHT side was correct side.  Questions answered,.  She works at [employer] system.  She will need to be 100% before she returns to work.  Meds reviewed and will be also with H&P.

## 2022-06-08 ENCOUNTER — TELEPHONE (OUTPATIENT)
Dept: FAMILY MEDICINE | Facility: CLINIC | Age: 47
End: 2022-06-08
Payer: COMMERCIAL

## 2022-06-08 NOTE — TELEPHONE ENCOUNTER
[facility] phone call message- general phone call:    Reason for call:     Caller advised having surgery on ankle on July 13 th  And would like to have a restriction note for work. Patient have an appointment with Dr. Bailon on 06/15/2022 at 9:20. Per call would like it upload onto [employer] if possible, thanks.      Return call needed: Yes    OK to leave a message on voice mail? Yes    Primary language: English      needed? No    Call taken on June 8, 2022 at 2:42 PM by Meredith Holder

## 2022-06-10 NOTE — TELEPHONE ENCOUNTER
Spoke to pt. She will bring paperwork to visit on 6/15/2022.    Robbie Bailon MD  Ann 10, 2022  3:44 PM

## 2022-06-10 NOTE — TELEPHONE ENCOUNTER
Dr. Bailon, Darlene called back, wants to speak with you regarding the voicemail you left her. She asked to be called on her work number [phone].

## 2022-06-10 NOTE — TELEPHONE ENCOUNTER
Left voicemail for pt requesting additional clarification on modifications note. At last visit I wrote a work note w/ R hand modifications to last til our next appt on 6/15.  Does she need additional modifications? I am happy to write an updated note at her appointment next week unless she needs this asap.     Robbie Bailon MD  Ann 10, 2022  11:53 AM

## 2022-06-13 ENCOUNTER — TELEPHONE (OUTPATIENT)
Dept: FAMILY MEDICINE | Facility: CLINIC | Age: 47
End: 2022-06-13
Payer: COMMERCIAL

## 2022-06-13 NOTE — TELEPHONE ENCOUNTER
[facility] phone call message- general phone call:    Reason for call: Patient called stating if Dr. Ye can call her regarding restrictions on her finger, no other info given. She asked to be called on her work phone # [phone], option 0 and ask for her. Please call and advise.    Return call needed: Yes    OK to leave a message on voice mail? No    Primary language: English      needed? No    Call taken on June 13, 2022 at 8:50 AM by Christine Hyde

## 2022-06-14 RX ORDER — CLINDAMYCIN PHOSPHATE 900 MG/50ML
900 INJECTION, SOLUTION INTRAVENOUS SEE ADMIN INSTRUCTIONS
Status: CANCELLED | OUTPATIENT
Start: 2022-07-13

## 2022-06-14 RX ORDER — CLINDAMYCIN PHOSPHATE 900 MG/50ML
900 INJECTION, SOLUTION INTRAVENOUS
Status: CANCELLED | OUTPATIENT
Start: 2022-07-13

## 2022-06-14 NOTE — PROGRESS NOTES
ASSESSMENT/PLAN:    (L25.9) Contact dermatitis and eczema  (primary encounter diagnosis)  Comment: rash on arm now appears to be more of a contact derm than sunburn; safe to try steroid crm; precautions given  Plan: triamcinolone (KENALOG) 0.1 % external cream          (L03.011) Infection of nail bed of finger of right hand  Comment:   Plan: resolved; removed work restrictions related to hand    (M25.871) Impingement syndrome of right ankle  Comment:   Plan: has surgery pending for impingement on 7/13/2022; I will see her in 1 wk for preop eval; new work note written    (N93.9) Abnormal uterine bleeding  Comment:   Plan: stable; gyn appt pending on 7/12/2022    Robbie Bailon MD  Ann 15, 2022  10:02 AM      Pt is a 47 year old female last seen on 6/1/2022 here today for:     1) Finger infection - resolved; asking if she can get her nails done now    2) Abnormal uterine bleeding - stable  - U/S done (see below)  Gyn appt pending 7/12/2022    3) R ankle impingement - seen by Dr Bashir on 6/7; surgery scheduled for 7/13/2022    4) sunburn/ rash -> never resolved w/ lotion/crm.  Tried aloe vera, aveeno; initially treated w/ steroid crm but stopped  Very itchy and red on arms and shoulders      Per my last note:  (L03.011) Infection of nail bed of finger of right hand  (primary encounter diagnosis)  Comment:   Plan: stable/ improved; will maintain work restrictions x 2 wks more; anticipate clearing back to full duty then     (N93.9) Abnormal uterine bleeding  Comment: U/S pending tomorrow; Gyn appt pending as well; given latest episode will check Hgb  Plan: Hemoglobin          (M25.571) Acute right ankle pain  Comment: appt pending w/ Dr Bashir next week  Plan: oxyCODONE-acetaminophen (PERCOCET) 5-325 MG tablet    Pelvic U/S 6/2/22:  IMPRESSION:  1.  Large simple right ovarian cyst measuring 2 x 2 x 1.9 cm. This cystic lesion encompasses near entirety of the right ovary with color Doppler imaging demonstrating flow in the  periphery of the right ovary. No evidence for mural nodularity or   septation. Management recommendations as detailed below.     2.  Left ovary not visualized due to bowel gas.     3.  Endometrial thickness at 10 mm.    Per Dr Bashir note on 6/7/2022:  ASSESSMENT:    1.  Status post right ankle lateral ligament reconstruction by an outside provider.  2.  Right ankle pain, most likely soft tissue impingement.     PLAN:  I discussed with the patient that given the fact that she had such a positive response short term from the injection, my recommendation will be to undergo an ankle arthroscopic debridement for a soft tissue impingement.  I discussed with her the most likely postoperative course and implications from such intervention, which include, but are not limited to, infection, bleeding, nerve damage, residual pain and stiffness.     I also discussed with the patient that it is a little bit unclear how much she will improve, but I also discussed with her that it is highly unlikely that she will be in a worse situation.  Given the short surgery with a short recovery, I think this is worth trying.  I also discussed with the patient that the soft tissue impingement cannot be visualized by MRI; therefore, we are just going by our experience and the way she responded to the corticosteroid injection.     All questions were answered.  The patient was pleased with the discussion.  She has no activity restrictions.  She will schedule surgery at the best of her convenience.    Past Medical History:   Diagnosis Date     Agoraphobia with panic attacks      Anxiety      Anxiety      Arthritis     of back     Asthma      Asthma in adult, moderate persistent, uncomplicated      Chronic pain      Chronic sinusitis      Cocaine abuse (H)      Controlled substance agreement signed 6/30/2015    Overview:  Patient has chronic pain and is seen at [facility] for this.  Has controlled substance agreement with them.  On Vicodin,  "Valium, Klonopin prescribed only from there.        Depression      Hx of seasonal allergies      Infectious pericarditis      Lipoma      Tobacco abuse       Past Surgical History:   Procedure Laterality Date     ANKLE SURGERY Right 05/30/2019     BIOPSY BREAST Right 1990    benign     CREATION PERICARDIAL WINDOW  02/10/2017    [facility]     HEMORRHOIDECTOMY EXTERNAL       TUMOR REMOVAL      Has had 3. In the right breast and \"inside of rib cage.\"      Current Outpatient Medications   Medication Sig Dispense Refill     triamcinolone (KENALOG) 0.1 % external cream Apply topically 2 times daily 100 g 1     ALPRAZolam (XANAX) 1 MG tablet        ALPRAZolam (XANAX) 2 MG tablet Take 1 tablet (2 mg) by mouth 2 times daily as needed for anxiety 60 tablet 3     cetirizine (ZYRTEC) 10 MG tablet Take 1 tablet (10 mg) by mouth daily 30 tablet 11     estradiol-norethindrone (ACTIVELLA) 1-0.5 MG tablet Take 1 tablet by mouth daily 30 tablet 11     fluticasone (FLONASE) 50 MCG/ACT nasal spray Spray 2 sprays into both nostrils daily 9.9 mL 11     fluticasone-salmeterol (ADVAIR) 500-50 MCG/DOSE inhaler Inhale 1 puff into the lungs every 12 hours 60 each 11     ibuprofen (ADVIL/MOTRIN) 600 MG tablet Take 1 tablet (600 mg) by mouth 3 times daily as needed for moderate pain 90 tablet 4     montelukast (SINGULAIR) 10 MG tablet Take 1 tablet (10 mg) by mouth At Bedtime 30 tablet 11     omeprazole (PRILOSEC) 20 MG DR capsule Take 1 capsule (20 mg) by mouth daily 30 capsule 0     oxyCODONE-acetaminophen (PERCOCET) 5-325 MG tablet Take 1 tablet by mouth nightly as needed for severe pain 30 tablet 0     PROAIR  (90 Base) MCG/ACT inhaler Inhale 2 puffs into the lungs every 4 hours as needed for shortness of breath / dyspnea or wheezing 8 g 11     spacer (OPTICHAMBER BINA) holding chamber For use w/ rescue inhaler 1 each 0     sulfamethoxazole-trimethoprim (BACTRIM DS) 800-160 MG tablet Take 1 tablet by mouth 2 times daily 14 " "tablet 0     tiotropium (SPIRIVA RESPIMAT) 1.25 MCG/ACT inhaler Inhale 2 puffs into the lungs daily 12 g 3     tiotropium (SPIRIVA RESPIMAT) 2.5 MCG/ACT inhaler Inhale 2 puffs into the lungs daily 4 g 5      Allergies   Allergen Reactions     Lidocaine Other (See Comments)     \"my jaw stopped moving\"  Other reaction(s): Dystonia     Penicillins Hives, Rash and Shortness Of Breath     Epinephrine-Lidocaine-Na Metabisulfite Other (See Comments) and Muscle Pain (Myalgia)     Severe jaw cramping, double vision  Jaw locking        ROS:   Gen- no weight change, no fevers/chills   Remainder of ROS negative.     Exam:   /77 (BP Location: Right arm, Patient Position: Sitting, Cuff Size: Adult Large)   Pulse 95   Temp 97.9  F (36.6  C) (Oral)   Resp 12   LMP  (LMP Unknown)   SpO2 97%    Alert and oriented x 3; No acute distress   L arm -      R arm -     "

## 2022-06-15 ENCOUNTER — OFFICE VISIT (OUTPATIENT)
Dept: FAMILY MEDICINE | Facility: CLINIC | Age: 47
End: 2022-06-15
Payer: OTHER MISCELLANEOUS

## 2022-06-15 VITALS
HEART RATE: 95 BPM | OXYGEN SATURATION: 97 % | SYSTOLIC BLOOD PRESSURE: 115 MMHG | TEMPERATURE: 97.9 F | RESPIRATION RATE: 12 BRPM | DIASTOLIC BLOOD PRESSURE: 77 MMHG

## 2022-06-15 DIAGNOSIS — L03.011 INFECTION OF NAIL BED OF FINGER OF RIGHT HAND: ICD-10-CM

## 2022-06-15 DIAGNOSIS — L25.9 CONTACT DERMATITIS AND ECZEMA: Primary | ICD-10-CM

## 2022-06-15 DIAGNOSIS — N93.9 ABNORMAL UTERINE BLEEDING: ICD-10-CM

## 2022-06-15 DIAGNOSIS — M25.871 IMPINGEMENT SYNDROME OF RIGHT ANKLE: ICD-10-CM

## 2022-06-15 PROCEDURE — 99214 OFFICE O/P EST MOD 30 MIN: CPT | Performed by: FAMILY MEDICINE

## 2022-06-15 RX ORDER — TRIAMCINOLONE ACETONIDE 1 MG/G
CREAM TOPICAL 2 TIMES DAILY
Qty: 100 G | Refills: 1 | Status: ON HOLD | OUTPATIENT
Start: 2022-06-15 | End: 2023-01-06

## 2022-06-15 NOTE — LETTER
WORK MEDICAL NOTE  Ann 15, 2022     Seen today: yes    Patient:  Darlene Hudson  :   1975  MRN:     [id number]  Physician: JULIETH BAILON    Darlene Hudson may return to work on Date: 2022 with the following restrictions.      The next clinic appointment is scheduled for (date/time) 2022 for pre-op evaluation.    Patient limitations:    1) R middle finger infection - resolved; please remove restrictions on R hand    2) Chronic R ankle impingement - she has surgery pending on 2022 with Dr Bashir at [facility].  Any prolonged standing causes significant swelling and pain in her ankle. My recommendation is sedentary work until she is recovered from her ankle surgery.  I anticipate this will take 4 weeks from surgery date.   Therefore, she should be on sedentary duty until 8/10/2022      Please contact my office with any questions/ concerns.            Julieth Bailon MD  Ann 15, 2022  9:56 AM

## 2022-06-22 ENCOUNTER — OFFICE VISIT (OUTPATIENT)
Dept: FAMILY MEDICINE | Facility: CLINIC | Age: 47
End: 2022-06-22
Payer: OTHER MISCELLANEOUS

## 2022-06-22 VITALS
TEMPERATURE: 98 F | SYSTOLIC BLOOD PRESSURE: 123 MMHG | OXYGEN SATURATION: 96 % | BODY MASS INDEX: 34.07 KG/M2 | DIASTOLIC BLOOD PRESSURE: 81 MMHG | WEIGHT: 230 LBS | HEART RATE: 108 BPM | HEIGHT: 69 IN | RESPIRATION RATE: 12 BRPM

## 2022-06-22 DIAGNOSIS — Z01.818 PREOPERATIVE EXAMINATION: Primary | ICD-10-CM

## 2022-06-22 DIAGNOSIS — M25.871 ANKLE IMPINGEMENT SYNDROME, RIGHT: ICD-10-CM

## 2022-06-22 DIAGNOSIS — N76.0 BACTERIAL VAGINOSIS: ICD-10-CM

## 2022-06-22 DIAGNOSIS — B96.89 BACTERIAL VAGINOSIS: ICD-10-CM

## 2022-06-22 DIAGNOSIS — F40.01 PANIC DISORDER WITH AGORAPHOBIA: ICD-10-CM

## 2022-06-22 DIAGNOSIS — F41.1 GENERALIZED ANXIETY DISORDER: ICD-10-CM

## 2022-06-22 PROCEDURE — 99214 OFFICE O/P EST MOD 30 MIN: CPT | Performed by: FAMILY MEDICINE

## 2022-06-22 RX ORDER — ALPRAZOLAM 2 MG
2 TABLET ORAL 2 TIMES DAILY PRN
Qty: 60 TABLET | Refills: 3 | Status: SHIPPED | OUTPATIENT
Start: 2022-07-01 | End: 2022-10-31

## 2022-06-22 RX ORDER — OXYCODONE AND ACETAMINOPHEN 5; 325 MG/1; MG/1
1 TABLET ORAL
Qty: 30 TABLET | Refills: 0 | Status: SHIPPED | OUTPATIENT
Start: 2022-07-01 | End: 2022-07-13

## 2022-06-22 RX ORDER — METRONIDAZOLE 500 MG/1
500 TABLET ORAL 2 TIMES DAILY
Qty: 14 TABLET | Refills: 3 | Status: SHIPPED | OUTPATIENT
Start: 2022-06-22 | End: 2022-06-29

## 2022-06-22 NOTE — PROGRESS NOTES
[facility] [address] [place] [address]  Phone: [phone]  Fax: [phone]  Primary Provider: Robbie Bailon  Pre-op Performing Provider: ROBBIE BAILON    PREOPERATIVE EVALUATION:  Today's date: 6/22/2022    Darlene Hudson is a 47 year old female who presents for a preoperative evaluation.    Surgical Information:  Surgery/Procedure: Rt Ankle Surgery  Surgery Location: [facility] [place]  Surgeon: Dr Bashir  Surgery Date: 7/13/22  Time of Surgery: TBD  Where patient plans to recover: At home with family  Fax number for surgical facility: Available electronically     Type of Anesthesia Anticipated: General    Assessment & Plan     The proposed surgical procedure is considered INTERMEDIATE risk.    Preoperative examination  Cleared to proceed w/ upcoming ankle surgery    Ankle impingement syndrome, right   - pt is currently prescribed 1 tab of oxycodone prn for pain; I recommended she hold on this in the week preceding surgery to help w/ adequate pain control after; she is a restricted pt so I will reach out to Dr Bashir's team to help coordinate her post-op pain mgmt plan  - oxyCODONE-acetaminophen (PERCOCET) 5-325 MG tablet  Dispense: 30 tablet; Refill: 0    Panic disorder with agoraphobia - due for refill  - ALPRAZolam (XANAX) 2 MG tablet  Dispense: 60 tablet; Refill: 3    Generalized anxiety disorder  - ALPRAZolam (XANAX) 2 MG tablet  Dispense: 60 tablet; Refill: 3      Possible Sleep Apnea:    STOP-Bang Total Score 6/22/2022   Total Score 2   Risk Stratification 0 - 2: Low Risk for NIDIA        Risks and Recommendations:  The patient has the following additional risks and recommendations for perioperative complications:   - No identified additional risk factors other than previously addressed      RECOMMENDATION:  APPROVAL GIVEN to proceed with proposed procedure, without further diagnostic evaluation.    >30 min was spent on the day of  visit in review of records, direct patient care, documentation, and care coordination.       Robbie Bailon MD  June 22, 2022  12:16 PM      Subjective     HPI related to upcoming procedure: preop for R ankle surgery    Preop Questions 6/22/2022   1. Have you ever had a heart attack or stroke? No   2. Have you ever had surgery on your heart or blood vessels, such as a stent placement, a coronary artery bypass, or surgery on an artery in your head, neck, heart, or legs? YES - window for pericarditis - 2/2017   3. Do you have chest pain with activity? YES - assoc w/ asthma only; NOT currently having CP   4. Do you have a history of  heart failure? No   5. Do you currently have a cold, bronchitis or symptoms of other infection? No   6. Do you have a cough, shortness of breath, or wheezing? No   7. Do you or anyone in your family have previous history of blood clots? No   8. Do you or does anyone in your family have a serious bleeding problem such as prolonged bleeding following surgeries or cuts? No   9. Have you ever had problems with anemia or been told to take iron pills? No   10. Have you had any abnormal blood loss such as black, tarry or bloody stools, or abnormal vaginal bleeding? YES - menorrhagia; Blood counts stable   11. Have you ever had a blood transfusion? No   12. Are you willing to have a blood transfusion if it is medically needed before, during, or after your surgery? Yes   13. Have you or any of your relatives ever had problems with anesthesia? No   14. Do you have sleep apnea, excessive snoring or daytime drowsiness? UNKNOWN - low risk on stratification   15. Do you have any artifical heart valves or other implanted medical devices like a pacemaker, defibrillator, or continuous glucose monitor? No   16. Do you have artificial joints? No   17. Are you allergic to latex? No   18. Is there any chance that you may be pregnant? No       Health Care Directive:  Patient does not have a Health Care Directive  or Living Will: Patient states has Advance Directive and will bring in a copy to clinic.    Preoperative Review of :   reviewed - controlled substances reflected in medication list.    Review of Systems  CONSTITUTIONAL: NEGATIVE for fever, chills, change in weight  INTEGUMENTARY/SKIN: NEGATIVE for worrisome rashes, moles or lesions  EYES: NEGATIVE for vision changes or irritation  ENT/MOUTH: NEGATIVE for ear, mouth and throat problems  RESP: NEGATIVE for significant cough or SOB  CV: NEGATIVE for chest pain, palpitations or peripheral edema  GI: NEGATIVE for nausea, abdominal pain, heartburn, or change in bowel habits  : NEGATIVE for frequency, dysuria, or hematuria  MUSCULOSKELETAL: NEGATIVE for significant arthralgias or myalgia  NEURO: NEGATIVE for weakness, dizziness or paresthesias  ENDOCRINE: NEGATIVE for temperature intolerance, skin/hair changes  HEME: NEGATIVE for bleeding problems  PSYCHIATRIC: NEGATIVE for changes in mood or affect    Patient Active Problem List    Diagnosis Date Noted     Right ankle swelling 06/07/2022     Priority: Medium     Added automatically from request for surgery [id number]       Physiologic disturbance of temperature regulation 10/24/2020     Priority: Medium     Family history of colon cancer 10/24/2020     Priority: Medium     Moderate persistent asthma without complication 09/29/2020     Priority: Medium     Arthralgia of both lower legs 05/29/2020     Priority: Medium     Bilateral achy knee pain that is chronic in nature going on for years. Most likely osteoarthritis in knees related to obesity. Previously injected in both knees with good relief. Injected last on 5/29/20       Cough 02/09/2020     Priority: Medium     Polysubstance abuse (H) 01/31/2020     Priority: Medium     Cocaine and alcohol in the past       Chronic infectious pericarditis 02/21/2019     Priority: Medium     Acute pericardial effusion 02/06/2017     Priority: Medium     Atopic rhinitis  01/27/2017     Priority: Medium     Chronic low back pain 01/27/2017     Priority: Medium     Pap smear for cervical cancer screening 10/31/2016     Priority: Medium     03/29/2010  Normal cytology, HPV ot done, repeat in 3 years.       Low back pain 07/13/2015     Priority: Medium     Tobacco use disorder 07/13/2015     Priority: Medium     Controlled substance agreement signed 06/30/2015     Priority: Medium     Overview:   Patient has chronic pain and is seen at [facility] for this.  Has controlled substance agreement with them.  On Vicodin, Valium, Klonopin prescribed only from there.         Abdominal pain 06/29/2015     Priority: Medium     Noninflammatory disorder of vagina 02/20/2015     Priority: Medium     Anxiety 02/20/2015     Priority: Medium     Abnormal cytology finding 11/09/2014     Priority: Medium     Overview:   ACUS/HPV positive       Abnormal cervical Papanicolaou smear 11/09/2014     Priority: Medium     Overview:   ACUS/HPV positive       Chronic sinusitis 10/26/2014     Priority: Medium     Other long term (current) drug therapy 01/28/2013     Priority: Medium     Overview:   Vicodin and cyclobenzaprine monthly       Nondependent alcohol abuse, episodic drinking behavior 10/03/2012     Priority: Medium     Major depressive disorder, recurrent episode, moderate (H) 09/05/2006     Priority: Medium     Panic disorder with agoraphobia 09/05/2006     Priority: Medium      Past Medical History:   Diagnosis Date     Agoraphobia with panic attacks      Anxiety      Anxiety      Arthritis     of back     Asthma      Asthma in adult, moderate persistent, uncomplicated      Chronic pain      Chronic sinusitis      Cocaine abuse (H)      Controlled substance agreement signed 6/30/2015    Overview:  Patient has chronic pain and is seen at [facility] for this.  Has controlled substance agreement with them.  On Vicodin, Valium, Klonopin prescribed only from there.        Depression       "Hx of seasonal allergies      Infectious pericarditis      Lipoma      Tobacco abuse      Past Surgical History:   Procedure Laterality Date     ANKLE SURGERY Right 05/30/2019     BIOPSY BREAST Right 1990    benign     CREATION PERICARDIAL WINDOW  02/10/2017    [facility]     HEMORRHOIDECTOMY EXTERNAL       TUMOR REMOVAL      Has had 3. In the right breast and \"inside of rib cage.\"     Current Outpatient Medications   Medication Sig Dispense Refill     [START ON 7/1/2022] ALPRAZolam (XANAX) 2 MG tablet Take 1 tablet (2 mg) by mouth 2 times daily as needed for anxiety 60 tablet 3     metroNIDAZOLE (FLAGYL) 500 MG tablet Take 1 tablet (500 mg) by mouth 2 times daily for 7 days 14 tablet 3     [START ON 7/1/2022] oxyCODONE-acetaminophen (PERCOCET) 5-325 MG tablet Take 1 tablet by mouth nightly as needed for severe pain 30 tablet 0     cetirizine (ZYRTEC) 10 MG tablet Take 1 tablet (10 mg) by mouth daily 30 tablet 11     fluticasone (FLONASE) 50 MCG/ACT nasal spray Spray 2 sprays into both nostrils daily 9.9 mL 11     fluticasone-salmeterol (ADVAIR) 500-50 MCG/DOSE inhaler Inhale 1 puff into the lungs every 12 hours 60 each 11     ibuprofen (ADVIL/MOTRIN) 600 MG tablet Take 1 tablet (600 mg) by mouth 3 times daily as needed for moderate pain 90 tablet 4     montelukast (SINGULAIR) 10 MG tablet Take 1 tablet (10 mg) by mouth At Bedtime 30 tablet 11     PROAIR  (90 Base) MCG/ACT inhaler Inhale 2 puffs into the lungs every 4 hours as needed for shortness of breath / dyspnea or wheezing 8 g 11     spacer (OPTICHAMBER BINA) holding chamber For use w/ rescue inhaler 1 each 0     tiotropium (SPIRIVA RESPIMAT) 1.25 MCG/ACT inhaler Inhale 2 puffs into the lungs daily 12 g 3     tiotropium (SPIRIVA RESPIMAT) 2.5 MCG/ACT inhaler Inhale 2 puffs into the lungs daily 4 g 5     triamcinolone (KENALOG) 0.1 % external cream Apply topically 2 times daily 100 g 1       Allergies   Allergen Reactions     Lidocaine Other " "(See Comments)     \"my jaw stopped moving\"  Other reaction(s): Dystonia     Penicillins Hives, Rash and Shortness Of Breath     Epinephrine-Lidocaine-Na Metabisulfite Other (See Comments) and Muscle Pain (Myalgia)     Severe jaw cramping, double vision  Jaw locking        Social History     Tobacco Use     Smoking status: Light Tobacco Smoker     Packs/day: 0.10     Years: 30.00     Pack years: 3.00     Types: Cigarettes     Last attempt to quit: 2020     Years since quittin.0     Smokeless tobacco: Current User     Tobacco comment: 2-3 cig/day   Substance Use Topics     Alcohol use: Not Currently     Comment: Occasiaonlly.        History   Drug Use Unknown         Objective     /81 (BP Location: Right arm, Patient Position: Sitting, Cuff Size: Adult Large)   Pulse 108   Temp 98  F (36.7  C) (Oral)   Resp 12   Ht 1.753 m (5' 9\")   Wt 104.3 kg (230 lb)   LMP  (LMP Unknown)   SpO2 96%   BMI 33.97 kg/m      Physical Exam    GENERAL APPEARANCE: healthy, alert and no distress     EYES: EOMI, PERRL     HENT: ear canals and TM's normal and nose and mouth without ulcers or lesions     NECK: no adenopathy, no asymmetry, masses, or scars and thyroid normal to palpation     RESP: lungs clear to auscultation - no rales, rhonchi or wheezes     CV: regular rates and rhythm, normal S1 S2, no S3 or S4 and no murmur, click or rub     ABDOMEN:  soft, nontender, no HSM or masses and bowel sounds normal     MS: extremities normal- no gross deformities noted, no evidence of inflammation in joints, FROM in all extremities.     SKIN: no suspicious lesions or rashes     NEURO: Normal strength and tone, sensory exam grossly normal, mentation intact and speech normal     PSYCH: mentation appears normal. and affect normal/bright     LYMPHATICS: No cervical adenopathy    Recent Labs   Lab Test 22  1421 22  1054 22  1108 21  2257 21  2257   HGB 12.3 12.6 13.6   < >  --    PLT  --  285 283   < " >  --    NA  --   --  137  --  141   POTASSIUM  --   --  3.9  --  3.6   CR  --   --  0.69  --  0.85    < > = values in this interval not displayed.        Diagnostics:  No labs were ordered during this visit. - Hgb stable on 6/1/2022  No EKG required, no history of coronary heart disease, significant arrhythmia, peripheral arterial disease or other structural heart disease.    Revised Cardiac Risk Index (RCRI):  The patient has the following serious cardiovascular risks for perioperative complications:   - No serious cardiac risks = 0 points     RCRI Interpretation: 0 points: Class I (very low risk - 0.4% complication rate)           Signed Electronically by: Robbie Bailon MD  Copy of this evaluation report is provided to requesting physician.

## 2022-07-06 ENCOUNTER — TELEPHONE (OUTPATIENT)
Dept: FAMILY MEDICINE | Facility: CLINIC | Age: 47
End: 2022-07-06

## 2022-07-06 DIAGNOSIS — K04.7 DENTAL ABSCESS: Primary | ICD-10-CM

## 2022-07-06 DIAGNOSIS — M25.871 ANKLE IMPINGEMENT SYNDROME, RIGHT: ICD-10-CM

## 2022-07-06 DIAGNOSIS — N93.9 VAGINAL BLEEDING: ICD-10-CM

## 2022-07-06 NOTE — TELEPHONE ENCOUNTER
"[facility] phone call message- general phone call:    Reason for call:     Caller advised Dr. Bailon fill out short term disability, missing \" Attended physion statement\" part. Would like to know if Dr. Bailon remember filling that part out. Patient advised because that one forum is missing, patient have not recvied income for a month.     Patient requesting for oxy due to having 2 tooth pull out today. Willing to bring in after summary if Dr. Bailon needing it to sent out medication.    Patient would also like Dr. Bailon to know that ankle surgery have been moved to sometime in August. Don't know the date and time just yet.     Return call needed: Yes    OK to leave a message on voice mail? Yes    Primary language: English      needed? No    Call taken on July 6, 2022 at 9:04 AM by Meredith Holder    "

## 2022-07-06 NOTE — TELEPHONE ENCOUNTER
"This RN spoke with Karoline for clarification of initial message below. Karoline would like to make it clear she does not need and did not request for refill of her Oxycodone. Karoline was fulfilling what was asked of her by Dr Bailon, letting him know she had two teeth extracted today and she may be possibly taking more of her Percocet if needed.     The form Karoline is referring to is from a week or two ago, during her appointment with Dr Bailon he was filling form out. Recipient has received all pages except the \"Attending Physician Statement\" portion. Karoline would like to verify if this was also part of the form that Dr Bailon had and was filling out?  To avoid further delay and in the best interest of patient, this RN has ask Karoline to call recipient to refax this portion to Dr Bailon in the case that he did not initially have this form. Karoline understands, Dr Bailon will not be expected back in clinic until next Wednesday, 7/13/2022. This does delay further when she would receive her check payment.   Offered an appointment with faculty physician to have form completed and returned sooner than next week  Karoline verbalized understanding and would prefer to wait for Dr Bailon.  Pia RN  "

## 2022-07-08 NOTE — TELEPHONE ENCOUNTER
Caller wanted to know if weve recvied a respond back from Dr. Bailon. I advised Dr. Bailno is not in yet.    Placed pt on hold and found a copy of it. Advised pt found a copy of compete form.  Missing her signature, Social # ect. Patient advised will give us a call back to get a fax number.

## 2022-07-12 ENCOUNTER — OFFICE VISIT (OUTPATIENT)
Dept: OBGYN | Facility: CLINIC | Age: 47
End: 2022-07-12
Payer: COMMERCIAL

## 2022-07-12 VITALS — DIASTOLIC BLOOD PRESSURE: 70 MMHG | HEART RATE: 76 BPM | SYSTOLIC BLOOD PRESSURE: 124 MMHG

## 2022-07-12 DIAGNOSIS — N92.1 MENORRHAGIA WITH IRREGULAR CYCLE: ICD-10-CM

## 2022-07-12 PROCEDURE — 99203 OFFICE O/P NEW LOW 30 MIN: CPT | Performed by: OBSTETRICS & GYNECOLOGY

## 2022-07-13 ENCOUNTER — HOSPITAL ENCOUNTER (OUTPATIENT)
Facility: AMBULATORY SURGERY CENTER | Age: 47
Discharge: HOME | End: 2022-07-13
Attending: ORTHOPAEDIC SURGERY
Payer: OTHER MISCELLANEOUS

## 2022-07-13 DIAGNOSIS — M25.471 RIGHT ANKLE SWELLING: ICD-10-CM

## 2022-07-13 RX ORDER — OXYCODONE AND ACETAMINOPHEN 5; 325 MG/1; MG/1
1 TABLET ORAL 2 TIMES DAILY PRN
Qty: 28 TABLET | Refills: 0 | Status: SHIPPED | OUTPATIENT
Start: 2022-07-13 | End: 2022-07-22

## 2022-07-14 ENCOUNTER — OFFICE VISIT (OUTPATIENT)
Dept: OBGYN | Facility: CLINIC | Age: 47
End: 2022-07-14
Payer: COMMERCIAL

## 2022-07-14 ENCOUNTER — PREP FOR PROCEDURE (OUTPATIENT)
Dept: OBGYN | Facility: CLINIC | Age: 47
End: 2022-07-14

## 2022-07-14 VITALS
HEIGHT: 69 IN | HEART RATE: 107 BPM | DIASTOLIC BLOOD PRESSURE: 78 MMHG | OXYGEN SATURATION: 99 % | BODY MASS INDEX: 35.84 KG/M2 | WEIGHT: 242 LBS | SYSTOLIC BLOOD PRESSURE: 116 MMHG

## 2022-07-14 DIAGNOSIS — N92.1 MENORRHAGIA WITH IRREGULAR CYCLE: Primary | ICD-10-CM

## 2022-07-14 PROCEDURE — 58100 BIOPSY OF UTERUS LINING: CPT | Mod: 53 | Performed by: OBSTETRICS & GYNECOLOGY

## 2022-07-14 NOTE — PROGRESS NOTES
"S:  47 year old WADAVONTE P50Shobha is here for an endometrial biopsy secondary to a desire for an endometrial ablation.    O:  /78 (BP Location: Right arm, Patient Position: Sitting, Cuff Size: Adult Regular)   Pulse 107   Ht 1.753 m (5' 9\")   Wt 109.8 kg (242 lb)   LMP  (LMP Unknown)         Body mass index is 35.74 kg/m .    EG/BUS wnl  Vagina no lesions, no abnormal discharge  Cervix was unable to be visualized despite longest speculum and trying multiple positions with the speculum  Pt tolerated procedure well    A:  47 year old WAAF P50Shobha with anovulatory bleeding, desiring Novasure    P:  Endometrial biopsy was unable to be done.  We discussed options.  Decision was made to proceed with Novasure with D&C for endometrial sampling.  We discussed that she may need medication after depending on the results.  She will be notified when surgery is scheduled.  She was counseled on the need for someone to drive her home and stay with her after surgery, the need for a pre-op exam prior to the surgery, the need to have nothing to eat for at least 8 hours prior to surgery, the need to not have anything to drink within 2 hours of surgery, and that the surgery center would call her a day or two before the procedure to go over details of the process and what time to arrive.  We did discuss that she could have only clear liquids up to 2 hours before surgery.    "

## 2022-07-14 NOTE — PROGRESS NOTES
"CC: Rogina \"Karoline\" RICHARD Hudson is here secondary to abnormal bleeding.    HPI: The pt is a 47 year old WAAF P5 who presents with changes to her periods for the last 12-18 months.  She started having hot flashes about 18 months ago.  She started skipping periods at the same time.  Most recently she skipped 4 months then bled through the months of May and June, almost daily.  She stopped bleeding about 2 weeks ago.  With the bleeding she would sometimes have significant cramping followed by a clot.  She had a Nexplanon about 11 years ago with continuous bleeding.  OCPs in the past caused nausea.  Her daughter has had issues with an IUD.  She had an ultrasound 6/2/22 that showed a 10 mm endometrium.  The right ovary was normal with a simple cyst; the left ovary wasn't visualized.  She works as a  and has bled through her cloths at work.  She is currently off awaiting ankle surgery, so would like to have this resolved before she has to go back to work in Sept.    Past Medical History:   Diagnosis Date     Agoraphobia with panic attacks      Anxiety      Anxiety      Arthritis     of back     Asthma      Asthma in adult, moderate persistent, uncomplicated      Chronic pain      Chronic sinusitis      Cocaine abuse (H)      Controlled substance agreement signed 6/30/2015    Overview:  Patient has chronic pain and is seen at [facility] for this.  Has controlled substance agreement with them.  On Vicodin, Valium, Klonopin prescribed only from there.        Depression      Hx of seasonal allergies      Infectious pericarditis      Lipoma      Tobacco abuse        Past Surgical History:   Procedure Laterality Date     ANKLE SURGERY Right 05/30/2019     BIOPSY BREAST Right 1990    benign     CREATION PERICARDIAL WINDOW  02/10/2017    [facility]     HEMORRHOIDECTOMY EXTERNAL       TUMOR REMOVAL      Has had 3. In the right breast and \"inside of rib cage.\"       Patient's   Family History "   Problem Relation Age of Onset     Hypertension Mother      Cancer Mother         cervical      Other Cancer Father         lung cancer     Asthma Father      Diabetes Brother      Diabetes Brother      Breast Cancer Maternal Grandmother      Asthma Child        Patient   Social History     Socioeconomic History     Marital status:      Spouse name: None     Number of children: None     Years of education: None     Highest education level: None   Tobacco Use     Smoking status: Light Tobacco Smoker     Packs/day: 0.10     Years: 30.00     Pack years: 3.00     Types: Cigarettes     Last attempt to quit: 2020     Years since quittin.1     Smokeless tobacco: Current User     Tobacco comment: 2-3 cig/day   Vaping Use     Vaping Use: Some days   Substance and Sexual Activity     Alcohol use: Not Currently     Comment: Occasiaonlly.      Drug use: Not Currently     Sexual activity: Yes     Partners: Male       Outpatient Medications Prior to Visit   Medication Sig Dispense Refill     ALPRAZolam (XANAX) 2 MG tablet Take 1 tablet (2 mg) by mouth 2 times daily as needed for anxiety 60 tablet 3     cetirizine (ZYRTEC) 10 MG tablet Take 1 tablet (10 mg) by mouth daily 30 tablet 11     fluticasone (FLONASE) 50 MCG/ACT nasal spray Spray 2 sprays into both nostrils daily 9.9 mL 11     fluticasone-salmeterol (ADVAIR) 500-50 MCG/DOSE inhaler Inhale 1 puff into the lungs every 12 hours 60 each 11     ibuprofen (ADVIL/MOTRIN) 600 MG tablet Take 1 tablet (600 mg) by mouth 3 times daily as needed for moderate pain 90 tablet 4     montelukast (SINGULAIR) 10 MG tablet Take 1 tablet (10 mg) by mouth At Bedtime 30 tablet 11     PROAIR  (90 Base) MCG/ACT inhaler Inhale 2 puffs into the lungs every 4 hours as needed for shortness of breath / dyspnea or wheezing 8 g 11     spacer (OPTICHAMBER BINA) holding chamber For use w/ rescue inhaler 1 each 0     tiotropium (SPIRIVA RESPIMAT) 1.25 MCG/ACT inhaler Inhale 2  puffs into the lungs daily 12 g 3     tiotropium (SPIRIVA RESPIMAT) 2.5 MCG/ACT inhaler Inhale 2 puffs into the lungs daily 4 g 5     triamcinolone (KENALOG) 0.1 % external cream Apply topically 2 times daily 100 g 1     oxyCODONE-acetaminophen (PERCOCET) 5-325 MG tablet Take 1 tablet by mouth nightly as needed for severe pain 30 tablet 0     No facility-administered medications prior to visit.       Patient is allergic to lidocaine, penicillins, and epinephrine-lidocaine-na metabisulfite.    ROS:  12 part ROS is negative aside from those symptoms in the HPI    PE:  /70 (BP Location: Right arm, Patient Position: Sitting, Cuff Size: Adult Large)   Pulse 76   LMP  (LMP Unknown)           There is no height or weight on file to calculate BMI. Patient declined weight    General: obese AAF, NAD  Psych: normal mood  Neuro: CN I-XII grossly intact  MS: normal gait    Assessment: 47 year old WAAF P5 with menorrhagia, likely anovulatory bleeding.    Plan: Natural history of perimenopausal changes and bleeding discussed with the patient.  We discussed options for bleeding control including OCPs, Mirena, cyclic progestin, Lysteda, and endometrial ablation.  She is interested in the ablation in the operating room.  She will come back in for endometrial biopsy and to schedule.  Questions were answered to the best of my ability.    37 minutes spent on the date of the encounter doing chart review, review of test results, patient visit and documentation

## 2022-07-15 PROBLEM — N92.1 MENORRHAGIA WITH IRREGULAR CYCLE: Status: ACTIVE | Noted: 2022-07-15

## 2022-07-19 ENCOUNTER — APPOINTMENT (OUTPATIENT)
Dept: RADIOLOGY | Facility: CLINIC | Age: 47
End: 2022-07-19
Attending: EMERGENCY MEDICINE
Payer: COMMERCIAL

## 2022-07-19 ENCOUNTER — HOSPITAL ENCOUNTER (EMERGENCY)
Facility: CLINIC | Age: 47
Discharge: HOME | End: 2022-07-19
Attending: STUDENT IN AN ORGANIZED HEALTH CARE EDUCATION/TRAINING PROGRAM | Admitting: STUDENT IN AN ORGANIZED HEALTH CARE EDUCATION/TRAINING PROGRAM
Payer: COMMERCIAL

## 2022-07-19 VITALS
RESPIRATION RATE: 16 BRPM | DIASTOLIC BLOOD PRESSURE: 91 MMHG | TEMPERATURE: 98.1 F | SYSTOLIC BLOOD PRESSURE: 112 MMHG | HEART RATE: 100 BPM | OXYGEN SATURATION: 98 %

## 2022-07-19 DIAGNOSIS — M25.531 RIGHT WRIST PAIN: ICD-10-CM

## 2022-07-19 PROCEDURE — 99283 EMERGENCY DEPT VISIT LOW MDM: CPT

## 2022-07-19 PROCEDURE — 73110 X-RAY EXAM OF WRIST: CPT | Mod: RT

## 2022-07-19 ASSESSMENT — ENCOUNTER SYMPTOMS
NUMBNESS: 0
WEAKNESS: 0

## 2022-07-20 ENCOUNTER — PATIENT OUTREACH (OUTPATIENT)
Dept: FAMILY MEDICINE | Facility: CLINIC | Age: 47
End: 2022-07-20

## 2022-07-20 ENCOUNTER — TRANSFERRED RECORDS (OUTPATIENT)
Dept: HEALTH INFORMATION MANAGEMENT | Facility: CLINIC | Age: 47
End: 2022-07-20

## 2022-07-20 ENCOUNTER — OFFICE VISIT (OUTPATIENT)
Dept: FAMILY MEDICINE | Facility: CLINIC | Age: 47
End: 2022-07-20

## 2022-07-20 VITALS
SYSTOLIC BLOOD PRESSURE: 90 MMHG | OXYGEN SATURATION: 97 % | HEART RATE: 100 BPM | TEMPERATURE: 97.9 F | RESPIRATION RATE: 12 BRPM | DIASTOLIC BLOOD PRESSURE: 66 MMHG

## 2022-07-20 DIAGNOSIS — Z79.899 ENCOUNTER FOR LONG-TERM (CURRENT) USE OF MEDICATIONS: ICD-10-CM

## 2022-07-20 DIAGNOSIS — Z01.818 PREOPERATIVE EXAMINATION: Primary | ICD-10-CM

## 2022-07-20 DIAGNOSIS — M19.132 SLAC (SCAPHOLUNATE ADVANCED COLLAPSE) OF WRIST, LEFT: ICD-10-CM

## 2022-07-20 LAB — CREAT UR-MCNC: 202 MG/DL

## 2022-07-20 PROCEDURE — 99213 OFFICE O/P EST LOW 20 MIN: CPT | Performed by: FAMILY MEDICINE

## 2022-07-20 PROCEDURE — U0003 INFECTIOUS AGENT DETECTION BY NUCLEIC ACID (DNA OR RNA); SEVERE ACUTE RESPIRATORY SYNDROME CORONAVIRUS 2 (SARS-COV-2) (CORONAVIRUS DISEASE [COVID-19]), AMPLIFIED PROBE TECHNIQUE, MAKING USE OF HIGH THROUGHPUT TECHNOLOGIES AS DESCRIBED BY CMS-2020-01-R: HCPCS | Performed by: FAMILY MEDICINE

## 2022-07-20 PROCEDURE — U0005 INFEC AGEN DETEC AMPLI PROBE: HCPCS | Performed by: FAMILY MEDICINE

## 2022-07-20 NOTE — ED TRIAGE NOTES
Pt reports a slip in her garage last night.  Right wrist pain since.  Ibuprofen 16:30 and oxycodone this morning for her chronic ankle pain.

## 2022-07-20 NOTE — PROGRESS NOTES
[facility] [address] [place] [address]  Phone: [phone]  Fax: [phone]  Primary Provider: Robbie Bailon  Pre-op Performing Provider: ROBBIE BAILON      PREOPERATIVE EVALUATION:  Today's date: 7/20/2022    Darlene Hudson is a 47 year old female who presents for a preoperative evaluation.    Surgical Information:  Surgery/Procedure: Biopsy  Surgery Location: [facility]  Surgeon: Dr. Holland  Surgery Date: 7/22/22  Time of Surgery: 7:30am  Where patient plans to recover: At home with family  Fax number for surgical facility:     Type of Anesthesia Anticipated: General    Assessment & Plan     The proposed surgical procedure is considered LOW risk.    Preoperative examination  Pt cleared to proceed w/ surgery; preop covid swab today  - Asymptomatic COVID-19 Virus (Coronavirus) by PCR Nose    Encounter for long-term (current) use of medications  - [id number] - Urine Drug Confirmation Panel (Comprehensive)    Slac (scapholunate advanced collapse) of wrist, left  I will order MRI and coordinate care for this as she had a poor experience at [facility]  - MR Wrist Right w/o Contrast      Risks and Recommendations:  The patient has the following additional risks and recommendations for perioperative complications:   - No identified additional risk factors other than previously addressed    Medication Instructions:  Patient is to take all scheduled medications on the day of surgery    RECOMMENDATION:  APPROVAL GIVEN to proceed with proposed procedure, without further diagnostic evaluation.            Subjective     HPI related to upcoming procedure: pre-op for endometrial biopsy under general anesthesia    R wrist injury - had a fall and x-ray showed a scapholunate tear and possible fx; was upset by follow-up visit today w/ [facility] hand surgeon. Would like for me to coordinate her care        X-ray wrist 7/19/2022                                                              IMPRESSION: Widening of the scapholunate interval indicating scapholunate ligament tear. Moderate narrowing at the joint between the scaphoid and capitate and the joint between the lunate and capitate. These findings have all developed since the prior   study. No fracture.    Preop Questions 7/20/2022   1. Have you ever had a heart attack or stroke? No   2. Have you ever had surgery on your heart or blood vessels, such as a stent placement, a coronary artery bypass, or surgery on an artery in your head, neck, heart, or legs? YES - window for pericarditis - 2/2017   3. Do you have chest pain with activity? YES - assoc w/ asthma only; NOT currently having CP     4. Do you have a history of  heart failure? No   5. Do you currently have a cold, bronchitis or symptoms of other infection? No   6. Do you have a cough, shortness of breath, or wheezing? No   7. Do you or anyone in your family have previous history of blood clots? No   8. Do you or does anyone in your family have a serious bleeding problem such as prolonged bleeding following surgeries or cuts? No   9. Have you ever had problems with anemia or been told to take iron pills? No   10. Have you had any abnormal blood loss such as black, tarry or bloody stools, or abnormal vaginal bleeding? YES - menorrhagia; Blood counts stable   11. Have you ever had a blood transfusion? No   12. Are you willing to have a blood transfusion if it is medically needed before, during, or after your surgery? Yes   13. Have you or any of your relatives ever had problems with anesthesia? No   14. Do you have sleep apnea, excessive snoring or daytime drowsiness? No   15. Do you have any artifical heart valves or other implanted medical devices like a pacemaker, defibrillator, or continuous glucose monitor? No   16. Do you have artificial joints? No   17. Are you allergic to latex? No   18. Is there any chance that you may be pregnant? No       Health Care Directive:  Patient does  not have a Health Care Directive or Living Will: Patient states has Advance Directive and will bring in a copy to clinic.    Preoperative Review of :   reviewed - controlled substances reflected in medication list.        Review of Systems  CONSTITUTIONAL: NEGATIVE for fever, chills, change in weight  INTEGUMENTARY/SKIN: NEGATIVE for worrisome rashes, moles or lesions  EYES: NEGATIVE for vision changes or irritation  ENT/MOUTH: NEGATIVE for ear, mouth and throat problems  RESP: NEGATIVE for significant cough or SOB  CV: NEGATIVE for chest pain, palpitations or peripheral edema  GI: NEGATIVE for nausea, abdominal pain, heartburn, or change in bowel habits  : NEGATIVE for frequency, dysuria, or hematuria; + menorrhagia  MUSCULOSKELETAL: + L wrist injury; Chronic R ankle impingement  NEURO: NEGATIVE for weakness, dizziness or paresthesias  ENDOCRINE: NEGATIVE for temperature intolerance, skin/hair changes  HEME: NEGATIVE for bleeding problems  PSYCHIATRIC: NEGATIVE for changes in mood or affect    Patient Active Problem List    Diagnosis Date Noted     Menorrhagia with irregular cycle 07/15/2022     Priority: Medium     Added automatically from request for surgery [id number]       Right ankle swelling 06/07/2022     Priority: Medium     Added automatically from request for surgery [id number]       Physiologic disturbance of temperature regulation 10/24/2020     Priority: Medium     Family history of colon cancer 10/24/2020     Priority: Medium     Moderate persistent asthma without complication 09/29/2020     Priority: Medium     Arthralgia of both lower legs 05/29/2020     Priority: Medium     Bilateral achy knee pain that is chronic in nature going on for years. Most likely osteoarthritis in knees related to obesity. Previously injected in both knees with good relief. Injected last on 5/29/20       Cough 02/09/2020     Priority: Medium     Polysubstance abuse (H) 01/31/2020     Priority: Medium     Cocaine and  alcohol in the past       Chronic infectious pericarditis 02/21/2019     Priority: Medium     Acute pericardial effusion 02/06/2017     Priority: Medium     Atopic rhinitis 01/27/2017     Priority: Medium     Chronic low back pain 01/27/2017     Priority: Medium     Pap smear for cervical cancer screening 10/31/2016     Priority: Medium     03/29/2010  Normal cytology, HPV ot done, repeat in 3 years.       Low back pain 07/13/2015     Priority: Medium     Tobacco use disorder 07/13/2015     Priority: Medium     Controlled substance agreement signed 06/30/2015     Priority: Medium     Overview:   Patient has chronic pain and is seen at [facility] for this.  Has controlled substance agreement with them.  On Vicodin, Valium, Klonopin prescribed only from there.         Abdominal pain 06/29/2015     Priority: Medium     Noninflammatory disorder of vagina 02/20/2015     Priority: Medium     Anxiety 02/20/2015     Priority: Medium     Abnormal cytology finding 11/09/2014     Priority: Medium     Overview:   ACUS/HPV positive       Abnormal cervical Papanicolaou smear 11/09/2014     Priority: Medium     Overview:   ACUS/HPV positive       Chronic sinusitis 10/26/2014     Priority: Medium     Other long term (current) drug therapy 01/28/2013     Priority: Medium     Overview:   Vicodin and cyclobenzaprine monthly       Nondependent alcohol abuse, episodic drinking behavior 10/03/2012     Priority: Medium     Major depressive disorder, recurrent episode, moderate (H) 09/05/2006     Priority: Medium     Panic disorder with agoraphobia 09/05/2006     Priority: Medium      Past Medical History:   Diagnosis Date     Agoraphobia with panic attacks      Anxiety      Anxiety      Arthritis     of back     Asthma      Asthma in adult, moderate persistent, uncomplicated      Chronic pain      Chronic sinusitis      Cocaine abuse (H)      Controlled substance agreement signed 06/30/2015    Overview:  Patient has chronic pain  "and is seen at [facility] for this.  Has controlled substance agreement with them.  On Vicodin, Valium, Klonopin prescribed only from there.        Depression      Hx of seasonal allergies      Infectious pericarditis      Lipoma      Tobacco abuse      Past Surgical History:   Procedure Laterality Date     ANKLE SURGERY Right 05/30/2019     BIOPSY BREAST Right 1990    benign     CREATION PERICARDIAL WINDOW  02/10/2017    [facility]     HEMORRHOIDECTOMY EXTERNAL       TUMOR REMOVAL      Has had 3. In the right breast and \"inside of rib cage.\"     Current Outpatient Medications   Medication Sig Dispense Refill     ALPRAZolam (XANAX) 2 MG tablet Take 1 tablet (2 mg) by mouth 2 times daily as needed for anxiety 60 tablet 3     cetirizine (ZYRTEC) 10 MG tablet Take 1 tablet (10 mg) by mouth daily 30 tablet 11     fluticasone (FLONASE) 50 MCG/ACT nasal spray Spray 2 sprays into both nostrils daily 9.9 mL 11     fluticasone-salmeterol (ADVAIR) 500-50 MCG/DOSE inhaler Inhale 1 puff into the lungs every 12 hours 60 each 11     ibuprofen (ADVIL/MOTRIN) 600 MG tablet Take 1 tablet (600 mg) by mouth 3 times daily as needed for moderate pain 90 tablet 4     montelukast (SINGULAIR) 10 MG tablet Take 1 tablet (10 mg) by mouth At Bedtime 30 tablet 11     oxyCODONE-acetaminophen (PERCOCET) 5-325 MG tablet Take 1 tablet by mouth 2 times daily as needed for breakthrough pain or severe pain 28 tablet 0     PROAIR  (90 Base) MCG/ACT inhaler Inhale 2 puffs into the lungs every 4 hours as needed for shortness of breath / dyspnea or wheezing 8 g 11     spacer (OPTICHAMBER BINA) holding chamber For use w/ rescue inhaler 1 each 0     tiotropium (SPIRIVA RESPIMAT) 1.25 MCG/ACT inhaler Inhale 2 puffs into the lungs daily 12 g 3     tiotropium (SPIRIVA RESPIMAT) 2.5 MCG/ACT inhaler Inhale 2 puffs into the lungs daily 4 g 5     triamcinolone (KENALOG) 0.1 % external cream Apply topically 2 times daily 100 g 1 " "      Allergies   Allergen Reactions     Lidocaine Other (See Comments)     \"my jaw stopped moving\"  Other reaction(s): Dystonia     Penicillins Hives, Rash and Shortness Of Breath     Epinephrine-Lidocaine-Na Metabisulfite Other (See Comments) and Muscle Pain (Myalgia)     Severe jaw cramping, double vision  Jaw locking        Social History     Tobacco Use     Smoking status: Light Tobacco Smoker     Packs/day: 0.10     Years: 30.00     Pack years: 3.00     Types: Cigarettes     Last attempt to quit: 2020     Years since quittin.1     Smokeless tobacco: Current User     Tobacco comment: 2-3 cig/day   Substance Use Topics     Alcohol use: Not Currently     Comment: Occasiaonlly.        History   Drug Use Unknown         Objective     BP 90/66 (BP Location: Left arm, Patient Position: Sitting, Cuff Size: Adult Large)   Pulse 100   Temp 97.9  F (36.6  C) (Oral)   Resp 12   SpO2 97%     Physical Exam    GENERAL APPEARANCE: healthy, alert and no distress     EYES: EOMI, PERRL     HENT: ear canals normal and nose and mouth without ulcers or lesions     NECK: no adenopathy, no asymmetry, masses, or scars and thyroid normal to palpation     RESP: lungs clear to auscultation - no rales, rhonchi or wheezes     CV: regular rates and rhythm, normal S1 S2, no S3 or S4 and no murmur, click or rub     ABDOMEN:  soft, nontender, no HSM or masses and bowel sounds normal     MS: extremities - L wrist in splint     SKIN: no suspicious lesions or rashes     NEURO: Normal strength and tone, sensory exam grossly normal, mentation intact and speech normal     PSYCH: mentation appears normal. and affect normal/bright     LYMPHATICS: No cervical adenopathy    Recent Labs   Lab Test 22  1421 22  1054 22  1108 21  2257 21  2257   HGB 12.3 12.6 13.6   < >  --    PLT  --  285 283   < >  --    NA  --   --  137  --  141   POTASSIUM  --   --  3.9  --  3.6   CR  --   --  0.69  --  0.85    < > = values in " this interval not displayed.        Diagnostics:  No labs were ordered during this visit.   No EKG required for low risk surgery (cataract, skin procedure, breast biopsy, etc).    Revised Cardiac Risk Index (RCRI):  The patient has the following serious cardiovascular risks for perioperative complications:   - No serious cardiac risks = 0 points     RCRI Interpretation: 0 points: Class I (very low risk - 0.4% complication rate)         Signed Electronically by: Robbie Bailon MD  Copy of this evaluation report is provided to requesting physician.

## 2022-07-20 NOTE — DISCHARGE INSTRUCTIONS
Your imaging showed findings that suggest a scapholunate ligament injury  Please wear the splint and I got you an appointment tomorrow at [facility] at 1230 pm with the hand doctor  Return if you have significant increase in pain, numbness/weakness

## 2022-07-20 NOTE — ED PROVIDER NOTES
NAME: Darlene Hudson  AGE: 47 year old female  YOB: 1975  MRN: [id number]  EVALUATION DATE & TIME: 2022  8:32 PM    PCP: Robbie Bailon  ED PROVIDER: nAgela Paul MD.    Chief Complaint   Patient presents with     Wrist Pain         FINAL IMPRESSION:  No diagnosis found.    MEDICAL DECISION MAKIN:45 PM I met with the patient, obtained history, performed an initial exam, and discussed options and plan for diagnostics and treatment here in the ED.      MDM:   47-year-old female presents with right wrist pain.  Patient reports she had a mechanical fall and injured her right wrist yesterday.  No head injuries or other new extremity pain.  She is got pain throughout her wrist without open wounds.  She is neurovascular intact.  X-ray shows findings suggestive of scapholunate ligament injury.  Discussed with [facility] who recommends splint and follow-up with them tomorrow in clinic.  Strict return precautions discussed and she agrees with the plan and her questions were answered. Strict return precautions discussed and patient is in agreement with plan, endorses understanding and their questions were answered    MEDICATIONS GIVEN IN THE EMERGENCY:  Medications - No data to display    NEW PRESCRIPTIONS STARTED AT TODAY'S ER VISIT:  New Prescriptions    No medications on file        =================================================================  HPI    Patient information was obtained from: Patient  Use of : N/A       Darlene Hudson is a 47 year old female with a past medical history of substance use, asthma, who presents to this ED via walk-in for evaluation of wrist pain.    Patient reports she has had pain in her right dorsal wrist since she fell in her garage last night. She says the wrist hurts with activity. She also endorses some chronic ankle pain. She says she took tylenol last night for pain and oxycodone this morning which she takes for her ankle pain. No head  "injury.    She denies any loss of sensation and all other relevant symptoms.      REVIEW OF SYSTEMS   Review of Systems   HENT:        No head injury   Musculoskeletal:        Positive for right dorsal wrist pain.   Neurological: Negative for weakness and numbness.        PAST MEDICAL HISTORY:  Past Medical History:   Diagnosis Date     Agoraphobia with panic attacks      Anxiety      Anxiety      Arthritis     of back     Asthma      Asthma in adult, moderate persistent, uncomplicated      Chronic pain      Chronic sinusitis      Cocaine abuse (H)      Controlled substance agreement signed 06/30/2015    Overview:  Patient has chronic pain and is seen at [facility] for this.  Has controlled substance agreement with them.  On Vicodin, Valium, Klonopin prescribed only from there.        Depression      Hx of seasonal allergies      Infectious pericarditis      Lipoma      Tobacco abuse        PAST SURGICAL HISTORY:  Past Surgical History:   Procedure Laterality Date     ANKLE SURGERY Right 05/30/2019     BIOPSY BREAST Right 1990    benign     CREATION PERICARDIAL WINDOW  02/10/2017    [facility]     HEMORRHOIDECTOMY EXTERNAL       TUMOR REMOVAL      Has had 3. In the right breast and \"inside of rib cage.\"       CURRENT MEDICATIONS:    No current facility-administered medications for this encounter.    Current Outpatient Medications:      ALPRAZolam (XANAX) 2 MG tablet, Take 1 tablet (2 mg) by mouth 2 times daily as needed for anxiety, Disp: 60 tablet, Rfl: 3     cetirizine (ZYRTEC) 10 MG tablet, Take 1 tablet (10 mg) by mouth daily, Disp: 30 tablet, Rfl: 11     fluticasone (FLONASE) 50 MCG/ACT nasal spray, Spray 2 sprays into both nostrils daily, Disp: 9.9 mL, Rfl: 11     fluticasone-salmeterol (ADVAIR) 500-50 MCG/DOSE inhaler, Inhale 1 puff into the lungs every 12 hours, Disp: 60 each, Rfl: 11     ibuprofen (ADVIL/MOTRIN) 600 MG tablet, Take 1 tablet (600 mg) by mouth 3 times daily as needed for " "moderate pain, Disp: 90 tablet, Rfl: 4     montelukast (SINGULAIR) 10 MG tablet, Take 1 tablet (10 mg) by mouth At Bedtime, Disp: 30 tablet, Rfl: 11     oxyCODONE-acetaminophen (PERCOCET) 5-325 MG tablet, Take 1 tablet by mouth 2 times daily as needed for breakthrough pain or severe pain, Disp: 28 tablet, Rfl: 0     PROAIR  (90 Base) MCG/ACT inhaler, Inhale 2 puffs into the lungs every 4 hours as needed for shortness of breath / dyspnea or wheezing, Disp: 8 g, Rfl: 11     spacer (OPTICHAMBER BINA) holding chamber, For use w/ rescue inhaler, Disp: 1 each, Rfl: 0     tiotropium (SPIRIVA RESPIMAT) 1.25 MCG/ACT inhaler, Inhale 2 puffs into the lungs daily, Disp: 12 g, Rfl: 3     tiotropium (SPIRIVA RESPIMAT) 2.5 MCG/ACT inhaler, Inhale 2 puffs into the lungs daily, Disp: 4 g, Rfl: 5     triamcinolone (KENALOG) 0.1 % external cream, Apply topically 2 times daily, Disp: 100 g, Rfl: 1    ALLERGIES:  Allergies   Allergen Reactions     Lidocaine Other (See Comments)     \"my jaw stopped moving\"  Other reaction(s): Dystonia     Penicillins Hives, Rash and Shortness Of Breath     Epinephrine-Lidocaine-Na Metabisulfite Other (See Comments) and Muscle Pain (Myalgia)     Severe jaw cramping, double vision  Jaw locking       FAMILY HISTORY:  Family History   Problem Relation Age of Onset     Hypertension Mother      Cancer Mother         cervical      Other Cancer Father         lung cancer     Asthma Father      Diabetes Brother      Diabetes Brother      Breast Cancer Maternal Grandmother      Asthma Child        SOCIAL HISTORY:   Social History     Socioeconomic History     Marital status:      Spouse name: None     Number of children: None     Years of education: None     Highest education level: None   Tobacco Use     Smoking status: Light Tobacco Smoker     Packs/day: 0.10     Years: 30.00     Pack years: 3.00     Types: Cigarettes     Last attempt to quit: 2020     Years since quittin.1     " Smokeless tobacco: Current User     Tobacco comment: 2-3 cig/day   Vaping Use     Vaping Use: Some days   Substance and Sexual Activity     Alcohol use: Not Currently     Comment: Occasiaonlly.      Drug use: Not Currently     Sexual activity: Yes     Partners: Male     PHYSICAL EXAM:    Vitals: BP (!) 112/91   Pulse 100   Temp 98.1  F (36.7  C) (Oral)   Resp 16   SpO2 98%    Constitutional: Well developed, well nourished. No acute distress.  HENT: Normocephalic, atraumatic, mucous membranes moist, nose normal  Eyes: Pupils mid range, sclera white, no discharge  Neck- Gross ROM intact.   Respiratory: Normal work of breathing, normal rate, speaks in full sentences  Cardiovascular: Normal heart rate. 2+ radial pulse, normal capillary refills to   Musculoskeletal: Tenderness throughout the right wrist with some swelling, has pain with ROM, no open wounds, no hand or forearm/elbow/shoulder tenderness.   Neurologic: Alert & oriented x 3, cranial nerves grossly intact. Speech clear. Moving all extremities spontaneously. Strength and sensation intact the RUE.    LAB:  All pertinent labs reviewed and interpreted.  Labs Ordered and Resulted from Time of ED Arrival to Time of ED Departure - No data to display    RADIOLOGY:  XR Wrist Right G/E 3 Views   Preliminary Result   IMPRESSION: Widening of the scapholunate interval indicating scapholunate ligament tear. Moderate narrowing at the joint between the scaphoid and capitate and the joint between the lunate and capitate. These findings have all developed since the prior    study. No fracture.          EKG:   N/A    PROCEDURES:   Procedures     I, Carlos Dorantes, am serving as a scribe to document services personally performed by Dr. Angela Paul based on my observation and the provider's statements to me. I, Angela Paul MD attest that Carlos Dorantes is acting in a scribe capacity, has observed my performance of the services and has documented them in accordance with my  direction.    Angela Paul M.D.  Emergency Medicine  [facility] EMERGENCY ROOM  [facility] [facility] [id number] [phone]  Dept: [phone]       Angela Paul MD  07/19/22 [id number]

## 2022-07-20 NOTE — PROGRESS NOTES
Clinic Care Coordination Contact    Follow Up Progress Note      Assessment: The pt was recently in the ED, I called to check up on the pt, and help the pt setup a ED follow up. The pt was at [facility] for wrist pain. I called and talked to the pt, pt stated that she is in a lot of pain. Pt stated that she is following up with hand doctor, then come in today to see  at 1:40pm.       Care Gaps:    Health Maintenance Due   Topic Date Due     NICOTINE/TOBACCO CESSATION COUNSELING Q 1 YR  Never done     PREVENTIVE CARE VISIT  Never done     ADVANCE CARE PLANNING  Never done     DEPRESSION ACTION PLAN  Never done     COLORECTAL CANCER SCREENING  Never done     HIV SCREENING  Never done     HEPATITIS C SCREENING  Never done     HEPATITIS B IMMUNIZATION (1 of 3 - Risk 3-dose series) Never done     Pneumococcal Vaccine: Pediatrics (0 to 5 Years) and At-Risk Patients (6 to 64 Years) (2 - PCV) 10/15/2015     MAMMO SCREENING  09/29/2017     URINE DRUG SCREEN  11/27/2020     ASTHMA ACTION PLAN  01/31/2021     SPIROMETRY  03/03/2021           Goals addressed this encounter:    Goals Addressed    None         Intervention/Education provided during outreach:               Plan:     Care Coordinator will follow up in month

## 2022-07-20 NOTE — H&P (VIEW-ONLY)
[facility] [address] [place] [address]  Phone: [phone]  Fax: [phone]  Primary Provider: Robbie Bailon  Pre-op Performing Provider: ROBBIE BAILON      PREOPERATIVE EVALUATION:  Today's date: 7/20/2022    Darlene Hudson is a 47 year old female who presents for a preoperative evaluation.    Surgical Information:  Surgery/Procedure: Biopsy  Surgery Location: [facility]  Surgeon: Dr. Holland  Surgery Date: 7/22/22  Time of Surgery: 7:30am  Where patient plans to recover: At home with family  Fax number for surgical facility:     Type of Anesthesia Anticipated: General    Assessment & Plan     The proposed surgical procedure is considered LOW risk.    Preoperative examination  Pt cleared to proceed w/ surgery; preop covid swab today  - Asymptomatic COVID-19 Virus (Coronavirus) by PCR Nose    Encounter for long-term (current) use of medications  - [id number] - Urine Drug Confirmation Panel (Comprehensive)    Slac (scapholunate advanced collapse) of wrist, left  I will order MRI and coordinate care for this as she had a poor experience at [facility]  - MR Wrist Right w/o Contrast      Risks and Recommendations:  The patient has the following additional risks and recommendations for perioperative complications:   - No identified additional risk factors other than previously addressed    Medication Instructions:  Patient is to take all scheduled medications on the day of surgery    RECOMMENDATION:  APPROVAL GIVEN to proceed with proposed procedure, without further diagnostic evaluation.            Subjective     HPI related to upcoming procedure: pre-op for endometrial biopsy under general anesthesia    R wrist injury - had a fall and x-ray showed a scapholunate tear and possible fx; was upset by follow-up visit today w/ [facility] hand surgeon. Would like for me to coordinate her care        X-ray wrist 7/19/2022                                                              IMPRESSION: Widening of the scapholunate interval indicating scapholunate ligament tear. Moderate narrowing at the joint between the scaphoid and capitate and the joint between the lunate and capitate. These findings have all developed since the prior   study. No fracture.    Preop Questions 7/20/2022   1. Have you ever had a heart attack or stroke? No   2. Have you ever had surgery on your heart or blood vessels, such as a stent placement, a coronary artery bypass, or surgery on an artery in your head, neck, heart, or legs? YES - window for pericarditis - 2/2017   3. Do you have chest pain with activity? YES - assoc w/ asthma only; NOT currently having CP     4. Do you have a history of  heart failure? No   5. Do you currently have a cold, bronchitis or symptoms of other infection? No   6. Do you have a cough, shortness of breath, or wheezing? No   7. Do you or anyone in your family have previous history of blood clots? No   8. Do you or does anyone in your family have a serious bleeding problem such as prolonged bleeding following surgeries or cuts? No   9. Have you ever had problems with anemia or been told to take iron pills? No   10. Have you had any abnormal blood loss such as black, tarry or bloody stools, or abnormal vaginal bleeding? YES - menorrhagia; Blood counts stable   11. Have you ever had a blood transfusion? No   12. Are you willing to have a blood transfusion if it is medically needed before, during, or after your surgery? Yes   13. Have you or any of your relatives ever had problems with anesthesia? No   14. Do you have sleep apnea, excessive snoring or daytime drowsiness? No   15. Do you have any artifical heart valves or other implanted medical devices like a pacemaker, defibrillator, or continuous glucose monitor? No   16. Do you have artificial joints? No   17. Are you allergic to latex? No   18. Is there any chance that you may be pregnant? No       Health Care Directive:  Patient does  not have a Health Care Directive or Living Will: Patient states has Advance Directive and will bring in a copy to clinic.    Preoperative Review of :   reviewed - controlled substances reflected in medication list.        Review of Systems  CONSTITUTIONAL: NEGATIVE for fever, chills, change in weight  INTEGUMENTARY/SKIN: NEGATIVE for worrisome rashes, moles or lesions  EYES: NEGATIVE for vision changes or irritation  ENT/MOUTH: NEGATIVE for ear, mouth and throat problems  RESP: NEGATIVE for significant cough or SOB  CV: NEGATIVE for chest pain, palpitations or peripheral edema  GI: NEGATIVE for nausea, abdominal pain, heartburn, or change in bowel habits  : NEGATIVE for frequency, dysuria, or hematuria; + menorrhagia  MUSCULOSKELETAL: + L wrist injury; Chronic R ankle impingement  NEURO: NEGATIVE for weakness, dizziness or paresthesias  ENDOCRINE: NEGATIVE for temperature intolerance, skin/hair changes  HEME: NEGATIVE for bleeding problems  PSYCHIATRIC: NEGATIVE for changes in mood or affect    Patient Active Problem List    Diagnosis Date Noted     Menorrhagia with irregular cycle 07/15/2022     Priority: Medium     Added automatically from request for surgery [id number]       Right ankle swelling 06/07/2022     Priority: Medium     Added automatically from request for surgery [id number]       Physiologic disturbance of temperature regulation 10/24/2020     Priority: Medium     Family history of colon cancer 10/24/2020     Priority: Medium     Moderate persistent asthma without complication 09/29/2020     Priority: Medium     Arthralgia of both lower legs 05/29/2020     Priority: Medium     Bilateral achy knee pain that is chronic in nature going on for years. Most likely osteoarthritis in knees related to obesity. Previously injected in both knees with good relief. Injected last on 5/29/20       Cough 02/09/2020     Priority: Medium     Polysubstance abuse (H) 01/31/2020     Priority: Medium     Cocaine and  alcohol in the past       Chronic infectious pericarditis 02/21/2019     Priority: Medium     Acute pericardial effusion 02/06/2017     Priority: Medium     Atopic rhinitis 01/27/2017     Priority: Medium     Chronic low back pain 01/27/2017     Priority: Medium     Pap smear for cervical cancer screening 10/31/2016     Priority: Medium     03/29/2010  Normal cytology, HPV ot done, repeat in 3 years.       Low back pain 07/13/2015     Priority: Medium     Tobacco use disorder 07/13/2015     Priority: Medium     Controlled substance agreement signed 06/30/2015     Priority: Medium     Overview:   Patient has chronic pain and is seen at [facility] for this.  Has controlled substance agreement with them.  On Vicodin, Valium, Klonopin prescribed only from there.         Abdominal pain 06/29/2015     Priority: Medium     Noninflammatory disorder of vagina 02/20/2015     Priority: Medium     Anxiety 02/20/2015     Priority: Medium     Abnormal cytology finding 11/09/2014     Priority: Medium     Overview:   ACUS/HPV positive       Abnormal cervical Papanicolaou smear 11/09/2014     Priority: Medium     Overview:   ACUS/HPV positive       Chronic sinusitis 10/26/2014     Priority: Medium     Other long term (current) drug therapy 01/28/2013     Priority: Medium     Overview:   Vicodin and cyclobenzaprine monthly       Nondependent alcohol abuse, episodic drinking behavior 10/03/2012     Priority: Medium     Major depressive disorder, recurrent episode, moderate (H) 09/05/2006     Priority: Medium     Panic disorder with agoraphobia 09/05/2006     Priority: Medium      Past Medical History:   Diagnosis Date     Agoraphobia with panic attacks      Anxiety      Anxiety      Arthritis     of back     Asthma      Asthma in adult, moderate persistent, uncomplicated      Chronic pain      Chronic sinusitis      Cocaine abuse (H)      Controlled substance agreement signed 06/30/2015    Overview:  Patient has chronic pain  "and is seen at [facility] for this.  Has controlled substance agreement with them.  On Vicodin, Valium, Klonopin prescribed only from there.        Depression      Hx of seasonal allergies      Infectious pericarditis      Lipoma      Tobacco abuse      Past Surgical History:   Procedure Laterality Date     ANKLE SURGERY Right 05/30/2019     BIOPSY BREAST Right 1990    benign     CREATION PERICARDIAL WINDOW  02/10/2017    [facility]     HEMORRHOIDECTOMY EXTERNAL       TUMOR REMOVAL      Has had 3. In the right breast and \"inside of rib cage.\"     Current Outpatient Medications   Medication Sig Dispense Refill     ALPRAZolam (XANAX) 2 MG tablet Take 1 tablet (2 mg) by mouth 2 times daily as needed for anxiety 60 tablet 3     cetirizine (ZYRTEC) 10 MG tablet Take 1 tablet (10 mg) by mouth daily 30 tablet 11     fluticasone (FLONASE) 50 MCG/ACT nasal spray Spray 2 sprays into both nostrils daily 9.9 mL 11     fluticasone-salmeterol (ADVAIR) 500-50 MCG/DOSE inhaler Inhale 1 puff into the lungs every 12 hours 60 each 11     ibuprofen (ADVIL/MOTRIN) 600 MG tablet Take 1 tablet (600 mg) by mouth 3 times daily as needed for moderate pain 90 tablet 4     montelukast (SINGULAIR) 10 MG tablet Take 1 tablet (10 mg) by mouth At Bedtime 30 tablet 11     oxyCODONE-acetaminophen (PERCOCET) 5-325 MG tablet Take 1 tablet by mouth 2 times daily as needed for breakthrough pain or severe pain 28 tablet 0     PROAIR  (90 Base) MCG/ACT inhaler Inhale 2 puffs into the lungs every 4 hours as needed for shortness of breath / dyspnea or wheezing 8 g 11     spacer (OPTICHAMBER BINA) holding chamber For use w/ rescue inhaler 1 each 0     tiotropium (SPIRIVA RESPIMAT) 1.25 MCG/ACT inhaler Inhale 2 puffs into the lungs daily 12 g 3     tiotropium (SPIRIVA RESPIMAT) 2.5 MCG/ACT inhaler Inhale 2 puffs into the lungs daily 4 g 5     triamcinolone (KENALOG) 0.1 % external cream Apply topically 2 times daily 100 g 1 " "      Allergies   Allergen Reactions     Lidocaine Other (See Comments)     \"my jaw stopped moving\"  Other reaction(s): Dystonia     Penicillins Hives, Rash and Shortness Of Breath     Epinephrine-Lidocaine-Na Metabisulfite Other (See Comments) and Muscle Pain (Myalgia)     Severe jaw cramping, double vision  Jaw locking        Social History     Tobacco Use     Smoking status: Light Tobacco Smoker     Packs/day: 0.10     Years: 30.00     Pack years: 3.00     Types: Cigarettes     Last attempt to quit: 2020     Years since quittin.1     Smokeless tobacco: Current User     Tobacco comment: 2-3 cig/day   Substance Use Topics     Alcohol use: Not Currently     Comment: Occasiaonlly.        History   Drug Use Unknown         Objective     BP 90/66 (BP Location: Left arm, Patient Position: Sitting, Cuff Size: Adult Large)   Pulse 100   Temp 97.9  F (36.6  C) (Oral)   Resp 12   SpO2 97%     Physical Exam    GENERAL APPEARANCE: healthy, alert and no distress     EYES: EOMI, PERRL     HENT: ear canals normal and nose and mouth without ulcers or lesions     NECK: no adenopathy, no asymmetry, masses, or scars and thyroid normal to palpation     RESP: lungs clear to auscultation - no rales, rhonchi or wheezes     CV: regular rates and rhythm, normal S1 S2, no S3 or S4 and no murmur, click or rub     ABDOMEN:  soft, nontender, no HSM or masses and bowel sounds normal     MS: extremities - L wrist in splint     SKIN: no suspicious lesions or rashes     NEURO: Normal strength and tone, sensory exam grossly normal, mentation intact and speech normal     PSYCH: mentation appears normal. and affect normal/bright     LYMPHATICS: No cervical adenopathy    Recent Labs   Lab Test 22  1421 22  1054 22  1108 21  2257 21  2257   HGB 12.3 12.6 13.6   < >  --    PLT  --  285 283   < >  --    NA  --   --  137  --  141   POTASSIUM  --   --  3.9  --  3.6   CR  --   --  0.69  --  0.85    < > = values in " this interval not displayed.        Diagnostics:  No labs were ordered during this visit.   No EKG required for low risk surgery (cataract, skin procedure, breast biopsy, etc).    Revised Cardiac Risk Index (RCRI):  The patient has the following serious cardiovascular risks for perioperative complications:   - No serious cardiac risks = 0 points     RCRI Interpretation: 0 points: Class I (very low risk - 0.4% complication rate)         Signed Electronically by: Robbie Bailon MD  Copy of this evaluation report is provided to requesting physician.

## 2022-07-21 ENCOUNTER — ANESTHESIA EVENT (OUTPATIENT)
Dept: SURGERY | Facility: AMBULATORY SURGERY CENTER | Age: 47
End: 2022-07-21
Payer: COMMERCIAL

## 2022-07-21 LAB — SARS-COV-2 RNA RESP QL NAA+PROBE: NEGATIVE

## 2022-07-21 ASSESSMENT — LIFESTYLE VARIABLES: TOBACCO_USE: 1

## 2022-07-21 NOTE — ANESTHESIA PREPROCEDURE EVALUATION
"Anesthesia Pre-Procedure Evaluation    Patient: Darlene Hudson   MRN: [id number] : 1975        Procedure : Procedure(s):  HYSTEROSCOPY, WITH ENDOMETRIAL RADIOFREQUENCY ABLATION - NOVASURE DILATION AND CURETTAGE, UTERUS  DILATION AND CURETTAGE, UTERUS          Past Medical History:   Diagnosis Date     Agoraphobia with panic attacks      Anxiety      Anxiety      Arthritis     of back     Asthma      Asthma in adult, moderate persistent, uncomplicated      Chronic pain      Chronic sinusitis      Cocaine abuse (H)      Controlled substance agreement signed 2015    Overview:  Patient has chronic pain and is seen at [facility] for this.  Has controlled substance agreement with them.  On Vicodin, Valium, Klonopin prescribed only from there.        Depression      Hx of seasonal allergies      Infectious pericarditis      Lipoma      Tobacco abuse       Past Surgical History:   Procedure Laterality Date     ANKLE SURGERY Right 2019     BIOPSY BREAST Right     benign     CREATION PERICARDIAL WINDOW  02/10/2017    [facility]     HEMORRHOIDECTOMY EXTERNAL       TUMOR REMOVAL      Has had 3. In the right breast and \"inside of rib cage.\"      Allergies   Allergen Reactions     Lidocaine Other (See Comments)     \"my jaw stopped moving\"  Other reaction(s): Dystonia     Penicillins Hives, Rash and Shortness Of Breath     Epinephrine-Lidocaine-Na Metabisulfite Other (See Comments) and Muscle Pain (Myalgia)     Severe jaw cramping, double vision  Jaw locking      Social History     Tobacco Use     Smoking status: Light Tobacco Smoker     Packs/day: 0.10     Years: 30.00     Pack years: 3.00     Types: Cigarettes     Last attempt to quit: 2020     Years since quittin.1     Smokeless tobacco: Current User     Tobacco comment: 2-3 cig/day   Substance Use Topics     Alcohol use: Not Currently     Comment: Occasiaonlly.       Wt Readings from Last 1 Encounters:   22 109.8 kg (242 " lb)        Anesthesia Evaluation   Pt has had prior anesthetic.     No history of anesthetic complications       ROS/MED HX  ENT/Pulmonary:     (+) tobacco use, asthma  (-) sleep apnea   Neurologic:       Cardiovascular: Comment: Hx of pericarditis w/ effusion 2017, s/p pericardial window   (-) hypertension   METS/Exercise Tolerance:     Hematologic:  - neg hematologic  ROS     Musculoskeletal:  - neg musculoskeletal ROS (+) fracture, Fracture location: RUE,     GI/Hepatic:  - neg GI/hepatic ROS  (-) GERD   Renal/Genitourinary:  - neg Renal ROS     Endo:     (+) Obesity,     Psychiatric/Substance Use:     (+) psychiatric history anxiety and depression alcohol abuse H/O chronic opiod use .     Infectious Disease: Comment: Recent Results (from the past 120 hour(s))  -Asymptomatic COVID-19 Virus (Coronavirus) by PCR Nose:   Collection Time: 07/20/22  3:58 PM  Specimen: Nose; Swab       Result                                            Value                         Ref Range                       SARS CoV2 PCR                                     Negative                      Negative                   -Urine Creatinine for Drug Screen Panel:   Collection Time: 07/20/22  3:58 PM       Result                                            Value                         Ref Range                       Creatinine Urine for Drug Screen                  202                           mg/dL                            Malignancy:       Other:      (+) , H/O Chronic Pain,        Physical Exam    Airway        Mallampati: II   TM distance: > 3 FB   Neck ROM: full     Respiratory Devices and Support         Dental  no notable dental history         Cardiovascular   cardiovascular exam normal          Pulmonary   pulmonary exam normal            Other findings: Fabric splint R wrist    OUTSIDE LABS:  CBC:   Lab Results   Component Value Date    WBC 7.4 05/18/2022    WBC 7.4 03/08/2022    HGB 12.3 06/01/2022    HGB 12.6 05/18/2022    HCT  37.4 05/18/2022    HCT 40.3 03/08/2022     05/18/2022     03/08/2022     BMP:   Lab Results   Component Value Date     03/08/2022     02/09/2021    POTASSIUM 3.9 03/08/2022    POTASSIUM 3.6 02/09/2021    CHLORIDE 107 03/08/2022    CHLORIDE 108 (H) 02/09/2021    CO2 21 (L) 03/08/2022    CO2 24 02/09/2021    BUN 8 03/08/2022    BUN 15 02/09/2021    CR 0.69 03/08/2022    CR 0.85 02/09/2021    GLC 84 03/08/2022    GLC 95 02/09/2021     COAGS: No results found for: PTT, INR, FIBR  POC:   Lab Results   Component Value Date    HCGS Negative 08/06/2020     HEPATIC:   Lab Results   Component Value Date    ALBUMIN 3.3 (L) 03/08/2022    PROTTOTAL 7.0 03/08/2022    ALT 14 03/08/2022    AST 17 03/08/2022    ALKPHOS 74 03/08/2022    BILITOTAL 0.3 03/08/2022    BILIDIRECT <0.1 01/28/2019     OTHER:   Lab Results   Component Value Date    LACT 1.2 08/06/2020    MAURY 8.9 03/08/2022    LIPASE <9 08/06/2020    AMYLASE 41 11/02/2017    TSH 0.60 03/08/2022    CRP 0.6 08/26/2021    SED 61 (H) 08/26/2021       Anesthesia Plan    ASA Status:  2   NPO Status:  NPO Appropriate    Anesthesia Type: MAC.   Induction: Propofol, Intravenous.   Maintenance: TIVA.        Consents    Anesthesia Plan(s) and associated risks, benefits, and realistic alternatives discussed. Questions answered and patient/representative(s) expressed understanding.    - Discussed:     - Discussed with:  Patient         Postoperative Care       PONV prophylaxis: Background Propofol Infusion, Ondansetron (or other 5HT-3), Dexamethasone or Solumedrol     Comments:    Other Comments: Plan for propofol gtt w/ ketamine / fentanyl PRN for pain.  Discussed potential need to convert to GA w/ ETT vs LMA if not tolerating.  Explained that it is possible to remember certain aspects of the OR experience during MAC dependent on the depth of sedation and patient understands this.  Decadron and zofran for PONV ppx.            Luz Kaur MD

## 2022-07-22 ENCOUNTER — ANESTHESIA (OUTPATIENT)
Dept: SURGERY | Facility: AMBULATORY SURGERY CENTER | Age: 47
End: 2022-07-22
Payer: COMMERCIAL

## 2022-07-22 ENCOUNTER — TELEPHONE (OUTPATIENT)
Dept: FAMILY MEDICINE | Facility: CLINIC | Age: 47
End: 2022-07-22

## 2022-07-22 ENCOUNTER — HOSPITAL ENCOUNTER (OUTPATIENT)
Facility: AMBULATORY SURGERY CENTER | Age: 47
Discharge: HOME | End: 2022-07-22
Attending: OBSTETRICS & GYNECOLOGY
Payer: COMMERCIAL

## 2022-07-22 VITALS
OXYGEN SATURATION: 100 % | DIASTOLIC BLOOD PRESSURE: 57 MMHG | HEART RATE: 53 BPM | RESPIRATION RATE: 16 BRPM | TEMPERATURE: 98 F | SYSTOLIC BLOOD PRESSURE: 103 MMHG

## 2022-07-22 DIAGNOSIS — M19.132 SLAC (SCAPHOLUNATE ADVANCED COLLAPSE) OF WRIST, LEFT: Primary | ICD-10-CM

## 2022-07-22 DIAGNOSIS — K04.7 DENTAL ABSCESS: ICD-10-CM

## 2022-07-22 DIAGNOSIS — N93.9 VAGINAL BLEEDING: ICD-10-CM

## 2022-07-22 DIAGNOSIS — G89.18 POST-OP PAIN: Primary | ICD-10-CM

## 2022-07-22 DIAGNOSIS — N92.1 MENORRHAGIA WITH IRREGULAR CYCLE: ICD-10-CM

## 2022-07-22 LAB
HCG UR QL: NEGATIVE
INTERNAL QC OK POCT: NORMAL
POCT KIT EXPIRATION DATE: NORMAL
POCT KIT LOT NUMBER: NORMAL

## 2022-07-22 PROCEDURE — 81025 URINE PREGNANCY TEST: CPT | Performed by: OBSTETRICS & GYNECOLOGY

## 2022-07-22 PROCEDURE — 58563 HYSTEROSCOPY ABLATION: CPT | Performed by: OBSTETRICS & GYNECOLOGY

## 2022-07-22 RX ORDER — PROPOFOL 10 MG/ML
INJECTION, EMULSION INTRAVENOUS CONTINUOUS PRN
Status: DISCONTINUED | OUTPATIENT
Start: 2022-07-22 | End: 2022-07-22

## 2022-07-22 RX ORDER — FENTANYL CITRATE 50 UG/ML
INJECTION, SOLUTION INTRAMUSCULAR; INTRAVENOUS PRN
Status: DISCONTINUED | OUTPATIENT
Start: 2022-07-22 | End: 2022-07-22

## 2022-07-22 RX ORDER — DEXAMETHASONE SODIUM PHOSPHATE 4 MG/ML
INJECTION, SOLUTION INTRA-ARTICULAR; INTRALESIONAL; INTRAMUSCULAR; INTRAVENOUS; SOFT TISSUE PRN
Status: DISCONTINUED | OUTPATIENT
Start: 2022-07-22 | End: 2022-07-22

## 2022-07-22 RX ORDER — IBUPROFEN 800 MG/1
800 TABLET, FILM COATED ORAL EVERY 6 HOURS PRN
Qty: 30 TABLET | Refills: 0 | Status: SHIPPED | OUTPATIENT
Start: 2022-07-22 | End: 2022-12-28

## 2022-07-22 RX ORDER — LIDOCAINE 40 MG/G
CREAM TOPICAL
Status: DISCONTINUED | OUTPATIENT
Start: 2022-07-22 | End: 2022-07-23 | Stop reason: HOSPADM

## 2022-07-22 RX ORDER — ONDANSETRON 2 MG/ML
INJECTION INTRAMUSCULAR; INTRAVENOUS PRN
Status: DISCONTINUED | OUTPATIENT
Start: 2022-07-22 | End: 2022-07-22

## 2022-07-22 RX ORDER — OXYCODONE HYDROCHLORIDE 10 MG/1
10 TABLET ORAL
Status: DISCONTINUED | OUTPATIENT
Start: 2022-07-22 | End: 2022-07-23 | Stop reason: HOSPADM

## 2022-07-22 RX ORDER — HYDROMORPHONE HCL IN WATER/PF 6 MG/30 ML
0.2 PATIENT CONTROLLED ANALGESIA SYRINGE INTRAVENOUS EVERY 5 MIN PRN
Status: DISCONTINUED | OUTPATIENT
Start: 2022-07-22 | End: 2022-07-23 | Stop reason: HOSPADM

## 2022-07-22 RX ORDER — ACETAMINOPHEN 325 MG/1
975 TABLET ORAL ONCE
Status: COMPLETED | OUTPATIENT
Start: 2022-07-22 | End: 2022-07-22

## 2022-07-22 RX ORDER — ACETAMINOPHEN 325 MG/1
975 TABLET ORAL ONCE
Status: DISCONTINUED | OUTPATIENT
Start: 2022-07-22 | End: 2022-07-23 | Stop reason: HOSPADM

## 2022-07-22 RX ORDER — KETAMINE HYDROCHLORIDE 10 MG/ML
INJECTION INTRAMUSCULAR; INTRAVENOUS PRN
Status: DISCONTINUED | OUTPATIENT
Start: 2022-07-22 | End: 2022-07-22

## 2022-07-22 RX ORDER — ACETAMINOPHEN 325 MG/1
975 TABLET ORAL EVERY 6 HOURS PRN
Qty: 50 TABLET | Refills: 0 | Status: ON HOLD | OUTPATIENT
Start: 2022-07-22 | End: 2023-01-06

## 2022-07-22 RX ORDER — SODIUM CHLORIDE, SODIUM LACTATE, POTASSIUM CHLORIDE, CALCIUM CHLORIDE 600; 310; 30; 20 MG/100ML; MG/100ML; MG/100ML; MG/100ML
INJECTION, SOLUTION INTRAVENOUS CONTINUOUS
Status: DISCONTINUED | OUTPATIENT
Start: 2022-07-22 | End: 2022-07-23 | Stop reason: HOSPADM

## 2022-07-22 RX ORDER — MEPERIDINE HYDROCHLORIDE 25 MG/ML
12.5 INJECTION INTRAMUSCULAR; INTRAVENOUS; SUBCUTANEOUS
Status: DISCONTINUED | OUTPATIENT
Start: 2022-07-22 | End: 2022-07-23 | Stop reason: HOSPADM

## 2022-07-22 RX ORDER — PROPOFOL 10 MG/ML
INJECTION, EMULSION INTRAVENOUS PRN
Status: DISCONTINUED | OUTPATIENT
Start: 2022-07-22 | End: 2022-07-22

## 2022-07-22 RX ORDER — GLYCOPYRROLATE 0.2 MG/ML
INJECTION, SOLUTION INTRAMUSCULAR; INTRAVENOUS PRN
Status: DISCONTINUED | OUTPATIENT
Start: 2022-07-22 | End: 2022-07-22

## 2022-07-22 RX ORDER — FENTANYL CITRATE 0.05 MG/ML
50 INJECTION, SOLUTION INTRAMUSCULAR; INTRAVENOUS EVERY 5 MIN PRN
Status: DISCONTINUED | OUTPATIENT
Start: 2022-07-22 | End: 2022-07-23 | Stop reason: HOSPADM

## 2022-07-22 RX ORDER — IBUPROFEN 800 MG/1
800 TABLET, FILM COATED ORAL ONCE
Status: DISCONTINUED | OUTPATIENT
Start: 2022-07-22 | End: 2022-07-23 | Stop reason: HOSPADM

## 2022-07-22 RX ORDER — KETOROLAC TROMETHAMINE 15 MG/ML
INJECTION, SOLUTION INTRAMUSCULAR; INTRAVENOUS PRN
Status: DISCONTINUED | OUTPATIENT
Start: 2022-07-22 | End: 2022-07-22

## 2022-07-22 RX ORDER — OXYCODONE AND ACETAMINOPHEN 5; 325 MG/1; MG/1
1 TABLET ORAL 2 TIMES DAILY PRN
Qty: 10 TABLET | Refills: 0 | Status: SHIPPED | OUTPATIENT
Start: 2022-07-22 | End: 2022-07-27

## 2022-07-22 RX ORDER — OXYCODONE HYDROCHLORIDE 5 MG/1
5 TABLET ORAL EVERY 4 HOURS PRN
Status: DISCONTINUED | OUTPATIENT
Start: 2022-07-22 | End: 2022-07-23 | Stop reason: HOSPADM

## 2022-07-22 RX ORDER — ONDANSETRON 2 MG/ML
4 INJECTION INTRAMUSCULAR; INTRAVENOUS EVERY 30 MIN PRN
Status: DISCONTINUED | OUTPATIENT
Start: 2022-07-22 | End: 2022-07-23 | Stop reason: HOSPADM

## 2022-07-22 RX ORDER — ONDANSETRON 4 MG/1
4 TABLET, ORALLY DISINTEGRATING ORAL EVERY 30 MIN PRN
Status: DISCONTINUED | OUTPATIENT
Start: 2022-07-22 | End: 2022-07-23 | Stop reason: HOSPADM

## 2022-07-22 RX ORDER — FENTANYL CITRATE 0.05 MG/ML
25 INJECTION, SOLUTION INTRAMUSCULAR; INTRAVENOUS
Status: DISCONTINUED | OUTPATIENT
Start: 2022-07-22 | End: 2022-07-23 | Stop reason: HOSPADM

## 2022-07-22 RX ADMIN — ACETAMINOPHEN 975 MG: 325 TABLET ORAL at 07:53

## 2022-07-22 RX ADMIN — FENTANYL CITRATE 50 MCG: 50 INJECTION, SOLUTION INTRAMUSCULAR; INTRAVENOUS at 08:33

## 2022-07-22 RX ADMIN — KETAMINE HYDROCHLORIDE 25 MG: 10 INJECTION INTRAMUSCULAR; INTRAVENOUS at 08:34

## 2022-07-22 RX ADMIN — FENTANYL CITRATE 50 MCG: 50 INJECTION, SOLUTION INTRAMUSCULAR; INTRAVENOUS at 08:50

## 2022-07-22 RX ADMIN — ONDANSETRON 4 MG: 2 INJECTION INTRAMUSCULAR; INTRAVENOUS at 08:50

## 2022-07-22 RX ADMIN — PROPOFOL 50 MG: 10 INJECTION, EMULSION INTRAVENOUS at 08:49

## 2022-07-22 RX ADMIN — GLYCOPYRROLATE 0.2 MG: 0.2 INJECTION, SOLUTION INTRAMUSCULAR; INTRAVENOUS at 08:34

## 2022-07-22 RX ADMIN — SODIUM CHLORIDE, SODIUM LACTATE, POTASSIUM CHLORIDE, CALCIUM CHLORIDE: 600; 310; 30; 20 INJECTION, SOLUTION INTRAVENOUS at 08:05

## 2022-07-22 RX ADMIN — KETOROLAC TROMETHAMINE 15 MG: 15 INJECTION, SOLUTION INTRAMUSCULAR; INTRAVENOUS at 08:52

## 2022-07-22 RX ADMIN — PROPOFOL 50 MG: 10 INJECTION, EMULSION INTRAVENOUS at 08:33

## 2022-07-22 RX ADMIN — DEXAMETHASONE SODIUM PHOSPHATE 4 MG: 4 INJECTION, SOLUTION INTRA-ARTICULAR; INTRALESIONAL; INTRAMUSCULAR; INTRAVENOUS; SOFT TISSUE at 08:42

## 2022-07-22 RX ADMIN — OXYCODONE HYDROCHLORIDE 5 MG: 5 TABLET ORAL at 09:37

## 2022-07-22 RX ADMIN — PROPOFOL 200 MCG/KG/MIN: 10 INJECTION, EMULSION INTRAVENOUS at 08:30

## 2022-07-22 NOTE — OP NOTE
Endometrial Ablation    DATE OF SERVICE: 7/22/2022     PREOPERATIVE DIAGNOSIS: menorrhagia     POSTOPERATIVE DIAGNOSIS: same     PROCEDURE: hysteroscopy, dilation and curettage, endometrial ablation with Novasure      SURGEON:  Lesly Holland      FINDINGS: normal endometrium     ANESTHESIA: local with sedation     ESTIMATED BLOOD LOSS: <10 cc     SPECIMENS: endometrial curettings      DRAINS: none     COMPLICATIONS: none     Brief History:  The patient is a 48 yo AAF P5 with a history of worsening periods.  She has had times when she bleeds through her clothing at work.  She has had side effects from OCPs and Nexplanon in the past.  She would like something more permanent and isn't interested in an IUD.  After counseling on options, she decided she'd like to proceed with endometrial ablation.  Endometrial biopsy was unable to be done in the office secondary to patient tolerance of the exam, so decision was made to do an endometrial sampling prior to the ablation while in the operating room.  She was counseled on the risks, benefits, and alternatives including new onset dysmenorrhea even if she doesn't have much period bleeding.     Procedure:  The patient was brought to the operating room where sedation medication was given.  After adequate anesthesia was obtained, she was placed in dorsal lithotomy position.  She was prepped and draped in the usual sterile fashion.  Time out was done confirming the patient and the procedure.  Bladder was emptied via straight catheter.  Speculum was inserted. The cervix was atraumatically dilated to a number 7 Sammy dilator.  The cervical length was 3 cm.  The uterine length was 8 cm.  The cavity length was therefore 5 cm.  The hysteroscope was inserted and the uterine cavity was found to be normal.  Hysteroscope was removed.  Novasure device was then inserted and opened.  The cavity width was 3.5 cm.  Cavity assessment was performed and passed.  The device was enabled, and the  actual ablation was begun.  Total time for the ablation was 39 sec with a power of 96.  The device was removed when the ablation was done.  The hysteroscope was again inserted with significant tissue blanching noted.  All instrumentation was then removed from the patient's cervix and vagina.  She tolerated the procedure well.  She was returned to supine position and brought to the recovery room in stable condition.  Sponge and sharp counts were correct.  She is expected to go home on the day of the procedure.

## 2022-07-22 NOTE — ANESTHESIA POSTPROCEDURE EVALUATION
Patient: Darlene Hudson    Procedure: Procedure(s):  HYSTEROSCOPY, WITH ENDOMETRIAL RADIOFREQUENCY ABLATION - NOVASURE DILATION AND CURETTAGE, UTERUS  DILATION AND CURETTAGE, UTERUS       Anesthesia Type:  MAC    Note:  Disposition: Outpatient   Postop Pain Control: Uneventful            Sign Out: Well controlled pain   PONV: No   Neuro/Psych: Uneventful            Sign Out: Acceptable/Baseline neuro status   Airway/Respiratory: Uneventful            Sign Out: Acceptable/Baseline resp. status   CV/Hemodynamics: Uneventful            Sign Out: Acceptable CV status; No obvious hypovolemia; No obvious fluid overload   Other NRE: NONE   DID A NON-ROUTINE EVENT OCCUR?            Last vitals:  Vitals Value Taken Time   BP 99/58 07/22/22 0915   Temp 98  F (36.7  C) 07/22/22 0859   Pulse 67 07/22/22 0922   Resp 16 07/22/22 0915   SpO2 99 % 07/22/22 0922   Vitals shown include unvalidated device data.    Electronically Signed By: Luz Kaur MD  July 22, 2022  9:26 AM

## 2022-07-22 NOTE — TELEPHONE ENCOUNTER
Spoke to patient. I discussed at length my concern that she is taking more pain medication than prescribed. She states that this is due to her new wrist fracture and recent gyn procedure. I sent over a script for diclofenac and an additional #10 tabs of percocet. I spoke to the pharmacist to explain the situation.  I will see her back in 5 days    Kirstie - can you contact Karoline on Monday because there was an issue regarding her MRI being done at [facility]. Thanks!?    Robbie Bailon MD  July 22, 2022  5:35 PM

## 2022-07-22 NOTE — DISCHARGE INSTRUCTIONS
You have received 975 mg of Acetaminophen (Tylenol) at 0753. Please do not take an additional dose of Tylenol until after 1:53pm     Do not exceed 4,000 mg of acetaminophen during a 24 hour period and keep in mind that acetaminophen can also be found in many over-the-counter cold medications as well as narcotics that may be given for pain.     You received a dose of IV Toradol at 8:52 AM. Please do not take any additional Ibuprofen products (Aleve/Advil/Motrin/Naproxen/Celebrex) until after 2:52 PM.      If you have any questions or concerns regarding your procedure, please contact Dr. Holland, her office number is [phone].

## 2022-07-22 NOTE — INTERVAL H&P NOTE
"I have reviewed the surgical (or preoperative) H&P that is linked to this encounter, and examined the patient. There are no significant changes    Clinical Conditions Present on Arrival:  Clinically Significant Risk Factors Present on Admission                   # Obesity: Estimated body mass index is 35.74 kg/m  as calculated from the following:    Height as of 7/14/22: 1.753 m (5' 9\").    Weight as of 7/14/22: 109.8 kg (242 lb).       "

## 2022-07-22 NOTE — TELEPHONE ENCOUNTER
[facility] phone call message- medication clarification/question:    Full Medication Name:     oxyCODONE-acetaminophen (PERCOCET         Dose:    5-325 MG tablet    Question:     Daughter advised mother broke her arm and is going through surgery. Caller advise Dr. Bailon is aware of surgery and that mother is in a lot of pain. Going through two surgrey today. Surgery done at [facility] Please call back and advise if needed.     Pharmacy confirmed as [facility] #[id number] [address] AT [facility]: Yes    OK to leave a message on voice mail? Yes    Primary language: English      needed? No    Call taken on July 22, 2022 at 8:47 AM by Meredith Holder

## 2022-07-22 NOTE — ANESTHESIA CARE TRANSFER NOTE
Patient: Darlene Hudson    Procedure: Procedure(s):  HYSTEROSCOPY, WITH ENDOMETRIAL RADIOFREQUENCY ABLATION - NOVASURE DILATION AND CURETTAGE, UTERUS  DILATION AND CURETTAGE, UTERUS       Diagnosis: Menorrhagia with irregular cycle [N92.1]  Diagnosis Additional Information: No value filed.    Anesthesia Type:   MAC     Note:    Oropharynx: oropharynx clear of all foreign objects and spontaneously breathing  Level of Consciousness: awake  Oxygen Supplementation: room air  Level of Supplemental Oxygen (L/min / FiO2): 0  Independent Airway: airway patency satisfactory and stable  Dentition: dentition unchanged  Vital Signs Stable: post-procedure vital signs reviewed and stable  Report to RN Given: handoff report given  Patient transferred to: Phase II    Handoff Report: Identifed the Patient, Identified the Reponsible Provider, Reviewed the pertinent medical history, Discussed the surgical course, Reviewed Intra-OP anesthesia mangement and issues during anesthesia, Set expectations for post-procedure period and Allowed opportunity for questions and acknowledgement of understanding      Vitals:  Vitals Value Taken Time   BP 94/53 07/22/22 0900   Temp 98  F (36.7  C) 07/22/22 0859   Pulse 83 07/22/22 0900   Resp 16 07/22/22 0900   SpO2 97 % 07/22/22 0900   Vitals shown include unvalidated device data.    Electronically Signed By: PEPE FOFANA CRNA  July 22, 2022  9:14 AM

## 2022-07-22 NOTE — TELEPHONE ENCOUNTER
RN was requested to call patient/ her daughter back. Per Landon, patient representative who had spoken directly with Dr Bailon, physician had voiced he will call patient by the end of the day. Very firm and aware of patient's request but should limit the frequent calls to clinic. Pia VÁSQUEZ

## 2022-07-22 NOTE — TELEPHONE ENCOUNTER
irina called and informed she only has 2 pills left due to having to take more because of a recent wrist surgery she had done along with her ankle surgery. patient states she will not have enough for over the weekend and is cramping a lot and does not think she will make it through the weekend without mediation. Patient wanting to know if  will be ok to refill mediation. Due to patient being in pain gave verbal consent to speak with daughter listed on emergency contact.

## 2022-07-22 NOTE — TELEPHONE ENCOUNTER
Dr Bailon is expected in clinic this afternoon, will route to his attention for review and further recommendation/ advise. Thank you. Pia VÁSQUEZ

## 2022-07-22 NOTE — PROGRESS NOTES
On awakening in phase 2 pt c/o feeling like she couldn't catch her breath and wanted her inhaler.  Lungs are clear throughout.  Pt maintaining O2 sats >92% on room air.  Pt did one puff from inhaler.  MDA informed and assessed pt.  No further orders.  Pt doing well.  Will continue to monitor.

## 2022-07-25 ENCOUNTER — HOSPITAL ENCOUNTER (OUTPATIENT)
Dept: MRI IMAGING | Facility: HOSPITAL | Age: 47
Discharge: HOME | End: 2022-07-25
Attending: FAMILY MEDICINE | Admitting: FAMILY MEDICINE
Payer: COMMERCIAL

## 2022-07-25 DIAGNOSIS — M19.132 SLAC (SCAPHOLUNATE ADVANCED COLLAPSE) OF WRIST, LEFT: ICD-10-CM

## 2022-07-25 PROCEDURE — 73221 MRI JOINT UPR EXTREM W/O DYE: CPT | Mod: RT

## 2022-07-25 NOTE — TELEPHONE ENCOUNTER
Called patient, lvm for her to call me. If unavailable to leave me a message in regards to the situation.    no

## 2022-07-26 NOTE — PROGRESS NOTES
ASSESSMENT/PLAN:    ***    Pt is a 47 year old female last seen on 7/20/2022 here for follow up of:     1) wrist pain - ***       MRI wrist - 7/26/2022  FINDINGS:     TENDONS:   -Extensor compartment tendons: Mild extensor carpi ulnaris tendinopathy without tearing. The remaining extensor tendons are negative.   -Flexor compartment tendons: No tear, tendinopathy, or tenosynovitis.     TRIANGULAR FIBROCARTILAGE COMPLEX:   -Articular disc: Intact.  -Peripheral tissue: Sprain of the ulnar attachment of the triangular fibrocartilage complex. No tearing. The radial attachments are intact. No diastasis or significant effusion at the DRUJ.     LIGAMENTS:   -Scapholunate and lunotriquetral ligament: No tear identified on this non-arthrographic study.     JOINTS AND BONES:   -There is no evidence for fracture or solid bone lesion but there is considerable edema within all of the bones of the carpus. There is also tiny effusion. An inflammatory arthropathy such as rheumatoid could result in this appearance. There is cystic   change within the first metacarpal. There is degenerative change at the STT joint and first CMC joint.     SOFT TISSUES:   -Ganglion/Synovial cyst: None.  -Nerves: Visualized median and ulnar nerves appear intact. Guyon's canal is normal.                                                                      IMPRESSION:  1.  There is signal abnormality throughout the bones of the carpus without evidence for discrete bone lesion or fracture. There is also a small effusion. An inflammatory arthropathy such as rheumatoid could result in this appearance.  2.  There is an intraosseous ganglion within the first metacarpal. There is no evidence for fracture.  3.  Mild extensor carpi ulnaris tendinopathy without tearing.  4.  No evidence for tear of the scapholunate ligament or proximal migration of the capitate.  5.  Sprain of the ulnar attachments of the TFCC but the radial attachments are intact and there is no  diastasis or effusion.                 2) Ankle pain - ***    3) Gyn - ***         Past Medical History:   Diagnosis Date     Agoraphobia with panic attacks      Anxiety      Anxiety      Arthritis     of back     Asthma      Asthma in adult, moderate persistent, uncomplicated      Chronic pain      Chronic sinusitis      Cocaine abuse (H)      Controlled substance agreement signed 06/30/2015    Overview:  Patient has chronic pain and is seen at [facility] for this.  Has controlled substance agreement with them.  On Vicodin, Valium, Klonopin prescribed only from there.        Depression      Hx of seasonal allergies      Infectious pericarditis      Lipoma      Tobacco abuse       Current Outpatient Medications   Medication Sig Dispense Refill     acetaminophen (TYLENOL) 325 MG tablet Take 3 tablets (975 mg) by mouth every 6 hours as needed for mild pain 50 tablet 0     ALPRAZolam (XANAX) 2 MG tablet Take 1 tablet (2 mg) by mouth 2 times daily as needed for anxiety 60 tablet 3     cetirizine (ZYRTEC) 10 MG tablet Take 1 tablet (10 mg) by mouth daily 30 tablet 11     diclofenac (VOLTAREN) 50 MG EC tablet Take 1 tablet (50 mg) by mouth 3 times daily as needed for moderate pain Take with food 15 tablet 0     fluticasone (FLONASE) 50 MCG/ACT nasal spray Spray 2 sprays into both nostrils daily 9.9 mL 11     fluticasone-salmeterol (ADVAIR) 500-50 MCG/DOSE inhaler Inhale 1 puff into the lungs every 12 hours 60 each 11     ibuprofen (ADVIL/MOTRIN) 600 MG tablet Take 1 tablet (600 mg) by mouth 3 times daily as needed for moderate pain 90 tablet 4     ibuprofen (ADVIL/MOTRIN) 800 MG tablet Take 1 tablet (800 mg) by mouth every 6 hours as needed for other (mild and/or inflammatory pain) 30 tablet 0     montelukast (SINGULAIR) 10 MG tablet Take 1 tablet (10 mg) by mouth At Bedtime 30 tablet 11     oxyCODONE-acetaminophen (PERCOCET) 5-325 MG tablet Take 1 tablet by mouth 2 times daily as needed for breakthrough pain or  "severe pain 10 tablet 0     PROAIR  (90 Base) MCG/ACT inhaler Inhale 2 puffs into the lungs every 4 hours as needed for shortness of breath / dyspnea or wheezing 8 g 11     spacer (OPTICHAMBER BINA) holding chamber For use w/ rescue inhaler 1 each 0     tiotropium (SPIRIVA RESPIMAT) 1.25 MCG/ACT inhaler Inhale 2 puffs into the lungs daily 12 g 3     tiotropium (SPIRIVA RESPIMAT) 2.5 MCG/ACT inhaler Inhale 2 puffs into the lungs daily 4 g 5     triamcinolone (KENALOG) 0.1 % external cream Apply topically 2 times daily 100 g 1      Allergies   Allergen Reactions     Lidocaine Other (See Comments)     \"my jaw stopped moving\"  Other reaction(s): Dystonia     Penicillins Hives, Rash and Shortness Of Breath     Epinephrine-Lidocaine-Na Metabisulfite Other (See Comments) and Muscle Pain (Myalgia)     Severe jaw cramping, double vision  Jaw locking      ROS:   Gen- no fevers/chills   Rheum - no morning stiffness   Derm - no rash/ redness   Neuro - no numbness, no tingling   Remainder of ROS negative.     Exam:     "

## 2022-07-27 ENCOUNTER — TELEPHONE (OUTPATIENT)
Dept: FAMILY MEDICINE | Facility: CLINIC | Age: 47
End: 2022-07-27

## 2022-07-27 ENCOUNTER — VIRTUAL VISIT (OUTPATIENT)
Dept: FAMILY MEDICINE | Facility: CLINIC | Age: 47
End: 2022-07-27
Payer: COMMERCIAL

## 2022-07-27 VITALS — BODY MASS INDEX: 33.23 KG/M2 | WEIGHT: 225 LBS

## 2022-07-27 DIAGNOSIS — J45.31 MILD PERSISTENT ASTHMA WITH EXACERBATION: ICD-10-CM

## 2022-07-27 DIAGNOSIS — S60.211D CONTUSION OF RIGHT WRIST, SUBSEQUENT ENCOUNTER: Primary | ICD-10-CM

## 2022-07-27 PROCEDURE — 99214 OFFICE O/P EST MOD 30 MIN: CPT | Mod: 95 | Performed by: FAMILY MEDICINE

## 2022-07-27 RX ORDER — OXYCODONE AND ACETAMINOPHEN 5; 325 MG/1; MG/1
1 TABLET ORAL 2 TIMES DAILY PRN
Qty: 28 TABLET | Refills: 0 | Status: SHIPPED | OUTPATIENT
Start: 2022-07-27 | End: 2022-08-04

## 2022-07-27 RX ORDER — PREDNISONE 50 MG/1
50 TABLET ORAL DAILY
Qty: 5 TABLET | Refills: 0 | Status: SHIPPED | OUTPATIENT
Start: 2022-07-27 | End: 2022-08-01

## 2022-07-27 NOTE — PROGRESS NOTES
Family Medicine Telephone Visit Note    Chief Complaint   Patient presents with     Results     MRI results            HPI   Patients name: Karoline  Appointment start time:  1:45 PM    1) Asthma bothering her; sore throat; cough  +wheezing  Taking inhaler  T - 101  Vaxxed and boosted    2) R Wrist Pain - pain is severe out of the brace       MRI wrist - 7/26/2022  FINDINGS:     TENDONS:   -Extensor compartment tendons: Mild extensor carpi ulnaris tendinopathy without tearing. The remaining extensor tendons are negative.   -Flexor compartment tendons: No tear, tendinopathy, or tenosynovitis.     TRIANGULAR FIBROCARTILAGE COMPLEX:   -Articular disc: Intact.  -Peripheral tissue: Sprain of the ulnar attachment of the triangular fibrocartilage complex. No tearing. The radial attachments are intact. No diastasis or significant effusion at the DRUJ.     LIGAMENTS:   -Scapholunate and lunotriquetral ligament: No tear identified on this non-arthrographic study.     JOINTS AND BONES:   -There is no evidence for fracture or solid bone lesion but there is considerable edema within all of the bones of the carpus. There is also tiny effusion. An inflammatory arthropathy such as rheumatoid could result in this appearance. There is cystic   change within the first metacarpal. There is degenerative change at the STT joint and first CMC joint.     SOFT TISSUES:   -Ganglion/Synovial cyst: None.  -Nerves: Visualized median and ulnar nerves appear intact. Guyon's canal is normal.                                                                    IMPRESSION:  1.  There is signal abnormality throughout the bones of the carpus without evidence for discrete bone lesion or fracture. There is also a small effusion. An inflammatory arthropathy such as rheumatoid could result in this appearance.  2.  There is an intraosseous ganglion within the first metacarpal. There is no evidence for fracture.  3.  Mild extensor carpi ulnaris tendinopathy  without tearing.  4.  No evidence for tear of the scapholunate ligament or proximal migration of the capitate.  5.  Sprain of the ulnar attachments of the TFCC but the radial attachments are intact and there is no diastasis or effusion.                 Current Outpatient Medications   Medication Sig Dispense Refill     predniSONE (DELTASONE) 50 MG tablet Take 1 tablet (50 mg) by mouth daily for 5 days Take with food 5 tablet 0     acetaminophen (TYLENOL) 325 MG tablet Take 3 tablets (975 mg) by mouth every 6 hours as needed for mild pain 50 tablet 0     ALPRAZolam (XANAX) 2 MG tablet Take 1 tablet (2 mg) by mouth 2 times daily as needed for anxiety 60 tablet 3     cetirizine (ZYRTEC) 10 MG tablet Take 1 tablet (10 mg) by mouth daily 30 tablet 11     diclofenac (VOLTAREN) 50 MG EC tablet Take 1 tablet (50 mg) by mouth 3 times daily as needed for moderate pain Take with food 15 tablet 0     fluticasone (FLONASE) 50 MCG/ACT nasal spray Spray 2 sprays into both nostrils daily 9.9 mL 11     fluticasone-salmeterol (ADVAIR) 500-50 MCG/DOSE inhaler Inhale 1 puff into the lungs every 12 hours 60 each 11     ibuprofen (ADVIL/MOTRIN) 600 MG tablet Take 1 tablet (600 mg) by mouth 3 times daily as needed for moderate pain 90 tablet 4     ibuprofen (ADVIL/MOTRIN) 800 MG tablet Take 1 tablet (800 mg) by mouth every 6 hours as needed for other (mild and/or inflammatory pain) 30 tablet 0     montelukast (SINGULAIR) 10 MG tablet Take 1 tablet (10 mg) by mouth At Bedtime 30 tablet 11     oxyCODONE-acetaminophen (PERCOCET) 5-325 MG tablet Take 1 tablet by mouth 2 times daily as needed for breakthrough pain or severe pain 28 tablet 0     PROAIR  (90 Base) MCG/ACT inhaler Inhale 2 puffs into the lungs every 4 hours as needed for shortness of breath / dyspnea or wheezing 8 g 11     spacer (OPTICHAMBER BINA) holding chamber For use w/ rescue inhaler 1 each 0     tiotropium (SPIRIVA RESPIMAT) 1.25 MCG/ACT inhaler Inhale 2 puffs  "into the lungs daily 12 g 3     tiotropium (SPIRIVA RESPIMAT) 2.5 MCG/ACT inhaler Inhale 2 puffs into the lungs daily 4 g 5     triamcinolone (KENALOG) 0.1 % external cream Apply topically 2 times daily 100 g 1     Allergies   Allergen Reactions     Lidocaine Other (See Comments)     \"my jaw stopped moving\"  Other reaction(s): Dystonia     Penicillins Hives, Rash and Shortness Of Breath     Epinephrine-Lidocaine-Na Metabisulfite Other (See Comments) and Muscle Pain (Myalgia)     Severe jaw cramping, double vision  Jaw locking            Review of Systems:     Constitutional, HEENT, cardiovascular, pulmonary, gi and gu systems are negative, except as otherwise noted.         Physical Exam:     Wt 102.1 kg (225 lb)   BMI 33.23 kg/m    Estimated body mass index is 33.23 kg/m  as calculated from the following:    Height as of 7/14/22: 1.753 m (5' 9\").    Weight as of this encounter: 102.1 kg (225 lb).    Exam:  Constitutional: healthy, alert and no distress  Psychiatric: mentation appears normal and affect normal/bright          Assessment and Plan     (S60.211D) Contusion of right wrist, subsequent encounter  (primary encounter diagnosis)  Comment: reviewed MRI at length; likely contusion/ swelling from fall. Luckily no fracture or scapholunate tear; will wear brace x 1 more week and hopefully the prednisone I am prescribing for her asthma exacerbation will help the swelling in the wrist; she has already had a workup for an inflammatory arthropathy; f/u already scheduled in 2 weeks  Plan: predniSONE (DELTASONE) 50 MG tablet, oxyCODONE-acetaminophen (PERCOCET) 5-325 MG         tablet            (J45.31) Mild persistent asthma with exacerbation  Comment: see above; 5 days of pred; counseled regarding precautions and specifically to hold all nsaids while on prednisone  Plan: predniSONE (DELTASONE) 50 MG tablet            Refilled medications that would be required in the next 3 months.     After Visit " Information:  Patient declined AVS     Return in about 2 weeks (around 8/10/2022).    Appointment end time: 1:58 PM  This is a telephone visit that took 13 minutes.      Clinician location:  [facility]     Robbie Bailon MD  July 27, 2022  1:58 PM

## 2022-07-27 NOTE — TELEPHONE ENCOUNTER
[facility] phone call message- general phone call:    Reason for call: Patient called stating she has a sore throat and bad cough if its okay appointment today with Dr. Bailon is a virtual?     Return call needed: Yes    OK to leave a message on voice mail? Yes    Primary language: English      needed? No    Call taken on July 27, 2022 at 10:16 AM by Christine Hyde

## 2022-08-04 ENCOUNTER — OFFICE VISIT (OUTPATIENT)
Dept: FAMILY MEDICINE | Facility: CLINIC | Age: 47
End: 2022-08-04
Payer: COMMERCIAL

## 2022-08-04 VITALS
RESPIRATION RATE: 12 BRPM | OXYGEN SATURATION: 100 % | TEMPERATURE: 98 F | DIASTOLIC BLOOD PRESSURE: 83 MMHG | SYSTOLIC BLOOD PRESSURE: 120 MMHG | HEART RATE: 86 BPM

## 2022-08-04 DIAGNOSIS — S60.211D CONTUSION OF RIGHT WRIST, SUBSEQUENT ENCOUNTER: Primary | ICD-10-CM

## 2022-08-04 DIAGNOSIS — Z12.31 ENCOUNTER FOR SCREENING MAMMOGRAM FOR BREAST CANCER: ICD-10-CM

## 2022-08-04 PROCEDURE — 99213 OFFICE O/P EST LOW 20 MIN: CPT | Performed by: FAMILY MEDICINE

## 2022-08-04 RX ORDER — OXYCODONE AND ACETAMINOPHEN 5; 325 MG/1; MG/1
1 TABLET ORAL DAILY PRN
Qty: 30 TABLET | Refills: 0 | Status: SHIPPED | OUTPATIENT
Start: 2022-08-10 | End: 2022-09-07

## 2022-08-04 NOTE — LETTER
WORK MEDICAL NOTE  2022     Seen today: yes    Patient:  Darlene Hudson  :   1975  MRN:     [id number]  Physician: JULIETH BAILON    Darlene Hudson was seen by me today for follow-up of her R wrist and R ankle pain. She will be able to return to work without restrictions on Wednesday 8/10/2022.  Please excuse her from work til then. She needs to be allowed to miss work to attend her doctor's appointments including follow-up with her dentist, foot/ankle surgeon, and myself.     The next clinic appointment is scheduled for (date/time) 2022.    Patient limitations:  NONE            Julieth Bailon MD  2022  1:18 PM

## 2022-08-08 NOTE — PROGRESS NOTES
"ASSESSMENT/PLAN:    ***      Pt is a 47 year old female last seen on 8/4/22 here today for:     ***    Per my last note:     (A20.848V) Contusion of right wrist, subsequent encounter  (primary encounter diagnosis)  Comment: stable/improved; note for work written  Plan: oxyCODONE-acetaminophen (PERCOCET) 5-325 MG tablet          (Z12.31) Encounter for screening mammogram for breast cancer  Comment:   Plan: MA SCREENING DIGITAL BILAT     F/u in 5 weeks for well visit             Past Medical History:   Diagnosis Date     Agoraphobia with panic attacks      Anxiety      Anxiety      Arthritis     of back     Asthma      Asthma in adult, moderate persistent, uncomplicated      Chronic pain      Chronic sinusitis      Cocaine abuse (H)      Controlled substance agreement signed 06/30/2015    Overview:  Patient has chronic pain and is seen at [facility] for this.  Has controlled substance agreement with them.  On Vicodin, Valium, Klonopin prescribed only from there.        Depression      Hx of seasonal allergies      Infectious pericarditis      Lipoma      Tobacco abuse       Past Surgical History:   Procedure Laterality Date     ANKLE SURGERY Right 05/30/2019     BIOPSY BREAST Right 1990    benign     CREATION PERICARDIAL WINDOW  02/10/2017    [facility]     DILATION AND CURETTAGE N/A 7/22/2022    Procedure: DILATION AND CURETTAGE, UTERUS;  Surgeon: Lesly Holland;  Location: [facility]     HEMORRHOIDECTOMY EXTERNAL       HYSTEROSCOPY, WITH ENDOMETRIAL RADIOFREQUENCY ABLATION - NOVASURE N/A 7/22/2022    Procedure: HYSTEROSCOPY, WITH ENDOMETRIAL RADIOFREQUENCY ABLATION - NOVASURE DILATION AND CURETTAGE, UTERUS;  Surgeon: Lesly Holland;  Location: [facility]     TUMOR REMOVAL      Has had 3. In the right breast and \"inside of rib cage.\"      Current Outpatient Medications   Medication Sig Dispense Refill     acetaminophen (TYLENOL) 325 MG tablet Take 3 tablets (975 mg) by mouth every 6 hours " "as needed for mild pain 50 tablet 0     ALPRAZolam (XANAX) 2 MG tablet Take 1 tablet (2 mg) by mouth 2 times daily as needed for anxiety 60 tablet 3     cetirizine (ZYRTEC) 10 MG tablet Take 1 tablet (10 mg) by mouth daily 30 tablet 11     fluticasone (FLONASE) 50 MCG/ACT nasal spray Spray 2 sprays into both nostrils daily 9.9 mL 11     fluticasone-salmeterol (ADVAIR) 500-50 MCG/DOSE inhaler Inhale 1 puff into the lungs every 12 hours 60 each 11     ibuprofen (ADVIL/MOTRIN) 600 MG tablet Take 1 tablet (600 mg) by mouth 3 times daily as needed for moderate pain 90 tablet 4     ibuprofen (ADVIL/MOTRIN) 800 MG tablet Take 1 tablet (800 mg) by mouth every 6 hours as needed for other (mild and/or inflammatory pain) 30 tablet 0     montelukast (SINGULAIR) 10 MG tablet Take 1 tablet (10 mg) by mouth At Bedtime 30 tablet 11     [START ON 8/10/2022] oxyCODONE-acetaminophen (PERCOCET) 5-325 MG tablet Take 1 tablet by mouth daily as needed for breakthrough pain or severe pain 30 tablet 0     PROAIR  (90 Base) MCG/ACT inhaler Inhale 2 puffs into the lungs every 4 hours as needed for shortness of breath / dyspnea or wheezing 8 g 11     spacer (OPTICHAMBER BINA) holding chamber For use w/ rescue inhaler 1 each 0     tiotropium (SPIRIVA RESPIMAT) 1.25 MCG/ACT inhaler Inhale 2 puffs into the lungs daily 12 g 3     tiotropium (SPIRIVA RESPIMAT) 2.5 MCG/ACT inhaler Inhale 2 puffs into the lungs daily 4 g 5     triamcinolone (KENALOG) 0.1 % external cream Apply topically 2 times daily 100 g 1      Allergies   Allergen Reactions     Lidocaine Other (See Comments)     \"my jaw stopped moving\"  Other reaction(s): Dystonia     Penicillins Hives, Rash and Shortness Of Breath     Epinephrine-Lidocaine-Na Metabisulfite Other (See Comments) and Muscle Pain (Myalgia)     Severe jaw cramping, double vision  Jaw locking        ROS:   Gen- no weight change, no fevers/chills   Head/ Eyes- no blurred vision, no headaches   ENT- no cough, " no congestion, no URI symptoms   Cardiac - no palpitations, no chest pain   Respiratory - no shortness of breath , no wheezing   GI - no nausea, no vomiting, no diarrhea, no constipation   Urinary - no dysuria, no polyuria   Neuro - no numbness, no tingling   Remainder of ROS negative.     Exam:   There were no vitals taken for this visit.   Alert and oriented x 3; No acute distress   HEENT: extraocular movements intact, tympanic membranes clear, oropharynx clear   Neck: no masses, no lymphadenopathy   Resp: clear to auscultation bilaterally, no wheezing/ronchi   CV: rate/rhythm regular, no murmurs/rubs/gallops   ABd: soft, + bowel sounds, nontender/nondistended, no rebound/guarding   Extrem: no clubbing/cyanosis/edema   MSK: full range of motion, nontender   Neuro: no focal deficits   Derm: no rashes

## 2022-08-10 ENCOUNTER — VIRTUAL VISIT (OUTPATIENT)
Dept: FAMILY MEDICINE | Facility: CLINIC | Age: 47
End: 2022-08-10
Payer: COMMERCIAL

## 2022-08-10 ENCOUNTER — TELEPHONE (OUTPATIENT)
Dept: FAMILY MEDICINE | Facility: CLINIC | Age: 47
End: 2022-08-10

## 2022-08-10 DIAGNOSIS — S60.211D CONTUSION OF RIGHT WRIST, SUBSEQUENT ENCOUNTER: Primary | ICD-10-CM

## 2022-08-10 DIAGNOSIS — M25.871 ANKLE IMPINGEMENT SYNDROME, RIGHT: ICD-10-CM

## 2022-08-10 PROCEDURE — 99207 PR NO CHARGE LOS: CPT | Performed by: FAMILY MEDICINE

## 2022-08-10 NOTE — TELEPHONE ENCOUNTER
This writer called patient, she was not offered phone visit initially by call center. Karoline agrees to keep her appointment but this has been changed to phone visit instead of in person. Pia VÁSQUEZ

## 2022-08-10 NOTE — TELEPHONE ENCOUNTER
[facility] phone call message- general phone call:    Reason for call:     Caller advised if Dr. Bailon can give her a call instead of coming into appoinment today. Per just have questions about [place] [employer] and employer. Please call back and advise if needed. Thanks.     Return call needed: Yes    OK to leave a message on voice mail? Yes    Primary language: English      needed? No    Call taken on August 10, 2022 at 10:07 AM by Meredith Holder

## 2022-08-10 NOTE — PROGRESS NOTES
Family Medicine Telephone Visit Note    Chief Complaint   Patient presents with     Forms     Work form             HPI   Patients name: Karoline  Appointment start time:  12:48 PM    Job won't release her to work til she is seen by [facility], but supposedly I need to fill out another form  before she can be seen by them?    Disability also needs an extension til she is back to work.         Current Outpatient Medications   Medication Sig Dispense Refill     acetaminophen (TYLENOL) 325 MG tablet Take 3 tablets (975 mg) by mouth every 6 hours as needed for mild pain 50 tablet 0     ALPRAZolam (XANAX) 2 MG tablet Take 1 tablet (2 mg) by mouth 2 times daily as needed for anxiety 60 tablet 3     cetirizine (ZYRTEC) 10 MG tablet Take 1 tablet (10 mg) by mouth daily 30 tablet 11     fluticasone (FLONASE) 50 MCG/ACT nasal spray Spray 2 sprays into both nostrils daily 9.9 mL 11     fluticasone-salmeterol (ADVAIR) 500-50 MCG/DOSE inhaler Inhale 1 puff into the lungs every 12 hours 60 each 11     ibuprofen (ADVIL/MOTRIN) 600 MG tablet Take 1 tablet (600 mg) by mouth 3 times daily as needed for moderate pain 90 tablet 4     ibuprofen (ADVIL/MOTRIN) 800 MG tablet Take 1 tablet (800 mg) by mouth every 6 hours as needed for other (mild and/or inflammatory pain) 30 tablet 0     montelukast (SINGULAIR) 10 MG tablet Take 1 tablet (10 mg) by mouth At Bedtime 30 tablet 11     oxyCODONE-acetaminophen (PERCOCET) 5-325 MG tablet Take 1 tablet by mouth daily as needed for breakthrough pain or severe pain 30 tablet 0     PROAIR  (90 Base) MCG/ACT inhaler Inhale 2 puffs into the lungs every 4 hours as needed for shortness of breath / dyspnea or wheezing 8 g 11     spacer (OPTICHAMBER BINA) holding chamber For use w/ rescue inhaler 1 each 0     tiotropium (SPIRIVA RESPIMAT) 1.25 MCG/ACT inhaler Inhale 2 puffs into the lungs daily 12 g 3     tiotropium (SPIRIVA RESPIMAT) 2.5 MCG/ACT inhaler Inhale 2 puffs into the lungs daily 4 g  "5     triamcinolone (KENALOG) 0.1 % external cream Apply topically 2 times daily 100 g 1     Allergies   Allergen Reactions     Lidocaine Other (See Comments)     \"my jaw stopped moving\"  Other reaction(s): Dystonia     Penicillins Hives, Rash and Shortness Of Breath     Epinephrine-Lidocaine-Na Metabisulfite Other (See Comments) and Muscle Pain (Myalgia)     Severe jaw cramping, double vision  Jaw locking              Review of Systems:     N/A          Physical Exam:     There were no vitals taken for this visit.  Estimated body mass index is 33.23 kg/m  as calculated from the following:    Height as of 7/14/22: 1.753 m (5' 9\").    Weight as of 7/27/22: 102.1 kg (225 lb).    Exam:    N/A        Assessment and Plan     (S60.211D) Contusion of right wrist, subsequent encounter  (primary encounter diagnosis)  Comment:   Plan: will look for paperwork. This did not need to be a telephone encounter; it is only a form request.     (M25.118) Ankle impingement syndrome, right  Comment:   Plan: see above      Appointment end time: 12:53 PM  This is a telephone visit that took 5 minutes.      Clinician location:  [facility]     Robbie Bailon MD  August 10, 2022  12:54 PM    "

## 2022-08-19 DIAGNOSIS — J45.51 SEVERE PERSISTENT ASTHMA WITH ACUTE EXACERBATION (H): ICD-10-CM

## 2022-08-24 ENCOUNTER — OFFICE VISIT (OUTPATIENT)
Dept: FAMILY MEDICINE | Facility: CLINIC | Age: 47
End: 2022-08-24
Payer: COMMERCIAL

## 2022-08-24 VITALS
TEMPERATURE: 98.6 F | DIASTOLIC BLOOD PRESSURE: 86 MMHG | SYSTOLIC BLOOD PRESSURE: 132 MMHG | RESPIRATION RATE: 20 BRPM | OXYGEN SATURATION: 98 % | HEART RATE: 82 BPM

## 2022-08-24 DIAGNOSIS — S60.211D CONTUSION OF RIGHT WRIST, SUBSEQUENT ENCOUNTER: ICD-10-CM

## 2022-08-24 DIAGNOSIS — M25.471 RIGHT ANKLE SWELLING: Primary | ICD-10-CM

## 2022-08-24 PROCEDURE — 99214 OFFICE O/P EST MOD 30 MIN: CPT | Mod: GC

## 2022-08-24 RX ORDER — METHYLPREDNISOLONE 4 MG
TABLET, DOSE PACK ORAL
Qty: 21 TABLET | Refills: 0 | Status: SHIPPED | OUTPATIENT
Start: 2022-08-24 | End: 2023-01-03

## 2022-08-24 NOTE — PROGRESS NOTES
Assessment & Plan     (M25.591) Right ankle swelling  (primary encounter diagnosis)  Comment: Ongoing ankle right ankle pain/swelling, possibly secondary to impingement.  Overall worsening symptoms after returning to work. On exam, significant swelling of the right ankle with diffuse tenderness. Will treat with steroids. Also recommend she begin wearing her boot again. Will have her take some time off of work to prevent continued aggravation   Plan: methylPREDNISolone (MEDROL DOSEPAK) 4 MG tablet        therapy pack  - Work note provided  - Follow up with Dr Bailon  - Follow-up precautions provided    (S60.404D) Contusion of right wrist, subsequent encounter  Comment: Xray initially concerning for fracture/scapholunate tear. MRI was however reassuring. Worsening symptoms since returning to work. Due to ongoing pain, will have her see a hand surgeon for further evaluation.   Plan: Orthopedic  Referral  -  Follow-up with Dr Bailon  - Follow-up precautions provided      Dagoberto Cochran MD PGY-2  [facility]       Pt is a 47 year old female last seen on 8/4/22 here today for:   Per Dr Bailon's last note:  (Z13.867M) Contusion of right wrist, subsequent encounter  (primary encounter diagnosis)  Comment: stable/improved; note for work written  Plan: oxyCODONE-acetaminophen (PERCOCET) 5-325 MG tablet          (Z12.31) Encounter for screening mammogram for breast cancer  Comment:   Plan: MA SCREENING DIGITAL BILAT     F/u in 5 weeks for well visit            Today, she presents with worsening right wrist pain and right ankle swelling.      Right wrist  Pain - Throbbing sensation in her wrist. Burning sensation with radiation to the elbow. 10-15 seconds in duration. 3-4 times per day.  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 Wakes her up at night. Swelling last week, has since improved. No redness. No numbness or tingling. Has tired a wrist brace in the  "past which did help, now it is painful to wear.     Right ankle  More swelling today. Was worse yesterday. Pain in the lateral/posterior aspect of the ankle. Occasional redness. Pain as been worsened since returning to work after being off for a couple of months. Returned on Monday this week. Is planning to have surgery, awaiting workman's comp claim.        Past Medical History:   Diagnosis Date     Agoraphobia with panic attacks      Anxiety      Anxiety      Arthritis     of back     Asthma      Asthma in adult, moderate persistent, uncomplicated      Chronic pain      Chronic sinusitis      Cocaine abuse (H)      Controlled substance agreement signed 06/30/2015    Overview:  Patient has chronic pain and is seen at [facility] for this.  Has controlled substance agreement with them.  On Vicodin, Valium, Klonopin prescribed only from there.        Depression      Hx of seasonal allergies      Infectious pericarditis      Lipoma      Tobacco abuse       Past Surgical History:   Procedure Laterality Date     ANKLE SURGERY Right 05/30/2019     BIOPSY BREAST Right 1990    benign     CREATION PERICARDIAL WINDOW  02/10/2017    [facility]     DILATION AND CURETTAGE N/A 7/22/2022    Procedure: DILATION AND CURETTAGE, UTERUS;  Surgeon: Lesly Holland;  Location: [place] [facility]     HEMORRHOIDECTOMY EXTERNAL       HYSTEROSCOPY, WITH ENDOMETRIAL RADIOFREQUENCY ABLATION - NOVASURE N/A 7/22/2022    Procedure: HYSTEROSCOPY, WITH ENDOMETRIAL RADIOFREQUENCY ABLATION - NOVASURE DILATION AND CURETTAGE, UTERUS;  Surgeon: Lesly Holland;  Location: [place] [facility]     TUMOR REMOVAL      Has had 3. In the right breast and \"inside of rib cage.\"      Current Outpatient Medications   Medication Sig Dispense Refill     methylPREDNISolone (MEDROL DOSEPAK) 4 MG tablet therapy pack Follow Package Directions 21 tablet 0     acetaminophen (TYLENOL) 325 MG tablet Take 3 tablets (975 mg) by mouth every 6 hours as needed for mild " "pain 50 tablet 0     ALPRAZolam (XANAX) 2 MG tablet Take 1 tablet (2 mg) by mouth 2 times daily as needed for anxiety 60 tablet 3     cetirizine (ZYRTEC) 10 MG tablet Take 1 tablet (10 mg) by mouth daily 30 tablet 11     fluticasone (FLONASE) 50 MCG/ACT nasal spray Spray 2 sprays into both nostrils daily 9.9 mL 11     fluticasone-salmeterol (ADVAIR) 500-50 MCG/DOSE inhaler Inhale 1 puff into the lungs every 12 hours 60 each 11     ibuprofen (ADVIL/MOTRIN) 600 MG tablet Take 1 tablet (600 mg) by mouth 3 times daily as needed for moderate pain 90 tablet 4     ibuprofen (ADVIL/MOTRIN) 800 MG tablet Take 1 tablet (800 mg) by mouth every 6 hours as needed for other (mild and/or inflammatory pain) 30 tablet 0     montelukast (SINGULAIR) 10 MG tablet Take 1 tablet (10 mg) by mouth At Bedtime 30 tablet 11     oxyCODONE-acetaminophen (PERCOCET) 5-325 MG tablet Take 1 tablet by mouth daily as needed for breakthrough pain or severe pain 30 tablet 0     PROAIR  (90 Base) MCG/ACT inhaler Inhale 2 puffs into the lungs every 4 hours as needed for shortness of breath / dyspnea or wheezing 8 g 11     spacer (OPTICHAMBER BINA) holding chamber For use w/ rescue inhaler 1 each 0     tiotropium (SPIRIVA RESPIMAT) 1.25 MCG/ACT inhaler Inhale 2 puffs into the lungs daily 12 g 3     tiotropium (SPIRIVA RESPIMAT) 2.5 MCG/ACT inhaler Inhale 2 puffs into the lungs daily 4 g 4     triamcinolone (KENALOG) 0.1 % external cream Apply topically 2 times daily 100 g 1      Allergies   Allergen Reactions     Lidocaine Other (See Comments)     \"my jaw stopped moving\"  Other reaction(s): Dystonia     Penicillins Hives, Rash and Shortness Of Breath     Epinephrine-Lidocaine-Na Metabisulfite Other (See Comments) and Muscle Pain (Myalgia)     Severe jaw cramping, double vision  Jaw locking        ROS:   Gen- no weight change, no fevers/chills   Remainder of ROS negative.     Exam:   /86   Pulse 82   Temp 98.6  F (37  C) (Oral)   Resp 20 "   SpO2 98%    Alert and oriented x 3; No acute distress   Right wrist - No significant swelling or bruising. Range of motion limited by pain. Sensation intact  Right Foot- Significant swelling at the ankle. No significant bruising or swelling. Diffuse tenderness to palpation. Range of motion limited by pain.

## 2022-08-24 NOTE — LETTER
WORK MEDICAL NOTE  2022     Seen today: yes    Patient:  Darlene Hudson  :   1975  MRN:     [id number]  Physician: RADHA ALVAREZ    Darlene Hudson is under my care. She is currently unable to work due to R ankle and wrist pain. She will see me back on 22 to discuss clearance back to work. She has an appointment with a hand specialist and ankle specialist pending    The next clinic appointment is scheduled for (date/time) 22.    Patient limitations:  Unable to tolerate prolonged sitting/ standing due to R ankle flare or gripping w/ R hand due to refractory contusion            Robbie Bailon MD  2022  4:06 PM

## 2022-08-24 NOTE — PROGRESS NOTES
{PROVIDER CHARTING PREFERENCE:959710}    Subjective   Karoline is a 47 year old{ACCOMPANIED BY STATEMENT (Optional):525965}, presenting for the following health issues:  No chief complaint on file.      HPI         Review of Systems   {ROS COMP (Optional):835735}      Objective    There were no vitals taken for this visit.  There is no height or weight on file to calculate BMI.  Physical Exam   {Exam List (Optional):205143}    {Diagnostic Test Results (Optional):636496}    {AMBULATORY ATTESTATION (Optional):887661}            .  ..

## 2022-08-25 ENCOUNTER — TELEPHONE (OUTPATIENT)
Dept: FAMILY MEDICINE | Facility: CLINIC | Age: 47
End: 2022-08-25

## 2022-08-25 NOTE — TELEPHONE ENCOUNTER
Prior Authorization needed on:  22    Medication:  spiriva respimat Dose:  2.5mcg/act    Pharmacy confirmed as   [id number] - [place] [address] [facility] [address]DA COCHRAN AT [address] [place] [address]  Phone: [phone] Fax: [phone]  : Yes    covermymeds  KEY: [id number]  PLN: RAPHAEL  : 1975    Kandy Lopez 2022 at 4:34 PM

## 2022-08-28 NOTE — PROGRESS NOTES
I have personally reviewed the history and examination as documented by Dr. Cochran.  I was present during key portions of the visit and agree with the assessment and plan as documented for 47 yr old female here for recurrent R ankle swelling and refractory R wrist pain. Will tx acute ankle flare w/ steroid burst. Will refer for 2nd opinion w/ hand surgery. Precautions given. Anticipatory guidance given.     Robbie Bailon MD  August 28, 2022  12:48 PM

## 2022-08-31 ENCOUNTER — TELEPHONE (OUTPATIENT)
Dept: FAMILY MEDICINE | Facility: CLINIC | Age: 47
End: 2022-08-31

## 2022-08-31 NOTE — TELEPHONE ENCOUNTER
[facility] phone call message - order or referral request for patient:     Order or referral being requested:     Referral for risk / Physical therapy for wrist        Additional Comments:     Caller asking for a referral for wrist. Caller advised is in a lot of pain. Have appointment with Dr. Bailon on 09/07     Caller advised she know she can not be seen if no referral place in. Please place in referral or call and advise. Please and thank you.     OK to leave a message on voice mail? Yes    Primary language: English      needed? No    Call taken on August 31, 2022 at 9:57 AM by Meredith Holder

## 2022-08-31 NOTE — TELEPHONE ENCOUNTER
This referral was placed on 8/24/22.   Kirstie - can you follow-up on this for Karoline? Thanks!    Robbie Bailon MD  August 31, 2022  3:05 PM

## 2022-09-02 NOTE — TELEPHONE ENCOUNTER
Called location, asked if I could help schedule or if they would like to call her to help schedule. He stated he would give her a call. Mustapha

## 2022-09-04 NOTE — TELEPHONE ENCOUNTER
DIAGNOSIS: Contusion of right wrist/inocencia/prefferd1/noimag/ortho   APPOINTMENT DATE: 09/13/2022   NOTES STATUS DETAILS   OFFICE NOTE from referring provider Internal 08/10/2022 to 07/20/2022 - Robbie Bailon MD - [facility]   OFFICE NOTE from other specialist Media Tab 07/20/2022 - Anahi Lema PA-C - [facility]   DISCHARGE REPORT from the ER Internal 07/19/2022 - [facility] ED   MEDICATION LIST Internal  Care Everywhere    LABS     CBC/DIFF Internal 05/18/2022   MRI Internal 07/25/2022 - [facility] Wrist   XRAYS (IMAGES & REPORTS) Internal 07/19/2022 - [facility] Wrist  02/09/2021 - RT Wrist  01/21/2019 - RT Wrist  02/16/2013 - RT Hand

## 2022-09-06 NOTE — PROGRESS NOTES
ASSESSMENT/PLAN:    (M25.871) Ankle impingement syndrome, right  (primary encounter diagnosis)  Comment:   Plan: awaiting workers comp approval to proceed w/ surgery; paperwork for work and disability insurance filled today    (I69.969D) Contusion of right wrist, subsequent encounter  Comment: hand surgery 2nd opinion pending on 9/13/22  Plan: oxyCODONE-acetaminophen (PERCOCET) 5-325 MG tablet          (J45.40) Moderate persistent asthma without complication  Comment: refilled  Plan: PROAIR  (90 Base) MCG/ACT inhaler          Robbie Bailon MD  September 7, 2022  1:41 PM      Pt is a 47 year old female last seen on 8/24/22 by Dr Cochran and myself here for follow up of:     1) R ankle - worse; no improvement w/ short course of steroids; awaiting surgery w/ Dr Bashir for impingement  Unable to tolerate boot  MRI ankle was in 3/2022; showed chronic ATFL tear  Tried cortisone injection in the past w/o relief   Still awaiting worker's comp physician to see her to approve proceeding w/ surgery  Has  involved  Tries to elevate and swelling improves    2) R wrist - has appt w/ hand surgeon on 9/13; still w/ significant pain; x-ray w/ concern for scapholunate injury; MRI w/ contusion; will get 2nd opinion  X-ray 7/19/22:  IMPRESSION: Widening of the scapholunate interval indicating scapholunate ligament tear. Moderate narrowing at the joint between the scaphoid and capitate and the joint between the lunate and capitate. These findings have all developed since the prior study. No fracture.  MRI wrist - 7/25/22:  IMPRESSION:  1.  There is signal abnormality throughout the bones of the carpus without evidence for discrete bone lesion or fracture. There is also a small effusion. An inflammatory arthropathy such as rheumatoid could result in this appearance.  2.  There is an intraosseous ganglion within the first metacarpal. There is no evidence for fracture.  3.  Mild extensor carpi ulnaris tendinopathy without  tearing.  4.  No evidence for tear of the scapholunate ligament or proximal migration of the capitate.  5.  Sprain of the ulnar attachments of the TFCC but the radial attachments are intact and there is no diastasis or effusion.                 Per Dr Cochran's note:  (I61.070) Right ankle swelling  (primary encounter diagnosis)  Comment: Ongoing ankle right ankle pain/swelling, possibly secondary to impingement.  Overall worsening symptoms after returning to work. On exam, significant swelling of the right ankle with diffuse tenderness. Will treat with steroids. Also recommend she begin wearing her boot again. Will have her take some time off of work to prevent continued aggravation   Plan: methylPREDNISolone (MEDROL DOSEPAK) 4 MG tablet        therapy pack  - Work note provided  - Follow up with Dr Bailon  - Follow-up precautions provided     (V13.705A) Contusion of right wrist, subsequent encounter  Comment: Xray initially concerning for fracture/scapholunate tear. MRI was however reassuring. Worsening symptoms since returning to work. Due to ongoing pain, will have her see a hand surgeon for further evaluation.   Plan: Orthopedic  Referral  -  Follow-up with Dr Bailon  - Follow-up precautions provided        Past Medical History:   Diagnosis Date     Agoraphobia with panic attacks      Anxiety      Anxiety      Arthritis     of back     Asthma      Asthma in adult, moderate persistent, uncomplicated      Chronic pain      Chronic sinusitis      Cocaine abuse (H)      Controlled substance agreement signed 06/30/2015    Overview:  Patient has chronic pain and is seen at [facility] for this.  Has controlled substance agreement with them.  On Vicodin, Valium, Klonopin prescribed only from there.        Depression      Hx of seasonal allergies      Infectious pericarditis      Lipoma      Tobacco abuse       Current Outpatient Medications   Medication Sig Dispense Refill     acetaminophen (TYLENOL) 325  "MG tablet Take 3 tablets (975 mg) by mouth every 6 hours as needed for mild pain 50 tablet 0     ALPRAZolam (XANAX) 2 MG tablet Take 1 tablet (2 mg) by mouth 2 times daily as needed for anxiety 60 tablet 3     cetirizine (ZYRTEC) 10 MG tablet Take 1 tablet (10 mg) by mouth daily 30 tablet 11     fluticasone (FLONASE) 50 MCG/ACT nasal spray Spray 2 sprays into both nostrils daily 9.9 mL 11     fluticasone-salmeterol (ADVAIR) 500-50 MCG/DOSE inhaler Inhale 1 puff into the lungs every 12 hours 60 each 11     ibuprofen (ADVIL/MOTRIN) 600 MG tablet Take 1 tablet (600 mg) by mouth 3 times daily as needed for moderate pain 90 tablet 4     ibuprofen (ADVIL/MOTRIN) 800 MG tablet Take 1 tablet (800 mg) by mouth every 6 hours as needed for other (mild and/or inflammatory pain) 30 tablet 0     methylPREDNISolone (MEDROL DOSEPAK) 4 MG tablet therapy pack Follow Package Directions 21 tablet 0     montelukast (SINGULAIR) 10 MG tablet Take 1 tablet (10 mg) by mouth At Bedtime 30 tablet 11     oxyCODONE-acetaminophen (PERCOCET) 5-325 MG tablet Take 1 tablet by mouth daily as needed for breakthrough pain or severe pain 30 tablet 0     PROAIR  (90 Base) MCG/ACT inhaler Inhale 2 puffs into the lungs every 4 hours as needed for shortness of breath / dyspnea or wheezing 8 g 11     spacer (OPTICHAMBER BINA) holding chamber For use w/ rescue inhaler 1 each 0     tiotropium (SPIRIVA RESPIMAT) 1.25 MCG/ACT inhaler Inhale 2 puffs into the lungs daily 12 g 3     tiotropium (SPIRIVA RESPIMAT) 2.5 MCG/ACT inhaler Inhale 2 puffs into the lungs daily 4 g 4     triamcinolone (KENALOG) 0.1 % external cream Apply topically 2 times daily 100 g 1      Allergies   Allergen Reactions     Lidocaine Other (See Comments)     \"my jaw stopped moving\"  Other reaction(s): Dystonia     Penicillins Hives, Rash and Shortness Of Breath     Epinephrine-Lidocaine-Na Metabisulfite Other (See Comments) and Muscle Pain (Myalgia)     Severe jaw cramping, double " vision  Jaw locking      ROS:   Gen- no fevers/chills   Remainder of ROS negative.     Exam:   /87   Pulse 85   Resp 18   SpO2 99%     R ankle w/ swelling and diffuse tenderness

## 2022-09-07 ENCOUNTER — OFFICE VISIT (OUTPATIENT)
Dept: FAMILY MEDICINE | Facility: CLINIC | Age: 47
End: 2022-09-07
Payer: COMMERCIAL

## 2022-09-07 VITALS
DIASTOLIC BLOOD PRESSURE: 87 MMHG | HEART RATE: 85 BPM | RESPIRATION RATE: 18 BRPM | OXYGEN SATURATION: 99 % | SYSTOLIC BLOOD PRESSURE: 127 MMHG

## 2022-09-07 DIAGNOSIS — S60.211D CONTUSION OF RIGHT WRIST, SUBSEQUENT ENCOUNTER: ICD-10-CM

## 2022-09-07 DIAGNOSIS — M25.871 ANKLE IMPINGEMENT SYNDROME, RIGHT: Primary | ICD-10-CM

## 2022-09-07 DIAGNOSIS — J45.40 MODERATE PERSISTENT ASTHMA WITHOUT COMPLICATION: ICD-10-CM

## 2022-09-07 PROCEDURE — 99214 OFFICE O/P EST MOD 30 MIN: CPT | Performed by: FAMILY MEDICINE

## 2022-09-07 RX ORDER — OXYCODONE AND ACETAMINOPHEN 5; 325 MG/1; MG/1
1 TABLET ORAL DAILY PRN
Qty: 30 TABLET | Refills: 0 | Status: SHIPPED | OUTPATIENT
Start: 2022-09-07 | End: 2022-10-05

## 2022-09-07 RX ORDER — ALBUTEROL SULFATE 90 UG/1
2 AEROSOL, METERED RESPIRATORY (INHALATION) EVERY 4 HOURS PRN
Qty: 8 G | Refills: 11 | Status: SHIPPED | OUTPATIENT
Start: 2022-09-07 | End: 2023-04-19

## 2022-09-07 NOTE — LETTER
Opioid / Opioid Plus Controlled Substance Agreement    This is an agreement between you and your provider about the safe and appropriate use of controlled substance/opioids prescribed by your care team. Controlled substances are medicines that can cause physical and mental dependence (abuse).    There are strict laws about having and using these medicines. We here at [facility] are committing to working with you in your efforts to get better. To support you in this work, we ll help you schedule regular office appointments for medicine refills. If we must cancel or change your appointment for any reason, we ll make sure you have enough medicine to last until your next appointment.     As a Provider, I will:    Listen carefully to your concerns and treat you with respect.     Recommend a treatment plan that I believe is in your best interest. This plan may involve therapies other than opioid pain medication.     Talk with you often about the possible benefits, and the risk of harm of any medicine that we prescribe for you.     Provide a plan on how to taper (discontinue or go off) using this medicine if the decision is made to stop its use.    As a Patient, I understand that opioid(s):     Are a controlled substance prescribed by my care team to help me function or work and manage my condition(s).     Are strong medicines and can cause serious side effects such as:    Drowsiness, which can seriously affect my driving ability    A lower breathing rate, enough to cause death    Harm to my thinking ability     Depression     Abuse of and addiction to this medicine    Need to be taken exactly as prescribed. Combining opioids with certain medicines or chemicals (such as illegal drugs, sedatives, sleeping pills, and benzodiazepines) can be dangerous or even fatal. If I stop opioids suddenly, I may have severe withdrawal symptoms.    Do not work for all types of pain nor for all patients. If they re not helpful, I may  be asked to stop them.        The risks, benefits and side effects of these medicine(s) were explained to me. I agree that:  1. I will take part in other treatments as advised by my care team. This may be psychiatry or counseling, physical therapy, behavioral therapy, group treatment or a referral to a specialist.     2. I will keep all my appointments. I understand that this is part of the monitoring of opioids. My care team may require an office visit for EVERY opioid/controlled substance refill. If I miss appointments or don t follow instructions, my care team may stop my medicine.    3. I will take my medicines as prescribed. I will not change the dose or schedule unless my care team tells me to. There will be no refills if I run out early.     4. I may be asked to come to the clinic and complete a urine drug test or complete a pill count at any time. If I don t give a urine sample or participate in a pill count, the care team may stop my medicine.    5. I will only receive prescriptions from this clinic for chronic pain. If I am treated by another provider for acute pain issues, I will tell them that I am taking opioid pain medication for chronic pain and that I have a treatment agreement with this provider. I will inform my [facility] care team within one business day if I am given a prescription for any pain medication by another healthcare provider. My [facility] care team can contact other providers and pharmacists about my use of any medicines.    6. It is up to me to make sure that I don t run out of my medicines on weekends or holidays. If my care team is willing to refill my opioid prescription without a visit, I must request refills only during office hours. Refills may take up to 3 business days to process. I will use one pharmacy to fill all my opioid and other controlled substance prescriptions. I will notify the clinic about any changes to my insurance or medication  availability.    7. I am responsible for my prescriptions. If the medicine/prescription is lost, stolen or destroyed, it will not be replaced. I also agree not to share controlled substance medicines with anyone.    8. I am aware I should not use any illegal or recreational drugs. I agree not to drink alcohol unless my care team says I can.       9. If I enroll in the [place] [facility] Cannabis program, I will tell my care team prior to my next refill.     10. I will tell my care team right away if I become pregnant, have a new medical problem treated outside of my regular clinic, or have a change in my medications.    11. I understand that this medicine can affect my thinking, judgment and reaction time. Alcohol and drugs affect the brain and body, which can affect the safety of my driving. Being under the influence of alcohol or drugs can affect my decision-making, behaviors, personal safety, and the safety of others. Driving while impaired (DWI) can occur if a person is driving, operating, or in physical control of a car, motorcycle, boat, snowmobile, ATV, motorbike, off-road vehicle, or any other motor vehicle ([place] Statute 169A.20). I understand the risk if I choose to drive or operate any vehicle or machinery.    I understand that if I do not follow any of the conditions above, my prescriptions or treatment may be stopped or changed.          Opioids  What You Need to Know    What are opioids?   Opioids are pain medicines that must be prescribed by a doctor. They are also known as narcotics.     Examples are:   1. morphine (MS Contin, Tatianna)  2. oxycodone (Oxycontin)  3. oxycodone and acetaminophen (Percocet)  4. hydrocodone and acetaminophen (Vicodin, Norco)   5. fentanyl patch (Duragesic)   6. hydromorphone (Dilaudid)   7. methadone  8. codeine (Tylenol #3)     What do opioids do well?   Opioids are best for severe short-term pain such as after a surgery or injury. They may work well for cancer pain. They may  help some people with long-lasting (chronic) pain.     What do opioids NOT do well?   Opioids never get rid of pain entirely, and they don t work well for most patients with chronic pain. Opioids don t reduce swelling, one of the causes of pain.                                    Other ways to manage chronic pain and improve function include:       Treat the health problem that may be causing pain    Anti-inflammation medicines, which reduce swelling and tenderness, such as ibuprofen (Advil, Motrin) or naproxen (Aleve)    Acetaminophen (Tylenol)    Antidepressants and anti-seizure medicines, especially for nerve pain    Topical treatments such as patches or creams    Injections or nerve blocks    Chiropractic or osteopathic treatment    Acupuncture, massage, deep breathing, meditation, visual imagery, aromatherapy    Use heat or ice at the pain site    Physical therapy     Exercise    Stop smoking    Take part in therapy       Risks and side effects     Talk to your doctor before you start or decide to keep taking opioids. Possible side effects include:      Lowering your breathing rate enough to cause death    Overdose, including death, especially if taking higher than prescribed doses    Worse depression symptoms; less pleasure in things you usually enjoy    Feeling tired or sluggish    Slower thoughts or cloudy thinking    Being more sensitive to pain over time; pain is harder to control    Trouble sleeping or restless sleep    Changes in hormone levels (for example, less testosterone)    Changes in sex drive or ability to have sex    Constipation    Unsafe driving    Itching and sweating    Dizziness    Nausea, throwing up and dry mouth    What else should I know about opioids?    Opioids may lead to dependence, tolerance, or addiction.      Dependence means that if you stop or reduce the medicine too quickly, you will have withdrawal symptoms. These include loose poop (diarrhea), jitters, flu-like symptoms,  nervousness and tremors. Dependence is not the same as addiction.                       Tolerance means needing higher doses over time to get the same effect. This may increase the chance of serious side effects.      Addiction is when people improperly use a substance that harms their body, their mind or their relations with others. Use of opiates can cause a relapse of addiction if you have a history of drug or alcohol abuse.      People who have used opioids for a long time may have a lower quality of life, worse depression, higher levels of pain and more visits to doctors.    You can overdose on opioids. Take these steps to lower your risk of overdose:    1. Recognize the signs:  Signs of overdose include decrease or loss of consciousness (blackout), slowed breathing, trouble waking up and blue lips. If someone is worried about overdose, they should call 911.    2. Talk to your doctor about Narcan (naloxone).   If you are at risk for overdose, you may be given a prescription for Narcan. This medicine very quickly reverses the effects of opioids.   If you overdose, a friend or family member can give you Narcan while waiting for the ambulance. They need to know the signs of overdose and how to give Narcan.     3. Don't use alcohol or street drugs.   Taking them with opioids can cause death.    4. Do not take any of these medicines unless your doctor says it s OK. Taking these with opioids can cause death:    Benzodiazepines, such as lorazepam (Ativan), alprazolam (Xanax) or diazepam (Valium)    Muscle relaxers, such as cyclobenzaprine (Flexeril)    Sleeping pills like zolpidem (Ambien)     Other opioids      How to keep you and other people safe while taking opioids:    1. Never share your opioids with others.  Opioid medicines are regulated by the Drug Enforcement Agency ([place]). Selling or sharing medications is a criminal act.    2. Be sure to store opioids in a secure place, locked up if possible. Young children  can easily swallow them and overdose.    3. When you are traveling with your medicines, keep them in the original bottles. If you use a pill box, be sure you also carry a copy of your medicine list from your clinic or pharmacy.    4. Safe disposal of opioids    Most pharmacies have places to get rid of medicine, called disposal kiosks. Medicine disposal options are also available in every [facility]. Search your county and  medication disposal  to find more options. You can find more details at:  [email or web address]     I agree that my provider, clinic care team, and pharmacy may work with any city, state or federal law enforcement agency that investigates the misuse, sale, or other diversion of my controlled medicine. I will allow my provider to discuss my care with, or share a copy of, this agreement with any other treating provider, pharmacy or emergency room where I receive care.    I have read this agreement and have asked questions about anything I did not understand.    _______________________________________________________  Patient Signature - Darlene Hudson _____________________                   Date     _______________________________________________________  Provider Signature - Robbie Bailon MD   _____________________                   Date     _______________________________________________________  Witness Signature (required if provider not present while patient signing)   _____________________                   Date

## 2022-09-07 NOTE — NURSING NOTE
Pt had paperwork she needed filled out by PCP, made copies and pt kept originals. Paperwork that was faxed was the [employer] forms. No fax number on sheet.    Called [employer] 1800 number at about 2pm talked to rep named Alla and asked about fax number she asked for pt name  and was able to give me Case # put case number on paperwork. Fax number I sent to was [phone]

## 2022-09-11 NOTE — PROGRESS NOTES
Chief Complaint: No chief complaint on file.      Date of Injury: ~August 2022  Mechanism of Injury: fall  Injury: right wrist sprain    History of Present Illness: Darlene Hudson is a 47 year old RHD female presenting for evaluation of right wrist pain. She had a slip and fall onto her right wrist with immediate pain. She had an XR which was concerning for an SL ligament tear, but an MRI showed no ligament tearing. She has tried a brace which is uncomfortable. The pain is worse with activity, using the hand a lot, or driving a lot. She takes ibuprofen and ice which help.    Clinical documentation by Dr. Bailon on 9/7/2022 was reviewed.    Occupation:     Past Medical History:   Past Medical History:   Diagnosis Date     Agoraphobia with panic attacks      Anxiety      Anxiety      Arthritis     of back     Asthma      Asthma in adult, moderate persistent, uncomplicated      Chronic pain      Chronic sinusitis      Cocaine abuse (H)      Controlled substance agreement signed 06/30/2015    Overview:  Patient has chronic pain and is seen at [facility] for this.  Has controlled substance agreement with them.  On Vicodin, Valium, Klonopin prescribed only from there.        Depression      Hx of seasonal allergies      Infectious pericarditis      Lipoma      Tobacco abuse        Past Surgical History:   Past Surgical History:   Procedure Laterality Date     ANKLE SURGERY Right 05/30/2019     BIOPSY BREAST Right 1990    benign     CREATION PERICARDIAL WINDOW  02/10/2017    [facility]     DILATION AND CURETTAGE N/A 7/22/2022    Procedure: DILATION AND CURETTAGE, UTERUS;  Surgeon: Lesly Holland;  Location: [facility]     HEMORRHOIDECTOMY EXTERNAL       HYSTEROSCOPY, WITH ENDOMETRIAL RADIOFREQUENCY ABLATION - NOVASURE N/A 7/22/2022    Procedure: HYSTEROSCOPY, WITH ENDOMETRIAL RADIOFREQUENCY ABLATION - NOVASURE DILATION AND CURETTAGE, UTERUS;  Surgeon: Lesly Holland;  Location:  "[facility]     TUMOR REMOVAL      Has had 3. In the right breast and \"inside of rib cage.\"       Medications:   Current Outpatient Medications:      acetaminophen (TYLENOL) 325 MG tablet, Take 3 tablets (975 mg) by mouth every 6 hours as needed for mild pain, Disp: 50 tablet, Rfl: 0     ALPRAZolam (XANAX) 2 MG tablet, Take 1 tablet (2 mg) by mouth 2 times daily as needed for anxiety, Disp: 60 tablet, Rfl: 3     cetirizine (ZYRTEC) 10 MG tablet, Take 1 tablet (10 mg) by mouth daily, Disp: 30 tablet, Rfl: 11     fluticasone (FLONASE) 50 MCG/ACT nasal spray, Spray 2 sprays into both nostrils daily, Disp: 9.9 mL, Rfl: 11     fluticasone-salmeterol (ADVAIR) 500-50 MCG/DOSE inhaler, Inhale 1 puff into the lungs every 12 hours, Disp: 60 each, Rfl: 11     ibuprofen (ADVIL/MOTRIN) 600 MG tablet, Take 1 tablet (600 mg) by mouth 3 times daily as needed for moderate pain, Disp: 90 tablet, Rfl: 4     ibuprofen (ADVIL/MOTRIN) 800 MG tablet, Take 1 tablet (800 mg) by mouth every 6 hours as needed for other (mild and/or inflammatory pain), Disp: 30 tablet, Rfl: 0     methylPREDNISolone (MEDROL DOSEPAK) 4 MG tablet therapy pack, Follow Package Directions, Disp: 21 tablet, Rfl: 0     montelukast (SINGULAIR) 10 MG tablet, Take 1 tablet (10 mg) by mouth At Bedtime, Disp: 30 tablet, Rfl: 11     oxyCODONE-acetaminophen (PERCOCET) 5-325 MG tablet, Take 1 tablet by mouth daily as needed for breakthrough pain or severe pain, Disp: 30 tablet, Rfl: 0     PROAIR  (90 Base) MCG/ACT inhaler, Inhale 2 puffs into the lungs every 4 hours as needed for shortness of breath / dyspnea or wheezing, Disp: 8 g, Rfl: 11     spacer (OPTICHAMBER BINA) holding chamber, For use w/ rescue inhaler, Disp: 1 each, Rfl: 0     tiotropium (SPIRIVA RESPIMAT) 1.25 MCG/ACT inhaler, Inhale 2 puffs into the lungs daily, Disp: 12 g, Rfl: 3     tiotropium (SPIRIVA RESPIMAT) 2.5 MCG/ACT inhaler, Inhale 2 puffs into the lungs daily, Disp: 4 g, Rfl: 4     " "triamcinolone (KENALOG) 0.1 % external cream, Apply topically 2 times daily, Disp: 100 g, Rfl: 1    Allergy:   Allergies   Allergen Reactions     Lidocaine Other (See Comments)     \"my jaw stopped moving\"  Other reaction(s): Dystonia     Penicillins Hives, Rash and Shortness Of Breath     Epinephrine-Lidocaine-Na Metabisulfite Other (See Comments) and Muscle Pain (Myalgia)     Severe jaw cramping, double vision  Jaw locking       Social History:   History   Smoking Status     Light Tobacco Smoker     Packs/day: 0.10     Years: 30.00     Types: Cigarettes     Last attempt to quit: 2020   Smokeless Tobacco     Current User     Comment: 2-3 cig/day      Social History     Tobacco Use     Smoking status: Light Tobacco Smoker     Packs/day: 0.10     Years: 30.00     Pack years: 3.00     Types: Cigarettes     Last attempt to quit: 2020     Years since quittin.2     Smokeless tobacco: Current User     Tobacco comment: 2-3 cig/day   Vaping Use     Vaping Use: Some days   Substance Use Topics     Alcohol use: Not Currently     Comment: Occasiaonlly.      Drug use: Not Currently        Family History:   Family History   Problem Relation Age of Onset     Hypertension Mother      Cancer Mother         cervical      Other Cancer Father         lung cancer     Asthma Father      Diabetes Brother      Diabetes Brother      Breast Cancer Maternal Grandmother      Asthma Child        Physical Examination:  There were no vitals filed for this visit.  There is no height or weight on file to calculate BMI.    Well appearing, well nourished  Alert and oriented x 3, cooperative with exam     Right wrist  Skin intact  TTP  Radial styloid: no   Scaphoid tubercle: no   Anatomic snuff box: no  SL interval: yes   LT interval: yes  ECU: TTP   ECU subluxation/instability: no  Fovea sign: positive   Ulnar styloid: NTTP   Range of Motion:    Forearm: pronation/supination 80/80    Wrist: extension/flexion 50/40    Digit: full AROM in " DIP, PIP, MP joints  Motor Exam: Intact TU/OK/x2-3. 5/5 1st DOI and thumb abduction  Sensory Exam: Sensation intact to light touch in FDWS (radial), volar IF (median), volar SF (ulnar)  Vascular Exam: 2+ radial pulse, < 2 sec capillary refill    Imaging/Studies:  XR (3 views) of the right wrist was obtained 07/19/2022.  I reviewed the images and report independently with the patient.  The imaging study shows widening of the SL interval with possible small erosion of the ulnar side of the scaphoid, narrowing of the midcarpal joint.    MRI performed 07/22/2022 reveals:  IMPRESSION:  1.  There is signal abnormality throughout the bones of the carpus without evidence for discrete bone lesion or fracture. There is also a small effusion. An inflammatory arthropathy such as rheumatoid could result in this appearance.  2.  There is an intraosseous ganglion within the first metacarpal. There is no evidence for fracture.  3.  Mild extensor carpi ulnaris tendinopathy without tearing.  4.  No evidence for tear of the scapholunate ligament or proximal migration of the capitate.  5.  Sprain of the ulnar attachments of the TFCC but the radial attachments are intact and there is no diastasis or effusion.           Assessment: Darlene Hudson is a 47 year old female with right wrist sprain and contusion.    Plan:   I had a discussion with the patient regarding my clinical findings, diagnosis, and treatment plan.  All questions answered.      OT for wrist splint, hand and wrist strengthening exercises; if symptoms do not resolve in 6 weeks, consider steroid injection    Next Visit:    Follow-up: 6 week(s).    Imaging: None..    Plan: review symptoms, consider steroid injection if pain persists.      ROBERTO RIVERS MD

## 2022-09-12 ENCOUNTER — TRANSFERRED RECORDS (OUTPATIENT)
Dept: HEALTH INFORMATION MANAGEMENT | Facility: CLINIC | Age: 47
End: 2022-09-12

## 2022-09-13 ENCOUNTER — OFFICE VISIT (OUTPATIENT)
Dept: ORTHOPEDICS | Facility: CLINIC | Age: 47
End: 2022-09-13
Attending: FAMILY MEDICINE
Payer: MEDICAID

## 2022-09-13 ENCOUNTER — PRE VISIT (OUTPATIENT)
Dept: ORTHOPEDICS | Facility: CLINIC | Age: 47
End: 2022-09-13

## 2022-09-13 DIAGNOSIS — M25.539 WRIST PAIN: Primary | ICD-10-CM

## 2022-09-13 DIAGNOSIS — S60.211D CONTUSION OF RIGHT WRIST, SUBSEQUENT ENCOUNTER: ICD-10-CM

## 2022-09-13 DIAGNOSIS — S63.501A RIGHT WRIST SPRAIN, INITIAL ENCOUNTER: Primary | ICD-10-CM

## 2022-09-13 PROCEDURE — 99213 OFFICE O/P EST LOW 20 MIN: CPT | Performed by: STUDENT IN AN ORGANIZED HEALTH CARE EDUCATION/TRAINING PROGRAM

## 2022-09-13 NOTE — LETTER
9/13/2022         RE: Darlene Hudson  [address] [place] [address]        Dear Colleague,    Thank you for referring your patient, Darlene Hudson, to the [facility] [place]. Please see a copy of my visit note below.    Chief Complaint: No chief complaint on file.      Date of Injury: ~August 2022  Mechanism of Injury: fall  Injury: right wrist sprain    History of Present Illness: Darlene Hudson is a 47 year old RHD female presenting for evaluation of right wrist pain. She had a slip and fall onto her right wrist with immediate pain. She had an XR which was concerning for an SL ligament tear, but an MRI showed no ligament tearing. She has tried a brace which is uncomfortable. The pain is worse with activity, using the hand a lot, or driving a lot. She takes ibuprofen and ice which help.    Clinical documentation by Dr. Bailon on 9/7/2022 was reviewed.    Occupation:     Past Medical History:   Past Medical History:   Diagnosis Date     Agoraphobia with panic attacks      Anxiety      Anxiety      Arthritis     of back     Asthma      Asthma in adult, moderate persistent, uncomplicated      Chronic pain      Chronic sinusitis      Cocaine abuse (H)      Controlled substance agreement signed 06/30/2015    Overview:  Patient has chronic pain and is seen at [facility] for this.  Has controlled substance agreement with them.  On Vicodin, Valium, Klonopin prescribed only from there.        Depression      Hx of seasonal allergies      Infectious pericarditis      Lipoma      Tobacco abuse        Past Surgical History:   Past Surgical History:   Procedure Laterality Date     ANKLE SURGERY Right 05/30/2019     BIOPSY BREAST Right 1990    benign     CREATION PERICARDIAL WINDOW  02/10/2017    [facility]     DILATION AND CURETTAGE N/A 7/22/2022    Procedure: DILATION AND CURETTAGE, UTERUS;  Surgeon: Lesly Holland;  Location: [facility] OR  "    HEMORRHOIDECTOMY EXTERNAL       HYSTEROSCOPY, WITH ENDOMETRIAL RADIOFREQUENCY ABLATION - NOVASURE N/A 7/22/2022    Procedure: HYSTEROSCOPY, WITH ENDOMETRIAL RADIOFREQUENCY ABLATION - NOVASURE DILATION AND CURETTAGE, UTERUS;  Surgeon: Lesly Holland;  Location: [place] [facility] OR     TUMOR REMOVAL      Has had 3. In the right breast and \"inside of rib cage.\"       Medications:   Current Outpatient Medications:      acetaminophen (TYLENOL) 325 MG tablet, Take 3 tablets (975 mg) by mouth every 6 hours as needed for mild pain, Disp: 50 tablet, Rfl: 0     ALPRAZolam (XANAX) 2 MG tablet, Take 1 tablet (2 mg) by mouth 2 times daily as needed for anxiety, Disp: 60 tablet, Rfl: 3     cetirizine (ZYRTEC) 10 MG tablet, Take 1 tablet (10 mg) by mouth daily, Disp: 30 tablet, Rfl: 11     fluticasone (FLONASE) 50 MCG/ACT nasal spray, Spray 2 sprays into both nostrils daily, Disp: 9.9 mL, Rfl: 11     fluticasone-salmeterol (ADVAIR) 500-50 MCG/DOSE inhaler, Inhale 1 puff into the lungs every 12 hours, Disp: 60 each, Rfl: 11     ibuprofen (ADVIL/MOTRIN) 600 MG tablet, Take 1 tablet (600 mg) by mouth 3 times daily as needed for moderate pain, Disp: 90 tablet, Rfl: 4     ibuprofen (ADVIL/MOTRIN) 800 MG tablet, Take 1 tablet (800 mg) by mouth every 6 hours as needed for other (mild and/or inflammatory pain), Disp: 30 tablet, Rfl: 0     methylPREDNISolone (MEDROL DOSEPAK) 4 MG tablet therapy pack, Follow Package Directions, Disp: 21 tablet, Rfl: 0     montelukast (SINGULAIR) 10 MG tablet, Take 1 tablet (10 mg) by mouth At Bedtime, Disp: 30 tablet, Rfl: 11     oxyCODONE-acetaminophen (PERCOCET) 5-325 MG tablet, Take 1 tablet by mouth daily as needed for breakthrough pain or severe pain, Disp: 30 tablet, Rfl: 0     PROAIR  (90 Base) MCG/ACT inhaler, Inhale 2 puffs into the lungs every 4 hours as needed for shortness of breath / dyspnea or wheezing, Disp: 8 g, Rfl: 11     spacer (OPTICHAMBER BINA) holding chamber, For use w/ " "rescue inhaler, Disp: 1 each, Rfl: 0     tiotropium (SPIRIVA RESPIMAT) 1.25 MCG/ACT inhaler, Inhale 2 puffs into the lungs daily, Disp: 12 g, Rfl: 3     tiotropium (SPIRIVA RESPIMAT) 2.5 MCG/ACT inhaler, Inhale 2 puffs into the lungs daily, Disp: 4 g, Rfl: 4     triamcinolone (KENALOG) 0.1 % external cream, Apply topically 2 times daily, Disp: 100 g, Rfl: 1    Allergy:   Allergies   Allergen Reactions     Lidocaine Other (See Comments)     \"my jaw stopped moving\"  Other reaction(s): Dystonia     Penicillins Hives, Rash and Shortness Of Breath     Epinephrine-Lidocaine-Na Metabisulfite Other (See Comments) and Muscle Pain (Myalgia)     Severe jaw cramping, double vision  Jaw locking       Social History:   History   Smoking Status     Light Tobacco Smoker     Packs/day: 0.10     Years: 30.00     Types: Cigarettes     Last attempt to quit: 2020   Smokeless Tobacco     Current User     Comment: 2-3 cig/day      Social History     Tobacco Use     Smoking status: Light Tobacco Smoker     Packs/day: 0.10     Years: 30.00     Pack years: 3.00     Types: Cigarettes     Last attempt to quit: 2020     Years since quittin.2     Smokeless tobacco: Current User     Tobacco comment: 2-3 cig/day   Vaping Use     Vaping Use: Some days   Substance Use Topics     Alcohol use: Not Currently     Comment: Occasiaonlly.      Drug use: Not Currently        Family History:   Family History   Problem Relation Age of Onset     Hypertension Mother      Cancer Mother         cervical      Other Cancer Father         lung cancer     Asthma Father      Diabetes Brother      Diabetes Brother      Breast Cancer Maternal Grandmother      Asthma Child        Physical Examination:  There were no vitals filed for this visit.  There is no height or weight on file to calculate BMI.    Well appearing, well nourished  Alert and oriented x 3, cooperative with exam     Right wrist  Skin intact  TTP  Radial styloid: no   Scaphoid tubercle: no "   Anatomic snuff box: no  SL interval: yes   LT interval: yes  ECU: TTP   ECU subluxation/instability: no  Fovea sign: positive   Ulnar styloid: NTTP   Range of Motion:    Forearm: pronation/supination 80/80    Wrist: extension/flexion 50/40    Digit: full AROM in DIP, PIP, MP joints  Motor Exam: Intact TU/OK/x2-3. 5/5 1st DOI and thumb abduction  Sensory Exam: Sensation intact to light touch in FDWS (radial), volar IF (median), volar SF (ulnar)  Vascular Exam: 2+ radial pulse, < 2 sec capillary refill    Imaging/Studies:  XR (3 views) of the right wrist was obtained 07/19/2022.  I reviewed the images and report independently with the patient.  The imaging study shows widening of the SL interval with possible small erosion of the ulnar side of the scaphoid, narrowing of the midcarpal joint.    MRI performed 07/22/2022 reveals:  IMPRESSION:  1.  There is signal abnormality throughout the bones of the carpus without evidence for discrete bone lesion or fracture. There is also a small effusion. An inflammatory arthropathy such as rheumatoid could result in this appearance.  2.  There is an intraosseous ganglion within the first metacarpal. There is no evidence for fracture.  3.  Mild extensor carpi ulnaris tendinopathy without tearing.  4.  No evidence for tear of the scapholunate ligament or proximal migration of the capitate.  5.  Sprain of the ulnar attachments of the TFCC but the radial attachments are intact and there is no diastasis or effusion.           Assessment: Darlene Hudson is a 47 year old female with right wrist sprain and contusion.    Plan:   I had a discussion with the patient regarding my clinical findings, diagnosis, and treatment plan.  All questions answered.      OT for wrist splint, hand and wrist strengthening exercises; if symptoms do not resolve in 6 weeks, consider steroid injection    Next Visit:    Follow-up: 6 week(s).    Imaging: None..    Plan: review symptoms, consider steroid  injection if pain persists.      ROBERTO RIVERS MD

## 2022-09-13 NOTE — NURSING NOTE
Reason For Visit:   Chief Complaint   Patient presents with     Consult     Right wrist contusion        Primary MD: Robbie Bailon  Ref. MD: Dr Bailon    Age: 47 year old    ?  No      There were no vitals taken for this visit.      Pain Assessment  Patient Currently in Pain: Yes  0-10 Pain Scale: 10 (at its worst)  Primary Pain Location: Wrist (right)  Pain Descriptors: Shooting, Intermittent  Alleviating Factors: NSAIDS  Aggravating Factors: Movement    Hand Dominance Evaluation  Hand Dominance: Right          QuickDASH Assessment  No flowsheet data found.       Current Outpatient Medications   Medication Sig Dispense Refill     acetaminophen (TYLENOL) 325 MG tablet Take 3 tablets (975 mg) by mouth every 6 hours as needed for mild pain 50 tablet 0     ALPRAZolam (XANAX) 2 MG tablet Take 1 tablet (2 mg) by mouth 2 times daily as needed for anxiety 60 tablet 3     cetirizine (ZYRTEC) 10 MG tablet Take 1 tablet (10 mg) by mouth daily 30 tablet 11     fluticasone (FLONASE) 50 MCG/ACT nasal spray Spray 2 sprays into both nostrils daily 9.9 mL 11     fluticasone-salmeterol (ADVAIR) 500-50 MCG/DOSE inhaler Inhale 1 puff into the lungs every 12 hours 60 each 11     ibuprofen (ADVIL/MOTRIN) 600 MG tablet Take 1 tablet (600 mg) by mouth 3 times daily as needed for moderate pain 90 tablet 4     ibuprofen (ADVIL/MOTRIN) 800 MG tablet Take 1 tablet (800 mg) by mouth every 6 hours as needed for other (mild and/or inflammatory pain) 30 tablet 0     methylPREDNISolone (MEDROL DOSEPAK) 4 MG tablet therapy pack Follow Package Directions 21 tablet 0     montelukast (SINGULAIR) 10 MG tablet Take 1 tablet (10 mg) by mouth At Bedtime 30 tablet 11     oxyCODONE-acetaminophen (PERCOCET) 5-325 MG tablet Take 1 tablet by mouth daily as needed for breakthrough pain or severe pain 30 tablet 0     PROAIR  (90 Base) MCG/ACT inhaler Inhale 2 puffs into the lungs every 4 hours as needed for shortness of breath / dyspnea or  "wheezing 8 g 11     spacer (OPTICHAMBER BINA) holding chamber For use w/ rescue inhaler 1 each 0     tiotropium (SPIRIVA RESPIMAT) 1.25 MCG/ACT inhaler Inhale 2 puffs into the lungs daily 12 g 3     tiotropium (SPIRIVA RESPIMAT) 2.5 MCG/ACT inhaler Inhale 2 puffs into the lungs daily 4 g 4     triamcinolone (KENALOG) 0.1 % external cream Apply topically 2 times daily 100 g 1       Allergies   Allergen Reactions     Lidocaine Other (See Comments)     \"my jaw stopped moving\"  Other reaction(s): Dystonia     Penicillins Hives, Rash and Shortness Of Breath     Epinephrine-Lidocaine-Na Metabisulfite Other (See Comments) and Muscle Pain (Myalgia)     Severe jaw cramping, double vision  Jaw locking       LORETTA ALLEN, ATC    "

## 2022-09-14 ENCOUNTER — TELEPHONE (OUTPATIENT)
Dept: FAMILY MEDICINE | Facility: CLINIC | Age: 47
End: 2022-09-14

## 2022-09-14 NOTE — TELEPHONE ENCOUNTER
[facility] phone call message- general phone call:    Reason for call:     Caller would like for Dr. Bailon to fill out her FAMLA for work. Caller advised needing it down and that Dr. Bailon have the paper worker.     Okay to call if Dr. Bailon have any questions and okay to  leave detail message    Return call needed: Yes    OK to leave a message on voice mail? Yes    Primary language: English      needed? No    Call taken on September 14, 2022 at 10:33 AM by Meredith Holder

## 2022-09-19 ENCOUNTER — APPOINTMENT (OUTPATIENT)
Dept: CT IMAGING | Facility: CLINIC | Age: 47
End: 2022-09-19
Attending: EMERGENCY MEDICINE
Payer: MEDICAID

## 2022-09-19 ENCOUNTER — HOSPITAL ENCOUNTER (EMERGENCY)
Facility: CLINIC | Age: 47
Discharge: HOME | End: 2022-09-19
Attending: EMERGENCY MEDICINE | Admitting: EMERGENCY MEDICINE
Payer: MEDICAID

## 2022-09-19 ENCOUNTER — HOSPITAL ENCOUNTER (EMERGENCY)
Facility: HOSPITAL | Age: 47
Discharge: HOME | End: 2022-09-19
Admitting: EMERGENCY MEDICINE
Payer: MEDICAID

## 2022-09-19 VITALS
TEMPERATURE: 98.3 F | DIASTOLIC BLOOD PRESSURE: 60 MMHG | BODY MASS INDEX: 32.58 KG/M2 | HEIGHT: 69 IN | RESPIRATION RATE: 20 BRPM | HEART RATE: 68 BPM | SYSTOLIC BLOOD PRESSURE: 104 MMHG | WEIGHT: 220 LBS | OXYGEN SATURATION: 98 %

## 2022-09-19 VITALS
TEMPERATURE: 97.7 F | DIASTOLIC BLOOD PRESSURE: 76 MMHG | HEART RATE: 105 BPM | BODY MASS INDEX: 32.58 KG/M2 | OXYGEN SATURATION: 99 % | SYSTOLIC BLOOD PRESSURE: 120 MMHG | WEIGHT: 220 LBS | RESPIRATION RATE: 18 BRPM | HEIGHT: 69 IN

## 2022-09-19 DIAGNOSIS — U07.1 INFECTION DUE TO 2019 NOVEL CORONAVIRUS: ICD-10-CM

## 2022-09-19 LAB
ALBUMIN SERPL-MCNC: 3.4 G/DL (ref 3.5–5)
ALP SERPL-CCNC: 95 U/L (ref 45–120)
ALT SERPL W P-5'-P-CCNC: 16 U/L (ref 0–45)
ANION GAP SERPL CALCULATED.3IONS-SCNC: 11 MMOL/L (ref 5–18)
AST SERPL W P-5'-P-CCNC: 16 U/L (ref 0–40)
BASOPHILS # BLD AUTO: 0 10E3/UL (ref 0–0.2)
BASOPHILS NFR BLD AUTO: 1 %
BILIRUB SERPL-MCNC: 0.4 MG/DL (ref 0–1)
BUN SERPL-MCNC: 12 MG/DL (ref 8–22)
CALCIUM SERPL-MCNC: 9.6 MG/DL (ref 8.5–10.5)
CHLORIDE BLD-SCNC: 105 MMOL/L (ref 98–107)
CO2 SERPL-SCNC: 24 MMOL/L (ref 22–31)
CREAT SERPL-MCNC: 0.85 MG/DL (ref 0.6–1.1)
D DIMER PPP FEU-MCNC: 0.69 UG/ML FEU (ref 0–0.5)
EOSINOPHIL # BLD AUTO: 0.5 10E3/UL (ref 0–0.7)
EOSINOPHIL NFR BLD AUTO: 7 %
ERYTHROCYTE [DISTWIDTH] IN BLOOD BY AUTOMATED COUNT: 12.7 % (ref 10–15)
GFR SERPL CREATININE-BSD FRML MDRD: 84 ML/MIN/1.73M2
GLUCOSE BLD-MCNC: 95 MG/DL (ref 70–125)
HCT VFR BLD AUTO: 39.3 % (ref 35–47)
HGB BLD-MCNC: 13.1 G/DL (ref 11.7–15.7)
HOLD SPECIMEN: NORMAL
IMM GRANULOCYTES # BLD: 0 10E3/UL
IMM GRANULOCYTES NFR BLD: 0 %
LYMPHOCYTES # BLD AUTO: 2.7 10E3/UL (ref 0.8–5.3)
LYMPHOCYTES NFR BLD AUTO: 36 %
MCH RBC QN AUTO: 30.4 PG (ref 26.5–33)
MCHC RBC AUTO-ENTMCNC: 33.3 G/DL (ref 31.5–36.5)
MCV RBC AUTO: 91 FL (ref 78–100)
MONOCYTES # BLD AUTO: 0.6 10E3/UL (ref 0–1.3)
MONOCYTES NFR BLD AUTO: 8 %
NEUTROPHILS # BLD AUTO: 3.7 10E3/UL (ref 1.6–8.3)
NEUTROPHILS NFR BLD AUTO: 48 %
NRBC # BLD AUTO: 0 10E3/UL
NRBC BLD AUTO-RTO: 0 /100
PLATELET # BLD AUTO: 262 10E3/UL (ref 150–450)
POTASSIUM BLD-SCNC: 3.4 MMOL/L (ref 3.5–5)
PROT SERPL-MCNC: 7.4 G/DL (ref 6–8)
RBC # BLD AUTO: 4.31 10E6/UL (ref 3.8–5.2)
SODIUM SERPL-SCNC: 140 MMOL/L (ref 136–145)
TROPONIN I SERPL-MCNC: <0.01 NG/ML (ref 0–0.29)
WBC # BLD AUTO: 7.6 10E3/UL (ref 4–11)

## 2022-09-19 PROCEDURE — 85018 HEMOGLOBIN: CPT | Performed by: PHYSICIAN ASSISTANT

## 2022-09-19 PROCEDURE — 84484 ASSAY OF TROPONIN QUANT: CPT | Performed by: PHYSICIAN ASSISTANT

## 2022-09-19 PROCEDURE — 250N000011 HC RX IP 250 OP 636: Performed by: EMERGENCY MEDICINE

## 2022-09-19 PROCEDURE — 82040 ASSAY OF SERUM ALBUMIN: CPT | Performed by: PHYSICIAN ASSISTANT

## 2022-09-19 PROCEDURE — 258N000003 HC RX IP 258 OP 636: Performed by: EMERGENCY MEDICINE

## 2022-09-19 PROCEDURE — 94640 AIRWAY INHALATION TREATMENT: CPT

## 2022-09-19 PROCEDURE — 96375 TX/PRO/DX INJ NEW DRUG ADDON: CPT | Mod: 59

## 2022-09-19 PROCEDURE — 250N000009 HC RX 250: Performed by: PHYSICIAN ASSISTANT

## 2022-09-19 PROCEDURE — 96361 HYDRATE IV INFUSION ADD-ON: CPT

## 2022-09-19 PROCEDURE — 85379 FIBRIN DEGRADATION QUANT: CPT | Performed by: EMERGENCY MEDICINE

## 2022-09-19 PROCEDURE — 80053 COMPREHEN METABOLIC PANEL: CPT | Performed by: PHYSICIAN ASSISTANT

## 2022-09-19 PROCEDURE — 99281 EMR DPT VST MAYX REQ PHY/QHP: CPT

## 2022-09-19 PROCEDURE — 71275 CT ANGIOGRAPHY CHEST: CPT

## 2022-09-19 PROCEDURE — 99285 EMERGENCY DEPT VISIT HI MDM: CPT | Mod: 25

## 2022-09-19 PROCEDURE — 96374 THER/PROPH/DIAG INJ IV PUSH: CPT | Mod: 59

## 2022-09-19 PROCEDURE — 250N000013 HC RX MED GY IP 250 OP 250 PS 637: Performed by: PHYSICIAN ASSISTANT

## 2022-09-19 PROCEDURE — 93005 ELECTROCARDIOGRAM TRACING: CPT | Performed by: PHYSICIAN ASSISTANT

## 2022-09-19 PROCEDURE — 36415 COLL VENOUS BLD VENIPUNCTURE: CPT | Performed by: PHYSICIAN ASSISTANT

## 2022-09-19 RX ORDER — KETOROLAC TROMETHAMINE 15 MG/ML
15 INJECTION, SOLUTION INTRAMUSCULAR; INTRAVENOUS ONCE
Status: DISCONTINUED | OUTPATIENT
Start: 2022-09-19 | End: 2022-09-19 | Stop reason: HOSPADM

## 2022-09-19 RX ORDER — DEXAMETHASONE SODIUM PHOSPHATE 10 MG/ML
6 INJECTION, SOLUTION INTRAMUSCULAR; INTRAVENOUS ONCE
Status: DISCONTINUED | OUTPATIENT
Start: 2022-09-19 | End: 2022-09-19 | Stop reason: HOSPADM

## 2022-09-19 RX ORDER — MORPHINE SULFATE 10 MG/ML
6 INJECTION, SOLUTION INTRAMUSCULAR; INTRAVENOUS
Status: COMPLETED | OUTPATIENT
Start: 2022-09-19 | End: 2022-09-19

## 2022-09-19 RX ORDER — IPRATROPIUM BROMIDE AND ALBUTEROL SULFATE 2.5; .5 MG/3ML; MG/3ML
3 SOLUTION RESPIRATORY (INHALATION) ONCE
Status: COMPLETED | OUTPATIENT
Start: 2022-09-19 | End: 2022-09-19

## 2022-09-19 RX ORDER — SODIUM CHLORIDE 9 MG/ML
INJECTION, SOLUTION INTRAVENOUS CONTINUOUS
Status: DISCONTINUED | OUTPATIENT
Start: 2022-09-19 | End: 2022-09-19 | Stop reason: HOSPADM

## 2022-09-19 RX ORDER — LORAZEPAM 2 MG/ML
1 INJECTION INTRAMUSCULAR ONCE
Status: COMPLETED | OUTPATIENT
Start: 2022-09-19 | End: 2022-09-19

## 2022-09-19 RX ORDER — IOPAMIDOL 755 MG/ML
100 INJECTION, SOLUTION INTRAVASCULAR ONCE
Status: COMPLETED | OUTPATIENT
Start: 2022-09-19 | End: 2022-09-19

## 2022-09-19 RX ORDER — IPRATROPIUM BROMIDE AND ALBUTEROL SULFATE 2.5; .5 MG/3ML; MG/3ML
3 SOLUTION RESPIRATORY (INHALATION) ONCE
Status: DISCONTINUED | OUTPATIENT
Start: 2022-09-19 | End: 2022-09-19 | Stop reason: HOSPADM

## 2022-09-19 RX ORDER — ACETAMINOPHEN 325 MG/1
975 TABLET ORAL ONCE
Status: COMPLETED | OUTPATIENT
Start: 2022-09-19 | End: 2022-09-19

## 2022-09-19 RX ADMIN — IOPAMIDOL 100 ML: 755 INJECTION, SOLUTION INTRAVENOUS at 18:35

## 2022-09-19 RX ADMIN — IPRATROPIUM BROMIDE AND ALBUTEROL SULFATE 3 ML: .5; 3 SOLUTION RESPIRATORY (INHALATION) at 17:05

## 2022-09-19 RX ADMIN — SODIUM CHLORIDE 1000 ML: 9 INJECTION, SOLUTION INTRAVENOUS at 17:41

## 2022-09-19 RX ADMIN — MORPHINE SULFATE 6 MG: 10 INJECTION INTRAVENOUS at 17:56

## 2022-09-19 RX ADMIN — ACETAMINOPHEN 975 MG: 325 TABLET, FILM COATED ORAL at 16:53

## 2022-09-19 RX ADMIN — LORAZEPAM 1 MG: 2 INJECTION INTRAMUSCULAR; INTRAVENOUS at 17:42

## 2022-09-19 ASSESSMENT — ACTIVITIES OF DAILY LIVING (ADL)
ADLS_ACUITY_SCORE: 35
ADLS_ACUITY_SCORE: 35

## 2022-09-19 NOTE — PROGRESS NOTES
Duoneb given. Pt seen on RA with sats at 100. Breath sounds diminished pre and post neb  States she is sob, she has RR of 32, chest pain and sweating.   RT will continue to monitor    Lori Dobbs, RT    
No

## 2022-09-19 NOTE — ED NOTES
Introduced self to patient.  Whiteboard updated.  Plan of care and length of time discussed with patient.  Will continue to monitor. Selena Flood RN.......9/19/2022 5:40 PM

## 2022-09-19 NOTE — ED PROVIDER NOTES
EMERGENCY DEPARTMENT ENCOUNTER      NAME: Darlene Hudson  AGE: 47 year old female  YOB: 1975  MRN: [id number]  EVALUATION DATE & TIME: 2022  4:55 PM    PCP: Robbie Bailon    ED PROVIDER: Bakari Joy M.D.      Chief Complaint   Patient presents with     Covid Concern         FINAL IMPRESSION:  1. Infection due to 2019 novel coronavirus          ED COURSE & MEDICAL DECISION MAKIN:05 PM I met with the patient, obtained history, performed an initial exam, and discussed options and plan for diagnostics and treatment here in the ED. PPE worn: n95 mask, eye protection, gloves   7:11 PM Repeat exam is benign. Discussed findings and discharge with patient. Discussed education profile of medication with pharmacy.    7:32 PM Patient spoke with pharmacy. She would like to consult with her PCP before taking Paxlovid.    Pertinent Labs & Imaging studies reviewed. (See chart for details)  47 year old female presents to the Emergency Department for evaluation of chest pain, shortness of breath. Patient appears non toxic with stable vitals signs, patient is afebrile with no hypoxia or increased work of breathing, did note slight tachycardia at triage but she was regular rate on my exam. Overall exam is benign.  Lungs are clear and abdomen is benign.  Patient endorses burning chest discomfort and shortness of breath, no endorsement of ripping or tearing chest discomfort the back or shoulder, pleurisy, hemoptysis, productive cough.  Does endorse subjective fevers but she is afebrile here.  Again the patient reports that she recently took home test which was positive for COVID.  Suspect all of her symptoms are secondary to COVID, considered but lower suspicion for pneumonia, CHF, ACS, low suspicion for PE, nothing to suggest dissection, esophageal rupture or other more malicious etiology of symptoms.  We will obtain screening labs, ECG and depending on D-dimer either chest x-ray or CT imaging.  Patient  was given DuoNeb, Tylenol, she would also endorsed anxiety and was given Ativan here.    Reassessment: Labs significant for elevated D-dimer, otherwise labs showed no acute concerning findings, troponin is negative and ECG nonischemic.  CT imaging reported no acute concerning findings.  I suspect patient symptoms secondary to COVID, she is not been hypoxic here and vitals reassuring and exam is benign so I feel she is safe for discharge.  Discussed findings and care plan with patient, did discuss possibility of initiating Paxloviddid have the patient meet with our pharmacist, concern for possible medication to medication interaction the patient decided to hold off on Paxlovid at this time we will follow-up with primary care which I feel is reasonable.  Otherwise feel she is safe for discharge with conservative management, provided quarantine recommendations.  Will recommend follow-up primary care in the next 5 to 7 days.  All of her questions were answered and reasons to return discussed.  Patient felt comfortable this plan was discharged in stable condition.    At the conclusion of the encounter I discussed the results of all of the tests and the disposition. The questions were answered and return precautions provided. The patient or family acknowledged understanding and was agreeable with the care plan.         MEDICATIONS GIVEN IN THE EMERGENCY:  Medications   acetaminophen (TYLENOL) tablet 975 mg (975 mg Oral Given 9/19/22 1653)   ipratropium - albuterol 0.5 mg/2.5 mg/3 mL (DUONEB) neb solution 3 mL (3 mLs Nebulization Given 9/19/22 1705)   0.9% sodium chloride BOLUS (0 mLs Intravenous Stopped 9/19/22 1926)   LORazepam (ATIVAN) injection 1 mg (1 mg Intravenous Given 9/19/22 1742)   Morphine Sulfate (PF) injection 6 mg (6 mg Intravenous Given 9/19/22 1756)   iopamidol (ISOVUE-370) solution 100 mL (100 mLs Intravenous Given 9/19/22 1835)       NEW PRESCRIPTIONS STARTED AT TODAY'S ER VISIT  Discharge Medication  "List as of 9/19/2022  7:43 PM               =================================================================    HPI    Patient information was obtained from: Patient     Use of Intrepreter: N/A         Darlene Hudson is a 47 year old female who presents COVID-19 concern.    On Friday (3 days ago), the patient reports developing symptoms of sore throat, headaches, and body aches. On Sunday (1 day ago), the patient took an at home COVID-19 which resulted positive. Since 5:00 AM (~12 hours ago), the patient endorses ongoing, persistent shortness of breath and chest discomfort, prompting her to be present in the ED. She describes the chest discomfort as a \"burning\" sensation. She also reports of associated symptoms of nausea, vomiting, and feeling flushed. She does note having a history of asthma and has been taking at home breathing treatments, but has had no improvement. The patient otherwise denies any change in bowel/bladder habits, black/bloody stools, focal weakness, and any other symptoms or complaints at this time.     Per chart review on [facility], patient is vaccinated and boosted for COVID-19.     Social Hx: Endorses intermittent tobacco and alcohol use.       REVIEW OF SYSTEMS   Constitutional: Positive for flushed. Denies fever, chills  HENT: Positive for sore throat.   Respiratory: Positive for shortness of breath. Denies productive cough   Cardiovascular: Positive for chest pain. Denies palpitations  GI: Positive for nausea and vomiting. Denies abdominal pain or change in bowel or bladder habits   Musculoskeletal: Positive for body aches. Denies any new muscle/joint swelling  Skin:  Denies rash   Neurologic: Positive for headaches. Denies focal weakness  All systems negative except as marked.     PAST MEDICAL HISTORY:  Past Medical History:   Diagnosis Date     Agoraphobia with panic attacks      Anxiety      Anxiety      Arthritis     of back     Asthma      Asthma in adult, moderate persistent, " "uncomplicated      Chronic pain      Chronic sinusitis      Cocaine abuse (H)      Controlled substance agreement signed 06/30/2015    Overview:  Patient has chronic pain and is seen at [facility] for this.  Has controlled substance agreement with them.  On Vicodin, Valium, Klonopin prescribed only from there.        Depression      Hx of seasonal allergies      Infectious pericarditis      Lipoma      Tobacco abuse        PAST SURGICAL HISTORY:  Past Surgical History:   Procedure Laterality Date     ANKLE SURGERY Right 05/30/2019     BIOPSY BREAST Right 1990    benign     CREATION PERICARDIAL WINDOW  02/10/2017    [facility]     DILATION AND CURETTAGE N/A 7/22/2022    Procedure: DILATION AND CURETTAGE, UTERUS;  Surgeon: Lesly Holalnd;  Location: [facility]     HEMORRHOIDECTOMY EXTERNAL       HYSTEROSCOPY, WITH ENDOMETRIAL RADIOFREQUENCY ABLATION - NOVASURE N/A 7/22/2022    Procedure: HYSTEROSCOPY, WITH ENDOMETRIAL RADIOFREQUENCY ABLATION - NOVASURE DILATION AND CURETTAGE, UTERUS;  Surgeon: Lesly Holland;  Location: [facility]     TUMOR REMOVAL      Has had 3. In the right breast and \"inside of rib cage.\"         CURRENT MEDICATIONS:    Prior to Admission medications    Medication Sig Start Date End Date Taking? Authorizing Provider   acetaminophen (TYLENOL) 325 MG tablet Take 3 tablets (975 mg) by mouth every 6 hours as needed for mild pain 7/22/22   Lesly Holland MD   ALPRAZolam (XANAX) 2 MG tablet Take 1 tablet (2 mg) by mouth 2 times daily as needed for anxiety 7/1/22   Robbie Bailon MD   cetirizine (ZYRTEC) 10 MG tablet Take 1 tablet (10 mg) by mouth daily 3/16/22   Anaid Vogel MD   fluticasone (FLONASE) 50 MCG/ACT nasal spray Spray 2 sprays into both nostrils daily 3/3/22   Robbie Bailon MD   fluticasone-salmeterol (ADVAIR) 500-50 MCG/DOSE inhaler Inhale 1 puff into the lungs every 12 hours 3/15/22   Latha Irwin MD   ibuprofen (ADVIL/MOTRIN) 600 MG tablet Take " "1 tablet (600 mg) by mouth 3 times daily as needed for moderate pain 7/30/21   Robbie Bailon MD   ibuprofen (ADVIL/MOTRIN) 800 MG tablet Take 1 tablet (800 mg) by mouth every 6 hours as needed for other (mild and/or inflammatory pain) 7/22/22   Lesly Holland MD   methylPREDNISolone (MEDROL DOSEPAK) 4 MG tablet therapy pack Follow Package Directions 8/24/22   Robbie Bailon MD   montelukast (SINGULAIR) 10 MG tablet Take 1 tablet (10 mg) by mouth At Bedtime 3/16/22   Anaid Vogel MD   oxyCODONE-acetaminophen (PERCOCET) 5-325 MG tablet Take 1 tablet by mouth daily as needed for breakthrough pain or severe pain 9/7/22   Robbie Bailon MD   PROAIR  (90 Base) MCG/ACT inhaler Inhale 2 puffs into the lungs every 4 hours as needed for shortness of breath / dyspnea or wheezing 9/7/22   Robbie Bailon MD   spacer (OPTICHAMBER BINA) holding chamber For use w/ rescue inhaler 8/6/20   Robbie Bailon MD   tiotropium (SPIRIVA RESPIMAT) 1.25 MCG/ACT inhaler Inhale 2 puffs into the lungs daily 3/28/22   Robbie Bailon MD   tiotropium (SPIRIVA RESPIMAT) 2.5 MCG/ACT inhaler Inhale 2 puffs into the lungs daily 8/21/22   Arnaud Garcia   triamcinolone (KENALOG) 0.1 % external cream Apply topically 2 times daily 6/15/22   Robbie Bailon MD        ALLERGIES:  Allergies   Allergen Reactions     Lidocaine Other (See Comments)     \"my jaw stopped moving\"  Other reaction(s): Dystonia     Penicillins Hives, Rash and Shortness Of Breath     Epinephrine-Lidocaine-Na Metabisulfite Other (See Comments) and Muscle Pain (Myalgia)     Severe jaw cramping, double vision  Jaw locking       FAMILY HISTORY:  Family History   Problem Relation Age of Onset     Hypertension Mother      Cancer Mother         cervical      Other Cancer Father         lung cancer     Asthma Father      Diabetes Brother      Diabetes Brother      Breast Cancer Maternal Grandmother      Asthma Child        SOCIAL HISTORY:   Social History     Socioeconomic " "History     Marital status:    Tobacco Use     Smoking status: Light Tobacco Smoker     Packs/day: 0.10     Years: 30.00     Pack years: 3.00     Types: Cigarettes     Last attempt to quit: 2020     Years since quittin.3     Smokeless tobacco: Current User     Tobacco comment: 2-3 cig/day   Vaping Use     Vaping Use: Some days   Substance and Sexual Activity     Alcohol use: Not Currently     Comment: Occasiaonlly.      Drug use: Not Currently     Sexual activity: Yes     Partners: Male       VITALS:  Patient Vitals for the past 24 hrs:   BP Temp Temp src Pulse Resp SpO2 Height Weight   22 1925 104/60 -- -- 68 20 98 % -- --   22 1800 (!) 126/91 -- -- 85 22 93 % -- --   22 1745 123/80 -- -- 85 30 97 % -- --   22 1730 -- -- -- 88 30 99 % -- --   22 1715 -- -- -- 96 30 100 % -- --   22 1643 (!) 123/103 98.3  F (36.8  C) Oral 105 18 100 % 1.753 m (5' 9\") 99.8 kg (220 lb)        PHYSICAL EXAM    Constitutional:  Awake, alert, in no apparent distress  HENT:  Normocephalic, Atraumatic. Bilateral external ears normal. Oropharynx moist. Nose normal. Neck- Normal range of motion with no guarding, No midline cervical tenderness, Supple, No stridor.   Eyes:  PERRL, EOMI with no signs of entrapment, Conjunctiva normal, No discharge.   Respiratory:  Normal breath sounds, No respiratory distress, No wheezing.    Cardiovascular:  Normal heart rate, Normal rhythm, No appreciable rubs or gallops.   GI:  Soft, No tenderness, No distension, No palpable masses  Musculoskeletal:  Intact distal pulses, No edema. Good range of motion in all major joints. No tenderness to palpation or major deformities noted.  Integument:  Warm, Dry, No erythema, No rash.   Neurologic:  Alert & oriented, Normal motor function, Normal sensory function, No focal deficits noted.   Psychiatric: Appears anxious.     LAB:  All pertinent labs reviewed and interpreted.  Results for orders placed or performed " during the hospital encounter of 09/19/22   CT Chest Pulmonary Embolism w Contrast    Impression    IMPRESSION:  1.  No pulmonary embolism.    2.  Small esophageal hiatal hernia.   Comprehensive metabolic panel   Result Value Ref Range    Sodium 140 136 - 145 mmol/L    Potassium 3.4 (L) 3.5 - 5.0 mmol/L    Chloride 105 98 - 107 mmol/L    Carbon Dioxide (CO2) 24 22 - 31 mmol/L    Anion Gap 11 5 - 18 mmol/L    Urea Nitrogen 12 8 - 22 mg/dL    Creatinine 0.85 0.60 - 1.10 mg/dL    Calcium 9.6 8.5 - 10.5 mg/dL    Glucose 95 70 - 125 mg/dL    Alkaline Phosphatase 95 45 - 120 U/L    AST 16 0 - 40 U/L    ALT 16 0 - 45 U/L    Protein Total 7.4 6.0 - 8.0 g/dL    Albumin 3.4 (L) 3.5 - 5.0 g/dL    Bilirubin Total 0.4 0.0 - 1.0 mg/dL    GFR Estimate 84 >60 mL/min/1.73m2   Result Value Ref Range    Troponin I <0.01 0.00 - 0.29 ng/mL   D dimer quantitative   Result Value Ref Range    D-Dimer Quantitative 0.69 (H) 0.00 - 0.50 ug/mL FEU   CBC with platelets and differential   Result Value Ref Range    WBC Count 7.6 4.0 - 11.0 10e3/uL    RBC Count 4.31 3.80 - 5.20 10e6/uL    Hemoglobin 13.1 11.7 - 15.7 g/dL    Hematocrit 39.3 35.0 - 47.0 %    MCV 91 78 - 100 fL    MCH 30.4 26.5 - 33.0 pg    MCHC 33.3 31.5 - 36.5 g/dL    RDW 12.7 10.0 - 15.0 %    Platelet Count 262 150 - 450 10e3/uL    % Neutrophils 48 %    % Lymphocytes 36 %    % Monocytes 8 %    % Eosinophils 7 %    % Basophils 1 %    % Immature Granulocytes 0 %    NRBCs per 100 WBC 0 <1 /100    Absolute Neutrophils 3.7 1.6 - 8.3 10e3/uL    Absolute Lymphocytes 2.7 0.8 - 5.3 10e3/uL    Absolute Monocytes 0.6 0.0 - 1.3 10e3/uL    Absolute Eosinophils 0.5 0.0 - 0.7 10e3/uL    Absolute Basophils 0.0 0.0 - 0.2 10e3/uL    Absolute Immature Granulocytes 0.0 <=0.4 10e3/uL    Absolute NRBCs 0.0 10e3/uL   Extra Red Top Tube   Result Value Ref Range    Hold Specimen [facility]        RADIOLOGY:  CT Chest Pulmonary Embolism w Contrast   Final Result   IMPRESSION:   1.  No pulmonary embolism.      2.   Small esophageal hiatal hernia.             EKG:    Sinus rhythm, no specific ST acute ischemic changes, no concerning dysrhythmias or interval punctation, compared to ECG of January 11, 2022, no specific ST acute ischemic change appreciated  I have independently reviewed and interpreted the EKG(s) documented above.    PROCEDURES:       I, Silviano Doll, am serving as a scribe to document services personally performed by Bakari Joy MD, based on my observation and the provider's statements to me. I, Bakari Joy MD attest that Silviano Doll is acting in a scribe capacity, has observed my performance of the services and has documented them in accordance with my direction.    Bakari Joy M.D.  Emergency Medicine  [facility] EMERGENCY ROOM  [address] [address] [address]  [phone]  Dept: [phone]     Bakari Joy MD  09/20/22 0014

## 2022-09-19 NOTE — ED TRIAGE NOTES
Pt arrives to ED with c/o covid symptoms with the biggest concerns being shortness of breath and chest pain. Pt tested positive for covid yesterday morning.  Pt has hx of asthma. States she is having to take her inhaler more times than usual and still having shortness of breath. Pt does not know where her nebulizer is. Took ibuprofen today.      Triage Assessment     Row Name 09/19/22 1691       Triage Assessment (Adult)    Airway WDL WDL       Respiratory WDL    Respiratory WDL rhythm/pattern    Rhythm/Pattern, Respiratory shortness of breath       Skin Circulation/Temperature WDL    Skin Circulation/Temperature WDL WDL       Cardiac WDL    Cardiac WDL chest pain       Chest Pain Assessment    Chest Pain Location midsternal       Peripheral/Neurovascular WDL    Peripheral Neurovascular WDL WDL       Cognitive/Neuro/Behavioral WDL    Cognitive/Neuro/Behavioral WDL WDL

## 2022-09-19 NOTE — ED NOTES
"ED Triage Provider Note  [facility]  Encounter Date: Sep 19, 2022      The patient was seen in triage to expedite ED workup.     History:  Chief Complaint   Patient presents with     Shortness of Breath     Covid Concern     Darlene Hudson is a 47 year old female with history of asthma presenting for evaluation of shortness of breath. Had positive home COVID-19 test yesterday; 3 days of symptoms. Worsened shortness of breath & wheezing. Dry cough. No leg pain/swelling. No pleuritic pain.    Review of Systems:  - Positive for shortness of breath, wheezing, cough, COVID+  - Negative for chest pain, nausea, vomiting, leg pain/swelling    Vitals:  /76   Pulse 105   Temp 97.7  F (36.5  C) (Oral)   Resp 18   Ht 1.753 m (5' 9\")   Wt 99.8 kg (220 lb)   SpO2 99%   BMI 32.49 kg/m      Brief Exam:  Anxious affect, no respiratory distress, mild wheezing to lungs, no stridor, normal phonation, no peripheral edema or calf tenderness, steady unaccompanied gait    Medical Decision Making:  Patient arriving to the ED with problem as above. A medical screening exam was performed. Orders initiated from triage to expedite ED workup.    Asthma appears to be exacerbating from COVID-19. Will thus give steroids & Duoneb while obtaining CXR, blood.    The patient is appropriate to wait in triage until ED room available.      Orders Placed This Encounter   Procedures     XR Chest 2 Views     Basic metabolic panel     Hepatic function panel     Magnesium     CRP inflammation     Procalcitonin     ECG 12-LEAD WITH MUSE (LHE)     Peripheral IV catheter     CBC with platelets differential           Jose L Rodriguez MD  09/19/22  Emergency Medicine  [facility] EMERGENCY DEPARTMENT  [address] [place] [id number] [phone]  Dept: [phone]     Jose L Rodriguez MD  09/19/22 1514    "

## 2022-09-19 NOTE — PROGRESS NOTES
Start time: 1:01PM  Stop time: 1:20PM   Duration of video chat: 19 minutes  Provider location: [facility]  Patient location: Home  Mode of transmission: Video Visit via [employer].    ASSESSMENT/PLAN:    (J45.51) Severe persistent asthma with acute exacerbation  (primary encounter diagnosis)  Comment: likely exacerbation of asthma due to current viral illness; pred taper; f/u in 2 wks  Plan: predniSONE (DELTASONE) 20 MG tablet, oxyCODONE (ROXICODONE) 5 MG tablet          (U07.1) Infection due to 2019 novel coronavirus  Comment: see above; precautions given   Plan: ondansetron (ZOFRAN ODT) 4 MG ODT tab          Robbie Bailon MD  September 21, 2022  1:28 PM      Pt is a 47 year old female last seen on 9/7/22 evaluated via virtual visit for:     COVID - started getting sick 5 days ago  Wheezing off and on today   +chills, fevers  Feels dehydrated -> nausea   Unable to get Zofran filled because she is restricted       Per ED note on 9/20/2022 -  FINAL IMPRESSION:  COVID        ED COURSE & MEDICAL DECISION MAKING:    Pertinent Labs & Imaging studies reviewed. (See chart for details)  47 year old female presents to the Emergency Department for evaluation of worsening symptoms after testing positive for COVID yesterday.  Patient reports of burning discomfort in her chest and difficulty catching her breath.  Also states she has not been able to eat much secondary nausea and may have fainted.  Does indicate striking her head.  Paxil been recommended yesterday however patient concerned about interaction with Advair and is awaiting consultation with her primary care physician tomorrow.  On exam patient is a mildly obese female in moderate distress.  Vital signs remarkable for slight tachycardia and tachypnea.  However lungs clear.  Cardiac exam rapid but otherwise unremarkable.  Patient was exercised in the triage area.  Pulse ox remained excellent at 97%.  We will proceed with symptomatic relief with intravenous fluids,  Zofran, Pepcid and Toradol.  We will also consult with pharmacy regarding Paxil event interaction.. Patient appears non toxic with stable vitals signs.    Per my last note:  (M25.801) Ankle impingement syndrome, right  (primary encounter diagnosis)  Comment:   Plan: awaiting workers comp approval to proceed w/ surgery; paperwork for work and disability insurance filled today     (Q35.622P) Contusion of right wrist, subsequent encounter  Comment: hand surgery 2nd opinion pending on 9/13/22  Plan: oxyCODONE-acetaminophen (PERCOCET) 5-325 MG tablet          (J45.40) Moderate persistent asthma without complication  Comment: refilled  Plan: PROAIR  (90 Base) MCG/ACT inhaler    Past Medical History:   Diagnosis Date     Agoraphobia with panic attacks      Anxiety      Anxiety      Arthritis     of back     Asthma      Asthma in adult, moderate persistent, uncomplicated      Chronic pain      Chronic sinusitis      Cocaine abuse (H)      Controlled substance agreement signed 06/30/2015    Overview:  Patient has chronic pain and is seen at [facility] for this.  Has controlled substance agreement with them.  On Vicodin, Valium, Klonopin prescribed only from there.        Depression      Hx of seasonal allergies      Infectious pericarditis      Lipoma      Tobacco abuse       Past Surgical History:   Procedure Laterality Date     ANKLE SURGERY Right 05/30/2019     BIOPSY BREAST Right 1990    benign     CREATION PERICARDIAL WINDOW  02/10/2017    [facility]     DILATION AND CURETTAGE N/A 7/22/2022    Procedure: DILATION AND CURETTAGE, UTERUS;  Surgeon: Lesly Holland;  Location: [facility]     HEMORRHOIDECTOMY EXTERNAL       HYSTEROSCOPY, WITH ENDOMETRIAL RADIOFREQUENCY ABLATION - NOVASURE N/A 7/22/2022    Procedure: HYSTEROSCOPY, WITH ENDOMETRIAL RADIOFREQUENCY ABLATION - NOVASURE DILATION AND CURETTAGE, UTERUS;  Surgeon: Lesly Holland;  Location: [facility] OR     TUMOR REMOVAL      Has had 3.  "In the right breast and \"inside of rib cage.\"      Current Outpatient Medications   Medication Sig Dispense Refill     ondansetron (ZOFRAN ODT) 4 MG ODT tab Take 1 tablet (4 mg) by mouth every 8 hours as needed for nausea 10 tablet 1     oxyCODONE (ROXICODONE) 5 MG tablet Take 1 tablet (5 mg) by mouth 2 times daily as needed for severe pain (7-10) 28 tablet 0     predniSONE (DELTASONE) 20 MG tablet Take 3 tablets (60 mg) by mouth daily for 5 days, THEN 2 tablets (40 mg) daily for 5 days, THEN 1 tablet (20 mg) daily for 5 days. 30 tablet 0     acetaminophen (TYLENOL) 325 MG tablet Take 3 tablets (975 mg) by mouth every 6 hours as needed for mild pain 50 tablet 0     ALPRAZolam (XANAX) 2 MG tablet Take 1 tablet (2 mg) by mouth 2 times daily as needed for anxiety 60 tablet 3     cetirizine (ZYRTEC) 10 MG tablet Take 1 tablet (10 mg) by mouth daily 30 tablet 11     famotidine (PEPCID) 20 MG tablet Take 1 tablet (20 mg) by mouth 2 times daily 40 tablet 0     fluticasone (FLONASE) 50 MCG/ACT nasal spray Spray 2 sprays into both nostrils daily 9.9 mL 11     fluticasone-salmeterol (ADVAIR) 500-50 MCG/DOSE inhaler Inhale 1 puff into the lungs every 12 hours 60 each 11     ibuprofen (ADVIL/MOTRIN) 600 MG tablet Take 1 tablet (600 mg) by mouth 3 times daily as needed for moderate pain 90 tablet 4     ibuprofen (ADVIL/MOTRIN) 800 MG tablet Take 1 tablet (800 mg) by mouth every 6 hours as needed for other (mild and/or inflammatory pain) 30 tablet 0     methylPREDNISolone (MEDROL DOSEPAK) 4 MG tablet therapy pack Follow Package Directions 21 tablet 0     montelukast (SINGULAIR) 10 MG tablet Take 1 tablet (10 mg) by mouth At Bedtime 30 tablet 11     oxyCODONE-acetaminophen (PERCOCET) 5-325 MG tablet Take 1 tablet by mouth daily as needed for breakthrough pain or severe pain 30 tablet 0     predniSONE (DELTASONE) 20 MG tablet Take two tablets (= 40mg) each day for 5 (five) days 10 tablet 0     PROAIR  (90 Base) MCG/ACT " "inhaler Inhale 2 puffs into the lungs every 4 hours as needed for shortness of breath / dyspnea or wheezing 8 g 11     spacer (OPTICHAMBER BINA) holding chamber For use w/ rescue inhaler 1 each 0     tiotropium (SPIRIVA RESPIMAT) 1.25 MCG/ACT inhaler Inhale 2 puffs into the lungs daily 12 g 3     tiotropium (SPIRIVA RESPIMAT) 2.5 MCG/ACT inhaler Inhale 2 puffs into the lungs daily 4 g 4     triamcinolone (KENALOG) 0.1 % external cream Apply topically 2 times daily 100 g 1      Allergies   Allergen Reactions     Lidocaine Other (See Comments)     \"my jaw stopped moving\"  Other reaction(s): Dystonia     Penicillins Hives, Rash and Shortness Of Breath     Epinephrine-Lidocaine-Na Metabisulfite Other (See Comments) and Muscle Pain (Myalgia)     Severe jaw cramping, double vision  Jaw locking        ROS:   Gen- +fevers/chills   ENT- + cough, + URI symptoms   Cardiac - no palpitations, no chest pain   Respiratory - + shortness of breath , + wheezing   GI - + nausea, no vomiting, no diarrhea, no constipation   Remainder of ROS negative.     Exam:   BP (!) 165/90    Alert and oriented x 3; + mild acute distress   Resp: No inc work of breathing or conversational dyspnea noted  Neuro: no focal deficits   Derm: no rashes       "

## 2022-09-19 NOTE — ED TRIAGE NOTES
Pt started feeling sick on Friday night. Pt tested positive for COVID yesterday. Pt has sore throat, headache, cough, and SOB. Pt has a history of Asthma.      Triage Assessment     Row Name 09/19/22 1503       Triage Assessment (Adult)    Airway WDL WDL       Respiratory WDL    Respiratory WDL X;rhythm/pattern;cough       Skin Circulation/Temperature WDL    Skin Circulation/Temperature WDL WDL       Cardiac WDL    Cardiac WDL WDL       Peripheral/Neurovascular WDL    Peripheral Neurovascular WDL WDL       Cognitive/Neuro/Behavioral WDL    Cognitive/Neuro/Behavioral WDL WDL

## 2022-09-20 ENCOUNTER — APPOINTMENT (OUTPATIENT)
Dept: RADIOLOGY | Facility: HOSPITAL | Age: 47
End: 2022-09-20
Attending: EMERGENCY MEDICINE
Payer: MEDICAID

## 2022-09-20 ENCOUNTER — HOSPITAL ENCOUNTER (EMERGENCY)
Facility: HOSPITAL | Age: 47
Discharge: HOME | End: 2022-09-20
Attending: EMERGENCY MEDICINE | Admitting: EMERGENCY MEDICINE
Payer: MEDICAID

## 2022-09-20 ENCOUNTER — PATIENT OUTREACH (OUTPATIENT)
Dept: CARE COORDINATION | Facility: CLINIC | Age: 47
End: 2022-09-20

## 2022-09-20 ENCOUNTER — TELEPHONE (OUTPATIENT)
Dept: FAMILY MEDICINE | Facility: CLINIC | Age: 47
End: 2022-09-20

## 2022-09-20 VITALS
DIASTOLIC BLOOD PRESSURE: 99 MMHG | BODY MASS INDEX: 32.49 KG/M2 | SYSTOLIC BLOOD PRESSURE: 142 MMHG | HEART RATE: 80 BPM | RESPIRATION RATE: 21 BRPM | WEIGHT: 220 LBS | TEMPERATURE: 97.4 F | OXYGEN SATURATION: 98 %

## 2022-09-20 DIAGNOSIS — U07.1 INFECTION DUE TO 2019 NOVEL CORONAVIRUS: ICD-10-CM

## 2022-09-20 LAB
ATRIAL RATE - MUSE: 94 BPM
DIASTOLIC BLOOD PRESSURE - MUSE: NORMAL MMHG
INTERPRETATION ECG - MUSE: NORMAL
P AXIS - MUSE: 57 DEGREES
PR INTERVAL - MUSE: 158 MS
QRS DURATION - MUSE: 76 MS
QT - MUSE: 356 MS
QTC - MUSE: 445 MS
R AXIS - MUSE: 31 DEGREES
SYSTOLIC BLOOD PRESSURE - MUSE: NORMAL MMHG
T AXIS - MUSE: 13 DEGREES
VENTRICULAR RATE- MUSE: 94 BPM

## 2022-09-20 PROCEDURE — 96374 THER/PROPH/DIAG INJ IV PUSH: CPT

## 2022-09-20 PROCEDURE — 96375 TX/PRO/DX INJ NEW DRUG ADDON: CPT

## 2022-09-20 PROCEDURE — 93005 ELECTROCARDIOGRAM TRACING: CPT | Performed by: EMERGENCY MEDICINE

## 2022-09-20 PROCEDURE — 999N000127 HC STATISTIC PERIPHERAL IV START W US GUIDANCE

## 2022-09-20 PROCEDURE — 250N000011 HC RX IP 250 OP 636: Performed by: EMERGENCY MEDICINE

## 2022-09-20 PROCEDURE — 71046 X-RAY EXAM CHEST 2 VIEWS: CPT

## 2022-09-20 PROCEDURE — 94640 AIRWAY INHALATION TREATMENT: CPT

## 2022-09-20 PROCEDURE — 258N000003 HC RX IP 258 OP 636: Performed by: EMERGENCY MEDICINE

## 2022-09-20 PROCEDURE — 96361 HYDRATE IV INFUSION ADD-ON: CPT

## 2022-09-20 PROCEDURE — 250N000009 HC RX 250: Performed by: EMERGENCY MEDICINE

## 2022-09-20 PROCEDURE — C9803 HOPD COVID-19 SPEC COLLECT: HCPCS

## 2022-09-20 PROCEDURE — 99284 EMERGENCY DEPT VISIT MOD MDM: CPT | Mod: 25

## 2022-09-20 RX ORDER — IPRATROPIUM BROMIDE AND ALBUTEROL SULFATE 2.5; .5 MG/3ML; MG/3ML
3 SOLUTION RESPIRATORY (INHALATION) ONCE
Status: COMPLETED | OUTPATIENT
Start: 2022-09-20 | End: 2022-09-20

## 2022-09-20 RX ORDER — KETOROLAC TROMETHAMINE 15 MG/ML
15 INJECTION, SOLUTION INTRAMUSCULAR; INTRAVENOUS ONCE
Status: COMPLETED | OUTPATIENT
Start: 2022-09-20 | End: 2022-09-20

## 2022-09-20 RX ORDER — ONDANSETRON 2 MG/ML
4 INJECTION INTRAMUSCULAR; INTRAVENOUS ONCE
Status: COMPLETED | OUTPATIENT
Start: 2022-09-20 | End: 2022-09-20

## 2022-09-20 RX ORDER — PREDNISONE 20 MG/1
TABLET ORAL
Qty: 10 TABLET | Refills: 0 | Status: SHIPPED | OUTPATIENT
Start: 2022-09-20 | End: 2022-12-28

## 2022-09-20 RX ORDER — FAMOTIDINE 20 MG/1
20 TABLET, FILM COATED ORAL 2 TIMES DAILY
Qty: 40 TABLET | Refills: 0 | Status: SHIPPED | OUTPATIENT
Start: 2022-09-20 | End: 2022-10-28

## 2022-09-20 RX ORDER — SODIUM CHLORIDE 9 MG/ML
INJECTION, SOLUTION INTRAVENOUS CONTINUOUS
Status: DISCONTINUED | OUTPATIENT
Start: 2022-09-20 | End: 2022-09-21 | Stop reason: HOSPADM

## 2022-09-20 RX ORDER — ONDANSETRON 4 MG/1
4 TABLET, ORALLY DISINTEGRATING ORAL EVERY 8 HOURS PRN
Qty: 10 TABLET | Refills: 0 | Status: SHIPPED | OUTPATIENT
Start: 2022-09-20 | End: 2022-09-21

## 2022-09-20 RX ADMIN — IPRATROPIUM BROMIDE AND ALBUTEROL SULFATE 3 ML: 2.5; .5 SOLUTION RESPIRATORY (INHALATION) at 22:39

## 2022-09-20 RX ADMIN — FAMOTIDINE 20 MG: 10 INJECTION, SOLUTION INTRAVENOUS at 22:38

## 2022-09-20 RX ADMIN — ONDANSETRON 4 MG: 2 INJECTION INTRAMUSCULAR; INTRAVENOUS at 22:38

## 2022-09-20 RX ADMIN — KETOROLAC TROMETHAMINE 15 MG: 15 INJECTION, SOLUTION INTRAMUSCULAR; INTRAVENOUS at 22:38

## 2022-09-20 RX ADMIN — SODIUM CHLORIDE 1000 ML: 9 INJECTION, SOLUTION INTRAVENOUS at 22:39

## 2022-09-20 ASSESSMENT — ACTIVITIES OF DAILY LIVING (ADL)
ADLS_ACUITY_SCORE: 35
ADLS_ACUITY_SCORE: 35

## 2022-09-20 NOTE — ED NOTES
Discussed discharge instructions and given instructions on home pulse oximeter. Pt understands and offers no questions at this time.

## 2022-09-20 NOTE — PROGRESS NOTES
Clinic Care Coordination Contact    Follow Up Progress Note      Assessment:  The pt was recently in the ED, I called to check up on the pt and help the pt setup a ED follow up. The pt was at [facility] for COVID concern. I called and talked to the pt, pt stated that she still the same. Pt stated that she was tested positive for COVID and is just in quarantine. Pt stated that she has a video visit with  tomorrow at 1:00pm.     Care Gaps:    Health Maintenance Due   Topic Date Due     NICOTINE/TOBACCO CESSATION COUNSELING Q 1 YR  Never done     PREVENTIVE CARE VISIT  Never done     ADVANCE CARE PLANNING  Never done     DEPRESSION ACTION PLAN  Never done     COLORECTAL CANCER SCREENING  Never done     HIV SCREENING  Never done     HEPATITIS C SCREENING  Never done     HEPATITIS B IMMUNIZATION (1 of 3 - Risk 3-dose series) Never done     Pneumococcal Vaccine: Pediatrics (0 to 5 Years) and At-Risk Patients (6 to 64 Years) (2 - PCV) 10/15/2015     MAMMO SCREENING  09/29/2017     ASTHMA ACTION PLAN  01/31/2021     SPIROMETRY  03/03/2021     COVID-19 Vaccine (4 - Booster for Moderna series) 04/13/2022     INFLUENZA VACCINE (1) 09/01/2022           Care Plans      Intervention/Education provided during outreach:               Plan:     Care Coordinator will follow up in

## 2022-09-20 NOTE — TELEPHONE ENCOUNTER
Writer received call from patient. Would like to know when appropriate to return back to ED if oxygen level on room air is 90 or less. Has pulse oximeter at home given by [facility]. O2 sats has been 90% but corrected with coughing to 96%. Discussed with Karoline as also per ED, if unable to correct oxygen level to above 90% (with coughing and deep breathing exercises) and consistently 90 or less or if working hard to breathe, should return back to ED. In the meantime, encourage deep breathing as tolerated, this may also trigger coughing episodes.    Karoline verbalized understanding and will plan to attend virtual appointment with Dr Bailon tomorrow for further discussion regarding covid treatment. Pia VÁSQUEZ

## 2022-09-20 NOTE — ED NOTES
ED Provider In Triage Note  [facility]  Encounter Date: Sep 20, 2022    Chief Complaint   Patient presents with     Shortness of Breath       Brief HPI:   Darlene Hudson is a 47 year old female presenting to the Emergency Department with a chief complaint of dyspnea for 5 days.  She had a positive home covid test.  Hx of asthma.  O2 sats have been as low as 88%.  Was seen at [facility] recently for the same.  Also complains of dizziness.    Brief Physical Exam:  BP (!) 142/99   Pulse 100   Temp 97.4  F (36.3  C) (Oral)   Resp (!) 35   Wt 99.8 kg (220 lb)   SpO2 100%   BMI 32.49 kg/m    General: Non-toxic appearing  HEENT: Atraumatic  Resp: No respiratory distress  Abdomen: Non-peritoneal  Neuro: Alert, oriented, answers questions appropriately  Psych: Behavior appropriate      Plan Initiated in Triage:  Orders Placed This Encounter     XR Chest 2 Views     ipratropium - albuterol 0.5 mg/2.5 mg/3 mL (DUONEB) neb solution 3 mL           PIT Dispo:   Return to [facility] while awaiting workup and ED bed availability    Brandon Hernandez MD on 9/20/2022 at 6:40 PM    Patient was evaluated by the Physician in Triage due to a limitation of available rooms in the Emergency Department. A plan of care was discussed based on the information obtained on the initial evaluation and patient was consuled to return back to the Emergency Department lob after this initial evalutaiton until results were obtained or a room became available in the Emergency Department. Patient was counseled not to leave prior to receiving the results of their workup.     Brandon Hernandez MD  [facility] EMERGENCY DEPARTMENT  [address] [place] [id number]  [phone]     Brandon Hernandez MD  09/20/22 2755

## 2022-09-20 NOTE — TELEPHONE ENCOUNTER
Note responding to after hours clinic page    Patient called the care center regarding the following symptoms: COVID positive, dizzy, sweating, oxygen saturation of 92-97, shortness of breath    COVID positive: tested positive on 9/19/22. She went to the emergency room yesterday with fever, headache, and chest pain. Respiratory performed some treatments, and she had a chest CT which did not show any acute findings. She feels worse than she did at that time, and is wondering if she needs to go back to the emergency room.    Shortness of breath: oxygen saturations between 88-97 with home oximeter.   Cough: ongoing cough   Chest pain: tightness; she notes that it hurts to take a deep breath, but notes that this discomfort has not changed since her emergency room visit yesterday. She notes that during her periods of hypoxia she has become tachycardic to the 130's-140's  Fevers: had a fever of 101.9 at home, and has been sweating profusely  Dizzy: has been getting worse. She did stumble due to dizziness today and hit her head against the wall.  Eating/drinking: has been drinking mild amounts, but threw up most liquids  Taking any medications: ibuprofen, tylenol   Has an appointment 1:00 PM in clinic tomorrow with her primary provider, Dr. Bailon  Medical conditions: Underlying asthma; has been using her inhalers more than normal.     Physical exam not possible due to phone visit, but patient was coughing throughout the interview, and does sound short of breath over the phone.     Assessment and Plan   I recommended to the patient that she return to the emergency room. She had a relatively normal work up yesterday, but based on her home labs I am concerned that she is becoming hypoxic and tachycardic with concern for dehydration and/or worsening of her COVID. She does have a virtual appointment tomorrow with her PCP, Dr. Bailon, but I am concerned that she might need additional treatment prior to that visit. Patient was  hesitant to return to the emergency room due to the long wait that she experienced last time. I encouraged her to continue to intermittently check her vitals, and if her oxygen levels drop below 90 or she continues to sustain tachycardia that she needs to return, to which she agreed. She states that she will think about coming to the emergency room, and will definitely come if she worsens. I again stated that my recommendation would be for her to be evaluated at the emergency room.     Samara Amaro MD

## 2022-09-20 NOTE — ED TRIAGE NOTES
5 days of covid like symptoms, including sob, cough, sweating, dizziness with positive home test; seen at  last evening and her breathing feels worse today.     Triage Assessment     Row Name 09/20/22 [id number]       Triage Assessment (Adult)    Airway WDL WDL       Respiratory WDL    Respiratory WDL X  sob/hyperventilating/hurts to breath and cough       Skin Circulation/Temperature WDL    Skin Circulation/Temperature WDL X  sweaty       Cardiac WDL    Cardiac WDL X  dizzy/tachycardia       Peripheral/Neurovascular WDL    Peripheral Neurovascular WDL WDL       Cognitive/Neuro/Behavioral WDL    Cognitive/Neuro/Behavioral WDL WDL

## 2022-09-21 ENCOUNTER — VIRTUAL VISIT (OUTPATIENT)
Dept: FAMILY MEDICINE | Facility: CLINIC | Age: 47
End: 2022-09-21
Payer: COMMERCIAL

## 2022-09-21 VITALS — SYSTOLIC BLOOD PRESSURE: 165 MMHG | DIASTOLIC BLOOD PRESSURE: 90 MMHG

## 2022-09-21 DIAGNOSIS — J45.51 SEVERE PERSISTENT ASTHMA WITH ACUTE EXACERBATION (H): Primary | ICD-10-CM

## 2022-09-21 DIAGNOSIS — U07.1 INFECTION DUE TO 2019 NOVEL CORONAVIRUS: ICD-10-CM

## 2022-09-21 LAB
ATRIAL RATE - MUSE: 69 BPM
DIASTOLIC BLOOD PRESSURE - MUSE: NORMAL MMHG
INTERPRETATION ECG - MUSE: NORMAL
P AXIS - MUSE: 39 DEGREES
PR INTERVAL - MUSE: 236 MS
QRS DURATION - MUSE: 86 MS
QT - MUSE: 402 MS
QTC - MUSE: 430 MS
R AXIS - MUSE: 51 DEGREES
SYSTOLIC BLOOD PRESSURE - MUSE: NORMAL MMHG
T AXIS - MUSE: 36 DEGREES
VENTRICULAR RATE- MUSE: 69 BPM

## 2022-09-21 PROCEDURE — 99214 OFFICE O/P EST MOD 30 MIN: CPT | Mod: 95 | Performed by: FAMILY MEDICINE

## 2022-09-21 RX ORDER — PREDNISONE 20 MG/1
TABLET ORAL
Qty: 30 TABLET | Refills: 0 | Status: SHIPPED | OUTPATIENT
Start: 2022-09-21 | End: 2022-10-06

## 2022-09-21 RX ORDER — OXYCODONE HYDROCHLORIDE 5 MG/1
5 TABLET ORAL 2 TIMES DAILY PRN
Qty: 28 TABLET | Refills: 0 | Status: SHIPPED | OUTPATIENT
Start: 2022-09-21 | End: 2022-10-05

## 2022-09-21 RX ORDER — ONDANSETRON 4 MG/1
4 TABLET, ORALLY DISINTEGRATING ORAL EVERY 8 HOURS PRN
Qty: 10 TABLET | Refills: 1 | Status: SHIPPED | OUTPATIENT
Start: 2022-09-21 | End: 2023-08-25

## 2022-09-21 NOTE — ED PROVIDER NOTES
EMERGENCY DEPARTMENT ENCOUNTER      NAME: Darlene Hudson  AGE: 47 year old female  YOB: 1975  MRN: [id number]  EVALUATION DATE & TIME: No admission date for patient encounter.    PCP: Robbie Bailon    ED PROVIDER: Gilbert Sutherland M.D.      Chief Complaint   Patient presents with     Shortness of Breath         FINAL IMPRESSION:  COVID      ED COURSE & MEDICAL DECISION MAKING:    Pertinent Labs & Imaging studies reviewed. (See chart for details)  47 year old female presents to the Emergency Department for evaluation of worsening symptoms after testing positive for COVID yesterday.  Patient reports of burning discomfort in her chest and difficulty catching her breath.  Also states she has not been able to eat much secondary nausea and may have fainted.  Does indicate striking her head.  Paxil been recommended yesterday however patient concerned about interaction with Advair and is awaiting consultation with her primary care physician tomorrow.  On exam patient is a mildly obese female in moderate distress.  Vital signs remarkable for slight tachycardia and tachypnea.  However lungs clear.  Cardiac exam rapid but otherwise unremarkable.  Patient was exercised in the triage area.  Pulse ox remained excellent at 97%.  We will proceed with symptomatic relief with intravenous fluids, Zofran, Pepcid and Toradol.  We will also consult with pharmacy regarding Paxil event interaction.. Patient appears non toxic with stable vitals signs.    7:32 PM I met with the patient for the initial interview and physical examination. Discussed plan for treatment and workup in the ED.    7:50 PM.  Pharmacy reports significant interaction with Advair steroid component resulting in potential prolonged steroid action and adrenal suppression.  10:43 PM.  IV access just initiated after extensive delays.  Locations for symptomatic relief and fluids being given.  Patient discussed with my associate end of shift.  Expectation is for  discharge after symptomatic relief.  At the conclusion of the encounter I discussed the results of all of the tests and the disposition. The questions were answered and return precautions provided. The patient or family acknowledged understanding and was agreeable with the care plan.       PPE: Provider wore gloves, N95 mask, eye protection, surgical cap, and paper mask.     MEDICATIONS GIVEN IN THE EMERGENCY:  Medications   ipratropium - albuterol 0.5 mg/2.5 mg/3 mL (DUONEB) neb solution 3 mL (has no administration in time range)   0.9% sodium chloride BOLUS (has no administration in time range)     Followed by   sodium chloride 0.9% infusion (has no administration in time range)   ondansetron (ZOFRAN) injection 4 mg (has no administration in time range)   ketorolac (TORADOL) injection 15 mg (has no administration in time range)   famotidine (PEPCID) injection 20 mg (has no administration in time range)       NEW PRESCRIPTIONS STARTED AT TODAY'S ER VISIT  Current Discharge Medication List      START taking these medications    Details   famotidine (PEPCID) 20 MG tablet Take 1 tablet (20 mg) by mouth 2 times daily  Qty: 40 tablet, Refills: 0      ondansetron (ZOFRAN ODT) 4 MG ODT tab Take 1 tablet (4 mg) by mouth every 8 hours as needed for nausea  Qty: 10 tablet, Refills: 0      predniSONE (DELTASONE) 20 MG tablet Take two tablets (= 40mg) each day for 5 (five) days  Qty: 10 tablet, Refills: 0                =================================================================    HPI    Patient information was obtained from: Patient    Use of Intrepreter: N/A       Darlene Hudson is a 47 year old female with a pertient medical history of depression, anxiety, cocaine abuse, tobacco abuse, polysubstance abuse, and moderate persistent asthma who presents to the ED for evaluation of shortness of breath.    The patient reports that she has been feeling poorly the last few days with a cough, chest tightness, and shortness  of breath. She took a home Covid-19 test recently which came back positive. She presented to the ED last night where her positive laboratory evaluation was normal. Recommended paxlovid, but she was worried about how this would interact with her asthma medication. She will discuss this with her primary doctor tomorrow.     Today, the patient complains of worsening shortness of breath. Also endorses burning chest discomfort and dizziness. She is unable to take fluids down due to nausea. The patient is vaccinated against Covid-19. No other concerns reported at this time.      REVIEW OF SYSTEMS   Respiratory:  Positive for shortness of breath and cough.  Cardiovascular:  Positive for chest discomfort and tightness.  GI:  Denies vomiting. Positive for nausea.  Neurologic:  Positive for dizziness.  All systems negative except as marked.     PAST MEDICAL HISTORY:  Past Medical History:   Diagnosis Date     Agoraphobia with panic attacks      Anxiety      Anxiety      Arthritis     of back     Asthma      Asthma in adult, moderate persistent, uncomplicated      Chronic pain      Chronic sinusitis      Cocaine abuse (H)      Controlled substance agreement signed 06/30/2015    Overview:  Patient has chronic pain and is seen at [facility] for this.  Has controlled substance agreement with them.  On Vicodin, Valium, Klonopin prescribed only from there.        Depression      Hx of seasonal allergies      Infectious pericarditis      Lipoma      Tobacco abuse        PAST SURGICAL HISTORY:  Past Surgical History:   Procedure Laterality Date     ANKLE SURGERY Right 05/30/2019     BIOPSY BREAST Right 1990    benign     CREATION PERICARDIAL WINDOW  02/10/2017    [facility]     DILATION AND CURETTAGE N/A 7/22/2022    Procedure: DILATION AND CURETTAGE, UTERUS;  Surgeon: Lesly Holland;  Location: [place] [facility]     HEMORRHOIDECTOMY EXTERNAL       HYSTEROSCOPY, WITH ENDOMETRIAL RADIOFREQUENCY ABLATION - NOVASURE N/A  "7/22/2022    Procedure: HYSTEROSCOPY, WITH ENDOMETRIAL RADIOFREQUENCY ABLATION - NOVASURE DILATION AND CURETTAGE, UTERUS;  Surgeon: Lesly Holland;  Location: [place] [facility]     TUMOR REMOVAL      Has had 3. In the right breast and \"inside of rib cage.\"         CURRENT MEDICATIONS:      Current Facility-Administered Medications:      0.9% sodium chloride BOLUS, 1,000 mL, Intravenous, Once **FOLLOWED BY** sodium chloride 0.9% infusion, , Intravenous, Continuous, Gilbert Sutherland MD     famotidine (PEPCID) injection 20 mg, 20 mg, Intravenous, Once, Gilbert Sutherland MD     ipratropium - albuterol 0.5 mg/2.5 mg/3 mL (DUONEB) neb solution 3 mL, 3 mL, Nebulization, Once, Brandon Hernandez MD     ketorolac (TORADOL) injection 15 mg, 15 mg, Intravenous, Once, Gilbert Sutherland MD     ondansetron (ZOFRAN) injection 4 mg, 4 mg, Intravenous, Once, Gilbert Sutherland MD    Current Outpatient Medications:      acetaminophen (TYLENOL) 325 MG tablet, Take 3 tablets (975 mg) by mouth every 6 hours as needed for mild pain, Disp: 50 tablet, Rfl: 0     ALPRAZolam (XANAX) 2 MG tablet, Take 1 tablet (2 mg) by mouth 2 times daily as needed for anxiety, Disp: 60 tablet, Rfl: 3     cetirizine (ZYRTEC) 10 MG tablet, Take 1 tablet (10 mg) by mouth daily, Disp: 30 tablet, Rfl: 11     fluticasone (FLONASE) 50 MCG/ACT nasal spray, Spray 2 sprays into both nostrils daily, Disp: 9.9 mL, Rfl: 11     fluticasone-salmeterol (ADVAIR) 500-50 MCG/DOSE inhaler, Inhale 1 puff into the lungs every 12 hours, Disp: 60 each, Rfl: 11     ibuprofen (ADVIL/MOTRIN) 600 MG tablet, Take 1 tablet (600 mg) by mouth 3 times daily as needed for moderate pain, Disp: 90 tablet, Rfl: 4     ibuprofen (ADVIL/MOTRIN) 800 MG tablet, Take 1 tablet (800 mg) by mouth every 6 hours as needed for other (mild and/or inflammatory pain), Disp: 30 tablet, Rfl: 0     methylPREDNISolone (MEDROL DOSEPAK) 4 MG tablet therapy pack, Follow Package Directions, Disp: 21 tablet, Rfl: 0    " " montelukast (SINGULAIR) 10 MG tablet, Take 1 tablet (10 mg) by mouth At Bedtime, Disp: 30 tablet, Rfl: 11     oxyCODONE-acetaminophen (PERCOCET) 5-325 MG tablet, Take 1 tablet by mouth daily as needed for breakthrough pain or severe pain, Disp: 30 tablet, Rfl: 0     PROAIR  (90 Base) MCG/ACT inhaler, Inhale 2 puffs into the lungs every 4 hours as needed for shortness of breath / dyspnea or wheezing, Disp: 8 g, Rfl: 11     spacer (OPTICHAMBER BINA) holding chamber, For use w/ rescue inhaler, Disp: 1 each, Rfl: 0     tiotropium (SPIRIVA RESPIMAT) 1.25 MCG/ACT inhaler, Inhale 2 puffs into the lungs daily, Disp: 12 g, Rfl: 3     tiotropium (SPIRIVA RESPIMAT) 2.5 MCG/ACT inhaler, Inhale 2 puffs into the lungs daily, Disp: 4 g, Rfl: 4     triamcinolone (KENALOG) 0.1 % external cream, Apply topically 2 times daily, Disp: 100 g, Rfl: 1    ALLERGIES:  Allergies   Allergen Reactions     Lidocaine Other (See Comments)     \"my jaw stopped moving\"  Other reaction(s): Dystonia     Penicillins Hives, Rash and Shortness Of Breath     Epinephrine-Lidocaine-Na Metabisulfite Other (See Comments) and Muscle Pain (Myalgia)     Severe jaw cramping, double vision  Jaw locking       FAMILY HISTORY:  Family History   Problem Relation Age of Onset     Hypertension Mother      Cancer Mother         cervical      Other Cancer Father         lung cancer     Asthma Father      Diabetes Brother      Diabetes Brother      Breast Cancer Maternal Grandmother      Asthma Child        SOCIAL HISTORY:   Social History     Socioeconomic History     Marital status:      Spouse name: None     Number of children: None     Years of education: None     Highest education level: None   Tobacco Use     Smoking status: Light Tobacco Smoker     Packs/day: 0.10     Years: 30.00     Pack years: 3.00     Types: Cigarettes     Last attempt to quit: 2020     Years since quittin.3     Smokeless tobacco: Current User     Tobacco comment: 2-3 " cig/day   Vaping Use     Vaping Use: Some days   Substance and Sexual Activity     Alcohol use: Not Currently     Comment: Occasiaonlly.      Drug use: Not Currently     Sexual activity: Yes     Partners: Male       VITALS:  Patient Vitals for the past 24 hrs:   BP Temp Temp src Pulse Resp SpO2 Weight   09/20/22 1841 (!) 142/99 97.4  F (36.3  C) Oral 100 (!) 35 100 % 99.8 kg (220 lb)        PHYSICAL EXAM    Constitutional:  Awake, alert. Mild to moderate distress.  HENT:  Normocephalic, Atraumatic. Bilateral external ears normal. Oropharynx moist. Nose normal. Neck- Normal range of motion   Eyes:  PERRL, EOMI with no signs of entrapment, Conjunctiva normal, No discharge.   Respiratory:  Normal breath sounds, No respiratory distress, No wheezing.    Cardiovascular:  Normal heart rate, Normal rhythm, No appreciable rubs or gallops.   GI:  Soft, No tenderness, No distension, No palpable masses  Musculoskeletal:  No edema. Good range of motion in all major joints. No tenderness to palpation or major deformities noted.  Integument:  Warm, Dry, No erythema, No rash.   Neurologic:  Alert & oriented, Normal motor function, Normal sensory function, No focal deficits noted.   Psychiatric:  Affect normal, Judgment normal, Mood normal.     LAB:  All pertinent labs reviewed and interpreted.  Results for orders placed or performed during the hospital encounter of 09/20/22   XR Chest 2 Views    Impression    IMPRESSION: Negative chest.       RADIOLOGY:  Reviewed all pertinent imaging. Please see official radiology report.  XR Chest 2 Views   Final Result   IMPRESSION: Negative chest.            I, Samara Bauer, am serving as a scribe to document services personally performed by Gilbert Sutherland MD, based on my observation and the provider's statements to me. IGilbert MD attest that Samara Bauer is acting in a scribe capacity, has observed my performance of the services and has documented them in accordance with my  direction.    Gilbert Sutherland M.D.  Emergency Medicine  [facility] EMERGENCY DEPARTMENT     Gilbert Sutherland MD  09/20/22 2312       Gilbert Sutherland MD  09/20/22 [id number]

## 2022-09-21 NOTE — ED NOTES
EMERGENCY DEPARTMENT SIGN OUT NOTE        ED COURSE AND MEDICAL DECISION MAKING  11:10 PM Patient was signed out to me by Dr Gilbert Sutherland.   11:20 PM Patient reports to nursing staff that she is feeling dizzy, has chest pain, and is diaphoretic.   11:21 PM Introduced myself to patient, reassessed patient, and answered her questions and concerns. I discussed the plan for discharge with the patient, and patient is agreeable. We discussed supportive cares at home and reasons for return to the ER including new or worsening symptoms - all questions and concerns addressed. Patient to be discharged by RN.        In brief, Darlene Hudson is a 47 year old female who initially presented with worsening shortness of breath. She endorses burning chest discomfort and dizziness. Unable to tolerate oral fluids due to nausea. She notes recently having an at home positive COVID-19 test.      At time of sign out, disposition was pending finishing nebulizer treatment and fluids.     Patient did get her nebulizer treatment, fluids here.  She still states that she does not feel well however her vital signs here have all been stable, oxygen saturations have been great.  She is up and able to speak in full sentences without difficulty.  At this point she does not need any need for hospitalization, she states she does not want to have any hospitalization but is just very frustrated that she still feels poorly.  She was given Pepcid, Zofran, Toradol here.  She was discharged yesterday with medications and will be discharged again today with medications.  I did tell her to please come back if she is feeling worse.  I tried to empathize and tell her that I know that she feels poorly she will likely feel poorly for a few days at this time.  Again with negative CT, negative chest x-ray today.  Labs looking appropriate.  Vitals appropriate.  Breathing here without difficulty, speaking in full sentences without difficulty.  Oxygen saturations in  the upper 90s feel that she is appropriate for discharge to home at this time.    FINAL IMPRESSION    1. Infection due to 2019 novel coronavirus        ED MEDS  Medications   0.9% sodium chloride BOLUS (1,000 mLs Intravenous New Bag 9/20/22 2239)     Followed by   sodium chloride 0.9% infusion (has no administration in time range)   ipratropium - albuterol 0.5 mg/2.5 mg/3 mL (DUONEB) neb solution 3 mL (3 mLs Nebulization Given 9/20/22 2239)   ondansetron (ZOFRAN) injection 4 mg (4 mg Intravenous Given 9/20/22 2238)   ketorolac (TORADOL) injection 15 mg (15 mg Intravenous Given 9/20/22 2238)   famotidine (PEPCID) injection 20 mg (20 mg Intravenous Given 9/20/22 2238)         RADIOLOGY    XR Chest 2 Views   Final Result   IMPRESSION: Negative chest.          DISCHARGE MEDS  New Prescriptions    FAMOTIDINE (PEPCID) 20 MG TABLET    Take 1 tablet (20 mg) by mouth 2 times daily    ONDANSETRON (ZOFRAN ODT) 4 MG ODT TAB    Take 1 tablet (4 mg) by mouth every 8 hours as needed for nausea    PREDNISONE (DELTASONE) 20 MG TABLET    Take two tablets (= 40mg) each day for 5 (five) days       Tracee Prescott M.D.  [facility] EMERGENCY DEPARTMENT  [address] [place] [id number]  [phone]     Tracee Prescott MD  09/20/22 8116

## 2022-09-27 ENCOUNTER — DOCUMENTATION ONLY (OUTPATIENT)
Dept: ORTHOPEDICS | Facility: CLINIC | Age: 47
End: 2022-09-27

## 2022-09-27 DIAGNOSIS — S60.211D CONTUSION OF RIGHT WRIST, SUBSEQUENT ENCOUNTER: ICD-10-CM

## 2022-09-27 DIAGNOSIS — S63.501A RIGHT WRIST SPRAIN, INITIAL ENCOUNTER: Primary | ICD-10-CM

## 2022-10-05 ENCOUNTER — OFFICE VISIT (OUTPATIENT)
Dept: FAMILY MEDICINE | Facility: CLINIC | Age: 47
End: 2022-10-05
Payer: MEDICAID

## 2022-10-05 VITALS
OXYGEN SATURATION: 100 % | RESPIRATION RATE: 20 BRPM | TEMPERATURE: 97 F | HEART RATE: 107 BPM | DIASTOLIC BLOOD PRESSURE: 82 MMHG | SYSTOLIC BLOOD PRESSURE: 113 MMHG

## 2022-10-05 DIAGNOSIS — Z23 NEED FOR PROPHYLACTIC VACCINATION AND INOCULATION AGAINST INFLUENZA: ICD-10-CM

## 2022-10-05 DIAGNOSIS — Z23 HIGH PRIORITY FOR 2019-NCOV VACCINE: ICD-10-CM

## 2022-10-05 DIAGNOSIS — Z11.59 NEED FOR HEPATITIS C SCREENING TEST: ICD-10-CM

## 2022-10-05 DIAGNOSIS — Z00.00 WELL ADULT EXAM: Primary | ICD-10-CM

## 2022-10-05 DIAGNOSIS — J45.51 SEVERE PERSISTENT ASTHMA WITH ACUTE EXACERBATION (H): ICD-10-CM

## 2022-10-05 DIAGNOSIS — Z12.11 SCREEN FOR COLON CANCER: ICD-10-CM

## 2022-10-05 DIAGNOSIS — M25.871 ANKLE IMPINGEMENT SYNDROME, RIGHT: ICD-10-CM

## 2022-10-05 DIAGNOSIS — Z11.4 SCREENING FOR HIV (HUMAN IMMUNODEFICIENCY VIRUS): ICD-10-CM

## 2022-10-05 PROCEDURE — 86803 HEPATITIS C AB TEST: CPT | Performed by: FAMILY MEDICINE

## 2022-10-05 PROCEDURE — 91313 COVID-19,PF,MODERNA BIVALENT: CPT | Performed by: FAMILY MEDICINE

## 2022-10-05 PROCEDURE — 99396 PREV VISIT EST AGE 40-64: CPT | Mod: 25 | Performed by: FAMILY MEDICINE

## 2022-10-05 PROCEDURE — 36415 COLL VENOUS BLD VENIPUNCTURE: CPT | Performed by: FAMILY MEDICINE

## 2022-10-05 PROCEDURE — 90471 IMMUNIZATION ADMIN: CPT | Performed by: FAMILY MEDICINE

## 2022-10-05 PROCEDURE — 90686 IIV4 VACC NO PRSV 0.5 ML IM: CPT | Performed by: FAMILY MEDICINE

## 2022-10-05 PROCEDURE — 87389 HIV-1 AG W/HIV-1&-2 AB AG IA: CPT | Performed by: FAMILY MEDICINE

## 2022-10-05 PROCEDURE — 0134A COVID-19,PF,MODERNA BIVALENT: CPT | Performed by: FAMILY MEDICINE

## 2022-10-05 RX ORDER — OXYCODONE HYDROCHLORIDE 5 MG/1
5 TABLET ORAL DAILY PRN
Qty: 28 TABLET | Refills: 0 | Status: SHIPPED | OUTPATIENT
Start: 2022-10-05 | End: 2022-10-28
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
## 2022-10-06 LAB
HCV AB SERPL QL IA: NONREACTIVE
HIV 1+2 AB+HIV1 P24 AG SERPL QL IA: NONREACTIVE

## 2022-10-13 ENCOUNTER — HOSPITAL ENCOUNTER (OUTPATIENT)
Dept: MAMMOGRAPHY | Facility: CLINIC | Age: 47
Discharge: HOME | End: 2022-10-13
Attending: FAMILY MEDICINE | Admitting: FAMILY MEDICINE
Payer: MEDICAID

## 2022-10-13 DIAGNOSIS — Z12.31 ENCOUNTER FOR SCREENING MAMMOGRAM FOR BREAST CANCER: ICD-10-CM

## 2022-10-13 PROCEDURE — 77067 SCR MAMMO BI INCL CAD: CPT

## 2022-10-14 NOTE — TELEPHONE ENCOUNTER
"Pt calling due to swelling and burning in her ankle/foot. Believes she may have an infection.   - noticed 30 minutes ago, while at work and is getting worse   - burning pain only with walking  -ankle surgery 1year ago, with \"internal brace\"  - Not erythematous  - No drainage, fever, numbness/tingling, significant decreased ROM, or any other concerns.    Recommended:   -tylenol/ibuprofen and ice for pain/swelling  -calling clinic in AM for appointment    "
verbal instruction/written material

## 2022-10-28 ENCOUNTER — TELEPHONE (OUTPATIENT)
Dept: FAMILY MEDICINE | Facility: CLINIC | Age: 47
End: 2022-10-28

## 2022-10-28 DIAGNOSIS — F40.01 PANIC DISORDER WITH AGORAPHOBIA: ICD-10-CM

## 2022-10-28 DIAGNOSIS — F41.1 GENERALIZED ANXIETY DISORDER: ICD-10-CM

## 2022-10-28 DIAGNOSIS — Z79.1 NSAID LONG-TERM USE: Primary | ICD-10-CM

## 2022-10-28 DIAGNOSIS — M25.871 ANKLE IMPINGEMENT SYNDROME, RIGHT: ICD-10-CM

## 2022-10-28 RX ORDER — OXYCODONE HYDROCHLORIDE 5 MG/1
5 TABLET ORAL DAILY PRN
Qty: 30 TABLET | Refills: 0 | Status: SHIPPED | OUTPATIENT
Start: 2022-10-31 | End: 2022-11-04

## 2022-10-28 RX ORDER — FAMOTIDINE 20 MG/1
20 TABLET, FILM COATED ORAL 2 TIMES DAILY
Qty: 60 TABLET | Refills: 11 | Status: ON HOLD | OUTPATIENT
Start: 2022-10-28 | End: 2023-01-06

## 2022-10-28 NOTE — TELEPHONE ENCOUNTER
[facility] phone call message- medication clarification/question:    Full Medication Name:     oxyCODONE (ROXICODONE    famotidine (PEPCID)     Zantac?        Dose:     Sig - Route: Take 1 tablet (5 mg) by mouth daily as needed for severe pain - Oral      Sig - Route: Take 1 tablet (20 mg) by mouth 2 times daily - Oral        Question:     Patient call and advised needing to place medication on Monday however she will be in the [employer]  and when she can call we will be closed, so she is placing in order today instead of Monday. Per patient Dr. Bailon know about these medication and is aware of the call.    Please place in medication and or call to advise. Thanks.     Pharm[id number] [address] AT [facility]: Yes    OK to leave a message on voice mail? Yes    Primary language: English      needed? No    Call taken on October 28, 2022 at 11:44 AM by Meredith Holder

## 2022-10-28 NOTE — TELEPHONE ENCOUNTER
Patient called after pickup medication and states wrong medication was sent, patient requesting for her ALPRAZolam (XANAX) 2 MG tablet that is being needed. If this can be sent to pharmcy. Will call and advise once done.

## 2022-10-31 RX ORDER — ALPRAZOLAM 2 MG
2 TABLET ORAL 2 TIMES DAILY PRN
Qty: 60 TABLET | Refills: 3 | Status: SHIPPED | OUTPATIENT
Start: 2022-10-31 | End: 2022-11-04

## 2022-10-31 NOTE — TELEPHONE ENCOUNTER
"Team - please inform Karoline that Xanax was sent to her pharmacy. My apologies. I was not in the office Friday PM and the refill request from our office said \"Zantac?\" so it was not filled at that time.  Thanks!    Robbie Bailon MD  October 31, 2022  6:44 AM    "

## 2022-10-31 NOTE — TELEPHONE ENCOUNTER
Patient called back and wanted to inform  due to change in insurance now that patient is only cover through the state while waiting for her new employers insurance to kick in pharmacy wanted patient to inform her pcp they are only able to give her a 7 day supply and pcp will need to send in a new script for refill a in a week. Call and advise if needed.

## 2022-11-04 RX ORDER — ALPRAZOLAM 2 MG
2 TABLET ORAL 2 TIMES DAILY PRN
Qty: 60 TABLET | Refills: 3 | Status: SHIPPED | OUTPATIENT
Start: 2022-11-04 | End: 2022-12-28

## 2022-11-04 RX ORDER — OXYCODONE HYDROCHLORIDE 5 MG/1
5 TABLET ORAL DAILY PRN
Qty: 30 TABLET | Refills: 0 | Status: SHIPPED | OUTPATIENT
Start: 2022-11-04 | End: 2022-11-21

## 2022-11-04 NOTE — TELEPHONE ENCOUNTER
Caller advised the last person she talk to got it incorrectly and she needed her oxyCODONE (ROXICODONE) 5 MG tablet fill. Please place in medication please and thank you.

## 2022-11-21 ENCOUNTER — TELEPHONE (OUTPATIENT)
Dept: FAMILY MEDICINE | Facility: CLINIC | Age: 47
End: 2022-11-21

## 2022-11-21 DIAGNOSIS — M25.871 ANKLE IMPINGEMENT SYNDROME, RIGHT: ICD-10-CM

## 2022-11-21 RX ORDER — OXYCODONE HYDROCHLORIDE 5 MG/1
5 TABLET ORAL DAILY PRN
Qty: 30 TABLET | Refills: 0 | Status: SHIPPED | OUTPATIENT
Start: 2022-11-30 | End: 2022-12-25

## 2022-11-21 NOTE — TELEPHONE ENCOUNTER
[facility] phone call message- medication clarification/question:    Full Medication Name:     oxyCODONE (ROXICODONE) 5 MG tablet      Dose:     Sig - Route: Take 1 tablet (5 mg) by mouth daily as needed for severe pain - Oral    Question:     Caller advise she is doing great and requesting for medication refill on the 30th of Nov. [facility]    Pharmacy confirmed as [id number] [place] [id number] [facility]  AT [facility]: Yes    OK to leave a message on voice mail? Yes    Primary language: English      needed? No    Call taken on November 21, 2022 at 3:53 PM by Meredith Holder

## 2022-11-22 NOTE — TELEPHONE ENCOUNTER
I called pt and it rang but then when I would say hello my voice would echo. I will try again later. Unable to LVMTCB.

## 2022-11-22 NOTE — TELEPHONE ENCOUNTER
Team - please inform pt that prescription has been sent to pharmacy to be filled on 11/30/2022.  Thanks!    Robbie Bailon MD  November 21, 2022  8:43 PM

## 2022-12-07 ENCOUNTER — VIRTUAL VISIT (OUTPATIENT)
Dept: FAMILY MEDICINE | Facility: CLINIC | Age: 47
End: 2022-12-07
Payer: MEDICAID

## 2022-12-07 DIAGNOSIS — U07.1 INFECTION DUE TO 2019 NOVEL CORONAVIRUS: Primary | ICD-10-CM

## 2022-12-07 PROCEDURE — 99442 PR PHYSICIAN TELEPHONE EVALUATION 11-20 MIN: CPT | Mod: 93 | Performed by: FAMILY MEDICINE

## 2022-12-07 RX ORDER — NIRMATRELVIR AND RITONAVIR 300-100 MG
3 KIT ORAL 2 TIMES DAILY
Qty: 30 EACH | Refills: 0 | Status: SHIPPED | OUTPATIENT
Start: 2022-12-07 | End: 2022-12-26

## 2022-12-07 ASSESSMENT — ASTHMA QUESTIONNAIRES
QUESTION_1 LAST FOUR WEEKS HOW MUCH OF THE TIME DID YOUR ASTHMA KEEP YOU FROM GETTING AS MUCH DONE AT WORK, SCHOOL OR AT HOME: A LITTLE OF THE TIME
QUESTION_2 LAST FOUR WEEKS HOW OFTEN HAVE YOU HAD SHORTNESS OF BREATH: THREE TO SIX TIMES A WEEK
QUESTION_3 LAST FOUR WEEKS HOW OFTEN DID YOUR ASTHMA SYMPTOMS (WHEEZING, COUGHING, SHORTNESS OF BREATH, CHEST TIGHTNESS OR PAIN) WAKE YOU UP AT NIGHT OR EARLIER THAN USUAL IN THE MORNING: TWO OR THREE NIGHTS A WEEK
ACT_TOTALSCORE: 13
QUESTION_5 LAST FOUR WEEKS HOW WOULD YOU RATE YOUR ASTHMA CONTROL: SOMEWHAT CONTROLLED
QUESTION_4 LAST FOUR WEEKS HOW OFTEN HAVE YOU USED YOUR RESCUE INHALER OR NEBULIZER MEDICATION (SUCH AS ALBUTEROL): THREE OR MORE TIMES PER DAY
ACT_TOTALSCORE: 13

## 2022-12-07 NOTE — NURSING NOTE
Due to being sick and feeling horrible pt was not able to Answer PHQ9 and ACT. ACT was high because of COVID.

## 2022-12-07 NOTE — LETTER
WORK MEDICAL NOTE  2022     Seen today: Yes    Patient:  Darlene Hudson  :   1975  MRN:     [id number]  Physician: JULIETH BAILON    Darlene Hudson was evaluated today. She is positive for symptomatic COVID infection. I am starting her on medication and she is recommended to be out of work x 1 week. I will evaluate her again on 22 to determine if she is able to return to work.    The next clinic appointment is scheduled for (date/time) one week.    Patient limitations:  NOT ALLOWED TO BE AT WORK TIL MEDICALLY CLEARED!          Julieth Bailon MD  2022  4:38 PM

## 2022-12-07 NOTE — PROGRESS NOTES
Family Medicine Telephone Visit Note    Chief Complaint   Patient presents with     Covid Treament     Sore throat, coughing, fatigue, SOB, and chills     Swollen Finger     Happened the day she got sick. Both hands were swollen.            HPI   Patients name: Karoline  Appointment start time:  4:23 PM    See my note for HPI      Current Outpatient Medications   Medication Sig Dispense Refill     nirmatrelvir and ritonavir (PAXLOVID, 300/100,) therapy pack Take 3 tablets by mouth 2 times daily 30 each 0     acetaminophen (TYLENOL) 325 MG tablet Take 3 tablets (975 mg) by mouth every 6 hours as needed for mild pain 50 tablet 0     ALPRAZolam (XANAX) 2 MG tablet Take 1 tablet (2 mg) by mouth 2 times daily as needed for anxiety 60 tablet 3     cetirizine (ZYRTEC) 10 MG tablet Take 1 tablet (10 mg) by mouth daily 30 tablet 11     famotidine (PEPCID) 20 MG tablet Take 1 tablet (20 mg) by mouth 2 times daily 60 tablet 11     fluticasone (FLONASE) 50 MCG/ACT nasal spray Spray 2 sprays into both nostrils daily 9.9 mL 11     ibuprofen (ADVIL/MOTRIN) 600 MG tablet Take 1 tablet (600 mg) by mouth 3 times daily as needed for moderate pain 90 tablet 4     ibuprofen (ADVIL/MOTRIN) 800 MG tablet Take 1 tablet (800 mg) by mouth every 6 hours as needed for other (mild and/or inflammatory pain) 30 tablet 0     methylPREDNISolone (MEDROL DOSEPAK) 4 MG tablet therapy pack Follow Package Directions 21 tablet 0     montelukast (SINGULAIR) 10 MG tablet Take 1 tablet (10 mg) by mouth At Bedtime 30 tablet 11     ondansetron (ZOFRAN ODT) 4 MG ODT tab Take 1 tablet (4 mg) by mouth every 8 hours as needed for nausea 10 tablet 1     oxyCODONE (ROXICODONE) 5 MG tablet Take 1 tablet (5 mg) by mouth daily as needed for severe pain (7-10) 30 tablet 0     predniSONE (DELTASONE) 20 MG tablet Take two tablets (= 40mg) each day for 5 (five) days 10 tablet 0     PROAIR  (90 Base) MCG/ACT inhaler Inhale 2 puffs into the lungs every 4 hours as needed  "for shortness of breath / dyspnea or wheezing 8 g 11     spacer (OPTICHAMBER BINA) holding chamber For use w/ rescue inhaler 1 each 0     tiotropium (SPIRIVA RESPIMAT) 1.25 MCG/ACT inhaler Inhale 2 puffs into the lungs daily 12 g 3     tiotropium (SPIRIVA RESPIMAT) 2.5 MCG/ACT inhaler Inhale 2 puffs into the lungs daily 4 g 4     triamcinolone (KENALOG) 0.1 % external cream Apply topically 2 times daily 100 g 1     Allergies   Allergen Reactions     Lidocaine Other (See Comments)     \"my jaw stopped moving\"  Other reaction(s): Dystonia     Penicillins Hives, Rash and Shortness Of Breath     Epinephrine-Lidocaine-Na Metabisulfite Other (See Comments) and Muscle Pain (Myalgia)     Severe jaw cramping, double vision  Jaw locking              Review of Systems:     Constitutional, HEENT, cardiovascular, pulmonary, gi and gu systems are negative, except as otherwise noted.         Physical Exam:     There were no vitals taken for this visit.  Estimated body mass index is 32.49 kg/m  as calculated from the following:    Height as of 9/19/22: 1.753 m (5' 9\").    Weight as of 9/20/22: 99.8 kg (220 lb).    Exam:  Constitutional: alert and mild distress  Psychiatric: mentation appears normal, anxious and fatigued          Assessment and Plan   Darlene was seen today for covid treament and swollen finger.    Diagnoses and all orders for this visit:    Infection due to 2019 novel coronavirus  -     nirmatrelvir and ritonavir (PAXLOVID, 300/100,) therapy pack; Take 3 tablets by mouth 2 times daily        Refilled medications that would be required in the next 3 months.     After Visit Information:  Patient chose to view AVS via [employer]    Return in 1 week (on 12/14/2022).    Appointment end time: 4:41 PM  This is a telephone visit that took 18 minutes.      Clinician location:  [facility]     Robbie Bailon MD  December 7, 2022  4:55 PM    "

## 2022-12-07 NOTE — PATIENT INSTRUCTIONS
COVID-19 Outpatient Treatments  Your care team can help you find the best treatments for COVID-19. Talk to a health care provider or refer to the FDA medicine fact sheets below.    Important: You can't have Paxlovid or molnupiravir if you're starting the medicine more than 5 days after your symptoms have started.  Paxlovid: https://www.fda.gov/media/977659/download  Molnupiravir (Lagevrio): https://www.fda.gov/media/747506/download  Paxlovid (nimatrelvir and ritonavir)  How it works  Two medicines (nirmatrelvir and ritonavir) are taken together. They stop the virus from growing. Less amount of virus is easier for your body to fight.  Benefits  Lowers risk of a hospital stay or death from COVID-19.  How to take    Medicine comes in a daily container with both medicine tablets. Take by mouth twice daily (once in the morning, once at night) for 5 days.    The number of tablets to take varies by patient.    Don't chew or break capsules. Swallow whole.  When to take  Take as soon as possible after positive COVID-19 test result, and within 5 days of your first symptoms.  Who can take it  Patients must be 12 years or older, weigh at least 88 pounds, and have tested positive for COVID-19. Paxlovid is the preferred treatment for pregnant patients.  Possible side effects  Can cause altered sense of taste, diarrhea (loose, watery stools), high blood pressure, muscle aches.  Medicine conflicts    Some medicines may conflict with Paxlovid and may cause serious side effects.    Tell your care team about all the medicines you take, including prescription and over-the-counter medicines, vitamins, and herbal supplements.    Your care team will review your medicines to make sure that you can safely take Paxlovid.  Cautions    Paxlovid is not advised for patients with severe kidney or liver disease. If you have kidney or liver problems, the dose may need to be changed.    If you're pregnant or breastfeeding, talk to your care team  about your options.    If you take hormonal birth control (such as the Pill), then you or your partner should also use a non-hormonal form of birth control (such as a condom). Keep doing this for 1 menstrual cycle after your last dose of Paxlovid.  Molnupiravir (Lagevrio)  How it works  Stops the virus from growing. Less amount of virus is easier for your body to fight.  Benefits  Lowers risk of a hospital stay or death from COVID-19.  How to take  Take 4 capsules by mouth every 12 hours (4 in the morning and 4 at night) for 5 days. Don't chew or break capsules. Swallow whole.  When to take  Take as soon as possible after positive COVID-19 test result, and within 5 days of your first symptoms.  Who can take it  Patients must be 18 years or older and have tested positive for COVID-19.  Possible side effects  Diarrhea (loose, watery stools), nausea (feeling sick to your stomach), dizziness, headaches.  Medicine conflicts  Right now, there are no known conflicts with other drugs. But tell your care team about all medicines you take.  Cautions    This medicine is not advised for patients who are pregnant.    If you are someone who could become pregnant, use trusted birth control until 4 days after your last dose of molnupiravir.    If your partner could become pregnant, you should use trusted birth control until 3 months after your last dose of molnupiravir.  For informational purposes only. Not to replace the advice of your health care provider. Copyright   2022 [facility]. All rights reserved. Clinically reviewed by Eula Crook, PharmD, BCACP. [employer] [id number] - REV 12/22.

## 2022-12-07 NOTE — PROGRESS NOTES
ASSESSMENT/PLAN:    (U07.1) Infection due to 2019 novel coronavirus  (primary encounter diagnosis)  Comment: reviewed indications/ contraindications/ med interactions for paxlovid; prescription sent; precautions given; f/u virtual visit in 1 wk  Plan: nirmatrelvir and ritonavir (PAXLOVID, 300/100,) therapy pack          Robbie Bailon MD  December 8, 2022  2:10 PM      Pt is a 47 year old female last seen on 10/5/22 for well exam evaluated via telephone encounter for:     Illness - started 2 days ago - Lightheaded, Hand swelling, chills, congestion, nausea, hot sweats  +HA; not a bad cough   When she sleeps she wakes trying to catch her breath  Has been taking her Spiriva; not wheezing   Unsure if she is having fevers -> no thermometer  Took a rapid test for covid and its positive  Did get her flu shot  Did get a covid booster as well   Daughter had covid last week, son tested positive 3 days     Per my last note:  Well adult exam - age-appropriate health maintenance and anticipatory guidance given  -     Hemoglobin A1c; Future   Screen for colon cancer -> referral for screening colonoscopy  -     Adult GI  Referral - Procedure Only; Future   Screening for HIV (human immunodeficiency virus)  -     HIV Screening; Future  -     HIV Screening   Need for hepatitis C screening test  -     Hepatitis C Screen Reflex to HCV RNA Quant and Genotype; Future  -     Hepatitis C Screen Reflex to HCV RNA Quant and Genotype   Need for prophylactic vaccination and inoculation against influenza   High priority for 2019-nCoV vaccine   Adult BMI 32.0-32.9 kg/sq m  -     Hemoglobin A1c; Future   Severe persistent asthma with acute exacerbation   Ankle impingement syndrome, right  -     oxyCODONE (ROXICODONE) 5 MG tablet; Take 1 tablet (5 mg) by mouth daily as needed for severe pain   Other orders  -     INFLUENZA VACCINE IM > 6 MONTHS VALENT IIV4 (AFLURIA/FLUZONE)  -     COVID-19,PF,MODERNA BIVALENT 18+Yrs    Past Medical  History:   Diagnosis Date     Agoraphobia with panic attacks      Anxiety      Anxiety      Arthritis     of back     Asthma      Asthma in adult, moderate persistent, uncomplicated      Chronic pain      Chronic sinusitis      Cocaine abuse (H)      Controlled substance agreement signed 06/30/2015    Overview:  Patient has chronic pain and is seen at [facility] for this.  Has controlled substance agreement with them.  On Vicodin, Valium, Klonopin prescribed only from there.        Depression      Hx of seasonal allergies      Infectious pericarditis      Lipoma      Tobacco abuse       Current Outpatient Medications   Medication Sig Dispense Refill     acetaminophen (TYLENOL) 325 MG tablet Take 3 tablets (975 mg) by mouth every 6 hours as needed for mild pain 50 tablet 0     ALPRAZolam (XANAX) 2 MG tablet Take 1 tablet (2 mg) by mouth 2 times daily as needed for anxiety 60 tablet 3     cetirizine (ZYRTEC) 10 MG tablet Take 1 tablet (10 mg) by mouth daily 30 tablet 11     famotidine (PEPCID) 20 MG tablet Take 1 tablet (20 mg) by mouth 2 times daily 60 tablet 11     fluticasone (FLONASE) 50 MCG/ACT nasal spray Spray 2 sprays into both nostrils daily 9.9 mL 11     fluticasone-salmeterol (ADVAIR) 500-50 MCG/DOSE inhaler Inhale 1 puff into the lungs every 12 hours 60 each 11     ibuprofen (ADVIL/MOTRIN) 600 MG tablet Take 1 tablet (600 mg) by mouth 3 times daily as needed for moderate pain 90 tablet 4     ibuprofen (ADVIL/MOTRIN) 800 MG tablet Take 1 tablet (800 mg) by mouth every 6 hours as needed for other (mild and/or inflammatory pain) 30 tablet 0     methylPREDNISolone (MEDROL DOSEPAK) 4 MG tablet therapy pack Follow Package Directions 21 tablet 0     montelukast (SINGULAIR) 10 MG tablet Take 1 tablet (10 mg) by mouth At Bedtime 30 tablet 11     ondansetron (ZOFRAN ODT) 4 MG ODT tab Take 1 tablet (4 mg) by mouth every 8 hours as needed for nausea 10 tablet 1     oxyCODONE (ROXICODONE) 5 MG tablet Take 1  "tablet (5 mg) by mouth daily as needed for severe pain (7-10) 30 tablet 0     predniSONE (DELTASONE) 20 MG tablet Take two tablets (= 40mg) each day for 5 (five) days 10 tablet 0     PROAIR  (90 Base) MCG/ACT inhaler Inhale 2 puffs into the lungs every 4 hours as needed for shortness of breath / dyspnea or wheezing 8 g 11     spacer (OPTICHAMBER BINA) holding chamber For use w/ rescue inhaler 1 each 0     tiotropium (SPIRIVA RESPIMAT) 1.25 MCG/ACT inhaler Inhale 2 puffs into the lungs daily 12 g 3     tiotropium (SPIRIVA RESPIMAT) 2.5 MCG/ACT inhaler Inhale 2 puffs into the lungs daily 4 g 4     triamcinolone (KENALOG) 0.1 % external cream Apply topically 2 times daily 100 g 1      Allergies   Allergen Reactions     Lidocaine Other (See Comments)     \"my jaw stopped moving\"  Other reaction(s): Dystonia     Penicillins Hives, Rash and Shortness Of Breath     Epinephrine-Lidocaine-Na Metabisulfite Other (See Comments) and Muscle Pain (Myalgia)     Severe jaw cramping, double vision  Jaw locking      ROS:   Gen- + fevers/chills   Derm - no rash/ redness   Remainder of ROS negative.     Exam:   Telephone visit   "

## 2022-12-12 ENCOUNTER — TELEPHONE (OUTPATIENT)
Dept: FAMILY MEDICINE | Facility: CLINIC | Age: 47
End: 2022-12-12

## 2022-12-12 ENCOUNTER — TELEPHONE (OUTPATIENT)
Dept: GASTROENTEROLOGY | Facility: CLINIC | Age: 47
End: 2022-12-12

## 2022-12-12 NOTE — TELEPHONE ENCOUNTER
Patient call wanted to know the status. I advised have not have a respond back yet.  Patient advise not going to work under recvied letter.

## 2022-12-12 NOTE — LETTER
WORK MEDICAL NOTE  2022       Date of last visit: 22    Patient:  Darlene Hudson  :   1975  MRN:     [id number]  Physician: JULIETH BAILON    Darlene Hudson is under my care. She was diagnosed with COVID on 22 and completed her course of medications and 5 day quarantine. Her symptoms are improved and she does not have fevers. She is ok to return to work on 22 without restrictions. She should wear a mask for an additional 5 days while indoors.    The next clinic appointment is scheduled for (date/time) 22    Patient limitations:  Mask indoors x 5 additional days          Julieth Bailon MD  2022  4:37 PM

## 2022-12-12 NOTE — TELEPHONE ENCOUNTER
[facility] phone call message- general phone call:    Reason for call:     Patient call and advise talked to Dr. Bailon and he advise to call Monday morning to remind him to write letter to go back to work.    Per Patient no restriction and would like to go back to work tomorrow. Per patient please put letter in my chart and she will screen shot it.    Please call back and advise if needed. Thanks.     Return call needed: Yes    OK to leave a message on voice mail? Yes    Primary language: English      needed? No    Call taken on December 12, 2022 at 9:38 AM by Meredith Holder

## 2022-12-12 NOTE — TELEPHONE ENCOUNTER
Team - please let Karoline know that her letter is written and available in [employer]. If she needs to pick it up, please print and leave at . Thank you!    Robbie Bailon MD  December 12, 2022  4:38 PM

## 2022-12-12 NOTE — LETTER
WORK MEDICAL NOTE  2022       Date of last visit: 22    Patient:  Darlene Hudson  :   1975  MRN:     [id number]  Physician: JULIETH BAILON    Darlene Hudson is under my care. She was diagnosed with COVID on 22 and completed her course of medications and 5 day quarantine. Her symptoms are now improved such that she is ok to return to work on 22 without restrictions. She should wear a mask for an additional 3 days while indoors.    The next clinic appointment is scheduled for (date/time) 22    Patient limitations:  Mask indoors x 5 additional days            Julieth Bailon MD  2022  12:31 PM

## 2022-12-12 NOTE — TELEPHONE ENCOUNTER
THE PATIENT SAID SHE HAS PREFERRED ONE, CURRENTLY ONLY MA IN HER CHART  Screening Questions  BLUE  KIND OF PREP RED  LOCATION [review exclusion criteria] GREEN  SEDATION TYPE        Y Are you active on mychart?       SEVEN Ordering/Referring Provider?       PREFERREDONE What type of coverage do you have?      Y Have you had a positive covid test in the last 14 days?     32.5 1. BMI  [BMI 40+ - review exclusion criteria]    Y  2. Are you able to give consent for your medical care? [IF NO,RN REVIEW]        Y  3. Are you taking any prescription pain medications on a routine schedule?        3a. EXTENDED PREP What kind of prescription? PERCOCET    N 4. Do you have any chemical dependencies such as alcohol, street drugs, or methadone?    Y ANXIETY 5. Do you have any history of post-traumatic stress syndrome, severe anxiety or history of psychosis?      **If yes 3- 5 , please schedule with MAC sedation.**          IF YES TO ANY 6 - 10 - HOSPITAL SETTING ONLY.     N 6.   Do you need assistance transferring?     N 7.   Have you had a heart or lung transplant?    N 8.   Are you currently on dialysis?   N 9.   Do you use daily home oxygen?   N 10. Do you take nitroglycerin?   10a.  If yes, how often?     11. [FEMALES]  N Are you currently pregnant?    11a.  If yes, how many weeks? [ Greater than 12 weeks, OR NEEDED]    N 12. Do you have Pulmonary Hypertension? *NEED PAC APPT AT [facility]*     N 13. [review exclusion criteria]  Do you have any implantable devices in your body (pacemaker, defib, LVAD)?    N 14. In the past 6 months, have you had any heart related issues including cardiomyopathy or heart attack?     14a.  If yes, did it require cardiac stenting if so when?     N 15. Have you had a stroke or Transient ischemic attack (TIA - aka  mini stroke ) within 6 months?      N 16. Do you have mod to severe Obstructive Sleep Apnea?  [Hospital only - Ok at [facility]]    N 17. Do you have SEVERE AND UNCONTROLLED asthma? *NEED  "PAC APPT AT [facility]* - SOMETIMES SEVERE    N 18. Are you currently taking any blood thinners?     18a. If yes, inform patient to \"follow up w/ ordering provider for bridging instructions.\"    N 19. Do you take the medication Phentermine?    19a. If yes, \"Hold for 7 days before procedure.  Please consult your prescribing provider if you have questions about holding this medication.\"     N  20. Do you have chronic kidney disease?      N  21. Do you have a diagnosis of diabetes?     N  22. On a regular basis do you go 3-5 days between bowel movements?      23. Preferred LOCAL Pharmacy for Pre Prescription    [ LIST ONLY ONE PHARMACY]     [id number] - [facility] [address] AT [facility]    - CLOSING REMINDERS -    Informed patient they will need an adult    Cannot take any type of public or medical transportation alone    Conscious Sedation- Needs  for 6 hours after the procedure       MAC/General-Needs  for 24 hours after procedure    Pre-Procedure Covid test to be completed [[facility] PCR Testing Required]    Confirmed Nurse will call to complete assessment       - SCHEDULING DETAILS -     TBD  Surgeon    TBD  Date of Procedure  Lower Endoscopy [Colonoscopy]  [facility]   GOL[facility] EXTENDED - If you answer yes to questions #1, #3, #22 ([place] and CF pts)Which Colonoscopy Prep was Sent?     MAC Sedation Type     TBD PAC / Pre-op Required         Additional comments:      THE PATIENT WILL CALL BACK AFTER LOOKING AT HER SCHEDULE.        "

## 2022-12-13 NOTE — TELEPHONE ENCOUNTER
New letter written. Team - please inform Karoline. Thanks!    Robbie Bailon MD  December 13, 2022  12:31 PM

## 2022-12-14 ENCOUNTER — VIRTUAL VISIT (OUTPATIENT)
Dept: FAMILY MEDICINE | Facility: CLINIC | Age: 47
End: 2022-12-14
Payer: MEDICAID

## 2022-12-14 DIAGNOSIS — U07.1 INFECTION DUE TO 2019 NOVEL CORONAVIRUS: Primary | ICD-10-CM

## 2022-12-14 DIAGNOSIS — M25.561 CHRONIC PAIN OF BOTH KNEES: ICD-10-CM

## 2022-12-14 DIAGNOSIS — M25.562 CHRONIC PAIN OF BOTH KNEES: ICD-10-CM

## 2022-12-14 DIAGNOSIS — G89.29 CHRONIC PAIN OF BOTH KNEES: ICD-10-CM

## 2022-12-14 PROCEDURE — 99441 PR PHYSICIAN TELEPHONE EVALUATION 5-10 MIN: CPT | Mod: 93 | Performed by: FAMILY MEDICINE

## 2022-12-14 NOTE — PROGRESS NOTES
ASSESSMENT/PLAN:    (U07.1) Infection due to 2019 novel coronavirus  (primary encounter diagnosis)  Comment:   Plan: stable/ greatly improved; precautions/ anticipatory guidance given    (M25.561,  M25.562,  G89.29) Chronic pain of both knees  Comment: will check x-rays prior to next visit and see her in clinic to consider cortisone injections/ PT at that time  Plan: XR Knee Bilateral 3 Views            Robbie Bailon MD  December 14, 2022  10:51 PM        Pt is a 47 year old female last seen on 12/7/22 via telephone encounter evaluated again today via telephone for follow-up of :     COVID infection - breathing is better; still w/ a lot of fatigue and body aches but is able to go to work  Treated w/ Paxlovid  Returned to work today     Knee and calf pain and low back pain -    climbing a ton of steps at work  Taking ibuprofen throughout the day  Knows that weight is not ideal  Does wear boots at work so ankle     Has changed her diet, avoiding energy drinks  Quit caffeine    Past Medical History:   Diagnosis Date     Agoraphobia with panic attacks      Anxiety      Anxiety      Arthritis     of back     Asthma      Asthma in adult, moderate persistent, uncomplicated      Chronic pain      Chronic sinusitis      Cocaine abuse (H)      Controlled substance agreement signed 06/30/2015    Overview:  Patient has chronic pain and is seen at [facility] for this.  Has controlled substance agreement with them.  On Vicodin, Valium, Klonopin prescribed only from there.        Depression      Hx of seasonal allergies      Infectious pericarditis      Lipoma      Tobacco abuse       Past Surgical History:   Procedure Laterality Date     ANKLE SURGERY Right 05/30/2019     BIOPSY BREAST Right 1990    benign     CREATION PERICARDIAL WINDOW  02/10/2017    [facility]     DILATION AND CURETTAGE N/A 7/22/2022    Procedure: DILATION AND CURETTAGE, UTERUS;  Surgeon: Lesly Holland;  Location: [facility]      "HEMORRHOIDECTOMY EXTERNAL       HYSTEROSCOPY, WITH ENDOMETRIAL RADIOFREQUENCY ABLATION - NOVASURE N/A 7/22/2022    Procedure: HYSTEROSCOPY, WITH ENDOMETRIAL RADIOFREQUENCY ABLATION - NOVASURE DILATION AND CURETTAGE, UTERUS;  Surgeon: Lesly Holland;  Location: [facility]     TUMOR REMOVAL      Has had 3. In the right breast and \"inside of rib cage.\"      Current Outpatient Medications   Medication Sig Dispense Refill     acetaminophen (TYLENOL) 325 MG tablet Take 3 tablets (975 mg) by mouth every 6 hours as needed for mild pain 50 tablet 0     ALPRAZolam (XANAX) 2 MG tablet Take 1 tablet (2 mg) by mouth 2 times daily as needed for anxiety 60 tablet 3     cetirizine (ZYRTEC) 10 MG tablet Take 1 tablet (10 mg) by mouth daily 30 tablet 11     famotidine (PEPCID) 20 MG tablet Take 1 tablet (20 mg) by mouth 2 times daily 60 tablet 11     fluticasone (FLONASE) 50 MCG/ACT nasal spray Spray 2 sprays into both nostrils daily 9.9 mL 11     ibuprofen (ADVIL/MOTRIN) 600 MG tablet Take 1 tablet (600 mg) by mouth 3 times daily as needed for moderate pain 90 tablet 4     ibuprofen (ADVIL/MOTRIN) 800 MG tablet Take 1 tablet (800 mg) by mouth every 6 hours as needed for other (mild and/or inflammatory pain) 30 tablet 0     methylPREDNISolone (MEDROL DOSEPAK) 4 MG tablet therapy pack Follow Package Directions 21 tablet 0     montelukast (SINGULAIR) 10 MG tablet Take 1 tablet (10 mg) by mouth At Bedtime 30 tablet 11     nirmatrelvir and ritonavir (PAXLOVID, 300/100,) therapy pack Take 3 tablets by mouth 2 times daily 30 each 0     ondansetron (ZOFRAN ODT) 4 MG ODT tab Take 1 tablet (4 mg) by mouth every 8 hours as needed for nausea 10 tablet 1     oxyCODONE (ROXICODONE) 5 MG tablet Take 1 tablet (5 mg) by mouth daily as needed for severe pain (7-10) 30 tablet 0     predniSONE (DELTASONE) 20 MG tablet Take two tablets (= 40mg) each day for 5 (five) days 10 tablet 0     PROAIR  (90 Base) MCG/ACT inhaler Inhale 2 puffs into " "the lungs every 4 hours as needed for shortness of breath / dyspnea or wheezing 8 g 11     spacer (OPTICHAMBER BINA) holding chamber For use w/ rescue inhaler 1 each 0     tiotropium (SPIRIVA RESPIMAT) 1.25 MCG/ACT inhaler Inhale 2 puffs into the lungs daily 12 g 3     tiotropium (SPIRIVA RESPIMAT) 2.5 MCG/ACT inhaler Inhale 2 puffs into the lungs daily 4 g 4     triamcinolone (KENALOG) 0.1 % external cream Apply topically 2 times daily 100 g 1      Allergies   Allergen Reactions     Lidocaine Other (See Comments)     \"my jaw stopped moving\"  Other reaction(s): Dystonia     Penicillins Hives, Rash and Shortness Of Breath     Epinephrine-Lidocaine-Na Metabisulfite Other (See Comments) and Muscle Pain (Myalgia)     Severe jaw cramping, double vision  Jaw locking        ROS:   Gen- no weight change, no fevers/chills   Head/ Eyes- no blurred vision, no headaches   ENT- no cough, no congestion, no URI symptoms   Cardiac - no palpitations, no chest pain   Respiratory - no shortness of breath , no wheezing   Remainder of ROS negative.     Exam:   There were no vitals taken for this visit.   Alert; No acute distress     "

## 2022-12-14 NOTE — PROGRESS NOTES
Family Medicine Telephone Visit Note    Chief Complaint   Patient presents with     RECHECK     COVID  better but feels like low energy.Bodyaches.             HPI   Patients name: Karoline  Appointment start time:  4:46 PM    See my note    Current Outpatient Medications   Medication Sig Dispense Refill     acetaminophen (TYLENOL) 325 MG tablet Take 3 tablets (975 mg) by mouth every 6 hours as needed for mild pain 50 tablet 0     ALPRAZolam (XANAX) 2 MG tablet Take 1 tablet (2 mg) by mouth 2 times daily as needed for anxiety 60 tablet 3     cetirizine (ZYRTEC) 10 MG tablet Take 1 tablet (10 mg) by mouth daily 30 tablet 11     famotidine (PEPCID) 20 MG tablet Take 1 tablet (20 mg) by mouth 2 times daily 60 tablet 11     fluticasone (FLONASE) 50 MCG/ACT nasal spray Spray 2 sprays into both nostrils daily 9.9 mL 11     ibuprofen (ADVIL/MOTRIN) 600 MG tablet Take 1 tablet (600 mg) by mouth 3 times daily as needed for moderate pain 90 tablet 4     ibuprofen (ADVIL/MOTRIN) 800 MG tablet Take 1 tablet (800 mg) by mouth every 6 hours as needed for other (mild and/or inflammatory pain) 30 tablet 0     methylPREDNISolone (MEDROL DOSEPAK) 4 MG tablet therapy pack Follow Package Directions 21 tablet 0     montelukast (SINGULAIR) 10 MG tablet Take 1 tablet (10 mg) by mouth At Bedtime 30 tablet 11     nirmatrelvir and ritonavir (PAXLOVID, 300/100,) therapy pack Take 3 tablets by mouth 2 times daily 30 each 0     ondansetron (ZOFRAN ODT) 4 MG ODT tab Take 1 tablet (4 mg) by mouth every 8 hours as needed for nausea 10 tablet 1     oxyCODONE (ROXICODONE) 5 MG tablet Take 1 tablet (5 mg) by mouth daily as needed for severe pain (7-10) 30 tablet 0     predniSONE (DELTASONE) 20 MG tablet Take two tablets (= 40mg) each day for 5 (five) days 10 tablet 0     PROAIR  (90 Base) MCG/ACT inhaler Inhale 2 puffs into the lungs every 4 hours as needed for shortness of breath / dyspnea or wheezing 8 g 11     spacer (OPTICHAMBER BINA)  "holding chamber For use w/ rescue inhaler 1 each 0     tiotropium (SPIRIVA RESPIMAT) 1.25 MCG/ACT inhaler Inhale 2 puffs into the lungs daily 12 g 3     tiotropium (SPIRIVA RESPIMAT) 2.5 MCG/ACT inhaler Inhale 2 puffs into the lungs daily 4 g 4     triamcinolone (KENALOG) 0.1 % external cream Apply topically 2 times daily 100 g 1     Allergies   Allergen Reactions     Lidocaine Other (See Comments)     \"my jaw stopped moving\"  Other reaction(s): Dystonia     Penicillins Hives, Rash and Shortness Of Breath     Epinephrine-Lidocaine-Na Metabisulfite Other (See Comments) and Muscle Pain (Myalgia)     Severe jaw cramping, double vision  Jaw locking              Review of Systems:     Constitutional, HEENT, cardiovascular, pulmonary, gi and gu systems are negative, except as otherwise noted.         Physical Exam:     There were no vitals taken for this visit.  Estimated body mass index is 32.49 kg/m  as calculated from the following:    Height as of 9/19/22: 1.753 m (5' 9\").    Weight as of 9/20/22: 99.8 kg (220 lb).    Exam:  Constitutional: healthy, alert and no distress  Psychiatric: mentation appears normal and affect normal/bright          Assessment and Plan       ICD-10-CM    1. Infection due to 2019 novel coronavirus  U07.1       2. Chronic pain of both knees  M25.561 XR Knee Bilateral 3 Views    M25.562     G89.29           Refilled medications that would be required in the next 3 months.     After Visit Information:  Patient chose to view AVS via [employer]    No follow-ups on file.    Appointment end time: 4:55 PM  This is a telephone visit that took 9 minutes.      Clinician location:  [facility]     Robbie Bailon MD  December 14, 2022  10:51 PM    "

## 2022-12-25 ENCOUNTER — HOSPITAL ENCOUNTER (EMERGENCY)
Facility: HOSPITAL | Age: 47
Discharge: HOME | End: 2022-12-25
Attending: EMERGENCY MEDICINE | Admitting: EMERGENCY MEDICINE
Payer: MEDICAID

## 2022-12-25 ENCOUNTER — APPOINTMENT (OUTPATIENT)
Dept: CT IMAGING | Facility: HOSPITAL | Age: 47
End: 2022-12-25
Attending: EMERGENCY MEDICINE
Payer: MEDICAID

## 2022-12-25 VITALS
OXYGEN SATURATION: 97 % | SYSTOLIC BLOOD PRESSURE: 115 MMHG | TEMPERATURE: 97 F | WEIGHT: 220 LBS | HEART RATE: 82 BPM | DIASTOLIC BLOOD PRESSURE: 71 MMHG | RESPIRATION RATE: 18 BRPM | BODY MASS INDEX: 32.58 KG/M2 | HEIGHT: 69 IN

## 2022-12-25 DIAGNOSIS — R10.9 RIGHT FLANK PAIN: ICD-10-CM

## 2022-12-25 DIAGNOSIS — M25.871 ANKLE IMPINGEMENT SYNDROME, RIGHT: ICD-10-CM

## 2022-12-25 DIAGNOSIS — K63.89 COLONIC MASS: ICD-10-CM

## 2022-12-25 LAB
ALBUMIN SERPL BCG-MCNC: 3.9 G/DL (ref 3.5–5.2)
ALBUMIN UR-MCNC: 10 MG/DL
ALP SERPL-CCNC: 103 U/L (ref 35–104)
ALT SERPL W P-5'-P-CCNC: 15 U/L (ref 10–35)
ANION GAP SERPL CALCULATED.3IONS-SCNC: 9 MMOL/L (ref 7–15)
APPEARANCE UR: ABNORMAL
AST SERPL W P-5'-P-CCNC: 23 U/L (ref 10–35)
BASOPHILS # BLD AUTO: 0.1 10E3/UL (ref 0–0.2)
BASOPHILS NFR BLD AUTO: 1 %
BILIRUB SERPL-MCNC: <0.2 MG/DL
BILIRUB UR QL STRIP: NEGATIVE
BUN SERPL-MCNC: 18 MG/DL (ref 6–20)
CALCIUM SERPL-MCNC: 9.1 MG/DL (ref 8.6–10)
CHLORIDE SERPL-SCNC: 106 MMOL/L (ref 98–107)
COLOR UR AUTO: YELLOW
CREAT SERPL-MCNC: 0.83 MG/DL (ref 0.51–0.95)
D DIMER PPP FEU-MCNC: 0.89 UG/ML FEU (ref 0–0.5)
DEPRECATED HCO3 PLAS-SCNC: 26 MMOL/L (ref 22–29)
EOSINOPHIL # BLD AUTO: 0.9 10E3/UL (ref 0–0.7)
EOSINOPHIL NFR BLD AUTO: 12 %
ERYTHROCYTE [DISTWIDTH] IN BLOOD BY AUTOMATED COUNT: 12.2 % (ref 10–15)
GFR SERPL CREATININE-BSD FRML MDRD: 86 ML/MIN/1.73M2
GLUCOSE SERPL-MCNC: 83 MG/DL (ref 70–99)
GLUCOSE UR STRIP-MCNC: NEGATIVE MG/DL
HCT VFR BLD AUTO: 38.8 % (ref 35–47)
HGB BLD-MCNC: 12.8 G/DL (ref 11.7–15.7)
HGB UR QL STRIP: ABNORMAL
HYALINE CASTS: 1 /LPF
IMM GRANULOCYTES # BLD: 0 10E3/UL
IMM GRANULOCYTES NFR BLD: 0 %
KETONES UR STRIP-MCNC: NEGATIVE MG/DL
LEUKOCYTE ESTERASE UR QL STRIP: ABNORMAL
LYMPHOCYTES # BLD AUTO: 2.5 10E3/UL (ref 0.8–5.3)
LYMPHOCYTES NFR BLD AUTO: 34 %
MCH RBC QN AUTO: 30.7 PG (ref 26.5–33)
MCHC RBC AUTO-ENTMCNC: 33 G/DL (ref 31.5–36.5)
MCV RBC AUTO: 93 FL (ref 78–100)
MONOCYTES # BLD AUTO: 0.5 10E3/UL (ref 0–1.3)
MONOCYTES NFR BLD AUTO: 7 %
MUCOUS THREADS #/AREA URNS LPF: PRESENT /LPF
NEUTROPHILS # BLD AUTO: 3.4 10E3/UL (ref 1.6–8.3)
NEUTROPHILS NFR BLD AUTO: 46 %
NITRATE UR QL: NEGATIVE
NRBC # BLD AUTO: 0 10E3/UL
NRBC BLD AUTO-RTO: 0 /100
PH UR STRIP: 5.5 [PH] (ref 5–7)
PLATELET # BLD AUTO: 280 10E3/UL (ref 150–450)
POTASSIUM SERPL-SCNC: 3.8 MMOL/L (ref 3.4–5.3)
PROT SERPL-MCNC: 7.2 G/DL (ref 6.4–8.3)
RBC # BLD AUTO: 4.17 10E6/UL (ref 3.8–5.2)
RBC URINE: 4 /HPF
SODIUM SERPL-SCNC: 141 MMOL/L (ref 136–145)
SP GR UR STRIP: 1.03 (ref 1–1.03)
SQUAMOUS EPITHELIAL: 14 /HPF
UROBILINOGEN UR STRIP-MCNC: <2 MG/DL
WBC # BLD AUTO: 7.2 10E3/UL (ref 4–11)
WBC URINE: 6 /HPF

## 2022-12-25 PROCEDURE — 87086 URINE CULTURE/COLONY COUNT: CPT | Performed by: EMERGENCY MEDICINE

## 2022-12-25 PROCEDURE — 250N000011 HC RX IP 250 OP 636: Performed by: EMERGENCY MEDICINE

## 2022-12-25 PROCEDURE — 96374 THER/PROPH/DIAG INJ IV PUSH: CPT | Mod: 59

## 2022-12-25 PROCEDURE — 999N000285 HC STATISTIC VASC ACCESS LAB DRAW WITH PIV START

## 2022-12-25 PROCEDURE — 71275 CT ANGIOGRAPHY CHEST: CPT

## 2022-12-25 PROCEDURE — 99285 EMERGENCY DEPT VISIT HI MDM: CPT | Mod: 25

## 2022-12-25 PROCEDURE — 999N000127 HC STATISTIC PERIPHERAL IV START W US GUIDANCE

## 2022-12-25 PROCEDURE — 96375 TX/PRO/DX INJ NEW DRUG ADDON: CPT

## 2022-12-25 PROCEDURE — 85379 FIBRIN DEGRADATION QUANT: CPT | Performed by: EMERGENCY MEDICINE

## 2022-12-25 PROCEDURE — 36415 COLL VENOUS BLD VENIPUNCTURE: CPT | Performed by: EMERGENCY MEDICINE

## 2022-12-25 PROCEDURE — 81001 URINALYSIS AUTO W/SCOPE: CPT | Performed by: EMERGENCY MEDICINE

## 2022-12-25 PROCEDURE — 74177 CT ABD & PELVIS W/CONTRAST: CPT

## 2022-12-25 PROCEDURE — 80053 COMPREHEN METABOLIC PANEL: CPT | Performed by: EMERGENCY MEDICINE

## 2022-12-25 PROCEDURE — 85025 COMPLETE CBC W/AUTO DIFF WBC: CPT | Performed by: EMERGENCY MEDICINE

## 2022-12-25 PROCEDURE — 250N000013 HC RX MED GY IP 250 OP 250 PS 637: Performed by: EMERGENCY MEDICINE

## 2022-12-25 RX ORDER — IOPAMIDOL 755 MG/ML
100 INJECTION, SOLUTION INTRAVASCULAR ONCE
Status: COMPLETED | OUTPATIENT
Start: 2022-12-25 | End: 2022-12-25

## 2022-12-25 RX ORDER — ALPRAZOLAM 0.5 MG
2 TABLET ORAL ONCE
Status: COMPLETED | OUTPATIENT
Start: 2022-12-25 | End: 2022-12-25

## 2022-12-25 RX ORDER — OXYCODONE HYDROCHLORIDE 5 MG/1
5 TABLET ORAL ONCE
Status: COMPLETED | OUTPATIENT
Start: 2022-12-25 | End: 2022-12-25

## 2022-12-25 RX ORDER — ONDANSETRON 2 MG/ML
4 INJECTION INTRAMUSCULAR; INTRAVENOUS ONCE
Status: COMPLETED | OUTPATIENT
Start: 2022-12-25 | End: 2022-12-25

## 2022-12-25 RX ORDER — KETOROLAC TROMETHAMINE 15 MG/ML
15 INJECTION, SOLUTION INTRAMUSCULAR; INTRAVENOUS ONCE
Status: COMPLETED | OUTPATIENT
Start: 2022-12-25 | End: 2022-12-25

## 2022-12-25 RX ORDER — MORPHINE SULFATE 4 MG/ML
4 INJECTION, SOLUTION INTRAMUSCULAR; INTRAVENOUS ONCE
Status: COMPLETED | OUTPATIENT
Start: 2022-12-25 | End: 2022-12-25

## 2022-12-25 RX ORDER — OXYCODONE HYDROCHLORIDE 5 MG/1
5 TABLET ORAL EVERY 8 HOURS PRN
Qty: 9 TABLET | Refills: 0 | Status: SHIPPED | OUTPATIENT
Start: 2022-12-25 | End: 2022-12-28

## 2022-12-25 RX ADMIN — ALPRAZOLAM 2 MG: 0.5 TABLET ORAL at 19:41

## 2022-12-25 RX ADMIN — OXYCODONE HYDROCHLORIDE 5 MG: 5 TABLET ORAL at 21:34

## 2022-12-25 RX ADMIN — IOPAMIDOL 100 ML: 755 INJECTION, SOLUTION INTRAVENOUS at 19:44

## 2022-12-25 RX ADMIN — KETOROLAC TROMETHAMINE 15 MG: 15 INJECTION, SOLUTION INTRAMUSCULAR; INTRAVENOUS at 17:39

## 2022-12-25 RX ADMIN — MORPHINE SULFATE 4 MG: 4 INJECTION INTRAVENOUS at 19:42

## 2022-12-25 RX ADMIN — ONDANSETRON 4 MG: 2 INJECTION INTRAMUSCULAR; INTRAVENOUS at 17:39

## 2022-12-25 RX ADMIN — IOPAMIDOL 100 ML: 755 INJECTION, SOLUTION INTRAVENOUS at 22:24

## 2022-12-25 ASSESSMENT — ACTIVITIES OF DAILY LIVING (ADL)
ADLS_ACUITY_SCORE: 35

## 2022-12-25 NOTE — ED PROVIDER NOTES
EMERGENCY DEPARTMENT NOTE     Name: Darlene Hudson    Age/Sex: 47 year old female   MRN: [id number]   Evaluation Date & Time:  12/25/2022  4:22 PM    PCP:    Robbie Bailon   ED Provider: Gilbert Jade D.O.       CHIEF COMPLAINT    Abdominal Pain       DIAGNOSIS & DISPOSITION     1. Ankle impingement syndrome, right    2. Right flank pain    3. Colonic mass      DISPOSITION: Home    At the conclusion of the encounter I discussed the results of all of the tests and the disposition. The questions were answered. The patient or family acknowledged understanding and was agreeable with the care plan.    TOTAL CRITICAL CARE TIME (EXCLUDING PROCEDURES): Not applicable    PROCEDURES:   None    EMERGENCY DEPARTMENT COURSE/MEDICAL DECISION MAKING   4:55 PM I met with the patient to gather history and to perform my initial exam.  We discussed treatment options and the plan for care while in the Emergency Department.  6:57 PM Rechecked the patient. She reported no improvement of pain with initial pain medications. Also requesting medication for anxiety.  7:52 PM Discussed imaging results with radiologist, Dr. Benitez. Results significant for proximal descending colonic wall thickening and adjacent lymph nodes compatible with colonic neoplasm, recommended urology consultation.  8:04 PM Placed call to general surgery.  8:07 PM Spoke with Dr. Raygoza, general surgery. Discussed the patient's case.    Darlene Hudson is a 47 year old female with relevant past history of asthma, infectious pericarditis, and menorrhagia with irregular cycle who presents to the emergency department for evaluation of abdominal pain. The patient reports 5 days of constant right sided abdominal pain that wraps to her back with associated nausea and vomiting.         Triage note reviewed:Patient reports constant  Right side abdominal pain for 5 days, now N/V.  No urinary sx        Differential  diagnosis  considered included but not limited to bowel  "obstruction or perforation, appendicitis, cholecystitis or choledocholithiasis, obstructing urolithiasis, urinary tract infection, referred cardiopulmonary source including pneumonia or pulmonary embolism  Vital signs:/71   Pulse 82   Temp 97  F (36.1  C) (Temporal)   Resp 18   Ht 1.753 m (5' 9\")   Wt 99.8 kg (220 lb)   SpO2 97%   BMI 32.49 kg/m    Pertinent physical exam findings:  Abdomen: Tenderness along the right side of the abdomen both in the right upper and lower quadrants.  No guarding or peritoneal signs, no CVA tenderness, bowel sounds present  Diagnostic studies:  Imaging:  CT Chest Pulmonary Embolism w Contrast   Final Result   IMPRESSION:   1.  No evidence of pulmonary embolus or other acute process in the chest.      CT Abdomen Pelvis w Contrast   Final Result   Addendum (preliminary) 1 of 1   Addendum: First impression should read \"1. Proximal descending colonic    wall thickening and adjacent lymph nodes compatible with colonic neoplasm.    GASTROENTEROLOGY consultation recommended.\"      Final   IMPRESSION:    1.  Proximal descending colonic wall thickening and adjacent lymph nodes compatible with colonic neoplasm. Urology consultation recommended.   2.  Nonspecific periportal adenopathy.      Impression was called to Dr. Jade by Dr. Jonny Benitez on 12/25/2022 7:52 PM.            Lab:  Labs Ordered and Resulted from Time of ED Arrival to Time of ED Departure   ROUTINE UA WITH MICROSCOPIC REFLEX TO CULTURE - Abnormal       Result Value    Color Urine Yellow      Appearance Urine Turbid (*)     Glucose Urine Negative      Bilirubin Urine Negative      Ketones Urine Negative      Specific Gravity Urine 1.033 (*)     Blood Urine 0.2 mg/dL (*)     pH Urine 5.5      Protein Albumin Urine 10 (*)     Urobilinogen Urine <2.0      Nitrite Urine Negative      Leukocyte Esterase Urine 250 Laura/uL (*)     Mucus Urine Present (*)     RBC Urine 4 (*)     WBC Urine 6 (*)     Squamous " Epithelials Urine 14 (*)     Hyaline Casts Urine 1     CBC WITH PLATELETS AND DIFFERENTIAL - Abnormal    WBC Count 7.2      RBC Count 4.17      Hemoglobin 12.8      Hematocrit 38.8      MCV 93      MCH 30.7      MCHC 33.0      RDW 12.2      Platelet Count 280      % Neutrophils 46      % Lymphocytes 34      % Monocytes 7      % Eosinophils 12      % Basophils 1      % Immature Granulocytes 0      NRBCs per 100 WBC 0      Absolute Neutrophils 3.4      Absolute Lymphocytes 2.5      Absolute Monocytes 0.5      Absolute Eosinophils 0.9 (*)     Absolute Basophils 0.1      Absolute Immature Granulocytes 0.0      Absolute NRBCs 0.0     D DIMER QUANTITATIVE - Abnormal    D-Dimer Quantitative 0.89 (*)    COMPREHENSIVE METABOLIC PANEL - Normal    Sodium 141      Potassium 3.8      Chloride 106      Carbon Dioxide (CO2) 26      Anion Gap 9      Urea Nitrogen 18.0      Creatinine 0.83      Calcium 9.1      Glucose 83      Alkaline Phosphatase 103      AST 23      ALT 15      Protein Total 7.2      Albumin 3.9      Bilirubin Total <0.2      GFR Estimate 86        Interventions: IV ketorolac, ondansetron, morphine, oral Xanax  Medical decision making: CT scan showed possible proximal descending colon mass, no evidence of obstruction or perforation.  Appendix appeared normal, no CT evidence of cholecystitis cholelithiasis choledocholithiasis.  No obstructing urolithiasis.  Urinalysis was negative.  Given right-sided pain and possible malignancy obtain D-dimer which was elevated.  CTA of the chest showed no pulmonary embolism or other process including pneumonia.  Patient's pain is improved.  She is able to take fluids without recurrent vomiting.  Serial abdominal exams now minimally tender.  Discussed case with Dr. Raygoza and arrangements are made for outpatient follow-up.  Patient will be discharged.  She will follow-up with surgery within the next 2 days.  She will continue oxycodone for pain management with ondansetron for  nausea management.  If uncontrolled pain, recurrent vomiting despite the use of ondansetron or new symptoms including fever or rectal bleeding will return to the emergency department.  Medical Decision Making    History:    Supplemental history from: Documented in HPI, if applicable    External Record(s) reviewed: Documented in HPI, if applicable.    Work Up:    Chart documentation includes differential considered and any EKGs or imaging independently interpreted by provider.    In additional to work up documented, I considered the following work up: See chart documentation, if applicable.    External consultation:    Discussion of management with another provider: See chart documentation, if applicable and General Surgery    Complicating factors:    Care impacted by chronic illness: N/A    Care affected by social determinants of health: N/A    Disposition considerations: Discharge. I prescribed additional prescription strength medication(s) as charted. I considered admission, but ultimately discharged patient With clinical improvement and after discussion with surgery.      ED INTERVENTIONS     Medications   ketorolac (TORADOL) injection 15 mg (15 mg Intravenous Given 12/25/22 1739)   ondansetron (ZOFRAN) injection 4 mg (4 mg Intravenous Given 12/25/22 1739)   morphine (PF) injection 4 mg (4 mg Intravenous Given 12/25/22 1942)   ALPRAZolam (XANAX) tablet 2 mg (2 mg Oral Given 12/25/22 1941)   iopamidol (ISOVUE-370) solution 100 mL (100 mLs Intravenous Given 12/25/22 1944)   oxyCODONE (ROXICODONE) tablet 5 mg (5 mg Oral Given 12/25/22 2134)   iopamidol (ISOVUE-370) solution 100 mL (100 mLs Intravenous Given 12/25/22 2224)       DISCHARGE MEDICATIONS        Review of your medicines      UNREVIEWED medicines. Ask your doctor about these medicines      Dose / Directions   acetaminophen 325 MG tablet  Commonly known as: TYLENOL  Used for: Post-op pain      Dose: 975 mg  Take 3 tablets (975 mg) by mouth every 6 hours as  needed for mild pain  Quantity: 50 tablet  Refills: 0     ALPRAZolam 2 MG tablet  Commonly known as: XANAX  Used for: Panic disorder with agoraphobia, Generalized anxiety disorder      Dose: 2 mg  Take 1 tablet (2 mg) by mouth 2 times daily as needed for anxiety  Quantity: 60 tablet  Refills: 3     cetirizine 10 MG tablet  Commonly known as: zyrTEC  Used for: Seasonal allergic rhinitis, unspecified trigger      Dose: 10 mg  Take 1 tablet (10 mg) by mouth daily  Quantity: 30 tablet  Refills: 11     famotidine 20 MG tablet  Commonly known as: PEPCID  Used for: NSAID long-term use      Dose: 20 mg  Take 1 tablet (20 mg) by mouth 2 times daily  Quantity: 60 tablet  Refills: 11     fluticasone 50 MCG/ACT nasal spray  Commonly known as: FLONASE  Used for: Seasonal allergic rhinitis, unspecified trigger      Dose: 2 spray  Spray 2 sprays into both nostrils daily  Quantity: 9.9 mL  Refills: 11     * ibuprofen 600 MG tablet  Commonly known as: ADVIL/MOTRIN  Used for: Acute right ankle pain      Dose: 600 mg  Take 1 tablet (600 mg) by mouth 3 times daily as needed for moderate pain  Quantity: 90 tablet  Refills: 4     * ibuprofen 800 MG tablet  Commonly known as: ADVIL/MOTRIN  Used for: Post-op pain      Dose: 800 mg  Take 1 tablet (800 mg) by mouth every 6 hours as needed for other (mild and/or inflammatory pain)  Quantity: 30 tablet  Refills: 0     methylPREDNISolone 4 MG tablet therapy pack  Commonly known as: MEDROL DOSEPAK  Used for: Right ankle swelling      Follow Package Directions  Quantity: 21 tablet  Refills: 0     montelukast 10 MG tablet  Commonly known as: Singulair  Used for: Moderate persistent asthma without complication      Dose: 10 mg  Take 1 tablet (10 mg) by mouth At Bedtime  Quantity: 30 tablet  Refills: 11     ondansetron 4 MG ODT tab  Commonly known as: Zofran ODT  Used for: Infection due to 2019 novel coronavirus      Dose: 4 mg  Take 1 tablet (4 mg) by mouth every 8 hours as needed for  nausea  Quantity: 10 tablet  Refills: 1     predniSONE 20 MG tablet  Commonly known as: DELTASONE      Take two tablets (= 40mg) each day for 5 (five) days  Quantity: 10 tablet  Refills: 0     ProAir  (90 Base) MCG/ACT inhaler  Used for: Moderate persistent asthma without complication  Generic drug: albuterol      Dose: 2 puff  Inhale 2 puffs into the lungs every 4 hours as needed for shortness of breath / dyspnea or wheezing  Quantity: 8 g  Refills: 11     * Spiriva Respimat 1.25 MCG/ACT inhaler  Used for: Severe persistent asthma with acute exacerbation  Generic drug: tiotropium      Dose: 2 puff  Inhale 2 puffs into the lungs daily  Quantity: 12 g  Refills: 3     * tiotropium 2.5 MCG/ACT inhaler  Commonly known as: SPIRIVA RESPIMAT  Used for: Severe persistent asthma with acute exacerbation      Dose: 2 puff  Inhale 2 puffs into the lungs daily  Quantity: 4 g  Refills: 4     triamcinolone 0.1 % external cream  Commonly known as: KENALOG  Used for: Contact dermatitis and eczema      Apply topically 2 times daily  Quantity: 100 g  Refills: 1         * This list has 4 medication(s) that are the same as other medications prescribed for you. Read the directions carefully, and ask your doctor or other care provider to review them with you.            CONTINUE these medicines which may have CHANGED, or have new prescriptions. If we are uncertain of the size of tablets/capsules you have at home, strength may be listed as something that might have changed.      Dose / Directions   oxyCODONE 5 MG tablet  Commonly known as: ROXICODONE  This may have changed: when to take this  Used for: Ankle impingement syndrome, right      Dose: 5 mg  Take 1 tablet (5 mg) by mouth every 8 hours as needed for severe pain (7-10)  Quantity: 9 tablet  Refills: 0        CONTINUE these medicines which have NOT CHANGED      Dose / Directions   spacer holding chamber  Used for: Moderate persistent asthma with acute exacerbation      For  use w/ rescue inhaler  Quantity: 1 each  Refills: 0           Where to get your medicines      Some of these will need a paper prescription and others can be bought over the counter. Ask your nurse if you have questions.    Bring a paper prescription for each of these medications    oxyCODONE 5 MG tablet           INFORMATION SOURCE AND LIMITATIONS    History/Exam limitations: None  Patient information was obtained from: Patient  Use of : N/A    HISTORY OF PRESENT ILLNESS   Darlene Hudson is a 47 year old year old female with a relevant past history of asthma, infectious pericarditis, and menorrhagia with irregular cycle, who presents to this ED via private vehicle for evaluation of abdominal pain.    Per chart review, the patient was seen via virtual visit by her PCP on 12/14/22 for a follow-up appointment. The patient was previously seen on 12/7/22 at which time she was positive for COVID-19 and started on Paxlovid. On 12/14 she reports her breathing was better, but she was still feeling a lot of fatigue and body aches. She also reports knee, calf, and low back pain that she has been taking ibuprofen for. Plan for bilateral knee x-rays prior to next visit, order placed.    The patient reports 5 days of constant right sided abdominal pain. She describes the pain as shooting. The pain wraps around her right side into her back. She endorses associated nausea and vomiting. She denies diarrhea, constipation, bloody or black stool, dysuria, hematuria, fever, vaginal bleeding, abnormal vaginal discharge, chest pain, shortness of breath, and any other complaints at this time.    REVIEW OF SYSTEMS:   Constitutional: Negative for  fever.   HENT: Negative for URI symptoms or sore throat.    Cardiac: Negative for  chest pain,palpitations, near syncope or syncope  Respiratory: Negative for cough and shortness of breath.    Gastrointestinal: Negative for constipation, diarrhea, rectal bleeding or melena. Positive  for abdominal pain, nausea, vomiting.  Genitourinary: Negative for dysuria, flank pain and hematuria, vaginal bleeding, vaginal discharge.   Musculoskeletal: Negative for back pain.   Skin: Negative for  rash  Neurological: Negative for dizziness, headache, syncope, speech difficulty, unilateral weakness or imbalance with walking.   Hematological: Negative for adenopathy. Does not bruise/bleed easily.   Psychiatric/Behavioral: Negative for confusion.     PATIENT HISTORY     Past Medical History:   Diagnosis Date     Agoraphobia with panic attacks      Anxiety      Anxiety      Arthritis      Asthma      Asthma in adult, moderate persistent, uncomplicated      Chronic pain      Chronic sinusitis      Cocaine abuse (H)      Controlled substance agreement signed 06/30/2015     Depression      Hx of seasonal allergies      Infectious pericarditis      Lipoma      Tobacco abuse      Patient Active Problem List   Diagnosis     Abnormal cytology finding     Nondependent alcohol abuse, episodic drinking behavior     Controlled substance agreement signed     Low back pain     Chronic sinusitis     Major depressive disorder, recurrent episode, moderate (H)     Tobacco use disorder     Noninflammatory disorder of vagina     Pap smear for cervical cancer screening     Abdominal pain     Abnormal cervical Papanicolaou smear     Panic disorder with agoraphobia     Atopic rhinitis     Chronic low back pain     Other long term (current) drug therapy     Acute pericardial effusion     Anxiety     Chronic infectious pericarditis     Polysubstance abuse (H)     Cough     Arthralgia of both lower legs     Moderate persistent asthma without complication     Physiologic disturbance of temperature regulation     Family history of colon cancer     Right ankle swelling     Menorrhagia with irregular cycle     Infection due to 2019 novel coronavirus     Past Surgical History:   Procedure Laterality Date     ANKLE SURGERY Right 05/30/2019      "BIOPSY BREAST Right 1990    benign     CREATION PERICARDIAL WINDOW  02/10/2017    [facility]     DILATION AND CURETTAGE N/A 7/22/2022    Procedure: DILATION AND CURETTAGE, UTERUS;  Surgeon: Lesly Holland;  Location: [facility]     HEMORRHOIDECTOMY EXTERNAL       HYSTEROSCOPY, WITH ENDOMETRIAL RADIOFREQUENCY ABLATION - NOVASURE N/A 7/22/2022    Procedure: HYSTEROSCOPY, WITH ENDOMETRIAL RADIOFREQUENCY ABLATION - NOVASURE DILATION AND CURETTAGE, UTERUS;  Surgeon: Lesly Holalnd;  Location: [facility]     TUMOR REMOVAL      Has had 3. In the right breast and \"inside of rib cage.\"     Social Histrory  Smoking:  Alcohol Use:  Drug Use:  Allergies   Allergen Reactions     Lidocaine Other (See Comments)     \"my jaw stopped moving\"  Other reaction(s): Dystonia     Penicillins Hives, Rash and Shortness Of Breath     Epinephrine-Lidocaine-Na Metabisulfite Other (See Comments) and Muscle Pain (Myalgia)     Severe jaw cramping, double vision  Jaw locking       OUTPATIENT MEDICATIONS     Discharge Medication List as of 12/25/2022 11:23 PM         Vitals:    12/25/22 1900 12/25/22 1945 12/25/22 2030 12/25/22 2145   BP: 113/72 122/74 115/71    Pulse: 75 70 83 82   Resp:       Temp:       TempSrc:       SpO2: 99% 100% 96% 97%   Weight:       Height:           Physical Exam   Constitutional: Oriented to person, place, and time. Appears well-developed and well-nourished.   HEENT:    Head: Atraumatic.   Neck: Normal range of motion. Neck supple.   Cardiovascular: Normal rate, regular rhythm and normal heart sounds.    Pulmonary/Chest: Normal effort  and breath sounds normal.   Abdominal: Soft. RUQ and RLQ tenderness without guarding. No peritoneal signs. Bowel sounds are normal.   Musculoskeletal: Normal range of motion.   Neurological: Alert and oriented to person, place, and time. Normal strength. No sensory deficit. No cranial nerve deficit.  Skin: Skin is warm and dry.   Psychiatric: Normal mood and affect. " Behavior is normal. Thought content normal.     DIAGNOSTICS    LABORATORY FINDINGS (REVIEWED AND INTERPRETED):  Labs Ordered and Resulted from Time of ED Arrival to Time of ED Departure   ROUTINE UA WITH MICROSCOPIC REFLEX TO CULTURE - Abnormal       Result Value    Color Urine Yellow      Appearance Urine Turbid (*)     Glucose Urine Negative      Bilirubin Urine Negative      Ketones Urine Negative      Specific Gravity Urine 1.033 (*)     Blood Urine 0.2 mg/dL (*)     pH Urine 5.5      Protein Albumin Urine 10 (*)     Urobilinogen Urine <2.0      Nitrite Urine Negative      Leukocyte Esterase Urine 250 Laura/uL (*)     Mucus Urine Present (*)     RBC Urine 4 (*)     WBC Urine 6 (*)     Squamous Epithelials Urine 14 (*)     Hyaline Casts Urine 1     CBC WITH PLATELETS AND DIFFERENTIAL - Abnormal    WBC Count 7.2      RBC Count 4.17      Hemoglobin 12.8      Hematocrit 38.8      MCV 93      MCH 30.7      MCHC 33.0      RDW 12.2      Platelet Count 280      % Neutrophils 46      % Lymphocytes 34      % Monocytes 7      % Eosinophils 12      % Basophils 1      % Immature Granulocytes 0      NRBCs per 100 WBC 0      Absolute Neutrophils 3.4      Absolute Lymphocytes 2.5      Absolute Monocytes 0.5      Absolute Eosinophils 0.9 (*)     Absolute Basophils 0.1      Absolute Immature Granulocytes 0.0      Absolute NRBCs 0.0     D DIMER QUANTITATIVE - Abnormal    D-Dimer Quantitative 0.89 (*)    COMPREHENSIVE METABOLIC PANEL - Normal    Sodium 141      Potassium 3.8      Chloride 106      Carbon Dioxide (CO2) 26      Anion Gap 9      Urea Nitrogen 18.0      Creatinine 0.83      Calcium 9.1      Glucose 83      Alkaline Phosphatase 103      AST 23      ALT 15      Protein Total 7.2      Albumin 3.9      Bilirubin Total <0.2      GFR Estimate 86           IMAGING (REVIEWED AND INTERPRETED):  CT Chest Pulmonary Embolism w Contrast   Final Result   IMPRESSION:   1.  No evidence of pulmonary embolus or other acute process in the  "chest.      CT Abdomen Pelvis w Contrast   Final Result   Addendum (preliminary) 1 of 1   Addendum: First impression should read \"1. Proximal descending colonic    wall thickening and adjacent lymph nodes compatible with colonic neoplasm.    GASTROENTEROLOGY consultation recommended.\"      Final   IMPRESSION:    1.  Proximal descending colonic wall thickening and adjacent lymph nodes compatible with colonic neoplasm. Urology consultation recommended.   2.  Nonspecific periportal adenopathy.      Impression was called to Dr. Jade by Dr. Jonny Benitez on 12/25/2022 7:52 PM.                   I, Homer Giles, am serving as a scribe to document services personally performed by Gilbert Jade D.O., based on my observation and the provider s statements to me.    I, Gilbert Jade D.O., attest that Homer Giles is acting in a scribe capacity, has observed my performance of the services and has documented them in accordance with my direction.    Gilbert Jade D.O.  EMERGENCY MEDICINE   12/25/22  [facility] EMERGENCY DEPARTMENT  [address] [place] [id number]  [phone]  Dept: [phone]       Gilbert Jade DO  12/26/22 1934    "

## 2022-12-26 ENCOUNTER — PATIENT OUTREACH (OUTPATIENT)
Dept: CARE COORDINATION | Facility: CLINIC | Age: 47
End: 2022-12-26

## 2022-12-26 ENCOUNTER — MYC MEDICAL ADVICE (OUTPATIENT)
Dept: FAMILY MEDICINE | Facility: CLINIC | Age: 47
End: 2022-12-26

## 2022-12-26 ENCOUNTER — TELEPHONE (OUTPATIENT)
Dept: FAMILY MEDICINE | Facility: CLINIC | Age: 47
End: 2022-12-26

## 2022-12-26 ENCOUNTER — TELEPHONE (OUTPATIENT)
Dept: SURGERY | Facility: CLINIC | Age: 47
End: 2022-12-26

## 2022-12-26 ENCOUNTER — VIRTUAL VISIT (OUTPATIENT)
Dept: FAMILY MEDICINE | Facility: CLINIC | Age: 47
End: 2022-12-26
Payer: MEDICAID

## 2022-12-26 ENCOUNTER — HOSPITAL ENCOUNTER (EMERGENCY)
Facility: HOSPITAL | Age: 47
Discharge: LEFT BEFORE DISCHARGE | End: 2022-12-26
Attending: EMERGENCY MEDICINE
Payer: MEDICAID

## 2022-12-26 VITALS
WEIGHT: 220.02 LBS | HEART RATE: 106 BPM | BODY MASS INDEX: 32.49 KG/M2 | OXYGEN SATURATION: 99 % | DIASTOLIC BLOOD PRESSURE: 100 MMHG | SYSTOLIC BLOOD PRESSURE: 150 MMHG | TEMPERATURE: 98.2 F | RESPIRATION RATE: 16 BRPM

## 2022-12-26 DIAGNOSIS — G89.29 CHRONIC PAIN OF BOTH KNEES: ICD-10-CM

## 2022-12-26 DIAGNOSIS — R10.9 ACUTE ABDOMINAL PAIN: Primary | ICD-10-CM

## 2022-12-26 DIAGNOSIS — M25.562 CHRONIC PAIN OF BOTH KNEES: ICD-10-CM

## 2022-12-26 DIAGNOSIS — R11.2 NAUSEA AND VOMITING, UNSPECIFIED VOMITING TYPE: ICD-10-CM

## 2022-12-26 DIAGNOSIS — R10.9 ABDOMINAL PAIN, UNSPECIFIED ABDOMINAL LOCATION: ICD-10-CM

## 2022-12-26 DIAGNOSIS — M25.561 CHRONIC PAIN OF BOTH KNEES: ICD-10-CM

## 2022-12-26 PROCEDURE — 99442 PR PHYSICIAN TELEPHONE EVALUATION 11-20 MIN: CPT | Mod: 93

## 2022-12-26 RX ORDER — OXYCODONE HYDROCHLORIDE 5 MG/1
5 TABLET ORAL EVERY 6 HOURS PRN
Qty: 8 TABLET | Refills: 0 | Status: SHIPPED | OUTPATIENT
Start: 2022-12-26 | End: 2022-12-28

## 2022-12-26 RX ORDER — ONDANSETRON 4 MG/1
4 TABLET, ORALLY DISINTEGRATING ORAL EVERY 8 HOURS PRN
Qty: 30 TABLET | Refills: 0 | Status: SHIPPED | OUTPATIENT
Start: 2022-12-26 | End: 2022-12-28

## 2022-12-26 NOTE — ED TRIAGE NOTES
"Pt c/o abdominal pain since last Wednesday, and nausea and vomiting since Saturday night. Pt states she was seen here yesterday and diagnosed with a \"mass\" in her colon. Pt last took Ibuprofen 800 mg at 1600 with no relief. Pt states, \" I feel like I'm dying.\"     Triage Assessment     Row Name 12/26/22 6182       Triage Assessment (Adult)    Airway WDL WDL       Respiratory WDL    Respiratory WDL WDL       Skin Circulation/Temperature WDL    Skin Circulation/Temperature WDL WDL       Cardiac WDL    Cardiac WDL WDL       Peripheral/Neurovascular WDL    Peripheral Neurovascular WDL WDL       Cognitive/Neuro/Behavioral WDL    Cognitive/Neuro/Behavioral WDL WDL              "

## 2022-12-26 NOTE — PROGRESS NOTES
Surgery    Met with patient with Dr. Jade.  Patient is a here today with abdominal pain work-up with a CT scan showing a left-sided splenic flexure mass.  She did plan to get a colonoscopy here in the next month or so time.  Secondary to her father dying of colon cancer at the age of 54.  She is 47 soon-to-be 48.  Her pain is most likely not related to this but she will need this worked up.  She had plans to get this colonoscopy in the next 2 to 3 months it was me set up through [facility] so would have been through one of my partners but will have her call in Monday to get this scheduled here in the next week or so time.  Colonoscopy and biopsies and then further planning depending on those findings.    [facility] [phone].        Elmo Raygoza MD  General Surgery [phone]  Vascular Surgery [phone]

## 2022-12-26 NOTE — PROGRESS NOTES
Family Medicine Telephone Visit Note    Chief Complaint   Patient presents with     ER F/U     Go over imaging result     Medication Reconciliation     Completed          HPI   Patients name: Karoline  Appointment start time:  4:15 PM    ED follow-up  Patient was seen in the emergency room on 12/25 with right sided abdominal pain + N/V. Imaging at that visit showed descending colonic wall thickening with adjacent lymph node reactivity concerning for colonic neoplasm. Patient has follow-up appointments scheduled with Dr. Bailon and general surgeon Dr. Matthews on 12/28.   - Abdominal pain: out of pain medications, couldn't get it refilled because Dr. Bailon has her restricted to him. She was prescribed opioids at the emergency room visit but was unable to pick them up due to the restriction.    - Nausea/vomiting: can't keep anything down; has vomited three times today. Has been drinking a little bit of fluids, no solid foods for days.   - Symptoms have been going on for a few days, has not improved.  - Is very concerned that her pain is out of control and that she needs to have it treated. Dr. Bailon is out of office, she is worried that even if a prescription is sent through by his partner that she will not be able to receive it.     Current Outpatient Medications   Medication Sig Dispense Refill     acetaminophen (TYLENOL) 325 MG tablet Take 3 tablets (975 mg) by mouth every 6 hours as needed for mild pain 50 tablet 0     ALPRAZolam (XANAX) 2 MG tablet Take 1 tablet (2 mg) by mouth 2 times daily as needed for anxiety 60 tablet 3     cetirizine (ZYRTEC) 10 MG tablet Take 1 tablet (10 mg) by mouth daily 30 tablet 11     famotidine (PEPCID) 20 MG tablet Take 1 tablet (20 mg) by mouth 2 times daily 60 tablet 11     fluticasone (FLONASE) 50 MCG/ACT nasal spray Spray 2 sprays into both nostrils daily 9.9 mL 11     ibuprofen (ADVIL/MOTRIN) 600 MG tablet Take 1 tablet (600 mg) by mouth 3 times daily as needed for moderate pain 90  "tablet 4     ibuprofen (ADVIL/MOTRIN) 800 MG tablet Take 1 tablet (800 mg) by mouth every 6 hours as needed for other (mild and/or inflammatory pain) 30 tablet 0     methylPREDNISolone (MEDROL DOSEPAK) 4 MG tablet therapy pack Follow Package Directions 21 tablet 0     montelukast (SINGULAIR) 10 MG tablet Take 1 tablet (10 mg) by mouth At Bedtime 30 tablet 11     nirmatrelvir and ritonavir (PAXLOVID, 300/100,) therapy pack Take 3 tablets by mouth 2 times daily 30 each 0     ondansetron (ZOFRAN ODT) 4 MG ODT tab Take 1 tablet (4 mg) by mouth every 8 hours as needed for nausea 10 tablet 1     oxyCODONE (ROXICODONE) 5 MG tablet Take 1 tablet (5 mg) by mouth every 8 hours as needed for severe pain (7-10) 9 tablet 0     predniSONE (DELTASONE) 20 MG tablet Take two tablets (= 40mg) each day for 5 (five) days 10 tablet 0     PROAIR  (90 Base) MCG/ACT inhaler Inhale 2 puffs into the lungs every 4 hours as needed for shortness of breath / dyspnea or wheezing 8 g 11     spacer (OPTICHAMBER BINA) holding chamber For use w/ rescue inhaler 1 each 0     tiotropium (SPIRIVA RESPIMAT) 1.25 MCG/ACT inhaler Inhale 2 puffs into the lungs daily 12 g 3     tiotropium (SPIRIVA RESPIMAT) 2.5 MCG/ACT inhaler Inhale 2 puffs into the lungs daily 4 g 4     triamcinolone (KENALOG) 0.1 % external cream Apply topically 2 times daily 100 g 1     Allergies   Allergen Reactions     Lidocaine Other (See Comments)     \"my jaw stopped moving\"  Other reaction(s): Dystonia     Penicillins Hives, Rash and Shortness Of Breath     Epinephrine-Lidocaine-Na Metabisulfite Other (See Comments) and Muscle Pain (Myalgia)     Severe jaw cramping, double vision  Jaw locking            Review of Systems:      HEENT, cardiovascular, pulmonary, and gu systems are negative, except as otherwise noted. Notes significant right sided abdominal pain.          Physical Exam:     There were no vitals taken for this visit.  Estimated body mass index is 32.49 kg/m  as " "calculated from the following:    Height as of 12/25/22: 1.753 m (5' 9\").    Weight as of 12/25/22: 99.8 kg (220 lb).    Exam:  Constitutional: alert and mild distress  Psychiatric: anxious and fatigued        Assessment and Plan     Acute abdominal pain    Nausea and vomiting, unspecified vomiting type  Comment: Patient was seen in the emergency room on 12/25 for abdominal pain with nausea/vomiting; found to have colonic wall thickening. She was discharged with plan for follow-up with general surgery on 12/28 and a prescription for percocet for pain. She was unable to  the prescription sent from the ED provider because she is restricted to Dr. Bailon.  She notes that her pain is intolerable and cannot wait until Dr. Bailon comes back from vacation tomorrow. Discussed with patient that we will have one of his partners, Dr. Angel, send in a prescription. Will also send in zofran to help with nausea/vomiting. Patient is concerned that the prescription will again be denied because it isn't from Dr. Bailon; she notes that if that pain medicine prescription is denied she will likely go to the emergency room for pain control.   Plan:   - oxyCODONE (ROXICODONE) 5 MG tablet  - naloxone (NARCAN) 4 MG/0.1ML nasal spray  - ondansetron (ZOFRAN ODT) 4 MG ODT tab    Appointment end time: 4:28 PM  This is a telephone visit that took 13 minutes.    Clinician location:  [facility]     Samara Amaro MD  I precepted today with Dr. Angel.    "

## 2022-12-26 NOTE — DISCHARGE INSTRUCTIONS
Start MiraLAX daily.  Take ibuprofen 600 mg every 8 hours and Tylenol 1 g every 8 hours for pain management.  May use oxycodone every 8 hours for pain not controlled with Tylenol or ibuprofen.  Follow-up with surgery to schedule colonoscopy and also see your primary care as scheduled in 3 days.  Return to the emergency department if uncontrolled pain or new symptoms including vomiting.

## 2022-12-26 NOTE — PROGRESS NOTES
Clinic Care Coordination Contact    Follow Up Progress Note      Assessment: The pt was recently in the ED, I called to check up on the pt and help the pt setup a ED follow up. The pt was at [facility] for abdominal pain. I called and talked to the pt, pt stated that she is doing ok. Pt stated that she wanted to talk to . I told the pt to message  on MyChart, he will respond to her faster. Pt stated that she has an appointment on 12/28/2022 at 4:00pm with .     Care Gaps:    Health Maintenance Due   Topic Date Due     DEPRESSION ACTION PLAN  Never done     HEPATITIS B IMMUNIZATION (1 of 3 - 3-dose series) Never done     COLORECTAL CANCER SCREENING  Never done     Pneumococcal Vaccine: Pediatrics (0 to 5 Years) and At-Risk Patients (6 to 64 Years) (2 - PCV) 10/15/2015     ASTHMA ACTION PLAN  01/31/2021     SPIROMETRY  03/03/2021           Care Plans      Intervention/Education provided during outreach:               Plan:     Care Coordinator will follow up in

## 2022-12-27 ENCOUNTER — TELEPHONE (OUTPATIENT)
Dept: ORTHOPEDICS | Facility: CLINIC | Age: 47
End: 2022-12-27

## 2022-12-27 LAB — BACTERIA UR CULT: NORMAL

## 2022-12-27 NOTE — PROGRESS NOTES
ASSESSMENT/PLAN:    ***      Pt is a 47 year old female last seen on 12/26/22 by Dr Amaro via virtual visit here today for:     ***    Per Dr Amaro's note:  ED follow-up  Patient was seen in the emergency room on 12/25 with right sided abdominal pain + N/V. Imaging at that visit showed descending colonic wall thickening with adjacent lymph node reactivity concerning for colonic neoplasm. Patient has follow-up appointments scheduled with Dr. Bailon and general surgeon Dr. Matthews on 12/28.   - Abdominal pain: out of pain medications, couldn't get it refilled because Dr. Bailon has her restricted to him. She was prescribed opioids at the emergency room visit but was unable to pick them up due to the restriction.    - Nausea/vomiting: can't keep anything down; has vomited three times today. Has been drinking a little bit of fluids, no solid foods for days.   - Symptoms have been going on for a few days, has not improved.  - Is very concerned that her pain is out of control and that she needs to have it treated. Dr. Bailon is out of office, she is worried that even if a prescription is sent through by his partner that she will not be able to receive it.    Acute abdominal pain    Nausea and vomiting, unspecified vomiting type  Comment: Patient was seen in the emergency room on 12/25 for abdominal pain with nausea/vomiting; found to have colonic wall thickening. She was discharged with plan for follow-up with general surgery on 12/28 and a prescription for percocet for pain. She was unable to  the prescription sent from the ED provider because she is restricted to Dr. Bailon.  She notes that her pain is intolerable and cannot wait until Dr. Bailon comes back from vacation tomorrow. Discussed with patient that we will have one of his partners, Dr. Angel, send in a prescription. Will also send in zofran to help with nausea/vomiting. Patient is concerned that the prescription will again be denied because it isn't  "from Dr. Bailon; she notes that if that pain medicine prescription is denied she will likely go to the emergency room for pain control.   Plan:   - oxyCODONE (ROXICODONE) 5 MG tablet  - naloxone (NARCAN) 4 MG/0.1ML nasal spray  - ondansetron (ZOFRAN ODT) 4 MG ODT tab       Past Medical History:   Diagnosis Date     Agoraphobia with panic attacks      Anxiety      Anxiety      Arthritis     of back     Asthma      Asthma in adult, moderate persistent, uncomplicated      Chronic pain      Chronic sinusitis      Cocaine abuse (H)      Controlled substance agreement signed 06/30/2015    Overview:  Patient has chronic pain and is seen at [facility] for this.  Has controlled substance agreement with them.  On Vicodin, Valium, Klonopin prescribed only from there.        Depression      Hx of seasonal allergies      Infectious pericarditis      Lipoma      Tobacco abuse       Past Surgical History:   Procedure Laterality Date     ANKLE SURGERY Right 05/30/2019     BIOPSY BREAST Right 1990    benign     CREATION PERICARDIAL WINDOW  02/10/2017    [facility]     DILATION AND CURETTAGE N/A 7/22/2022    Procedure: DILATION AND CURETTAGE, UTERUS;  Surgeon: Lesly Holland;  Location: [facility]     HEMORRHOIDECTOMY EXTERNAL       HYSTEROSCOPY, WITH ENDOMETRIAL RADIOFREQUENCY ABLATION - NOVASURE N/A 7/22/2022    Procedure: HYSTEROSCOPY, WITH ENDOMETRIAL RADIOFREQUENCY ABLATION - NOVASURE DILATION AND CURETTAGE, UTERUS;  Surgeon: Lesly Holland;  Location: [facility]     TUMOR REMOVAL      Has had 3. In the right breast and \"inside of rib cage.\"      Current Outpatient Medications   Medication Sig Dispense Refill     acetaminophen (TYLENOL) 325 MG tablet Take 3 tablets (975 mg) by mouth every 6 hours as needed for mild pain 50 tablet 0     ALPRAZolam (XANAX) 2 MG tablet Take 1 tablet (2 mg) by mouth 2 times daily as needed for anxiety 60 tablet 3     cetirizine (ZYRTEC) 10 MG tablet Take 1 tablet (10 mg) by " mouth daily 30 tablet 11     famotidine (PEPCID) 20 MG tablet Take 1 tablet (20 mg) by mouth 2 times daily 60 tablet 11     fluticasone (FLONASE) 50 MCG/ACT nasal spray Spray 2 sprays into both nostrils daily 9.9 mL 11     ibuprofen (ADVIL/MOTRIN) 600 MG tablet Take 1 tablet (600 mg) by mouth 3 times daily as needed for moderate pain 90 tablet 4     ibuprofen (ADVIL/MOTRIN) 800 MG tablet Take 1 tablet (800 mg) by mouth every 6 hours as needed for other (mild and/or inflammatory pain) 30 tablet 0     methylPREDNISolone (MEDROL DOSEPAK) 4 MG tablet therapy pack Follow Package Directions 21 tablet 0     montelukast (SINGULAIR) 10 MG tablet Take 1 tablet (10 mg) by mouth At Bedtime 30 tablet 11     naloxone (NARCAN) 4 MG/0.1ML nasal spray Spray 1 spray (4 mg) into one nostril alternating nostrils as needed for opioid reversal every 2-3 minutes until assistance arrives 0.2 mL 1     ondansetron (ZOFRAN ODT) 4 MG ODT tab Take 1 tablet (4 mg) by mouth every 8 hours as needed for nausea 30 tablet 0     ondansetron (ZOFRAN ODT) 4 MG ODT tab Take 1 tablet (4 mg) by mouth every 8 hours as needed for nausea 10 tablet 1     oxyCODONE (ROXICODONE) 5 MG tablet Take 1 tablet (5 mg) by mouth every 6 hours as needed for severe pain (7-10) 8 tablet 0     oxyCODONE (ROXICODONE) 5 MG tablet Take 1 tablet (5 mg) by mouth every 8 hours as needed for severe pain (7-10) 9 tablet 0     predniSONE (DELTASONE) 20 MG tablet Take two tablets (= 40mg) each day for 5 (five) days 10 tablet 0     PROAIR  (90 Base) MCG/ACT inhaler Inhale 2 puffs into the lungs every 4 hours as needed for shortness of breath / dyspnea or wheezing 8 g 11     spacer (OPTICHAMBER BINA) holding chamber For use w/ rescue inhaler 1 each 0     tiotropium (SPIRIVA RESPIMAT) 1.25 MCG/ACT inhaler Inhale 2 puffs into the lungs daily 12 g 3     tiotropium (SPIRIVA RESPIMAT) 2.5 MCG/ACT inhaler Inhale 2 puffs into the lungs daily 4 g 4     triamcinolone (KENALOG) 0.1 %  "external cream Apply topically 2 times daily 100 g 1      Allergies   Allergen Reactions     Lidocaine Other (See Comments)     \"my jaw stopped moving\"  Other reaction(s): Dystonia     Penicillins Hives, Rash and Shortness Of Breath     Epinephrine-Lidocaine-Na Metabisulfite Other (See Comments) and Muscle Pain (Myalgia)     Severe jaw cramping, double vision  Jaw locking        ROS:   Gen- no weight change, no fevers/chills   Head/ Eyes- no blurred vision, no headaches   ENT- no cough, no congestion, no URI symptoms   Cardiac - no palpitations, no chest pain   Respiratory - no shortness of breath , no wheezing   GI - no nausea, no vomiting, no diarrhea, no constipation   Urinary - no dysuria, no polyuria   Neuro - no numbness, no tingling   Remainder of ROS negative.     Exam:   There were no vitals taken for this visit.   Alert and oriented x 3; No acute distress   HEENT: extraocular movements intact, tympanic membranes clear, oropharynx clear   Neck: no masses, no lymphadenopathy   Resp: clear to auscultation bilaterally, no wheezing/ronchi   CV: rate/rhythm regular, no murmurs/rubs/gallops   ABd: soft, + bowel sounds, nontender/nondistended, no rebound/guarding   Extrem: no clubbing/cyanosis/edema   MSK: full range of motion, nontender   Neuro: no focal deficits   Derm: no rashes       "

## 2022-12-27 NOTE — ED NOTES
Called out for Pt x2 in traige 10min apart. Pt did not answer. Pt not found in waiting area, vestibule,or hallway. Pt dismissed LWBS after triage.

## 2022-12-27 NOTE — TELEPHONE ENCOUNTER
[facility] phone call message- general phone call:    Reason for call: Patient called stating she was in the ER last night 12/25/2022 and informs it's an emergency and would like  to call her back if possible . Patient would like  to read/review her ED report  And call her back as it's an emergency, patient does have an appointment Wednesday 12/28/22 with  but would like a call back sooner if possible. Please call and advise .    Return call needed: Yes    OK to leave a message on voice mail? Yes    Primary language: English      needed? No    Call taken on December 26, 2022 at 8:07 AM by Lauren Hdez    
Routing to PCP to review, writer reviewed ED chart notes and no emergent findings noted but does need urology consult. Neo VÁSQUEZ  
Spoke to pt. She already has an appt w/ Dr Matthews of general surgery scheduled for tomorrow and has follow-up scheduled with me later in the afternoon.    Robbie Bailon MD  December 27, 2022  2:50 PM    
impairments found

## 2022-12-27 NOTE — ED PROVIDER NOTES
Patient was not available when I went to evaluate her and apparently has left without being seen     Gilbert Jade,   12/27/22 021

## 2022-12-27 NOTE — TELEPHONE ENCOUNTER
Phoned patient to get her scheduled for surgery with Dr. Bashir after receiving a staff MSG that the patients  has been approved.    Patient stated that there was a lump found on her and has a visit to confirm what it is and if she will have surgery for that. Patient stated that she would like to proceed with surgery but does not want to scheduled until she figures her other health issue first.     Patient was provided call back number for her to call when she is ready to get scheduled.      [phone].    Patient had no other questions or concerns.

## 2022-12-27 NOTE — PROGRESS NOTES
Preceptor Attestation:  Patient's case reviewed and discussed with the resident, Samara Amaro MD, during her telephone visit. I did not personally speak with the patient. I agree with written assessment and plan of care.    Supervising Physician:  Jody Angel MD   [facility]

## 2022-12-28 ENCOUNTER — OFFICE VISIT (OUTPATIENT)
Dept: FAMILY MEDICINE | Facility: CLINIC | Age: 47
End: 2022-12-28
Payer: MEDICAID

## 2022-12-28 ENCOUNTER — TELEPHONE (OUTPATIENT)
Dept: SURGERY | Facility: CLINIC | Age: 47
End: 2022-12-28

## 2022-12-28 ENCOUNTER — OFFICE VISIT (OUTPATIENT)
Dept: SURGERY | Facility: CLINIC | Age: 47
End: 2022-12-28
Payer: MEDICAID

## 2022-12-28 VITALS
RESPIRATION RATE: 18 BRPM | SYSTOLIC BLOOD PRESSURE: 114 MMHG | TEMPERATURE: 98.7 F | OXYGEN SATURATION: 96 % | WEIGHT: 244 LBS | HEART RATE: 90 BPM | BODY MASS INDEX: 36.03 KG/M2 | DIASTOLIC BLOOD PRESSURE: 76 MMHG

## 2022-12-28 DIAGNOSIS — K59.03 DRUG-INDUCED CONSTIPATION: ICD-10-CM

## 2022-12-28 DIAGNOSIS — F41.1 GENERALIZED ANXIETY DISORDER: ICD-10-CM

## 2022-12-28 DIAGNOSIS — F40.01 PANIC DISORDER WITH AGORAPHOBIA: ICD-10-CM

## 2022-12-28 DIAGNOSIS — K63.89 COLONIC MASS: ICD-10-CM

## 2022-12-28 DIAGNOSIS — Z01.818 PREOPERATIVE EXAMINATION: Primary | ICD-10-CM

## 2022-12-28 DIAGNOSIS — K63.89 COLONIC MASS: Primary | ICD-10-CM

## 2022-12-28 DIAGNOSIS — Z12.11 ENCOUNTER FOR SCREENING COLONOSCOPY: Primary | ICD-10-CM

## 2022-12-28 PROCEDURE — 99203 OFFICE O/P NEW LOW 30 MIN: CPT | Performed by: SURGERY

## 2022-12-28 PROCEDURE — 99214 OFFICE O/P EST MOD 30 MIN: CPT | Performed by: FAMILY MEDICINE

## 2022-12-28 RX ORDER — CLINDAMYCIN PHOSPHATE 900 MG/50ML
900 INJECTION, SOLUTION INTRAVENOUS
Status: CANCELLED | OUTPATIENT
Start: 2023-01-06

## 2022-12-28 RX ORDER — BISACODYL 5 MG/1
TABLET, DELAYED RELEASE ORAL
Qty: 4 TABLET | Refills: 0 | Status: ON HOLD | OUTPATIENT
Start: 2022-12-28 | End: 2023-01-06

## 2022-12-28 RX ORDER — AMOXICILLIN 250 MG
1 CAPSULE ORAL 2 TIMES DAILY PRN
Qty: 14 TABLET | Refills: 1 | Status: ON HOLD | OUTPATIENT
Start: 2022-12-28 | End: 2023-01-06

## 2022-12-28 RX ORDER — ALPRAZOLAM 2 MG
2 TABLET ORAL 3 TIMES DAILY PRN
Qty: 90 TABLET | Refills: 0 | Status: SHIPPED | OUTPATIENT
Start: 2022-12-28 | End: 2023-01-26

## 2022-12-28 RX ORDER — HYDROCODONE BITARTRATE AND ACETAMINOPHEN 5; 325 MG/1; MG/1
1 TABLET ORAL EVERY 6 HOURS PRN
Qty: 12 TABLET | Refills: 0 | Status: SHIPPED | OUTPATIENT
Start: 2022-12-28 | End: 2022-12-31

## 2022-12-28 RX ORDER — CLINDAMYCIN PHOSPHATE 900 MG/50ML
900 INJECTION, SOLUTION INTRAVENOUS SEE ADMIN INSTRUCTIONS
Status: CANCELLED | OUTPATIENT
Start: 2023-01-06

## 2022-12-28 NOTE — PROGRESS NOTES
Right abdominal pain, went to ER  Got ct left mass, c scope then surgery  -no other symptoms, no history of c scope    HPI:  Darlene Hudson is a 47 year old female who was referred to me by Robbie Bailon for left colon mass.  She originally presented to the emergency room for right-sided abdominal discomfort last week.  Subsequent to this she underwent CT imaging which demonstrated a near obstructing left colon mass.  For this reason she was referred to surgery for evaluation.  She denies any left-sided abdominal pain, fevers, chills, bone pain, bloody bowel movements or any other symptoms.  She has not had a colonoscopy.  She does have family history of colon cancer states that her father had colon cancer while in his mid 50s.    Allergies:Lidocaine, Penicillins, and Epinephrine-lidocaine-na metabisulfite    Past Medical History:   Diagnosis Date     Agoraphobia with panic attacks      Anxiety      Anxiety      Arthritis     of back     Asthma      Asthma in adult, moderate persistent, uncomplicated      Chronic pain      Chronic sinusitis      Cocaine abuse (H)      Controlled substance agreement signed 06/30/2015    Overview:  Patient has chronic pain and is seen at [facility] for this.  Has controlled substance agreement with them.  On Vicodin, Valium, Klonopin prescribed only from there.        Depression      Hx of seasonal allergies      Infectious pericarditis      Lipoma      Other chronic pain      Tobacco abuse        Past Surgical History:   Procedure Laterality Date     ANKLE SURGERY Right 05/30/2019     BIOPSY BREAST Right 1990    benign     CREATION PERICARDIAL WINDOW  02/10/2017    [facility]     DILATION AND CURETTAGE N/A 7/22/2022    Procedure: DILATION AND CURETTAGE, UTERUS;  Surgeon: Lesly Holland;  Location: [facility]     HEMORRHOIDECTOMY EXTERNAL       HYSTEROSCOPY, WITH ENDOMETRIAL RADIOFREQUENCY ABLATION - NOVASURE N/A 7/22/2022    Procedure: HYSTEROSCOPY, WITH  "ENDOMETRIAL RADIOFREQUENCY ABLATION - NOVASURE DILATION AND CURETTAGE, UTERUS;  Surgeon: Lesly oHlland;  Location: [facility]     TUMOR REMOVAL      Has had 3. In the right breast and \"inside of rib cage.\"       CURRENT MEDS:  Current Outpatient Medications   Medication Sig Dispense Refill     acetaminophen (TYLENOL) 325 MG tablet Take 3 tablets (975 mg) by mouth every 6 hours as needed for mild pain 50 tablet 0     cetirizine (ZYRTEC) 10 MG tablet Take 1 tablet (10 mg) by mouth daily 30 tablet 11     famotidine (PEPCID) 20 MG tablet Take 1 tablet (20 mg) by mouth 2 times daily 60 tablet 11     fluticasone (FLONASE) 50 MCG/ACT nasal spray Spray 2 sprays into both nostrils daily 9.9 mL 11     ibuprofen (ADVIL/MOTRIN) 600 MG tablet Take 1 tablet (600 mg) by mouth 3 times daily as needed for moderate pain 90 tablet 4     methylPREDNISolone (MEDROL DOSEPAK) 4 MG tablet therapy pack Follow Package Directions 21 tablet 0     montelukast (SINGULAIR) 10 MG tablet Take 1 tablet (10 mg) by mouth At Bedtime 30 tablet 11     naloxone (NARCAN) 4 MG/0.1ML nasal spray Spray 1 spray (4 mg) into one nostril alternating nostrils as needed for opioid reversal every 2-3 minutes until assistance arrives 0.2 mL 1     ondansetron (ZOFRAN ODT) 4 MG ODT tab Take 1 tablet (4 mg) by mouth every 8 hours as needed for nausea 10 tablet 1     PROAIR  (90 Base) MCG/ACT inhaler Inhale 2 puffs into the lungs every 4 hours as needed for shortness of breath / dyspnea or wheezing 8 g 11     spacer (OPTICHAMBER BINA) holding chamber For use w/ rescue inhaler 1 each 0     tiotropium (SPIRIVA RESPIMAT) 1.25 MCG/ACT inhaler Inhale 2 puffs into the lungs daily 12 g 3     tiotropium (SPIRIVA RESPIMAT) 2.5 MCG/ACT inhaler Inhale 2 puffs into the lungs daily 4 g 4     triamcinolone (KENALOG) 0.1 % external cream Apply topically 2 times daily 100 g 1     ALPRAZolam (XANAX) 2 MG tablet Take 1 tablet (2 mg) by mouth 3 times daily as needed for " anxiety 90 tablet 0     bisacodyl (DULCOLAX) 5 MG EC tablet Take as directed on colonoscopy prep instructions 4 tablet 0     HYDROcodone-acetaminophen (NORCO) 5-325 MG tablet Take 1 tablet by mouth every 6 hours as needed for severe pain (7-10) 12 tablet 0     oxyCODONE (ROXICODONE) 5 MG tablet Take 2 tablets (10 mg) by mouth 2 times daily as needed for severe pain (7-10) 24 tablet 0     polyethylene glycol (GOLYTELY) 236 g suspension Take as directed on colonoscopy prep instructions 4000 mL 0     prochlorperazine (COMPAZINE) 10 MG tablet Take 1 tablet (10 mg) by mouth every 6 hours as needed for nausea or vomiting 24 tablet 0     senna-docusate (SENOKOT-S/PERICOLACE) 8.6-50 MG tablet Take 1 tablet by mouth 2 times daily as needed for constipation 14 tablet 1       Family history-reviewed and  non-contributory     reports that she has been smoking cigarettes. She has a 3.00 pack-year smoking history. She uses smokeless tobacco. She reports that she does not currently use alcohol. She reports that she does not currently use drugs.    Review of Systems -   The 12 system review is within normal limits except for as mentioned above.  General ROS: No complaints or constitutional symptoms  Ophthalmic ROS: No complaints of visual changes  Skin: No complaints or symptoms   Endocrine: No complaints or symptoms  Hematologic/Lymphatic: No symptoms or complaints  Psychiatric: No symptoms or complaints  Respiratory ROS: no cough, shortness of breath, or wheezing  Cardiovascular ROS: no chest pain or dyspnea on exertion  Gastrointestinal ROS: As per HPI  Genito-Urinary ROS: no dysuria, trouble voiding, or hematuria  Musculoskeletal ROS: no joint or muscle pain  Neurological ROS: no TIA or stroke symptoms    There were no vitals taken for this visit.  There is no height or weight on file to calculate BMI.    EXAM:  GENERAL: Well developed female, No acute distress, pleasant and conversant   EYES: Pupils equal, round and reactive,  no scleral icterus  ABDOMEN: Soft, no palpable masses, no tenderness to palpation  SKIN: Pink, warm and dry, no obvious rashes or lesions   NEURO:No focal deficits, ambulatory  MUSCULOSKELETAL:No obvious deformities, no swelling, normal appearing          IMAGES:   Relevant images were reviewed and discussed with the patient.  Notable findings were CT images reviewed and demonstrate a left-sided colon mass distal to the splenic flexure      Assessment/Plan:   Darlene Hudson is a 47 year old female with a left-sided colon mass which is worrisome for malignancy.  At this point she will need endoscopic evaluation followed by surgery.  We will plan on setting this up through the clinic.  Dr. Charles of our group will be taking lead on this case as he will be able to get her in sooner rather than later.  She understands having it was discussed and has consented to proceed.      Dorian Matthews D.O. QAMAR  [phone]  [facility] Department of Surgery

## 2022-12-28 NOTE — LETTER
We've received instruction to get you scheduled for a colonoscopy with Dr Charles. We have that arranged as follows:     Pre-op Physical:  Call your primary clinic to schedule.    Surgery Date: 1/3/2023    Location: [place] [address]    Approximate Arrival Time: 7:00 am  (Unless instructed differently by the pre-op call nurse)       Prep Tasks and Info:     1. Schedule a pre-op physical with your primary care doctor within 30 days of surgery. This is required by anesthesia and if not done, your surgery will be cancelled. Call them asap to get this scheduled.    2. Review your medications with your primary care or prescribing physician; they will advise you which meds to stop and when, and when you can resume taking.  Certain medications like blood thinners, need to be stopped in advance of surgery to proceed safely.    3. You must get tested for COVID-19, even if you are vaccinated.    Outpatient Surgery:  If you are going home the same day of surgery, a home rapid antigen Covid-19 test is required 1-2 days before surgery- regardless of your vaccination status.  Take a photo of the negative result and show to the nurse on the day of surgery. If you test positive, contact our office right away to reschedule surgery. You can buy a home Covid-19 Rapid Antigen test at many local pharmacies, or you can order for free at covid.gov/tests.      4. Please shower the evening before and morning of surgery with Hibiclens or Exidine soap.  This can be found at your local pharmacy. Follow the links below for detailed instructions for preoperative skin preparation:     Showering Before [id number]      Preparing Your Skin Before Surgery - When you can't take a [id number]     5. Fasting instructions will be provided by the pre-op nurse who will call you 1-3 days before surgery.  Typically we advise normal food up to 8 hours before you arrive for surgery.  Clear liquids only from then until 2 hours before you arrive surgery then nothing at all by mouth.  The nurse will review your specific instructions with you at the call.     6. Smoking impacts your body's ability to heal properly.  If you are a smoker, we strongly urge you to stop smoking 4-6 weeks before surgery.    7. You will need an adult to drive you home and stay with you 24 hours after surgery. Public transportation or Medical Van Services are not permitted.    8. You may have one family member wait in the lobby at the surgery center during your surgery. Visitor restrictions are subject to change, please verify with the pre-op nurse when they call.    9. If the community sees a new COVID19 surge, your procedure may need to be postponed. We will contact you if this happens. You will be screened for high-risk exposure to Covid-19 during the pre-op call.  We encourage you to quarantine yourself away from any Covid-19 people for 10 days before surgery to avoid possible last minute cancellations.   When you arrive to the surgery center, you will again be screened for COVID19 symptoms. If you screen positive, your surgery will need to be postponed.    10. We always encourage you to notify your insurance any time you have medical tests or procedures scheduled including surgery. The number is usually right on the back of your insurance card. Please call [facility] Cost of Care at [phone] if you'd like a surgery quote.      Preparing for Your Surgery [id number]     Call our office if you have any questions! Thank you!       STANDARD Golytely (Colyte, Nulytely)  Prep Instructions for your Colonoscopy      Please read these instructions carefully at least 7 days prior to your colonoscopy procedure. Be sure to follow all directions completely. The inside of your colon must be clean to allow for a complete examination for the presence of any growths, polyps, and/or abnormalities, as well as their biopsy or  removal. A number of tips are included in order to make this part of the procedure as comfortable as possible.    Getting ready:     A nurse will call you within a week of your exam to prescribe your bowel prep, review the prep instructions, and answer any other questions you have.     You must arrange for an adult to drive you home after your exam. Your colonoscopy cannot be done unless you have a ride. If you need to use public transportation, someone must ride with you and stay with you for a minimum of 6-24 hours.    Check with your insurance company to be sure they will cover this exam.    7 days before the exam:    Talk to your doctor:  If you take blood-thinners (such as Coumadin, Plavix, Xarelto), your prescription or schedule may need to change before the test.    Stop taking fiber supplements, multi-vitamins with iron, and medicines that contain iron.    Continue taking prescribed aspirin; talk to your prescribing doctor with any concerns.    Stop eating corn, nuts and foods with seeds.  These can stay in the colon for days.    If you have diabetes:  Ask to have your exam early in the morning.  Also, ask your doctor if you should change your diet or medicines.    3 days before the exam:    Begin a low-fiber diet: No raw fruits or vegetables, whole wheat, seeds, nuts, popcorn or other high-fiber foods (see list on page). No binding agents: (bran, Metamucil, Fibercon) and no Olestra (a fat substitute).    One day before the exam:    You can have a light, low-fiber breakfast. But drink only clear liquids after 9 a.m. (see list below). Drink at least 8 to 10 full glasses of clear liquids during the day.     Fill the jug that contains the Golytely powder with warm water. Cover and shake until well mixed. Use a full gallon of water. Chill for 3 hours, but do not add ice.    You will start drinking half of the Golytely solution at 6 p.m. The timing of drinking the 2nd half of the Golytely solution depends on your  exam time. See Step 2 below.    After you start drinking the solution, stay near a toilet. You may have watery stools (diarrhea), mild cramping, bloating , and nausea.     Step One:  o At 3 p.m., take 2 tablets of Dulcolax (bisacodyl).  o At 6 p.m. start drinking the Golytely solution. Drink an 8-ounce glass every 15 minutes until the jug is half empty. Drink each glass quickly.     Step Two:   If you arrive before 11 AM:  At 11 p.m. on the day before your exam:    Take 2 Dulcolax (Bisacodyl) tablets.     Start drinking the other half of the Golytely jug. Drink one 8-ounce glass every 15 minutes until the jug is empty. Drink each glass quickly.  If you arrive after 11 AM:  At 6 AM on the day of the exam:    Take 2 tablets of Dulcolax (Bisacodyl).     Drink the other half of the Golytely jug.     Drink one 8-ounce glass every 15 minutes until the jug is empty.     Drink each glass quickly.     You should finish the prep 4 hours before the exam.      Day of exam:      You may drink clear liquids only up until 2 hours before your arrival time.    Do not drink anything 2 hours before your arrival, not even water. (If you must take medicine, you can take it with a sip of water.)     Do not chew or swallow anything including water or gum for at least 2 hours before your exam. If you do, we may cancel the exam for your safety.     Do not take diabetes medicine by mouth until after your exam.    If you have asthma, bring your inhaler.    Arrive with an adult who will take you home after your test. The medicine used will make you sleepy. If you don't have someone to take you home, we will cancel your test.       CLEAR LIQUIDS   You may have:    Water, tea, coffee (no milk or cream)    Soda pop, Gatorade (not red or purple)    Jell-O, Popsicles (no milk or fruit pieces - not red or purple)    Fat-free soup broth or bouillon    Plain hard candy, such as clear life savers (not red or purple)    Clear juices and fruit-flavored  drinks, such as apple juice, white grape juice, Hi-C, and Pop-Aid (not red or purple)   Do not have:    Milk or milk products such as ice cream, malts or shakes, or coffee creamer    Red or purple drinks of any kind such as cranberry juice or grape juice. Avoid red or purple Jell-O, Popsicles, Pop-Aid, sorbet, sherbet and candy    Juices with pulp such as orange, grapefruit, pineapple or tomato juice    Cream soups of any kind    Alcohol and beer    Protein drinks or protein powder     LOW FIBER DIET   You may have:      Starches: White bread, rolls, biscuits, croissants, Olya toast, white flour tortillas, waffles, pancakes, Welsh toast; white rice, noodles, pasta, macaroni; cooked and peeled potatoes; plain crackers, saltines; cooked farina or cream of rice; puffed rice, corn flakes, Rice Krispies, Special K     Vegetables: tender cooked and canned, vegetable broths    Fruits and fruit juices: Strained fruit juice, canned fruit without seeds or skin (not pineapple), applesauce, pear sauce, ripe bananas, melons (not watermelon)     Milk products: Milk (plain or flavored), cheese, cottage cheese, yogurt (no berries), custard, ice cream      Proteins: Tender, well-cooked ground beef, lamb, veal, ham, pork, chicken, turkey, fish or organ meats; eggs; creamy peanut butter     Fats and condiments:  Margarine, butter, oils, mayonnaise, sour cream, salad dressing, plain gravy; spices, cooked herbs; sugar, clear jelly, honey, syrup     Snacks, sweets and drinks: Pretzels, hard candy; plain cakes and cookies (no nuts or seeds); gelatin, plain pudding, sherbet, Popsicles; coffee, tea, carbonated ( fizzy ) drinks Do not have:      Starches: Breads or rolls that contain nuts, seeds or fruit; whole wheat or whole grain breads that contain more than 1 gram of fiber per slice; cornbread; corn or whole wheat tortillas; potatoes with skin; brown rice, wild rice, kasha (buckwheat), and oatmeal     Vegetables: Any raw or steamed  vegetables; vegetables with seeds; corn in any form     Fruits and fruit juices: Prunes, prune juice, raisins and other dried fruits, berries and other fruits with seeds, canned pineapple juices with pulp such as orange, grapefruit, pineapple or tomato juice    Milk products: Any yogurt with nuts, seeds or berries     Proteins: Tough, fibrous meats with gristle; cooked dried beans, peas or lentils; crunchy peanut butter    Fats and condiments: Pickles, olives, relish, horseradish; jam, marmalade, preserves     Snacks, sweets and drinks: Popcorn, nuts, seeds, granola, coconut, candies made with nuts or seeds; all desserts that contain nuts, seeds, raisins and other dried fruits, coconut, whole grains or bran.        FAQ:      How do you know if your colon is cleaned out?   o After completing the bowel prep, your bowel movements should be all liquid and yellow. Your bowel movements will look similar to urine in the toilet. If there are pieces of stool (poop) in the toilet, or if you can't see to the bottom of the toilet, please call our office for advice. Call [phone] and ask to speak with a nurse.     Why do you need a responsible  to take you home and stay with you?  o We require a responsible adult to take you home for your safety. The sedation medicines used to relax you during the procedure can impair your judgement and reaction time, make you forgetful and possible a little unsteady. Do not drive, make any important decisions, or sign any legal documents for 24 hours after your procedure.     It is normal to feel bloated and gassy after your procedure. Walking will help move the air through your colon. You can take non-aspirin pain relievers that contain acetaminophen (Tylenol).     When can you eat after your procedure?  o You may resume your normal diet when you feel ready, unless advised otherwise by the doctor performing your procedure. Do not drink alcohol for 24 hours after your procedure.      You many resume normal activities (work, exercise, etc.) after 24 hours.     When will you get test results?  o You should have your procedure results and any lab results (if applicable) by letter, MyChart message, or phone call within 2 weeks. If you have any questions, please call the doctor that referred you for the procedure.       Thank you for choosing [facility], for your procedure. If you are sent a survey regarding your care, please take the time to complete the questionnaire. We value your feedback!             Updated: 6/22/2022

## 2022-12-28 NOTE — LETTER
12/28/2022         RE: Darlene Hudson  [address]  [place] [address]        Dear Colleague,    Thank you for referring your patient, Darlene Hudson, to the [facility] [place]. Please see a copy of my visit note below.    Right abdominal pain, went to ER  Got ct left mass, c scope then surgery  -no other symptoms, no history of c scope    HPI:  Darlene Hudson is a 47 year old female who was referred to me by Robbie Bailon for left colon mass.  She originally presented to the emergency room for right-sided abdominal discomfort last week.  Subsequent to this she underwent CT imaging which demonstrated a near obstructing left colon mass.  For this reason she was referred to surgery for evaluation.  She denies any left-sided abdominal pain, fevers, chills, bone pain, bloody bowel movements or any other symptoms.  She has not had a colonoscopy.  She does have family history of colon cancer states that her father had colon cancer while in his mid 50s.    Allergies:Lidocaine, Penicillins, and Epinephrine-lidocaine-na metabisulfite    Past Medical History:   Diagnosis Date     Agoraphobia with panic attacks      Anxiety      Anxiety      Arthritis     of back     Asthma      Asthma in adult, moderate persistent, uncomplicated      Chronic pain      Chronic sinusitis      Cocaine abuse (H)      Controlled substance agreement signed 06/30/2015    Overview:  Patient has chronic pain and is seen at [facility] for this.  Has controlled substance agreement with them.  On Vicodin, Valium, Klonopin prescribed only from there.        Depression      Hx of seasonal allergies      Infectious pericarditis      Lipoma      Other chronic pain      Tobacco abuse        Past Surgical History:   Procedure Laterality Date     ANKLE SURGERY Right 05/30/2019     BIOPSY BREAST Right 1990    benign     CREATION PERICARDIAL WINDOW  02/10/2017    [facility]  "AND CURETTAGE N/A 7/22/2022    Procedure: DILATION AND CURETTAGE, UTERUS;  Surgeon: Lesly Holland;  Location: [facility]     HEMORRHOIDECTOMY EXTERNAL       HYSTEROSCOPY, WITH ENDOMETRIAL RADIOFREQUENCY ABLATION - NOVASURE N/A 7/22/2022    Procedure: HYSTEROSCOPY, WITH ENDOMETRIAL RADIOFREQUENCY ABLATION - NOVASURE DILATION AND CURETTAGE, UTERUS;  Surgeon: Lesly Holland;  Location: [facility]     TUMOR REMOVAL      Has had 3. In the right breast and \"inside of rib cage.\"       CURRENT MEDS:  Current Outpatient Medications   Medication Sig Dispense Refill     acetaminophen (TYLENOL) 325 MG tablet Take 3 tablets (975 mg) by mouth every 6 hours as needed for mild pain 50 tablet 0     cetirizine (ZYRTEC) 10 MG tablet Take 1 tablet (10 mg) by mouth daily 30 tablet 11     famotidine (PEPCID) 20 MG tablet Take 1 tablet (20 mg) by mouth 2 times daily 60 tablet 11     fluticasone (FLONASE) 50 MCG/ACT nasal spray Spray 2 sprays into both nostrils daily 9.9 mL 11     ibuprofen (ADVIL/MOTRIN) 600 MG tablet Take 1 tablet (600 mg) by mouth 3 times daily as needed for moderate pain 90 tablet 4     methylPREDNISolone (MEDROL DOSEPAK) 4 MG tablet therapy pack Follow Package Directions 21 tablet 0     montelukast (SINGULAIR) 10 MG tablet Take 1 tablet (10 mg) by mouth At Bedtime 30 tablet 11     naloxone (NARCAN) 4 MG/0.1ML nasal spray Spray 1 spray (4 mg) into one nostril alternating nostrils as needed for opioid reversal every 2-3 minutes until assistance arrives 0.2 mL 1     ondansetron (ZOFRAN ODT) 4 MG ODT tab Take 1 tablet (4 mg) by mouth every 8 hours as needed for nausea 10 tablet 1     PROAIR  (90 Base) MCG/ACT inhaler Inhale 2 puffs into the lungs every 4 hours as needed for shortness of breath / dyspnea or wheezing 8 g 11     spacer (OPTICHAMBER BINA) holding chamber For use w/ rescue inhaler 1 each 0     tiotropium (SPIRIVA RESPIMAT) 1.25 MCG/ACT inhaler Inhale 2 puffs into the lungs daily 12 g 3 "     tiotropium (SPIRIVA RESPIMAT) 2.5 MCG/ACT inhaler Inhale 2 puffs into the lungs daily 4 g 4     triamcinolone (KENALOG) 0.1 % external cream Apply topically 2 times daily 100 g 1     ALPRAZolam (XANAX) 2 MG tablet Take 1 tablet (2 mg) by mouth 3 times daily as needed for anxiety 90 tablet 0     bisacodyl (DULCOLAX) 5 MG EC tablet Take as directed on colonoscopy prep instructions 4 tablet 0     HYDROcodone-acetaminophen (NORCO) 5-325 MG tablet Take 1 tablet by mouth every 6 hours as needed for severe pain (7-10) 12 tablet 0     oxyCODONE (ROXICODONE) 5 MG tablet Take 2 tablets (10 mg) by mouth 2 times daily as needed for severe pain (7-10) 24 tablet 0     polyethylene glycol (GOLYTELY) 236 g suspension Take as directed on colonoscopy prep instructions 4000 mL 0     prochlorperazine (COMPAZINE) 10 MG tablet Take 1 tablet (10 mg) by mouth every 6 hours as needed for nausea or vomiting 24 tablet 0     senna-docusate (SENOKOT-S/PERICOLACE) 8.6-50 MG tablet Take 1 tablet by mouth 2 times daily as needed for constipation 14 tablet 1       Family history-reviewed and  non-contributory     reports that she has been smoking cigarettes. She has a 3.00 pack-year smoking history. She uses smokeless tobacco. She reports that she does not currently use alcohol. She reports that she does not currently use drugs.    Review of Systems -   The 12 system review is within normal limits except for as mentioned above.  General ROS: No complaints or constitutional symptoms  Ophthalmic ROS: No complaints of visual changes  Skin: No complaints or symptoms   Endocrine: No complaints or symptoms  Hematologic/Lymphatic: No symptoms or complaints  Psychiatric: No symptoms or complaints  Respiratory ROS: no cough, shortness of breath, or wheezing  Cardiovascular ROS: no chest pain or dyspnea on exertion  Gastrointestinal ROS: As per HPI  Genito-Urinary ROS: no dysuria, trouble voiding, or hematuria  Musculoskeletal ROS: no joint or muscle  pain  Neurological ROS: no TIA or stroke symptoms    There were no vitals taken for this visit.  There is no height or weight on file to calculate BMI.    EXAM:  GENERAL: Well developed female, No acute distress, pleasant and conversant   EYES: Pupils equal, round and reactive, no scleral icterus  ABDOMEN: Soft, no palpable masses, no tenderness to palpation  SKIN: Pink, warm and dry, no obvious rashes or lesions   NEURO:No focal deficits, ambulatory  MUSCULOSKELETAL:No obvious deformities, no swelling, normal appearing          IMAGES:   Relevant images were reviewed and discussed with the patient.  Notable findings were CT images reviewed and demonstrate a left-sided colon mass distal to the splenic flexure      Assessment/Plan:   Darlene Hudson is a 47 year old female with a left-sided colon mass which is worrisome for malignancy.  At this point she will need endoscopic evaluation followed by surgery.  We will plan on setting this up through the clinic.  Dr. Charles of our group will be taking lead on this case as he will be able to get her in sooner rather than later.  She understands having it was discussed and has consented to proceed.      Dorian Matthews D.O. QAMAR  [phone]  [facility] Department of Surgery      Again, thank you for allowing me to participate in the care of your patient.        Sincerely,        Mejia Matthews, DO

## 2022-12-28 NOTE — TELEPHONE ENCOUNTER
Spoke with patient today regarding surgery scheduling     Went over details/instructions.    Surgery Letter sent via [employer]

## 2022-12-28 NOTE — H&P (VIEW-ONLY)
[facility] [address] [place] [address]  Phone: [phone]  Fax: [phone]  Primary Provider: Robbie Bailon  Pre-op Performing Provider: ROBBIE BAILON    PREOPERATIVE EVALUATION:  Today's date: 12/28/2022    Darlene Hudson is a 47 year old female who presents for a preoperative evaluation.    Surgical Information:  Surgery/Procedure: Colonoscopy and Colectomy  Surgery Location: [facility]  Surgeon: Dr. Charles   Surgery Date: 1/6/23  Time of Surgery: 5:30 am   Where patient plans to recover: At home with family  Fax number for surgical facility: Note does not need to be faxed, will be available electronically in Epic.    Type of Anesthesia Anticipated: General for colectomy and conscious sedation for colonoscopy    Assessment & Plan     The proposed surgical procedure is considered INTERMEDIATE risk.    Preoperative examination  Pt cleared to proceed w/ upcoming colonoscopy and sigmoid colectomy; I will work with her on pain control; precautions given    Colonic mass  See above  - HYDROcodone-acetaminophen (NORCO) 5-325 MG tablet  Dispense: 12 tablet; Refill: 0    Drug-induced constipation  - senna-docusate (SENOKOT-S/PERICOLACE) 8.6-50 MG tablet  Dispense: 14 tablet; Refill: 1    Panic disorder with agoraphobia    - ALPRAZolam (XANAX) 2 MG tablet  Dispense: 90 tablet; Refill: 0    Generalized anxiety disorder    - ALPRAZolam (XANAX) 2 MG tablet  Dispense: 90 tablet; Refill: 0      Possible Sleep Apnea: will address after she recovers from procedures          Risks and Recommendations:  The patient has the following additional risks and recommendations for perioperative complications:   - No identified additional risk factors other than previously addressed      RECOMMENDATION:  APPROVAL GIVEN to proceed with proposed procedure, without further diagnostic evaluation.    Robbie Bailon MD  December 30, 2022  10:24 AM        Subjective     HPI related to upcoming  procedure: colonoscopy and subsequent sigmoid colectomy for colonic mass    Per Dr Matthews;s note from surgical consult today:   Right abdominal pain, went to ER  Got ct left mass, c scope then surgery  -no other symptoms, no history of c scope    Per Dr Amaro's note on 12/26/22:  ED follow-up  Patient was seen in the emergency room on 12/25 with right sided abdominal pain + N/V. Imaging at that visit showed descending colonic wall thickening with adjacent lymph node reactivity concerning for colonic neoplasm. Patient has follow-up appointments scheduled with Dr. Bailon and general surgeon Dr. Matthews on 12/28.   - Abdominal pain: out of pain medications, couldn't get it refilled because Dr. Bailon has her restricted to him. She was prescribed opioids at the emergency room visit but was unable to pick them up due to the restriction.    - Nausea/vomiting: can't keep anything down; has vomited three times today. Has been drinking a little bit of fluids, no solid foods for days.   - Symptoms have been going on for a few days, has not improved.  - Is very concerned that her pain is out of control and that she needs to have it treated. Dr. Bailon is out of office, she is worried that even if a prescription is sent through by his partner that she will not be able to receive it.    Acute abdominal pain    Nausea and vomiting, unspecified vomiting type  Comment: Patient was seen in the emergency room on 12/25 for abdominal pain with nausea/vomiting; found to have colonic wall thickening. She was discharged with plan for follow-up with general surgery on 12/28 and a prescription for percocet for pain. She was unable to  the prescription sent from the ED provider because she is restricted to Dr. Bailon.  She notes that her pain is intolerable and cannot wait until Dr. Bailon comes back from vacation tomorrow. Discussed with patient that we will have one of his partners, Dr. Angel, send in a prescription. Will also send in  zofran to help with nausea/vomiting. Patient is concerned that the prescription will again be denied because it isn't from Dr. Bailon; she notes that if that pain medicine prescription is denied she will likely go to the emergency room for pain control.   Plan:   - oxyCODONE (ROXICODONE) 5 MG tablet  - naloxone (NARCAN) 4 MG/0.1ML nasal spray  - ondansetron (ZOFRAN ODT) 4 MG ODT tab      Preop Questions 12/28/2022   1. Have you ever had a heart attack or stroke? No   2. Have you ever had surgery on your heart or blood vessels, such as a stent placement, a coronary artery bypass, or surgery on an artery in your head, neck, heart, or legs? YES - pericardial window in 2017   3. Do you have chest pain with activity? No   4. Do you have a history of  heart failure? No   5. Do you currently have a cold, bronchitis or symptoms of other infection? No   6. Do you have a cough, shortness of breath, or wheezing? No   7. Do you or anyone in your family have previous history of blood clots? No   8. Do you or does anyone in your family have a serious bleeding problem such as prolonged bleeding following surgeries or cuts? No   9. Have you ever had problems with anemia or been told to take iron pills? No   10. Have you had any abnormal blood loss such as black, tarry or bloody stools, or abnormal vaginal bleeding? No   11. Have you ever had a blood transfusion? No   12. Are you willing to have a blood transfusion if it is medically needed before, during, or after your surgery? Yes   13. Have you or any of your relatives ever had problems with anesthesia? No   14. Do you have sleep apnea, excessive snoring or daytime drowsiness? YES - will consider sleep study after her procedure   14a. Do you have a CPAP machine? No   15. Do you have any artifical heart valves or other implanted medical devices like a pacemaker, defibrillator, or continuous glucose monitor? No   16. Do you have artificial joints? No   17. Are you allergic to latex?  No   18. Is there any chance that you may be pregnant? No       Health Care Directive:  Patient does not have a Health Care Directive or Living Will: Patient states has Advance Directive and will bring in a copy to clinic.    Preoperative Review of :   reviewed - controlled substances reflected in medication list.      Review of Systems  CONSTITUTIONAL: NEGATIVE for fever, chills, change in weight  INTEGUMENTARY/SKIN: NEGATIVE for worrisome rashes, moles or lesions  EYES: NEGATIVE for vision changes or irritation  ENT/MOUTH: NEGATIVE for ear, mouth and throat problems  RESP: NEGATIVE for significant cough or SOB  CV: NEGATIVE for chest pain, palpitations or peripheral edema  GI: POSITIVE for nausea, abdominal pain - see HPI  : NEGATIVE for frequency, dysuria, or hematuria  MUSCULOSKELETAL: NEGATIVE for significant arthralgias or myalgia  NEURO: NEGATIVE for weakness, dizziness or paresthesias  ENDOCRINE: NEGATIVE for temperature intolerance, skin/hair changes  HEME: NEGATIVE for bleeding problems  PSYCHIATRIC: POSITIVE for changes in mood or affect - more anxious since likely cancer diagnosis    Patient Active Problem List    Diagnosis Date Noted     Colonic mass 12/28/2022     Priority: Medium     Added automatically from request for surgery [id number]       Infection due to 2019 novel coronavirus 09/20/2022     Priority: Medium     Menorrhagia with irregular cycle 07/15/2022     Priority: Medium     Added automatically from request for surgery [id number]       Right ankle swelling 06/07/2022     Priority: Medium     Added automatically from request for surgery [id number]       Physiologic disturbance of temperature regulation 10/24/2020     Priority: Medium     Family history of colon cancer 10/24/2020     Priority: Medium     Moderate persistent asthma without complication 09/29/2020     Priority: Medium     Arthralgia of both lower legs 05/29/2020     Priority: Medium     Bilateral achy knee pain that is chronic  in nature going on for years. Most likely osteoarthritis in knees related to obesity. Previously injected in both knees with good relief. Injected last on 5/29/20       Cough 02/09/2020     Priority: Medium     Polysubstance abuse (H) 01/31/2020     Priority: Medium     Cocaine and alcohol in the past       Chronic infectious pericarditis 02/21/2019     Priority: Medium     Acute pericardial effusion 02/06/2017     Priority: Medium     Atopic rhinitis 01/27/2017     Priority: Medium     Chronic low back pain 01/27/2017     Priority: Medium     Pap smear for cervical cancer screening 10/31/2016     Priority: Medium     03/29/2010  Normal cytology, HPV ot done, repeat in 3 years.       Low back pain 07/13/2015     Priority: Medium     Tobacco use disorder 07/13/2015     Priority: Medium     Controlled substance agreement signed 06/30/2015     Priority: Medium     Overview:   Patient has chronic pain and is seen at [facility] for this.  Has controlled substance agreement with them.  On Vicodin, Valium, Klonopin prescribed only from there.         Abdominal pain 06/29/2015     Priority: Medium     Noninflammatory disorder of vagina 02/20/2015     Priority: Medium     Anxiety 02/20/2015     Priority: Medium     Abnormal cytology finding 11/09/2014     Priority: Medium     Overview:   ACUS/HPV positive       Abnormal cervical Papanicolaou smear 11/09/2014     Priority: Medium     Overview:   ACUS/HPV positive       Chronic sinusitis 10/26/2014     Priority: Medium     Other long term (current) drug therapy 01/28/2013     Priority: Medium     Overview:   Vicodin and cyclobenzaprine monthly       Nondependent alcohol abuse, episodic drinking behavior 10/03/2012     Priority: Medium     Major depressive disorder, recurrent episode, moderate (H) 09/05/2006     Priority: Medium     Panic disorder with agoraphobia 09/05/2006     Priority: Medium      Past Medical History:   Diagnosis Date     Agoraphobia with panic  "attacks      Anxiety      Anxiety      Arthritis     of back     Asthma      Asthma in adult, moderate persistent, uncomplicated      Chronic pain      Chronic sinusitis      Cocaine abuse (H)      Controlled substance agreement signed 06/30/2015    Overview:  Patient has chronic pain and is seen at [facility] for this.  Has controlled substance agreement with them.  On Vicodin, Valium, Klonopin prescribed only from there.        Depression      Hx of seasonal allergies      Infectious pericarditis      Lipoma      Other chronic pain      Tobacco abuse      Past Surgical History:   Procedure Laterality Date     ANKLE SURGERY Right 05/30/2019     BIOPSY BREAST Right 1990    benign     CREATION PERICARDIAL WINDOW  02/10/2017    [facility]     DILATION AND CURETTAGE N/A 7/22/2022    Procedure: DILATION AND CURETTAGE, UTERUS;  Surgeon: Lesly Holland;  Location: [facility]     HEMORRHOIDECTOMY EXTERNAL       HYSTEROSCOPY, WITH ENDOMETRIAL RADIOFREQUENCY ABLATION - NOVASURE N/A 7/22/2022    Procedure: HYSTEROSCOPY, WITH ENDOMETRIAL RADIOFREQUENCY ABLATION - NOVASURE DILATION AND CURETTAGE, UTERUS;  Surgeon: Lesly Holland;  Location: [facility]     TUMOR REMOVAL      Has had 3. In the right breast and \"inside of rib cage.\"     Current Outpatient Medications   Medication Sig Dispense Refill     ALPRAZolam (XANAX) 2 MG tablet Take 1 tablet (2 mg) by mouth 3 times daily as needed for anxiety 90 tablet 0     HYDROcodone-acetaminophen (NORCO) 5-325 MG tablet Take 1 tablet by mouth every 6 hours as needed for severe pain (7-10) 12 tablet 0     senna-docusate (SENOKOT-S/PERICOLACE) 8.6-50 MG tablet Take 1 tablet by mouth 2 times daily as needed for constipation 14 tablet 1     acetaminophen (TYLENOL) 325 MG tablet Take 3 tablets (975 mg) by mouth every 6 hours as needed for mild pain 50 tablet 0     bisacodyl (DULCOLAX) 5 MG EC tablet Take as directed on colonoscopy prep instructions 4 tablet 0     " "cetirizine (ZYRTEC) 10 MG tablet Take 1 tablet (10 mg) by mouth daily 30 tablet 11     famotidine (PEPCID) 20 MG tablet Take 1 tablet (20 mg) by mouth 2 times daily 60 tablet 11     fluticasone (FLONASE) 50 MCG/ACT nasal spray Spray 2 sprays into both nostrils daily 9.9 mL 11     ibuprofen (ADVIL/MOTRIN) 600 MG tablet Take 1 tablet (600 mg) by mouth 3 times daily as needed for moderate pain 90 tablet 4     methylPREDNISolone (MEDROL DOSEPAK) 4 MG tablet therapy pack Follow Package Directions 21 tablet 0     montelukast (SINGULAIR) 10 MG tablet Take 1 tablet (10 mg) by mouth At Bedtime 30 tablet 11     naloxone (NARCAN) 4 MG/0.1ML nasal spray Spray 1 spray (4 mg) into one nostril alternating nostrils as needed for opioid reversal every 2-3 minutes until assistance arrives 0.2 mL 1     ondansetron (ZOFRAN ODT) 4 MG ODT tab Take 1 tablet (4 mg) by mouth every 8 hours as needed for nausea 10 tablet 1     oxyCODONE (ROXICODONE) 5 MG tablet Take 2 tablets (10 mg) by mouth 2 times daily as needed for severe pain (7-10) 24 tablet 0     polyethylene glycol (GOLYTELY) 236 g suspension Take as directed on colonoscopy prep instructions 4000 mL 0     PROAIR  (90 Base) MCG/ACT inhaler Inhale 2 puffs into the lungs every 4 hours as needed for shortness of breath / dyspnea or wheezing 8 g 11     prochlorperazine (COMPAZINE) 10 MG tablet Take 1 tablet (10 mg) by mouth every 6 hours as needed for nausea or vomiting 24 tablet 0     spacer (OPTICHAMBER BINA) holding chamber For use w/ rescue inhaler 1 each 0     tiotropium (SPIRIVA RESPIMAT) 1.25 MCG/ACT inhaler Inhale 2 puffs into the lungs daily 12 g 3     tiotropium (SPIRIVA RESPIMAT) 2.5 MCG/ACT inhaler Inhale 2 puffs into the lungs daily 4 g 4     triamcinolone (KENALOG) 0.1 % external cream Apply topically 2 times daily 100 g 1       Allergies   Allergen Reactions     Lidocaine Other (See Comments)     \"my jaw stopped moving\"  Other reaction(s): Dystonia     Penicillins " Hives, Rash and Shortness Of Breath     Epinephrine-Lidocaine-Na Metabisulfite Other (See Comments) and Muscle Pain (Myalgia)     Severe jaw cramping, double vision  Jaw locking        Social History     Tobacco Use     Smoking status: Light Smoker     Packs/day: 0.10     Years: 30.00     Pack years: 3.00     Types: Cigarettes     Last attempt to quit: 2020     Years since quittin.5     Smokeless tobacco: Current     Tobacco comments:     2-3 cig/day   Substance Use Topics     Alcohol use: Not Currently     Comment: Occasiaonlly.        History   Drug Use Unknown         Objective     /76   Pulse 90   Temp 98.7  F (37.1  C) (Oral)   Resp 18   Wt 110.7 kg (244 lb)   SpO2 96%   BMI 36.03 kg/m      Physical Exam    GENERAL APPEARANCE: healthy, alert and no distress     EYES: EOMI     HENT: ear canals and TM's normal and nose and mouth without ulcers or lesions     NECK: no adenopathy, no asymmetry, masses, or scars and thyroid normal to palpation     RESP: lungs clear to auscultation - no rales, rhonchi or wheezes     CV: regular rates and rhythm, normal S1 S2, no S3 or S4 and no murmur, click or rub     ABDOMEN:  soft, +R-sided tenderness; no R/G     MS: extremities normal- no gross deformities noted, no evidence of inflammation in joints     SKIN: no suspicious lesions or rashes     NEURO: Normal strength and tone, sensory exam grossly normal, mentation intact and speech normal     PSYCH: mentation appears normal. and affect appropriately anxious     LYMPHATICS: No cervical adenopathy    Recent Labs   Lab Test 22  1750 22  1741   HGB 12.8 13.1    262    140   POTASSIUM 3.8 3.4*   CR 0.83 0.85        Diagnostics:  No labs were ordered during this visit.   No EKG required, no history of coronary heart disease, significant arrhythmia, peripheral arterial disease or other structural heart disease. -> hx of pericarditis in 2017; normal EKG on 2022     Revised Cardiac Risk  Index (RCRI):  The patient has the following serious cardiovascular risks for perioperative complications:   - No serious cardiac risks = 0 points     RCRI Interpretation: 0 points: Class I (very low risk - 0.4% complication rate)           Signed Electronically by: Robbie Bailon MD  Copy of this evaluation report is provided to requesting physician.

## 2022-12-28 NOTE — H&P (VIEW-ONLY)
[facility] [address] [place] [address]  Phone: [phone]  Fax: [phone]  Primary Provider: Robbie Bailon  Pre-op Performing Provider: ROBBIE BAILON    PREOPERATIVE EVALUATION:  Today's date: 12/28/2022    Darlene Hudson is a 47 year old female who presents for a preoperative evaluation.    Surgical Information:  Surgery/Procedure: Colonoscopy and Colectomy  Surgery Location: [facility]  Surgeon: Dr. Charles   Surgery Date: 1/6/23  Time of Surgery: 5:30 am   Where patient plans to recover: At home with family  Fax number for surgical facility: Note does not need to be faxed, will be available electronically in Epic.    Type of Anesthesia Anticipated: General for colectomy and conscious sedation for colonoscopy    Assessment & Plan     The proposed surgical procedure is considered INTERMEDIATE risk.    Preoperative examination  Pt cleared to proceed w/ upcoming colonoscopy and sigmoid colectomy; I will work with her on pain control; precautions given    Colonic mass  See above  - HYDROcodone-acetaminophen (NORCO) 5-325 MG tablet  Dispense: 12 tablet; Refill: 0    Drug-induced constipation  - senna-docusate (SENOKOT-S/PERICOLACE) 8.6-50 MG tablet  Dispense: 14 tablet; Refill: 1    Panic disorder with agoraphobia    - ALPRAZolam (XANAX) 2 MG tablet  Dispense: 90 tablet; Refill: 0    Generalized anxiety disorder    - ALPRAZolam (XANAX) 2 MG tablet  Dispense: 90 tablet; Refill: 0      Possible Sleep Apnea: will address after she recovers from procedures          Risks and Recommendations:  The patient has the following additional risks and recommendations for perioperative complications:   - No identified additional risk factors other than previously addressed      RECOMMENDATION:  APPROVAL GIVEN to proceed with proposed procedure, without further diagnostic evaluation.    Robbie Bailon MD  December 30, 2022  10:24 AM        Subjective     HPI related to upcoming  procedure: colonoscopy and subsequent sigmoid colectomy for colonic mass    Per Dr Matthews;s note from surgical consult today:   Right abdominal pain, went to ER  Got ct left mass, c scope then surgery  -no other symptoms, no history of c scope    Per Dr Amaro's note on 12/26/22:  ED follow-up  Patient was seen in the emergency room on 12/25 with right sided abdominal pain + N/V. Imaging at that visit showed descending colonic wall thickening with adjacent lymph node reactivity concerning for colonic neoplasm. Patient has follow-up appointments scheduled with Dr. Bailon and general surgeon Dr. Matthews on 12/28.   - Abdominal pain: out of pain medications, couldn't get it refilled because Dr. Bailon has her restricted to him. She was prescribed opioids at the emergency room visit but was unable to pick them up due to the restriction.    - Nausea/vomiting: can't keep anything down; has vomited three times today. Has been drinking a little bit of fluids, no solid foods for days.   - Symptoms have been going on for a few days, has not improved.  - Is very concerned that her pain is out of control and that she needs to have it treated. Dr. Bailon is out of office, she is worried that even if a prescription is sent through by his partner that she will not be able to receive it.    Acute abdominal pain    Nausea and vomiting, unspecified vomiting type  Comment: Patient was seen in the emergency room on 12/25 for abdominal pain with nausea/vomiting; found to have colonic wall thickening. She was discharged with plan for follow-up with general surgery on 12/28 and a prescription for percocet for pain. She was unable to  the prescription sent from the ED provider because she is restricted to Dr. Bailon.  She notes that her pain is intolerable and cannot wait until Dr. Bailon comes back from vacation tomorrow. Discussed with patient that we will have one of his partners, Dr. Angel, send in a prescription. Will also send in  zofran to help with nausea/vomiting. Patient is concerned that the prescription will again be denied because it isn't from Dr. Bailon; she notes that if that pain medicine prescription is denied she will likely go to the emergency room for pain control.   Plan:   - oxyCODONE (ROXICODONE) 5 MG tablet  - naloxone (NARCAN) 4 MG/0.1ML nasal spray  - ondansetron (ZOFRAN ODT) 4 MG ODT tab      Preop Questions 12/28/2022   1. Have you ever had a heart attack or stroke? No   2. Have you ever had surgery on your heart or blood vessels, such as a stent placement, a coronary artery bypass, or surgery on an artery in your head, neck, heart, or legs? YES - pericardial window in 2017   3. Do you have chest pain with activity? No   4. Do you have a history of  heart failure? No   5. Do you currently have a cold, bronchitis or symptoms of other infection? No   6. Do you have a cough, shortness of breath, or wheezing? No   7. Do you or anyone in your family have previous history of blood clots? No   8. Do you or does anyone in your family have a serious bleeding problem such as prolonged bleeding following surgeries or cuts? No   9. Have you ever had problems with anemia or been told to take iron pills? No   10. Have you had any abnormal blood loss such as black, tarry or bloody stools, or abnormal vaginal bleeding? No   11. Have you ever had a blood transfusion? No   12. Are you willing to have a blood transfusion if it is medically needed before, during, or after your surgery? Yes   13. Have you or any of your relatives ever had problems with anesthesia? No   14. Do you have sleep apnea, excessive snoring or daytime drowsiness? YES - will consider sleep study after her procedure   14a. Do you have a CPAP machine? No   15. Do you have any artifical heart valves or other implanted medical devices like a pacemaker, defibrillator, or continuous glucose monitor? No   16. Do you have artificial joints? No   17. Are you allergic to latex?  No   18. Is there any chance that you may be pregnant? No       Health Care Directive:  Patient does not have a Health Care Directive or Living Will: Patient states has Advance Directive and will bring in a copy to clinic.    Preoperative Review of :   reviewed - controlled substances reflected in medication list.      Review of Systems  CONSTITUTIONAL: NEGATIVE for fever, chills, change in weight  INTEGUMENTARY/SKIN: NEGATIVE for worrisome rashes, moles or lesions  EYES: NEGATIVE for vision changes or irritation  ENT/MOUTH: NEGATIVE for ear, mouth and throat problems  RESP: NEGATIVE for significant cough or SOB  CV: NEGATIVE for chest pain, palpitations or peripheral edema  GI: POSITIVE for nausea, abdominal pain - see HPI  : NEGATIVE for frequency, dysuria, or hematuria  MUSCULOSKELETAL: NEGATIVE for significant arthralgias or myalgia  NEURO: NEGATIVE for weakness, dizziness or paresthesias  ENDOCRINE: NEGATIVE for temperature intolerance, skin/hair changes  HEME: NEGATIVE for bleeding problems  PSYCHIATRIC: POSITIVE for changes in mood or affect - more anxious since likely cancer diagnosis    Patient Active Problem List    Diagnosis Date Noted     Colonic mass 12/28/2022     Priority: Medium     Added automatically from request for surgery [id number]       Infection due to 2019 novel coronavirus 09/20/2022     Priority: Medium     Menorrhagia with irregular cycle 07/15/2022     Priority: Medium     Added automatically from request for surgery [id number]       Right ankle swelling 06/07/2022     Priority: Medium     Added automatically from request for surgery [id number]       Physiologic disturbance of temperature regulation 10/24/2020     Priority: Medium     Family history of colon cancer 10/24/2020     Priority: Medium     Moderate persistent asthma without complication 09/29/2020     Priority: Medium     Arthralgia of both lower legs 05/29/2020     Priority: Medium     Bilateral achy knee pain that is chronic  in nature going on for years. Most likely osteoarthritis in knees related to obesity. Previously injected in both knees with good relief. Injected last on 5/29/20       Cough 02/09/2020     Priority: Medium     Polysubstance abuse (H) 01/31/2020     Priority: Medium     Cocaine and alcohol in the past       Chronic infectious pericarditis 02/21/2019     Priority: Medium     Acute pericardial effusion 02/06/2017     Priority: Medium     Atopic rhinitis 01/27/2017     Priority: Medium     Chronic low back pain 01/27/2017     Priority: Medium     Pap smear for cervical cancer screening 10/31/2016     Priority: Medium     03/29/2010  Normal cytology, HPV ot done, repeat in 3 years.       Low back pain 07/13/2015     Priority: Medium     Tobacco use disorder 07/13/2015     Priority: Medium     Controlled substance agreement signed 06/30/2015     Priority: Medium     Overview:   Patient has chronic pain and is seen at [facility] for this.  Has controlled substance agreement with them.  On Vicodin, Valium, Klonopin prescribed only from there.         Abdominal pain 06/29/2015     Priority: Medium     Noninflammatory disorder of vagina 02/20/2015     Priority: Medium     Anxiety 02/20/2015     Priority: Medium     Abnormal cytology finding 11/09/2014     Priority: Medium     Overview:   ACUS/HPV positive       Abnormal cervical Papanicolaou smear 11/09/2014     Priority: Medium     Overview:   ACUS/HPV positive       Chronic sinusitis 10/26/2014     Priority: Medium     Other long term (current) drug therapy 01/28/2013     Priority: Medium     Overview:   Vicodin and cyclobenzaprine monthly       Nondependent alcohol abuse, episodic drinking behavior 10/03/2012     Priority: Medium     Major depressive disorder, recurrent episode, moderate (H) 09/05/2006     Priority: Medium     Panic disorder with agoraphobia 09/05/2006     Priority: Medium      Past Medical History:   Diagnosis Date     Agoraphobia with panic  "attacks      Anxiety      Anxiety      Arthritis     of back     Asthma      Asthma in adult, moderate persistent, uncomplicated      Chronic pain      Chronic sinusitis      Cocaine abuse (H)      Controlled substance agreement signed 06/30/2015    Overview:  Patient has chronic pain and is seen at [facility] for this.  Has controlled substance agreement with them.  On Vicodin, Valium, Klonopin prescribed only from there.        Depression      Hx of seasonal allergies      Infectious pericarditis      Lipoma      Other chronic pain      Tobacco abuse      Past Surgical History:   Procedure Laterality Date     ANKLE SURGERY Right 05/30/2019     BIOPSY BREAST Right 1990    benign     CREATION PERICARDIAL WINDOW  02/10/2017    [facility]     DILATION AND CURETTAGE N/A 7/22/2022    Procedure: DILATION AND CURETTAGE, UTERUS;  Surgeon: Lesly Holland;  Location: [facility]     HEMORRHOIDECTOMY EXTERNAL       HYSTEROSCOPY, WITH ENDOMETRIAL RADIOFREQUENCY ABLATION - NOVASURE N/A 7/22/2022    Procedure: HYSTEROSCOPY, WITH ENDOMETRIAL RADIOFREQUENCY ABLATION - NOVASURE DILATION AND CURETTAGE, UTERUS;  Surgeon: Lesly Holland;  Location: [facility]     TUMOR REMOVAL      Has had 3. In the right breast and \"inside of rib cage.\"     Current Outpatient Medications   Medication Sig Dispense Refill     ALPRAZolam (XANAX) 2 MG tablet Take 1 tablet (2 mg) by mouth 3 times daily as needed for anxiety 90 tablet 0     HYDROcodone-acetaminophen (NORCO) 5-325 MG tablet Take 1 tablet by mouth every 6 hours as needed for severe pain (7-10) 12 tablet 0     senna-docusate (SENOKOT-S/PERICOLACE) 8.6-50 MG tablet Take 1 tablet by mouth 2 times daily as needed for constipation 14 tablet 1     acetaminophen (TYLENOL) 325 MG tablet Take 3 tablets (975 mg) by mouth every 6 hours as needed for mild pain 50 tablet 0     bisacodyl (DULCOLAX) 5 MG EC tablet Take as directed on colonoscopy prep instructions 4 tablet 0     " "cetirizine (ZYRTEC) 10 MG tablet Take 1 tablet (10 mg) by mouth daily 30 tablet 11     famotidine (PEPCID) 20 MG tablet Take 1 tablet (20 mg) by mouth 2 times daily 60 tablet 11     fluticasone (FLONASE) 50 MCG/ACT nasal spray Spray 2 sprays into both nostrils daily 9.9 mL 11     ibuprofen (ADVIL/MOTRIN) 600 MG tablet Take 1 tablet (600 mg) by mouth 3 times daily as needed for moderate pain 90 tablet 4     methylPREDNISolone (MEDROL DOSEPAK) 4 MG tablet therapy pack Follow Package Directions 21 tablet 0     montelukast (SINGULAIR) 10 MG tablet Take 1 tablet (10 mg) by mouth At Bedtime 30 tablet 11     naloxone (NARCAN) 4 MG/0.1ML nasal spray Spray 1 spray (4 mg) into one nostril alternating nostrils as needed for opioid reversal every 2-3 minutes until assistance arrives 0.2 mL 1     ondansetron (ZOFRAN ODT) 4 MG ODT tab Take 1 tablet (4 mg) by mouth every 8 hours as needed for nausea 10 tablet 1     oxyCODONE (ROXICODONE) 5 MG tablet Take 2 tablets (10 mg) by mouth 2 times daily as needed for severe pain (7-10) 24 tablet 0     polyethylene glycol (GOLYTELY) 236 g suspension Take as directed on colonoscopy prep instructions 4000 mL 0     PROAIR  (90 Base) MCG/ACT inhaler Inhale 2 puffs into the lungs every 4 hours as needed for shortness of breath / dyspnea or wheezing 8 g 11     prochlorperazine (COMPAZINE) 10 MG tablet Take 1 tablet (10 mg) by mouth every 6 hours as needed for nausea or vomiting 24 tablet 0     spacer (OPTICHAMBER BINA) holding chamber For use w/ rescue inhaler 1 each 0     tiotropium (SPIRIVA RESPIMAT) 1.25 MCG/ACT inhaler Inhale 2 puffs into the lungs daily 12 g 3     tiotropium (SPIRIVA RESPIMAT) 2.5 MCG/ACT inhaler Inhale 2 puffs into the lungs daily 4 g 4     triamcinolone (KENALOG) 0.1 % external cream Apply topically 2 times daily 100 g 1       Allergies   Allergen Reactions     Lidocaine Other (See Comments)     \"my jaw stopped moving\"  Other reaction(s): Dystonia     Penicillins " Hives, Rash and Shortness Of Breath     Epinephrine-Lidocaine-Na Metabisulfite Other (See Comments) and Muscle Pain (Myalgia)     Severe jaw cramping, double vision  Jaw locking        Social History     Tobacco Use     Smoking status: Light Smoker     Packs/day: 0.10     Years: 30.00     Pack years: 3.00     Types: Cigarettes     Last attempt to quit: 2020     Years since quittin.5     Smokeless tobacco: Current     Tobacco comments:     2-3 cig/day   Substance Use Topics     Alcohol use: Not Currently     Comment: Occasiaonlly.        History   Drug Use Unknown         Objective     /76   Pulse 90   Temp 98.7  F (37.1  C) (Oral)   Resp 18   Wt 110.7 kg (244 lb)   SpO2 96%   BMI 36.03 kg/m      Physical Exam    GENERAL APPEARANCE: healthy, alert and no distress     EYES: EOMI     HENT: ear canals and TM's normal and nose and mouth without ulcers or lesions     NECK: no adenopathy, no asymmetry, masses, or scars and thyroid normal to palpation     RESP: lungs clear to auscultation - no rales, rhonchi or wheezes     CV: regular rates and rhythm, normal S1 S2, no S3 or S4 and no murmur, click or rub     ABDOMEN:  soft, +R-sided tenderness; no R/G     MS: extremities normal- no gross deformities noted, no evidence of inflammation in joints     SKIN: no suspicious lesions or rashes     NEURO: Normal strength and tone, sensory exam grossly normal, mentation intact and speech normal     PSYCH: mentation appears normal. and affect appropriately anxious     LYMPHATICS: No cervical adenopathy    Recent Labs   Lab Test 22  1750 22  1741   HGB 12.8 13.1    262    140   POTASSIUM 3.8 3.4*   CR 0.83 0.85        Diagnostics:  No labs were ordered during this visit.   No EKG required, no history of coronary heart disease, significant arrhythmia, peripheral arterial disease or other structural heart disease. -> hx of pericarditis in 2017; normal EKG on 2022     Revised Cardiac Risk  Index (RCRI):  The patient has the following serious cardiovascular risks for perioperative complications:   - No serious cardiac risks = 0 points     RCRI Interpretation: 0 points: Class I (very low risk - 0.4% complication rate)           Signed Electronically by: Robbie Bailon MD  Copy of this evaluation report is provided to requesting physician.

## 2022-12-28 NOTE — PROGRESS NOTES
[facility] [address] [place] [address]  Phone: [phone]  Fax: [phone]  Primary Provider: Robbie Bailon  Pre-op Performing Provider: ROBBIE BAILON    PREOPERATIVE EVALUATION:  Today's date: 12/28/2022    Darlene Hudson is a 47 year old female who presents for a preoperative evaluation.    Surgical Information:  Surgery/Procedure: Colonoscopy and Colectomy  Surgery Location: [facility]  Surgeon: Dr. Charles   Surgery Date: 1/6/23  Time of Surgery: 5:30 am   Where patient plans to recover: At home with family  Fax number for surgical facility: Note does not need to be faxed, will be available electronically in Epic.    Type of Anesthesia Anticipated: General for colectomy and conscious sedation for colonoscopy    Assessment & Plan     The proposed surgical procedure is considered INTERMEDIATE risk.    Preoperative examination  Pt cleared to proceed w/ upcoming colonoscopy and sigmoid colectomy; I will work with her on pain control; precautions given    Colonic mass  See above  - HYDROcodone-acetaminophen (NORCO) 5-325 MG tablet  Dispense: 12 tablet; Refill: 0    Drug-induced constipation  - senna-docusate (SENOKOT-S/PERICOLACE) 8.6-50 MG tablet  Dispense: 14 tablet; Refill: 1    Panic disorder with agoraphobia    - ALPRAZolam (XANAX) 2 MG tablet  Dispense: 90 tablet; Refill: 0    Generalized anxiety disorder    - ALPRAZolam (XANAX) 2 MG tablet  Dispense: 90 tablet; Refill: 0      Possible Sleep Apnea: will address after she recovers from procedures          Risks and Recommendations:  The patient has the following additional risks and recommendations for perioperative complications:   - No identified additional risk factors other than previously addressed      RECOMMENDATION:  APPROVAL GIVEN to proceed with proposed procedure, without further diagnostic evaluation.    Robbie Bailon MD  December 30, 2022  10:24 AM        Subjective     HPI related to upcoming  procedure: colonoscopy and subsequent sigmoid colectomy for colonic mass    Per Dr Matthews;s note from surgical consult today:   Right abdominal pain, went to ER  Got ct left mass, c scope then surgery  -no other symptoms, no history of c scope    Per Dr Amaro's note on 12/26/22:  ED follow-up  Patient was seen in the emergency room on 12/25 with right sided abdominal pain + N/V. Imaging at that visit showed descending colonic wall thickening with adjacent lymph node reactivity concerning for colonic neoplasm. Patient has follow-up appointments scheduled with Dr. Bailon and general surgeon Dr. Matthews on 12/28.   - Abdominal pain: out of pain medications, couldn't get it refilled because Dr. Bailon has her restricted to him. She was prescribed opioids at the emergency room visit but was unable to pick them up due to the restriction.    - Nausea/vomiting: can't keep anything down; has vomited three times today. Has been drinking a little bit of fluids, no solid foods for days.   - Symptoms have been going on for a few days, has not improved.  - Is very concerned that her pain is out of control and that she needs to have it treated. Dr. Bailon is out of office, she is worried that even if a prescription is sent through by his partner that she will not be able to receive it.    Acute abdominal pain    Nausea and vomiting, unspecified vomiting type  Comment: Patient was seen in the emergency room on 12/25 for abdominal pain with nausea/vomiting; found to have colonic wall thickening. She was discharged with plan for follow-up with general surgery on 12/28 and a prescription for percocet for pain. She was unable to  the prescription sent from the ED provider because she is restricted to Dr. Bailon.  She notes that her pain is intolerable and cannot wait until Dr. Bailon comes back from vacation tomorrow. Discussed with patient that we will have one of his partners, Dr. Angel, send in a prescription. Will also send in  zofran to help with nausea/vomiting. Patient is concerned that the prescription will again be denied because it isn't from Dr. Bailon; she notes that if that pain medicine prescription is denied she will likely go to the emergency room for pain control.   Plan:   - oxyCODONE (ROXICODONE) 5 MG tablet  - naloxone (NARCAN) 4 MG/0.1ML nasal spray  - ondansetron (ZOFRAN ODT) 4 MG ODT tab      Preop Questions 12/28/2022   1. Have you ever had a heart attack or stroke? No   2. Have you ever had surgery on your heart or blood vessels, such as a stent placement, a coronary artery bypass, or surgery on an artery in your head, neck, heart, or legs? YES - pericardial window in 2017   3. Do you have chest pain with activity? No   4. Do you have a history of  heart failure? No   5. Do you currently have a cold, bronchitis or symptoms of other infection? No   6. Do you have a cough, shortness of breath, or wheezing? No   7. Do you or anyone in your family have previous history of blood clots? No   8. Do you or does anyone in your family have a serious bleeding problem such as prolonged bleeding following surgeries or cuts? No   9. Have you ever had problems with anemia or been told to take iron pills? No   10. Have you had any abnormal blood loss such as black, tarry or bloody stools, or abnormal vaginal bleeding? No   11. Have you ever had a blood transfusion? No   12. Are you willing to have a blood transfusion if it is medically needed before, during, or after your surgery? Yes   13. Have you or any of your relatives ever had problems with anesthesia? No   14. Do you have sleep apnea, excessive snoring or daytime drowsiness? YES - will consider sleep study after her procedure   14a. Do you have a CPAP machine? No   15. Do you have any artifical heart valves or other implanted medical devices like a pacemaker, defibrillator, or continuous glucose monitor? No   16. Do you have artificial joints? No   17. Are you allergic to latex?  No   18. Is there any chance that you may be pregnant? No       Health Care Directive:  Patient does not have a Health Care Directive or Living Will: Patient states has Advance Directive and will bring in a copy to clinic.    Preoperative Review of :   reviewed - controlled substances reflected in medication list.      Review of Systems  CONSTITUTIONAL: NEGATIVE for fever, chills, change in weight  INTEGUMENTARY/SKIN: NEGATIVE for worrisome rashes, moles or lesions  EYES: NEGATIVE for vision changes or irritation  ENT/MOUTH: NEGATIVE for ear, mouth and throat problems  RESP: NEGATIVE for significant cough or SOB  CV: NEGATIVE for chest pain, palpitations or peripheral edema  GI: POSITIVE for nausea, abdominal pain - see HPI  : NEGATIVE for frequency, dysuria, or hematuria  MUSCULOSKELETAL: NEGATIVE for significant arthralgias or myalgia  NEURO: NEGATIVE for weakness, dizziness or paresthesias  ENDOCRINE: NEGATIVE for temperature intolerance, skin/hair changes  HEME: NEGATIVE for bleeding problems  PSYCHIATRIC: POSITIVE for changes in mood or affect - more anxious since likely cancer diagnosis    Patient Active Problem List    Diagnosis Date Noted     Colonic mass 12/28/2022     Priority: Medium     Added automatically from request for surgery [id number]       Infection due to 2019 novel coronavirus 09/20/2022     Priority: Medium     Menorrhagia with irregular cycle 07/15/2022     Priority: Medium     Added automatically from request for surgery [id number]       Right ankle swelling 06/07/2022     Priority: Medium     Added automatically from request for surgery [id number]       Physiologic disturbance of temperature regulation 10/24/2020     Priority: Medium     Family history of colon cancer 10/24/2020     Priority: Medium     Moderate persistent asthma without complication 09/29/2020     Priority: Medium     Arthralgia of both lower legs 05/29/2020     Priority: Medium     Bilateral achy knee pain that is chronic  in nature going on for years. Most likely osteoarthritis in knees related to obesity. Previously injected in both knees with good relief. Injected last on 5/29/20       Cough 02/09/2020     Priority: Medium     Polysubstance abuse (H) 01/31/2020     Priority: Medium     Cocaine and alcohol in the past       Chronic infectious pericarditis 02/21/2019     Priority: Medium     Acute pericardial effusion 02/06/2017     Priority: Medium     Atopic rhinitis 01/27/2017     Priority: Medium     Chronic low back pain 01/27/2017     Priority: Medium     Pap smear for cervical cancer screening 10/31/2016     Priority: Medium     03/29/2010  Normal cytology, HPV ot done, repeat in 3 years.       Low back pain 07/13/2015     Priority: Medium     Tobacco use disorder 07/13/2015     Priority: Medium     Controlled substance agreement signed 06/30/2015     Priority: Medium     Overview:   Patient has chronic pain and is seen at [facility] for this.  Has controlled substance agreement with them.  On Vicodin, Valium, Klonopin prescribed only from there.         Abdominal pain 06/29/2015     Priority: Medium     Noninflammatory disorder of vagina 02/20/2015     Priority: Medium     Anxiety 02/20/2015     Priority: Medium     Abnormal cytology finding 11/09/2014     Priority: Medium     Overview:   ACUS/HPV positive       Abnormal cervical Papanicolaou smear 11/09/2014     Priority: Medium     Overview:   ACUS/HPV positive       Chronic sinusitis 10/26/2014     Priority: Medium     Other long term (current) drug therapy 01/28/2013     Priority: Medium     Overview:   Vicodin and cyclobenzaprine monthly       Nondependent alcohol abuse, episodic drinking behavior 10/03/2012     Priority: Medium     Major depressive disorder, recurrent episode, moderate (H) 09/05/2006     Priority: Medium     Panic disorder with agoraphobia 09/05/2006     Priority: Medium      Past Medical History:   Diagnosis Date     Agoraphobia with panic  "attacks      Anxiety      Anxiety      Arthritis     of back     Asthma      Asthma in adult, moderate persistent, uncomplicated      Chronic pain      Chronic sinusitis      Cocaine abuse (H)      Controlled substance agreement signed 06/30/2015    Overview:  Patient has chronic pain and is seen at [facility] for this.  Has controlled substance agreement with them.  On Vicodin, Valium, Klonopin prescribed only from there.        Depression      Hx of seasonal allergies      Infectious pericarditis      Lipoma      Other chronic pain      Tobacco abuse      Past Surgical History:   Procedure Laterality Date     ANKLE SURGERY Right 05/30/2019     BIOPSY BREAST Right 1990    benign     CREATION PERICARDIAL WINDOW  02/10/2017    [facility]     DILATION AND CURETTAGE N/A 7/22/2022    Procedure: DILATION AND CURETTAGE, UTERUS;  Surgeon: Lesly Holland;  Location: [facility]     HEMORRHOIDECTOMY EXTERNAL       HYSTEROSCOPY, WITH ENDOMETRIAL RADIOFREQUENCY ABLATION - NOVASURE N/A 7/22/2022    Procedure: HYSTEROSCOPY, WITH ENDOMETRIAL RADIOFREQUENCY ABLATION - NOVASURE DILATION AND CURETTAGE, UTERUS;  Surgeon: Lesly Holland;  Location: [facility]     TUMOR REMOVAL      Has had 3. In the right breast and \"inside of rib cage.\"     Current Outpatient Medications   Medication Sig Dispense Refill     ALPRAZolam (XANAX) 2 MG tablet Take 1 tablet (2 mg) by mouth 3 times daily as needed for anxiety 90 tablet 0     HYDROcodone-acetaminophen (NORCO) 5-325 MG tablet Take 1 tablet by mouth every 6 hours as needed for severe pain (7-10) 12 tablet 0     senna-docusate (SENOKOT-S/PERICOLACE) 8.6-50 MG tablet Take 1 tablet by mouth 2 times daily as needed for constipation 14 tablet 1     acetaminophen (TYLENOL) 325 MG tablet Take 3 tablets (975 mg) by mouth every 6 hours as needed for mild pain 50 tablet 0     bisacodyl (DULCOLAX) 5 MG EC tablet Take as directed on colonoscopy prep instructions 4 tablet 0     " "cetirizine (ZYRTEC) 10 MG tablet Take 1 tablet (10 mg) by mouth daily 30 tablet 11     famotidine (PEPCID) 20 MG tablet Take 1 tablet (20 mg) by mouth 2 times daily 60 tablet 11     fluticasone (FLONASE) 50 MCG/ACT nasal spray Spray 2 sprays into both nostrils daily 9.9 mL 11     ibuprofen (ADVIL/MOTRIN) 600 MG tablet Take 1 tablet (600 mg) by mouth 3 times daily as needed for moderate pain 90 tablet 4     methylPREDNISolone (MEDROL DOSEPAK) 4 MG tablet therapy pack Follow Package Directions 21 tablet 0     montelukast (SINGULAIR) 10 MG tablet Take 1 tablet (10 mg) by mouth At Bedtime 30 tablet 11     naloxone (NARCAN) 4 MG/0.1ML nasal spray Spray 1 spray (4 mg) into one nostril alternating nostrils as needed for opioid reversal every 2-3 minutes until assistance arrives 0.2 mL 1     ondansetron (ZOFRAN ODT) 4 MG ODT tab Take 1 tablet (4 mg) by mouth every 8 hours as needed for nausea 10 tablet 1     oxyCODONE (ROXICODONE) 5 MG tablet Take 2 tablets (10 mg) by mouth 2 times daily as needed for severe pain (7-10) 24 tablet 0     polyethylene glycol (GOLYTELY) 236 g suspension Take as directed on colonoscopy prep instructions 4000 mL 0     PROAIR  (90 Base) MCG/ACT inhaler Inhale 2 puffs into the lungs every 4 hours as needed for shortness of breath / dyspnea or wheezing 8 g 11     prochlorperazine (COMPAZINE) 10 MG tablet Take 1 tablet (10 mg) by mouth every 6 hours as needed for nausea or vomiting 24 tablet 0     spacer (OPTICHAMBER BINA) holding chamber For use w/ rescue inhaler 1 each 0     tiotropium (SPIRIVA RESPIMAT) 1.25 MCG/ACT inhaler Inhale 2 puffs into the lungs daily 12 g 3     tiotropium (SPIRIVA RESPIMAT) 2.5 MCG/ACT inhaler Inhale 2 puffs into the lungs daily 4 g 4     triamcinolone (KENALOG) 0.1 % external cream Apply topically 2 times daily 100 g 1       Allergies   Allergen Reactions     Lidocaine Other (See Comments)     \"my jaw stopped moving\"  Other reaction(s): Dystonia     Penicillins " Hives, Rash and Shortness Of Breath     Epinephrine-Lidocaine-Na Metabisulfite Other (See Comments) and Muscle Pain (Myalgia)     Severe jaw cramping, double vision  Jaw locking        Social History     Tobacco Use     Smoking status: Light Smoker     Packs/day: 0.10     Years: 30.00     Pack years: 3.00     Types: Cigarettes     Last attempt to quit: 2020     Years since quittin.5     Smokeless tobacco: Current     Tobacco comments:     2-3 cig/day   Substance Use Topics     Alcohol use: Not Currently     Comment: Occasiaonlly.        History   Drug Use Unknown         Objective     /76   Pulse 90   Temp 98.7  F (37.1  C) (Oral)   Resp 18   Wt 110.7 kg (244 lb)   SpO2 96%   BMI 36.03 kg/m      Physical Exam    GENERAL APPEARANCE: healthy, alert and no distress     EYES: EOMI     HENT: ear canals and TM's normal and nose and mouth without ulcers or lesions     NECK: no adenopathy, no asymmetry, masses, or scars and thyroid normal to palpation     RESP: lungs clear to auscultation - no rales, rhonchi or wheezes     CV: regular rates and rhythm, normal S1 S2, no S3 or S4 and no murmur, click or rub     ABDOMEN:  soft, +R-sided tenderness; no R/G     MS: extremities normal- no gross deformities noted, no evidence of inflammation in joints     SKIN: no suspicious lesions or rashes     NEURO: Normal strength and tone, sensory exam grossly normal, mentation intact and speech normal     PSYCH: mentation appears normal. and affect appropriately anxious     LYMPHATICS: No cervical adenopathy    Recent Labs   Lab Test 22  1750 22  1741   HGB 12.8 13.1    262    140   POTASSIUM 3.8 3.4*   CR 0.83 0.85        Diagnostics:  No labs were ordered during this visit.   No EKG required, no history of coronary heart disease, significant arrhythmia, peripheral arterial disease or other structural heart disease. -> hx of pericarditis in 2017; normal EKG on 2022     Revised Cardiac Risk  Index (RCRI):  The patient has the following serious cardiovascular risks for perioperative complications:   - No serious cardiac risks = 0 points     RCRI Interpretation: 0 points: Class I (very low risk - 0.4% complication rate)           Signed Electronically by: Robbie Bailon MD  Copy of this evaluation report is provided to requesting physician.

## 2022-12-28 NOTE — LETTER
We've received instruction to get you scheduled for surgery with Dr Charles. We have that arranged as follows:     Pre-op Physical:  Call your primary clinic to schedule.    Surgery Date: 1/6/2023     Location: [facility].  [address] [place], [place] [address]    Approximate Arrival Time: 5:30 am  (Unless instructed differently by the pre-op call nurse)     Post op Appointment: 1/18/2023 at 1:00pm at  [facility] with Dr. Charles.    Prep Tasks and Info:     1. Schedule a pre-op physical with your primary care doctor within 30 days of surgery. This is required by anesthesia and if not done, your surgery will be cancelled. Call them asap to get this scheduled.    2. Review your medications with your primary care or prescribing physician; they will advise you which meds to stop and when, and when you can resume taking.  Certain medications like blood thinners need to be stopped in advance of surgery to proceed safely.    3. Please shower the evening before and morning of surgery with Hibiclens or Exidine soap.  This can be found at your local pharmacy. Follow the links below for detailed instructions for preoperative skin preparation:     Showering Before [id number]       Preparing Your Skin Before Surgery - When you can't take a [id number]     4. Fasting instructions will be provided by the pre-op nurse who will call you 1-3 days before surgery.  Typically we advise normal food up to 8 hours before you arrive for surgery. Clear liquids only from then until 2 hours before you arrive surgery then nothing at all by mouth.  The nurse will review your specific instructions with you at the call.     5. Smoking impacts your body's ability to heal properly.  If you are a smoker, we strongly urge you to stop smoking 4-6 weeks before surgery. Plastic surgery patients are required to be nicotine free for 6-8 weeks before surgery.     6. You will need an adult to drive you  home and stay with you 24 hours after surgery. Public transportation or Medical Van Services are not permitted.    7. Visitor restrictions are subject to change, please verify with the pre-op nurse how many people may accompany you when they call.    8. We always encourage you to notify your insurance any time you have medical tests or procedures scheduled including surgery. The number is usually right on the back of your insurance card. Please call [facility] Cost of Care at [phone] if you'd like a surgery quote.      Preparing for Your Surgery [id number]     Call our office if you have any questions! Thank you!

## 2022-12-29 ENCOUNTER — PATIENT OUTREACH (OUTPATIENT)
Dept: CARE COORDINATION | Facility: CLINIC | Age: 47
End: 2022-12-29

## 2022-12-29 NOTE — PROGRESS NOTES
Clinic Care Coordination Contact    Follow Up Progress Note      Assessment: I got a call from Flory from [facility] about the pt. The pt has surgery coming up, and needs a referral. She needed a referral for OD[facility] Seals. I filled out the referral for him and faxed it to [employer].     Care Gaps:    Health Maintenance Due   Topic Date Due     DEPRESSION ACTION PLAN  Never done     HEPATITIS B IMMUNIZATION (1 of 3 - 3-dose series) Never done     COLORECTAL CANCER SCREENING  Never done     Pneumococcal Vaccine: Pediatrics (0 to 5 Years) and At-Risk Patients (6 to 64 Years) (2 - PCV) 10/15/2015     ASTHMA ACTION PLAN  01/31/2021     SPIROMETRY  03/03/2021           Care Plans      Intervention/Education provided during outreach:               Plan:     Care Coordinator will follow up in

## 2022-12-30 ENCOUNTER — ANESTHESIA EVENT (OUTPATIENT)
Dept: SURGERY | Facility: AMBULATORY SURGERY CENTER | Age: 47
End: 2022-12-30
Payer: MEDICAID

## 2022-12-30 ENCOUNTER — TELEPHONE (OUTPATIENT)
Dept: FAMILY MEDICINE | Facility: CLINIC | Age: 47
End: 2022-12-30

## 2022-12-30 ENCOUNTER — PREP FOR PROCEDURE (OUTPATIENT)
Dept: SURGERY | Facility: CLINIC | Age: 47
End: 2022-12-30

## 2022-12-30 DIAGNOSIS — K63.89 COLONIC MASS: Primary | ICD-10-CM

## 2022-12-30 RX ORDER — PROCHLORPERAZINE MALEATE 10 MG
10 TABLET ORAL EVERY 6 HOURS PRN
Qty: 24 TABLET | Refills: 0 | Status: SHIPPED | OUTPATIENT
Start: 2022-12-30 | End: 2023-01-18

## 2022-12-30 RX ORDER — OXYCODONE HYDROCHLORIDE 5 MG/1
10 TABLET ORAL 2 TIMES DAILY PRN
Qty: 24 TABLET | Refills: 0 | Status: ON HOLD | OUTPATIENT
Start: 2022-12-30 | End: 2023-01-06

## 2022-12-30 NOTE — TELEPHONE ENCOUNTER
[facility] phone call message- medication clarification/question:    Full Medication Name: HYDROcodone-acetaminophen (NORCO) 5-325 MG tablet    Question: Patient called to let Dr Bailon know that this medication is not helping with her pain. Will send message to Dr. Bailon. Please call and advise if needed thank you    Pharmacy confirmed as [id number] [place] [id number] [facility]  AT [facility]: Yes    OK to leave a message on voice mail? Yes    Primary language: English      needed? No    Call taken on December 30, 2022 at 9:30 AM by Christine Cruz

## 2022-12-30 NOTE — TELEPHONE ENCOUNTER
Spoke to pt. Will switch back to oxycodone and adjust dosing regimen. Precautions given. Compazine also called in for increased nausea. Pt has colonoscopy and partial colectomy scheduled for next week.    Robbie Bailon MD  December 30, 2022  10:09 AM

## 2023-01-03 ENCOUNTER — SURGERY (OUTPATIENT)
Age: 48
End: 2023-01-03
Payer: MEDICAID

## 2023-01-03 ENCOUNTER — ANESTHESIA (OUTPATIENT)
Dept: SURGERY | Facility: AMBULATORY SURGERY CENTER | Age: 48
End: 2023-01-03
Payer: MEDICAID

## 2023-01-03 ENCOUNTER — HOSPITAL ENCOUNTER (OUTPATIENT)
Facility: AMBULATORY SURGERY CENTER | Age: 48
Discharge: HOME | End: 2023-01-03
Attending: SURGERY
Payer: MEDICAID

## 2023-01-03 VITALS
RESPIRATION RATE: 16 BRPM | SYSTOLIC BLOOD PRESSURE: 95 MMHG | HEIGHT: 69 IN | BODY MASS INDEX: 35.55 KG/M2 | TEMPERATURE: 97.5 F | WEIGHT: 240 LBS | OXYGEN SATURATION: 99 % | DIASTOLIC BLOOD PRESSURE: 58 MMHG | HEART RATE: 63 BPM

## 2023-01-03 DIAGNOSIS — K63.89 COLONIC MASS: ICD-10-CM

## 2023-01-03 LAB — COLONOSCOPY: NORMAL

## 2023-01-03 RX ORDER — GLYCOPYRROLATE 0.2 MG/ML
INJECTION, SOLUTION INTRAMUSCULAR; INTRAVENOUS PRN
Status: DISCONTINUED | OUTPATIENT
Start: 2023-01-03 | End: 2023-01-03

## 2023-01-03 RX ORDER — PROPOFOL 10 MG/ML
INJECTION, EMULSION INTRAVENOUS PRN
Status: DISCONTINUED | OUTPATIENT
Start: 2023-01-03 | End: 2023-01-03

## 2023-01-03 RX ORDER — HYDROMORPHONE HCL IN WATER/PF 6 MG/30 ML
0.2 PATIENT CONTROLLED ANALGESIA SYRINGE INTRAVENOUS EVERY 5 MIN PRN
Status: DISCONTINUED | OUTPATIENT
Start: 2023-01-03 | End: 2023-01-04 | Stop reason: HOSPADM

## 2023-01-03 RX ORDER — SODIUM CHLORIDE, SODIUM LACTATE, POTASSIUM CHLORIDE, CALCIUM CHLORIDE 600; 310; 30; 20 MG/100ML; MG/100ML; MG/100ML; MG/100ML
INJECTION, SOLUTION INTRAVENOUS CONTINUOUS
Status: DISCONTINUED | OUTPATIENT
Start: 2023-01-03 | End: 2023-01-04 | Stop reason: HOSPADM

## 2023-01-03 RX ORDER — ONDANSETRON 2 MG/ML
4 INJECTION INTRAMUSCULAR; INTRAVENOUS EVERY 30 MIN PRN
Status: DISCONTINUED | OUTPATIENT
Start: 2023-01-03 | End: 2023-01-04 | Stop reason: HOSPADM

## 2023-01-03 RX ORDER — LIDOCAINE 40 MG/G
CREAM TOPICAL
Status: DISCONTINUED | OUTPATIENT
Start: 2023-01-03 | End: 2023-01-04 | Stop reason: HOSPADM

## 2023-01-03 RX ORDER — FENTANYL CITRATE 0.05 MG/ML
25 INJECTION, SOLUTION INTRAMUSCULAR; INTRAVENOUS
Status: DISCONTINUED | OUTPATIENT
Start: 2023-01-03 | End: 2023-01-04 | Stop reason: HOSPADM

## 2023-01-03 RX ORDER — PROPOFOL 10 MG/ML
INJECTION, EMULSION INTRAVENOUS CONTINUOUS PRN
Status: DISCONTINUED | OUTPATIENT
Start: 2023-01-03 | End: 2023-01-03

## 2023-01-03 RX ORDER — ONDANSETRON 2 MG/ML
INJECTION INTRAMUSCULAR; INTRAVENOUS PRN
Status: DISCONTINUED | OUTPATIENT
Start: 2023-01-03 | End: 2023-01-03

## 2023-01-03 RX ORDER — MEPERIDINE HYDROCHLORIDE 25 MG/ML
12.5 INJECTION INTRAMUSCULAR; INTRAVENOUS; SUBCUTANEOUS
Status: DISCONTINUED | OUTPATIENT
Start: 2023-01-03 | End: 2023-01-04 | Stop reason: HOSPADM

## 2023-01-03 RX ORDER — FENTANYL CITRATE 0.05 MG/ML
50 INJECTION, SOLUTION INTRAMUSCULAR; INTRAVENOUS EVERY 5 MIN PRN
Status: DISCONTINUED | OUTPATIENT
Start: 2023-01-03 | End: 2023-01-04 | Stop reason: HOSPADM

## 2023-01-03 RX ORDER — OXYCODONE HYDROCHLORIDE 10 MG/1
10 TABLET ORAL ONCE
Status: COMPLETED | OUTPATIENT
Start: 2023-01-03 | End: 2023-01-03

## 2023-01-03 RX ORDER — ONDANSETRON 4 MG/1
4 TABLET, ORALLY DISINTEGRATING ORAL EVERY 30 MIN PRN
Status: DISCONTINUED | OUTPATIENT
Start: 2023-01-03 | End: 2023-01-04 | Stop reason: HOSPADM

## 2023-01-03 RX ORDER — FENTANYL CITRATE 0.05 MG/ML
25 INJECTION, SOLUTION INTRAMUSCULAR; INTRAVENOUS EVERY 5 MIN PRN
Status: DISCONTINUED | OUTPATIENT
Start: 2023-01-03 | End: 2023-01-04 | Stop reason: HOSPADM

## 2023-01-03 RX ORDER — HYDROMORPHONE HCL IN WATER/PF 6 MG/30 ML
0.4 PATIENT CONTROLLED ANALGESIA SYRINGE INTRAVENOUS EVERY 5 MIN PRN
Status: DISCONTINUED | OUTPATIENT
Start: 2023-01-03 | End: 2023-01-04 | Stop reason: HOSPADM

## 2023-01-03 RX ORDER — FENTANYL CITRATE 0.05 MG/ML
50 INJECTION, SOLUTION INTRAMUSCULAR; INTRAVENOUS ONCE
Status: COMPLETED | OUTPATIENT
Start: 2023-01-03 | End: 2023-01-03

## 2023-01-03 RX ADMIN — PROPOFOL 40 MG: 10 INJECTION, EMULSION INTRAVENOUS at 08:52

## 2023-01-03 RX ADMIN — FENTANYL CITRATE 50 MCG: 0.05 INJECTION, SOLUTION INTRAMUSCULAR; INTRAVENOUS at 10:01

## 2023-01-03 RX ADMIN — ONDANSETRON 4 MG: 2 INJECTION INTRAMUSCULAR; INTRAVENOUS at 08:56

## 2023-01-03 RX ADMIN — FENTANYL CITRATE 50 MCG: 0.05 INJECTION, SOLUTION INTRAMUSCULAR; INTRAVENOUS at 09:51

## 2023-01-03 RX ADMIN — OXYCODONE HYDROCHLORIDE 10 MG: 10 TABLET ORAL at 10:10

## 2023-01-03 RX ADMIN — SODIUM CHLORIDE, SODIUM LACTATE, POTASSIUM CHLORIDE, CALCIUM CHLORIDE: 600; 310; 30; 20 INJECTION, SOLUTION INTRAVENOUS at 07:48

## 2023-01-03 RX ADMIN — PROPOFOL 200 MCG/KG/MIN: 10 INJECTION, EMULSION INTRAVENOUS at 08:52

## 2023-01-03 RX ADMIN — FENTANYL CITRATE 50 MCG: 0.05 INJECTION, SOLUTION INTRAMUSCULAR; INTRAVENOUS at 07:52

## 2023-01-03 RX ADMIN — GLYCOPYRROLATE 0.2 MG: 0.2 INJECTION, SOLUTION INTRAMUSCULAR; INTRAVENOUS at 08:52

## 2023-01-03 ASSESSMENT — LIFESTYLE VARIABLES: TOBACCO_USE: 1

## 2023-01-03 NOTE — ANESTHESIA POSTPROCEDURE EVALUATION
Patient: Darlene Hudson    Procedure: Procedure(s):  COLONOSCOPY WITH BIOPSY OF COLON MASS, POLYPECTOMY       Anesthesia Type:  MAC    Note:  Disposition: Outpatient   Postop Pain Control: Uneventful            Sign Out: Well controlled pain   PONV: No   Neuro/Psych: Uneventful            Sign Out: Acceptable/Baseline neuro status   Airway/Respiratory: Uneventful            Sign Out: Acceptable/Baseline resp. status   CV/Hemodynamics: Uneventful            Sign Out: Acceptable CV status; No obvious hypovolemia; No obvious fluid overload   Other NRE:    DID A NON-ROUTINE EVENT OCCUR?            Last vitals:  Vitals Value Taken Time   /93 01/03/23 1103   Temp 97.5  F (36.4  C) 01/03/23 0946   Pulse 130 01/03/23 1106   Resp 16 01/03/23 1015   SpO2 99 % 01/03/23 1106   Vitals shown include unvalidated device data.    Electronically Signed By: Matthew Larry MD  January 3, 2023  11:43 AM

## 2023-01-03 NOTE — ANESTHESIA CARE TRANSFER NOTE
Patient: Darlene Hudson    Procedure: Procedure(s):  COLONOSCOPY WITH BIOPSY OF COLON MASS, POLYPECTOMY       Diagnosis: Colonic mass [K63.89]  Diagnosis Additional Information: No value filed.    Anesthesia Type:   MAC     Note:  Anesthesia Care Transfer Notewriter  Vitals:  Vitals Value Taken Time   /77    Temp 97.5  F (36.4  C) 01/03/23 0946   Pulse 87 01/03/23 0946   Resp 16    SpO2 100 % 01/03/23 0946       Electronically Signed By: PEPE Parikh CRNA  January 3, 2023  9:47 AM

## 2023-01-03 NOTE — DISCHARGE INSTRUCTIONS
Discharge Instructions after Colonoscopy    Activity and Diet  You were given medicine for sedation. You may be dizzy or sleepy.  For 24 hours:   Do not drive or use heavy equipment.   Do not make important decisions.   Do not drink any alcohol.  You may return to your normal diet and medicines.    Discomfort   Air was placed in your colon during the exam in order to see it. Walking helps to pass the air.   You may take Tylenol (acetaminophen) for pain unless your doctor has told you not to.    When to call:    Call right away if you have:   Severe abdominal or chest pain not relieved with passing air.   More than 1 to 2 Tablespoons of bleeding from your rectum.   Fever above 100.6  F (37.5  C).    If you have severe pain, bleeding, or shortness of breath, go to an emergency room.    If you have questions, call:  Monday to Friday, 7 a.m. to 4:30 p.m.  [phone]

## 2023-01-03 NOTE — INTERVAL H&P NOTE
"I have reviewed the surgical (or preoperative) H&P that is linked to this encounter, and examined the patient. There are no significant changes    Clinical Conditions Present on Arrival:  Clinically Significant Risk Factors Present on Admission                    # Obesity: Estimated body mass index is 35.44 kg/m  as calculated from the following:    Height as of this encounter: 1.753 m (5' 9\").    Weight as of this encounter: 108.9 kg (240 lb).       "

## 2023-01-03 NOTE — PROGRESS NOTES
Pt reported a positive home COVID test within the month.  Pt also showed writer a neg home COVID test from Dec 22nd 2022.  Pt is not currently having any symptoms.

## 2023-01-03 NOTE — ANESTHESIA PREPROCEDURE EVALUATION
"Anesthesia Pre-Procedure Evaluation    Patient: Darlene Hudson   MRN: [id number] : 1975        Procedure : Procedure(s):  COLONOSCOPY          Past Medical History:   Diagnosis Date     Agoraphobia with panic attacks      Anxiety      Anxiety      Arthritis     of back     Asthma      Asthma in adult, moderate persistent, uncomplicated      Chronic pain      Chronic sinusitis      Cocaine abuse (H)      Controlled substance agreement signed 2015    Overview:  Patient has chronic pain and is seen at [facility] for this.  Has controlled substance agreement with them.  On Vicodin, Valium, Klonopin prescribed only from there.        Depression      Hx of seasonal allergies      Infectious pericarditis      Lipoma      Other chronic pain      Tobacco abuse       Past Surgical History:   Procedure Laterality Date     ANKLE SURGERY Right 2019     BIOPSY BREAST Right     benign     CREATION PERICARDIAL WINDOW  02/10/2017    [facility]     DILATION AND CURETTAGE N/A 2022    Procedure: DILATION AND CURETTAGE, UTERUS;  Surgeon: Lesly Holland;  Location: [facility]     HEMORRHOIDECTOMY EXTERNAL       HYSTEROSCOPY, WITH ENDOMETRIAL RADIOFREQUENCY ABLATION - NOVASURE N/A 2022    Procedure: HYSTEROSCOPY, WITH ENDOMETRIAL RADIOFREQUENCY ABLATION - NOVASURE DILATION AND CURETTAGE, UTERUS;  Surgeon: Lesly Holland;  Location: [facility]     TUMOR REMOVAL      Has had 3. In the right breast and \"inside of rib cage.\"      Allergies   Allergen Reactions     Lidocaine Other (See Comments)     \"my jaw stopped moving\"  Other reaction(s): Dystonia     Penicillins Hives, Rash and Shortness Of Breath     Epinephrine-Lidocaine-Na Metabisulfite Other (See Comments) and Muscle Pain (Myalgia)     Severe jaw cramping, double vision  Jaw locking      Social History     Tobacco Use     Smoking status: Light Smoker     Packs/day: 0.10     Years: 30.00     Pack years: 3.00     Types: " Cigarettes     Last attempt to quit: 2020     Years since quittin.6     Smokeless tobacco: Current     Tobacco comments:     2-3 cig/day   Substance Use Topics     Alcohol use: Not Currently     Comment: Occasiaonlly.       Wt Readings from Last 1 Encounters:   22 108.9 kg (240 lb)        Anesthesia Evaluation            ROS/MED HX  ENT/Pulmonary:     (+) tobacco use, Intermittent, asthma     Neurologic:       Cardiovascular:       METS/Exercise Tolerance:     Hematologic:       Musculoskeletal:       GI/Hepatic:       Renal/Genitourinary:       Endo:     (+) Obesity,     Psychiatric/Substance Use:       Infectious Disease:       Malignancy:       Other:      (+) , H/O Chronic Pain,        Physical Exam    Airway  airway exam normal      Mallampati: II       Respiratory Devices and Support         Dental  no notable dental history         Cardiovascular   cardiovascular exam normal       Rhythm and rate: regular and normal     Pulmonary   pulmonary exam normal        breath sounds clear to auscultation           OUTSIDE LABS:  CBC:   Lab Results   Component Value Date    WBC 7.2 2022    WBC 7.6 2022    HGB 12.8 2022    HGB 13.1 2022    HCT 38.8 2022    HCT 39.3 2022     2022     2022     BMP:   Lab Results   Component Value Date     2022     2022    POTASSIUM 3.8 2022    POTASSIUM 3.4 (L) 2022    CHLORIDE 106 2022    CHLORIDE 105 2022    CO2 26 2022    CO2 24 2022    BUN 18.0 2022    BUN 12 2022    CR 0.83 2022    CR 0.85 2022    GLC 83 2022    GLC 95 2022     COAGS: No results found for: PTT, INR, FIBR  POC:   Lab Results   Component Value Date    HCG Negative 2022    HCGS Negative 2020     HEPATIC:   Lab Results   Component Value Date    ALBUMIN 3.9 2022    PROTTOTAL 7.2 2022    ALT 15 2022    AST 23 2022     ALKPHOS 103 12/25/2022    BILITOTAL <0.2 12/25/2022    BILIDIRECT <0.1 01/28/2019     OTHER:   Lab Results   Component Value Date    LACT 1.2 08/06/2020    MAURY 9.1 12/25/2022    LIPASE <9 08/06/2020    AMYLASE 41 11/02/2017    TSH 0.60 03/08/2022    CRP 0.6 08/26/2021    SED 61 (H) 08/26/2021       Anesthesia Plan    ASA Status:  3      Anesthesia Type: MAC.   Induction: Intravenous, Propofol.   Maintenance: TIVA.        Consents    Anesthesia Plan(s) and associated risks, benefits, and realistic alternatives discussed. Questions answered and patient/representative(s) expressed understanding.    - Discussed:     - Discussed with:  Patient      - Extended Intubation/Ventilatory Support Discussed: No.      - Patient is DNR/DNI Status: No    Use of blood products discussed: No .     Postoperative Care    Pain management: IV analgesics.   PONV prophylaxis: Ondansetron (or other 5HT-3), Dexamethasone or Solumedrol     Comments:    Other Comments: Propofol infusion  Patient requests mild IV pain med intra op to help with chronic pain - these have been held secondary to prep  antiemetics            Matthew Larry MD

## 2023-01-04 PROBLEM — F19.10 POLYSUBSTANCE ABUSE (H): Status: RESOLVED | Noted: 2020-01-31 | Resolved: 2023-01-04

## 2023-01-05 ENCOUNTER — PATIENT OUTREACH (OUTPATIENT)
Dept: CARE COORDINATION | Facility: CLINIC | Age: 48
End: 2023-01-05

## 2023-01-05 ENCOUNTER — TELEPHONE (OUTPATIENT)
Dept: FAMILY MEDICINE | Facility: CLINIC | Age: 48
End: 2023-01-05

## 2023-01-05 ENCOUNTER — TELEPHONE (OUTPATIENT)
Dept: SURGERY | Facility: CLINIC | Age: 48
End: 2023-01-05

## 2023-01-05 ENCOUNTER — ANESTHESIA EVENT (OUTPATIENT)
Dept: SURGERY | Facility: HOSPITAL | Age: 48
End: 2023-01-05
Payer: MEDICAID

## 2023-01-05 DIAGNOSIS — Z79.2 PROPHYLACTIC ANTIBIOTIC: Primary | ICD-10-CM

## 2023-01-05 RX ORDER — NEOMYCIN SULFATE 500 MG/1
TABLET ORAL
Qty: 6 TABLET | Refills: 0 | Status: ON HOLD | OUTPATIENT
Start: 2023-01-05 | End: 2023-01-06

## 2023-01-05 RX ORDER — METRONIDAZOLE 500 MG/1
TABLET ORAL
Qty: 6 TABLET | Refills: 0 | Status: ON HOLD | OUTPATIENT
Start: 2023-01-05 | End: 2023-01-06

## 2023-01-05 NOTE — TELEPHONE ENCOUNTER
After speaking with Karoline, this writer has called pharmacies in [place] to assess if they would have Neomycin in stock prior to adding pharmacy to restricted pharmacy list.   [facility] (can not get from their distributor), [facility] on [address] (on hold for 30 mins, connected with Marisela,Pharmacist, they do not have any) , [facility] and [facility] (none on stock) , [facility] (has Neomycin) .  Encounter routed to NANI Whitaker/[facility] who assists with restricted patients. Please add [facility] [address] in [place] for this one time in order for patient to get Neomycin today, needed for surgery tomorrow.  Thank you for your help. Pia RN (of note, documentation completed 1028, warm hand off to Tiarra at 1029am).

## 2023-01-05 NOTE — TELEPHONE ENCOUNTER
[facility] phone call message- medication clarification/question:    Full Medication Name: medication for surgery 1/6/23    Question: Patient called stating she unsure which medication for her surgery is suppose to be sent to pharmacy. She stated pharmacy doesn't have any of her medication if someone can help assist with this? She stated surgeon should of sent a message to Dr. Bailon and she needs her medications today, since her surgery is tomorrow early morning. Please call and advise. Thank you    Pharmacy confirmed as [id number] [address]  AT [facility]: Yes    OK to leave a message on voice mail? Yes    Primary language: English      needed? No    Call taken on January 5, 2023 at 9:00 AM by Christine Cruz

## 2023-01-05 NOTE — PROGRESS NOTES
Clinic Care Coordination Contact    Follow Up Progress Note      Assessment: Abbie wanted me to send a referral for [facility] in [place]. The pt current pharmacy that she is restricted to did not have the pt medication, she needed to take before her surgery tomorrow. I filled out the referral for [facility] [place], and faxed it to Medicaid Restricted Program. Once they receive it,they will process it, so that [facility] can distribute the pt medication.     Care Gaps:    Health Maintenance Due   Topic Date Due     DEPRESSION ACTION PLAN  Never done     HEPATITIS B IMMUNIZATION (1 of 3 - 3-dose series) Never done     Pneumococcal Vaccine: Pediatrics (0 to 5 Years) and At-Risk Patients (6 to 64 Years) (2 - PCV) 10/15/2015     ASTHMA ACTION PLAN  01/31/2021     SPIROMETRY  03/03/2021           Care Plans      Intervention/Education provided during outreach:               Plan:     Care Coordinator will follow up in

## 2023-01-05 NOTE — TELEPHONE ENCOUNTER
Karoline called in requesting the pre op abx for her surgery tomorrow be sent to the pharmacy on file. She was at the pharmacy this morning and they stated that they didn't have any orders for her there.  Sent direct message request to our Triage Nurses to have them sent over. EulaRN is sending over.     Called Karoline back and let her know they were being sent to the pharmacy now.

## 2023-01-05 NOTE — TELEPHONE ENCOUNTER
Update from Tiarra CARDOZA/[facility], restricted recipient , he has completed faxing to program of adding [facility] [address] to patient's restricted pharmacy for Neomycin.     Will route this encounter to Eula Frost RN. Please resend prescription for Neomycin to [facility] in [place]. Patient has been informed of all actions. Thank you for your help.   Pia VÁSQUEZ

## 2023-01-06 ENCOUNTER — PATIENT OUTREACH (OUTPATIENT)
Dept: CARE COORDINATION | Facility: CLINIC | Age: 48
End: 2023-01-06

## 2023-01-06 ENCOUNTER — ANESTHESIA (OUTPATIENT)
Dept: SURGERY | Facility: HOSPITAL | Age: 48
End: 2023-01-06
Payer: MEDICAID

## 2023-01-06 ENCOUNTER — HOSPITAL ENCOUNTER (INPATIENT)
Facility: HOSPITAL | Age: 48
LOS: 8 days | Discharge: HOME | End: 2023-01-14
Attending: SURGERY | Admitting: SURGERY
Payer: MEDICAID

## 2023-01-06 DIAGNOSIS — C18.6 MALIGNANT NEOPLASM OF DESCENDING COLON (H): Primary | ICD-10-CM

## 2023-01-06 DIAGNOSIS — J30.2 SEASONAL ALLERGIC RHINITIS, UNSPECIFIED TRIGGER: ICD-10-CM

## 2023-01-06 PROBLEM — C18.9 COLON CANCER (H): Status: ACTIVE | Noted: 2023-01-06

## 2023-01-06 LAB
ABO/RH(D): NORMAL
ANTIBODY SCREEN: NEGATIVE
CEA SERPL-MCNC: 19.2 NG/ML
CREAT SERPL-MCNC: 0.71 MG/DL (ref 0.51–0.95)
GFR SERPL CREATININE-BSD FRML MDRD: >90 ML/MIN/1.73M2
SPECIMEN EXPIRATION DATE: NORMAL

## 2023-01-06 PROCEDURE — 36415 COLL VENOUS BLD VENIPUNCTURE: CPT | Performed by: SURGERY

## 2023-01-06 PROCEDURE — 250N000011 HC RX IP 250 OP 636: Performed by: SURGERY

## 2023-01-06 PROCEDURE — 44213 LAP MOBIL SPLENIC FL ADD-ON: CPT | Performed by: SURGERY

## 2023-01-06 PROCEDURE — 250N000011 HC RX IP 250 OP 636: Performed by: NURSE ANESTHETIST, CERTIFIED REGISTERED

## 2023-01-06 PROCEDURE — 250N000011 HC RX IP 250 OP 636: Performed by: ANESTHESIOLOGY

## 2023-01-06 PROCEDURE — 250N000011 HC RX IP 250 OP 636: Performed by: STUDENT IN AN ORGANIZED HEALTH CARE EDUCATION/TRAINING PROGRAM

## 2023-01-06 PROCEDURE — 120N000001 HC R&B MED SURG/OB

## 2023-01-06 PROCEDURE — 44204 LAPARO PARTIAL COLECTOMY: CPT | Mod: 80 | Performed by: SURGERY

## 2023-01-06 PROCEDURE — 36415 COLL VENOUS BLD VENIPUNCTURE: CPT | Performed by: ANESTHESIOLOGY

## 2023-01-06 PROCEDURE — 272N000001 HC OR GENERAL SUPPLY STERILE: Performed by: SURGERY

## 2023-01-06 PROCEDURE — 250N000009 HC RX 250: Performed by: NURSE ANESTHETIST, CERTIFIED REGISTERED

## 2023-01-06 PROCEDURE — 82378 CARCINOEMBRYONIC ANTIGEN: CPT | Performed by: SURGERY

## 2023-01-06 PROCEDURE — 250N000013 HC RX MED GY IP 250 OP 250 PS 637: Performed by: SURGERY

## 2023-01-06 PROCEDURE — 4A1BXSH MONITORING OF GASTROINTESTINAL VASCULAR PERFUSION USING INDOCYANINE GREEN DYE, EXTERNAL APPROACH: ICD-10-PCS | Performed by: SURGERY

## 2023-01-06 PROCEDURE — 250N000009 HC RX 250: Performed by: SURGERY

## 2023-01-06 PROCEDURE — 258N000003 HC RX IP 258 OP 636: Performed by: SURGERY

## 2023-01-06 PROCEDURE — 710N000009 HC RECOVERY PHASE 1, LEVEL 1, PER MIN: Performed by: SURGERY

## 2023-01-06 PROCEDURE — 0DBL4ZZ EXCISION OF TRANSVERSE COLON, PERCUTANEOUS ENDOSCOPIC APPROACH: ICD-10-PCS | Performed by: SURGERY

## 2023-01-06 PROCEDURE — 44204 LAPARO PARTIAL COLECTOMY: CPT | Performed by: SURGERY

## 2023-01-06 PROCEDURE — 0DTM4ZZ RESECTION OF DESCENDING COLON, PERCUTANEOUS ENDOSCOPIC APPROACH: ICD-10-PCS | Performed by: SURGERY

## 2023-01-06 PROCEDURE — 88309 TISSUE EXAM BY PATHOLOGIST: CPT | Mod: TC | Performed by: SURGERY

## 2023-01-06 PROCEDURE — 82565 ASSAY OF CREATININE: CPT | Performed by: SURGERY

## 2023-01-06 PROCEDURE — 999N000141 HC STATISTIC PRE-PROCEDURE NURSING ASSESSMENT: Performed by: SURGERY

## 2023-01-06 PROCEDURE — 0DNU4ZZ RELEASE OMENTUM, PERCUTANEOUS ENDOSCOPIC APPROACH: ICD-10-PCS | Performed by: SURGERY

## 2023-01-06 PROCEDURE — 258N000003 HC RX IP 258 OP 636: Performed by: ANESTHESIOLOGY

## 2023-01-06 PROCEDURE — 360N000077 HC SURGERY LEVEL 4, PER MIN: Performed by: SURGERY

## 2023-01-06 PROCEDURE — 86901 BLOOD TYPING SEROLOGIC RH(D): CPT | Performed by: ANESTHESIOLOGY

## 2023-01-06 PROCEDURE — 250N000009 HC RX 250: Performed by: ANESTHESIOLOGY

## 2023-01-06 PROCEDURE — 258N000003 HC RX IP 258 OP 636: Performed by: NURSE ANESTHETIST, CERTIFIED REGISTERED

## 2023-01-06 PROCEDURE — 370N000017 HC ANESTHESIA TECHNICAL FEE, PER MIN: Performed by: SURGERY

## 2023-01-06 RX ORDER — GABAPENTIN 100 MG/1
100 CAPSULE ORAL 3 TIMES DAILY
Status: DISCONTINUED | OUTPATIENT
Start: 2023-01-06 | End: 2023-01-09

## 2023-01-06 RX ORDER — LIDOCAINE 40 MG/G
CREAM TOPICAL
Status: DISCONTINUED | OUTPATIENT
Start: 2023-01-06 | End: 2023-01-06 | Stop reason: HOSPADM

## 2023-01-06 RX ORDER — SODIUM CHLORIDE, SODIUM LACTATE, POTASSIUM CHLORIDE, AND CALCIUM CHLORIDE .6; .31; .03; .02 G/100ML; G/100ML; G/100ML; G/100ML
IRRIGANT IRRIGATION PRN
Status: DISCONTINUED | OUTPATIENT
Start: 2023-01-06 | End: 2023-01-06 | Stop reason: HOSPADM

## 2023-01-06 RX ORDER — ALBUTEROL SULFATE 90 UG/1
2 AEROSOL, METERED RESPIRATORY (INHALATION) EVERY 4 HOURS PRN
Status: DISCONTINUED | OUTPATIENT
Start: 2023-01-06 | End: 2023-01-14 | Stop reason: HOSPADM

## 2023-01-06 RX ORDER — AMOXICILLIN 250 MG
2 CAPSULE ORAL 2 TIMES DAILY
Status: DISCONTINUED | OUTPATIENT
Start: 2023-01-06 | End: 2023-01-14 | Stop reason: HOSPADM

## 2023-01-06 RX ORDER — SODIUM CHLORIDE, SODIUM LACTATE, POTASSIUM CHLORIDE, CALCIUM CHLORIDE 600; 310; 30; 20 MG/100ML; MG/100ML; MG/100ML; MG/100ML
INJECTION, SOLUTION INTRAVENOUS CONTINUOUS
Status: DISCONTINUED | OUTPATIENT
Start: 2023-01-06 | End: 2023-01-06 | Stop reason: HOSPADM

## 2023-01-06 RX ORDER — AMOXICILLIN 250 MG
1 CAPSULE ORAL 2 TIMES DAILY
Status: DISCONTINUED | OUTPATIENT
Start: 2023-01-06 | End: 2023-01-14 | Stop reason: HOSPADM

## 2023-01-06 RX ORDER — ACETAMINOPHEN 325 MG/1
975 TABLET ORAL EVERY 8 HOURS
Status: COMPLETED | OUTPATIENT
Start: 2023-01-06 | End: 2023-01-09

## 2023-01-06 RX ORDER — ACETAMINOPHEN 325 MG/1
650 TABLET ORAL EVERY 4 HOURS PRN
Status: DISCONTINUED | OUTPATIENT
Start: 2023-01-09 | End: 2023-01-10

## 2023-01-06 RX ORDER — HYDROMORPHONE HCL IN WATER/PF 6 MG/30 ML
0.4 PATIENT CONTROLLED ANALGESIA SYRINGE INTRAVENOUS
Status: DISCONTINUED | OUTPATIENT
Start: 2023-01-06 | End: 2023-01-06

## 2023-01-06 RX ORDER — KETOROLAC TROMETHAMINE 30 MG/ML
INJECTION, SOLUTION INTRAMUSCULAR; INTRAVENOUS PRN
Status: DISCONTINUED | OUTPATIENT
Start: 2023-01-06 | End: 2023-01-06

## 2023-01-06 RX ORDER — HYDROMORPHONE HYDROCHLORIDE 1 MG/ML
0.4 INJECTION, SOLUTION INTRAMUSCULAR; INTRAVENOUS; SUBCUTANEOUS EVERY 5 MIN PRN
Status: DISCONTINUED | OUTPATIENT
Start: 2023-01-06 | End: 2023-01-06 | Stop reason: HOSPADM

## 2023-01-06 RX ORDER — OXYCODONE HYDROCHLORIDE 5 MG/1
5 TABLET ORAL EVERY 4 HOURS PRN
Status: CANCELLED | OUTPATIENT
Start: 2023-01-06

## 2023-01-06 RX ORDER — NALOXONE HYDROCHLORIDE 0.4 MG/ML
0.2 INJECTION, SOLUTION INTRAMUSCULAR; INTRAVENOUS; SUBCUTANEOUS
Status: DISCONTINUED | OUTPATIENT
Start: 2023-01-06 | End: 2023-01-14 | Stop reason: HOSPADM

## 2023-01-06 RX ORDER — OXYCODONE HYDROCHLORIDE 5 MG/1
10 TABLET ORAL EVERY 4 HOURS PRN
Status: CANCELLED | OUTPATIENT
Start: 2023-01-06

## 2023-01-06 RX ORDER — HYDROMORPHONE HYDROCHLORIDE 2 MG/1
2 TABLET ORAL EVERY 4 HOURS PRN
Status: DISCONTINUED | OUTPATIENT
Start: 2023-01-06 | End: 2023-01-06

## 2023-01-06 RX ORDER — MONTELUKAST SODIUM 10 MG/1
10 TABLET ORAL AT BEDTIME
Status: DISCONTINUED | OUTPATIENT
Start: 2023-01-06 | End: 2023-01-14 | Stop reason: HOSPADM

## 2023-01-06 RX ORDER — ACETAMINOPHEN 325 MG/1
975 TABLET ORAL ONCE
Status: COMPLETED | OUTPATIENT
Start: 2023-01-06 | End: 2023-01-06

## 2023-01-06 RX ORDER — ONDANSETRON 2 MG/ML
INJECTION INTRAMUSCULAR; INTRAVENOUS PRN
Status: DISCONTINUED | OUTPATIENT
Start: 2023-01-06 | End: 2023-01-06

## 2023-01-06 RX ORDER — ONDANSETRON 4 MG/1
4 TABLET, ORALLY DISINTEGRATING ORAL EVERY 6 HOURS PRN
Status: DISCONTINUED | OUTPATIENT
Start: 2023-01-06 | End: 2023-01-14 | Stop reason: HOSPADM

## 2023-01-06 RX ORDER — SODIUM CHLORIDE 9 MG/ML
INJECTION, SOLUTION INTRAVENOUS CONTINUOUS PRN
Status: DISCONTINUED | OUTPATIENT
Start: 2023-01-06 | End: 2023-01-06

## 2023-01-06 RX ORDER — KETAMINE HYDROCHLORIDE 10 MG/ML
INJECTION INTRAMUSCULAR; INTRAVENOUS PRN
Status: DISCONTINUED | OUTPATIENT
Start: 2023-01-06 | End: 2023-01-06

## 2023-01-06 RX ORDER — NALOXONE HYDROCHLORIDE 0.4 MG/ML
0.4 INJECTION, SOLUTION INTRAMUSCULAR; INTRAVENOUS; SUBCUTANEOUS
Status: DISCONTINUED | OUTPATIENT
Start: 2023-01-06 | End: 2023-01-14 | Stop reason: HOSPADM

## 2023-01-06 RX ORDER — GLYCOPYRROLATE 0.2 MG/ML
INJECTION, SOLUTION INTRAMUSCULAR; INTRAVENOUS PRN
Status: DISCONTINUED | OUTPATIENT
Start: 2023-01-06 | End: 2023-01-06

## 2023-01-06 RX ORDER — INDOCYANINE GREEN AND WATER 25 MG
KIT INJECTION PRN
Status: DISCONTINUED | OUTPATIENT
Start: 2023-01-06 | End: 2023-01-06 | Stop reason: HOSPADM

## 2023-01-06 RX ORDER — FENTANYL CITRATE 50 UG/ML
INJECTION, SOLUTION INTRAMUSCULAR; INTRAVENOUS PRN
Status: DISCONTINUED | OUTPATIENT
Start: 2023-01-06 | End: 2023-01-06

## 2023-01-06 RX ORDER — FENTANYL CITRATE 50 UG/ML
25 INJECTION, SOLUTION INTRAMUSCULAR; INTRAVENOUS EVERY 5 MIN PRN
Status: DISCONTINUED | OUTPATIENT
Start: 2023-01-06 | End: 2023-01-06 | Stop reason: HOSPADM

## 2023-01-06 RX ORDER — FENTANYL CITRATE 50 UG/ML
50 INJECTION, SOLUTION INTRAMUSCULAR; INTRAVENOUS EVERY 5 MIN PRN
Status: DISCONTINUED | OUTPATIENT
Start: 2023-01-06 | End: 2023-01-06 | Stop reason: HOSPADM

## 2023-01-06 RX ORDER — BUPIVACAINE HYDROCHLORIDE AND EPINEPHRINE 2.5; 5 MG/ML; UG/ML
INJECTION, SOLUTION EPIDURAL; INFILTRATION; INTRACAUDAL; PERINEURAL PRN
Status: DISCONTINUED | OUTPATIENT
Start: 2023-01-06 | End: 2023-01-06 | Stop reason: HOSPADM

## 2023-01-06 RX ORDER — ONDANSETRON 2 MG/ML
4 INJECTION INTRAMUSCULAR; INTRAVENOUS ONCE
Status: COMPLETED | OUTPATIENT
Start: 2023-01-06 | End: 2023-01-06

## 2023-01-06 RX ORDER — ONDANSETRON 4 MG/1
4 TABLET, ORALLY DISINTEGRATING ORAL EVERY 30 MIN PRN
Status: DISCONTINUED | OUTPATIENT
Start: 2023-01-06 | End: 2023-01-06 | Stop reason: HOSPADM

## 2023-01-06 RX ORDER — FENTANYL CITRATE 50 UG/ML
25 INJECTION, SOLUTION INTRAMUSCULAR; INTRAVENOUS ONCE
Status: COMPLETED | OUTPATIENT
Start: 2023-01-06 | End: 2023-01-06

## 2023-01-06 RX ORDER — ALBUTEROL SULFATE 0.83 MG/ML
2.5 SOLUTION RESPIRATORY (INHALATION) EVERY 4 HOURS PRN
Status: DISCONTINUED | OUTPATIENT
Start: 2023-01-06 | End: 2023-01-06 | Stop reason: HOSPADM

## 2023-01-06 RX ORDER — SODIUM CHLORIDE, SODIUM LACTATE, POTASSIUM CHLORIDE, CALCIUM CHLORIDE 600; 310; 30; 20 MG/100ML; MG/100ML; MG/100ML; MG/100ML
INJECTION, SOLUTION INTRAVENOUS CONTINUOUS
Status: DISCONTINUED | OUTPATIENT
Start: 2023-01-06 | End: 2023-01-07

## 2023-01-06 RX ORDER — ONDANSETRON 2 MG/ML
4 INJECTION INTRAMUSCULAR; INTRAVENOUS EVERY 6 HOURS PRN
Status: DISCONTINUED | OUTPATIENT
Start: 2023-01-06 | End: 2023-01-14 | Stop reason: HOSPADM

## 2023-01-06 RX ORDER — DEXAMETHASONE SODIUM PHOSPHATE 10 MG/ML
INJECTION, SOLUTION INTRAMUSCULAR; INTRAVENOUS PRN
Status: DISCONTINUED | OUTPATIENT
Start: 2023-01-06 | End: 2023-01-06

## 2023-01-06 RX ORDER — PROPOFOL 10 MG/ML
INJECTION, EMULSION INTRAVENOUS CONTINUOUS PRN
Status: DISCONTINUED | OUTPATIENT
Start: 2023-01-06 | End: 2023-01-06

## 2023-01-06 RX ORDER — PROPOFOL 10 MG/ML
INJECTION, EMULSION INTRAVENOUS PRN
Status: DISCONTINUED | OUTPATIENT
Start: 2023-01-06 | End: 2023-01-06

## 2023-01-06 RX ORDER — CEFAZOLIN SODIUM/WATER 2 G/20 ML
2 SYRINGE (ML) INTRAVENOUS
Status: COMPLETED | OUTPATIENT
Start: 2023-01-06 | End: 2023-01-06

## 2023-01-06 RX ORDER — HYDROMORPHONE HCL IN WATER/PF 6 MG/30 ML
0.2 PATIENT CONTROLLED ANALGESIA SYRINGE INTRAVENOUS
Status: DISCONTINUED | OUTPATIENT
Start: 2023-01-06 | End: 2023-01-06

## 2023-01-06 RX ORDER — POLYETHYLENE GLYCOL 3350 17 G/17G
17 POWDER, FOR SOLUTION ORAL DAILY
Status: DISCONTINUED | OUTPATIENT
Start: 2023-01-06 | End: 2023-01-14 | Stop reason: HOSPADM

## 2023-01-06 RX ORDER — MEPERIDINE HYDROCHLORIDE 25 MG/ML
12.5 INJECTION INTRAMUSCULAR; INTRAVENOUS; SUBCUTANEOUS EVERY 5 MIN PRN
Status: DISCONTINUED | OUTPATIENT
Start: 2023-01-06 | End: 2023-01-06 | Stop reason: HOSPADM

## 2023-01-06 RX ORDER — LABETALOL HYDROCHLORIDE 5 MG/ML
10 INJECTION, SOLUTION INTRAVENOUS
Status: DISCONTINUED | OUTPATIENT
Start: 2023-01-06 | End: 2023-01-06 | Stop reason: HOSPADM

## 2023-01-06 RX ORDER — MAGNESIUM SULFATE 4 G/50ML
4 INJECTION INTRAVENOUS ONCE
Status: COMPLETED | OUTPATIENT
Start: 2023-01-06 | End: 2023-01-06

## 2023-01-06 RX ORDER — HYDROMORPHONE HYDROCHLORIDE 2 MG/1
2 TABLET ORAL
Status: DISCONTINUED | OUTPATIENT
Start: 2023-01-06 | End: 2023-01-06

## 2023-01-06 RX ORDER — CEFAZOLIN SODIUM/WATER 2 G/20 ML
2 SYRINGE (ML) INTRAVENOUS SEE ADMIN INSTRUCTIONS
Status: DISCONTINUED | OUTPATIENT
Start: 2023-01-06 | End: 2023-01-06 | Stop reason: HOSPADM

## 2023-01-06 RX ORDER — HEPARIN SODIUM 5000 [USP'U]/.5ML
5000 INJECTION, SOLUTION INTRAVENOUS; SUBCUTANEOUS
Status: COMPLETED | OUTPATIENT
Start: 2023-01-06 | End: 2023-01-06

## 2023-01-06 RX ORDER — METRONIDAZOLE 500 MG/100ML
500 INJECTION, SOLUTION INTRAVENOUS
Status: COMPLETED | OUTPATIENT
Start: 2023-01-06 | End: 2023-01-06

## 2023-01-06 RX ORDER — INDOCYANINE GREEN AND WATER 25 MG
2.5 KIT INJECTION ONCE
Status: COMPLETED | OUTPATIENT
Start: 2023-01-06 | End: 2023-01-06

## 2023-01-06 RX ORDER — HYDROMORPHONE HYDROCHLORIDE 1 MG/ML
0.2 INJECTION, SOLUTION INTRAMUSCULAR; INTRAVENOUS; SUBCUTANEOUS EVERY 5 MIN PRN
Status: DISCONTINUED | OUTPATIENT
Start: 2023-01-06 | End: 2023-01-06 | Stop reason: HOSPADM

## 2023-01-06 RX ORDER — DEXMEDETOMIDINE HYDROCHLORIDE 4 UG/ML
INJECTION, SOLUTION INTRAVENOUS
Status: DISCONTINUED
Start: 2023-01-06 | End: 2023-01-06 | Stop reason: HOSPADM

## 2023-01-06 RX ORDER — ALPRAZOLAM 0.5 MG
2 TABLET ORAL 3 TIMES DAILY PRN
Status: DISCONTINUED | OUTPATIENT
Start: 2023-01-06 | End: 2023-01-14 | Stop reason: HOSPADM

## 2023-01-06 RX ORDER — HALOPERIDOL 5 MG/ML
1 INJECTION INTRAMUSCULAR
Status: DISCONTINUED | OUTPATIENT
Start: 2023-01-06 | End: 2023-01-06 | Stop reason: HOSPADM

## 2023-01-06 RX ORDER — ONDANSETRON 2 MG/ML
4 INJECTION INTRAMUSCULAR; INTRAVENOUS EVERY 30 MIN PRN
Status: DISCONTINUED | OUTPATIENT
Start: 2023-01-06 | End: 2023-01-06 | Stop reason: HOSPADM

## 2023-01-06 RX ORDER — ENOXAPARIN SODIUM 100 MG/ML
40 INJECTION SUBCUTANEOUS EVERY 24 HOURS
Status: DISCONTINUED | OUTPATIENT
Start: 2023-01-07 | End: 2023-01-14 | Stop reason: HOSPADM

## 2023-01-06 RX ORDER — SCOLOPAMINE TRANSDERMAL SYSTEM 1 MG/1
1 PATCH, EXTENDED RELEASE TRANSDERMAL
Status: DISCONTINUED | OUTPATIENT
Start: 2023-01-06 | End: 2023-01-06

## 2023-01-06 RX ADMIN — PROPOFOL 180 MG: 10 INJECTION, EMULSION INTRAVENOUS at 07:39

## 2023-01-06 RX ADMIN — SUGAMMADEX 250 MG: 100 INJECTION, SOLUTION INTRAVENOUS at 12:19

## 2023-01-06 RX ADMIN — GLYCOPYRROLATE 0.2 MG: 0.2 INJECTION, SOLUTION INTRAMUSCULAR; INTRAVENOUS at 08:09

## 2023-01-06 RX ADMIN — PHENYLEPHRINE HYDROCHLORIDE 100 MCG: 10 INJECTION INTRAVENOUS at 10:53

## 2023-01-06 RX ADMIN — FENTANYL CITRATE 25 MCG: 50 INJECTION, SOLUTION INTRAMUSCULAR; INTRAVENOUS at 07:13

## 2023-01-06 RX ADMIN — FENTANYL CITRATE 50 MCG: 50 INJECTION, SOLUTION INTRAMUSCULAR; INTRAVENOUS at 11:11

## 2023-01-06 RX ADMIN — ROCURONIUM BROMIDE 20 MG: 50 INJECTION, SOLUTION INTRAVENOUS at 08:41

## 2023-01-06 RX ADMIN — HEPARIN SODIUM 5000 UNITS: 10000 INJECTION, SOLUTION INTRAVENOUS; SUBCUTANEOUS at 07:15

## 2023-01-06 RX ADMIN — UMECLIDINIUM 1 PUFF: 62.5 AEROSOL, POWDER ORAL at 18:49

## 2023-01-06 RX ADMIN — PHENYLEPHRINE HYDROCHLORIDE 0.2 MCG/KG/MIN: 10 INJECTION INTRAVENOUS at 11:01

## 2023-01-06 RX ADMIN — MONTELUKAST 10 MG: 10 TABLET, FILM COATED ORAL at 22:14

## 2023-01-06 RX ADMIN — ROCURONIUM BROMIDE 50 MG: 50 INJECTION, SOLUTION INTRAVENOUS at 07:40

## 2023-01-06 RX ADMIN — KETAMINE HYDROCHLORIDE 25 MG: 10 INJECTION, SOLUTION INTRAMUSCULAR; INTRAVENOUS at 08:13

## 2023-01-06 RX ADMIN — ALPRAZOLAM 2 MG: 0.5 TABLET ORAL at 18:07

## 2023-01-06 RX ADMIN — Medication 2 G: at 11:28

## 2023-01-06 RX ADMIN — PHENYLEPHRINE HYDROCHLORIDE 100 MCG: 10 INJECTION INTRAVENOUS at 10:43

## 2023-01-06 RX ADMIN — SODIUM CHLORIDE, POTASSIUM CHLORIDE, SODIUM LACTATE AND CALCIUM CHLORIDE: 600; 310; 30; 20 INJECTION, SOLUTION INTRAVENOUS at 09:33

## 2023-01-06 RX ADMIN — INDOCYANINE GREEN AND WATER 2.5 MG: KIT at 10:34

## 2023-01-06 RX ADMIN — PHENYLEPHRINE HYDROCHLORIDE 100 MCG: 10 INJECTION INTRAVENOUS at 09:28

## 2023-01-06 RX ADMIN — HYDROMORPHONE HYDROCHLORIDE 0.4 MG: 0.2 INJECTION, SOLUTION INTRAMUSCULAR; INTRAVENOUS; SUBCUTANEOUS at 14:24

## 2023-01-06 RX ADMIN — SENNOSIDES AND DOCUSATE SODIUM 2 TABLET: 8.6; 5 TABLET ORAL at 22:14

## 2023-01-06 RX ADMIN — ROCURONIUM BROMIDE 5 MG: 50 INJECTION, SOLUTION INTRAVENOUS at 11:31

## 2023-01-06 RX ADMIN — PROPOFOL 70 MG: 10 INJECTION, EMULSION INTRAVENOUS at 12:15

## 2023-01-06 RX ADMIN — POLYETHYLENE GLYCOL 3350 17 G: 17 POWDER, FOR SOLUTION ORAL at 18:49

## 2023-01-06 RX ADMIN — SODIUM CHLORIDE, POTASSIUM CHLORIDE, SODIUM LACTATE AND CALCIUM CHLORIDE: 600; 310; 30; 20 INJECTION, SOLUTION INTRAVENOUS at 14:31

## 2023-01-06 RX ADMIN — FENTANYL CITRATE 50 MCG: 50 INJECTION, SOLUTION INTRAMUSCULAR; INTRAVENOUS at 12:33

## 2023-01-06 RX ADMIN — FENTANYL CITRATE 100 MCG: 50 INJECTION, SOLUTION INTRAMUSCULAR; INTRAVENOUS at 07:39

## 2023-01-06 RX ADMIN — FENTANYL CITRATE 50 MCG: 50 INJECTION, SOLUTION INTRAMUSCULAR; INTRAVENOUS at 12:16

## 2023-01-06 RX ADMIN — ONDANSETRON 4 MG: 2 INJECTION INTRAMUSCULAR; INTRAVENOUS at 07:17

## 2023-01-06 RX ADMIN — DEXAMETHASONE SODIUM PHOSPHATE 10 MG: 10 INJECTION, SOLUTION INTRAMUSCULAR; INTRAVENOUS at 07:40

## 2023-01-06 RX ADMIN — FENTANYL CITRATE 50 MCG: 50 INJECTION, SOLUTION INTRAMUSCULAR; INTRAVENOUS at 08:09

## 2023-01-06 RX ADMIN — KETAMINE HYDROCHLORIDE 25 MG: 10 INJECTION, SOLUTION INTRAMUSCULAR; INTRAVENOUS at 07:39

## 2023-01-06 RX ADMIN — PHENYLEPHRINE HYDROCHLORIDE 150 MCG: 10 INJECTION INTRAVENOUS at 09:15

## 2023-01-06 RX ADMIN — HYDROMORPHONE HYDROCHLORIDE 0.4 MG: 0.2 INJECTION, SOLUTION INTRAMUSCULAR; INTRAVENOUS; SUBCUTANEOUS at 16:32

## 2023-01-06 RX ADMIN — ONDANSETRON 4 MG: 2 INJECTION INTRAMUSCULAR; INTRAVENOUS at 11:17

## 2023-01-06 RX ADMIN — PHENYLEPHRINE HYDROCHLORIDE 150 MCG: 10 INJECTION INTRAVENOUS at 08:06

## 2023-01-06 RX ADMIN — SODIUM CHLORIDE, POTASSIUM CHLORIDE, SODIUM LACTATE AND CALCIUM CHLORIDE: 600; 310; 30; 20 INJECTION, SOLUTION INTRAVENOUS at 07:12

## 2023-01-06 RX ADMIN — DEXMEDETOMIDINE HYDROCHLORIDE 0.5 MCG/KG/HR: 100 INJECTION, SOLUTION INTRAVENOUS at 07:46

## 2023-01-06 RX ADMIN — FENTANYL CITRATE 25 MCG: 50 INJECTION, SOLUTION INTRAMUSCULAR; INTRAVENOUS at 13:35

## 2023-01-06 RX ADMIN — ACETAMINOPHEN 975 MG: 325 TABLET ORAL at 06:06

## 2023-01-06 RX ADMIN — GABAPENTIN 100 MG: 100 CAPSULE ORAL at 22:14

## 2023-01-06 RX ADMIN — ROCURONIUM BROMIDE 10 MG: 50 INJECTION, SOLUTION INTRAVENOUS at 10:15

## 2023-01-06 RX ADMIN — HYDROMORPHONE HYDROCHLORIDE 0.5 MG: 1 INJECTION, SOLUTION INTRAMUSCULAR; INTRAVENOUS; SUBCUTANEOUS at 08:58

## 2023-01-06 RX ADMIN — INDOCYANINE GREEN AND WATER 2.5 MG: KIT at 08:54

## 2023-01-06 RX ADMIN — KETOROLAC TROMETHAMINE 15 MG: 30 INJECTION, SOLUTION INTRAMUSCULAR at 11:44

## 2023-01-06 RX ADMIN — ROCURONIUM BROMIDE 20 MG: 50 INJECTION, SOLUTION INTRAVENOUS at 09:31

## 2023-01-06 RX ADMIN — FENTANYL CITRATE 25 MCG: 50 INJECTION, SOLUTION INTRAMUSCULAR; INTRAVENOUS at 12:56

## 2023-01-06 RX ADMIN — HYDROMORPHONE HYDROCHLORIDE 0.5 MG: 1 INJECTION, SOLUTION INTRAMUSCULAR; INTRAVENOUS; SUBCUTANEOUS at 08:04

## 2023-01-06 RX ADMIN — INDOCYANINE GREEN AND WATER 2.5 MG: KIT at 09:59

## 2023-01-06 RX ADMIN — MAGNESIUM SULFATE HEPTAHYDRATE 4 G: 80 INJECTION, SOLUTION INTRAVENOUS at 07:21

## 2023-01-06 RX ADMIN — SODIUM CHLORIDE: 9 INJECTION, SOLUTION INTRAVENOUS at 07:41

## 2023-01-06 RX ADMIN — Medication: at 18:19

## 2023-01-06 RX ADMIN — ROCURONIUM BROMIDE 10 MG: 50 INJECTION, SOLUTION INTRAVENOUS at 10:46

## 2023-01-06 RX ADMIN — PHENYLEPHRINE HYDROCHLORIDE 100 MCG: 10 INJECTION INTRAVENOUS at 07:58

## 2023-01-06 RX ADMIN — METRONIDAZOLE 500 MG: 500 INJECTION, SOLUTION INTRAVENOUS at 07:19

## 2023-01-06 RX ADMIN — PHENYLEPHRINE HYDROCHLORIDE 100 MCG: 10 INJECTION INTRAVENOUS at 10:58

## 2023-01-06 RX ADMIN — ACETAMINOPHEN 975 MG: 325 TABLET ORAL at 16:13

## 2023-01-06 RX ADMIN — Medication 2 G: at 07:27

## 2023-01-06 RX ADMIN — SCOPALAMINE 1 PATCH: 1 PATCH, EXTENDED RELEASE TRANSDERMAL at 06:56

## 2023-01-06 RX ADMIN — PROPOFOL 160 MCG/KG/MIN: 10 INJECTION, EMULSION INTRAVENOUS at 07:42

## 2023-01-06 RX ADMIN — MIDAZOLAM 2 MG: 1 INJECTION INTRAMUSCULAR; INTRAVENOUS at 07:30

## 2023-01-06 RX ADMIN — PHENYLEPHRINE HYDROCHLORIDE 100 MCG: 10 INJECTION INTRAVENOUS at 07:49

## 2023-01-06 ASSESSMENT — ACTIVITIES OF DAILY LIVING (ADL)
ADLS_ACUITY_SCORE: 18

## 2023-01-06 NOTE — ANESTHESIA PROCEDURE NOTES
Airway       Patient location during procedure: OR       Procedure Start/Stop Times: 1/6/2023 7:43 AM  Staff -        Anesthesiologist:  Francois Funez MD       Resident/Fellow: Dagoberto Mcgregor       CRNA: Pb Gaston APRN CRNA       Performed By: SRNAIndications and Patient Condition       Indications for airway management: lila-procedural         Mask difficulty assessment: 1 - vent by mask    Final Airway Details       Final airway type: endotracheal airway       Successful airway: ETT - single  Endotracheal Airway Details        ETT size (mm): 7.5       Cuffed: yes       Successful intubation technique: direct laryngoscopy       Grade View of Cords: 2       Adjucts: stylet       Position: Right       Measured from: gums/teeth       Secured at (cm): 23       Bite block used: None    Post intubation assessment        Placement verified by: capnometry, equal breath sounds and chest rise        Number of attempts at approach: 1       Number of other approaches attempted: 0       Secured with: silk tape       Ease of procedure: easy       Dentition: Intact and Unchanged       Dental guard used and removed.    Medication(s) Administered   Medication Administration Time: 1/6/2023 7:43 AM    Additional Comments       Small mouth opening, underbite and prominent incisors.

## 2023-01-06 NOTE — INTERVAL H&P NOTE
"I have reviewed the surgical (or preoperative) H&P that is linked to this encounter, and examined the patient. There are no significant changes. Patient presenting today for segmental colectomy for colon cancer. All questions answered. Will proceed as scheduled.     Clinical Conditions Present on Arrival:  Clinically Significant Risk Factors Present on Admission                    # Obesity: Estimated body mass index is 35.88 kg/m  as calculated from the following:    Height as of 1/3/23: 1.753 m (5' 9\").    Weight as of this encounter: 110.2 kg (243 lb).       "

## 2023-01-06 NOTE — ANESTHESIA CARE TRANSFER NOTE
Patient: Darlene Hudson    Procedure: Procedure(s):  LAPAROSCOPIC ASSISTED DESCENDING COLELCTOMY SPLENIC FLEXURE MOBILIZATION LYSIS OF ADHESIONS       Diagnosis: Colonic mass [K63.89]  Diagnosis Additional Information: No value filed.    Anesthesia Type:   General     Note:    Oropharynx: oropharynx clear of all foreign objects and spontaneously breathing  Level of Consciousness: drowsy  Oxygen Supplementation: face mask  Level of Supplemental Oxygen (L/min / FiO2): 8  Independent Airway: airway patency satisfactory and stable  Dentition: dentition unchanged  Vital Signs Stable: post-procedure vital signs reviewed and stable  Report to RN Given: handoff report given  Patient transferred to: PACU  Comments: Report given to PACU RN, all questions answered.  Handoff Report: Identifed the Patient, Identified the Reponsible Provider, Reviewed the pertinent medical history, Discussed the surgical course, Reviewed Intra-OP anesthesia mangement and issues during anesthesia, Set expectations for post-procedure period and Allowed opportunity for questions and acknowledgement of understanding      Vitals:  Vitals Value Taken Time   /64 01/06/23 1230   Temp 97.1*F temporal    Pulse 81 01/06/23 1235   Resp 14 01/06/23 1235   SpO2 99 % 01/06/23 1235   Vitals shown include unvalidated device data.    Electronically Signed By: PEPE Still CRNA  January 6, 2023  12:37 PM

## 2023-01-06 NOTE — ANESTHESIA POSTPROCEDURE EVALUATION
Patient: Darlene Hudson    Procedure: Procedure(s):  LAPAROSCOPIC ASSISTED DESCENDING COLELCTOMY SPLENIC FLEXURE MOBILIZATION LYSIS OF ADHESIONS       Anesthesia Type:  General    Note:  Disposition: Inpatient   Postop Pain Control: Uneventful            Sign Out: Well controlled pain   PONV: No   Neuro/Psych: Uneventful            Sign Out: Acceptable/Baseline neuro status   Airway/Respiratory: Uneventful            Sign Out: Acceptable/Baseline resp. status   CV/Hemodynamics: Uneventful            Sign Out: Acceptable CV status   Other NRE:    DID A NON-ROUTINE EVENT OCCUR?            Last vitals:  Vitals Value Taken Time   BP 99/56 01/06/23 1345   Temp 36.2  C (97.1  F) 01/06/23 1230   Pulse 71 01/06/23 1352   Resp 14 01/06/23 1348   SpO2 95 % 01/06/23 1352   Vitals shown include unvalidated device data.    Electronically Signed By: Francois Funez MD  January 6, 2023  1:53 PM

## 2023-01-06 NOTE — OP NOTE
General Surgery Operative Note    Date of Surgery: 1/6/2023     Surgeon: Kris Charles MD    Assistant:   Harpreet Doll DO - His assistance was required for visualization and retraction.     Preoperative Diagnosis: Colon cancer    Postoperative Diagnosis: Colon cancer    Procedure:   1.  Laparoscopic assisted transverse and descending segmental colectomy  2.  Lysis of adhesions  3.  Splenic flexure mobilization  4.  Laparoscopic TAP block    EBL: 75 cc    Findings: Splenic flexure mass.  Patient also had an adhesion from her uterus to her lower omentum to her umbilicus that may have been responsible for her symptoms.    Indications:  Patient is a 47-year-old woman who was found to have a left-sided colon mass on a work-up for abdominal pain.  Her staging imaging was negative for metastatic disease.  Her colonoscopy was consistent with adenocarcinoma.  The risks and benefits of surgery were discussed with the patient and she consented to the operation.    Details of operation:  Patient was brought to the operating room placed on the table in the supine position.  General anesthesia was induced without incident.  A Cruz catheter was placed.  She was placed in lithotomy position.  She was prepped and draped in usual sterile fashion.  An Ioban was placed.  A timeout for safety was performed.    I began with a supraumbilical incision and a Veress needle was inserted.  Pneumoperitoneum was established without incident.  A 5 mm optical trocar was inserted at this site.  There was no injury with entry.  I then placed 3 additional 5 mm trochars.  Patient had some adhesions to her midline.  We began by taking these down.  Just below her umbilicus she had quite a dense adhesion of omentum and a structure coming from her pelvis.  This was quite tight and may have been contributing to her abdominal pain.  These were all taken down off the abdominal wall.  I then turned my attention to the evaluating the colon.  The area that  I had tattooed in her colonoscopy was clearly visible in the proximal descending colon.  The mass was visible up at the splenic flexure.  Patient had a very redundant colon.      We began with a medial to lateral dissection.  Patient was positioned appropriately.  The sigmoid colon was retracted ventrally and the peritoneum was incised below the superior hemorrhoidal.  I then began careful use of blunt dissection to establish the plane between the mesocolon and the retroperitoneum.  I tended to carry this dissection cephalad to the origin of the MANOHAR.  Her anatomy was somewhat confusing at this point.  Consequently we shifted our approach to take down the lateral attachments.  We took down the attachments to the pelvic brim and the white line of Toldt.  We then bluntly dissected the mesocolon off of Gerota's fascia.  We were able to connect back towards her medial dissection plane.  At this point the retroperitoneal anatomy was more clear and we were easily able to identify the ureter along its course.  We then proceeded to mobilize the splenic flexure.  We proceeded from a lateral to medial approach.  We followed our lateral dissection up to encountered the spleen.  We then took down the splenocolic ligament.  We then came to the transverse colon and made our way into the lesser sac.  We then were able to connect our superior and lateral dissections.  The pancreas was clearly visible down below and was not injured.      Once the colon was completely free, we then returned to our medial dissection to identify the left colic artery.  Due to difficult anatomy, we did enlist the assistance of indocyanine green technology.  With this clarification we are able to identify the inferior mesenteric artery and a quite large arc of Riolan artery.  These arteries were skeletonized and ultimately we were able to identify the left colic artery.  This was divided with LigaSure.  We then carried inferior mesenteric defect out  inferior  laterally down to the sigmoid colon where we wanted to take our distal transection.  We then selected a site on the transverse colon for proximal transection and made a window between the bowel and the mesocolon.  The midline port was then upsized to a 12 mm port.  A 60 mm blue load Endo ARA stapler was introduced and fired across the transverse colon.  The mesocolon was then divided using a LigaSure device, connecting the transverse mesocolon division with the medial and inferior mesenteric divisions that had already been made.  At this point the colon was completely free.  We used indocyanine green a second time to check our perfusion of the transverse colon transection line the area were replanted divide the sigmoid colon.  The area of bowel viability was marked and we then used a second firing of the Endo ARA stapler across the sigmoid colon to perform our distal transection.  We then performed a laparoscopic TAP block.     With a specimen now completely free, we transitioned to the open portion of the case.  The supraumbilical incision was extended and an Kishan wound protector was placed.  We able to remove the specimen and it was passed off.  She had a large bulky cancer right at the splenic flexure.  We then brought up both ends of  colon to make anastomosis.  She had a very large bulky omentum on her transverse colon which was made it difficult to deliver both ends out of the incision.  We replaced the ends and went back laparoscopically and debulk the transverse colon by removing some of the bulky omentum.  We then made our midline incision slightly larger and were able to externalize 2 ends of the bowel.  We assured that the mesentery was not twisted.  We then proceeded to make an anastomosis.  Colotomies were made in either end after aligning them in a side-to-side fashion.  60 mm blue load Endo ARA stapler was then used to create the anastomosis.  We use a second firing to make anastomosis  slightly larger.  We then used a 90 TA stapler to close the common enterotomy.  The anterior suture line was then oversewn using 3-0 Vicryl.  A crotch stitch was placed.  The anastomosis was widely patent.  It appeared very healthy.  We then replaced into the abdomen.      We went back laparoscopically for final check and everything appeared to be lying well and was very hemostatic.  We then addressed this attachment to the omentum and we had seen at the beginning of the case.  This went from the uterus to the omentum.  It appeared to be a round ligament and an aberrant course.  This seemed very likely to cause bowel obstructions or further abdominal pain in the future.  We ultimately decided to divide it where it attached to the omentum thus freeing the omentum.  We laid the omentum over the anastomosis and proceeded with closure.      Our gowns and gloves were changed and a closing tray was brought up.  The midline incision was closed with 0 PDS sutures.  Deep Vicryl sutures were used to close the subcutaneous tissues and for deep dermal stitches.  All the skin including the port sites was then closed with 4 Monocryl suture.  Exofin was used as dressing.  The patient was awakened anesthesia brought to recovery room.    Kris Charles MD  General Surgeon  [facility]  Surgery [address] [address]?  Office: [phone]

## 2023-01-06 NOTE — PROGRESS NOTES
Clinic Care Coordination Contact    Follow Up Progress Note      Assessment: I got a call from Flory about the pt. She stated that they need the referral faxed to Medicaid Restricted Program again. She stated that they did not receive it. I re-faxed the pt referral.    Care Gaps:    Health Maintenance Due   Topic Date Due     DEPRESSION ACTION PLAN  Never done     HEPATITIS B IMMUNIZATION (1 of 3 - 3-dose series) Never done     Pneumococcal Vaccine: Pediatrics (0 to 5 Years) and At-Risk Patients (6 to 64 Years) (2 - PCV) 10/15/2015     ASTHMA ACTION PLAN  01/31/2021     SPIROMETRY  03/03/2021           Care Plans      Intervention/Education provided during outreach:               Plan:     Care Coordinator will follow up in

## 2023-01-06 NOTE — PHARMACY-ADMISSION MEDICATION HISTORY
Pharmacy Note - Admission Medication History    Pertinent Provider Information: Patient completed bowel prep regimen day prior to surgery as prescribed.   ______________________________________________________________________    Prior To Admission (PTA) med list completed and updated in EMR.       PTA Med List   Medication Sig Last Dose     ALPRAZolam (XANAX) 2 MG tablet Take 1 tablet (2 mg) by mouth 3 times daily as needed for anxiety  at prn     cetirizine (ZYRTEC) 10 MG tablet Take 1 tablet (10 mg) by mouth daily (Patient taking differently: Take 10 mg by mouth daily as needed) Past Week     fluticasone (FLONASE) 50 MCG/ACT nasal spray Spray 2 sprays into both nostrils daily (Patient taking differently: Spray 2 sprays into both nostrils daily as needed) Past Week     ibuprofen (ADVIL/MOTRIN) 600 MG tablet Take 1 tablet (600 mg) by mouth 3 times daily as needed for moderate pain Past Week     metroNIDAZOLE (FLAGYL) 500 MG tablet Take 2 tablets three times daily the day prior to surgery. 1/5/2023     montelukast (SINGULAIR) 10 MG tablet Take 1 tablet (10 mg) by mouth At Bedtime 1/5/2023     naloxone (NARCAN) 4 MG/0.1ML nasal spray Spray 1 spray (4 mg) into one nostril alternating nostrils as needed for opioid reversal every 2-3 minutes until assistance arrives  at prn     neomycin (MYCIFRADIN) 500 MG tablet Take 2 tablets by mouth three times daily the day prior to surgery. 1/5/2023     ondansetron (ZOFRAN ODT) 4 MG ODT tab Take 1 tablet (4 mg) by mouth every 8 hours as needed for nausea Past Week     oxyCODONE (ROXICODONE) 5 MG tablet Take 2 tablets (10 mg) by mouth 2 times daily as needed for severe pain (7-10) Past Week     PROAIR  (90 Base) MCG/ACT inhaler Inhale 2 puffs into the lungs every 4 hours as needed for shortness of breath / dyspnea or wheezing      prochlorperazine (COMPAZINE) 10 MG tablet Take 1 tablet (10 mg) by mouth every 6 hours as needed for nausea or vomiting Past Week     tiotropium  (SPIRIVA RESPIMAT) 1.25 MCG/ACT inhaler Inhale 2 puffs into the lungs daily 1/5/2023       Information source(s): Patient and Hospital records    Method of interview communication: in-person    Patient was asked about OTC/herbal products specifically.  PTA med list reflects this.    Based on the pharmacist's assessment, the PTA med list information appears reliable    Allergies were reviewed, assessed, and updated with the patient.      Patient did not bring any medications to the hospital and can't retrieve from home. No multi-dose medications are available for use during hospital stay.      Thank you for the opportunity to participate in the care of this patient.      Elisa Villagran [facility]     1/6/2023     7:15 AM

## 2023-01-06 NOTE — ANESTHESIA PREPROCEDURE EVALUATION
Anesthesia Pre-Procedure Evaluation    Patient: aDrlene Hudson   MRN: [id number] : 1975        Procedure : Procedure(s):  COLECTOMY, SIGMOID, LAPAROSCOPIC          Past Medical History:   Diagnosis Date     Abdominal pain 2015     Abnormal cervical Papanicolaou smear 2014    Overview:  ACUS/HPV positive     Abnormal cytology finding 2014    Overview:  ACUS/HPV positive     Acute pericardial effusion 2017     Agoraphobia with panic attacks      Anxiety      Arthralgia of both lower legs 2020    Bilateral achy knee pain that is chronic in nature going on for years. Most likely osteoarthritis in knees related to obesity. Previously injected in both knees with good relief. Injected last on 20     Arthritis     of back     Asthma in adult, moderate persistent, uncomplicated      Atopic rhinitis 2017     Chronic infectious pericarditis 2019     Chronic low back pain 2017     Chronic pain      Chronic sinusitis      Cocaine abuse (H)      Colonic mass 2022    Added automatically from request for surgery [id number]     Controlled substance agreement signed 2015    Overview:  Patient has chronic pain and is seen at [facility] for this.  Has controlled substance agreement with them.  On Vicodin, Valium, Klonopin prescribed only from there.        Coronary artery disease      Cough 2020     Family history of colon cancer 10/24/2020     Hx of seasonal allergies      Infection due to 2019 novel coronavirus 2022     Infectious pericarditis      Lipoma      Low back pain 2015     Major depressive disorder, recurrent episode, moderate (H) 2006     Menorrhagia with irregular cycle 07/15/2022    Added automatically from request for surgery [id number]     Moderate persistent asthma without complication 2020     Nondependent alcohol abuse, episodic drinking behavior 10/03/2012     Noninflammatory disorder of vagina 2015  "    Other chronic pain      Other long term (current) drug therapy 01/28/2013    Overview:  Vicodin and cyclobenzaprine monthly     Panic disorder with agoraphobia 09/05/2006     Pap smear for cervical cancer screening 10/31/2016    03/29/2010  Normal cytology, HPV ot done, repeat in 3 years.     Pericarditis 2017     Physiologic disturbance of temperature regulation 10/24/2020     PONV (postoperative nausea and vomiting)      Right ankle swelling 06/07/2022    Added automatically from request for surgery [id number]     Tobacco abuse      Tobacco use disorder 07/13/2015      Past Surgical History:   Procedure Laterality Date     ANKLE SURGERY Right 05/30/2019     BIOPSY BREAST Right 1990    benign     COLONOSCOPY N/A 1/3/2023    Procedure: COLONOSCOPY WITH BIOPSY OF COLON MASS, POLYPECTOMY;  Surgeon: Kris Charles MD;  Location: [facility]     CREATION PERICARDIAL WINDOW  02/10/2017    [facility]     DILATION AND CURETTAGE N/A 7/22/2022    Procedure: DILATION AND CURETTAGE, UTERUS;  Surgeon: Lesly Holland;  Location: [facility]     HEMORRHOIDECTOMY EXTERNAL       HYSTEROSCOPY, WITH ENDOMETRIAL RADIOFREQUENCY ABLATION - NOVASURE N/A 7/22/2022    Procedure: HYSTEROSCOPY, WITH ENDOMETRIAL RADIOFREQUENCY ABLATION - NOVASURE DILATION AND CURETTAGE, UTERUS;  Surgeon: Lesly Holland;  Location: [facility]     TUMOR REMOVAL      Has had 3. In the right breast and \"inside of rib cage.\"      Allergies   Allergen Reactions     Lidocaine Other (See Comments)     \"my jaw stopped moving\"  Other reaction(s): Dystonia     Penicillins Hives, Rash and Shortness Of Breath     Epinephrine-Lidocaine-Na Metabisulfite Other (See Comments) and Muscle Pain (Myalgia)     Severe jaw cramping, double vision  Jaw locking      Social History     Tobacco Use     Smoking status: Light Smoker     Packs/day: 0.10     Years: 30.00     Pack years: 3.00     Types: Cigarettes     Last attempt to quit: 5/28/2020     " Years since quittin.6     Smokeless tobacco: Current     Tobacco comments:     2-3 cig/day   Substance Use Topics     Alcohol use: Not Currently     Comment: Occasiaonlly.       Wt Readings from Last 1 Encounters:   23 110.2 kg (243 lb)        Anesthesia Evaluation            ROS/MED HX  ENT/Pulmonary:     (+) asthma     Neurologic:       Cardiovascular: Comment: History of pericardial effusion  POCUS in care everywhere in  showed mild pericardial effusion with no hemodynamic effects.     (+) --CAD ---Previous cardiac testing   Echo: Date: Results:    Stress Test: Date: Results:    ECG Reviewed: Date: 2022 Results:  Sinus rhythm with 1st degree A-V block   Otherwise normal ECG  Cath: Date: Results:      METS/Exercise Tolerance:     Hematologic:       Musculoskeletal:       GI/Hepatic:  - neg GI/hepatic ROS     Renal/Genitourinary:  - neg Renal ROS     Endo:       Psychiatric/Substance Use:     (+) psychiatric history anxiety and depression H/O chronic opiod use . Recreational drug usage: Cannabis.    Infectious Disease:       Malignancy:   (+) Malignancy, History of GI.GI CA Active status post.        Other:      (+) , H/O Chronic Pain,        Physical Exam    Airway        Mallampati: II   TM distance: > 3 FB   Neck ROM: full   Mouth opening: > 3 cm    Respiratory Devices and Support         Dental  no notable dental history         Cardiovascular          Rhythm and rate: regular and normal     Pulmonary           breath sounds clear to auscultation           OUTSIDE LABS:  CBC:   Lab Results   Component Value Date    WBC 7.2 2022    WBC 7.6 2022    HGB 12.8 2022    HGB 13.1 2022    HCT 38.8 2022    HCT 39.3 2022     2022     2022     BMP:   Lab Results   Component Value Date     2022     2022    POTASSIUM 3.8 2022    POTASSIUM 3.4 (L) 2022    CHLORIDE 106 2022    CHLORIDE 105 2022    CO2  26 12/25/2022    CO2 24 09/19/2022    BUN 18.0 12/25/2022    BUN 12 09/19/2022    CR 0.83 12/25/2022    CR 0.85 09/19/2022    GLC 83 12/25/2022    GLC 95 09/19/2022     COAGS: No results found for: PTT, INR, FIBR  POC:   Lab Results   Component Value Date    HCG Negative 07/22/2022    HCGS Negative 08/06/2020     HEPATIC:   Lab Results   Component Value Date    ALBUMIN 3.9 12/25/2022    PROTTOTAL 7.2 12/25/2022    ALT 15 12/25/2022    AST 23 12/25/2022    ALKPHOS 103 12/25/2022    BILITOTAL <0.2 12/25/2022    BILIDIRECT <0.1 01/28/2019     OTHER:   Lab Results   Component Value Date    LACT 1.2 08/06/2020    MAURY 9.1 12/25/2022    LIPASE <9 08/06/2020    AMYLASE 41 11/02/2017    TSH 0.60 03/08/2022    CRP 0.6 08/26/2021    SED 61 (H) 08/26/2021       Anesthesia Plan    ASA Status:  3   NPO Status:  NPO Appropriate    Anesthesia Type: General.     - Airway: ETT   Induction: Propofol.   Maintenance: TIVA.   Techniques and Equipment:     - Lines/Monitors: 2nd IV     - Drips/Meds: Ketamine, Dexmed. infusion     Consents    Anesthesia Plan(s) and associated risks, benefits, and realistic alternatives discussed. Questions answered and patient/representative(s) expressed understanding.    - Discussed:     - Discussed with:  Patient         Postoperative Care       PONV prophylaxis: Ondansetron (or other 5HT-3), Dexamethasone or Solumedrol, Scopolamine patch     Comments:    Other Comments: GETA, TIVA, precedex, ketamine, mag, acetaminophen.             Francois Funez MD

## 2023-01-06 NOTE — INTERVAL H&P NOTE
"I have reviewed the surgical (or preoperative) H&P that is linked to this encounter, and examined the patient. There are no significant changes    Clinical Conditions Present on Arrival:  Clinically Significant Risk Factors Present on Admission                    # Obesity: Estimated body mass index is 35.88 kg/m  as calculated from the following:    Height as of 1/3/23: 1.753 m (5' 9\").    Weight as of this encounter: 110.2 kg (243 lb).       "

## 2023-01-07 LAB
ANION GAP SERPL CALCULATED.3IONS-SCNC: 9 MMOL/L (ref 7–15)
BUN SERPL-MCNC: 12.4 MG/DL (ref 6–20)
CALCIUM SERPL-MCNC: 8.9 MG/DL (ref 8.6–10)
CHLORIDE SERPL-SCNC: 107 MMOL/L (ref 98–107)
CREAT SERPL-MCNC: 0.84 MG/DL (ref 0.51–0.95)
DEPRECATED HCO3 PLAS-SCNC: 20 MMOL/L (ref 22–29)
ERYTHROCYTE [DISTWIDTH] IN BLOOD BY AUTOMATED COUNT: 12.2 % (ref 10–15)
GFR SERPL CREATININE-BSD FRML MDRD: 85 ML/MIN/1.73M2
GLUCOSE SERPL-MCNC: 95 MG/DL (ref 70–99)
HCT VFR BLD AUTO: 37 % (ref 35–47)
HGB BLD-MCNC: 11.6 G/DL (ref 11.7–15.7)
MAGNESIUM SERPL-MCNC: 2.1 MG/DL (ref 1.7–2.3)
MCH RBC QN AUTO: 30.1 PG (ref 26.5–33)
MCHC RBC AUTO-ENTMCNC: 31.4 G/DL (ref 31.5–36.5)
MCV RBC AUTO: 96 FL (ref 78–100)
PHOSPHATE SERPL-MCNC: 4.3 MG/DL (ref 2.5–4.5)
PLATELET # BLD AUTO: 209 10E3/UL (ref 150–450)
POTASSIUM SERPL-SCNC: 4.6 MMOL/L (ref 3.4–5.3)
RBC # BLD AUTO: 3.86 10E6/UL (ref 3.8–5.2)
SODIUM SERPL-SCNC: 136 MMOL/L (ref 136–145)
WBC # BLD AUTO: 10.2 10E3/UL (ref 4–11)

## 2023-01-07 PROCEDURE — 85027 COMPLETE CBC AUTOMATED: CPT | Performed by: SURGERY

## 2023-01-07 PROCEDURE — 250N000013 HC RX MED GY IP 250 OP 250 PS 637: Performed by: SURGERY

## 2023-01-07 PROCEDURE — 250N000011 HC RX IP 250 OP 636: Performed by: SURGERY

## 2023-01-07 PROCEDURE — 83735 ASSAY OF MAGNESIUM: CPT | Performed by: SURGERY

## 2023-01-07 PROCEDURE — C9113 INJ PANTOPRAZOLE SODIUM, VIA: HCPCS | Performed by: SURGERY

## 2023-01-07 PROCEDURE — 80048 BASIC METABOLIC PNL TOTAL CA: CPT | Performed by: SURGERY

## 2023-01-07 PROCEDURE — 36415 COLL VENOUS BLD VENIPUNCTURE: CPT | Performed by: SURGERY

## 2023-01-07 PROCEDURE — 84100 ASSAY OF PHOSPHORUS: CPT | Performed by: SURGERY

## 2023-01-07 PROCEDURE — 120N000001 HC R&B MED SURG/OB

## 2023-01-07 RX ORDER — KETOROLAC TROMETHAMINE 15 MG/ML
15 INJECTION, SOLUTION INTRAMUSCULAR; INTRAVENOUS EVERY 6 HOURS
Status: DISCONTINUED | OUTPATIENT
Start: 2023-01-07 | End: 2023-01-09

## 2023-01-07 RX ORDER — METHOCARBAMOL 500 MG/1
500 TABLET, FILM COATED ORAL 4 TIMES DAILY PRN
Status: DISCONTINUED | OUTPATIENT
Start: 2023-01-07 | End: 2023-01-10

## 2023-01-07 RX ADMIN — GABAPENTIN 100 MG: 100 CAPSULE ORAL at 14:57

## 2023-01-07 RX ADMIN — ALPRAZOLAM 2 MG: 0.5 TABLET ORAL at 08:43

## 2023-01-07 RX ADMIN — ALBUTEROL SULFATE 2 PUFF: 90 AEROSOL, METERED RESPIRATORY (INHALATION) at 13:17

## 2023-01-07 RX ADMIN — GABAPENTIN 100 MG: 100 CAPSULE ORAL at 20:06

## 2023-01-07 RX ADMIN — KETOROLAC TROMETHAMINE 15 MG: 15 INJECTION, SOLUTION INTRAMUSCULAR; INTRAVENOUS at 16:18

## 2023-01-07 RX ADMIN — ENOXAPARIN SODIUM 40 MG: 40 INJECTION SUBCUTANEOUS at 07:00

## 2023-01-07 RX ADMIN — METHOCARBAMOL 500 MG: 500 TABLET, FILM COATED ORAL at 13:18

## 2023-01-07 RX ADMIN — ALBUTEROL SULFATE 2 PUFF: 90 AEROSOL, METERED RESPIRATORY (INHALATION) at 16:38

## 2023-01-07 RX ADMIN — ONDANSETRON 4 MG: 4 TABLET, ORALLY DISINTEGRATING ORAL at 18:38

## 2023-01-07 RX ADMIN — GABAPENTIN 100 MG: 100 CAPSULE ORAL at 08:43

## 2023-01-07 RX ADMIN — SENNOSIDES AND DOCUSATE SODIUM 2 TABLET: 8.6; 5 TABLET ORAL at 08:43

## 2023-01-07 RX ADMIN — ALPRAZOLAM 2 MG: 0.5 TABLET ORAL at 16:26

## 2023-01-07 RX ADMIN — PANTOPRAZOLE SODIUM 40 MG: 40 INJECTION, POWDER, FOR SOLUTION INTRAVENOUS at 20:07

## 2023-01-07 RX ADMIN — ONDANSETRON 4 MG: 2 INJECTION INTRAMUSCULAR; INTRAVENOUS at 11:44

## 2023-01-07 RX ADMIN — KETOROLAC TROMETHAMINE 15 MG: 15 INJECTION, SOLUTION INTRAMUSCULAR; INTRAVENOUS at 11:43

## 2023-01-07 RX ADMIN — UMECLIDINIUM 1 PUFF: 62.5 AEROSOL, POWDER ORAL at 08:43

## 2023-01-07 RX ADMIN — POLYETHYLENE GLYCOL 3350 17 G: 17 POWDER, FOR SOLUTION ORAL at 08:43

## 2023-01-07 RX ADMIN — ALPRAZOLAM 2 MG: 0.5 TABLET ORAL at 00:46

## 2023-01-07 RX ADMIN — KETOROLAC TROMETHAMINE 15 MG: 15 INJECTION, SOLUTION INTRAMUSCULAR; INTRAVENOUS at 22:21

## 2023-01-07 ASSESSMENT — ACTIVITIES OF DAILY LIVING (ADL)
ADLS_ACUITY_SCORE: 20
ADLS_ACUITY_SCORE: 21
ADLS_ACUITY_SCORE: 20
ADLS_ACUITY_SCORE: 21
ADLS_ACUITY_SCORE: 21
ADLS_ACUITY_SCORE: 18

## 2023-01-07 NOTE — PLAN OF CARE
Problem: Obstructive Sleep Apnea Risk or Actual Comorbidity Management  Goal: Unobstructed Breathing During Sleep  Outcome: Progressing     Problem: Pain Acute  Goal: Optimal Pain Control and Function  Outcome: Progressing  Intervention: Prevent or Manage Pain  Flowsheets  Taken 1/7/2023 0640  Sensory Stimulation Regulation:   care clustered   lighting decreased  Sleep/Rest Enhancement:   awakenings minimized   comfort measures   medication   room darkened  Medication Review/Management: medications reviewed  Taken 1/7/2023 0003  Medication Review/Management: medications reviewed  Intervention: Optimize Psychosocial Wellbeing  Flowsheets (Taken 1/7/2023 0640)  Supportive Measures: active listening utilized     Goal Outcome Evaluation:         Pt c/o constant 7-8/10 pain. PCA pump intact. Abdominal binder in place.

## 2023-01-07 NOTE — PLAN OF CARE
Problem: Violence Risk or Actual  Goal: Anger and Impulse Control  Outcome: Progressing     Problem: Pain Acute  Goal: Optimal Pain Control and Function  Outcome: Progressing  Intervention: Develop Pain Management Plan  Recent Flowsheet Documentation  Taken 1/6/2023 1810 by Ketan Fagan RN  Pain Management Interventions: medication (see MAR)  Intervention: Prevent or Manage Pain  Recent Flowsheet Documentation  Taken 1/6/2023 1810 by Ketan Fagan RN  Medication Review/Management: medications reviewed     Problem: Nausea and Vomiting  Goal: Nausea and Vomiting Relief  Outcome: Progressing     Problem: Asthma Comorbidity  Goal: Maintenance of Asthma Control  Outcome: Progressing  Intervention: Maintain Asthma Symptom Control  Recent Flowsheet Documentation  Taken 1/6/2023 1810 by Ketan Fagan RN  Medication Review/Management: medications reviewed     Problem: Behavioral Health Comorbidity  Goal: Maintenance of Behavioral Health Symptom Control  Outcome: Progressing  Intervention: Maintain Behavioral Health Symptom Control  Recent Flowsheet Documentation  Taken 1/6/2023 1810 by Ketan Fagan RN  Medication Review/Management: medications reviewed     Problem: Pain Chronic (Persistent) (Comorbidity Management)  Goal: Acceptable Pain Control and Functional Ability  Outcome: Progressing  Intervention: Develop Pain Management Plan  Recent Flowsheet Documentation  Taken 1/6/2023 1810 by Ketan Fagan RN  Pain Management Interventions: medication (see MAR)  Intervention: Manage Persistent Pain  Recent Flowsheet Documentation  Taken 1/6/2023 1810 by Ketan Fagan RN  Medication Review/Management: medications reviewed   Goal Outcome Evaluation:       Pt is A&&O x4, VSS, pt reported consistent pain 6-8/10. Pt is on PCA and reports relief. Pt is tolerating clear liquids. Cruz is patent with clear yellow output. Pt denies passing gas. Prn xanax given.

## 2023-01-07 NOTE — PLAN OF CARE
Goal Outcome Evaluation:             Frustrated by alarms beeping, able to calm down with settings, and PRN alprazolam. Surgery says Oximeter lower limit can be 85%. PT enouraged to walk and agreed to remove jauregui at noon and get up to walk. Pt. Became very tired and with abdominal pain was wheeled back to room. PCA pump used consistently and button presses exceeded doses given. PRN toradol given and pt. Reported pain was tolerable after. Ate a small amount of food and became nauseous, zofran IV effective at relieving symptoms. Drinking iced coffee with no difficulty. ABD binder in place. Incision look good, no drainage.

## 2023-01-07 NOTE — PROGRESS NOTES
General Surgery Progress Note:    Hospital Day # 1    ASSESSMENT:   1.  Colon cancer     Darlene Hudson is a 47 year old female status post laparoscopic subtotal colectomy with colocolonic anastomosis.  Patient is postop day 1.  She is recovering uneventfully.  She is a little frustrated with the IV restraints.  Still with abdominal pain postoperatively    PLAN:   -We will advance her to a full liquid diet today.  -Remove the Cruz catheter.  Monitor for voiding after Cruz removal.  -Continue with pain control with the PCA.  If needed may have to add IV Tylenol.  Patient does not take oral Tylenol.  -Continue to monitor for return of bowel function.  -Instructed patient to ambulate as tolerated  -We will stop the IV fluids.      SUBJECTIVE:   Darlene Hudson seen on a.m. rounds.  Patient states that she is doing well from surgery but still has a lot of abdominal pain.  Patient is using the PCA and states that the abdominal pain is tolerable with the PCA.  Patient is very frustrated because she cannot sleep.  Patient states that her frustration comes from the beeping machines.  Patient has not ambulated postoperatively.  Patient has no further complaints at this time.    Patient Vitals for the past 24 hrs:   BP Temp Temp src Pulse Resp SpO2 Height Weight   01/07/23 0711 94/55 99.5  F (37.5  C) Axillary 75 18 99 % -- --   01/07/23 0600 -- -- -- -- -- 96 % -- --   01/07/23 0443 102/59 97.9  F (36.6  C) Oral 85 18 100 % -- --   01/07/23 0034 104/55 -- -- -- -- -- -- --   01/07/23 0029 (!) 88/53 98.3  F (36.8  C) Oral 77 14 93 % -- --   01/06/23 2200 99/52 -- -- 86 20 95 % -- --   01/06/23 1810 122/72 97.8  F (36.6  C) Oral 84 20 96 % -- --   01/06/23 1728 118/72 97.9  F (36.6  C) Oral 76 20 97 % -- --   01/06/23 1643 113/67 97.8  F (36.6  C) Oral 80 20 98 % -- --   01/06/23 1511 135/79 97.8  F (36.6  C) Oral 60 18 99 % -- --   01/06/23 1457 135/79 97.7  F (36.5  C) Oral 66 18 99 % -- --   01/06/23 1435 122/68 97.8  " F (36.6  C) Oral 56 18 93 % -- --   01/06/23 1404 112/60 97.7  F (36.5  C) Oral 62 18 94 % 1.753 m (5' 9\") 112.1 kg (247 lb 3.2 oz)   01/06/23 1345 99/56 98.1  F (36.7  C) Temporal 66 16 95 % -- --   01/06/23 1336 97/56 -- -- 70 23 96 % -- --   01/06/23 1331 97/56 -- -- 73 22 96 % -- --   01/06/23 1330 -- -- -- 73 20 96 % -- --   01/06/23 1317 93/50 -- -- 71 26 96 % -- --   01/06/23 1315 (!) 85/50 -- -- 79 30 (!) 88 % -- --   01/06/23 1300 93/53 -- -- 76 13 96 % -- --   01/06/23 1245 105/59 -- -- 79 15 99 % -- --   01/06/23 1230 108/64 97.1  F (36.2  C) Temporal 80 16 99 % -- --       Physical Exam:  General: NAD, pleasant  CV:RRR  LUNGS:Normal respiratory effort, no accessory muscle use, breath sounds bilaterally on auscultation  ABD: Abdomen soft with minimal distention.  Appropriate tenderness to palpation.  Surgical incisions well approximated.  Mild shadowing through the abdominal surgical dressings  EXT:no CCE    Admission on 01/06/2023   Component Date Value     CEA 01/06/2023 19.2      ABO/RH(D) 01/06/2023 O POS      Antibody Screen 01/06/2023 Negative      SPECIMEN EXPIRATION DATE 01/06/2023 [id number]      Creatinine 01/06/2023 0.71      GFR Estimate 01/06/2023 >90      Sodium 01/07/2023 136      Potassium 01/07/2023 4.6      Chloride 01/07/2023 107      Carbon Dioxide (CO2) 01/07/2023 20 (L)      Anion Gap 01/07/2023 9      Urea Nitrogen 01/07/2023 12.4      Creatinine 01/07/2023 0.84      Calcium 01/07/2023 8.9      Glucose 01/07/2023 95      GFR Estimate 01/07/2023 85      WBC Count 01/07/2023 10.2      RBC Count 01/07/2023 3.86      Hemoglobin 01/07/2023 11.6 (L)      Hematocrit 01/07/2023 37.0      MCV 01/07/2023 96      MCH 01/07/2023 30.1      MCHC 01/07/2023 31.4 (L)      RDW 01/07/2023 12.2      Platelet Count 01/07/2023 209      Magnesium 01/07/2023 2.1      Phosphorus 01/07/2023 4.3         DO Harpreet Blanchard DO  General Surgeon  [employer], "  [address] [address]?  Office: [phone]  Employed by [facility]  Pager: [phone]

## 2023-01-08 PROCEDURE — 258N000003 HC RX IP 258 OP 636

## 2023-01-08 PROCEDURE — 250N000011 HC RX IP 250 OP 636: Performed by: SURGERY

## 2023-01-08 PROCEDURE — C9113 INJ PANTOPRAZOLE SODIUM, VIA: HCPCS | Performed by: SURGERY

## 2023-01-08 PROCEDURE — 120N000001 HC R&B MED SURG/OB

## 2023-01-08 PROCEDURE — 258N000003 HC RX IP 258 OP 636: Performed by: SURGERY

## 2023-01-08 PROCEDURE — 250N000013 HC RX MED GY IP 250 OP 250 PS 637: Performed by: SURGERY

## 2023-01-08 RX ORDER — HYDROMORPHONE HYDROCHLORIDE 1 MG/ML
0.5 INJECTION, SOLUTION INTRAMUSCULAR; INTRAVENOUS; SUBCUTANEOUS
Status: DISCONTINUED | OUTPATIENT
Start: 2023-01-08 | End: 2023-01-10

## 2023-01-08 RX ORDER — HYDROMORPHONE HYDROCHLORIDE 4 MG/1
4 TABLET ORAL
Status: DISCONTINUED | OUTPATIENT
Start: 2023-01-08 | End: 2023-01-08

## 2023-01-08 RX ORDER — CALCIUM CARBONATE 500 MG/1
1000 TABLET, CHEWABLE ORAL 3 TIMES DAILY PRN
Status: DISCONTINUED | OUTPATIENT
Start: 2023-01-08 | End: 2023-01-14 | Stop reason: HOSPADM

## 2023-01-08 RX ORDER — HYDROMORPHONE HYDROCHLORIDE 4 MG/1
8 TABLET ORAL
Status: DISCONTINUED | OUTPATIENT
Start: 2023-01-08 | End: 2023-01-09

## 2023-01-08 RX ORDER — HYDROMORPHONE HYDROCHLORIDE 1 MG/ML
0.3 INJECTION, SOLUTION INTRAMUSCULAR; INTRAVENOUS; SUBCUTANEOUS
Status: DISCONTINUED | OUTPATIENT
Start: 2023-01-08 | End: 2023-01-10

## 2023-01-08 RX ORDER — HYDROMORPHONE HYDROCHLORIDE 4 MG/1
4 TABLET ORAL
Status: DISCONTINUED | OUTPATIENT
Start: 2023-01-08 | End: 2023-01-09

## 2023-01-08 RX ORDER — HYDROMORPHONE HCL IN WATER/PF 6 MG/30 ML
0.2 PATIENT CONTROLLED ANALGESIA SYRINGE INTRAVENOUS
Status: DISCONTINUED | OUTPATIENT
Start: 2023-01-08 | End: 2023-01-08

## 2023-01-08 RX ORDER — HYDROMORPHONE HCL IN WATER/PF 6 MG/30 ML
0.4 PATIENT CONTROLLED ANALGESIA SYRINGE INTRAVENOUS
Status: DISCONTINUED | OUTPATIENT
Start: 2023-01-08 | End: 2023-01-08

## 2023-01-08 RX ORDER — HYDROMORPHONE HYDROCHLORIDE 2 MG/1
2 TABLET ORAL
Status: DISCONTINUED | OUTPATIENT
Start: 2023-01-08 | End: 2023-01-08

## 2023-01-08 RX ADMIN — ALPRAZOLAM 2 MG: 0.5 TABLET ORAL at 21:28

## 2023-01-08 RX ADMIN — KETOROLAC TROMETHAMINE 15 MG: 15 INJECTION, SOLUTION INTRAMUSCULAR; INTRAVENOUS at 10:08

## 2023-01-08 RX ADMIN — ACETAMINOPHEN 975 MG: 325 TABLET ORAL at 08:03

## 2023-01-08 RX ADMIN — MONTELUKAST 10 MG: 10 TABLET, FILM COATED ORAL at 20:37

## 2023-01-08 RX ADMIN — KETOROLAC TROMETHAMINE 15 MG: 15 INJECTION, SOLUTION INTRAMUSCULAR; INTRAVENOUS at 16:07

## 2023-01-08 RX ADMIN — HYDROMORPHONE HYDROCHLORIDE 4 MG: 4 TABLET ORAL at 17:18

## 2023-01-08 RX ADMIN — KETOROLAC TROMETHAMINE 15 MG: 15 INJECTION, SOLUTION INTRAMUSCULAR; INTRAVENOUS at 04:27

## 2023-01-08 RX ADMIN — GABAPENTIN 100 MG: 100 CAPSULE ORAL at 13:14

## 2023-01-08 RX ADMIN — HYDROMORPHONE HYDROCHLORIDE 0.5 MG: 1 INJECTION, SOLUTION INTRAMUSCULAR; INTRAVENOUS; SUBCUTANEOUS at 21:28

## 2023-01-08 RX ADMIN — GABAPENTIN 100 MG: 100 CAPSULE ORAL at 20:37

## 2023-01-08 RX ADMIN — SENNOSIDES AND DOCUSATE SODIUM 1 TABLET: 8.6; 5 TABLET ORAL at 21:29

## 2023-01-08 RX ADMIN — SODIUM CHLORIDE 1000 ML: 9 INJECTION, SOLUTION INTRAVENOUS at 08:04

## 2023-01-08 RX ADMIN — HYDROMORPHONE HYDROCHLORIDE 0.5 MG: 1 INJECTION, SOLUTION INTRAMUSCULAR; INTRAVENOUS; SUBCUTANEOUS at 18:34

## 2023-01-08 RX ADMIN — ACETAMINOPHEN 975 MG: 325 TABLET ORAL at 01:03

## 2023-01-08 RX ADMIN — ALPRAZOLAM 2 MG: 0.5 TABLET ORAL at 14:15

## 2023-01-08 RX ADMIN — GABAPENTIN 100 MG: 100 CAPSULE ORAL at 08:03

## 2023-01-08 RX ADMIN — Medication: at 01:13

## 2023-01-08 RX ADMIN — ENOXAPARIN SODIUM 40 MG: 40 INJECTION SUBCUTANEOUS at 08:02

## 2023-01-08 RX ADMIN — PANTOPRAZOLE SODIUM 40 MG: 40 INJECTION, POWDER, FOR SOLUTION INTRAVENOUS at 08:02

## 2023-01-08 RX ADMIN — HYDROMORPHONE HYDROCHLORIDE 4 MG: 4 TABLET ORAL at 20:37

## 2023-01-08 RX ADMIN — HYDROMORPHONE HYDROCHLORIDE 4 MG: 4 TABLET ORAL at 13:14

## 2023-01-08 RX ADMIN — SODIUM CHLORIDE 500 ML: 9 INJECTION, SOLUTION INTRAVENOUS at 00:57

## 2023-01-08 RX ADMIN — HYDROMORPHONE HYDROCHLORIDE 4 MG: 4 TABLET ORAL at 10:08

## 2023-01-08 RX ADMIN — ALPRAZOLAM 2 MG: 0.5 TABLET ORAL at 08:03

## 2023-01-08 RX ADMIN — HYDROMORPHONE HYDROCHLORIDE 0.2 MG: 0.2 INJECTION, SOLUTION INTRAMUSCULAR; INTRAVENOUS; SUBCUTANEOUS at 14:15

## 2023-01-08 RX ADMIN — UMECLIDINIUM 1 PUFF: 62.5 AEROSOL, POWDER ORAL at 08:04

## 2023-01-08 RX ADMIN — ACETAMINOPHEN 975 MG: 325 TABLET ORAL at 16:07

## 2023-01-08 ASSESSMENT — ACTIVITIES OF DAILY LIVING (ADL)
ADLS_ACUITY_SCORE: 21

## 2023-01-08 NOTE — PLAN OF CARE
"Goal Outcome Evaluation:      Plan of Care Reviewed With: patient    Overall Patient Progress: no changeOverall Patient Progress: no change    Outcome Evaluation: Pt hypotensive early in shift and writer obtained order for 500 mL bolus. PCA dilaudid going per order. Denied nausea.    BP (!) 84/52 (BP Location: Left arm)   Pulse 94   Temp 98.3  F (36.8  C) (Oral)   Resp 18   Ht 1.753 m (5' 9\")   Wt 112.1 kg (247 lb 3.2 oz)   LMP 05/01/2022   SpO2 95%   BMI 36.51 kg/m      "

## 2023-01-08 NOTE — PROGRESS NOTES
General Surgery Brief Note    I called patient this afternoon. Having trouble with pain control after transitioning off the PCA.  I have increased the dosages of both her PO dilaudid and her IV breakthrough dilaudid to try to get better control for her. She received ~15 mg IV dilaudid over the course of 24 hours yesterday which would be equivalent to ~75 mg of PO dilaudid.    Kris Charles MD  General Surgeon  [facility]  Surgery [address] [address]?  Office: [phone]

## 2023-01-08 NOTE — PLAN OF CARE
Goal Outcome Evaluation:                 Outcome Evaluation: BP lower but pt states no symptoms. 1L NS bolus given. Vague reports of nausea/indegestion- denies need for PRN medications. Encouraged liquids and smaller meals. refused bowel meds-  Encouraged activity - up to BR and sitting at bedside. PCA removed per orders - oral Dilaudid Q3 hours PRN with IV Dilaudid available for breakthrough pain. Family visiting at bedside. No behaviors this shift

## 2023-01-08 NOTE — PROGRESS NOTES
"General Surgery progress Note    No acute events overnight.  Patient has some heartburn and intermittent nausea.  Complains of a headache this morning she had multiple bowel movements yesterday with gas per her report.  She is urinating possibly more than is recorded but difficult to tell.  She states that it is fairly light-colored.  She walked in the halls yesterday 1 time.  She had a difficult interaction last evening with nursing staff that she is upset about this morning.    BP (!) 84/52 (BP Location: Left arm)   Pulse 94   Temp 98.3  F (36.8  C) (Oral)   Resp 18   Ht 1.753 m (5' 9\")   Wt 112.1 kg (247 lb 3.2 oz)   LMP 05/01/2022   SpO2 95%   BMI 36.51 kg/m      Laying in bed in no acute distress  Unlabored breathing on room air  Heart rate in the 90s  Abdomen is soft and nondistended.  Tender across her lower abdomen and around incisions.    I/O last 3 completed shifts:  In: 1289 [P.O.:720; I.V.:569]  Out: 50 [Urine:50]    No new labs    47-year-old woman who is postoperative day 2 from a laparoscopic descending colectomy for colon cancer.  She is having some bowel function but still not normal intestinal motility.  Heartburn and nausea likely from mild ileus.  I also think she is slightly dehydrated.  Her anxiety is also problematic this morning she tells me and I think is contributing significantly to her feeling of unwellness.      -Transition to p.o. pain medication today.  We will do oral Dilaudid and IV Dilaudid for breakthrough.  Discontinue PCA.   -Continue nonnarcotic medications including Toradol, Tylenol, gabapentin, and Robaxin.  -Continue with regular diet.  Encourage small amounts.  Focus on hydration today.  -We will give NS bolus 1 L this morning  -Incentive spirometry and wean from O2 as able  -Encourage ambulation and sitting up in the chair.  -Home Xanax for anxiety.  -Lovenox  -Protonix and Tums for heartburn    Kris Charles MD  General Surgeon  [facility] " [facility] - [address]  [address]  [place] [address]?  Office: [phone]

## 2023-01-08 NOTE — PROGRESS NOTES
Nurse went in to provide IV protonix after patient requested for something for heartburn and to assist patient after complaint about no help to and from the BR.   Patient reports that she had called and waited over 30 minutes. HUC reported he could tell patient was very upset when he answered call light to see how he could help. NA was sent in and came out to room reporting patient had yelled and was upset with him when he was simply trying to help. Then nurse went in to see if everything was ok and patient was sitting in bed expressing how upset she was because of how long she had to wait. Nurse asked patient to lower her voice, explained that there was no reason that she needed to yell at her or any other staff as we are trying to help her. Explained that I could not speak for how long she had to wait but that we were sorry. Patient continued to yell and swear, stating that Nurse was trying to justify situation. Patient told nurse to get the F--- out of her room and not come back. Charge nurse was made aware as this is now the second time she has fired a nurse in 24 hours.  Nurse called and spoke with patient's daughter to explain situation, daughter asked if she could come in and stay the night with her. This was cleared with charge, nursing supervisor and security was notified. Daughter reports that her mother has been behaving this way with her as well and was apologetic.     Charge nurse went in to attempt to deescalate the situation.

## 2023-01-08 NOTE — PLAN OF CARE
Goal Outcome Evaluation:  Patient consistently rates pain 8. On PCA pump and getting toradol scheduled as well as gabapentin.  POD 1 and on regular diet. Patient eating food that family brings in for her. Tonight she had take out spaghetti. Patient continues to complain of nausea-gave zofran and then requested something for heartburn as well. MD was paged and new order for protonix received and med was given.   Patients behaviors are controlling and at times aggressive with strong use of foul language. Patient tells staff exactly what to do and what to bring her and if staff fails to meet her requests she becomes upset.   Sats have been fluctuating from 88-95%. Patient was put on 2L O2 /NC at bedtime.  Prior to nurse being fired by patient and clearly being asked to not return to room, I had explained that bladder scan should be performed to check for urinary retention after only voiding 50cc in >6 hours post jauregui removal. Patient agreeable but nurse is unsure if this was done by NA or not.  Abdominal binder in place.  Gait belt and walker when ambulating. Walked in lafleur x 1 this shift, made it to elevators and then had to sit in wheelchair and be wheeled back to room. When encouraged to sit in chair patient refused, saying she can't because its too painful.

## 2023-01-09 LAB
PATH REPORT.COMMENTS IMP SPEC: ABNORMAL
PATH REPORT.COMMENTS IMP SPEC: ABNORMAL
PATH REPORT.COMMENTS IMP SPEC: YES
PATH REPORT.FINAL DX SPEC: ABNORMAL
PATH REPORT.GROSS SPEC: ABNORMAL
PATH REPORT.RELEVANT HX SPEC: ABNORMAL
PATHOLOGY SYNOPTIC REPORT: ABNORMAL
PHOTO IMAGE: ABNORMAL

## 2023-01-09 PROCEDURE — 250N000011 HC RX IP 250 OP 636: Performed by: SURGERY

## 2023-01-09 PROCEDURE — C9113 INJ PANTOPRAZOLE SODIUM, VIA: HCPCS | Performed by: SURGERY

## 2023-01-09 PROCEDURE — 250N000013 HC RX MED GY IP 250 OP 250 PS 637: Performed by: SURGERY

## 2023-01-09 PROCEDURE — 120N000001 HC R&B MED SURG/OB

## 2023-01-09 PROCEDURE — 250N000013 HC RX MED GY IP 250 OP 250 PS 637: Performed by: PHYSICIAN ASSISTANT

## 2023-01-09 PROCEDURE — 250N000011 HC RX IP 250 OP 636: Performed by: PHYSICIAN ASSISTANT

## 2023-01-09 PROCEDURE — 88309 TISSUE EXAM BY PATHOLOGIST: CPT | Mod: 26 | Performed by: PATHOLOGY

## 2023-01-09 RX ORDER — OXYCODONE HYDROCHLORIDE 5 MG/1
10 TABLET ORAL EVERY 6 HOURS
Status: DISCONTINUED | OUTPATIENT
Start: 2023-01-09 | End: 2023-01-09

## 2023-01-09 RX ORDER — GABAPENTIN 300 MG/1
300 CAPSULE ORAL 3 TIMES DAILY
Status: DISCONTINUED | OUTPATIENT
Start: 2023-01-09 | End: 2023-01-12

## 2023-01-09 RX ORDER — OXYCODONE HYDROCHLORIDE 5 MG/1
10 TABLET ORAL EVERY 4 HOURS
Status: DISCONTINUED | OUTPATIENT
Start: 2023-01-09 | End: 2023-01-10

## 2023-01-09 RX ORDER — ACETAMINOPHEN 325 MG/1
975 TABLET ORAL EVERY 8 HOURS
Status: DISCONTINUED | OUTPATIENT
Start: 2023-01-09 | End: 2023-01-10

## 2023-01-09 RX ADMIN — HYDROMORPHONE HYDROCHLORIDE 0.5 MG: 1 INJECTION, SOLUTION INTRAMUSCULAR; INTRAVENOUS; SUBCUTANEOUS at 12:14

## 2023-01-09 RX ADMIN — HYDROMORPHONE HYDROCHLORIDE 0.5 MG: 1 INJECTION, SOLUTION INTRAMUSCULAR; INTRAVENOUS; SUBCUTANEOUS at 02:23

## 2023-01-09 RX ADMIN — ACETAMINOPHEN 975 MG: 325 TABLET ORAL at 15:31

## 2023-01-09 RX ADMIN — ALPRAZOLAM 2 MG: 0.5 TABLET ORAL at 21:00

## 2023-01-09 RX ADMIN — GABAPENTIN 300 MG: 300 CAPSULE ORAL at 21:00

## 2023-01-09 RX ADMIN — ENOXAPARIN SODIUM 40 MG: 40 INJECTION SUBCUTANEOUS at 08:45

## 2023-01-09 RX ADMIN — OXYCODONE HYDROCHLORIDE 10 MG: 5 TABLET ORAL at 13:07

## 2023-01-09 RX ADMIN — GABAPENTIN 300 MG: 300 CAPSULE ORAL at 13:07

## 2023-01-09 RX ADMIN — UMECLIDINIUM 1 PUFF: 62.5 AEROSOL, POWDER ORAL at 08:43

## 2023-01-09 RX ADMIN — HYDROMORPHONE HYDROCHLORIDE 0.5 MG: 1 INJECTION, SOLUTION INTRAMUSCULAR; INTRAVENOUS; SUBCUTANEOUS at 08:40

## 2023-01-09 RX ADMIN — ALPRAZOLAM 2 MG: 0.5 TABLET ORAL at 02:39

## 2023-01-09 RX ADMIN — OXYCODONE HYDROCHLORIDE 10 MG: 5 TABLET ORAL at 22:22

## 2023-01-09 RX ADMIN — OXYCODONE HYDROCHLORIDE 10 MG: 5 TABLET ORAL at 17:15

## 2023-01-09 RX ADMIN — PANTOPRAZOLE SODIUM 40 MG: 40 INJECTION, POWDER, FOR SOLUTION INTRAVENOUS at 08:37

## 2023-01-09 RX ADMIN — HYDROMORPHONE HYDROCHLORIDE 0.5 MG: 1 INJECTION, SOLUTION INTRAMUSCULAR; INTRAVENOUS; SUBCUTANEOUS at 18:25

## 2023-01-09 RX ADMIN — HYDROMORPHONE HYDROCHLORIDE 0.5 MG: 1 INJECTION, SOLUTION INTRAMUSCULAR; INTRAVENOUS; SUBCUTANEOUS at 15:37

## 2023-01-09 RX ADMIN — HYDROMORPHONE HYDROCHLORIDE 0.5 MG: 1 INJECTION, SOLUTION INTRAMUSCULAR; INTRAVENOUS; SUBCUTANEOUS at 04:27

## 2023-01-09 RX ADMIN — ALPRAZOLAM 2 MG: 0.5 TABLET ORAL at 15:31

## 2023-01-09 RX ADMIN — HYDROMORPHONE HYDROCHLORIDE 0.3 MG: 1 INJECTION, SOLUTION INTRAMUSCULAR; INTRAVENOUS; SUBCUTANEOUS at 21:00

## 2023-01-09 RX ADMIN — ACETAMINOPHEN 975 MG: 325 TABLET ORAL at 08:35

## 2023-01-09 RX ADMIN — GABAPENTIN 100 MG: 100 CAPSULE ORAL at 08:36

## 2023-01-09 RX ADMIN — METHOCARBAMOL 500 MG: 500 TABLET, FILM COATED ORAL at 06:18

## 2023-01-09 RX ADMIN — HYDROMORPHONE HYDROCHLORIDE 1 MG: 1 INJECTION, SOLUTION INTRAMUSCULAR; INTRAVENOUS; SUBCUTANEOUS at 09:53

## 2023-01-09 RX ADMIN — MONTELUKAST 10 MG: 10 TABLET, FILM COATED ORAL at 21:00

## 2023-01-09 RX ADMIN — HYDROMORPHONE HYDROCHLORIDE 0.5 MG: 1 INJECTION, SOLUTION INTRAMUSCULAR; INTRAVENOUS; SUBCUTANEOUS at 06:31

## 2023-01-09 RX ADMIN — SENNOSIDES AND DOCUSATE SODIUM 1 TABLET: 8.6; 5 TABLET ORAL at 08:36

## 2023-01-09 ASSESSMENT — ACTIVITIES OF DAILY LIVING (ADL)
ADLS_ACUITY_SCORE: 21

## 2023-01-09 NOTE — PROGRESS NOTES
ASSESSMENT:  Darlene Hudson is a 47 year old female status post laparoscopic subtotal colectomy with colocolonic anastomosis.  Colon mass with pending pathology  Significant pain without adequate pain control and patient is not narcotic naïve    PLAN:  -I reviewed patient's history of pain medications that she currently is taking at home.  She states that she takes 5 mg oxycodone at night and then 2 mg of Xanax 2 hours after.  She also uses Xanax as needed usually once in the morning and once in the afternoon for anxiety.  -Patient states that she is comfortable with me changing her medications and will actually schedule her oxycodone and will start with 10 mg 4 times a day but will try to decrease that to 5 mg and adjust as her pain is reassessed.  Also increased her gabapentin to 300 mg 3 times a day.  Xanax 3 times a day as needed.  -Hopefully we can get her pain under control and then we can start working on ambulation and activity and seeing how she does.  -Our goal is oral pain control for discharge planning but she can have IV Dilaudid as needed for breakthrough pain.  -I did remove Toradol from her regiment and placed that scheduled acetaminophen.  -If her pain is not adequate controlled then I will ask pain management team to see her.    SUBJECTIVE:   She is angry this morning.  She states that she feels that her pain is not adequately controlled.  She has not been given oral Dilaudid and only receiving IV Dilaudid 0.5 mg routinely.  She denies any nausea or vomiting.  She is passing gas without bowel movement.  She is tolerating regular diet.  Patient states that she will not accept Toradol as she does not feel this is helpful.  She is afebrile and intermittently tachycardic.  Vital signs otherwise stable.  I did visit the patient twice this morning.  At first when I saw her I did approve 1 mg dose of Dilaudid just to help with the pain and this see if we can start to get on top of the pain.  At the  second visit she was much more comfortable and willing to discuss oral medications.  She was eating and tolerating well.  She was getting sleepy most likely from the increased dose of Dilaudid.      Patient Vitals for the past 24 hrs:   BP Temp Temp src Pulse Resp SpO2   01/09/23 0715 122/71 98.8  F (37.1  C) Oral 111 20 90 %   01/09/23 0242 121/58 99.8  F (37.7  C) Oral (!) 125 20 91 %   01/08/23 1622 94/52 98.3  F (36.8  C) Oral 92 20 93 %   01/08/23 1216 104/50 98.7  F (37.1  C) Oral 93 18 95 %         PHYSICAL EXAM:  GEN: No acute distress, comfortable    ABD: Soft exquisite tenderness everywhere but with very light touch.  Able to examine her abdomen lateral sides with more tenderness on the left versus the right.  Her wounds are clean and dry.  No evidence of infection.     Output by Drain (mL) 01/07/23 0700 - 01/07/23 1459 01/07/23 1500 - 01/07/23 2259 01/07/23 2300 - 01/08/23 0659 01/08/23 0700 - 01/08/23 1459 01/08/23 1500 - 01/08/23 2259 01/08/23 2300 - 01/09/23 0659 01/09/23 0700 - 01/09/23 1117   Patient has no LDAs of requested type attached.      EXT:No cyanosis, edema or obvious abnormalities    01/08 0700 - 01/09 0659  In: 937 [P.O.:920; I.V.:17]  Out: -     Admission on 01/06/2023   Component Date Value     CEA 01/06/2023 19.2      ABO/RH(D) 01/06/2023 O POS      Antibody Screen 01/06/2023 Negative      SPECIMEN EXPIRATION DATE 01/06/2023 [id number]      Creatinine 01/06/2023 0.71      GFR Estimate 01/06/2023 >90      Sodium 01/07/2023 136      Potassium 01/07/2023 4.6      Chloride 01/07/2023 107      Carbon Dioxide (CO2) 01/07/2023 20 (L)      Anion Gap 01/07/2023 9      Urea Nitrogen 01/07/2023 12.4      Creatinine 01/07/2023 0.84      Calcium 01/07/2023 8.9      Glucose 01/07/2023 95      GFR Estimate 01/07/2023 85      WBC Count 01/07/2023 10.2      RBC Count 01/07/2023 3.86      Hemoglobin 01/07/2023 11.6 (L)      Hematocrit 01/07/2023 37.0      MCV 01/07/2023 96      MCH 01/07/2023 30.1       MCHC 01/07/2023 31.4 (L)      RDW 01/07/2023 12.2      Platelet Count 01/07/2023 209      Magnesium 01/07/2023 2.1      Phosphorus 01/07/2023 4.3           SOPHIA Rahman  [facility]  [address] [place] [id number]  Phone- [phone]  Fax- [phone]

## 2023-01-09 NOTE — PLAN OF CARE
Goal Outcome Evaluation:      Problem: Violence Risk or Actual  Goal: Anger and Impulse Control  Outcome: Progressing     Problem: Pain Acute  Goal: Optimal Pain Control and Function  Outcome: Progressing  Intervention: Prevent or Manage Pain  Recent Flowsheet Documentation  Taken 1/9/2023 0100 by Leydi Sandhu RN  Medication Review/Management: medications reviewed     Problem: Pain Chronic (Persistent) (Comorbidity Management)  Goal: Acceptable Pain Control and Functional Ability  Outcome: Progressing  Intervention: Manage Persistent Pain  Recent Flowsheet Documentation  Taken 1/9/2023 0100 by Leydi Sandhu RN  Medication Review/Management: medications reviewed    10/10 pain reported. Patient received PRN 0.5mg dilaudid x3 after each dose patient slept comfortably.  Patient refused tylenol and toradol. Xanax and Robaxin were also given. Patient up to bathroom with 1 assist but c/o pain the entire time. VSS. Patient has 2L on oxygen mask but only wears it when oxygen sensor beeps for low oxygen. Patients cousin at bedside overnight.     Leydi Sandhu RN

## 2023-01-09 NOTE — PROGRESS NOTES
"SPIRITUAL HEALTH SERVICES NOTE  [facility]; P2    SPIRITUAL CARE NOTE  Saw Karoline due to admission screening response/patient request upon admission. She appeared very drowsy but was able to engage in brief conversation.  Karoline says that she has been experiencing a lot of pain today. She anticipates discharging to home \"in the next couple of days\" and says \"I'm not going home unless I'm comfortable.\" She states that she has many supportive people in her life. Karoline declined a Bible or devotional and welcomed a prayer.     Time Spent with Patient/Family: 5 minutes    Plan of Care: Available for further follow-up as requested by patient, family or staff.      Caty Mccartney M.Div.      Office: [phone] (for non-urgent requests)  Please Vocera or page through [facility] for time-sensitive requests    "

## 2023-01-09 NOTE — PLAN OF CARE
Problem: Pain Acute  Goal: Optimal Pain Control and Function  Outcome: Not Progressing  Intervention: Develop Pain Management Plan  Recent Flowsheet Documentation  Taken 1/8/2023 1600 by Cam Pan RN  Pain Management Interventions: medication (see MAR)  Intervention: Prevent or Manage Pain  Recent Flowsheet Documentation  Taken 1/8/2023 1600 by Cam Pan RN  Medication Review/Management:   medications reviewed   high-risk medications identified   Goal Outcome Evaluation:               Pt is continue to be on pain 10/10 on her abdomen. Pt's bp is on the softer site. Pt received Dilaudid po and iv several time. Md is aware. Pt is alert. Continue to monitor pt.

## 2023-01-09 NOTE — PLAN OF CARE
Goal Outcome Evaluation: Patient A&Ox4. Continues to be report abdominal pain rating 9-10/10. Pt frustrated and anxious at times. Dilaudid PO and IV given. Xanax, gabapentin, pt relaxed after meds. Family at bedside.            Problem: Violence Risk or Actual  Goal: Anger and Impulse Control  1/9/2023 0050 by Josemanuel Emanuel RN  Outcome: Progressing  1/9/2023 0050 by Josemanuel Emanuel RN  Outcome: Progressing  Intervention: Minimize Safety Risk  Recent Flowsheet Documentation  Taken 1/8/2023 2037 by Josemanuel Emanuel RN  Sensory Stimulation Regulation: care clustered  Intervention: Promote Self-Control  Recent Flowsheet Documentation  Taken 1/8/2023 2037 by Josemanuel Emanuel RN  Supportive Measures: active listening utilized     Problem: Pain Acute  Goal: Optimal Pain Control and Function  1/9/2023 0050 by Josemanuel Emanuel RN  Outcome: Progressing  1/9/2023 0050 by Josemanuel Emanuel RN  Outcome: Progressing  Intervention: Develop Pain Management Plan  Recent Flowsheet Documentation  Taken 1/8/2023 2128 by Josemanuel Emanuel RN  Pain Management Interventions: medication (see MAR)  Taken 1/8/2023 2037 by Josemanuel Emanuel RN  Pain Management Interventions: medication (see MAR)  Intervention: Prevent or Manage Pain  Recent Flowsheet Documentation  Taken 1/8/2023 2037 by Josemanuel Emanuel RN  Sensory Stimulation Regulation: care clustered  Medication Review/Management: medications reviewed  Intervention: Optimize Psychosocial Wellbeing  Recent Flowsheet Documentation  Taken 1/8/2023 2037 by Josemanuel Emanuel RN  Supportive Measures: active listening utilized     Problem: Nausea and Vomiting  Goal: Nausea and Vomiting Relief  1/9/2023 0050 by Josemanuel Emanuel RN  Outcome: Progressing  1/9/2023 0050 by Josemanuel Emanuel RN  Outcome: Progressing     Problem: Asthma Comorbidity  Goal: Maintenance of Asthma Control  1/9/2023 0050 by Josemanuel Emanuel RN  Outcome: Progressing  1/9/2023 0050 by Josemanuel Emanuel  RN  Outcome: Progressing  Intervention: Maintain Asthma Symptom Control  Recent Flowsheet Documentation  Taken 1/8/2023 2037 by Josemanuel Emanuel RN  Medication Review/Management: medications reviewed     Problem: Behavioral Health Comorbidity  Goal: Maintenance of Behavioral Health Symptom Control  1/9/2023 0050 by Josemanuel Emanuel RN  Outcome: Progressing  1/9/2023 0050 by Josemanuel Emanuel RN  Outcome: Progressing  Intervention: Maintain Behavioral Health Symptom Control  Recent Flowsheet Documentation  Taken 1/8/2023 2037 by Josemanuel Emanuel RN  Medication Review/Management: medications reviewed     Problem: Obstructive Sleep Apnea Risk or Actual Comorbidity Management  Goal: Unobstructed Breathing During Sleep  1/9/2023 0050 by Josemanuel Emanuel RN  Outcome: Progressing  1/9/2023 0050 by Josemanuel Emanuel RN  Outcome: Progressing  Intervention: Monitor and Manage Obstructive Sleep Apnea  Recent Flowsheet Documentation  Taken 1/8/2023 2037 by Josemanuel Emanuel RN  Medication Review/Management: medications reviewed     Problem: Pain Chronic (Persistent) (Comorbidity Management)  Goal: Acceptable Pain Control and Functional Ability  1/9/2023 0050 by Josemanuel Emanuel RN  Outcome: Progressing  1/9/2023 0050 by Josemanuel Emanuel RN  Outcome: Progressing  Intervention: Develop Pain Management Plan  Recent Flowsheet Documentation  Taken 1/8/2023 2128 by Josemanuel Emanuel RN  Pain Management Interventions: medication (see MAR)  Taken 1/8/2023 2037 by Josemanuel Emanuel RN  Pain Management Interventions: medication (see MAR)  Intervention: Manage Persistent Pain  Recent Flowsheet Documentation  Taken 1/8/2023 2037 by Josemanuel Emanuel RN  Medication Review/Management: medications reviewed  Intervention: Optimize Psychosocial Wellbeing  Recent Flowsheet Documentation  Taken 1/8/2023 2037 by Josemanuel Emanuel RN  Supportive Measures: active listening utilized     Problem: Plan of Care - These are the overarching goals to  "be used throughout the patient stay.    Goal: Plan of Care Review  Description: The Plan of Care Review/Shift note should be completed every shift.  The Outcome Evaluation is a brief statement about your assessment that the patient is improving, declining, or no change.  This information will be displayed automatically on your shift note.  1/9/2023 0050 by Josemanuel Emanuel RN  Outcome: Progressing  1/9/2023 0050 by Josemanuel Emanuel RN  Outcome: Progressing  Goal: Patient-Specific Goal (Individualized)  Description: You can add care plan individualizations to a care plan. Examples of Individualization might be:  \"Parent requests to be called daily at 9am for status\", \"I have a hard time hearing out of my right ear\", or \"Do not touch me to wake me up as it startles me\".  1/9/2023 0050 by Josemanuel Emanuel RN  Outcome: Progressing  1/9/2023 0050 by Josemanuel Emanuel RN  Outcome: Progressing  Goal: Absence of Hospital-Acquired Illness or Injury  1/9/2023 0050 by Josemanuel Emanuel RN  Outcome: Progressing  1/9/2023 0050 by Josemanuel Emanuel RN  Outcome: Progressing  Intervention: Identify and Manage Fall Risk  Recent Flowsheet Documentation  Taken 1/8/2023 2037 by Josemanuel Emanuel RN  Safety Promotion/Fall Prevention:    activity supervised    clutter free environment maintained    fall prevention program maintained    nonskid shoes/slippers when out of bed  Intervention: Prevent Skin Injury  Recent Flowsheet Documentation  Taken 1/8/2023 2128 by Josemanuel Emanuel RN  Body Position: position changed independently  Taken 1/8/2023 2037 by Josemanuel Emanuel RN  Body Position: position changed independently  Goal: Optimal Comfort and Wellbeing  1/9/2023 0050 by Josemanuel Emanuel RN  Outcome: Progressing  1/9/2023 0050 by Josemanuel Emanuel RN  Outcome: Progressing  Intervention: Monitor Pain and Promote Comfort  Recent Flowsheet Documentation  Taken 1/8/2023 2128 by Josemanuel Emanuel RN  Pain Management Interventions: " medication (see MAR)  Taken 1/8/2023 2037 by Josemanuel Emanuel, RN  Pain Management Interventions: medication (see MAR)  Goal: Readiness for Transition of Care  1/9/2023 0050 by Josemanuel Emanuel, RN  Outcome: Progressing  1/9/2023 0050 by Josemanuel Emanuel, RN  Outcome: Progressing

## 2023-01-10 LAB
ANION GAP SERPL CALCULATED.3IONS-SCNC: 9 MMOL/L (ref 7–15)
BUN SERPL-MCNC: 8.8 MG/DL (ref 6–20)
CALCIUM SERPL-MCNC: 9 MG/DL (ref 8.6–10)
CHLORIDE SERPL-SCNC: 103 MMOL/L (ref 98–107)
CREAT SERPL-MCNC: 0.73 MG/DL (ref 0.51–0.95)
DEPRECATED HCO3 PLAS-SCNC: 26 MMOL/L (ref 22–29)
ERYTHROCYTE [DISTWIDTH] IN BLOOD BY AUTOMATED COUNT: 12.4 % (ref 10–15)
GFR SERPL CREATININE-BSD FRML MDRD: >90 ML/MIN/1.73M2
GLUCOSE SERPL-MCNC: 104 MG/DL (ref 70–99)
HCT VFR BLD AUTO: 32.9 % (ref 35–47)
HGB BLD-MCNC: 10.3 G/DL (ref 11.7–15.7)
MAGNESIUM SERPL-MCNC: 1.6 MG/DL (ref 1.7–2.3)
MCH RBC QN AUTO: 30.1 PG (ref 26.5–33)
MCHC RBC AUTO-ENTMCNC: 31.3 G/DL (ref 31.5–36.5)
MCV RBC AUTO: 96 FL (ref 78–100)
PHOSPHATE SERPL-MCNC: 3.8 MG/DL (ref 2.5–4.5)
PLATELET # BLD AUTO: 245 10E3/UL (ref 150–450)
POTASSIUM SERPL-SCNC: 4.5 MMOL/L (ref 3.4–5.3)
RBC # BLD AUTO: 3.42 10E6/UL (ref 3.8–5.2)
SODIUM SERPL-SCNC: 138 MMOL/L (ref 136–145)
WBC # BLD AUTO: 10.8 10E3/UL (ref 4–11)

## 2023-01-10 PROCEDURE — 83735 ASSAY OF MAGNESIUM: CPT | Performed by: SURGERY

## 2023-01-10 PROCEDURE — 250N000013 HC RX MED GY IP 250 OP 250 PS 637: Performed by: PHYSICIAN ASSISTANT

## 2023-01-10 PROCEDURE — 36415 COLL VENOUS BLD VENIPUNCTURE: CPT | Performed by: SURGERY

## 2023-01-10 PROCEDURE — C9113 INJ PANTOPRAZOLE SODIUM, VIA: HCPCS | Performed by: SURGERY

## 2023-01-10 PROCEDURE — 120N000001 HC R&B MED SURG/OB

## 2023-01-10 PROCEDURE — 250N000013 HC RX MED GY IP 250 OP 250 PS 637: Performed by: SURGERY

## 2023-01-10 PROCEDURE — 85014 HEMATOCRIT: CPT | Performed by: SURGERY

## 2023-01-10 PROCEDURE — 84100 ASSAY OF PHOSPHORUS: CPT | Performed by: SURGERY

## 2023-01-10 PROCEDURE — 250N000011 HC RX IP 250 OP 636: Performed by: SURGERY

## 2023-01-10 PROCEDURE — 250N000011 HC RX IP 250 OP 636: Performed by: NURSE PRACTITIONER

## 2023-01-10 PROCEDURE — 80048 BASIC METABOLIC PNL TOTAL CA: CPT | Performed by: SURGERY

## 2023-01-10 PROCEDURE — 250N000013 HC RX MED GY IP 250 OP 250 PS 637: Performed by: NURSE PRACTITIONER

## 2023-01-10 PROCEDURE — 99255 IP/OBS CONSLTJ NEW/EST HI 80: CPT | Performed by: NURSE PRACTITIONER

## 2023-01-10 RX ORDER — METHOCARBAMOL 500 MG/1
500 TABLET, FILM COATED ORAL EVERY 6 HOURS
Status: DISCONTINUED | OUTPATIENT
Start: 2023-01-10 | End: 2023-01-14 | Stop reason: HOSPADM

## 2023-01-10 RX ORDER — HYDROMORPHONE HCL IN WATER/PF 6 MG/30 ML
0.2 PATIENT CONTROLLED ANALGESIA SYRINGE INTRAVENOUS EVERY 4 HOURS PRN
Status: DISCONTINUED | OUTPATIENT
Start: 2023-01-10 | End: 2023-01-11

## 2023-01-10 RX ORDER — HYDROMORPHONE HCL IN WATER/PF 6 MG/30 ML
0.2 PATIENT CONTROLLED ANALGESIA SYRINGE INTRAVENOUS
Status: DISCONTINUED | OUTPATIENT
Start: 2023-01-10 | End: 2023-01-10

## 2023-01-10 RX ORDER — ACETAMINOPHEN 325 MG/1
975 TABLET ORAL EVERY 6 HOURS
Status: DISCONTINUED | OUTPATIENT
Start: 2023-01-10 | End: 2023-01-12

## 2023-01-10 RX ORDER — HYDROMORPHONE HCL IN WATER/PF 6 MG/30 ML
0.4 PATIENT CONTROLLED ANALGESIA SYRINGE INTRAVENOUS
Status: DISCONTINUED | OUTPATIENT
Start: 2023-01-10 | End: 2023-01-10

## 2023-01-10 RX ORDER — OXYCODONE HYDROCHLORIDE 5 MG/1
10 TABLET ORAL
Status: DISCONTINUED | OUTPATIENT
Start: 2023-01-10 | End: 2023-01-12

## 2023-01-10 RX ADMIN — HYDROMORPHONE HYDROCHLORIDE 0.5 MG: 1 INJECTION, SOLUTION INTRAMUSCULAR; INTRAVENOUS; SUBCUTANEOUS at 00:19

## 2023-01-10 RX ADMIN — HYDROMORPHONE HYDROCHLORIDE 0.5 MG: 1 INJECTION, SOLUTION INTRAMUSCULAR; INTRAVENOUS; SUBCUTANEOUS at 12:51

## 2023-01-10 RX ADMIN — ALPRAZOLAM 2 MG: 0.5 TABLET ORAL at 08:48

## 2023-01-10 RX ADMIN — UMECLIDINIUM 1 PUFF: 62.5 AEROSOL, POWDER ORAL at 08:37

## 2023-01-10 RX ADMIN — HYDROMORPHONE HYDROCHLORIDE 0.2 MG: 0.2 INJECTION, SOLUTION INTRAMUSCULAR; INTRAVENOUS; SUBCUTANEOUS at 17:52

## 2023-01-10 RX ADMIN — ALPRAZOLAM 2 MG: 0.5 TABLET ORAL at 18:03

## 2023-01-10 RX ADMIN — MONTELUKAST 10 MG: 10 TABLET, FILM COATED ORAL at 20:08

## 2023-01-10 RX ADMIN — ACETAMINOPHEN 975 MG: 325 TABLET ORAL at 08:41

## 2023-01-10 RX ADMIN — HYDROMORPHONE HYDROCHLORIDE 0.5 MG: 1 INJECTION, SOLUTION INTRAMUSCULAR; INTRAVENOUS; SUBCUTANEOUS at 08:48

## 2023-01-10 RX ADMIN — HYDROMORPHONE HYDROCHLORIDE 0.2 MG: 0.2 INJECTION, SOLUTION INTRAMUSCULAR; INTRAVENOUS; SUBCUTANEOUS at 22:09

## 2023-01-10 RX ADMIN — OXYCODONE HYDROCHLORIDE 10 MG: 5 TABLET ORAL at 20:08

## 2023-01-10 RX ADMIN — OXYCODONE HYDROCHLORIDE 10 MG: 5 TABLET ORAL at 06:11

## 2023-01-10 RX ADMIN — ENOXAPARIN SODIUM 40 MG: 40 INJECTION SUBCUTANEOUS at 08:40

## 2023-01-10 RX ADMIN — GABAPENTIN 300 MG: 300 CAPSULE ORAL at 20:08

## 2023-01-10 RX ADMIN — OXYCODONE HYDROCHLORIDE 10 MG: 5 TABLET ORAL at 14:16

## 2023-01-10 RX ADMIN — GABAPENTIN 300 MG: 300 CAPSULE ORAL at 14:16

## 2023-01-10 RX ADMIN — ACETAMINOPHEN 975 MG: 325 TABLET ORAL at 21:23

## 2023-01-10 RX ADMIN — METHOCARBAMOL 500 MG: 500 TABLET, FILM COATED ORAL at 17:52

## 2023-01-10 RX ADMIN — SENNOSIDES AND DOCUSATE SODIUM 1 TABLET: 8.6; 5 TABLET ORAL at 20:08

## 2023-01-10 RX ADMIN — SENNOSIDES AND DOCUSATE SODIUM 1 TABLET: 8.6; 5 TABLET ORAL at 08:41

## 2023-01-10 RX ADMIN — ACETAMINOPHEN 975 MG: 325 TABLET ORAL at 15:58

## 2023-01-10 RX ADMIN — ACETAMINOPHEN 975 MG: 325 TABLET ORAL at 00:16

## 2023-01-10 RX ADMIN — OXYCODONE HYDROCHLORIDE 10 MG: 5 TABLET ORAL at 02:22

## 2023-01-10 RX ADMIN — OXYCODONE HYDROCHLORIDE 10 MG: 5 TABLET ORAL at 10:18

## 2023-01-10 RX ADMIN — GABAPENTIN 300 MG: 300 CAPSULE ORAL at 08:41

## 2023-01-10 RX ADMIN — PANTOPRAZOLE SODIUM 40 MG: 40 INJECTION, POWDER, FOR SOLUTION INTRAVENOUS at 08:40

## 2023-01-10 RX ADMIN — POLYETHYLENE GLYCOL 3350 17 G: 17 POWDER, FOR SOLUTION ORAL at 08:39

## 2023-01-10 ASSESSMENT — ACTIVITIES OF DAILY LIVING (ADL)
ADLS_ACUITY_SCORE: 23
ADLS_ACUITY_SCORE: 21
ADLS_ACUITY_SCORE: 21
ADLS_ACUITY_SCORE: 23
ADLS_ACUITY_SCORE: 23
ADLS_ACUITY_SCORE: 21
ADLS_ACUITY_SCORE: 21
ADLS_ACUITY_SCORE: 23
ADLS_ACUITY_SCORE: 21
ADLS_ACUITY_SCORE: 23

## 2023-01-10 NOTE — PROGRESS NOTES
"General Surgery Progress Note    No acute events overnight. Sleepy this morning. Says she is feeling \"icky.\" Hard to tell if this is simply the pain that she has been having or something else. She tolerated diet yesterday. Hasn't had anything this morning. No significant bowel function. Has not really been out of bed except to the bathroom.     BP (!) 88/51 (BP Location: Right arm)   Pulse 91   Temp 98.3  F (36.8  C) (Oral)   Resp 16   Ht 1.753 m (5' 9\")   Wt 112.1 kg (247 lb 3.2 oz)   LMP 05/01/2022   SpO2 97%   BMI 36.51 kg/m      Laying in bed in NAD. Sleepy  Non-labored breathing with facemask  Tachycardic to 90s   Abdomen is soft and non-distended. Tender around incisions. Left side seems a bit better less tender this morning    I/O last 3 completed shifts:  In: 920 [P.O.:920]  Out: -     47 year old woman POD 4 s/p segmental colectomy for colon cancer. Final path showing T2N0 cancer. Difficult to tell how she is doing. Continues to have some trouble with pain that is limiting her mobility. Her exam is fairly reassuring but she does subjectively say she is feeling worse today. Afebrile but continues to be tachycardic.     - Continue current pain regimen. Seems to be helping limit her IV usage.   - Will check labs. If concerning will plan for CT  - Encourage ambulation. I have ordered PT since she has stairs at home and has not really even been able to walk the halls.   - Continue regular diet  - Lovenox. Will need to go home with this. Provide teaching  - Pulmonary toilet    Kris Charles MD  General Surgeon  [facility]  [facility] - [address]  [address]  [place] [address]?  Office: [phone]    "

## 2023-01-10 NOTE — SIGNIFICANT EVENT
"This writer initially entered pts room at approximately 2045. During this initial encounter this writer witness a minor in the room. The minor was placing the pt oxymask back on the pts face. Assessment was complete to the most that the pt would allow me. This writer left room. Re-enter room at 2100 and informed the pt that [facility] has visiting hours that ended at 830pm. Pt became upset with this writer for making this statement in front of the minor as well as stating \"I no longer want you as my nurse and would like to speak to the charge nurse\" but the pt requested this writer gives her the medication before leaving. The patient continued to state that the previous nursing supervisors and MD's have okayed for her minor son to stay the night.     Charge and ANS were updated on the situation. ANS went to talk with the pt and informed the pt that visiting policy does not allow minors to stay the night, but she has the approval for an adult to stay. The pt then proceeded to become verbally aggressive and using profanity with the ANS. The ANS stated to the pt that we will give you an appropriate amount of time to make arrangements for the minor to be picked up by an adult or CPS would be contacted. The pt continued to use a raised tone of voice with profanity and threatening language. ANS exited room.     Charge RN went back to pts room after about 15-20 minutes to reassess the situation.. At this time the pt informed the Charge RN that the pt has contacted someone to come  the minor. Again at this time the pt stated \" I do not want the other lady or nurse back in my room\".     Minor was picked up in an appropriate amount of time. Care was handed over to THALIA Tovar for the last 30 minutes of her shift.   "

## 2023-01-10 NOTE — PROGRESS NOTES
On afternoon rounds I reviewed her chart and was concerned about low blood pressure and need for oxygen.    Laboratory test this morning were okay and no concerns for infectious or complications postsurgically.    I consulted pain as she is requiring too high of doses of medications and she is now suffering the consequences including escalation of pain, drowsiness, risk of falling, low blood pressure and low oxygen.      I also discussed the patient that we need to stop using IV narcotics.  Patient states that she was told that she can have them today.  I did decrease the amount to 0.2 for moderate pain and 0.4 for severe but overall I discussed that she needs to be off of IV Dilaudid or IV narcotic tomorrow.  Patient agreed with plan to be off of IV narcotics tomorrow.    Briefly discussed with her that it is a good practice to assume that everyone has their best intentions for caring for her.  She states that she is still mad and that she tripped in the room and felt ignored and not cared for.      I appreciate pain team seeing her.     Héctor JORDAN  [facility]  [address] [address]  Phone- [phone]  Fax- [phone]

## 2023-01-10 NOTE — PROGRESS NOTES
"Pt used call light to call staff in room at ~1130 saying that she had fallen on the floor. When staff arrived to this room, patient was walking around bed, making bed, swearing about how 'nobody makes your f**ing bed and expects you to lie in f**ing sh** all night'. When asked if she had fallen she stated she 'tripped' but did not fall.  Pt continued to say how she 'quit using the call light because nobody answers'.  Pt continued to swear at staff, this writer replied 'if you are going to keep swearing at us, we will leave and come back later'. Pt responded \"fine, f**ing leave\".  Staff stepped out, seeing patient was steady on her feet and uninjured.    Pt used call light about 10 minutes later, when this writer answered in person, pt asked when she got meds next. This writer said 2pm. Pt raised voice and said 'I have to wait until 2 ford pain meds, I should be able to have them between the regular ones\" (meaning, prn doses brought without asking or assessing between the scheduled doses). Pt again raised voice, perseverating on making bed,nobody answering call lights, \"I'm not wearing my oxygen\" (she had removed it) \"or this thing, and I shouldn't be expected to have to take care of it\" (her pulse oximeter, which she had also removed and was lying on the floor on the opposite side of the room)  etc. This writer said I would be back in about 10 minutes with her prn medication, as I had to see another patient who had already called.      When this writer returned to room, patient affect was quiet, very calm, had sluggish verbal responses and sluggish movement, dropping items off bedside table with uncoordinated movements.      Charge nurse and unit supervisor were made aware of these interactions.   "

## 2023-01-10 NOTE — PLAN OF CARE
VSS, pt makes frequent requests for pain medications, rates pain 9/10 when found to be up and ambulating in room. Not able to give a pain goal, perseverates on items in room, etc.   See this writer's prior note regarding behavior this shift.

## 2023-01-10 NOTE — PLAN OF CARE
Problem: Pain Acute  Goal: Optimal Pain Control and Function  Intervention: Prevent or Manage Pain  Recent Flowsheet Documentation  Taken 1/10/2023 0200 by Patricia Mancera, RN  Medication Review/Management: medications reviewed   Goal Outcome Evaluation:         PRN dilaudid and scheduled oxycodone given for abdominal pain. Pt rates pain 7/10 came down to a 5. Pt has been pretty drowsy sleeping inbetween cares. Up to the bathroom with A-1. Pt has been tachycardic. No behaviors noted this shift.  Patricia Mancera, RN

## 2023-01-10 NOTE — PLAN OF CARE
Problem: Pain Acute  Goal: Optimal Pain Control and Function  Outcome: Progressing    Problem: Nausea and Vomiting  Goal: Nausea and Vomiting Relief  Outcome: Progressing    Pt started out very agitated, non compliant and rude. As the day continued pt became more calm and compliant. The biggest issue is pain control. Pt is now on 10 mg scheduled Oxycodone with break through IV Dilaudid 0.5 mg. Pt also has PRN Xanax if needed. Dr. Charles was just here and had a long talk with pt. He was going to try some Fentanyl but was unable to get it ordered. Talked with pt and it was discussed to continue current regimen and if pain not well controlled will involve pain team tomorrow.

## 2023-01-10 NOTE — CONSULTS
"[facility] ACUTE PAIN SERVICE CONSULTATION     Date of Admission:  1/6/2023  Date of Consult (When I saw the patient): 01/10/23  Physician requesting consult: Héctor Polanco PA-C   Reason for consult: significant surgical pain  Primary Care Physician: Robbie Bailon MD     Assessment/Plan:     Darlene Hudson is a 47 year old female who was admitted on 1/6/2023.  Pain team was asked to see the patient for post-op pain. Having trouble with pain control after transitioning off the PCA (1/6-8). Admitted for colonoscopy and subsequent sigmoid colectomy for colonic mass. History of asthma, cocaine and alcohol abuse, tobacco use disorder, chronic low back pain, anxiety, depression, panic disorder, and arthralgia of both lower legs. At time of visit patient appears sedated, eyelids over pupil line, RASS -2. Reports pain is 2-3 at rest and 7-8/10 with movement. Reports IV Dilaudid is the only thing that seems to help, she states she does not \"like\" oral Dilaudid, Oxycodone somewhat effective. Pain is located in abdomen and is sharp, stabbing. The patient denies nausea, vomiting, constipation, diarrhea, chest pain, shortness of breath. The patient does smoke and has chemical dependency history.     Post op day: 4 Days Post-Op (segmental colectomy for colon cancer)    Opioid Induced Respiratory Depression Risk Assessment:?  High - Asthma, Obesity, Smoker, >2 opioid therapies, concomitant CNS depressants, post surgical ?     PLAN:   1) Pain is consistent with post op pain. Patient currently sedated, hypotension noted, RASS -2. Xanax given 1/10/23 at 0848, Dilaudid given at 1251, Oxycodone given at 1018, Gabapentin scheduled.  Patient high risk for opioid induced respiratory depression as above. Recommend PO medications and minimize IV, maximize adjuvants, recommend to decrease dose and frequency and to taper off IV meds. Utilize caution with opioids and benzodiazepines. Labs and imaging indicated: I have personally " reviewed pertinent labs, tests, and radiologic imaging in patient's chart. Treatment plan includes multimodal pain approach, Surgery team, post op cares. Patient educated regarding multimodal pain approach, medications as listed below. Patient is understanding of the plan. All questions and concerns addressed to patient's satisfaction.   2)Multimodal Medication Therapy  Topical: none  NSAID'S: CrCl 124.5 mL/min.  Toradol 1/7-1/8: patient does not feel this is helpful per surgery.   Steroids: none  Muscle Relaxants: Robaxin 500mg prn - change to scheduled   Adjuvants: APAP 975mg q8h + 650mg q4h prn - discontinue prn and change to q6h, gabapentin increased from 100mg to 300mg tid on 1/9 by surgery    Antidepressants/anxiolytics: PTA alprazolam 2mg tid prn   Opioids: oral Dilaudid switched to oxycodone 10mg po q4h by surgery yesterday - discontinue scheduled and change to q3hprn due to sedation during visit   IV Pain medication: Dilaudid 0.2-0.4mg iv q2hprn- change to q4h prn - minimize and use PO first    3)Non-medication interventions: ice   4)Constipation Prophylaxis: Miralax daily, Senokot-s 1-2 tabs bid   5) Care Teams: PT, surgery     -Opioid prescriber has been PCP.   -MN  pulled from system on 1/10/23:   -Filled 12/30/2022, Oxycodone Hcl (Ir) 5 Mg Tablet #24 for 6 days  -Filled 12/28/2022, Alprazolam 2 Mg Tablet #90 for 30 days  -Filled 12/28/2022, Hydrocodone-Acetamin 5-325 Mg #12 for 3 days   -Filled 12/26/2022, Oxycodone Hcl (Ir) 5 Mg Tablet #8 for 2 days     Discharge Recommendations - We recommend prescribing the following at the time of discharge: scripts per surgery     History of Present Illness (HPI):       Darlene Hudson is a 47 year old female who was found to have a left-sided colon mass on a work-up for abdominal pain.  Her staging imaging was negative for metastatic disease.  Her colonoscopy was consistent with adenocarcinoma. Presented for  Laparoscopic assisted transverse and descending  segmental colectomy.  Past medical history as above. The pain is reported to be acute, chronic, located in the abdomen. Current pain is rated at 3-8/10 and goal is 2/10.      Per MN  review, the patient does have an opioid tolerance. Opioid induced side effects noted and include: sedation.      Reviewed medical record, labs, imaging, ED note, and care everywhere.     Past pain treatments have included: APAP, Hydrocodone-Acetaminophen, Oxycodone, Axanax, Gabapentin    Home pain medications/psych medications/anticoagulation medications include: Alprazolam 2mg tid prn, ibuprofen prn, oxycodone 10mg bid prn     Medical History   PAST MEDICAL HISTORY:   Past Medical History:   Diagnosis Date     Abdominal pain 06/29/2015     Abnormal cervical Papanicolaou smear 11/09/2014    Overview:  ACUS/HPV positive     Abnormal cytology finding 11/09/2014    Overview:  ACUS/HPV positive     Acute pericardial effusion 02/06/2017     Agoraphobia with panic attacks      Anxiety      Arthralgia of both lower legs 05/29/2020    Bilateral achy knee pain that is chronic in nature going on for years. Most likely osteoarthritis in knees related to obesity. Previously injected in both knees with good relief. 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  Has controlled substance agreement with them.  On Vicodin, Valium, Klonopin prescribed only from there.        Coronary artery disease      Cough 02/09/2020     Family history of colon cancer 10/24/2020     Hx of seasonal allergies       Infection due to 2019 novel coronavirus 09/20/2022     Infectious pericarditis      Lipoma      Low back pain 07/13/2015     Major depressive disorder, recurrent episode, moderate (H) 09/05/2006     Menorrhagia with irregular cycle 07/15/2022    Added automatically from request for surgery [id number]     Moderate persistent asthma without complication 09/29/2020     Nondependent alcohol abuse, episodic drinking behavior 10/03/2012     Noninflammatory disorder of vagina 02/20/2015     Other chronic pain      Other long term (current) drug therapy 01/28/2013    Overview:  Vicodin and cyclobenzaprine monthly     Panic disorder with agoraphobia 09/05/2006     Pap smear for cervical cancer screening 10/31/2016    03/29/2010  Normal cytology, HPV ot done, repeat in 3 years.     Pericarditis 2017     Physiologic disturbance of temperature regulation 10/24/2020     PONV (postoperative nausea and vomiting)      Right ankle swelling 06/07/2022    Added automatically from request for surgery [id number]     Tobacco abuse      Tobacco use disorder 07/13/2015       PAST SURGICAL HISTORY:   Past Surgical History:   Procedure Laterality Date     ANKLE SURGERY Right 05/30/2019     BIOPSY BREAST Right 1990    benign     COLONOSCOPY N/A 1/3/2023    Procedure: COLONOSCOPY WITH BIOPSY OF COLON MASS, POLYPECTOMY;  Surgeon: Kris Charles MD;  Location: [facility] OR     CREATION PERICARDIAL WINDOW  02/10/2017    [facility]     DILATION AND CURETTAGE N/A 7/22/2022    Procedure: DILATION AND CURETTAGE, UTERUS;  Surgeon: Lesly Holland;  Location: [facility] OR     HEMORRHOIDECTOMY EXTERNAL       HYSTEROSCOPY, WITH ENDOMETRIAL RADIOFREQUENCY ABLATION - NOVASURE N/A 7/22/2022    Procedure: HYSTEROSCOPY, WITH ENDOMETRIAL RADIOFREQUENCY ABLATION - NOVASURE DILATION AND CURETTAGE, UTERUS;  Surgeon: Lesly Holland;  Location: [facility] OR     LAPAROSCOPIC ASSISTED SIGMOID COLECTOMY N/A 1/6/2023    Procedure: LAPAROSCOPIC  "ASSISTED DESCENDING COLELCTOMY SPLENIC FLEXURE MOBILIZATION ;  Surgeon: Kris Charles MD;  Location: [facility] OR     LAPAROSCOPIC LYSIS ADHESIONS N/A 2023    Procedure: LYSIS OF ADHESIONS;  Surgeon: Kris Charles MD;  Location: [facility] OR     TUMOR REMOVAL      Has had 3. In the right breast and \"inside of rib cage.\"       FAMILY HISTORY:   Family History   Problem Relation Age of Onset     Hypertension Mother      Cancer Mother         cervical      Other Cancer Father         lung cancer     Asthma Father      Diabetes Brother      Diabetes Brother      Breast Cancer Maternal Grandmother      Asthma Child        SOCIAL HISTORY:   Social History     Tobacco Use     Smoking status: Light Smoker     Packs/day: 0.10     Years: 30.00     Pack years: 3.00     Types: Cigarettes     Last attempt to quit: 2020     Years since quittin.6     Smokeless tobacco: Never     Tobacco comments:     2-3 cig/day   Substance Use Topics     Alcohol use: Not Currently     Comment: Occasiaonlly.         HEALTH & LIFESTYLE PRACTICES  Tobacco:  reports that she has been smoking cigarettes. She has a 3.00 pack-year smoking history. She has never used smokeless tobacco.  Alcohol:  reports that she does not currently use alcohol.  Illicit drugs:  reports that she does not currently use drugs.    Allergies  Allergies   Allergen Reactions     Lidocaine Other (See Comments)     \"my jaw stopped moving\"  Other reaction(s): Dystonia     Penicillins Hives, Rash and Shortness Of Breath     Epinephrine-Lidocaine-Na Metabisulfite Other (See Comments) and Muscle Pain (Myalgia)     Severe jaw cramping, double vision  Jaw locking       Problem List  Patient Active Problem List    Diagnosis Date Noted     Colon cancer (H) 2023     Priority: Medium     Colonic mass 2022     Priority: Medium     Added automatically from request for surgery [id number]       Infection due to 2019 novel coronavirus " 09/20/2022     Priority: Medium     Menorrhagia with irregular cycle 07/15/2022     Priority: Medium     Added automatically from request for surgery [id number]       Right ankle swelling 06/07/2022     Priority: Medium     Added automatically from request for surgery [id number]       Physiologic disturbance of temperature regulation 10/24/2020     Priority: Medium     Family history of colon cancer 10/24/2020     Priority: Medium     Moderate persistent asthma without complication 09/29/2020     Priority: Medium     Arthralgia of both lower legs 05/29/2020     Priority: Medium     Bilateral achy knee pain that is chronic in nature going on for years. Most likely osteoarthritis in knees related to obesity. Previously injected in both knees with good relief. 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       Low back pain 07/13/2015     Priority: Medium     Tobacco use disorder 07/13/2015     Priority: Medium     Controlled substance agreement signed 06/30/2015     Priority: Medium     Overview:   Patient has chronic pain and is seen at [facility] for this.  Has controlled substance agreement with them.  On Vicodin, Valium, Klonopin prescribed only from there.         Abdominal pain 06/29/2015     Priority: Medium     Noninflammatory disorder of vagina 02/20/2015     Priority: Medium     Anxiety 02/20/2015     Priority: Medium     Abnormal cytology finding 11/09/2014     Priority: Medium     Overview:   ACUS/HPV positive       Abnormal cervical Papanicolaou smear 11/09/2014     Priority: Medium     Overview:    ACUS/HPV positive       Chronic sinusitis 10/26/2014     Priority: Medium     Other long term (current) drug therapy 01/28/2013     Priority: Medium     Overview:   Vicodin and cyclobenzaprine monthly       Nondependent alcohol abuse, episodic drinking behavior 10/03/2012     Priority: Medium     Major depressive disorder, recurrent episode, moderate (H) 09/05/2006     Priority: Medium     Panic disorder with agoraphobia 09/05/2006     Priority: Medium       Prior to Admission Medications   Medications Prior to Admission   Medication Sig Dispense Refill Last Dose     ALPRAZolam (XANAX) 2 MG tablet Take 1 tablet (2 mg) by mouth 3 times daily as needed for anxiety 90 tablet 0  at prn     cetirizine (ZYRTEC) 10 MG tablet Take 1 tablet (10 mg) by mouth daily (Patient taking differently: Take 10 mg by mouth daily as needed) 30 tablet 11 Past Week     fluticasone (FLONASE) 50 MCG/ACT nasal spray Spray 2 sprays into both nostrils daily (Patient taking differently: Spray 2 sprays into both nostrils daily as needed) 9.9 mL 11 Past Week     ibuprofen (ADVIL/MOTRIN) 600 MG tablet Take 1 tablet (600 mg) by mouth 3 times daily as needed for moderate pain 90 tablet 4 Past Week     montelukast (SINGULAIR) 10 MG tablet Take 1 tablet (10 mg) by mouth At Bedtime 30 tablet 11 1/5/2023     naloxone (NARCAN) 4 MG/0.1ML nasal spray Spray 1 spray (4 mg) into one nostril alternating nostrils as needed for opioid reversal every 2-3 minutes until assistance arrives 0.2 mL 1  at prn     ondansetron (ZOFRAN ODT) 4 MG ODT tab Take 1 tablet (4 mg) by mouth every 8 hours as needed for nausea 10 tablet 1 Past Week     PROAIR  (90 Base) MCG/ACT inhaler Inhale 2 puffs into the lungs every 4 hours as needed for shortness of breath / dyspnea or wheezing 8 g 11      prochlorperazine (COMPAZINE) 10 MG tablet Take 1 tablet (10 mg) by mouth every 6 hours as needed for nausea or vomiting 24 tablet 0 Past Week     tiotropium (SPIRIVA RESPIMAT) 1.25  "MCG/ACT inhaler Inhale 2 puffs into the lungs daily 12 g 3 1/5/2023     spacer (OPTICHAMBER BINA) holding chamber For use w/ rescue inhaler 1 each 0        Review of Systems  Complete ROS reviewed, unless noted in HPI, all other systems reviewed (with patient) and all others found to be negative.      Objective:     Physical Exam:  BP (!) 88/51 (BP Location: Right arm)   Pulse 91   Temp 98.3  F (36.8  C) (Oral)   Resp 16   Ht 1.753 m (5' 9\")   Wt 112.1 kg (247 lb 3.2 oz)   LMP 05/01/2022   SpO2 97%   BMI 36.51 kg/m    Weight:   Vitals:    01/06/23 0540 01/06/23 1404   Weight: 110.2 kg (243 lb) 112.1 kg (247 lb 3.2 oz)      Body mass index is 36.51 kg/m .    General Appearance:  Sedated, no distress   Head:  Normocephalic, without obvious abnormality, atraumatic   Eyes:  PERRL, conjunctiva/corneas clear, EOM's intact   ENT/Throat: Lips, mucosa, and tongue normal; teeth and gums normal   Lymph/Neck: Supple, symmetrical, trachea midline   Lungs:   Clear to auscultation bilaterally, respirations unlabored, room air   Chest Wall:  No tenderness or deformity   Cardiovascular/Heart:  Regular rate and rhythm, S1, S2 normal,no murmur, rub or gallop.    Abdomen:   Soft, RLQ tender, incision CDI and ALICIA   Musculoskeletal: Extremities normal, atraumatic   Skin: Skin color, texture, turgor normall   Neurologic: JASMYNE -2, Moves all 4 extremities     Psych: Affect is sedated      Imaging: Reviewed I have personally reviewed pertinent labs, tests, and radiologic imaging in patient's chart.  CT Abdomen Pelvis w Contrast    Addendum Date: 12/25/2022    Addendum: First impression should read \"1. Proximal descending colonic wall thickening and adjacent lymph nodes compatible with colonic neoplasm. GASTROENTEROLOGY consultation recommended.\"    Result Date: 12/25/2022  EXAM: CT ABDOMEN PELVIS W CONTRAST LOCATION: [facility] DATE/TIME: 12/25/2022 7:38 PM INDICATION: right flank pain COMPARISON: None. " TECHNIQUE: CT scan of the abdomen and pelvis was performed following injection of IV contrast. Multiplanar reformats were obtained. Dose reduction techniques were used. CONTRAST: isovue 370  100ml FINDINGS: LOWER CHEST: Mild basilar atelectasis. Small hiatal hernia. HEPATOBILIARY: No biliary dilatation PANCREAS: Pancreas divisum SPLEEN: Unremarkable ADRENAL GLANDS: Unremarkable KIDNEYS/BLADDER: Small left renal cyst for which no additional follow-up is recommended. BOWEL: Normal caliber appendix. Uncomplicated diverticulosis. Short segment annular wall thickening with adjacent small but rounded pericolonic lymph nodes consistent with primary colonic neoplasm in the proximal descending colon.. LYMPH NODES: Nonspecific dural adenopathy including 12 mm short axis node image 57 series 2. VASCULATURE: No abdominal aortic aneurysm. PELVIC ORGANS: No free fluid MUSCULOSKELETAL: No acute bony abnormalities. Small fat-containing umbilical hernia. Left lower back subcutaneous nodule, 7 mm, image 104, nonspecific.     IMPRESSION: 1.  Proximal descending colonic wall thickening and adjacent lymph nodes compatible with colonic neoplasm. Urology consultation recommended. 2.  Nonspecific periportal adenopathy. Impression was called to Dr. Jade by Dr. Jonny Benitez on 12/25/2022 7:52 PM.     CT Chest Pulmonary Embolism w Contrast    Result Date: 12/25/2022  EXAM: CT CHEST PULMONARY EMBOLISM W CONTRAST LOCATION: [facility] DATE/TIME: 12/25/2022 10:23 PM INDICATION: right flank / chest pain colon mass elevated D Dimer COMPARISON: PE chest CT 09/19/2022 TECHNIQUE: CT chest pulmonary angiogram during arterial phase injection of IV contrast. Multiplanar reformats and MIP reconstructions were performed. Dose reduction techniques were used. CONTRAST: ISOVUE 370 100ML FINDINGS: ANGIOGRAM CHEST: Pulmonary arteries are normal caliber and negative for pulmonary emboli. Thoracic aorta is negative for  dissection. No CT evidence of right heart strain. LUNGS AND PLEURA: Mild centrilobular emphysematous changes. No focal pulmonary consolidation or effusion. MEDIASTINUM/AXILLAE: Normal. CORONARY ARTERY CALCIFICATION: None. UPPER ABDOMEN: Small left renal cyst requiring no further follow-up. Excreted contrast within the renal collecting systems. Left colon mass suspicious for neoplasm reidentified. Redmonstration of periportal enlarged lymph nodes. MUSCULOSKELETAL: Normal.     IMPRESSION: 1.  No evidence of pulmonary embolus or other acute process in the chest.      Labs: Reviewed I have personally reviewed pertinent labs, tests, and radiologic imaging in patient's chart.  Recent Results (from the past 24 hour(s))   Basic metabolic panel    Collection Time: 01/10/23  8:38 AM   Result Value Ref Range    Sodium 138 136 - 145 mmol/L    Potassium 4.5 3.4 - 5.3 mmol/L    Chloride 103 98 - 107 mmol/L    Carbon Dioxide (CO2) 26 22 - 29 mmol/L    Anion Gap 9 7 - 15 mmol/L    Urea Nitrogen 8.8 6.0 - 20.0 mg/dL    Creatinine 0.73 0.51 - 0.95 mg/dL    Calcium 9.0 8.6 - 10.0 mg/dL    Glucose 104 (H) 70 - 99 mg/dL    GFR Estimate >90 >60 mL/min/1.73m2   Magnesium    Collection Time: 01/10/23  8:38 AM   Result Value Ref Range    Magnesium 1.6 (L) 1.7 - 2.3 mg/dL   Phosphorus    Collection Time: 01/10/23  8:38 AM   Result Value Ref Range    Phosphorus 3.8 2.5 - 4.5 mg/dL   CBC with platelets    Collection Time: 01/10/23  9:01 AM   Result Value Ref Range    WBC Count 10.8 4.0 - 11.0 10e3/uL    RBC Count 3.42 (L) 3.80 - 5.20 10e6/uL    Hemoglobin 10.3 (L) 11.7 - 15.7 g/dL    Hematocrit 32.9 (L) 35.0 - 47.0 %    MCV 96 78 - 100 fL    MCH 30.1 26.5 - 33.0 pg    MCHC 31.3 (L) 31.5 - 36.5 g/dL    RDW 12.4 10.0 - 15.0 %    Platelet Count 245 150 - 450 10e3/uL       Total time spent 80 minutes with greater than 50% in consultation, education and coordination of care.     Also discussed with RN, pharmacist.     Please see A&P for additional  details of medical decision making.    Elements of Medical Decision Making as described above. High level of decision making required due to 1 or more chronic illness with severe exacerbation, progression, and side effects of treatment.  Acute or chronic illness or injury or surgery. Illness that poses a threat to life or bodily function. High risk therapy including opioids, high risk drug therapy including oral and/or parenteral controlled substances    Thank you for this consultation.    ANA PAULA PennyP-C  Acute Care Pain Management Program  [facility] ([place], [place], [place])  Monday-Friday 8a-4p   Page via online paging system or call [phone]

## 2023-01-11 PROCEDURE — 120N000001 HC R&B MED SURG/OB

## 2023-01-11 PROCEDURE — C9113 INJ PANTOPRAZOLE SODIUM, VIA: HCPCS | Performed by: SURGERY

## 2023-01-11 PROCEDURE — 250N000011 HC RX IP 250 OP 636: Performed by: SURGERY

## 2023-01-11 PROCEDURE — 250N000013 HC RX MED GY IP 250 OP 250 PS 637: Performed by: SURGERY

## 2023-01-11 PROCEDURE — 250N000011 HC RX IP 250 OP 636: Performed by: NURSE PRACTITIONER

## 2023-01-11 PROCEDURE — 250N000013 HC RX MED GY IP 250 OP 250 PS 637: Performed by: NURSE PRACTITIONER

## 2023-01-11 PROCEDURE — 99233 SBSQ HOSP IP/OBS HIGH 50: CPT | Performed by: NURSE PRACTITIONER

## 2023-01-11 PROCEDURE — 999N000033 HC STATISTIC CHRONIC PULMONARY DISEASE SPECIALIST

## 2023-01-11 RX ORDER — HYDROMORPHONE HCL IN WATER/PF 6 MG/30 ML
0.2 PATIENT CONTROLLED ANALGESIA SYRINGE INTRAVENOUS EVERY 4 HOURS PRN
Status: COMPLETED | OUTPATIENT
Start: 2023-01-11 | End: 2023-01-11

## 2023-01-11 RX ORDER — HYDROMORPHONE HCL IN WATER/PF 6 MG/30 ML
0.2 PATIENT CONTROLLED ANALGESIA SYRINGE INTRAVENOUS EVERY 4 HOURS PRN
Status: DISCONTINUED | OUTPATIENT
Start: 2023-01-11 | End: 2023-01-11

## 2023-01-11 RX ADMIN — GABAPENTIN 300 MG: 300 CAPSULE ORAL at 11:13

## 2023-01-11 RX ADMIN — PANTOPRAZOLE SODIUM 40 MG: 40 INJECTION, POWDER, FOR SOLUTION INTRAVENOUS at 08:30

## 2023-01-11 RX ADMIN — ALPRAZOLAM 2 MG: 0.5 TABLET ORAL at 21:13

## 2023-01-11 RX ADMIN — SENNOSIDES AND DOCUSATE SODIUM 1 TABLET: 8.6; 5 TABLET ORAL at 21:14

## 2023-01-11 RX ADMIN — HYDROMORPHONE HYDROCHLORIDE 0.2 MG: 0.2 INJECTION, SOLUTION INTRAMUSCULAR; INTRAVENOUS; SUBCUTANEOUS at 13:42

## 2023-01-11 RX ADMIN — OXYCODONE HYDROCHLORIDE 10 MG: 5 TABLET ORAL at 00:14

## 2023-01-11 RX ADMIN — ACETAMINOPHEN 975 MG: 325 TABLET ORAL at 21:14

## 2023-01-11 RX ADMIN — ACETAMINOPHEN 975 MG: 325 TABLET ORAL at 16:19

## 2023-01-11 RX ADMIN — OXYCODONE HYDROCHLORIDE 10 MG: 5 TABLET ORAL at 16:20

## 2023-01-11 RX ADMIN — METHOCARBAMOL 500 MG: 500 TABLET, FILM COATED ORAL at 11:13

## 2023-01-11 RX ADMIN — UMECLIDINIUM 1 PUFF: 62.5 AEROSOL, POWDER ORAL at 08:36

## 2023-01-11 RX ADMIN — ENOXAPARIN SODIUM 40 MG: 40 INJECTION SUBCUTANEOUS at 08:30

## 2023-01-11 RX ADMIN — METHOCARBAMOL 500 MG: 500 TABLET, FILM COATED ORAL at 18:28

## 2023-01-11 RX ADMIN — POLYETHYLENE GLYCOL 3350 17 G: 17 POWDER, FOR SOLUTION ORAL at 08:35

## 2023-01-11 RX ADMIN — GABAPENTIN 300 MG: 300 CAPSULE ORAL at 21:15

## 2023-01-11 RX ADMIN — MONTELUKAST 10 MG: 10 TABLET, FILM COATED ORAL at 21:15

## 2023-01-11 RX ADMIN — ALBUTEROL SULFATE 2 PUFF: 90 AEROSOL, METERED RESPIRATORY (INHALATION) at 03:54

## 2023-01-11 RX ADMIN — METHOCARBAMOL 500 MG: 500 TABLET, FILM COATED ORAL at 00:14

## 2023-01-11 RX ADMIN — HYDROMORPHONE HYDROCHLORIDE 0.2 MG: 0.2 INJECTION, SOLUTION INTRAMUSCULAR; INTRAVENOUS; SUBCUTANEOUS at 03:46

## 2023-01-11 RX ADMIN — HYDROMORPHONE HYDROCHLORIDE 0.2 MG: 0.2 INJECTION, SOLUTION INTRAMUSCULAR; INTRAVENOUS; SUBCUTANEOUS at 18:33

## 2023-01-11 RX ADMIN — ALPRAZOLAM 2 MG: 0.5 TABLET ORAL at 16:20

## 2023-01-11 RX ADMIN — HYDROMORPHONE HYDROCHLORIDE 0.2 MG: 0.2 INJECTION, SOLUTION INTRAMUSCULAR; INTRAVENOUS; SUBCUTANEOUS at 22:59

## 2023-01-11 RX ADMIN — ACETAMINOPHEN 975 MG: 325 TABLET ORAL at 03:47

## 2023-01-11 RX ADMIN — OXYCODONE HYDROCHLORIDE 10 MG: 5 TABLET ORAL at 05:50

## 2023-01-11 RX ADMIN — HYDROMORPHONE HYDROCHLORIDE 0.2 MG: 0.2 INJECTION, SOLUTION INTRAMUSCULAR; INTRAVENOUS; SUBCUTANEOUS at 08:39

## 2023-01-11 RX ADMIN — ACETAMINOPHEN 975 MG: 325 TABLET ORAL at 11:13

## 2023-01-11 RX ADMIN — ALPRAZOLAM 2 MG: 0.5 TABLET ORAL at 06:53

## 2023-01-11 RX ADMIN — OXYCODONE HYDROCHLORIDE 10 MG: 5 TABLET ORAL at 11:12

## 2023-01-11 RX ADMIN — GABAPENTIN 300 MG: 300 CAPSULE ORAL at 13:42

## 2023-01-11 RX ADMIN — METHOCARBAMOL 500 MG: 500 TABLET, FILM COATED ORAL at 05:50

## 2023-01-11 RX ADMIN — OXYCODONE HYDROCHLORIDE 10 MG: 5 TABLET ORAL at 21:14

## 2023-01-11 ASSESSMENT — ACTIVITIES OF DAILY LIVING (ADL)
ADLS_ACUITY_SCORE: 26
ADLS_ACUITY_SCORE: 22
ADLS_ACUITY_SCORE: 26
ADLS_ACUITY_SCORE: 23
ADLS_ACUITY_SCORE: 26
ADLS_ACUITY_SCORE: 22
ADLS_ACUITY_SCORE: 26
ADLS_ACUITY_SCORE: 22
ADLS_ACUITY_SCORE: 23
ADLS_ACUITY_SCORE: 23

## 2023-01-11 NOTE — CONSULTS
Tobacco Treatment Consult  1/11/2023, 4:32 PM    Patient admitted on: 1/6/2023   Patient admitted for: Colonic mass [K63.89]  Colon cancer (H) [C18.9]   Patient seen on: 1/11/2023    Karoline declined tobacco treatment counseling at this time and tobacco cessation resource materials at this time. She states that she is done smoking but that she does not feel she needs any assistance or materials.    Total 1 minutes spent in smoking cessation, and 15 minutes spent in chart review, care coordination, and documentation.    Bonnie Huber, RT, Chronic Pulmonary Disease Specialist & Certified Tobacco Treatment Specialist  Phone [phone]

## 2023-01-11 NOTE — PROGRESS NOTES
[facility] ACUTE PAIN SERVICE    Daily PAIN Progress Note    Assessment/Plan:  Darlene Hudson is a 47 year old female who was admitted on 1/6/2023.  Pain team was asked to see the patient for post-op pain. Having trouble with pain control after transitioning off the PCA post op. Admitted for colonoscopy and subsequent sigmoid colectomy for colonic mass. History of asthma, cocaine and alcohol abuse, tobacco use disorder, chronic low back pain, anxiety, depression, panic disorder, and arthralgia of both lower legs. The patient does smoke and has chemical dependency history.      Post op day: 5 Days Post-Op (segmental colectomy for colon cancer)     Opioid Induced Respiratory Depression Risk Assessment:?  High - Asthma, Obesity, Smoker, >2 opioid therapies, concomitant CNS depressants, post surgical   PLAN:   1) Pain is consistent with post op pain. Yesterday 1/10/23  patient was sedated, hypotension noted, needing NC O2, RASS -2. Today patient is alert, still requiring O2 intermittently. Patient remains high risk for opioid induced respiratory depression as shown above. Recommend PO medications and minimize IV, maximize adjuvants, recommend to stop IV today. Utilize caution with opioids and benzodiazepines. Patient is high risk for unintentional overdose given history cocaine and ETOH abuse and concurrent use of benzodiazepines and opioids. Labs and imaging indicated: I have personally reviewed pertinent labs, tests, and radiologic imaging in patient's chart. Treatment plan includes multimodal pain approach, Surgery team, post op cares. Patient educated regarding multimodal pain approach, medications as listed below. Patient is understanding of the plan. All questions and concerns addressed to patient's satisfaction.   2)Multimodal Medication Therapy  Topical: patient declines   NSAID'S: CrCl 124.5 mL/min.  Toradol given 1/7-1/8, could consider again or PO NSAID if ok with surgery   Steroids: none  Muscle  Relaxants: Robaxin 500mg scheduled   Adjuvants: APAP 975mg q6h, gabapentin increased from 100mg to 300mg tid on 1/9 by surgery    Antidepressants/anxiolytics: PTA alprazolam 2mg tid prn   Opioids: Oxycodone 10 q3h prn   IV Pain medication: Dilaudid 0.2 q4h prn - wean off today and stop today after 3 doses.   3)Non-medication interventions: ice, integrative therapy    4)Constipation Prophylaxis: Miralax daily, Senokot-s 1-2 tabs bid   5) Care Teams: PT, surgery      -Opioid prescriber has been PCP.   -[place]  pulled from system on 1/10/23:   -Filled 12/30/2022, Oxycodone Hcl (Ir) 5 Mg Tablet #24 for 6 days  -Filled 12/28/2022, Alprazolam 2 Mg Tablet #90 for 30 days  -Filled 12/28/2022, Hydrocodone-Acetamin 5-325 Mg #12 for 3 days   -Filled 12/26/2022, Oxycodone Hcl (Ir) 5 Mg Tablet #8 for 2 days      Discharge Recommendations - We recommend prescribing the following at the time of discharge: scripts per surgery, small supply Oxycodone x3days, APAP, Gabapentin. Recommend Intranasal naloxone at discharge given high risk for unintentional overdose in the setting of opioids and concurrent use of benzodiazepine and history of cocaine and alcohol abuse.     Subjective:  No acute events overnight, notes reviewed. Reports ongoing constant low abdomen pain described as constant punching and rates this 7-8/10. She continues to request IV pain medications stating this is the only thing helping. Discussed that IV medications at this point are not recommended and we will need to transition to PO medications. She is in agreement to stop IV pain medications at 1900 today, will order 3x doses and stop IV medications at the end of today. She is interested in integrative therapies, will consult. She denies N/V, chest pain, shortness of breath.     Principal Problem:    Colon cancer (H)      Objective:  Vital signs in last 24 hours:  /55 (BP Location: Right arm)   Pulse 88   Temp 98.1  F (36.7  C) (Oral)   Resp 17   Ht 1.753 m  "(5' 9\")   Wt 112.1 kg (247 lb 3.2 oz)   LMP 05/01/2022   SpO2 91%   BMI 36.51 kg/m    Weight:   Vitals:    01/06/23 0540 01/06/23 1404   Weight: 110.2 kg (243 lb) 112.1 kg (247 lb 3.2 oz)      Weight change:   Body mass index is 36.51 kg/m .    Intake/Output last 3 shifts:  I/O last 3 completed shifts:  In: 740 [P.O.:740]  Out: -   Intake/Output this shift:  I/O this shift:  In: 240 [P.O.:240]  Out: -     Review of Systems:   As per subjective, all others negative.    Physical Exam:  General Appearance:  Alert, cooperative, no distress,appears comfortable resting in bed    Head:  Normocephalic, without obvious abnormality, atraumatic   Eyes:  PERRL, conjunctiva/corneas clear, EOM's intact   Nose: Nares normal, septum midline   Throat: Lips, mucosa, and tongue normal; teeth and gums normal   Neck: Supple, symmetrical, trachea midline   Back:   Symmetric, no curvature, ROM normal   Lungs:   Clear to auscultation bilaterally, respirations unlabored   Chest Wall:  No tenderness or deformity   Heart:  Regular rate and rhythm, S1, S2 normal    Abdomen:   Soft, tender around incision sites, incisions are clean.    Extremities: Extremities normal, atraumatic, no cyanosis or edema   Skin: Skin color, texture, turgor normal, no rashes or lesions   Neurologic: Alert and oriented X 3, Moves all 4 extremities     Imaging: Reviewed I have personally reviewed pertinent labs, tests, and radiologic imaging in patient's chart.      Labs: Reviewed I have personally reviewed pertinent labs, tests, and radiologic imaging in patient's chart.  Recent Results (from the past 24 hour(s))   CBC with platelets    Collection Time: 01/10/23  9:01 AM   Result Value Ref Range    WBC Count 10.8 4.0 - 11.0 10e3/uL    RBC Count 3.42 (L) 3.80 - 5.20 10e6/uL    Hemoglobin 10.3 (L) 11.7 - 15.7 g/dL    Hematocrit 32.9 (L) 35.0 - 47.0 %    MCV 96 78 - 100 fL    MCH 30.1 26.5 - 33.0 pg    MCHC 31.3 (L) 31.5 - 36.5 g/dL    RDW 12.4 10.0 - 15.0 %    " Platelet Count 245 150 - 450 10e3/uL         Total time spent 40 minutes with greater than 50% in consultation, education and coordination of care.     Also discussed with RN.     Please see A&P for additional details of medical decision making.    Elements of Medical Decision Making as described above. High level of decision making required due to 1 or more chronic illness with severe exacerbation, progression, and side effects of treatment.  Acute or chronic illness or injury or surgery. Illness that poses a threat to life or bodily function. High risk therapy including opioids, high risk drug therapy including oral and/or parenteral controlled substances.       ANA PAULA PennyP-C  Acute Care Pain Management Program  [facility] ([place], [place], [place])  Monday-Friday 8a-4p   Page via online paging system or call [phone]

## 2023-01-11 NOTE — PROGRESS NOTES
"General Surgery Progress Note    No acute events overnight. Continues to complain of pain but seems to be a bit better. No BM or gas. She is urinating frequently. She continues to have difficulty getting up out of bed. Intermittently on O2.     /55 (BP Location: Right arm)   Pulse 88   Temp 98.1  F (36.7  C) (Oral)   Resp 17   Ht 1.753 m (5' 9\")   Wt 112.1 kg (247 lb 3.2 oz)   LMP 05/01/2022   SpO2 93%   BMI 36.51 kg/m      Laying in bed in NAD. Looks more alert and comfortable to me today  HR in high 90s  Non-labored breathing on RA  Abdomen is soft and non-distended. Remains tender around incisions  Incisions clean and intact.    I/O last 3 completed shifts:  In: 740 [P.O.:740]  Out: -     Labs yesterday unremarkable.     47 year old woman POD 5 after segmental colectomy for colon cancer. Making progress.     - Wean off IV pain medication today  - Continue other pain regimen including oxycodone, tylenol, robaxin, and gabapentin  - Home xanax  - Ambulation. I think this is the most crucial thing for the patient at this point. PT to assist with getting out of bed.   - Lovenox  - Regular diet  - Wean off O2    Kris Charles MD  General Surgeon  [facility]  Surgery Clinic - [address]  [address]  [place] [address]?  Office: [phone]    "

## 2023-01-11 NOTE — PLAN OF CARE
Problem: Pain Acute  Goal: Optimal Pain Control and Function  Outcome: Progressing     Problem: Pain Chronic (Persistent) (Comorbidity Management)  Goal: Acceptable Pain Control and Functional Ability  Outcome: Progressing     Problem: Plan of Care - These are the overarching goals to be used throughout the patient stay.    Goal: Readiness for Transition of Care  Outcome: Progressing   Goal Outcome Evaluation:10/10 pain reported. Patient received PRN 0.5mg dilaudid x2.  Scheduled  tylenol and Xanax was also given. Patient up to bathroom with 1 assist but c/o pain the entire time. VSS. Patient has 2L on oxygen mask, refused breakfast and lunch.

## 2023-01-11 NOTE — PLAN OF CARE
"  Problem: Pain Acute  Goal: Optimal Pain Control and Function  Outcome: Not Progressing  Intervention: Develop Pain Management Plan  Recent Flowsheet Documentation  Taken 1/10/2023 2008 by Valencia Colmenares RN  Pain Management Interventions: medication (see MAR)  Taken 1/10/2023 1558 by Valencia Colmenares RN  Pain Management Interventions: medication (see MAR)  Intervention: Prevent or Manage Pain  Recent Flowsheet Documentation  Taken 1/10/2023 1653 by Valencia Colmenares RN  Medication Review/Management: medications reviewed     Problem: Behavioral Health Comorbidity  Goal: Maintenance of Behavioral Health Symptom Control  Outcome: Progressing  Intervention: Maintain Behavioral Health Symptom Control  Recent Flowsheet Documentation  Taken 1/10/2023 1653 by Valencia Colmenares RN  Medication Review/Management: medications reviewed   Goal Outcome Evaluation:       Patient made frequent request for prn meds, had dilaudid at 1752, oxycodone at 2008 and dilaudid at 2209, and zanax prn for anxiety, writer overhead patient telling her primary MD that staff has been treating her like \"shirt\" over here, remains on 02@2l, via oxymask, has been taking oxygen off at times and needs reminders to put it back on, no active behaviors this shift, pulse tachy, other vitals stable.                   "

## 2023-01-11 NOTE — PLAN OF CARE
Problem: Obstructive Sleep Apnea Risk or Actual Comorbidity Management  Goal: Unobstructed Breathing During Sleep  Outcome: Progressing     Problem: Pain Chronic (Persistent) (Comorbidity Management)  Goal: Acceptable Pain Control and Functional Ability  Outcome: Progressing     Problem: Plan of Care - These are the overarching goals to be used throughout the patient stay.    Goal: Optimal Comfort and Wellbeing  Intervention: Monitor Pain and Promote Comfort  Recent Flowsheet Documentation  Taken 1/11/2023 0014 by Latasha Nava RN  Pain Management Interventions: medication (see MAR)     Problem: Pain Acute  Goal: Optimal Pain Control and Function  Outcome: Progressing   Goal Outcome Evaluation:  Patient rated pain high at start of shift. 10/10, drowsy at the time and not sure what time it was. Writer told her the current time. She called family member on her phone to confirm. Prn oxycodone and scheduled robaxin given, also received iv dilaudid and xanax x1. When giving pain meds, wants to know when she can have the rest. Is on 2L oxygen through oxymask. Continuous pulse ox on. Sats in the low 90s and low 80s when oxygen is off. Assist of 1 with a walker.

## 2023-01-12 ENCOUNTER — APPOINTMENT (OUTPATIENT)
Dept: RADIOLOGY | Facility: HOSPITAL | Age: 48
End: 2023-01-12
Attending: SURGERY
Payer: MEDICAID

## 2023-01-12 ENCOUNTER — TELEPHONE (OUTPATIENT)
Dept: FAMILY MEDICINE | Facility: CLINIC | Age: 48
End: 2023-01-12

## 2023-01-12 PROCEDURE — 250N000013 HC RX MED GY IP 250 OP 250 PS 637: Performed by: NURSE PRACTITIONER

## 2023-01-12 PROCEDURE — 250N000013 HC RX MED GY IP 250 OP 250 PS 637: Performed by: SURGERY

## 2023-01-12 PROCEDURE — 74018 RADEX ABDOMEN 1 VIEW: CPT

## 2023-01-12 PROCEDURE — 250N000013 HC RX MED GY IP 250 OP 250 PS 637: Performed by: STUDENT IN AN ORGANIZED HEALTH CARE EDUCATION/TRAINING PROGRAM

## 2023-01-12 PROCEDURE — 120N000001 HC R&B MED SURG/OB

## 2023-01-12 PROCEDURE — 99233 SBSQ HOSP IP/OBS HIGH 50: CPT | Performed by: NURSE PRACTITIONER

## 2023-01-12 PROCEDURE — 250N000011 HC RX IP 250 OP 636: Performed by: SURGERY

## 2023-01-12 RX ORDER — OXYCODONE AND ACETAMINOPHEN 10; 325 MG/1; MG/1
1 TABLET ORAL
Status: DISCONTINUED | OUTPATIENT
Start: 2023-01-12 | End: 2023-01-12

## 2023-01-12 RX ORDER — ACETAMINOPHEN 325 MG/1
325 TABLET ORAL
Status: DISCONTINUED | OUTPATIENT
Start: 2023-01-12 | End: 2023-01-14 | Stop reason: HOSPADM

## 2023-01-12 RX ORDER — IBUPROFEN 600 MG/1
600 TABLET, FILM COATED ORAL EVERY 6 HOURS
Status: DISCONTINUED | OUTPATIENT
Start: 2023-01-12 | End: 2023-01-14 | Stop reason: HOSPADM

## 2023-01-12 RX ORDER — OXYCODONE HYDROCHLORIDE 5 MG/1
10 TABLET ORAL
Status: DISCONTINUED | OUTPATIENT
Start: 2023-01-12 | End: 2023-01-14 | Stop reason: HOSPADM

## 2023-01-12 RX ORDER — PANTOPRAZOLE SODIUM 40 MG/1
40 TABLET, DELAYED RELEASE ORAL DAILY
Status: DISCONTINUED | OUTPATIENT
Start: 2023-01-12 | End: 2023-01-14 | Stop reason: HOSPADM

## 2023-01-12 RX ORDER — OXYCODONE AND ACETAMINOPHEN 10; 325 MG/1; MG/1
1 TABLET ORAL
Status: DISCONTINUED | OUTPATIENT
Start: 2023-01-12 | End: 2023-01-12 | Stop reason: RX

## 2023-01-12 RX ORDER — OXYCODONE HYDROCHLORIDE 5 MG/1
5-10 TABLET ORAL
Status: DISCONTINUED | OUTPATIENT
Start: 2023-01-12 | End: 2023-01-12

## 2023-01-12 RX ORDER — OXYCODONE HYDROCHLORIDE 5 MG/1
10 TABLET ORAL ONCE
Status: COMPLETED | OUTPATIENT
Start: 2023-01-12 | End: 2023-01-12

## 2023-01-12 RX ADMIN — METHOCARBAMOL 500 MG: 500 TABLET, FILM COATED ORAL at 00:20

## 2023-01-12 RX ADMIN — ACETAMINOPHEN 975 MG: 325 TABLET ORAL at 03:49

## 2023-01-12 RX ADMIN — METHOCARBAMOL 500 MG: 500 TABLET, FILM COATED ORAL at 21:09

## 2023-01-12 RX ADMIN — SENNOSIDES AND DOCUSATE SODIUM 2 TABLET: 8.6; 5 TABLET ORAL at 08:48

## 2023-01-12 RX ADMIN — ALPRAZOLAM 2 MG: 0.5 TABLET ORAL at 11:25

## 2023-01-12 RX ADMIN — IBUPROFEN 600 MG: 600 TABLET, FILM COATED ORAL at 16:28

## 2023-01-12 RX ADMIN — OXYCODONE HYDROCHLORIDE 10 MG: 5 TABLET ORAL at 08:47

## 2023-01-12 RX ADMIN — OXYCODONE HYDROCHLORIDE 10 MG: 5 TABLET ORAL at 02:29

## 2023-01-12 RX ADMIN — ACETAMINOPHEN 975 MG: 325 TABLET ORAL at 11:24

## 2023-01-12 RX ADMIN — METHOCARBAMOL 500 MG: 500 TABLET, FILM COATED ORAL at 15:05

## 2023-01-12 RX ADMIN — UMECLIDINIUM 1 PUFF: 62.5 AEROSOL, POWDER ORAL at 08:50

## 2023-01-12 RX ADMIN — SENNOSIDES AND DOCUSATE SODIUM 1 TABLET: 8.6; 5 TABLET ORAL at 21:10

## 2023-01-12 RX ADMIN — POLYETHYLENE GLYCOL 3350 17 G: 17 POWDER, FOR SOLUTION ORAL at 08:51

## 2023-01-12 RX ADMIN — METHOCARBAMOL 500 MG: 500 TABLET, FILM COATED ORAL at 08:46

## 2023-01-12 RX ADMIN — GABAPENTIN 300 MG: 300 CAPSULE ORAL at 08:48

## 2023-01-12 RX ADMIN — MONTELUKAST 10 MG: 10 TABLET, FILM COATED ORAL at 21:11

## 2023-01-12 RX ADMIN — OXYCODONE HYDROCHLORIDE 10 MG: 5 TABLET ORAL at 03:48

## 2023-01-12 RX ADMIN — ENOXAPARIN SODIUM 40 MG: 40 INJECTION SUBCUTANEOUS at 08:56

## 2023-01-12 RX ADMIN — OXYCODONE HYDROCHLORIDE 10 MG: 5 TABLET ORAL at 21:17

## 2023-01-12 RX ADMIN — GABAPENTIN 300 MG: 300 CAPSULE ORAL at 15:06

## 2023-01-12 RX ADMIN — IBUPROFEN 600 MG: 600 TABLET, FILM COATED ORAL at 21:09

## 2023-01-12 RX ADMIN — OXYCODONE HYDROCHLORIDE 10 MG: 5 TABLET ORAL at 16:28

## 2023-01-12 RX ADMIN — ACETAMINOPHEN 325 MG: 325 TABLET ORAL at 16:28

## 2023-01-12 RX ADMIN — PANTOPRAZOLE SODIUM 40 MG: 40 TABLET, DELAYED RELEASE ORAL at 08:47

## 2023-01-12 RX ADMIN — OXYCODONE HYDROCHLORIDE 10 MG: 5 TABLET ORAL at 13:16

## 2023-01-12 ASSESSMENT — ACTIVITIES OF DAILY LIVING (ADL)
ADLS_ACUITY_SCORE: 26

## 2023-01-12 NOTE — PLAN OF CARE
Problem: Pain Acute  Goal: Optimal Pain Control and Function  Outcome: Not Progressing  Intervention: Prevent or Manage Pain  Recent Flowsheet Documentation  Taken 1/11/2023 1900 by Valencia Colmenares RN  Medication Review/Management: medications reviewed   Goal Outcome Evaluation:                      Patient has been complaining of abdominal pain, at two times rated pain at 20, had oxycodone at 1620 and 2114 and dilaudid at 1833, zanax  for anxiety prn x2, remains on 02@2l, no acute respiratory distress, has been taking 02 off at times and needed and received reminders to put it back on, alert and oriented, no behaviors.

## 2023-01-12 NOTE — PROGRESS NOTES
I made a brief visit with patient this morning to check on her and see how she is doing.  She continues to have low blood pressure and a low oxygen.  Patient continues to have severe pain and continues to be angry and lashing out at the staff was swearing.  Patient tells me that she is just suffering and thinks that we all believe she is a druggie.  I was very firm and stated that we do not believe this as we believe that she has a significant history of narcotic use and her brain does not function the same.  Her pain modality cannot be high doses of narcotics and needs to be off of IV narcotics.  We need to approach this with a different modality and unfortunately patient is not open for this discussion.  I discussed with her that pain management is in charge of her pain medications at this time and they will be seeing her this morning.  Unfortunately patient not in a state of mind this morning to have this reality discussion and is more ruminating on having more pain medications to the detriment of her health.    I let the patient know the pain management team will be seeing her and that Dr. Florez see her in the afternoon.    Appreciate pain management team and appreciate all staff that have been trying to support care for this patient.    Héctor JORDAN  [facility] General Surgery & Bariatric Care  [address] [address]  Phone- [phone]  Fax- [phone]

## 2023-01-12 NOTE — TELEPHONE ENCOUNTER
[facility] phone call message- general phone call:    Reason for call: Patient called stating she would like to speak to  as it's an emergency, was kind of hard to understand patient as it sounds like her mouth is stuffed and words slurring, patient states she's just in so much pain and was informed that if she is in that much pain she would need to go to transitional care but then informed she wouldn't go because she is too young, patient is so confused and states  normally manages her pain and is her primary and feels like where she is at they have no idea what they are doing and would like a call back urgently from  or else she's going to go crazy. Please call and advise.     Return call needed: Yes    OK to leave a message on voice mail? Yes    Primary language: English      needed? No    Call taken on January 12, 2023 at 8:32 AM by Lauren Hdez

## 2023-01-12 NOTE — PLAN OF CARE
"  Problem: Violence Risk or Actual  Goal: Anger and Impulse Control  1/12/2023 0628 by Jessica Florence RN  Outcome: Progressing     Problem: Pain Acute  Goal: Optimal Pain Control and Function  Outcome: Progressing        Goal Outcome Evaluation: Vss. Oral Oxycodone x2 for pain. Contacted house officer as patient reports that we are not treating her pain and wants to speak with \"on-call drCabrera\" Patient angry, swearing, and threatening during shift. Takes pulse oximeter off along with oxygen regularly.                         "

## 2023-01-12 NOTE — SIGNIFICANT EVENT
"Significant Event Note    Time of event: 2:51 AM January 12, 2023    Description of event:  Patient in \"Severe pain\"   See prior notes from other providers -- this call is no different than her prior complaints.     Plan:  1 time dose oral oxycodone.   Day team to follow up.     Discussed with: bedside nurse    Deep Frias MD    "

## 2023-01-12 NOTE — PROGRESS NOTES
"General Surgery Progress Note    No acute events overnight. Continues to have difficulty with pain. Pain is limiting her mobility, IS, and general recovery. She expresses to me that she is feeling depressed. She is drinking and urinating. Denies bowel function. Denies nausea. Not really eating much.     BP (!) 88/54 (BP Location: Right arm)   Pulse 90   Temp 98.5  F (36.9  C) (Oral)   Resp 20   Ht 1.753 m (5' 9\")   Wt 112.1 kg (247 lb 3.2 oz)   LMP 05/01/2022   SpO2 94%   BMI 36.51 kg/m      Laying in bed in NAD. Depressed affect  Pulse ox alarming almost constantly  RRR  Non-labored breathing on RA  Abdomen is soft and minimally distended. She has a bit of tympany in the RLQ. Most tender in the RLQ. Left side is much less tender.     I/O last 3 completed shifts:  In: 260 [P.O.:260]  Out: -     Abdominal film looks relatively normal to me. Perhaps a bit more gas than normal but not really consistent with an ileus.     47 year old woman POD 6 from segmental colectomy for colon cancer that has returned as [id number]. She continues to struggle with uncontrolled pain. Objectively, she seems to be doing fine. He labs and imaging have been normal. She is a little tachycardic at times but not overly concerning. I would like to see her moving better. I think she is becoming more depressed which is significantly impacting her response to the pain that she does have. Additionally, we talked at length about what pain regimens have worked in the past with her other surgeries. She thinks that she was on percocet with breakthrough dilaudid. This would be a bit unusual of a regimen. I will try to move her over to percocet to see if that improves her control.     - Percocet  every 3 hours PRN  - Stop scheduled Tylenol. Will start scheduled ibuprofen in its place. Continue gabapentin and robaxin.   - Stop her pulse ox that does not seem to be actually adding to her care and is mostly just alarming. Will do spot checks " before narcotic administration. I am not too concerned about respiratory depression as we have come down significantly from the amount of narcotics she was taking a few days ago. She is a smoker so I think anything above 85% is ok. She has not felt short of breath really at all in recent days.   - Encourage ambulation. PT  - Regular diet  - Lovenox. Will need this at discharge  - Hoping to discharge in the next 24-48 hours if we can get her in a good spot with pain control.     Kris Charles MD  General Surgeon  [facility]  Surgery [address] [address]?  Office: [phone]

## 2023-01-12 NOTE — PROGRESS NOTES
[facility] ACUTE PAIN SERVICE    Daily PAIN Progress Note    Assessment/Plan:  Darlene Hudson is a 47 year old female who was admitted on 1/6/2023.  Pain team was asked to see the patient for post-op pain. Having trouble with pain control after transitioning off the PCA post op. Admitted for colonoscopy and subsequent sigmoid colectomy for colonic mass. History of asthma, cocaine and alcohol abuse, tobacco use disorder, chronic low back pain, chronic opioid use, anxiety, depression, panic disorder, and arthralgia of both lower legs. The patient does smoke and has chemical dependency history.      Post op day: 6 Days Post-Op (segmental colectomy for colon cancer)     Opioid Induced Respiratory Depression Risk Assessment:?  High - Asthma, Obesity, Smoker, >2 opioid therapies, concomitant CNS depressants, post surgical     PLAN:   1) Pain is consistent with post op pain. Pain management has been complicated by sedation, hypotension noted, needing face mask O2. Also noted patient behaviors to include agitation and swearing at staff. Patient remains high risk for opioid induced respiratory depression as shown above. Recommend stopping IV pain medications and would recommend to minimize opioids as able given high risk, maximize adjuvants, and reccomend taper plan of PO medications as patient is now post op day 6. Utilize caution with opioids and benzodiazepines. Patient is high risk for unintentional overdose given history cocaine and ETOH abuse and concurrent use of benzodiazepines and opioids. Labs and imaging indicated: I have personally reviewed pertinent labs, tests, and radiologic imaging in patient's chart. Treatment plan includes multimodal pain approach, Surgery team, post op cares. Patient educated regarding multimodal pain approach, medications as listed below. Patient is understanding of the plan. All questions and concerns addressed to patient's satisfaction.   2)Multimodal Medication  "Therapy  Topical: patient declines   NSAID'S: CrCl 124.5 mL/min.  Toradol given 1/7-1/8, could consider again or PO NSAID if ok with surgery   Steroids: none  Muscle Relaxants: Robaxin 500mg scheduled   Adjuvants: APAP 975mg q6h, gabapentin increased from 100mg to 300mg tid on 1/9 by surgery    Antidepressants/anxiolytics: PTA alprazolam 2mg tid prn   Opioids: Oxycodone 5-10 q3h prn   IV Pain medication: stop 1/11/23 - do not recommend further.    3)Non-medication interventions: ice, rest, integrative therapy    4)Constipation Prophylaxis: Miralax daily, Senokot-s 1-2 tabs bid   5) Care Teams: PT, surgery      -Opioid prescriber has been PCP.   -[place]  pulled from system on 1/10/23:   -Filled 12/30/2022, Oxycodone Hcl (Ir) 5 Mg Tablet #24 for 6 days  -Filled 12/28/2022, Alprazolam 2 Mg Tablet #90 for 30 days  -Filled 12/28/2022, Hydrocodone-Acetamin 5-325 Mg #12 for 3 days   -Filled 12/26/2022, Oxycodone Hcl (Ir) 5 Mg Tablet #8 for 2 days   -Filled 12/2/22 Oxycodone 5 mg #30 tabs for 20 days   -Filled 12/2/22 Alprazolam 2 Mg Tablet #60 for 30 days  -Filled 11/6/22 Oxycodone 5 mg #30 for 30 days      Discharge Recommendations - We recommend prescribing the following at the time of discharge: scripts per surgery, small supply Oxycodone x2-3 days, APAP, Gabapentin. Recommend Intranasal naloxone at discharge given high risk for unintentional overdose in the setting of opioids and concurrent use of benzodiazepine and history of cocaine and alcohol abuse - has script per med rec, continue at discharge.     Subjective:  Per RN notes a significant event was called overnight for severe pain, patient agitated using profanity language with staff. One time dose Oxycodone was ordered.     Patient seen at bedside this AM. Patient reports severe abdomen pain that feels \"like a ball in my stomach and it won't go away\". She reports \"you all aren't doing shit for my pain, you just want me to suffer\". She reports \"these 2 tablets of " "Oxycodone are not enough.\" I discussed with patient that if Oxycodone is not effective an opioid rotation to PO Dilaudid or PO Hydrocodone-Acetaminophen or PO Morphine could be considered, but only PO medications are recommended, no IV medication, and only one type of opioid is recommended. She then reports \"\"I am not even asking for IV and I don't want to take 20 mg Oxycodone\". She then requests that she remain on Oxycodone and \"I do not want any changes made to my medications unless it comes from Dr Charles or Dr Bailon (PCP).\"  I did review with patient my concerns for her high risk of opioid induced respiratory depression and unintentional overdose with opioids, concurrent use of benzodiazepines, hypotension, sedation throughout her hospital stay, and need for face mask O2 to keep oxygen levels above 90%. 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  She requests at this point I leave the room and requests she does not want Pain Management involved in her care any further.     Principal Problem:    Colon cancer (H)      Objective:  Vital signs in last 24 hours:  BP (!) 88/54 (BP Location: Right arm)   Pulse 90   Temp 98.5  F (36.9  C) (Oral)   Resp 20   Ht 1.753 m (5' 9\")   Wt 112.1 kg (247 lb 3.2 oz)   LMP 05/01/2022   SpO2 94%   BMI 36.51 kg/m    Weight:     Vitals:    01/06/23 0540 01/06/23 1404   Weight: 110.2 kg (243 lb) 112.1 kg (247 lb 3.2 oz)      Weight change:   Body mass index is 36.51 kg/m .    Intake/Output last 3 shifts:  I/O last 3 completed shifts:  In: 900 [P.O.:900]  Out: -   Intake/Output this shift:  No intake/output data recorded.    Review of Systems:   As per subjective, all others negative.    Physical Exam:  General Appearance:  Alert, no distress, appears comfortable resting in bed when I " enter the room with the lights off, her son who is a minor is in her room sitting in the chair   Head:  Normocephalic, without obvious abnormality   Eyes:  PERRL, conjunctiva/corneas clear   Nose: Nares normal, septum midline   Throat: Lips, mucosa, and tongue normal; teeth and gums normal   Neck: Supple, symmetrical, trachea midline   Back:   Symmetric, no curvature, ROM normal   Lungs:   Clear to auscultation bilaterally, respirations unlabored, face mask O2, pulse ox on   Heart:  Regular rate and rhythm, S1, S2 normal    Abdomen:   Soft, tender around incision sites, incisions are clean and ALICIA, bowel sounds active in all four quadrants   Extremities: Extremities normal   Skin: Skin warm, dry    Neurologic: Alert and oriented X 3, Moves all 4 extremities     Imaging: Reviewed I have personally reviewed pertinent labs, tests, and radiologic imaging in patient's chart.      Labs: Reviewed I have personally reviewed pertinent labs, tests, and radiologic imaging in patient's chart.      Total time spent 45 minutes with greater than 50% in consultation, education and coordination of care.     Also discussed with RN, surgery team paged to update on care    Please see A&P for additional details of medical decision making.    Elements of Medical Decision Making as described above. High level of decision making required due to 1 or more chronic illness with severe exacerbation, progression, and side effects of treatment.  Acute or chronic illness or injury or surgery. Illness that poses a threat to life or bodily function. High risk therapy including opioids, high risk drug therapy including oral and/or parenteral controlled substances.       YEISON Penny  Acute Care Pain Management Program  [facility] ([place], [place], [place])  Monday-Friday 8a-4p   Page via online paging system or call [phone]

## 2023-01-13 ENCOUNTER — TELEPHONE (OUTPATIENT)
Dept: FAMILY MEDICINE | Facility: CLINIC | Age: 48
End: 2023-01-13
Payer: COMMERCIAL

## 2023-01-13 PROCEDURE — 250N000013 HC RX MED GY IP 250 OP 250 PS 637: Performed by: SURGERY

## 2023-01-13 PROCEDURE — 250N000013 HC RX MED GY IP 250 OP 250 PS 637: Performed by: NURSE PRACTITIONER

## 2023-01-13 PROCEDURE — 120N000001 HC R&B MED SURG/OB

## 2023-01-13 PROCEDURE — 250N000011 HC RX IP 250 OP 636: Performed by: SURGERY

## 2023-01-13 RX ORDER — BISACODYL 10 MG
10 SUPPOSITORY, RECTAL RECTAL ONCE
Status: COMPLETED | OUTPATIENT
Start: 2023-01-13 | End: 2023-01-13

## 2023-01-13 RX ORDER — ENOXAPARIN SODIUM 100 MG/ML
40 INJECTION SUBCUTANEOUS EVERY 24 HOURS
Qty: 8.4 ML | Refills: 0 | Status: SHIPPED | OUTPATIENT
Start: 2023-01-14 | End: 2023-02-04

## 2023-01-13 RX ORDER — METHOCARBAMOL 500 MG/1
500 TABLET, FILM COATED ORAL EVERY 6 HOURS
Qty: 60 TABLET | Refills: 0 | Status: SHIPPED | OUTPATIENT
Start: 2023-01-13 | End: 2023-01-28

## 2023-01-13 RX ORDER — OXYCODONE AND ACETAMINOPHEN 5; 325 MG/1; MG/1
1-2 TABLET ORAL EVERY 4 HOURS PRN
Qty: 60 TABLET | Refills: 0 | Status: SHIPPED | OUTPATIENT
Start: 2023-01-13 | End: 2023-01-18

## 2023-01-13 RX ADMIN — IBUPROFEN 600 MG: 600 TABLET, FILM COATED ORAL at 21:51

## 2023-01-13 RX ADMIN — IBUPROFEN 600 MG: 600 TABLET, FILM COATED ORAL at 02:58

## 2023-01-13 RX ADMIN — OXYCODONE HYDROCHLORIDE 10 MG: 5 TABLET ORAL at 11:13

## 2023-01-13 RX ADMIN — ENOXAPARIN SODIUM 40 MG: 40 INJECTION SUBCUTANEOUS at 07:55

## 2023-01-13 RX ADMIN — POLYETHYLENE GLYCOL 3350 17 G: 17 POWDER, FOR SOLUTION ORAL at 09:06

## 2023-01-13 RX ADMIN — METHOCARBAMOL 500 MG: 500 TABLET, FILM COATED ORAL at 21:51

## 2023-01-13 RX ADMIN — IBUPROFEN 600 MG: 600 TABLET, FILM COATED ORAL at 09:07

## 2023-01-13 RX ADMIN — ACETAMINOPHEN 325 MG: 325 TABLET ORAL at 16:12

## 2023-01-13 RX ADMIN — METHOCARBAMOL 500 MG: 500 TABLET, FILM COATED ORAL at 09:07

## 2023-01-13 RX ADMIN — MONTELUKAST 10 MG: 10 TABLET, FILM COATED ORAL at 21:51

## 2023-01-13 RX ADMIN — OXYCODONE HYDROCHLORIDE 10 MG: 5 TABLET ORAL at 07:54

## 2023-01-13 RX ADMIN — ALPRAZOLAM 2 MG: 0.5 TABLET ORAL at 14:33

## 2023-01-13 RX ADMIN — ACETAMINOPHEN 325 MG: 325 TABLET ORAL at 19:46

## 2023-01-13 RX ADMIN — ALPRAZOLAM 2 MG: 0.5 TABLET ORAL at 20:46

## 2023-01-13 RX ADMIN — UMECLIDINIUM 1 PUFF: 62.5 AEROSOL, POWDER ORAL at 09:14

## 2023-01-13 RX ADMIN — OXYCODONE HYDROCHLORIDE 10 MG: 5 TABLET ORAL at 19:46

## 2023-01-13 RX ADMIN — OXYCODONE HYDROCHLORIDE 10 MG: 5 TABLET ORAL at 01:50

## 2023-01-13 RX ADMIN — IBUPROFEN 600 MG: 600 TABLET, FILM COATED ORAL at 14:28

## 2023-01-13 RX ADMIN — ALPRAZOLAM 2 MG: 0.5 TABLET ORAL at 09:13

## 2023-01-13 RX ADMIN — ACETAMINOPHEN 325 MG: 325 TABLET ORAL at 11:25

## 2023-01-13 RX ADMIN — SENNOSIDES AND DOCUSATE SODIUM 2 TABLET: 8.6; 5 TABLET ORAL at 09:14

## 2023-01-13 RX ADMIN — METHOCARBAMOL 500 MG: 500 TABLET, FILM COATED ORAL at 14:28

## 2023-01-13 RX ADMIN — METHOCARBAMOL 500 MG: 500 TABLET, FILM COATED ORAL at 02:58

## 2023-01-13 RX ADMIN — PANTOPRAZOLE SODIUM 40 MG: 40 TABLET, DELAYED RELEASE ORAL at 09:07

## 2023-01-13 RX ADMIN — OXYCODONE HYDROCHLORIDE 10 MG: 5 TABLET ORAL at 16:12

## 2023-01-13 RX ADMIN — SENNOSIDES AND DOCUSATE SODIUM 2 TABLET: 8.6; 5 TABLET ORAL at 21:51

## 2023-01-13 ASSESSMENT — ACTIVITIES OF DAILY LIVING (ADL)
ADLS_ACUITY_SCORE: 23
ADLS_ACUITY_SCORE: 26
ADLS_ACUITY_SCORE: 23
ADLS_ACUITY_SCORE: 23
ADLS_ACUITY_SCORE: 26
ADLS_ACUITY_SCORE: 23
ADLS_ACUITY_SCORE: 26
ADLS_ACUITY_SCORE: 26

## 2023-01-13 NOTE — PROGRESS NOTES
"Patient very aggitated about medication changes.  Patient states, \"I haven't been able to get up and move with the pain medications the way they are now.  No way I can get up with them being less!\"  Patient calling her primary from her cell phone.    "

## 2023-01-13 NOTE — PROGRESS NOTES
"General Surgery Progress Note    No acute events overnight. Patient is feeling quite a bit better today. She has showered and walked in the halls. She is passing gas. She is much more alert since stopping the gabapentin. She is motivated to work to get out of the hospital.     /67 (BP Location: Right arm)   Pulse 90   Temp 97.8  F (36.6  C) (Oral)   Resp 20   Ht 1.753 m (5' 9\")   Wt 112.1 kg (247 lb 3.2 oz)   LMP 05/01/2022   SpO2 93%   BMI 36.51 kg/m      Sitting up in bed in NAD. Much brighter affect.   RRR  Non-labored breathing on RA  Abdomen is soft and appropriately tender  Incisions are healing well.    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 She is doing much better today.     - Will plan to move to Percocet 5-235 1-2 tabs every 4 hours PRN early this afternoon. Continue methocarbamol and ibuprofen.   - Ambulation  - IS  - Regular diet  - Senna and miralax  - Lovenox. Continue teaching for home  - Discharge as early as this afternoon but more likely tomorrow. Will see how she progresses.     Kris Charles MD  General Surgeon  [facility]  Surgery Clinic - [address]  [address]  [place] [address]?  Office: [phone]    "

## 2023-01-13 NOTE — TELEPHONE ENCOUNTER
[facility] phone call message- general phone call:    Reason for call: Patient called for a callback from pcp. Patient did not state any reasons.  Please call and advise. Thank you.       Return call needed: Yes    OK to leave a message on voice mail? Yes    Primary language: English      needed? No    Call taken on January 13, 2023 at 11:26 AM by Tonio Doll

## 2023-01-13 NOTE — PLAN OF CARE
Problem: Pain Acute  Goal: Optimal Pain Control and Function  Outcome: Progressing  Intervention: Prevent or Manage Pain  Recent Flowsheet Documentation  Taken 1/12/2023 2109 by Gladys Doll, THALIA  Sensory Stimulation Regulation: care clustered     Problem: Violence Risk or Actual  Goal: Anger and Impulse Control  Intervention: Minimize Safety Risk  Recent Flowsheet Documentation  Taken 1/12/2023 2109 by Gladys Doll, THALIA  Sensory Stimulation Regulation: care clustered     Goal Outcome Evaluation: Pt was clam and cooperative. Rate pain 7/10. Pain was manage with oxycodone.

## 2023-01-13 NOTE — PLAN OF CARE
Problem: Pain Acute  Goal: Optimal Pain Control and Function  Outcome: Progressing  Intervention: Develop Pain Management Plan  Recent Flowsheet Documentation  Taken 1/13/2023 0617 by Naina Martinez RN  Pain Management Interventions:   rest   repositioned  Taken 1/13/2023 0258 by Naina Martinez RN  Pain Management Interventions: (scheduled medications) medication (see MAR)  Taken 1/13/2023 0150 by Naina Martinez RN  Pain Management Interventions: medication (see MAR)  Intervention: Prevent or Manage Pain  Recent Flowsheet Documentation  Taken 1/12/2023 2330 by Naina Martinez RN  Sensory Stimulation Regulation:   care clustered   lighting decreased   quiet environment promoted  Sleep/Rest Enhancement:   awakenings minimized   comfort measures   room darkened  Medication Review/Management: medications reviewed  Intervention: Optimize Psychosocial Wellbeing  Recent Flowsheet Documentation  Taken 1/12/2023 2330 by Naina Martinez RN  Supportive Measures: active listening utilized   Goal Outcome Evaluation:       See eMAR for PRN and scheduled medication administration records

## 2023-01-13 NOTE — TELEPHONE ENCOUNTER
Spoke to patient and her surgeon last evening. Will continue to facilitate with her care. Encouraged patient to work with hospital medical team.     Robbie Bailon MD  January 13, 2023  9:07 AM

## 2023-01-13 NOTE — PLAN OF CARE
Problem: Pain Chronic (Persistent) (Comorbidity Management)  Goal: Acceptable Pain Control and Functional Ability  Outcome: Progressing  Intervention: Develop Pain Management Plan  Recent Flowsheet Documentation  Taken 1/12/2023 1628 by Lesly Salmeron RN  Pain Management Interventions: medication (see MAR)  Taken 1/12/2023 1124 by Lesly Salmeron, RN  Pain Management Interventions: medication (see MAR)  Taken 1/12/2023 0847 by Lesly Salmeron, RN  Pain Management Interventions: medication (see MAR)  Intervention: Manage Persistent Pain  Recent Flowsheet Documentation  Taken 1/12/2023 1553 by Lesly Salmeron, RN  Medication Review/Management: medications reviewed  Taken 1/12/2023 0847 by Lesly Salmeron RN  Medication Review/Management: medications reviewed   Goal Outcome Evaluation:         Patient oriented x4. Drowsy at times.  Requesting prn pain meds frequently. Prn oxy 10 mg q 3 h. Prn Xanax given.   Unable to pass gas. No BM yet. Refusing to walk in hallway d/t pain not under controlled per patient report.  Lovenox teaching started.

## 2023-01-13 NOTE — PROGRESS NOTES
2315: Handoff report received from THALIA Graham. Dual skin and safety check completed at bedside. Pt is currently resting in bed and appears to be sleeping. Pt is snoring. No signs of pain/discomfort. No other acute concerns at this time.    2330: Upon initial assessment with VS. O2 sat 87-88% on RA. Placed on 2L oxymask at this time.    0150: PRN oxycodone given at this time- see flowsheets    No other acute changes overnight. Pt VSS on 2L oxymask. Pain managed with PRN medications. Pt overall calm and pleasant overnight. Will report to oncoming THALIA.

## 2023-01-13 NOTE — TELEPHONE ENCOUNTER
Spoke to pt and surgeon regarding her progress and plan. Significant improvement since yesterday.    Robbie Bailon MD  January 13, 2023  1:48 PM

## 2023-01-13 NOTE — CONSULTS
Integrative Therapy Consult    Healing PresenceYes  Essential Oils: Topical (EO/Topical Oil), Inhalation (EO/Topical oil)     Support Healing process blend - HC, Lavender Massage Oil - HC       Healing Music:       Breathwork:       Guided Imagery:       Acupressure: Hands on Li4     Oshibori:       Energy Therapy:       Healing Touch:       Reiki:       Qi Gong:     Massage: Hand, Foot      Targeted Massage:    Sleep Promotion:       Other Therapy:       Intervention Reason: Anxiety/Stress, Pain     Pre and Post Session Scores: Patient Desires Treatment: yes  Pre-Session Anxiety: 4  Post-session Anxiety: 0     Pre-session Pain: 8 Post-session Pain: 0               Delivery:         Referrals:      Eboni Yañez

## 2023-01-14 VITALS
OXYGEN SATURATION: 95 % | SYSTOLIC BLOOD PRESSURE: 102 MMHG | HEIGHT: 69 IN | TEMPERATURE: 97.7 F | HEART RATE: 79 BPM | WEIGHT: 247.2 LBS | RESPIRATION RATE: 16 BRPM | BODY MASS INDEX: 36.61 KG/M2 | DIASTOLIC BLOOD PRESSURE: 63 MMHG

## 2023-01-14 PROCEDURE — 250N000011 HC RX IP 250 OP 636: Performed by: SURGERY

## 2023-01-14 PROCEDURE — 250N000013 HC RX MED GY IP 250 OP 250 PS 637: Performed by: SURGERY

## 2023-01-14 PROCEDURE — 250N000013 HC RX MED GY IP 250 OP 250 PS 637: Performed by: NURSE PRACTITIONER

## 2023-01-14 RX ORDER — IBUPROFEN 600 MG/1
600 TABLET, FILM COATED ORAL EVERY 6 HOURS
Qty: 40 TABLET | Refills: 0 | Status: SHIPPED | OUTPATIENT
Start: 2023-01-14 | End: 2023-01-24

## 2023-01-14 RX ORDER — POLYETHYLENE GLYCOL 3350 17 G/17G
17 POWDER, FOR SOLUTION ORAL 2 TIMES DAILY
Qty: 510 G | Refills: 0 | Status: SHIPPED | OUTPATIENT
Start: 2023-01-14 | End: 2023-01-29

## 2023-01-14 RX ORDER — AMOXICILLIN 250 MG
1 CAPSULE ORAL 2 TIMES DAILY
Qty: 20 TABLET | Refills: 0 | Status: SHIPPED | OUTPATIENT
Start: 2023-01-14 | End: 2023-01-24

## 2023-01-14 RX ADMIN — ALPRAZOLAM 2 MG: 0.5 TABLET ORAL at 05:55

## 2023-01-14 RX ADMIN — IBUPROFEN 600 MG: 600 TABLET, FILM COATED ORAL at 05:54

## 2023-01-14 RX ADMIN — OXYCODONE HYDROCHLORIDE 10 MG: 5 TABLET ORAL at 00:50

## 2023-01-14 RX ADMIN — POLYETHYLENE GLYCOL 3350 17 G: 17 POWDER, FOR SOLUTION ORAL at 08:04

## 2023-01-14 RX ADMIN — ACETAMINOPHEN 325 MG: 325 TABLET ORAL at 00:50

## 2023-01-14 RX ADMIN — ACETAMINOPHEN 325 MG: 325 TABLET ORAL at 11:17

## 2023-01-14 RX ADMIN — UMECLIDINIUM 1 PUFF: 62.5 AEROSOL, POWDER ORAL at 11:17

## 2023-01-14 RX ADMIN — ENOXAPARIN SODIUM 40 MG: 40 INJECTION SUBCUTANEOUS at 07:55

## 2023-01-14 RX ADMIN — PANTOPRAZOLE SODIUM 40 MG: 40 TABLET, DELAYED RELEASE ORAL at 08:04

## 2023-01-14 RX ADMIN — METHOCARBAMOL 500 MG: 500 TABLET, FILM COATED ORAL at 12:32

## 2023-01-14 RX ADMIN — SENNOSIDES AND DOCUSATE SODIUM 2 TABLET: 8.6; 5 TABLET ORAL at 08:04

## 2023-01-14 RX ADMIN — OXYCODONE HYDROCHLORIDE 10 MG: 5 TABLET ORAL at 11:17

## 2023-01-14 RX ADMIN — IBUPROFEN 600 MG: 600 TABLET, FILM COATED ORAL at 12:32

## 2023-01-14 RX ADMIN — ACETAMINOPHEN 325 MG: 325 TABLET ORAL at 07:47

## 2023-01-14 RX ADMIN — OXYCODONE HYDROCHLORIDE 10 MG: 5 TABLET ORAL at 07:47

## 2023-01-14 RX ADMIN — METHOCARBAMOL 500 MG: 500 TABLET, FILM COATED ORAL at 05:54

## 2023-01-14 ASSESSMENT — ACTIVITIES OF DAILY LIVING (ADL)
ADLS_ACUITY_SCORE: 23
ADLS_ACUITY_SCORE: 21
ADLS_ACUITY_SCORE: 20
ADLS_ACUITY_SCORE: 23
ADLS_ACUITY_SCORE: 23
ADLS_ACUITY_SCORE: 21
ADLS_ACUITY_SCORE: 23

## 2023-01-14 NOTE — PROGRESS NOTES
ASSESSMENT:  1. Malignant neoplasm of descending colon (H)        Darlene Hudson is a 47 year old female status post laparoscopic subtotal colectomy with colocolonic anastomosis. 1/6/22 with Dr Charles   Post op pain   Passing gas without bm since surgery  -Overall patient has improved dramatically over the past 2 days after being off of gabapentin.  PLAN:  -Continue Percocet every 4 hours for pain.  Discussed possibly of changing her Xanax to Atarax to see if this helps with pain and anxiety that she has such good relief with Xanax she does not want to change.  I also discussed possibility having physical therapy see her as I would like her to see how she will do going up stairs but she refused and states that she does not feel that she needs it.  Patient still complaining of pain but understanding the importance of healing and going home.  We will give patient 1 more day and needs to discharge tomorrow.  We are not doing anything for the patient and unfortunately there is no reason for her to not to go home tomorrow.  It is reasonable since she has not had a bowel movement but most likely she will have a regular bowel movement at home.    SUBJECTIVE:   She is complaining of pain.  She states that overall she is happy with everything and doing much better.  She states that she is just not feeling well today.  She cannot describe it.  She still having pain in the right lower quadrant.  She is afebrile.  No change in the nature of her pain.  Denies any dysuria, bowel movements and does have flatulence.      Patient Vitals for the past 24 hrs:   BP Temp Temp src Pulse Resp SpO2   01/14/23 0715 90/62 98.3  F (36.8  C) Oral 86 16 92 %   01/14/23 0013 97/55 97.3  F (36.3  C) Oral 99 18 94 %   01/13/23 2145 (!) 88/53 -- -- 96 16 98 %   01/13/23 1529 90/53 97.8  F (36.6  C) Oral 92 18 93 %         PHYSICAL EXAM:  GEN: No acute distress, comfortable and very pleasant  LUNGS: CTA bilaterally  CV:RRR  ABD: Soft her  incisions are clean and dry without any stigma of infection.  Some firmness along the wounds which is normal healing process.  Output by Drain (mL) 01/12/23 0700 - 01/12/23 1459 01/12/23 1500 - 01/12/23 2259 01/12/23 2300 - 01/13/23 0659 01/13/23 0700 - 01/13/23 1459 01/13/23 1500 - 01/13/23 2259 01/13/23 2300 - 01/14/23 0659 01/14/23 0700 - 01/14/23 1039   Patient has no LDAs of requested type attached.      EXT:No cyanosis, edema or obvious abnormalities    No intake/output data recorded.    Admission on 01/06/2023   Component Date Value     CEA 01/06/2023 19.2      ABO/RH(D) 01/06/2023 O POS      Antibody Screen 01/06/2023 Negative      SPECIMEN EXPIRATION DATE 01/06/2023 [id number]      Case Report 01/06/2023                      Value:Surgical Pathology Report                         Case: [id number]                                  Authorizing Provider:  Kris Charles,   Collected:           01/06/2023 10:51 AM                                 MD                                                                           Ordering Location:     [facility]      Received:            01/06/2023 12:47 PM                                 [facility]                                                               Pathologist:           Monae Kwok MD                                                           Specimen:    Large Intestine, Colon, Ascending/Transverse, TRANSVERSE AND DESCENDING COLON               Final Diagnosis 01/06/2023                      Value:This result contains rich text formatting which cannot be displayed here.     Synoptic Checklist 01/06/2023                      Value:COLON AND RECTUM: Resection, Including Transanal Disk Excision of Rectal Neoplasms                            COLON AND RECTUM: RESECTION - All Specimens                            8th Edition - Protocol posted: 6/22/2022                                                        SPECIMEN                                Procedure:    Laparoscopic assisted ascending colectomy and splenic flexure mobilization                                                         TUMOR                               Tumor Site:    Splenic flexure                                Histologic Type:    Adenocarcinoma                                Histologic Grade:    G2, moderately differentiated                                Tumor Size:    Greatest dimension (Centimeters): 4.7 cm                                 Additional Dimension (Centimeters):    4.3 cm                                 Additional Dimension (Centimeters):    1.4 cm                               Tumor Extent:    Invades into muscularis propria                                Macroscopic Tumor Perforation:    Not identified                                Lymphovascular Invasion:    Not identified                                Perineural Invasion:    Not identified                                Type of Polyp in which Invasive Carcinoma Arose:    Tubular adenoma                                Treatment Effect:    No known presurgical therapy                                                         MARGINS                               Margin Status for Invasive Carcinoma:    All margins negative for invasive carcinoma                                  Closest Margin(s) to Invasive Carcinoma:    Radial (circumferential) or mesenteric                                  Distance from Invasive Carcinoma to Closest Margin:    7.6 cm                                 Distance from Invasive Carcinoma to Radial (Circumferential) Margin:    Distance already reported as closest margin                                  Distance from Invasive Carcinoma to Distal Margin:    Not applicable                                Margin Status for Non-Invasive Tumor:    All margins negative for high-grade dysplasia / intramucosal carcinoma and low-grade dysplasia                                                          REGIONAL LYMPH NODES                               Regional Lymph Node Status:                                     :    All regional lymph nodes negative for tumor                                  Number of Lymph Nodes Examined:    48                                Tumor Deposits:    Not identified                                                         PATHOLOGIC STAGE CLASSIFICATION (pTNM, AJCC 8th Edition)                               Reporting of pT, pN, and (when applicable) pM categories is based on information available to the pathologist at the time the report is issued. As per the AJCC (Chapter 1, 8th Ed.) it is the managing physician's responsibility to establish the final pathologic stage based upon all pertinent information, including but potentially not limited to this pathology report.                               pT Category:    pT2                                pN Category:    pN0                                                         ADDITIONAL FINDINGS                               Additional Findings:    Sessile serrated lesions, without dysplasia x2        Clinical Information 01/06/2023                      Value:This result contains rich text formatting which cannot be displayed here.     Gross Description 01/06/2023                      Value:This result contains rich text formatting which cannot be displayed here.     MCRS 01/06/2023 Yes (A)      Performing Labs 01/06/2023                      Value:This result contains rich text formatting which cannot be displayed here.     Creatinine 01/06/2023 0.71      GFR Estimate 01/06/2023 >90      Sodium 01/07/2023 136      Potassium 01/07/2023 4.6      Chloride 01/07/2023 107      Carbon Dioxide (CO2) 01/07/2023 20 (L)      Anion Gap 01/07/2023 9      Urea Nitrogen 01/07/2023 12.4      Creatinine 01/07/2023 0.84      Calcium 01/07/2023 8.9      Glucose 01/07/2023 95      GFR Estimate 01/07/2023 85      WBC Count 01/07/2023 10.2       RBC Count 01/07/2023 3.86      Hemoglobin 01/07/2023 11.6 (L)      Hematocrit 01/07/2023 37.0      MCV 01/07/2023 96      MCH 01/07/2023 30.1      MCHC 01/07/2023 31.4 (L)      RDW 01/07/2023 12.2      Platelet Count 01/07/2023 209      Magnesium 01/07/2023 2.1      Phosphorus 01/07/2023 4.3      WBC Count 01/10/2023 10.8      RBC Count 01/10/2023 3.42 (L)      Hemoglobin 01/10/2023 10.3 (L)      Hematocrit 01/10/2023 32.9 (L)      MCV 01/10/2023 96      MCH 01/10/2023 30.1      MCHC 01/10/2023 31.3 (L)      RDW 01/10/2023 12.4      Platelet Count 01/10/2023 245      Sodium 01/10/2023 138      Potassium 01/10/2023 4.5      Chloride 01/10/2023 103      Carbon Dioxide (CO2) 01/10/2023 26      Anion Gap 01/10/2023 9      Urea Nitrogen 01/10/2023 8.8      Creatinine 01/10/2023 0.73      Calcium 01/10/2023 9.0      Glucose 01/10/2023 104 (H)      GFR Estimate 01/10/2023 >90      Magnesium 01/10/2023 1.6 (L)      Phosphorus 01/10/2023 3.8           SOPHIA RahmanC  [facility]  [address] [place] [address]  Phone- [phone]  Fax- [phone]

## 2023-01-14 NOTE — PLAN OF CARE
Problem: Pain Acute  Goal: Optimal Pain Control and Function  Outcome: Not Progressing   Goal Outcome Evaluation:  Assumed care at 1900, patient requesting pain medication for 8/10 right sided abdominal pain, gave PRN oxy with tylenol at 1945. Patient immediately asking for xanax, writer educated her on needing to wait 1 hour, patient verbalized understanding. Patient hit call light at 2040 requesting xanax, gave at 2045. Patient O2 sats drop to 80s without O2 when moving. Improves with 2L via oxymask, sats = %.   Patient called for writer an hour later states her pain is not controlled and she wants writer to call doctor. Writer discussed with patient to try hs medications, try to lay down to relax. Patient agreeable and followed up patient sleeping comfortably. BP's have been soft, last check 88/53, updated provider. No new orders, continue to monitor.

## 2023-01-14 NOTE — PLAN OF CARE
"  Problem: Violence Risk or Actual  Goal: Anger and Impulse Control  Outcome: Adequate for Care Transition     Problem: Pain Acute  Goal: Optimal Pain Control and Function  Outcome: Adequate for Care Transition     Problem: Nausea and Vomiting  Goal: Nausea and Vomiting Relief  Outcome: Adequate for Care Transition     Problem: Asthma Comorbidity  Goal: Maintenance of Asthma Control  Outcome: Adequate for Care Transition     Problem: Behavioral Health Comorbidity  Goal: Maintenance of Behavioral Health Symptom Control  Outcome: Adequate for Care Transition     Problem: Obstructive Sleep Apnea Risk or Actual Comorbidity Management  Goal: Unobstructed Breathing During Sleep  Outcome: Adequate for Care Transition     Problem: Pain Chronic (Persistent) (Comorbidity Management)  Goal: Acceptable Pain Control and Functional Ability  Outcome: Adequate for Care Transition     Problem: Plan of Care - These are the overarching goals to be used throughout the patient stay.    Goal: Plan of Care Review  Description: The Plan of Care Review/Shift note should be completed every shift.  The Outcome Evaluation is a brief statement about your assessment that the patient is improving, declining, or no change.  This information will be displayed automatically on your shift note.  Outcome: Adequate for Care Transition  Goal: Patient-Specific Goal (Individualized)  Description: You can add care plan individualizations to a care plan. Examples of Individualization might be:  \"Parent requests to be called daily at 9am for status\", \"I have a hard time hearing out of my right ear\", or \"Do not touch me to wake me up as it startles me\".  Outcome: Adequate for Care Transition  Goal: Absence of Hospital-Acquired Illness or Injury  Outcome: Adequate for Care Transition  Goal: Optimal Comfort and Wellbeing  Outcome: Adequate for Care Transition  Goal: Readiness for Transition of Care  Outcome: Adequate for Care Transition   Goal Outcome " Evaluation:    Goal discussed with: Patient    Goal assessed as: adequate for care transition

## 2023-01-14 NOTE — PLAN OF CARE
Goal Outcome Evaluation: A&Ox4. C/o abdominal pain, PRN oxy and tylenol given, pt slept afterwards. BP soft. Denies n/v, lightheadedness. Scheduled ibuprofen/robaxin given late d/t pt sleeping. PRN xanax given for anxiety. Pt states current pain regimes isn't working. No BM this shift, pt states passing gas, hypoactive bowel sounds. Able to make needs known.        Problem: Violence Risk or Actual  Goal: Anger and Impulse Control  1/14/2023 0451 by Josemanuel Emanuel RN  Outcome: Progressing  1/14/2023 0451 by Josemanuel Emanuel RN  Outcome: Progressing  Intervention: Minimize Safety Risk  Recent Flowsheet Documentation  Taken 1/14/2023 0050 by Josemanuel Emanuel RN  Sensory Stimulation Regulation:    care clustered    lighting decreased    quiet environment promoted  Enhanced Safety Measures: room near unit station  Intervention: Promote Self-Control  Recent Flowsheet Documentation  Taken 1/14/2023 0050 by Josemanuel Emanuel RN  Supportive Measures: active listening utilized     Problem: Pain Acute  Goal: Optimal Pain Control and Function  1/14/2023 0451 by Josemanuel Emanuel RN  Outcome: Progressing  1/14/2023 0451 by Josemanuel Emanuel RN  Outcome: Progressing  Intervention: Develop Pain Management Plan  Recent Flowsheet Documentation  Taken 1/14/2023 0050 by Josemanuel Emanuel RN  Pain Management Interventions: medication (see MAR)  Intervention: Prevent or Manage Pain  Recent Flowsheet Documentation  Taken 1/14/2023 0050 by Josemanuel Emanuel RN  Sensory Stimulation Regulation:    care clustered    lighting decreased    quiet environment promoted  Medication Review/Management: medications reviewed  Intervention: Optimize Psychosocial Wellbeing  Recent Flowsheet Documentation  Taken 1/14/2023 0050 by Josemanuel Emanuel RN  Supportive Measures: active listening utilized     Problem: Nausea and Vomiting  Goal: Nausea and Vomiting Relief  1/14/2023 0451 by Josemanuel Emanuel RN  Outcome: Progressing  1/14/2023 0451 by Jenae  Josemanuel HERNÁNDEZ RN  Outcome: Progressing     Problem: Asthma Comorbidity  Goal: Maintenance of Asthma Control  1/14/2023 0451 by Josemanuel Emanuel RN  Outcome: Progressing  1/14/2023 0451 by Josemanuel Emanuel RN  Outcome: Progressing  Intervention: Maintain Asthma Symptom Control  Recent Flowsheet Documentation  Taken 1/14/2023 0050 by Josemanuel Emanuel RN  Medication Review/Management: medications reviewed     Problem: Behavioral Health Comorbidity  Goal: Maintenance of Behavioral Health Symptom Control  1/14/2023 0451 by Josemanuel Emanuel RN  Outcome: Progressing  1/14/2023 0451 by Josemanuel Emanuel RN  Outcome: Progressing  Intervention: Maintain Behavioral Health Symptom Control  Recent Flowsheet Documentation  Taken 1/14/2023 0050 by Josemanuel Emanuel RN  Medication Review/Management: medications reviewed     Problem: Obstructive Sleep Apnea Risk or Actual Comorbidity Management  Goal: Unobstructed Breathing During Sleep  1/14/2023 0451 by Josemanuel Emanuel RN  Outcome: Progressing  1/14/2023 0451 by Josemanuel Emanuel RN  Outcome: Progressing  Intervention: Monitor and Manage Obstructive Sleep Apnea  Recent Flowsheet Documentation  Taken 1/14/2023 0050 by Josemanuel Emanuel RN  Medication Review/Management: medications reviewed     Problem: Pain Chronic (Persistent) (Comorbidity Management)  Goal: Acceptable Pain Control and Functional Ability  1/14/2023 0451 by Josemanuel Emanuel RN  Outcome: Progressing  1/14/2023 0451 by Josemanuel Emanuel RN  Outcome: Progressing  Intervention: Develop Pain Management Plan  Recent Flowsheet Documentation  Taken 1/14/2023 0050 by Josemanuel Emanuel RN  Pain Management Interventions: medication (see MAR)  Intervention: Manage Persistent Pain  Recent Flowsheet Documentation  Taken 1/14/2023 0050 by Josemanuel Emanuel RN  Medication Review/Management: medications reviewed  Intervention: Optimize Psychosocial Wellbeing  Recent Flowsheet Documentation  Taken 1/14/2023 0050 by Jenae  "Josemanuel HERNÁNDEZ RN  Supportive Measures: active listening utilized     Problem: Plan of Care - These are the overarching goals to be used throughout the patient stay.    Goal: Plan of Care Review  Description: The Plan of Care Review/Shift note should be completed every shift.  The Outcome Evaluation is a brief statement about your assessment that the patient is improving, declining, or no change.  This information will be displayed automatically on your shift note.  1/14/2023 0451 by Josemanuel Emanuel RN  Outcome: Progressing  1/14/2023 0451 by Josemanuel Emanuel RN  Outcome: Progressing  Goal: Patient-Specific Goal (Individualized)  Description: You can add care plan individualizations to a care plan. Examples of Individualization might be:  \"Parent requests to be called daily at 9am for status\", \"I have a hard time hearing out of my right ear\", or \"Do not touch me to wake me up as it startles me\".  1/14/2023 0451 by Josemanuel Emanuel RN  Outcome: Progressing  1/14/2023 0451 by Josemanuel Emanuel RN  Outcome: Progressing  Goal: Absence of Hospital-Acquired Illness or Injury  1/14/2023 0451 by Josemanuel Emanuel RN  Outcome: Progressing  1/14/2023 0451 by Josemanuel Emanuel RN  Outcome: Progressing  Intervention: Identify and Manage Fall Risk  Recent Flowsheet Documentation  Taken 1/14/2023 0050 by Josemanuel Emanuel RN  Safety Promotion/Fall Prevention:    clutter free environment maintained    nonskid shoes/slippers when out of bed  Intervention: Prevent Skin Injury  Recent Flowsheet Documentation  Taken 1/14/2023 0050 by Josemanuel Emanuel RN  Body Position: position changed independently  Goal: Optimal Comfort and Wellbeing  1/14/2023 0451 by Josemanuel Emanuel RN  Outcome: Progressing  1/14/2023 0451 by Josemanuel Emanuel RN  Outcome: Progressing  Intervention: Monitor Pain and Promote Comfort  Recent Flowsheet Documentation  Taken 1/14/2023 0050 by Josemanuel Emanuel RN  Pain Management Interventions: medication (see " MAR)  Goal: Readiness for Transition of Care  1/14/2023 0451 by Josemanuel Emanuel, RN  Outcome: Progressing  1/14/2023 0451 by Josemanuel Emanuel, RN  Outcome: Progressing

## 2023-01-14 NOTE — DISCHARGE SUMMARY
Physician Discharge Summary     Patient ID:  Darlene Hudson  [id number]  47 year old  1975    Admit date: 1/6/2023    Discharge date and time: 1/14/2023     Admitting Physician: Kris Charles MD     Discharge Physician: Elom Raygoza MD    Admission Diagnoses: Colonic mass [K63.89]  Colon cancer (H) [C18.9]    Discharge Diagnoses: colon cancer    Admission Condition: good    Discharged Condition: good    Indication for Admission: colon mass and pain    Hospital Course: Patient was admitted to hospital with tumor of the splenic flexure this is known from prior admission and she is having increased pain problems and issues and partial obstruction.  She was admitted and underwent a laparoscopic colon resection by Dr. Charles.  She has been treated for postop care from there.  Pain service was consulted and she has had issues with pain and pain control and anxiety post procedure.  She is now tolerating p.o. diet p.o. pain meds.    Consults: Pain service    Significant Diagnostic Studies: labs: Metabolic hemogram    Treatments: IV hydration, antibiotics: Zosyn, analgesia: acetaminophen, Vicodin, Dilaudid and Morphine and surgery: Laparoscopic colon resection    Discharge Exam:  Abdomen soft nondistended incisions are healed wounds intact no signs of infection    Disposition: home    Patient Instructions:   Current Discharge Medication List      START taking these medications    Details   enoxaparin ANTICOAGULANT (LOVENOX) 40 MG/0.4ML syringe Inject 0.4 mLs (40 mg) Subcutaneous every 24 hours for 21 days  Qty: 8.4 mL, Refills: 0    Associated Diagnoses: Malignant neoplasm of descending colon (H)      methocarbamol (ROBAXIN) 500 MG tablet Take 1 tablet (500 mg) by mouth every 6 hours for 15 days  Qty: 60 tablet, Refills: 0    Associated Diagnoses: Malignant neoplasm of descending colon (H)      oxyCODONE-acetaminophen (PERCOCET) 5-325 MG tablet Take 1-2 tablets by mouth every 4 hours as needed for  pain  Qty: 60 tablet, Refills: 0    Associated Diagnoses: Malignant neoplasm of descending colon (H)         CONTINUE these medications which have NOT CHANGED    Details   ALPRAZolam (XANAX) 2 MG tablet Take 1 tablet (2 mg) by mouth 3 times daily as needed for anxiety  Qty: 90 tablet, Refills: 0    Comments: Increased to TID during acute medical crisis  Associated Diagnoses: Panic disorder with agoraphobia; Generalized anxiety disorder      cetirizine (ZYRTEC) 10 MG tablet Take 1 tablet (10 mg) by mouth daily  Qty: 30 tablet, Refills: 11    Associated Diagnoses: Seasonal allergic rhinitis, unspecified trigger      fluticasone (FLONASE) 50 MCG/ACT nasal spray Spray 2 sprays into both nostrils daily  Qty: 9.9 mL, Refills: 11    Associated Diagnoses: Seasonal allergic rhinitis, unspecified trigger      ibuprofen (ADVIL/MOTRIN) 600 MG tablet Take 1 tablet (600 mg) by mouth 3 times daily as needed for moderate pain  Qty: 90 tablet, Refills: 4    Associated Diagnoses: Acute right ankle pain      montelukast (SINGULAIR) 10 MG tablet Take 1 tablet (10 mg) by mouth At Bedtime  Qty: 30 tablet, Refills: 11    Associated Diagnoses: Moderate persistent asthma without complication      naloxone (NARCAN) 4 MG/0.1ML nasal spray Spray 1 spray (4 mg) into one nostril alternating nostrils as needed for opioid reversal every 2-3 minutes until assistance arrives  Qty: 0.2 mL, Refills: 1    Associated Diagnoses: Acute abdominal pain; Chronic pain of both knees      ondansetron (ZOFRAN ODT) 4 MG ODT tab Take 1 tablet (4 mg) by mouth every 8 hours as needed for nausea  Qty: 10 tablet, Refills: 1    Associated Diagnoses: Infection due to 2019 novel coronavirus      PROAIR  (90 Base) MCG/ACT inhaler Inhale 2 puffs into the lungs every 4 hours as needed for shortness of breath / dyspnea or wheezing  Qty: 8 g, Refills: 11    Comments: Pharmacy may dispense brand covered by insurance (Proair, or proventil or ventolin or generic albuterol  inhaler)  Associated Diagnoses: Moderate persistent asthma without complication      prochlorperazine (COMPAZINE) 10 MG tablet Take 1 tablet (10 mg) by mouth every 6 hours as needed for nausea or vomiting  Qty: 24 tablet, Refills: 0    Associated Diagnoses: Colonic mass      tiotropium (SPIRIVA RESPIMAT) 1.25 MCG/ACT inhaler Inhale 2 puffs into the lungs daily  Qty: 12 g, Refills: 3    Associated Diagnoses: Severe persistent asthma with acute exacerbation      spacer (1001 MenusBER BINA) holding chamber For use w/ rescue inhaler  Qty: 1 each, Refills: 0    Associated Diagnoses: Moderate persistent asthma with acute exacerbation           Activity: activity as tolerated, activity as tolerated and no driving for today, ambulate in house and no lifting, Driving, or Strenuous exercise for 2 weeks  Diet: regular diet  Wound Care: keep wound clean and dry, reinforce dressing PRN and ice to area for comfort    Follow-up with Dr. Charles in 2 weeks.    Signed:  Elmo Raygoza MD  1/14/2023  2:10 PM

## 2023-01-14 NOTE — PROGRESS NOTES
Surgery    I was called stat for the nurse for room 202 to come see her secondary to pain issues and intolerance of pain and the pain meds are not working.  I walked into the room and she was sleep or almost asleep.  I had a discussion with her she states that she has so much pain but she can still keep her eyes open worse today awake entirely from our conversation.  She takes Percocet every hour to every 4 hours 2 of them presently and thus that she is got set up with her doctor Dr. Charles.  She did of the pain service involved but she fired the pain service.  She has a long history of narcotic abuse and actually has 1 doctor who takes care of all of her narcotics usually.  This is her primary doctor who she says is available to her 24/7.  She is angry that Dr. Charles is not available 24/7 for her and I told her that we do get weekends off once in a while and do need to have some time away from the practice of medicine and I think it is appropriate that he is not here this weekend for her.  I discussed that we can treat her pain I think that her pain is been treated with the same regimen for the last day or 2 and I think that it is working decently especially right now and she can stay awake discussed things with me.  Discounted graded that she said that I was not very helpful that I am not Dr. Charles and I am not her primary doctor and then I am not helpful and at this time point I will I recommended that we this conversation is probably not too helpful anymore and that she is just mad and angry with me and I am not sure why.  She does have a tolerance to narcotics from past use that discussion did ensue and I think that that that made her more upset.  From my standpoint I Sally continue the same regimen which Dr. Charles who is been working hard with this patient for quite some time now is the same I told her that she needs to get out of bed which she has not done today day yet she is a work on  drinking and eating and doing normal things of progression after her surgery.  Michelle after that we would be for sure checking on her and also hopefully looking out tomorrow if we could possibly get her out of the hospital.  If not Dr. Charles will back on Monday and he will be able to address some of her issues to at that time point.          Elmo Raygoza MD  General Surgery [phone]  Vascular Surgery [phone]

## 2023-01-16 ENCOUNTER — PATIENT OUTREACH (OUTPATIENT)
Dept: CARE COORDINATION | Facility: CLINIC | Age: 48
End: 2023-01-16

## 2023-01-16 NOTE — PROGRESS NOTES
Clinic Care Coordination Contact  Community Health Worker Initial Outreach    Patient accepts CC: No, Pt declined needs for extra support.     Clinic Care Coordination Contact  [facility]: Post-Discharge Note  SITUATION                                                      Admission:    Admission Date: 01/06/23   Reason for Admission: Admission Diagnoses: Colonic mass (K63.89)  Colon cancer (H) (C18.9)     Discharge Diagnoses: colon cancer  Discharge:   Discharge Date: 01/14/23  Discharge Diagnosis: Admission Diagnoses: Colonic mass (K63.89)  Colon cancer (H) (C18.9)     Discharge Diagnoses: colon cancer    BACKGROUND                                                      Per hospital discharge summary and inpatient provider notes:    Darlene Hudson is a 47 year old female who was admitted on 1/6/2023.  Pain team was asked to see the patient for post-op pain. Having trouble with pain control after transitioning off the PCA (1/6-8). Admitted for colonoscopy and subsequent sigmoid colectomy for colonic mass. History of asthma, cocaine and alcohol abuse, tobacco use disorder, chronic low back pain, anxiety, depression, panic disorder, and arthralgia of both lower legs. At time of visit patient appears sedated, eyelids over pupil line, RASS -2. Reports pain is 2-3 at rest and 7-8/10 with movement.    ASSESSMENT           Discharge Assessment  How are you doing now that you are home?: feeling well  How are your symptoms? (Red Flag symptoms escalate to triage hotline per guidelines): Improved  Do you feel your condition is stable enough to be safe at home until your provider visit?: Yes  Does the patient have their discharge instructions? : Yes  Does the patient have questions regarding their discharge instructions? : No  Were you started on any new medications or were there changes to any of your previous medications? : Yes  Does the patient have all of their medications?: Yes  Do you have questions regarding any  of your medications? : No  Discharge follow-up appointment scheduled within 14 calendar days? : Yes  Discharge Follow Up Appointment Date: 01/18/23  Discharge Follow Up Appointment Scheduled with?: Primary Care Provider    Post-op ([facility] Only)  If the patient had a surgery or procedure, do they have any questions for a nurse?: No         PLAN                                                      Outpatient Plan:     Follow-up and recommended labs and tests  Follow up with Dr. Charles on 1/18. Please call his office at [phone] if you have any questions or concerns.  Follow-up and recommended labs and tests  Follow up with primary care provider, Robbie Bailon, within 7 days to evaluate after surgery. No follow up labs or test are  needed.  Follow up with Dr. Charles , at (location with clinic name or city) [facility], within 2 weeks to evaluate  after surgery. No follow up labs or test are needed.    Future Appointments   Date Time Provider Department Center   1/18/2023  1:00 PM Kris Charles MD [facility]   1/18/2023  4:20 PM Robbie Bailon MD [facility] [place]       For any urgent concerns, please contact our 24 hour nurse triage line: [phone] ([phone])         VIRIDIANA Norris  [facility]

## 2023-01-16 NOTE — TELEPHONE ENCOUNTER
Team - please schedule Karoline for phone follow-up on Wed 1/18/23 at 4:20pm. Please call her to confirm date and time. Thank you!    Robbie Bailon MD  January 16, 2023  10:26 AM

## 2023-01-17 ENCOUNTER — PATIENT OUTREACH (OUTPATIENT)
Dept: CARE COORDINATION | Facility: CLINIC | Age: 48
End: 2023-01-17

## 2023-01-17 ASSESSMENT — ACTIVITIES OF DAILY LIVING (ADL): DEPENDENT_IADLS:: INDEPENDENT

## 2023-01-17 NOTE — PROGRESS NOTES
Clinic Care Coordination Contact  [facility]: Post-Discharge Note  SITUATION                                                      Admission:    Admission Date: 01/06/23   Reason for Admission: colon mass and pain  Discharge:   Discharge Date: 01/14/23  Discharge Diagnosis: colon mass and pain    BACKGROUND                                                      Per hospital discharge summary and inpatient provider notes.      ASSESSMENT           Discharge Assessment  How are you doing now that you are home?: Pt stated that she is doing better, she is just resting  How are your symptoms? (Red Flag symptoms escalate to triage hotline per guidelines): Improved  Do you feel your condition is stable enough to be safe at home until your provider visit?: Yes  Does the patient have their discharge instructions? : Yes  Does the patient have questions regarding their discharge instructions? : No  Were you started on any new medications or were there changes to any of your previous medications? : Yes  Does the patient have all of their medications?: Yes  Do you have questions regarding any of your medications? : No  Do you have all of your needed medical supplies or equipment (DME)?  (i.e. oxygen tank, CPAP, cane, etc.): No - What equipment or supplies are needed?  Discharge follow-up appointment scheduled within 14 calendar days? : Yes  Discharge Follow Up Appointment Date: 01/18/23  Discharge Follow Up Appointment Scheduled with?: Primary Care Provider                  PLAN                                                      Outpatient Plan:      Future Appointments   Date Time Provider Department Center   1/18/2023  1:00 PM Kris Charles MD [facility]   1/18/2023  4:20 PM Robbie Bailon MD [facility] [place]         For any urgent concerns, please contact our 24 hour clinic line:   [facility]: [phone]       SMITH Vallejo

## 2023-01-17 NOTE — PROGRESS NOTES
Hospitalization Follow-up Visit         [facility]       Hospital Follow-up Visit:    Hospital:  [facility]   Date of Admission: 1/6/2023  Date of Discharge: 1/14/2023  Reason(s) for Admission: colonic mass            Problems taking medications regularly:  None       Post Discharge Medication Reconciliation: discharge medications reconciled and changed, per note/orders.       Problems adhering to non-medication therapy:  None       Medications reviewed by: by myself. Findings include no issues.    Summary of hospitalization:  [facility] discharge summary reviewed  Diagnostic Tests/Treatments reviewed.  Follow up needed: in 6 weeks w/ Dr Charles  Other Healthcare Providers Involved in Patient s Care:         None  Update since discharge: improved.   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  Her pathology came back as a T2 N0 M0 cancer which is a stage I. Her father has a history of colon cancer at 54.     In regards to her recovery, the patient has been doing really quite well since her discharge from the hospital.  She is moving around well.  Her pain medication requirement is far less than it was in the hospital.  She is having bowel function daily.  She is still struggling with some nausea and very low appetite.  She is keeping hydrated.  She is having a little bit of drainage from the upper portion of her wound.     On exam she looks is comfortable as I have seen her since surgery.  She is moving about the room quite  easily.  Her abdomen is soft and nondistended.  It is minimally tender.  Her midline incision on the upper portion is slightly open with a little bit of fibrinous drainage.  There is no evidence of erythema or infection.     47-year-old woman who is recovering well after her cancer resection.  I have no concerns at this time.  Her recovery is following the expected pattern.  We took some time to review her pathology report in more detail.  We talked about the staging of her cancer and that she should not require any chemotherapy.  I would like her to see oncology to discuss needs for genetic testing and further input on surveillance.  She would definitely need a repeat colonoscopy in 1 year which I will perform.     She is following with her PCP Dr. Bailon for pain management at this point.  I recommended that she try to focus on protein intake.  I recommended using protein shakes while she is waiting for her appetite to improve.      I will see her back in clinic in 6 weeks to check her wounds and monitor her progress.  We are always available if the need arises before that.      Discharge summary:  Admit date: 1/6/2023     Discharge date and time: 1/14/2023    Admitting Physician: Kris Charles MD    Discharge Physician: Elmo Raygoza MD   Admission Diagnoses: Colonic mass [K63.89]  Colon cancer (H) [C18.9]   Discharge Diagnoses: colon cancer   Admission Condition: good   Discharged Condition: good   Indication for Admission: colon mass and pain     Hospital Course: Patient was admitted to hospital with tumor of the splenic flexure this is known from prior admission and she is having increased pain problems and issues and partial obstruction.  She was admitted and underwent a laparoscopic colon resection by Dr. Charles.  She has been treated for postop care from there.  Pain service was consulted and she has had issues with pain and pain control and anxiety post procedure.  She is now tolerating p.o.  diet p.o. pain meds.   Consults: Pain service   Significant Diagnostic Studies: labs: Metabolic hemogram   Treatments: IV hydration, antibiotics: Zosyn, analgesia: acetaminophen, Vicodin, Dilaudid and Morphine and surgery: Laparoscopic colon resection   Discharge Exam:  Abdomen soft nondistended incisions are healed wounds intact no signs of infection    Plan:   Activity: activity as tolerated, activity as tolerated and no driving for today, ambulate in house and no lifting, Driving, or Strenuous exercise for 2 weeks  Diet: regular diet  Wound Care: keep wound clean and dry, reinforce dressing PRN and ice to area for comfort     Follow-up with Dr. Charles in 2 weeks.    START taking these medications    Details  enoxaparin ANTICOAGULANT (LOVENOX) 40 MG/0.4ML syringe Inject 0.4 mLs (40 mg) Subcutaneous every 24 hours for 21 days  Qty: 8.4 mL, Refills: 0    Associated Diagnoses: Malignant neoplasm of descending colon (H)     methocarbamol (ROBAXIN) 500 MG tablet Take 1 tablet (500 mg) by mouth every 6 hours for 15 days  Qty: 60 tablet, Refills: 0    Associated Diagnoses: Malignant neoplasm of descending colon (H)     oxyCODONE-acetaminophen (PERCOCET) 5-325 MG tablet Take 1-2 tablets by mouth every 4 hours as needed for pain  Qty: 60 tablet, Refills: 0    Associated Diagnoses: Malignant neoplasm of descending colon (H)            Review of Systems:   CONSTITUTIONAL: no fatigue, no unexpected change in weight  SKIN: no worrisome rashes, no worrisome moles, no worrisome lesions  RESP: no significant cough, no shortness of breath  CV: no chest pain, no palpitations, no new or worsening peripheral edema  GI: +nausea            Physical Exam:     There were no vitals filed for this visit.  There is no height or weight on file to calculate BMI.    Telephone encounter          Results:   N/A    Assessment and Plan      Darlene was seen today for hospital f/u.    Diagnoses and all orders for this visit:    Colonic mass -> pt  doing well s/p discharge; we reviewed plan to wean opiates; she will call in 2 days for refill as she has already weaned down from 12 tabs/day to 6 tabs/day; f/u telephone visit in 2 weeks; precautions given  -     prochlorperazine (COMPAZINE) 10 MG tablet; Take 1 tablet (10 mg) by mouth every 6 hours as needed for nausea or vomiting      Robbie Bailon MD  January 18, 2023  4:44 PM

## 2023-01-18 ENCOUNTER — VIRTUAL VISIT (OUTPATIENT)
Dept: FAMILY MEDICINE | Facility: CLINIC | Age: 48
End: 2023-01-18
Payer: MEDICAID

## 2023-01-18 ENCOUNTER — PATIENT OUTREACH (OUTPATIENT)
Dept: ONCOLOGY | Facility: CLINIC | Age: 48
End: 2023-01-18

## 2023-01-18 ENCOUNTER — OFFICE VISIT (OUTPATIENT)
Dept: SURGERY | Facility: CLINIC | Age: 48
End: 2023-01-18
Payer: MEDICAID

## 2023-01-18 DIAGNOSIS — K63.89 COLONIC MASS: ICD-10-CM

## 2023-01-18 DIAGNOSIS — C18.5 MALIGNANT NEOPLASM OF SPLENIC FLEXURE (H): Primary | ICD-10-CM

## 2023-01-18 PROCEDURE — 99024 POSTOP FOLLOW-UP VISIT: CPT | Performed by: SURGERY

## 2023-01-18 PROCEDURE — 99495 TRANSJ CARE MGMT MOD F2F 14D: CPT | Mod: 93 | Performed by: FAMILY MEDICINE

## 2023-01-18 RX ORDER — PROCHLORPERAZINE MALEATE 10 MG
10 TABLET ORAL EVERY 6 HOURS PRN
Qty: 40 TABLET | Refills: 1 | Status: SHIPPED | OUTPATIENT
Start: 2023-01-18 | End: 2024-01-17

## 2023-01-18 ASSESSMENT — ASTHMA QUESTIONNAIRES
ACT_TOTALSCORE: 16
ACT_TOTALSCORE: 16
QUESTION_4 LAST FOUR WEEKS HOW OFTEN HAVE YOU USED YOUR RESCUE INHALER OR NEBULIZER MEDICATION (SUCH AS ALBUTEROL): ONE OR TWO TIMES PER DAY
QUESTION_2 LAST FOUR WEEKS HOW OFTEN HAVE YOU HAD SHORTNESS OF BREATH: ONCE OR TWICE A WEEK
QUESTION_1 LAST FOUR WEEKS HOW MUCH OF THE TIME DID YOUR ASTHMA KEEP YOU FROM GETTING AS MUCH DONE AT WORK, SCHOOL OR AT HOME: A LITTLE OF THE TIME
QUESTION_3 LAST FOUR WEEKS HOW OFTEN DID YOUR ASTHMA SYMPTOMS (WHEEZING, COUGHING, SHORTNESS OF BREATH, CHEST TIGHTNESS OR PAIN) WAKE YOU UP AT NIGHT OR EARLIER THAN USUAL IN THE MORNING: ONCE OR TWICE
QUESTION_5 LAST FOUR WEEKS HOW WOULD YOU RATE YOUR ASTHMA CONTROL: POORLY CONTROLLED

## 2023-01-18 ASSESSMENT — PATIENT HEALTH QUESTIONNAIRE - PHQ9: SUM OF ALL RESPONSES TO PHQ QUESTIONS 1-9: 3

## 2023-01-18 NOTE — PROGRESS NOTES
General surgery clinic follow-up visit    Patient is a 47-year-old woman who is now postoperative day 12 from a laparoscopic assisted segmental colectomy for splenic flexure cancer.  Her pathology came back as a T2 N0 M0 cancer which is a stage I. Her father has a history of colon cancer at 54.    In regards to her recovery, the patient has been doing really quite well since her discharge from the hospital.  She is moving around well.  Her pain medication requirement is far less than it was in the hospital.  She is having bowel function daily.  She is still struggling with some nausea and very low appetite.  She is keeping hydrated.  She is having a little bit of drainage from the upper portion of her wound.    On exam she looks is comfortable as I have seen her since surgery.  She is moving about the room quite easily.  Her abdomen is soft and nondistended.  It is minimally tender.  Her midline incision on the upper portion is slightly open with a little bit of fibrinous drainage.  There is no evidence of erythema or infection.    47-year-old woman who is recovering well after her cancer resection.  I have no concerns at this time.  Her recovery is following the expected pattern.  We took some time to review her pathology report in more detail.  We talked about the staging of her cancer and that she should not require any chemotherapy.  I would like her to see oncology to discuss needs for genetic testing and further input on surveillance.  She would definitely need a repeat colonoscopy in 1 year which I will perform.    She is following with her PCP Dr. Bailon for pain management at this point.  I recommended that she try to focus on protein intake.  I recommended using protein shakes while she is waiting for her appetite to improve.     I will see her back in clinic in 6 weeks to check her wounds and monitor her progress.  We are always available if the need arises before that.    Kris Charles MD  General  Surgeon  [facility]  Surgery [address]  [address]  [place] [address]?  Office: [phone]

## 2023-01-18 NOTE — PROGRESS NOTES
New Patient Oncology Nurse Navigator Note     Referring provider: Dr Kris Charles, Gen Surg [facility]    Referring Clinic/Organization: [facility]     Referred to: Medical Oncology    Requested provider (if applicable): First available - did not specify     Referral Received: 01/18/23       Evaluation for : colon cancer     Clinical History (per Nurse review of records provided):      12/25/2022 CT chest, CT Abd Pelvis at     IMPRESSION:  1.  No evidence of pulmonary embolus or other acute process in the chest.      IMPRESSION:   1.  Proximal descending colonic wall thickening and adjacent lymph nodes compatible with colonic neoplasm. Gastroenterology consultation recommended.  2.  Nonspecific periportal adenopathy.      1/3/2023 Colonoscopy     Impression:          -  Preparation of the colon was fair.          -  Likely malignant tumor in the proximal descending colon and at 50 cm             proximal to the anus.  Biopsied.  Tattooed.          -  One 5 mm polyp in the sigmoid colon, removed with a cold biopsy forceps.             Resected and retrieved.          -  Mild diverticulosis in the sigmoid colon.     DIAGNOSIS:   A) COLON, DESCENDING, MASS, BIOPSY:   - TUBULAR ADENOMA WITH HIGH GRADE DYSPLASIA AND INTRAMUCOSAL   ADENOCARCINOMA, SEE COMMENT   - NO LOSS OF NUCLEAR EXPRESSION OF MMR PROTEINS: LOW PROBABILITY OF   MICROSATELLITE INSTABILITY- HIGH (MSI-H)     B) COLON, SIGMOID, POLYP, POLYPECTOMY:        - HYPERPLASTIC POLYP     COMMENT:   The descending colon mass biopsies (A) demonstrate a tubular adenoma   with high grade dysplasia and intramucosal adenocarcinoma with extension   into lamina propria and muscularis mucosae. A definitive invasive   component is not identified in this sample. Given this is only a limited   and superficial sample of a larger mass, an unsampled invasive process   cannot be excluded. Clinical correlation recommended.       1/6/2023 lap asst descending colectomy splenic  flexure mobilization, lysis of adhesions by Dr Charles    Final Diagnosis   TRANSVERSE AND DESCENDING COLON, LAPAROSCOPIC ASSISTED DESCENDING COLECTOMY WITH SPLENIC FLEXURE MOBILIZATION:        1.  INVASIVE MODERATELY DIFFERENTIATED ADENOCARCINOMA, SPLENIC FLEXURE (SEE SYNOPTIC REPORT)              A.  EXOPHYTIC AND ULCERATED 4.7 X 4.3 X 1.4 CM TUMOR              B.  INVASION INTO, BUT NOT THROUGH MUSCULARIS PROPRIA              C.  TUMOR ASSOCIATED WITH TUBULAR ADENOMA WITH HIGH-GRADE DYSPLASIA              D.  TUMOR 9. 8 AND 13.7 CM FROM PROXIMAL/DISTAL SURGICAL MARGINS AND 7.6 CM FROM RADIAL MARGIN.                    NO EVIDENCE OF DYSPLASIA OR MALIGNANCY AT SURGICAL MARGINS              E.  MESENTERIC LYMPHNODES WITH NO EVIDENCE OF MALIGNANCY (0 OF 48)              F.  STAGE: pT2 pN0        2.  BENIGN SESSILE SERRATED LESIONS, WITHOUT DYSPLASIA (X 2)        3.  MODERATE FOLLICULAR LYMPHOID HYPERPLASIA, MESENTERIC LYMPH NODES.  FOCAL INK DEPOSITION PRESENT              Clinical Assessment / Barriers to Care (Per Nurse):    Pt needs to establish with Med Onc for surveillance & further recommendations. Will offer next available Med Onc per pt preference.       Records Location: Epic     Records Needed:     N/A     Additional testing needed prior to consult:     ROMAINE Cruz, RN, BSN, OCN  Oncology New Patient Nurse Navigator   [facility]  [phone]

## 2023-01-18 NOTE — PROGRESS NOTES
Family Medicine Telephone Visit Note    Chief Complaint   Patient presents with     Hospital F/U     No concerns, came back from Surgeon's everything seems fine. Only concerned about eating, has f/up in 6 weeks              HPI   Patients name: Karoline  Appointment start time:  4:23 PM    See my note    Current Outpatient Medications   Medication Sig Dispense Refill     prochlorperazine (COMPAZINE) 10 MG tablet Take 1 tablet (10 mg) by mouth every 6 hours as needed for nausea or vomiting 40 tablet 1     ALPRAZolam (XANAX) 2 MG tablet Take 1 tablet (2 mg) by mouth 3 times daily as needed for anxiety 90 tablet 0     cetirizine (ZYRTEC) 10 MG tablet Take 1 tablet (10 mg) by mouth daily (Patient taking differently: Take 10 mg by mouth daily as needed) 30 tablet 11     enoxaparin ANTICOAGULANT (LOVENOX) 40 MG/0.4ML syringe Inject 0.4 mLs (40 mg) Subcutaneous every 24 hours for 21 days 8.4 mL 0     fluticasone (FLONASE) 50 MCG/ACT nasal spray Spray 2 sprays into both nostrils daily (Patient taking differently: Spray 2 sprays into both nostrils daily as needed) 9.9 mL 11     ibuprofen (ADVIL/MOTRIN) 600 MG tablet Take 1 tablet (600 mg) by mouth every 6 hours for 10 days 40 tablet 0     methocarbamol (ROBAXIN) 500 MG tablet Take 1 tablet (500 mg) by mouth every 6 hours for 15 days 60 tablet 0     montelukast (SINGULAIR) 10 MG tablet Take 1 tablet (10 mg) by mouth At Bedtime 30 tablet 11     naloxone (NARCAN) 4 MG/0.1ML nasal spray Spray 1 spray (4 mg) into one nostril alternating nostrils as needed for opioid reversal every 2-3 minutes until assistance arrives 0.2 mL 1     ondansetron (ZOFRAN ODT) 4 MG ODT tab Take 1 tablet (4 mg) by mouth every 8 hours as needed for nausea 10 tablet 1     oxyCODONE-acetaminophen (PERCOCET) 5-325 MG tablet Take 2 tablets by mouth every 6 hours as needed for severe pain (7-10) 60 tablet 0     polyethylene glycol (MIRALAX) 17 GM/Dose powder Take 17 g by mouth 2 times daily for 15 days 510 g 0  "    PROAIR  (90 Base) MCG/ACT inhaler Inhale 2 puffs into the lungs every 4 hours as needed for shortness of breath / dyspnea or wheezing 8 g 11     senna-docusate (SENOKOT-S/PERICOLACE) 8.6-50 MG tablet Take 1 tablet by mouth 2 times daily for 10 days 20 tablet 0     spacer (OPTICHAMBER BINA) holding chamber For use w/ rescue inhaler 1 each 0     tiotropium (SPIRIVA RESPIMAT) 1.25 MCG/ACT inhaler Inhale 2 puffs into the lungs daily 12 g 3     Allergies   Allergen Reactions     Lidocaine Other (See Comments)     \"my jaw stopped moving\"  Other reaction(s): Dystonia     Penicillins Hives, Rash and Shortness Of Breath     Epinephrine-Lidocaine-Na Metabisulfite Other (See Comments) and Muscle Pain (Myalgia)     Severe jaw cramping, double vision  Jaw locking              Review of Systems:     CONSTITUTIONAL: NEGATIVE for fever, chills, change in weight  ENT/MOUTH: NEGATIVE for ear, mouth and throat problems  RESP: NEGATIVE for significant cough or SOB  CV: NEGATIVE for chest pain, palpitations or peripheral edema         Physical Exam:     There were no vitals taken for this visit.  Estimated body mass index is 36.51 kg/m  as calculated from the following:    Height as of 1/6/23: 1.753 m (5' 9\").    Weight as of 1/6/23: 112.1 kg (247 lb 3.2 oz).    Exam:  Constitutional: alert, pleasant,and no distress over phone  Psychiatric: mentation appears normal and affect normal/bright            Assessment and Plan     (K63.89) Colonic mass  Comment: see my note  Plan: prochlorperazine (COMPAZINE) 10 MG tablet          Refilled medications that would be required in the next 3 months.     After Visit Information:  Patient chose to view AVS via [employer]    Return in about 2 weeks (around 2/1/2023) for using a phone visit.    Appointment end time: 4:43 PM  This is a telephone visit that took 20 minutes.      Clinician location:  [facility]     Robbie Bailon MD  January 18, 2023  4:43 PM    "

## 2023-01-20 ENCOUNTER — TELEPHONE (OUTPATIENT)
Dept: FAMILY MEDICINE | Facility: CLINIC | Age: 48
End: 2023-01-20
Payer: COMMERCIAL

## 2023-01-20 DIAGNOSIS — K63.89 COLONIC MASS: Primary | ICD-10-CM

## 2023-01-20 RX ORDER — OXYCODONE AND ACETAMINOPHEN 5; 325 MG/1; MG/1
2 TABLET ORAL EVERY 6 HOURS PRN
Qty: 60 TABLET | Refills: 0 | Status: SHIPPED | OUTPATIENT
Start: 2023-01-20 | End: 2023-01-26

## 2023-01-20 NOTE — TELEPHONE ENCOUNTER
Looked at Karoline's med list and looks like medication is discontinued. Said last day is 1/18/23. Can you reorder? Looks like she just called about it.

## 2023-01-20 NOTE — TELEPHONE ENCOUNTER
I called Karoline and let her know about the refill. I did tell her you leave at noon. She said she appreciates it and will probably drop them off Monday or until Wednesday, she said her job will just have to understand because she is in a lot of pain. I did ask her what was going on she said she had a lot of pain and that she will go into ER if she develops a fever.

## 2023-01-20 NOTE — TELEPHONE ENCOUNTER
[facility] phone call message- medication clarification/question:    Full Medication Name: oxyCODONE (ROXICODONE) tablet 10 mg      Question: Patient called requesting refill on medication. If able to fill please send to pharmacy thank you.    [id number] [address] [facility] [address] [facility]  AT [facility]: Yes    OK to leave a message on voice mail? Yes    Primary language: English      needed? No    Call taken on January 20, 2023 at 8:10 AM by Christine Cruz

## 2023-01-20 NOTE — TELEPHONE ENCOUNTER
Refill josie. Juventino - please let Karoline know. Also, she said that she was dropping off paperwork? Please let her know that I leave at noon today so I need it before then or it will need to wait til next Wednesday. Thanks!    .Robbie Bailon MD  January 20, 2023  9:26 AM

## 2023-01-24 NOTE — PROGRESS NOTES
RECORDS STATUS - ALL OTHER DIAGNOSIS    colon cancer   RECORDS RECEIVED FROM: [facility]   DATE RECEIVED: 1/30/2023   NOTES STATUS DETAILS   OFFICE NOTE from referring provider Complete Epic   Kris Charles MD   DISCHARGE SUMMARY from hospital Complete [facility]- 1/6/2023   Colon cancer (H)  Principal problem        DISCHARGE REPORT from the ER     OPERATIVE REPORT Complete See internal biopsy    MEDICATION LIST Complete [facility]   CLINICAL TRIAL TREATMENTS TO DATE     LABS     PATHOLOGY REPORTS Complete- see internal biopsy  1/6/2023  TRANSVERSE AND DESCENDING COLON, LAPAROSCOPIC ASSISTED DESCENDING COLECTOMY WITH SPLENIC FLEXURE MOBILIZATION:        1.  INVASIVE MODERATELY DIFFERENTIATED ADENOCARCINOMA   ANYTHING RELATED TO DIAGNOSIS Complete Labs last updated on 1/10/2023   GENONOMIC TESTING     TYPE:     IMAGING (NEED IMAGES & REPORT)     CT SCANS Complete CT Chest Pulmonary 12/25/2022    CT Abdomen Pelvis 12/25/2022   MRI     Xray ABdomen  Complete 1/12/2023   MAMMO     ULTRASOUND     PET

## 2023-01-25 ENCOUNTER — TELEPHONE (OUTPATIENT)
Dept: FAMILY MEDICINE | Facility: CLINIC | Age: 48
End: 2023-01-25

## 2023-01-25 NOTE — TELEPHONE ENCOUNTER
[facility] phone call message- general phone call:    Reason for call:     Patient call and advise she was suppose to have a appointment today with her son with Dr. Bailon. I apologize to patient and  schedule patient for a telephone visit w/ Dr. Bailon at 16:20, however patient would like to know if we can change it into in person visit instead. I advise patient that I do not make that call.  It will have to be Dr. Bailon call. Caller ask to send in message. Message send.     Return call needed: Yes    OK to leave a message on voice mail? Yes    Primary language: English      needed? No    Call taken on January 25, 2023 at 9:37 AM by Meredith Holder

## 2023-01-25 NOTE — TELEPHONE ENCOUNTER
Team - at my visit with Karoline last week, we did NOT plan a visit for her today. We specifically had a visit scheduled for 2 weeks later which is 2/1/23. IF she wants to have an in-person visit today, it will be at 4:20, not at 3pm with her son. She is welcome to stay and wait til I see all other scheduled patients. Please inform her and adjust her appt accordingly. Thank you!    Robbie Bailon MD  January 25, 2023  10:45 AM

## 2023-01-26 ENCOUNTER — MYC REFILL (OUTPATIENT)
Dept: FAMILY MEDICINE | Facility: CLINIC | Age: 48
End: 2023-01-26
Payer: COMMERCIAL

## 2023-01-26 DIAGNOSIS — K63.89 COLONIC MASS: ICD-10-CM

## 2023-01-26 DIAGNOSIS — R10.9 ACUTE ABDOMINAL PAIN: Primary | ICD-10-CM

## 2023-01-26 RX ORDER — OXYCODONE AND ACETAMINOPHEN 5; 325 MG/1; MG/1
2 TABLET ORAL EVERY 6 HOURS PRN
Qty: 60 TABLET | Refills: 0 | Status: CANCELLED | OUTPATIENT
Start: 2023-01-26

## 2023-01-26 RX ORDER — OXYCODONE AND ACETAMINOPHEN 5; 325 MG/1; MG/1
2 TABLET ORAL EVERY 6 HOURS PRN
Qty: 36 TABLET | Refills: 0 | Status: SHIPPED | OUTPATIENT
Start: 2023-01-26 | End: 2023-02-01

## 2023-01-26 NOTE — TELEPHONE ENCOUNTER
Spoke w/ pt at length. We discussed that she was taking more pain medication than we discussed at her appt last week and we needed a strict taper;  Adjusted meds from ibuprofen to diclofenac and sent refill of pain meds w/ strict instructions. Note for work written.    Robbie Bailon MD  January 26, 2023  6:02 PM

## 2023-01-26 NOTE — LETTER
MEDICAL NOTE  2023         Patient:  Darlene Hudson  :   1975  MRN:     [id number]  Physician: JULIETH BAILON    Darlene Hudson is under my care. 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 She is not cleared to resume work duties until her visit with me on 2023.          Julieth Bailon MD  2023  5:57 PM

## 2023-01-30 ENCOUNTER — PRE VISIT (OUTPATIENT)
Dept: ONCOLOGY | Facility: CLINIC | Age: 48
End: 2023-01-30
Payer: COMMERCIAL

## 2023-01-30 ENCOUNTER — TELEPHONE (OUTPATIENT)
Dept: SURGERY | Facility: CLINIC | Age: 48
End: 2023-01-30
Payer: COMMERCIAL

## 2023-01-30 NOTE — TELEPHONE ENCOUNTER
Patient has been having trouble eating, feeling nauseous all day, not much of an appetite and when she does eat she's having the nausea again and sometimes vomits. She's very weak and tired and doesn't have the energy to do much. Had to cancel her appt with Oncology today due to low energy. She does not have a fever. She's not sure if she should come in to see Dr Charles or her PCP. Please call her back at [phone].

## 2023-01-30 NOTE — PROGRESS NOTES
Call start: 4:55PM  Call stop: 5:13 PM  Call duration: 18 min      ASSESSMENT/PLAN:    (R11.2,  Z98.890) PONV (postoperative nausea and vomiting)  (primary encounter diagnosis)  Comment: will add carafate and doxylamine; precautions given  Plan: sucralfate (CARAFATE) 1 GM/10ML suspension, doxylamine (UNISOM) 25 MG TABS tablet          (K63.89) Colonic mass  Comment: reviewed benign workup at ED; will see her in clinic in 1 week and continue wean  Plan: oxyCODONE (ROXICODONE) 5 MG tablet          (R10.9) Acute abdominal pain  Comment: see above  Plan: oxyCODONE (ROXICODONE) 5 MG tablet            Robbie Bailon MD  February 1, 2023  5:14 PM        Pt is a 47 year old female last seen on 1/18/2023 evaluated today via telephone encounter for :     ED follow-up - went to ED yesterday; spoke to surgeon's office and they think it is scar tissue  Went to work training last week and hours later felt nauseated, inc pain  Poor appetite, feels sick off of certain smells  Not constipated      CT scan 1/31/23:  IMPRESSION:   1.  Expected postoperative changes about the colonic staple line in the left midabdomen as noted above with mild mesenteric stranding. No evidence of a bowel obstruction, abscess or anastomotic leak. Expected changes in the abdominal wall at port sites.  2.  Amidst the stranding, there is a tiny nodular area of soft tissue along the anterior peritoneal reflection. This likely reflects a postoperative change and can be followed on subsequent studies to ensure its not something more worrisome.  3.  Focally enlarged left external iliac node and slightly plump node in the ashanti hepatis are unchanged and of uncertain significance.  4.  Other noncritical findings as noted above.      Per ED note from 1/31/23:  FINAL IMPRESSION:  1. LUQ abdominal pain   2. Nausea      ED COURSE & MEDICAL DECISION MAKING:    Pertinent Labs & Imaging studies reviewed. (See chart for details)  47 year old female with history of alcohol  abuse and colon cancer status post partial colectomy at the splenic flexure on 1/6/2023 who presents to the Emergency Department for evaluation of ongoing nausea vomiting decreased oral intake ever since her surgery.  New worsening left upper quadrant abdominal pain and fever at home today.  Differential includes viral syndrome, GERD, gastritis, pancreatitis or hepatobiliary pathology, postoperative infection or abscess, anastomotic leak.     Patient placed on monitor, IV established and blood obtained.  Given 1 L normal saline bolus, Compazine, morphine.  CBC, BMP, LFTs, lipase, magnesium unremarkable except for chronically elevated alk phos at 181.  We were awaiting the results of her CT abdomen and pelvis when patient informed nursing that her son was having an asthma attack and she needed to leave AMA.  Patient had the ability/capacity make this decision.  Patient encouraged to follow-up with her PCP and/or surgeon.     Ultimately her CT resulted really unremarkable with the exception of focally enlarged left external iliac and ashanti hepatis node and tiny nodular area of soft tissue along the anterior peritoneal reflection likely postoperative.      Per my last note:  Colonic mass -> pt doing well s/p discharge; we reviewed plan to wean opiates; she will call in 2 days for refill as she has already weaned down from 12 tabs/day to 6 tabs/day; f/u telephone visit in 2 weeks; precautions given  -     prochlorperazine (COMPAZINE) 10 MG tablet; Take 1 tablet (10 mg) by mouth every 6 hours as needed for nausea or vomiting    Past Medical History:   Diagnosis Date     Abdominal pain 06/29/2015     Abnormal cervical Papanicolaou smear 11/09/2014    Overview:  ACUS/HPV positive     Abnormal cytology finding 11/09/2014    Overview:  ACUS/HPV positive     Acute pericardial effusion 02/06/2017     Agoraphobia with panic attacks      Anxiety      Arthralgia of both lower legs 05/29/2020    Bilateral achy knee pain that is  chronic in nature going on for years. Most likely osteoarthritis in knees related to obesity. Previously injected in both knees with good relief. 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  Has controlled substance agreement with them.  On Vicodin, Valium, Klonopin prescribed only from there.        Coronary artery disease      Cough 02/09/2020     Family history of colon cancer 10/24/2020     Hx of seasonal allergies      Infection due to 2019 novel coronavirus 09/20/2022     Infectious pericarditis      Lipoma      Low back pain 07/13/2015     Major depressive disorder, recurrent episode, moderate (H) 09/05/2006     Menorrhagia with irregular cycle 07/15/2022    Added automatically from request for surgery [id number]     Moderate persistent asthma without complication 09/29/2020     Nondependent alcohol abuse, episodic drinking behavior 10/03/2012     Noninflammatory disorder of vagina 02/20/2015     Other chronic pain      Other long term (current) drug therapy 01/28/2013    Overview:  Vicodin and cyclobenzaprine monthly     Panic disorder with agoraphobia 09/05/2006     Pap smear for cervical cancer screening 10/31/2016    03/29/2010  Normal cytology, HPV ot done, repeat in 3 years.     Pericarditis 2017     Physiologic disturbance of temperature regulation 10/24/2020     PONV (postoperative nausea and vomiting)      Right ankle swelling 06/07/2022    Added automatically from request for surgery [id number]     Tobacco abuse      Tobacco use disorder 07/13/2015  "     Past Surgical History:   Procedure Laterality Date     ANKLE SURGERY Right 05/30/2019     BIOPSY BREAST Right 1990    benign     COLONOSCOPY N/A 1/3/2023    Procedure: COLONOSCOPY WITH BIOPSY OF COLON MASS, POLYPECTOMY;  Surgeon: Kris Charles MD;  Location: [facility]     CREATION PERICARDIAL WINDOW  02/10/2017    [facility]     DILATION AND CURETTAGE N/A 7/22/2022    Procedure: DILATION AND CURETTAGE, UTERUS;  Surgeon: Lesly Holland;  Location: [facility]     HEMORRHOIDECTOMY EXTERNAL       HYSTEROSCOPY, WITH ENDOMETRIAL RADIOFREQUENCY ABLATION - NOVASURE N/A 7/22/2022    Procedure: HYSTEROSCOPY, WITH ENDOMETRIAL RADIOFREQUENCY ABLATION - NOVASURE DILATION AND CURETTAGE, UTERUS;  Surgeon: Lesly Holland;  Location: [facility] OR     LAPAROSCOPIC ASSISTED SIGMOID COLECTOMY N/A 1/6/2023    Procedure: LAPAROSCOPIC ASSISTED DESCENDING COLELCTOMY SPLENIC FLEXURE MOBILIZATION ;  Surgeon: Kris Charles MD;  Location: [facility] OR     LAPAROSCOPIC LYSIS ADHESIONS N/A 1/6/2023    Procedure: LYSIS OF ADHESIONS;  Surgeon: Kris Charles MD;  Location: [facility] OR     TUMOR REMOVAL      Has had 3. In the right breast and \"inside of rib cage.\"      Current Outpatient Medications   Medication Sig Dispense Refill     doxylamine (UNISOM) 25 MG TABS tablet Take 1 tablet (25 mg) by mouth nightly as needed for other (nausea) 7 tablet 1     oxyCODONE (ROXICODONE) 5 MG tablet Take 1 tablet (5 mg) by mouth every 4 hours as needed for severe pain (7-10) 42 tablet 0     sucralfate (CARAFATE) 1 GM/10ML suspension Take 10 mLs (1 g) by mouth 4 times daily as needed for nausea 414 mL 0     ALPRAZolam (XANAX) 2 MG tablet Take 1 tablet (2 mg) by mouth 3 times daily as needed for anxiety 90 tablet 1     cetirizine (ZYRTEC) 10 MG tablet Take 1 tablet (10 mg) by mouth daily (Patient taking differently: Take 10 mg by mouth daily as needed) 30 tablet 11     enoxaparin " "ANTICOAGULANT (LOVENOX) 40 MG/0.4ML syringe Inject 0.4 mLs (40 mg) Subcutaneous every 24 hours for 21 days 8.4 mL 0     fluticasone (FLONASE) 50 MCG/ACT nasal spray Spray 2 sprays into both nostrils daily (Patient taking differently: Spray 2 sprays into both nostrils daily as needed) 9.9 mL 11     montelukast (SINGULAIR) 10 MG tablet Take 1 tablet (10 mg) by mouth At Bedtime 30 tablet 11     naloxone (NARCAN) 4 MG/0.1ML nasal spray Spray 1 spray (4 mg) into one nostril alternating nostrils as needed for opioid reversal every 2-3 minutes until assistance arrives 0.2 mL 1     ondansetron (ZOFRAN ODT) 4 MG ODT tab Take 1 tablet (4 mg) by mouth every 8 hours as needed for nausea 10 tablet 1     PROAIR  (90 Base) MCG/ACT inhaler Inhale 2 puffs into the lungs every 4 hours as needed for shortness of breath / dyspnea or wheezing 8 g 11     prochlorperazine (COMPAZINE) 10 MG tablet Take 1 tablet (10 mg) by mouth every 6 hours as needed for nausea or vomiting 40 tablet 1     spacer (OPTICHAMBER BINA) holding chamber For use w/ rescue inhaler 1 each 0     tiotropium (SPIRIVA RESPIMAT) 1.25 MCG/ACT inhaler Inhale 2 puffs into the lungs daily 12 g 3      Allergies   Allergen Reactions     Lidocaine Other (See Comments)     \"my jaw stopped moving\"  Other reaction(s): Dystonia     Penicillins Hives, Rash and Shortness Of Breath     Epinephrine-Lidocaine-Na Metabisulfite Other (See Comments) and Muscle Pain (Myalgia)     Severe jaw cramping, double vision  Jaw locking        ROS:   Gen- no weight change, no fevers/chills   Cardiac - no palpitations, no chest pain   Respiratory - no shortness of breath , no wheezing   GI - see HPI  Urinary - no dysuria, no polyuria   Remainder of ROS negative.     Exam:   LMP  (LMP Unknown)    Alert, anxious, mild distress       "

## 2023-01-31 ENCOUNTER — HOSPITAL ENCOUNTER (EMERGENCY)
Facility: CLINIC | Age: 48
Discharge: LEFT BEFORE DISCHARGE | End: 2023-01-31
Attending: EMERGENCY MEDICINE | Admitting: EMERGENCY MEDICINE
Payer: MEDICAID

## 2023-01-31 ENCOUNTER — APPOINTMENT (OUTPATIENT)
Dept: CT IMAGING | Facility: CLINIC | Age: 48
End: 2023-01-31
Attending: EMERGENCY MEDICINE
Payer: MEDICAID

## 2023-01-31 VITALS
TEMPERATURE: 97.8 F | WEIGHT: 247 LBS | HEART RATE: 108 BPM | OXYGEN SATURATION: 99 % | DIASTOLIC BLOOD PRESSURE: 90 MMHG | RESPIRATION RATE: 22 BRPM | BODY MASS INDEX: 36.48 KG/M2 | SYSTOLIC BLOOD PRESSURE: 132 MMHG

## 2023-01-31 DIAGNOSIS — R11.0 NAUSEA: ICD-10-CM

## 2023-01-31 DIAGNOSIS — R10.12 LUQ ABDOMINAL PAIN: ICD-10-CM

## 2023-01-31 LAB
ALBUMIN SERPL-MCNC: 3.6 G/DL (ref 3.5–5)
ALP SERPL-CCNC: 181 U/L (ref 45–120)
ALT SERPL W P-5'-P-CCNC: 36 U/L (ref 0–45)
ANION GAP SERPL CALCULATED.3IONS-SCNC: 10 MMOL/L (ref 5–18)
AST SERPL W P-5'-P-CCNC: 33 U/L (ref 0–40)
BASOPHILS # BLD AUTO: 0.1 10E3/UL (ref 0–0.2)
BASOPHILS NFR BLD AUTO: 1 %
BILIRUB DIRECT SERPL-MCNC: 0.1 MG/DL
BILIRUB SERPL-MCNC: 0.4 MG/DL (ref 0–1)
BUN SERPL-MCNC: 9 MG/DL (ref 8–22)
CALCIUM SERPL-MCNC: 9.7 MG/DL (ref 8.5–10.5)
CHLORIDE BLD-SCNC: 108 MMOL/L (ref 98–107)
CO2 SERPL-SCNC: 23 MMOL/L (ref 22–31)
CREAT SERPL-MCNC: 0.74 MG/DL (ref 0.6–1.1)
EOSINOPHIL # BLD AUTO: 0.8 10E3/UL (ref 0–0.7)
EOSINOPHIL NFR BLD AUTO: 11 %
ERYTHROCYTE [DISTWIDTH] IN BLOOD BY AUTOMATED COUNT: 12.5 % (ref 10–15)
GFR SERPL CREATININE-BSD FRML MDRD: >90 ML/MIN/1.73M2
GLUCOSE BLD-MCNC: 84 MG/DL (ref 70–125)
HCT VFR BLD AUTO: 37 % (ref 35–47)
HGB BLD-MCNC: 12.3 G/DL (ref 11.7–15.7)
IMM GRANULOCYTES # BLD: 0 10E3/UL
IMM GRANULOCYTES NFR BLD: 0 %
LACTATE SERPL-SCNC: 1.2 MMOL/L (ref 0.7–2)
LIPASE SERPL-CCNC: 17 U/L (ref 0–52)
LYMPHOCYTES # BLD AUTO: 1.9 10E3/UL (ref 0.8–5.3)
LYMPHOCYTES NFR BLD AUTO: 28 %
MAGNESIUM SERPL-MCNC: 1.9 MG/DL (ref 1.8–2.6)
MCH RBC QN AUTO: 29.6 PG (ref 26.5–33)
MCHC RBC AUTO-ENTMCNC: 33.2 G/DL (ref 31.5–36.5)
MCV RBC AUTO: 89 FL (ref 78–100)
MONOCYTES # BLD AUTO: 0.4 10E3/UL (ref 0–1.3)
MONOCYTES NFR BLD AUTO: 7 %
NEUTROPHILS # BLD AUTO: 3.5 10E3/UL (ref 1.6–8.3)
NEUTROPHILS NFR BLD AUTO: 53 %
NRBC # BLD AUTO: 0 10E3/UL
NRBC BLD AUTO-RTO: 0 /100
PLATELET # BLD AUTO: 362 10E3/UL (ref 150–450)
POTASSIUM BLD-SCNC: 4.1 MMOL/L (ref 3.5–5)
PROT SERPL-MCNC: 7.7 G/DL (ref 6–8)
RBC # BLD AUTO: 4.16 10E6/UL (ref 3.8–5.2)
SODIUM SERPL-SCNC: 141 MMOL/L (ref 136–145)
WBC # BLD AUTO: 6.7 10E3/UL (ref 4–11)

## 2023-01-31 PROCEDURE — 96375 TX/PRO/DX INJ NEW DRUG ADDON: CPT

## 2023-01-31 PROCEDURE — 74177 CT ABD & PELVIS W/CONTRAST: CPT

## 2023-01-31 PROCEDURE — 258N000003 HC RX IP 258 OP 636: Performed by: EMERGENCY MEDICINE

## 2023-01-31 PROCEDURE — 96374 THER/PROPH/DIAG INJ IV PUSH: CPT | Mod: 59

## 2023-01-31 PROCEDURE — 99285 EMERGENCY DEPT VISIT HI MDM: CPT | Mod: 25

## 2023-01-31 PROCEDURE — 96361 HYDRATE IV INFUSION ADD-ON: CPT

## 2023-01-31 PROCEDURE — 83690 ASSAY OF LIPASE: CPT | Performed by: EMERGENCY MEDICINE

## 2023-01-31 PROCEDURE — 36415 COLL VENOUS BLD VENIPUNCTURE: CPT | Performed by: EMERGENCY MEDICINE

## 2023-01-31 PROCEDURE — 82248 BILIRUBIN DIRECT: CPT | Performed by: EMERGENCY MEDICINE

## 2023-01-31 PROCEDURE — 999N000127 HC STATISTIC PERIPHERAL IV START W US GUIDANCE

## 2023-01-31 PROCEDURE — 83735 ASSAY OF MAGNESIUM: CPT | Performed by: EMERGENCY MEDICINE

## 2023-01-31 PROCEDURE — 999N000203 HC STATISTICAL VASC ACCESS NURSE TIME, 16-31 MINUTES

## 2023-01-31 PROCEDURE — 250N000011 HC RX IP 250 OP 636: Performed by: EMERGENCY MEDICINE

## 2023-01-31 PROCEDURE — 999N000285 HC STATISTIC VASC ACCESS LAB DRAW WITH PIV START

## 2023-01-31 PROCEDURE — 83605 ASSAY OF LACTIC ACID: CPT | Performed by: EMERGENCY MEDICINE

## 2023-01-31 PROCEDURE — 85004 AUTOMATED DIFF WBC COUNT: CPT | Performed by: EMERGENCY MEDICINE

## 2023-01-31 RX ORDER — MORPHINE SULFATE 4 MG/ML
4 INJECTION, SOLUTION INTRAMUSCULAR; INTRAVENOUS ONCE
Status: COMPLETED | OUTPATIENT
Start: 2023-01-31 | End: 2023-01-31

## 2023-01-31 RX ORDER — IOPAMIDOL 755 MG/ML
90 INJECTION, SOLUTION INTRAVASCULAR ONCE
Status: COMPLETED | OUTPATIENT
Start: 2023-01-31 | End: 2023-01-31

## 2023-01-31 RX ORDER — ONDANSETRON 2 MG/ML
4 INJECTION INTRAMUSCULAR; INTRAVENOUS ONCE
Status: DISCONTINUED | OUTPATIENT
Start: 2023-01-31 | End: 2023-01-31

## 2023-01-31 RX ORDER — ONDANSETRON 2 MG/ML
8 INJECTION INTRAMUSCULAR; INTRAVENOUS ONCE
Status: COMPLETED | OUTPATIENT
Start: 2023-01-31 | End: 2023-01-31

## 2023-01-31 RX ADMIN — MORPHINE SULFATE 4 MG: 4 INJECTION, SOLUTION INTRAMUSCULAR; INTRAVENOUS at 15:34

## 2023-01-31 RX ADMIN — PROCHLORPERAZINE EDISYLATE 10 MG: 5 INJECTION INTRAMUSCULAR; INTRAVENOUS at 16:00

## 2023-01-31 RX ADMIN — SODIUM CHLORIDE 1000 ML: 9 INJECTION, SOLUTION INTRAVENOUS at 15:33

## 2023-01-31 RX ADMIN — IOPAMIDOL 90 ML: 755 INJECTION, SOLUTION INTRAVENOUS at 16:21

## 2023-01-31 ASSESSMENT — ACTIVITIES OF DAILY LIVING (ADL): ADLS_ACUITY_SCORE: 33

## 2023-01-31 NOTE — ED TRIAGE NOTES
Pt presents to the ED with c/o of post op pain and vomiting. Pt was seen for surgical interventions due to tumor removal. Pt reports vomiting and in pain.      Triage Assessment     Row Name 01/31/23 1308       Triage Assessment (Adult)    Airway WDL WDL       Respiratory WDL    Respiratory WDL WDL       Skin Circulation/Temperature WDL    Skin Circulation/Temperature WDL WDL       Cardiac WDL    Cardiac WDL WDL       Peripheral/Neurovascular WDL    Peripheral Neurovascular WDL WDL       Cognitive/Neuro/Behavioral WDL    Cognitive/Neuro/Behavioral WDL WDL       Eb Coma Scale    Best Eye Response 4-->(E4) spontaneous    Best Motor Response 6-->(M6) obeys commands    Best Verbal Response 5-->(V5) oriented    [place] Coma Scale Score 15

## 2023-01-31 NOTE — ED NOTES
RN went in to the room and Karoline stated she wanted to go AMA, as her son is having an asthma attack at basketball practice. MD made aware. The IV was removed. The patient was ambulatory and feeling better.

## 2023-01-31 NOTE — DISCHARGE INSTRUCTIONS
Your blood work looked normal.  The results of the CT scan is still pending at the time of this dictation.  You have chosen to leave AMA prior to the results of this.  Follow-up with your surgeon.

## 2023-01-31 NOTE — TELEPHONE ENCOUNTER
Patient called back she has not heard back and something is wrong so she is going to [facility].

## 2023-01-31 NOTE — ED PROVIDER NOTES
EMERGENCY DEPARTMENT ENCOUNTER      NAME: Darlene Hudson  AGE: 47 year old female  YOB: 1975  MRN: [id number]  EVALUATION DATE & TIME: 1/31/2023  2:08 PM    PCP: Robbie Bailon    ED PROVIDER: Sophie Mercer MD    Chief Complaint   Patient presents with     Abdominal Pain     Vomiting     Post-op Problem         FINAL IMPRESSION:  1. LUQ abdominal pain    2. Nausea          ED COURSE & MEDICAL DECISION MAKING:    Pertinent Labs & Imaging studies reviewed. (See chart for details)  47 year old female with history of alcohol abuse and colon cancer status post partial colectomy at the splenic flexure on 1/6/2023 who presents to the Emergency Department for evaluation of ongoing nausea vomiting decreased oral intake ever since her surgery.  New worsening left upper quadrant abdominal pain and fever at home today.  Differential includes viral syndrome, GERD, gastritis, pancreatitis or hepatobiliary pathology, postoperative infection or abscess, anastomotic leak.    Patient placed on monitor, IV established and blood obtained.  Given 1 L normal saline bolus, Compazine, morphine.  CBC, BMP, LFTs, lipase, magnesium unremarkable except for chronically elevated alk phos at 181.  We were awaiting the results of her CT abdomen and pelvis when patient informed nursing that her son was having an asthma attack and she needed to leave AMA.  Patient had the ability/capacity make this decision.  Patient encouraged to follow-up with her PCP and/or surgeon.    Ultimately her CT resulted really unremarkable with the exception of focally enlarged left external iliac and ashanti hepatis node and tiny nodular area of soft tissue along the anterior peritoneal reflection likely postoperative.      ED Course as of 01/31/23 2114   Tue Jan 31, 2023   1413 Pulse: 108   1602 Alkaline Phosphatase(!): 181       Medical Decision Making    History:    Supplemental history from: N/A    External Record(s) reviewed: Inpatient Record: M  [facility]    Work Up:    Chart documentation includes differential considered and any EKGs or imaging independently interpreted by provider, where specified.    In additional to work up documented, I considered the following work up: Documented in chart, if applicable.  2:26 PM I met with the patient, obtained history, performed an initial exam, and discussed options and plan for diagnostics and treatment here in the ED.      External consultation:    Discussion of management with another provider: Documented in chart, if applicable    Complicating factors:    Care impacted by chronic illness: Cancer/Chemotherapy    Care affected by social determinants of health: Alcohol Abuse and/or Recreational Drug Use    Disposition considerations: Discharge. I recommended the patient continue their current prescription strength medication(s): Oxycodone as needed. I considered admission, but ultimately discharged patient Left AMA.        At the conclusion of the encounter I discussed the results of all of the tests and the disposition. The questions were answered. The patient or family acknowledged understanding and was agreeable with the care plan.      MEDICATIONS GIVEN IN THE EMERGENCY:  Medications   0.9% sodium chloride BOLUS (0 mLs Intravenous Stopped 1/31/23 1646)   morphine (PF) injection 4 mg (4 mg Intravenous Given 1/31/23 1534)   ondansetron (ZOFRAN) injection 8 mg (8 mg Intravenous Not Given 1/31/23 1440)   prochlorperazine (COMPAZINE) injection 10 mg (10 mg Intravenous Given 1/31/23 1600)   iopamidol (ISOVUE-370) solution 90 mL (90 mLs Intravenous Given 1/31/23 1621)       NEW PRESCRIPTIONS STARTED AT TODAY'S ER VISIT  Discharge Medication List as of 1/31/2023  4:48 PM             =================================================================    HPI    Patient information was obtained from: patient     Use of Intrepreter: N/A      Darlene Hudson is a 47 year old female with pertinent  "medical history of colon cancer, chronic infectious pericarditis, non inflammatory disorder of vagina, and nondependent alcohol abuse who presents with abdominal pain, nausea, and vomiting. The patient underwent a laparoscopic colon resection by Dr. Charles on 1/6/23. She was healing well until 1/27 when she began to have LLQ and periumbilical abdominal pain. She was sitting down at work when she became chilled, diaphoretic, lightheaded, nausea, and had weakness. She was prescribed percocet, compazine, and Zofran but the percocet \"does not touch the pain\" and she has been persistently nauseated with some vomiting despite anti-nausea medications. For the last 4 days since the recent onset of symptoms she has had difficulty sleeping and cannot get comfortable at night due to her abdominal pain and sleeps on the couch for extra cushion. She recorded a fever last night at 101 but her temperature was 98.7 this morning. She also adds that since the surgery she has had no appetite and has had some drainage from her surgery incision. Today, she has had continued abdominal pain, nausea, and vomiting. She took a percocet at 11:30 AM and Compazine at 7:30 AM.    She denies hematuria, dysuria, bowel habit changes, or any other complaints at this time.     REVIEW OF SYSTEMS  Constitutional:  Denies weight loss or weakness. Endorses chills, fever, diaphoresis, and decreased appetite.  GI:  Denies diarrhea. Positive for abdominal pain, nausea, and vomiting.  : Denies dysuria, denies hematuria  Musculoskeletal:  Denies any new muscle/joint pain, swelling or loss of function.  Neurologic:  Denies headache, focal weakness or sensory changes. Endorses lightheadedness and generalized weakness.    PAST MEDICAL HISTORY:  Past Medical History:   Diagnosis Date     Abdominal pain 06/29/2015     Abnormal cervical Papanicolaou smear 11/09/2014    Overview:  ACUS/HPV positive     Abnormal cytology finding 11/09/2014    Overview:  ACUS/HPV " positive     Acute pericardial effusion 02/06/2017     Agoraphobia with panic attacks      Anxiety      Arthralgia of both lower legs 05/29/2020    Bilateral achy knee pain that is chronic in nature going on for years. Most likely osteoarthritis in knees related to obesity. Previously injected in both knees with good relief. Injected last on 5/29/20     Arthritis     of back     Asthma in adult, moderate persistent, uncomplicated      Atopic rhinitis 01/27/2017     Chronic infectious pericarditis 02/21/2019     Chronic low back pain 01/27/2017     Chronic pain      Chronic sinusitis      Cocaine abuse (H)      Colonic mass 12/28/2022    Added automatically from request for surgery [id number]     Controlled substance agreement signed 06/30/2015    Overview:  Patient has chronic pain and is seen at [facility] for this.  Has controlled substance agreement with them.  On Vicodin, Valium, Klonopin prescribed only from there.        Coronary artery disease      Cough 02/09/2020     Family history of colon cancer 10/24/2020     Hx of seasonal allergies      Infection due to 2019 novel coronavirus 09/20/2022     Infectious pericarditis      Lipoma      Low back pain 07/13/2015     Major depressive disorder, recurrent episode, moderate (H) 09/05/2006     Menorrhagia with irregular cycle 07/15/2022    Added automatically from request for surgery [id number]     Moderate persistent asthma without complication 09/29/2020     Nondependent alcohol abuse, episodic drinking behavior 10/03/2012     Noninflammatory disorder of vagina 02/20/2015     Other chronic pain      Other long term (current) drug therapy 01/28/2013    Overview:  Vicodin and cyclobenzaprine monthly     Panic disorder with agoraphobia 09/05/2006     Pap smear for cervical cancer screening 10/31/2016    03/29/2010  Normal cytology, HPV ot done, repeat in 3 years.     Pericarditis 2017     Physiologic disturbance of temperature regulation 10/24/2020     PONV  "(postoperative nausea and vomiting)      Right ankle swelling 06/07/2022    Added automatically from request for surgery [id number]     Tobacco abuse      Tobacco use disorder 07/13/2015       PAST SURGICAL HISTORY:  Past Surgical History:   Procedure Laterality Date     ANKLE SURGERY Right 05/30/2019     BIOPSY BREAST Right 1990    benign     COLONOSCOPY N/A 1/3/2023    Procedure: COLONOSCOPY WITH BIOPSY OF COLON MASS, POLYPECTOMY;  Surgeon: Kris Charles MD;  Location: [facility]     CREATION PERICARDIAL WINDOW  02/10/2017    [facility]     DILATION AND CURETTAGE N/A 7/22/2022    Procedure: DILATION AND CURETTAGE, UTERUS;  Surgeon: Lesly Holland;  Location: [facility]     HEMORRHOIDECTOMY EXTERNAL       HYSTEROSCOPY, WITH ENDOMETRIAL RADIOFREQUENCY ABLATION - NOVASURE N/A 7/22/2022    Procedure: HYSTEROSCOPY, WITH ENDOMETRIAL RADIOFREQUENCY ABLATION - NOVASURE DILATION AND CURETTAGE, UTERUS;  Surgeon: Lesly Holland;  Location: [facility]     LAPAROSCOPIC ASSISTED SIGMOID COLECTOMY N/A 1/6/2023    Procedure: LAPAROSCOPIC ASSISTED DESCENDING COLELCTOMY SPLENIC FLEXURE MOBILIZATION ;  Surgeon: Kris Charles MD;  Location: [facility] OR     LAPAROSCOPIC LYSIS ADHESIONS N/A 1/6/2023    Procedure: LYSIS OF ADHESIONS;  Surgeon: Kris Charles MD;  Location: [facility] OR     TUMOR REMOVAL      Has had 3. In the right breast and \"inside of rib cage.\"       CURRENT MEDICATIONS:    Prior to Admission Medications   Prescriptions Last Dose Informant Patient Reported? Taking?   ALPRAZolam (XANAX) 2 MG tablet   No No   Sig: Take 1 tablet (2 mg) by mouth 3 times daily as needed for anxiety   PROAIR  (90 Base) MCG/ACT inhaler   No No   Sig: Inhale 2 puffs into the lungs every 4 hours as needed for shortness of breath / dyspnea or wheezing   cetirizine (ZYRTEC) 10 MG tablet   No No   Sig: Take 1 tablet (10 mg) by mouth daily   Patient taking differently: Take 10 " "mg by mouth daily as needed   diclofenac (VOLTAREN) 50 MG EC tablet   No No   Sig: Take 1 tablet (50 mg) by mouth 3 times daily as needed for moderate pain (4-6) Take with food and stop taking ibuprofen   enoxaparin ANTICOAGULANT (LOVENOX) 40 MG/0.4ML syringe   No No   Sig: Inject 0.4 mLs (40 mg) Subcutaneous every 24 hours for 21 days   fluticasone (FLONASE) 50 MCG/ACT nasal spray   No No   Sig: Spray 2 sprays into both nostrils daily   Patient taking differently: Spray 2 sprays into both nostrils daily as needed   montelukast (SINGULAIR) 10 MG tablet   No No   Sig: Take 1 tablet (10 mg) by mouth At Bedtime   naloxone (NARCAN) 4 MG/0.1ML nasal spray   No No   Sig: Spray 1 spray (4 mg) into one nostril alternating nostrils as needed for opioid reversal every 2-3 minutes until assistance arrives   ondansetron (ZOFRAN ODT) 4 MG ODT tab   No No   Sig: Take 1 tablet (4 mg) by mouth every 8 hours as needed for nausea   oxyCODONE-acetaminophen (PERCOCET) 5-325 MG tablet   No No   Sig: Take 2 tablets by mouth every 6 hours as needed for severe pain (7-10)   prochlorperazine (COMPAZINE) 10 MG tablet   No No   Sig: Take 1 tablet (10 mg) by mouth every 6 hours as needed for nausea or vomiting   spacer (OPTICHAMBER BINA) holding chamber   No No   Sig: For use w/ rescue inhaler   tiotropium (SPIRIVA RESPIMAT) 1.25 MCG/ACT inhaler   No No   Sig: Inhale 2 puffs into the lungs daily      Facility-Administered Medications: None       ALLERGIES:  Allergies   Allergen Reactions     Lidocaine Other (See Comments)     \"my jaw stopped moving\"  Other reaction(s): Dystonia     Penicillins Hives, Rash and Shortness Of Breath     Epinephrine-Lidocaine-Na Metabisulfite Other (See Comments) and Muscle Pain (Myalgia)     Severe jaw cramping, double vision  Jaw locking       FAMILY HISTORY:  Family History   Problem Relation Age of Onset     Hypertension Mother      Cancer Mother         cervical      Other Cancer Father         lung cancer "     Asthma Father      Diabetes Brother      Diabetes Brother      Breast Cancer Maternal Grandmother      Asthma Child        SOCIAL HISTORY:  Social History     Tobacco Use     Smoking status: Light Smoker     Packs/day: 0.10     Years: 30.00     Pack years: 3.00     Types: Cigarettes     Last attempt to quit: 2020     Years since quittin.6     Smokeless tobacco: Never     Tobacco comments:     2-3 cig/day, Seen IP by TTS on 23 - states she is done but declined our services and resource materials stating that she did not need them.   Vaping Use     Vaping Use: Some days   Substance Use Topics     Alcohol use: Not Currently     Comment: Occasiaonlly.      Drug use: Not Currently        VITALS:  Patient Vitals for the past 24 hrs:   BP Temp Temp src Pulse Resp SpO2 Weight   23 1306 (!) 132/90 97.8  F (36.6  C) Temporal 108 22 99 % 112 kg (247 lb)       PHYSICAL EXAM    General Appearance: Well-appearing, well-nourished, no acute distress  Head:  Normocephalic  Eyes: conjunctiva/corneas clear  ENT:  membranes are moist without pallor  Cardio:  Regular rate and rhythm  Pulm:  No respiratory distress  Back:  No CVA tenderness, normal ROM  Abdomen:  Soft, non distended,no rebound or guarding. Superior aspect of midline incision has nodular area to palpation. Left upper quadrant tenderness.  Extremities: Normal gait  Skin:  Skin warm, dry, no rashes  Neuro:  Alert and oriented ×3     RADIOLOGY/LABS:  Reviewed all pertinent imaging. Please see official radiology report. All pertinent labs reviewed and interpreted.    Results for orders placed or performed during the hospital encounter of 23   CT Abdomen Pelvis w Contrast    Impression    IMPRESSION:   1.  Expected postoperative changes about the colonic staple line in the left midabdomen as noted above with mild mesenteric stranding. No evidence of a bowel obstruction, abscess or anastomotic leak. Expected changes in the abdominal wall at port  sites.  2.  Amidst the stranding, there is a tiny nodular area of soft tissue along the anterior peritoneal reflection. This likely reflects a postoperative change and can be followed on subsequent studies to ensure its not something more worrisome.  3.  Focally enlarged left external iliac node and slightly plump node in the ashanti hepatis are unchanged and of uncertain significance.  4.  Other noncritical findings as noted above.   Basic metabolic panel   Result Value Ref Range    Sodium 141 136 - 145 mmol/L    Potassium 4.1 3.5 - 5.0 mmol/L    Chloride 108 (H) 98 - 107 mmol/L    Carbon Dioxide (CO2) 23 22 - 31 mmol/L    Anion Gap 10 5 - 18 mmol/L    Urea Nitrogen 9 8 - 22 mg/dL    Creatinine 0.74 0.60 - 1.10 mg/dL    Calcium 9.7 8.5 - 10.5 mg/dL    Glucose 84 70 - 125 mg/dL    GFR Estimate >90 >60 mL/min/1.73m2   Result Value Ref Range    Magnesium 1.9 1.8 - 2.6 mg/dL   Result Value Ref Range    Lipase 17 0 - 52 U/L   Hepatic function panel   Result Value Ref Range    Bilirubin Total 0.4 0.0 - 1.0 mg/dL    Bilirubin Direct 0.1 <=0.5 mg/dL    Protein Total 7.7 6.0 - 8.0 g/dL    Albumin 3.6 3.5 - 5.0 g/dL    Alkaline Phosphatase 181 (H) 45 - 120 U/L    AST 33 0 - 40 U/L    ALT 36 0 - 45 U/L   CBC with platelets and differential   Result Value Ref Range    WBC Count 6.7 4.0 - 11.0 10e3/uL    RBC Count 4.16 3.80 - 5.20 10e6/uL    Hemoglobin 12.3 11.7 - 15.7 g/dL    Hematocrit 37.0 35.0 - 47.0 %    MCV 89 78 - 100 fL    MCH 29.6 26.5 - 33.0 pg    MCHC 33.2 31.5 - 36.5 g/dL    RDW 12.5 10.0 - 15.0 %    Platelet Count 362 150 - 450 10e3/uL    % Neutrophils 53 %    % Lymphocytes 28 %    % Monocytes 7 %    % Eosinophils 11 %    % Basophils 1 %    % Immature Granulocytes 0 %    NRBCs per 100 WBC 0 <1 /100    Absolute Neutrophils 3.5 1.6 - 8.3 10e3/uL    Absolute Lymphocytes 1.9 0.8 - 5.3 10e3/uL    Absolute Monocytes 0.4 0.0 - 1.3 10e3/uL    Absolute Eosinophils 0.8 (H) 0.0 - 0.7 10e3/uL    Absolute Basophils 0.1 0.0 - 0.2  10e3/uL    Absolute Immature Granulocytes 0.0 <=0.4 10e3/uL    Absolute NRBCs 0.0 10e3/uL   Lactic acid whole blood   Result Value Ref Range    Lactic Acid 1.2 0.7 - 2.0 mmol/L       The creation of this record is based on the scribe s observations of the work being performed by Sophie Mercer MD and the provider s statements to them. It was created on her behalf by Jan Orourke, a trained medical scribe. This document has been checked and approved by the attending provider.    Sophie Mercer MD  Emergency Medicine  [facility] EMERGENCY ROOM  [address] [address] [phone]  Dept: [phone]     Sophie Mercer MD  01/31/23 0342

## 2023-02-01 ENCOUNTER — PATIENT OUTREACH (OUTPATIENT)
Dept: CARE COORDINATION | Facility: CLINIC | Age: 48
End: 2023-02-01

## 2023-02-01 ENCOUNTER — VIRTUAL VISIT (OUTPATIENT)
Dept: FAMILY MEDICINE | Facility: CLINIC | Age: 48
End: 2023-02-01
Payer: MEDICAID

## 2023-02-01 DIAGNOSIS — R10.9 ACUTE ABDOMINAL PAIN: ICD-10-CM

## 2023-02-01 DIAGNOSIS — K63.89 COLONIC MASS: ICD-10-CM

## 2023-02-01 DIAGNOSIS — R11.2 PONV (POSTOPERATIVE NAUSEA AND VOMITING): Primary | ICD-10-CM

## 2023-02-01 DIAGNOSIS — Z98.890 PONV (POSTOPERATIVE NAUSEA AND VOMITING): Primary | ICD-10-CM

## 2023-02-01 PROCEDURE — 99213 OFFICE O/P EST LOW 20 MIN: CPT | Mod: 95 | Performed by: FAMILY MEDICINE

## 2023-02-01 RX ORDER — OXYCODONE HYDROCHLORIDE 5 MG/1
5 TABLET ORAL EVERY 4 HOURS PRN
Qty: 42 TABLET | Refills: 0 | Status: SHIPPED | OUTPATIENT
Start: 2023-02-01 | End: 2023-02-08

## 2023-02-01 RX ORDER — OXYCODONE AND ACETAMINOPHEN 5; 325 MG/1; MG/1
2 TABLET ORAL EVERY 6 HOURS PRN
Qty: 36 TABLET | Status: CANCELLED | OUTPATIENT
Start: 2023-02-01

## 2023-02-01 RX ORDER — SUCRALFATE ORAL 1 G/10ML
1 SUSPENSION ORAL 4 TIMES DAILY PRN
Qty: 414 ML | Refills: 0 | Status: SHIPPED | OUTPATIENT
Start: 2023-02-01 | End: 2024-07-23

## 2023-02-01 NOTE — TELEPHONE ENCOUNTER
"I spoke with Karoline and discussed how she is feeling today after the ED. She does admit to feeling slightly better in regards to nausea. She still has complaint of left lower abdominal pain. She describes it as a persistent \"pulling sensation\" that is worse with movements and sitting up for a prolonged time. Her appetite is still poor, but she trying to eat a bland diet. She has had regular bowel movements. Her other main concern is fatigue and difficulty with motivation to do things. She cancelled her appt with Oncology yesterday (Dr. Valdez) and I encouraged her to re-schedule this today. She verbalized understanding.     Pt has a follow up with her PCP later this afternoon. She has questions about returning to work, as she has an active and physical job in security.   "

## 2023-02-01 NOTE — PROGRESS NOTES
Clinic Care Coordination Contact    Follow Up Progress Note      Assessment: The pt was recently in the ED, I called to check up on the pt and help the pt setup a ED follow up. The pt was at [facility] for abdominal pain, vomiting, post-op problem. I called and talked to the pt, pt stated that she is doing ok. Pt stated that she has a telephone visit with  today at 4:20pm.    Care Gaps:    Health Maintenance Due   Topic Date Due     DEPRESSION ACTION PLAN  Never done     HEPATITIS B IMMUNIZATION (1 of 3 - 3-dose series) Never done     Pneumococcal Vaccine: Pediatrics (0 to 5 Years) and At-Risk Patients (6 to 64 Years) (2 - PCV) 10/15/2015     ASTHMA ACTION PLAN  01/31/2021     SPIROMETRY  03/03/2021           Care Plans      Intervention/Education provided during outreach:               Plan:     Care Coordinator will follow up in

## 2023-02-01 NOTE — LETTER
WORK MEDICAL NOTE  2023         Patient:  Darlene Hudson  :   1975  MRN:     [id number]  Physician: JULIETH BAILON    Darlene Hudson may not return to work until her 6-week post-op check on 3/1/2023. She will subsequently be cleared by myself and her surgeon.      The next clinic appointment is scheduled for (date/time) 23.          Julieth Bailon MD  2023  5:13 PM

## 2023-02-01 NOTE — PROGRESS NOTES
Family Medicine Telephone Visit Note  Chief Complaint   Patient presents with     RECHECK     Pt states she feels horrible pain on L side. Surgeon called her back with normal results. Scar tissue is what was causing pain. Nausea, loss of appetite     Refill Request     Oxycodone w/no tylenol per Surgeon.     Letter for School/Work     Updated work letter            HPI   Patients name: Karoline  Appointment start time:  4:55 PM    See my note      Current Outpatient Medications   Medication Sig Dispense Refill     doxylamine (UNISOM) 25 MG TABS tablet Take 1 tablet (25 mg) by mouth nightly as needed for other (nausea) 7 tablet 1     oxyCODONE (ROXICODONE) 5 MG tablet Take 1 tablet (5 mg) by mouth every 4 hours as needed for severe pain (7-10) 42 tablet 0     sucralfate (CARAFATE) 1 GM/10ML suspension Take 10 mLs (1 g) by mouth 4 times daily as needed for nausea 414 mL 0     ALPRAZolam (XANAX) 2 MG tablet Take 1 tablet (2 mg) by mouth 3 times daily as needed for anxiety 90 tablet 1     cetirizine (ZYRTEC) 10 MG tablet Take 1 tablet (10 mg) by mouth daily (Patient taking differently: Take 10 mg by mouth daily as needed) 30 tablet 11     enoxaparin ANTICOAGULANT (LOVENOX) 40 MG/0.4ML syringe Inject 0.4 mLs (40 mg) Subcutaneous every 24 hours for 21 days 8.4 mL 0     fluticasone (FLONASE) 50 MCG/ACT nasal spray Spray 2 sprays into both nostrils daily (Patient taking differently: Spray 2 sprays into both nostrils daily as needed) 9.9 mL 11     montelukast (SINGULAIR) 10 MG tablet Take 1 tablet (10 mg) by mouth At Bedtime 30 tablet 11     naloxone (NARCAN) 4 MG/0.1ML nasal spray Spray 1 spray (4 mg) into one nostril alternating nostrils as needed for opioid reversal every 2-3 minutes until assistance arrives 0.2 mL 1     ondansetron (ZOFRAN ODT) 4 MG ODT tab Take 1 tablet (4 mg) by mouth every 8 hours as needed for nausea 10 tablet 1     PROAIR  (90 Base) MCG/ACT inhaler Inhale 2 puffs into the lungs every 4 hours as  "needed for shortness of breath / dyspnea or wheezing 8 g 11     prochlorperazine (COMPAZINE) 10 MG tablet Take 1 tablet (10 mg) by mouth every 6 hours as needed for nausea or vomiting 40 tablet 1     spacer (OPTICHAMBER BINA) holding chamber For use w/ rescue inhaler 1 each 0     tiotropium (SPIRIVA RESPIMAT) 1.25 MCG/ACT inhaler Inhale 2 puffs into the lungs daily 12 g 3     Allergies   Allergen Reactions     Lidocaine Other (See Comments)     \"my jaw stopped moving\"  Other reaction(s): Dystonia     Penicillins Hives, Rash and Shortness Of Breath     Epinephrine-Lidocaine-Na Metabisulfite Other (See Comments) and Muscle Pain (Myalgia)     Severe jaw cramping, double vision  Jaw locking              Review of Systems:     Constitutional, HEENT, cardiovascular, pulmonary, gi and gu systems are negative, except as otherwise noted.         Physical Exam:     LMP  (LMP Unknown)   Estimated body mass index is 36.48 kg/m  as calculated from the following:    Height as of 1/6/23: 1.753 m (5' 9\").    Weight as of 1/31/23: 112 kg (247 lb).    Exam:  Constitutional: alert, mild distress and cooperative  Psychiatric: mentation appears normal and anxious          Assessment and Plan       ICD-10-CM    1. PONV (postoperative nausea and vomiting)  R11.2 sucralfate (CARAFATE) 1 GM/10ML suspension    Z98.890 doxylamine (UNISOM) 25 MG TABS tablet      2. Colonic mass  K63.89 oxyCODONE (ROXICODONE) 5 MG tablet      3. Acute abdominal pain  R10.9 oxyCODONE (ROXICODONE) 5 MG tablet          Refilled medications that would be required in the next 3 months.     After Visit Information:  Patient chose to view AVS via [employer]    Return in about 1 week (around 2/8/2023).    Appointment end time: 5:13PM  This is a telephone visit that took 18 minutes.      Clinician location:  [facility]     Robbie Bailon MD  February 3, 2023  9:03 AM    "

## 2023-02-07 ENCOUNTER — ONCOLOGY VISIT (OUTPATIENT)
Dept: ONCOLOGY | Facility: HOSPITAL | Age: 48
End: 2023-02-07
Attending: SURGERY
Payer: MEDICAID

## 2023-02-07 VITALS
DIASTOLIC BLOOD PRESSURE: 75 MMHG | RESPIRATION RATE: 16 BRPM | HEART RATE: 112 BPM | BODY MASS INDEX: 34.8 KG/M2 | OXYGEN SATURATION: 98 % | WEIGHT: 235 LBS | HEIGHT: 69 IN | SYSTOLIC BLOOD PRESSURE: 115 MMHG

## 2023-02-07 DIAGNOSIS — C18.5 MALIGNANT NEOPLASM OF SPLENIC FLEXURE (H): ICD-10-CM

## 2023-02-07 DIAGNOSIS — C18.6 ADENOCARCINOMA OF DESCENDING COLON (H): Primary | ICD-10-CM

## 2023-02-07 DIAGNOSIS — R59.0 INGUINAL LYMPHADENOPATHY: ICD-10-CM

## 2023-02-07 PROCEDURE — G0463 HOSPITAL OUTPT CLINIC VISIT: HCPCS | Performed by: INTERNAL MEDICINE

## 2023-02-07 PROCEDURE — 99205 OFFICE O/P NEW HI 60 MIN: CPT | Performed by: INTERNAL MEDICINE

## 2023-02-07 ASSESSMENT — PAIN SCALES - GENERAL: PAINLEVEL: MODERATE PAIN (5)

## 2023-02-07 NOTE — PROGRESS NOTES
[facility] Hematology and Oncology Consult Note      Patient: Darlene Hudson  MRN: [id number]  Date of Service: Feb 7, 2023      We have been asked by Dr. Charles to evaluate Darlene Hudson for colon cancer    Assessment/Plan:    1.  Adenocarcinoma of the descending colon: She has a stage I colon cancer.  This was reviewed with the patient.  We talked about a low risk of recurrence and subsequent lack of benefit from adjuvant chemotherapy.  We will have her come back in 3 months with repeat imaging to establish a posttreatment baseline.  She does have a significant family history of cancer and will review her case with cancer genetics.  We discussed screening recommendations for her children.  Multiple questions were answered.    2.  Left external iliac lymph node seen on imaging: 18 x 10 cm on CT scan of the abdomen from January 31, 2023.  There was moderate follicular lymphoid hyperplasia noted in some of her mesenteric lymph nodes on surgery.  We can follow these inguinal nodes radiographically and consider biopsy if they enlarge over time.    ECOG Performance  0    Staging History:    Cancer Staging   No matching staging information was found for the patient.    History:    Karoline is a 47-year-old woman who has a recently been diagnosed with colon cancer.  She initially presented to the emergency room on December 25 with abdominal pain.  Imaging at that time showed a short segment of annular wall thickening in the proximal descending colon.  Chest CT was also done which showed no evidence of pulmonary embolism.  She followed up a few days later as an outpatient with surgery.  She was taken to the operating room on January 6 and underwent a laparoscopic assisted transverse and descending segmental colectomy.  She was discharged on January 14.  Surgical pathology revealed an invasive moderately differentiated adenocarcinoma.  47 x 43.14 mm tumor invading into, but not through, the muscularis propria.   Margins negative.  48 nodes taken, all negative.  Today she is feeling better.  Appetite is good.  Bowel habits are regular.  No abdominal pain.    Past History:    Past Medical History:   Diagnosis Date     Abdominal pain 06/29/2015     Abnormal cervical Papanicolaou smear 11/09/2014    Overview:  ACUS/HPV positive     Abnormal cytology finding 11/09/2014    Overview:  ACUS/HPV positive     Acute pericardial effusion 02/06/2017     Agoraphobia with panic attacks      Anxiety      Arthralgia of both lower legs 05/29/2020    Bilateral achy knee pain that is chronic in nature going on for years. Most likely osteoarthritis in knees related to obesity. Previously injected in both knees with good relief. 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  Has controlled substance agreement with them.  On Vicodin, Valium, Klonopin prescribed only from there.        Coronary artery disease      Cough 02/09/2020     Family history of colon cancer 10/24/2020     Hx of seasonal allergies      Infection due to 2019 novel coronavirus 09/20/2022     Infectious pericarditis      Lipoma      Low back pain 07/13/2015     Major depressive disorder, recurrent episode, moderate (H) 09/05/2006     Menorrhagia with irregular cycle 07/15/2022    Added automatically from request for surgery [id number]     Moderate persistent asthma without complication 09/29/2020     Nondependent alcohol abuse, episodic drinking behavior 10/03/2012     Noninflammatory disorder of vagina 02/20/2015     Other  chronic pain      Other long term (current) drug therapy 2013    Overview:  Vicodin and cyclobenzaprine monthly     Panic disorder with agoraphobia 2006     Pap smear for cervical cancer screening 10/31/2016    2010  Normal cytology, HPV ot done, repeat in 3 years.     Pericarditis 2017     Physiologic disturbance of temperature regulation 10/24/2020     PONV (postoperative nausea and vomiting)      Right ankle swelling 2022    Added automatically from request for surgery [id number]     Tobacco abuse      Tobacco use disorder 2015    Family History   Problem Relation Age of Onset     Hypertension Mother      Cancer Mother         cervical      Other Cancer Father         lung cancer     Asthma Father      Diabetes Brother      Diabetes Brother      Breast Cancer Maternal Grandmother      Asthma Child       [unfilled] Social History     Socioeconomic History     Marital status:      Spouse name: Not on file     Number of children: Not on file     Years of education: Not on file     Highest education level: Not on file   Occupational History     Not on file   Tobacco Use     Smoking status: Light Smoker     Packs/day: 0.10     Years: 30.00     Pack years: 3.00     Types: Cigarettes     Last attempt to quit: 2020     Years since quittin.6     Smokeless tobacco: Never     Tobacco comments:     2-3 cig/day, Seen IP by TTS on 23 - states she is done but declined our services and resource materials stating that she did not need them.   Vaping Use     Vaping Use: Some days   Substance and Sexual Activity     Alcohol use: Not Currently     Comment: Occasiaonlly.      Drug use: Not Currently     Sexual activity: Yes     Partners: Male   Other Topics Concern     Parent/sibling w/ CABG, MI or angioplasty before 65F 55M? Not Asked   Social History Narrative     Not on file     Social Determinants of Health     Financial Resource Strain: Not on file   Food Insecurity: Not on file  "  Transportation Needs: Not on file   Physical Activity: Not on file   Stress: Not on file   Social Connections: Not on file   Intimate Partner Violence: Not on file   Housing Stability: Not on file        Allergies:    Allergies   Allergen Reactions     Lidocaine Other (See Comments)     \"my jaw stopped moving\"  Other reaction(s): Dystonia     Penicillins Hives, Rash and Shortness Of Breath     Epinephrine-Lidocaine-Na Metabisulfite Other (See Comments) and Muscle Pain (Myalgia)     Severe jaw cramping, double vision  Jaw locking     Review of Systems:    As above in the history.     Review of Systems otherwise Negative for:  General: chills, fever or night sweats  Psychological: anxiety or depression  Ophthalmic: blurry vision, double vision or loss of vision, vision change  ENT: epistaxis, oral lesions, hearing changes  Hematological and Lymphatic: bleeding, bruising, jaundice, swollen lymph nodes  Endocrine: hot flashes, unexpected weight changes  Respiratory: cough, hemoptysis, orthopnea or shortness of breath/ROPER  Cardiovascular: chest pain, edema, palpitations or PND  Gastrointestinal: abdominal pain, blood in stools, change in bowel habits, constipation, diarrhea or nausea/vomiting  Genito-Urinary: change in urinary stream, incontinence, frequency/urgency  Musculoskeletal: joint pain, stiffness, swelling, muscle pain  Neurological: dizziness, headaches, numbness/tingling  Dermatological: lumps and rash    Physical Exam:    /75   Pulse 112   Resp 16   Ht 1.753 m (5' 9\")   Wt 106.6 kg (235 lb)   LMP  (LMP Unknown)   SpO2 98%   BMI 34.70 kg/m      General: patient appears stated age of 47 year old. Nontoxic and in no distress.   HEENT: Head: atraumatic, normocephalic. Sclerae anicteric.  Chest:  Normal respiratory effort  Cardiac:  No edema.   Abdomen: abdomen is non-distended  Extremities: normal tone and muscle bulk.   Skin: no lesions or rash on visible skin. Warm and dry.   CNS: alert and " oriented. Grossly non-focal.   Psychiatric: normal mood and affect.     Lab Results:    Recent Results (from the past 168 hour(s))   Basic metabolic panel   Result Value Ref Range    Sodium 141 136 - 145 mmol/L    Potassium 4.1 3.5 - 5.0 mmol/L    Chloride 108 (H) 98 - 107 mmol/L    Carbon Dioxide (CO2) 23 22 - 31 mmol/L    Anion Gap 10 5 - 18 mmol/L    Urea Nitrogen 9 8 - 22 mg/dL    Creatinine 0.74 0.60 - 1.10 mg/dL    Calcium 9.7 8.5 - 10.5 mg/dL    Glucose 84 70 - 125 mg/dL    GFR Estimate >90 >60 mL/min/1.73m2   Magnesium   Result Value Ref Range    Magnesium 1.9 1.8 - 2.6 mg/dL   Lipase   Result Value Ref Range    Lipase 17 0 - 52 U/L   Hepatic function panel   Result Value Ref Range    Bilirubin Total 0.4 0.0 - 1.0 mg/dL    Bilirubin Direct 0.1 <=0.5 mg/dL    Protein Total 7.7 6.0 - 8.0 g/dL    Albumin 3.6 3.5 - 5.0 g/dL    Alkaline Phosphatase 181 (H) 45 - 120 U/L    AST 33 0 - 40 U/L    ALT 36 0 - 45 U/L   CBC with platelets and differential   Result Value Ref Range    WBC Count 6.7 4.0 - 11.0 10e3/uL    RBC Count 4.16 3.80 - 5.20 10e6/uL    Hemoglobin 12.3 11.7 - 15.7 g/dL    Hematocrit 37.0 35.0 - 47.0 %    MCV 89 78 - 100 fL    MCH 29.6 26.5 - 33.0 pg    MCHC 33.2 31.5 - 36.5 g/dL    RDW 12.5 10.0 - 15.0 %    Platelet Count 362 150 - 450 10e3/uL    % Neutrophils 53 %    % Lymphocytes 28 %    % Monocytes 7 %    % Eosinophils 11 %    % Basophils 1 %    % Immature Granulocytes 0 %    NRBCs per 100 WBC 0 <1 /100    Absolute Neutrophils 3.5 1.6 - 8.3 10e3/uL    Absolute Lymphocytes 1.9 0.8 - 5.3 10e3/uL    Absolute Monocytes 0.4 0.0 - 1.3 10e3/uL    Absolute Eosinophils 0.8 (H) 0.0 - 0.7 10e3/uL    Absolute Basophils 0.1 0.0 - 0.2 10e3/uL    Absolute Immature Granulocytes 0.0 <=0.4 10e3/uL    Absolute NRBCs 0.0 10e3/uL   Lactic acid whole blood   Result Value Ref Range    Lactic Acid 1.2 0.7 - 2.0 mmol/L     Imaging Results:    XR Abdomen Port 1 View    Result Date: 1/12/2023  EXAM: XR ABDOMEN PORT 1 VIEW  LOCATION: [facility] DATE/TIME: 1/12/2023 3:24 PM INDICATION: Evaluate for abdominal distension COMPARISON: CT of the abdomen and pelvis on 12/25/2022     IMPRESSION: No definite free air on supine views. Nonobstructive bowel gas pattern. A few gas-filled nondistended segments of the colon. Moderate amount of formed stool in the colon.    CT Abdomen Pelvis w Contrast    Result Date: 1/31/2023  EXAM: CT ABDOMEN PELVIS W CONTRAST LOCATION: [facility] DATE/TIME: 1/31/2023 4:26 PM INDICATION: Resection of the splenic flexure colon cancer on 1/6/2023. Now w LUQ abd pain, n v f COMPARISON: 12/25/2022 TECHNIQUE: CT scan of the abdomen and pelvis was performed following injection of IV contrast. Multiplanar reformats were obtained. Dose reduction techniques were used. CONTRAST: ISOVUE 370 90 mL FINDINGS: Slight motion artifact. LOWER CHEST: Mild basilar dependent density. No pulmonary nodules or effusions. HEPATOBILIARY: No liver lesions. PANCREAS: Normal. SPLEEN: Normal. ADRENAL GLANDS: Normal. KIDNEYS/BLADDER: Small left renal cyst again noted which needs no follow-up. BOWEL: Postoperative changes from a partial left hemicolectomy with staple line. Minimal stranding in the left midabdomen within the surgical bed. Small area of focal nodular thickening noted anteriorly along the peritoneal reflection (axial image 150). Focal diverticulosis in the region of the staple line. No microperforation, free fluid or abscess. Bowel is of normal caliber. LYMPH NODES: There is an enlarged left external iliac node that is unchanged measuring 1.8 x 1 cm (axial image 197). Slightly plump node in the ashanti hepatis is unchanged. There are a few small bilateral inguinal nodes of normal caliber. Few tiny left para-aortic nodes again noted. VASCULATURE: Unremarkable. PELVIC ORGANS: Normal. MUSCULOSKELETAL: Stranding in the periumbilical region within the subcutaneous fat and in the right  lower quadrant port site with tiny bubbles of air in the latter location. No suspicious bone lesions.     IMPRESSION: 1.  Expected postoperative changes about the colonic staple line in the left midabdomen as noted above with mild mesenteric stranding. No evidence of a bowel obstruction, abscess or anastomotic leak. Expected changes in the abdominal wall at port sites. 2.  Amidst the stranding, there is a tiny nodular area of soft tissue along the anterior peritoneal reflection. This likely reflects a postoperative change and can be followed on subsequent studies to ensure its not something more worrisome. 3.  Focally enlarged left external iliac node and slightly plump node in the ashanti hepatis are unchanged and of uncertain significance. 4.  Other noncritical findings as noted above.      Signed by: Arnaud Valedz MD

## 2023-02-07 NOTE — PROGRESS NOTES
"Oncology Rooming Note    February 7, 2023 1:10 PM   Darlene Hudson is a 47 year old female who presents for:    Chief Complaint   Patient presents with     Oncology Clinic Visit     New Patient - Malignant neoplasm of splenic flexure     Initial Vitals: /75   Pulse 112   Resp 16   Ht 1.753 m (5' 9\")   Wt 106.6 kg (235 lb)   LMP  (LMP Unknown)   SpO2 98%   BMI 34.70 kg/m   Estimated body mass index is 34.7 kg/m  as calculated from the following:    Height as of this encounter: 1.753 m (5' 9\").    Weight as of this encounter: 106.6 kg (235 lb). Body surface area is 2.28 meters squared.  Moderate Pain (5) Comment: with pain meds   No LMP recorded (lmp unknown). Patient has had an ablation.  Allergies reviewed: Yes  Medications reviewed: Yes    Medications: Medication refills not needed today.  Pharmacy name entered into [employer]:    [id number] [place], [place] - [address] AT [facility] [address] [place] [id number] [facility] [place], [place] - [address] [facility]    Clinical concerns:  New consult      Jaja Reyes            "

## 2023-02-07 NOTE — LETTER
"    2/7/2023         RE: Darlene Hudson  [address]  [place] [address]        Dear Colleague,    Thank you for referring your patient, Darlene Hudson, to the [facility]. Please see a copy of my visit note below.    Oncology Rooming Note    February 7, 2023 1:10 PM   Darlene Hudson is a 47 year old female who presents for:    Chief Complaint   Patient presents with     Oncology Clinic Visit     New Patient - Malignant neoplasm of splenic flexure     Initial Vitals: /75   Pulse 112   Resp 16   Ht 1.753 m (5' 9\")   Wt 106.6 kg (235 lb)   LMP  (LMP Unknown)   SpO2 98%   BMI 34.70 kg/m   Estimated body mass index is 34.7 kg/m  as calculated from the following:    Height as of this encounter: 1.753 m (5' 9\").    Weight as of this encounter: 106.6 kg (235 lb). Body surface area is 2.28 meters squared.  Moderate Pain (5) Comment: with pain meds   No LMP recorded (lmp unknown). Patient has had an ablation.  Allergies reviewed: Yes  Medications reviewed: Yes    Medications: Medication refills not needed today.  Pharmacy name entered into Uversity:    [id number] [place], [place] - [address] [facility] [address] [place] [address] [id number] [place] [address] PHARMACY [place], [place] - [address] [facility]    Clinical concerns:  New consult      Jaja Reyes              [facility] Hematology and Oncology Consult Note      Patient: Darlene Hudson  MRN: [id number]  Date of Service: Feb 7, 2023      We have been asked by Dr. Charles to evaluate Darlene Hudson for colon cancer    Assessment/Plan:    1.  Adenocarcinoma of the descending colon: She has a stage I colon cancer.  This was reviewed with the patient.  We talked about a low risk of recurrence and subsequent lack of benefit from adjuvant chemotherapy.  We will have her come back in 3 months with repeat imaging to establish a posttreatment " baseline.  She does have a significant family history of cancer and will review her case with cancer genetics.  We discussed screening recommendations for her children.  Multiple questions were answered.    2.  Left external iliac lymph node seen on imaging: 18 x 10 cm on CT scan of the abdomen from January 31, 2023.  There was moderate follicular lymphoid hyperplasia noted in some of her mesenteric lymph nodes on surgery.  We can follow these inguinal nodes radiographically and consider biopsy if they enlarge over time.    ECOG Performance  0    Staging History:    Cancer Staging   No matching staging information was found for the patient.    History:    Karoline is a 47-year-old woman who has a recently been diagnosed with colon cancer.  She initially presented to the emergency room on December 25 with abdominal pain.  Imaging at that time showed a short segment of annular wall thickening in the proximal descending colon.  Chest CT was also done which showed no evidence of pulmonary embolism.  She followed up a few days later as an outpatient with surgery.  She was taken to the operating room on January 6 and underwent a laparoscopic assisted transverse and descending segmental colectomy.  She was discharged on January 14.  Surgical pathology revealed an invasive moderately differentiated adenocarcinoma.  47 x 43.14 mm tumor invading into, but not through, the muscularis propria.  Margins negative.  48 nodes taken, all negative.  Today she is feeling better.  Appetite is good.  Bowel habits are regular.  No abdominal pain.    Past History:    Past Medical History:   Diagnosis Date     Abdominal pain 06/29/2015     Abnormal cervical Papanicolaou smear 11/09/2014    Overview:  ACUS/HPV positive     Abnormal cytology finding 11/09/2014    Overview:  ACUS/HPV positive     Acute pericardial effusion 02/06/2017     Agoraphobia with panic attacks      Anxiety      Arthralgia of both lower legs 05/29/2020    Bilateral achy  knee pain that is chronic in nature going on for years. Most likely osteoarthritis in knees related to obesity. Previously injected in both knees with good relief. 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  Has controlled substance agreement with them.  On Vicodin, Valium, Klonopin prescribed only from there.        Coronary artery disease      Cough 02/09/2020     Family history of colon cancer 10/24/2020     Hx of seasonal allergies      Infection due to 2019 novel coronavirus 09/20/2022     Infectious pericarditis      Lipoma      Low back pain 07/13/2015     Major depressive disorder, recurrent episode, moderate (H) 09/05/2006     Menorrhagia with irregular cycle 07/15/2022    Added automatically from request for surgery [id number]     Moderate persistent asthma without complication 09/29/2020     Nondependent alcohol abuse, episodic drinking behavior 10/03/2012     Noninflammatory disorder of vagina 02/20/2015     Other chronic pain      Other long term (current) drug therapy 01/28/2013    Overview:  Vicodin and cyclobenzaprine monthly     Panic disorder with agoraphobia 09/05/2006     Pap smear for cervical cancer screening 10/31/2016    03/29/2010  Normal cytology, HPV ot done, repeat in 3 years.     Pericarditis 2017     Physiologic disturbance of temperature regulation 10/24/2020     PONV (postoperative nausea and vomiting)      Right ankle swelling 06/07/2022    Added automatically from request for surgery [id number]     Tobacco abuse      Tobacco use  "disorder 2015    Family History   Problem Relation Age of Onset     Hypertension Mother      Cancer Mother         cervical      Other Cancer Father         lung cancer     Asthma Father      Diabetes Brother      Diabetes Brother      Breast Cancer Maternal Grandmother      Asthma Child       [unfilled] Social History     Socioeconomic History     Marital status:      Spouse name: Not on file     Number of children: Not on file     Years of education: Not on file     Highest education level: Not on file   Occupational History     Not on file   Tobacco Use     Smoking status: Light Smoker     Packs/day: 0.10     Years: 30.00     Pack years: 3.00     Types: Cigarettes     Last attempt to quit: 2020     Years since quittin.6     Smokeless tobacco: Never     Tobacco comments:     2-3 cig/day, Seen IP by TTS on 23 - states she is done but declined our services and resource materials stating that she did not need them.   Vaping Use     Vaping Use: Some days   Substance and Sexual Activity     Alcohol use: Not Currently     Comment: Occasiaonlly.      Drug use: Not Currently     Sexual activity: Yes     Partners: Male   Other Topics Concern     Parent/sibling w/ CABG, MI or angioplasty before 65F 55M? Not Asked   Social History Narrative     Not on file     Social Determinants of Health     Financial Resource Strain: Not on file   Food Insecurity: Not on file   Transportation Needs: Not on file   Physical Activity: Not on file   Stress: Not on file   Social Connections: Not on file   Intimate Partner Violence: Not on file   Housing Stability: Not on file        Allergies:    Allergies   Allergen Reactions     Lidocaine Other (See Comments)     \"my jaw stopped moving\"  Other reaction(s): Dystonia     Penicillins Hives, Rash and Shortness Of Breath     Epinephrine-Lidocaine-Na Metabisulfite Other (See Comments) and Muscle Pain (Myalgia)     Severe jaw cramping, double vision  Jaw locking     Review " "of Systems:    As above in the history.     Review of Systems otherwise Negative for:  General: chills, fever or night sweats  Psychological: anxiety or depression  Ophthalmic: blurry vision, double vision or loss of vision, vision change  ENT: epistaxis, oral lesions, hearing changes  Hematological and Lymphatic: bleeding, bruising, jaundice, swollen lymph nodes  Endocrine: hot flashes, unexpected weight changes  Respiratory: cough, hemoptysis, orthopnea or shortness of breath/ROPER  Cardiovascular: chest pain, edema, palpitations or PND  Gastrointestinal: abdominal pain, blood in stools, change in bowel habits, constipation, diarrhea or nausea/vomiting  Genito-Urinary: change in urinary stream, incontinence, frequency/urgency  Musculoskeletal: joint pain, stiffness, swelling, muscle pain  Neurological: dizziness, headaches, numbness/tingling  Dermatological: lumps and rash    Physical Exam:    /75   Pulse 112   Resp 16   Ht 1.753 m (5' 9\")   Wt 106.6 kg (235 lb)   LMP  (LMP Unknown)   SpO2 98%   BMI 34.70 kg/m      General: patient appears stated age of 47 year old. Nontoxic and in no distress.   HEENT: Head: atraumatic, normocephalic. Sclerae anicteric.  Chest:  Normal respiratory effort  Cardiac:  No edema.   Abdomen: abdomen is non-distended  Extremities: normal tone and muscle bulk.   Skin: no lesions or rash on visible skin. Warm and dry.   CNS: alert and oriented. Grossly non-focal.   Psychiatric: normal mood and affect.     Lab Results:    Recent Results (from the past 168 hour(s))   Basic metabolic panel   Result Value Ref Range    Sodium 141 136 - 145 mmol/L    Potassium 4.1 3.5 - 5.0 mmol/L    Chloride 108 (H) 98 - 107 mmol/L    Carbon Dioxide (CO2) 23 22 - 31 mmol/L    Anion Gap 10 5 - 18 mmol/L    Urea Nitrogen 9 8 - 22 mg/dL    Creatinine 0.74 0.60 - 1.10 mg/dL    Calcium 9.7 8.5 - 10.5 mg/dL    Glucose 84 70 - 125 mg/dL    GFR Estimate >90 >60 mL/min/1.73m2   Magnesium   Result Value Ref " Range    Magnesium 1.9 1.8 - 2.6 mg/dL   Lipase   Result Value Ref Range    Lipase 17 0 - 52 U/L   Hepatic function panel   Result Value Ref Range    Bilirubin Total 0.4 0.0 - 1.0 mg/dL    Bilirubin Direct 0.1 <=0.5 mg/dL    Protein Total 7.7 6.0 - 8.0 g/dL    Albumin 3.6 3.5 - 5.0 g/dL    Alkaline Phosphatase 181 (H) 45 - 120 U/L    AST 33 0 - 40 U/L    ALT 36 0 - 45 U/L   CBC with platelets and differential   Result Value Ref Range    WBC Count 6.7 4.0 - 11.0 10e3/uL    RBC Count 4.16 3.80 - 5.20 10e6/uL    Hemoglobin 12.3 11.7 - 15.7 g/dL    Hematocrit 37.0 35.0 - 47.0 %    MCV 89 78 - 100 fL    MCH 29.6 26.5 - 33.0 pg    MCHC 33.2 31.5 - 36.5 g/dL    RDW 12.5 10.0 - 15.0 %    Platelet Count 362 150 - 450 10e3/uL    % Neutrophils 53 %    % Lymphocytes 28 %    % Monocytes 7 %    % Eosinophils 11 %    % Basophils 1 %    % Immature Granulocytes 0 %    NRBCs per 100 WBC 0 <1 /100    Absolute Neutrophils 3.5 1.6 - 8.3 10e3/uL    Absolute Lymphocytes 1.9 0.8 - 5.3 10e3/uL    Absolute Monocytes 0.4 0.0 - 1.3 10e3/uL    Absolute Eosinophils 0.8 (H) 0.0 - 0.7 10e3/uL    Absolute Basophils 0.1 0.0 - 0.2 10e3/uL    Absolute Immature Granulocytes 0.0 <=0.4 10e3/uL    Absolute NRBCs 0.0 10e3/uL   Lactic acid whole blood   Result Value Ref Range    Lactic Acid 1.2 0.7 - 2.0 mmol/L     Imaging Results:    XR Abdomen Port 1 View    Result Date: 1/12/2023  EXAM: XR ABDOMEN PORT 1 VIEW LOCATION: [facility] DATE/TIME: 1/12/2023 3:24 PM INDICATION: Evaluate for abdominal distension COMPARISON: CT of the abdomen and pelvis on 12/25/2022     IMPRESSION: No definite free air on supine views. Nonobstructive bowel gas pattern. A few gas-filled nondistended segments of the colon. Moderate amount of formed stool in the colon.    CT Abdomen Pelvis w Contrast    Result Date: 1/31/2023  EXAM: CT ABDOMEN PELVIS W CONTRAST LOCATION: [facility] DATE/TIME: 1/31/2023 4:26 PM INDICATION: Resection  of the splenic flexure colon cancer on 1/6/2023. Now w LUQ abd pain, n v f COMPARISON: 12/25/2022 TECHNIQUE: CT scan of the abdomen and pelvis was performed following injection of IV contrast. Multiplanar reformats were obtained. Dose reduction techniques were used. CONTRAST: ISOVUE 370 90 mL FINDINGS: Slight motion artifact. LOWER CHEST: Mild basilar dependent density. No pulmonary nodules or effusions. HEPATOBILIARY: No liver lesions. PANCREAS: Normal. SPLEEN: Normal. ADRENAL GLANDS: Normal. KIDNEYS/BLADDER: Small left renal cyst again noted which needs no follow-up. BOWEL: Postoperative changes from a partial left hemicolectomy with staple line. Minimal stranding in the left midabdomen within the surgical bed. Small area of focal nodular thickening noted anteriorly along the peritoneal reflection (axial image 150). Focal diverticulosis in the region of the staple line. No microperforation, free fluid or abscess. Bowel is of normal caliber. LYMPH NODES: There is an enlarged left external iliac node that is unchanged measuring 1.8 x 1 cm (axial image 197). Slightly plump node in the ashanti hepatis is unchanged. There are a few small bilateral inguinal nodes of normal caliber. Few tiny left para-aortic nodes again noted. VASCULATURE: Unremarkable. PELVIC ORGANS: Normal. MUSCULOSKELETAL: Stranding in the periumbilical region within the subcutaneous fat and in the right lower quadrant port site with tiny bubbles of air in the latter location. No suspicious bone lesions.     IMPRESSION: 1.  Expected postoperative changes about the colonic staple line in the left midabdomen as noted above with mild mesenteric stranding. No evidence of a bowel obstruction, abscess or anastomotic leak. Expected changes in the abdominal wall at port sites. 2.  Amidst the stranding, there is a tiny nodular area of soft tissue along the anterior peritoneal reflection. This likely reflects a postoperative change and can be followed on  subsequent studies to ensure its not something more worrisome. 3.  Focally enlarged left external iliac node and slightly plump node in the ashanti hepatis are unchanged and of uncertain significance. 4.  Other noncritical findings as noted above.      Signed by: Arnaud Valdez MD        Again, thank you for allowing me to participate in the care of your patient.        Sincerely,        Arnaud Valdez MD

## 2023-02-08 ENCOUNTER — OFFICE VISIT (OUTPATIENT)
Dept: FAMILY MEDICINE | Facility: CLINIC | Age: 48
End: 2023-02-08
Payer: MEDICAID

## 2023-02-08 VITALS
OXYGEN SATURATION: 100 % | SYSTOLIC BLOOD PRESSURE: 137 MMHG | DIASTOLIC BLOOD PRESSURE: 83 MMHG | HEART RATE: 118 BPM | RESPIRATION RATE: 16 BRPM

## 2023-02-08 DIAGNOSIS — K63.89 COLONIC MASS: ICD-10-CM

## 2023-02-08 DIAGNOSIS — R10.9 ACUTE ABDOMINAL PAIN: ICD-10-CM

## 2023-02-08 PROCEDURE — 99214 OFFICE O/P EST MOD 30 MIN: CPT | Performed by: FAMILY MEDICINE

## 2023-02-08 RX ORDER — OXYCODONE HYDROCHLORIDE 5 MG/1
5 TABLET ORAL EVERY 4 HOURS PRN
Qty: 126 TABLET | Refills: 0 | Status: SHIPPED | OUTPATIENT
Start: 2023-02-08 | End: 2023-03-01

## 2023-02-08 NOTE — PROGRESS NOTES
ASSESSMENT/PLAN:    (K63.89) Colonic mass  Comment: encouraging visit w/ oncology; will follow w/ me in 3 weeks after seeing Dr Charles and start wean off opiates; she understands importance of decreasing her pain medication usage to prevent tolerance/ dependence   Plan: oxyCODONE (ROXICODONE) 5 MG tablet, diclofenac (VOLTAREN) 50 MG EC tablet          (R10.9) Acute abdominal pain  Comment: see above  Plan: oxyCODONE (ROXICODONE) 5 MG tablet, diclofenac (VOLTAREN) 50 MG EC tablet          Robbie Bailon MD  February 8, 2023  3:11 PM        Pt is a 47 year old female last seen on 2/1/23 via virtual visit here today for:     1) colon CA - wonderful news from oncology; did have some survivor's guilt   Per Dr Valdez oncology note from 2/7/23:  Assessment/Plan:   1.  Adenocarcinoma of the descending colon: She has a stage I colon cancer.  This was reviewed with the patient.  We talked about a low risk of recurrence and subsequent lack of benefit from adjuvant chemotherapy.  We will have her come back in 3 months with repeat imaging to establish a posttreatment baseline.  She does have a significant family history of cancer and will review her case with cancer genetics.  We discussed screening recommendations for her children.  Multiple questions were answered.   2.  Left external iliac lymph node seen on imaging: 18 x 10 cm on CT scan of the abdomen from January 31, 2023.  There was moderate follicular lymphoid hyperplasia noted in some of her mesenteric lymph nodes on surgery.  We can follow these inguinal nodes radiographically and consider biopsy if they enlarge over time.    2) Abd pain - carafate and doxylamine is allowing her to eat  Is still taking 6 oxycodone tabs/ day   Also taking ibuprofen 6x/day  No vomiting  No melena or blood in stool       Per my last note:  (R11.2,  Z98.890) PONV (postoperative nausea and vomiting)  (primary encounter diagnosis)  Comment: will add carafate and doxylamine; precautions  given  Plan: sucralfate (CARAFATE) 1 GM/10ML suspension, doxylamine (UNISOM) 25 MG TABS tablet          (K63.89) Colonic mass  Comment: reviewed benign workup at ED; will see her in clinic in 1 week and continue wean  Plan: oxyCODONE (ROXICODONE) 5 MG tablet          (R10.9) Acute abdominal pain  Comment: see above  Plan: oxyCODONE (ROXICODONE) 5 MG tablet    Past Medical History:   Diagnosis Date     Abdominal pain 06/29/2015     Abnormal cervical Papanicolaou smear 11/09/2014    Overview:  ACUS/HPV positive     Abnormal cytology finding 11/09/2014    Overview:  ACUS/HPV positive     Acute pericardial effusion 02/06/2017     Agoraphobia with panic attacks      Anxiety      Arthralgia of both lower legs 05/29/2020    Bilateral achy knee pain that is chronic in nature going on for years. Most likely osteoarthritis in knees related to obesity. Previously injected in both knees with good relief. 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  Has controlled substance agreement with them.  On Vicodin, Valium, Klonopin prescribed only from there.        Coronary artery disease      Cough 02/09/2020     Family history of colon cancer 10/24/2020     Hx of seasonal allergies      Infection due to 2019 novel coronavirus 09/20/2022     Infectious pericarditis      Lipoma      Low back pain 07/13/2015     Major depressive disorder, recurrent episode, moderate (H) 09/05/2006     Menorrhagia with irregular cycle 07/15/2022    Added automatically from request for surgery  [id number]     Moderate persistent asthma without complication 09/29/2020     Nondependent alcohol abuse, episodic drinking behavior 10/03/2012     Noninflammatory disorder of vagina 02/20/2015     Other chronic pain      Other long term (current) drug therapy 01/28/2013    Overview:  Vicodin and cyclobenzaprine monthly     Panic disorder with agoraphobia 09/05/2006     Pap smear for cervical cancer screening 10/31/2016    03/29/2010  Normal cytology, HPV ot done, repeat in 3 years.     Pericarditis 2017     Physiologic disturbance of temperature regulation 10/24/2020     PONV (postoperative nausea and vomiting)      Right ankle swelling 06/07/2022    Added automatically from request for surgery [id number]     Tobacco abuse      Tobacco use disorder 07/13/2015      Past Surgical History:   Procedure Laterality Date     ANKLE SURGERY Right 05/30/2019     BIOPSY BREAST Right 1990    benign     COLONOSCOPY N/A 1/3/2023    Procedure: COLONOSCOPY WITH BIOPSY OF COLON MASS, POLYPECTOMY;  Surgeon: Kris Charles MD;  Location: [facility] OR     CREATION PERICARDIAL WINDOW  02/10/2017    [facility]     DILATION AND CURETTAGE N/A 7/22/2022    Procedure: DILATION AND CURETTAGE, UTERUS;  Surgeon: Lesly Holland;  Location: formerly [facility] OR     HEMORRHOIDECTOMY EXTERNAL       HYSTEROSCOPY, WITH ENDOMETRIAL RADIOFREQUENCY ABLATION - NOVASURE N/A 7/22/2022    Procedure: HYSTEROSCOPY, WITH ENDOMETRIAL RADIOFREQUENCY ABLATION - NOVASURE DILATION AND CURETTAGE, UTERUS;  Surgeon: Lesly Holland;  Location: formerly [facility] OR     LAPAROSCOPIC ASSISTED SIGMOID COLECTOMY N/A 1/6/2023    Procedure: LAPAROSCOPIC ASSISTED DESCENDING COLELCTOMY SPLENIC FLEXURE MOBILIZATION ;  Surgeon: Kris Charles MD;  Location: [facility] OR     LAPAROSCOPIC LYSIS ADHESIONS N/A 1/6/2023    Procedure: LYSIS OF ADHESIONS;  Surgeon: Kris Charles MD;  Location: [facility] OR     TUMOR REMOVAL      Has had 3. In the  "right breast and \"inside of rib cage.\"      Current Outpatient Medications   Medication Sig Dispense Refill     diclofenac (VOLTAREN) 50 MG EC tablet Take 1 tablet (50 mg) by mouth 3 times daily as needed for moderate pain (4-6) 90 tablet 1     oxyCODONE (ROXICODONE) 5 MG tablet Take 1 tablet (5 mg) by mouth every 4 hours as needed for severe pain (7-10) 126 tablet 0     ALPRAZolam (XANAX) 2 MG tablet Take 1 tablet (2 mg) by mouth 3 times daily as needed for anxiety 90 tablet 1     cetirizine (ZYRTEC) 10 MG tablet Take 1 tablet (10 mg) by mouth daily (Patient taking differently: Take 10 mg by mouth daily as needed) 30 tablet 11     doxylamine (UNISOM) 25 MG TABS tablet Take 1 tablet (25 mg) by mouth nightly as needed for other (nausea) 7 tablet 1     fluticasone (FLONASE) 50 MCG/ACT nasal spray Spray 2 sprays into both nostrils daily (Patient taking differently: Spray 2 sprays into both nostrils daily as needed) 9.9 mL 11     montelukast (SINGULAIR) 10 MG tablet Take 1 tablet (10 mg) by mouth At Bedtime 30 tablet 11     naloxone (NARCAN) 4 MG/0.1ML nasal spray Spray 1 spray (4 mg) into one nostril alternating nostrils as needed for opioid reversal every 2-3 minutes until assistance arrives (Patient not taking: Reported on 2/7/2023) 0.2 mL 1     ondansetron (ZOFRAN ODT) 4 MG ODT tab Take 1 tablet (4 mg) by mouth every 8 hours as needed for nausea 10 tablet 1     PROAIR  (90 Base) MCG/ACT inhaler Inhale 2 puffs into the lungs every 4 hours as needed for shortness of breath / dyspnea or wheezing 8 g 11     prochlorperazine (COMPAZINE) 10 MG tablet Take 1 tablet (10 mg) by mouth every 6 hours as needed for nausea or vomiting 40 tablet 1     spacer (OPTICHAMBER BINA) holding chamber For use w/ rescue inhaler 1 each 0     sucralfate (CARAFATE) 1 GM/10ML suspension Take 10 mLs (1 g) by mouth 4 times daily as needed for nausea 414 mL 0     tiotropium (SPIRIVA RESPIMAT) 1.25 MCG/ACT inhaler Inhale 2 puffs into the " "lungs daily 12 g 3      Allergies   Allergen Reactions     Gabapentin Other (See Comments)     Mental status is changed      Lidocaine Other (See Comments)     \"my jaw stopped moving\"  Other reaction(s): Dystonia     Penicillins Hives, Rash and Shortness Of Breath     Epinephrine-Lidocaine-Na Metabisulfite Other (See Comments) and Muscle Pain (Myalgia)     Severe jaw cramping, double vision  Jaw locking        ROS:   Gen-  no fevers/chills   Cardiac - no palpitations, no chest pain   Respiratory - no shortness of breath , no wheezing   GI - see HPI  Urinary - no dysuria, no polyuria   Remainder of ROS negative.     Exam:   /83 (BP Location: Left arm, Patient Position: Sitting, Cuff Size: Adult Regular)   Pulse 118   Resp 16   LMP  (LMP Unknown)   SpO2 100%    Alert and oriented x 3; No acute distress   ABd: soft, non-distended; +operative scar + diffuse TTP, greatest in LUQ; but no rebound/guarding  Neuro: no focal deficits   Derm: no rashes       "

## 2023-02-09 ENCOUNTER — TELEPHONE (OUTPATIENT)
Dept: FAMILY MEDICINE | Facility: CLINIC | Age: 48
End: 2023-02-09
Payer: COMMERCIAL

## 2023-02-09 NOTE — TELEPHONE ENCOUNTER
[facility] phone call message- general phone call:    Reason for call:       Patient call and advise, would like a call back from Kirstie. No other info given. Please give patient a call back. Please and thank you.     Return call needed: Yes    OK to leave a message on voice mail? Yes    Primary language: English      needed? No    Call taken on February 9, 2023 at 12:48 PM by Meredith Holder

## 2023-02-21 ENCOUNTER — TELEPHONE (OUTPATIENT)
Dept: FAMILY MEDICINE | Facility: CLINIC | Age: 48
End: 2023-02-21
Payer: COMMERCIAL

## 2023-02-21 NOTE — TELEPHONE ENCOUNTER
Team - please inform patient that I have reviewed her message and feel strongly that she needs an office visit to be tested for covid/ flu and evaluated for pneumonia given her bad asthma. I also cannot call in codeine cough syrup while she is on oxycodone for pain. I will be back in the office tomorrow afternoon and I can call her after seeing patients if she wishes. Otherwise, I would recommend she make a sick visit. I will be precepting Friday am so she could be scheduled that morning if she wishes to wait.     Robbie Bailon MD  February 21, 2023  3:15 PM

## 2023-02-21 NOTE — TELEPHONE ENCOUNTER
"[facility] phone call message- medication clarification/question:    Full Medication Name:     Cough medication      Dose:       N/A      Question:     Caller requesting for medication. Decline appointment, however she advise she have a fever, cough, congested and her \" chest on fire\".    Please please in medication and or call to advise. Thank you.     Pharmacy confirmed as    [id number] [place] [id number] [facility]  [facility]  : Yes    OK to leave a message on voice mail? Yes    Primary language: English      needed? No    Call taken on February 21, 2023 at 8:28 AM by Meredith Holder      "

## 2023-02-22 ENCOUNTER — OFFICE VISIT (OUTPATIENT)
Dept: FAMILY MEDICINE | Facility: CLINIC | Age: 48
End: 2023-02-22
Payer: COMMERCIAL

## 2023-02-22 VITALS
HEART RATE: 104 BPM | DIASTOLIC BLOOD PRESSURE: 76 MMHG | OXYGEN SATURATION: 97 % | SYSTOLIC BLOOD PRESSURE: 107 MMHG | TEMPERATURE: 97.9 F

## 2023-02-22 DIAGNOSIS — R09.1 PLEURISY: ICD-10-CM

## 2023-02-22 DIAGNOSIS — R05.1 ACUTE COUGH: Primary | ICD-10-CM

## 2023-02-22 DIAGNOSIS — C18.6 ADENOCARCINOMA OF DESCENDING COLON (H): Primary | ICD-10-CM

## 2023-02-22 LAB
FLUAV RNA SPEC QL NAA+PROBE: NEGATIVE
FLUBV RNA RESP QL NAA+PROBE: NEGATIVE
RSV RNA SPEC NAA+PROBE: NEGATIVE
SARS-COV-2 RNA RESP QL NAA+PROBE: POSITIVE

## 2023-02-22 PROCEDURE — 99213 OFFICE O/P EST LOW 20 MIN: CPT | Mod: CS | Performed by: STUDENT IN AN ORGANIZED HEALTH CARE EDUCATION/TRAINING PROGRAM

## 2023-02-22 PROCEDURE — 87637 SARSCOV2&INF A&B&RSV AMP PRB: CPT | Performed by: STUDENT IN AN ORGANIZED HEALTH CARE EDUCATION/TRAINING PROGRAM

## 2023-02-22 RX ORDER — CODEINE PHOSPHATE/GUAIFENESIN 10-100MG/5
5 LIQUID (ML) ORAL EVERY 4 HOURS PRN
Qty: 180 ML | Refills: 1 | Status: SHIPPED | OUTPATIENT
Start: 2023-02-22 | End: 2024-01-17

## 2023-02-22 RX ORDER — PREDNISONE 50 MG/1
50 TABLET ORAL DAILY
Qty: 5 TABLET | Refills: 0 | Status: SHIPPED | OUTPATIENT
Start: 2023-02-22 | End: 2023-02-27

## 2023-02-22 ASSESSMENT — ASTHMA QUESTIONNAIRES
QUESTION_3 LAST FOUR WEEKS HOW OFTEN DID YOUR ASTHMA SYMPTOMS (WHEEZING, COUGHING, SHORTNESS OF BREATH, CHEST TIGHTNESS OR PAIN) WAKE YOU UP AT NIGHT OR EARLIER THAN USUAL IN THE MORNING: ONCE OR TWICE
ACT_TOTALSCORE: 19
QUESTION_4 LAST FOUR WEEKS HOW OFTEN HAVE YOU USED YOUR RESCUE INHALER OR NEBULIZER MEDICATION (SUCH AS ALBUTEROL): ONE OR TWO TIMES PER DAY
QUESTION_5 LAST FOUR WEEKS HOW WOULD YOU RATE YOUR ASTHMA CONTROL: WELL CONTROLLED
EMERGENCY_ROOM_LAST_YEAR_TOTAL: TWO
QUESTION_2 LAST FOUR WEEKS HOW OFTEN HAVE YOU HAD SHORTNESS OF BREATH: ONCE OR TWICE A WEEK
QUESTION_1 LAST FOUR WEEKS HOW MUCH OF THE TIME DID YOUR ASTHMA KEEP YOU FROM GETTING AS MUCH DONE AT WORK, SCHOOL OR AT HOME: NONE OF THE TIME
ACT_TOTALSCORE: 19

## 2023-02-22 NOTE — PROGRESS NOTES
Assessment & Plan     Acute cough  Pleurisy  Differential diagnoses includes a viral upper respiratory illness, pneumonia,  Bronchitis, COVID. Will swab Flu/COVID today. Also given risk of decompensation with asthma history, will send guaifenesin AC and pred burst. No alarming symptoms for pneumonia. Likely COVID vs other viral URI.    - Symptomatic Influenza A/B & SARS-CoV2 (COVID-19) Virus PCR Multiplex Nose; Future  - predniSONE (DELTASONE) 50 MG tablet; Take 1 tablet (50 mg) by mouth daily for 5 days  - Symptomatic Influenza A/B & SARS-CoV2 (COVID-19) Virus PCR Multiplex Nose  - guaiFENesin-codeine (GUAIFENESIN AC) 100-10 MG/5ML syrup; Take 5 mLs by mouth every 4 hours as needed for congestion      Follow up next week with Dr. Bailon as scheduled    Debra Castro MD  [facility]    Pablito Ramey is a 47 year old, presenting for the following health issues:  Cough (Per dr bailon needs covid/flu swab)      HPI     Acute Illness  Acute illness concerns: Cough, congestion, chest hurts with deep breathing, sometimes has shortness of breath.  Onset/Duration: 3 days, not improving but not getting worse  Symptoms:  Fever: YES - 100.1 yesterday, but no fevers since.  Chills/Sweats: No  Headache (location?): No  Sinus Pressure: YES  Conjunctivitis:  No  Ear Pain: no  Rhinorrhea: YES  Congestion: YES  Sore Throat: YES  Cough: YES-non-productive  Wheeze: No  Decreased Appetite: No  Nausea: No  Vomiting: No  Sick/Strep Exposure: YES - son was sick last week. He was COVID+ last week. She had COVID maybe 2 months ago and had taken paxlovid already.  Therapies tried and outcome: Taking OTC cough suppressants.    Taking her Spiriva more as well    Review of Systems   Constitutional, HEENT, cardiovascular, pulmonary, gi and gu systems are negative, except as otherwise noted.      Objective    /76   Pulse 104   Temp 97.9  F (36.6  C) (Oral)   LMP  (LMP Unknown)   SpO2 97%   There is no  height or weight on file to calculate BMI.  Physical Exam   GENERAL: healthy, alert and no distress  EYES: Eyes grossly normal to inspection, PERRL and conjunctivae and sclerae normal  HENT: ear canals and TM's normal, nose and mouth without ulcers or lesions  NECK: no adenopathy, no asymmetry, masses, or scars    RESP: lungs clear to auscultation - no rales, rhonchi or wheezes  CV: regular rate and rhythm, normal S1 S2, no S3 or S4, no murmur, click or rub, no peripheral edema and peripheral pulses strong  MS: no gross musculoskeletal defects noted, no edema

## 2023-02-22 NOTE — TELEPHONE ENCOUNTER
I precepted Dr Matthew pereyra/ patient today. Spoke to patient personally.    Robbie Bailon MD  February 22, 2023  3:37 PM

## 2023-02-22 NOTE — TELEPHONE ENCOUNTER
"Call and Lvm. If Patient returning vm, please rely Dr. Bailon message to patient. Thank you.     \"Team - please inform patient that I have reviewed her message and feel strongly that she needs an office visit to be tested for covid/ flu and evaluated for pneumonia given her bad asthma. I also cannot call in codeine cough syrup while she is on oxycodone for pain. I will be back in the office tomorrow afternoon and I can call her after seeing patients if she wishes. Otherwise, I would recommend she make a sick visit. I will be precepting Friday am so she could be scheduled that morning if she wishes to wait. \"  "

## 2023-02-22 NOTE — TELEPHONE ENCOUNTER
"Rely message to patient, however patient would like to come in \" I need to come in and get it check out. I'm really sick\"    Schedule patient with team, Dr. Castro today at 14:40.    "

## 2023-02-23 ENCOUNTER — TELEPHONE (OUTPATIENT)
Dept: SURGERY | Facility: CLINIC | Age: 48
End: 2023-02-23

## 2023-02-23 ENCOUNTER — HOSPITAL ENCOUNTER (EMERGENCY)
Facility: HOSPITAL | Age: 48
Discharge: HOME | End: 2023-02-23
Attending: EMERGENCY MEDICINE | Admitting: EMERGENCY MEDICINE
Payer: COMMERCIAL

## 2023-02-23 ENCOUNTER — TELEPHONE (OUTPATIENT)
Dept: FAMILY MEDICINE | Facility: CLINIC | Age: 48
End: 2023-02-23

## 2023-02-23 ENCOUNTER — MYC MEDICAL ADVICE (OUTPATIENT)
Dept: FAMILY MEDICINE | Facility: CLINIC | Age: 48
End: 2023-02-23
Payer: COMMERCIAL

## 2023-02-23 VITALS
TEMPERATURE: 97.9 F | BODY MASS INDEX: 34.07 KG/M2 | WEIGHT: 230 LBS | OXYGEN SATURATION: 97 % | RESPIRATION RATE: 18 BRPM | HEIGHT: 69 IN | SYSTOLIC BLOOD PRESSURE: 127 MMHG | HEART RATE: 88 BPM | DIASTOLIC BLOOD PRESSURE: 87 MMHG

## 2023-02-23 DIAGNOSIS — U07.1 INFECTION DUE TO 2019 NOVEL CORONAVIRUS: ICD-10-CM

## 2023-02-23 DIAGNOSIS — R10.84 ABDOMINAL PAIN, GENERALIZED: ICD-10-CM

## 2023-02-23 LAB
ALBUMIN SERPL BCG-MCNC: 4.2 G/DL (ref 3.5–5.2)
ALP SERPL-CCNC: 221 U/L (ref 35–104)
ALT SERPL W P-5'-P-CCNC: 102 U/L (ref 10–35)
ANION GAP SERPL CALCULATED.3IONS-SCNC: 12 MMOL/L (ref 7–15)
AST SERPL W P-5'-P-CCNC: 57 U/L (ref 10–35)
BASOPHILS # BLD AUTO: 0 10E3/UL (ref 0–0.2)
BASOPHILS NFR BLD AUTO: 0 %
BILIRUB SERPL-MCNC: <0.2 MG/DL
BUN SERPL-MCNC: 12 MG/DL (ref 6–20)
CALCIUM SERPL-MCNC: 9.4 MG/DL (ref 8.6–10)
CHLORIDE SERPL-SCNC: 104 MMOL/L (ref 98–107)
CREAT SERPL-MCNC: 0.66 MG/DL (ref 0.51–0.95)
DEPRECATED HCO3 PLAS-SCNC: 22 MMOL/L (ref 22–29)
EOSINOPHIL # BLD AUTO: 0 10E3/UL (ref 0–0.7)
EOSINOPHIL NFR BLD AUTO: 0 %
ERYTHROCYTE [DISTWIDTH] IN BLOOD BY AUTOMATED COUNT: 13 % (ref 10–15)
GFR SERPL CREATININE-BSD FRML MDRD: >90 ML/MIN/1.73M2
GLUCOSE SERPL-MCNC: 142 MG/DL (ref 70–99)
HCG SERPL QL: NEGATIVE
HCT VFR BLD AUTO: 39.4 % (ref 35–47)
HGB BLD-MCNC: 13.1 G/DL (ref 11.7–15.7)
IMM GRANULOCYTES # BLD: 0.1 10E3/UL
IMM GRANULOCYTES NFR BLD: 1 %
LYMPHOCYTES # BLD AUTO: 1.4 10E3/UL (ref 0.8–5.3)
LYMPHOCYTES NFR BLD AUTO: 11 %
MCH RBC QN AUTO: 29.9 PG (ref 26.5–33)
MCHC RBC AUTO-ENTMCNC: 33.2 G/DL (ref 31.5–36.5)
MCV RBC AUTO: 90 FL (ref 78–100)
MONOCYTES # BLD AUTO: 0.2 10E3/UL (ref 0–1.3)
MONOCYTES NFR BLD AUTO: 2 %
NEUTROPHILS # BLD AUTO: 10.3 10E3/UL (ref 1.6–8.3)
NEUTROPHILS NFR BLD AUTO: 86 %
NRBC # BLD AUTO: 0 10E3/UL
NRBC BLD AUTO-RTO: 0 /100
PLATELET # BLD AUTO: 313 10E3/UL (ref 150–450)
POTASSIUM SERPL-SCNC: 4.1 MMOL/L (ref 3.4–5.3)
PROT SERPL-MCNC: 8.1 G/DL (ref 6.4–8.3)
RBC # BLD AUTO: 4.38 10E6/UL (ref 3.8–5.2)
SODIUM SERPL-SCNC: 138 MMOL/L (ref 136–145)
WBC # BLD AUTO: 11.9 10E3/UL (ref 4–11)

## 2023-02-23 PROCEDURE — 80053 COMPREHEN METABOLIC PANEL: CPT | Performed by: EMERGENCY MEDICINE

## 2023-02-23 PROCEDURE — 84703 CHORIONIC GONADOTROPIN ASSAY: CPT | Performed by: EMERGENCY MEDICINE

## 2023-02-23 PROCEDURE — 250N000013 HC RX MED GY IP 250 OP 250 PS 637: Performed by: EMERGENCY MEDICINE

## 2023-02-23 PROCEDURE — 36415 COLL VENOUS BLD VENIPUNCTURE: CPT | Performed by: EMERGENCY MEDICINE

## 2023-02-23 PROCEDURE — 96360 HYDRATION IV INFUSION INIT: CPT

## 2023-02-23 PROCEDURE — 999N000248 HC STATISTIC IV INSERT WITH US BY RN

## 2023-02-23 PROCEDURE — 99283 EMERGENCY DEPT VISIT LOW MDM: CPT

## 2023-02-23 PROCEDURE — 85004 AUTOMATED DIFF WBC COUNT: CPT | Performed by: EMERGENCY MEDICINE

## 2023-02-23 PROCEDURE — 258N000003 HC RX IP 258 OP 636: Performed by: EMERGENCY MEDICINE

## 2023-02-23 RX ORDER — KETOROLAC TROMETHAMINE 15 MG/ML
15 INJECTION, SOLUTION INTRAMUSCULAR; INTRAVENOUS ONCE
Status: DISCONTINUED | OUTPATIENT
Start: 2023-02-23 | End: 2023-02-23

## 2023-02-23 RX ORDER — OXYCODONE HYDROCHLORIDE 5 MG/1
5 TABLET ORAL ONCE
Status: COMPLETED | OUTPATIENT
Start: 2023-02-23 | End: 2023-02-23

## 2023-02-23 RX ADMIN — OXYCODONE HYDROCHLORIDE 5 MG: 5 TABLET ORAL at 17:04

## 2023-02-23 RX ADMIN — SODIUM CHLORIDE 1000 ML: 9 INJECTION, SOLUTION INTRAVENOUS at 16:01

## 2023-02-23 ASSESSMENT — ACTIVITIES OF DAILY LIVING (ADL): ADLS_ACUITY_SCORE: 35

## 2023-02-23 NOTE — ED PROVIDER NOTES
"EMERGENCY DEPARTMENT ENCOUNTER      NAME: Darlene Hudson  AGE: 47 year old female  YOB: 1975  MRN: [id number]  EVALUATION DATE & TIME: No admission date for patient encounter.    PCP: Robbie Bailon    ED PROVIDER: Lima Cronin M.D.      Chief Complaint   Patient presents with     Flu Symptoms         FINAL IMPRESSION:  1. Infection due to 2019 novel coronavirus    2. Abdominal pain, generalized          ED COURSE & MEDICAL DECISION MAKING:    ED Course as of 02/23/23 1722   Thu Feb 23, 2023   1456 Patient with diffuse abdominal and low chest wall pains with coughing, notes she has had COVID19 three times and this time is worse than prior, declines offered ketorolac, recently had oxycodone one hour ago and feels like ketorolac \"makes me sick\" so ketorolac held, she does request IVF and notes she feels dehydrated, IVF ordered with CTA r/o PE and CT abdomen with pain after surgery most likely MSK in setting of cough with COVID19 but will ensure no other pathology present with  initially with stress of triage   1513 Per charge RN patient refusing to be cared for by ER team here in ER, threatened to leave, will reassess shortly.   1705 Patient yelling at nursing team and then refused to go to CT until seen by the doctor, will reassess her now   1716 I went to reassess patient kindly and with RN at bedside, as norah had \"sent CAT scan away\" because they attempted to verify her name with her and make sure they had the right person for CT. When I walked into room she declines to wear mask with known COVID19, refuses to talk to me about situation adn refuses CT imaging, demands to leave. Clive aware she is leaving agast medical advice without imaging, VS WNL, sober appearing, patient yelling at staff, nursing calling security to ensure she is not violent when she leaves AMA with h/o code green     2:50 PM I introduced myself to the patient, obtained patient history, performed a physical exam, and " discussed plan for ED workup including potential diagnostic laboratory/imaging studies and interventions.  5:09 PM I rechecked the patient and updated them on laboratory results.     Pertinent Labs & Imaging studies reviewed. (See chart for details)    N95 worn  A face shield was worn also  COVID PPE    Medical Decision Making    History:    Supplemental history from: Documented in chart, if applicable    External Record(s) reviewed: Documented in chart, if applicable.    Work Up:    Chart documentation includes differential considered and any EKGs or imaging independently interpreted by provider, where specified.    In additional to work up documented, I considered the following work up: Documented in chart, if applicable.    External consultation:    Discussion of management with another provider: Documented in chart, if applicable    Complicating factors:    Care impacted by chronic illness: Cancer/Chemotherapy, Chronic Lung Disease and Smoking / Nicotine Use    Care affected by social determinants of health: N/A    Disposition considerations: Patient leaving against medical advice without completion of testing        At the conclusion of the encounter I discussed the results of all of the tests and the disposition. The questions were answered. The patient or family acknowledged understanding and was agreeable with the care plan.     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    In January  "2023, the patient had resected intestinal adenocarcinoma. She went to her oncology office yesterday and reported 3 days of cough, nasal congestion, and body aches. She had a COVID test which was positive. Today she called her surgeon's office because she saw her COVID test was positive and she was worried because she is still coughing. The cough causes abdominal pain and she just had surgery on her abdomen last month. Her surgeon, Dr. Charles, advised her to come to the ER for evaluation.    The patient reports this is the 3rd time she has been diagnosed with COVID. Her current symptoms are worse than when she has had COVID in the past, and she reports feeling as if she was \"hit by a truck.\" She reports pain throughout the sides of her chest that extends down into the sides of her abdomen. The pain is worse with coughing, better when not coughing. She took oxycodone around 1320 for her pain. She has not had any recorded fevers today and denies any urinary symptoms.    REVIEW OF SYSTEMS   All other systems reviewed and are negative except as noted above in HPI.    PAST MEDICAL HISTORY:  Past Medical History:   Diagnosis Date     Abdominal pain 06/29/2015     Abnormal cervical Papanicolaou smear 11/09/2014    Overview:  ACUS/HPV positive     Abnormal cytology finding 11/09/2014    Overview:  ACUS/HPV positive     Acute pericardial effusion 02/06/2017     Agoraphobia with panic attacks      Anxiety      Arthralgia of both lower legs 05/29/2020    Bilateral achy knee pain that is chronic in nature going on for years. Most likely osteoarthritis in knees related to obesity. Previously injected in both knees with good relief. 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  Has controlled substance agreement with them.  On Vicodin, Valium, Klonopin prescribed only from there.        Coronary artery disease      Cough 02/09/2020     Family history of colon cancer 10/24/2020     Hx of seasonal allergies      Infection due to 2019 novel coronavirus 09/20/2022     Infectious pericarditis      Lipoma      Low back pain 07/13/2015     Major depressive disorder, recurrent episode, moderate (H) 09/05/2006     Menorrhagia with irregular cycle 07/15/2022    Added automatically from request for surgery [id number]     Moderate persistent asthma without complication 09/29/2020     Nondependent alcohol abuse, episodic drinking behavior 10/03/2012     Noninflammatory disorder of vagina 02/20/2015     Other chronic pain      Other long term (current) drug therapy 01/28/2013    Overview:  Vicodin and cyclobenzaprine monthly     Panic disorder with agoraphobia 09/05/2006     Pap smear for cervical cancer screening 10/31/2016    03/29/2010  Normal cytology, HPV ot done, repeat in 3 years.     Pericarditis 2017     Physiologic disturbance of temperature regulation 10/24/2020     PONV (postoperative nausea and vomiting)      Right ankle swelling 06/07/2022    Added automatically from request for surgery [id number]     Tobacco abuse      Tobacco use disorder 07/13/2015       PAST SURGICAL HISTORY:  Past Surgical History:   Procedure Laterality Date     ANKLE SURGERY Right 05/30/2019     BIOPSY BREAST Right 1990    benign     COLONOSCOPY N/A 1/3/2023    Procedure: COLONOSCOPY WITH BIOPSY OF COLON MASS, POLYPECTOMY;  Surgeon: Kris Charles MD;  Location: [facility] OR     CREATION PERICARDIAL WINDOW  02/10/2017    [facility]     DILATION AND CURETTAGE N/A 7/22/2022    Procedure: DILATION  "AND CURETTAGE, UTERUS;  Surgeon: Lesly Holland;  Location: [facility]     HEMORRHOIDECTOMY EXTERNAL       HYSTEROSCOPY, WITH ENDOMETRIAL RADIOFREQUENCY ABLATION - NOVASURE N/A 7/22/2022    Procedure: HYSTEROSCOPY, WITH ENDOMETRIAL RADIOFREQUENCY ABLATION - NOVASURE DILATION AND CURETTAGE, UTERUS;  Surgeon: Lesly Holland;  Location: [facility]     LAPAROSCOPIC ASSISTED SIGMOID COLECTOMY N/A 1/6/2023    Procedure: LAPAROSCOPIC ASSISTED DESCENDING COLELCTOMY SPLENIC FLEXURE MOBILIZATION ;  Surgeon: Kris Charles MD;  Location: [facility] OR     LAPAROSCOPIC LYSIS ADHESIONS N/A 1/6/2023    Procedure: LYSIS OF ADHESIONS;  Surgeon: Kris Charles MD;  Location: [facility] OR     TUMOR REMOVAL      Has had 3. In the right breast and \"inside of rib cage.\"       CURRENT MEDICATIONS:    ALPRAZolam (XANAX) 2 MG tablet  cetirizine (ZYRTEC) 10 MG tablet  diclofenac (VOLTAREN) 50 MG EC tablet  doxylamine (UNISOM) 25 MG TABS tablet  fluticasone (FLONASE) 50 MCG/ACT nasal spray  guaiFENesin-codeine (GUAIFENESIN AC) 100-10 MG/5ML syrup  montelukast (SINGULAIR) 10 MG tablet  naloxone (NARCAN) 4 MG/0.1ML nasal spray  ondansetron (ZOFRAN ODT) 4 MG ODT tab  oxyCODONE (ROXICODONE) 5 MG tablet  predniSONE (DELTASONE) 50 MG tablet  PROAIR  (90 Base) MCG/ACT inhaler  prochlorperazine (COMPAZINE) 10 MG tablet  spacer (OPTICHAMBER BINA) holding chamber  sucralfate (CARAFATE) 1 GM/10ML suspension  tiotropium (SPIRIVA RESPIMAT) 1.25 MCG/ACT inhaler        ALLERGIES:  Allergies   Allergen Reactions     Gabapentin Other (See Comments)     Mental status is changed      Lidocaine Other (See Comments)     \"my jaw stopped moving\"  Other reaction(s): Dystonia     Penicillins Hives, Rash and Shortness Of Breath     Epinephrine-Lidocaine-Na Metabisulfite Other (See Comments) and Muscle Pain (Myalgia)     Severe jaw cramping, double vision  Jaw locking       FAMILY HISTORY:  Family History   Problem " "Relation Age of Onset     Hypertension Mother      Cancer Mother         cervical      Other Cancer Father         lung cancer     Asthma Father      Diabetes Brother      Diabetes Brother      Breast Cancer Maternal Grandmother      Asthma Child        SOCIAL HISTORY:   Social History     Socioeconomic History     Marital status:    Tobacco Use     Smoking status: Light Smoker     Packs/day: 0.10     Years: 30.00     Pack years: 3.00     Types: Cigarettes     Last attempt to quit: 2020     Years since quittin.7     Smokeless tobacco: Never     Tobacco comments:     2-3 cig/day, Seen IP by TTS on 23 - states she is done but declined our services and resource materials stating that she did not need them.   Vaping Use     Vaping Use: Some days   Substance and Sexual Activity     Alcohol use: Not Currently     Comment: Occasiaonlly.      Drug use: Not Currently     Sexual activity: Yes     Partners: Male       VITALS:  Patient Vitals for the past 24 hrs:   BP Temp Temp src Pulse Resp SpO2 Height Weight   23 1615 127/87 -- -- 88 -- 97 % -- --   23 1600 130/87 -- -- 103 -- 94 % -- --   23 1439 (!) 128/92 97.9  F (36.6  C) Temporal 120 18 98 % 1.753 m (5' 9\") 104.3 kg (230 lb)       PHYSICAL EXAM    GENERAL: Awake, alert.  In no acute distress.   HEENT: Normocephalic, atraumatic.  Pupils equal, round and reactive.  Conjunctiva normal.  EOMI.  NECK: No stridor or apparent deformity.  PULMONARY: Symmetrical breath sounds without distress.  Lungs clear to auscultation bilaterally without wheezes, rhonchi or rales.  CARDIO: Regular rate and rhythm.  No significant murmur, rub or gallop.  Radial pulses strong and symmetrical.  ABDOMINAL: Abdomen soft, non-distended and non-tender to palpation.  No CVAT, no palpable hepatosplenomegaly.  EXTREMITIES: No lower extremity swelling or edema.    NEURO: Alert and oriented to person, place and time.  Cranial nerves grossly intact.  No focal motor " deficit.  PSYCH: Normal mood and affect  SKIN: No rashes      LAB:  All pertinent labs reviewed and interpreted.  Results for orders placed or performed during the hospital encounter of 02/23/23   Comprehensive metabolic panel   Result Value Ref Range    Sodium 138 136 - 145 mmol/L    Potassium 4.1 3.4 - 5.3 mmol/L    Chloride 104 98 - 107 mmol/L    Carbon Dioxide (CO2) 22 22 - 29 mmol/L    Anion Gap 12 7 - 15 mmol/L    Urea Nitrogen 12.0 6.0 - 20.0 mg/dL    Creatinine 0.66 0.51 - 0.95 mg/dL    Calcium 9.4 8.6 - 10.0 mg/dL    Glucose 142 (H) 70 - 99 mg/dL    Alkaline Phosphatase 221 (H) 35 - 104 U/L    AST 57 (H) 10 - 35 U/L     (H) 10 - 35 U/L    Protein Total 8.1 6.4 - 8.3 g/dL    Albumin 4.2 3.5 - 5.2 g/dL    Bilirubin Total <0.2 <=1.2 mg/dL    GFR Estimate >90 >60 mL/min/1.73m2   HCG qualitative Blood   Result Value Ref Range    hCG Serum Qualitative Negative Negative   CBC with platelets and differential   Result Value Ref Range    WBC Count 11.9 (H) 4.0 - 11.0 10e3/uL    RBC Count 4.38 3.80 - 5.20 10e6/uL    Hemoglobin 13.1 11.7 - 15.7 g/dL    Hematocrit 39.4 35.0 - 47.0 %    MCV 90 78 - 100 fL    MCH 29.9 26.5 - 33.0 pg    MCHC 33.2 31.5 - 36.5 g/dL    RDW 13.0 10.0 - 15.0 %    Platelet Count 313 150 - 450 10e3/uL    % Neutrophils 86 %    % Lymphocytes 11 %    % Monocytes 2 %    % Eosinophils 0 %    % Basophils 0 %    % Immature Granulocytes 1 %    NRBCs per 100 WBC 0 <1 /100    Absolute Neutrophils 10.3 (H) 1.6 - 8.3 10e3/uL    Absolute Lymphocytes 1.4 0.8 - 5.3 10e3/uL    Absolute Monocytes 0.2 0.0 - 1.3 10e3/uL    Absolute Eosinophils 0.0 0.0 - 0.7 10e3/uL    Absolute Basophils 0.0 0.0 - 0.2 10e3/uL    Absolute Immature Granulocytes 0.1 <=0.4 10e3/uL    Absolute NRBCs 0.0 10e3/uL       RADIOLOGY:  Reviewed all pertinent imaging. Please see official radiology report.  XR Chest 2 Views    (Results Pending)   CT Chest Pulmonary Embolism w Contrast    (Results Pending)   CT Abdomen Pelvis w Contrast     (Results Pending)                 I, Homer Giles, am serving as a scribe to document services personally performed by Dr. Lima Cronin based on my observation and the provider's statements to me. I, Lima Cronin MD attest that Homer Giles is acting in a scribe capacity, has observed my performance of the services and has documented them in accordance with my direction.     Lima Cronin MD  02/23/23 5225

## 2023-02-23 NOTE — DISCHARGE INSTRUCTIONS
You are leaving against medical advice from the ER without CAT scans done and with incomplete testing to make sure you're safe after your surgery last month. Please come back to the ER if you change your mind and are open to doing CAT scans so we can make sure you're okay.

## 2023-02-23 NOTE — ED TRIAGE NOTES
Patient has a 2 days history of flu like symptoms, body aches, cough, fever, lethargy, nausea--multiple complaints. Has been taking xanax, oxycodone, tylenol without relief of flu symptoms.  Did have abdominal surgery for colon cancer on Jan 6th.

## 2023-02-23 NOTE — ED NOTES
IV access removed while pt video recorded staff in the room. She verbally accosted staff while they attempted to discharge her. Security had been called for safety of pt and staff. Pt did not want discharge paperwork discussed with her. She then refused to sign discharge paperwork. Pt was walked to the front door and states that she has a ride coming

## 2023-02-23 NOTE — ED NOTES
Pt very upset, raising her voice and cussing at RN. RN attempted to discuss plan with pt as she wanted to know what she was waiting for. Pt states that waiting on blood work and then CT isn't going to work for her. She states she needs to see the provider

## 2023-02-23 NOTE — TELEPHONE ENCOUNTER
Karoline calls today to ask Dr. Charles for his recommendation about needing to seek care at the ED. She tested positive for COVID yesterday and states her symptoms are severe enough that she thought about going to the ED yesterday. Pt reports fever/chills and body aches. Her main concern is pain at her surgical site. She is worried about her coughing damaging the repair. She was seen by her PCP yesterday and was given a Rx for Prednisone and a cough medicine. She reports little relief with these. Pt informed that I would reach out to Dr. Charles to let him know her symptoms and offer further recommendations.

## 2023-02-23 NOTE — ED NOTES
"Pt had requested pain medication. MD informed and order for pain medication with placed. Nurse gave medication and pt states she wants to see the MD now and then wants to leave. Pt sent CT away. States transport away because they were \"rude\" when asking her to identify herself. Pt continued to cuss out nurse for several minutes.   "

## 2023-02-23 NOTE — ED NOTES
Pt tells RN that there is absolutely no chance of being pregnant. She states that she no longer has menstrual cycles and has not been sexual active in a year.

## 2023-02-24 ENCOUNTER — PATIENT OUTREACH (OUTPATIENT)
Dept: CARE COORDINATION | Facility: CLINIC | Age: 48
End: 2023-02-24

## 2023-02-24 NOTE — PROGRESS NOTES
I have personally reviewed the history and examination as documented by Dr. Castro.  I was present during key portions of the visit and agree with the assessment and plan as documented for 47 yr old female with asthma here for cough. Covid/ flu swabs sent. Symptomatic tx sent. Precautions given. Anticipatory guidance given.     Robbie Bailon MD  February 24, 2023  4:18 PM    Addendum: Pt was covid positive. She presented to ED the day after visit for worsening symptoms. See ED note for details.    Robbie Bailon MD  February 24, 2023  4:19 PM

## 2023-02-24 NOTE — TELEPHONE ENCOUNTER
Noted. Will discuss further with her at appt on 3/1.23.    Robbie Bailon MD  February 24, 2023  2:14 PM

## 2023-02-24 NOTE — PROGRESS NOTES
Clinic Care Coordination Contact    Follow Up Progress Note      Assessment:  The pt was recently in the ED, I called to check up on the pt and help the pt setup a ED follow up. The pt was at [facility] for a flu symptoms. I called and talked to the pt, pt stated that she is doing ok. Pt stated that she wanted to be seen today, I told the pt that I will have the  call her and schedule her, since my computer was not working right.     I had Lauren called her and setup an appointment, but got her vm. The pt has an appointment on 03/01/2023 at 4:20pm with .     Care Gaps:    Health Maintenance Due   Topic Date Due     DEPRESSION ACTION PLAN  Never done     HEPATITIS B IMMUNIZATION (1 of 3 - 3-dose series) Never done     Pneumococcal Vaccine: Pediatrics (0 to 5 Years) and At-Risk Patients (6 to 64 Years) (2 - PCV) 10/15/2015     ASTHMA ACTION PLAN  01/31/2021     SPIROMETRY  03/03/2021     COVID-19 Vaccine (3 - Moderna risk series) 10/05/2022           Care Plans      Intervention/Education provided during outreach:               Plan:     Care Coordinator will follow up in

## 2023-02-24 NOTE — TELEPHONE ENCOUNTER
Patient called the on-call number this evening after being in the ED at [facility]. She was concerned about her HIPAA rights being violated, She was discharged from the ED. She called to discuss her experience and wanted to know the significance of her lab results, Her WBC, glucose and LFTs were slightly elevated. Reassurance provided that nothing needed to be addressed acutely overnight. She has been on prednisone for 2 doses and could make her WBC and glucose elevated. She has had some nausea. Her bilirubin was level was normal.    She states she is going to contact her . Discussed getting in touch with patient advocate at [facility]. Active listening utilized as patient told her story of the events tonight. Reassurance that the mildly abnormal results did not need to be addressed acutely overnight. She could have a telephone/video visit tomorrow if she continues to not feel well.

## 2023-02-27 NOTE — PROGRESS NOTES
"ASSESSMENT/PLAN:    (K63.89) Colonic mass  (primary encounter diagnosis)  Comment: will wean from 6 tabs daily to 5 tabs daily; 2-week supply sent; f/u in 2 wks; updated work note written; will need some guidance from Dr Charles regarding lifting restrictions after her surgery; she has an appt w/ him on 3/8/23  Plan: oxyCODONE (ROXICODONE) 5 MG tablet          (R10.9) Acute abdominal pain  Comment: see above  Plan: oxyCODONE (ROXICODONE) 5 MG tablet          (U07.1) Infection due to 2019 novel coronavirus  Comment:   Plan: stable; precautions given    Robbie Bailon MD  March 1, 2023  4:37 PM        Pt is a 47 year old female last seen on 2/22/23 by Dr Castro here today for:     1) Covid - had a terrible experience at ED  Is talking to an  because she feels like the doctor shared her private medical information in an area of the ED where other people could hear it   Breathing is better  Throat is on fire, cough is better  Voice is sore  Headaches, aches   Very fatigued    2) Colon CA-     Per ED note on 2/23/23:  FINAL IMPRESSION:  1. Infection due to 2019 novel coronavirus    2. Abdominal pain, generalized        ED COURSE & MEDICAL DECISION MAKING:        ED Course as of 02/23/23 1722   Thu Feb 23, 2023   1456 Patient with diffuse abdominal and low chest wall pains with coughing, notes she has had COVID19 three times and this time is worse than prior, declines offered ketorolac, recently had oxycodone one hour ago and feels like ketorolac \"makes me sick\" so ketorolac held, she does request IVF and notes she feels dehydrated, IVF ordered with CTA r/o PE and CT abdomen with pain after surgery most likely MSK in setting of cough with COVID19 but will ensure no other pathology present with  initially with stress of triage   1513 Per charge RN patient refusing to be cared for by ER team here in ER, threatened to leave, will reassess shortly.   1705 Patient yelling at nursing team and then refused to go to " "CT until seen by the doctor, will reassess her now   1716 I went to reassess patient kindly and with RN at bedside, as bridgetn had \"sent CAT scan away\" because they attempted to verify her name with her and make sure they had the right person for CT. When I walked into room she declines to wear mask with known COVID19, refuses to talk to me about situation adn refuses CT imaging, demands to leave. Paitent aware she is leaving agast medical advice without imaging, VS WNL, sober appearing, patient yelling at staff, nursing calling security to ensure she is not violent when she leaves AMA with h/o code green      2:50 PM I introduced myself to the patient, obtained patient history, performed a physical exam, and discussed plan for ED workup including potential diagnostic laboratory/imaging studies and interventions.  5:09 PM I rechecked the patient and updated them on laboratory results.      Pertinent Labs & Imaging studies reviewed. (See chart for details)      Per Dr Castro's note:  Acute cough  Pleurisy  Differential diagnoses includes a viral upper respiratory illness, pneumonia,  Bronchitis, COVID. Will swab Flu/COVID today. Also given risk of decompensation with asthma history, will send guaifenesin AC and pred burst. No alarming symptoms for pneumonia. Likely COVID vs other viral URI.     - Symptomatic Influenza A/B & SARS-CoV2 (COVID-19) Virus PCR Multiplex Nose; Future  - predniSONE (DELTASONE) 50 MG tablet; Take 1 tablet (50 mg) by mouth daily for 5 days  - Symptomatic Influenza A/B & SARS-CoV2 (COVID-19) Virus PCR Multiplex Nose  - guaiFENesin-codeine (GUAIFENESIN AC) 100-10 MG/5ML syrup; Take 5 mLs by mouth every 4 hours as needed for congestion    Per my last note on 2/8/23:  (K63.89) Colonic mass  Comment: encouraging visit w/ oncology; will follow w/ me in 3 weeks after seeing Dr Charles and start wean off opiates; she understands importance of decreasing her pain medication usage to prevent tolerance/ " dependence   Plan: oxyCODONE (ROXICODONE) 5 MG tablet, diclofenac (VOLTAREN) 50 MG EC tablet          (R10.9) Acute abdominal pain  Comment: see above  Plan: oxyCODONE (ROXICODONE) 5 MG tablet, diclofenac (VOLTAREN) 50 MG EC tablet    Past Medical History:   Diagnosis Date     Abdominal pain 06/29/2015     Abnormal cervical Papanicolaou smear 11/09/2014    Overview:  ACUS/HPV positive     Abnormal cytology finding 11/09/2014    Overview:  ACUS/HPV positive     Acute pericardial effusion 02/06/2017     Agoraphobia with panic attacks      Anxiety      Arthralgia of both lower legs 05/29/2020    Bilateral achy knee pain that is chronic in nature going on for years. Most likely osteoarthritis in knees related to obesity. Previously injected in both knees with good relief. 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  Has controlled substance agreement with them.  On Vicodin, Valium, Klonopin prescribed only from there.        Coronary artery disease      Cough 02/09/2020     Family history of colon cancer 10/24/2020     Hx of seasonal allergies      Infection due to 2019 novel coronavirus 09/20/2022     Infectious pericarditis      Lipoma      Low back pain 07/13/2015     Major depressive disorder, recurrent episode, moderate (H) 09/05/2006     Menorrhagia with irregular cycle 07/15/2022    Added automatically from request for surgery [id number]     Moderate persistent asthma without complication 09/29/2020     Nondependent alcohol abuse, episodic drinking behavior  "10/03/2012     Noninflammatory disorder of vagina 02/20/2015     Other chronic pain      Other long term (current) drug therapy 01/28/2013    Overview:  Vicodin and cyclobenzaprine monthly     Panic disorder with agoraphobia 09/05/2006     Pap smear for cervical cancer screening 10/31/2016    03/29/2010  Normal cytology, HPV ot done, repeat in 3 years.     Pericarditis 2017     Physiologic disturbance of temperature regulation 10/24/2020     PONV (postoperative nausea and vomiting)      Right ankle swelling 06/07/2022    Added automatically from request for surgery [id number]     Tobacco abuse      Tobacco use disorder 07/13/2015      Past Surgical History:   Procedure Laterality Date     ANKLE SURGERY Right 05/30/2019     BIOPSY BREAST Right 1990    benign     COLONOSCOPY N/A 1/3/2023    Procedure: COLONOSCOPY WITH BIOPSY OF COLON MASS, POLYPECTOMY;  Surgeon: Kris Charles MD;  Location: [facility]     CREATION PERICARDIAL WINDOW  02/10/2017    [facility]     DILATION AND CURETTAGE N/A 7/22/2022    Procedure: DILATION AND CURETTAGE, UTERUS;  Surgeon: Lesly Holland;  Location: [facility] OR     HEMORRHOIDECTOMY EXTERNAL       HYSTEROSCOPY, WITH ENDOMETRIAL RADIOFREQUENCY ABLATION - NOVASURE N/A 7/22/2022    Procedure: HYSTEROSCOPY, WITH ENDOMETRIAL RADIOFREQUENCY ABLATION - NOVASURE DILATION AND CURETTAGE, UTERUS;  Surgeon: Lesly Holland;  Location: [facility] OR     LAPAROSCOPIC ASSISTED SIGMOID COLECTOMY N/A 1/6/2023    Procedure: LAPAROSCOPIC ASSISTED DESCENDING COLELCTOMY SPLENIC FLEXURE MOBILIZATION ;  Surgeon: Kris Charles MD;  Location: [facility] OR     LAPAROSCOPIC LYSIS ADHESIONS N/A 1/6/2023    Procedure: LYSIS OF ADHESIONS;  Surgeon: Kris Charles MD;  Location: [facility] OR     TUMOR REMOVAL      Has had 3. In the right breast and \"inside of rib cage.\"      Current Outpatient Medications   Medication Sig Dispense Refill     ALPRAZolam (XANAX) 2 " MG tablet Take 1 tablet (2 mg) by mouth 3 times daily as needed for anxiety 90 tablet 1     cetirizine (ZYRTEC) 10 MG tablet Take 1 tablet (10 mg) by mouth daily (Patient taking differently: Take 10 mg by mouth daily as needed) 30 tablet 11     diclofenac (VOLTAREN) 50 MG EC tablet Take 1 tablet (50 mg) by mouth 3 times daily as needed for moderate pain (4-6) 90 tablet 1     doxylamine (UNISOM) 25 MG TABS tablet Take 1 tablet (25 mg) by mouth nightly as needed for other (nausea) 7 tablet 1     fluticasone (FLONASE) 50 MCG/ACT nasal spray Spray 2 sprays into both nostrils daily (Patient taking differently: Spray 2 sprays into both nostrils daily as needed) 9.9 mL 11     guaiFENesin-codeine (GUAIFENESIN AC) 100-10 MG/5ML syrup Take 5 mLs by mouth every 4 hours as needed for congestion 180 mL 1     montelukast (SINGULAIR) 10 MG tablet Take 1 tablet (10 mg) by mouth At Bedtime 30 tablet 11     naloxone (NARCAN) 4 MG/0.1ML nasal spray Spray 1 spray (4 mg) into one nostril alternating nostrils as needed for opioid reversal every 2-3 minutes until assistance arrives (Patient not taking: Reported on 2/22/2023) 0.2 mL 1     ondansetron (ZOFRAN ODT) 4 MG ODT tab Take 1 tablet (4 mg) by mouth every 8 hours as needed for nausea 10 tablet 1     oxyCODONE (ROXICODONE) 5 MG tablet Take 1 tablet (5 mg) by mouth every 4 hours as needed for severe pain (7-10) 126 tablet 0     predniSONE (DELTASONE) 50 MG tablet Take 1 tablet (50 mg) by mouth daily for 5 days 5 tablet 0     PROAIR  (90 Base) MCG/ACT inhaler Inhale 2 puffs into the lungs every 4 hours as needed for shortness of breath / dyspnea or wheezing 8 g 11     prochlorperazine (COMPAZINE) 10 MG tablet Take 1 tablet (10 mg) by mouth every 6 hours as needed for nausea or vomiting 40 tablet 1     spacer (OPTICHAMBER BINA) holding chamber For use w/ rescue inhaler 1 each 0     sucralfate (CARAFATE) 1 GM/10ML suspension Take 10 mLs (1 g) by mouth 4 times daily as needed for  "nausea 414 mL 0     tiotropium (SPIRIVA RESPIMAT) 1.25 MCG/ACT inhaler Inhale 2 puffs into the lungs daily 12 g 3      Allergies   Allergen Reactions     Gabapentin Other (See Comments)     Mental status is changed      Lidocaine Other (See Comments)     \"my jaw stopped moving\"  Other reaction(s): Dystonia     Penicillins Hives, Rash and Shortness Of Breath     Epinephrine-Lidocaine-Na Metabisulfite Other (See Comments) and Muscle Pain (Myalgia)     Severe jaw cramping, double vision  Jaw locking        ROS:   Gen- no weight change, no fevers/chills   Head/ Eyes- no blurred vision, no headaches   ENT- no cough, no congestion, no URI symptoms   Cardiac - no palpitations, no chest pain   Respiratory - no shortness of breath , no wheezing   GI - no nausea, no vomiting, no diarrhea, no constipation   Urinary - no dysuria, no polyuria   Neuro - no numbness, no tingling   Remainder of ROS negative.     Exam:   LMP  (LMP Unknown)    Alert and oriented x 3; No acute distress   HEENT: extraocular movements intact, tympanic membranes clear, oropharynx clear   Neck: no masses, no lymphadenopathy   Resp: clear to auscultation bilaterally, no wheezing/ronchi   CV: rate/rhythm regular, no murmurs/rubs/gallops   ABd: soft, + bowel sounds, nontender/nondistended, no rebound/guarding   Extrem: no clubbing/cyanosis/edema   MSK: full range of motion, nontender   Neuro: no focal deficits   Derm: no rashes       "

## 2023-03-01 ENCOUNTER — VIRTUAL VISIT (OUTPATIENT)
Dept: FAMILY MEDICINE | Facility: CLINIC | Age: 48
End: 2023-03-01
Payer: COMMERCIAL

## 2023-03-01 DIAGNOSIS — R10.9 ACUTE ABDOMINAL PAIN: ICD-10-CM

## 2023-03-01 DIAGNOSIS — K63.89 COLONIC MASS: Primary | ICD-10-CM

## 2023-03-01 DIAGNOSIS — U07.1 INFECTION DUE TO 2019 NOVEL CORONAVIRUS: ICD-10-CM

## 2023-03-01 PROCEDURE — 99213 OFFICE O/P EST LOW 20 MIN: CPT | Mod: 95 | Performed by: FAMILY MEDICINE

## 2023-03-01 RX ORDER — OXYCODONE HYDROCHLORIDE 5 MG/1
5 TABLET ORAL EVERY 4 HOURS PRN
Qty: 70 TABLET | Refills: 0 | Status: SHIPPED | OUTPATIENT
Start: 2023-03-01 | End: 2023-03-08

## 2023-03-01 NOTE — LETTER
WORK MEDICAL NOTE  2023     Seen today: Yes    Patient:  Darlene Hudson  :   1975  MRN:     [id number]  Physician: JULIETH BAILON    Darlene Hudson is under my care for for her colon cancer and recovery. She is still unable to tolerate work duty at this time. I will see her back in 2 weeks to gauge her progress. She has a post-op visit with her surgeon on 3/8/23 as well.    The next clinic appointment is scheduled for (date/time) 3/15/23.    Patient limitations:  Unable to tolerate prolonged sitting/ standing/ walking/ climbing/ bending/ lifting          Julieth Bailon MD  2023  4:34 PM

## 2023-03-01 NOTE — PROGRESS NOTES
Family Medicine Telephone Visit Note  Chief Complaint   Patient presents with     Post-op Visit     Pt still feeling sick, coughing. Pt upset about last ER visit.     Refill Request     Oxycodone            HPI   Patients name: Karoline  Appointment start time:  4:17 PM    See my note      Current Outpatient Medications   Medication Sig Dispense Refill     oxyCODONE (ROXICODONE) 5 MG tablet Take 1 tablet (5 mg) by mouth every 4 hours as needed for severe pain (7-10) 70 tablet 0     ALPRAZolam (XANAX) 2 MG tablet Take 1 tablet (2 mg) by mouth 3 times daily as needed for anxiety 90 tablet 1     cetirizine (ZYRTEC) 10 MG tablet Take 1 tablet (10 mg) by mouth daily (Patient taking differently: Take 10 mg by mouth daily as needed) 30 tablet 11     diclofenac (VOLTAREN) 50 MG EC tablet Take 1 tablet (50 mg) by mouth 3 times daily as needed for moderate pain (4-6) 90 tablet 1     doxylamine (UNISOM) 25 MG TABS tablet Take 1 tablet (25 mg) by mouth nightly as needed for other (nausea) 7 tablet 1     fluticasone (FLONASE) 50 MCG/ACT nasal spray Spray 2 sprays into both nostrils daily (Patient taking differently: Spray 2 sprays into both nostrils daily as needed) 9.9 mL 11     guaiFENesin-codeine (GUAIFENESIN AC) 100-10 MG/5ML syrup Take 5 mLs by mouth every 4 hours as needed for congestion 180 mL 1     montelukast (SINGULAIR) 10 MG tablet Take 1 tablet (10 mg) by mouth At Bedtime 30 tablet 11     naloxone (NARCAN) 4 MG/0.1ML nasal spray Spray 1 spray (4 mg) into one nostril alternating nostrils as needed for opioid reversal every 2-3 minutes until assistance arrives (Patient not taking: Reported on 2/22/2023) 0.2 mL 1     ondansetron (ZOFRAN ODT) 4 MG ODT tab Take 1 tablet (4 mg) by mouth every 8 hours as needed for nausea 10 tablet 1     PROAIR  (90 Base) MCG/ACT inhaler Inhale 2 puffs into the lungs every 4 hours as needed for shortness of breath / dyspnea or wheezing 8 g 11     prochlorperazine (COMPAZINE) 10 MG tablet  "Take 1 tablet (10 mg) by mouth every 6 hours as needed for nausea or vomiting 40 tablet 1     spacer (OPTICHAMBER BINA) holding chamber For use w/ rescue inhaler 1 each 0     sucralfate (CARAFATE) 1 GM/10ML suspension Take 10 mLs (1 g) by mouth 4 times daily as needed for nausea 414 mL 0     tiotropium (SPIRIVA RESPIMAT) 1.25 MCG/ACT inhaler Inhale 2 puffs into the lungs daily 12 g 3     Allergies   Allergen Reactions     Gabapentin Other (See Comments)     Mental status is changed      Lidocaine Other (See Comments)     \"my jaw stopped moving\"  Other reaction(s): Dystonia     Penicillins Hives, Rash and Shortness Of Breath     Epinephrine-Lidocaine-Na Metabisulfite Other (See Comments) and Muscle Pain (Myalgia)     Severe jaw cramping, double vision  Jaw locking              Review of Systems:     Constitutional, HEENT, cardiovascular, pulmonary, gi and gu systems are negative, except as otherwise noted.         Physical Exam:     LMP  (LMP Unknown)   Estimated body mass index is 33.97 kg/m  as calculated from the following:    Height as of 2/23/23: 1.753 m (5' 9\").    Weight as of 2/23/23: 104.3 kg (230 lb).    Exam:  Constitutional: alert and mild distress  Psychiatric: mentation appears normal, anxious and fatigued          Assessment and Plan   Darlene was seen today for post-op visit and refill request.    Diagnoses and all orders for this visit:    Colonic mass - will wean from 6 tabs daily to 5 tabs daily; 2-week supply sent; f/u in 2 wks; updated work note written; will need some guidance from Dr Charles regarding lifting restrictions after her surgery; she has an appt w/ him on 3/8/23  -     oxyCODONE (ROXICODONE) 5 MG tablet; Take 1 tablet (5 mg) by mouth every 4 hours as needed for severe pain (7-10)    Acute abdominal pain  -     oxyCODONE (ROXICODONE) 5 MG tablet; Take 1 tablet (5 mg) by mouth every 4 hours as needed for severe pain (7-10)    Infection due to 2019 novel coronavirus - stable; " precautions given      Refilled medications that would be required in the next 3 months.     After Visit Information:  Patient chose to view AVS via [employer]    No follow-ups on file.    Appointment end time: 4:35 PM  This is a telephone visit that took 18 minutes.      Clinician location:  [facility]     Robbie Bailon MD  March 1, 2023  4:36 PM

## 2023-03-03 ENCOUNTER — MYC MEDICAL ADVICE (OUTPATIENT)
Dept: CARE COORDINATION | Facility: CLINIC | Age: 48
End: 2023-03-03
Payer: COMMERCIAL

## 2023-03-03 DIAGNOSIS — R10.9 ACUTE ABDOMINAL PAIN: ICD-10-CM

## 2023-03-03 DIAGNOSIS — K63.89 COLONIC MASS: ICD-10-CM

## 2023-03-04 RX ORDER — OXYCODONE HYDROCHLORIDE 5 MG/1
5 TABLET ORAL EVERY 4 HOURS PRN
Qty: 70 TABLET | Status: CANCELLED | OUTPATIENT
Start: 2023-03-04

## 2023-03-04 NOTE — TELEPHONE ENCOUNTER
Refill request for oxycodone was incorrect. Pt filled script on 3/1/23. I cancelled the order.    Robbie Bailon MD  March 4, 2023  8:37 AM

## 2023-03-08 ENCOUNTER — OFFICE VISIT (OUTPATIENT)
Dept: FAMILY MEDICINE | Facility: CLINIC | Age: 48
End: 2023-03-08
Payer: COMMERCIAL

## 2023-03-08 ENCOUNTER — OFFICE VISIT (OUTPATIENT)
Dept: SURGERY | Facility: CLINIC | Age: 48
End: 2023-03-08
Payer: COMMERCIAL

## 2023-03-08 DIAGNOSIS — J45.40 MODERATE PERSISTENT ASTHMA WITHOUT COMPLICATION: Primary | ICD-10-CM

## 2023-03-08 DIAGNOSIS — R10.9 ACUTE ABDOMINAL PAIN: ICD-10-CM

## 2023-03-08 DIAGNOSIS — F40.01 PANIC DISORDER WITH AGORAPHOBIA: ICD-10-CM

## 2023-03-08 DIAGNOSIS — K63.89 COLONIC MASS: ICD-10-CM

## 2023-03-08 DIAGNOSIS — Z90.49 S/P PARTIAL COLECTOMY: Primary | ICD-10-CM

## 2023-03-08 DIAGNOSIS — F41.1 GENERALIZED ANXIETY DISORDER: ICD-10-CM

## 2023-03-08 PROCEDURE — 99207 PR NO CHARGE LOS: CPT | Performed by: FAMILY MEDICINE

## 2023-03-08 PROCEDURE — 99024 POSTOP FOLLOW-UP VISIT: CPT | Performed by: SURGERY

## 2023-03-08 RX ORDER — OXYCODONE HYDROCHLORIDE 5 MG/1
5 TABLET ORAL
Qty: 35 TABLET | Refills: 0 | Status: SHIPPED | OUTPATIENT
Start: 2023-03-08 | End: 2023-03-15

## 2023-03-08 RX ORDER — ALPRAZOLAM 2 MG
2 TABLET ORAL 2 TIMES DAILY PRN
Qty: 60 TABLET | Refills: 1 | Status: SHIPPED | OUTPATIENT
Start: 2023-03-08 | End: 2023-04-18

## 2023-03-08 NOTE — LETTER
3/8/2023         RE: Darlene Hudson  [address]  [place] [address]        Dear Colleague,    Thank you for referring your patient, Darlene Hudson, to the [facility] [place]. Please see a copy of my visit note below.    General surgery clinic follow-up visit    Patient is a 47-year-old woman who presents for follow-up approximately 8 weeks after her distal transverse and descending colectomy for splenic flexure colon cancer.  She had no positive lymph nodes.  She has seen oncology who will be following her.    She tells me that she continues to improve slowly.  She does still have some pain but is getting better.  She is weaning off of her narcotics with the excellent help of her primary care physician.  Her appetite is somewhat changed.  She is eating little less and feeling full much earlier.  She also has a change in the kinds of foods that she likes.  She is having bowel function.  She complains of feeling tired much earlier than she normally would.    On exam her abdomen is soft and nondistended.  Her incision is healing up well.  She does have a little bit of hypertrophic scarring of her midline incision.    Karoline is recovering as expected after her partial colectomy.  At this point I have no further activity restrictions.  She has no dietary restrictions.  I do expect her fatigue will continue to improve.  The data shows that a large percentage of patients who undergo a partial colectomy have decreased energy for several months after the operation.    She needs a colonoscopy with me in 1 year.    She can be discharged from the clinic for now.  I am happy to see her on an as-needed basis if questions or concerns arise.    Kris Charles MD  General Surgeon  [facility]  Surgery [facility] - [address]  [place] [address]?  Office: [phone]        Again, thank you for allowing me to participate  in the care of your patient.        Sincerely,        Kris Charles MD

## 2023-03-08 NOTE — PROGRESS NOTES
General surgery clinic follow-up visit    Patient is a 47-year-old woman who presents for follow-up approximately 8 weeks after her distal transverse and descending colectomy for splenic flexure colon cancer.  She had no positive lymph nodes.  She has seen oncology who will be following her.    She tells me that she continues to improve slowly.  She does still have some pain but is getting better.  She is weaning off of her narcotics with the excellent help of her primary care physician.  Her appetite is somewhat changed.  She is eating little less and feeling full much earlier.  She also has a change in the kinds of foods that she likes.  She is having bowel function.  She complains of feeling tired much earlier than she normally would.    On exam her abdomen is soft and nondistended.  Her incision is healing up well.  She does have a little bit of hypertrophic scarring of her midline incision.    Karoline is recovering as expected after her partial colectomy.  At this point I have no further activity restrictions.  She has no dietary restrictions.  I do expect her fatigue will continue to improve.  The data shows that a large percentage of patients who undergo a partial colectomy have decreased energy for several months after the operation.    She needs a colonoscopy with me in 1 year.    She can be discharged from the clinic for now.  I am happy to see her on an as-needed basis if questions or concerns arise.    Kris Charles MD  General Surgeon  [facility]  Surgery [address]  [place] [address]?  Office: [phone]

## 2023-03-08 NOTE — PROGRESS NOTES
Karoline needs her alprazolam switched to bid.  She also needs 1-week supply of oxycodone sent as pharmacy would only fill 1 week at a time.  PDMP reviewed  Scripts sent.      Robbie Bailon MD  March 8, 2023  2:26 PM

## 2023-03-10 ENCOUNTER — TELEPHONE (OUTPATIENT)
Dept: FAMILY MEDICINE | Facility: CLINIC | Age: 48
End: 2023-03-10
Payer: COMMERCIAL

## 2023-03-10 NOTE — TELEPHONE ENCOUNTER
[facility] phone call message- medication clarification/question:    Full Medication Name:     oxyCODONE (ROXICODONE) 5 MG tablet and her other prep medication             Dose:     Sig - Route: Take 1 tablet (5 mg) by mouth 5 x daily PRN for severe pain (7-10) - Oral    Question:     Patient call and advise she can only get so much within a year and when patient tried to go get the refill they wouldn't give her the whole thing, Per patient needing Dr. Hansen to call medica, insurance, and let them that patient have cancer and is needing medication to cope.    Please call patient back and advise    Per caller do not have [facility] phone number.     Pharmacy confirmed as [id number] [place] [id number] AT [facility]: Yes    OK to leave a message on voice mail? Yes    Primary language: English      needed? No    Call taken on March 10, 2023 at 12:13 PM by Meredith Holder

## 2023-03-14 NOTE — PROGRESS NOTES
TELEPHONE ENCOUNTER:  Call start: 4:40 PM  Call stop: 4:53PM  Call duration: 13 minutes    ASSESSMENT/PLAN:    (C18.6) Cancer of descending colon (H)  (primary encounter diagnosis)  Comment: Emmy was able to confirm PA for meds over the phone while I was on the phone w/ Karoline; she will see me in person in 1 week and continue her taper  Plan: oxyCODONE (ROXICODONE) 5 MG tablet          (Z90.49) Status post partial colectomy  Comment: see above  Plan: oxyCODONE (ROXICODONE) 5 MG tablet          Robbie Bailon MD  March 15, 2023  4:53 PM      Pt is a 47 year old female last seen on 3/1/23 evaluated today via telephone encounter for:     Follow-up - was unable to get oxycodone approved   States that I need to call insurance and confirm colon cancer diagnosis?  Spoke to Emmy here at [facility] who handles our prior auth issues       Per my last note:  Colonic mass - will wean from 6 tabs daily to 5 tabs daily; 2-week supply sent; f/u in 2 wks; updated work note written; will need some guidance from Dr Charles regarding lifting restrictions after her surgery; she has an appt w/ him on 3/8/23  -     oxyCODONE (ROXICODONE) 5 MG tablet; Take 1 tablet (5 mg) by mouth every 4 hours as needed for severe pain (7-10)     Acute abdominal pain  -     oxyCODONE (ROXICODONE) 5 MG tablet; Take 1 tablet (5 mg) by mouth every 4 hours as needed for severe pain (7-10)     Infection due to 2019 novel coronavirus - stable; precautions given    Past Medical History:   Diagnosis Date     Abdominal pain 06/29/2015     Abnormal cervical Papanicolaou smear 11/09/2014    Overview:  ACUS/HPV positive     Abnormal cytology finding 11/09/2014    Overview:  ACUS/HPV positive     Acute pericardial effusion 02/06/2017     Agoraphobia with panic attacks      Anxiety      Arthralgia of both lower legs 05/29/2020    Bilateral achy knee pain that is chronic in nature going on for years. Most likely osteoarthritis in knees related to obesity. Previously injected  in both knees with good relief. 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 for this.  Has controlled substance agreement with them.  On Vicodin, Valium, Klonopin prescribed only from there.        Coronary artery disease      Cough 02/09/2020     Family history of colon cancer 10/24/2020     Hx of seasonal allergies      Infection due to 2019 novel coronavirus 09/20/2022     Infectious pericarditis      Lipoma      Low back pain 07/13/2015     Major depressive disorder, recurrent episode, moderate (H) 09/05/2006     Menorrhagia with irregular cycle 07/15/2022    Added automatically from request for surgery [id number]     Moderate persistent asthma without complication 09/29/2020     Nondependent alcohol abuse, episodic drinking behavior 10/03/2012     Noninflammatory disorder of vagina 02/20/2015     Other chronic pain      Other long term (current) drug therapy 01/28/2013    Overview:  Vicodin and cyclobenzaprine monthly     Panic disorder with agoraphobia 09/05/2006     Pap smear for cervical cancer screening 10/31/2016    03/29/2010  Normal cytology, HPV ot done, repeat in 3 years.     Pericarditis 2017     Physiologic disturbance of temperature regulation 10/24/2020     PONV (postoperative nausea and vomiting)      Right ankle swelling 06/07/2022    Added automatically from request for surgery [id number]     Tobacco abuse      Tobacco use disorder 07/13/2015      Past Surgical History:   Procedure Laterality Date     ANKLE SURGERY Right 05/30/2019     BIOPSY BREAST Right  "1990    benign     COLONOSCOPY N/A 1/3/2023    Procedure: COLONOSCOPY WITH BIOPSY OF COLON MASS, POLYPECTOMY;  Surgeon: Kris Charles MD;  Location: [facility] OR     CREATION PERICARDIAL WINDOW  02/10/2017    [facility]     DILATION AND CURETTAGE N/A 7/22/2022    Procedure: DILATION AND CURETTAGE, UTERUS;  Surgeon: Lesly Holland;  Location: [facility] OR     HEMORRHOIDECTOMY EXTERNAL       HYSTEROSCOPY, WITH ENDOMETRIAL RADIOFREQUENCY ABLATION - NOVASURE N/A 7/22/2022    Procedure: HYSTEROSCOPY, WITH ENDOMETRIAL RADIOFREQUENCY ABLATION - NOVASURE DILATION AND CURETTAGE, UTERUS;  Surgeon: Lesly Holland;  Location: [facility] OR     LAPAROSCOPIC ASSISTED SIGMOID COLECTOMY N/A 1/6/2023    Procedure: LAPAROSCOPIC ASSISTED DESCENDING COLELCTOMY SPLENIC FLEXURE MOBILIZATION ;  Surgeon: Kris Charles MD;  Location: [facility] OR     LAPAROSCOPIC LYSIS ADHESIONS N/A 1/6/2023    Procedure: LYSIS OF ADHESIONS;  Surgeon: Kris Charles MD;  Location: [facility] OR     TUMOR REMOVAL      Has had 3. In the right breast and \"inside of rib cage.\"      Current Outpatient Medications   Medication Sig Dispense Refill     oxyCODONE (ROXICODONE) 5 MG tablet Take 1 tablet (5 mg) by mouth 5 x daily PRN for severe pain (7-10) 35 tablet 0     ALPRAZolam (XANAX) 2 MG tablet Take 1 tablet (2 mg) by mouth 2 times daily as needed for anxiety 60 tablet 1     cetirizine (ZYRTEC) 10 MG tablet Take 1 tablet (10 mg) by mouth daily (Patient taking differently: Take 10 mg by mouth daily as needed) 30 tablet 11     diclofenac (VOLTAREN) 50 MG EC tablet Take 1 tablet (50 mg) by mouth 3 times daily as needed for moderate pain (4-6) 90 tablet 1     doxylamine (UNISOM) 25 MG TABS tablet Take 1 tablet (25 mg) by mouth nightly as needed for other (nausea) 7 tablet 1     fluticasone (FLONASE) 50 MCG/ACT nasal spray Spray 2 sprays into both nostrils daily (Patient taking differently: Spray 2 sprays into " "both nostrils daily as needed) 9.9 mL 11     guaiFENesin-codeine (GUAIFENESIN AC) 100-10 MG/5ML syrup Take 5 mLs by mouth every 4 hours as needed for congestion 180 mL 1     montelukast (SINGULAIR) 10 MG tablet Take 1 tablet (10 mg) by mouth At Bedtime 30 tablet 11     naloxone (NARCAN) 4 MG/0.1ML nasal spray Spray 1 spray (4 mg) into one nostril alternating nostrils as needed for opioid reversal every 2-3 minutes until assistance arrives (Patient not taking: Reported on 2/22/2023) 0.2 mL 1     ondansetron (ZOFRAN ODT) 4 MG ODT tab Take 1 tablet (4 mg) by mouth every 8 hours as needed for nausea 10 tablet 1     PROAIR  (90 Base) MCG/ACT inhaler Inhale 2 puffs into the lungs every 4 hours as needed for shortness of breath / dyspnea or wheezing 8 g 11     prochlorperazine (COMPAZINE) 10 MG tablet Take 1 tablet (10 mg) by mouth every 6 hours as needed for nausea or vomiting 40 tablet 1     spacer (OPTICHAMBER BINA) holding chamber For use w/ rescue inhaler 1 each 0     sucralfate (CARAFATE) 1 GM/10ML suspension Take 10 mLs (1 g) by mouth 4 times daily as needed for nausea 414 mL 0     tiotropium (SPIRIVA RESPIMAT) 1.25 MCG/ACT inhaler Inhale 2 puffs into the lungs daily 12 g 3      Allergies   Allergen Reactions     Gabapentin Other (See Comments)     Mental status is changed      Lidocaine Other (See Comments)     \"my jaw stopped moving\"  Other reaction(s): Dystonia     Penicillins Hives, Rash and Shortness Of Breath     Epinephrine-Lidocaine-Na Metabisulfite Other (See Comments) and Muscle Pain (Myalgia)     Severe jaw cramping, double vision  Jaw locking        ROS:   Gen- no fevers/chills   Remainder of ROS negative.     Exam:   LMP  (LMP Unknown)    N/A - phone visit    "

## 2023-03-15 ENCOUNTER — VIRTUAL VISIT (OUTPATIENT)
Dept: FAMILY MEDICINE | Facility: CLINIC | Age: 48
End: 2023-03-15
Payer: COMMERCIAL

## 2023-03-15 DIAGNOSIS — C18.6 CANCER OF DESCENDING COLON (H): Primary | ICD-10-CM

## 2023-03-15 DIAGNOSIS — Z90.49 STATUS POST PARTIAL COLECTOMY: ICD-10-CM

## 2023-03-15 PROCEDURE — 99213 OFFICE O/P EST LOW 20 MIN: CPT | Mod: 95 | Performed by: FAMILY MEDICINE

## 2023-03-15 RX ORDER — OXYCODONE HYDROCHLORIDE 5 MG/1
5 TABLET ORAL
Qty: 35 TABLET | Refills: 0 | Status: SHIPPED | OUTPATIENT
Start: 2023-03-15 | End: 2023-03-22

## 2023-03-21 NOTE — PROGRESS NOTES
TELEPHONE VISIT  Call start: 4:26PM  Call stop: 4:35PM  Call duration: 9 MIN    ASSESSMENT/PLAN:    (C18.6) Cancer of descending colon (H)  Comment: continued wean down to 4 tabs daily; 1 week supply sent; note to return to work on modified duty on Monday 3/27; precautions given; will verify w/ pharmacy that I can send 4 weeks of medication as I will be away the first two weeks of April and will need to send more than 1 week of meds; she will call me next Tuesday to let me know how work goes and remind me to send in refill of pain meds before I am away  Plan: oxyCODONE (ROXICODONE) 5 MG tablet          (Z90.49) Status post partial colectomy  Comment: see above  Plan: oxyCODONE (ROXICODONE) 5 MG tablet          (J30.2) Seasonal allergic rhinitis, unspecified trigger  Comment: refilled meds; precautions given; Had COVID in early December so is out of 90-day window; if her symptoms persist, would consider re-swabbing  Plan: fluticasone (FLONASE) 50 MCG/ACT nasal spray, cetirizine (ZYRTEC) 10 MG tablet         (J45.51) Severe persistent asthma with acute exacerbation  Comment: see above  Plan: tiotropium (SPIRIVA RESPIMAT) 1.25 MCG/ACT inhaler          Robbie Bailon MD  March 22, 2023  4:37 PM        Pt is a 48 year old female last seen on 3/8/23 via virtual visit evaluated again today via telephone encounter for:     Switched on day of visit from in-person to telephone visit     1) allergies/ HA - needs refill of Flonase and Spiriva; feels like allergies are acting up; ?wheezing. 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  At this point I have no further activity restrictions.  She has no dietary restrictions.  I do expect her fatigue will continue to improve.  The data shows that a large percentage of patients who undergo a partial colectomy have decreased energy for several months after the operation.     She needs a colonoscopy with me in 1 year.     She can be discharged from the clinic for now.  I am happy to see her on an as-needed basis if questions or concerns arise.      Past Medical History:   Diagnosis Date     Abdominal pain 06/29/2015     Abnormal cervical Papanicolaou smear 11/09/2014    Overview:  ACUS/HPV positive     Abnormal cytology finding 11/09/2014    Overview:  ACUS/HPV positive     Acute pericardial effusion 02/06/2017     Agoraphobia with panic attacks      Anxiety      Arthralgia of both lower legs 05/29/2020    Bilateral achy knee pain that is chronic in nature going on for years. Most likely osteoarthritis in knees related to obesity. Previously injected in both knees with good relief. 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  Has controlled substance agreement with them.  On Vicodin, Valium, Klonopin prescribed only from  there.        Coronary artery disease      Cough 02/09/2020     Family history of colon cancer 10/24/2020     Hx of seasonal allergies      Infection due to 2019 novel coronavirus 09/20/2022     Infectious pericarditis      Lipoma      Low back pain 07/13/2015     Major depressive disorder, recurrent episode, moderate (H) 09/05/2006     Menorrhagia with irregular cycle 07/15/2022    Added automatically from request for surgery [id number]     Moderate persistent asthma without complication 09/29/2020     Nondependent alcohol abuse, episodic drinking behavior 10/03/2012     Noninflammatory disorder of vagina 02/20/2015     Other chronic pain      Other long term (current) drug therapy 01/28/2013    Overview:  Vicodin and cyclobenzaprine monthly     Panic disorder with agoraphobia 09/05/2006     Pap smear for cervical cancer screening 10/31/2016    03/29/2010  Normal cytology, HPV ot done, repeat in 3 years.     Pericarditis 2017     Physiologic disturbance of temperature regulation 10/24/2020     PONV (postoperative nausea and vomiting)      Right ankle swelling 06/07/2022    Added automatically from request for surgery [id number]     Tobacco abuse      Tobacco use disorder 07/13/2015      Past Surgical History:   Procedure Laterality Date     ANKLE SURGERY Right 05/30/2019     BIOPSY BREAST Right 1990    benign     COLONOSCOPY N/A 1/3/2023    Procedure: COLONOSCOPY WITH BIOPSY OF COLON MASS, POLYPECTOMY;  Surgeon: Kris Charles MD;  Location: [facility] OR     CREATION PERICARDIAL WINDOW  02/10/2017    [facility]     DILATION AND CURETTAGE N/A 7/22/2022    Procedure: DILATION AND CURETTAGE, UTERUS;  Surgeon: Lesly Holland;  Location: [facility] OR     HEMORRHOIDECTOMY EXTERNAL       HYSTEROSCOPY, WITH ENDOMETRIAL RADIOFREQUENCY ABLATION - NOVASURE N/A 7/22/2022    Procedure: HYSTEROSCOPY, WITH ENDOMETRIAL RADIOFREQUENCY ABLATION - NOVASURE DILATION AND CURETTAGE, UTERUS;  Surgeon: Lesly Holland;   "Location: [facility] OR     LAPAROSCOPIC ASSISTED SIGMOID COLECTOMY N/A 1/6/2023    Procedure: LAPAROSCOPIC ASSISTED DESCENDING COLELCTOMY SPLENIC FLEXURE MOBILIZATION ;  Surgeon: Kris Charles MD;  Location: [facility] OR     LAPAROSCOPIC LYSIS ADHESIONS N/A 1/6/2023    Procedure: LYSIS OF ADHESIONS;  Surgeon: Kris Charles MD;  Location: [facility] OR     TUMOR REMOVAL      Has had 3. In the right breast and \"inside of rib cage.\"      Current Outpatient Medications   Medication Sig Dispense Refill     cetirizine (ZYRTEC) 10 MG tablet Take 1 tablet (10 mg) by mouth daily 30 tablet 11     fluticasone (FLONASE) 50 MCG/ACT nasal spray Spray 2 sprays into both nostrils daily 9.9 mL 11     oxyCODONE (ROXICODONE) 5 MG tablet Take 1 tablet (5 mg) by mouth 4 times daily as needed for severe pain (7-10) 28 tablet 0     tiotropium (SPIRIVA RESPIMAT) 1.25 MCG/ACT inhaler Inhale 2 puffs into the lungs daily 12 g 3     ALPRAZolam (XANAX) 2 MG tablet Take 1 tablet (2 mg) by mouth 2 times daily as needed for anxiety 60 tablet 1     diclofenac (VOLTAREN) 50 MG EC tablet Take 1 tablet (50 mg) by mouth 3 times daily as needed for moderate pain (4-6) 90 tablet 1     doxylamine (UNISOM) 25 MG TABS tablet Take 1 tablet (25 mg) by mouth nightly as needed for other (nausea) 7 tablet 1     guaiFENesin-codeine (GUAIFENESIN AC) 100-10 MG/5ML syrup Take 5 mLs by mouth every 4 hours as needed for congestion 180 mL 1     montelukast (SINGULAIR) 10 MG tablet Take 1 tablet (10 mg) by mouth At Bedtime 30 tablet 11     naloxone (NARCAN) 4 MG/0.1ML nasal spray Spray 1 spray (4 mg) into one nostril alternating nostrils as needed for opioid reversal every 2-3 minutes until assistance arrives (Patient not taking: Reported on 2/22/2023) 0.2 mL 1     ondansetron (ZOFRAN ODT) 4 MG ODT tab Take 1 tablet (4 mg) by mouth every 8 hours as needed for nausea 10 tablet 1     PROAIR  (90 Base) MCG/ACT inhaler Inhale 2 puffs " "into the lungs every 4 hours as needed for shortness of breath / dyspnea or wheezing 8 g 11     prochlorperazine (COMPAZINE) 10 MG tablet Take 1 tablet (10 mg) by mouth every 6 hours as needed for nausea or vomiting 40 tablet 1     spacer (OPTICHAMBER BINA) holding chamber For use w/ rescue inhaler 1 each 0     sucralfate (CARAFATE) 1 GM/10ML suspension Take 10 mLs (1 g) by mouth 4 times daily as needed for nausea 414 mL 0      Allergies   Allergen Reactions     Gabapentin Other (See Comments)     Mental status is changed      Lidocaine Other (See Comments)     \"my jaw stopped moving\"  Other reaction(s): Dystonia     Penicillins Hives, Rash and Shortness Of Breath     Epinephrine-Lidocaine-Na Metabisulfite Other (See Comments) and Muscle Pain (Myalgia)     Severe jaw cramping, double vision  Jaw locking        ROS:   Gen-  no fevers/chills   Head/ Eyes- no blurred vision, + headaches   ENT- see HPI  Cardiac - no palpitations, no chest pain   Respiratory - no shortness of breath , ? wheezing   GI - no nausea, no vomiting, no diarrhea, no constipation; + abd pain   Urinary - no dysuria, no polyuria   Remainder of ROS negative.     Exam:   LMP  (LMP Unknown)    Alert ; No acute distress     "

## 2023-03-22 ENCOUNTER — VIRTUAL VISIT (OUTPATIENT)
Dept: FAMILY MEDICINE | Facility: CLINIC | Age: 48
End: 2023-03-22
Payer: COMMERCIAL

## 2023-03-22 DIAGNOSIS — Z90.49 STATUS POST PARTIAL COLECTOMY: ICD-10-CM

## 2023-03-22 DIAGNOSIS — J30.2 SEASONAL ALLERGIC RHINITIS, UNSPECIFIED TRIGGER: ICD-10-CM

## 2023-03-22 DIAGNOSIS — J45.51 SEVERE PERSISTENT ASTHMA WITH ACUTE EXACERBATION (H): ICD-10-CM

## 2023-03-22 DIAGNOSIS — C18.6 CANCER OF DESCENDING COLON (H): ICD-10-CM

## 2023-03-22 PROCEDURE — 99213 OFFICE O/P EST LOW 20 MIN: CPT | Mod: 95 | Performed by: FAMILY MEDICINE

## 2023-03-22 RX ORDER — OXYCODONE HYDROCHLORIDE 5 MG/1
5 TABLET ORAL 4 TIMES DAILY PRN
Qty: 28 TABLET | Refills: 0 | Status: SHIPPED | OUTPATIENT
Start: 2023-03-22 | End: 2023-03-28

## 2023-03-22 RX ORDER — FLUTICASONE PROPIONATE 50 MCG
2 SPRAY, SUSPENSION (ML) NASAL DAILY
Qty: 9.9 ML | Refills: 11 | Status: SHIPPED | OUTPATIENT
Start: 2023-03-22 | End: 2024-05-29

## 2023-03-22 RX ORDER — CETIRIZINE HYDROCHLORIDE 10 MG/1
10 TABLET ORAL DAILY
Qty: 30 TABLET | Refills: 11 | Status: SHIPPED | OUTPATIENT
Start: 2023-03-22 | End: 2024-05-29

## 2023-03-22 RX ORDER — TIOTROPIUM BROMIDE INHALATION SPRAY 1.56 UG/1
2 SPRAY, METERED RESPIRATORY (INHALATION) DAILY
Qty: 12 G | Refills: 3 | Status: SHIPPED | OUTPATIENT
Start: 2023-03-22 | End: 2024-09-28

## 2023-03-22 NOTE — PROGRESS NOTES
Family Medicine Telephone Visit Note    Chief Complaint   Patient presents with     RECHECK     No concerns            HPI   Patients name: Karoline  Appointment start time:  4:26PM    See my note      Current Outpatient Medications   Medication Sig Dispense Refill     cetirizine (ZYRTEC) 10 MG tablet Take 1 tablet (10 mg) by mouth daily 30 tablet 11     fluticasone (FLONASE) 50 MCG/ACT nasal spray Spray 2 sprays into both nostrils daily 9.9 mL 11     oxyCODONE (ROXICODONE) 5 MG tablet Take 1 tablet (5 mg) by mouth 4 times daily as needed for severe pain (7-10) 28 tablet 0     tiotropium (SPIRIVA RESPIMAT) 1.25 MCG/ACT inhaler Inhale 2 puffs into the lungs daily 12 g 3     ALPRAZolam (XANAX) 2 MG tablet Take 1 tablet (2 mg) by mouth 2 times daily as needed for anxiety 60 tablet 1     diclofenac (VOLTAREN) 50 MG EC tablet Take 1 tablet (50 mg) by mouth 3 times daily as needed for moderate pain (4-6) 90 tablet 1     doxylamine (UNISOM) 25 MG TABS tablet Take 1 tablet (25 mg) by mouth nightly as needed for other (nausea) 7 tablet 1     guaiFENesin-codeine (GUAIFENESIN AC) 100-10 MG/5ML syrup Take 5 mLs by mouth every 4 hours as needed for congestion 180 mL 1     montelukast (SINGULAIR) 10 MG tablet Take 1 tablet (10 mg) by mouth At Bedtime 30 tablet 11     naloxone (NARCAN) 4 MG/0.1ML nasal spray Spray 1 spray (4 mg) into one nostril alternating nostrils as needed for opioid reversal every 2-3 minutes until assistance arrives (Patient not taking: Reported on 2/22/2023) 0.2 mL 1     ondansetron (ZOFRAN ODT) 4 MG ODT tab Take 1 tablet (4 mg) by mouth every 8 hours as needed for nausea 10 tablet 1     PROAIR  (90 Base) MCG/ACT inhaler Inhale 2 puffs into the lungs every 4 hours as needed for shortness of breath / dyspnea or wheezing 8 g 11     prochlorperazine (COMPAZINE) 10 MG tablet Take 1 tablet (10 mg) by mouth every 6 hours as needed for nausea or vomiting 40 tablet 1     spacer (OPTICHAMBER BINA) holding  "chamber For use w/ rescue inhaler 1 each 0     sucralfate (CARAFATE) 1 GM/10ML suspension Take 10 mLs (1 g) by mouth 4 times daily as needed for nausea 414 mL 0     Allergies   Allergen Reactions     Gabapentin Other (See Comments)     Mental status is changed      Lidocaine Other (See Comments)     \"my jaw stopped moving\"  Other reaction(s): Dystonia     Penicillins Hives, Rash and Shortness Of Breath     Epinephrine-Lidocaine-Na Metabisulfite Other (See Comments) and Muscle Pain (Myalgia)     Severe jaw cramping, double vision  Jaw locking              Review of Systems:     Constitutional, HEENT, cardiovascular, pulmonary, gi and gu systems are negative, except as otherwise noted.         Physical Exam:     LMP  (LMP Unknown)   Estimated body mass index is 33.97 kg/m  as calculated from the following:    Height as of 2/23/23: 1.753 m (5' 9\").    Weight as of 2/23/23: 104.3 kg (230 lb).    Exam:  Constitutional: healthy, alert and no distress  Psychiatric: mentation appears normal and affect normal/bright            Assessment and Plan   Darlene was seen today for recheck.    Diagnoses and all orders for this visit:    Cancer of descending colon (H)  -     oxyCODONE (ROXICODONE) 5 MG tablet; Take 1 tablet (5 mg) by mouth 4 times daily as needed for severe pain (7-10)    Status post partial colectomy  -     oxyCODONE (ROXICODONE) 5 MG tablet; Take 1 tablet (5 mg) by mouth 4 times daily as needed for severe pain (7-10)    Seasonal allergic rhinitis, unspecified trigger  -     fluticasone (FLONASE) 50 MCG/ACT nasal spray; Spray 2 sprays into both nostrils daily  -     cetirizine (ZYRTEC) 10 MG tablet; Take 1 tablet (10 mg) by mouth daily    Severe persistent asthma with acute exacerbation  -     tiotropium (SPIRIVA RESPIMAT) 1.25 MCG/ACT inhaler; Inhale 2 puffs into the lungs daily        Refilled medications that would be required in the next 3 months.     After Visit Information:  Patient chose to view AVS via " MyChart    Return in about 27 days (around 4/18/2023) for Follow up.    Appointment end time: 4:35PM  This is a telephone visit that took 9 minutes.      Clinician location:  [facility]       Robbie Bailon MD  March 22, 2023  4:36 PM

## 2023-03-22 NOTE — LETTER
WORK NOTE  2023     Seen today: Yes    Patient:  Darlene Hudson  :   1975  MRN:     [id number]  Physician: JULIETH BAILON    Darlene Hudson may return to work on Date: 3/27/23 WITH LIGHT DUTY.      The next clinic appointment is scheduled for (date/time) 22.    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          Julieth Bailon MD  2023  4:32 PM

## 2023-03-28 ENCOUNTER — TELEPHONE (OUTPATIENT)
Dept: FAMILY MEDICINE | Facility: CLINIC | Age: 48
End: 2023-03-28

## 2023-03-28 ENCOUNTER — OFFICE VISIT (OUTPATIENT)
Dept: FAMILY MEDICINE | Facility: CLINIC | Age: 48
End: 2023-03-28
Payer: COMMERCIAL

## 2023-03-28 VITALS
HEART RATE: 90 BPM | RESPIRATION RATE: 18 BRPM | SYSTOLIC BLOOD PRESSURE: 125 MMHG | DIASTOLIC BLOOD PRESSURE: 90 MMHG | OXYGEN SATURATION: 98 %

## 2023-03-28 DIAGNOSIS — Z90.49 STATUS POST PARTIAL COLECTOMY: Primary | ICD-10-CM

## 2023-03-28 DIAGNOSIS — C18.6 CANCER OF DESCENDING COLON (H): ICD-10-CM

## 2023-03-28 PROCEDURE — 99215 OFFICE O/P EST HI 40 MIN: CPT | Performed by: FAMILY MEDICINE

## 2023-03-28 RX ORDER — OXYCODONE HYDROCHLORIDE 5 MG/1
5 TABLET ORAL 4 TIMES DAILY PRN
Qty: 70 TABLET | Refills: 0 | Status: SHIPPED | OUTPATIENT
Start: 2023-03-28 | End: 2023-04-18

## 2023-03-28 ASSESSMENT — ENCOUNTER SYMPTOMS: CONSTITUTIONAL NEGATIVE: 1

## 2023-03-28 NOTE — LETTER
March 28, 2023      Darlene Hudson  [address] [place] [address]    Work Note:  Patient may return to work without restriction as of 29 Mar 23.          Arnaud Garcia III, MD, FAAFP  [facility]  03/28/23 4:20 PM

## 2023-03-28 NOTE — PROGRESS NOTES
Assessment and Plan     1. Status post partial colectomy  Discussed Dr. Bailon's previous notes and her surgeon's release to full activity. We both think it is reasonable for her to return to full activity and a note was written to this effect. Encouraged full activity. If she were to take 4 oxy/day x 21 days, she'd need 84 tabs. We both agree that the goal is downwards titration and we also agree that 70 tabs should be sufficient. She'll plan on bringing her bottle for a pill count at her next visit. Large empathy and motivation provided. Discussed healing and the role of pain.  - oxyCODONE (ROXICODONE) 5 MG tablet; Take 1 tablet (5 mg) by mouth 4 times daily as needed for severe pain (7-10)  Dispense: 70 tablet; Refill: 0    2. Cancer of descending colon (H)  - oxyCODONE (ROXICODONE) 5 MG tablet; Take 1 tablet (5 mg) by mouth 4 times daily as needed for severe pain (7-10)  Dispense: 70 tablet; Refill: 0    43 minutes spent on the date of the encounter doing chart review, history and exam, documentation, and further activities as noted above.    Options for treatment and follow-up care were reviewed with the patient and/or guardian. Darlene Hudsno and/or guardian engaged in the decision making process and verbalized understanding of the options discussed and agreed with the final plan. I answered all of their questions.    Arnaud Garcia III, MD, FAAFP  [facility]  03/28/23 4:55 PM           HPI:       Darlene Hudson is a 48 year old  female  Patient presents with:  Recheck Medication: Refill needed   Pt refused weight and height/ and ACT  Letter for work  (Full duty)     Is here to follow up on her post-op pain. Had been getting schedule visits and refills with her PCP, Dr. Bailon, who is now out on paternity leave. She had been reducing her use. Per her understanding, she was going to be able to stay on 4 oxycodone per day until her follow up with Dr. Bailon on 18 Apr. She would like to  "be able to decrease it and feels like this is a possibility. Hopes to reduce to 2-3 tabs/tab across the next 1-2 weeks (maybe even starting as soon as tomorrow).    Works in emergency management in the schools. Feels like she is ready to go back without restriction. Is \"going crazy behind a desk.\"         PMHX:     Patient Active Problem List   Diagnosis     Nondependent alcohol abuse, episodic drinking behavior     Controlled substance agreement signed     Chronic sinusitis     Major depressive disorder, recurrent episode, moderate (H)     Tobacco use disorder     Abnormal cervical Papanicolaou smear     Panic disorder with agoraphobia     Atopic rhinitis     Chronic low back pain     Other long term (current) drug therapy     Acute pericardial effusion     Anxiety     Chronic infectious pericarditis     Cough     Arthralgia of both lower legs     Moderate persistent asthma without complication     Physiologic disturbance of temperature regulation     Family history of colon cancer     Right ankle swelling     Menorrhagia with irregular cycle     Infection due to 2019 novel coronavirus     Adenocarcinoma of descending colon (H)       Current Outpatient Medications   Medication Sig Dispense Refill     oxyCODONE (ROXICODONE) 5 MG tablet Take 1 tablet (5 mg) by mouth 4 times daily as needed for severe pain (7-10) 70 tablet 0     ALPRAZolam (XANAX) 2 MG tablet Take 1 tablet (2 mg) by mouth 2 times daily as needed for anxiety 60 tablet 1     cetirizine (ZYRTEC) 10 MG tablet Take 1 tablet (10 mg) by mouth daily 30 tablet 11     diclofenac (VOLTAREN) 50 MG EC tablet Take 1 tablet (50 mg) by mouth 3 times daily as needed for moderate pain (4-6) 90 tablet 1     doxylamine (UNISOM) 25 MG TABS tablet Take 1 tablet (25 mg) by mouth nightly as needed for other (nausea) 7 tablet 1     fluticasone (FLONASE) 50 MCG/ACT nasal spray Spray 2 sprays into both nostrils daily 9.9 mL 11     guaiFENesin-codeine (GUAIFENESIN AC) 100-10 " MG/5ML syrup Take 5 mLs by mouth every 4 hours as needed for congestion 180 mL 1     montelukast (SINGULAIR) 10 MG tablet Take 1 tablet (10 mg) by mouth At Bedtime 30 tablet 11     naloxone (NARCAN) 4 MG/0.1ML nasal spray Spray 1 spray (4 mg) into one nostril alternating nostrils as needed for opioid reversal every 2-3 minutes until assistance arrives (Patient not taking: Reported on 2023) 0.2 mL 1     ondansetron (ZOFRAN ODT) 4 MG ODT tab Take 1 tablet (4 mg) by mouth every 8 hours as needed for nausea 10 tablet 1     PROAIR  (90 Base) MCG/ACT inhaler Inhale 2 puffs into the lungs every 4 hours as needed for shortness of breath / dyspnea or wheezing 8 g 11     prochlorperazine (COMPAZINE) 10 MG tablet Take 1 tablet (10 mg) by mouth every 6 hours as needed for nausea or vomiting 40 tablet 1     spacer (OPTICHAMBER BINA) holding chamber For use w/ rescue inhaler 1 each 0     sucralfate (CARAFATE) 1 GM/10ML suspension Take 10 mLs (1 g) by mouth 4 times daily as needed for nausea 414 mL 0     tiotropium (SPIRIVA RESPIMAT) 1.25 MCG/ACT inhaler Inhale 2 puffs into the lungs daily 12 g 3       Social History     Socioeconomic History     Marital status:      Spouse name: Not on file     Number of children: Not on file     Years of education: Not on file     Highest education level: Not on file   Occupational History     Not on file   Tobacco Use     Smoking status: Light Smoker     Packs/day: 0.10     Years: 30.00     Pack years: 3.00     Types: Cigarettes     Last attempt to quit: 2020     Years since quittin.8     Smokeless tobacco: Never     Tobacco comments:     2-3 cig/day, Seen IP by TTS on 23 - states she is done but declined our services and resource materials stating that she did not need them.   Vaping Use     Vaping Use: Some days   Substance and Sexual Activity     Alcohol use: Not Currently     Comment: Occasiaonlly.      Drug use: Not Currently     Sexual activity: Yes      "Partners: Male   Other Topics Concern     Parent/sibling w/ CABG, MI or angioplasty before 65F 55M? Not Asked   Social History Narrative     Not on file     Social Determinants of Health     Financial Resource Strain: Not on file   Food Insecurity: Not on file   Transportation Needs: Not on file   Physical Activity: Not on file   Stress: Not on file   Social Connections: Not on file   Intimate Partner Violence: Not on file   Housing Stability: Not on file       Allergies   Allergen Reactions     Gabapentin Other (See Comments)     Mental status is changed      Lidocaine Other (See Comments)     \"my jaw stopped moving\"  Other reaction(s): Dystonia     Penicillins Hives, Rash and Shortness Of Breath     Epinephrine-Lidocaine-Na Metabisulfite Other (See Comments) and Muscle Pain (Myalgia)     Severe jaw cramping, double vision  Jaw locking       No results found for this or any previous visit (from the past 24 hour(s)).         Review of Systems:     Review of Systems   Constitutional: Negative.             Physical Exam:     Vitals:    03/28/23 1552   BP: (!) 125/90   Pulse: 90   Resp: 18   SpO2: 98%     There is no height or weight on file to calculate BMI.    Physical Exam  Vitals and nursing note reviewed.   Constitutional:       General: She is not in acute distress.     Appearance: Normal appearance. She is not ill-appearing, toxic-appearing or diaphoretic.   Pulmonary:      Effort: No respiratory distress.   Neurological:      Mental Status: She is alert and oriented to person, place, and time.           "

## 2023-03-28 NOTE — TELEPHONE ENCOUNTER
[facility] phone call message- medication clarification/question:    Full Medication Name:     oxyCODONE (ROXICODONE) 5 MG tablet      Dose:     Sig - Route: Take 1 tablet (5 mg) by mouth 4 times daily as needed for severe pain (7-10) - Oral    Question:     Caller requesting for medication. Please call and advise if needed.    Patient also would like to go back to work. Would like a call from Dr. Bailon to discuss about restriction. Patient would like to go back to work without physical being done.     Pharmacy confirmed as [id number] [place] [id number] [facility]  AT [facility]: Yes    OK to leave a message on voice mail? Yes    Primary language: English      needed? No    Call taken on March 28, 2023 at 11:14 AM by Meredith Holder

## 2023-03-28 NOTE — TELEPHONE ENCOUNTER
Patient called back again and wanting to speak to someone no one is available. Caller step away from phone and was not responding, so call was disconnected. Advise, can not hear patient and if patient can call us back. Thank you

## 2023-04-18 ENCOUNTER — OFFICE VISIT (OUTPATIENT)
Dept: FAMILY MEDICINE | Facility: CLINIC | Age: 48
End: 2023-04-18
Payer: COMMERCIAL

## 2023-04-18 VITALS
DIASTOLIC BLOOD PRESSURE: 75 MMHG | HEART RATE: 86 BPM | TEMPERATURE: 98.3 F | SYSTOLIC BLOOD PRESSURE: 119 MMHG | BODY MASS INDEX: 33.97 KG/M2 | HEIGHT: 69 IN | RESPIRATION RATE: 20 BRPM | OXYGEN SATURATION: 96 %

## 2023-04-18 DIAGNOSIS — Z90.49 STATUS POST PARTIAL COLECTOMY: ICD-10-CM

## 2023-04-18 DIAGNOSIS — F41.1 GENERALIZED ANXIETY DISORDER: ICD-10-CM

## 2023-04-18 DIAGNOSIS — F40.01 PANIC DISORDER WITH AGORAPHOBIA: ICD-10-CM

## 2023-04-18 DIAGNOSIS — C18.6 CANCER OF DESCENDING COLON (H): Primary | ICD-10-CM

## 2023-04-18 DIAGNOSIS — J45.40 MODERATE PERSISTENT ASTHMA WITHOUT COMPLICATION: ICD-10-CM

## 2023-04-18 PROCEDURE — 99214 OFFICE O/P EST MOD 30 MIN: CPT | Performed by: FAMILY MEDICINE

## 2023-04-18 RX ORDER — OXYCODONE HYDROCHLORIDE 5 MG/1
5 TABLET ORAL 3 TIMES DAILY PRN
Qty: 84 TABLET | Refills: 0 | Status: SHIPPED | OUTPATIENT
Start: 2023-04-18 | End: 2023-05-12

## 2023-04-18 RX ORDER — ALPRAZOLAM 2 MG
2 TABLET ORAL 2 TIMES DAILY PRN
Qty: 60 TABLET | Refills: 1 | Status: SHIPPED | OUTPATIENT
Start: 2023-05-03 | End: 2023-06-14

## 2023-04-18 ASSESSMENT — ANXIETY QUESTIONNAIRES
7. FEELING AFRAID AS IF SOMETHING AWFUL MIGHT HAPPEN: NOT AT ALL
GAD7 TOTAL SCORE: 5
6. BECOMING EASILY ANNOYED OR IRRITABLE: SEVERAL DAYS
IF YOU CHECKED OFF ANY PROBLEMS ON THIS QUESTIONNAIRE, HOW DIFFICULT HAVE THESE PROBLEMS MADE IT FOR YOU TO DO YOUR WORK, TAKE CARE OF THINGS AT HOME, OR GET ALONG WITH OTHER PEOPLE: SOMEWHAT DIFFICULT
GAD7 TOTAL SCORE: 5
GAD7 TOTAL SCORE: 5
1. FEELING NERVOUS, ANXIOUS, OR ON EDGE: SEVERAL DAYS
7. FEELING AFRAID AS IF SOMETHING AWFUL MIGHT HAPPEN: NOT AT ALL
3. WORRYING TOO MUCH ABOUT DIFFERENT THINGS: SEVERAL DAYS
5. BEING SO RESTLESS THAT IT IS HARD TO SIT STILL: SEVERAL DAYS
2. NOT BEING ABLE TO STOP OR CONTROL WORRYING: NOT AT ALL
1. FEELING NERVOUS, ANXIOUS, OR ON EDGE: SEVERAL DAYS
3. WORRYING TOO MUCH ABOUT DIFFERENT THINGS: SEVERAL DAYS
2. NOT BEING ABLE TO STOP OR CONTROL WORRYING: NOT AT ALL
5. BEING SO RESTLESS THAT IT IS HARD TO SIT STILL: SEVERAL DAYS
6. BECOMING EASILY ANNOYED OR IRRITABLE: SEVERAL DAYS

## 2023-04-18 ASSESSMENT — PATIENT HEALTH QUESTIONNAIRE - PHQ9
5. POOR APPETITE OR OVEREATING: SEVERAL DAYS
SUM OF ALL RESPONSES TO PHQ QUESTIONS 1-9: 2
5. POOR APPETITE OR OVEREATING: SEVERAL DAYS

## 2023-04-18 ASSESSMENT — ASTHMA QUESTIONNAIRES: ACT_TOTALSCORE: 14

## 2023-04-18 NOTE — PROGRESS NOTES
"ASSESSMENT/PLAN:    (C18.6) Cancer of descending colon (H)  (primary encounter diagnosis)  Comment: we had a nice discussion today about her difficulty coping with her cancer dx, being a cancer survivor, and managing that along with her baseline anxiety, difficulty w/ pain, work duties, and parenting duties. I recommended she connect with a therapist but she says she does not have time for that. I encouraged her to try and find more balance w/ work and home. Will continue to follow closely; weaned down to 3 tabs/ day of oxycodone; will f/u in 4 wks  Plan: oxyCODONE (ROXICODONE) 5 MG tablet          (Z90.49) Status post partial colectomy  Comment: see above  Plan: oxyCODONE (ROXICODONE) 5 MG tablet          (J45.40) Moderate persistent asthma without complication  Comment:   Plan: refilled her rescue inhaler; stable today    (F40.01) Panic disorder with agoraphobia  Comment: see above regarding stress/ mood; will follow closely  Plan: ALPRAZolam (XANAX) 2 MG tablet          (F41.1) Generalized anxiety disorder  Comment: see above  Plan: ALPRAZolam (XANAX) 2 MG tablet          Robbie Bailon MD  April 18, 2023  4:53 PM        Pt is a 48 year old female last seen on 3/28/23 by Dr Garcia while I was away on leave here today for:     1) Is weaning down to 3 tabs/day  Is back at work - has \"survivors guilt\" - is meaner than before   Is now working 7 days/week   Has a lot of posttraumatic stress from her cancer diagnosis  Has guilt related to mom and boyfriend passing away from [place] - was that her fault?  \"Nobody understands\"    2) allergies/ asthma - zyrtec/ spiriva/ flonase; does not take siingulair    Per Dr Garcia' note:  1. Status post partial colectomy  Discussed Dr. Bailon's previous notes and her surgeon's release to full activity. We both think it is reasonable for her to return to full activity and a note was written to this effect. Encouraged full activity. If she were to take 4 oxy/day x 21 days, she'd need 84 " tabs. We both agree that the goal is downwards titration and we also agree that 70 tabs should be sufficient. She'll plan on bringing her bottle for a pill count at her next visit. Large empathy and motivation provided. Discussed healing and the role of pain.  - oxyCODONE (ROXICODONE) 5 MG tablet; Take 1 tablet (5 mg) by mouth 4 times daily as needed for severe pain (7-10)  Dispense: 70 tablet; Refill: 0     2. Cancer of descending colon (H)  - oxyCODONE (ROXICODONE) 5 MG tablet; Take 1 tablet (5 mg) by mouth 4 times daily as needed for severe pain (7-10)  Dispense: 70 tablet; Refill: 0    Past Medical History:   Diagnosis Date     Abdominal pain 06/29/2015     Abnormal cervical Papanicolaou smear 11/09/2014    Overview:  ACUS/HPV positive     Abnormal cytology finding 11/09/2014    Overview:  ACUS/HPV positive     Acute pericardial effusion 02/06/2017     Agoraphobia with panic attacks      Anxiety      Arthralgia of both lower legs 05/29/2020    Bilateral achy knee pain that is chronic in nature going on for years. Most likely osteoarthritis in knees related to obesity. Previously injected in both knees with good relief. Injected last on 5/29/20     Arthritis     of back     Asthma in adult, moderate persistent, uncomplicated      Atopic rhinitis 01/27/2017     Chronic infectious pericarditis 02/21/2019     Chronic low back pain 01/27/2017     Chronic pain      Chronic sinusitis      Cocaine abuse (H)      Colonic mass 12/28/2022    Added automatically from request for surgery [id number]     Controlled substance agreement signed 06/30/2015    Overview:  Patient has chronic pain and is seen at [facility] for this.  Has controlled substance agreement with them.  On Vicodin, Valium, Klonopin prescribed only from there.        Coronary artery disease      Cough 02/09/2020     Family history of colon cancer 10/24/2020     Hx of seasonal allergies      Infection due to 2019 novel coronavirus 09/20/2022      Infectious pericarditis      Lipoma      Low back pain 07/13/2015     Major depressive disorder, recurrent episode, moderate (H) 09/05/2006     Menorrhagia with irregular cycle 07/15/2022    Added automatically from request for surgery [id number]     Moderate persistent asthma without complication 09/29/2020     Nondependent alcohol abuse, episodic drinking behavior 10/03/2012     Noninflammatory disorder of vagina 02/20/2015     Other chronic pain      Other long term (current) drug therapy 01/28/2013    Overview:  Vicodin and cyclobenzaprine monthly     Panic disorder with agoraphobia 09/05/2006     Pap smear for cervical cancer screening 10/31/2016    03/29/2010  Normal cytology, HPV ot done, repeat in 3 years.     Pericarditis 2017     Physiologic disturbance of temperature regulation 10/24/2020     PONV (postoperative nausea and vomiting)      Right ankle swelling 06/07/2022    Added automatically from request for surgery [id number]     Tobacco abuse      Tobacco use disorder 07/13/2015      Past Surgical History:   Procedure Laterality Date     ANKLE SURGERY Right 05/30/2019     BIOPSY BREAST Right 1990    benign     COLONOSCOPY N/A 1/3/2023    Procedure: COLONOSCOPY WITH BIOPSY OF COLON MASS, POLYPECTOMY;  Surgeon: Kris Charles MD;  Location: [facility] OR     CREATION PERICARDIAL WINDOW  02/10/2017    [facility]     DILATION AND CURETTAGE N/A 7/22/2022    Procedure: DILATION AND CURETTAGE, UTERUS;  Surgeon: Lesly Holland;  Location: [facility] OR     HEMORRHOIDECTOMY EXTERNAL       HYSTEROSCOPY, WITH ENDOMETRIAL RADIOFREQUENCY ABLATION - NOVASURE N/A 7/22/2022    Procedure: HYSTEROSCOPY, WITH ENDOMETRIAL RADIOFREQUENCY ABLATION - NOVASURE DILATION AND CURETTAGE, UTERUS;  Surgeon: Lesly Holland;  Location: [facility] OR     LAPAROSCOPIC ASSISTED SIGMOID COLECTOMY N/A 1/6/2023    Procedure: LAPAROSCOPIC ASSISTED DESCENDING COLELCTOMY SPLENIC FLEXURE MOBILIZATION ;  Surgeon: Araceli  "Kris Ruiz MD;  Location: [facility] [place]     LAPAROSCOPIC LYSIS ADHESIONS N/A 1/6/2023    Procedure: LYSIS OF ADHESIONS;  Surgeon: Kirs Charles MD;  Location: [facility] OR     TUMOR REMOVAL      Has had 3. In the right breast and \"inside of rib cage.\"      Current Outpatient Medications   Medication Sig Dispense Refill     [START ON 5/3/2023] ALPRAZolam (XANAX) 2 MG tablet Take 1 tablet (2 mg) by mouth 2 times daily as needed for anxiety 60 tablet 1     cetirizine (ZYRTEC) 10 MG tablet Take 1 tablet (10 mg) by mouth daily 30 tablet 11     diclofenac (VOLTAREN) 50 MG EC tablet Take 1 tablet (50 mg) by mouth 3 times daily as needed for moderate pain (4-6) 90 tablet 1     doxylamine (UNISOM) 25 MG TABS tablet Take 1 tablet (25 mg) by mouth nightly as needed for other (nausea) 7 tablet 1     fluticasone (FLONASE) 50 MCG/ACT nasal spray Spray 2 sprays into both nostrils daily 9.9 mL 11     guaiFENesin-codeine (GUAIFENESIN AC) 100-10 MG/5ML syrup Take 5 mLs by mouth every 4 hours as needed for congestion 180 mL 1     montelukast (SINGULAIR) 10 MG tablet Take 1 tablet (10 mg) by mouth At Bedtime 30 tablet 11     naloxone (NARCAN) 4 MG/0.1ML nasal spray Spray 1 spray (4 mg) into one nostril alternating nostrils as needed for opioid reversal every 2-3 minutes until assistance arrives 0.2 mL 1     ondansetron (ZOFRAN ODT) 4 MG ODT tab Take 1 tablet (4 mg) by mouth every 8 hours as needed for nausea 10 tablet 1     oxyCODONE (ROXICODONE) 5 MG tablet Take 1 tablet (5 mg) by mouth 3 times daily as needed for severe pain 84 tablet 0     PROAIR  (90 Base) MCG/ACT inhaler Inhale 2 puffs into the lungs every 4 hours as needed for shortness of breath / dyspnea or wheezing 8 g 11     prochlorperazine (COMPAZINE) 10 MG tablet Take 1 tablet (10 mg) by mouth every 6 hours as needed for nausea or vomiting 40 tablet 1     spacer (OPTICHAMBER BINA) holding chamber For use w/ rescue inhaler 1 each 0     " "sucralfate (CARAFATE) 1 GM/10ML suspension Take 10 mLs (1 g) by mouth 4 times daily as needed for nausea 414 mL 0     tiotropium (SPIRIVA RESPIMAT) 1.25 MCG/ACT inhaler Inhale 2 puffs into the lungs daily 12 g 3      Allergies   Allergen Reactions     Gabapentin Other (See Comments)     Mental status is changed      Lidocaine Other (See Comments)     \"my jaw stopped moving\"  Other reaction(s): Dystonia     Penicillins Hives, Rash and Shortness Of Breath     Epinephrine-Lidocaine-Na Metabisulfite Other (See Comments) and Muscle Pain (Myalgia)     Severe jaw cramping, double vision  Jaw locking        ROS:   Gen- no weight change, no fevers/chills   Cardiac - no palpitations, no chest pain   Respiratory - no shortness of breath , no wheezing   Remainder of ROS negative.     Exam:   /75   Pulse 86   Temp 98.3  F (36.8  C) (Oral)   Resp 20   Ht 1.753 m (5' 9\")   SpO2 96%   BMI 33.97 kg/m     Alert and oriented x 3; No acute distress   Neuro: no focal deficits   Derm: no rashes       "

## 2023-04-19 RX ORDER — ALBUTEROL SULFATE 90 UG/1
2 AEROSOL, METERED RESPIRATORY (INHALATION) EVERY 4 HOURS PRN
Qty: 8 G | Refills: 11 | Status: SHIPPED | OUTPATIENT
Start: 2023-04-19

## 2023-04-26 ENCOUNTER — OFFICE VISIT (OUTPATIENT)
Dept: FAMILY MEDICINE | Facility: CLINIC | Age: 48
End: 2023-04-26
Payer: COMMERCIAL

## 2023-04-26 VITALS — SYSTOLIC BLOOD PRESSURE: 122 MMHG | HEART RATE: 93 BPM | OXYGEN SATURATION: 98 % | DIASTOLIC BLOOD PRESSURE: 86 MMHG

## 2023-04-26 DIAGNOSIS — M25.871 ANKLE IMPINGEMENT SYNDROME, RIGHT: ICD-10-CM

## 2023-04-26 DIAGNOSIS — M25.471 RIGHT ANKLE SWELLING: Primary | ICD-10-CM

## 2023-04-26 PROCEDURE — 99214 OFFICE O/P EST MOD 30 MIN: CPT | Performed by: FAMILY MEDICINE

## 2023-04-26 RX ORDER — PREDNISONE 50 MG/1
50 TABLET ORAL DAILY
Qty: 5 TABLET | Refills: 0 | Status: SHIPPED | OUTPATIENT
Start: 2023-04-26 | End: 2023-06-19

## 2023-04-26 NOTE — LETTER
WORK NOTE  2023     Seen today: Yes    Patient:  Darlene Hudson  :   1975  MRN:     [id number]  Physician: JULIETH BAILON    Darlene Hudson is under my care for her R ankle swelling. She will need to be treated with a walking boot and medication x 5 days. She will be unable to tolerate work from -. She may return to work on Date: 23 without limitations.      The next clinic appointment is scheduled for (date/time) 23.    Patient limitations:  see above            Julieth Bailon MD  2023  3:53 PM

## 2023-04-26 NOTE — PROGRESS NOTES
ASSESSMENT/PLAN:    (M25.471) Right ankle swelling  (primary encounter diagnosis)  Comment: acute flare of her R ankle swelling; will tx w/ boot and short course of pred; work note written; f/u as scheduled on 5/16/23  Plan: Ankle/Foot Bracing Supplies DME Walking Boot; Right; Pneumatic; Short, predniSONE (DELTASONE)        50 MG tablet          (M25.871) Ankle impingement syndrome, right  Comment: see above  Plan: Ankle/Foot Bracing Supplies DME Walking Boot; Right; Pneumatic; Short          Robbie Bailon MD  April 26, 2023  3:53 PM        Pt is a 48 year old female last seen on 4/18/23 here for follow up of:     L ankle - swelled up at work today  Work was concerned because they thought she needed to go to the ED  She declined so they said you need a doctors note before returning  Has dx of lateral impingement and was pending approval from worker's comp for ankle surgery  Will reschedule w/ Dr Bashir      Per my last note:  ASSESSMENT/PLAN:     (C18.6) Cancer of descending colon (H)  (primary encounter diagnosis)  Comment: we had a nice discussion today about her difficulty coping with her cancer dx, being a cancer survivor, and managing that along with her baseline anxiety, difficulty w/ pain, work duties, and parenting duties. I recommended she connect with a therapist but she says she does not have time for that. I encouraged her to try and find more balance w/ work and home. Will continue to follow closely; weaned down to 3 tabs/ day of oxycodone; will f/u in 4 wks  Plan: oxyCODONE (ROXICODONE) 5 MG tablet          (Z90.49) Status post partial colectomy  Comment: see above  Plan: oxyCODONE (ROXICODONE) 5 MG tablet          (J45.40) Moderate persistent asthma without complication  Comment:   Plan: refilled her rescue inhaler; stable today     (F40.01) Panic disorder with agoraphobia  Comment: see above regarding stress/ mood; will follow closely  Plan: ALPRAZolam (XANAX) 2 MG tablet          (F41.1) Generalized  anxiety disorder  Comment: see above  Plan: ALPRAZolam (XANAX) 2 MG tablet    Past Medical History:   Diagnosis Date     Abdominal pain 06/29/2015     Abnormal cervical Papanicolaou smear 11/09/2014    Overview:  ACUS/HPV positive     Abnormal cytology finding 11/09/2014    Overview:  ACUS/HPV positive     Acute pericardial effusion 02/06/2017     Agoraphobia with panic attacks      Anxiety      Arthralgia of both lower legs 05/29/2020    Bilateral achy knee pain that is chronic in nature going on for years. Most likely osteoarthritis in knees related to obesity. Previously injected in both knees with good relief. 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  Has controlled substance agreement with them.  On Vicodin, Valium, Klonopin prescribed only from there.        Coronary artery disease      Cough 02/09/2020     Family history of colon cancer 10/24/2020     Hx of seasonal allergies      Infection due to 2019 novel coronavirus 09/20/2022     Infectious pericarditis      Lipoma      Low back pain 07/13/2015     Major depressive disorder, recurrent episode, moderate (H) 09/05/2006     Menorrhagia with irregular cycle 07/15/2022    Added automatically from request for surgery [id number]     Moderate persistent asthma without complication 09/29/2020     Nondependent alcohol abuse, episodic drinking behavior 10/03/2012     Noninflammatory disorder of vagina 02/20/2015     Other chronic pain      Other long term (current) drug therapy 01/28/2013    Overview:  Vicodin  and cyclobenzaprine monthly     Panic disorder with agoraphobia 09/05/2006     Pap smear for cervical cancer screening 10/31/2016    03/29/2010  Normal cytology, HPV ot done, repeat in 3 years.     Pericarditis 2017     Physiologic disturbance of temperature regulation 10/24/2020     PONV (postoperative nausea and vomiting)      Right ankle swelling 06/07/2022    Added automatically from request for surgery [id number]     Tobacco abuse      Tobacco use disorder 07/13/2015      Current Outpatient Medications   Medication Sig Dispense Refill     [START ON 5/3/2023] ALPRAZolam (XANAX) 2 MG tablet Take 1 tablet (2 mg) by mouth 2 times daily as needed for anxiety 60 tablet 1     cetirizine (ZYRTEC) 10 MG tablet Take 1 tablet (10 mg) by mouth daily 30 tablet 11     diclofenac (VOLTAREN) 50 MG EC tablet Take 1 tablet (50 mg) by mouth 3 times daily as needed for moderate pain (4-6) 90 tablet 1     doxylamine (UNISOM) 25 MG TABS tablet Take 1 tablet (25 mg) by mouth nightly as needed for other (nausea) 7 tablet 1     fluticasone (FLONASE) 50 MCG/ACT nasal spray Spray 2 sprays into both nostrils daily 9.9 mL 11     guaiFENesin-codeine (GUAIFENESIN AC) 100-10 MG/5ML syrup Take 5 mLs by mouth every 4 hours as needed for congestion 180 mL 1     montelukast (SINGULAIR) 10 MG tablet Take 1 tablet (10 mg) by mouth At Bedtime 30 tablet 11     naloxone (NARCAN) 4 MG/0.1ML nasal spray Spray 1 spray (4 mg) into one nostril alternating nostrils as needed for opioid reversal every 2-3 minutes until assistance arrives 0.2 mL 1     ondansetron (ZOFRAN ODT) 4 MG ODT tab Take 1 tablet (4 mg) by mouth every 8 hours as needed for nausea 10 tablet 1     oxyCODONE (ROXICODONE) 5 MG tablet Take 1 tablet (5 mg) by mouth 3 times daily as needed for severe pain 84 tablet 0     PROAIR  (90 Base) MCG/ACT inhaler Inhale 2 puffs into the lungs every 4 hours as needed for shortness of breath or wheezing 8 g 11     prochlorperazine (COMPAZINE) 10 MG  "tablet Take 1 tablet (10 mg) by mouth every 6 hours as needed for nausea or vomiting 40 tablet 1     spacer (OPTICHAMBER BINA) holding chamber For use w/ rescue inhaler 1 each 0     sucralfate (CARAFATE) 1 GM/10ML suspension Take 10 mLs (1 g) by mouth 4 times daily as needed for nausea 414 mL 0     tiotropium (SPIRIVA RESPIMAT) 1.25 MCG/ACT inhaler Inhale 2 puffs into the lungs daily 12 g 3      Allergies   Allergen Reactions     Gabapentin Other (See Comments)     Mental status is changed      Lidocaine Other (See Comments)     \"my jaw stopped moving\"  Other reaction(s): Dystonia     Penicillins Hives, Rash and Shortness Of Breath     Lidocaine-Epinephrine Other (See Comments) and Muscle Pain (Myalgia)     Severe jaw cramping, double vision  Jaw locking      ROS:   Gen- no fevers/chills   Rheum - no morning stiffness   Derm - no rash/ redness   Neuro - no numbness, no tingling   Remainder of ROS negative.     Exam:     R ankle -   +significant soft tissue swelling over lateral malleolus  No pitting edema  Tender to light touch laterally  No pain w/ dorsiflexion/ plantarflexion  Pain w/ weight-bearing      DME (Durable Medical Equipment) Orders and Documentation  Orders Placed This Encounter   Procedures     Ankle/Foot Bracing Supplies DME Walking Boot; Right; Pneumatic; Short      The patient was assessed and it was determined the patient is in need of the following listed DME Supplies/Equipment. Please complete supporting documentation below to demonstrate medical necessity.      Ankle/Foot Bracing Supplies Documentation  Patient requires the use of the ordered bracing device due to following medical need/condition: R ankle impingement     Has the patient received a similiar product in the past five years? Yes    Reason for repleacement? Damaged/Unusable    Insurance Guidelines: Per Insurance guidelines, a same/similiar brace should not be dispensed without a valid reason for replacement.              "

## 2023-05-11 ENCOUNTER — TELEPHONE (OUTPATIENT)
Dept: FAMILY MEDICINE | Facility: CLINIC | Age: 48
End: 2023-05-11
Payer: MEDICAID

## 2023-05-11 DIAGNOSIS — C18.6 CANCER OF DESCENDING COLON (H): ICD-10-CM

## 2023-05-11 DIAGNOSIS — Z90.49 STATUS POST PARTIAL COLECTOMY: ICD-10-CM

## 2023-05-11 NOTE — TELEPHONE ENCOUNTER
[facility] phone call message- medication clarification/question:    Full Medication Name: oxyCODONE (ROXICODONE) 5 MG tablet    Question: Patient called stating she left the remainder of her medication in [place] and now her ankles are swelling and she's super burnt. She lands tomorrow and will be back home wondering if Dr. Bailon can send remainder of medication to her pharmacy. Please advise thank you.    Pharmacy confirmed as [id number] [address]  AT [facility]: Yes    OK to leave a message on voice mail? Yes    Primary language: English      needed? No    Call taken on May 11, 2023 at 12:59 PM by Christine Cruz

## 2023-05-12 RX ORDER — OXYCODONE HYDROCHLORIDE 5 MG/1
5 TABLET ORAL 3 TIMES DAILY PRN
Qty: 84 TABLET | Refills: 0 | Status: SHIPPED | OUTPATIENT
Start: 2023-05-12 | End: 2023-06-09

## 2023-05-12 NOTE — TELEPHONE ENCOUNTER
Patient called back in regards to medication. Relayed message to patient, patient verbalize ok. But patient had a question. Patient concern is if she might get sick? If there is anything that she can take? She left her mediation in [place] Wednesday. Patient would like a call back, please call and advise, thank you.

## 2023-05-12 NOTE — TELEPHONE ENCOUNTER
Sent script for early fill today. If pharmacy is unable to fill, I recommended she take imodium, naproxen, and I can call in hydroxyzine and clonidine to manage side effects til she is due next week.  Team - I asked Karoline to call back at 4pm if issues. Please find me in the precepting room for any issues. Thanks!     Robbie Bailon MD  May 12, 2023  2:58 PM

## 2023-05-12 NOTE — TELEPHONE ENCOUNTER
Team - please inform pt that soonest she is able to get medication is Tues 5/16. We are unable to provide additional medication for lost medication (ie medication left behind). This is part of our controlled substance contract.     Robbie Bailon MD  May 12, 2023  8:39 AM

## 2023-05-16 ENCOUNTER — OFFICE VISIT (OUTPATIENT)
Dept: FAMILY MEDICINE | Facility: CLINIC | Age: 48
End: 2023-05-16
Payer: COMMERCIAL

## 2023-05-16 VITALS — DIASTOLIC BLOOD PRESSURE: 77 MMHG | OXYGEN SATURATION: 100 % | HEART RATE: 92 BPM | SYSTOLIC BLOOD PRESSURE: 111 MMHG

## 2023-05-16 DIAGNOSIS — M25.471 RIGHT ANKLE SWELLING: ICD-10-CM

## 2023-05-16 DIAGNOSIS — M25.871 ANKLE IMPINGEMENT SYNDROME, RIGHT: Primary | ICD-10-CM

## 2023-05-16 PROCEDURE — 99213 OFFICE O/P EST LOW 20 MIN: CPT | Performed by: FAMILY MEDICINE

## 2023-05-16 NOTE — PROGRESS NOTES
ASSESSMENT/PLAN:    (M25.871) Ankle impingement syndrome, right  (primary encounter diagnosis)  Comment: will schedule to see Dr Bashir for re-eval of her ankle impingement and whether proceeding w/ surgery is best at this point. Note written to return to work w/ ankle brace; f/u in 4 wks  Plan: Orthopedic  Referral          (M25.471) Right ankle swelling  Comment: see above  Plan: Orthopedic  Referral          Robbie Bailon MD  May 16, 2023  4:32 PM        Pt is a 48 year old female last seen on 4/26/23 here today for:     R ankle - never did the boot;  Took 3 days of prednisone w/ good relief. Yesterday at work, was sent home due to more swelling       Per my last note:  (M25.471) Right ankle swelling  (primary encounter diagnosis)  Comment: acute flare of her R ankle swelling; will tx w/ boot and short course of pred; work note written; f/u as scheduled on 5/16/23  Plan: Ankle/Foot Bracing Supplies DME Walking Boot; Right; Pneumatic; Short, predniSONE (DELTASONE)        50 MG tablet          (M25.871) Ankle impingement syndrome, right  Comment: see above  Plan: Ankle/Foot Bracing Supplies DME Walking Boot; Right; Pneumatic; Short    Past Medical History:   Diagnosis Date     Abdominal pain 06/29/2015     Abnormal cervical Papanicolaou smear 11/09/2014    Overview:  ACUS/HPV positive     Abnormal cytology finding 11/09/2014    Overview:  ACUS/HPV positive     Acute pericardial effusion 02/06/2017     Agoraphobia with panic attacks      Anxiety      Arthralgia of both lower legs 05/29/2020    Bilateral achy knee pain that is chronic in nature going on for years. Most likely osteoarthritis in knees related to obesity. Previously injected in both knees with good relief. 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  Has controlled substance agreement with them.  On Vicodin, Valium, Klonopin prescribed only from there.        Coronary artery disease      Cough 02/09/2020     Family history of colon cancer 10/24/2020     Hx of seasonal allergies      Infection due to 2019 novel coronavirus 09/20/2022     Infectious pericarditis      Lipoma      Low back pain 07/13/2015     Major depressive disorder, recurrent episode, moderate (H) 09/05/2006     Menorrhagia with irregular cycle 07/15/2022    Added automatically from request for surgery [id number]     Moderate persistent asthma without complication 09/29/2020     Nondependent alcohol abuse, episodic drinking behavior 10/03/2012     Noninflammatory disorder of vagina 02/20/2015     Other chronic pain      Other long term (current) drug therapy 01/28/2013    Overview:  Vicodin and cyclobenzaprine monthly     Panic disorder with agoraphobia 09/05/2006     Pap smear for cervical cancer screening 10/31/2016    03/29/2010  Normal cytology, HPV ot done, repeat in 3 years.     Pericarditis 2017     Physiologic disturbance of temperature regulation 10/24/2020     PONV (postoperative nausea and vomiting)      Right ankle swelling 06/07/2022    Added automatically from request for surgery [id number]     Tobacco abuse      Tobacco use disorder 07/13/2015      Past Surgical History:   Procedure Laterality Date     ANKLE SURGERY Right 05/30/2019     BIOPSY BREAST Right 1990    benign     COLONOSCOPY N/A 1/3/2023    Procedure: COLONOSCOPY WITH BIOPSY OF COLON MASS, POLYPECTOMY;  Surgeon: Kris Charles MD;  Location: [facility] OR     CREATION PERICARDIAL WINDOW  02/10/2017    [facility] AND  "CURETTAGE N/A 7/22/2022    Procedure: DILATION AND CURETTAGE, UTERUS;  Surgeon: Lesly Holland;  Location: [facility] OR     HEMORRHOIDECTOMY EXTERNAL       HYSTEROSCOPY, WITH ENDOMETRIAL RADIOFREQUENCY ABLATION - NOVASURE N/A 7/22/2022    Procedure: HYSTEROSCOPY, WITH ENDOMETRIAL RADIOFREQUENCY ABLATION - NOVASURE DILATION AND CURETTAGE, UTERUS;  Surgeon: Lesly Holland;  Location: [facility] OR     LAPAROSCOPIC ASSISTED SIGMOID COLECTOMY N/A 1/6/2023    Procedure: LAPAROSCOPIC ASSISTED DESCENDING COLELCTOMY SPLENIC FLEXURE MOBILIZATION ;  Surgeon: Kris Charles MD;  Location: [facility] OR     LAPAROSCOPIC LYSIS ADHESIONS N/A 1/6/2023    Procedure: LYSIS OF ADHESIONS;  Surgeon: Kris Charles MD;  Location: [facility] OR     TUMOR REMOVAL      Has had 3. In the right breast and \"inside of rib cage.\"      Current Outpatient Medications   Medication Sig Dispense Refill     ALPRAZolam (XANAX) 2 MG tablet Take 1 tablet (2 mg) by mouth 2 times daily as needed for anxiety 60 tablet 1     cetirizine (ZYRTEC) 10 MG tablet Take 1 tablet (10 mg) by mouth daily 30 tablet 11     diclofenac (VOLTAREN) 50 MG EC tablet Take 1 tablet (50 mg) by mouth 3 times daily as needed for moderate pain (4-6) 90 tablet 1     doxylamine (UNISOM) 25 MG TABS tablet Take 1 tablet (25 mg) by mouth nightly as needed for other (nausea) 7 tablet 1     fluticasone (FLONASE) 50 MCG/ACT nasal spray Spray 2 sprays into both nostrils daily 9.9 mL 11     guaiFENesin-codeine (GUAIFENESIN AC) 100-10 MG/5ML syrup Take 5 mLs by mouth every 4 hours as needed for congestion 180 mL 1     montelukast (SINGULAIR) 10 MG tablet Take 1 tablet (10 mg) by mouth At Bedtime 30 tablet 11     naloxone (NARCAN) 4 MG/0.1ML nasal spray Spray 1 spray (4 mg) into one nostril alternating nostrils as needed for opioid reversal every 2-3 minutes until assistance arrives 0.2 mL 1     ondansetron (ZOFRAN ODT) 4 MG ODT tab Take 1 tablet (4 mg) by " "mouth every 8 hours as needed for nausea 10 tablet 1     oxyCODONE (ROXICODONE) 5 MG tablet Take 1 tablet (5 mg) by mouth 3 times daily as needed for severe pain 84 tablet 0     predniSONE (DELTASONE) 50 MG tablet Take 1 tablet (50 mg) by mouth daily 5 tablet 0     PROAIR  (90 Base) MCG/ACT inhaler Inhale 2 puffs into the lungs every 4 hours as needed for shortness of breath or wheezing 8 g 11     prochlorperazine (COMPAZINE) 10 MG tablet Take 1 tablet (10 mg) by mouth every 6 hours as needed for nausea or vomiting 40 tablet 1     spacer (OPTICHAMBER BINA) holding chamber For use w/ rescue inhaler 1 each 0     sucralfate (CARAFATE) 1 GM/10ML suspension Take 10 mLs (1 g) by mouth 4 times daily as needed for nausea 414 mL 0     tiotropium (SPIRIVA RESPIMAT) 1.25 MCG/ACT inhaler Inhale 2 puffs into the lungs daily 12 g 3      Allergies   Allergen Reactions     Gabapentin Other (See Comments)     Mental status is changed      Lidocaine Other (See Comments)     \"my jaw stopped moving\"  Other reaction(s): Dystonia     Penicillins Hives, Rash and Shortness Of Breath     Lidocaine-Epinephrine Other (See Comments) and Muscle Pain (Myalgia)     Severe jaw cramping, double vision  Jaw locking        ROS:   Gen-  no fevers/chills   Neuro - no numbness, no tingling   Remainder of ROS negative.     Exam:   /77 (BP Location: Left arm, Patient Position: Sitting, Cuff Size: Adult Large)   Pulse 92   SpO2 100%    Alert and oriented x 3; No acute distress   MSK: + significant soft tissue swelling over lateral ankle and midfoot; +TTP over lateral malleolus  Neuro: no focal deficits   Derm:mild erythema overlying lateral malleolus       "

## 2023-05-16 NOTE — LETTER
Return to Work  May 16, 2023     Seen today: Yes    Patient:  Darlene Hudson  :   1975  MRN:     [id number]  Physician: JULIETH BAILON may return to work on Date: 23.      The next clinic appointment is scheduled for (date/time) 23.    Patient limitations:  Please allow her to wear an ankle brace on her R ankle for recurrent pain/swelling.            Julieth Bailon MD  May 16, 2023  4:34 PM

## 2023-05-24 ENCOUNTER — VIRTUAL VISIT (OUTPATIENT)
Dept: ONCOLOGY | Facility: CLINIC | Age: 48
End: 2023-05-24
Attending: INTERNAL MEDICINE
Payer: COMMERCIAL

## 2023-05-24 ENCOUNTER — TELEPHONE (OUTPATIENT)
Dept: ONCOLOGY | Facility: CLINIC | Age: 48
End: 2023-05-24
Payer: MEDICAID

## 2023-05-24 DIAGNOSIS — C18.6 ADENOCARCINOMA OF DESCENDING COLON (H): Primary | ICD-10-CM

## 2023-05-24 DIAGNOSIS — Z80.0 FAMILY HISTORY OF COLON CANCER: ICD-10-CM

## 2023-05-24 DIAGNOSIS — Z80.9 FAMILY HISTORY OF CANCER: ICD-10-CM

## 2023-05-24 DIAGNOSIS — Z80.3 FAMILY HISTORY OF MALIGNANT NEOPLASM OF BREAST: ICD-10-CM

## 2023-05-24 PROCEDURE — 96040 HC GENETIC COUNSELING, EACH 30 MINUTES: CPT | Mod: TEL,95 | Performed by: GENETIC COUNSELOR, MS

## 2023-05-24 NOTE — NURSING NOTE
Is the patient currently in the state of [place]? YES    Visit mode:TELEPHONE    If the visit is dropped, the patient can be reconnected by: TELEPHONE VISIT: Phone number: [phone]    Will anyone else be joining the visit? NO      How would you like to obtain your AVS? MyChart    Are changes needed to the allergy or medication list? NO    Reason for visit: Consult    Johanna FREEMAN

## 2023-05-24 NOTE — PROGRESS NOTES
Virtual Visit Details    Type of service:  Telephone Visit   Phone call duration: 30 minutes     5/24/2023    Referring Provider: Arnaud Valdez MD    Presenting Information:   I met with Darlene Hudson today for genetic counseling at the [facility] (virtual visit) to discuss her personal and family history of cancer.  She is here today to review this history, cancer screening recommendations, and available genetic testing options.    Personal History:  Darlene is a 48 year old female. 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  Treatment included partial colectomy.     Family History: (Please see scanned pedigree for detailed family history information)    Karoline's mother passed away at age 69.  She had a history of cervical cancer and a benign brain tumor    Her maternal grandmother had a history of breast cancer    Her maternal grandfather had a history of lung cancer    Karoline's father was diagnosed with colon cancer and passed away in his 50's.  He had a large family, in which all siblings but one passed away due to cancer (including several at early ages).  Details regarding these diagnoses were not available.      Her maternal ethnicity is [place]. Her paternal ethnicity is .      Discussion:    Darlene's personal history of early onset colon cancer, in addition to her significant paternal family history of multiple cancers may be suggestive of a hereditary cancer syndrome.    We reviewed the features of sporadic, familial, and hereditary cancers. Based on her personal and family history, Darlene meets current National Comprehensive Cancer Network (NCCN) criteria for genetic evaluation for Lyn syndrome (MLH1, MSH2, MSH6, PMS2 and EPCAM).  In addition, NCCN guidelines recommend germline multigene testing for individuals diagnosed prior to age 50 with colorectal cancer    A detailed handout regarding hereditary colon  cancer and the information we discussed can be found in the after visit summary. Topics included: inheritance pattern, cancer risks, cancer screening recommendations, and also risks, benefits and limitations of testing.     We discussed that there are additional genes that could cause increased risk for colon and other cancers. As many of these genes present with overlapping features in a family and accurate cancer risk cannot always be established based upon the pedigree analysis alone, it would be reasonable for Darlene to consider panel genetic testing to analyze multiple genes at once.    Karoline elected to proceed with testing for an expanded hereditary cancers panel.  Verbal consent was obtained for Lyn syndrome and the 84 gene [facility] Multi Cancers Panel.  A blood draw will be coordinated through [employer].  Test turn around time: 3-4 weeks.       Medical Management: For Darlene, we reviewed that the information from genetic testing may determine:    additional cancer screening for which Darlene may qualify (i.e. mammogram and breast MRI, more frequent colonoscopies, more frequent dermatologic exams, etc.),    options for risk reducing surgeries Darlene could consider (i.e. bilateral mastectomy, surgery to remove her ovaries and/or uterus, etc.),      and targeted chemotherapies for certain cancers should they be needed in the future (i.e. immunotherapy for individuals with Lyn syndrome, PARP inhibitors, etc.).     These recommendations and possible targeted chemotherapies will be discussed in detail once genetic testing is completed.     Plan:  1) Today Darlene elected to proceed with Lyn syndrome testing and the expanded 84 gene Multi Cancers panel through [email or web address].  2) This information should be available in 4 weeks.  3) Darlene will return to clinic to discuss the results.    Iqra Reed MS, [facility]  Licensed, Certified Genetic Counselor  [employer]  Phone: [phone]        Genetic Testing Next  Steps:    1. An order for genetic testing will be placed by your genetic counselor, and a blood draw will be coordinated through an [facility]   2. You will be contacted by [facility] if the estimated out of pocket cost exceeds $100.  This price depends on your insurance benefits (including coverage and remaining deductible).   Once notified, please contact the testing laboratory to confirm your out of pocket cost.  Additional pricing and payment options may be available.  3. Results from your genetic test will be available in approximately 2-4 weeks, though may take longer depending on insurance.  A follow up appointment will be scheduled with your genetic counselor to discuss these results and medical management options.  Your genetic test results may not appear in [facility] [employer] [facility]; please contact your genetic counselor if you would prefer to obtain an electronic copy prior to your appointment.

## 2023-05-24 NOTE — TELEPHONE ENCOUNTER
VF called pt to schedule lab per Iqra Reed GC.  We were unable to find an appointment slot open when the pt will already be in a clinic.  The pt preferred to call and schedule her own appt.     Johanna FREEMAN

## 2023-05-24 NOTE — LETTER
5/24/2023         RE: Darlene Hudson  [address]  [place] [address]        Dear Colleague,    Thank you for referring your patient, Darlene Hudson, to the [facility]. Please see a copy of my visit note below.    Virtual Visit Details    Type of service:  Telephone Visit   Phone call duration: 30 minutes     5/24/2023    Referring Provider: Arnaud Valdez MD    Presenting Information:   I met with Darlene Hudson today for genetic counseling at the Cancer Risk Management Program (virtual visit) to discuss her personal and family history of cancer.  She is here today to review this history, cancer screening recommendations, and available genetic testing options.    Personal History:  Darlene is a 48 year old female. 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  Treatment included partial colectomy.     Family History: (Please see scanned pedigree for detailed family history information)  Karoline's mother passed away at age 69.  She had a history of cervical cancer and a benign brain tumor  Her maternal grandmother had a history of breast cancer  Her maternal grandfather had a history of lung cancer  Karoline's father was diagnosed with colon cancer and passed away in his 50's.  He had a large family, in which all siblings but one passed away due to cancer (including several at early ages).  Details regarding these diagnoses were not available.    Her maternal ethnicity is Yi. Her paternal ethnicity is .      Discussion:  Darlene's personal history of early onset colon cancer, in addition to her significant paternal family history of multiple cancers may be suggestive of a hereditary cancer syndrome.  We reviewed the features of sporadic, familial, and hereditary cancers. Based on her personal and family history, Darlene meets current National Comprehensive Cancer Network (NCCN) criteria for genetic  evaluation for Lyn syndrome (MLH1, MSH2, MSH6, PMS2 and EPCAM).  In addition, NCCN guidelines recommend germline multigene testing for individuals diagnosed prior to age 50 with colorectal cancer  A detailed handout regarding hereditary colon cancer and the information we discussed can be found in the after visit summary. Topics included: inheritance pattern, cancer risks, cancer screening recommendations, and also risks, benefits and limitations of testing.   We discussed that there are additional genes that could cause increased risk for colon and other cancers. As many of these genes present with overlapping features in a family and accurate cancer risk cannot always be established based upon the pedigree analysis alone, it would be reasonable for Darlene to consider panel genetic testing to analyze multiple genes at once.  Karoline elected to proceed with testing for an expanded hereditary cancers panel.  Verbal consent was obtained for Lyn syndrome and the 84 gene [facility] Multi Cancers Panel.  A blood draw will be coordinated through [facility].  Test turn around time: 3-4 weeks.     Medical Management: For Darlene, we reviewed that the information from genetic testing may determine:  additional cancer screening for which Darlene may qualify (i.e. mammogram and breast MRI, more frequent colonoscopies, more frequent dermatologic exams, etc.),  options for risk reducing surgeries Darlene could consider (i.e. bilateral mastectomy, surgery to remove her ovaries and/or uterus, etc.),    and targeted chemotherapies for certain cancers should they be needed in the future (i.e. immunotherapy for individuals with Lyn syndrome, PARP inhibitors, etc.).   These recommendations and possible targeted chemotherapies will be discussed in detail once genetic testing is completed.     Plan:  1) Today Darlene elected to proceed with Lyn syndrome testing and the expanded 84 gene Multi Cancers panel through [facility].  2) This  information should be available in 4 weeks.  3) Darlene will return to clinic to discuss the results.    Iqra Reed MS, [facility]  Licensed, Certified Genetic Counselor  [facility]  Phone: [phone]        Genetic Testing Next Steps:    An order for genetic testing will be placed by your genetic counselor, and a blood draw will be coordinated through an [facility] clinic   You will be contacted by [facility] if the estimated out of pocket cost exceeds $100.  This price depends on your insurance benefits (including coverage and remaining deductible).   Once notified, please contact the testing laboratory to confirm your out of pocket cost.  Additional pricing and payment options may be available.  Results from your genetic test will be available in approximately 2-4 weeks, though may take longer depending on insurance.  A follow up appointment will be scheduled with your genetic counselor to discuss these results and medical management options.  Your genetic test results may not appear in [employer]; please contact your genetic counselor if you would prefer to obtain an electronic copy prior to your appointment.               Iqra Reed GC

## 2023-05-25 ENCOUNTER — HOSPITAL ENCOUNTER (OUTPATIENT)
Dept: CT IMAGING | Facility: HOSPITAL | Age: 48
Discharge: HOME | End: 2023-05-25
Attending: INTERNAL MEDICINE | Admitting: INTERNAL MEDICINE
Payer: COMMERCIAL

## 2023-05-25 DIAGNOSIS — R59.0 INGUINAL LYMPHADENOPATHY: ICD-10-CM

## 2023-05-25 DIAGNOSIS — C18.6 ADENOCARCINOMA OF DESCENDING COLON (H): ICD-10-CM

## 2023-05-25 PROCEDURE — 74177 CT ABD & PELVIS W/CONTRAST: CPT

## 2023-05-25 PROCEDURE — 250N000011 HC RX IP 250 OP 636: Performed by: INTERNAL MEDICINE

## 2023-05-25 RX ORDER — IOPAMIDOL 755 MG/ML
90 INJECTION, SOLUTION INTRAVASCULAR ONCE
Status: COMPLETED | OUTPATIENT
Start: 2023-05-25 | End: 2023-05-25

## 2023-05-25 RX ADMIN — IOPAMIDOL 90 ML: 755 INJECTION, SOLUTION INTRAVENOUS at 16:04

## 2023-05-25 NOTE — PATIENT INSTRUCTIONS
Assessing Cancer Risk  Only about 5-10% of cancers are thought to be due to an inherited cancer susceptibility gene.    These families often have:  Several people with the same or related types of cancer  Cancers diagnosed at a young age (before age 50)  Individuals with more than one primary cancer  Multiple generations of the family affected with cancer    Comprehensive Colon Cancer Panel  We each inherit two copies of every gene in our bodies: one from our mother, and one from our father. Each gene has a specific job to do. When a gene has a mistake or  mutation  in it, it does not work like it should.      This handout will review common hereditary colon cancer syndromes, and other genes related to an increased risk for colon cancer.  The genes that will be discussed in this handout are: APC, BMPR1A, CDH1, CHEK2, EPCAM, GREM1, MLH1, MSH2, MSH6, MUTYH, PMS2, POLD1, POLE, PTEN, SMAD4, STK11, and TP53.  These genes are clinically actionable, meaning there are published guidelines for cancer screening and management for individuals who are found to carry mutations in these genes. Inheriting a mutation does not mean a person will develop cancer, but it does significantly increase his or her risk above the general population risk.      Familial Adenomatous Polyposis (FAP)  FAP is a hereditary cancer syndrome caused by mutations in the APC gene. The condition is known to cause hundreds to thousands of adenomatous polyps in the colon, creating a  carpet  of polyps. Some individuals have what is called attenuated FAP (AFAP), a milder form of FAP with fewer polyps and typically later onset. Individuals with an APC gene mutation are at an increased risk for colon, thyroid, and duodenal cancers, as well as several other types of cancer1.  Other features of this condition may include: osteomas, dental anomalies, benign skin lesions, CHRPE ( freckle  on the inside of the eye), and desmoid tumors.      Lifetime Cancer Risks     Cancer Type General Population FAP   Colon  4.1% near 100%   Thyroid (papillary) 1.2% Up to 12%   Duodenal <1% Up to 10%   Liver (hepatoblastoma)  <1% Up to 2.5% before age 5   Pancreas 1.7% Up to 2%   Stomach <1% Up to 7.1%       Juvenile Polyposis Syndrome (JPS)  JPS is characterized by hamartomatous polyps, called juvenile polyps, in the gastrointestinal tract.  Juvenile  refers to the type of polyps seen in this hereditary cancer condition, not the age of onset. Currently, mutations in two genes are known to cause JPS: BMPR1A and SMAD4. Of individuals clinically diagnosed with JPS, only 40% have an identifiable mutation in one of these genes, suggesting there are other genes that cause JPS that have not been discovered yet. Individuals with JPS are at an increased risk for colon cancer and stomach cancer 2,3,4. Pancreatic and small bowel cancers have also been reported in JPS, but the actual risks are unknown.         Lifetime Cancer Risks   Cancer Type General Population JPS   Colon 4.1% 40-50%   Gastric/Duodenal <1% Up to 21%     Some individuals with SMAD4 mutations have a condition called JPS/HHT (Juvenile Polyposis/Hereditary Hemorrhagic Telangiectasia) where in addition to JPS, individuals may have nose bleeds and clotting issues.     Hereditary Diffuse Gastric Cancer (HDGC)  Currently, mutations in one gene are known to cause Hereditary Diffuse Gastric Cancer: CDH1.  Individuals with HDGC are at increased risk for diffuse gastric cancer and lobular breast cancer. Of people diagnosed with HDGC, 30-50% have a mutation in the CDH1 gene.  This suggests there are likely mutations in other genes that may cause HDGC that have not been identified yet. Individuals with HDGC may also be at increased risk for colon cancer.      Lifetime Cancer Risks   Cancer Type General Population HDGC   Diffuse Gastric <1% 67-83%   Breast 12% 41-60%     Lyn syndrome  Mutations in five different genes are known to cause Lyn  syndrome: MLH1, MSH2, MSH6, PMS2, and EPCAM. Individuals with Lyn syndrome have an increased risk for colon, uterine, ovarian, small bowel, stomach, urinary tract, and brain cancer, as well as several other types of cancer. The exact lifetime cancer risks are dependent upon the gene in which the mutation was identified.      Lifetime Cancer Risks   Cancer Type General Population Lyn syndrome   Colon 5% Up to 61%   Uterine 2-3% Up to 57%   Stomach <1% Up to 9%   Ovarian 2% Up to 38%   Urinary tract <1% Up to 28%   Hepatobiliary tract <1% Up to 3.7%   Small bowel <1% Up to 11%   Brain/CNS <1% Up to 7.7%   Pancreas <1% Up to 6.2%       MUTYH-Associated Polyposis (MAP)  MAP is a hereditary cancer syndrome caused by mutations in the MUTYH gene. Unlike the other hereditary cancer genes discussed in this handout, two mutations in the MUTYH gene cause MAP and increase cancer risk. Those affected with MAP typically have between  adenomatous polyps. This syndrome also increases the risk for colon and duodenal cancer. Current research suggests that other cancers may be associated with MUTYH mutations, as well. The table below includes the risk that someone with two MUTYH gene mutations would develop cancer in their lifetime; of note, there is also an increased colon cancer risk for individuals who carry only one MUTYH gene mutation5,6,7.       Lifetime Cancer Risks   Cancer Type General Population MAP   Colon 5% 70-90%   Duodenal  <1% 4%       Cowden syndrome  Cowden syndrome is a hereditary condition that increases the risk for breast, thyroid, endometrial, colon, and kidney cancer.  A single mutation in the PTEN gene causes Cowden syndrome and increases cancer risk.  The table below shows the chance that someone with a PTEN mutation would develop cancer in their lifetime8,9.  Other benign features seen in some individuals with Cowden syndrome include benign skin lesions (facial papules, keratoses, lipomas),  learning disabilities, autism, thyroid nodules, hamartomatous colon polyps, and larger head size.      Lifetime Cancer Risks   Cancer Type General Population Cowden Syndrome   Breast 12% 40-60%*   Thyroid 1% 35%   Renal 1-2% 34%   Endometrial 2-3% 28%   Colon 4.1% 9%   Melanoma 2-3% Up to 6%     *One recent study found breast cancer risk to be increased to 85%    Peutz-Jeghers syndrome (PJS)  PJS is a hereditary cancer syndrome caused by mutations in the STK11 gene. This condition can be distinguished from other hereditary syndromes by the presence of hamartomatous polyps in the gastrointestinal tract and freckles present in unusual places such as the hands, feet, neck, and lips. Individuals with Peutz-Jeghers syndrome have an increased risk for colon, breast, pancreatic, and other cancers3.  Men are at risk for testicular tumors which can affect hormones in the body. Women are at risk for sex cord tumors of the ovaries and a rare aggressive type of cervical cancer.     Lifetime Cancer Risks   Cancer Type General Population PJS   Breast 12% 32-54%   Colon 4.1% 39%   Stomach <1% 29%   Pancreas 1.5% 11-36%   Small Intestine <1% 13%   Ovarian  <2%  >20%   Lung 6-7% 7-17%     Additional Genes Associated with Increased Colon Cancer Risk  CHEK2  CHEK2 is a moderate-risk breast cancer gene.  Women who have a mutation in CHEK2 have around a 2-fold increased risk for breast cancer compared to the general population, and this risk may be higher depending upon family history.12,13,14. Mutations in CHEK2 have also been shown to increase the risk of a number of other cancers, including colon and prostate, however these cancer risks are currently not well understood.     GREM1  GREM1 is a moderate-risk colon polyposis gene. Duplications of this gene are more commonly found in individuals with Ashkenazi Catholic [id number]. Mutations in GREM1 are associated with colon polyps and therefore an increased risk of colon cancer; however  the estimated cancer risk is not well [id number].     POLD1 and POLE  POLD1 and POLE are moderate-risk colon cancer genes. Carriers of a mutation in one of these genes increases the lifetime risk of colorectal [id number],18,19,20. Mutations in these genes may also be associated with increased risk for other cancers including: endometrial cancer, duodenal adenomas and carcinomas, and brain tumors.    TP53  Li Fraumeni syndrome (LFS) is a hereditary cancer predisposition syndrome. LFS is caused by a mutation in the TP53 gene. A single mutation in one of the copies of TP53 increases the risk for multiple cancers. Individuals with LFS are at an increased risk for developing cancer at a young age. The general lifetime risk for development of cancer is 50% by age 30 and 90% by age 60.      Core Cancers: Sarcomas, Breast, Brain, Lung, Leukemias/Lymphomas, Adrenocortical carcinomas  Other Cancers: Gastrointestinal, Thyroid, Skin, Genitourinary    Genetic Testing  Genetic testing involves a simple blood test and will look at the genetic information in genes associated with an increased risk of colon cancer. The tests look for any harmful mutations that are associated with increased cancer risk.  If possible, it is recommended that the person(s) who has had cancer be tested before other family members.  That person will give us the most useful information about whether or not a specific gene mutation is associated with the cancer in the family.     Results  There are three possible results from genetic testing:  Positive--a harmful mutation was identified  Negative--no mutation was identified  Variant of unknown significance--a variation in one of the genes was identified, but it is unclear how this impacts cancer risk in the family  Advantages and Disadvantages  There are advantages and disadvantages to genetic testing of these genes.    Advantages  May clarify your cancer risk  Can help you make medical decisions  May  explain the cancers in your family  May give useful information to your family members (if you share your results)    Disadvantages  Possible negative emotional impact of learning about inherited cancer risk  Uncertainty in interpreting a negative test result in some situations  Possible genetic discrimination concerns (see below)    Inheritance   Most mutations in the genes outlined above are inherited in an autosomal dominant pattern.  This means that if a parent has a mutation, each of their children will have a 50% chance of inheriting that same mutation.  Therefore, each child--male or female--would have a 50% chance of being at increased risk for developing cancer.                                              Image obtained from [employer], 2013     In the case of MUTYH-Associated Polyposis (MAP) this hereditary cancer syndrome is inherited in an autosomal recessive pattern. This means that each parent of an individual with MAP is a carrier of MAP, meaning that they have only one mutation in MUTYH. They still have one functioning copy of their gene.  Carriers are at a slightly higher risk for colon cancer than the general population. If each parent is a carrier for MAP, they have a 25% of having a child who is affected with MAP, meaning the child inherited both gene mutations - one from each parent.       Image obtained from [employer], 2016    Genetic Information Nondiscrimination Act (KHALIF)  The Genetic Information Nondiscrimination Act of 2008 (KHALIF) is a federal law that protects individuals from health insurance or employment discrimination based on a genetic test result alone (with some exceptions, including employers with fewer than 15 employees, and ).  Although rare, KHALIF  does not cover discrimination protections in terms of life insurance, long term care, or disability insurances.  Visit the [employer] [place] website to learn more. Visit the  National Human Genome Research [place] at Genome.gov/30271457 to learn more.    Reducing Cancer Risk  Each of the genes listed within this handout have nationally recognized cancer screening guidelines that would be recommended for individuals who test positive.  In addition to increased cancer screening, surgeries may be offered or recommended to reduce cancer risk in certain cases.  Recommendations are based upon an individual s genetic test result as well as their personal and family history of cancer.    Questions to Think About Regarding Genetic Testing  What effect will the test result have on me and my relationship with my family members if I have an inherited gene mutation?  If I don t have a gene mutation?  Should I share my test results, and how will my family react to this news, which may also affect them?  Are my children ready to learn new information that may one day affect their own health?    Resources    PTEN World Link_A_Media Devicesworld.Post-A-Vox   No Stomach for Cancer, Inc. nostomachforcancer.org   Stomach Cancer Relief Network scrnet.org   Collaborative Group of the Americas on Inherited Colorectal Cancer (CGA) cgaicc.com   Cancer Care cancercare.org   American Cancer Society (ACS) cancer.org   National Cancer [place] (NCI) cancer.org   Lyn Syndrome International lynchcancers.Post-A-Vox       Please call us if you have any questions or concerns.     Cancer Risk Management Program [phone] ([phone])  Allen Cheek, MS [facility]  [phone]  Samara Fuller, MS, [facility] [phone]  Suki Cunningham, MS, [facility]  [phone]  Iqra Reed, MS, [facility]  [phone]  Doreen Caicedo, MS, [facility]  [phone]  Emi Ramesh, MS, [facility] [phone]  Michelle Dasilva, MS, [facility] [phone]    References    Ish ACOSTA, Becky J, Parminder G, Ellie E, Di J, et al. The Prevalence of thyroid cancer and benign thyroid disease in patients with familial adenomatous polyposis may be higher than previously recognized. Clin Colorectal Cancer.  2012;11:304-308.  Ernesto L, Devyn Ren A, Ariana L, Deondre BURNHAM, Jewel K, et al. Risk of colorectal cancer in juvenile polyposis. Gut. 2007;56:965-967.  Gui FG, Fransisca [place], Dmitri CA. Colorectal cancer risk in hamartomatous polyposis syndromes. World Journal of Gastrointestinal Surgery. 2015;27:25-32  Gela SMALLS. Guidance on gastrointestinal surveillance for hereditary non-polyposis colorectal cancer, familial adenomatous polypolis, juvenile polyposis, and Peutz-Jeghers syndrome. Gut. 2002;51:21-27.  Lennox AK, Darrin MANDI, Cristino JG et al. Risk of extracolonic cancers for people with biallelic and monoallelic mutations in MUTYH. Int J of Cancer. 2016;139:1340-4446.  Ita S, Solitario S, Rob H, Patti K, Cristina M, et al. MUTYH-associated polyposis: 70 of 71 patients with biallelic mutations present with an attenuated or atypical phenotype. Int J of Cancer. 2006;119:807-814.  Ligia G, Alina F, Salmon I, Pinchev M, [place] H, et al. MUTYH mutation carriers have increased breast cancer risk. Cancer. 2012;5424-0877.  Domenic MH, Vasquez J, Isela J, Ruby LA, Ormaggi MS, Eng C. Lifetime cancer risks in individuals with germline PTEN mutations. Clin Cancer Res. 2012;18:400-7.  Maribel KEITH. Cowden Syndrome: A Critical Review of the Clinical Literature. J Gracy . 2009:18:13-27.  National Comprehensive Cancer Network. Clinical practice guidelines in oncology, colorectal cancer screening. Available online (registration required). 2013.  National Cancer [place]. SEER Cancer Stat Fact Sheets.  December 2013.  CHEK2 Breast Cancer Case-Control Consortium. CHEK2*1100delC and susceptibility to breast cancer: A collaborative analysis involving 10,860 breast cancer cases and 9,065 controls from 10 studies. Am J Hum Gracy, 74 (2004), pp. 1781-4826  Alexei T, Bria S, Ericka K, et al. Spectrum of Mutations in BRCA1, BRCA2, CHEK2, and TP53 in Families at High Risk of Breast Cancer. JAY.  2006;295(12):3405-0474.  Bucky BURNHAM, Shanae D, Rochelle MOHAN, et al. Risk of breast cancer in women with a CHEK2 mutation with and without a family history of breast cancer. J Clin Oncol. 2011;29:6338-2106.  Daniel ALVAREZ, Marilu E, Tyree J, Edda N, Francia M et al. Defining the polyposis/colorectal cancer phenotype associated with the GREM1 duplication: counseling and management guidelines. Gracy .Res. 2016;98:1-5.  Sepideh E, Kelly S, Julian MOHAN, Tan Geronimo, et al. Hereditary mixed polyposis syndrome is caused by a 40kb upstream duplication that leads to increased and ectopic expression of the BMP antagonist GREM1. Violeta Gracy. 2015;44:699-703.  TEJ Meléndez. et al. Germline mutations affecting the proofreading domains of POLE and POLD1 predispose to colorectal adenomas and carcinomas. Violeta. Gracy. 45, 136-44 (2013).  CHERRY Ventura. et al. POLE and POLD1 mutations in 529 aric with familial colorectal cancer and/or polyposis: review of reported cases and recommendations for genetic testing and surveillance. Gracy. Med. (2015). doi:10.1038/gim.2015.75  RICHARD Perkins. et al. New insights into POLE and POLD1 germline mutations in familial colorectal cancer and polyposis. Hum. Mol. Gracy. 23, 1206-12 (2014).  RIMMA Mcintosh. et al. Frequency and phenotypic spectrum of germline mutations in POLE and seven other polymerase genes in 266 patients with colorectal adenomas and carcinomas. Int. J. Cancer 137, 320-31 (2015).

## 2023-05-31 ENCOUNTER — ONCOLOGY VISIT (OUTPATIENT)
Dept: ONCOLOGY | Facility: HOSPITAL | Age: 48
End: 2023-05-31
Attending: INTERNAL MEDICINE
Payer: COMMERCIAL

## 2023-05-31 VITALS
SYSTOLIC BLOOD PRESSURE: 133 MMHG | BODY MASS INDEX: 36.29 KG/M2 | HEART RATE: 88 BPM | RESPIRATION RATE: 18 BRPM | OXYGEN SATURATION: 100 % | TEMPERATURE: 98.2 F | DIASTOLIC BLOOD PRESSURE: 104 MMHG | WEIGHT: 245 LBS | HEIGHT: 69 IN

## 2023-05-31 DIAGNOSIS — F06.4 ANXIETY DISORDER DUE TO GENERAL MEDICAL CONDITION: ICD-10-CM

## 2023-05-31 DIAGNOSIS — R59.0 INGUINAL LYMPHADENOPATHY: ICD-10-CM

## 2023-05-31 DIAGNOSIS — R59.0 ILIAC LYMPHADENOPATHY: ICD-10-CM

## 2023-05-31 DIAGNOSIS — C18.6 ADENOCARCINOMA OF DESCENDING COLON (H): Primary | ICD-10-CM

## 2023-05-31 PROCEDURE — 99214 OFFICE O/P EST MOD 30 MIN: CPT | Performed by: INTERNAL MEDICINE

## 2023-05-31 PROCEDURE — G0463 HOSPITAL OUTPT CLINIC VISIT: HCPCS | Performed by: INTERNAL MEDICINE

## 2023-05-31 NOTE — LETTER
"    5/31/2023         RE: Darlene Hudson  [address] [place] [address]        Dear Colleague,    Thank you for referring your patient, Darlene Hudson, to the [facility]. Please see a copy of my visit note below.    Oncology Rooming Note    May 31, 2023 3:41 PM   Darlene Hudson is a 48 year old female who presents for:    Chief Complaint   Patient presents with     Oncology Clinic Visit     4 month follow up with prior CT review related to Adenocarcinoma of descending colon; Malignant neoplasm of splenic flexure; Inguinal lymphadenopathy       Initial Vitals: BP (!) 133/104 (BP Location: Left arm, Patient Position: Sitting, Cuff Size: Adult Large)   Pulse 88   Temp 98.2  F (36.8  C) (Tympanic)   Resp 18   Ht 1.753 m (5' 9\")   Wt 111.1 kg (245 lb)   SpO2 100%   BMI 36.18 kg/m   Estimated body mass index is 36.18 kg/m  as calculated from the following:    Height as of this encounter: 1.753 m (5' 9\").    Weight as of this encounter: 111.1 kg (245 lb). Body surface area is 2.33 meters squared.  Data Unavailable Comment: declined to answer   No LMP recorded. Patient has had an ablation.  Allergies reviewed: Yes  Medications reviewed: Yes    Medications: Medication refills not needed today.  Pharmacy name entered into [employer]: [id number] - [place], [place] - [address]  AT [facility]    Clinical concerns: 4 month follow up with prior CT review related to Adenocarcinoma of descending colon; Malignant neoplasm of splenic flexure; Inguinal lymphadenopathy      ILANA GRANADOS CMA              [facility] Hematology and Oncology Progress Note    Patient: Darlene Hudson  MRN: [id number]  Date of Service: May 31, 2023        Assessment and Plan:    Cancer Staging   Adenocarcinoma of descending colon (H)  Staging form: Colon and Rectum, AJCC 8th Edition  - Pathologic stage from 2/7/2023: Stage I (pT2, pN0, cM0) - Signed by Arnaud Valdez, " MD on 2/7/2023    1.  Adenocarcinoma of the descending colon:  Has recently had surveillance CT imaging.  There is no evidence of recurrent disease noted.  Clinically she is doing well.  She has met with cancer genetics and will be having testing done soon.  We will see her back in 6 months with repeat imaging.  She will be due for colonoscopy in January of 2024.      2.  Left external iliac lymph node seen on imaging: Stable on current CT when compared to January 2023.  We will continue to monitor radiographically.  Most likely benign. There was moderate follicular lymphoid hyperplasia noted in some of her mesenteric lymph nodes on surgery.     3.  Anxiety: She is having more anxiety related to her diagnosis and treatment.  A referral was made to oncology psychotherapy in this regard.    I spent 35 minutes in the care of this patient today, which included time necessary for preparation for the visit, face to face time with the patient, communication of recommendations to the care team, and documentation time.    ECOG Performance  0    Diagnosis:    1.  Adenocarcinoma of the descending colon: Diagnosed January 2023. Surgical pathology revealed an invasive moderately differentiated adenocarcinoma. 47 x 43.14 mm tumor invading into, but not through, the muscularis propria.  Margins negative.  48 nodes taken, all negative.   No perforation, lymphovascular invasion, or perineural invasion noted on pathology. pMMR.     Treatment:    Transverse and descending segmental colectomy January 6, 2023.   No adjuvant therapy was given.     Interim History:    Karoline returns for follow-up visit.  She is last seen about 4 months ago.  In the interim she has been doing okay.  No pain.  She is having more worry and anxiety over her health.  She is noted more exhaustion and being more tired lately.  She is back working full-time at a physically demanding job.  No acute complaints today.    Review of Systems:    As above in the history.      Review of Systems otherwise Negative for:  General: chills, fever or night sweats  Psychological: depression  Ophthalmic: blurry vision, double vision or loss of vision, vision change  ENT: epistaxis, oral lesions, hearing changes  Hematological and Lymphatic: bleeding, bruising, jaundice, swollen lymph nodes  Endocrine: hot flashes, unexpected weight changes  Respiratory: cough, hemoptysis, orthopnea or shortness of breath/ROPER  Cardiovascular: chest pain, edema, palpitations or PND  Gastrointestinal: abdominal pain, blood in stools, change in bowel habits, constipation, diarrhea or nausea/vomiting  Genito-Urinary: change in urinary stream, incontinence, frequency/urgency  Musculoskeletal: joint pain, stiffness, swelling, muscle pain  Neurological: dizziness, headaches, numbness/tingling  Dermatological: lumps and rash    Past History:    Past Medical History:   Diagnosis Date     Abdominal pain 06/29/2015     Abnormal cervical Papanicolaou smear 11/09/2014    Overview:  ACUS/HPV positive     Abnormal cytology finding 11/09/2014    Overview:  ACUS/HPV positive     Acute pericardial effusion 02/06/2017     Agoraphobia with panic attacks      Anxiety      Arthralgia of both lower legs 05/29/2020    Bilateral achy knee pain that is chronic in nature going on for years. Most likely osteoarthritis in knees related to obesity. Previously injected in both knees with good relief. 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  Has controlled substance agreement with  "them.  On Vicodin, Valium, Klonopin prescribed only from there.        Coronary artery disease      Cough 02/09/2020     Family history of colon cancer 10/24/2020     Hx of seasonal allergies      Infection due to 2019 novel coronavirus 09/20/2022     Infectious pericarditis      Lipoma      Low back pain 07/13/2015     Major depressive disorder, recurrent episode, moderate (H) 09/05/2006     Menorrhagia with irregular cycle 07/15/2022    Added automatically from request for surgery [id number]     Moderate persistent asthma without complication 09/29/2020     Nondependent alcohol abuse, episodic drinking behavior 10/03/2012     Noninflammatory disorder of vagina 02/20/2015     Other chronic pain      Other long term (current) drug therapy 01/28/2013    Overview:  Vicodin and cyclobenzaprine monthly     Panic disorder with agoraphobia 09/05/2006     Pap smear for cervical cancer screening 10/31/2016    03/29/2010  Normal cytology, HPV ot done, repeat in 3 years.     Pericarditis 2017     Physiologic disturbance of temperature regulation 10/24/2020     PONV (postoperative nausea and vomiting)      Right ankle swelling 06/07/2022    Added automatically from request for surgery [id number]     Tobacco abuse      Tobacco use disorder 07/13/2015     Physical Exam:    BP (!) 133/104 (BP Location: Left arm, Patient Position: Sitting, Cuff Size: Adult Large)   Pulse 88   Temp 98.2  F (36.8  C) (Tympanic)   Resp 18   Ht 1.753 m (5' 9\")   Wt 111.1 kg (245 lb)   SpO2 100%   BMI 36.18 kg/m      General: patient appears stated age of 48 year old. Nontoxic and in no distress.   HEENT: Head: atraumatic, normocephalic. Sclerae anicteric.  Chest:  Normal respiratory effort  Cardiac:  No edema.   Abdomen: abdomen is soft, non-distended  Extremities: normal tone and muscle bulk.  Skin: no lesions or rash on visible skin. Warm and dry.   CNS: alert and oriented. Grossly non-focal.   Psychiatric: normal mood and affect.     Lab " Results:    No results found for this or any previous visit (from the past 168 hour(s)).     Imaging:    CT Chest/Abdomen/Pelvis w Contrast    Result Date: 5/25/2023  EXAM: CT CHEST/ABDOMEN/PELVIS W CONTRAST LOCATION: [facility] DATE/TIME: 5/25/2023 4:35 PM CDT INDICATION: Colon cancer followup. Inguinal lymphadenopathy. COMPARISON: 01/31/2023 CT abdomen and pelvis. TECHNIQUE: CT scan of the chest, abdomen, and pelvis was performed following injection of IV contrast. Multiplanar reformats were obtained. Dose reduction techniques were used. CONTRAST: Isovue 370 90 ml FINDINGS: LUNGS AND PLEURA: Emphysematous change. Bibasilar atelectasis. MEDIASTINUM/AXILLAE: 1 cm stable enlarged node in the right pericardiac region. CORONARY ARTERY CALCIFICATION: None. HEPATOBILIARY: Normal. PANCREAS: Normal. SPLEEN: Normal. ADRENAL GLANDS: Normal. KIDNEYS/BLADDER: Simple cyst left kidney. No follow-up is needed. BOWEL: Postsurgical changes in the left hemicolon. No fluid collection or evidence of bowel obstruction. No appendicitis. LYMPH NODES: Stable enlarged left external iliac node measuring 1.1 cm in short axis dimension. VASCULATURE: Unremarkable. PELVIC ORGANS: Normal. MUSCULOSKELETAL: Normal.     IMPRESSION: 1.  Postsurgical changes in the left hemicolon. No obstruction or fluid collection. 2.  Stable enlarged left external iliac lymph node measuring 1.1 cm in short axis dimension. There is a also stable enlarged node in the right anterior cardiophrenic angle measuring 1 cm in short axis dimension.      Signed by: Arnaud Valdez MD        Again, thank you for allowing me to participate in the care of your patient.        Sincerely,        Arnaud Valdez MD

## 2023-05-31 NOTE — PROGRESS NOTES
"Oncology Rooming Note    May 31, 2023 3:41 PM   Darlene Hudson is a 48 year old female who presents for:    Chief Complaint   Patient presents with     Oncology Clinic Visit     4 month follow up with prior CT review related to Adenocarcinoma of descending colon; Malignant neoplasm of splenic flexure; Inguinal lymphadenopathy       Initial Vitals: BP (!) 133/104 (BP Location: Left arm, Patient Position: Sitting, Cuff Size: Adult Large)   Pulse 88   Temp 98.2  F (36.8  C) (Tympanic)   Resp 18   Ht 1.753 m (5' 9\")   Wt 111.1 kg (245 lb)   SpO2 100%   BMI 36.18 kg/m   Estimated body mass index is 36.18 kg/m  as calculated from the following:    Height as of this encounter: 1.753 m (5' 9\").    Weight as of this encounter: 111.1 kg (245 lb). Body surface area is 2.33 meters squared.  Data Unavailable Comment: declined to answer   No LMP recorded. Patient has had an ablation.  Allergies reviewed: Yes  Medications reviewed: Yes    Medications: Medication refills not needed today.  Pharmacy name entered into [employer]: [facility] #[id number] [place], [place] - [address] AT [facility]    Clinical concerns: 4 month follow up with prior CT review related to Adenocarcinoma of descending colon; Malignant neoplasm of splenic flexure; Inguinal lymphadenopathy      ILANA GRANADOS CMA            "

## 2023-06-07 ENCOUNTER — OFFICE VISIT (OUTPATIENT)
Dept: FAMILY MEDICINE | Facility: CLINIC | Age: 48
End: 2023-06-07
Payer: COMMERCIAL

## 2023-06-07 ENCOUNTER — PATIENT OUTREACH (OUTPATIENT)
Dept: CARE COORDINATION | Facility: CLINIC | Age: 48
End: 2023-06-07

## 2023-06-07 VITALS
HEART RATE: 76 BPM | HEIGHT: 69 IN | OXYGEN SATURATION: 100 % | TEMPERATURE: 97.9 F | SYSTOLIC BLOOD PRESSURE: 138 MMHG | RESPIRATION RATE: 18 BRPM | BODY MASS INDEX: 36.18 KG/M2 | DIASTOLIC BLOOD PRESSURE: 95 MMHG

## 2023-06-07 DIAGNOSIS — F40.01 PANIC DISORDER WITH AGORAPHOBIA: ICD-10-CM

## 2023-06-07 DIAGNOSIS — F17.200 TOBACCO USE DISORDER: ICD-10-CM

## 2023-06-07 DIAGNOSIS — F41.9 ANXIETY: Primary | ICD-10-CM

## 2023-06-07 DIAGNOSIS — F33.1 MAJOR DEPRESSIVE DISORDER, RECURRENT EPISODE, MODERATE (H): ICD-10-CM

## 2023-06-07 DIAGNOSIS — F43.10 PTSD (POST-TRAUMATIC STRESS DISORDER): Primary | ICD-10-CM

## 2023-06-07 PROCEDURE — 99214 OFFICE O/P EST MOD 30 MIN: CPT | Performed by: FAMILY MEDICINE

## 2023-06-07 NOTE — LETTER
MEDICAL NOTE  2023     Seen today: Yes    Patient:  Darlene Hudson  :   1975  MRN:     [id number]  Physician: JULIETH BAILON     Darlene Hudson is under my care. She is currently experiencing worsening symptoms related to her previous cancer diagnosis and will need to take medical leave from work. I anticipate that this leave will be 3-4 months.     She will return to see me in 1 week. We can fill [place] paperwork at that time. She will see my colleague Dr Garcia over the summer til I return from paternity leave in September      The next clinic appointment is scheduled for (date/time) 23.          Julieth Bailon MD  2023  2:24 PM

## 2023-06-07 NOTE — PROGRESS NOTES
"ASSESSMENT/PLAN:     (F43.10) PTSD (post-traumatic stress disorder)  (primary encounter diagnosis)  Comment: appreciate our CSW Cruzito spending significant time today w/ patient to discuss her symptoms and help facilitate therapy for her; She has an appt already scheduled w/ me next week to discuss mgmt of her chronic medical conditions over the summer while I am on paternity leave.   Plan: Adult Mental Health  Referral          Robbie Bialon MD  2023  2:25 PM        Pt is a 48 year old female last seen on 23 here today for:     1) asthma/allergies - bad x past couple days  Air quality is terrible  Taking inhalers, zyrtec, flonase; getting better now; not wheezing    2) Mood - bad mental health issues right now  Is getting anxious googling medical conditions  Was seen by oncologist and noted to be anxious; recommended counseling for her and genetic testing   Two cousins in 50's  recently -> thinks rule of 3's means she is the next to go   Is afraid bad things will happen to her friends  \"Kind of freaked out yesterday\"          Per my last note:  (L60.918) Ankle impingement syndrome, right  (primary encounter diagnosis)  Comment: will schedule to see Dr Bashir for re-eval of her ankle impingement and whether proceeding w/ surgery is best at this point. Note written to return to work w/ ankle brace; f/u in 4 wks  Plan: Orthopedic  Referral          (M25.924) Right ankle swelling  Comment: see above  Plan: Orthopedic  Referral    Past Medical History:   Diagnosis Date     Abdominal pain 2015     Abnormal cervical Papanicolaou smear 2014    Overview:  ACUS/HPV positive     Abnormal cytology finding 2014    Overview:  ACUS/HPV positive     Acute pericardial effusion 2017     Agoraphobia with panic attacks      Anxiety      Arthralgia of both lower legs 2020    Bilateral achy knee pain that is chronic in nature going on for years. Most likely " osteoarthritis in knees related to obesity. Previously injected in both knees with good relief. 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  Has controlled substance agreement with them.  On Vicodin, Valium, Klonopin prescribed only from there.        Coronary artery disease      Cough 02/09/2020     Family history of colon cancer 10/24/2020     Hx of seasonal allergies      Infection due to 2019 novel coronavirus 09/20/2022     Infectious pericarditis      Lipoma      Low back pain 07/13/2015     Major depressive disorder, recurrent episode, moderate (H) 09/05/2006     Menorrhagia with irregular cycle 07/15/2022    Added automatically from request for surgery [id number]     Moderate persistent asthma without complication 09/29/2020     Nondependent alcohol abuse, episodic drinking behavior 10/03/2012     Noninflammatory disorder of vagina 02/20/2015     Other chronic pain      Other long term (current) drug therapy 01/28/2013    Overview:  Vicodin and cyclobenzaprine monthly     Panic disorder with agoraphobia 09/05/2006     Pap smear for cervical cancer screening 10/31/2016    03/29/2010  Normal cytology, HPV ot done, repeat in 3 years.     Pericarditis 2017     Physiologic disturbance of temperature regulation 10/24/2020     PONV (postoperative nausea and vomiting)      Right ankle swelling 06/07/2022    Added automatically from request for surgery [id number]     Tobacco abuse      Tobacco use disorder 07/13/2015      Past Surgical History:   Procedure Laterality  "Date     ANKLE SURGERY Right 05/30/2019     BIOPSY BREAST Right 1990    benign     COLONOSCOPY N/A 1/3/2023    Procedure: COLONOSCOPY WITH BIOPSY OF COLON MASS, POLYPECTOMY;  Surgeon: Kris Charles MD;  Location: [facility] OR     CREATION PERICARDIAL WINDOW  02/10/2017    [facility]     DILATION AND CURETTAGE N/A 7/22/2022    Procedure: DILATION AND CURETTAGE, UTERUS;  Surgeon: Lesly Holland;  Location: [facility] OR     HEMORRHOIDECTOMY EXTERNAL       HYSTEROSCOPY, WITH ENDOMETRIAL RADIOFREQUENCY ABLATION - NOVASURE N/A 7/22/2022    Procedure: HYSTEROSCOPY, WITH ENDOMETRIAL RADIOFREQUENCY ABLATION - NOVASURE DILATION AND CURETTAGE, UTERUS;  Surgeon: Lesly Holland;  Location: [facility] OR     LAPAROSCOPIC ASSISTED SIGMOID COLECTOMY N/A 1/6/2023    Procedure: LAPAROSCOPIC ASSISTED DESCENDING COLELCTOMY SPLENIC FLEXURE MOBILIZATION ;  Surgeon: Kris Charles MD;  Location: [facility] OR     LAPAROSCOPIC LYSIS ADHESIONS N/A 1/6/2023    Procedure: LYSIS OF ADHESIONS;  Surgeon: Kris Charles MD;  Location: [facility] OR     TUMOR REMOVAL      Has had 3. In the right breast and \"inside of rib cage.\"      Current Outpatient Medications   Medication Sig Dispense Refill     ALPRAZolam (XANAX) 2 MG tablet Take 1 tablet (2 mg) by mouth 2 times daily as needed for anxiety 60 tablet 1     cetirizine (ZYRTEC) 10 MG tablet Take 1 tablet (10 mg) by mouth daily 30 tablet 11     diclofenac (VOLTAREN) 50 MG EC tablet Take 1 tablet (50 mg) by mouth 3 times daily as needed for moderate pain (4-6) 90 tablet 1     doxylamine (UNISOM) 25 MG TABS tablet Take 1 tablet (25 mg) by mouth nightly as needed for other (nausea) 7 tablet 1     fluticasone (FLONASE) 50 MCG/ACT nasal spray Spray 2 sprays into both nostrils daily 9.9 mL 11     guaiFENesin-codeine (GUAIFENESIN AC) 100-10 MG/5ML syrup Take 5 mLs by mouth every 4 hours as needed for congestion 180 mL 1     montelukast (SINGULAIR) " "10 MG tablet Take 1 tablet (10 mg) by mouth At Bedtime 30 tablet 11     naloxone (NARCAN) 4 MG/0.1ML nasal spray Spray 1 spray (4 mg) into one nostril alternating nostrils as needed for opioid reversal every 2-3 minutes until assistance arrives 0.2 mL 1     ondansetron (ZOFRAN ODT) 4 MG ODT tab Take 1 tablet (4 mg) by mouth every 8 hours as needed for nausea 10 tablet 1     oxyCODONE (ROXICODONE) 5 MG tablet Take 1 tablet (5 mg) by mouth 3 times daily as needed for severe pain 84 tablet 0     predniSONE (DELTASONE) 50 MG tablet Take 1 tablet (50 mg) by mouth daily 5 tablet 0     PROAIR  (90 Base) MCG/ACT inhaler Inhale 2 puffs into the lungs every 4 hours as needed for shortness of breath or wheezing 8 g 11     prochlorperazine (COMPAZINE) 10 MG tablet Take 1 tablet (10 mg) by mouth every 6 hours as needed for nausea or vomiting 40 tablet 1     spacer (OPTICHAMBER BINA) holding chamber For use w/ rescue inhaler 1 each 0     sucralfate (CARAFATE) 1 GM/10ML suspension Take 10 mLs (1 g) by mouth 4 times daily as needed for nausea 414 mL 0     tiotropium (SPIRIVA RESPIMAT) 1.25 MCG/ACT inhaler Inhale 2 puffs into the lungs daily 12 g 3      Allergies   Allergen Reactions     Gabapentin Other (See Comments)     Mental status is changed      Lidocaine Other (See Comments)     \"my jaw stopped moving\"  Other reaction(s): Dystonia     Penicillins Hives, Rash and Shortness Of Breath     Lidocaine-Epinephrine Other (See Comments) and Muscle Pain (Myalgia)     Severe jaw cramping, double vision  Jaw locking        ROS:   Gen- no weight change, no fevers/chills   ENT- no cough, no congestion, no URI symptoms   Cardiac - no palpitations, no chest pain   Respiratory - see HPI   Remainder of ROS negative.     Exam:   BP (!) 138/95   Pulse 76   Temp 97.9  F (36.6  C) (Oral)   Resp 18   Ht 1.753 m (5' 9\")   SpO2 100%   BMI 36.18 kg/m     Alert and oriented x 3; Anxious. No acute distess  Psych - No HI/SI    "

## 2023-06-08 ASSESSMENT — ACTIVITIES OF DAILY LIVING (ADL): DEPENDENT_IADLS:: INDEPENDENT

## 2023-06-08 NOTE — PROGRESS NOTES
Clinic Care Coordination Contact    Clinic Care Coordination Contact  OUTREACH    Referral Information:  Referral Source: PCP    Primary Diagnosis: Dual -  MH/CD    No chief complaint on file.    SW met with patient this date at [facility] PCP appointment regarding MH/JHONNY diagnoses and patient-reported concerns. Patient appears to be in the Preparation Stage of Change this date regarding both MH and JHONNY diagnoses as evidenced by patient requesting assistance from PCP and SW for treatment and support services/resourvces this date. Patient identified having been decreasing methamphetamine use over time with overall recovery goal being abstinence. Patient identified stress, anxiety, social triggers exacerbate or cue use of methamphetamine. Patient vocalized need for psychology/therapy services to address the emotional/social cues to use as well as to recover from what appears to be       Universal Utilization:   Clinic Utilization  Difficulty keeping appointments:: No  Compliance Concerns: No  No-Show Concerns: No  No PCP office visit in Past Year: No  Utilization    Hospital Admissions  1             ED Visits  7             No Show Count (past year)  1                Current as of: 6/8/2023  1:27 PM            Clinical Concerns:  Current Medical Concerns:   Current Behavioral Concerns: Anxiety  Education Provided to patient: Psycho-education on anxiety as normal response to multiple recent traumatic health-related events; explained health psychology  Pain  Pain (GOAL):: No  Health Maintenance Reviewed: Due/Overdue   Clinical Pathway: None    Medication Management:  Medication review status: Medications reviewed and no changes reported per patient.           Functional Status:  Dependent ADLs:: Independent  Dependent IADLs:: Independent  Bed or wheelchair confined:: No  Mobility Status: Independent  Fallen 2 or more times in the past year?: No  Any fall with injury in the past year?: No    Living Situation:  Current living  arrangement:: I live in a private home with family  Type of residence:: Private home - stairs    Lifestyle & Psychosocial Needs:    Social Determinants of Health     Tobacco Use: Medium Risk (6/7/2023)    Patient History      Smoking Tobacco Use: Former      Smokeless Tobacco Use: Never      Passive Exposure: Not on file   Alcohol Use: Not on file   Financial Resource Strain: Not on file   Food Insecurity: Not on file   Transportation Needs: Not on file   Physical Activity: Not on file   Stress: Not on file   Social Connections: Not on file   Intimate Partner Violence: Not on file   Depression: Not at risk (6/7/2023)    PHQ-2      PHQ-2 Score: 1   Housing Stability: Not on file     Diet:: Regular  Inadequate nutrition (GOAL):: No  Tube Feeding: No  Inadequate activity/exercise (GOAL):: No  Significant changes in sleep pattern (GOAL): No  Transportation means:: Family, Friend, Accessible car     Pentecostalism or spiritual beliefs that impact treatment:: No  Mental health DX:: Yes  Mental health DX how managed:: Medication  Mental health management concern (GOAL):: Yes  Chemical Dependency Status: Current Concern  Chemical Dependency Management: Previous treatment  Informal Support system:: Children, Family, Friends      Resources and Interventions:  Current Resources:      Community Resources: None  Supplies Currently Used at Home: None  Equipment Currently Used at Home: none  Employment Status: employed full-time    Advance Care Plan/Directive  Advanced Care Plans/Directives on file:: Yes  Type Advanced Care Plans/Directives: Advanced Directive - On File  Advanced Care Plan/Directive Status: Not Applicable    Referrals Placed: CD     Care Plan:  Care Plan: Mental Health     Problem: Mental Health Symptoms Need Improvement     Goal: Improve management of mental health symptoms and establish with mental health/psychosocial supports     Start Date: 6/7/2023    This Visit's Progress: 100%    Priority: High    Note:     I  will get scheduled for sessions with Health Psychology at [facility].                        Patient/Caregiver understanding: Yes.    Outreach Frequency: monthly  Future Appointments              In 4 days Mejia Davis PsyD [facility]    In 6 days Robbie Bailon MD [facility] [place]    In 1 month Arnaud Garcia MD [facility] [place]    In 5 months [facility]    In 6 months Arnaud Valdez MD [facility]        Plan: Patient to work with staff to manage anxiety.    JERAMY GARCIA, VENUS, [facility]

## 2023-06-09 ENCOUNTER — TELEPHONE (OUTPATIENT)
Dept: FAMILY MEDICINE | Facility: CLINIC | Age: 48
End: 2023-06-09
Payer: COMMERCIAL

## 2023-06-09 DIAGNOSIS — Z90.49 STATUS POST PARTIAL COLECTOMY: ICD-10-CM

## 2023-06-09 DIAGNOSIS — C18.6 CANCER OF DESCENDING COLON (H): ICD-10-CM

## 2023-06-09 RX ORDER — OXYCODONE HYDROCHLORIDE 5 MG/1
5 TABLET ORAL 3 TIMES DAILY PRN
Qty: 70 TABLET | Refills: 0 | Status: SHIPPED | OUTPATIENT
Start: 2023-06-12 | End: 2023-06-30

## 2023-06-09 NOTE — TELEPHONE ENCOUNTER
[facility] phone call message- medication clarification/question:    Full Medication Name:  oxyCODONE (ROXICODONE) 5 MG tablet      Dose:  Sig - Route: Take 1 tablet (5 mg) by mouth 3 times daily as needed for severe pain - Oral    Question: Patient called stating that she will be out of town for the weekend and won't be back until next Tuesday. Patient would like to request a refill of the medication before she goes out of town. If able to refill, please send to pharmacy. Thank you.         Pharmacy confirmed as [id number] [address]  AT [facility]: Yes    OK to leave a message on voice mail? Yes    Primary language: English      needed? No    Call taken on June 9, 2023 at 9:59 AM by Silviano Lal

## 2023-06-09 NOTE — TELEPHONE ENCOUNTER
Spoke to pt. Will send refill for Monday 6/12 which would be the soonest she will be due. Will resume her oxycodone taper and decrease from #84 tabs to #70. She will be seeing Dr Garcia over the summer as I will be on leave and he will continue taper.     Robbie Bailon MD  June 9, 2023  11:59 AM

## 2023-06-12 ENCOUNTER — ONCOLOGY VISIT (OUTPATIENT)
Dept: ONCOLOGY | Facility: HOSPITAL | Age: 48
End: 2023-06-12
Attending: INTERNAL MEDICINE
Payer: COMMERCIAL

## 2023-06-12 DIAGNOSIS — C18.6 ADENOCARCINOMA OF DESCENDING COLON (H): ICD-10-CM

## 2023-06-12 DIAGNOSIS — F43.10 POSTTRAUMATIC STRESS DISORDER: Primary | ICD-10-CM

## 2023-06-12 DIAGNOSIS — M25.471 RIGHT ANKLE SWELLING: Primary | ICD-10-CM

## 2023-06-12 PROCEDURE — 90837 PSYTX W PT 60 MINUTES: CPT | Performed by: PSYCHOLOGIST

## 2023-06-12 NOTE — LETTER
2023         RE: Darlene Hudson  [address] [place] [address]        Dear Colleague,    Thank you for referring your patient, Darlene Hudson, to the [facility] [place]. Please see a copy of my visit note below.          [facility]- Psychotherapy                                    Progress Note    Patient Name: Darlene Hudson  Date: 2023         Service Type: Individual      Session Start Time: 1545  Session End Time: 1640     Session Length: 55 mins    Session #: 1    Attendees: Client attended alone    Service Modality:  In-person    DATA  Interactive Complexity: No  Crisis: No        Progress Since Last Session (Related to Symptoms / Goals / Homework):   Symptoms: Pt reports trauma sx including flashbacks, hypervigilance, cues to past trauma, depersonalization, derealization.     Homework: none      Episode of Care Goals: Satisfactory progress - ACTION (Actively working towards change); Intervened by reinforcing change plan / affirming steps taken     Current / Ongoing Stressors and Concerns:   Pt reports she is on leave from work due to inability to function in her work. Pt reports she is experiencing flashbacks to trauma caused by her cancer and people who have  in her life. Pt reports she is accusing her S/O of cheating and she does not trust him despite his assurances he is not and a lack of evidence he would be cheating.      Treatment Objective(s) Addressed in This Session:   Education regarding sx, possible referral to a higher level of care.   .     Intervention:   CBT: ,  Emotion Focused Therapy: ,  Solution Focused: ,    Assessments completed prior to visit:  none      ASSESSMENT: Current Emotional / Mental Status (status of significant symptoms):   Risk status (Self / Other harm or suicidal ideation)   Patient denies current fears or concerns for personal safety.   Patient denies current or recent suicidal ideation or  behaviors.   Patient denies current or recent homicidal ideation or behaviors.   Patient denies current or recent self injurious behavior or ideation.   Patient denies other safety concerns.   Patient reports there has been no change in risk factors since their last session.     Patient reports there has been no change in protective factors since their last session.     Recommended that patient call 911 or go to the local ED should there be a change in any of these risk factors.     Appearance:   Appropriate    Eye Contact:   Good    Psychomotor Behavior: Normal    Attitude:   Cooperative    Orientation:   All   Speech    Rate / Production: Hyperverbal     Volume:  Normal    Mood:    Anxious    Affect:    Appropriate    Thought Content:  Clear    Thought Form:  Coherent    Insight:    Fair      Medication Review:   No current psychiatric medications prescribed     Medication Compliance:   NA     Changes in Health Issues:   Yes: ankle surgery coming up, No Psychological Distress     Chemical Use Review:   Substance Use: Chemical use reviewed, no active concerns identified      Tobacco Use: No current tobacco use.      Diagnosis:  1. Adenocarcinoma of descending colon (H)    F43.10 PTSD    Collateral Reports Completed:   Not Applicable    PLAN: (Patient Tasks / Therapist Tasks / Other)  Return in a week    Dorian Davis MA [facility]                                                         ______________________________________________________________________    Individual Treatment Plan    Patient's Name: Darlene Hudson  YOB: 1975    Date of Creation: 6/12/2023  Date Treatment Plan Last Reviewed/Revised: 6/12/2023    DSM5 Diagnoses: F43.10 PTSD  Psychosocial / Contextual Factors: trauma hx including deaths and cancer hx.  PROMIS (reviewed every 90 days):     Referral / Collaboration:  The following referral(s) was/were discussed but patient declines follow up at this time. IOP MH [place], PHP.    Anticipated  number of session for this episode of care: 9-12 sessions  Anticipation frequency of session: Weekly  Anticipated Duration of each session: 53 or more minutes  Treatment plan will be reviewed in 90 days or when goals have been changed.       MeasurableTreatment Goal(s) related to diagnosis / functional impairment(s)  Goal 1: Patient will reduce anxiety due to trauma sx    Objective #A (Patient Action)    Patient will learn about sx of trauma. .  Status: New - Date: 6/12/23     Intervention(s)  Therapist will teach about the sx and effects of PTSD.    Objective #B  Patient will teach coping strategies to manage sx.  Status: New - Date: 6/12/23     Intervention(s)  Therapist will teach emotional regulation skills. ,.    Objective #C  Patient will provide distraction techniques to manage sx.  Status: New - Date: 6/12/23     Intervention(s)  Therapist will teach distraction skills. ,.      Patient has reviewed and agreed to the above plan.      Dorian Davis MA [facility] June 12, 2023        Again, thank you for allowing me to participate in the care of your patient.        Sincerely,        Mejia Davis PsyD

## 2023-06-12 NOTE — PROGRESS NOTES
[facility]- Psychotherapy                                    Progress Note    Patient Name: Darlene Hudson  Date: 2023         Service Type: Individual      Session Start Time: 1545  Session End Time: 1640     Session Length: 55 mins    Session #: 1    Attendees: Client attended alone    Service Modality:  In-person    DATA  Interactive Complexity: No  Crisis: No        Progress Since Last Session (Related to Symptoms / Goals / Homework):   Symptoms: Pt reports trauma sx including flashbacks, hypervigilance, cues to past trauma, depersonalization, derealization.     Homework: none      Episode of Care Goals: Satisfactory progress - ACTION (Actively working towards change); Intervened by reinforcing change plan / affirming steps taken     Current / Ongoing Stressors and Concerns:   Pt reports she is on leave from work due to inability to function in her work. Pt reports she is experiencing flashbacks to trauma caused by her cancer and people who have  in her life. Pt reports she is accusing her S/O of cheating and she does not trust him despite his assurances he is not and a lack of evidence he would be cheating.      Treatment Objective(s) Addressed in This Session:   Education regarding sx, possible referral to a higher level of care.   .     Intervention:   CBT: ,  Emotion Focused Therapy: ,  Solution Focused: ,    Assessments completed prior to visit:  none      ASSESSMENT: Current Emotional / Mental Status (status of significant symptoms):   Risk status (Self / Other harm or suicidal ideation)   Patient denies current fears or concerns for personal safety.   Patient denies current or recent suicidal ideation or behaviors.   Patient denies current or recent homicidal ideation or behaviors.   Patient denies current or recent self injurious behavior or ideation.   Patient denies other safety concerns.   Patient reports there has been no change in risk factors since their last  session.     Patient reports there has been no change in protective factors since their last session.     Recommended that patient call 911 or go to the local ED should there be a change in any of these risk factors.     Appearance:   Appropriate    Eye Contact:   Good    Psychomotor Behavior: Normal    Attitude:   Cooperative    Orientation:   All   Speech    Rate / Production: Hyperverbal     Volume:  Normal    Mood:    Anxious    Affect:    Appropriate    Thought Content:  Clear    Thought Form:  Coherent    Insight:    Fair      Medication Review:   No current psychiatric medications prescribed     Medication Compliance:   NA     Changes in Health Issues:   Yes: ankle surgery coming up, No Psychological Distress     Chemical Use Review:   Substance Use: Chemical use reviewed, no active concerns identified      Tobacco Use: No current tobacco use.      Diagnosis:  1. Adenocarcinoma of descending colon (H)    F43.10 PTSD    Collateral Reports Completed:   Not Applicable    PLAN: (Patient Tasks / Therapist Tasks / Other)  Return in a week    Dorian Davis MA [facility]                                                         ______________________________________________________________________    Individual Treatment Plan    Patient's Name: Darlene Hudson  YOB: 1975    Date of Creation: 6/12/2023  Date Treatment Plan Last Reviewed/Revised: 6/12/2023    DSM5 Diagnoses: F43.10 PTSD  Psychosocial / Contextual Factors: trauma hx including deaths and cancer hx.  PROMIS (reviewed every 90 days):     Referral / Collaboration:  The following referral(s) was/were discussed but patient declines follow up at this time. IOP  [place], PHP.    Anticipated number of session for this episode of care: 9-12 sessions  Anticipation frequency of session: Weekly  Anticipated Duration of each session: 53 or more minutes  Treatment plan will be reviewed in 90 days or when goals have been changed.       MeasurableTreatment  Goal(s) related to diagnosis / functional impairment(s)  Goal 1: Patient will reduce anxiety due to trauma sx    Objective #A (Patient Action)    Patient will learn about sx of trauma. .  Status: New - Date: 6/12/23     Intervention(s)  Therapist will teach about the sx and effects of PTSD.    Objective #B  Patient will teach coping strategies to manage sx.  Status: New - Date: 6/12/23     Intervention(s)  Therapist will teach emotional regulation skills. ,.    Objective #C  Patient will provide distraction techniques to manage sx.  Status: New - Date: 6/12/23     Intervention(s)  Therapist will teach distraction skills. ,.      Patient has reviewed and agreed to the above plan.      Dorian Davis MA, LP [facility] June 12, 2023

## 2023-06-13 ENCOUNTER — OFFICE VISIT (OUTPATIENT)
Dept: ORTHOPEDICS | Facility: CLINIC | Age: 48
End: 2023-06-13
Payer: OTHER MISCELLANEOUS

## 2023-06-13 ENCOUNTER — ANCILLARY PROCEDURE (OUTPATIENT)
Dept: GENERAL RADIOLOGY | Facility: CLINIC | Age: 48
End: 2023-06-13
Attending: ORTHOPAEDIC SURGERY
Payer: OTHER MISCELLANEOUS

## 2023-06-13 DIAGNOSIS — M25.571 PAIN IN JOINT, ANKLE AND FOOT, RIGHT: Primary | ICD-10-CM

## 2023-06-13 DIAGNOSIS — M25.471 RIGHT ANKLE SWELLING: ICD-10-CM

## 2023-06-13 PROCEDURE — 73610 X-RAY EXAM OF ANKLE: CPT | Mod: RT | Performed by: RADIOLOGY

## 2023-06-13 PROCEDURE — 99214 OFFICE O/P EST MOD 30 MIN: CPT | Performed by: ORTHOPAEDIC SURGERY

## 2023-06-13 NOTE — PROGRESS NOTES
Chief complaint right ankle soft tissue impingement    Darlene Hudson presents today for further follow-up.  Unfortunately she could not proceed with the surgery on the right ankle because of being diagnosed with cancer which is currently in remission.    Into the exam she presents with a full range of motion of the right ankle hindfoot and midfoot joints skin is intact skin is intact presents with a stable exam to lateral ligament complex.    Assessment right ankle soft tissue impingement    Plan I discussed with the patient the most likely postoperative course and complications from undergoing a right ankle arthroscopic debridement.  Complications included but not limited to infection bleeding nerve damage residual pain and stiffness    All questions were answered.  Patient will schedule surgery at the base of her convenience.  In the meantime she has no restrictions.    TT 20 minutes

## 2023-06-13 NOTE — NURSING NOTE
Pre-Op Teaching was done in person at the clinic.    Teaching Flowsheet   Relevant Diagnosis: R ankle pain  Teaching Topic: Pre-Operative Teaching     Person(s) involved in teaching:   Patient     Motivation Level:  Asks Questions: Yes  Eager to Learn: Yes  Cooperative: Yes  Receptive (willing/able to accept information): Yes  Any cultural factors/Jain beliefs that may influence understanding or compliance? No     Patient demonstrates understanding of the following:  Reason for the appointment, diagnosis and treatment plan: Yes  Knowledge of proper use of medications and conditions for which they are ordered (with special attention to potential side effects or drug interactions): Yes  Which situations necessitate calling provider and whom to contact: Yes- discussed the stoplight tool to help assist with this.      Teaching Concerns Addressed:      Proper use of surgical scrub explain and provided to patient.    Nutritional needs and diet plan: Yes  Pain management techniques: Yes  Wound Care: Yes  How and/when to access community resources: Yes     Instructional Materials Used/Given: a surgical folder and 2 bottles of surgical soap given in clinic      - Important contact info/ phone numbers  - Map/ location of surgery  - Showering instructions  - Stop light tool    Additionally the following was discussed with patient:  - Patient's children will be driving the patient home after surgery and staying with them for 24 hours.        -Next step: Perioperative  to call patient to schedule surgery and schedule pre-op with PCP. Patient has appointment with PCP tomorrow and would like to use as her pre-op H&P.    Time spent with patient: 15 minutes.     Tara Holter, RNCC

## 2023-06-13 NOTE — NURSING NOTE
Reason For Visit:   Chief Complaint   Patient presents with     RECHECK     Return to discuss right ankle surgery. XR today. Patient states that the ankle swells up often.       There were no vitals taken for this visit.    Pain Assessment  Patient Currently in Pain: Yes  0-10 Pain Scale: 5  Primary Pain Location: Ankle (right)  Pain Descriptors: Bellaing    Roel Frazier, EMT

## 2023-06-14 ENCOUNTER — OFFICE VISIT (OUTPATIENT)
Dept: FAMILY MEDICINE | Facility: CLINIC | Age: 48
End: 2023-06-14
Payer: COMMERCIAL

## 2023-06-14 VITALS
RESPIRATION RATE: 20 BRPM | DIASTOLIC BLOOD PRESSURE: 85 MMHG | SYSTOLIC BLOOD PRESSURE: 123 MMHG | HEART RATE: 84 BPM | OXYGEN SATURATION: 99 %

## 2023-06-14 DIAGNOSIS — Z80.0 FAMILY HISTORY OF COLON CANCER: ICD-10-CM

## 2023-06-14 DIAGNOSIS — Z80.3 FAMILY HISTORY OF MALIGNANT NEOPLASM OF BREAST: ICD-10-CM

## 2023-06-14 DIAGNOSIS — C18.6 ADENOCARCINOMA OF DESCENDING COLON (H): ICD-10-CM

## 2023-06-14 DIAGNOSIS — Z80.9 FAMILY HISTORY OF CANCER: ICD-10-CM

## 2023-06-14 DIAGNOSIS — F41.1 GENERALIZED ANXIETY DISORDER: ICD-10-CM

## 2023-06-14 DIAGNOSIS — F40.01 PANIC DISORDER WITH AGORAPHOBIA: ICD-10-CM

## 2023-06-14 PROCEDURE — 99000 SPECIMEN HANDLING OFFICE-LAB: CPT | Performed by: FAMILY MEDICINE

## 2023-06-14 PROCEDURE — 99214 OFFICE O/P EST MOD 30 MIN: CPT | Performed by: FAMILY MEDICINE

## 2023-06-14 PROCEDURE — 36415 COLL VENOUS BLD VENIPUNCTURE: CPT | Performed by: FAMILY MEDICINE

## 2023-06-14 RX ORDER — ALPRAZOLAM 2 MG
2 TABLET ORAL 2 TIMES DAILY PRN
Qty: 60 TABLET | Refills: 2 | Status: SHIPPED | OUTPATIENT
Start: 2023-07-01 | End: 2023-09-28

## 2023-06-14 ASSESSMENT — ASTHMA QUESTIONNAIRES
QUESTION_5 LAST FOUR WEEKS HOW WOULD YOU RATE YOUR ASTHMA CONTROL: WELL CONTROLLED
QUESTION_2 LAST FOUR WEEKS HOW OFTEN HAVE YOU HAD SHORTNESS OF BREATH: ONCE OR TWICE A WEEK
ACT_TOTALSCORE: 20
QUESTION_3 LAST FOUR WEEKS HOW OFTEN DID YOUR ASTHMA SYMPTOMS (WHEEZING, COUGHING, SHORTNESS OF BREATH, CHEST TIGHTNESS OR PAIN) WAKE YOU UP AT NIGHT OR EARLIER THAN USUAL IN THE MORNING: ONCE OR TWICE
QUESTION_4 LAST FOUR WEEKS HOW OFTEN HAVE YOU USED YOUR RESCUE INHALER OR NEBULIZER MEDICATION (SUCH AS ALBUTEROL): ONCE A WEEK OR LESS
QUESTION_1 LAST FOUR WEEKS HOW MUCH OF THE TIME DID YOUR ASTHMA KEEP YOU FROM GETTING AS MUCH DONE AT WORK, SCHOOL OR AT HOME: A LITTLE OF THE TIME
ACT_TOTALSCORE: 20

## 2023-06-14 NOTE — Clinical Note
[place] - my apologies but Karoline is seeing you for a preop for her upcoming ankle surgery next week. I could not make this happen on Wednesday as that visit was specifically for [facility] paperwork and reviewing our plan for her care over the summer. I did a preop for her colectomy in 12/2022 which should have most of the info you need for her preop. Text me Monday if you have concerns. You should not have to address anything but the preop. Thank you!- Adamaris

## 2023-06-15 RX ORDER — TIOTROPIUM BROMIDE 18 UG/1
18 CAPSULE ORAL; RESPIRATORY (INHALATION) DAILY
COMMUNITY
End: 2024-09-28

## 2023-06-15 RX ORDER — ALBUTEROL SULFATE 90 UG/1
2 AEROSOL, METERED RESPIRATORY (INHALATION) EVERY 6 HOURS PRN
COMMUNITY
End: 2023-09-19

## 2023-06-15 RX ORDER — FLUTICASONE PROPIONATE 50 MCG
1 SPRAY, SUSPENSION (ML) NASAL DAILY
COMMUNITY
End: 2024-07-23

## 2023-06-15 RX ORDER — ALPRAZOLAM 2 MG
2 TABLET ORAL 3 TIMES DAILY PRN
COMMUNITY
End: 2023-06-19

## 2023-06-15 RX ORDER — OXYCODONE HYDROCHLORIDE 5 MG/1
10 CAPSULE ORAL DAILY
COMMUNITY
End: 2023-06-19

## 2023-06-15 RX ORDER — CETIRIZINE HYDROCHLORIDE 10 MG/1
10 TABLET ORAL DAILY
COMMUNITY
End: 2023-06-19

## 2023-06-19 ENCOUNTER — OFFICE VISIT (OUTPATIENT)
Dept: FAMILY MEDICINE | Facility: CLINIC | Age: 48
End: 2023-06-19
Payer: COMMERCIAL

## 2023-06-19 VITALS
OXYGEN SATURATION: 98 % | DIASTOLIC BLOOD PRESSURE: 85 MMHG | TEMPERATURE: 97.9 F | SYSTOLIC BLOOD PRESSURE: 127 MMHG | WEIGHT: 243 LBS | HEART RATE: 77 BPM | BODY MASS INDEX: 35.88 KG/M2

## 2023-06-19 DIAGNOSIS — S40.862A TICK BITE OF LEFT UPPER ARM, INITIAL ENCOUNTER: ICD-10-CM

## 2023-06-19 DIAGNOSIS — Z01.818 PREOP GENERAL PHYSICAL EXAM: Primary | ICD-10-CM

## 2023-06-19 DIAGNOSIS — F41.1 GENERALIZED ANXIETY DISORDER: ICD-10-CM

## 2023-06-19 DIAGNOSIS — M25.871 ANKLE IMPINGEMENT SYNDROME, RIGHT: ICD-10-CM
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
## 2023-06-19 DIAGNOSIS — F11.90 CHRONIC, CONTINUOUS USE OF OPIOIDS: ICD-10-CM

## 2023-06-19 PROCEDURE — 99214 OFFICE O/P EST MOD 30 MIN: CPT | Mod: GC | Performed by: STUDENT IN AN ORGANIZED HEALTH CARE EDUCATION/TRAINING PROGRAM

## 2023-06-19 NOTE — H&P (VIEW-ONLY)
[facility] [address] [place] [address]  Phone: [phone]  Fax: [phone]  Primary Provider: Robbie Bailon  Pre-op Performing Provider: ALIRIO MCHUGH    PREOPERATIVE EVALUATION:  Today's date: 6/19/2023    Darlene Hudson is a 48 year old female who presents for a preoperative evaluation.      6/19/2023    10:27 AM   Additional Questions   Roomed by Ety   Accompanied by self         6/19/2023    10:27 AM   Patient Reported Additional Medications   Patient reports taking the following new medications no     Surgical Information:  Surgery/Procedure: R Ankle arhtroscopy  Surgery Location: [facility]  Surgeon: Mckay  Surgery Date: 6/21/23  Time of Surgery: 11am  Where patient plans to recover: At home with family  Fax number for surgical facility: Note does not need to be faxed, will be available electronically in Epic.    Assessment & Plan     The proposed surgical procedure is considered INTERMEDIATE risk.    Preop general physical exam  Ankle impingement syndrome, right  History of R ankle surgery many years ago with dx of right ankle soft tissue impingement causing swelling and pain. Seen by Orthopedic provider on 6/13/23 with plan for arthroscopy and possible debridement on 6/21. All questions sought and answered. She had also already discussed many of the pre-op concerns and questions during her last visit with PCP.    Generalized anxiety disorder  Is currently taking Xanax - discussed that she should stop dose in AM but patient states she usually takes around 6AM and that she wouldn't be able to go through the day without it. Discussed that if she needs to take it, she should disclose to anesthesia provider pre-op in case it changes anxiolytics during the perioperative setting.    Chronic, continuous use of opioids  Continue oxycodone as previously prescribed by PCP. Hold the AM of surgery. She will likely need increased dosage post op but patient states she is  restricted in terms of prescriptions. Recommend that orthopedic surgeon provide recommendations for post op pain control and if they are unable to prescribe, Dr. Garcia would be able to prescribe at her next follow up.    Possible Sleep Apnea: Has never been worked up for NIDIA but should get eval post op.           - No identified additional risk factors other than previously addressed    Antiplatelet or Anticoagulation Medication Instructions:   - Patient is on no antiplatelet or anticoagulation medications.    Additional Medication Instructions:  Patient is to take all scheduled medications on the day of surgery EXCEPT for modifications listed below:   - Benzodiazepines: HOLD day of surgery if able.   - Oxycodone: HOLD day of surgery if able.    RECOMMENDATION:  APPROVAL GIVEN to proceed with proposed procedure, without further diagnostic evaluation.      Tick Bite   Of note, during patient encounter today, a tick appeared on her LUE that had just started burrowing. Appeared to be a wood tick. Removed with mild difficulty using tweezers. Recommend local/symptomatic cares to area.     Subjective     HPI related to upcoming procedure:   H/o right ankle surgery, seen in may of this year for acute flare of right ankle swelling. Treated with course of prednisone and boot. Pain not significantly improving. Referred back to orthopedic surgery and they offered arthroscopy +/- debridement.        6/19/2023    10:15 AM   Preop Questions   1. Have you ever had a heart attack or stroke? No   2. Have you ever had surgery on your heart or blood vessels, such as a stent placement, a coronary artery bypass, or surgery on an artery in your head, neck, heart, or legs? YES - pericardial window, pericarditis   3. Do you have chest pain with activity? No   4. Do you have a history of  heart failure? No   5. Do you currently have a cold, bronchitis or symptoms of other infection? No   6. Do you have a cough, shortness of breath, or  wheezing? No   7. Do you or anyone in your family have previous history of blood clots? No   8. Do you or does anyone in your family have a serious bleeding problem such as prolonged bleeding following surgeries or cuts? No   9. Have you ever had problems with anemia or been told to take iron pills? No   10. Have you had any abnormal blood loss such as black, tarry or bloody stools, or abnormal vaginal bleeding? No   11. Have you ever had a blood transfusion? No   12. Are you willing to have a blood transfusion if it is medically needed before, during, or after your surgery? Yes   13. Have you or any of your relatives ever had problems with anesthesia? No   14. Do you have sleep apnea, excessive snoring or daytime drowsiness? YES - snores a lot, never been diagnosed with sleep apnea and does not use CPAP   14a. Do you have a CPAP machine? No   15. Do you have any artifical heart valves or other implanted medical devices like a pacemaker, defibrillator, or continuous glucose monitor? No   16. Do you have artificial joints? No   17. Are you allergic to latex? No   18. Is there any chance that you may be pregnant? No     Preoperative Review of :   reviewed - controlled substances reflected in medication list.       Status of Chronic Conditions:  See problem list for active medical problems.  Problems all longstanding and stable, except as noted/documented.  See ROS for pertinent symptoms related to these conditions.      Review of Systems  Constitutional, neuro, ENT, endocrine, pulmonary, cardiac, gastrointestinal, genitourinary, musculoskeletal, integument and psychiatric systems are negative, except as otherwise noted.    Patient Active Problem List    Diagnosis Date Noted     Adenocarcinoma of descending colon (H) 02/07/2023     Priority: Medium     Infection due to 2019 novel coronavirus 09/20/2022     Priority: Medium     Menorrhagia with irregular cycle 07/15/2022     Priority: Medium     Added  automatically from request for surgery [id number]       Right ankle swelling 06/07/2022     Priority: Medium     Added automatically from request for surgery [id number]       Physiologic disturbance of temperature regulation 10/24/2020     Priority: Medium     Family history of colon cancer 10/24/2020     Priority: Medium     Moderate persistent asthma without complication 09/29/2020     Priority: Medium     Arthralgia of both lower legs 05/29/2020     Priority: Medium     Bilateral achy knee pain that is chronic in nature going on for years. Most likely osteoarthritis in knees related to obesity. Previously injected in both knees with good relief. Injected last on 5/29/20       Cough 02/09/2020     Priority: Medium     Chronic infectious pericarditis 02/21/2019     Priority: Medium     Acute pericardial effusion 02/06/2017     Priority: Medium     Atopic rhinitis 01/27/2017     Priority: Medium     Chronic low back pain 01/27/2017     Priority: Medium     Tobacco use disorder 07/13/2015     Priority: Medium     Controlled substance agreement signed 06/30/2015     Priority: Medium     Overview:   Patient has chronic pain and is seen at [facility] for this.  Has controlled substance agreement with them.  On Vicodin, Valium, Klonopin prescribed only from there.         Anxiety 02/20/2015     Priority: Medium     Abnormal cervical Papanicolaou smear 11/09/2014     Priority: Medium     Overview:   ACUS/HPV positive       Chronic sinusitis 10/26/2014     Priority: Medium     Other long term (current) drug therapy 01/28/2013     Priority: Medium     Overview:   Vicodin and cyclobenzaprine monthly       Nondependent alcohol abuse, episodic drinking behavior 10/03/2012     Priority: Medium     Major depressive disorder, recurrent episode, moderate (H) 09/05/2006     Priority: Medium     Panic disorder with agoraphobia 09/05/2006     Priority: Medium      Past Medical History:   Diagnosis Date     Abdominal pain  06/29/2015     Abnormal cervical Papanicolaou smear 11/09/2014    Overview:  ACUS/HPV positive     Abnormal cytology finding 11/09/2014    Overview:  ACUS/HPV positive     Acute pericardial effusion 02/06/2017     Agoraphobia with panic attacks      Anxiety      Arthralgia of both lower legs 05/29/2020    Bilateral achy knee pain that is chronic in nature going on for years. Most likely osteoarthritis in knees related to obesity. Previously injected in both knees with good relief. Injected last on 5/29/20     Arthritis     of back     Asthma in adult, moderate persistent, uncomplicated      Atopic rhinitis 01/27/2017     Chronic infectious pericarditis 02/21/2019     Chronic low back pain 01/27/2017     Chronic pain      Chronic sinusitis      Cocaine abuse (H)      Colonic mass 12/28/2022    Added automatically from request for surgery [id number]     Controlled substance agreement signed 06/30/2015    Overview:  Patient has chronic pain and is seen at [facility] for this.  Has controlled substance agreement with them.  On Vicodin, Valium, Klonopin prescribed only from there.        Coronary artery disease      Cough 02/09/2020     Family history of colon cancer 10/24/2020     Hx of seasonal allergies      Infection due to 2019 novel coronavirus 09/20/2022     Infectious pericarditis      Lipoma      Low back pain 07/13/2015     Major depressive disorder, recurrent episode, moderate (H) 09/05/2006     Menorrhagia with irregular cycle 07/15/2022    Added automatically from request for surgery [id number]     Moderate persistent asthma without complication 09/29/2020     Nondependent alcohol abuse, episodic drinking behavior 10/03/2012     Noninflammatory disorder of vagina 02/20/2015     Other chronic pain      Other long term (current) drug therapy 01/28/2013    Overview:  Vicodin and cyclobenzaprine monthly     Panic disorder with agoraphobia 09/05/2006     Pap smear for cervical cancer screening 10/31/2016     "03/29/2010  Normal cytology, HPV ot done, repeat in 3 years.     Pericarditis 2017     Physiologic disturbance of temperature regulation 10/24/2020     PONV (postoperative nausea and vomiting)      Right ankle swelling 06/07/2022    Added automatically from request for surgery [id number]     Tobacco abuse      Tobacco use disorder 07/13/2015     Past Surgical History:   Procedure Laterality Date     ANKLE SURGERY Right 05/30/2019     BIOPSY BREAST Right 1990    benign     COLONOSCOPY N/A 1/3/2023    Procedure: COLONOSCOPY WITH BIOPSY OF COLON MASS, POLYPECTOMY;  Surgeon: Kris Charles MD;  Location: [facility] OR     CREATION PERICARDIAL WINDOW  02/10/2017    [facility]     DILATION AND CURETTAGE N/A 7/22/2022    Procedure: DILATION AND CURETTAGE, UTERUS;  Surgeon: Lesly Holland;  Location: [facility] OR     HEMORRHOIDECTOMY EXTERNAL       HYSTEROSCOPY, WITH ENDOMETRIAL RADIOFREQUENCY ABLATION - NOVASURE N/A 7/22/2022    Procedure: HYSTEROSCOPY, WITH ENDOMETRIAL RADIOFREQUENCY ABLATION - NOVASURE DILATION AND CURETTAGE, UTERUS;  Surgeon: Lesly Holland;  Location: [facility] OR     LAPAROSCOPIC ASSISTED SIGMOID COLECTOMY N/A 1/6/2023    Procedure: LAPAROSCOPIC ASSISTED DESCENDING COLELCTOMY SPLENIC FLEXURE MOBILIZATION ;  Surgeon: Kris Charles MD;  Location: [facility] OR     LAPAROSCOPIC LYSIS ADHESIONS N/A 1/6/2023    Procedure: LYSIS OF ADHESIONS;  Surgeon: Kris Charles MD;  Location: [facility] OR     TUMOR REMOVAL      Has had 3. In the right breast and \"inside of rib cage.\"     Current Outpatient Medications   Medication Sig Dispense Refill     albuterol (PROAIR HFA/PROVENTIL HFA/VENTOLIN HFA) 108 (90 Base) MCG/ACT inhaler Inhale 2 puffs into the lungs every 6 hours as needed for shortness of breath, wheezing or cough       ALPRAZolam (XANAX) 2 MG tablet Take 2 mg by mouth 3 times daily as needed for anxiety       [START ON 7/1/2023] ALPRAZolam (XANAX) 2 " MG tablet Take 1 tablet (2 mg) by mouth 2 times daily as needed for anxiety 60 tablet 2     cetirizine (ZYRTEC) 10 MG tablet Take 10 mg by mouth daily       cetirizine (ZYRTEC) 10 MG tablet Take 1 tablet (10 mg) by mouth daily 30 tablet 11     diclofenac (VOLTAREN) 50 MG EC tablet Take 1 tablet (50 mg) by mouth 3 times daily as needed for moderate pain (4-6) 90 tablet 1     doxylamine (UNISOM) 25 MG TABS tablet Take 1 tablet (25 mg) by mouth nightly as needed for other (nausea) 7 tablet 1     fluticasone (FLONASE) 50 MCG/ACT nasal spray Spray 1 spray into both nostrils daily       fluticasone (FLONASE) 50 MCG/ACT nasal spray Spray 2 sprays into both nostrils daily 9.9 mL 11     guaiFENesin-codeine (GUAIFENESIN AC) 100-10 MG/5ML syrup Take 5 mLs by mouth every 4 hours as needed for congestion 180 mL 1     montelukast (SINGULAIR) 10 MG tablet Take 1 tablet (10 mg) by mouth At Bedtime 30 tablet 11     naloxone (NARCAN) 4 MG/0.1ML nasal spray Spray 1 spray (4 mg) into one nostril alternating nostrils as needed for opioid reversal every 2-3 minutes until assistance arrives 0.2 mL 1     ondansetron (ZOFRAN ODT) 4 MG ODT tab Take 1 tablet (4 mg) by mouth every 8 hours as needed for nausea 10 tablet 1     oxyCODONE (OXY-IR) 5 MG capsule Take 10 mg by mouth daily       oxyCODONE (ROXICODONE) 5 MG tablet Take 1 tablet (5 mg) by mouth 3 times daily as needed for severe pain 70 tablet 0     predniSONE (DELTASONE) 50 MG tablet Take 1 tablet (50 mg) by mouth daily 5 tablet 0     PROAIR  (90 Base) MCG/ACT inhaler Inhale 2 puffs into the lungs every 4 hours as needed for shortness of breath or wheezing 8 g 11     prochlorperazine (COMPAZINE) 10 MG tablet Take 1 tablet (10 mg) by mouth every 6 hours as needed for nausea or vomiting 40 tablet 1     spacer (OPTICHAMBER BINA) holding chamber For use w/ rescue inhaler 1 each 0     sucralfate (CARAFATE) 1 GM/10ML suspension Take 10 mLs (1 g) by mouth 4 times daily as needed for  "nausea 414 mL 0     tiotropium (SPIRIVA RESPIMAT) 1.25 MCG/ACT inhaler Inhale 2 puffs into the lungs daily 12 g 3     tiotropium (SPIRIVA) 18 MCG inhaled capsule Inhale 18 mcg into the lungs daily         Allergies   Allergen Reactions     Gabapentin Other (See Comments)     Mental status is changed      Lidocaine Other (See Comments)     \"my jaw stopped moving\"  Other reaction(s): Dystonia     Penicillins Hives, Rash and Shortness Of Breath     Lidocaine-Epinephrine Other (See Comments) and Muscle Pain (Myalgia)     Severe jaw cramping, double vision  Jaw locking        Social History     Tobacco Use     Smoking status: Former     Packs/day: 0.10     Years: 30.00     Pack years: 3.00     Types: Cigarettes     Quit date: 5/28/2020     Years since quitting: 3.0     Smokeless tobacco: Never     Tobacco comments:     2-3 cig/day, Seen IP by TTS on 1/11/23 - states she is done but declined our services and resource materials stating that she did not need them. Stopped smoking in Jan 2023   Vaping Use     Vaping status: Former   Substance Use Topics     Alcohol use: Not Currently     Comment: Occasiaonlly.      Family History   Problem Relation Age of Onset     Hypertension Mother      Cancer Mother         cervical      Other Cancer Father         lung cancer     Asthma Father      Diabetes Brother      Diabetes Brother      Breast Cancer Maternal Grandmother      Asthma Child      History   Drug Use Unknown         Objective     /85 (BP Location: Right arm, Patient Position: Sitting, Cuff Size: Adult Large)   Pulse 77   Temp 97.9  F (36.6  C) (Oral)   Wt 110.2 kg (243 lb)   SpO2 98%   BMI 35.88 kg/m      Physical Exam    GENERAL APPEARANCE: healthy, alert and no distress     EYES: EOMI, PERRL     HENT: ear canals and TM's normal and nose and mouth without ulcers or lesions     NECK: no adenopathy, no asymmetry, masses, or scars and thyroid normal to palpation     RESP: lungs clear to auscultation - no rales, " rhonchi or wheezes     CV: regular rates and rhythm, normal S1 S2, no S3 or S4 and no murmur, click or rub     ABDOMEN:  soft, nontender, no HSM or masses and bowel sounds normal     MS: extremities normal- no gross deformities noted, no evidence of inflammation in joints, FROM in all extremities.     SKIN: no suspicious lesions or rashes     NEURO: Normal strength and tone, sensory exam grossly normal, mentation intact and speech normal     PSYCH: mentation appears normal. and affect normal/bright     LYMPHATICS: No cervical adenopathy    Recent Labs   Lab Test 02/23/23  1558 01/31/23  1528   HGB 13.1 12.3    362    141   POTASSIUM 4.1 4.1   CR 0.66 0.74        Diagnostics:  No labs were ordered during this visit.   No EKG required, no history of coronary heart disease, significant arrhythmia, peripheral arterial disease or other structural heart disease.    Revised Cardiac Risk Index (RCRI):  The patient has the following serious cardiovascular risks for perioperative complications:   - No serious cardiac risks = 0 points     RCRI Interpretation: 0 points: Class I (very low risk - 0.4% complication rate)           Signed Electronically by: Debra Castro MD  Copy of this evaluation report is provided to requesting physician.

## 2023-06-19 NOTE — PATIENT INSTRUCTIONS
For informational purposes only. Not to replace the advice of your health care provider. Copyright   2003,  [facility]. All rights reserved. Clinically reviewed by Kymberly Cabral MD. [place] [id number] - REV .  Preparing for Your Surgery  Getting started  A nurse will call you to review your health history and instructions. They will give you an arrival time based on your scheduled surgery time. Please be ready to share:  Your doctor's clinic name and phone number  Your medical, surgical, and anesthesia history  A list of allergies and sensitivities  A list of medicines, including herbal treatments and over-the-counter drugs  Whether the patient has a legal guardian (ask how to send us the papers in advance)  Please tell us if you're pregnant--or if there's any chance you might be pregnant. Some surgeries may injure a fetus (unborn baby), so they require a pregnancy test. Surgeries that are safe for a fetus don't always need a test, and you can choose whether to have one.   If you have a child who's having surgery, please ask for a copy of Preparing for Your Child's Surgery.    Preparing for surgery  Within 10 to 30 days of surgery: Have a pre-op exam (sometimes called an H&P, or History and Physical). This can be done at a clinic or pre-operative center.  If you're having a , you may not need this exam. Talk to your care team.  At your pre-op exam, talk to your care team about all medicines you take. If you need to stop any medicines before surgery, ask when to start taking them again.  We do this for your safety. Many medicines can make you bleed too much during surgery. Some change how well surgery (anesthesia) drugs work.  Call your insurance company to let them know you're having surgery. (If you don't have insurance, call [phone].)  Call your clinic if there's any change in your health. This includes signs of a cold or flu (sore throat, runny nose, cough, rash, fever). It  also includes a scrape or scratch near the surgery site.  If you have questions on the day of surgery, call your hospital or surgery center.  Eating and drinking guidelines  For your safety: Unless your surgeon tells you otherwise, follow the guidelines below.  Eat and drink as usual until 8 hours before you arrive for surgery. After that, no food or milk.  Drink clear liquids until 2 hours before you arrive. These are liquids you can see through, like water, Gatorade, and Propel Water. They also include plain black coffee and tea (no cream or milk), candy, and breath mints. You can spit out gum when you arrive.  If you drink alcohol: Stop drinking it the night before surgery.  If your care team tells you to take medicine on the morning of surgery, it's okay to take it with a sip of water.  Preventing infection  Shower or bathe the night before and morning of your surgery. Follow the instructions your clinic gave you. (If no instructions, use regular soap.)  Don't shave or clip hair near your surgery site. We'll remove the hair if needed.  Don't smoke or vape the morning of surgery. You may chew nicotine gum up to 2 hours before surgery. A nicotine patch is okay.  Note: Some surgeries require you to completely quit smoking and nicotine. Check with your surgeon.  Your care team will make every effort to keep you safe from infection. We will:  Clean our hands often with soap and water (or an alcohol-based hand rub).  Clean the skin at your surgery site with a special soap that kills germs.  Give you a special gown to keep you warm. (Cold raises the risk of infection.)  Wear special hair covers, masks, gowns and gloves during surgery.  Give antibiotic medicine, if prescribed. Not all surgeries need antibiotics.  What to bring on the day of surgery  Photo ID and insurance card  Copy of your health care directive, if you have one  Glasses and hearing aids (bring cases)  You can't wear contacts during surgery  Inhaler and  eye drops, if you use them (tell us about these when you arrive)  CPAP machine or breathing device, if you use them  A few personal items, if spending the night  If you have . . .  A pacemaker, ICD (cardiac defibrillator) or other implant: Bring the ID card.  An implanted stimulator: Bring the remote control.  A legal guardian: Bring a copy of the certified (court-stamped) guardianship papers.  Please remove any jewelry, including body piercings. Leave jewelry and other valuables at home.  If you're going home the day of surgery  You must have a responsible adult drive you home. They should stay with you overnight as well.  If you don't have someone to stay with you, and you aren't safe to go home alone, we may keep you overnight. Insurance often won't pay for this.  After surgery  If it's hard to control your pain or you need more pain medicine, please call your surgeon's office.  Questions?   If you have any questions for your care team, list them here: _________________________________________________________________________________________________________________________________________________________________________ ____________________________________ ____________________________________ ____________________________________    How to Take Your Medication Before Surgery  - Take all of your medications before surgery except as noted below  - HOLD (do not take) xanax or oxycodone the day of your procedure.

## 2023-06-19 NOTE — PROGRESS NOTES
[facility] [address] [place] [address]  Phone: [phone]  Fax: [phone]  Primary Provider: Robbie Bailon  Pre-op Performing Provider: ALIRIO MCHUGH    PREOPERATIVE EVALUATION:  Today's date: 6/19/2023    Darlene Hudson is a 48 year old female who presents for a preoperative evaluation.      6/19/2023    10:27 AM   Additional Questions   Roomed by Ety   Accompanied by self         6/19/2023    10:27 AM   Patient Reported Additional Medications   Patient reports taking the following new medications no     Surgical Information:  Surgery/Procedure: R Ankle arhtroscopy  Surgery Location: [facility]  Surgeon: Mckay  Surgery Date: 6/21/23  Time of Surgery: 11am  Where patient plans to recover: At home with family  Fax number for surgical facility: Note does not need to be faxed, will be available electronically in Epic.    Assessment & Plan     The proposed surgical procedure is considered INTERMEDIATE risk.    Preop general physical exam  Ankle impingement syndrome, right  History of R ankle surgery many years ago with dx of right ankle soft tissue impingement causing swelling and pain. Seen by Orthopedic provider on 6/13/23 with plan for arthroscopy and possible debridement on 6/21. All questions sought and answered. She had also already discussed many of the pre-op concerns and questions during her last visit with PCP.    Generalized anxiety disorder  Is currently taking Xanax - discussed that she should stop dose in AM but patient states she usually takes around 6AM and that she wouldn't be able to go through the day without it. Discussed that if she needs to take it, she should disclose to anesthesia provider pre-op in case it changes anxiolytics during the perioperative setting.    Chronic, continuous use of opioids  Continue oxycodone as previously prescribed by PCP. Hold the AM of surgery. She will likely need increased dosage post op but patient states she is  restricted in terms of prescriptions. Recommend that orthopedic surgeon provide recommendations for post op pain control and if they are unable to prescribe, Dr. Garcia would be able to prescribe at her next follow up.    Possible Sleep Apnea: Has never been worked up for NIDIA but should get eval post op.           - No identified additional risk factors other than previously addressed    Antiplatelet or Anticoagulation Medication Instructions:   - Patient is on no antiplatelet or anticoagulation medications.    Additional Medication Instructions:  Patient is to take all scheduled medications on the day of surgery EXCEPT for modifications listed below:   - Benzodiazepines: HOLD day of surgery if able.   - Oxycodone: HOLD day of surgery if able.    RECOMMENDATION:  APPROVAL GIVEN to proceed with proposed procedure, without further diagnostic evaluation.      Tick Bite   Of note, during patient encounter today, a tick appeared on her LUE that had just started burrowing. Appeared to be a wood tick. Removed with mild difficulty using tweezers. Recommend local/symptomatic cares to area.     Subjective     HPI related to upcoming procedure:   H/o right ankle surgery, seen in may of this year for acute flare of right ankle swelling. Treated with course of prednisone and boot. Pain not significantly improving. Referred back to orthopedic surgery and they offered arthroscopy +/- debridement.        6/19/2023    10:15 AM   Preop Questions   1. Have you ever had a heart attack or stroke? No   2. Have you ever had surgery on your heart or blood vessels, such as a stent placement, a coronary artery bypass, or surgery on an artery in your head, neck, heart, or legs? YES - pericardial window, pericarditis   3. Do you have chest pain with activity? No   4. Do you have a history of  heart failure? No   5. Do you currently have a cold, bronchitis or symptoms of other infection? No   6. Do you have a cough, shortness of breath, or  wheezing? No   7. Do you or anyone in your family have previous history of blood clots? No   8. Do you or does anyone in your family have a serious bleeding problem such as prolonged bleeding following surgeries or cuts? No   9. Have you ever had problems with anemia or been told to take iron pills? No   10. Have you had any abnormal blood loss such as black, tarry or bloody stools, or abnormal vaginal bleeding? No   11. Have you ever had a blood transfusion? No   12. Are you willing to have a blood transfusion if it is medically needed before, during, or after your surgery? Yes   13. Have you or any of your relatives ever had problems with anesthesia? No   14. Do you have sleep apnea, excessive snoring or daytime drowsiness? YES - snores a lot, never been diagnosed with sleep apnea and does not use CPAP   14a. Do you have a CPAP machine? No   15. Do you have any artifical heart valves or other implanted medical devices like a pacemaker, defibrillator, or continuous glucose monitor? No   16. Do you have artificial joints? No   17. Are you allergic to latex? No   18. Is there any chance that you may be pregnant? No     Preoperative Review of :   reviewed - controlled substances reflected in medication list.       Status of Chronic Conditions:  See problem list for active medical problems.  Problems all longstanding and stable, except as noted/documented.  See ROS for pertinent symptoms related to these conditions.      Review of Systems  Constitutional, neuro, ENT, endocrine, pulmonary, cardiac, gastrointestinal, genitourinary, musculoskeletal, integument and psychiatric systems are negative, except as otherwise noted.    Patient Active Problem List    Diagnosis Date Noted     Adenocarcinoma of descending colon (H) 02/07/2023     Priority: Medium     Infection due to 2019 novel coronavirus 09/20/2022     Priority: Medium     Menorrhagia with irregular cycle 07/15/2022     Priority: Medium     Added  automatically from request for surgery [id number]       Right ankle swelling 06/07/2022     Priority: Medium     Added automatically from request for surgery [id number]       Physiologic disturbance of temperature regulation 10/24/2020     Priority: Medium     Family history of colon cancer 10/24/2020     Priority: Medium     Moderate persistent asthma without complication 09/29/2020     Priority: Medium     Arthralgia of both lower legs 05/29/2020     Priority: Medium     Bilateral achy knee pain that is chronic in nature going on for years. Most likely osteoarthritis in knees related to obesity. Previously injected in both knees with good relief. Injected last on 5/29/20       Cough 02/09/2020     Priority: Medium     Chronic infectious pericarditis 02/21/2019     Priority: Medium     Acute pericardial effusion 02/06/2017     Priority: Medium     Atopic rhinitis 01/27/2017     Priority: Medium     Chronic low back pain 01/27/2017     Priority: Medium     Tobacco use disorder 07/13/2015     Priority: Medium     Controlled substance agreement signed 06/30/2015     Priority: Medium     Overview:   Patient has chronic pain and is seen at [facility] for this.  Has controlled substance agreement with them.  On Vicodin, Valium, Klonopin prescribed only from there.         Anxiety 02/20/2015     Priority: Medium     Abnormal cervical Papanicolaou smear 11/09/2014     Priority: Medium     Overview:   ACUS/HPV positive       Chronic sinusitis 10/26/2014     Priority: Medium     Other long term (current) drug therapy 01/28/2013     Priority: Medium     Overview:   Vicodin and cyclobenzaprine monthly       Nondependent alcohol abuse, episodic drinking behavior 10/03/2012     Priority: Medium     Major depressive disorder, recurrent episode, moderate (H) 09/05/2006     Priority: Medium     Panic disorder with agoraphobia 09/05/2006     Priority: Medium      Past Medical History:   Diagnosis Date     Abdominal pain  06/29/2015     Abnormal cervical Papanicolaou smear 11/09/2014    Overview:  ACUS/HPV positive     Abnormal cytology finding 11/09/2014    Overview:  ACUS/HPV positive     Acute pericardial effusion 02/06/2017     Agoraphobia with panic attacks      Anxiety      Arthralgia of both lower legs 05/29/2020    Bilateral achy knee pain that is chronic in nature going on for years. Most likely osteoarthritis in knees related to obesity. Previously injected in both knees with good relief. Injected last on 5/29/20     Arthritis     of back     Asthma in adult, moderate persistent, uncomplicated      Atopic rhinitis 01/27/2017     Chronic infectious pericarditis 02/21/2019     Chronic low back pain 01/27/2017     Chronic pain      Chronic sinusitis      Cocaine abuse (H)      Colonic mass 12/28/2022    Added automatically from request for surgery [id number]     Controlled substance agreement signed 06/30/2015    Overview:  Patient has chronic pain and is seen at [facility] for this.  Has controlled substance agreement with them.  On Vicodin, Valium, Klonopin prescribed only from there.        Coronary artery disease      Cough 02/09/2020     Family history of colon cancer 10/24/2020     Hx of seasonal allergies      Infection due to 2019 novel coronavirus 09/20/2022     Infectious pericarditis      Lipoma      Low back pain 07/13/2015     Major depressive disorder, recurrent episode, moderate (H) 09/05/2006     Menorrhagia with irregular cycle 07/15/2022    Added automatically from request for surgery [id number]     Moderate persistent asthma without complication 09/29/2020     Nondependent alcohol abuse, episodic drinking behavior 10/03/2012     Noninflammatory disorder of vagina 02/20/2015     Other chronic pain      Other long term (current) drug therapy 01/28/2013    Overview:  Vicodin and cyclobenzaprine monthly     Panic disorder with agoraphobia 09/05/2006     Pap smear for cervical cancer screening 10/31/2016     "03/29/2010  Normal cytology, HPV ot done, repeat in 3 years.     Pericarditis 2017     Physiologic disturbance of temperature regulation 10/24/2020     PONV (postoperative nausea and vomiting)      Right ankle swelling 06/07/2022    Added automatically from request for surgery [id number]     Tobacco abuse      Tobacco use disorder 07/13/2015     Past Surgical History:   Procedure Laterality Date     ANKLE SURGERY Right 05/30/2019     BIOPSY BREAST Right 1990    benign     COLONOSCOPY N/A 1/3/2023    Procedure: COLONOSCOPY WITH BIOPSY OF COLON MASS, POLYPECTOMY;  Surgeon: Kris Charles MD;  Location: [facility] OR     CREATION PERICARDIAL WINDOW  02/10/2017    [facility]     DILATION AND CURETTAGE N/A 7/22/2022    Procedure: DILATION AND CURETTAGE, UTERUS;  Surgeon: Lesly Holland;  Location: [facility] OR     HEMORRHOIDECTOMY EXTERNAL       HYSTEROSCOPY, WITH ENDOMETRIAL RADIOFREQUENCY ABLATION - NOVASURE N/A 7/22/2022    Procedure: HYSTEROSCOPY, WITH ENDOMETRIAL RADIOFREQUENCY ABLATION - NOVASURE DILATION AND CURETTAGE, UTERUS;  Surgeon: Lesly Holland;  Location: [facility] OR     LAPAROSCOPIC ASSISTED SIGMOID COLECTOMY N/A 1/6/2023    Procedure: LAPAROSCOPIC ASSISTED DESCENDING COLELCTOMY SPLENIC FLEXURE MOBILIZATION ;  Surgeon: Krsi Charles MD;  Location: [facility] OR     LAPAROSCOPIC LYSIS ADHESIONS N/A 1/6/2023    Procedure: LYSIS OF ADHESIONS;  Surgeon: Kris Charles MD;  Location: [facility] OR     TUMOR REMOVAL      Has had 3. In the right breast and \"inside of rib cage.\"     Current Outpatient Medications   Medication Sig Dispense Refill     albuterol (PROAIR HFA/PROVENTIL HFA/VENTOLIN HFA) 108 (90 Base) MCG/ACT inhaler Inhale 2 puffs into the lungs every 6 hours as needed for shortness of breath, wheezing or cough       ALPRAZolam (XANAX) 2 MG tablet Take 2 mg by mouth 3 times daily as needed for anxiety       [START ON 7/1/2023] ALPRAZolam (XANAX) 2 " MG tablet Take 1 tablet (2 mg) by mouth 2 times daily as needed for anxiety 60 tablet 2     cetirizine (ZYRTEC) 10 MG tablet Take 10 mg by mouth daily       cetirizine (ZYRTEC) 10 MG tablet Take 1 tablet (10 mg) by mouth daily 30 tablet 11     diclofenac (VOLTAREN) 50 MG EC tablet Take 1 tablet (50 mg) by mouth 3 times daily as needed for moderate pain (4-6) 90 tablet 1     doxylamine (UNISOM) 25 MG TABS tablet Take 1 tablet (25 mg) by mouth nightly as needed for other (nausea) 7 tablet 1     fluticasone (FLONASE) 50 MCG/ACT nasal spray Spray 1 spray into both nostrils daily       fluticasone (FLONASE) 50 MCG/ACT nasal spray Spray 2 sprays into both nostrils daily 9.9 mL 11     guaiFENesin-codeine (GUAIFENESIN AC) 100-10 MG/5ML syrup Take 5 mLs by mouth every 4 hours as needed for congestion 180 mL 1     montelukast (SINGULAIR) 10 MG tablet Take 1 tablet (10 mg) by mouth At Bedtime 30 tablet 11     naloxone (NARCAN) 4 MG/0.1ML nasal spray Spray 1 spray (4 mg) into one nostril alternating nostrils as needed for opioid reversal every 2-3 minutes until assistance arrives 0.2 mL 1     ondansetron (ZOFRAN ODT) 4 MG ODT tab Take 1 tablet (4 mg) by mouth every 8 hours as needed for nausea 10 tablet 1     oxyCODONE (OXY-IR) 5 MG capsule Take 10 mg by mouth daily       oxyCODONE (ROXICODONE) 5 MG tablet Take 1 tablet (5 mg) by mouth 3 times daily as needed for severe pain 70 tablet 0     predniSONE (DELTASONE) 50 MG tablet Take 1 tablet (50 mg) by mouth daily 5 tablet 0     PROAIR  (90 Base) MCG/ACT inhaler Inhale 2 puffs into the lungs every 4 hours as needed for shortness of breath or wheezing 8 g 11     prochlorperazine (COMPAZINE) 10 MG tablet Take 1 tablet (10 mg) by mouth every 6 hours as needed for nausea or vomiting 40 tablet 1     spacer (OPTICHAMBER BINA) holding chamber For use w/ rescue inhaler 1 each 0     sucralfate (CARAFATE) 1 GM/10ML suspension Take 10 mLs (1 g) by mouth 4 times daily as needed for  "nausea 414 mL 0     tiotropium (SPIRIVA RESPIMAT) 1.25 MCG/ACT inhaler Inhale 2 puffs into the lungs daily 12 g 3     tiotropium (SPIRIVA) 18 MCG inhaled capsule Inhale 18 mcg into the lungs daily         Allergies   Allergen Reactions     Gabapentin Other (See Comments)     Mental status is changed      Lidocaine Other (See Comments)     \"my jaw stopped moving\"  Other reaction(s): Dystonia     Penicillins Hives, Rash and Shortness Of Breath     Lidocaine-Epinephrine Other (See Comments) and Muscle Pain (Myalgia)     Severe jaw cramping, double vision  Jaw locking        Social History     Tobacco Use     Smoking status: Former     Packs/day: 0.10     Years: 30.00     Pack years: 3.00     Types: Cigarettes     Quit date: 5/28/2020     Years since quitting: 3.0     Smokeless tobacco: Never     Tobacco comments:     2-3 cig/day, Seen IP by TTS on 1/11/23 - states she is done but declined our services and resource materials stating that she did not need them. Stopped smoking in Jan 2023   Vaping Use     Vaping status: Former   Substance Use Topics     Alcohol use: Not Currently     Comment: Occasiaonlly.      Family History   Problem Relation Age of Onset     Hypertension Mother      Cancer Mother         cervical      Other Cancer Father         lung cancer     Asthma Father      Diabetes Brother      Diabetes Brother      Breast Cancer Maternal Grandmother      Asthma Child      History   Drug Use Unknown         Objective     /85 (BP Location: Right arm, Patient Position: Sitting, Cuff Size: Adult Large)   Pulse 77   Temp 97.9  F (36.6  C) (Oral)   Wt 110.2 kg (243 lb)   SpO2 98%   BMI 35.88 kg/m      Physical Exam    GENERAL APPEARANCE: healthy, alert and no distress     EYES: EOMI, PERRL     HENT: ear canals and TM's normal and nose and mouth without ulcers or lesions     NECK: no adenopathy, no asymmetry, masses, or scars and thyroid normal to palpation     RESP: lungs clear to auscultation - no rales, " rhonchi or wheezes     CV: regular rates and rhythm, normal S1 S2, no S3 or S4 and no murmur, click or rub     ABDOMEN:  soft, nontender, no HSM or masses and bowel sounds normal     MS: extremities normal- no gross deformities noted, no evidence of inflammation in joints, FROM in all extremities.     SKIN: no suspicious lesions or rashes     NEURO: Normal strength and tone, sensory exam grossly normal, mentation intact and speech normal     PSYCH: mentation appears normal. and affect normal/bright     LYMPHATICS: No cervical adenopathy    Recent Labs   Lab Test 02/23/23  1558 01/31/23  1528   HGB 13.1 12.3    362    141   POTASSIUM 4.1 4.1   CR 0.66 0.74        Diagnostics:  No labs were ordered during this visit.   No EKG required, no history of coronary heart disease, significant arrhythmia, peripheral arterial disease or other structural heart disease.    Revised Cardiac Risk Index (RCRI):  The patient has the following serious cardiovascular risks for perioperative complications:   - No serious cardiac risks = 0 points     RCRI Interpretation: 0 points: Class I (very low risk - 0.4% complication rate)           Signed Electronically by: Debra Castro MD  Copy of this evaluation report is provided to requesting physician.

## 2023-06-20 ENCOUNTER — ANESTHESIA EVENT (OUTPATIENT)
Dept: SURGERY | Facility: AMBULATORY SURGERY CENTER | Age: 48
End: 2023-06-20
Payer: COMMERCIAL

## 2023-06-20 RX ORDER — ONDANSETRON 4 MG/1
4 TABLET, ORALLY DISINTEGRATING ORAL EVERY 30 MIN PRN
Status: CANCELLED | OUTPATIENT
Start: 2023-06-20

## 2023-06-20 RX ORDER — HYDROMORPHONE HYDROCHLORIDE 1 MG/ML
0.2 INJECTION, SOLUTION INTRAMUSCULAR; INTRAVENOUS; SUBCUTANEOUS EVERY 5 MIN PRN
Status: CANCELLED | OUTPATIENT
Start: 2023-06-20

## 2023-06-20 RX ORDER — FENTANYL CITRATE 50 UG/ML
25 INJECTION, SOLUTION INTRAMUSCULAR; INTRAVENOUS EVERY 5 MIN PRN
Status: CANCELLED | OUTPATIENT
Start: 2023-06-20

## 2023-06-20 RX ORDER — HYDROMORPHONE HYDROCHLORIDE 1 MG/ML
0.4 INJECTION, SOLUTION INTRAMUSCULAR; INTRAVENOUS; SUBCUTANEOUS EVERY 5 MIN PRN
Status: CANCELLED | OUTPATIENT
Start: 2023-06-20

## 2023-06-20 RX ORDER — FENTANYL CITRATE 50 UG/ML
50 INJECTION, SOLUTION INTRAMUSCULAR; INTRAVENOUS EVERY 5 MIN PRN
Status: CANCELLED | OUTPATIENT
Start: 2023-06-20

## 2023-06-20 RX ORDER — ONDANSETRON 2 MG/ML
4 INJECTION INTRAMUSCULAR; INTRAVENOUS EVERY 30 MIN PRN
Status: CANCELLED | OUTPATIENT
Start: 2023-06-20

## 2023-06-20 RX ORDER — OXYCODONE HYDROCHLORIDE 5 MG/1
5 TABLET ORAL
Status: CANCELLED | OUTPATIENT
Start: 2023-06-20

## 2023-06-20 RX ORDER — SODIUM CHLORIDE, SODIUM LACTATE, POTASSIUM CHLORIDE, CALCIUM CHLORIDE 600; 310; 30; 20 MG/100ML; MG/100ML; MG/100ML; MG/100ML
INJECTION, SOLUTION INTRAVENOUS CONTINUOUS
Status: CANCELLED | OUTPATIENT
Start: 2023-06-20

## 2023-06-20 NOTE — ANESTHESIA PREPROCEDURE EVALUATION
Anesthesia Pre-Procedure Evaluation    Patient: Darlene Hudson   MRN: [id number] : 1975        Procedure : Procedure(s):  right ankle arthroscopy with debridement          Past Medical History:   Diagnosis Date     Abdominal pain 2015     Abnormal cervical Papanicolaou smear 2014    Overview:  ACUS/HPV positive     Abnormal cytology finding 2014    Overview:  ACUS/HPV positive     Acute pericardial effusion 2017     Agoraphobia with panic attacks      Anxiety      Arthralgia of both lower legs 2020    Bilateral achy knee pain that is chronic in nature going on for years. Most likely osteoarthritis in knees related to obesity. Previously injected in both knees with good relief. Injected last on 20     Arthritis     of back     Asthma in adult, moderate persistent, uncomplicated      Atopic rhinitis 2017     Chronic infectious pericarditis 2019     Chronic low back pain 2017     Chronic pain      Chronic sinusitis      Cocaine abuse (H)      Colonic mass 2022    Added automatically from request for surgery [id number]     Controlled substance agreement signed 2015    Overview:  Patient has chronic pain and is seen at [facility] for this.  Has controlled substance agreement with them.  On Vicodin, Valium, Klonopin prescribed only from there.        Coronary artery disease      Cough 2020     Family history of colon cancer 10/24/2020     Hx of seasonal allergies      Infection due to 2019 novel coronavirus 2022     Infectious pericarditis      Lipoma      Low back pain 2015     Major depressive disorder, recurrent episode, moderate (H) 2006     Menorrhagia with irregular cycle 07/15/2022    Added automatically from request for surgery [id number]     Moderate persistent asthma without complication 2020     Nondependent alcohol abuse, episodic drinking behavior 10/03/2012     Noninflammatory disorder of vagina  "02/20/2015     Other chronic pain      Other long term (current) drug therapy 01/28/2013    Overview:  Vicodin and cyclobenzaprine monthly     Panic disorder with agoraphobia 09/05/2006     Pap smear for cervical cancer screening 10/31/2016    03/29/2010  Normal cytology, HPV ot done, repeat in 3 years.     Pericarditis 2017     Physiologic disturbance of temperature regulation 10/24/2020     PONV (postoperative nausea and vomiting)      Right ankle swelling 06/07/2022    Added automatically from request for surgery [id number]     Tobacco abuse      Tobacco use disorder 07/13/2015      Past Surgical History:   Procedure Laterality Date     ANKLE SURGERY Right 05/30/2019     BIOPSY BREAST Right 1990    benign     COLONOSCOPY N/A 1/3/2023    Procedure: COLONOSCOPY WITH BIOPSY OF COLON MASS, POLYPECTOMY;  Surgeon: Kris Charles MD;  Location: [facility]     CREATION PERICARDIAL WINDOW  02/10/2017    [facility]     DILATION AND CURETTAGE N/A 7/22/2022    Procedure: DILATION AND CURETTAGE, UTERUS;  Surgeon: Lesly Holland;  Location: [facility]     HEMORRHOIDECTOMY EXTERNAL       HYSTEROSCOPY, WITH ENDOMETRIAL RADIOFREQUENCY ABLATION - NOVASURE N/A 7/22/2022    Procedure: HYSTEROSCOPY, WITH ENDOMETRIAL RADIOFREQUENCY ABLATION - NOVASURE DILATION AND CURETTAGE, UTERUS;  Surgeon: Lesly Holland;  Location: [facility]     LAPAROSCOPIC ASSISTED SIGMOID COLECTOMY N/A 1/6/2023    Procedure: LAPAROSCOPIC ASSISTED DESCENDING COLELCTOMY SPLENIC FLEXURE MOBILIZATION ;  Surgeon: Kris Charles MD;  Location: [facility]     LAPAROSCOPIC LYSIS ADHESIONS N/A 1/6/2023    Procedure: LYSIS OF ADHESIONS;  Surgeon: Kris Charles MD;  Location: [facility] OR     TUMOR REMOVAL      Has had 3. In the right breast and \"inside of rib cage.\"      Allergies   Allergen Reactions     Gabapentin Other (See Comments)     Mental status is changed      Lidocaine Other (See Comments)     \"my " "jaw stopped moving\"  Other reaction(s): Dystonia     Penicillins Hives, Rash and Shortness Of Breath     Lidocaine-Epinephrine Other (See Comments) and Muscle Pain (Myalgia)     Severe jaw cramping, double vision  Jaw locking      Social History     Tobacco Use     Smoking status: Former     Packs/day: 0.10     Years: 30.00     Pack years: 3.00     Types: Cigarettes     Quit date: 5/28/2020     Years since quitting: 3.0     Smokeless tobacco: Never     Tobacco comments:     2-3 cig/day, Seen IP by TTS on 1/11/23 - states she is done but declined our services and resource materials stating that she did not need them. Stopped smoking in Jan 2023   Vaping Use     Vaping status: Former   Substance Use Topics     Alcohol use: Not Currently     Comment: Occasiaonlly.       Wt Readings from Last 1 Encounters:   06/19/23 110.2 kg (243 lb)        Anesthesia Evaluation            ROS/MED HX  ENT/Pulmonary:     (+) asthma     Neurologic:       Cardiovascular: Comment: History of pericardial effusion  POCUS in care everywhere in 2021 showed mild pericardial effusion with no hemodynamic effects.     (+) --CAD ---Previous cardiac testing   Echo: Date: Results:    Stress Test: Date: Results:    ECG Reviewed: Date: 9/2022 Results:  Sinus rhythm with 1st degree A-V block   Otherwise normal ECG  Cath: Date: Results:      METS/Exercise Tolerance:     Hematologic:       Musculoskeletal:       GI/Hepatic:  - neg GI/hepatic ROS     Renal/Genitourinary:  - neg Renal ROS     Endo:       Psychiatric/Substance Use:     (+) psychiatric history anxiety and depression H/O chronic opiod use . Recreational drug usage: Cannabis.    Infectious Disease:       Malignancy:   (+) Malignancy, History of GI.GI CA Active status post.        Other:      (+) , H/O Chronic Pain,        Physical Exam    Airway        Mallampati: II   TM distance: > 3 FB   Neck ROM: full   Mouth opening: > 3 cm    Respiratory Devices and Support         Dental       (+) Minor " Abnormalities - some fillings, tiny chips      Cardiovascular   cardiovascular exam normal          Pulmonary   pulmonary exam normal                OUTSIDE LABS:  CBC:   Lab Results   Component Value Date    WBC 11.9 (H) 02/23/2023    WBC 6.7 01/31/2023    HGB 13.1 02/23/2023    HGB 12.3 01/31/2023    HCT 39.4 02/23/2023    HCT 37.0 01/31/2023     02/23/2023     01/31/2023     BMP:   Lab Results   Component Value Date     02/23/2023     01/31/2023    POTASSIUM 4.1 02/23/2023    POTASSIUM 4.1 01/31/2023    CHLORIDE 104 02/23/2023    CHLORIDE 108 (H) 01/31/2023    CO2 22 02/23/2023    CO2 23 01/31/2023    BUN 12.0 02/23/2023    BUN 9 01/31/2023    CR 0.66 02/23/2023    CR 0.74 01/31/2023     (H) 02/23/2023    GLC 84 01/31/2023     COAGS: No results found for: PTT, INR, FIBR  POC:   Lab Results   Component Value Date    HCG Negative 07/22/2022    HCGS Negative 02/23/2023     HEPATIC:   Lab Results   Component Value Date    ALBUMIN 4.2 02/23/2023    PROTTOTAL 8.1 02/23/2023     (H) 02/23/2023    AST 57 (H) 02/23/2023    ALKPHOS 221 (H) 02/23/2023    BILITOTAL <0.2 02/23/2023    BILIDIRECT <0.1 01/28/2019     OTHER:   Lab Results   Component Value Date    LACT 1.2 01/31/2023    MAURY 9.4 02/23/2023    PHOS 3.8 01/10/2023    MAG 1.9 01/31/2023    LIPASE 17 01/31/2023    AMYLASE 41 11/02/2017    TSH 0.60 03/08/2022    CRP 0.6 08/26/2021    SED 61 (H) 08/26/2021       Anesthesia Plan    ASA Status:  3   NPO Status:  NPO Appropriate    Anesthesia Type: General.     - Airway: LMA   Induction: Intravenous, Propofol.   Maintenance: Balanced.        Consents    Anesthesia Plan(s) and associated risks, benefits, and realistic alternatives discussed. Questions answered and patient/representative(s) expressed understanding.    - Discussed:     - Discussed with:  Patient, Other (See Comment)      - Extended Intubation/Ventilatory Support Discussed: No.      - Patient is DNR/DNI Status: No     Use of blood products discussed: No .     Postoperative Care    Pain management: IV analgesics, Oral pain medications, Multi-modal analgesia.   PONV prophylaxis: Dexamethasone or Solumedrol, Ondansetron (or other 5HT-3), Background Propofol Infusion     Comments:                Chin Morrison MD

## 2023-06-21 ENCOUNTER — ANCILLARY PROCEDURE (OUTPATIENT)
Dept: ULTRASOUND IMAGING | Facility: CLINIC | Age: 48
End: 2023-06-21
Attending: STUDENT IN AN ORGANIZED HEALTH CARE EDUCATION/TRAINING PROGRAM
Payer: OTHER MISCELLANEOUS

## 2023-06-21 ENCOUNTER — ANESTHESIA (OUTPATIENT)
Dept: SURGERY | Facility: AMBULATORY SURGERY CENTER | Age: 48
End: 2023-06-21
Payer: COMMERCIAL

## 2023-06-21 ENCOUNTER — HOSPITAL ENCOUNTER (OUTPATIENT)
Facility: AMBULATORY SURGERY CENTER | Age: 48
End: 2023-06-21
Attending: ORTHOPAEDIC SURGERY
Payer: COMMERCIAL

## 2023-06-21 ENCOUNTER — HOSPITAL ENCOUNTER (OUTPATIENT)
Facility: AMBULATORY SURGERY CENTER | Age: 48
Discharge: HOME | End: 2023-06-21
Attending: ORTHOPAEDIC SURGERY
Payer: COMMERCIAL

## 2023-06-21 ENCOUNTER — TELEPHONE (OUTPATIENT)
Dept: FAMILY MEDICINE | Facility: CLINIC | Age: 48
End: 2023-06-21

## 2023-06-21 VITALS
WEIGHT: 242 LBS | DIASTOLIC BLOOD PRESSURE: 98 MMHG | HEART RATE: 74 BPM | SYSTOLIC BLOOD PRESSURE: 138 MMHG | HEIGHT: 69 IN | BODY MASS INDEX: 35.84 KG/M2 | RESPIRATION RATE: 18 BRPM | TEMPERATURE: 98.2 F

## 2023-06-21 VITALS
OXYGEN SATURATION: 100 % | HEART RATE: 46 BPM | DIASTOLIC BLOOD PRESSURE: 65 MMHG | TEMPERATURE: 97.4 F | RESPIRATION RATE: 14 BRPM | SYSTOLIC BLOOD PRESSURE: 111 MMHG

## 2023-06-21 DIAGNOSIS — M25.571 ACUTE RIGHT ANKLE PAIN: Primary | ICD-10-CM

## 2023-06-21 DIAGNOSIS — Z98.890 STATUS POST SURGICAL MANIPULATION OF ANKLE JOINT: Primary | ICD-10-CM

## 2023-06-21 DIAGNOSIS — M25.871 ANKLE IMPINGEMENT SYNDROME, RIGHT: ICD-10-CM

## 2023-06-21 PROCEDURE — 29898 ANKLE ARTHROSCOPY/SURGERY: CPT | Mod: RT

## 2023-06-21 PROCEDURE — 29898 ANKLE ARTHROSCOPY/SURGERY: CPT | Mod: RT | Performed by: ORTHOPAEDIC SURGERY

## 2023-06-21 RX ORDER — FENTANYL CITRATE 50 UG/ML
50 INJECTION, SOLUTION INTRAMUSCULAR; INTRAVENOUS EVERY 5 MIN PRN
Status: DISCONTINUED | OUTPATIENT
Start: 2023-06-21 | End: 2023-06-21 | Stop reason: HOSPADM

## 2023-06-21 RX ORDER — CEFAZOLIN SODIUM 2 G/50ML
2 SOLUTION INTRAVENOUS
Status: CANCELLED | OUTPATIENT
Start: 2023-06-21

## 2023-06-21 RX ORDER — BUPIVACAINE HYDROCHLORIDE 5 MG/ML
INJECTION, SOLUTION PERINEURAL PRN
Status: DISCONTINUED | OUTPATIENT
Start: 2023-06-21 | End: 2023-06-21 | Stop reason: HOSPADM

## 2023-06-21 RX ORDER — HYDROXYZINE HYDROCHLORIDE 25 MG/1
25 TABLET, FILM COATED ORAL
Status: COMPLETED | OUTPATIENT
Start: 2023-06-21 | End: 2023-06-21

## 2023-06-21 RX ORDER — HYDROMORPHONE HYDROCHLORIDE 1 MG/ML
0.2 INJECTION, SOLUTION INTRAMUSCULAR; INTRAVENOUS; SUBCUTANEOUS EVERY 5 MIN PRN
Status: DISCONTINUED | OUTPATIENT
Start: 2023-06-21 | End: 2023-06-21 | Stop reason: HOSPADM

## 2023-06-21 RX ORDER — HYDROXYZINE HYDROCHLORIDE 25 MG/1
25 TABLET, FILM COATED ORAL EVERY 8 HOURS PRN
Qty: 15 TABLET | Refills: 0 | Status: SHIPPED | OUTPATIENT
Start: 2023-06-21 | End: 2023-09-13

## 2023-06-21 RX ORDER — FENTANYL CITRATE 50 UG/ML
25 INJECTION, SOLUTION INTRAMUSCULAR; INTRAVENOUS EVERY 5 MIN PRN
Status: DISCONTINUED | OUTPATIENT
Start: 2023-06-21 | End: 2023-06-21 | Stop reason: HOSPADM

## 2023-06-21 RX ORDER — KETAMINE HYDROCHLORIDE 10 MG/ML
INJECTION INTRAMUSCULAR; INTRAVENOUS PRN
Status: DISCONTINUED | OUTPATIENT
Start: 2023-06-21 | End: 2023-06-21

## 2023-06-21 RX ORDER — CEFAZOLIN SODIUM 2 G/50ML
2 SOLUTION INTRAVENOUS
OUTPATIENT
Start: 2023-06-21

## 2023-06-21 RX ORDER — HYDROMORPHONE HYDROCHLORIDE 1 MG/ML
0.4 INJECTION, SOLUTION INTRAMUSCULAR; INTRAVENOUS; SUBCUTANEOUS EVERY 5 MIN PRN
Status: DISCONTINUED | OUTPATIENT
Start: 2023-06-21 | End: 2023-06-21 | Stop reason: HOSPADM

## 2023-06-21 RX ORDER — ONDANSETRON 4 MG/1
4 TABLET, ORALLY DISINTEGRATING ORAL EVERY 30 MIN PRN
Status: DISCONTINUED | OUTPATIENT
Start: 2023-06-21 | End: 2023-06-21 | Stop reason: HOSPADM

## 2023-06-21 RX ORDER — SODIUM CHLORIDE, SODIUM LACTATE, POTASSIUM CHLORIDE, CALCIUM CHLORIDE 600; 310; 30; 20 MG/100ML; MG/100ML; MG/100ML; MG/100ML
INJECTION, SOLUTION INTRAVENOUS CONTINUOUS
Status: ACTIVE | OUTPATIENT
Start: 2023-06-21

## 2023-06-21 RX ORDER — ONDANSETRON 4 MG/1
4 TABLET, ORALLY DISINTEGRATING ORAL EVERY 30 MIN PRN
Status: DISCONTINUED | OUTPATIENT
Start: 2023-06-21 | End: 2023-06-22 | Stop reason: HOSPADM

## 2023-06-21 RX ORDER — NALOXONE HYDROCHLORIDE 0.4 MG/ML
0.2 INJECTION, SOLUTION INTRAMUSCULAR; INTRAVENOUS; SUBCUTANEOUS
Status: ACTIVE | OUTPATIENT
Start: 2023-06-21

## 2023-06-21 RX ORDER — OXYCODONE HYDROCHLORIDE 5 MG/1
5 TABLET ORAL
Status: DISCONTINUED | OUTPATIENT
Start: 2023-06-21 | End: 2023-06-22 | Stop reason: HOSPADM

## 2023-06-21 RX ORDER — HYDROXYZINE HYDROCHLORIDE 25 MG/1
25 TABLET, FILM COATED ORAL
OUTPATIENT
Start: 2023-06-21

## 2023-06-21 RX ORDER — FENTANYL CITRATE 50 UG/ML
INJECTION, SOLUTION INTRAMUSCULAR; INTRAVENOUS PRN
Status: DISCONTINUED | OUTPATIENT
Start: 2023-06-21 | End: 2023-06-21

## 2023-06-21 RX ORDER — ALPRAZOLAM 2 MG
2 TABLET ORAL 2 TIMES DAILY
COMMUNITY
End: 2023-07-11

## 2023-06-21 RX ORDER — KETOROLAC TROMETHAMINE 30 MG/ML
INJECTION, SOLUTION INTRAMUSCULAR; INTRAVENOUS PRN
Status: DISCONTINUED | OUTPATIENT
Start: 2023-06-21 | End: 2023-06-21

## 2023-06-21 RX ORDER — ACETAMINOPHEN 325 MG/1
975 TABLET ORAL ONCE
Status: COMPLETED | OUTPATIENT
Start: 2023-06-21 | End: 2023-06-21

## 2023-06-21 RX ORDER — NALOXONE HYDROCHLORIDE 0.4 MG/ML
0.4 INJECTION, SOLUTION INTRAMUSCULAR; INTRAVENOUS; SUBCUTANEOUS
Status: ACTIVE | OUTPATIENT
Start: 2023-06-21

## 2023-06-21 RX ORDER — CEFAZOLIN SODIUM 2 G/50ML
2 SOLUTION INTRAVENOUS SEE ADMIN INSTRUCTIONS
OUTPATIENT
Start: 2023-06-21

## 2023-06-21 RX ORDER — PROPOFOL 10 MG/ML
INJECTION, EMULSION INTRAVENOUS PRN
Status: DISCONTINUED | OUTPATIENT
Start: 2023-06-21 | End: 2023-06-21

## 2023-06-21 RX ORDER — OXYCODONE HYDROCHLORIDE 5 MG/1
5 TABLET ORAL
Status: COMPLETED | OUTPATIENT
Start: 2023-06-21 | End: 2023-06-21

## 2023-06-21 RX ORDER — OXYCODONE HYDROCHLORIDE 5 MG/1
5-10 TABLET ORAL EVERY 4 HOURS PRN
Qty: 10 TABLET | Refills: 0 | OUTPATIENT
Start: 2023-06-21 | End: 2024-08-08

## 2023-06-21 RX ORDER — OXYCODONE HYDROCHLORIDE 5 MG/1
5 TABLET ORAL
OUTPATIENT
Start: 2023-06-21

## 2023-06-21 RX ORDER — CEFAZOLIN SODIUM 2 G/50ML
2 SOLUTION INTRAVENOUS SEE ADMIN INSTRUCTIONS
Status: CANCELLED | OUTPATIENT
Start: 2023-06-21

## 2023-06-21 RX ORDER — LIDOCAINE 40 MG/G
CREAM TOPICAL
Status: ACTIVE | OUTPATIENT
Start: 2023-06-21

## 2023-06-21 RX ORDER — OXYCODONE HYDROCHLORIDE 5 MG/1
5-10 TABLET ORAL EVERY 4 HOURS PRN
Qty: 12 TABLET | Refills: 0 | Status: SHIPPED | OUTPATIENT
Start: 2023-06-21 | End: 2023-09-13

## 2023-06-21 RX ORDER — ONDANSETRON 2 MG/ML
4 INJECTION INTRAMUSCULAR; INTRAVENOUS EVERY 30 MIN PRN
Status: DISCONTINUED | OUTPATIENT
Start: 2023-06-21 | End: 2023-06-21 | Stop reason: HOSPADM

## 2023-06-21 RX ORDER — HYDROXYZINE HYDROCHLORIDE 25 MG/1
25 TABLET, FILM COATED ORAL EVERY 8 HOURS PRN
Qty: 15 TABLET | Refills: 0 | OUTPATIENT
Start: 2023-06-21 | End: 2024-08-08

## 2023-06-21 RX ORDER — DEXAMETHASONE SODIUM PHOSPHATE 4 MG/ML
INJECTION, SOLUTION INTRA-ARTICULAR; INTRALESIONAL; INTRAMUSCULAR; INTRAVENOUS; SOFT TISSUE PRN
Status: DISCONTINUED | OUTPATIENT
Start: 2023-06-21 | End: 2023-06-21

## 2023-06-21 RX ORDER — OXYCODONE HYDROCHLORIDE 10 MG/1
10 TABLET ORAL DAILY
COMMUNITY
End: 2023-06-21

## 2023-06-21 RX ORDER — ONDANSETRON 2 MG/ML
4 INJECTION INTRAMUSCULAR; INTRAVENOUS EVERY 30 MIN PRN
Status: DISCONTINUED | OUTPATIENT
Start: 2023-06-21 | End: 2023-06-22 | Stop reason: HOSPADM

## 2023-06-21 RX ORDER — FLUMAZENIL 0.1 MG/ML
0.2 INJECTION, SOLUTION INTRAVENOUS
Status: ACTIVE | OUTPATIENT
Start: 2023-06-21

## 2023-06-21 RX ORDER — FENTANYL CITRATE 50 UG/ML
25-50 INJECTION, SOLUTION INTRAMUSCULAR; INTRAVENOUS
Status: DISCONTINUED | OUTPATIENT
Start: 2023-06-21 | End: 2024-03-20

## 2023-06-21 RX ORDER — GLYCOPYRROLATE 0.2 MG/ML
INJECTION, SOLUTION INTRAMUSCULAR; INTRAVENOUS PRN
Status: DISCONTINUED | OUTPATIENT
Start: 2023-06-21 | End: 2023-06-21

## 2023-06-21 RX ORDER — SODIUM CHLORIDE, SODIUM LACTATE, POTASSIUM CHLORIDE, CALCIUM CHLORIDE 600; 310; 30; 20 MG/100ML; MG/100ML; MG/100ML; MG/100ML
INJECTION, SOLUTION INTRAVENOUS CONTINUOUS
Status: DISCONTINUED | OUTPATIENT
Start: 2023-06-21 | End: 2023-06-21 | Stop reason: HOSPADM

## 2023-06-21 RX ORDER — CEFAZOLIN SODIUM 2 G/50ML
2 SOLUTION INTRAVENOUS SEE ADMIN INSTRUCTIONS
Status: ACTIVE | OUTPATIENT
Start: 2023-06-21

## 2023-06-21 RX ORDER — PROPOFOL 10 MG/ML
INJECTION, EMULSION INTRAVENOUS CONTINUOUS PRN
Status: DISCONTINUED | OUTPATIENT
Start: 2023-06-21 | End: 2023-06-21

## 2023-06-21 RX ORDER — GABAPENTIN 300 MG/1
300 CAPSULE ORAL
Status: ACTIVE | OUTPATIENT
Start: 2023-06-21

## 2023-06-21 RX ORDER — ONDANSETRON 2 MG/ML
INJECTION INTRAMUSCULAR; INTRAVENOUS PRN
Status: DISCONTINUED | OUTPATIENT
Start: 2023-06-21 | End: 2023-06-21

## 2023-06-21 RX ORDER — ACETAMINOPHEN 325 MG/1
650 TABLET ORAL
Status: DISCONTINUED | OUTPATIENT
Start: 2023-06-21 | End: 2023-06-22 | Stop reason: HOSPADM

## 2023-06-21 RX ORDER — CEFAZOLIN SODIUM 2 G/50ML
2 SOLUTION INTRAVENOUS
Status: COMPLETED | OUTPATIENT
Start: 2023-06-21 | End: 2023-06-21

## 2023-06-21 RX ADMIN — FENTANYL CITRATE 50 MCG: 50 INJECTION, SOLUTION INTRAMUSCULAR; INTRAVENOUS at 11:37

## 2023-06-21 RX ADMIN — CEFAZOLIN SODIUM 2 G: 2 SOLUTION INTRAVENOUS at 11:22

## 2023-06-21 RX ADMIN — SODIUM CHLORIDE, SODIUM LACTATE, POTASSIUM CHLORIDE, CALCIUM CHLORIDE: 600; 310; 30; 20 INJECTION, SOLUTION INTRAVENOUS at 11:19

## 2023-06-21 RX ADMIN — FENTANYL CITRATE 50 MCG: 50 INJECTION, SOLUTION INTRAMUSCULAR; INTRAVENOUS at 12:12

## 2023-06-21 RX ADMIN — GLYCOPYRROLATE 0.1 MG: 0.2 INJECTION, SOLUTION INTRAMUSCULAR; INTRAVENOUS at 11:25

## 2023-06-21 RX ADMIN — ACETAMINOPHEN 975 MG: 325 TABLET ORAL at 11:09

## 2023-06-21 RX ADMIN — HYDROXYZINE HYDROCHLORIDE 25 MG: 25 TABLET, FILM COATED ORAL at 12:23

## 2023-06-21 RX ADMIN — PROPOFOL 150 MCG/KG/MIN: 10 INJECTION, EMULSION INTRAVENOUS at 11:24

## 2023-06-21 RX ADMIN — KETAMINE HYDROCHLORIDE 15 MG: 10 INJECTION INTRAMUSCULAR; INTRAVENOUS at 11:29

## 2023-06-21 RX ADMIN — HYDROMORPHONE HYDROCHLORIDE 0.4 MG: 1 INJECTION, SOLUTION INTRAMUSCULAR; INTRAVENOUS; SUBCUTANEOUS at 12:30

## 2023-06-21 RX ADMIN — PROPOFOL 250 MG: 10 INJECTION, EMULSION INTRAVENOUS at 11:24

## 2023-06-21 RX ADMIN — FENTANYL CITRATE 50 MCG: 50 INJECTION, SOLUTION INTRAMUSCULAR; INTRAVENOUS at 11:24

## 2023-06-21 RX ADMIN — FENTANYL CITRATE 50 MCG: 50 INJECTION, SOLUTION INTRAMUSCULAR; INTRAVENOUS at 12:20

## 2023-06-21 RX ADMIN — KETOROLAC TROMETHAMINE 15 MG: 30 INJECTION, SOLUTION INTRAMUSCULAR; INTRAVENOUS at 11:46

## 2023-06-21 RX ADMIN — Medication 0.5 MG: at 11:46

## 2023-06-21 RX ADMIN — ONDANSETRON 4 MG: 2 INJECTION INTRAMUSCULAR; INTRAVENOUS at 11:25

## 2023-06-21 RX ADMIN — DEXAMETHASONE SODIUM PHOSPHATE 4 MG: 4 INJECTION, SOLUTION INTRA-ARTICULAR; INTRALESIONAL; INTRAMUSCULAR; INTRAVENOUS; SOFT TISSUE at 11:25

## 2023-06-21 RX ADMIN — KETAMINE HYDROCHLORIDE 5 MG: 10 INJECTION INTRAMUSCULAR; INTRAVENOUS at 11:41

## 2023-06-21 RX ADMIN — OXYCODONE HYDROCHLORIDE 5 MG: 5 TABLET ORAL at 12:23

## 2023-06-21 NOTE — INTERVAL H&P NOTE
"I have reviewed the surgical (or preoperative) H&P that is linked to this encounter, and examined the patient. There are no significant changes    Clinical Conditions Present on Arrival:  Clinically Significant Risk Factors Present on Admission                  # Obesity: Estimated body mass index is 35.74 kg/m  as calculated from the following:    Height as of an earlier encounter on 6/21/23: 1.753 m (5' 9\").    Weight as of an earlier encounter on 6/21/23: 109.8 kg (242 lb).       "

## 2023-06-21 NOTE — BRIEF OP NOTE
[facility] Brief Operative Note    Pre-operative diagnosis: Right ankle swelling [M25.471]   Post-operative diagnosis Anterior right ankle impingement   Procedure: Procedure(s):  right ankle arthroscopy with debridement   Surgeon(s): Surgeon(s) and Role:     * Kareem Bashir MD - Primary     * Caty Darby PA-C - Assisting   Estimated blood loss: 20 cc    Specimens: * No specimens in log *   Findings: See dictated operative note      Plan:  Same Day surgery discharge to home once criteria met.  CAM boot to remain on right  lower extremity and WBAT.  Oxycodone for pain.  No dressing change on own.  Leave dressing on until 2 weeks follow up appointment.  F/U in clinic in 2 weeks    I was asked by Dr. Bashir to assist with surgery. I positioned and prepped the patient. I retracted soft tissue.   I suctioned fluids when needed. I provided traction for dissection. I helped to ligate blood vessels. I helped Dr. Bashir identify and protect important structures. The procedure was medically necessary for an assistant because Dr. Bashir needed the operative exposure and assistance that I provided. This allowed him to safely and efficiently operate. It was also important that I help ligate blood vessels to maintain hemostasis and reduce the bleeding risk. I helped with the closure of the operative incisions as well as helping with the boot/cast/splint.  The assistance that I provided reduced operative time which meant less general anesthetic for the patient. No qualified residents were available to assist.    Caty Darby PA-C

## 2023-06-21 NOTE — PROGRESS NOTES
Patient called and request for scooter. Pt recently had surgery on her ankle and stated a scooter will make it easier for her to get around. Pt states she is seeing Dr. Garcia since Dr. Bailon is out - will route to Dr. Garcia.

## 2023-06-21 NOTE — DISCHARGE INSTRUCTIONS
"[facility]  Home Care Following Anesthesia  For 24 hours after surgery:  Get plenty of rest.  A responsible adult must stay with you for at least 24 hours after you leave the surgery center.  Do not drive or use heavy equipment.  If you have weakness or tingling, don't drive or use heavy equipment until this feeling goes away.   Do not drink alcohol.   Avoid strenuous or risky activities.  Ask for help when climbing stairs.  You may feel lightheaded.  IF so, sit for a few minutes before standing.  Have someone help you get up.   If you have nausea (feel sick to your stomach): Drink only clear liquids such as apple juice, ginger ale, broth or 7-Up.  Rest may also help.  Be sure to drink enough fluids.  Move to a regular diet as you feel able.   You may have a slight fever.  Call the doctor if your fever is over 100 F (37.7 C) (taken under the tongue) or lasts longer than 24 hours.  You may have a dry mouth, a sore throat, muscle aches or trouble sleeping. These should go away after 24 hours.  Do not make important or legal decisions.   It is recommended to avoid smoking.        Today you received a Marcaine or bupivacaine block to numb the nerves near your surgery site.  This is a block using local anesthetic or \"numbing\" medication injected around the nerves to anesthetize or \"numb\" the area supplied by those nerves.  This block is injected into the muscle layer near your surgical site.  The medication may numb the location where you had surgery for 6-18 hours, but may last up to 24 hours.  If your surgical site is an arm or leg you should be careful with your affected limb, since it is possible to injure your limb without being aware of it due to the numbing.  Until full feeling returns, you should guard against bumping or hitting your limb, and avoid extreme hot or cold temperatures on the skin.  As the block wears off, the feeling will return as a tingling or prickly " sensation near your surgical site.  You will experience more discomfort from your incision as the feeling returns.  You may want to take a pain pill (a narcotic or Tylenol if this was prescribed by your surgeon) when you start to experience mild pain before the pain beccomes more severe.  If your pain medications do not control your pain you should notifiy your surgeon.    Tips for taking pain medications  To get the best pain relief possible, remember these points:  Take pain medications as directed, before pain becomes severe.  Pain medication can upset your stomach: taking it with food may help.  Constipation is a common side effect of pain medication. Drink plenty of  fluids.  Eat foods high in fiber. Take a stool softener if recommended by your doctor or pharmacist.  Do not drink alcohol, drive or operate machinery while taking pain medications.  Ask about other ways to control pain, such as with heat, ice or relaxation.    Tylenol/Acetaminophen Consumption    If you feel your pain relief is insufficient, you may take Tylenol/Acetaminophen in addition to your narcotic pain medication.   Be careful not to exceed 4,000 mg of Tylenol/Acetaminophen in a 24 hour period from all sources.  If you are taking extra strength Tylenol/acetaminophen (500 mg), the maximum dose is 8 tablets in 24 hours.  If you are taking regular strength acetaminophen (325 mg), the maximum dose is 12 tablets in 24 hours.    Call a doctor for any of the following:  Signs of infection (fever, growing tenderness at the surgery site, a large amount of drainage or bleeding, severe pain, foul-smelling drainage, redness, swelling).  It has been over 8 to 10 hours since surgery and you are still not able to urinate (pass water).  Headache for over 24 hours.  Numbness, tingling or weakness the day after surgery (if you had spinal anesthesia).  Signs of Covid-19 infection (temperature over 100 degrees, shortness of breath, cough, loss of taste/smell,  generalized body aches, persistent headache, chills, sore throat, nausea/vomiting/diarrhea)  Your doctor is:       Dr. Kareem Bashir, Orthopaedics: [phone]               Or dial [phone] and ask for the resident on call for:  Orthopaedics  For emergency care, call the:  [facility] Emergency Department: [phone] (TTY for hearing impaired: [phone])

## 2023-06-21 NOTE — ANESTHESIA POSTPROCEDURE EVALUATION
Patient: Darlene Hudson    Procedure: Procedure(s):  right ankle arthroscopy with debridement       Anesthesia Type:  General    Note:  Disposition: Outpatient   Postop Pain Control: Uneventful            Sign Out: Well controlled pain   PONV: No   Neuro/Psych: Uneventful            Sign Out: Acceptable/Baseline neuro status   Airway/Respiratory: Uneventful            Sign Out: Acceptable/Baseline resp. status   CV/Hemodynamics: Uneventful            Sign Out: Acceptable CV status; No obvious hypovolemia; No obvious fluid overload   Other NRE:    DID A NON-ROUTINE EVENT OCCUR?            Last vitals:  Vitals Value Taken Time   /68 06/21/23 1245   Temp 36.4  C (97.5  F) 06/21/23 1245   Pulse 51 06/21/23 1247   Resp 16 06/21/23 1247   SpO2 95 % 06/21/23 1247   Vitals shown include unvalidated device data.    Electronically Signed By: Chin Morrison MD  June 21, 2023  2:28 PM

## 2023-06-21 NOTE — ANESTHESIA CARE TRANSFER NOTE
Patient: Darlene Hudson    Procedure: Procedure(s):  right ankle arthroscopy with debridement       Diagnosis: Right ankle swelling [M25.471]  Diagnosis Additional Information: No value filed.    Anesthesia Type:   General     Note:    Oropharynx: oropharynx clear of all foreign objects and spontaneously breathing  Level of Consciousness: awake  Oxygen Supplementation: nasal cannula  Level of Supplemental Oxygen (L/min / FiO2): 2  Independent Airway: airway patency satisfactory and stable  Dentition: dentition unchanged  Vital Signs Stable: post-procedure vital signs reviewed and stable  Report to RN Given: handoff report given  Patient transferred to: PACU    Handoff Report: Identifed the Patient, Identified the Reponsible Provider, Reviewed the pertinent medical history, Discussed the surgical course, Reviewed Intra-OP anesthesia mangement and issues during anesthesia, Set expectations for post-procedure period and Allowed opportunity for questions and acknowledgement of understanding      Vitals:  Vitals Value Taken Time   /73 06/21/23 1202   Temp 36.3  C (97.3  F) 06/21/23 1202   Pulse 85 06/21/23 1205   Resp 21 06/21/23 1205   SpO2 100 % 06/21/23 1205   Vitals shown include unvalidated device data.    Electronically Signed By: PEPE Salazar CRNA  June 21, 2023  12:07 PM

## 2023-06-22 NOTE — OP NOTE
Procedure Date: 06/21/2023    PREOPERATIVE DIAGNOSIS:  Right ankle soft tissue impingement.    POSTOPERATIVE DIAGNOSES:    1.  Right ankle soft tissue impingement.  2.  Right ankle bony impingement.    PROCEDURE:     1.  Right ankle extensive arthroscopic debridement.  2.  Right ankle partial tibia and talus excision.    SURGEON:  Kareem Bashir MD    ASSISTANT:  Caty Darby PA-C    COMPLICATIONS:  None.    DRAINS:  None.    ESTIMATED BLOOD LOSS:  Less than 20 mL    ANESTHESIA:  General endotracheal.    INDICATIONS FOR PROCEDURE:  Please refer to clinic note for further details and indications in this patient's case.    PROCEDURE NOTE:  On 06/21/2023, the patient was taken to surgery.  Preoperative antibiotics were administered to the patient prior to arrival to the OR.    After successful induction of general endotracheal anesthesia, she was placed supine on the operating table.  The right lower extremity was prepped and draped in sterile fashion.  After exsanguination by gravity, tourniquet cuff was inflated to 300 mmHg on the proximal third of the right thigh.    The pause for the cause was performed according to institution's policy, which confirmed laterality of the procedure.    We proceeded with placement of an anteromedial portal medial to the anterior tibialis tendon, followed by a lateral portal lateral to the extensor digitorum longus.  We proceeded with exploration of the joint with the following findings:    1.  A large amount of hemorrhagic hypertrophic synovitis through the anterior compartment of the ankle joint.  2.  Absence of arthritic changes.  3.  Clear medial and lateral gutters.  4.  No loose bodies.  5.  The presence of an anterior distal tibial osteophyte as well as osteophyte across the dorsal medial aspect of the neck of the talus.    Upon the findings mentioned above, we proceeded with an aggressive debridement of the ankle joint through both medial and lateral portals with a shaver  in order to avoid any soft tissue impingement.  These were followed by a resection of the most anterior and distal portion of the tibia, which extended from the medial malleolus all the way into the most lateral portion.  Similarly speaking, we proceeded also with a resection of the more dorsal medial aspect of the neck of the talus in order to avoid any impingement with maximum dorsiflexion.    We confirmed by direct visualization the absence of any impingement across the ankle joint in maximum dorsiflexion.    Instruments were removed.  The tourniquet cuff was deflated.  Satisfactory hemostasis was accomplished.  Wound was closed in layers.  Sterile dressings were applied.  The patient was placed in a tall Cam walker and transferred in stable condition to PACU.    PLAN:  The patient will remain weightbearing as tolerated with use of the Cam walker.  Cam walker will be utilized at all times except for hygiene for the first 2 weeks from surgery.  At that time, sutures will be removed if indicated.  She will proceed with the use of the Cam walker for comfort purposes in physical therapy without restrictions between weeks 2 and 6.    She will have no activity restrictions between weeks 2 and 6 as well.    At the 6-week appointment, she will be reevaluated and no x-rays will be obtained.    Kareem Bashir MD        D: 2023   T: 2023   MT: jose    Name:     ANGELA LIM  MRN:      [id number]        Account:        [id number]   :      1975           Procedure Date: 2023     Document: [id number]

## 2023-06-22 NOTE — TELEPHONE ENCOUNTER
1. Status post surgical manipulation of ankle joint  - Rolling Knee Walker/Scooter Order for DME - ONLY FOR DME    2. Ankle impingement syndrome, right  - Rolling Knee Walker/Scooter Order for DME - ONLY FOR DME      Arnaud Garcia III, MD, FAAFP  [facility]  06/22/23 1:37 PM

## 2023-06-23 NOTE — TELEPHONE ENCOUNTER
Called to inform pt I have DME and was going to see where she wanted it faxed or if she wanted to pick it up

## 2023-06-26 ENCOUNTER — TELEPHONE (OUTPATIENT)
Dept: FAMILY MEDICINE | Facility: CLINIC | Age: 48
End: 2023-06-26
Payer: COMMERCIAL

## 2023-06-26 NOTE — PROGRESS NOTES
Faculty Supervision of Residents   I have examined this patient and the medical care has been evaluated and discussed with the resident. See resident note outlining our discussion.      Anaid Vogel MD

## 2023-06-26 NOTE — TELEPHONE ENCOUNTER
Forms Request  Name of form/paperwork: DISABILITY FORM  Do we have the form: Y   When is form needed by: ASAP   How would you like the form returned: /FAX  Fax: [phone]  Patient Notified form requests are processed in 10 business days: Y  Okay to leave a detailed message? Y

## 2023-06-27 LAB — SCANNED LAB RESULT: NORMAL

## 2023-06-29 NOTE — TELEPHONE ENCOUNTER
Spoke with Supervisor Teresa and she advise to give it to Dr. Urbano. Place onto her desk to fill out. She is here today. Unsure if it will be fill out    Per patient needing asap

## 2023-06-30 ENCOUNTER — ONCOLOGY VISIT (OUTPATIENT)
Dept: ONCOLOGY | Facility: CLINIC | Age: 48
End: 2023-06-30
Attending: PSYCHOLOGIST
Payer: COMMERCIAL

## 2023-06-30 DIAGNOSIS — Z90.49 STATUS POST PARTIAL COLECTOMY: ICD-10-CM

## 2023-06-30 DIAGNOSIS — F43.10 POSTTRAUMATIC STRESS DISORDER: Primary | ICD-10-CM

## 2023-06-30 DIAGNOSIS — C18.6 CANCER OF DESCENDING COLON (H): ICD-10-CM

## 2023-06-30 PROCEDURE — 90837 PSYTX W PT 60 MINUTES: CPT | Performed by: PSYCHOLOGIST

## 2023-06-30 RX ORDER — OXYCODONE HYDROCHLORIDE 5 MG/1
5 TABLET ORAL EVERY 6 HOURS PRN
Qty: 33 TABLET | Refills: 0 | Status: SHIPPED | OUTPATIENT
Start: 2023-06-30 | End: 2023-07-11

## 2023-06-30 RX ORDER — OXYCODONE HYDROCHLORIDE 5 MG/1
5 TABLET ORAL 3 TIMES DAILY PRN
Qty: 70 TABLET | Refills: 0 | Status: SHIPPED | OUTPATIENT
Start: 2023-06-30 | End: 2023-06-30
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
## 2023-06-30 NOTE — TELEPHONE ENCOUNTER
I call and lvm.   Per Dr. Urbano, form needs to be completed by surgeon or come in for a appointment. When patient calls please help patient.    Original doc is still in call center in copy not sign bin
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
## 2023-07-07 ENCOUNTER — VIRTUAL VISIT (OUTPATIENT)
Dept: FAMILY MEDICINE | Facility: CLINIC | Age: 48
End: 2023-07-07
Payer: COMMERCIAL

## 2023-07-07 ENCOUNTER — VIRTUAL VISIT (OUTPATIENT)
Dept: ONCOLOGY | Facility: CLINIC | Age: 48
End: 2023-07-07
Attending: INTERNAL MEDICINE
Payer: COMMERCIAL

## 2023-07-07 DIAGNOSIS — F43.10 PTSD (POST-TRAUMATIC STRESS DISORDER): Primary | ICD-10-CM

## 2023-07-07 DIAGNOSIS — C18.6 ADENOCARCINOMA OF DESCENDING COLON (H): ICD-10-CM

## 2023-07-07 DIAGNOSIS — F43.10 POSTTRAUMATIC STRESS DISORDER: Primary | ICD-10-CM

## 2023-07-07 PROCEDURE — 99213 OFFICE O/P EST LOW 20 MIN: CPT | Mod: VID | Performed by: FAMILY MEDICINE

## 2023-07-07 PROCEDURE — 90837 PSYTX W PT 60 MINUTES: CPT | Mod: VID | Performed by: PSYCHOLOGIST

## 2023-07-07 NOTE — PROGRESS NOTES
"Karoline is a 48 year old who is being evaluated via a billable video visit.      How would you like to obtain your AVS? MyChart            Assessment & Plan     PTSD (post-traumatic stress disorder)  Forms filled out for short term disability based on today's conversation and note from PCP and mental health provider    Forms faxed on 7-7-2023.    Adenocarcinoma of descending colon (H)  Empathetic listening as she told her medical story.               BMI:   Estimated body mass index is 35.74 kg/m  as calculated from the following:    Height as of 6/21/23: 1.753 m (5' 9\").    Weight as of 6/21/23: 109.8 kg (242 lb).           No follow-ups on file.    Maria Isabel Urbano DO  [facility]    Pablito Ramey is a 48 year old, presenting for the following health issues:  Forms (Forms/)    *HPI     1. Forms for short term disability. She has had FMLA forms from 6/2023 filled out by her PCP and now needs her short term disability forms filled out. She is on leave due to her mental health. She is on leave until 9/2023. She had been working at [employer], but states there are too many triggers for her mental health and concerns for her physical safety after she was recovering from surgery. She has been seeing a mental health provider. She has had a recent diagnosis of colon cancer requiring surgical intervention. She has not been able to cope well with the recovery and return to the work environment.   Objective           Vitals:  No vitals were obtained today due to virtual visit.    Physical Exam   GENERAL: Healthy, alert and no distress  RESP: No audible wheeze, cough, or visible cyanosis.  No visible retractions or increased work of breathing.    SKIN: Visible skin clear. No significant rash, abnormal pigmentation or lesions.  PSYCH: Mentation appears normal, she is angry in her tone of voice and visibly frustrated, judgement and insight intact                Video-Visit Details    Type of service:  Video " Visit   Video Start Time: 4:32PM  Video End Time:4:52PM    Originating Location (pt. Location): Home    Distant Location (provider location):  On-site  Platform used for Video Visit: Shelley
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
## 2023-07-10 ENCOUNTER — OFFICE VISIT (OUTPATIENT)
Dept: ORTHOPEDICS | Facility: CLINIC | Age: 48
End: 2023-07-10
Payer: OTHER MISCELLANEOUS

## 2023-07-10 DIAGNOSIS — Z98.890 HISTORY OF ANKLE SURGERY: Primary | ICD-10-CM

## 2023-07-10 PROCEDURE — 99024 POSTOP FOLLOW-UP VISIT: CPT

## 2023-07-11 ENCOUNTER — TELEPHONE (OUTPATIENT)
Dept: FAMILY MEDICINE | Facility: CLINIC | Age: 48
End: 2023-07-11

## 2023-07-11 ENCOUNTER — VIRTUAL VISIT (OUTPATIENT)
Dept: FAMILY MEDICINE | Facility: CLINIC | Age: 48
End: 2023-07-11
Payer: COMMERCIAL

## 2023-07-11 ENCOUNTER — PATIENT OUTREACH (OUTPATIENT)
Dept: FAMILY MEDICINE | Facility: CLINIC | Age: 48
End: 2023-07-11
Payer: COMMERCIAL

## 2023-07-11 DIAGNOSIS — F40.01 PANIC DISORDER WITH AGORAPHOBIA: ICD-10-CM

## 2023-07-11 DIAGNOSIS — F11.90 CHRONIC, CONTINUOUS USE OF OPIOIDS: Primary | ICD-10-CM

## 2023-07-11 DIAGNOSIS — F17.200 TOBACCO USE DISORDER: ICD-10-CM

## 2023-07-11 DIAGNOSIS — F11.90 CHRONIC, CONTINUOUS USE OF OPIOIDS: ICD-10-CM

## 2023-07-11 DIAGNOSIS — Z90.49 STATUS POST PARTIAL COLECTOMY: ICD-10-CM

## 2023-07-11 DIAGNOSIS — F41.1 GENERALIZED ANXIETY DISORDER: ICD-10-CM

## 2023-07-11 DIAGNOSIS — Z98.890 STATUS POST SURGICAL MANIPULATION OF ANKLE JOINT: ICD-10-CM

## 2023-07-11 DIAGNOSIS — C18.6 CANCER OF DESCENDING COLON (H): ICD-10-CM

## 2023-07-11 DIAGNOSIS — F43.10 PTSD (POST-TRAUMATIC STRESS DISORDER): Primary | ICD-10-CM

## 2023-07-11 PROCEDURE — 99417 PROLNG OP E/M EACH 15 MIN: CPT | Mod: VID | Performed by: FAMILY MEDICINE

## 2023-07-11 PROCEDURE — 99207 PR NO CHARGE NURSE ONLY: CPT

## 2023-07-11 PROCEDURE — 99215 OFFICE O/P EST HI 40 MIN: CPT | Mod: VID | Performed by: FAMILY MEDICINE

## 2023-07-11 RX ORDER — ALPRAZOLAM 2 MG
2 TABLET ORAL
Qty: 8 TABLET | Refills: 0 | Status: SHIPPED | OUTPATIENT
Start: 2023-07-11 | End: 2023-11-22

## 2023-07-11 RX ORDER — OXYCODONE HYDROCHLORIDE 5 MG/1
5 TABLET ORAL 3 TIMES DAILY PRN
Qty: 90 TABLET | Refills: 0 | Status: SHIPPED | OUTPATIENT
Start: 2023-07-11 | End: 2023-08-11

## 2023-07-11 NOTE — PROGRESS NOTES
Is here for an oxycodone refill. Had ankle surgery 21 Jun 23. Has had increased pain since then necessitating increased oxycodone use. Got a recent additional refill of oxy that ran out today. Has been taking a pill *** times per day. Was previously on one tab three times a day.    Has been referred to physical therapy. Has *** scheduled to start on ***.

## 2023-07-11 NOTE — PROGRESS NOTES
Reason for visit:    Darlene Hudson came in to the clinic for a two week post op check.    Her surgery was done 6/21/2023 by Dr Bashir.  She had a right ankle extensive arthroscopic debridement and partial tibia and talus excision.     Assessment:    Darlene came into the clinic in a Cam boot WBAT    The Surgical wounds were exposed and found to be well-healed; so the sutures were removed. Skin was c/d/i. Steri strips were applied. Karoline is doing well overall.     Plan:     She was placed back into her CAM boot.  She was told to transition to normal shoes, WBAT.   An order will be placed for physical therapy with no restrictions, she may begin right away.     She has an appointment to see Dr. Bashir at 6 weeks post op and at that time Dr. Bashir will determine further restrictions.    She has our phone number and will call with questions or problems.    Wanda Adams PA-C is the overseeing provider on site today.

## 2023-07-11 NOTE — PROGRESS NOTES
Clinic Care Coordination Contact    Follow Up Progress Note      Assessment:     Patient presented to appointment this date:    Appearance: N/A  Speech: no slurred or pressured speech this date; mood-congruent; lower tone, volume and slower rate than baseline for patient  Emotion: flat mood; stable  Perception: no reported or presenting symptoms of hallucinations, delusions or episodes of dissociation this date.  Thought Content: Patient reported no suicidal or homicidal ideation or intent this date.  Insight & Judgement: insight moderate; judgement moderate; impulse control low to moderate  Cognition: oriented x3; memory appeared intact short-term and long-term    Patient denied substance use concerns at this time. Patient did not appear to be under the influence of substances during call this date. Patient did not report or display slurred speech, labile mood, tangential speech or inconsistent participation this date. Patient did not report symptoms of withdrawal.    Patient reported having seen BH provider through [facility] for four sessions thus far. Patient reported feeling sessions have been helpful. Patient reported waking during the night and going to restroom and having trouble sleeping after. Patient reported taking the benzo to sleep in the evening and when she wakes up during the night. Patient reported wanting to increase dose of benzodiazepine. Patient reported she obsesses about medical conditions and Googles compulsively still at this time. Patient reported she has a genetics appointment 7/18/23 and the results have come back per her provider who is reportedly waiting until the appointment to discuss them. Patient reported she has been compulsively Googling potential results since finding out the results came back. Patient reported that the compulsive Googling was down prior to hearing the results were back. Patient vocalized being disappointed in step back in managing the compulsive Googling but  "vocalized understanding that recovery process includes \"ups and downs.\"    SW offered additional time for patient to identify additional ways to alleviate anxiety and minimize compulsive Googling.    Care Gaps:    Health Maintenance Due   Topic Date Due     DEPRESSION ACTION PLAN  Never done     HEPATITIS B IMMUNIZATION (1 of 3 - 3-dose series) Never done     Pneumococcal Vaccine: Pediatrics (0 to 5 Years) and At-Risk Patients (6 to 64 Years) (2 - PCV) 10/15/2015     ASTHMA ACTION PLAN  01/31/2021     SPIROMETRY  03/03/2021     ZOSTER IMMUNIZATION (1 of 2) 03/15/2023     URINE DRUG SCREEN  07/20/2023       Scheduled this date.      Care Plans  Care Plan: Mental Health     Problem: Mental Health Symptoms Need Improvement     Goal: Improve management of mental health symptoms and establish with mental health/psychosocial supports     Start Date: 6/7/2023    This Visit's Progress: 100% Recent Progress: 100%    Priority: High    Note:     I will continue to see mental health professional via [facility] as scheduled..                        Intervention/Education provided during outreach: MHSUD symptoms assessment;medication review; follow-up on mental health referral      Plan:   Patient to continue sessions with Oncology BH professional as scheduled.    Patient to reach out for additional support and skills practice with SW.    Patient to complete provider appointment this date at [facility].    Care Coordinator will follow up in one month.     JERAMY GARCIA, VENUS, JULISA    "

## 2023-07-11 NOTE — CONFIDENTIAL NOTE
Clinic Care Coordination Contact  [facility]/Voicemail       Clinical Data: Care Coordinator Outreach  Outreach attempted x 1.  Left message on patient's voicemail with call back information and requested return call.      JERAMY GARCIA, VENUS, LADC

## 2023-07-11 NOTE — PROGRESS NOTES
"Family Medicine Video Visit Note  Karoline is being evaluated via a billable video visit.             Video Visit Consent     Patient was verbally read the following and verbal consent was obtained.  \"Video visits are billed at different rates depending on your insurance coverage. During this emergency period, for some insurers they may be billed the same as an in-person visit.  Please reach out to your insurance provider with any questions.  If during the course of the call the physician/provider feels a video visit is not appropriate, you will not be charged for this service.\"     (Name person giving consent:  Patient   Date verbal consent given:  7/11/2023  Time verbal consent given:  3:15 PM)    Patient would like the video invitation sent by: Text to cell phone: [phone]      Chief Complaint   Patient presents with     Follow Up     Due to patient being non-English speaking/uses sign language, an  was used for this visit. Only for face-to-face interpretation by an external agency, date and length of interpretation can be found on the scanned worksheet.           HPI       Video Start Time: 3:30 PM    Karoline presents to clinic today for the following health issues:    Is here for an oxycodone refill. Had ankle surgery 21 Jun 23. Has had increased pain since then necessitating increased oxycodone use. Got a recent additional refill of oxy that ran out today. Has been taking a pill 3 times per day. Was previously on one tab three times a day. Is taking a \"whole lot of ibuprofen\" = about 12-16 tabs a day for 4 at a time.    Has been referred to physical therapy. Has not yet scheduled to start. Expecting a call any day now.    Has been having very increased anxiety. Would like a dose increase of her Xanax. Continues to see therapy and finds this helpful. They wonder (per her report) if an extra Xanax at night would help. Her appointment is on the 19th. Thinks re: her anxiety that \"some things will never " "change.\" Is getting up at night most nights to paul Lowe.      Current Outpatient Medications   Medication Sig Dispense Refill     albuterol (PROAIR HFA/PROVENTIL HFA/VENTOLIN HFA) 108 (90 Base) MCG/ACT inhaler Inhale 2 puffs into the lungs every 6 hours as needed for shortness of breath, wheezing or cough       ALPRAZolam (XANAX) 2 MG tablet Take 2 mg by mouth 2 times daily       ALPRAZolam (XANAX) 2 MG tablet Take 1 tablet (2 mg) by mouth 2 times daily as needed for anxiety 60 tablet 2     cetirizine (ZYRTEC) 10 MG tablet Take 1 tablet (10 mg) by mouth daily 30 tablet 11     diclofenac (VOLTAREN) 50 MG EC tablet Take 1 tablet (50 mg) by mouth 3 times daily as needed for moderate pain (4-6) 90 tablet 1     doxylamine (UNISOM) 25 MG TABS tablet Take 1 tablet (25 mg) by mouth nightly as needed for other (nausea) 7 tablet 1     fluticasone (FLONASE) 50 MCG/ACT nasal spray Spray 1 spray into both nostrils daily       fluticasone (FLONASE) 50 MCG/ACT nasal spray Spray 2 sprays into both nostrils daily 9.9 mL 11     guaiFENesin-codeine (GUAIFENESIN AC) 100-10 MG/5ML syrup Take 5 mLs by mouth every 4 hours as needed for congestion 180 mL 1     hydrOXYzine (ATARAX) 25 MG tablet Take 1 tablet (25 mg) by mouth every 8 hours as needed for itching or anxiety (pain) 15 tablet 0     montelukast (SINGULAIR) 10 MG tablet Take 1 tablet (10 mg) by mouth At Bedtime 30 tablet 11     naloxone (NARCAN) 4 MG/0.1ML nasal spray Spray 1 spray (4 mg) into one nostril alternating nostrils as needed for opioid reversal every 2-3 minutes until assistance arrives 0.2 mL 1     ondansetron (ZOFRAN ODT) 4 MG ODT tab Take 1 tablet (4 mg) by mouth every 8 hours as needed for nausea 10 tablet 1     oxyCODONE (ROXICODONE) 5 MG tablet Take 1 tablet (5 mg) by mouth every 6 hours as needed for severe pain 33 tablet 0     oxyCODONE (ROXICODONE) 5 MG tablet Take 1-2 tablets (5-10 mg) by mouth every 4 hours as needed for moderate to severe pain 12 " "tablet 0     PROAIR  (90 Base) MCG/ACT inhaler Inhale 2 puffs into the lungs every 4 hours as needed for shortness of breath or wheezing 8 g 11     prochlorperazine (COMPAZINE) 10 MG tablet Take 1 tablet (10 mg) by mouth every 6 hours as needed for nausea or vomiting 40 tablet 1     spacer (OPTICHAMBER BINA) holding chamber For use w/ rescue inhaler 1 each 0     sucralfate (CARAFATE) 1 GM/10ML suspension Take 10 mLs (1 g) by mouth 4 times daily as needed for nausea 414 mL 0     tiotropium (SPIRIVA RESPIMAT) 1.25 MCG/ACT inhaler Inhale 2 puffs into the lungs daily 12 g 3     tiotropium (SPIRIVA) 18 MCG inhaled capsule Inhale 18 mcg into the lungs daily       Allergies   Allergen Reactions     Gabapentin Other (See Comments)     Mental status is changed      Lidocaine Other (See Comments)     \"my jaw stopped moving\"  Other reaction(s): Dystonia     Penicillins Hives, Rash and Shortness Of Breath     Lidocaine-Epinephrine Other (See Comments) and Muscle Pain (Myalgia)     Severe jaw cramping, double vision  Jaw locking              Review of Systems:     CONSTITUTIONAL: NEGATIVE for fever, chills, change in weight         Physical Exam:     There were no vitals taken for this visit.  Estimated body mass index is 35.74 kg/m  as calculated from the following:    Height as of 6/21/23: 1.753 m (5' 9\").    Weight as of 6/21/23: 109.8 kg (242 lb).    GENERAL: Healthy, alert and no distress  EYES: Eyes grossly normal to inspection.  No discharge or erythema, or obvious scleral/conjunctival abnormalities.  RESP: No audible wheeze, cough, or visible cyanosis.  No visible retractions or increased work of breathing.    SKIN: Visible skin clear. No significant rash, abnormal pigmentation or lesions.  NEURO: Cranial nerves grossly intact.  Mentation and speech appropriate for age.  PSYCH: Mentation appears normal, affect normal/bright, judgement and insight intact, normal speech and appearance, still appeared dressed for " bed, laying under a blanket on her couch.          Assessment and Plan     1. Chronic, continuous use of opioids  Back to her presurgical baseline of 3 oxy a day. Refilled. Recheck in 1 month. Her and her therapists are aiming for her regular return to work at the end of the summer (August). Could consider decreasing the oxycodone at the next visit to 2 a day but we'll see how she is doing.    2. Status post surgical manipulation of ankle joint  Discussed max dosing of OTC ibuprofen (which she is currently meeting) to augment her pain control. Offered naproxen but she finds greater benefit from ibuprofen. Encouraged the start of PT and increasing mobility as recommended by her ortho.    3. Cancer of descending colon (H)  Empathy and listening provided.  - oxyCODONE (ROXICODONE) 5 MG tablet; Take 1 tablet (5 mg) by mouth 3 times daily as needed for severe pain  Dispense: 90 tablet; Refill: 0    4. Status post partial colectomy  See above.  - oxyCODONE (ROXICODONE) 5 MG tablet; Take 1 tablet (5 mg) by mouth 3 times daily as needed for severe pain  Dispense: 90 tablet; Refill: 0    5. Generalized anxiety disorder  Spent some time challenging her assumptions and provided large empathy. Briefly reviewed some of her catastrophizing and called it out. Will allow for one additional dose per day leading up to her genetics appointment with no plan to continue further.  - ALPRAZolam (XANAX) 2 MG tablet; Take 1 tablet (2 mg) by mouth nightly as needed for anxiety  Dispense: 8 tablet; Refill: 0    56 minutes spent on the date of the encounter doing chart review, history and exam, documentation, and further activities as noted above.    After Visit Information:  Patient chose to view AVS via [employer]      No follow-ups on file.      Video-Visit Details    Type of service:  Video Visit    Video End Time (time video stopped): 4:00 PM    Originating Location (pt. Location): Home    Distant Location (provider location):  [facility]  [facility]     Platform used for Video Visit: Shelley Garcia III, MD, FAAFP  [employer]  07/13/23 7:17 AM

## 2023-07-12 ENCOUNTER — ONCOLOGY VISIT (OUTPATIENT)
Dept: ONCOLOGY | Facility: CLINIC | Age: 48
End: 2023-07-12
Attending: INTERNAL MEDICINE
Payer: COMMERCIAL

## 2023-07-12 DIAGNOSIS — F43.10 POSTTRAUMATIC STRESS DISORDER: Primary | ICD-10-CM

## 2023-07-12 PROCEDURE — 90837 PSYTX W PT 60 MINUTES: CPT | Performed by: PSYCHOLOGIST
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
## 2023-07-19 ENCOUNTER — VIRTUAL VISIT (OUTPATIENT)
Dept: ONCOLOGY | Facility: CLINIC | Age: 48
End: 2023-07-19
Attending: GENETIC COUNSELOR, MS
Payer: COMMERCIAL

## 2023-07-19 DIAGNOSIS — C18.6 ADENOCARCINOMA OF DESCENDING COLON (H): Primary | ICD-10-CM

## 2023-07-19 DIAGNOSIS — Z80.0 FAMILY HISTORY OF COLON CANCER: ICD-10-CM

## 2023-07-19 PROCEDURE — 96040 HC GENETIC COUNSELING, EACH 30 MINUTES: CPT | Mod: TEL,95 | Performed by: GENETIC COUNSELOR, MS

## 2023-07-19 NOTE — PROGRESS NOTES
"Virtual Visit Details    Type of service:  Telephone Visit   Phone call duration: 37 minutes     7/19/2023    Referring Provider: Arnaud Valdez MD    Presenting Information:  Darlene Hudson returned to the Cancer Risk Management Program(virtual/telephone visit) to discuss her genetic testing results.     Genetic Testing Result: NEGATIVE  Darlene is negative for mutations in the 84 gene [facility] Multi Cancer Panel. A copy of the test report can be found in the Laboratory tab, dated 6/14/2023, and named \"LABORATORY MISCELLANEOUS ORDER\". The report is scanned in as a linked document.    Interpretation:  We discussed several different interpretations of this negative test result.    1. One explanation may be that there is a different gene or combination of genes and environment that are associated with the cancers in this family.  2. Another explanation may be that her father did have a mutation in a cancer susceptibility gene which she did not inherit.  3. There is also a small possibility that there is a mutation in one of these genes, and the testing laboratory could not find it with their current testing methods.       Screening:  Based on this negative test result, it is important for Darlene and her relatives to refer back to the family history for appropriate cancer screening.      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 Karoline's father was diagnosed with colon cancer and passed away in his 50's.  He had a large family, in which all siblings but one passed away due to cancer (including several at early ages).  Details regarding these diagnoses were not available.  Based on her history, close relatives may remain at increased risk for cancer.     Per National Comprehensive Cancer Network (NCCN) guidelines, individuals with a first degree relative with colorectal cancer diagnosed at any age should begin colonoscopy at age 40, or 10 years before the " "earliest diagnosis of colorectal cancer, whichever is first. Colonoscopy should be repeated every 5 years, or based on colonoscopy findings. These recommendations may change based on personal and family history as well as personal preference, and should be discussed with an individuals physician.     Other population cancer screening options, such as those recommended by the American Cancer Society and the National Comprehensive Cancer Network (NCCN), are also appropriate for Darlene and her family. These screening recommendations may change if there are changes to Darlene's personal and/or family history of cancer. Final screening recommendations should be made by each individual's primary care provider.     Karoline shared significant health anxiety, and concern regarding developing additional cancer.  She has started writing in a journal at night when she begins ruminating and \"googling\".  She is trying to focus on the positives today, and feel comfortable with the current cancer surveillance plan with her doctors.  She is also hoping to return to work in August.      Summary:  We do not have an explanation for Darlene's personal or family history of cancer. While no genetic changes were identified, Darlene may still be at risk for certain cancers due to family history, environmental factors, or other genetic causes not identified by this test.  Because of that, it is important that she continue with cancer screening based on her personal and family history as discussed above.    Genetic testing is rapidly advancing, and new cancer susceptibility genes will most likely be identified in the future. Therefore, I encouraged Darlene to contact me annually or if there are changes in her personal or family history. This may change how we assess her cancer risk, screening, and the testing we would offer.    Plan:  1.  I provided Darlene with a copy of her test results today and a handout summarizing negative genetic test results " (see after visit summary).  2. She plans to follow-up with her managing physicians.  3. She should contact me periodically, or if her personal or family history of cancer changes, and she would like to know if there are additional screening or testing recommendations at that time.    Darlene is encouraged to contact me if she has any further questions.    Iqra Reed MS, CGC

## 2023-07-19 NOTE — LETTER
"    7/19/2023         RE: Darlene Hudson  [address] [place] [address]        Dear Colleague,    Thank you for referring your patient, Darlene Hudson, to the [facility]. Please see a copy of my visit note below.    Virtual Visit Details    Type of service:  Telephone Visit   Phone call duration: 37 minutes     7/19/2023    Referring Provider: Arnaud Valdez MD    Presenting Information:  Darlene Hudson returned to the Cancer Risk Management Program(virtual/telephone visit) to discuss her genetic testing results.     Genetic Testing Result: NEGATIVE  Darlene is negative for mutations in the 84 gene [facility] Multi Cancer Panel. A copy of the test report can be found in the Laboratory tab, dated 6/14/2023, and named \"LABORATORY MISCELLANEOUS ORDER\". The report is scanned in as a linked document.    Interpretation:  We discussed several different interpretations of this negative test result.    One explanation may be that there is a different gene or combination of genes and environment that are associated with the cancers in this family.  Another explanation may be that her father did have a mutation in a cancer susceptibility gene which she did not inherit.  There is also a small possibility that there is a mutation in one of these genes, and the testing laboratory could not find it with their current testing methods.       Screening:  Based on this negative test result, it is important for Darlene and her relatives to refer back to the family history for appropriate cancer screening.    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 Karoline's father was diagnosed with colon cancer and passed away in his 50's.  He had a large family, in which all siblings but one passed away due to cancer (including several at early ages).  Details regarding these diagnoses were not available.  Based on her history, close relatives may " "remain at increased risk for cancer.     Per National Comprehensive Cancer Network (NCCN) guidelines, individuals with a first degree relative with colorectal cancer diagnosed at any age should begin colonoscopy at age 40, or 10 years before the earliest diagnosis of colorectal cancer, whichever is first. Colonoscopy should be repeated every 5 years, or based on colonoscopy findings. These recommendations may change based on personal and family history as well as personal preference, and should be discussed with an individuals physician.     Other population cancer screening options, such as those recommended by the American Cancer Society and the National Comprehensive Cancer Network (NCCN), are also appropriate for Darlene and her family. These screening recommendations may change if there are changes to Darlene's personal and/or family history of cancer. Final screening recommendations should be made by each individual's primary care provider.     Karoline shared significant health anxiety, and concern regarding developing additional cancer.  She has started writing in a journal at night when she begins ruminating and \"googling\".  She is trying to focus on the positives today, and feel comfortable with the current cancer surveillance plan with her doctors.  She is also hoping to return to work in August.      Summary:  We do not have an explanation for Darlene's personal or family history of cancer. While no genetic changes were identified, Darlene may still be at risk for certain cancers due to family history, environmental factors, or other genetic causes not identified by this test.  Because of that, it is important that she continue with cancer screening based on her personal and family history as discussed above.    Genetic testing is rapidly advancing, and new cancer susceptibility genes will most likely be identified in the future. Therefore, I encouraged Darlene to contact me annually or if there are changes in " her personal or family history. This may change how we assess her cancer risk, screening, and the testing we would offer.    Plan:  1.  I provided Darlene with a copy of her test results today and a handout summarizing negative genetic test results (see after visit summary).  2. She plans to follow-up with her managing physicians.  3. She should contact me periodically, or if her personal or family history of cancer changes, and she would like to know if there are additional screening or testing recommendations at that time.    Darlene is encouraged to contact me if she has any further questions.  Iqra Reed MS, CGC

## 2023-07-19 NOTE — NURSING NOTE
Is the patient currently in the state of [place]? YES    Visit mode:TELEPHONE    If the visit is dropped, the patient can be reconnected by: TELEPHONE VISIT: Phone number: [phone]    Will anyone else be joining the visit? NO      How would you like to obtain your AVS? MyChart    Are changes needed to the allergy or medication list? NO    Reason for visit: RECHECK    Johanna FREEMAN

## 2023-07-20 NOTE — PATIENT INSTRUCTIONS
Negative Genetic Test Result    Genetic Testing  Genetic testing involved a blood or saliva test which looked at the genetic information in select genes for variants associated with cancer risk.  This testing may have included analysis of a single gene due to a known variant in the family, multiple genes most associated with the cancers in a family, or an expanded panel of genes related to many types of cancers.     Results  There are several possible genetic test results, including:   Positive--a harmful mutation (also known as a  pathogenic  or  likely pathogenic  variant) was identified in a gene associated with increased cancer risk.  These risks, as well as medical management options, depend on the specific genetic variant identified.    Negative--no variants were identified in the genes analyzed   Variant of unknown significance--a variant was identified in one or more genes, though it is currently unclear how this impacts cancer risk in the family.  Genetic testing labs are working to collect evidence about these uncertain variants and may provide updates in the future.    What Does a Negative Genetic Test Result Mean?  A negative genetic test results means that no genetic changes (variants) were detected in the genes tested. While no inherited risk factors for cancer were identified, you and your family may be at risk for certain cancers due to family history, environmental factors, or other genetic causes not identified by this test.  It is also possible that your family may still be at risk of carrying a genetic risk factor which you did not inherit. Your genetic counselor can help interpret the result for you and your relatives.      It is important to note which genes were included in your test. A list of these genes can be found on your test result.    Screening Recommendations  A combination of personal and family history factors may inform cancer risk and medical management recommendations.   Population cancer screening options, such as those recommended by the American Cancer Society and the National Comprehensive Cancer Network (NCCN) are appropriate for many families at average risk for cancer.  However, earlier and/or more frequent screening may be recommended based on personal factors (lifestyle, exposures, medications, screening results), family history of cancer, and sometimes genetic factors.  These cancer risk management options should be discussed in more detail with an individual's medical providers.      Please call us if you have any questions or concerns.   Cancer Risk Management Program (Appointments: [phone])  Allen Cheek, MS [facility]  [phone]  Samara Fuller, MS, [facility] [phone]  Suki Cunningham, MS, [facility]  [phone]  Iqra Reed, MS, [facility]  [phone]  Doreen Caicedo, MS, [facility]  [phone]  Emi Ramesh, MS, [facility] [phone]  Michelle Dasilva, MS, [facility] [phone]

## 2023-07-24 ENCOUNTER — TELEPHONE (OUTPATIENT)
Dept: FAMILY MEDICINE | Facility: CLINIC | Age: 48
End: 2023-07-24
Payer: COMMERCIAL

## 2023-07-24 DIAGNOSIS — F40.01 PANIC DISORDER WITH AGORAPHOBIA: ICD-10-CM

## 2023-07-24 DIAGNOSIS — F41.1 GENERALIZED ANXIETY DISORDER: Primary | ICD-10-CM

## 2023-07-24 NOTE — TELEPHONE ENCOUNTER
Patient has state insurance, insurance will not allow early fill. Unable to get medication, cannot pay out of pocket due to nature of med and state insurance. Pharmacy to call patient and inform. Recommend adding more to next prescription, can fill on the 31st of July per pharmacist. Yuko VÁSQUEZ

## 2023-07-24 NOTE — TELEPHONE ENCOUNTER
Per previous plans, patient is allowed to be on twice daily Xanax. She was allowed a short term increase leading up to her genetics appointment that occurred on 19 Jul. On reviewing the PDMP, it doesn't appear that this additional #8 pills were picked up. Her baseline medication was sent with refills by Dr. Bailon and the last bottle was picked up on 1 Jul.    I'm not sure what happened with the last small fill. If she is short #8 pills this could be reasonable to send. I'm not sure why she didn't get the prescription that I sent on 11 Jul.    Arnaud Garcia III, MD, FAAFP  [facility]  07/24/23 2:32 PM

## 2023-07-24 NOTE — TELEPHONE ENCOUNTER
Patient is not known to me. Per chart review of last visit I believe the plan was to NOT refill the alprazolam any further. I will route to Dr. Garcia for confirmation.

## 2023-07-24 NOTE — LETTER
July 26, 2023      Darlene Hudson  [address] [place] [address]            To Whom it May Concern,    Darlene Hudson may return to work without restriction.        Sincerely,      Arnaud Garcia III, MD, FAAFP  [facility]  07/26/23 5:33 PM

## 2023-07-24 NOTE — TELEPHONE ENCOUNTER
Will plan to continue prescription prior to increase of 2mg twice daily, which Dr. Bailon sent a refill in for. Unfortunately patient will not be able to pick this up prior to 7/31 due to insurance, but this will be the correct prescription at that time.     Chen Cole MD

## 2023-07-24 NOTE — TELEPHONE ENCOUNTER
[facility] phone call message- medication clarification/question:    Full Medication Name:  ALPRAZolam (XANAX) 2 MG tablet   Dose: Take 1 tablet (2 mg) by mouth nightly as needed for anxiety     Question: Pharmacy calling to get clarification, patient is trying to refill medication, wondering if she can pay put of pocket for this? & is this prescription still what the provider wants patient to take? Please call back and advise pharmacy thank you.     Pharmacy confirmed as    [id number] [address]  AT [facility]: Yes    OK to leave a message on voice mail? Yes    Primary language: English      needed? No    Call taken on July 24, 2023 at 1:15 PM by Christine Cruz    Patient Information     Patient Name  Kolton Aragon MRN  [id number] Sex  Male   1969      Letter by Jono Gonzalez MD at      Author:  Jono Gonzalez MD Service:  (none) Author Type:  (none)    Filed:   Encounter Date:  3/13/2017 Status:  (Other)           Kolton Aragon  [address]  [address] [facility] [place] [address]          2017    Dear Kolton:    Your blood tests show that your kidney function is slightly worse than before, but this is likely due to being a bit dehydrated from your recent stomach bug.  Your pancreas still appears to be inflamed, so some of your recent symptoms might be due to the pancreas as well.  Make sure you're drinking plenty of water.      I think we should go ahead with the MRI of the pancreas as we discussed. I'll let you know those results when available.     It was a pleasure seeing you the other day.  If you have any questions, please don't hesitate to call us.     Sincerely,          Jono Gonzalez MD                                Results for orders placed or performed in visit on 17      COMP METABOLIC PANEL      Result  Value Ref Range    SODIUM 128 (L) 133 - 143 mmol/L    POTASSIUM 3.5 3.5 - 5.1 mmol/L    CHLORIDE 85 (L) 98 - 107 mmol/L    CO2,TOTAL 26 21 - 31 mmol/L    ANION GAP 17 5 - 18                    GLUCOSE 111 (H) 70 - 105 mg/dL    CALCIUM 9.8 8.6 - 10.3 mg/dL    BUN 27 (H) 7 - 25 mg/dL    CREATININE 2.06 (H) 0.70 - 1.30 mg/dL    BUN/CREAT RATIO           13                    GFR if African American 42 (L) >60 ml/min/1.73m2    GFR if not  34 (L) >60 ml/min/1.73m2    ALBUMIN 4.6 3.5 - 5.7 g/dL    PROTEIN,TOTAL 8.5 6.4 - 8.9 g/dL    GLOBULIN                  3.9 (H) 2.0 - 3.7 g/dL    A/G RATIO 1.2 1.0 - 2.0                    BILIRUBIN,TOTAL 1.0 0.3 - 1.0 mg/dL    ALK PHOSPHATASE 28 (L) 34 - 104 IU/L    ALT (SGPT) 39 7 - 52 IU/L    AST (SGOT) 40 (H) 13 - 39 IU/L   LIPID PANEL      Result  Value Ref Range     CHOLESTEROL,TOTAL 222 (H) <200 mg/dL    TRIGLYCERIDES 173 (H) <150 mg/dL    HDL CHOLESTEROL 94 (H) 23 - 92 mg/dL    NON-HDL CHOLESTEROL 128 <145 mg/dl    CHOL/HDL RATIO            2.36 <4.50                    LDL CHOLESTEROL 93 <100 mg/dL    PATIENT STATUS            FASTING                   LIPASE      Result  Value Ref Range    LIPASE 320.9 (H) 11.0 - 82.0 IU/L   CBC WITH AUTO DIFFERENTIAL      Result  Value Ref Range    WHITE BLOOD COUNT         7.8 4.5 - 11.0 thou/cu mm    RED BLOOD COUNT           4.35 4.30 - 5.90 mil/cu mm    HEMOGLOBIN                13.4 (L) 13.5 - 17.5 g/dL    HEMATOCRIT                39.3 37.0 - 53.0 %    MCV                       91 80 - 100 fL    MCH                       30.7 26.0 - 34.0 pg    MCHC                      34.0 32.0 - 36.0 g/dL    RDW                       11.0 (L) 11.5 - 15.5 %    PLATELET COUNT            184 140 - 440 thou/cu mm    MPV                       7.7 6.5 - 11.0 fL    NEUTROPHILS               67.0 42.0 - 72.0 %    LYMPHOCYTES               14.9 (L) 20.0 - 44.0 %    MONOCYTES                 15.2 (H) <12.0 %    EOSINOPHILS               1.8 <8.0 %    BASOPHILS                 1.2 <3.0 %    ABSOLUTE NEUTROPHILS      5.3 1.7 - 7.0 thou/cu mm    ABSOLUTE LYMPHOCYTES      1.2 0.9 - 2.9 thou/cu mm    ABSOLUTE MONOCYTES        1.2 (H) <0.9 thou/cu mm    ABSOLUTE EOSINOPHILS      0.1 <0.5 thou/cu mm    ABSOLUTE BASOPHILS        0.1 <0.3 thou/cu mm

## 2023-07-24 NOTE — TELEPHONE ENCOUNTER
Patient called would like to speak with Dr. Garcia regarding medication and to release her back to work, Please call and advise thank you.

## 2023-07-24 NOTE — TELEPHONE ENCOUNTER
Writer called Karoline to discuss, she never picked up the medicine because the pharmacy never notified her it was ready. When she picked up her oxy, that is all they gave her. When she went to pick it up recently, they then stated they needed to verify with prescriber since it had been so long since it was prescribed. She is unsure why it wasn't filled at the same time as the oxy but did verify the prescription is on file at the pharmacy. Neo VÁSQUEZ

## 2023-07-25 ENCOUNTER — TELEPHONE (OUTPATIENT)
Dept: FAMILY MEDICINE | Facility: CLINIC | Age: 48
End: 2023-07-25
Payer: COMMERCIAL

## 2023-07-25 NOTE — TELEPHONE ENCOUNTER
Prior Authorization needed on:  07/25/2023    Medication:  Aloprazolam tablets Dose:  2mg    Pharmacy confirmed as [id number] - [place] - [address] [facility]  AT [address] [place] [address]  Phone: [phone] Fax: [phone]: Yes      Insurance Phone: [phone]  Insurance Patient ID: [id number]    Maryana Coffey CMA July 25, 2023 at 10:36 AM

## 2023-07-26 RX ORDER — CLONAZEPAM 2 MG/1
2 TABLET ORAL 2 TIMES DAILY PRN
Qty: 8 TABLET | Refills: 0 | Status: SHIPPED | OUTPATIENT
Start: 2023-07-26 | End: 2023-09-13

## 2023-07-26 NOTE — TELEPHONE ENCOUNTER
[facility] phone call message- general phone call:    Reason for call: Patient is calling back regarding medication. Advise patient prescription can not be picked up until 7/31 due to insurance issues. Patient stated for Dr. Garcia to call Dr. Bailon regarding this, as she was told that if there was any problems with patient medication can have Dr. Garcia call Dr. Bailon once a month. Caller said she can get severely sick and she can't wait any longer. Wants a call back from Dr. Garcia. Please call and advise, thank you.     Return call needed: Yes    OK to leave a message on voice mail? Yes    Primary language: English      needed? No    Call taken on July 26, 2023 at 9:47 AM by Tonio Doll

## 2023-07-28 NOTE — TELEPHONE ENCOUNTER
Central Prior Authorization Team - Phone: [phone]     Prior Authorization Not Needed per Insurance- refill too soon- no PA needed. Patient received Rx for clonazepam on 7/26/23    Medication: ALPRAZOLAM 2 MG PO TABS  Insurance Company: [employer] - Phone [phone] Fax [phone]  Expected CoPay:      [id number] [address]  AT [facility]  Pharmacy Notified: Yes  Patient Notified: Yes pharmacy has already spoken with patient. No PA needed.

## 2023-08-02 ENCOUNTER — ONCOLOGY VISIT (OUTPATIENT)
Dept: ONCOLOGY | Facility: CLINIC | Age: 48
End: 2023-08-02
Attending: INTERNAL MEDICINE
Payer: COMMERCIAL

## 2023-08-02 DIAGNOSIS — F43.10 POSTTRAUMATIC STRESS DISORDER: Primary | ICD-10-CM

## 2023-08-02 PROCEDURE — 90837 PSYTX W PT 60 MINUTES: CPT | Performed by: PSYCHOLOGIST

## 2023-08-02 NOTE — LETTER
8/2/2023         RE: Darlene Hudson  [address] [place] [address]        Dear Colleague,    Thank you for referring your patient, Darlene Hudson, to the [facility]. Please see a copy of my visit note below.    See confidential note      Again, thank you for allowing me to participate in the care of your patient.        Sincerely,        Mejia Davis LP
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
## 2023-08-11 DIAGNOSIS — C18.6 CANCER OF DESCENDING COLON (H): ICD-10-CM

## 2023-08-11 DIAGNOSIS — Z90.49 STATUS POST PARTIAL COLECTOMY: Primary | ICD-10-CM

## 2023-08-11 RX ORDER — OXYCODONE HYDROCHLORIDE 5 MG/1
5 TABLET ORAL 3 TIMES DAILY PRN
Qty: 90 TABLET | Refills: 0 | Status: SHIPPED | OUTPATIENT
Start: 2023-08-11 | End: 2023-09-13

## 2023-08-11 RX ORDER — OXYCODONE HYDROCHLORIDE 5 MG/1
5-10 TABLET ORAL EVERY 4 HOURS PRN
Qty: 12 TABLET | Refills: 0 | Status: CANCELLED | OUTPATIENT
Start: 2023-08-11

## 2023-08-11 NOTE — TELEPHONE ENCOUNTER
[facility] phone call message- medication clarification/question:    Full Medication Name: oxyCODONE (ROXICODONE) 5 MG tablet     Question: Patient calling to request refill on medication, stated she cannot go all weekend without her medications and need fill today.If able to fill please send to pharmacy thank you.    Pharmacy confirmed as    [id number] [place] [id number] [facility]  AT [facility]: Yes    OK to leave a message on voice mail? Yes    Primary language: English      needed? No    Call taken on August 11, 2023 at 9:33 AM by Christine Cruz

## 2023-08-11 NOTE — TELEPHONE ENCOUNTER
1. Status post partial colectomy  Continue current therapy of three times daily. As previously planned, patient will be addressing narcotic tapering with PCP when he returns.  - oxyCODONE (ROXICODONE) 5 MG tablet; Take 1 tablet (5 mg) by mouth 3 times daily as needed for severe pain  Dispense: 90 tablet; Refill: 0    2. Cancer of descending colon (H)  - oxyCODONE (ROXICODONE) 5 MG tablet; Take 1 tablet (5 mg) by mouth 3 times daily as needed for severe pain  Dispense: 90 tablet; Refill: 0      Arnaud Garcia III, MD, FAAFP  [facility]  08/11/23 2:53 PM

## 2023-08-15 ENCOUNTER — OFFICE VISIT (OUTPATIENT)
Dept: ORTHOPEDICS | Facility: CLINIC | Age: 48
End: 2023-08-15
Payer: OTHER MISCELLANEOUS

## 2023-08-15 DIAGNOSIS — Z98.890 HISTORY OF ANKLE SURGERY: Primary | ICD-10-CM

## 2023-08-15 PROCEDURE — 99024 POSTOP FOLLOW-UP VISIT: CPT | Performed by: ORTHOPAEDIC SURGERY

## 2023-08-15 NOTE — NURSING NOTE
Reason For Visit:   Chief Complaint   Patient presents with    RECHECK     POST OP - 6 week POP Right ankle scope DOS 6/21/23           There were no vitals taken for this visit.         Cruzito Roy MA

## 2023-08-15 NOTE — LETTER
8/15/2023         RE: Darlene Hudson  [address] [place] [address]        Dear Colleague,    Thank you for referring your patient, Darlene Hudson, to the [facility] [place]. Please see a copy of my visit note below.    Chief complaint: Status post right ankle arthroscopic debridement for impingement performed on June 21, 2023    Patient presents today for further follow-up.  Reports to be doing well reports to be compliant.  Reports not to have done any physical therapy yet.  Complains of having some ankle swelling.    Patient also reports to have a history of using compression stockings at nighttime    Into his exam she presented with some diffuse swelling through the ankle joint there is well-healed portals there is full range of motion of the ankle joint although there is pain with extreme plantarflexion    Assessment: Status post right ankle arthroscopic debridement    Plan: I discussed with the patient that his point I would like her to proceed with a course of physical therapy in order to avoid any scarring of the ankle.  I also encouraged her to proceed with the use of compression stockings during daytime hours something that she was not very excited to hear.    Patient will follow-up on a as needed basis.  Encouraged her to visit with us in 2-1/2 months from now she is not happy with the function of the ankle joint.    All questions were answered.      Kareem Bashir MD

## 2023-08-15 NOTE — PROGRESS NOTES
Chief complaint: Status post right ankle arthroscopic debridement for impingement performed on June 21, 2023    Patient presents today for further follow-up.  Reports to be doing well reports to be compliant.  Reports not to have done any physical therapy yet.  Complains of having some ankle swelling.    Patient also reports to have a history of using compression stockings at nighttime    Into his exam she presented with some diffuse swelling through the ankle joint there is well-healed portals there is full range of motion of the ankle joint although there is pain with extreme plantarflexion    Assessment: Status post right ankle arthroscopic debridement    Plan: I discussed with the patient that his point I would like her to proceed with a course of physical therapy in order to avoid any scarring of the ankle.  I also encouraged her to proceed with the use of compression stockings during daytime hours something that she was not very excited to hear.    Patient will follow-up on a as needed basis.  Encouraged her to visit with us in 2-1/2 months from now she is not happy with the function of the ankle joint.    All questions were answered.

## 2023-08-21 ENCOUNTER — OFFICE VISIT (OUTPATIENT)
Dept: FAMILY MEDICINE | Facility: CLINIC | Age: 48
End: 2023-08-21
Payer: COMMERCIAL

## 2023-08-21 VITALS
HEIGHT: 69 IN | HEART RATE: 76 BPM | TEMPERATURE: 98.2 F | DIASTOLIC BLOOD PRESSURE: 89 MMHG | BODY MASS INDEX: 35.74 KG/M2 | SYSTOLIC BLOOD PRESSURE: 126 MMHG | OXYGEN SATURATION: 97 %

## 2023-08-21 DIAGNOSIS — M25.471 RIGHT ANKLE SWELLING: Primary | ICD-10-CM

## 2023-08-21 DIAGNOSIS — Z98.890 STATUS POST SURGICAL MANIPULATION OF ANKLE JOINT: ICD-10-CM

## 2023-08-21 PROCEDURE — 99214 OFFICE O/P EST MOD 30 MIN: CPT | Performed by: FAMILY MEDICINE

## 2023-08-21 NOTE — PROGRESS NOTES
"  Assessment & Plan     Right ankle swelling  Chronic right ankle swelling at the site of previous multiple orthopedic surgeries. Waxes and wanes in nature. Worse in the past 24 hours after being on her feet more. No other acutely worsening features or associated symptoms. No calf swelling or tenderness to suggest at VTE. Evaluated by orthopedic surgeon last week and advised to begin PT and to consistently wear compression stockings.   - DME Compression Sleeve/Stocking Order  - PT as per ortho  - Elevation at rest  - May benefit from assessment of venous function in follow-up? Defer to PCP.    Status post surgical manipulation of ankle joint  Following with orthopedics. Has some questions about returning to wear postop boot.  - Defer to ortho on this question. Expressed my concerns about the risks of immobilization over the longer term but that it might be appropriate intermittently depending on symptoms.   - Work letter provided for missed work today and possibly tomorrow.    I spent a total of 32 minutes on the day of the visit.   Time spent by me doing chart review, history and exam, documentation and further activities per the note       BMI:   Estimated body mass index is 35.74 kg/m  as calculated from the following:    Height as of this encounter: 1.753 m (5' 9\").    Weight as of 6/21/23: 109.8 kg (242 lb).   Weight management plan: Patient was referred to their PCP to discuss a diet and exercise plan.      Follow up as scheduled later this week.    Jody Angel MD  [facility]    Pablito Ramey is a 48 year old, presenting for the following health issues:  Follow up (Following up on swelling ankle. The swell went down a little but still there and very tender to touch and alittle pain. )        8/21/2023    10:57 AM   Additional Questions   Roomed by Maryana   Accompanied by Self       HPI     Ongoing swelling since her initial ankle surgery in 2020. Most recently she is status " "post right ankle arthroscopic debridement for impingement performed on June 21, 2023. This morning she woke up with a very swollen ankle to the point that she couldn't get her shoe on. 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     No swelling on the other side of her left ankle. No calf swelling or tenderness.  Associated significant pain in the joint despite previous surgeries. Swelling waxes and wanes with improvement with elevation. No associated redness or warmth.    She was evaluated by her orthopedic surgeon, Dr. Bashir, on 8/15/23, for this issue. At that time, she was encouraged to wear compression stockings and to begin physical therapy. She has an appointment with PT but not until early September. In the meantime she is not sure how she is going to be able to work due to her walking restrictions. She wonders if she should restart her walking boot.  She was wearing compression stockings before the first surgery on her ankle in 2020 but has not consistently done so since.          Objective    /89   Pulse 76   Temp 98.2  F (36.8  C) (Oral)   Ht 1.753 m (5' 9\")   SpO2 97%   BMI 35.74 kg/m    Body mass index is 35.74 kg/m .  Physical Exam   GENERAL: healthy, alert and no distress  RESP: normal rate and effort  CV: regular rate and rhythm, peripheral pulses strong  MS: diffusely swollen (non-pitting) right ankle that is tender on medial and lateral aspects, some increased pain with dorsi and plantar flexion but normal ROM, no calf tenderness or induration  SKIN: surgical scars well-healed on right ankle       DME (Durable Medical Equipment) Orders and Documentation  Orders Placed This Encounter   Procedures    Compression Sleeve/Stocking Order      The patient was assessed and it was determined the patient is in need of the following listed DME Supplies/Equipment. Please complete supporting documentation below to demonstrate " medical necessity.      Compression Sleeve/Stocking(s) Supplies Documentation  The patient needs compression stockings for reducing dependent edema in her ankle joint s/p surgery.

## 2023-08-21 NOTE — PATIENT INSTRUCTIONS
- Start wearing a compression stocking on the right side when up and about for your day.     - Follow up with Dr. Bashir on the boot question. I suspect that wearing it more might be more harmful but would defer to his judgment.

## 2023-08-21 NOTE — LETTER
August 21, 2023      Darlene Hudson  [address] [place] [address]        To whom it may concern:    Karoline was seen today for her ankle in the office. Please excuse her absence from work for this issue today and possibly tomorrow. She will have this issue reassessed on Friday, 8/25/23.    Sincerely,    Jody Angel MD

## 2023-08-22 ENCOUNTER — HOSPITAL ENCOUNTER (EMERGENCY)
Facility: HOSPITAL | Age: 48
Discharge: HOME | End: 2023-08-22
Admitting: PHYSICIAN ASSISTANT
Payer: COMMERCIAL

## 2023-08-22 ENCOUNTER — TELEPHONE (OUTPATIENT)
Dept: ORTHOPEDICS | Facility: CLINIC | Age: 48
End: 2023-08-22
Payer: COMMERCIAL

## 2023-08-22 VITALS
WEIGHT: 220 LBS | OXYGEN SATURATION: 97 % | HEIGHT: 69 IN | RESPIRATION RATE: 18 BRPM | HEART RATE: 90 BPM | BODY MASS INDEX: 32.58 KG/M2 | DIASTOLIC BLOOD PRESSURE: 94 MMHG | TEMPERATURE: 97.9 F | SYSTOLIC BLOOD PRESSURE: 136 MMHG

## 2023-08-22 DIAGNOSIS — M79.661 RIGHT CALF PAIN: ICD-10-CM

## 2023-08-22 DIAGNOSIS — M25.571 CHRONIC PAIN OF RIGHT ANKLE: ICD-10-CM

## 2023-08-22 DIAGNOSIS — M25.471 RIGHT ANKLE SWELLING: ICD-10-CM

## 2023-08-22 DIAGNOSIS — Z98.890 HISTORY OF ANKLE SURGERY: Primary | ICD-10-CM

## 2023-08-22 DIAGNOSIS — G89.29 CHRONIC PAIN OF RIGHT ANKLE: ICD-10-CM

## 2023-08-22 PROCEDURE — 99283 EMERGENCY DEPT VISIT LOW MDM: CPT

## 2023-08-22 PROCEDURE — 250N000013 HC RX MED GY IP 250 OP 250 PS 637: Performed by: PHYSICIAN ASSISTANT

## 2023-08-22 RX ORDER — OXYCODONE HYDROCHLORIDE 5 MG/1
5 TABLET ORAL ONCE
Status: COMPLETED | OUTPATIENT
Start: 2023-08-22 | End: 2023-08-22

## 2023-08-22 RX ADMIN — OXYCODONE HYDROCHLORIDE 5 MG: 5 TABLET ORAL at 18:11

## 2023-08-22 ASSESSMENT — ENCOUNTER SYMPTOMS
ARTHRALGIAS: 1
JOINT SWELLING: 1

## 2023-08-22 NOTE — ED TRIAGE NOTES
Patient presents to ER for right ankle pain. Had surgery on right ankle 2 months ago. Went and saw primary for ankle pain and was told to come to ER if it got worse.  Today, woke up with excruciating pain that shoots up leg. Calf feels tight per patient. Denies numbness/tingling. Pt concerned for blood clot.     Taken Ibuprofen for pain today at noon.      Triage Assessment       Row Name 08/22/23 [id number]       Triage Assessment (Adult)    Airway WDL WDL       Respiratory WDL    Respiratory WDL WDL       Skin Circulation/Temperature WDL    Skin Circulation/Temperature WDL WDL       Cardiac WDL    Cardiac WDL WDL       Peripheral/Neurovascular WDL    Peripheral Neurovascular WDL WDL       Cognitive/Neuro/Behavioral WDL    Cognitive/Neuro/Behavioral WDL WDL

## 2023-08-22 NOTE — TELEPHONE ENCOUNTER
[facility] Call Center    Phone Message    May a detailed message be left on voicemail: yes     Reason for Call: Other: Patient wanted you to know that her swelling has gotten worse.  She has even tried to wear compression socks to see if that would help with the swelling but it did not.  In fact, it made the pain worse.  Patient does not know what to do at this point, she said her foot looks like an elephant foot.  Please call to advise.  Thank you.     Action Taken: Other: [phone]    Travel Screening: Not Applicable

## 2023-08-22 NOTE — ED PROVIDER NOTES
Emergency Department Encounter   NAME: Darlene Hudson ; AGE: 48 year old female ; YOB: 1975 ; MRN: [id number] ; PCP: Robbie Bailon   ED PROVIDER: Beth Hubbard PA-C    Evaluation Date & Time:   No admission date for patient encounter.    CHIEF COMPLAINT:  Ankle Pain and Leg Pain      Impression and Plan   MDM:   Darlene Hudson is a 48 year old female with a pertinent history of s/p, right ankle surgery, CAD, cocaine use, alcohol dependence, tobacco use disorder, asthma who presents to the ED by personal vehicle for evaluation of ankle pain.  The patient has had 3 surgeries on her right ankle and has chronic pain and swelling since her reconstructive surgery in May with [facility] orthopedics.  She has recently returned to work, and has noticed increased swelling and pain in the ankle at the end of the day.  She saw her primary provider yesterday and was referred to PT and advised to continue using her compression stockings and elevation.  Today she developed some pain and swelling up her posterior calf, prompting her visit to the ER.  Here in the ED, she is well-appearing and in no acute distress.  Ambulating without assistance though antalgic gait.  Vitally stable with mild hypertension.  She does have moderate soft tissue swelling of the right ankle though there is no warmth, erythema, or significant pain with range of motion to suggest septic arthritis.  No evidence of a cellulitis.  Compartments are soft and she has intact neurologic assessment -no concern for compartment syndrome.  She does have some tenderness of her posterior calf though no appreciable swelling or palpable cords, however given her reported pain and recent procedure, she is at risk for DVT, and ultrasound ordered to further assess.  I suspect the majority of her symptoms are chronic in nature, and she has plans to follow back up with [facility] orthopedics and initiate PT.  Did offer patient repeat x-ray of the ankle  joint, however she declined as she had this not had any new injury and she states that there is no hardware in the joint to assess.    Patient reported to nursing staff that her son was in a scooter accident and injured his face.  She states she needs to bring him to [facility] and leave the emergency department immediately as her ride is here.  She would not like to stay for the ultrasound, however states she will likely return later this evening if able to have this done.  We discussed my concerns for DVT, and risks of this including PE, pulmonary/cardiac compromise, and even death.  She verbalized understanding and will have this assessed.  She signed out AMA though was encouraged to return at her earliest convenience.  We did discuss her elevated blood pressure which she will have rechecked in her clinic.    Medical Decision Making    History:  Supplemental history from: Documented in chart, if applicable  External Record(s) reviewed: Documented in chart, if applicable.    Work Up:  Chart documentation includes differential considered and any EKGs or imaging independently interpreted by provider, where specified.  In additional to work up documented, I considered the following work up: Documented in chart, if applicable.    External consultation:  Discussion of management with another provider: Documented in chart, if applicable    Complicating factors:  Care impacted by chronic illness: Heart Disease, Smoking / Nicotine Use, and Other: asthma  Care affected by social determinants of health: Alcohol Abuse and/or Recreational Drug Use    Disposition considerations: Admission considered, however patient left AMA.       ED COURSE:  5:53 PM  I met and introduced myself to the patient. I gathered initial history and performed my physical exam. We discussed plan for initial workup.   6:30 PM RN notified me that patient needs to leave due to a family emergency.   6:40 PM I met with the patient - she needs to  leave now and her ride is here. We discussed risks of undiagnosed DVT including PE, respiratory/cardiac compromise, and even death and she verbalized understanding. AMA form signed with THALIA Milan.       At the conclusion of the encounter I discussed the results of all the tests and the disposition. The questions were answered. The patient or family acknowledged understanding and was agreeable with the care plan.    FINAL IMPRESSION:    ICD-10-CM    1. Right calf pain  M79.661       2. Chronic pain of right ankle  M25.571     G89.29             MEDICATIONS GIVEN IN THE EMERGENCY DEPARTMENT:  Medications   oxyCODONE (ROXICODONE) tablet 5 mg (5 mg Oral $Given 8/22/23 1811)         NEW PRESCRIPTIONS STARTED AT TODAY'S ED VISIT:  New Prescriptions    No medications on file         HPI   Patient information was obtained from: patient    Use of Intrepreter: N/A     Darlene Hudson is a 48 year old female with a pertinent history of s/p, right ankle surgery, CAD, cocaine use, alcohol dependence, tobacco use disorder, asthma who presents to the ED by personal vehicle for evaluation of ankle pain.     Per chart review, patient had a right ankle arthroscopy with debridement on her right ankle on 6/21/23 at the [facility].     Patient reports ongoing swelling in her right ankle and right calf with associated pain and tingling radiating up her right calf ~1.5 months. Her surgery was on 6/21 and after the first few weeks her ankle was fine, but recently it has been increased. Her PCP and surgeon are set to plan for PT if her swelling and pain down not go down. Denies trauma to the area.       REVIEW OF SYSTEMS:  Review of Systems   Musculoskeletal:  Positive for arthralgias (right calf and ankle pain and swelling) and joint swelling (right calf and ankle).         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    Chronic low back pain 01/27/2017    Chronic pain     Chronic sinusitis     Cocaine abuse (H)     Colonic mass 12/28/2022    Controlled substance agreement signed 06/30/2015    Coronary artery disease     Cough 02/09/2020    Family history of colon cancer 10/24/2020    Hx of seasonal allergies     Infection due to 2019 novel coronavirus 09/20/2022    Infectious pericarditis     Lipoma     Low back pain 07/13/2015    Major depressive disorder, recurrent episode, moderate (H) 09/05/2006    Menorrhagia with irregular cycle 07/15/2022    Moderate persistent asthma without complication 09/29/2020    Nondependent alcohol abuse, episodic drinking behavior 10/03/2012    Noninflammatory disorder of vagina 02/20/2015    Other chronic pain     Other long term (current) drug therapy 01/28/2013    Panic disorder with agoraphobia 09/05/2006    Pap smear for cervical cancer screening 10/31/2016    Pericarditis 2017    Physiologic disturbance of temperature regulation 10/24/2020    PONV (postoperative nausea and vomiting)     Right ankle swelling 06/07/2022    Tobacco abuse     Tobacco use disorder 07/13/2015       Past Surgical History:   Procedure Laterality Date    ANKLE SURGERY Right 05/30/2019    ARTHROSCOPY ANKLE Right 6/21/2023    Procedure: right ankle arthroscopy with debridement;  Surgeon: Kareem Bashir MD;  Location: [facility] OR    BIOPSY BREAST Right 1990    benign    COLONOSCOPY N/A 1/3/2023    Procedure: COLONOSCOPY WITH BIOPSY OF COLON MASS, POLYPECTOMY;  Surgeon: Kris Charles MD;  Location: [facility]    CREATION PERICARDIAL WINDOW  02/10/2017     "[facility]    DILATION AND CURETTAGE N/A 7/22/2022    Procedure: DILATION AND CURETTAGE, UTERUS;  Surgeon: Lesly Holland;  Location: [facility]    HEMORRHOIDECTOMY EXTERNAL      HYSTEROSCOPY, WITH ENDOMETRIAL RADIOFREQUENCY ABLATION - NOVASURE N/A 7/22/2022    Procedure: HYSTEROSCOPY, WITH ENDOMETRIAL RADIOFREQUENCY ABLATION - NOVASURE DILATION AND CURETTAGE, UTERUS;  Surgeon: Lesly Holland;  Location: [facility]    LAPAROSCOPIC ASSISTED SIGMOID COLECTOMY N/A 1/6/2023    Procedure: LAPAROSCOPIC ASSISTED DESCENDING COLELCTOMY SPLENIC FLEXURE MOBILIZATION ;  Surgeon: Kris Charles MD;  [place] [facility]    LAPAROSCOPIC LYSIS ADHESIONS N/A 1/6/2023    Procedure: LYSIS OF ADHESIONS;  Surgeon: Kris Charles MD;  [place] [facility]    TUMOR REMOVAL      Has had 3. In the right breast and \"inside of rib cage.\"       Family History   Problem Relation Age of Onset    Hypertension Mother     Cancer Mother         cervical     Other Cancer Father         lung cancer    Asthma Father     Diabetes Brother     Diabetes Brother     Breast Cancer Maternal Grandmother     Asthma Child        Social History     Tobacco Use    Smoking status: Former     Packs/day: 0.10     Years: 30.00     Pack years: 3.00     Types: Cigarettes     Quit date: 5/28/2020     Years since quitting: 3.2    Smokeless tobacco: Never    Tobacco comments:     2-3 cig/day, Seen IP by TTS on 1/11/23 - states she is done but declined our services and resource materials stating that she did not need them. Stopped smoking in Jan 2023   Vaping Use    Vaping Use: Former   Substance Use Topics    Alcohol use: Not Currently     Comment: Occasiaonlly.     Drug use: Not Currently       albuterol (PROAIR HFA/PROVENTIL HFA/VENTOLIN HFA) 108 (90 Base) MCG/ACT inhaler  ALPRAZolam (XANAX) 2 MG tablet  ALPRAZolam (XANAX) 2 MG tablet  cetirizine (ZYRTEC) 10 MG tablet  clonazePAM (KLONOPIN) 2 MG tablet  diclofenac " "(VOLTAREN) 50 MG EC tablet  doxylamine (UNISOM) 25 MG TABS tablet  fluticasone (FLONASE) 50 MCG/ACT nasal spray  fluticasone (FLONASE) 50 MCG/ACT nasal spray  guaiFENesin-codeine (GUAIFENESIN AC) 100-10 MG/5ML syrup  hydrOXYzine (ATARAX) 25 MG tablet  montelukast (SINGULAIR) 10 MG tablet  naloxone (NARCAN) 4 MG/0.1ML nasal spray  ondansetron (ZOFRAN ODT) 4 MG ODT tab  oxyCODONE (ROXICODONE) 5 MG tablet  oxyCODONE (ROXICODONE) 5 MG tablet  PROAIR  (90 Base) MCG/ACT inhaler  prochlorperazine (COMPAZINE) 10 MG tablet  spacer (OPTICHAMBER BINA) holding chamber  sucralfate (CARAFATE) 1 GM/10ML suspension  tiotropium (SPIRIVA RESPIMAT) 1.25 MCG/ACT inhaler  tiotropium (SPIRIVA) 18 MCG inhaled capsule          Physical Exam     First Vitals:  Patient Vitals for the past 24 hrs:   BP Temp Temp src Pulse Resp SpO2 Height Weight   08/22/23 1814 (!) 136/94 -- -- 90 -- 97 % -- --   08/22/23 1700 (!) 138/92 97.9  F (36.6  C) Oral 97 18 96 % 1.753 m (5' 9\") 99.8 kg (220 lb)         PHYSICAL EXAM:   Physical Exam  Vitals and nursing note reviewed.   Constitutional:       General: She is not in acute distress.     Appearance: She is not ill-appearing or toxic-appearing.   Musculoskeletal:      Comments: Moderate soft tissue swelling of the right ankle.  There is no warmth, erythema, or overlying skin changes.  Plantarflexion intact.  Dorsiflexion reduced due to pain and swelling, though I am able to passively dorsiflex the ankle.  2+ DP and PT pulses and distal cap refill less than 2 seconds.  Foot is warm and well-perfused.  Some mild tenderness to her posterior calf though no appreciable edema or palpable cords.  No erythema or warmth of the calf.  Compartments are soft.  Sensation to light touch intact.   Neurological:      Mental Status: She is alert.             Results     LAB:  All pertinent labs reviewed and interpreted  Labs Ordered and Resulted from Time of ED Arrival to Time of ED Departure - No data to " display    RADIOLOGY:  US Lower Extremity Venous Duplex Right               I, Janine Badillo, am serving as a scribe to document services personally performed by Beth Hubbard PA-C, based on my observation and the provider's statements to me. I, Beth Hubbard PA-C attest that Janine Badillo is acting in a scribe capacity, has observed my performance of the services and has documented them in accordance with my direction.       Beth Hubbard PA-C   Emergency Medicine   [facility] EMERGENCY DEPARTMENT       Beth Hubbard PA-C  08/22/23 8512

## 2023-08-22 NOTE — ED NOTES
Pt states her son was in an accident and needs to leave, nephew here to pick her up. Provider updated.

## 2023-08-22 NOTE — LETTER
Return to Work  2023     Seen today: No    Patient:  Darlene Hudson  :   1975  MRN:     [id number]  Physician: GLENN MANNING    The next clinic appointment is scheduled for (date/time) 23 with PT.    Patient limitations:  Wrap ankle with ACE wrap or compression stocking. Allow to take seated breaks as needed, for elevating and icing the ankle. For example 5-10 mins every 1-2 hours. Restrictions through 23. Patient is to attend physical therapy for swelling/edema control therapy. Also to schedule consult with the lymphedema clinic.      Electronically signed by Glenn Manning MD

## 2023-08-23 ENCOUNTER — PATIENT OUTREACH (OUTPATIENT)
Dept: CARE COORDINATION | Facility: CLINIC | Age: 48
End: 2023-08-23
Payer: COMMERCIAL

## 2023-08-23 NOTE — PROGRESS NOTES
Clinic Care Coordination Contact  Follow Up Progress Note      Assessment: The pt was recently in the ED, I called to check up on the pt, and help the pt setup a ED follow up. The pt was at  [facility] for ankle pain, and leg pain. I called and talked to the pt, pt stated that she is doing better. Pt stated that she needs to come in today to see if she has a blood clot. I checked and we did not have any opening. Pt stated that she will just have to go to the ED and see.     Care Gaps:    Health Maintenance Due   Topic Date Due    DEPRESSION ACTION PLAN  Never done    HEPATITIS B IMMUNIZATION (1 of 3 - 3-dose series) Never done    Pneumococcal Vaccine: Pediatrics (0 to 5 Years) and At-Risk Patients (6 to 64 Years) (2 - PCV) 10/15/2015    ASTHMA ACTION PLAN  01/31/2021    SPIROMETRY  03/03/2021    URINE DRUG SCREEN  07/20/2023    ZOSTER IMMUNIZATION (1 of 2) 03/15/2023           Care Plans      Intervention/Education provided during outreach:               Plan:     Care Coordinator will follow up in

## 2023-08-23 NOTE — TELEPHONE ENCOUNTER
I spoke with Karoline after consulting with Dr. Bashir. He suggests trying Lymphedema/Edema therapy and also adding edema control to her current PT regimine. She is on board with these ideas.   She is asking for a work note. I said I'd ask Dr. Bashir and will send her a [employer] message once I have a response from him. She was grateful.  Wanda Hunter ATC

## 2023-08-25 ENCOUNTER — HOSPITAL ENCOUNTER (EMERGENCY)
Facility: HOSPITAL | Age: 48
Discharge: HOME | End: 2023-08-25
Attending: EMERGENCY MEDICINE | Admitting: EMERGENCY MEDICINE
Payer: COMMERCIAL

## 2023-08-25 ENCOUNTER — OFFICE VISIT (OUTPATIENT)
Dept: FAMILY MEDICINE | Facility: CLINIC | Age: 48
End: 2023-08-25
Payer: COMMERCIAL

## 2023-08-25 VITALS
TEMPERATURE: 98 F | BODY MASS INDEX: 32.58 KG/M2 | HEIGHT: 69 IN | OXYGEN SATURATION: 100 % | WEIGHT: 220 LBS | DIASTOLIC BLOOD PRESSURE: 68 MMHG | HEART RATE: 100 BPM | SYSTOLIC BLOOD PRESSURE: 138 MMHG | RESPIRATION RATE: 18 BRPM

## 2023-08-25 VITALS
BODY MASS INDEX: 32.58 KG/M2 | TEMPERATURE: 97.9 F | HEIGHT: 69 IN | DIASTOLIC BLOOD PRESSURE: 86 MMHG | SYSTOLIC BLOOD PRESSURE: 132 MMHG | HEART RATE: 109 BPM | OXYGEN SATURATION: 97 % | WEIGHT: 220 LBS

## 2023-08-25 DIAGNOSIS — F43.9 STRESS AT HOME: ICD-10-CM

## 2023-08-25 DIAGNOSIS — H57.11 EYE PAIN, RIGHT: ICD-10-CM

## 2023-08-25 DIAGNOSIS — M79.604 RIGHT LEG PAIN: ICD-10-CM

## 2023-08-25 DIAGNOSIS — Z98.890 STATUS POST SURGICAL MANIPULATION OF ANKLE JOINT: Primary | ICD-10-CM

## 2023-08-25 PROCEDURE — 99283 EMERGENCY DEPT VISIT LOW MDM: CPT

## 2023-08-25 PROCEDURE — 99214 OFFICE O/P EST MOD 30 MIN: CPT | Performed by: FAMILY MEDICINE

## 2023-08-25 PROCEDURE — 250N000009 HC RX 250: Performed by: EMERGENCY MEDICINE

## 2023-08-25 RX ORDER — POLYMYXIN B SULFATE AND TRIMETHOPRIM 1; 10000 MG/ML; [USP'U]/ML
1-2 SOLUTION OPHTHALMIC EVERY 4 HOURS
Qty: 10 ML | Refills: 0 | Status: SHIPPED | OUTPATIENT
Start: 2023-08-25 | End: 2024-03-08

## 2023-08-25 RX ORDER — OXYCODONE AND ACETAMINOPHEN 5; 325 MG/1; MG/1
1 TABLET ORAL EVERY 8 HOURS PRN
Qty: 63 TABLET | Refills: 0 | Status: SHIPPED | OUTPATIENT
Start: 2023-08-25 | End: 2023-09-13

## 2023-08-25 RX ORDER — TETRACAINE HYDROCHLORIDE 5 MG/ML
1-2 SOLUTION OPHTHALMIC ONCE
Status: COMPLETED | OUTPATIENT
Start: 2023-08-25 | End: 2023-08-25

## 2023-08-25 RX ORDER — ALPRAZOLAM 1 MG
1 TABLET ORAL
Qty: 21 TABLET | Refills: 0 | Status: SHIPPED | OUTPATIENT
Start: 2023-08-25 | End: 2023-09-13

## 2023-08-25 RX ORDER — ONDANSETRON 8 MG/1
4-8 TABLET, FILM COATED ORAL EVERY 8 HOURS PRN
Qty: 30 TABLET | Refills: 1 | Status: SHIPPED | OUTPATIENT
Start: 2023-08-25 | End: 2024-03-08

## 2023-08-25 RX ADMIN — FLUORESCEIN SODIUM 1 STRIP: 1 STRIP OPHTHALMIC at 12:08

## 2023-08-25 RX ADMIN — TETRACAINE HYDROCHLORIDE 2 DROP: 5 SOLUTION OPHTHALMIC at 12:08

## 2023-08-25 ASSESSMENT — ENCOUNTER SYMPTOMS
PHOTOPHOBIA: 0
CHILLS: 0
SHORTNESS OF BREATH: 0
EYE DISCHARGE: 1
FEVER: 0
EYE PAIN: 1

## 2023-08-25 NOTE — ED PROVIDER NOTES
"Emergency Department Midlevel Supervisory Note     I personally saw the patient and performed a substantive portion of the visit including all aspects of the medical decision making.    ED Course:  12:20 PM  Anahi Potter PA-C staffed patient with me. I agree with their assessment and plan of management, and I will see the patient.  12:26 PM  I met with the patient to introduce myself, gather additional history, perform my initial exam, and discuss the plan.     Brief HPI:     Darlene Hudson is a 48 year old female who presents for evaluation of eye pain and leg pain. Patient reports that her eyes have been burning really bad and have swelled up since 10:00 am. Patient says she tried rinsing and washing her eyes but doing so only exacerbated the burning. She says that her vision has become blurrier. Patient denies any fevers, chills, infections, abrasions, or injuries to her eyes. She says she has had pink eye before but reports no recent exposure prior to this encounter. She does not wear false lashes.     I, Homer Giles, am serving as a scribe to document services personally performed by Osvaldo Mccain MD based on my observations and the provider's statements to me.   I, Osvaldo Mccain MD attest that Homer Giles was acting in a scribe capacity, has observed my performance of the services and has documented them in accordance with my direction.    Brief Physical Exam: /68   Pulse 100   Temp 98  F (36.7  C) (Oral)   Resp 18   Ht 1.753 m (5' 9\")   Wt 99.8 kg (220 lb)   SpO2 100%   BMI 32.49 kg/m    Constitutional:  Alert, in no acute distress  EYES: Conjunctivae clear.  Patient does have a hyperpigmented lesion to limbus roughly 10:00.  No fluorescein stain uptake.  No APD.  No foreign body noted underneath eyelid with eversion  HENT:  Atraumatic, normocephalic  Respiratory:  Respirations even, unlabored, in no acute respiratory distress  Cardiovascular:  Regular rate and rhythm, good " peripheral perfusion  GI: Soft, nondistended, nontender, no palpable masses, no rebound, no guarding   Musculoskeletal:  No edema. No cyanosis. Range of motion major extremities intact.    Integument: Warm, Dry, No erythema, No rash.   Neurologic:  Alert & oriented, no focal deficits noted  Psych: Normal mood and affect     MDM:      Patient has glitter in her make-up suspect this is likely in her eye causing irritation.  No fluorescein stain uptake.  Intraocular pressure normal.  No significant injection.  No photophobia.  No APD.    Prescribe antibiotic topically and outpatient follow-up with ophthalmology    Does not want be seen for her leg pain       1. Eye pain, right    2. Right leg pain        Labs and Imaging:     I have reviewed the relevant laboratory and radiology studies    Procedures:  I was present for the key portions of this procedure:     Osvaldo Mccain MD  [facility] EMERGENCY DEPARTMENT  [address] [place] [id number] [phone]     Osvaldo Mccain MD  08/25/23 4886

## 2023-08-25 NOTE — PROGRESS NOTES
Assessment and Plan     1. Status post surgical manipulation of ankle joint  We had a long discussion about how mood and stress affect pain and feelings. Large support given. In reviewing the PDMP, there have been times where she has been on increased doses of oxycodone and Xanax. Overall, I am aware of the goal for her to be titrated off but for likely maintenance of previous doses. She also states this awareness. Over worry of her insurance keeping her from picking up the oxycodone, I'll send percocet with some zofran. I will send enough for her to take TID (back to her original dose) until she sees Dr. Bailon back from his leave. Temporarily increase her at bedtime Xanax by 1mg for the next three weeks as well.  - oxyCODONE-acetaminophen (PERCOCET) 5-325 MG tablet; Take 1 tablet by mouth every 8 hours as needed for pain  Dispense: 63 tablet; Refill: 0  - ondansetron (ZOFRAN) 8 MG tablet; Take 0.5-1 tablets (4-8 mg) by mouth every 8 hours as needed for nausea  Dispense: 30 tablet; Refill: 1    2. Stress at home  - ALPRAZolam (XANAX) 1 MG tablet; Take 1 tablet (1 mg) by mouth nightly as needed for anxiety  Dispense: 21 tablet; Refill: 0    32 minutes spent on the date of the encounter doing chart review, history and exam, documentation, and further activities as noted above.    Options for treatment and follow-up care were reviewed with the patient and/or guardian. Darlene Hudson and/or guardian engaged in the decision making process and verbalized understanding of the options discussed and agreed with the final plan. I answered all of their questions.    Arnaud Garcia III, MD, FAAFP  [facility]  08/28/23 3:03 PM           HPI:       Darlene Hudson is a 48 year old  female  Patient presents with:  Follow Up: Following up on ankle swelling and joint. It is still swollen.   Recheck Medication: Pt would like to talk about her medications.     This has been a rough week for Karoline.    Her and  her son got in a disagreement/fight where the  were called. He left and is now staying with his sister.    Karoline is back at work and her ankle is really hurting. She is already out of her oxy because she was taking 3-5 tabs a day. Is requesting a refill to be able to return to TID. Acetaminophen gives her nausea that is well controlled with Zofran.    Isn't sleeping well and is hoping for a bump in her Xanax.    She states both of these requests are things that Dr. Bailon has done for her in the past.    Due to her schedule, she hasn't had therapy in awhile. She plans to restart therapy next week.         PMHX:     Patient Active Problem List   Diagnosis    Nondependent alcohol abuse, episodic drinking behavior    Controlled substance agreement signed    Chronic sinusitis    Major depressive disorder, recurrent episode, moderate (H)    Tobacco use disorder    Abnormal cervical Papanicolaou smear    Panic disorder with agoraphobia    Atopic rhinitis    Chronic low back pain    Other long term (current) drug therapy    Acute pericardial effusion    Anxiety    Chronic infectious pericarditis    Cough    Arthralgia of both lower legs    Moderate persistent asthma without complication    Physiologic disturbance of temperature regulation    Family history of colon cancer    Right ankle swelling    Menorrhagia with irregular cycle    Infection due to 2019 novel coronavirus    Adenocarcinoma of descending colon (H)       Current Outpatient Medications   Medication Sig Dispense Refill    ALPRAZolam (XANAX) 1 MG tablet Take 1 tablet (1 mg) by mouth nightly as needed for anxiety 21 tablet 0    ondansetron (ZOFRAN) 8 MG tablet Take 0.5-1 tablets (4-8 mg) by mouth every 8 hours as needed for nausea 30 tablet 1    oxyCODONE-acetaminophen (PERCOCET) 5-325 MG tablet Take 1 tablet by mouth every 8 hours as needed for pain 63 tablet 0    albuterol (PROAIR HFA/PROVENTIL HFA/VENTOLIN HFA) 108 (90 Base) MCG/ACT inhaler Inhale 2 puffs  into the lungs every 6 hours as needed for shortness of breath, wheezing or cough      ALPRAZolam (XANAX) 2 MG tablet Take 1 tablet (2 mg) by mouth nightly as needed for anxiety 8 tablet 0    ALPRAZolam (XANAX) 2 MG tablet Take 1 tablet (2 mg) by mouth 2 times daily as needed for anxiety 60 tablet 2    cetirizine (ZYRTEC) 10 MG tablet Take 1 tablet (10 mg) by mouth daily 30 tablet 11    clonazePAM (KLONOPIN) 2 MG tablet Take 1 tablet (2 mg) by mouth 2 times daily as needed for anxiety 8 tablet 0    diclofenac (VOLTAREN) 50 MG EC tablet Take 1 tablet (50 mg) by mouth 3 times daily as needed for moderate pain (4-6) 90 tablet 1    doxylamine (UNISOM) 25 MG TABS tablet Take 1 tablet (25 mg) by mouth nightly as needed for other (nausea) 7 tablet 1    fluticasone (FLONASE) 50 MCG/ACT nasal spray Spray 1 spray into both nostrils daily      fluticasone (FLONASE) 50 MCG/ACT nasal spray Spray 2 sprays into both nostrils daily 9.9 mL 11    guaiFENesin-codeine (GUAIFENESIN AC) 100-10 MG/5ML syrup Take 5 mLs by mouth every 4 hours as needed for congestion 180 mL 1    hydrOXYzine (ATARAX) 25 MG tablet Take 1 tablet (25 mg) by mouth every 8 hours as needed for itching or anxiety (pain) 15 tablet 0    montelukast (SINGULAIR) 10 MG tablet Take 1 tablet (10 mg) by mouth At Bedtime 30 tablet 11    naloxone (NARCAN) 4 MG/0.1ML nasal spray Spray 1 spray (4 mg) into one nostril alternating nostrils as needed for opioid reversal every 2-3 minutes until assistance arrives 0.2 mL 1    oxyCODONE (ROXICODONE) 5 MG tablet Take 1 tablet (5 mg) by mouth 3 times daily as needed for severe pain 90 tablet 0    oxyCODONE (ROXICODONE) 5 MG tablet Take 1-2 tablets (5-10 mg) by mouth every 4 hours as needed for moderate to severe pain 12 tablet 0    PROAIR  (90 Base) MCG/ACT inhaler Inhale 2 puffs into the lungs every 4 hours as needed for shortness of breath or wheezing 8 g 11    prochlorperazine (COMPAZINE) 10 MG tablet Take 1 tablet (10 mg)  by mouth every 6 hours as needed for nausea or vomiting 40 tablet 1    spacer (OPTICHAMBER BINA) holding chamber For use w/ rescue inhaler 1 each 0    sucralfate (CARAFATE) 1 GM/10ML suspension Take 10 mLs (1 g) by mouth 4 times daily as needed for nausea 414 mL 0    tiotropium (SPIRIVA RESPIMAT) 1.25 MCG/ACT inhaler Inhale 2 puffs into the lungs daily 12 g 3    tiotropium (SPIRIVA) 18 MCG inhaled capsule Inhale 18 mcg into the lungs daily      trimethoprim-polymyxin b (POLYTRIM) 72546-2.1 UNIT/ML-% ophthalmic solution Place 1-2 drops into the right eye every 4 hours 10 mL 0       Social History     Socioeconomic History    Marital status:      Spouse name: Not on file    Number of children: Not on file    Years of education: Not on file    Highest education level: Not on file   Occupational History    Not on file   Tobacco Use    Smoking status: Former     Packs/day: 0.10     Years: 30.00     Pack years: 3.00     Types: Cigarettes     Quit date: 5/28/2020     Years since quitting: 3.2    Smokeless tobacco: Never    Tobacco comments:     2-3 cig/day, Seen IP by [facility] on 1/11/23 - states she is done but declined our services and resource materials stating that she did not need them. Stopped smoking in Jan 2023   Vaping Use    Vaping Use: Former   Substance and Sexual Activity    Alcohol use: Not Currently     Comment: Occasiaonlly.     Drug use: Not Currently    Sexual activity: Yes     Partners: Male   Other Topics Concern    Parent/sibling w/ CABG, MI or angioplasty before 65F 55M? Not Asked   Social History Narrative    Not on file     Social Determinants of Health     Financial Resource Strain: Not on file   Food Insecurity: Not on file   Transportation Needs: Not on file   Physical Activity: Not on file   Stress: Not on file   Social Connections: Not on file   Intimate Partner Violence: Not on file   Housing Stability: Not on file       Allergies   Allergen Reactions    Gabapentin Other (See Comments)     " Mental status is changed     Lidocaine Other (See Comments)     \"my jaw stopped moving\"  Other reaction(s): Dystonia    Penicillins Hives, Rash and Shortness Of Breath    Lidocaine-Epinephrine Other (See Comments) and Muscle Pain (Myalgia)     Severe jaw cramping, double vision  Jaw locking       No results found for this or any previous visit (from the past 24 hour(s)).         Review of Systems:     Review of Systems   Constitutional: Negative.             Physical Exam:     Vitals:    08/25/23 1526 08/25/23 1527   BP: (!) 145/89 132/86   BP Location: Right arm Right arm   Patient Position: Sitting Sitting   Cuff Size: Adult Regular Adult Regular   Pulse: 109    Temp: 97.9  F (36.6  C)    TempSrc: Tympanic    SpO2: 97%    Weight: 99.8 kg (220 lb)    Height: 1.753 m (5' 9\")      Body mass index is 32.49 kg/m .    Physical Exam  Vitals and nursing note reviewed.   Constitutional:       General: She is not in acute distress.     Appearance: Normal appearance. She is not ill-appearing, toxic-appearing or diaphoretic.   Pulmonary:      Effort: No respiratory distress.   Neurological:      Mental Status: She is alert and oriented to person, place, and time.   Psychiatric:         Mood and Affect: Mood is anxious. Affect is tearful.         Thought Content: Thought content normal.           "

## 2023-08-25 NOTE — DISCHARGE INSTRUCTIONS
You were seen here today for evaluation of eye pain.  I will prescribe you an antibiotic drop to apply into your eye to help prevent an infection.  Do not stick anything else into your eye or wash your eye out.  Do not wear make-up on your eyelids until your symptoms improve.    Please call [facility] today to schedule a follow-up appointment.  Follow-up with your primary care provider as planned today for your leg pain.    Return here for any new or worsening symptoms including severe pain, loss of vision, fever, vomiting, or any other symptoms that concern you.

## 2023-08-25 NOTE — ED TRIAGE NOTES
Has burning and swelling right eye and right leg pain. Leg pain has been ongoing and eye just started today. Denies using new products. Denies sob.     Triage Assessment       Row Name 08/25/23 1128       Triage Assessment (Adult)    Airway WDL WDL

## 2023-08-25 NOTE — ED PROVIDER NOTES
EMERGENCY DEPARTMENT ENCOUNTER      NAME: Darlene Hudson  AGE: 48 year old female  YOB: 1975  MRN: [id number]  EVALUATION DATE & TIME: 8/25/2023 11:30 AM    PCP: Robbie Bailon    ED PROVIDER: Anahi Potter PA-C      Chief Complaint   Patient presents with    Eye Pain    Leg Pain         FINAL IMPRESSION:  1. Eye pain, right    2. Right leg pain          ED COURSE & MEDICAL DECISION MAKING:    Pertinent Labs & Imaging studies reviewed. (See chart for details)    48 year old female presents to the Emergency Department for evaluation of multiple concerns.    Physical exam is remarkable for a generally well-appearing female who is in no acute distress.  Heart and lung sounds are clear diffusely throughout.  She has swelling surrounding the right ankle, 2 well-healed surgical incision sites are noted on the anterior ankle.  She has diffuse tenderness around the ankle but there is no significant warmth or erythema noted.  She has good distal sensation in the right toes, capillary refill less than 2 seconds, strong dorsal pedal pulse.  No calf tenderness.  The patient's right eye appears uncomfortable, she is squeezing it shut frequently and there is some clear tearing noted.  No significant conjunctival injection, pupils are equal and reactive, extraocular motions are intact.  No swelling or erythema surrounding the eye.  Vital signs are stable and she is afebrile.    I recommended an ultrasound of the right leg which the patient declined, she states she has follow-up arranged with her primary care provider later today.  I think this is reasonable since she has been having the pain for several weeks and does not have any significant calf tenderness, erythema, or swelling.    I examined the patient's eye using Woods lamp and slit lamp.  She had complete relief of her symptoms with tetracaine drops and I did not see any obvious corneal abrasions or scratches in the eye.  She has a brown discoloration at  the 11 o'clock position of unclear significance, no fluorescein uptake noted.  Ocular pressure is 13 in the affected eye. It is not entirely clear at this time but I suspect it is likely irritation from a local irritant like eye make-up combined with washing.  She does not wear contact lenses and has no clinical findings concerning for retinal detachment or globe rupture.  No evidence of cellulitis surrounding the eye. Reassured that this is most likely an anterior chamber issue with improvement in pain with the topical drops. Plan to treat with Polytrim drops, recommend follow-up with ophthalmology.  Advised return here for any new or worsening symptoms.  Patient is agreeable with this treatment plan and verbalized understanding, care was discussed with staff physician Dr. Mohsen Mccain.    Medical Decision Making    History:  Supplemental history from: Documented in chart, if applicable  External Record(s) reviewed: Documented in chart, if applicable.    Work Up:  Chart documentation includes differential considered and any EKGs or imaging independently interpreted by provider, where specified.  In additional to work up documented, I considered the following work up: Imaging US, but deferred due to patient declined.    External consultation:  Discussion of management with another provider: Documented in chart, if applicable    Complicating factors:  Care impacted by chronic illness: N/A  Care affected by social determinants of health: N/A    Disposition considerations: Discharge. I prescribed additional prescription strength medication(s) as charted. N/A.    ED Course   11:44 AM Performed my initial history and physical exam. Discussed workup in the emergency department, management of symptoms, and likely disposition.   12:15 PM Staffed with Dr. Mccain.  12:26 PM I discussed the plan for discharge with the patient or family and they are agreeable.. We discussed supportive cares at home and reasons for return to the ER  including new or worsening symptoms - all questions and concerns addressed. Patient to be discharged by RN.    At the conclusion of the encounter I discussed the results of all of the tests and the disposition. The questions were answered. The patient or family acknowledged understanding and was agreeable with the care plan.     Voice recognition software was used in the creation of this note. Any grammatical or nonsensical errors are due to inherent errors with the software and are not the intention of the writer.     MEDICATIONS GIVEN IN THE EMERGENCY:  Medications   tetracaine (PONTOCAINE) 0.5 % ophthalmic solution 1-2 drop (2 drops Right Eye $Given 8/25/23 1208)   fluorescein (FUL-PIOTR) ophthalmic strip 1 strip (1 strip Right Eye $Given 8/25/23 1208)       NEW PRESCRIPTIONS STARTED AT TODAY'S ER VISIT  New Prescriptions    TRIMETHOPRIM-POLYMYXIN B (POLYTRIM) 67968-6.1 UNIT/ML-% OPHTHALMIC SOLUTION    Place 1-2 drops into the right eye every 4 hours            =================================================================    HPI    Patient information was obtained from: Patient    Use of : N/A       Darlene Hudson is a 48 year old female who presents to the ED for evaluation of eye pain and leg pain. Patient reports that her right eye has been burning really bad since 10:00 am. Patient says she tried rinsing and washing her eyes but doing so only exacerbated the burning. She says that her vision has become blurrier. Patient denies any fevers, chills, infections, abrasions, or injuries to her eyes. She says she has had pink eye before but reports no recent exposure prior to this encounter. She does not wear false lashes or contact lenses.    Patient reports that she has been experiencing a radiating pain that shoots up and down her right leg. She had ankle surgery this past June, in which she consulted her surgeon last week due to seeing no improvement. She was supposed to start physical therapy in  September, but the earliest appointment she could book is in October. She denies any shortness of breath, chest pain, and history of blood clots in her legs. She is currently not taking estrogen. She reports taking percocet and ibuprofen for pain relief.     REVIEW OF SYSTEMS   Review of Systems   Constitutional:  Negative for chills and fever.   Eyes:  Positive for pain, discharge and visual disturbance (blurry). Negative for photophobia.   Respiratory:  Negative for shortness of breath.    Cardiovascular:  Negative for chest pain.   Musculoskeletal:         Right leg pain       All other systems reviewed and are negative unless noted in HPI.      PAST MEDICAL HISTORY:  Past Medical History:   Diagnosis Date    Abdominal pain 06/29/2015    Abnormal cervical Papanicolaou smear 11/09/2014    Overview:  ACUS/HPV positive    Abnormal cytology finding 11/09/2014    Overview:  ACUS/HPV positive    Acute pericardial effusion 02/06/2017    Agoraphobia with panic attacks     Anxiety     Arthralgia of both lower legs 05/29/2020    Bilateral achy knee pain that is chronic in nature going on for years. Most likely osteoarthritis in knees related to obesity. Previously injected in both knees with good relief. 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  Has controlled substance agreement with them.  On Vicodin, Valium, Klonopin prescribed only from there.       Coronary artery disease     Cough 02/09/2020    Family history of colon cancer 10/24/2020    Hx of seasonal allergies      Infection due to 2019 novel coronavirus 09/20/2022    Infectious pericarditis     Lipoma     Low back pain 07/13/2015    Major depressive disorder, recurrent episode, moderate (H) 09/05/2006    Menorrhagia with irregular cycle 07/15/2022    Added automatically from request for surgery [id number]    Moderate persistent asthma without complication 09/29/2020    Nondependent alcohol abuse, episodic drinking behavior 10/03/2012    Noninflammatory disorder of vagina 02/20/2015    Other chronic pain     Other long term (current) drug therapy 01/28/2013    Overview:  Vicodin and cyclobenzaprine monthly    Panic disorder with agoraphobia 09/05/2006    Pap smear for cervical cancer screening 10/31/2016    03/29/2010  Normal cytology, HPV ot done, repeat in 3 years.    Pericarditis 2017    Physiologic disturbance of temperature regulation 10/24/2020    PONV (postoperative nausea and vomiting)     Right ankle swelling 06/07/2022    Added automatically from request for surgery [id number]    Tobacco abuse     Tobacco use disorder 07/13/2015       PAST SURGICAL HISTORY:  Past Surgical History:   Procedure Laterality Date    ANKLE SURGERY Right 05/30/2019    ARTHROSCOPY ANKLE Right 6/21/2023    Procedure: right ankle arthroscopy with debridement;  Surgeon: Kareem Bashir MD;  Location: [facility] OR    BIOPSY BREAST Right 1990    benign    COLONOSCOPY N/A 1/3/2023    Procedure: COLONOSCOPY WITH BIOPSY OF COLON MASS, POLYPECTOMY;  Surgeon: Kris Charles MD;  Location: [facility] OR    CREATION PERICARDIAL WINDOW  02/10/2017    [facility]    DILATION AND CURETTAGE N/A 7/22/2022    Procedure: DILATION AND CURETTAGE, UTERUS;  Surgeon: Lesly Holland;  Location: [facility] OR    HEMORRHOIDECTOMY EXTERNAL      HYSTEROSCOPY, WITH ENDOMETRIAL RADIOFREQUENCY ABLATION - NOVASURE N/A 7/22/2022    Procedure: HYSTEROSCOPY, WITH ENDOMETRIAL RADIOFREQUENCY ABLATION - NOVASURE DILATION AND CURETTAGE, UTERUS;  Surgeon: Amalia  "Lesly;  Location: [facility] OR    LAPAROSCOPIC ASSISTED SIGMOID COLECTOMY N/A 1/6/2023    Procedure: LAPAROSCOPIC ASSISTED DESCENDING COLELCTOMY SPLENIC FLEXURE MOBILIZATION ;  Surgeon: Kris Charles MD;  Location: [facility] OR    LAPAROSCOPIC LYSIS ADHESIONS N/A 1/6/2023    Procedure: LYSIS OF ADHESIONS;  Surgeon: Kris Charles MD;  Location: [facility] OR    TUMOR REMOVAL      Has had 3. In the right breast and \"inside of rib cage.\"       CURRENT MEDICATIONS:    trimethoprim-polymyxin b (POLYTRIM) 81751-8.1 UNIT/ML-% ophthalmic solution  albuterol (PROAIR HFA/PROVENTIL HFA/VENTOLIN HFA) 108 (90 Base) MCG/ACT inhaler  ALPRAZolam (XANAX) 2 MG tablet  ALPRAZolam (XANAX) 2 MG tablet  cetirizine (ZYRTEC) 10 MG tablet  clonazePAM (KLONOPIN) 2 MG tablet  diclofenac (VOLTAREN) 50 MG EC tablet  doxylamine (UNISOM) 25 MG TABS tablet  fluticasone (FLONASE) 50 MCG/ACT nasal spray  fluticasone (FLONASE) 50 MCG/ACT nasal spray  guaiFENesin-codeine (GUAIFENESIN AC) 100-10 MG/5ML syrup  hydrOXYzine (ATARAX) 25 MG tablet  montelukast (SINGULAIR) 10 MG tablet  naloxone (NARCAN) 4 MG/0.1ML nasal spray  ondansetron (ZOFRAN ODT) 4 MG ODT tab  oxyCODONE (ROXICODONE) 5 MG tablet  oxyCODONE (ROXICODONE) 5 MG tablet  PROAIR  (90 Base) MCG/ACT inhaler  prochlorperazine (COMPAZINE) 10 MG tablet  spacer (OPTICHAMBER BINA) holding chamber  sucralfate (CARAFATE) 1 GM/10ML suspension  tiotropium (SPIRIVA RESPIMAT) 1.25 MCG/ACT inhaler  tiotropium (SPIRIVA) 18 MCG inhaled capsule        ALLERGIES:  Allergies   Allergen Reactions    Gabapentin Other (See Comments)     Mental status is changed     Lidocaine Other (See Comments)     \"my jaw stopped moving\"  Other reaction(s): Dystonia    Penicillins Hives, Rash and Shortness Of Breath    Lidocaine-Epinephrine Other (See Comments) and Muscle Pain (Myalgia)     Severe jaw cramping, double vision  Jaw locking       FAMILY HISTORY:  Family History   Problem " "Relation Age of Onset    Hypertension Mother     Cancer Mother         cervical     Other Cancer Father         lung cancer    Asthma Father     Diabetes Brother     Diabetes Brother     Breast Cancer Maternal Grandmother     Asthma Child        SOCIAL HISTORY:   Social History     Socioeconomic History    Marital status:    Tobacco Use    Smoking status: Former     Packs/day: 0.10     Years: 30.00     Pack years: 3.00     Types: Cigarettes     Quit date: 5/28/2020     Years since quitting: 3.2    Smokeless tobacco: Never    Tobacco comments:     2-3 cig/day, Seen IP by TTS on 1/11/23 - states she is done but declined our services and resource materials stating that she did not need them. Stopped smoking in Jan 2023   Vaping Use    Vaping Use: Former   Substance and Sexual Activity    Alcohol use: Not Currently     Comment: Occasiaonlly.     Drug use: Not Currently    Sexual activity: Yes     Partners: Male       VITALS:  Patient Vitals for the past 24 hrs:   BP Temp Temp src Pulse Resp SpO2 Height Weight   08/25/23 1128 138/68 98  F (36.7  C) Oral 100 18 100 % 1.753 m (5' 9\") 99.8 kg (220 lb)       PHYSICAL EXAM    VITAL SIGNS: /68   Pulse 100   Temp 98  F (36.7  C) (Oral)   Resp 18   Ht 1.753 m (5' 9\")   Wt 99.8 kg (220 lb)   SpO2 100%   BMI 32.49 kg/m    General Appearance: Alert, cooperative, normal speech and facial symmetry, appears stated age, the patient does not appear in distress  Head:  Normocephalic, without obvious abnormality, atraumatic  Eyes: Brown discoloration at the 11 o'clock position; clear tearing; Conjunctiva/corneas clear, EOM's intact, no nystagmus, PERRL  Cardio:  Regular rate and rhythm, S1 and S2 normal, no murmur, rub    or gallop, 2+ pulses symmetric in all extremities  Pulm:  Clear to auscultation bilaterally, respirations unlabored with no accessory muscle use  Extremities: Swelling surrounding the right ankle, 2 well-healed surgical incision sites are noted on the " anterior ankle.  She has diffuse tenderness around the ankle but there is no significant warmth or erythema noted.  She has good distal sensation in the right toes, capillary refill less than 2 seconds, strong dorsal pedal pulse.  No calf tenderness.  Neuro: Patient is awake, alert, and responsive to voice. No gross motor weaknesses or sensory loss; moves all extremities.    LAB:  All pertinent labs reviewed and interpreted.  Labs Ordered and Resulted from Time of ED Arrival to Time of ED Departure - No data to display    RADIOLOGY:  Reviewed all pertinent imaging. Please see official radiology report.  No orders to display       Anahi Potter PA-C  Emergency Medicine  [facility] EMERGENCY DEPARTMENT  [facility] [facility] [address] [phone]  Dept: [phone]      Anahi Potter PA-C  08/25/23 1442

## 2023-08-28 ASSESSMENT — ENCOUNTER SYMPTOMS: CONSTITUTIONAL NEGATIVE: 1

## 2023-09-01 ENCOUNTER — OFFICE VISIT (OUTPATIENT)
Dept: FAMILY MEDICINE | Facility: CLINIC | Age: 48
End: 2023-09-01
Payer: COMMERCIAL

## 2023-09-01 VITALS
HEART RATE: 91 BPM | HEIGHT: 69 IN | DIASTOLIC BLOOD PRESSURE: 84 MMHG | BODY MASS INDEX: 32.58 KG/M2 | SYSTOLIC BLOOD PRESSURE: 115 MMHG | TEMPERATURE: 98.1 F | OXYGEN SATURATION: 100 % | WEIGHT: 220 LBS

## 2023-09-01 DIAGNOSIS — M25.562 ARTHRALGIA OF BOTH LOWER LEGS: Primary | ICD-10-CM

## 2023-09-01 DIAGNOSIS — M25.471 RIGHT ANKLE SWELLING: ICD-10-CM

## 2023-09-01 DIAGNOSIS — M25.561 ARTHRALGIA OF BOTH LOWER LEGS: Primary | ICD-10-CM

## 2023-09-01 PROCEDURE — 99213 OFFICE O/P EST LOW 20 MIN: CPT | Performed by: STUDENT IN AN ORGANIZED HEALTH CARE EDUCATION/TRAINING PROGRAM

## 2023-09-01 NOTE — LETTER
RETURN TO WORK/SCHOOL FORM    9/1/2023    Re: Darlene Hudson  1975      To Whom It May Concern:     Darlene Hudson was seen in clinic today for ankle pain and swelling. Please excuse her late arrival for this visit. She missed 8/21, 8/22, 8/23 for ankle pain. On 8/29 and 9/1 she was late to work for ankle swelling. She may return to work with restrictions on 9/1/23. I have prescribed a treatment plan that should allow her to have less pain and swelling.        Restrictions: Limited to light duty- desk work. Follow up on these restrictions when her primary, Dr. Bailon returns to clinic.       Latha Irwin MD  9/1/2023 10:31 AM

## 2023-09-01 NOTE — PROGRESS NOTES
"  Assessment & Plan     Right ankle swelling  Has had multiple surgeries on the right ankle, last on June 21, 2023. The patient seems to be missing work due to not wanting to wear compression socks- I recommended that she needs to wear compression stockings as close to 24/7 as possible. Continue following with ortho. Already has an appointment scheduled with Dr. Bailon when he returns from leave. I wrote a note to explain that patient was seen in clinic today.                  No follow-ups on file.    Latha Irwin MD  [facility]    Pablito Ramey is a 48 year old, presenting for the following health issues:  Other (Pt wants to recheck her swollen ankle and pt feels that it is not getting better. )      9/1/2023     9:53 AM   Additional Questions   Roomed by Maryana   Accompanied by Self       HPI     Just went back to work on 8/15  Had been off for mental health prior to that- her mental health had worsened related to cancer diagnosis.    Has had a lot of ankle swelling since going back to work.   Wears compression socks at night prescribed by Dr. Angel and recommended by ortho. Was supposed to be wearing them all day but she doesn't like the way they look.   Can't get shoe on sometimes because of swelling.   She has been calling in to work late a lot recently due to trouble getting her shoe on. She has not tried wearing compression during the day.             Review of Systems   Constitutional, HEENT, cardiovascular, pulmonary, gi and gu systems are negative, except as otherwise noted.      Objective    /84   Pulse 91   Temp 98.1  F (36.7  C) (Tympanic)   Ht 1.753 m (5' 9\")   Wt 99.8 kg (220 lb)   SpO2 100%   BMI 32.49 kg/m    Body mass index is 32.49 kg/m .  Physical Exam   GENERAL: healthy, alert and no distress  MS: RLE exam shows edema of ankle at the lateral malleolus which is tender to palpation.                       "

## 2023-09-08 ENCOUNTER — THERAPY VISIT (OUTPATIENT)
Dept: PHYSICAL THERAPY | Facility: REHABILITATION | Age: 48
End: 2023-09-08
Attending: ORTHOPAEDIC SURGERY
Payer: OTHER MISCELLANEOUS

## 2023-09-08 DIAGNOSIS — Z98.890 HISTORY OF ANKLE SURGERY: ICD-10-CM

## 2023-09-08 DIAGNOSIS — R29.898 WEAKNESS OF RIGHT LOWER EXTREMITY: ICD-10-CM

## 2023-09-08 DIAGNOSIS — I89.0 LYMPHEDEMA: Primary | ICD-10-CM

## 2023-09-08 DIAGNOSIS — M25.471 RIGHT ANKLE SWELLING: ICD-10-CM

## 2023-09-08 PROCEDURE — 97161 PT EVAL LOW COMPLEX 20 MIN: CPT | Mod: GP | Performed by: PHYSICAL THERAPIST

## 2023-09-08 PROCEDURE — 97140 MANUAL THERAPY 1/> REGIONS: CPT | Mod: GP | Performed by: PHYSICAL THERAPIST

## 2023-09-08 NOTE — PROGRESS NOTES
PHYSICAL THERAPY EVALUATION  Type of Visit: Evaluation    See electronic medical record for Abuse and Falls Screening details.  Patient reports she was playing basketball at work as , she fell and tore the ligaments; this was in Aug 2017, then she ended up having surgery for a reconstruction in 2018, then again in 2019; more recently she had final surgery to remove some scar tissue in June 2023. She started to have some swelling before the last surgery but not she has had an increase in swelling since the last surgery.   Hx of colon cancer this last year and had surgery for this in Jan 2023, went back to work in March but then went on leave for mental health after the cancer when she had the ankle surgery as well. She is back to work light duty right now.         Subjective       Presenting condition or subjective complaint: ankle surgery, ankle keeps swelling  Date of onset: 08/04/17    Relevant medical history: Asthma; Cancer; Mental Illness; Overweight; Smoking   Dates & types of surgery: Heart Surgery 2017 - pericarditis (open heart surgery)  Colon CA in Jan 2023, June 14, 2023; ankle surgery in 2018, 2019    Prior diagnostic imaging/testing results: X-ray     Prior therapy history for the same diagnosis, illness or injury: No has had therapy for the ankle in the past for ankle but not for the edema    Prior Level of Function  Transfers: Independent  Ambulation: Independent  ADL: Independent  IADL: Housekeeping, Work    Living Environment  Social support: With family members   Type of home: Multi-level; House   Stairs to enter the home: No       Ramp: No   Stairs inside the home: Yes 20 Is there a railing: Yes   Help at home: Home and Yard maintenance tasks; Home management tasks (cooking, cleaning)  Equipment owned:       Employment:   emergency management/corrections  Hobbies/Interests:      Patient goals for therapy: swelling to go down    Pain assessment: Pain present     Objective        EDEMA EVALUATION  Additional history:  Body part affected by edema: ankle  If cancer related, treatment: no - hx of colon cancer, surgery with LN removed, no chemo or radiation  If not cancer related, problems with veins or cause of swelling: hx of repeated ankle surgeries  Distance able to walk: couple of blocks, maximum  Time able to stand: 10-15 minutes  Sensation problems in hands/feet: Yes nerve damage from surgeries  Edema etiology: Surgery    FUNCTIONAL SCALES  Lower Extremity Functional Scale (score out of 80). A lower score indicates greater impairment:    Shoulder Pain and Disability Index (score out of 100).  A higher score indicates greater impairment:      Cognitive Status Examination  Orientation: Oriented to person, place and time   Level of Consciousness: Alert  Follows Commands and Answers Questions: 100% of the time  Personal Safety and Judgement: Intact  Memory: Intact    EDEMA  Skin Condition: Intact  Scar: Yes  Location: lateral right ankle and 2 small anterior ankles from repeated surgeries, very sensitive to even light touch   Capillary Refill: Symmetrical  Stemmer Sign: -  Ulceration: No    GIRTH MEASUREMENTS: Refer to separate girth measurement flowsheet.     VOLUME LE  Right LE (mL) 2118.95 (ankle circumference and fig 8 larger on right side vs left side)    Left LE (mL) 2258.09    LE Volume Comparison LLE volume greater than RLE volume   % Difference    Head/Neck Volume     Trunk Volume    Genital Volume       RANGE OF MOTION:  min dec in ankle PF and DF to neutral with tightness   GAIT/LOCOMOTION: slightly antalgic with initiation of gait and decreased stance time on right side   BALANCE:  not fully assessed today   SENSATION:  hypersensitivity to the lateral ankle in the area of the scarring   MUSCLE TONE: WFL, Hypotonic    Assessment & Plan   CLINICAL IMPRESSIONS  Medical Diagnosis: Lymphedema, Hx of Right ankle surgery, Right ankle swelling    Treatment Diagnosis: lymphedema, right  ankle swelling, weakness of right lower extremity   Impression/Assessment: Patient is a 48 year old female with complaints right ankle edema following right ankle surgeries in 2018, 2019 and recently in June 2023. She has hx of open heart surgery in 2017 and colon cancer surgery in Jan 2023.  The following significant findings have been identified: Pain, Decreased ROM/flexibility, Decreased joint mobility, Decreased strength, Impaired balance, Decreased proprioception, Impaired sensation, Edema, Impaired muscle performance, and Decreased activity tolerance. These impairments interfere with their ability to perform work tasks, recreational activities, household chores, and community mobility as compared to previous level of function.     Clinical Decision Making (Complexity):  Clinical Presentation: Stable/Uncomplicated  Clinical Presentation Rationale: based on medical and personal factors listed in PT evaluation  Clinical Decision Making (Complexity): Low complexity    PLAN OF CARE  Treatment Interventions:  Interventions: Gait Training, Manual Therapy, Neuromuscular Re-education, Therapeutic Activity, Therapeutic Exercise, Self-Care/Home Management, Gradient Compression Bandaging    Long Term Goals     PT Goal 1  Goal Identifier: HEP  Goal Description: Patient will be independent in a HEP and other self care/home management techniques for ongoing symptom management in 12 weeks  Target Date: 12/01/23  PT Goal 2  Goal Identifier: Circumerfence and fig 8 measures  Goal Description: Patient will demonstrate a decreas ei cicumferential measurement of ankle and fig8 of the ankle to demonstrate a reduction in edema and decrease risk for infection and improve donning of shoes in 12 weeks  Target Date: 12/01/23      Frequency of Treatment: 1 time per week  Duration of Treatment: up to 12 weeks    Recommended Referrals to Other Professionals:  PT for ankle post-op - has scheduled for later this month   Education Assessment:         Risks and benefits of evaluation/treatment have been explained.   Patient/Family/caregiver agrees with Plan of Care.     Evaluation Time:     PT Alverto, Low Complexity Minutes (66939): 30   Present: Not applicable     Signing Clinician: Jessica Lozano PT

## 2023-09-11 DIAGNOSIS — C18.6 CANCER OF DESCENDING COLON (H): ICD-10-CM

## 2023-09-11 DIAGNOSIS — Z90.49 STATUS POST PARTIAL COLECTOMY: ICD-10-CM

## 2023-09-11 NOTE — TELEPHONE ENCOUNTER
Team - please inform Karoline that per Dr Garcia' last note, she was provided enough pain medication to last til her appointment Wednesday. I will address at that visit. Thanks!    Robbie Bailon MD  September 11, 2023  12:32 PM

## 2023-09-12 ENCOUNTER — TELEPHONE (OUTPATIENT)
Dept: FAMILY MEDICINE | Facility: CLINIC | Age: 48
End: 2023-09-12
Payer: COMMERCIAL

## 2023-09-13 ENCOUNTER — OFFICE VISIT (OUTPATIENT)
Dept: FAMILY MEDICINE | Facility: CLINIC | Age: 48
End: 2023-09-13
Payer: COMMERCIAL

## 2023-09-13 VITALS
TEMPERATURE: 98 F | SYSTOLIC BLOOD PRESSURE: 132 MMHG | HEART RATE: 73 BPM | OXYGEN SATURATION: 99 % | DIASTOLIC BLOOD PRESSURE: 87 MMHG | RESPIRATION RATE: 20 BRPM

## 2023-09-13 DIAGNOSIS — Z23 NEED FOR HEPATITIS B VACCINATION: ICD-10-CM

## 2023-09-13 DIAGNOSIS — F41.1 GENERALIZED ANXIETY DISORDER: ICD-10-CM

## 2023-09-13 DIAGNOSIS — C18.6 CANCER OF DESCENDING COLON (H): ICD-10-CM

## 2023-09-13 DIAGNOSIS — F40.01 PANIC DISORDER WITH AGORAPHOBIA: ICD-10-CM

## 2023-09-13 DIAGNOSIS — Z98.890 STATUS POST SURGICAL MANIPULATION OF ANKLE JOINT: Primary | ICD-10-CM

## 2023-09-13 DIAGNOSIS — M25.571 ACUTE RIGHT ANKLE PAIN: ICD-10-CM

## 2023-09-13 DIAGNOSIS — Z23 NEED FOR PNEUMOCOCCAL VACCINATION: ICD-10-CM

## 2023-09-13 DIAGNOSIS — Z90.49 STATUS POST PARTIAL COLECTOMY: ICD-10-CM

## 2023-09-13 DIAGNOSIS — Z23 NEED FOR PROPHYLACTIC VACCINATION AND INOCULATION AGAINST INFLUENZA: ICD-10-CM

## 2023-09-13 PROCEDURE — 90471 IMMUNIZATION ADMIN: CPT | Performed by: FAMILY MEDICINE

## 2023-09-13 PROCEDURE — 90677 PCV20 VACCINE IM: CPT | Performed by: FAMILY MEDICINE

## 2023-09-13 PROCEDURE — 90682 RIV4 VACC RECOMBINANT DNA IM: CPT | Performed by: FAMILY MEDICINE

## 2023-09-13 PROCEDURE — 90472 IMMUNIZATION ADMIN EACH ADD: CPT | Performed by: FAMILY MEDICINE

## 2023-09-13 PROCEDURE — 90746 HEPB VACCINE 3 DOSE ADULT IM: CPT | Performed by: FAMILY MEDICINE

## 2023-09-13 PROCEDURE — 99215 OFFICE O/P EST HI 40 MIN: CPT | Mod: 25 | Performed by: FAMILY MEDICINE

## 2023-09-13 RX ORDER — OXYCODONE HYDROCHLORIDE 5 MG/1
5 TABLET ORAL 3 TIMES DAILY PRN
Qty: 90 TABLET | OUTPATIENT
Start: 2023-09-13

## 2023-09-13 RX ORDER — OXYCODONE HYDROCHLORIDE 5 MG/1
5 TABLET ORAL 3 TIMES DAILY PRN
Qty: 90 TABLET | Refills: 0 | Status: SHIPPED | OUTPATIENT
Start: 2023-09-13 | End: 2023-10-23

## 2023-09-13 RX ORDER — HYDROXYZINE HYDROCHLORIDE 25 MG/1
25 TABLET, FILM COATED ORAL
Qty: 30 TABLET | Refills: 1 | Status: SHIPPED | OUTPATIENT
Start: 2023-09-13 | End: 2024-03-12

## 2023-09-13 ASSESSMENT — ANXIETY QUESTIONNAIRES
5. BEING SO RESTLESS THAT IT IS HARD TO SIT STILL: SEVERAL DAYS
4. TROUBLE RELAXING: SEVERAL DAYS
2. NOT BEING ABLE TO STOP OR CONTROL WORRYING: SEVERAL DAYS
GAD7 TOTAL SCORE: 7
6. BECOMING EASILY ANNOYED OR IRRITABLE: SEVERAL DAYS
3. WORRYING TOO MUCH ABOUT DIFFERENT THINGS: SEVERAL DAYS
GAD7 TOTAL SCORE: 7
7. FEELING AFRAID AS IF SOMETHING AWFUL MIGHT HAPPEN: SEVERAL DAYS
IF YOU CHECKED OFF ANY PROBLEMS ON THIS QUESTIONNAIRE, HOW DIFFICULT HAVE THESE PROBLEMS MADE IT FOR YOU TO DO YOUR WORK, TAKE CARE OF THINGS AT HOME, OR GET ALONG WITH OTHER PEOPLE: SOMEWHAT DIFFICULT
1. FEELING NERVOUS, ANXIOUS, OR ON EDGE: SEVERAL DAYS

## 2023-09-13 ASSESSMENT — PATIENT HEALTH QUESTIONNAIRE - PHQ9
SUM OF ALL RESPONSES TO PHQ QUESTIONS 1-9: 0
10. IF YOU CHECKED OFF ANY PROBLEMS, HOW DIFFICULT HAVE THESE PROBLEMS MADE IT FOR YOU TO DO YOUR WORK, TAKE CARE OF THINGS AT HOME, OR GET ALONG WITH OTHER PEOPLE: NOT DIFFICULT AT ALL
SUM OF ALL RESPONSES TO PHQ QUESTIONS 1-9: 0

## 2023-09-13 NOTE — PROGRESS NOTES
ASSESSMENT/PLAN:    (Z98.890) Status post surgical manipulation of ankle joint  (primary encounter diagnosis)  Comment:   Plan: states that pain is worse after surgery; I explained that much of this is likely now chronic in nature and I would avoid further interventions aside from compression and physical therapy; she may need permanent work restrictions but we can determine that once we deem she is at maximal medical improvement; note for work written; f/up in 4 wks    (M25.571) Acute right ankle pain  Comment: needs to submit UDS before picking up pain meds  Plan: Urine Drugs of Abuse Screen Panel 13          (C18.6) Cancer of descending colon (H)  Comment: see above; will continue wean off opiates now that I am back from paternity leave  Plan: oxyCODONE (ROXICODONE) 5 MG tablet          (Z90.49) Status post partial colectomy  Comment: see above  Plan: oxyCODONE (ROXICODONE) 5 MG tablet          (F41.1) Generalized anxiety disorder  Comment: explained that I would not be increasing her xanax; will add nighttime hydroxyzine for sleep; we had a lengthy discussion as well about her relationship with her son. See his chart for specific details. We discussed parenting strategies including de escalation techniques, boundary setting, etc  Plan: hydrOXYzine (ATARAX) 25 MG tablet          (F40.01) Panic disorder with agoraphobia  Comment: see above  Plan: hydrOXYzine (ATARAX) 25 MG tablet          (Z23) Need for prophylactic vaccination and inoculation against influenza  Comment:   Plan: INFLUENZA QUAD, RECOMBINANT, P-FREE (RIV4) (FLUBLOK)          (Z23) Need for hepatitis B vaccination  Comment:   Plan: HEPATITIS B VACCINE,ADULT,IM          (Z23) Need for pneumococcal vaccination  Comment:   Plan: PNEUMOCOCCAL 20 VALENT CONJUGATE (PREVNAR 20)    >40 min was spent on the day of visit in review of records, direct patient care, documentation, and care coordination.           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; would not be able to do that full-time because it only pays $18/hr; her regular salary was over $30/hr  Would like to continue light duty; needs intermittent FMLA    2) anxiety - it's bad but its slowly getting better  Needs a new therapist  Worried all the time  Strife with her son is \"driving me insane\"    Answers submitted by the patient for this visit:  Patient Health Questionnaire (Submitted on 9/13/2023)  If you checked off any problems, how difficult have these problems made it for you to do your work, take care of things at home, or get along with other people?: Not difficult at all  PHQ9 TOTAL SCORE: 0  ACE-7 (Submitted on 9/13/2023)  ACE 7 TOTAL SCORE: 7      Per recent PT eval on 9/8/23:  CLINICAL IMPRESSIONS  Medical Diagnosis: Lymphedema, Hx of Right ankle surgery, Right ankle swelling    Treatment Diagnosis: lymphedema, right ankle swelling, weakness of right lower extremity   Impression/Assessment: Patient is a 48 year old female with complaints right ankle edema following right ankle surgeries in 2018, 2019 and recently in June 2023. She has hx of open heart surgery in 2017 and colon cancer surgery in Jan 2023.  The following significant findings have been identified: Pain, Decreased ROM/flexibility, Decreased joint mobility, Decreased strength, Impaired balance, Decreased proprioception, Impaired sensation, Edema, Impaired muscle performance, and Decreased activity tolerance. These impairments interfere with their ability to perform work tasks, recreational activities, household chores, and " community mobility as compared to previous level of function.     Per Dr Irwin's last note:  Right ankle swelling  Has had multiple surgeries on the right ankle, last on June 21, 2023. The patient seems to be missing work due to not wanting to wear compression socks- I recommended that she needs to wear compression stockings as close to 24/7 as possible. Continue following with ortho. Already has an appointment scheduled with Dr. Bailon when he returns from leave. I wrote a note to explain that patient was seen in clinic today.     Per Dr Garcia' last note on 8/25/23:  1. Status post surgical manipulation of ankle joint  We had a long discussion about how mood and stress affect pain and feelings. Large support given. In reviewing the PDMP, there have been times where she has been on increased doses of oxycodone and Xanax. Overall, I am aware of the goal for her to be titrated off but for likely maintenance of previous doses. She also states this awareness. Over worry of her insurance keeping her from picking up the oxycodone, I'll send percocet with some zofran. I will send enough for her to take TID (back to her original dose) until she sees Dr. Bailon back from his leave. Temporarily increase her at bedtime Xanax by 1mg for the next three weeks as well.  - oxyCODONE-acetaminophen (PERCOCET) 5-325 MG tablet; Take 1 tablet by mouth every 8 hours as needed for pain  Dispense: 63 tablet; Refill: 0  - ondansetron (ZOFRAN) 8 MG tablet; Take 0.5-1 tablets (4-8 mg) by mouth every 8 hours as needed for nausea  Dispense: 30 tablet; Refill: 1     2. Stress at home  - ALPRAZolam (XANAX) 1 MG tablet; Take 1 tablet (1 mg) by mouth nightly as needed for anxiety  Dispense: 21 tablet; Refill: 0    Per Dr Bashir's note on 8/15/23:  Assessment: Status post right ankle arthroscopic debridement     Plan: I discussed with the patient that his point I would like her to proceed with a course of physical therapy in order to avoid any  scarring of the ankle.  I also encouraged her to proceed with the use of compression stockings during daytime hours something that she was not very excited to hear.     Patient will follow-up on a as needed basis.  Encouraged her to visit with us in 2-1/2 months from now she is not happy with the function of the ankle joint.    Past Medical History:   Diagnosis Date    Abdominal pain 06/29/2015    Abnormal cervical Papanicolaou smear 11/09/2014    Overview:  ACUS/HPV positive    Abnormal cytology finding 11/09/2014    Overview:  ACUS/HPV positive    Acute pericardial effusion 02/06/2017    Agoraphobia with panic attacks     Anxiety     Arthralgia of both lower legs 05/29/2020    Bilateral achy knee pain that is chronic in nature going on for years. Most likely osteoarthritis in knees related to obesity. Previously injected in both knees with good relief. 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  Has controlled substance agreement with them.  On Vicodin, Valium, Klonopin prescribed only from there.       Coronary artery disease     Cough 02/09/2020    Family history of colon cancer 10/24/2020    Hx of seasonal allergies     Infection due to 2019 novel coronavirus 09/20/2022    Infectious pericarditis     Lipoma     Low back pain 07/13/2015    Major depressive disorder, recurrent episode, moderate (H) 09/05/2006    Menorrhagia with irregular cycle 07/15/2022    Added automatically from request for surgery [id number]    Moderate persistent asthma without complication  09/29/2020    Nondependent alcohol abuse, episodic drinking behavior 10/03/2012    Noninflammatory disorder of vagina 02/20/2015    Other chronic pain     Other long term (current) drug therapy 01/28/2013    Overview:  Vicodin and cyclobenzaprine monthly    Panic disorder with agoraphobia 09/05/2006    Pap smear for cervical cancer screening 10/31/2016    03/29/2010  Normal cytology, HPV ot done, repeat in 3 years.    Pericarditis 2017    Physiologic disturbance of temperature regulation 10/24/2020    PONV (postoperative nausea and vomiting)     Right ankle swelling 06/07/2022    Added automatically from request for surgery [id number]    Tobacco abuse     Tobacco use disorder 07/13/2015      Past Surgical History:   Procedure Laterality Date    ANKLE SURGERY Right 05/30/2019    ARTHROSCOPY ANKLE Right 6/21/2023    Procedure: right ankle arthroscopy with debridement;  Surgeon: Kareem Bashir MD;  Location: [facility] OR    BIOPSY BREAST Right 1990    benign    COLONOSCOPY N/A 1/3/2023    Procedure: COLONOSCOPY WITH BIOPSY OF COLON MASS, POLYPECTOMY;  Surgeon: Kris Charles MD;  Location: [facility] OR    CREATION PERICARDIAL WINDOW  02/10/2017    [facility]    DILATION AND CURETTAGE N/A 7/22/2022    Procedure: DILATION AND CURETTAGE, UTERUS;  Surgeon: Lesly Holland;  Location: [facility] OR    HEMORRHOIDECTOMY EXTERNAL      HYSTEROSCOPY, WITH ENDOMETRIAL RADIOFREQUENCY ABLATION - NOVASURE N/A 7/22/2022    Procedure: HYSTEROSCOPY, WITH ENDOMETRIAL RADIOFREQUENCY ABLATION - NOVASURE DILATION AND CURETTAGE, UTERUS;  Surgeon: Lesly Holland;  Location: [facility] OR    LAPAROSCOPIC ASSISTED SIGMOID COLECTOMY N/A 1/6/2023    Procedure: LAPAROSCOPIC ASSISTED DESCENDING COLELCTOMY SPLENIC FLEXURE MOBILIZATION ;  Surgeon: Kris Charles MD;  Location: [facility] [place]    LAPAROSCOPIC LYSIS ADHESIONS N/A 1/6/2023    Procedure: LYSIS OF ADHESIONS;  Surgeon: Kris Charles MD;   "Location: [facility] OR    TUMOR REMOVAL      Has had 3. In the right breast and \"inside of rib cage.\"      Current Outpatient Medications   Medication Sig Dispense Refill    hydrOXYzine (ATARAX) 25 MG tablet Take 1 tablet (25 mg) by mouth nightly as needed for anxiety 30 tablet 1    oxyCODONE (ROXICODONE) 5 MG tablet Take 1 tablet (5 mg) by mouth 3 times daily as needed for severe pain 90 tablet 0    albuterol (PROAIR HFA/PROVENTIL HFA/VENTOLIN HFA) 108 (90 Base) MCG/ACT inhaler Inhale 2 puffs into the lungs every 6 hours as needed for shortness of breath, wheezing or cough      ALPRAZolam (XANAX) 2 MG tablet Take 1 tablet (2 mg) by mouth nightly as needed for anxiety 8 tablet 0    ALPRAZolam (XANAX) 2 MG tablet Take 1 tablet (2 mg) by mouth 2 times daily as needed for anxiety 60 tablet 2    cetirizine (ZYRTEC) 10 MG tablet Take 1 tablet (10 mg) by mouth daily 30 tablet 11    diclofenac (VOLTAREN) 50 MG EC tablet Take 1 tablet (50 mg) by mouth 3 times daily as needed for moderate pain (4-6) 90 tablet 1    doxylamine (UNISOM) 25 MG TABS tablet Take 1 tablet (25 mg) by mouth nightly as needed for other (nausea) 7 tablet 1    fluticasone (FLONASE) 50 MCG/ACT nasal spray Spray 1 spray into both nostrils daily      fluticasone (FLONASE) 50 MCG/ACT nasal spray Spray 2 sprays into both nostrils daily 9.9 mL 11    guaiFENesin-codeine (GUAIFENESIN AC) 100-10 MG/5ML syrup Take 5 mLs by mouth every 4 hours as needed for congestion 180 mL 1    montelukast (SINGULAIR) 10 MG tablet Take 1 tablet (10 mg) by mouth At Bedtime 30 tablet 11    naloxone (NARCAN) 4 MG/0.1ML nasal spray Spray 1 spray (4 mg) into one nostril alternating nostrils as needed for opioid reversal every 2-3 minutes until assistance arrives 0.2 mL 1    ondansetron (ZOFRAN) 8 MG tablet Take 0.5-1 tablets (4-8 mg) by mouth every 8 hours as needed for nausea 30 tablet 1    PROAIR  (90 Base) MCG/ACT inhaler Inhale 2 puffs into the lungs every 4 hours as " "needed for shortness of breath or wheezing 8 g 11    prochlorperazine (COMPAZINE) 10 MG tablet Take 1 tablet (10 mg) by mouth every 6 hours as needed for nausea or vomiting 40 tablet 1    spacer (OPTICHAMBER BINA) holding chamber For use w/ rescue inhaler 1 each 0    sucralfate (CARAFATE) 1 GM/10ML suspension Take 10 mLs (1 g) by mouth 4 times daily as needed for nausea 414 mL 0    tiotropium (SPIRIVA RESPIMAT) 1.25 MCG/ACT inhaler Inhale 2 puffs into the lungs daily 12 g 3    tiotropium (SPIRIVA) 18 MCG inhaled capsule Inhale 18 mcg into the lungs daily      trimethoprim-polymyxin b (POLYTRIM) 64671-8.1 UNIT/ML-% ophthalmic solution Place 1-2 drops into the right eye every 4 hours 10 mL 0      Allergies   Allergen Reactions    Gabapentin Other (See Comments)     Mental status is changed     Lidocaine Other (See Comments)     \"my jaw stopped moving\"  Other reaction(s): Dystonia    Penicillins Hives, Rash and Shortness Of Breath    Lidocaine-Epinephrine Other (See Comments) and Muscle Pain (Myalgia)     Severe jaw cramping, double vision  Jaw locking        ROS:   Gen-  no fevers/chills   ENT- no cough, no congestion, no URI symptoms   Cardiac - no palpitations, no chest pain   Respiratory - no shortness of breath , no wheezing   GI - no nausea, no vomiting, no diarrhea, no constipation   Urinary - no dysuria, no polyuria   Remainder of ROS negative.     Exam:   /87   Pulse 73   Temp 98  F (36.7  C) (Oral)   Resp 20   SpO2 99%    Alert and oriented x 3; No acute distress   Neuro: no focal deficits   Derm: no rashes       "

## 2023-09-13 NOTE — LETTER
Return to Work  2023     Seen today: Yes    Patient:  Darlene Hudson  :   1975  MRN:     [id number]  Physician: JULIETH BAILON    Darlene Hudson is under my care. She has been on light duty since her last visit in clinic with Dr Irwin on 23. She continues to have significant pain, swelling, and instability in the ankle so needs to remain on light duty for 4 additional weeks. She will be attending physical therapy during this time.    The next clinic appointment is scheduled for (date/time) 4 weeks.    Patient limitations:  unable to tolerate prolonged standing/ walking/ bending/ climbing          Julieth Bailon MD  2023  3:26 PM

## 2023-09-19 DIAGNOSIS — J45.40 MODERATE PERSISTENT ASTHMA WITHOUT COMPLICATION: ICD-10-CM

## 2023-09-19 DIAGNOSIS — C18.6 CANCER OF DESCENDING COLON (H): ICD-10-CM

## 2023-09-19 DIAGNOSIS — Z90.49 STATUS POST PARTIAL COLECTOMY: ICD-10-CM

## 2023-09-19 RX ORDER — OXYCODONE HYDROCHLORIDE 5 MG/1
5 TABLET ORAL 3 TIMES DAILY PRN
Qty: 90 TABLET | OUTPATIENT
Start: 2023-09-19

## 2023-09-19 RX ORDER — MONTELUKAST SODIUM 10 MG/1
10 TABLET ORAL AT BEDTIME
Qty: 30 TABLET | Refills: 11 | Status: SHIPPED | OUTPATIENT
Start: 2023-09-19

## 2023-09-19 RX ORDER — ALBUTEROL SULFATE 90 UG/1
2 AEROSOL, METERED RESPIRATORY (INHALATION) EVERY 6 HOURS PRN
Qty: 18 G | Refills: 11 | Status: SHIPPED | OUTPATIENT
Start: 2023-09-19 | End: 2024-09-28

## 2023-09-19 NOTE — TELEPHONE ENCOUNTER
[facility] phone call message- medication clarification/question:    Full Medication Name:     - montelukast (SINGULAIR) 10 MG tablet     - albuterol (PROAIR HFA/PROVENTIL HFA/VENTOLIN HFA) 108 (90 Base) MCG/ACT inhaler     - oxyCODONE (ROXICODONE) 5 MG tablet     Question: Patient called requesting refill on her montelukast. Patient stated her pro-air was discontinued and she need a new prescription sent. For the Oxycodone patient stated her pharmacy is out of stock and recommended changing dosage to 10mg and she takes half or switching prescription to percocet. Please advise thank you.     Pharmacy confirmed as    [id number] [address] AT [facility]: Yes    OK to leave a message on voice mail? Yes    Primary language: English      needed? No    Call taken on September 19, 2023 at 10:34 AM by Christine Cruz

## 2023-09-20 ENCOUNTER — THERAPY VISIT (OUTPATIENT)
Dept: PHYSICAL THERAPY | Facility: REHABILITATION | Age: 48
End: 2023-09-20
Attending: ORTHOPAEDIC SURGERY
Payer: OTHER MISCELLANEOUS

## 2023-09-20 DIAGNOSIS — I89.0 LYMPHEDEMA: ICD-10-CM

## 2023-09-20 DIAGNOSIS — R29.898 WEAKNESS OF RIGHT LOWER EXTREMITY: ICD-10-CM

## 2023-09-20 DIAGNOSIS — M25.471 RIGHT ANKLE SWELLING: ICD-10-CM

## 2023-09-20 DIAGNOSIS — Z98.890 HISTORY OF ANKLE SURGERY: Primary | ICD-10-CM

## 2023-09-20 PROCEDURE — 97140 MANUAL THERAPY 1/> REGIONS: CPT | Mod: GP | Performed by: PHYSICAL THERAPIST

## 2023-09-20 NOTE — TELEPHONE ENCOUNTER
Patient calling again to check if Dr. Bailon seen her message, informed her he is here this afternoon. Will resend another message.

## 2023-09-20 NOTE — TELEPHONE ENCOUNTER
[facility] phone call message- medication clarification/question:    Full Medication Name: ALPRAZolam (XANAX) 2 MG tablet       Question: Patient called stating that she wants to disregard 1st message. Patient states she got the Oxycodone but she wants a replacement on the Singular inhaler (they no longer have it) so she wants a fast reacting inhaler. Also wanted to inform PCP that her ALPRAZolam (XANAX) 2 MG tablet  is due for refill as well.     Pharmacy confirmed as    [id number] [address]  AT [facility] OF [id number]: Yes    OK to leave a message on voice mail? Yes    Primary language: English      needed? No    Call taken on September 20, 2023 at 11:40 AM by Lamar Cruz     Primary language: English      needed? No

## 2023-09-20 NOTE — TELEPHONE ENCOUNTER
Team - per my records, Karoline received her last Xanax prescription on 8/30/23. She should not be due again til 9/29.  Please clarify. If she is requiring it sooner than that, we will need to have a conversation over the phone. I added a medication at the last visit to help her sleep and we discussed not increasing her xanax. Thanks!    Robbie Bailon MD  September 20, 2023  1:01 PM

## 2023-09-22 ENCOUNTER — TELEPHONE (OUTPATIENT)
Dept: FAMILY MEDICINE | Facility: CLINIC | Age: 48
End: 2023-09-22
Payer: COMMERCIAL

## 2023-09-22 DIAGNOSIS — Z12.31 ENCOUNTER FOR SCREENING MAMMOGRAM FOR BREAST CANCER: Primary | ICD-10-CM

## 2023-09-22 NOTE — TELEPHONE ENCOUNTER
[facility] phone call message - order or referral request for patient:     Order or referral being requested:     Patient called requesting for a mammogram referral. If able to place please call patient to let her know thank you.        OK to leave a message on voice mail? Yes    Primary language: English      needed? No    Call taken on September 22, 2023 at 2:58 PM by Christine Cruz

## 2023-09-25 ENCOUNTER — THERAPY VISIT (OUTPATIENT)
Dept: PHYSICAL THERAPY | Facility: CLINIC | Age: 48
End: 2023-09-25
Payer: OTHER MISCELLANEOUS

## 2023-09-25 DIAGNOSIS — Z98.890 HISTORY OF ANKLE SURGERY: ICD-10-CM

## 2023-09-25 PROCEDURE — 97110 THERAPEUTIC EXERCISES: CPT | Mod: GP

## 2023-09-25 PROCEDURE — 97161 PT EVAL LOW COMPLEX 20 MIN: CPT | Mod: GP

## 2023-09-25 NOTE — PROGRESS NOTES
PHYSICAL THERAPY EVALUATION  Type of Visit: Evaluation    See electronic medical record for Abuse and Falls Screening details.    Subjective  Patient presents w/ history of R ankle injury that originally occurred at work as a  while playing basketball in 2017. She had reconstruction surgery in 2018, again in 2019 and most recently in 2023. She was immediately WBAT in the CAM boot and weaned out of this in about 3 weeks.  She continues to have a lot of swelling and pain post operatively. She recently started lymphedema PT to try and help with swelling. For exercise walks her dog. 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   DOS: 6/21/23  Surgeon: Dr. Bashir  PROCEDURE:     1.  Right ankle extensive arthroscopic debridement.  2.  Right ankle partial tibia and talus excision.        Presenting condition or subjective complaint: ankle surgery, ankle keeps swelling  Date of onset:      Relevant medical history: Asthma; Cancer; Mental Illness; Overweight; Smoking   Dates & types of surgery: Heart Surgery 2017 - pericarditis (open heart surgery)  Colon CA in Jan 2023, June 14, 2023; ankle surgery in 2018, 2019    Prior diagnostic imaging/testing results: X-ray     Prior therapy history for the same diagnosis, illness or injury: No has had therapy for the ankle in the past for ankle but not for the edema    Prior Level of Function  Transfers: Independent  Ambulation: Independent  ADL: Independent  IADL:  Independent    Living Environment  Social support: With family members   Type of home: Multi-level; House   Stairs to enter the home: No       Ramp: No   Stairs inside the home: Yes 20 Is there a railing: Yes   Help at home: Home and Yard maintenance tasks; Home management tasks (cooking, cleaning)  Equipment owned:       Employment:   emergency management/corrections  Hobbies/Interests:       Patient goals for therapy: swelling to go down, getting back to regular work, walking longer, wants to join the gym    Pain assessment: See objective evaluation for additional pain details     Objective   FOOT/ANKLE EVALUATION  PAIN: Pain Level at Rest: 3/10  Pain Level with Use: 10/10  Pain Location: anterior and lateral ankle, sometimes up into lower leg  Pain Quality: Throbbing  Pain Frequency: intermittent  Pain is Worst: at times impacting sleep  Pain is Exacerbated By: worse with more standing and walking  Pain is Relieved By: hot water, elevating  Pain Progression: Worsened  INTEGUMENTARY (edema, incisions):   R ankle figure 8: 59 cm  L ankle figure 8: 57 cm  POSTURE:   GAIT:   Weightbearing Status:   Assistive Device(s):   Gait Deviations:   BALANCE/PROPRIOCEPTION:   WEIGHT BEARING ALIGNMENT:   Foot position WNL  NON-WEIGHTBEARING ALIGNMENT:    ROM:   (Degrees) Left AROM Left PROM  Right AROM Right PROM   Ankle Dorsiflexion neutral 10 deg Lacking 25 deg * Lacking 20 deg *   Ankle Plantarflexion WNL  60 deg    Ankle Inversion 55  20*    Ankle Eversion 20  8    Great Toe Flexion       Great Toe Extension       Pain:   End feel:     STRENGTH:   Pain: - none + mild ++ moderate +++ severe  Strength Scale: 0-5/5 Left Right   Ankle Dorsiflexion 5 3+   Ankle Plantarflexion 5- 3   Ankle Inversion 5 3   Ankle Eversion 5 3-   Great Toe Flexion     Great Toe Extension     Anterior Tibialis     Posterior Tibialis     Peroneals     Extensor Digitorum     Gastroc/Soleus       FLEXIBILITY:   SPECIAL TESTS:   FUNCTIONAL TESTS:   Hesitant to fully weight bear on R LE, will only complete w/ UE support, reports pain and feeling unstable.  SL stance mild proprioceptive difficulty on L LE.  PALPATION:   Sensitive to light touch throughout ankle  JOINT MOBILITY:     Assessment & Plan   CLINICAL IMPRESSIONS  Medical Diagnosis: History of ankle surgery    Treatment Diagnosis: R ankle pain, weakness, stiffness    Impression/Assessment: Patient is a 48 year old female with R ankle complaints.  The following significant findings have been identified: Pain, Decreased ROM/flexibility, Decreased joint mobility, Decreased strength, Impaired balance, Decreased proprioception, Edema, Impaired gait, and Decreased activity tolerance. These impairments interfere with their ability to perform self care tasks, work tasks, recreational activities, household chores, driving , household mobility, and community mobility as compared to previous level of function.     Clinical Decision Making (Complexity):  Clinical Presentation: Stable/Uncomplicated  Clinical Presentation Rationale: based on medical and personal factors listed in PT evaluation  Clinical Decision Making (Complexity): Low complexity    PLAN OF CARE  Treatment Interventions:  Modalities: Dry Needling  Interventions: Gait Training, Manual Therapy, Neuromuscular Re-education, Therapeutic Activity, Therapeutic Exercise, Self-Care/Home Management    Long Term Goals     PT Goal 1  Goal Identifier: Walking  Goal Description: Patient will be able to walk for at least 30 min w/o increase in pain  Rationale: to maximize safety and independence with performance of ADLs and functional tasks;to maximize safety and independence within the home;to maximize safety and independence within the community  Target Date: 11/20/23      Frequency of Treatment: 1x/week  Duration of Treatment: 8 weeks    Recommended Referrals to Other Professionals:  none  Education Assessment:        Risks and benefits of evaluation/treatment have been explained.   Patient/Family/caregiver agrees with Plan of Care.     Evaluation Time:     PT Eval, Low Complexity Minutes (56118): 20       Signing Clinician: Iqra An PT

## 2023-09-26 NOTE — TELEPHONE ENCOUNTER
Sorry for the delay. Mammogram order is in. Thanks!    Robbie Bailon MD  September 26, 2023  3:42 PM

## 2023-09-27 ENCOUNTER — TELEPHONE (OUTPATIENT)
Dept: FAMILY MEDICINE | Facility: CLINIC | Age: 48
End: 2023-09-27

## 2023-09-27 NOTE — TELEPHONE ENCOUNTER
[facility] phone call message- general phone call:    Reason for call: Patient called requesting for a letter for intermittent FMLA, letter to state that when her ankle is swelling she can be late for work, and stating she needs to attend all appointments (physical therapy, rehab and all up coming appointments). She requested if Dr. Bailon can give her a call so she can explain it more. Please advise thank you.    Return call needed: Yes    OK to leave a message on voice mail? Yes    Primary language: English      needed? No    Call taken on September 27, 2023 at 1:09 PM by Christine Cruz

## 2023-09-27 NOTE — LETTER
WORK NOTE - REQUEST FOR INTERMITTENT FMLA  2023         Patient:  Darlene Hudson  :   1975  MRN:     [id number]  Physician: JULIETH BAILON    Darlene Hudson is under my care.  She is being treated for R ankle pain, now s/p surgery which was unsuccessful. She will require intermittent leave to attend physical therapy and when her ankle swells and she has difficulty bearing weight. She may miss 1-2 days at a time for this chronic condition.    She also is being treated for colon cancer, now s/p successful partial colectomy. She will require intermittent leave to attend her oncology, surgery, and imaging appointments for continued surveillance.       The next clinic appointment is scheduled for (date/time) 10/18/2023.            Julieth Bailon MD  2023  5:10 PM

## 2023-09-27 NOTE — TELEPHONE ENCOUNTER
Spoke to pt. Letter written as requested. She will access via [employer].    Robbie Bailon MD  September 27, 2023  5:12 PM

## 2023-09-28 ENCOUNTER — TELEPHONE (OUTPATIENT)
Dept: FAMILY MEDICINE | Facility: CLINIC | Age: 48
End: 2023-09-28
Payer: COMMERCIAL

## 2023-09-28 DIAGNOSIS — F41.1 GENERALIZED ANXIETY DISORDER: ICD-10-CM

## 2023-09-28 DIAGNOSIS — F40.01 PANIC DISORDER WITH AGORAPHOBIA: ICD-10-CM

## 2023-09-28 RX ORDER — ALPRAZOLAM 2 MG
2 TABLET ORAL 2 TIMES DAILY PRN
Qty: 60 TABLET | Refills: 5 | Status: SHIPPED | OUTPATIENT
Start: 2023-09-28 | End: 2023-11-22

## 2023-09-28 NOTE — TELEPHONE ENCOUNTER
[facility] phone call message- general phone call:    Reason for call: Patient calling in today stating that the pharmacy lost her alprazolam (Xanax) prescription. Patient is requesting a new prescription be sent the pharmacy.    Return call needed: Yes    OK to leave a message on voice mail? Yes    Primary language: English      needed? No    Call taken on September 28, 2023 at 11:29 AM by DOMINGO LUNA CMA

## 2023-09-28 NOTE — TELEPHONE ENCOUNTER
PDMP reviewed. Refill sent. Please inform pt. Thanks!    Robbie Bailon MD  September 28, 2023  3:34 PM

## 2023-10-02 ENCOUNTER — TELEPHONE (OUTPATIENT)
Dept: FAMILY MEDICINE | Facility: CLINIC | Age: 48
End: 2023-10-02

## 2023-10-02 NOTE — TELEPHONE ENCOUNTER
[facility] phone call message- general phone call:    Reason for call: Writer schedule patient for appointment on 10/2 at 4pm with . Due to patient being a restricted patient, writer is unable to schedule with . Attempted many times to reschedule her appointment with a faculty, patient stated she wanted a call back from Meredith or Emmy. Please call patient back and advise.     Return call needed: Yes    OK to leave a message on voice mail? Yes    Primary language: English      needed? No    Call taken on October 2, 2023 at 1:19 PM by Jailyn Hdez

## 2023-10-03 ENCOUNTER — HOSPITAL ENCOUNTER (EMERGENCY)
Facility: CLINIC | Age: 48
Discharge: HOME | End: 2023-10-03
Attending: STUDENT IN AN ORGANIZED HEALTH CARE EDUCATION/TRAINING PROGRAM | Admitting: STUDENT IN AN ORGANIZED HEALTH CARE EDUCATION/TRAINING PROGRAM
Payer: COMMERCIAL

## 2023-10-03 ENCOUNTER — PATIENT OUTREACH (OUTPATIENT)
Dept: GASTROENTEROLOGY | Facility: CLINIC | Age: 48
End: 2023-10-03
Payer: COMMERCIAL

## 2023-10-03 ENCOUNTER — APPOINTMENT (OUTPATIENT)
Dept: RADIOLOGY | Facility: CLINIC | Age: 48
End: 2023-10-03
Attending: STUDENT IN AN ORGANIZED HEALTH CARE EDUCATION/TRAINING PROGRAM
Payer: COMMERCIAL

## 2023-10-03 VITALS
SYSTOLIC BLOOD PRESSURE: 129 MMHG | HEART RATE: 69 BPM | OXYGEN SATURATION: 99 % | DIASTOLIC BLOOD PRESSURE: 73 MMHG | RESPIRATION RATE: 16 BRPM

## 2023-10-03 DIAGNOSIS — Z12.11 SPECIAL SCREENING FOR MALIGNANT NEOPLASMS, COLON: Primary | ICD-10-CM

## 2023-10-03 DIAGNOSIS — R10.9 FLANK PAIN: ICD-10-CM

## 2023-10-03 DIAGNOSIS — R06.02 SHORTNESS OF BREATH: ICD-10-CM

## 2023-10-03 DIAGNOSIS — R07.9 CHEST PAIN, UNSPECIFIED TYPE: ICD-10-CM

## 2023-10-03 LAB
ALBUMIN SERPL BCG-MCNC: 4.1 G/DL (ref 3.5–5.2)
ALP SERPL-CCNC: 108 U/L (ref 35–104)
ALT SERPL W P-5'-P-CCNC: 22 U/L (ref 0–50)
ANION GAP SERPL CALCULATED.3IONS-SCNC: 8 MMOL/L (ref 7–15)
AST SERPL W P-5'-P-CCNC: 22 U/L (ref 0–45)
ATRIAL RATE - MUSE: 111 BPM
BASO+EOS+MONOS # BLD AUTO: ABNORMAL 10*3/UL
BASO+EOS+MONOS NFR BLD AUTO: ABNORMAL %
BASOPHILS # BLD AUTO: 0 10E3/UL (ref 0–0.2)
BASOPHILS NFR BLD AUTO: 1 %
BILIRUB DIRECT SERPL-MCNC: <0.2 MG/DL (ref 0–0.3)
BILIRUB SERPL-MCNC: <0.2 MG/DL
BUN SERPL-MCNC: 12.7 MG/DL (ref 6–20)
CALCIUM SERPL-MCNC: 9.6 MG/DL (ref 8.6–10)
CHLORIDE SERPL-SCNC: 104 MMOL/L (ref 98–107)
CREAT SERPL-MCNC: 0.75 MG/DL (ref 0.51–0.95)
D DIMER PPP FEU-MCNC: 0.46 UG/ML FEU (ref 0–0.5)
DEPRECATED HCO3 PLAS-SCNC: 27 MMOL/L (ref 22–29)
DIASTOLIC BLOOD PRESSURE - MUSE: NORMAL MMHG
EGFRCR SERPLBLD CKD-EPI 2021: >90 ML/MIN/1.73M2
EOSINOPHIL # BLD AUTO: 0.8 10E3/UL (ref 0–0.7)
EOSINOPHIL NFR BLD AUTO: 11 %
ERYTHROCYTE [DISTWIDTH] IN BLOOD BY AUTOMATED COUNT: 12.3 % (ref 10–15)
FLUAV RNA SPEC QL NAA+PROBE: NEGATIVE
FLUBV RNA RESP QL NAA+PROBE: NEGATIVE
GLUCOSE SERPL-MCNC: 89 MG/DL (ref 70–99)
HCT VFR BLD AUTO: 37.3 % (ref 35–47)
HGB BLD-MCNC: 12.3 G/DL (ref 11.7–15.7)
IMM GRANULOCYTES # BLD: 0 10E3/UL
IMM GRANULOCYTES NFR BLD: 0 %
INTERPRETATION ECG - MUSE: NORMAL
LIPASE SERPL-CCNC: 23 U/L (ref 13–60)
LYMPHOCYTES # BLD AUTO: 1.9 10E3/UL (ref 0.8–5.3)
LYMPHOCYTES NFR BLD AUTO: 28 %
MCH RBC QN AUTO: 29.9 PG (ref 26.5–33)
MCHC RBC AUTO-ENTMCNC: 33 G/DL (ref 31.5–36.5)
MCV RBC AUTO: 91 FL (ref 78–100)
MONOCYTES # BLD AUTO: 0.4 10E3/UL (ref 0–1.3)
MONOCYTES NFR BLD AUTO: 6 %
NEUTROPHILS # BLD AUTO: 3.8 10E3/UL (ref 1.6–8.3)
NEUTROPHILS NFR BLD AUTO: 54 %
NRBC # BLD AUTO: 0 10E3/UL
NRBC BLD AUTO-RTO: 0 /100
P AXIS - MUSE: 38 DEGREES
PLATELET # BLD AUTO: 296 10E3/UL (ref 150–450)
POTASSIUM SERPL-SCNC: 3.9 MMOL/L (ref 3.4–5.3)
PR INTERVAL - MUSE: 162 MS
PROT SERPL-MCNC: 7.4 G/DL (ref 6.4–8.3)
QRS DURATION - MUSE: 76 MS
QT - MUSE: 332 MS
QTC - MUSE: 451 MS
R AXIS - MUSE: 15 DEGREES
RBC # BLD AUTO: 4.12 10E6/UL (ref 3.8–5.2)
RSV RNA SPEC NAA+PROBE: NEGATIVE
SARS-COV-2 RNA RESP QL NAA+PROBE: NEGATIVE
SODIUM SERPL-SCNC: 139 MMOL/L (ref 135–145)
SYSTOLIC BLOOD PRESSURE - MUSE: NORMAL MMHG
T AXIS - MUSE: 2 DEGREES
TROPONIN T SERPL HS-MCNC: <6 NG/L
VENTRICULAR RATE- MUSE: 111 BPM
WBC # BLD AUTO: 7 10E3/UL (ref 4–11)

## 2023-10-03 PROCEDURE — 85379 FIBRIN DEGRADATION QUANT: CPT | Performed by: STUDENT IN AN ORGANIZED HEALTH CARE EDUCATION/TRAINING PROGRAM

## 2023-10-03 PROCEDURE — 80053 COMPREHEN METABOLIC PANEL: CPT | Performed by: STUDENT IN AN ORGANIZED HEALTH CARE EDUCATION/TRAINING PROGRAM

## 2023-10-03 PROCEDURE — 250N000011 HC RX IP 250 OP 636: Performed by: EMERGENCY MEDICINE

## 2023-10-03 PROCEDURE — 83690 ASSAY OF LIPASE: CPT | Performed by: STUDENT IN AN ORGANIZED HEALTH CARE EDUCATION/TRAINING PROGRAM

## 2023-10-03 PROCEDURE — 99285 EMERGENCY DEPT VISIT HI MDM: CPT | Mod: 25

## 2023-10-03 PROCEDURE — 84484 ASSAY OF TROPONIN QUANT: CPT | Performed by: STUDENT IN AN ORGANIZED HEALTH CARE EDUCATION/TRAINING PROGRAM

## 2023-10-03 PROCEDURE — 93005 ELECTROCARDIOGRAM TRACING: CPT | Performed by: STUDENT IN AN ORGANIZED HEALTH CARE EDUCATION/TRAINING PROGRAM

## 2023-10-03 PROCEDURE — 71046 X-RAY EXAM CHEST 2 VIEWS: CPT

## 2023-10-03 PROCEDURE — 85025 COMPLETE CBC W/AUTO DIFF WBC: CPT | Performed by: STUDENT IN AN ORGANIZED HEALTH CARE EDUCATION/TRAINING PROGRAM

## 2023-10-03 PROCEDURE — 82248 BILIRUBIN DIRECT: CPT | Performed by: STUDENT IN AN ORGANIZED HEALTH CARE EDUCATION/TRAINING PROGRAM

## 2023-10-03 PROCEDURE — 96374 THER/PROPH/DIAG INJ IV PUSH: CPT

## 2023-10-03 PROCEDURE — 87637 SARSCOV2&INF A&B&RSV AMP PRB: CPT | Performed by: STUDENT IN AN ORGANIZED HEALTH CARE EDUCATION/TRAINING PROGRAM

## 2023-10-03 PROCEDURE — 36415 COLL VENOUS BLD VENIPUNCTURE: CPT | Performed by: STUDENT IN AN ORGANIZED HEALTH CARE EDUCATION/TRAINING PROGRAM

## 2023-10-03 RX ORDER — KETOROLAC TROMETHAMINE 15 MG/ML
15 INJECTION, SOLUTION INTRAMUSCULAR; INTRAVENOUS ONCE
Status: COMPLETED | OUTPATIENT
Start: 2023-10-03 | End: 2023-10-03

## 2023-10-03 RX ADMIN — KETOROLAC TROMETHAMINE 15 MG: 15 INJECTION INTRAMUSCULAR; INTRAVENOUS at 16:14

## 2023-10-03 ASSESSMENT — ACTIVITIES OF DAILY LIVING (ADL): ADLS_ACUITY_SCORE: 35

## 2023-10-03 NOTE — ED NOTES
ED SIGNOUT  Date/Time:10/3/2023 3:43 PM    Patient signed out to me by my colleague, Dr. Espana.  Please see their note for complete history and physical. Plan to follow up on CXR.    The creation of this record is based on the scribe s observations of the work being performed by Eula Hunt and the provider s statements to them. It was created on their behalf by Eula Hunt a trained medical scribe. This document has been checked and approved by the attending provider.      REMAINING ED WORKUP:  CXR with plan for discharge afterwards    Vitals:  /73   Pulse 69   Resp 16   SpO2 99%       Pertinent labs results reviewed   Results for orders placed or performed during the hospital encounter of 10/03/23   Chest XR,  PA & LAT    Impression    IMPRESSION:    Low lung volumes. No airspace opacities, pleural effusions, or pneumothorax.    Stable, nonenlarged cardiac silhouette.   Basic metabolic panel   Result Value Ref Range    Sodium 139 135 - 145 mmol/L    Potassium 3.9 3.4 - 5.3 mmol/L    Chloride 104 98 - 107 mmol/L    Carbon Dioxide (CO2) 27 22 - 29 mmol/L    Anion Gap 8 7 - 15 mmol/L    Urea Nitrogen 12.7 6.0 - 20.0 mg/dL    Creatinine 0.75 0.51 - 0.95 mg/dL    GFR Estimate >90 >60 mL/min/1.73m2    Calcium 9.6 8.6 - 10.0 mg/dL    Glucose 89 70 - 99 mg/dL   Result Value Ref Range    Troponin T, High Sensitivity <6 <=14 ng/L   Hepatic function panel   Result Value Ref Range    Protein Total 7.4 6.4 - 8.3 g/dL    Albumin 4.1 3.5 - 5.2 g/dL    Bilirubin Total <0.2 <=1.2 mg/dL    Alkaline Phosphatase 108 (H) 35 - 104 U/L    AST 22 0 - 45 U/L    ALT 22 0 - 50 U/L    Bilirubin Direct <0.20 0.00 - 0.30 mg/dL   Result Value Ref Range    Lipase 23 13 - 60 U/L   Symptomatic Influenza A/B, RSV, & SARS-CoV2 PCR (COVID-19) Nasopharyngeal    Specimen: Nasopharyngeal; Swab   Result Value Ref Range    Influenza A PCR Negative Negative    Influenza B PCR Negative Negative    RSV PCR Negative Negative    SARS CoV2 PCR  Negative Negative   D dimer quantitative   Result Value Ref Range    D-Dimer Quantitative 0.46 0.00 - 0.50 ug/mL FEU   CBC with platelets and differential   Result Value Ref Range    WBC Count 7.0 4.0 - 11.0 10e3/uL    RBC Count 4.12 3.80 - 5.20 10e6/uL    Hemoglobin 12.3 11.7 - 15.7 g/dL    Hematocrit 37.3 35.0 - 47.0 %    MCV 91 78 - 100 fL    MCH 29.9 26.5 - 33.0 pg    MCHC 33.0 31.5 - 36.5 g/dL    RDW 12.3 10.0 - 15.0 %    Platelet Count 296 150 - 450 10e3/uL    % Neutrophils 54 %    % Lymphocytes 28 %    % Monocytes 6 %    Mids % (Monos, Eos, Basos)      % Eosinophils 11 %    % Basophils 1 %    % Immature Granulocytes 0 %    NRBCs per 100 WBC 0 <1 /100    Absolute Neutrophils 3.8 1.6 - 8.3 10e3/uL    Absolute Lymphocytes 1.9 0.8 - 5.3 10e3/uL    Absolute Monocytes 0.4 0.0 - 1.3 10e3/uL    Mids Abs (Monos, Eos, Basos)      Absolute Eosinophils 0.8 (H) 0.0 - 0.7 10e3/uL    Absolute Basophils 0.0 0.0 - 0.2 10e3/uL    Absolute Immature Granulocytes 0.0 <=0.4 10e3/uL    Absolute NRBCs 0.0 10e3/uL   ECG 12-LEAD WITH MUSE (LHE)   Result Value Ref Range    Systolic Blood Pressure  mmHg    Diastolic Blood Pressure  mmHg    Ventricular Rate 111 BPM    Atrial Rate 111 BPM    NJ Interval 162 ms    QRS Duration 76 ms     ms    QTc 451 ms    P Axis 38 degrees    R AXIS 15 degrees    T Axis 2 degrees    Interpretation ECG       Sinus tachycardia  Possible Left atrial enlargement  Borderline ECG  When compared with ECG of 20-SEP-2022 19:02,  NJ interval has decreased  Vent. rate has increased BY  42 BPM  T wave inversion now evident in Inferior leads  T wave amplitude has decreased in Lateral leads  Confirmed by SEE ED PROVIDER NOTE FOR, ECG INTERPRETATION ([id number]),  Samara Aldana ([id number]) on 10/3/2023 3:32:23 PM         Pertinent imaging reviewed   Please see official radiology report.  Chest XR,  PA & LAT   Final Result   IMPRESSION:      Low lung volumes. No airspace opacities, pleural effusions, or  pneumothorax.      Stable, nonenlarged cardiac silhouette.           Interventions  Medications   ketorolac (TORADOL) injection 15 mg (15 mg Intravenous $Given 10/3/23 1614)        ED Course/MDM:  3:30 PM  Signout accepted from Dr. Espana.  Prior records were reviewed.  Diagnostics from this visit are reviewed.  4:07 PM CXR (independent interpretation): no acute cardiopulmonary process   Pt discharged per sign out plan.  Toradol ordered prior to discharge for same chronic pain.    ED Course as of 10/03/23 1640   Tue Oct 03, 2023   [id number] I met with the patient, obtained history, performed an initial exam, and discussed options and plan for diagnostics and treatment here in the ED.   1423 Patient is a 48-year-old female who presents to the emergency department with 1 day of right flank pain radiating to her right lower quadrant abdomen in addition to shortness of breath and chest tightness that began today.  Her exam is positive for right CVA tenderness, in the absence of fever or urinary symptoms.  She does report an ankle surgery in June, but this is not close enough in proximity to raise severity of risk of pulmonary embolism as this was not in the past 4 weeks.  She is low risk Wells, and therefore candidate for D-dimer.  We will also rule out ACS with troponin.  Given her flank pain will evaluate with urinalysis to look for UTI/pyelonephritis, in addition to LFTs and lipase, although she does not have focal abdominal tenderness.   1518 Age-adjusted dimer normal, does not warrant CT scan of the chest.  No leukocytosis or anemia.   1525 Troponin undetectable, does not need to be repeated.  Lipase does not show pancreatitis.  No evidence of transaminitis.  No electrolyte abnormalities or kidney injury.  Pending chest x-ray.   1526 EKG shows sinus tachycardia at a rate of 111 with a , QRS 76, QTc 451.  No ST segment changes or evidence of emergent ischemia detected.   1537 Pt signed out to Dr. Chao with the  following to do:  - f/up CXR then discharge           1. Flank pain    2. Shortness of breath    3. Chest pain, unspecified type          Brandon Chao DO  Emergency Medicine  [facility] EMERGENCY ROOM  10/3/2023       Brandon Chao MD  10/03/23 1640

## 2023-10-03 NOTE — ED PROVIDER NOTES
EMERGENCY DEPARTMENT ENCOUNTER      NAME: Darlene Hudson  AGE: 48 year old female  YOB: 1975  MRN: [id number]  EVALUATION DATE & TIME: No admission date for patient encounter.    PCP: Robbie Bailon    ED PROVIDER: Faisal Espana MD      Chief Complaint   Patient presents with    Chest Pain    Shortness of Breath         FINAL IMPRESSION:  1. Flank pain    2. Shortness of breath    3. Chest pain, unspecified type          ED COURSE & MEDICAL DECISION MAKING:    Pertinent Labs & Imaging studies reviewed. (See chart for details)  48 year old female presents to the Emergency Department for evaluation of chest pain, shortness of breath, flank pain    ED Course as of 10/03/23 1537   Tue Oct 03, 2023   1407 I met with the patient, obtained history, performed an initial exam, and discussed options and plan for diagnostics and treatment here in the ED.   1423 Patient is a 48-year-old female who presents to the emergency department with 1 day of right flank pain radiating to her right lower quadrant abdomen in addition to shortness of breath and chest tightness that began today.  Her exam is positive for right CVA tenderness, in the absence of fever or urinary symptoms.  She does report an ankle surgery in June, but this is not close enough in proximity to raise severity of risk of pulmonary embolism as this was not in the past 4 weeks.  She is low risk Wells, and therefore candidate for D-dimer.  We will also rule out ACS with troponin.  Given her flank pain will evaluate with urinalysis to look for UTI/pyelonephritis, in addition to LFTs and lipase, although she does not have focal abdominal tenderness.   1518 Age-adjusted dimer normal, does not warrant CT scan of the chest.  No leukocytosis or anemia.   1525 Troponin undetectable, does not need to be repeated.  Lipase does not show pancreatitis.  No evidence of transaminitis.  No electrolyte abnormalities or kidney injury.  Pending chest x-ray.   1526  EKG shows sinus tachycardia at a rate of 111 with a , QRS 76, QTc 451.  No ST segment changes or evidence of emergent ischemia detected.   1537 Pt signed out to Dr. Chao with the following to do:  - f/up CXR then discharge       Medical Decision Making    History:  Supplemental history from: Documented in chart, if applicable  External Record(s) reviewed: Documented in chart, if applicable.    Work Up:  Chart documentation includes differential considered and any EKGs or imaging independently interpreted by provider, where specified.  In additional to work up documented, I considered the following work up: Documented in chart, if applicable.    External consultation:  Discussion of management with another provider: Documented in chart, if applicable    Complicating factors:  Care impacted by chronic illness: N/A  Care affected by social determinants of health: N/A    Disposition considerations:  pending work up at the time of sign out to Dr. Chao.        At the conclusion of the encounter I discussed the results of all of the tests and the disposition. The questions were answered. The patient or family acknowledged understanding and was agreeable with the care plan.     0 minutes of critical care time     MEDICATIONS GIVEN IN THE EMERGENCY:  Medications - No data to display    NEW PRESCRIPTIONS STARTED AT TODAY'S ER VISIT  New Prescriptions    No medications on file          =================================================================    HPI    Patient information was obtained from: patient    Use of : N/A        Darlene Hudson is a 48 year old female with a pertinent history of asthma, anxiety, cocaine abuse, tobacco abuse, coronary artery disease, depression, colon cancer, and alcohol abuse who presents to this ED by walking for evaluation of chest pain and shortness of breath.    Patient reports that she's had chest pain and shortness of breath today. She started feeling it at work  so she went out side to walk, but couldn't catch her breath. Patient has felt fatigued and had one episode of dizziness today but attributes this to her nervousness. She has also had right-sided back/flank pain since yesterday and has had multiple instances of kidney infection in the past with one instance of kidney stones. Patient has used her inhaler 5 times today. She had surgery for her colon cancer back in January, but does not have a colostomy bag. Patient denies any fever, urinary symptoms, digestive symptoms, or abdominal pain.      PAST MEDICAL HISTORY:  Past Medical History:   Diagnosis Date    Abdominal pain 06/29/2015    Abnormal cervical Papanicolaou smear 11/09/2014    Overview:  ACUS/HPV positive    Abnormal cytology finding 11/09/2014    Overview:  ACUS/HPV positive    Acute pericardial effusion 02/06/2017    Agoraphobia with panic attacks     Anxiety     Arthralgia of both lower legs 05/29/2020    Bilateral achy knee pain that is chronic in nature going on for years. Most likely osteoarthritis in knees related to obesity. Previously injected in both knees with good relief. 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  Has controlled substance agreement with them.  On Vicodin, Valium, Klonopin prescribed only from there.       Coronary artery disease     Cough 02/09/2020    Family history of colon cancer 10/24/2020    Hx of seasonal allergies     Infection due to 2019 novel coronavirus 09/20/2022    Infectious pericarditis     Lipoma     Low back pain  07/13/2015    Major depressive disorder, recurrent episode, moderate (H) 09/05/2006    Menorrhagia with irregular cycle 07/15/2022    Added automatically from request for surgery [id number]    Moderate persistent asthma without complication 09/29/2020    Nondependent alcohol abuse, episodic drinking behavior 10/03/2012    Noninflammatory disorder of vagina 02/20/2015    Other chronic pain     Other long term (current) drug therapy 01/28/2013    Overview:  Vicodin and cyclobenzaprine monthly    Panic disorder with agoraphobia 09/05/2006    Pap smear for cervical cancer screening 10/31/2016    03/29/2010  Normal cytology, HPV ot done, repeat in 3 years.    Pericarditis 2017    Physiologic disturbance of temperature regulation 10/24/2020    PONV (postoperative nausea and vomiting)     Right ankle swelling 06/07/2022    Added automatically from request for surgery [id number]    Tobacco abuse     Tobacco use disorder 07/13/2015       PAST SURGICAL HISTORY:  Past Surgical History:   Procedure Laterality Date    ANKLE SURGERY Right 05/30/2019    ARTHROSCOPY ANKLE Right 6/21/2023    Procedure: right ankle arthroscopy with debridement;  Surgeon: Kareem Bashir MD;  Location: [facility] OR    BIOPSY BREAST Right 1990    benign    COLONOSCOPY N/A 1/3/2023    Procedure: COLONOSCOPY WITH BIOPSY OF COLON MASS, POLYPECTOMY;  Surgeon: Kris Charles MD;  Location: [facility] OR    CREATION PERICARDIAL WINDOW  02/10/2017    [facility]    DILATION AND CURETTAGE N/A 7/22/2022    Procedure: DILATION AND CURETTAGE, UTERUS;  Surgeon: Lesly Holland;  Location: [facility] OR    HEMORRHOIDECTOMY EXTERNAL      HYSTEROSCOPY, WITH ENDOMETRIAL RADIOFREQUENCY ABLATION - NOVASURE N/A 7/22/2022    Procedure: HYSTEROSCOPY, WITH ENDOMETRIAL RADIOFREQUENCY ABLATION - NOVASURE DILATION AND CURETTAGE, UTERUS;  Surgeon: Lesly Holland;  Location: [facility] OR    LAPAROSCOPIC ASSISTED SIGMOID COLECTOMY N/A 1/6/2023    Procedure:  "LAPAROSCOPIC ASSISTED DESCENDING COLELCTOMY SPLENIC FLEXURE MOBILIZATION ;  Surgeon: Kris Charles MD;  Location: [facility] OR    LAPAROSCOPIC LYSIS ADHESIONS N/A 1/6/2023    Procedure: LYSIS OF ADHESIONS;  Surgeon: Kris Charles MD;  Location: [facility] OR    TUMOR REMOVAL      Has had 3. In the right breast and \"inside of rib cage.\"           CURRENT MEDICATIONS:    albuterol (PROAIR HFA/PROVENTIL HFA/VENTOLIN HFA) 108 (90 Base) MCG/ACT inhaler  ALPRAZolam (XANAX) 2 MG tablet  ALPRAZolam (XANAX) 2 MG tablet  cetirizine (ZYRTEC) 10 MG tablet  diclofenac (VOLTAREN) 50 MG EC tablet  doxylamine (UNISOM) 25 MG TABS tablet  fluticasone (FLONASE) 50 MCG/ACT nasal spray  fluticasone (FLONASE) 50 MCG/ACT nasal spray  guaiFENesin-codeine (GUAIFENESIN AC) 100-10 MG/5ML syrup  hydrOXYzine (ATARAX) 25 MG tablet  montelukast (SINGULAIR) 10 MG tablet  naloxone (NARCAN) 4 MG/0.1ML nasal spray  ondansetron (ZOFRAN) 8 MG tablet  oxyCODONE (ROXICODONE) 5 MG tablet  PROAIR  (90 Base) MCG/ACT inhaler  prochlorperazine (COMPAZINE) 10 MG tablet  spacer (OPTICHAMBER BINA) holding chamber  sucralfate (CARAFATE) 1 GM/10ML suspension  tiotropium (SPIRIVA RESPIMAT) 1.25 MCG/ACT inhaler  tiotropium (SPIRIVA) 18 MCG inhaled capsule  trimethoprim-polymyxin b (POLYTRIM) 22489-5.1 UNIT/ML-% ophthalmic solution        ALLERGIES:  Allergies   Allergen Reactions    Gabapentin Other (See Comments)     Mental status is changed     Lidocaine Other (See Comments)     \"my jaw stopped moving\"  Other reaction(s): Dystonia    Penicillins Hives, Rash and Shortness Of Breath    Lidocaine-Epinephrine Other (See Comments) and Muscle Pain (Myalgia)     Severe jaw cramping, double vision  Jaw locking       FAMILY HISTORY:  Family History   Problem Relation Age of Onset    Hypertension Mother     Cancer Mother         cervical     Other Cancer Father         lung cancer    Asthma Father     Diabetes Brother     Diabetes " Brother     Breast Cancer Maternal Grandmother     Asthma Child        SOCIAL HISTORY:   Social History     Socioeconomic History    Marital status:    Tobacco Use    Smoking status: Former     Packs/day: 0.10     Years: 30.00     Pack years: 3.00     Types: Cigarettes     Quit date: 5/28/2020     Years since quitting: 3.3    Smokeless tobacco: Never    Tobacco comments:     2-3 cig/day, Seen IP by TTS on 1/11/23 - states she is done but declined our services and resource materials stating that she did not need them. Stopped smoking in Jan 2023   Vaping Use    Vaping Use: Former   Substance and Sexual Activity    Alcohol use: Not Currently     Comment: Occasiaonlly.     Drug use: Not Currently    Sexual activity: Yes     Partners: Male       VITALS:  /68   Pulse 91   Resp 17   SpO2 97%     PHYSICAL EXAM    Physical Exam  Vitals and nursing note reviewed.   Constitutional:       General: She is not in acute distress.     Appearance: Normal appearance. She is normal weight. She is not ill-appearing.   HENT:      Head: Normocephalic and atraumatic.      Nose: Nose normal.      Mouth/Throat:      Mouth: Mucous membranes are moist.      Pharynx: Oropharynx is clear.   Eyes:      Extraocular Movements: Extraocular movements intact.      Conjunctiva/sclera: Conjunctivae normal.      Pupils: Pupils are equal, round, and reactive to light.   Cardiovascular:      Rate and Rhythm: Normal rate and regular rhythm.      Pulses: Normal pulses.      Heart sounds: Normal heart sounds. No murmur heard.  Pulmonary:      Effort: Pulmonary effort is normal. No respiratory distress.      Breath sounds: Normal breath sounds.   Abdominal:      General: Abdomen is flat. There is no distension.      Palpations: Abdomen is soft.      Tenderness: There is no abdominal tenderness. There is right CVA tenderness. There is no left CVA tenderness.   Musculoskeletal:         General: Normal range of motion.      Cervical back: Normal  range of motion.      Right lower leg: No edema.      Left lower leg: No edema.   Skin:     General: Skin is warm and dry.      Capillary Refill: Capillary refill takes less than 2 seconds.      Coloration: Skin is not jaundiced.      Findings: No rash.   Neurological:      General: No focal deficit present.      Mental Status: She is alert and oriented to person, place, and time. Mental status is at baseline.   Psychiatric:         Mood and Affect: Mood normal.         Behavior: Behavior normal.         Thought Content: Thought content normal.         Judgment: Judgment normal.            LAB:  All pertinent labs reviewed and interpreted.  Results for orders placed or performed during the hospital encounter of 10/03/23   Basic metabolic panel   Result Value Ref Range    Sodium 139 135 - 145 mmol/L    Potassium 3.9 3.4 - 5.3 mmol/L    Chloride 104 98 - 107 mmol/L    Carbon Dioxide (CO2) 27 22 - 29 mmol/L    Anion Gap 8 7 - 15 mmol/L    Urea Nitrogen 12.7 6.0 - 20.0 mg/dL    Creatinine 0.75 0.51 - 0.95 mg/dL    GFR Estimate >90 >60 mL/min/1.73m2    Calcium 9.6 8.6 - 10.0 mg/dL    Glucose 89 70 - 99 mg/dL   Result Value Ref Range    Troponin T, High Sensitivity <6 <=14 ng/L   Hepatic function panel   Result Value Ref Range    Protein Total 7.4 6.4 - 8.3 g/dL    Albumin 4.1 3.5 - 5.2 g/dL    Bilirubin Total <0.2 <=1.2 mg/dL    Alkaline Phosphatase 108 (H) 35 - 104 U/L    AST 22 0 - 45 U/L    ALT 22 0 - 50 U/L    Bilirubin Direct <0.20 0.00 - 0.30 mg/dL   Result Value Ref Range    Lipase 23 13 - 60 U/L   Symptomatic Influenza A/B, RSV, & SARS-CoV2 PCR (COVID-19) Nasopharyngeal    Specimen: Nasopharyngeal; Swab   Result Value Ref Range    Influenza A PCR Negative Negative    Influenza B PCR Negative Negative    RSV PCR Negative Negative    SARS CoV2 PCR Negative Negative   D dimer quantitative   Result Value Ref Range    D-Dimer Quantitative 0.46 0.00 - 0.50 ug/mL FEU   CBC with platelets and differential   Result Value  Ref Range    WBC Count 7.0 4.0 - 11.0 10e3/uL    RBC Count 4.12 3.80 - 5.20 10e6/uL    Hemoglobin 12.3 11.7 - 15.7 g/dL    Hematocrit 37.3 35.0 - 47.0 %    MCV 91 78 - 100 fL    MCH 29.9 26.5 - 33.0 pg    MCHC 33.0 31.5 - 36.5 g/dL    RDW 12.3 10.0 - 15.0 %    Platelet Count 296 150 - 450 10e3/uL    % Neutrophils 54 %    % Lymphocytes 28 %    % Monocytes 6 %    Mids % (Monos, Eos, Basos)      % Eosinophils 11 %    % Basophils 1 %    % Immature Granulocytes 0 %    NRBCs per 100 WBC 0 <1 /100    Absolute Neutrophils 3.8 1.6 - 8.3 10e3/uL    Absolute Lymphocytes 1.9 0.8 - 5.3 10e3/uL    Absolute Monocytes 0.4 0.0 - 1.3 10e3/uL    Mids Abs (Monos, Eos, Basos)      Absolute Eosinophils 0.8 (H) 0.0 - 0.7 10e3/uL    Absolute Basophils 0.0 0.0 - 0.2 10e3/uL    Absolute Immature Granulocytes 0.0 <=0.4 10e3/uL    Absolute NRBCs 0.0 10e3/uL   ECG 12-LEAD WITH MUSE (LHE)   Result Value Ref Range    Systolic Blood Pressure  mmHg    Diastolic Blood Pressure  mmHg    Ventricular Rate 111 BPM    Atrial Rate 111 BPM    AR Interval 162 ms    QRS Duration 76 ms     ms    QTc 451 ms    P Axis 38 degrees    R AXIS 15 degrees    T Axis 2 degrees    Interpretation ECG       Sinus tachycardia  Possible Left atrial enlargement  Borderline ECG  When compared with ECG of 20-SEP-2022 19:02,  AR interval has decreased  Vent. rate has increased BY  42 BPM  T wave inversion now evident in Inferior leads  T wave amplitude has decreased in Lateral leads  Confirmed by SEE ED PROVIDER NOTE FOR, ECG INTERPRETATION ([id number]),  Samara Aldana ([id number]) on 10/3/2023 3:32:23 PM         RADIOLOGY:  Reviewed all pertinent imaging. Please see official radiology report.  Chest XR,  PA & LAT    (Results Pending)       PROCEDURES:   None      [employer] Documentation:   CMS Diagnoses:               I, Jackie Cheek, am serving as a scribe to document services personally performed by Faisal Espana based on my observation and the  provider's statements to me. I, Faisal Espana, attest that Jackieliana Cheek is acting in a scribe capacity, has observed my performance of the services and has documented them in accordance with my direction.    Faisal Espana MD  [facility] EMERGENCY ROOM  [id number] [id number] [phone]       Faisal Espana MD  10/03/23 [id number]

## 2023-10-03 NOTE — TELEPHONE ENCOUNTER
"Ordering/Referring Provider: Robbie Bailon   BMI: Estimated body mass index is 32.49 kg/m  as calculated from the following:    Height as of 9/1/23: 1.753 m (5' 9\").    Weight as of 9/1/23: 99.8 kg (220 lb).     Sedation:  Based on patient's medical history patient is appropriate for   MAC/deep sedation.   BMI<= 45 45 < BMI <= 48 48 < BMI < = 50  BMI > 50   No Restrictions No [facility]  No [facility]  [facility] with exceptions Hospital Only OR Only       Location:  Does patient have an LVAD?  No    Does patient have a history of moderate to severe sleep apnea?  No    Does patient have a history of asthma, COPD or any other lung disease?  No    Does patient have a history of cardiac disease?  No    Is patient awaiting a heart or lung transplant?   No    Has patient had a stroke or transient ischemic attack in the last 6 months?   No    Is the patient currently on dialysis?   No    Prep:  Previous prep (last colonoscopy):   N/A    Quality of previous prep:   N/A    Is patient currently on dialysis, is ESRD, or CKD stage 4/5?   No (standard prep)    Does patient have a diagnosis of diabetes?  No    Does patient have a diagnosis of cystic fibrosis (CF)?  No    BMI > 40?  No    Final Referral Status:  meets the criteria for placement of colonoscopy screening  referral order.      Referral order placed with the following recommendations:  Sedation: MAC/Deep Sedation  Location Type: No Scheduling Restrictions  Prep: Standard [facility]    Kaitlynn Rene RN on 10/3/2023 at 11:44 AM    "

## 2023-10-03 NOTE — DISCHARGE INSTRUCTIONS
Please return to the emergency department if your symptoms worsen, if you develop lightheadedness, passing out.

## 2023-10-03 NOTE — ED TRIAGE NOTES
Patient states yesterday she had experienced lower back pain that was sudden and sharp, today at work about an hour ago, she had chest tightness to mid sternum and was SOB, was 8/10 now better.  History of recent ankle surgery, took a Percocet she had from surgery. History of colon cancer 1/2023 and asthma, she states this doesn't feel like her asthma and she has used her inhaler without relief. Negative home covid test last week.     Triage Assessment       Row Name 10/03/23 4654       Triage Assessment (Adult)    Airway WDL WDL       Respiratory WDL    Respiratory WDL all       Skin Circulation/Temperature WDL    Skin Circulation/Temperature WDL WDL       Cardiac WDL    Cardiac WDL WDL;chest pain       Chest Pain Assessment    Chest Pain Location midsternal    Chest Pain Radiation back    Character sharp       Peripheral/Neurovascular WDL    Peripheral Neurovascular WDL WDL       Cognitive/Neuro/Behavioral WDL    Cognitive/Neuro/Behavioral WDL WDL

## 2023-10-04 ENCOUNTER — PATIENT OUTREACH (OUTPATIENT)
Dept: CARE COORDINATION | Facility: CLINIC | Age: 48
End: 2023-10-04
Payer: COMMERCIAL

## 2023-10-04 NOTE — TELEPHONE ENCOUNTER
Patient is calling back regarding below encounter, no one has reached back out to her - she stated she is really sick and ended up going to the ER yesterday 10/3/23 due to chest pain and headaches. She has been really fatigue and ER did not find anything - but she feels something is really wrong and she's not sure what to do. She also stated this may not be due to her asthma. She is just not feeling like herself and would like message to be routed to Dr. Bailon. Please call and advise.

## 2023-10-04 NOTE — TELEPHONE ENCOUNTER
Team - not sure why I am just getting this message now when she called 2 days ago. I have no openings in my afternoon schedule now. She will need to be scheduled with faculty tomorrow or Friday. I am precepting Friday am so she could be scheduled with a resident that morning with note that I will precept. Thanks!    Robbie Bailon MD  October 4, 2023  12:36 PM

## 2023-10-04 NOTE — PROGRESS NOTES
Clinic Care Coordination Contact  Follow Up Progress Note      Assessment:  The pt was recently in the ED, I called to check up on the pt, and help the pt setup a ED follow up. The pt was at [facility] for chest pain, and shortness of breath. I called and talked to the pt, pt stated that she is not feeling well. Pt stated that she called and requested a call back from . Pt is waiting for call back from  and see. She has a follow up with  on 10/18/2023 at 2:40pm. She will call if she needs I sooner.     Care Gaps:    Health Maintenance Due   Topic Date Due    DEPRESSION ACTION PLAN  Never done    ASTHMA ACTION PLAN  01/31/2021    SPIROMETRY  03/03/2021    URINE DRUG SCREEN  07/20/2023    COVID-19 Vaccine (5 - 2023-24 season) 09/01/2023    ZOSTER IMMUNIZATION (1 of 2) 03/15/2023    YEARLY PREVENTIVE VISIT  10/05/2023    HEPATITIS B IMMUNIZATION (2 of 3 - 19+ 3-dose series) 10/11/2023    MAMMO SCREENING  10/13/2023           Care Plans      Intervention/Education provided during outreach:               Plan:     Care Coordinator will follow up in

## 2023-10-06 ENCOUNTER — TELEPHONE (OUTPATIENT)
Dept: FAMILY MEDICINE | Facility: CLINIC | Age: 48
End: 2023-10-06

## 2023-10-06 NOTE — TELEPHONE ENCOUNTER
Team - we will require a written release of information consent from the patient in order for me to talk to this provider. Once in place, I can call. That won't happen today as I am away this afternoon. Can call Dr Huggins next Wednesday when I return to [place] if patient consents. Thanks!       Robbie Bailon MD  October 6, 2023  12:28 PM

## 2023-10-06 NOTE — TELEPHONE ENCOUNTER
[facility] phone call message- general phone call:    Reason for call: calling regarding patient MRN [id number] - Dr Reji Huggins (psychiatrist) independent reviewer for [employer] that patient has a claim in with- is wanting to speak to Dr Bailon regarding patient's Psychiatric condition (PTSD).  Please contact Eula at [phone] for a 5 minute call with Dr Huggins to discuss above.  Thank you    Return call needed: Yes    OK to leave a message on voice mail? Yes    Primary language: English      needed? No    Call taken on October 6, 2023 at 12:00 PM by Elizabeth Newell

## 2023-10-09 ENCOUNTER — TELEPHONE (OUTPATIENT)
Dept: FAMILY MEDICINE | Facility: CLINIC | Age: 48
End: 2023-10-09
Payer: COMMERCIAL

## 2023-10-09 NOTE — TELEPHONE ENCOUNTER
Darlene Hudson called to schedule an appointment for TB screening.        TB Screening questions  Have you had recent contact with a person with active tuberculosis (TB)?  No, continue to next question.  Have you ever been treated for tuberculosis (TB) or latent TB before?  No, continue to next question.  Has a county worker or another healthcare worker (not your employer) told you to come in to be tested for TB?  No, continue to next question.  Have you had a live vaccine (smallpox, flumist, MMR, varicella, oral polio and/or yellow fever) in the last 4 weeks?  No, lab visit scheduled.       Lab visit scheduled for 10/12/23    Christine Cruz

## 2023-10-11 ENCOUNTER — TELEPHONE (OUTPATIENT)
Dept: FAMILY MEDICINE | Facility: CLINIC | Age: 48
End: 2023-10-11
Payer: COMMERCIAL

## 2023-10-11 DIAGNOSIS — Z11.1 SCREENING EXAMINATION FOR PULMONARY TUBERCULOSIS: Primary | ICD-10-CM

## 2023-10-11 NOTE — TELEPHONE ENCOUNTER
[facility] phone call message- general phone call:    Reason for call: Eula calling from Dr. Huggins office - Dr. Huggins is a independent reviewer [employer], he is reviewing patient FMLA disability claim. Dr. Huggins would like to speak with Dr. Bailon about how it is affecting her work. If able to please call back and advise thank you.    Return call needed: Yes    OK to leave a message on voice mail? Yes    Primary language: English      needed? No    Call taken on October 11, 2023 at 9:29 AM by Christine Cruz

## 2023-10-12 ENCOUNTER — TELEPHONE (OUTPATIENT)
Dept: FAMILY MEDICINE | Facility: CLINIC | Age: 48
End: 2023-10-12

## 2023-10-12 ENCOUNTER — LAB (OUTPATIENT)
Dept: LAB | Facility: CLINIC | Age: 48
End: 2023-10-12
Payer: COMMERCIAL

## 2023-10-12 DIAGNOSIS — Z11.1 SCREENING EXAMINATION FOR PULMONARY TUBERCULOSIS: ICD-10-CM

## 2023-10-12 PROCEDURE — 36415 COLL VENOUS BLD VENIPUNCTURE: CPT

## 2023-10-12 PROCEDURE — 86481 TB AG RESPONSE T-CELL SUSP: CPT

## 2023-10-12 NOTE — TELEPHONE ENCOUNTER
Patient called requesting if Dr. Bailon can call her, she stated she is almost positive she has an ear infection and that she's getting sicker. Would like to speak to Dr. Bailon and that she cannot get in to anywhere. Please advise thank you.

## 2023-10-13 ENCOUNTER — OFFICE VISIT (OUTPATIENT)
Dept: FAMILY MEDICINE | Facility: CLINIC | Age: 48
End: 2023-10-13
Payer: COMMERCIAL

## 2023-10-13 VITALS — DIASTOLIC BLOOD PRESSURE: 86 MMHG | HEART RATE: 88 BPM | SYSTOLIC BLOOD PRESSURE: 119 MMHG | OXYGEN SATURATION: 98 %

## 2023-10-13 DIAGNOSIS — M26.609 TEMPOROMANDIBULAR JOINT DISORDER: Primary | ICD-10-CM

## 2023-10-13 DIAGNOSIS — B34.9 VIRAL ILLNESS: ICD-10-CM

## 2023-10-13 PROCEDURE — 99213 OFFICE O/P EST LOW 20 MIN: CPT | Performed by: FAMILY MEDICINE

## 2023-10-13 NOTE — PROGRESS NOTES
Assessment and Plan     1. Temporomandibular joint disorder  Discussed the natural history of the disease. Continue OTC NSAIDs. Reviewed the isometric exercises with the patient. Recommend OTC mouth guard. Consider physical therapy if not improving. Answered all of their questions.     2. Viral illness  Is really on the recovery side now. No focal exam findings outside of the TMJ pain which was really her main concern.    Options for treatment and follow-up care were reviewed with the patient and/or guardian. Darlene Hudson and/or guardian engaged in the decision making process and verbalized understanding of the options discussed and agreed with the final plan. I answered all of their questions.    Arnaud Garcia III, MD, FAAFP  [facility]  10/13/23 11:49 AM           HPI:       Darlene Hudson is a 48 year old  female  Patient presents with:  Ear Problem: Thinks she might have an ear infection, R ear hurts. Throat. Asthma has been sick 10 days on and off.   Forms: Handicap sticker form  Have Sign LUDWIN for [employer]    Hasn't been feeling well for about 10 days. Waxes and wanes. Denies shortness of breath but does feel more achy than normal. Some cough and rhinorrhea. Mostly, she is concerned about her right ear pain. Doesn't think that she is grinding her teeth but has been under a lot of extra stress as of late.         PMHX:     Patient Active Problem List   Diagnosis    Nondependent alcohol abuse, episodic drinking behavior    Controlled substance agreement signed    Chronic sinusitis    Major depressive disorder, recurrent episode, moderate (H)    Tobacco use disorder    Abnormal cervical Papanicolaou smear    Panic disorder with agoraphobia    Atopic rhinitis    Chronic low back pain    Other long term (current) drug therapy    Acute pericardial effusion    Anxiety    Chronic infectious pericarditis    Cough    Arthralgia of both lower legs    Moderate persistent asthma without  complication    Physiologic disturbance of temperature regulation    Family history of colon cancer    Right ankle swelling    Menorrhagia with irregular cycle    Infection due to 2019 novel coronavirus    Adenocarcinoma of descending colon (H)    History of ankle surgery       Current Outpatient Medications   Medication Sig Dispense Refill    albuterol (PROAIR HFA/PROVENTIL HFA/VENTOLIN HFA) 108 (90 Base) MCG/ACT inhaler Inhale 2 puffs into the lungs every 6 hours as needed for shortness of breath, wheezing or cough 18 g 11    ALPRAZolam (XANAX) 2 MG tablet Take 1 tablet (2 mg) by mouth 2 times daily as needed for anxiety 60 tablet 5    ALPRAZolam (XANAX) 2 MG tablet Take 1 tablet (2 mg) by mouth nightly as needed for anxiety 8 tablet 0    cetirizine (ZYRTEC) 10 MG tablet Take 1 tablet (10 mg) by mouth daily 30 tablet 11    doxylamine (UNISOM) 25 MG TABS tablet Take 1 tablet (25 mg) by mouth nightly as needed for other (nausea) 7 tablet 1    fluticasone (FLONASE) 50 MCG/ACT nasal spray Spray 1 spray into both nostrils daily      fluticasone (FLONASE) 50 MCG/ACT nasal spray Spray 2 sprays into both nostrils daily 9.9 mL 11    guaiFENesin-codeine (GUAIFENESIN AC) 100-10 MG/5ML syrup Take 5 mLs by mouth every 4 hours as needed for congestion 180 mL 1    hydrOXYzine (ATARAX) 25 MG tablet Take 1 tablet (25 mg) by mouth nightly as needed for anxiety 30 tablet 1    montelukast (SINGULAIR) 10 MG tablet Take 1 tablet (10 mg) by mouth At Bedtime 30 tablet 11    naloxone (NARCAN) 4 MG/0.1ML nasal spray Spray 1 spray (4 mg) into one nostril alternating nostrils as needed for opioid reversal every 2-3 minutes until assistance arrives 0.2 mL 1    ondansetron (ZOFRAN) 8 MG tablet Take 0.5-1 tablets (4-8 mg) by mouth every 8 hours as needed for nausea 30 tablet 1    oxyCODONE (ROXICODONE) 5 MG tablet Take 1 tablet (5 mg) by mouth 3 times daily as needed for severe pain 90 tablet 0    PROAIR  (90 Base) MCG/ACT inhaler Inhale  2 puffs into the lungs every 4 hours as needed for shortness of breath or wheezing 8 g 11    prochlorperazine (COMPAZINE) 10 MG tablet Take 1 tablet (10 mg) by mouth every 6 hours as needed for nausea or vomiting 40 tablet 1    spacer (OPTICHAMBER BINA) holding chamber For use w/ rescue inhaler 1 each 0    sucralfate (CARAFATE) 1 GM/10ML suspension Take 10 mLs (1 g) by mouth 4 times daily as needed for nausea 414 mL 0    tiotropium (SPIRIVA RESPIMAT) 1.25 MCG/ACT inhaler Inhale 2 puffs into the lungs daily 12 g 3    tiotropium (SPIRIVA) 18 MCG inhaled capsule Inhale 18 mcg into the lungs daily      trimethoprim-polymyxin b (POLYTRIM) 83915-0.1 UNIT/ML-% ophthalmic solution Place 1-2 drops into the right eye every 4 hours 10 mL 0       Social History     Socioeconomic History    Marital status:      Spouse name: Not on file    Number of children: Not on file    Years of education: Not on file    Highest education level: Not on file   Occupational History    Not on file   Tobacco Use    Smoking status: Former     Packs/day: 0.10     Years: 30.00     Additional pack years: 0.00     Total pack years: 3.00     Types: Cigarettes     Quit date: 5/28/2020     Years since quitting: 3.3    Smokeless tobacco: Never    Tobacco comments:     2-3 cig/day, Seen IP by TTS on 1/11/23 - states she is done but declined our services and resource materials stating that she did not need them. Stopped smoking in Jan 2023   Vaping Use    Vaping Use: Former   Substance and Sexual Activity    Alcohol use: Not Currently     Comment: Occasiaonlly.     Drug use: Not Currently    Sexual activity: Yes     Partners: Male   Other Topics Concern    Parent/sibling w/ CABG, MI or angioplasty before 65F 55M? Not Asked   Social History Narrative    Not on file     Social Determinants of Health     Financial Resource Strain: Low Risk  (10/13/2023)    Financial Resource Strain     Within the past 12 months, have you or your family members you  "live with been unable to get utilities (heat, electricity) when it was really needed?: No   Food Insecurity: Low Risk  (10/13/2023)    Food Insecurity     Within the past 12 months, did you worry that your food would run out before you got money to buy more?: No     Within the past 12 months, did the food you bought just not last and you didn t have money to get more?: No   Transportation Needs: Low Risk  (10/13/2023)    Transportation Needs     Within the past 12 months, has lack of transportation kept you from medical appointments, getting your medicines, non-medical meetings or appointments, work, or from getting things that you need?: No   Physical Activity: Not on file   Stress: Not on file   Social Connections: Not on file   Interpersonal Safety: Not on file   Housing Stability: Low Risk  (10/13/2023)    Housing Stability     Do you have housing? : Yes     Are you worried about losing your housing?: No       Allergies   Allergen Reactions    Gabapentin Other (See Comments)     Mental status is changed     Lidocaine Other (See Comments)     \"my jaw stopped moving\"  Other reaction(s): Dystonia    Penicillins Hives, Rash and Shortness Of Breath    Lidocaine-Epinephrine Other (See Comments) and Muscle Pain (Myalgia)     Severe jaw cramping, double vision  Jaw locking       Results for orders placed or performed in visit on 10/12/23 (from the past 24 hour(s))   Quantiferon TB Gold Plus    Specimen: Peripheral Blood    Narrative    The following orders were created for panel order Quantiferon TB Gold Plus.  Procedure                               Abnormality         Status                     ---------                               -----------         ------                     Quantiferon TB Gold Plus...[[id number]]                      In process                 Quantiferon TB Gold Plus...[[id number]]                      In process                 Quantiferon TB Gold Plus...[[id number]]                      In " process                 Quantiferon TB Gold Plus...[[id number]]                      In process                   Please view results for these tests on the individual orders.            Review of Systems:     ROS         Physical Exam:     Vitals:    10/13/23 1050   BP: 119/86   BP Location: Left arm   Patient Position: Sitting   Cuff Size: Adult Large   Pulse: 88   SpO2: 98%     There is no height or weight on file to calculate BMI.    Physical Exam  Vitals and nursing note reviewed.   Constitutional:       General: She is not in acute distress.     Appearance: Normal appearance. She is not ill-appearing, toxic-appearing or diaphoretic.   HENT:      Head: Normocephalic.      Jaw: Pain on movement present.        Right Ear: Tympanic membrane, ear canal and external ear normal. There is no impacted cerumen.      Left Ear: Tympanic membrane, ear canal and external ear normal. There is no impacted cerumen.      Nose: Nose normal. No congestion or rhinorrhea.      Right Sinus: No maxillary sinus tenderness or frontal sinus tenderness.      Left Sinus: No maxillary sinus tenderness or frontal sinus tenderness.      Mouth/Throat:      Mouth: Mucous membranes are moist.   Eyes:      General: No scleral icterus.        Right eye: No discharge.         Left eye: No discharge.      Extraocular Movements: Extraocular movements intact.      Conjunctiva/sclera: Conjunctivae normal.      Pupils: Pupils are equal, round, and reactive to light.   Cardiovascular:      Rate and Rhythm: Normal rate and regular rhythm.      Pulses: Normal pulses.      Heart sounds: No murmur heard.  Pulmonary:      Effort: Pulmonary effort is normal. No respiratory distress.      Breath sounds: Normal breath sounds. No stridor. No wheezing, rhonchi or rales.   Musculoskeletal:      Cervical back: Normal range of motion. No rigidity or tenderness.   Lymphadenopathy:      Cervical: No cervical adenopathy.   Neurological:      Mental Status: She is alert  and oriented to person, place, and time.

## 2023-10-13 NOTE — TELEPHONE ENCOUNTER
Spoke to pt. She has a sick visit today w/ Dr Garcia at 10:40am. She has follow-up with me next week to manage her chronic conditions including pain medication. She will focus only on acute illness today w/ Dr Garcia.    Robbie Bailon MD  October 13, 2023  9:28 AM

## 2023-10-15 LAB
QUANTIFERON MITOGEN: 3.91 IU/ML
QUANTIFERON NIL TUBE: 0.02 IU/ML
QUANTIFERON TB1 TUBE: 0.02 IU/ML
QUANTIFERON TB2 TUBE: 0.03

## 2023-10-16 LAB
GAMMA INTERFERON BACKGROUND BLD IA-ACNC: 0.02 IU/ML
M TB IFN-G BLD-IMP: NEGATIVE
M TB IFN-G CD4+ BCKGRND COR BLD-ACNC: 3.89 IU/ML
MITOGEN IGNF BCKGRD COR BLD-ACNC: 0 IU/ML
MITOGEN IGNF BCKGRD COR BLD-ACNC: 0.01 IU/ML

## 2023-10-17 NOTE — TELEPHONE ENCOUNTER
Left voicemail for Dr Huggins to email me to coordinate a time for a call regarding Karoline's FMLA claim.    Robbie Bailon MD  October 17, 2023  3:07 PM

## 2023-10-17 NOTE — PROGRESS NOTES
ASSESSMENT/PLAN:    (J18.9) Atypical pneumonia  (primary encounter diagnosis)  Comment: given chronicity of symptoms w/o improvement will tx for atypical pneumonia w/ Abx/ steroids; precautions given  Plan: azithromycin (ZITHROMAX) 250 MG tablet, predniSONE (DELTASONE) 50 MG tablet          (B34.9) Viral illness  Comment: will swab for flu/covid as precaution  Plan: Symptomatic Influenza A/B, RSV, & SARS-CoV2 PCR (COVID-19) Nose          (F11.90) Chronic, continuous use of opioids  Comment: she will return for UDS prior to next refill  Plan: Urine Drugs of Abuse Screen Panel 13          Robbie Bailon MD  2023  3:29 PM        Pt is a 48 year old female last seen on 10/13/23 by Dr Garcia here today for:     1) cough, congestion, tightness, and shortness of breath x 2 weeks  Throat is sore  +wheezing  States it does not feel like an asthma flare    2) Disability claim -  was off work 23 - 23 due to both limitations from ankle as well as mental health -> ptsd/ worsening of anxiety/ depression; did attend therapy but did not do intensive oupt therapy so claim is being disputed   10/16/2021 - mom passed from covid  2021 - boyfriend (Shlomo)  of covid  2022 - diagnosed w/ colon cancer  2023 - ankle surgery       Per Dr Garcia' note:  1. Temporomandibular joint disorder  Discussed the natural history of the disease. Continue OTC NSAIDs. Reviewed the isometric exercises with the patient. Recommend OTC mouth guard. Consider physical therapy if not improving. Answered all of their questions.      2. Viral illness  Is really on the recovery side now. No focal exam findings outside of the TMJ pain which was really her main concern.      Per ED note on 10/3/23:  Darlene Hudson is a 48 year old female with a pertinent history of asthma, anxiety, cocaine abuse, tobacco abuse, coronary artery disease, depression, colon cancer, and alcohol abuse who presents to this ED by walking for  evaluation of chest pain and shortness of breath.     Patient reports that she's had chest pain and shortness of breath today. She started feeling it at work so she went out side to walk, but couldn't catch her breath. Patient has felt fatigued and had one episode of dizziness today but attributes this to her nervousness. She has also had right-sided back/flank pain since yesterday and has had multiple instances of kidney infection in the past with one instance of kidney stones. Patient has used her inhaler 5 times today. She had surgery for her colon cancer back in January, but does not have a colostomy bag. Patient denies any fever, urinary symptoms, digestive symptoms, or abdominal pain.    FINAL IMPRESSION:  1. Flank pain   2. Shortness of breath   3. Chest pain, unspecified type            ED COURSE & MEDICAL DECISION MAKING:    Pertinent Labs & Imaging studies reviewed. (See chart for details)  48 year old female presents to the Emergency Department for evaluation of chest pain, shortness of breath, flank pain     ED Course as of 10/03/23 1537  Tue Oct 03, 2023  4719 I met with the patient, obtained history, performed an initial exam, and discussed options and plan for diagnostics and treatment here in the ED.  0477 Patient is a 48-year-old female who presents to the emergency department with 1 day of right flank pain radiating to her right lower quadrant abdomen in addition to shortness of breath and chest tightness that began today.  Her exam is positive for right CVA tenderness, in the absence of fever or urinary symptoms.  She does report an ankle surgery in June, but this is not close enough in proximity to raise severity of risk of pulmonary embolism as this was not in the past 4 weeks.  She is low risk Wells, and therefore candidate for D-dimer.  We will also rule out ACS with troponin.  Given her flank pain will evaluate with urinalysis to look for UTI/pyelonephritis, in addition to LFTs and lipase,  although she does not have focal abdominal tenderness.  1518 Age-adjusted dimer normal, does not warrant CT scan of the chest.  No leukocytosis or anemia.  1525 Troponin undetectable, does not need to be repeated.  Lipase does not show pancreatitis.  No evidence of transaminitis.  No electrolyte abnormalities or kidney injury.  Pending chest x-ray.  1526 EKG shows sinus tachycardia at a rate of 111 with a , QRS 76, QTc 451.  No ST segment changes or evidence of emergent ischemia detected.  1537 Pt signed out to Dr. Chao with the following to do:  - f/up CXR then discharge        Past Medical History:   Diagnosis Date    Abdominal pain 06/29/2015    Abnormal cervical Papanicolaou smear 11/09/2014    Overview:  ACUS/HPV positive    Abnormal cytology finding 11/09/2014    Overview:  ACUS/HPV positive    Acute pericardial effusion 02/06/2017    Agoraphobia with panic attacks     Anxiety     Arthralgia of both lower legs 05/29/2020    Bilateral achy knee pain that is chronic in nature going on for years. Most likely osteoarthritis in knees related to obesity. Previously injected in both knees with good relief. 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  Has controlled substance agreement with them.  On Vicodin, Valium, Klonopin prescribed only from there.       Coronary artery disease     Cough 02/09/2020    Family history of colon cancer 10/24/2020    Hx of seasonal allergies     Infection due to 2019 novel coronavirus 09/20/2022    Infectious pericarditis      Lipoma     Low back pain 07/13/2015    Major depressive disorder, recurrent episode, moderate (H) 09/05/2006    Menorrhagia with irregular cycle 07/15/2022    Added automatically from request for surgery [id number]    Moderate persistent asthma without complication 09/29/2020    Nondependent alcohol abuse, episodic drinking behavior 10/03/2012    Noninflammatory disorder of vagina 02/20/2015    Other chronic pain     Other long term (current) drug therapy 01/28/2013    Overview:  Vicodin and cyclobenzaprine monthly    Panic disorder with agoraphobia 09/05/2006    Pap smear for cervical cancer screening 10/31/2016    03/29/2010  Normal cytology, HPV ot done, repeat in 3 years.    Pericarditis 2017    Physiologic disturbance of temperature regulation 10/24/2020    PONV (postoperative nausea and vomiting)     Right ankle swelling 06/07/2022    Added automatically from request for surgery [id number]    Tobacco abuse     Tobacco use disorder 07/13/2015      Past Surgical History:   Procedure Laterality Date    ANKLE SURGERY Right 05/30/2019    ARTHROSCOPY ANKLE Right 6/21/2023    Procedure: right ankle arthroscopy with debridement;  Surgeon: Kareem Bashir MD;  Location: [facility] OR    BIOPSY BREAST Right 1990    benign    COLONOSCOPY N/A 1/3/2023    Procedure: COLONOSCOPY WITH BIOPSY OF COLON MASS, POLYPECTOMY;  Surgeon: Kris Charles MD;  Location: [facility] OR    CREATION PERICARDIAL WINDOW  02/10/2017    [facility]    DILATION AND CURETTAGE N/A 7/22/2022    Procedure: DILATION AND CURETTAGE, UTERUS;  Surgeon: Lesly Holland;  Location: [facility] OR    HEMORRHOIDECTOMY EXTERNAL      HYSTEROSCOPY, WITH ENDOMETRIAL RADIOFREQUENCY ABLATION - NOVASURE N/A 7/22/2022    Procedure: HYSTEROSCOPY, WITH ENDOMETRIAL RADIOFREQUENCY ABLATION - NOVASURE DILATION AND CURETTAGE, UTERUS;  Surgeon: Lesly Holland;  Location: [facility] OR    LAPAROSCOPIC ASSISTED SIGMOID COLECTOMY N/A 1/6/2023    Procedure:  "LAPAROSCOPIC ASSISTED DESCENDING COLELCTOMY SPLENIC FLEXURE MOBILIZATION ;  Surgeon: Kris Charles MD;  Location: [facility] OR    LAPAROSCOPIC LYSIS ADHESIONS N/A 1/6/2023    Procedure: LYSIS OF ADHESIONS;  Surgeon: Kris Charles MD;  Location: [facility] OR    TUMOR REMOVAL      Has had 3. In the right breast and \"inside of rib cage.\"      Current Outpatient Medications   Medication Sig Dispense Refill    azithromycin (ZITHROMAX) 250 MG tablet Take 2 tablets (500 mg) by mouth daily for 1 day, THEN 1 tablet (250 mg) daily for 4 days. 6 tablet 0    predniSONE (DELTASONE) 50 MG tablet Take 1 tablet (50 mg) by mouth daily for 5 days 5 tablet 0    albuterol (PROAIR HFA/PROVENTIL HFA/VENTOLIN HFA) 108 (90 Base) MCG/ACT inhaler Inhale 2 puffs into the lungs every 6 hours as needed for shortness of breath, wheezing or cough 18 g 11    ALPRAZolam (XANAX) 2 MG tablet Take 1 tablet (2 mg) by mouth 2 times daily as needed for anxiety 60 tablet 5    ALPRAZolam (XANAX) 2 MG tablet Take 1 tablet (2 mg) by mouth nightly as needed for anxiety 8 tablet 0    cetirizine (ZYRTEC) 10 MG tablet Take 1 tablet (10 mg) by mouth daily 30 tablet 11    doxylamine (UNISOM) 25 MG TABS tablet Take 1 tablet (25 mg) by mouth nightly as needed for other (nausea) 7 tablet 1    fluticasone (FLONASE) 50 MCG/ACT nasal spray Spray 1 spray into both nostrils daily      fluticasone (FLONASE) 50 MCG/ACT nasal spray Spray 2 sprays into both nostrils daily 9.9 mL 11    guaiFENesin-codeine (GUAIFENESIN AC) 100-10 MG/5ML syrup Take 5 mLs by mouth every 4 hours as needed for congestion 180 mL 1    hydrOXYzine (ATARAX) 25 MG tablet Take 1 tablet (25 mg) by mouth nightly as needed for anxiety 30 tablet 1    montelukast (SINGULAIR) 10 MG tablet Take 1 tablet (10 mg) by mouth At Bedtime 30 tablet 11    naloxone (NARCAN) 4 MG/0.1ML nasal spray Spray 1 spray (4 mg) into one nostril alternating nostrils as needed for opioid reversal every " "2-3 minutes until assistance arrives 0.2 mL 1    ondansetron (ZOFRAN) 8 MG tablet Take 0.5-1 tablets (4-8 mg) by mouth every 8 hours as needed for nausea 30 tablet 1    oxyCODONE (ROXICODONE) 5 MG tablet Take 1 tablet (5 mg) by mouth 3 times daily as needed for severe pain 90 tablet 0    PROAIR  (90 Base) MCG/ACT inhaler Inhale 2 puffs into the lungs every 4 hours as needed for shortness of breath or wheezing 8 g 11    prochlorperazine (COMPAZINE) 10 MG tablet Take 1 tablet (10 mg) by mouth every 6 hours as needed for nausea or vomiting 40 tablet 1    spacer (OPTICHAMBER BINA) holding chamber For use w/ rescue inhaler 1 each 0    sucralfate (CARAFATE) 1 GM/10ML suspension Take 10 mLs (1 g) by mouth 4 times daily as needed for nausea 414 mL 0    tiotropium (SPIRIVA RESPIMAT) 1.25 MCG/ACT inhaler Inhale 2 puffs into the lungs daily 12 g 3    tiotropium (SPIRIVA) 18 MCG inhaled capsule Inhale 18 mcg into the lungs daily      trimethoprim-polymyxin b (POLYTRIM) 83023-4.1 UNIT/ML-% ophthalmic solution Place 1-2 drops into the right eye every 4 hours 10 mL 0      Allergies   Allergen Reactions    Gabapentin Other (See Comments)     Mental status is changed     Lidocaine Other (See Comments)     \"my jaw stopped moving\"  Other reaction(s): Dystonia    Penicillins Hives, Rash and Shortness Of Breath    Lidocaine-Epinephrine Other (See Comments) and Muscle Pain (Myalgia)     Severe jaw cramping, double vision  Jaw locking        ROS:   Gen- no weight change, + fevers/chills   Head/ Eyes- no blurred vision, no headaches   ENT- see HPI   Cardiac - no palpitations, no chest pain   Respiratory - + shortness of breath , + wheezing   GI - no nausea, no vomiting, no diarrhea, no constipation   Urinary - no dysuria, no polyuria   Remainder of ROS negative.     Exam:   BP (!) 142/93 (BP Location: Right arm, Patient Position: Sitting, Cuff Size: Adult Large)   Pulse 106   SpO2 98%    Alert and oriented x 3; + mild resp " distress   HEENT: extraocular movements intact, tympanic membranes clear, oropharynx erythematous  Neck: no masses, no lymphadenopathy   Resp: +diffuse wheezing  CV: rate/rhythm regular, no murmurs/rubs/gallops   Extrem: no clubbing/cyanosis/edema   Neuro: no focal deficits   Derm: no rashes

## 2023-10-18 ENCOUNTER — OFFICE VISIT (OUTPATIENT)
Dept: FAMILY MEDICINE | Facility: CLINIC | Age: 48
End: 2023-10-18
Payer: COMMERCIAL

## 2023-10-18 VITALS — DIASTOLIC BLOOD PRESSURE: 93 MMHG | HEART RATE: 106 BPM | SYSTOLIC BLOOD PRESSURE: 142 MMHG | OXYGEN SATURATION: 98 %

## 2023-10-18 DIAGNOSIS — B34.9 VIRAL ILLNESS: ICD-10-CM

## 2023-10-18 DIAGNOSIS — F11.90 CHRONIC, CONTINUOUS USE OF OPIOIDS: ICD-10-CM

## 2023-10-18 DIAGNOSIS — J18.9 ATYPICAL PNEUMONIA: Primary | ICD-10-CM

## 2023-10-18 LAB
FLUAV RNA SPEC QL NAA+PROBE: NEGATIVE
FLUBV RNA RESP QL NAA+PROBE: NEGATIVE
RSV RNA SPEC NAA+PROBE: NEGATIVE
SARS-COV-2 RNA RESP QL NAA+PROBE: NEGATIVE

## 2023-10-18 PROCEDURE — 99214 OFFICE O/P EST MOD 30 MIN: CPT | Performed by: FAMILY MEDICINE

## 2023-10-18 PROCEDURE — 87637 SARSCOV2&INF A&B&RSV AMP PRB: CPT | Performed by: FAMILY MEDICINE

## 2023-10-18 RX ORDER — AZITHROMYCIN 250 MG/1
TABLET, FILM COATED ORAL
Qty: 6 TABLET | Refills: 0 | Status: SHIPPED | OUTPATIENT
Start: 2023-10-18 | End: 2023-10-23

## 2023-10-18 RX ORDER — PREDNISONE 50 MG/1
50 TABLET ORAL DAILY
Qty: 5 TABLET | Refills: 0 | Status: SHIPPED | OUTPATIENT
Start: 2023-10-18 | End: 2023-10-23

## 2023-10-18 NOTE — LETTER
Return to Work  2023     Seen today: Yes    Patient:  Darlene Hudson  :   1975  MRN:     [id number]  Physician: JULIETH BAILON    Darlene Hudson is under my care for her acute resp illness. Please excuse her for days missed between 10/2/23 - 10/22/23.  She may return to work on Date: 10/23/23.      The next clinic appointment is scheduled for (date/time) 5 weeks.          Julieth Bailon MD  2023  3:32 PM

## 2023-10-18 NOTE — LETTER
Opioid / Opioid Plus Controlled Substance Agreement    This is an agreement between you and your provider about the safe and appropriate use of controlled substance/opioids prescribed by your care team. Controlled substances are medicines that can cause physical and mental dependence (abuse).    There are strict laws about having and using these medicines. We here at [facility] are committing to working with you in your efforts to get better. To support you in this work, we ll help you schedule regular office appointments for medicine refills. If we must cancel or change your appointment for any reason, we ll make sure you have enough medicine to last until your next appointment.     As a Provider, I will:  Listen carefully to your concerns and treat you with respect.   Recommend a treatment plan that I believe is in your best interest. This plan may involve therapies other than opioid pain medication.   Talk with you often about the possible benefits, and the risk of harm of any medicine that we prescribe for you.   Provide a plan on how to taper (discontinue or go off) using this medicine if the decision is made to stop its use.    As a Patient, I understand that opioid(s):   Are a controlled substance prescribed by my care team to help me function or work and manage my condition(s).   Are strong medicines and can cause serious side effects such as:  Drowsiness, which can seriously affect my driving ability  A lower breathing rate, enough to cause death  Harm to my thinking ability   Depression   Abuse of and addiction to this medicine  Need to be taken exactly as prescribed. Combining opioids with certain medicines or chemicals (such as illegal drugs, sedatives, sleeping pills, and benzodiazepines) can be dangerous or even fatal. If I stop opioids suddenly, I may have severe withdrawal symptoms.  Do not work for all types of pain nor for all patients. If they re not helpful, I may be asked to stop  them.        The risks, benefits and side effects of these medicine(s) were explained to me. I agree that:  I will take part in other treatments as advised by my care team. This may be psychiatry or counseling, physical therapy, behavioral therapy, group treatment or a referral to a specialist.     I will keep all my appointments. I understand that this is part of the monitoring of opioids. My care team may require an office visit for EVERY opioid/controlled substance refill. If I miss appointments or don t follow instructions, my care team may stop my medicine.    I will take my medicines as prescribed. I will not change the dose or schedule unless my care team tells me to. There will be no refills if I run out early.     I may be asked to come to the clinic and complete a urine drug test or complete a pill count at any time. If I don t give a urine sample or participate in a pill count, the care team may stop my medicine.    I will only receive prescriptions from this clinic for chronic pain. If I am treated by another provider for acute pain issues, I will tell them that I am taking opioid pain medication for chronic pain and that I have a treatment agreement with this provider. I will inform my [facility] care team within one business day if I am given a prescription for any pain medication by another healthcare provider. My [facility] care team can contact other providers and pharmacists about my use of any medicines.    It is up to me to make sure that I don t run out of my medicines on weekends or holidays. If my care team is willing to refill my opioid prescription without a visit, I must request refills only during office hours. Refills may take up to 3 business days to process. I will use one pharmacy to fill all my opioid and other controlled substance prescriptions. I will notify the clinic about any changes to my insurance or medication availability.    I am responsible for my  prescriptions. If the medicine/prescription is lost, stolen or destroyed, it will not be replaced. I also agree not to share controlled substance medicines with anyone.    I am aware I should not use any illegal or recreational drugs. I agree not to drink alcohol unless my care team says I can.       If I enroll in the [place] [facility] Cann[facility] program, I will tell my care team prior to my next refill.     I will tell my care team right away if I become pregnant, have a new medical problem treated outside of my regular clinic, or have a change in my medications.    I understand that this medicine can affect my thinking, judgment and reaction time. Alcohol and drugs affect the brain and body, which can affect the safety of my driving. Being under the influence of alcohol or drugs can affect my decision-making, behaviors, personal safety, and the safety of others. Driving while impaired (DWI) can occur if a person is driving, operating, or in physical control of a car, motorcycle, boat, snowmobile, ATV, motorbike, off-road vehicle, or any other motor vehicle ([place] Statute 169A.20). I understand the risk if I choose to drive or operate any vehicle or machinery.    I understand that if I do not follow any of the conditions above, my prescriptions or treatment may be stopped or changed.          Opioids  What You Need to Know    What are opioids?   Opioids are pain medicines that must be prescribed by a doctor. They are also known as narcotics.     Examples are:   morphine (MS Contin, Tatianna)  oxycodone (Oxycontin)  oxycodone and acetaminophen (Percocet)  hydrocodone and acetaminophen (Vicodin, Norco)   fentanyl patch (Duragesic)   hydromorphone (Dilaudid)   methadone  codeine (Tylenol #3)     What do opioids do well?   Opioids are best for severe short-term pain such as after a surgery or injury. They may work well for cancer pain. They may help some people with long-lasting (chronic) pain.     What do opioids NOT do  well?   Opioids never get rid of pain entirely, and they don t work well for most patients with chronic pain. Opioids don t reduce swelling, one of the causes of pain.                                    Other ways to manage chronic pain and improve function include:     Treat the health problem that may be causing pain  Anti-inflammation medicines, which reduce swelling and tenderness, such as ibuprofen (Advil, Motrin) or naproxen (Aleve)  Acetaminophen (Tylenol)  Antidepressants and anti-seizure medicines, especially for nerve pain  Topical treatments such as patches or creams  Injections or nerve blocks  Chiropractic or osteopathic treatment  Acupuncture, massage, deep breathing, meditation, visual imagery, aromatherapy  Use heat or ice at the pain site  Physical therapy   Exercise  Stop smoking  Take part in therapy       Risks and side effects     Talk to your doctor before you start or decide to keep taking opioids. Possible side effects include:    Lowering your breathing rate enough to cause death  Overdose, including death, especially if taking higher than prescribed doses  Worse depression symptoms; less pleasure in things you usually enjoy  Feeling tired or sluggish  Slower thoughts or cloudy thinking  Being more sensitive to pain over time; pain is harder to control  Trouble sleeping or restless sleep  Changes in hormone levels (for example, less testosterone)  Changes in sex drive or ability to have sex  Constipation  Unsafe driving  Itching and sweating  Dizziness  Nausea, throwing up and dry mouth    What else should I know about opioids?    Opioids may lead to dependence, tolerance, or addiction.    Dependence means that if you stop or reduce the medicine too quickly, you will have withdrawal symptoms. These include loose poop (diarrhea), jitters, flu-like symptoms, nervousness and tremors. Dependence is not the same as addiction.                     Tolerance means needing higher doses over time to  get the same effect. This may increase the chance of serious side effects.    Addiction is when people improperly use a substance that harms their body, their mind or their relations with others. Use of opiates can cause a relapse of addiction if you have a history of drug or alcohol abuse.    People who have used opioids for a long time may have a lower quality of life, worse depression, higher levels of pain and more visits to doctors.    You can overdose on opioids. Take these steps to lower your risk of overdose:    Recognize the signs:  Signs of overdose include decrease or loss of consciousness (blackout), slowed breathing, trouble waking up and blue lips. If someone is worried about overdose, they should call 911.    Talk to your doctor about Narcan (naloxone).   If you are at risk for overdose, you may be given a prescription for Narcan. This medicine very quickly reverses the effects of opioids.   If you overdose, a friend or family member can give you Narcan while waiting for the ambulance. They need to know the signs of overdose and how to give Narcan.     Don't use alcohol or street drugs.   Taking them with opioids can cause death.    Do not take any of these medicines unless your doctor says it s OK. Taking these with opioids can cause death:  Benzodiazepines, such as lorazepam (Ativan), alprazolam (Xanax) or diazepam (Valium)  Muscle relaxers, such as cyclobenzaprine (Flexeril)  Sleeping pills like zolpidem (Ambien)   Other opioids      How to keep you and other people safe while taking opioids:    Never share your opioids with others.  Opioid medicines are regulated by the Drug Enforcement Agency ([place]). Selling or sharing medications is a criminal act.    2. Be sure to store opioids in a secure place, locked up if possible. Young children can easily swallow them and overdose.    3. When you are traveling with your medicines, keep them in the original bottles. If you use a pill box, be sure you also  carry a copy of your medicine list from your clinic or pharmacy.    4. Safe disposal of opioids    Most pharmacies have places to get rid of medicine, called disposal kiosks. Medicine disposal options are also available in every [facility]. Search your county and  medication disposal  to find more options. You can find more details at:  https://[email or web address].[email or web address]-green/managing-unwanted-medications     I agree that my provider, clinic care team, and pharmacy may work with any city, state or federal law enforcement agency that investigates the misuse, sale, or other diversion of my controlled medicine. I will allow my provider to discuss my care with, or share a copy of, this agreement with any other treating provider, pharmacy or emergency room where I receive care.    I have read this agreement and have asked questions about anything I did not understand.    _______________________________________________________  Patient Signature - Darlene Hudson _____________________                   Date     _______________________________________________________  Provider Signature - Robbie Bailon MD   _____________________                   Date     _______________________________________________________  Witness Signature (required if provider not present while patient signing)   _____________________                   Date

## 2023-10-23 ENCOUNTER — LAB (OUTPATIENT)
Dept: LAB | Facility: CLINIC | Age: 48
End: 2023-10-23
Payer: COMMERCIAL

## 2023-10-23 DIAGNOSIS — F11.90 CHRONIC, CONTINUOUS USE OF OPIOIDS: ICD-10-CM

## 2023-10-23 DIAGNOSIS — C18.6 CANCER OF DESCENDING COLON (H): ICD-10-CM

## 2023-10-23 DIAGNOSIS — Z90.49 STATUS POST PARTIAL COLECTOMY: ICD-10-CM

## 2023-10-23 LAB
AMPHETAMINES UR QL SCN: ABNORMAL
BARBITURATES UR QL SCN: ABNORMAL
BENZODIAZ UR QL SCN: ABNORMAL
BZE UR QL SCN: ABNORMAL
CANNABINOIDS UR QL SCN: ABNORMAL
FENTANYL UR QL: ABNORMAL
OPIATES UR QL SCN: ABNORMAL
PCP QUAL URINE (ROCHE): ABNORMAL

## 2023-10-23 PROCEDURE — 80307 DRUG TEST PRSMV CHEM ANLYZR: CPT

## 2023-10-23 RX ORDER — OXYCODONE HYDROCHLORIDE 5 MG/1
5 TABLET ORAL 3 TIMES DAILY PRN
Qty: 90 TABLET | Refills: 0 | Status: SHIPPED | OUTPATIENT
Start: 2023-10-23 | End: 2023-11-22

## 2023-10-23 NOTE — TELEPHONE ENCOUNTER
Team - refill is in. This was delayed because Karoline was to come in on Friday am and leave a urine sample for her UDS/ pain contract. She did not do that so no refill was sent. Per my discussion w/ Teresa, she is coming in this afternoon w/ her urine sample. Refill sent.    Robbie Bailon MD  October 23, 2023  1:34 PM

## 2023-10-23 NOTE — TELEPHONE ENCOUNTER
[facility] phone call message- medication clarification/question:    Full Medication Name: oxyCODONE (ROXICODONE) 5 MG tablet        Question: patient called in asking for refill on above medication- stated another doctor will need to refill since Dr Bailon is not in clinic until 10/25 -- patient very insistent and Teresa consulted    Pharmacy confirmed as    [id number] [address]  AT [facility]  Yes    OK to leave a message on voice mail? Yes    Primary language: English      needed? No    Call taken on October 23, 2023 at 11:57 AM by Elizabeth Newell

## 2023-10-25 NOTE — TELEPHONE ENCOUNTER
Reviewed mychart messages from patient and spoke to her at length; Plan moving forward will be for her and I to craft a behavioral contract which she will sign at next visit stating rules/boundaries/ expectations around communication with me and my team. She was in agreement.    Robbie Bailon MD  October 25, 2023  5:31 PM

## 2023-10-26 ENCOUNTER — TELEPHONE (OUTPATIENT)
Dept: FAMILY MEDICINE | Facility: CLINIC | Age: 48
End: 2023-10-26
Payer: COMMERCIAL

## 2023-10-26 ENCOUNTER — THERAPY VISIT (OUTPATIENT)
Dept: PHYSICAL THERAPY | Facility: CLINIC | Age: 48
End: 2023-10-26
Payer: OTHER MISCELLANEOUS

## 2023-10-26 DIAGNOSIS — Z98.890 HISTORY OF ANKLE SURGERY: Primary | ICD-10-CM

## 2023-10-26 PROCEDURE — 97110 THERAPEUTIC EXERCISES: CPT | Mod: GP | Performed by: PHYSICAL THERAPIST

## 2023-10-26 NOTE — TELEPHONE ENCOUNTER
[facility] phone call message- general phone call:    Reason for call: Dr. Jessie Romero from [place] [employer] is calling requesting to speak with Dr. Bailon about FMLA disability claim. Would like a call back at earliest convenience thank you.    Return call needed: Yes    OK to leave a message on voice mail? Yes    Primary language: English      needed? No    Call taken on October 26, 2023 at 10:03 AM by Christine Cruz

## 2023-11-03 NOTE — TELEPHONE ENCOUNTER
Spoke to Dr Romero regarding Karoline's chronic medical conditions.    Robbie Bailon MD  November 3, 2023  12:15 PM

## 2023-11-21 NOTE — PROGRESS NOTES
ASSESSMENT/PLAN:    (C18.6) Cancer of descending colon (H)  Comment: stable; meds refilled; f/up in 4 weeks; work note written   Plan: oxyCODONE (ROXICODONE) 5 MG tablet          (Z90.49) Status post partial colectomy  Comment: see above  Plan: oxyCODONE (ROXICODONE) 5 MG tablet          (F41.1) Generalized anxiety disorder  Comment: stable; called pharmacy for early fill as she will be away for thanksgiving and returning monday  Plan: ALPRAZolam (XANAX) 2 MG tablet          (F40.01) Panic disorder with agoraphobia  Comment: see above  Plan: ALPRAZolam (XANAX) 2 MG tablet    >30 min was spent on the day of visit in review of records, direct patient care, documentation, and care coordination.          Robbie Bailon MD  November 22, 2023  4:21 PM        Pt is a 48 year old female last seen on 10/18/23 here today for:     Switching practices - difficult interaction with the practice manager  Not interested in signing a behavior contract with her; will sign one with me  Crying all day at work because of this   No acute issues; ankle swells and is painful w/ prolonged standing; has been doing PT which is helping  Can't go back to full duty at work if she is wearing a boot so not ready yet    Per PT note on 10/26/23:  There is less swelling, less pain but doesn't feel stable when she puts all of her weight on it. Does not have an ankle brace. Wants to go back ASAP to work. Has been feeling sick the last 3 weeks.    Per my last note:   (J18.9) Atypical pneumonia  (primary encounter diagnosis)  Comment: given chronicity of symptoms w/o improvement will tx for atypical pneumonia w/ Abx/ steroids; precautions given  Plan: azithromycin (ZITHROMAX) 250 MG tablet, predniSONE (DELTASONE) 50 MG tablet          (B34.9) Viral illness  Comment: will swab for flu/covid as precaution  Plan: Symptomatic Influenza A/B, RSV, & SARS-CoV2 PCR (COVID-19) Nose          (F11.90) Chronic, continuous use of opioids  Comment: she will return for  UDS prior to next refill  Plan: Urine Drugs of Abuse Screen Panel 13    Past Medical History:   Diagnosis Date    Abdominal pain 06/29/2015    Abnormal cervical Papanicolaou smear 11/09/2014    Overview:  ACUS/HPV positive    Abnormal cytology finding 11/09/2014    Overview:  ACUS/HPV positive    Acute pericardial effusion 02/06/2017    Agoraphobia with panic attacks     Anxiety     Arthralgia of both lower legs 05/29/2020    Bilateral achy knee pain that is chronic in nature going on for years. Most likely osteoarthritis in knees related to obesity. Previously injected in both knees with good relief. 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 for this.  Has controlled substance agreement with them.  On Vicodin, Valium, Klonopin prescribed only from there.       Coronary artery disease     Cough 02/09/2020    Family history of colon cancer 10/24/2020    Hx of seasonal allergies     Infection due to 2019 novel coronavirus 09/20/2022    Infectious pericarditis     Lipoma     Low back pain 07/13/2015    Major depressive disorder, recurrent episode, moderate (H) 09/05/2006    Menorrhagia with irregular cycle 07/15/2022    Added automatically from request for surgery [id number]    Moderate persistent asthma without complication 09/29/2020    Nondependent alcohol abuse, episodic drinking behavior 10/03/2012    Noninflammatory disorder of vagina 02/20/2015    Other chronic pain     Other long term (current) drug therapy 01/28/2013    Overview:  Vicodin and cyclobenzaprine monthly    Panic  "disorder with agoraphobia 09/05/2006    Pap smear for cervical cancer screening 10/31/2016    03/29/2010  Normal cytology, HPV ot done, repeat in 3 years.    Pericarditis 2017    Physiologic disturbance of temperature regulation 10/24/2020    PONV (postoperative nausea and vomiting)     Right ankle swelling 06/07/2022    Added automatically from request for surgery [id number]    Tobacco abuse     Tobacco use disorder 07/13/2015      Past Surgical History:   Procedure Laterality Date    ANKLE SURGERY Right 05/30/2019    ARTHROSCOPY ANKLE Right 6/21/2023    Procedure: right ankle arthroscopy with debridement;  Surgeon: Kareem Bashir MD;  Location: [facility] OR    BIOPSY BREAST Right 1990    benign    COLONOSCOPY N/A 1/3/2023    Procedure: COLONOSCOPY WITH BIOPSY OF COLON MASS, POLYPECTOMY;  Surgeon: Kris Charles MD;  Location: [facility] OR    CREATION PERICARDIAL WINDOW  02/10/2017    [facility]    DILATION AND CURETTAGE N/A 7/22/2022    Procedure: DILATION AND CURETTAGE, UTERUS;  Surgeon: Lesly Holland;  Location: [facility] OR    HEMORRHOIDECTOMY EXTERNAL      HYSTEROSCOPY, WITH ENDOMETRIAL RADIOFREQUENCY ABLATION - NOVASURE N/A 7/22/2022    Procedure: HYSTEROSCOPY, WITH ENDOMETRIAL RADIOFREQUENCY ABLATION - NOVASURE DILATION AND CURETTAGE, UTERUS;  Surgeon: Lesly Holland;  Location: [facility] OR    LAPAROSCOPIC ASSISTED SIGMOID COLECTOMY N/A 1/6/2023    Procedure: LAPAROSCOPIC ASSISTED DESCENDING COLELCTOMY SPLENIC FLEXURE MOBILIZATION ;  Surgeon: Kris Charles MD;  Location: [facility] OR    LAPAROSCOPIC LYSIS ADHESIONS N/A 1/6/2023    Procedure: LYSIS OF ADHESIONS;  Surgeon: Kris Charles MD;  Location: [facility] OR    TUMOR REMOVAL      Has had 3. In the right breast and \"inside of rib cage.\"      Current Outpatient Medications   Medication Sig Dispense Refill    ALPRAZolam (XANAX) 2 MG tablet Take 1 tablet (2 mg) by mouth 2 times daily as needed for " anxiety 60 tablet 5    oxyCODONE (ROXICODONE) 5 MG tablet Take 1 tablet (5 mg) by mouth 3 times daily as needed for severe pain 90 tablet 0    albuterol (PROAIR HFA/PROVENTIL HFA/VENTOLIN HFA) 108 (90 Base) MCG/ACT inhaler Inhale 2 puffs into the lungs every 6 hours as needed for shortness of breath, wheezing or cough 18 g 11    cetirizine (ZYRTEC) 10 MG tablet Take 1 tablet (10 mg) by mouth daily 30 tablet 11    doxylamine (UNISOM) 25 MG TABS tablet Take 1 tablet (25 mg) by mouth nightly as needed for other (nausea) 7 tablet 1    fluticasone (FLONASE) 50 MCG/ACT nasal spray Spray 1 spray into both nostrils daily      fluticasone (FLONASE) 50 MCG/ACT nasal spray Spray 2 sprays into both nostrils daily 9.9 mL 11    guaiFENesin-codeine (GUAIFENESIN AC) 100-10 MG/5ML syrup Take 5 mLs by mouth every 4 hours as needed for congestion 180 mL 1    hydrOXYzine (ATARAX) 25 MG tablet Take 1 tablet (25 mg) by mouth nightly as needed for anxiety 30 tablet 1    montelukast (SINGULAIR) 10 MG tablet Take 1 tablet (10 mg) by mouth At Bedtime 30 tablet 11    naloxone (NARCAN) 4 MG/0.1ML nasal spray Spray 1 spray (4 mg) into one nostril alternating nostrils as needed for opioid reversal every 2-3 minutes until assistance arrives 0.2 mL 1    ondansetron (ZOFRAN) 8 MG tablet Take 0.5-1 tablets (4-8 mg) by mouth every 8 hours as needed for nausea 30 tablet 1    PROAIR  (90 Base) MCG/ACT inhaler Inhale 2 puffs into the lungs every 4 hours as needed for shortness of breath or wheezing 8 g 11    prochlorperazine (COMPAZINE) 10 MG tablet Take 1 tablet (10 mg) by mouth every 6 hours as needed for nausea or vomiting 40 tablet 1    spacer (OPTICHAMBER BINA) holding chamber For use w/ rescue inhaler 1 each 0    sucralfate (CARAFATE) 1 GM/10ML suspension Take 10 mLs (1 g) by mouth 4 times daily as needed for nausea 414 mL 0    tiotropium (SPIRIVA RESPIMAT) 1.25 MCG/ACT inhaler Inhale 2 puffs into the lungs daily 12 g 3    tiotropium  "(SPIRIVA) 18 MCG inhaled capsule Inhale 18 mcg into the lungs daily      trimethoprim-polymyxin b (POLYTRIM) 41308-2.1 UNIT/ML-% ophthalmic solution Place 1-2 drops into the right eye every 4 hours 10 mL 0      Allergies   Allergen Reactions    Gabapentin Other (See Comments)     Mental status is changed     Lidocaine Other (See Comments)     \"my jaw stopped moving\"  Other reaction(s): Dystonia    Penicillins Hives, Rash and Shortness Of Breath    Lidocaine-Epinephrine Other (See Comments) and Muscle Pain (Myalgia)     Severe jaw cramping, double vision  Jaw locking        ROS:   Gen- no weight change, no fevers/chills   ENT- no cough, no congestion, no URI symptoms   Cardiac - no palpitations, no chest pain   Respiratory - no shortness of breath , no wheezing   Remainder of ROS negative.     Exam:   /80 (BP Location: Right arm, Patient Position: Sitting, Cuff Size: Adult Large)   Pulse 89   SpO2 100%    Alert and oriented x 3; No acute distress   Neuro: no focal deficits   Derm: no rashes       "

## 2023-11-22 ENCOUNTER — OFFICE VISIT (OUTPATIENT)
Dept: FAMILY MEDICINE | Facility: CLINIC | Age: 48
End: 2023-11-22
Payer: COMMERCIAL

## 2023-11-22 VITALS — HEART RATE: 89 BPM | DIASTOLIC BLOOD PRESSURE: 80 MMHG | OXYGEN SATURATION: 100 % | SYSTOLIC BLOOD PRESSURE: 124 MMHG

## 2023-11-22 DIAGNOSIS — C18.6 CANCER OF DESCENDING COLON (H): ICD-10-CM

## 2023-11-22 DIAGNOSIS — F41.1 GENERALIZED ANXIETY DISORDER: ICD-10-CM

## 2023-11-22 DIAGNOSIS — F40.01 PANIC DISORDER WITH AGORAPHOBIA: ICD-10-CM

## 2023-11-22 DIAGNOSIS — Z90.49 STATUS POST PARTIAL COLECTOMY: ICD-10-CM

## 2023-11-22 PROCEDURE — 99214 OFFICE O/P EST MOD 30 MIN: CPT | Performed by: FAMILY MEDICINE

## 2023-11-22 RX ORDER — ALPRAZOLAM 2 MG
2 TABLET ORAL 2 TIMES DAILY PRN
Qty: 60 TABLET | Refills: 5 | Status: SHIPPED | OUTPATIENT
Start: 2023-11-22 | End: 2024-02-21

## 2023-11-22 RX ORDER — OXYCODONE HYDROCHLORIDE 5 MG/1
5 TABLET ORAL 3 TIMES DAILY PRN
Qty: 90 TABLET | Refills: 0 | Status: SHIPPED | OUTPATIENT
Start: 2023-11-22 | End: 2023-12-20

## 2023-11-22 NOTE — LETTER
WORK MEDICAL NOTE  2023     Seen today: Yes    Patient:  Darlene Hudson  :   1975  MRN:     [id number]  Physician: JULIETH BAILON    Dralene Hudson is under my care. She continues to have significant pain, swelling, and instability in the ankle so needs to remain on light duty for 8 additional weeks. She will be attending physical therapy during this time. She will also need to be excused from work per her intermittent FMLA to attend upcoming appointments required for surveillance of her colon cancer.      The next clinic appointment is scheduled for (date/time) 4 weeks.     Patient limitations:  unable to tolerate prolonged standing/ walking/ bending/ climbing         Julieth Bailon MD  2023  4:09 PM

## 2023-12-02 ENCOUNTER — HEALTH MAINTENANCE LETTER (OUTPATIENT)
Age: 48
End: 2023-12-02

## 2023-12-04 ENCOUNTER — HOSPITAL ENCOUNTER (OUTPATIENT)
Dept: CT IMAGING | Facility: CLINIC | Age: 48
Discharge: HOME | End: 2023-12-04
Attending: INTERNAL MEDICINE | Admitting: INTERNAL MEDICINE
Payer: COMMERCIAL

## 2023-12-04 DIAGNOSIS — C18.6 ADENOCARCINOMA OF DESCENDING COLON (H): ICD-10-CM

## 2023-12-04 PROCEDURE — 74177 CT ABD & PELVIS W/CONTRAST: CPT

## 2023-12-04 PROCEDURE — 250N000011 HC RX IP 250 OP 636: Mod: JZ | Performed by: INTERNAL MEDICINE

## 2023-12-04 RX ORDER — IOPAMIDOL 755 MG/ML
100 INJECTION, SOLUTION INTRAVASCULAR ONCE
Status: COMPLETED | OUTPATIENT
Start: 2023-12-04 | End: 2023-12-04

## 2023-12-04 RX ORDER — IOPAMIDOL 755 MG/ML
90 INJECTION, SOLUTION INTRAVASCULAR ONCE
Status: DISCONTINUED | OUTPATIENT
Start: 2023-12-04 | End: 2023-12-04

## 2023-12-04 RX ADMIN — IOPAMIDOL 100 ML: 755 INJECTION, SOLUTION INTRAVENOUS at 16:29

## 2023-12-05 ENCOUNTER — TELEPHONE (OUTPATIENT)
Dept: SURGERY | Facility: CLINIC | Age: 48
End: 2023-12-05
Payer: COMMERCIAL

## 2023-12-05 ENCOUNTER — PREP FOR PROCEDURE (OUTPATIENT)
Dept: SURGERY | Facility: CLINIC | Age: 48
End: 2023-12-05
Payer: COMMERCIAL

## 2023-12-05 DIAGNOSIS — Z80.0 FAMILY HISTORY OF COLON CANCER: Primary | ICD-10-CM

## 2023-12-06 ENCOUNTER — ONCOLOGY VISIT (OUTPATIENT)
Dept: ONCOLOGY | Facility: HOSPITAL | Age: 48
End: 2023-12-06
Attending: INTERNAL MEDICINE
Payer: COMMERCIAL

## 2023-12-06 VITALS
SYSTOLIC BLOOD PRESSURE: 113 MMHG | WEIGHT: 251.7 LBS | DIASTOLIC BLOOD PRESSURE: 58 MMHG | HEIGHT: 69 IN | HEART RATE: 89 BPM | OXYGEN SATURATION: 96 % | RESPIRATION RATE: 16 BRPM | BODY MASS INDEX: 37.28 KG/M2 | TEMPERATURE: 98.1 F

## 2023-12-06 DIAGNOSIS — C18.6 ADENOCARCINOMA OF DESCENDING COLON (H): Primary | ICD-10-CM

## 2023-12-06 DIAGNOSIS — R91.1 PULMONARY NODULE, RIGHT: ICD-10-CM

## 2023-12-06 LAB
ALBUMIN SERPL BCG-MCNC: 4 G/DL (ref 3.5–5.2)
ALP SERPL-CCNC: 118 U/L (ref 40–150)
ALT SERPL W P-5'-P-CCNC: 26 U/L (ref 0–50)
ANION GAP SERPL CALCULATED.3IONS-SCNC: 11 MMOL/L (ref 7–15)
AST SERPL W P-5'-P-CCNC: 21 U/L (ref 0–45)
BASOPHILS # BLD AUTO: 0 10E3/UL (ref 0–0.2)
BASOPHILS NFR BLD AUTO: 0 %
BILIRUB SERPL-MCNC: 0.2 MG/DL
BUN SERPL-MCNC: 25.9 MG/DL (ref 6–20)
CALCIUM SERPL-MCNC: 9.1 MG/DL (ref 8.6–10)
CHLORIDE SERPL-SCNC: 107 MMOL/L (ref 98–107)
CREAT SERPL-MCNC: 1 MG/DL (ref 0.51–0.95)
DEPRECATED HCO3 PLAS-SCNC: 21 MMOL/L (ref 22–29)
EGFRCR SERPLBLD CKD-EPI 2021: 68 ML/MIN/1.73M2
EOSINOPHIL # BLD AUTO: 0.6 10E3/UL (ref 0–0.7)
EOSINOPHIL NFR BLD AUTO: 9 %
ERYTHROCYTE [DISTWIDTH] IN BLOOD BY AUTOMATED COUNT: 12.6 % (ref 10–15)
GLUCOSE SERPL-MCNC: 103 MG/DL (ref 70–99)
HCT VFR BLD AUTO: 40.4 % (ref 35–47)
HGB BLD-MCNC: 13 G/DL (ref 11.7–15.7)
IMM GRANULOCYTES # BLD: 0 10E3/UL
IMM GRANULOCYTES NFR BLD: 0 %
LYMPHOCYTES # BLD AUTO: 2.8 10E3/UL (ref 0.8–5.3)
LYMPHOCYTES NFR BLD AUTO: 41 %
MCH RBC QN AUTO: 29.5 PG (ref 26.5–33)
MCHC RBC AUTO-ENTMCNC: 32.2 G/DL (ref 31.5–36.5)
MCV RBC AUTO: 92 FL (ref 78–100)
MONOCYTES # BLD AUTO: 0.5 10E3/UL (ref 0–1.3)
MONOCYTES NFR BLD AUTO: 8 %
NEUTROPHILS # BLD AUTO: 2.8 10E3/UL (ref 1.6–8.3)
NEUTROPHILS NFR BLD AUTO: 42 %
NRBC # BLD AUTO: 0 10E3/UL
NRBC BLD AUTO-RTO: 0 /100
PLATELET # BLD AUTO: 275 10E3/UL (ref 150–450)
POTASSIUM SERPL-SCNC: 3.8 MMOL/L (ref 3.4–5.3)
PROT SERPL-MCNC: 7.6 G/DL (ref 6.4–8.3)
RBC # BLD AUTO: 4.41 10E6/UL (ref 3.8–5.2)
SODIUM SERPL-SCNC: 139 MMOL/L (ref 135–145)
TSH SERPL DL<=0.005 MIU/L-ACNC: 2.04 UIU/ML (ref 0.3–4.2)
WBC # BLD AUTO: 6.7 10E3/UL (ref 4–11)

## 2023-12-06 PROCEDURE — 36415 COLL VENOUS BLD VENIPUNCTURE: CPT | Performed by: INTERNAL MEDICINE

## 2023-12-06 PROCEDURE — 84443 ASSAY THYROID STIM HORMONE: CPT | Performed by: INTERNAL MEDICINE

## 2023-12-06 PROCEDURE — 99213 OFFICE O/P EST LOW 20 MIN: CPT | Performed by: INTERNAL MEDICINE

## 2023-12-06 PROCEDURE — 85025 COMPLETE CBC W/AUTO DIFF WBC: CPT | Performed by: INTERNAL MEDICINE

## 2023-12-06 PROCEDURE — 99214 OFFICE O/P EST MOD 30 MIN: CPT | Performed by: INTERNAL MEDICINE

## 2023-12-06 PROCEDURE — 80053 COMPREHEN METABOLIC PANEL: CPT | Performed by: INTERNAL MEDICINE

## 2023-12-06 ASSESSMENT — PAIN SCALES - GENERAL: PAINLEVEL: NO PAIN (0)

## 2023-12-06 NOTE — LETTER
12/6/2023         RE: Darlene Hudson  [address] [place] [address]        Dear Colleague,    Thank you for referring your patient, Darlene Hudson, to the [facility]. Please see a copy of my visit note below.    [facility] Hematology and Oncology Progress Note    Patient: Darlene Hudson  MRN: [id number]  Date of Service: Dec 6, 2023        Assessment and Plan:     Cancer Staging   Adenocarcinoma of descending colon (H)  Staging form: Colon and Rectum, AJCC 8th Edition  - Pathologic stage from 2/7/2023: Stage I (pT2, pN0, cM0) - Signed by Arnaud Valdez MD on 2/7/2023    1.  Adenocarcinoma of the descending colon: She had a follow-up CT scan on December 4.  I personally reviewed the images and shared them with [facility].  There is no evidence of recurrent colon cancer.  She has a stable left pelvic node.  Seems to be stable going back to January.  She will be due for colonoscopy in January.  I reviewed with her pathologic findings of surgery and a relatively low risk of recurrence.  Repeat imaging in 6 months, June, 2024.     2.  Left external iliac lymph node seen on imaging: Stable on current CT when compared to January 2023.  We will continue to monitor radiographically.  Most likely benign. There was moderate follicular lymphoid hyperplasia noted in some of her mesenteric lymph nodes on surgery.     3.  Right lung nodules: There is a cluster of lung nodules in the upper right lower lobe.  These were personally reviewed and shared with Karoline.  She had a upper respiratory tract infection last week.  This is not a typical pattern or appearance of colon cancer metastases.  I think they are infectious/inflammatory.  We are going to repeat a CT scan in 3 months.    I spent 32 minutes in the care of this patient today, which included time necessary for preparation for the visit, face to face time with the patient, communication of recommendations to the care team, and  documentation time.    ECOG Performance  0    Diagnosis:    1.  Adenocarcinoma of the descending colon: Diagnosed January 2023. Surgical pathology revealed an invasive moderately differentiated adenocarcinoma. 47 x 43.14 mm tumor invading into, but not through, the muscularis propria.  Margins negative.  48 nodes taken, all negative.   No perforation, lymphovascular invasion, or perineural invasion noted on pathology. pMMR.     Treatment:    Transverse and descending segmental colectomy January 6, 2023.   No adjuvant therapy was given.     Interim History:    Karoline returns for follow-up visit.  Overall she has been doing okay.  She reports no new abdominal pain or change in her bowel habits.  She does note that she has a upper respiratory type infection last week.    Review of Systems:    As above in the history.     Review of Systems otherwise Negative for:  General: chills, fever or night sweats  Psychological: depression  Ophthalmic: blurry vision, double vision or loss of vision, vision change  ENT: epistaxis, oral lesions, hearing changes  Hematological and Lymphatic: bleeding, bruising, jaundice, swollen lymph nodes  Endocrine: hot flashes, unexpected weight changes  Respiratory: cough, hemoptysis, orthopnea or shortness of breath/ROPER  Cardiovascular: chest pain, edema, palpitations or PND  Gastrointestinal: abdominal pain, blood in stools, change in bowel habits, constipation, diarrhea or nausea/vomiting  Genito-Urinary: change in urinary stream, incontinence, frequency/urgency  Musculoskeletal: joint pain, stiffness, swelling, muscle pain  Neurological: dizziness, headaches, numbness/tingling  Dermatological: lumps and rash    Past History:    Past Medical History:   Diagnosis Date     Abdominal pain 06/29/2015     Abnormal cervical Papanicolaou smear 11/09/2014    Overview:  ACUS/HPV positive     Abnormal cytology finding 11/09/2014    Overview:  ACUS/HPV positive     Acute pericardial effusion 02/06/2017      Agoraphobia with panic attacks      Anxiety      Arthralgia of both lower legs 05/29/2020    Bilateral achy knee pain that is chronic in nature going on for years. Most likely osteoarthritis in knees related to obesity. Previously injected in both knees with good relief. 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  Has controlled substance agreement with them.  On Vicodin, Valium, Klonopin prescribed only from there.        Coronary artery disease      Cough 02/09/2020     Family history of colon cancer 10/24/2020     Hx of seasonal allergies      Infection due to 2019 novel coronavirus 09/20/2022     Infectious pericarditis      Lipoma      Low back pain 07/13/2015     Major depressive disorder, recurrent episode, moderate (H) 09/05/2006     Menorrhagia with irregular cycle 07/15/2022    Added automatically from request for surgery [id number]     Moderate persistent asthma without complication 09/29/2020     Nondependent alcohol abuse, episodic drinking behavior 10/03/2012     Noninflammatory disorder of vagina 02/20/2015     Other chronic pain      Other long term (current) drug therapy 01/28/2013    Overview:  Vicodin and cyclobenzaprine monthly     Panic disorder with agoraphobia 09/05/2006     Pap smear for cervical cancer screening 10/31/2016    03/29/2010  Normal cytology, HPV ot done, repeat in 3 years.     Pericarditis 2017     Physiologic disturbance of temperature regulation 10/24/2020     PONV (postoperative nausea and vomiting)      Right ankle  "swelling 06/07/2022    Added automatically from request for surgery [id number]     Tobacco abuse      Tobacco use disorder 07/13/2015     Physical Exam:    /58 (BP Location: Left arm, Patient Position: Sitting, Cuff Size: Adult Large)   Pulse 89   Temp 98.1  F (36.7  C) (Oral)   Resp 16   Ht 1.753 m (5' 9\")   Wt 114.2 kg (251 lb 11.2 oz)   SpO2 96%   BMI 37.17 kg/m      General: patient appears stated age of 48 year old. Nontoxic and in no distress.   HEENT: Head: atraumatic, normocephalic. Sclerae anicteric.  Chest:  Normal respiratory effort  Cardiac:  No edema.   Abdomen: abdomen is non-distended  Extremities: normal tone and muscle bulk.  Skin: no lesions or rash on visible skin. Warm and dry.   CNS: alert and oriented. Grossly non-focal.   Psychiatric: normal mood and affect.     Lab Results:    Recent Results (from the past 168 hour(s))   Comprehensive metabolic panel   Result Value Ref Range    Sodium 139 135 - 145 mmol/L    Potassium 3.8 3.4 - 5.3 mmol/L    Carbon Dioxide (CO2) 21 (L) 22 - 29 mmol/L    Anion Gap 11 7 - 15 mmol/L    Urea Nitrogen 25.9 (H) 6.0 - 20.0 mg/dL    Creatinine 1.00 (H) 0.51 - 0.95 mg/dL    GFR Estimate 68 >60 mL/min/1.73m2    Calcium 9.1 8.6 - 10.0 mg/dL    Chloride 107 98 - 107 mmol/L    Glucose 103 (H) 70 - 99 mg/dL    Alkaline Phosphatase 118 40 - 150 U/L    AST 21 0 - 45 U/L    ALT 26 0 - 50 U/L    Protein Total 7.6 6.4 - 8.3 g/dL    Albumin 4.0 3.5 - 5.2 g/dL    Bilirubin Total 0.2 <=1.2 mg/dL   TSH with free T4 reflex   Result Value Ref Range    TSH 2.04 0.30 - 4.20 uIU/mL   CBC with platelets and differential   Result Value Ref Range    WBC Count 6.7 4.0 - 11.0 10e3/uL    RBC Count 4.41 3.80 - 5.20 10e6/uL    Hemoglobin 13.0 11.7 - 15.7 g/dL    Hematocrit 40.4 35.0 - 47.0 %    MCV 92 78 - 100 fL    MCH 29.5 26.5 - 33.0 pg    MCHC 32.2 31.5 - 36.5 g/dL    RDW 12.6 10.0 - 15.0 %    Platelet Count 275 150 - 450 10e3/uL    % Neutrophils 42 %    % Lymphocytes 41 %    % " Monocytes 8 %    % Eosinophils 9 %    % Basophils 0 %    % Immature Granulocytes 0 %    NRBCs per 100 WBC 0 <1 /100    Absolute Neutrophils 2.8 1.6 - 8.3 10e3/uL    Absolute Lymphocytes 2.8 0.8 - 5.3 10e3/uL    Absolute Monocytes 0.5 0.0 - 1.3 10e3/uL    Absolute Eosinophils 0.6 0.0 - 0.7 10e3/uL    Absolute Basophils 0.0 0.0 - 0.2 10e3/uL    Absolute Immature Granulocytes 0.0 <=0.4 10e3/uL    Absolute NRBCs 0.0 10e3/uL     Imaging:    CT Chest/Abdomen/Pelvis w Contrast    Result Date: 12/5/2023  EXAM: CT CHEST/ABDOMEN/PELVIS W CONTRAST LOCATION: [facility] DATE: 12/4/2023 INDICATION: colon cancer follow COMPARISON: 05/25/2023 TECHNIQUE: CT scan of the chest, abdomen, and pelvis was performed following injection of IV contrast. Multiplanar reformats were obtained. Dose reduction techniques were used. CONTRAST: ISOVUE 370 100 mL FINDINGS: LUNGS AND PLEURA: There is a collection of 5 new nodules in the superior segment right lower lobe largest measures 5 mm on image 93. No other lung nodules. Given the clustered nature of these nodules would favor benign. Mild emphysema. Lungs otherwise clear. MEDIASTINUM/AXILLAE: No lymphadenopathy. CORONARY ARTERY CALCIFICATION: None. HEPATOBILIARY: Normal. PANCREAS: Normal. SPLEEN: Normal. ADRENAL GLANDS: Normal. KIDNEYS/BLADDER: No significant findings. BOWEL: Postop partial left hemicolectomy. Nothing for local tumor recurrence. Moderate amount of stool. LYMPH NODES: Stable 1 cm lymph node right cardial phrenic angle and stable 11 mm left external iliac lymph node. No new findings. VASCULATURE: Unremarkable. PELVIC ORGANS: Normal. MUSCULOSKELETAL: No concerning bone lesion.     IMPRESSION: 1.  Post partial left hemicolectomy. 2.  Stable mildly prominent left external iliac lymph node in right cardiophrenic angle lymph node. 3.  New cluster of 5 nodules in the superior segment right lower lobe. Largest 5 mm. Most likely benign given the distribution of  "nodules. No other nodules in the lungs. Recommend attention on follow-up surveillance. 4.  Mild emphysema.       Signed by: Arnaud Valdez MD      Oncology Rooming Note    December 6, 2023 3:09 PM   Darlene Hudson is a 48 year old female who presents for:    Chief Complaint   Patient presents with     Oncology Clinic Visit     Return visit 6 months-review CT 12/04/23. Adenocarcinoma of descending colon.     Initial Vitals: /58 (BP Location: Left arm, Patient Position: Sitting, Cuff Size: Adult Large)   Pulse 89   Temp 98.1  F (36.7  C) (Oral)   Resp 16   Ht 1.753 m (5' 9\")   Wt 114.2 kg (251 lb 11.2 oz)   SpO2 96%   BMI 37.17 kg/m   Estimated body mass index is 37.17 kg/m  as calculated from the following:    Height as of this encounter: 1.753 m (5' 9\").    Weight as of this encounter: 114.2 kg (251 lb 11.2 oz). Body surface area is 2.36 meters squared.  No Pain (0) Comment: Data Unavailable   No LMP recorded. Patient has had an ablation.  Allergies reviewed: Yes  Medications reviewed: Yes    Medications: Medication refills not needed today.  Pharmacy name entered into [employer]:    [id number] [address]  AT [facility] [place], [place] - [address] [place] [address] [phone]    Clinical concerns: Return visit 6 months-review CT 12/04/23. Adenocarcinoma of descending colon.      Geneva Jaime CMA ([facility])                  Again, thank you for allowing me to participate in the care of your patient.        Sincerely,        Arnaud Valdez MD  "

## 2023-12-06 NOTE — PROGRESS NOTES
[facility] Hematology and Oncology Progress Note    Patient: Darlene Hudson  MRN: [id number]  Date of Service: Dec 6, 2023        Assessment and Plan:     Cancer Staging   Adenocarcinoma of descending colon (H)  Staging form: Colon and Rectum, AJCC 8th Edition  - Pathologic stage from 2/7/2023: Stage I (pT2, pN0, cM0) - Signed by Arnaud Valdez MD on 2/7/2023    1.  Adenocarcinoma of the descending colon: She had a follow-up CT scan on December 4.  I personally reviewed the images and shared them with [facility].  There is no evidence of recurrent colon cancer.  She has a stable left pelvic node.  Seems to be stable going back to January.  She will be due for colonoscopy in January.  I reviewed with her pathologic findings of surgery and a relatively low risk of recurrence.  Repeat imaging in 6 months, June, 2024.     2.  Left external iliac lymph node seen on imaging: Stable on current CT when compared to January 2023.  We will continue to monitor radiographically.  Most likely benign. There was moderate follicular lymphoid hyperplasia noted in some of her mesenteric lymph nodes on surgery.     3.  Right lung nodules: There is a cluster of lung nodules in the upper right lower lobe.  These were personally reviewed and shared with [facility].  She had a upper respiratory tract infection last week.  This is not a typical pattern or appearance of colon cancer metastases.  I think they are infectious/inflammatory.  We are going to repeat a CT scan in 3 months.    I spent 32 minutes in the care of this patient today, which included time necessary for preparation for the visit, face to face time with the patient, communication of recommendations to the care team, and documentation time.    ECOG Performance  0    Diagnosis:    1.  Adenocarcinoma of the descending colon: Diagnosed January 2023. Surgical pathology revealed an invasive moderately differentiated adenocarcinoma. 47 x 43.14 mm tumor invading into, but not  through, the muscularis propria.  Margins negative.  48 nodes taken, all negative.   No perforation, lymphovascular invasion, or perineural invasion noted on pathology. pMMR.     Treatment:    Transverse and descending segmental colectomy January 6, 2023.   No adjuvant therapy was given.     Interim History:    Karoline returns for follow-up visit.  Overall she has been doing okay.  She reports no new abdominal pain or change in her bowel habits.  She does note that she has a upper respiratory type infection last week.    Review of Systems:    As above in the history.     Review of Systems otherwise Negative for:  General: chills, fever or night sweats  Psychological: depression  Ophthalmic: blurry vision, double vision or loss of vision, vision change  ENT: epistaxis, oral lesions, hearing changes  Hematological and Lymphatic: bleeding, bruising, jaundice, swollen lymph nodes  Endocrine: hot flashes, unexpected weight changes  Respiratory: cough, hemoptysis, orthopnea or shortness of breath/ROPER  Cardiovascular: chest pain, edema, palpitations or PND  Gastrointestinal: abdominal pain, blood in stools, change in bowel habits, constipation, diarrhea or nausea/vomiting  Genito-Urinary: change in urinary stream, incontinence, frequency/urgency  Musculoskeletal: joint pain, stiffness, swelling, muscle pain  Neurological: dizziness, headaches, numbness/tingling  Dermatological: lumps and rash    Past History:    Past Medical History:   Diagnosis Date    Abdominal pain 06/29/2015    Abnormal cervical Papanicolaou smear 11/09/2014    Overview:  ACUS/HPV positive    Abnormal cytology finding 11/09/2014    Overview:  ACUS/HPV positive    Acute pericardial effusion 02/06/2017    Agoraphobia with panic attacks     Anxiety     Arthralgia of both lower legs 05/29/2020    Bilateral achy knee pain that is chronic in nature going on for years. Most likely osteoarthritis in knees related to obesity. Previously injected in both knees with  good relief. 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  Has controlled substance agreement with them.  On Vicodin, Valium, Klonopin prescribed only from there.       Coronary artery disease     Cough 02/09/2020    Family history of colon cancer 10/24/2020    Hx of seasonal allergies     Infection due to 2019 novel coronavirus 09/20/2022    Infectious pericarditis     Lipoma     Low back pain 07/13/2015    Major depressive disorder, recurrent episode, moderate (H) 09/05/2006    Menorrhagia with irregular cycle 07/15/2022    Added automatically from request for surgery [id number]    Moderate persistent asthma without complication 09/29/2020    Nondependent alcohol abuse, episodic drinking behavior 10/03/2012    Noninflammatory disorder of vagina 02/20/2015    Other chronic pain     Other long term (current) drug therapy 01/28/2013    Overview:  Vicodin and cyclobenzaprine monthly    Panic disorder with agoraphobia 09/05/2006    Pap smear for cervical cancer screening 10/31/2016    03/29/2010  Normal cytology, HPV ot done, repeat in 3 years.    Pericarditis 2017    Physiologic disturbance of temperature regulation 10/24/2020    PONV (postoperative nausea and vomiting)     Right ankle swelling 06/07/2022    Added automatically from request for surgery [id number]    Tobacco abuse     Tobacco use disorder 07/13/2015     Physical Exam:    /58 (BP Location: Left arm, Patient Position: Sitting, Cuff Size: Adult Large)   Pulse 89   Temp 98.1  F (36.7  C) (Oral)   Resp 16    "Ht 1.753 m (5' 9\")   Wt 114.2 kg (251 lb 11.2 oz)   SpO2 96%   BMI 37.17 kg/m      General: patient appears stated age of 48 year old. Nontoxic and in no distress.   HEENT: Head: atraumatic, normocephalic. Sclerae anicteric.  Chest:  Normal respiratory effort  Cardiac:  No edema.   Abdomen: abdomen is non-distended  Extremities: normal tone and muscle bulk.  Skin: no lesions or rash on visible skin. Warm and dry.   CNS: alert and oriented. Grossly non-focal.   Psychiatric: normal mood and affect.     Lab Results:    Recent Results (from the past 168 hour(s))   Comprehensive metabolic panel   Result Value Ref Range    Sodium 139 135 - 145 mmol/L    Potassium 3.8 3.4 - 5.3 mmol/L    Carbon Dioxide (CO2) 21 (L) 22 - 29 mmol/L    Anion Gap 11 7 - 15 mmol/L    Urea Nitrogen 25.9 (H) 6.0 - 20.0 mg/dL    Creatinine 1.00 (H) 0.51 - 0.95 mg/dL    GFR Estimate 68 >60 mL/min/1.73m2    Calcium 9.1 8.6 - 10.0 mg/dL    Chloride 107 98 - 107 mmol/L    Glucose 103 (H) 70 - 99 mg/dL    Alkaline Phosphatase 118 40 - 150 U/L    AST 21 0 - 45 U/L    ALT 26 0 - 50 U/L    Protein Total 7.6 6.4 - 8.3 g/dL    Albumin 4.0 3.5 - 5.2 g/dL    Bilirubin Total 0.2 <=1.2 mg/dL   TSH with free T4 reflex   Result Value Ref Range    TSH 2.04 0.30 - 4.20 uIU/mL   CBC with platelets and differential   Result Value Ref Range    WBC Count 6.7 4.0 - 11.0 10e3/uL    RBC Count 4.41 3.80 - 5.20 10e6/uL    Hemoglobin 13.0 11.7 - 15.7 g/dL    Hematocrit 40.4 35.0 - 47.0 %    MCV 92 78 - 100 fL    MCH 29.5 26.5 - 33.0 pg    MCHC 32.2 31.5 - 36.5 g/dL    RDW 12.6 10.0 - 15.0 %    Platelet Count 275 150 - 450 10e3/uL    % Neutrophils 42 %    % Lymphocytes 41 %    % Monocytes 8 %    % Eosinophils 9 %    % Basophils 0 %    % Immature Granulocytes 0 %    NRBCs per 100 WBC 0 <1 /100    Absolute Neutrophils 2.8 1.6 - 8.3 10e3/uL    Absolute Lymphocytes 2.8 0.8 - 5.3 10e3/uL    Absolute Monocytes 0.5 0.0 - 1.3 10e3/uL    Absolute Eosinophils 0.6 0.0 - 0.7 10e3/uL "    Absolute Basophils 0.0 0.0 - 0.2 10e3/uL    Absolute Immature Granulocytes 0.0 <=0.4 10e3/uL    Absolute NRBCs 0.0 10e3/uL     Imaging:    CT Chest/Abdomen/Pelvis w Contrast    Result Date: 12/5/2023  EXAM: CT CHEST/ABDOMEN/PELVIS W CONTRAST LOCATION: [facility] DATE: 12/4/2023 INDICATION: colon cancer follow COMPARISON: 05/25/2023 TECHNIQUE: CT scan of the chest, abdomen, and pelvis was performed following injection of IV contrast. Multiplanar reformats were obtained. Dose reduction techniques were used. CONTRAST: ISOVUE 370 100 mL FINDINGS: LUNGS AND PLEURA: There is a collection of 5 new nodules in the superior segment right lower lobe largest measures 5 mm on image 93. No other lung nodules. Given the clustered nature of these nodules would favor benign. Mild emphysema. Lungs otherwise clear. MEDIASTINUM/AXILLAE: No lymphadenopathy. CORONARY ARTERY CALCIFICATION: None. HEPATOBILIARY: Normal. PANCREAS: Normal. SPLEEN: Normal. ADRENAL GLANDS: Normal. KIDNEYS/BLADDER: No significant findings. BOWEL: Postop partial left hemicolectomy. Nothing for local tumor recurrence. Moderate amount of stool. LYMPH NODES: Stable 1 cm lymph node right cardial phrenic angle and stable 11 mm left external iliac lymph node. No new findings. VASCULATURE: Unremarkable. PELVIC ORGANS: Normal. MUSCULOSKELETAL: No concerning bone lesion.     IMPRESSION: 1.  Post partial left hemicolectomy. 2.  Stable mildly prominent left external iliac lymph node in right cardiophrenic angle lymph node. 3.  New cluster of 5 nodules in the superior segment right lower lobe. Largest 5 mm. Most likely benign given the distribution of nodules. No other nodules in the lungs. Recommend attention on follow-up surveillance. 4.  Mild emphysema.       Signed by: Arnaud Valdez MD

## 2023-12-06 NOTE — PROGRESS NOTES
"Oncology Rooming Note    December 6, 2023 3:09 PM   Darlene Hudson is a 48 year old female who presents for:    Chief Complaint   Patient presents with    Oncology Clinic Visit     Return visit 6 months-review CT 12/04/23. Adenocarcinoma of descending colon.     Initial Vitals: /58 (BP Location: Left arm, Patient Position: Sitting, Cuff Size: Adult Large)   Pulse 89   Temp 98.1  F (36.7  C) (Oral)   Resp 16   Ht 1.753 m (5' 9\")   Wt 114.2 kg (251 lb 11.2 oz)   SpO2 96%   BMI 37.17 kg/m   Estimated body mass index is 37.17 kg/m  as calculated from the following:    Height as of this encounter: 1.753 m (5' 9\").    Weight as of this encounter: 114.2 kg (251 lb 11.2 oz). Body surface area is 2.36 meters squared.  No Pain (0) Comment: Data Unavailable   No LMP recorded. Patient has had an ablation.  Allergies reviewed: Yes  Medications reviewed: Yes    Medications: Medication refills not needed today.  Pharmacy name entered into [employer]:    [id number] - [place], [place] - [address] AT [place], [place] - [phone]    Clinical concerns: Return visit 6 months-review CT 12/04/23. Adenocarcinoma of descending colon.      Geneva Jaime CMA ([facility])                "

## 2023-12-08 ENCOUNTER — HOSPITAL ENCOUNTER (OUTPATIENT)
Facility: AMBULATORY SURGERY CENTER | Age: 48
End: 2023-12-08
Attending: SURGERY
Payer: COMMERCIAL

## 2023-12-08 NOTE — TELEPHONE ENCOUNTER
Karoline returned my call and she's now scheduled for surgery with Dr. Charles on 01.16.24. We went over details and I let her know I will send the confirmation letter and colonoscopy prep instructions to her [facility]. She's in agreement with the plan.

## 2023-12-14 ENCOUNTER — TELEPHONE (OUTPATIENT)
Dept: ONCOLOGY | Facility: HOSPITAL | Age: 48
End: 2023-12-14
Payer: COMMERCIAL

## 2023-12-14 NOTE — TELEPHONE ENCOUNTER
"Received a phone call from Darlene today. She states she saw Dr. Valdez on 12/6/23 and was told she has lung nodules on her most recent CT scan. She states Dr. Valdez asked her if she was \"sick lately\" and she was getting over a cold at that time. Recently, however, she has noticed that she feels more short of breath. She does have asthma and is on inhalers and allergy medications so she is unsure if this is related to her asthma or this cluster of lung nodules. She does have a cough that comes and goes but no other cold symptoms at this time. She states she has been missing work lately because she \"feels off.\" She is wondering if this cluster of lung nodules could be the reason she is more short of breath? She does have an appointment with her PCP next Wednesday to discuss this as well. Dr. Valdez is not in the clinic today. Will route this question to Nicolle Sadler CNP to review and advise. Thank you!    Ludmila Osorio RN on 12/14/2023 at 9:10 AM    "

## 2023-12-14 NOTE — TELEPHONE ENCOUNTER
"Per Nicolle Sadler, CNP: \"These nodules are very very tiny and should not really be causing any issues.\" I called Toddtiffany back with this message. She verbalized understanding and will see her primary care physician as scheduled next week.     Ludmila Osorio RN on 12/14/2023 at 1:01 PM    "

## 2023-12-18 ENCOUNTER — ANCILLARY PROCEDURE (OUTPATIENT)
Dept: MAMMOGRAPHY | Facility: CLINIC | Age: 48
End: 2023-12-18
Attending: FAMILY MEDICINE
Payer: COMMERCIAL

## 2023-12-18 DIAGNOSIS — Z12.31 VISIT FOR SCREENING MAMMOGRAM: ICD-10-CM

## 2023-12-18 PROCEDURE — 77067 SCR MAMMO BI INCL CAD: CPT

## 2023-12-20 ENCOUNTER — OFFICE VISIT (OUTPATIENT)
Dept: FAMILY MEDICINE | Facility: CLINIC | Age: 48
End: 2023-12-20
Payer: COMMERCIAL

## 2023-12-20 VITALS
DIASTOLIC BLOOD PRESSURE: 90 MMHG | HEART RATE: 80 BPM | TEMPERATURE: 98 F | RESPIRATION RATE: 18 BRPM | OXYGEN SATURATION: 100 % | SYSTOLIC BLOOD PRESSURE: 139 MMHG

## 2023-12-20 DIAGNOSIS — G89.29 CHRONIC MIDLINE THORACIC BACK PAIN: ICD-10-CM

## 2023-12-20 DIAGNOSIS — F11.90 CHRONIC, CONTINUOUS USE OF OPIOIDS: ICD-10-CM

## 2023-12-20 DIAGNOSIS — M54.6 CHRONIC MIDLINE THORACIC BACK PAIN: ICD-10-CM

## 2023-12-20 DIAGNOSIS — C18.6 CANCER OF DESCENDING COLON (H): ICD-10-CM

## 2023-12-20 DIAGNOSIS — Z90.49 STATUS POST PARTIAL COLECTOMY: ICD-10-CM

## 2023-12-20 DIAGNOSIS — R06.02 SHORTNESS OF BREATH: Primary | ICD-10-CM

## 2023-12-20 DIAGNOSIS — Z98.890 STATUS POST SURGICAL MANIPULATION OF ANKLE JOINT: ICD-10-CM

## 2023-12-20 PROCEDURE — 99214 OFFICE O/P EST MOD 30 MIN: CPT | Performed by: FAMILY MEDICINE

## 2023-12-20 RX ORDER — MELOXICAM 15 MG/1
15 TABLET ORAL DAILY
Qty: 30 TABLET | Refills: 1 | Status: SHIPPED | OUTPATIENT
Start: 2023-12-20 | End: 2024-03-20

## 2023-12-20 RX ORDER — OXYCODONE HYDROCHLORIDE 5 MG/1
5 TABLET ORAL 3 TIMES DAILY PRN
Qty: 90 TABLET | Refills: 0 | Status: SHIPPED | OUTPATIENT
Start: 2023-12-20 | End: 2024-01-17

## 2023-12-20 ASSESSMENT — ASTHMA QUESTIONNAIRES
ACT_TOTALSCORE: 14
QUESTION_4 LAST FOUR WEEKS HOW OFTEN HAVE YOU USED YOUR RESCUE INHALER OR NEBULIZER MEDICATION (SUCH AS ALBUTEROL): ONE OR TWO TIMES PER DAY
QUESTION_1 LAST FOUR WEEKS HOW MUCH OF THE TIME DID YOUR ASTHMA KEEP YOU FROM GETTING AS MUCH DONE AT WORK, SCHOOL OR AT HOME: A LITTLE OF THE TIME
ACT_TOTALSCORE: 14
QUESTION_5 LAST FOUR WEEKS HOW WOULD YOU RATE YOUR ASTHMA CONTROL: WELL CONTROLLED
EMERGENCY_ROOM_LAST_YEAR_TOTAL: TWO
QUESTION_2 LAST FOUR WEEKS HOW OFTEN HAVE YOU HAD SHORTNESS OF BREATH: THREE TO SIX TIMES A WEEK
QUESTION_3 LAST FOUR WEEKS HOW OFTEN DID YOUR ASTHMA SYMPTOMS (WHEEZING, COUGHING, SHORTNESS OF BREATH, CHEST TIGHTNESS OR PAIN) WAKE YOU UP AT NIGHT OR EARLIER THAN USUAL IN THE MORNING: FOUR OR MORE NIGHTS A WEEK

## 2023-12-20 NOTE — PROGRESS NOTES
ASSESSMENT/PLAN:    (R06.02) Shortness of breath  (primary encounter diagnosis)  Comment: given persistent symptoms, will recheck PFTs and check echo/ stress test; precautions given; pt to follow-up in 10 days to check her weight. She is not volume up but her weight is supposedly up 30lbs over past 3 months? She will be seeing Dr Oneill on 12/29 and I will precept  Plan: Echocardiogram Complete, Echocardiogram Exercise Stress, General PFT Lab (Please always keep checked), Pulmonary Function Test          (C18.6) Cancer of descending colon (H)  Comment:   Plan: stable surveillance imaging; colonoscopy scheduled for January 2024    (Z90.49) Status post partial colectomy  Comment:   Plan: see above    (F11.90) Chronic, continuous use of opioids  Comment:   Plan: PDMP reviewed; pt attempted to have me increase meds this month and I made it clear that her plan is to taper opiates, never to add additional quantity; will add meloxicam for pain control    (Z98.890) Status post surgical manipulation of ankle joint  Comment: see above  Plan: oxyCODONE (ROXICODONE) 5 MG tablet, meloxicam (MOBIC) 15 MG tablet          (M54.6,  G89.29) Chronic midline thoracic back pain  Comment: see above; once she has time, will refer for PT for mid back pain; pt would ultimately like to pursue a breast reduction  Plan: oxyCODONE (ROXICODONE) 5 MG tablet, meloxicam (MOBIC) 15 MG tablet         Robbie Bailon MD  December 20, 2023  3:01 PM      Pt is a 48 year old female last seen on 11/22/23 here today for:     1) SOB - went to ED; left before getting a chest CT because her anxiety was bad. Was sent home from work because of difficulty catching her breath. She thinks it has to do with her weight because she was told she gained 30lbs. I am unsure if these weights are accurate as weight today in clinic is 257lbs. Had labwork which was normal - Hgb/thyroid all normal. Her clothes are fitting fine. She states that she doesn't eat junk. Probably has  "too much caffeine. Does eat out more because of work schedule. Does note she was home and less active over the summer but past 3 months has been more active after work.     Wt Readings from Last 3 Encounters:   12/06/23 114.2 kg (251 lb 11.2 oz)   09/01/23 99.8 kg (220 lb)   08/25/23 99.8 kg (220 lb)     2) Asthma - ACT is 14   Using inhaler \"a lot\" but doesn't think it's her asthma. Doesn't help.     3) Colon cancer/ Oncology - had appt w/ both Dr Valdez and Dr Charles; imaging stable;     CT Chest/Abdomen/Pelvis - 12/4/23  IMPRESSION:  1.  Post partial left hemicolectomy.     2.  Stable mildly prominent left external iliac lymph node in right cardiophrenic angle lymph node.     3.  New cluster of 5 nodules in the superior segment right lower lobe. Largest 5 mm. Most likely benign given the distribution of nodules. No other nodules in the lungs. Recommend attention on follow-up surveillance.     4.  Mild emphysema.      Per [facility] ED note on 12/7/23:  [facility]  Emergency Medicine Visit Note    Chief Complaint: SOB (SHORTNESS OF BREATH)    HPI  48 year old female with a history of depression, asthma, adenocarcinoma of the descending colon s/p partial colectomy presenting for evaluation of shortness of breath. Patient reports that she is been feeling short of breath for the past few weeks. Shortness of breath is worse with exertion, but does occur at rest as well. She does report that she feels more short of and more pain in the chest when she is lying flat. She is had intermittent episodes of sharp stabbing pain in the left side of her chest that last about 30 seconds. This is not always related to her shortness of breath. She denies any history of DVT or PE. Denies any lower extremity swelling or pain. No recent cough or fevers. She has a history of asthma, but states that this does not always feel like her asthma. She also has a history of a pericardial effusion secondary to pericarditis in the " past.    Triage Vitals [12/07/23 1102]   Temp 97.8  F (36.6  C) Temp src Oral Pulse 90 Resp 16 /70 SpO2 100 %     Physical Exam  Vitals and nursing note reviewed.   Constitutional:   General: She is not in acute distress.  Appearance: She is not toxic-appearing.   HENT:   Head: Normocephalic and atraumatic.   Mouth/Throat:   Mouth: Mucous membranes are moist.   Eyes:   Conjunctiva/sclera: Conjunctivae normal.   Cardiovascular:   Rate and Rhythm: Normal rate and regular rhythm.   Pulmonary:   Effort: Pulmonary effort is normal. No respiratory distress.   Breath sounds: Normal breath sounds. No wheezing, rhonchi or rales.   Abdominal:   Palpations: Abdomen is soft.   Tenderness: There is no abdominal tenderness.   Musculoskeletal:   General: No deformity.   Cervical back: Normal range of motion.   Skin:  General: Skin is warm and dry.   Neurological:   General: No focal deficit present.   Mental Status: She is alert.   Comments: Speech normal   Psychiatric:   Mood and Affect: Mood normal.   Behavior: Behavior normal.     MDM:   48 year old female with a history of depression, asthma, adenocarcinoma of the descending colon s/p partial colectomy presenting for evaluation of shortness of breath. Reassuring vitals. No hypoxia or tachycardia. acute coronary syndrome, pulmonary embolism, pneumothorax, pneumonia, cardiac tamponade, congestive heart failure, chronic obstructive pulmonary disease/ asthma, and musculoskeletal pain. Patient did just have an outpatient CT chest/abdomen/pelvis as part of her cancer follow up on 12/04/2023. This showed some likely benign lung nodules, but was otherwise unremarkable. No obvious pericardial effusion was noted on this. No other lung findings. This was not an angio study and so PE is still on the differential, though seems less likely given no tachycardia or hypoxia. Planned evaluation: EKG, labs, consider CXR vs CT pulmonary angiogram. Likely disposition: Disposition pending  results.    Justina Flores PA-C    ED Course as of 12/07/23 1458   Thu Dec 07, 2023   1153 ECG 12-LEAD ROUTINE  Normal sinus rhythm with a rate of 72. No evidence of ischemia. [JS]   1220 ATTENDING: I personally saw the patient, performed key elements of the visit, and supervised patient care with the primary clinician. MDM: SOB for several weeks. Worse with exertion, but can occur at rest. Not like her asthma. Prior percardial effusion, but CT done 2 days ago did not show pericardial effusion.   [PK]   1223 Complete Blood Count-No Diff  No leukocytosis or anemia [JS]   1224 CT chest/abd/pelvis with IV contrast from 12/4/23:  1. Post partial left hemicolectomy.   2. Stable mildly prominent left external iliac lymph node in right cardiophrenic angle lymph node.   3. New cluster of 5 nodules in the superior segment right lower lobe. Largest 5 mm. Most likely benign given the distribution of nodules. No other nodules in the lungs. Recommend attention on follow-up surveillance.  4. Mild emphysema. [JS]   1309 Troponin I: <0.01  ACS/MI unlikely. [JS]   1310 B Type Natr. Peptide: <10  Heart failure less likely. [JS]   1310 Basic Metabolic Panel(!)  Unremarkable. [JS]   1310 D Dimer, Quant(!): 0.52  Elevated d-dimer, will plan to obtain CT pulmonary angio [JS]   1456 Patient is upset about the long wait for an IV for her CT and for her call light to be answered due to multiple critical and trauma patient's presenting at the same time. She reports that the nurse was rude to her and prefers to be discharged as she no longer feels comfortable receiving care here. I apologized for her bad experience and the long wait. I did recommend that she stay for further evaluation to rule out PE. She understands that we have not ruled this out and that if she has an undiagnosed PE it could lead to permanent disability or death. She understands the risks and has capacity and still requests to be discharged at this time. Patietn discharged  prior to completing her entire evaluation. [JS]     Per my last note:  (C18.6) Cancer of descending colon (H)  Comment: stable; meds refilled; f/up in 4 weeks; work note written   Plan: oxyCODONE (ROXICODONE) 5 MG tablet          (Z90.49) Status post partial colectomy  Comment: see above  Plan: oxyCODONE (ROXICODONE) 5 MG tablet          (F41.1) Generalized anxiety disorder  Comment: stable; called pharmacy for early fill as she will be away for thanksgiving and returning monday  Plan: ALPRAZolam (XANAX) 2 MG tablet          (F40.01) Panic disorder with agoraphobia  Comment: see above  Plan: ALPRAZolam (XANAX) 2 MG tablet    Past Medical History:   Diagnosis Date    Abdominal pain 06/29/2015    Abnormal cervical Papanicolaou smear 11/09/2014    Overview:  ACUS/HPV positive    Abnormal cytology finding 11/09/2014    Overview:  ACUS/HPV positive    Acute pericardial effusion 02/06/2017    Agoraphobia with panic attacks     Anxiety     Arthralgia of both lower legs 05/29/2020    Bilateral achy knee pain that is chronic in nature going on for years. Most likely osteoarthritis in knees related to obesity. Previously injected in both knees with good relief. 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  Has controlled substance agreement with them.  On Vicodin, Valium, Klonopin prescribed only from there.       Coronary artery disease     Cough 02/09/2020    Family history of colon cancer 10/24/2020    Hx of seasonal allergies     Infection due to 2019 novel coronavirus  09/20/2022    Infectious pericarditis     Lipoma     Low back pain 07/13/2015    Major depressive disorder, recurrent episode, moderate (H) 09/05/2006    Menorrhagia with irregular cycle 07/15/2022    Added automatically from request for surgery [id number]    Moderate persistent asthma without complication 09/29/2020    Nondependent alcohol abuse, episodic drinking behavior 10/03/2012    Noninflammatory disorder of vagina 02/20/2015    Other chronic pain     Other long term (current) drug therapy 01/28/2013    Overview:  Vicodin and cyclobenzaprine monthly    Panic disorder with agoraphobia 09/05/2006    Pap smear for cervical cancer screening 10/31/2016    03/29/2010  Normal cytology, HPV ot done, repeat in 3 years.    Pericarditis 2017    Physiologic disturbance of temperature regulation 10/24/2020    PONV (postoperative nausea and vomiting)     Right ankle swelling 06/07/2022    Added automatically from request for surgery [id number]    Tobacco abuse     Tobacco use disorder 07/13/2015      Past Surgical History:   Procedure Laterality Date    ANKLE SURGERY Right 05/30/2019    ARTHROSCOPY ANKLE Right 6/21/2023    Procedure: right ankle arthroscopy with debridement;  Surgeon: Kareem Bashir MD;  Location: [facility] OR    BIOPSY BREAST Right 1990    benign    COLONOSCOPY N/A 1/3/2023    Procedure: COLONOSCOPY WITH BIOPSY OF COLON MASS, POLYPECTOMY;  Surgeon: Kris Charles MD;  Location: [facility] OR    CREATION PERICARDIAL WINDOW  02/10/2017    [facility]    DILATION AND CURETTAGE N/A 7/22/2022    Procedure: DILATION AND CURETTAGE, UTERUS;  Surgeon: Lesly Holland;  Location: [facility] OR    HEMORRHOIDECTOMY EXTERNAL      HYSTEROSCOPY, WITH ENDOMETRIAL RADIOFREQUENCY ABLATION - NOVASURE N/A 7/22/2022    Procedure: HYSTEROSCOPY, WITH ENDOMETRIAL RADIOFREQUENCY ABLATION - NOVASURE DILATION AND CURETTAGE, UTERUS;  Surgeon: Lesly Holland;  Location: [facility] OR    LAPAROSCOPIC ASSISTED  "SIGMOID COLECTOMY N/A 1/6/2023    Procedure: LAPAROSCOPIC ASSISTED DESCENDING COLELCTOMY SPLENIC FLEXURE MOBILIZATION ;  Surgeon: Kris Charles MD;  Location: [facility] OR    LAPAROSCOPIC LYSIS ADHESIONS N/A 1/6/2023    Procedure: LYSIS OF ADHESIONS;  Surgeon: Kris Charles MD;  Location: [facility] OR    TUMOR REMOVAL      Has had 3. In the right breast and \"inside of rib cage.\"      Current Outpatient Medications   Medication Sig Dispense Refill    meloxicam (MOBIC) 15 MG tablet Take 1 tablet (15 mg) by mouth daily 30 tablet 1    oxyCODONE (ROXICODONE) 5 MG tablet Take 1 tablet (5 mg) by mouth 3 times daily as needed for severe pain 90 tablet 0    albuterol (PROAIR HFA/PROVENTIL HFA/VENTOLIN HFA) 108 (90 Base) MCG/ACT inhaler Inhale 2 puffs into the lungs every 6 hours as needed for shortness of breath, wheezing or cough 18 g 11    ALPRAZolam (XANAX) 2 MG tablet Take 1 tablet (2 mg) by mouth 2 times daily as needed for anxiety 60 tablet 5    cetirizine (ZYRTEC) 10 MG tablet Take 1 tablet (10 mg) by mouth daily 30 tablet 11    doxylamine (UNISOM) 25 MG TABS tablet Take 1 tablet (25 mg) by mouth nightly as needed for other (nausea) 7 tablet 1    fluticasone (FLONASE) 50 MCG/ACT nasal spray Spray 1 spray into both nostrils daily      fluticasone (FLONASE) 50 MCG/ACT nasal spray Spray 2 sprays into both nostrils daily 9.9 mL 11    guaiFENesin-codeine (GUAIFENESIN AC) 100-10 MG/5ML syrup Take 5 mLs by mouth every 4 hours as needed for congestion 180 mL 1    hydrOXYzine (ATARAX) 25 MG tablet Take 1 tablet (25 mg) by mouth nightly as needed for anxiety 30 tablet 1    montelukast (SINGULAIR) 10 MG tablet Take 1 tablet (10 mg) by mouth At Bedtime 30 tablet 11    naloxone (NARCAN) 4 MG/0.1ML nasal spray Spray 1 spray (4 mg) into one nostril alternating nostrils as needed for opioid reversal every 2-3 minutes until assistance arrives 0.2 mL 1    ondansetron (ZOFRAN) 8 MG tablet Take 0.5-1 tablets " "(4-8 mg) by mouth every 8 hours as needed for nausea 30 tablet 1    PROAIR  (90 Base) MCG/ACT inhaler Inhale 2 puffs into the lungs every 4 hours as needed for shortness of breath or wheezing 8 g 11    prochlorperazine (COMPAZINE) 10 MG tablet Take 1 tablet (10 mg) by mouth every 6 hours as needed for nausea or vomiting 40 tablet 1    spacer (OPTICHAMBER BINA) holding chamber For use w/ rescue inhaler 1 each 0    sucralfate (CARAFATE) 1 GM/10ML suspension Take 10 mLs (1 g) by mouth 4 times daily as needed for nausea 414 mL 0    tiotropium (SPIRIVA RESPIMAT) 1.25 MCG/ACT inhaler Inhale 2 puffs into the lungs daily 12 g 3    tiotropium (SPIRIVA) 18 MCG inhaled capsule Inhale 18 mcg into the lungs daily      trimethoprim-polymyxin b (POLYTRIM) 65229-4.1 UNIT/ML-% ophthalmic solution Place 1-2 drops into the right eye every 4 hours 10 mL 0      Allergies   Allergen Reactions    Gabapentin Other (See Comments)     Mental status is changed     Lidocaine Other (See Comments)     \"my jaw stopped moving\"  Other reaction(s): Dystonia    Penicillins Hives, Rash and Shortness Of Breath    Lidocaine-Epinephrine Other (See Comments) and Muscle Pain (Myalgia)     Severe jaw cramping, double vision  Jaw locking        ROS:   Gen- no weight change, no fevers/chills   Head/ Eyes- no blurred vision, no headaches   ENT- no cough, no congestion, no URI symptoms   Cardiac - no palpitations, no chest pain   Respiratory - see HPI  GI - no nausea, no vomiting, no diarrhea, no constipation   Urinary - no dysuria, no polyuria   Neuro - no numbness, no tingling   Remainder of ROS negative.     Exam:   BP (!) 139/90 (BP Location: Right arm, Patient Position: Sitting, Cuff Size: Adult Large)   Pulse 80   Temp 98  F (36.7  C) (Oral)   Resp 18   SpO2 100%    Alert and oriented x 3; No acute distress   Neuro: no focal deficits   Derm: no rashes       "

## 2023-12-20 NOTE — LETTER
December 20, 2023      Re: Darlene Hudson   1975    To Whom It May Concern:    This is to confirm that the above patient was seen on 12/20/2023.  Darlene Hudson is unable to return to work today. Please excuse patient for days missed from November 21st to present day. Please excuse her for days that will be missed in the future.    Patient is to remain on light duty, until further notice.    Thank you for your cooperation in this matter.  Please do not hesitate to contact me if you have any further questions.    Sincerely,      JULIETH AMEZCUA

## 2023-12-20 NOTE — Clinical Note
Just LA NENA Ramey on your schedule on 12/19. Specifically a weight check. I will precept. Should be no discussion involving pain or anxiety meds. - Adamaris
2.13

## 2023-12-28 ENCOUNTER — PATIENT OUTREACH (OUTPATIENT)
Dept: GASTROENTEROLOGY | Facility: CLINIC | Age: 48
End: 2023-12-28
Payer: COMMERCIAL

## 2023-12-29 ENCOUNTER — OFFICE VISIT (OUTPATIENT)
Dept: FAMILY MEDICINE | Facility: CLINIC | Age: 48
End: 2023-12-29
Payer: COMMERCIAL

## 2023-12-29 VITALS
BODY MASS INDEX: 38.71 KG/M2 | RESPIRATION RATE: 16 BRPM | WEIGHT: 262.12 LBS | DIASTOLIC BLOOD PRESSURE: 75 MMHG | SYSTOLIC BLOOD PRESSURE: 110 MMHG | OXYGEN SATURATION: 99 % | HEART RATE: 94 BPM

## 2023-12-29 DIAGNOSIS — R63.5 WEIGHT GAIN: Primary | ICD-10-CM

## 2023-12-29 DIAGNOSIS — R06.02 SHORTNESS OF BREATH: ICD-10-CM

## 2023-12-29 PROCEDURE — 99213 OFFICE O/P EST LOW 20 MIN: CPT | Mod: GC

## 2023-12-29 NOTE — PROGRESS NOTES
"  Assessment & Plan     Weight gain  Patient now with 42 pounds weight gain per scales/documentation since August, 11 pounds in the last few weeks. She does not describe awareness of this including her clothes fit similarly, denies lower extremity edema, abdominal distension. With such rapid weight gain, if true, think about fluid accumulating although exam is not c/w this. Would assess for heart failure, last LFTs normal, no indication of kidney dysfunction. She has a history of colon cancer but has been up to date with her oncologist and recent CT scan unremarkable with the exception of new nodules in the lung w/ plans to repeat in three months. She has an ECHO, stress, and PFTs ordered per her last visit. Her recent visit to the ER w/ unremarkable labs (including BNP, trop, TSH). Referral staff not available today, but will send a message so that on Monday we can see what the status of the orders are and if we can do anything expedite this process. In the interim, no other additional tests needed at this time. Could consider infectious/inflammatory etiology of lung nodules ? But would start with completing the work-up as ordered by PCP.    Shortness of breath  See above- agree with PFTs, ECHO, and stress test as it does appear to be cardiac in nature given dyspnea on exertion and orthopnea. Additionally, with history of asthma, would be reasonable to recheck PFTs.    Follow-up with PCP     Anahi Oneill MD  [facility]    Pablito Ramey is a 48 year old, presenting for the following health issues:  RECHECK (Follow up weight/)    HPI     \"Weight check\"  - patient seen 12/20 for shortness of breath in clinic, had gone to the ED for this and left before getting a CT due to anxiety; lab work was normal  - ordered PFTs and ECHO/stress test at last clinic visit  - also with a hx of asthma, colon cancer-- up to date with oncologist, last routine CT 12/7 w/ New cluster of 5 nodules in the " superior segment right lower lobe. Largest 5 mm. Most likely benign given the distribution of nodules. No other nodules in the lungs. Recommend attention on follow-up surveillance.   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      Objective    /75   Pulse 94   Resp 16   Wt 118.9 kg (262 lb 1.9 oz)   SpO2 99%   BMI 38.71 kg/m    Body mass index is 38.71 kg/m .  Physical Exam   GENERAL: healthy, alert and no distress  NECK: no adenopathy, no asymmetry, masses, or scars and thyroid normal to palpation  RESP: lungs clear to auscultation - no rales, rhonchi or wheezes  CV: regular rate and rhythm, normal S1 S2, no S3 or S4, no murmur, click or rub, no peripheral edema and peripheral pulses strong  ABDOMEN: soft, nontender, no hepatosplenomegaly, no masses and bowel sounds normal  MS: no gross musculoskeletal defects noted, no edema

## 2024-01-12 RX ORDER — LIDOCAINE 40 MG/G
CREAM TOPICAL
Status: CANCELLED | OUTPATIENT
Start: 2024-01-12

## 2024-01-12 RX ORDER — SODIUM CHLORIDE, SODIUM LACTATE, POTASSIUM CHLORIDE, CALCIUM CHLORIDE 600; 310; 30; 20 MG/100ML; MG/100ML; MG/100ML; MG/100ML
INJECTION, SOLUTION INTRAVENOUS CONTINUOUS
Status: CANCELLED | OUTPATIENT
Start: 2024-01-12

## 2024-01-15 ENCOUNTER — TELEPHONE (OUTPATIENT)
Dept: SURGERY | Facility: CLINIC | Age: 49
End: 2024-01-15
Payer: COMMERCIAL

## 2024-01-15 NOTE — TELEPHONE ENCOUNTER
I received an e-mail from the pre-op nurse at [facility] about patient needing to cancel the colonoscopy due to not being cleared by PCP. I talked to patient and she is going to call me once she's safely cleared.     Copied and pasted the reason, per pre-op nurse at [facility]:    Darlene Hudson [birth year] scheduled for a Colonoscopy on 1-16 needs to be cancelled until she is cleared by her PCP. She was seen in her PCP office on 12-29, they requested her to have an Echo, Stress Test and Pulmonary Function tests but she has not had any of these scheduled yet.  I spoke with her this am and she voiced understanding and will call her PCP . Her shortness of breath and dizziness they feel she is been having is possibly related to her heart. Notes are in EPIC. This was also reviewed with Anesthesia and they are in agreement with this. Thanks!

## 2024-01-16 NOTE — PROGRESS NOTES
ASSESSMENT/PLAN:    (R06.02) Shortness of breath  (primary encounter diagnosis)  Comment:   Plan: stable/ improved w/ new housing; she suspects mold has been a chronic trigger and this is definitely possible; has PFTs pending    (R63.5) Weight gain  Comment:   Plan: still unclear etiology; has echo and stress test pending     (F41.1) Generalized anxiety disorder  (primary encounter diagnosis)  Comment: will add for mood and pain; precautions given  Plan: DULoxetine (CYMBALTA) 20 MG capsule                (M79.18,  G89.29) Chronic musculoskeletal pain  Comment: see above  Plan: DULoxetine (CYMBALTA) 20 MG capsule          (Z98.890) Status post surgical manipulation of ankle joint  Comment: stable; due for refill at end of week; dated script sent  Plan: oxyCODONE (ROXICODONE) 5 MG tablet          (M54.6,  G89.29) Chronic midline thoracic back pain  Comment: see above  Plan: oxyCODONE (ROXICODONE) 5 MG tablet              Robbie Bailon MD  January 17, 2024  4:25 PM        Pt is a 48 year old female last seen by me on 12/20/23 here today for:     1) Shortness of breath   Diagnosed herself -> her old house had a lot of mold; has been out a month and now has noted significant improvement  PFTs pending 1/22/24  Echocardiogram tomorrow  Stress test is at the end of the month     2) Weight gain  - weight is stable since last visit but now down   Unclear why she has gained so much weight     Wt Readings from Last 3 Encounters:   01/17/24 118.8 kg (262 lb)   12/29/23 118.9 kg (262 lb 1.9 oz)   12/06/23 114.2 kg (251 lb 11.2 oz)     3) Parental issues  [place] is going to move out and live with his sister [place]   He quit job   Karoline confused about his sense of entitlement  Has come out as [place], not bisexual   Is attending school   Is still in [employer] for being a      4) Pain - has been taking 2 tabs of max dose meloxicam      Per note by Dr Oneill on 12/29/23:  Weight gain  Patient now with 42 pounds weight gain  per scales/documentation since August, 11 pounds in the last few weeks. She does not describe awareness of this including her clothes fit similarly, denies lower extremity edema, abdominal distension. With such rapid weight gain, if true, think about fluid accumulating although exam is not c/w this. Would assess for heart failure, last LFTs normal, no indication of kidney dysfunction. She has a history of colon cancer but has been up to date with her oncologist and recent CT scan unremarkable with the exception of new nodules in the lung w/ plans to repeat in three months. She has an ECHO, stress, and PFTs ordered per her last visit. Her recent visit to the ER w/ unremarkable labs (including BNP, trop, TSH). Referral staff not available today, but will send a message so that on Monday we can see what the status of the orders are and if we can do anything expedite this process. In the interim, no other additional tests needed at this time. Could consider infectious/inflammatory etiology of lung nodules ? But would start with completing the work-up as ordered by PCP.     Shortness of breath  See above- agree with PFTs, ECHO, and stress test as it does appear to be cardiac in nature given dyspnea on exertion and orthopnea. Additionally, with history of asthma, would be reasonable to recheck PFTs.    Per my last note:  (R06.02) Shortness of breath  (primary encounter diagnosis)  Comment: given persistent symptoms, will recheck PFTs and check echo/ stress test; precautions given; pt to follow-up in 10 days to check her weight. She is not volume up but her weight is supposedly up 30lbs over past 3 months? She will be seeing Dr Oneill on 12/29 and I will precept  Plan: Echocardiogram Complete, Echocardiogram Exercise Stress, General PFT Lab (Please always keep checked), Pulmonary Function Test          (C18.6) Cancer of descending colon (H)  Comment:   Plan: stable surveillance imaging; colonoscopy scheduled for January  2024     (Z90.49) Status post partial colectomy  Comment:   Plan: see above     (F11.90) Chronic, continuous use of opioids  Comment:   Plan: PDMP reviewed; pt attempted to have me increase meds this month and I made it clear that her plan is to taper opiates, never to add additional quantity; will add meloxicam for pain control     (Z98.890) Status post surgical manipulation of ankle joint  Comment: see above  Plan: oxyCODONE (ROXICODONE) 5 MG tablet, meloxicam (MOBIC) 15 MG tablet          (M54.6,  G89.29) Chronic midline thoracic back pain  Comment: see above; once she has time, will refer for PT for mid back pain; pt would ultimately like to pursue a breast reduction  Plan: oxyCODONE (ROXICODONE) 5 MG tablet, meloxicam (MOBIC) 15 MG tablet    Past Medical History:   Diagnosis Date    Abdominal pain 06/29/2015    Abnormal cervical Papanicolaou smear 11/09/2014    Overview:  ACUS/HPV positive    Abnormal cytology finding 11/09/2014    Overview:  ACUS/HPV positive    Acute pericardial effusion 02/06/2017    Agoraphobia with panic attacks     Anxiety     Arthralgia of both lower legs 05/29/2020    Bilateral achy knee pain that is chronic in nature going on for years. Most likely osteoarthritis in knees related to obesity. Previously injected in both knees with good relief. 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  Has controlled substance agreement with them.  On Vicodin, Valium, Klonopin prescribed only from there.        Coronary artery disease     Cough 02/09/2020    Family history of colon cancer 10/24/2020    Hx of seasonal allergies     Infection due to 2019 novel coronavirus 09/20/2022    Infectious pericarditis     Lipoma     Low back pain 07/13/2015    Major depressive disorder, recurrent episode, moderate (H) 09/05/2006    Menorrhagia with irregular cycle 07/15/2022    Added automatically from request for surgery [id number]    Moderate persistent asthma without complication 09/29/2020    Nondependent alcohol abuse, episodic drinking behavior 10/03/2012    Noninflammatory disorder of vagina 02/20/2015    Other chronic pain     Other long term (current) drug therapy 01/28/2013    Overview:  Vicodin and cyclobenzaprine monthly    Panic disorder with agoraphobia 09/05/2006    Pap smear for cervical cancer screening 10/31/2016    03/29/2010  Normal cytology, HPV ot done, repeat in 3 years.    Pericarditis 2017    Physiologic disturbance of temperature regulation 10/24/2020    PONV (postoperative nausea and vomiting)     Right ankle swelling 06/07/2022    Added automatically from request for surgery [id number]    Tobacco abuse     Tobacco use disorder 07/13/2015      Past Surgical History:   Procedure Laterality Date    ANKLE SURGERY Right 05/30/2019    ARTHROSCOPY ANKLE Right 6/21/2023    Procedure: right ankle arthroscopy with debridement;  Surgeon: Kareem Bashir MD;  Location: [facility] OR    BIOPSY BREAST Right 1990    benign    COLONOSCOPY N/A 1/3/2023    Procedure: COLONOSCOPY WITH BIOPSY OF COLON MASS, POLYPECTOMY;  Surgeon: Kris Charles MD;  Location: [facility] OR    CREATION PERICARDIAL WINDOW  02/10/2017    [facility]    DILATION AND CURETTAGE N/A 7/22/2022    Procedure: DILATION AND CURETTAGE, UTERUS;  Surgeon: Lesly Holland;  Location: [facility] OR    HEMORRHOIDECTOMY EXTERNAL      HYSTEROSCOPY, WITH ENDOMETRIAL RADIOFREQUENCY ABLATION - NOVASURE N/A 7/22/2022    Procedure: HYSTEROSCOPY, WITH  "ENDOMETRIAL RADIOFREQUENCY ABLATION - NOVASURE DILATION AND CURETTAGE, UTERUS;  Surgeon: Lesly Holland;  Location: [facility] OR    LAPAROSCOPIC ASSISTED SIGMOID COLECTOMY N/A 1/6/2023    Procedure: LAPAROSCOPIC ASSISTED DESCENDING COLELCTOMY SPLENIC FLEXURE MOBILIZATION ;  Surgeon: Kris Charles MD;  Location: [facility] OR    LAPAROSCOPIC LYSIS ADHESIONS N/A 1/6/2023    Procedure: LYSIS OF ADHESIONS;  Surgeon: Kris Charles MD;  Location: [facility] OR    TUMOR REMOVAL      Has had 3. In the right breast and \"inside of rib cage.\"      Current Outpatient Medications   Medication Sig Dispense Refill    albuterol (PROAIR HFA/PROVENTIL HFA/VENTOLIN HFA) 108 (90 Base) MCG/ACT inhaler Inhale 2 puffs into the lungs every 6 hours as needed for shortness of breath, wheezing or cough 18 g 11    ALPRAZolam (XANAX) 2 MG tablet Take 1 tablet (2 mg) by mouth 2 times daily as needed for anxiety 60 tablet 5    cetirizine (ZYRTEC) 10 MG tablet Take 1 tablet (10 mg) by mouth daily 30 tablet 11    doxylamine (UNISOM) 25 MG TABS tablet Take 1 tablet (25 mg) by mouth nightly as needed for other (nausea) 7 tablet 1    DULoxetine (CYMBALTA) 20 MG capsule Take 1 capsule (20 mg) by mouth 2 times daily 60 capsule 1    fluticasone (FLONASE) 50 MCG/ACT nasal spray Spray 1 spray into both nostrils daily      fluticasone (FLONASE) 50 MCG/ACT nasal spray Spray 2 sprays into both nostrils daily 9.9 mL 11    hydrOXYzine (ATARAX) 25 MG tablet Take 1 tablet (25 mg) by mouth nightly as needed for anxiety 30 tablet 1    meloxicam (MOBIC) 15 MG tablet Take 1 tablet (15 mg) by mouth daily 30 tablet 1    montelukast (SINGULAIR) 10 MG tablet Take 1 tablet (10 mg) by mouth At Bedtime 30 tablet 11    naloxone (NARCAN) 4 MG/0.1ML nasal spray Spray 1 spray (4 mg) into one nostril alternating nostrils as needed for opioid reversal every 2-3 minutes until assistance arrives 0.2 mL 1    ondansetron (ZOFRAN) 8 MG tablet Take 0.5-1 " "tablets (4-8 mg) by mouth every 8 hours as needed for nausea 30 tablet 1    oxyCODONE (ROXICODONE) 5 MG tablet Take 1 tablet (5 mg) by mouth 3 times daily as needed for severe pain 90 tablet 0    PROAIR  (90 Base) MCG/ACT inhaler Inhale 2 puffs into the lungs every 4 hours as needed for shortness of breath or wheezing 8 g 11    spacer (OPTICHAMBER BINA) holding chamber For use w/ rescue inhaler 1 each 0    sucralfate (CARAFATE) 1 GM/10ML suspension Take 10 mLs (1 g) by mouth 4 times daily as needed for nausea 414 mL 0    tiotropium (SPIRIVA RESPIMAT) 1.25 MCG/ACT inhaler Inhale 2 puffs into the lungs daily 12 g 3    tiotropium (SPIRIVA) 18 MCG inhaled capsule Inhale 18 mcg into the lungs daily      trimethoprim-polymyxin b (POLYTRIM) 82866-5.1 UNIT/ML-% ophthalmic solution Place 1-2 drops into the right eye every 4 hours 10 mL 0      Allergies   Allergen Reactions    Gabapentin Other (See Comments)     Mental status is changed     Lidocaine Other (See Comments)     \"my jaw stopped moving\"  Other reaction(s): Dystonia    Penicillins Hives, Rash and Shortness Of Breath    Lidocaine-Epinephrine Other (See Comments) and Muscle Pain (Myalgia)     Severe jaw cramping, double vision  Jaw locking        ROS:   Gen- + weight change - see HPI, no fevers/chills   Cardiac - no palpitations, no chest pain   Respiratory - no shortness of breath , no wheezing   Remainder of ROS negative.     Exam:   /79 (BP Location: Right arm, Patient Position: Sitting, Cuff Size: Adult Large)   Pulse 90   Temp 98.2  F (36.8  C) (Oral)   Resp 17   Ht 1.76 m (5' 9.29\")   Wt 118.8 kg (262 lb)   SpO2 99%   BMI 38.37 kg/m     Alert and oriented x 3; No acute distress   Neuro: no focal deficits   Derm: no rashes       "

## 2024-01-17 ENCOUNTER — OFFICE VISIT (OUTPATIENT)
Dept: FAMILY MEDICINE | Facility: CLINIC | Age: 49
End: 2024-01-17
Payer: COMMERCIAL

## 2024-01-17 VITALS
BODY MASS INDEX: 38.8 KG/M2 | WEIGHT: 262 LBS | OXYGEN SATURATION: 99 % | TEMPERATURE: 98.2 F | RESPIRATION RATE: 17 BRPM | HEART RATE: 90 BPM | DIASTOLIC BLOOD PRESSURE: 79 MMHG | HEIGHT: 69 IN | SYSTOLIC BLOOD PRESSURE: 129 MMHG

## 2024-01-17 DIAGNOSIS — G89.29 CHRONIC MIDLINE THORACIC BACK PAIN: ICD-10-CM

## 2024-01-17 DIAGNOSIS — G89.29 CHRONIC MUSCULOSKELETAL PAIN: ICD-10-CM

## 2024-01-17 DIAGNOSIS — M79.18 CHRONIC MUSCULOSKELETAL PAIN: ICD-10-CM

## 2024-01-17 DIAGNOSIS — Z98.890 STATUS POST SURGICAL MANIPULATION OF ANKLE JOINT: ICD-10-CM

## 2024-01-17 DIAGNOSIS — M54.6 CHRONIC MIDLINE THORACIC BACK PAIN: ICD-10-CM

## 2024-01-17 DIAGNOSIS — F41.1 GENERALIZED ANXIETY DISORDER: ICD-10-CM

## 2024-01-17 DIAGNOSIS — R63.5 WEIGHT GAIN: ICD-10-CM

## 2024-01-17 DIAGNOSIS — R06.02 SHORTNESS OF BREATH: Primary | ICD-10-CM

## 2024-01-17 PROCEDURE — 99214 OFFICE O/P EST MOD 30 MIN: CPT | Performed by: FAMILY MEDICINE

## 2024-01-17 RX ORDER — DULOXETIN HYDROCHLORIDE 20 MG/1
20 CAPSULE, DELAYED RELEASE ORAL 2 TIMES DAILY
Qty: 60 CAPSULE | Refills: 1 | Status: SHIPPED | OUTPATIENT
Start: 2024-01-17 | End: 2024-03-20

## 2024-01-17 RX ORDER — OXYCODONE HYDROCHLORIDE 5 MG/1
5 TABLET ORAL 3 TIMES DAILY PRN
Qty: 90 TABLET | Refills: 0 | Status: SHIPPED | OUTPATIENT
Start: 2024-01-19 | End: 2024-01-26

## 2024-01-25 NOTE — PROGRESS NOTES
ASSESSMENT/PLAN:  (Z98.890) Status post surgical manipulation of ankle joint  Comment: I am hopeful that the pharmacy will allow an early refill due to lost/stolen meds; will change follow-up from 2/7 to 2/21; she will reach out regarding FMLA paperwork; note written for work   Plan: oxyCODONE (ROXICODONE) 5 MG tablet          (M54.6,  G89.29) Chronic midline thoracic back pain  Comment: see above  Plan: oxyCODONE (ROXICODONE) 5 MG tablet          Robbie Bailon MD  January 26, 2024  11:18 AM        Pt is a 48 year old female last seen on 1/17/24 here today for:     1) Lost work bag which had her pain meds in it    2) FMLA - having work issues because she did not qualify for full FMLA, only intermittent FMLA. Needs a letter stating that she is released to full duty but will still need leave to attend appointments.       Per my last note:  (R06.02) Shortness of breath  (primary encounter diagnosis)  Comment:   Plan: stable/ improved w/ new housing; she suspects mold has been a chronic trigger and this is definitely possible; has PFTs pending     (R63.5) Weight gain  Comment:   Plan: still unclear etiology; has echo and stress test pending      (F41.1) Generalized anxiety disorder  (primary encounter diagnosis)  Comment: will add for mood and pain; precautions given  Plan: DULoxetine (CYMBALTA) 20 MG capsule                (M79.18,  G89.29) Chronic musculoskeletal pain  Comment: see above  Plan: DULoxetine (CYMBALTA) 20 MG capsule          (Z98.890) Status post surgical manipulation of ankle joint  Comment: stable; due for refill at end of week; dated script sent  Plan: oxyCODONE (ROXICODONE) 5 MG tablet          (M54.6,  G89.29) Chronic midline thoracic back pain  Comment: see above  Plan: oxyCODONE (ROXICODONE) 5 MG tablet      Past Medical History:   Diagnosis Date    Abdominal pain 06/29/2015    Abnormal cervical Papanicolaou smear 11/09/2014    Overview:  ACUS/HPV positive    Abnormal cytology finding 11/09/2014     Overview:  ACUS/HPV positive    Acute pericardial effusion 02/06/2017    Agoraphobia with panic attacks     Anxiety     Arthralgia of both lower legs 05/29/2020    Bilateral achy knee pain that is chronic in nature going on for years. Most likely osteoarthritis in knees related to obesity. Previously injected in both knees with good relief. 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  Has controlled substance agreement with them.  On Vicodin, Valium, Klonopin prescribed only from there.       Coronary artery disease     Cough 02/09/2020    Family history of colon cancer 10/24/2020    Hx of seasonal allergies     Infection due to 2019 novel coronavirus 09/20/2022    Infectious pericarditis     Lipoma     Low back pain 07/13/2015    Major depressive disorder, recurrent episode, moderate (H) 09/05/2006    Menorrhagia with irregular cycle 07/15/2022    Added automatically from request for surgery [id number]    Moderate persistent asthma without complication 09/29/2020    Nondependent alcohol abuse, episodic drinking behavior 10/03/2012    Noninflammatory disorder of vagina 02/20/2015    Other chronic pain     Other long term (current) drug therapy 01/28/2013    Overview:  Vicodin and cyclobenzaprine monthly    Panic disorder with agoraphobia 09/05/2006    Pap smear for cervical cancer screening 10/31/2016    03/29/2010  Normal cytology, HPV ot done, repeat in 3 years.    Pericarditis 2017    Physiologic disturbance of temperature  "regulation 10/24/2020    PONV (postoperative nausea and vomiting)     Right ankle swelling 06/07/2022    Added automatically from request for surgery [id number]    Tobacco abuse     Tobacco use disorder 07/13/2015      Past Surgical History:   Procedure Laterality Date    ANKLE SURGERY Right 05/30/2019    ARTHROSCOPY ANKLE Right 6/21/2023    Procedure: right ankle arthroscopy with debridement;  Surgeon: Kareem Bashir MD;  Location: [facility] OR    BIOPSY BREAST Right 1990    benign    COLONOSCOPY N/A 1/3/2023    Procedure: COLONOSCOPY WITH BIOPSY OF COLON MASS, POLYPECTOMY;  Surgeon: Kris Charles MD;  Location: [facility] OR    CREATION PERICARDIAL WINDOW  02/10/2017    [facility]    DILATION AND CURETTAGE N/A 7/22/2022    Procedure: DILATION AND CURETTAGE, UTERUS;  Surgeon: Lesly Holland;  Location: [facility] OR    HEMORRHOIDECTOMY EXTERNAL      HYSTEROSCOPY, WITH ENDOMETRIAL RADIOFREQUENCY ABLATION - NOVASURE N/A 7/22/2022    Procedure: HYSTEROSCOPY, WITH ENDOMETRIAL RADIOFREQUENCY ABLATION - NOVASURE DILATION AND CURETTAGE, UTERUS;  Surgeon: Lesly Holland;  Location: [facility] OR    LAPAROSCOPIC ASSISTED SIGMOID COLECTOMY N/A 1/6/2023    Procedure: LAPAROSCOPIC ASSISTED DESCENDING COLELCTOMY SPLENIC FLEXURE MOBILIZATION ;  Surgeon: Kris Charles MD;  [place] [facility] [place]    LAPAROSCOPIC LYSIS ADHESIONS N/A 1/6/2023    Procedure: LYSIS OF ADHESIONS;  Surgeon: Kris Charles MD;  [place] [facility] [place]    TUMOR REMOVAL      Has had 3. In the right breast and \"inside of rib cage.\"      Current Outpatient Medications   Medication Sig Dispense Refill    oxyCODONE (ROXICODONE) 5 MG tablet Take 1 tablet (5 mg) by mouth 3 times daily as needed for severe pain 78 tablet 0    albuterol (PROAIR HFA/PROVENTIL HFA/VENTOLIN HFA) 108 (90 Base) MCG/ACT inhaler Inhale 2 puffs into the lungs every 6 hours as needed for shortness of breath, wheezing or cough 18 g 11 "    ALPRAZolam (XANAX) 2 MG tablet Take 1 tablet (2 mg) by mouth 2 times daily as needed for anxiety 60 tablet 5    cetirizine (ZYRTEC) 10 MG tablet Take 1 tablet (10 mg) by mouth daily 30 tablet 11    doxylamine (UNISOM) 25 MG TABS tablet Take 1 tablet (25 mg) by mouth nightly as needed for other (nausea) 7 tablet 1    DULoxetine (CYMBALTA) 20 MG capsule Take 1 capsule (20 mg) by mouth 2 times daily 60 capsule 1    fluticasone (FLONASE) 50 MCG/ACT nasal spray Spray 1 spray into both nostrils daily      fluticasone (FLONASE) 50 MCG/ACT nasal spray Spray 2 sprays into both nostrils daily 9.9 mL 11    hydrOXYzine (ATARAX) 25 MG tablet Take 1 tablet (25 mg) by mouth nightly as needed for anxiety 30 tablet 1    meloxicam (MOBIC) 15 MG tablet Take 1 tablet (15 mg) by mouth daily 30 tablet 1    montelukast (SINGULAIR) 10 MG tablet Take 1 tablet (10 mg) by mouth At Bedtime 30 tablet 11    naloxone (NARCAN) 4 MG/0.1ML nasal spray Spray 1 spray (4 mg) into one nostril alternating nostrils as needed for opioid reversal every 2-3 minutes until assistance arrives 0.2 mL 1    ondansetron (ZOFRAN) 8 MG tablet Take 0.5-1 tablets (4-8 mg) by mouth every 8 hours as needed for nausea 30 tablet 1    PROAIR  (90 Base) MCG/ACT inhaler Inhale 2 puffs into the lungs every 4 hours as needed for shortness of breath or wheezing 8 g 11    spacer (OPTICHAMBER BINA) holding chamber For use w/ rescue inhaler 1 each 0    sucralfate (CARAFATE) 1 GM/10ML suspension Take 10 mLs (1 g) by mouth 4 times daily as needed for nausea 414 mL 0    tiotropium (SPIRIVA RESPIMAT) 1.25 MCG/ACT inhaler Inhale 2 puffs into the lungs daily 12 g 3    tiotropium (SPIRIVA) 18 MCG inhaled capsule Inhale 18 mcg into the lungs daily      trimethoprim-polymyxin b (POLYTRIM) 13037-5.1 UNIT/ML-% ophthalmic solution Place 1-2 drops into the right eye every 4 hours 10 mL 0      Allergies   Allergen Reactions    Gabapentin Other (See Comments)     Mental status is  "changed     Lidocaine Other (See Comments)     \"my jaw stopped moving\"  Other reaction(s): Dystonia    Penicillins Hives, Rash and Shortness Of Breath    Lidocaine-Epinephrine Other (See Comments) and Muscle Pain (Myalgia)     Severe jaw cramping, double vision  Jaw locking        ROS:   ENT- no cough, no congestion, no URI symptoms   Cardiac - no palpitations, no chest pain   Respiratory - no shortness of breath , no wheezing   Remainder of ROS negative.     Exam:   BP (!) 134/90 (BP Location: Right arm, Patient Position: Sitting, Cuff Size: Adult Regular)   Pulse 96   SpO2 97%    Alert and oriented x 3; No acute distress   Neuro: no focal deficits   Derm: no rashes       "

## 2024-01-26 ENCOUNTER — OFFICE VISIT (OUTPATIENT)
Dept: FAMILY MEDICINE | Facility: CLINIC | Age: 49
End: 2024-01-26
Payer: COMMERCIAL

## 2024-01-26 VITALS — OXYGEN SATURATION: 97 % | SYSTOLIC BLOOD PRESSURE: 134 MMHG | HEART RATE: 96 BPM | DIASTOLIC BLOOD PRESSURE: 90 MMHG

## 2024-01-26 DIAGNOSIS — M54.6 CHRONIC MIDLINE THORACIC BACK PAIN: ICD-10-CM

## 2024-01-26 DIAGNOSIS — Z98.890 STATUS POST SURGICAL MANIPULATION OF ANKLE JOINT: ICD-10-CM

## 2024-01-26 DIAGNOSIS — G89.29 CHRONIC MIDLINE THORACIC BACK PAIN: ICD-10-CM

## 2024-01-26 PROCEDURE — 99213 OFFICE O/P EST LOW 20 MIN: CPT | Performed by: FAMILY MEDICINE

## 2024-01-26 RX ORDER — OXYCODONE HYDROCHLORIDE 5 MG/1
5 TABLET ORAL 3 TIMES DAILY PRN
Qty: 78 TABLET | Refills: 0 | Status: SHIPPED | OUTPATIENT
Start: 2024-01-26 | End: 2024-02-21

## 2024-01-26 ASSESSMENT — ASTHMA QUESTIONNAIRES
ACT_TOTALSCORE: 25
QUESTION_1 LAST FOUR WEEKS HOW MUCH OF THE TIME DID YOUR ASTHMA KEEP YOU FROM GETTING AS MUCH DONE AT WORK, SCHOOL OR AT HOME: NONE OF THE TIME
QUESTION_3 LAST FOUR WEEKS HOW OFTEN DID YOUR ASTHMA SYMPTOMS (WHEEZING, COUGHING, SHORTNESS OF BREATH, CHEST TIGHTNESS OR PAIN) WAKE YOU UP AT NIGHT OR EARLIER THAN USUAL IN THE MORNING: NOT AT ALL
QUESTION_4 LAST FOUR WEEKS HOW OFTEN HAVE YOU USED YOUR RESCUE INHALER OR NEBULIZER MEDICATION (SUCH AS ALBUTEROL): NOT AT ALL
QUESTION_2 LAST FOUR WEEKS HOW OFTEN HAVE YOU HAD SHORTNESS OF BREATH: NOT AT ALL
QUESTION_5 LAST FOUR WEEKS HOW WOULD YOU RATE YOUR ASTHMA CONTROL: COMPLETELY CONTROLLED
ACT_TOTALSCORE: 25

## 2024-01-26 NOTE — LETTER
WORK MEDICAL NOTE  2024     Seen today: Yes    Patient:  Darlene Hudson  :   1975  MRN:     [id number]  Physician: JULIETH BAILON    Darlene Hudson is under my care. She is cleared for FULL work duty with NO RESTRICTIONS. She will still require intermittent FMLA to allow her to attend doctor's appointments for her colon cancer. She will also require intermittent FMLA for flares of her ankle pain and asthma.  Please contact me with questions/ concerns and send any formal paperwork for my completion if needed.    The next clinic appointment is scheduled for (date/time) 24.          Julieth Bailon MD  2024  11:14 AM

## 2024-02-01 ENCOUNTER — HOSPITAL ENCOUNTER (OUTPATIENT)
Dept: CARDIOLOGY | Facility: HOSPITAL | Age: 49
Discharge: HOME | End: 2024-02-01
Attending: FAMILY MEDICINE | Admitting: FAMILY MEDICINE
Payer: COMMERCIAL

## 2024-02-01 DIAGNOSIS — R06.02 SHORTNESS OF BREATH: ICD-10-CM

## 2024-02-01 PROCEDURE — 93306 TTE W/DOPPLER COMPLETE: CPT

## 2024-02-05 ENCOUNTER — OFFICE VISIT (OUTPATIENT)
Dept: SURGERY | Facility: CLINIC | Age: 49
End: 2024-02-05
Payer: COMMERCIAL

## 2024-02-05 VITALS — HEIGHT: 69 IN | BODY MASS INDEX: 38.8 KG/M2 | WEIGHT: 262 LBS

## 2024-02-05 DIAGNOSIS — E66.09 CLASS 2 OBESITY DUE TO EXCESS CALORIES WITHOUT SERIOUS COMORBIDITY WITH BODY MASS INDEX (BMI) OF 38.0 TO 38.9 IN ADULT: Primary | ICD-10-CM

## 2024-02-05 DIAGNOSIS — E66.812 CLASS 2 OBESITY DUE TO EXCESS CALORIES WITHOUT SERIOUS COMORBIDITY WITH BODY MASS INDEX (BMI) OF 38.0 TO 38.9 IN ADULT: Primary | ICD-10-CM

## 2024-02-05 PROCEDURE — 99212 OFFICE O/P EST SF 10 MIN: CPT | Performed by: SURGERY

## 2024-02-05 NOTE — LETTER
2/5/2024         RE: Darlene Hudson  [address] [place] [address]        Dear Colleague,    Thank you for referring your patient, Darlene Hudson, to the [facility] [place]. Please see a copy of my visit note below.    General surgery clinic follow-up    Patient is a 48-year-old woman who presents to my clinic to discuss colonoscopy.  She is had a prior diagnosis of colon cancer status post left colectomy by me about a year ago.  She is due for colonoscopy but is awaiting preoperative clearance due to some cardiac and pulmonary issues.  She is generally been doing okay since her surgery and has not required any additional adjuvant therapies.    She also complains of a small lump on her left back that is bothering her.  She feels like it has been enlarging in size over the last several months.    On exam her abdomen is soft and nondistended.  She has a palpable minimally tender subcutaneous mass in her left mid back    A discussion about colonoscopy.  I reassured her that as soon as we have preoperative clearance we will get her scheduled as soon as we can.  We could excise the subcutaneous lesion as well if the patient desires.    She is also concerned about some weight gain.  We had a discussion about this and she would like to see weight management.  I have placed a referral.    She has an appointment with pulmonology on the ninth and then an appointment with her primary care physician later this month.  I told her to call us if she gets preoperative clearance after that primary care visit and we can schedule her as soon as possible after that.    Kris Charles MD  General Surgeon  [facility]  Surgery [facility] - [address]  [place] [address]  Office: [phone]      Again, thank you for allowing me to participate in the care of your patient.        Sincerely,        Kris Charles MD

## 2024-02-06 NOTE — PROGRESS NOTES
General surgery clinic follow-up    Patient is a 48-year-old woman who presents to my clinic to discuss colonoscopy.  She is had a prior diagnosis of colon cancer status post left colectomy by me about a year ago.  She is due for colonoscopy but is awaiting preoperative clearance due to some cardiac and pulmonary issues.  She is generally been doing okay since her surgery and has not required any additional adjuvant therapies.    She also complains of a small lump on her left back that is bothering her.  She feels like it has been enlarging in size over the last several months.    On exam her abdomen is soft and nondistended.  She has a palpable minimally tender subcutaneous mass in her left mid back    A discussion about colonoscopy.  I reassured her that as soon as we have preoperative clearance we will get her scheduled as soon as we can.  We could excise the subcutaneous lesion as well if the patient desires.    She is also concerned about some weight gain.  We had a discussion about this and she would like to see weight management.  I have placed a referral.    She has an appointment with pulmonology on the ninth and then an appointment with her primary care physician later this month.  I told her to call us if she gets preoperative clearance after that primary care visit and we can schedule her as soon as possible after that.    Kris Charles MD  General Surgeon  [facility]  Surgery Clinic - [address]  [place] [address]  Office: [phone]

## 2024-02-07 ENCOUNTER — ANCILLARY PROCEDURE (OUTPATIENT)
Dept: GENERAL RADIOLOGY | Facility: CLINIC | Age: 49
End: 2024-02-07
Attending: FAMILY MEDICINE
Payer: OTHER MISCELLANEOUS

## 2024-02-07 ENCOUNTER — OFFICE VISIT (OUTPATIENT)
Dept: FAMILY MEDICINE | Facility: CLINIC | Age: 49
End: 2024-02-07
Payer: OTHER MISCELLANEOUS

## 2024-02-07 VITALS
DIASTOLIC BLOOD PRESSURE: 85 MMHG | OXYGEN SATURATION: 96 % | RESPIRATION RATE: 16 BRPM | SYSTOLIC BLOOD PRESSURE: 130 MMHG | HEART RATE: 74 BPM | TEMPERATURE: 98 F

## 2024-02-07 DIAGNOSIS — S90.31XA CONTUSION OF RIGHT FOOT, INITIAL ENCOUNTER: Primary | ICD-10-CM

## 2024-02-07 DIAGNOSIS — S90.31XA CONTUSION OF RIGHT FOOT, INITIAL ENCOUNTER: ICD-10-CM

## 2024-02-07 PROCEDURE — 73630 X-RAY EXAM OF FOOT: CPT | Mod: TC | Performed by: RADIOLOGY

## 2024-02-07 PROCEDURE — 99213 OFFICE O/P EST LOW 20 MIN: CPT | Performed by: FAMILY MEDICINE

## 2024-02-07 NOTE — LETTER
WORK MEDICAL NOTE  2024     Seen today: Yes    Patient:  Darlene Hudson  :   1975  MRN:     [id number]  Physician: JULIETH BAILON    Darlene Hudson was evaluated today for a NEW WORK-RELATED R FOOT INJURY.  She needs to be in a walking boot x 2 weeks. I will see her back on 24.  She may return to work on 24 WITH RESTRICTIONS. OK FOR SEDENTARY WORK ONLY.    The next clinic appointment is scheduled for (date/time) 24.    Patient limitations:   Unable to stand/walk/ climb due to foot injury.             Julieth Bailon MD  2024  3:20 PM

## 2024-02-07 NOTE — PROGRESS NOTES
ASSESSMENT/PLAN:    (S90.31XA) Contusion of right foot, initial encounter  (primary encounter diagnosis)  Comment: neg for fx; placed in walking boot; note for work for sedentary activity; f/up in 2 wks; precautions given  Plan: XR Foot Right G/E 3 Views            Robbie Bailon MD  February 7, 2024  3:23 PM        Pt is a 48 year old female last seen on 1/26/24 here today for:     1) R foot pain - 2/5/24 - at work was walking up stairs and hit step hard  Immediate pain but was able to bear weight -> finished shift at 12:30, then had medical appt w/ her colon cancer surgeon  Swelling later that evening w/ inc pain over midfoot  Pain is not in location of previous ankle pain/ ankle surgery  Able to bear weight but with pain  Has been trying heating pads and alternating percocet/ ibuprofen    PFTs - pending on 2/9/24  Echo - 2/1/24  Interpretation Summary     1. Normal left ventricular size and systolic performance with a visually  estimated ejection fraction of 55-60%.  2. No significant valvular heart disease is identified on this study.  3. Normal right ventricular size and systolic performance.    Per my last note:  (Z98.890) Status post surgical manipulation of ankle joint  Comment: I am hopeful that the pharmacy will allow an early refill due to lost/stolen meds; will change follow-up from 2/7 to 2/21; she will reach out regarding FMLA paperwork; note written for work   Plan: oxyCODONE (ROXICODONE) 5 MG tablet          (M54.6,  G89.29) Chronic midline thoracic back pain  Comment: see above  Plan: oxyCODONE (ROXICODONE) 5 MG tablet        Past Medical History:   Diagnosis Date    Abdominal pain 06/29/2015    Abnormal cervical Papanicolaou smear 11/09/2014    Overview:  ACUS/HPV positive    Abnormal cytology finding 11/09/2014    Overview:  ACUS/HPV positive    Acute pericardial effusion 02/06/2017    Agoraphobia with panic attacks     Anxiety     Arthralgia of both lower legs 05/29/2020    Bilateral achy knee pain  that is chronic in nature going on for years. Most likely osteoarthritis in knees related to obesity. Previously injected in both knees with good relief. 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  Has controlled substance agreement with them.  On Vicodin, Valium, Klonopin prescribed only from there.       Coronary artery disease     Cough 02/09/2020    Family history of colon cancer 10/24/2020    Hx of seasonal allergies     Infection due to 2019 novel coronavirus 09/20/2022    Infectious pericarditis     Lipoma     Low back pain 07/13/2015    Major depressive disorder, recurrent episode, moderate (H) 09/05/2006    Menorrhagia with irregular cycle 07/15/2022    Added automatically from request for surgery [id number]    Moderate persistent asthma without complication 09/29/2020    Nondependent alcohol abuse, episodic drinking behavior 10/03/2012    Noninflammatory disorder of vagina 02/20/2015    Other chronic pain     Other long term (current) drug therapy 01/28/2013    Overview:  Vicodin and cyclobenzaprine monthly    Panic disorder with agoraphobia 09/05/2006    Pap smear for cervical cancer screening 10/31/2016    03/29/2010  Normal cytology, HPV ot done, repeat in 3 years.    Pericarditis 2017    Physiologic disturbance of temperature regulation 10/24/2020    PONV (postoperative nausea and vomiting)     Right ankle swelling 06/07/2022    Added automatically from request for surgery [id number]    Tobacco abuse     Tobacco use  "disorder 07/13/2015      Past Surgical History:   Procedure Laterality Date    ANKLE SURGERY Right 05/30/2019    ARTHROSCOPY ANKLE Right 6/21/2023    Procedure: right ankle arthroscopy with debridement;  Surgeon: Kareem Bashir MD;  Location: [facility] OR    BIOPSY BREAST Right 1990    benign    COLONOSCOPY N/A 1/3/2023    Procedure: COLONOSCOPY WITH BIOPSY OF COLON MASS, POLYPECTOMY;  Surgeon: Kris Charles MD;  Location: [facility] OR    CREATION PERICARDIAL WINDOW  02/10/2017    [facility]    DILATION AND CURETTAGE N/A 7/22/2022    Procedure: DILATION AND CURETTAGE, UTERUS;  Surgeon: Lesly Holland;  Location: [facility] OR    HEMORRHOIDECTOMY EXTERNAL      HYSTEROSCOPY, WITH ENDOMETRIAL RADIOFREQUENCY ABLATION - NOVASURE N/A 7/22/2022    Procedure: HYSTEROSCOPY, WITH ENDOMETRIAL RADIOFREQUENCY ABLATION - NOVASURE DILATION AND CURETTAGE, UTERUS;  Surgeon: Lesly Holland;  Location: [facility] OR    LAPAROSCOPIC ASSISTED SIGMOID COLECTOMY N/A 1/6/2023    Procedure: LAPAROSCOPIC ASSISTED DESCENDING COLELCTOMY SPLENIC FLEXURE MOBILIZATION ;  Surgeon: Kris Charles MD;  Location: [facility] OR    LAPAROSCOPIC LYSIS ADHESIONS N/A 1/6/2023    Procedure: LYSIS OF ADHESIONS;  Surgeon: Kris Charles MD;  Location: [facility] OR    TUMOR REMOVAL      Has had 3. In the right breast and \"inside of rib cage.\"      Current Outpatient Medications   Medication Sig Dispense Refill    albuterol (PROAIR HFA/PROVENTIL HFA/VENTOLIN HFA) 108 (90 Base) MCG/ACT inhaler Inhale 2 puffs into the lungs every 6 hours as needed for shortness of breath, wheezing or cough 18 g 11    ALPRAZolam (XANAX) 2 MG tablet Take 1 tablet (2 mg) by mouth 2 times daily as needed for anxiety 60 tablet 5    cetirizine (ZYRTEC) 10 MG tablet Take 1 tablet (10 mg) by mouth daily 30 tablet 11    doxylamine (UNISOM) 25 MG TABS tablet Take 1 tablet (25 mg) by mouth nightly as needed for other (nausea) 7 " "tablet 1    DULoxetine (CYMBALTA) 20 MG capsule Take 1 capsule (20 mg) by mouth 2 times daily 60 capsule 1    fluticasone (FLONASE) 50 MCG/ACT nasal spray Spray 1 spray into both nostrils daily      fluticasone (FLONASE) 50 MCG/ACT nasal spray Spray 2 sprays into both nostrils daily 9.9 mL 11    hydrOXYzine (ATARAX) 25 MG tablet Take 1 tablet (25 mg) by mouth nightly as needed for anxiety 30 tablet 1    meloxicam (MOBIC) 15 MG tablet Take 1 tablet (15 mg) by mouth daily 30 tablet 1    montelukast (SINGULAIR) 10 MG tablet Take 1 tablet (10 mg) by mouth At Bedtime 30 tablet 11    naloxone (NARCAN) 4 MG/0.1ML nasal spray Spray 1 spray (4 mg) into one nostril alternating nostrils as needed for opioid reversal every 2-3 minutes until assistance arrives 0.2 mL 1    ondansetron (ZOFRAN) 8 MG tablet Take 0.5-1 tablets (4-8 mg) by mouth every 8 hours as needed for nausea 30 tablet 1    oxyCODONE (ROXICODONE) 5 MG tablet Take 1 tablet (5 mg) by mouth 3 times daily as needed for severe pain 78 tablet 0    PROAIR  (90 Base) MCG/ACT inhaler Inhale 2 puffs into the lungs every 4 hours as needed for shortness of breath or wheezing 8 g 11    spacer (OPTICHAMBER BINA) holding chamber For use w/ rescue inhaler 1 each 0    sucralfate (CARAFATE) 1 GM/10ML suspension Take 10 mLs (1 g) by mouth 4 times daily as needed for nausea 414 mL 0    tiotropium (SPIRIVA RESPIMAT) 1.25 MCG/ACT inhaler Inhale 2 puffs into the lungs daily 12 g 3    tiotropium (SPIRIVA) 18 MCG inhaled capsule Inhale 18 mcg into the lungs daily      trimethoprim-polymyxin b (POLYTRIM) 14796-1.1 UNIT/ML-% ophthalmic solution Place 1-2 drops into the right eye every 4 hours 10 mL 0      Allergies   Allergen Reactions    Gabapentin Other (See Comments)     Mental status is changed     Lidocaine Other (See Comments)     \"my jaw stopped moving\"  Other reaction(s): Dystonia    Penicillins Hives, Rash and Shortness Of Breath    Lidocaine-Epinephrine Other (See " Comments) and Muscle Pain (Myalgia)     Severe jaw cramping, double vision  Jaw locking        ROS:   Gen- no fevers/chills   Neuro - no numbness, no tingling   Remainder of ROS negative.     Exam:   /85 (BP Location: Right arm, Patient Position: Sitting, Cuff Size: Adult Large)   Pulse 74   Temp 98  F (36.7  C) (Skin)   Resp 16   SpO2 96%    Alert and oriented x 3; No acute distress   MSK:   Neuro: no focal deficits   Derm: no rashes       Xray of R foot on February 7, 2024 at [place] location - films personally reviewed with patient at time of visit     My impression: +1st MTP spurring; Neg for fx    Official read:  IMPRESSION: Degenerative change at the first MTP joint. No evidence for fracture. Right foot otherwise negative.

## 2024-02-09 ENCOUNTER — OFFICE VISIT (OUTPATIENT)
Dept: PULMONOLOGY | Facility: CLINIC | Age: 49
End: 2024-02-09
Attending: FAMILY MEDICINE
Payer: COMMERCIAL

## 2024-02-09 DIAGNOSIS — R06.02 SHORTNESS OF BREATH: ICD-10-CM

## 2024-02-09 LAB — HGB BLD-MCNC: 12.6 G/DL

## 2024-02-09 PROCEDURE — 94729 DIFFUSING CAPACITY: CPT | Performed by: INTERNAL MEDICINE

## 2024-02-09 PROCEDURE — 85018 HEMOGLOBIN: CPT | Mod: QW | Performed by: INTERNAL MEDICINE

## 2024-02-09 PROCEDURE — 94375 RESPIRATORY FLOW VOLUME LOOP: CPT | Performed by: INTERNAL MEDICINE

## 2024-02-09 PROCEDURE — 94726 PLETHYSMOGRAPHY LUNG VOLUMES: CPT | Performed by: INTERNAL MEDICINE

## 2024-02-13 ENCOUNTER — TELEPHONE (OUTPATIENT)
Dept: FAMILY MEDICINE | Facility: CLINIC | Age: 49
End: 2024-02-13
Payer: COMMERCIAL

## 2024-02-13 DIAGNOSIS — S90.31XA CONTUSION OF RIGHT FOOT, INITIAL ENCOUNTER: Primary | ICD-10-CM

## 2024-02-13 LAB
DLCOCOR-%PRED-PRE: 81 %
DLCOCOR-PRE: 18.93 ML/MIN/MMHG
DLCOUNC-%PRED-PRE: 79 %
DLCOUNC-PRE: 18.44 ML/MIN/MMHG
DLCOUNC-PRED: 23.2 ML/MIN/MMHG
ERV-%PRED-PRE: 40 %
ERV-PRE: 0.56 L
ERV-PRED: 1.37 L
EXPTIME-PRE: 5.38 SEC
FEF2575-%PRED-PRE: 96 %
FEF2575-PRE: 2.75 L/SEC
FEF2575-PRED: 2.83 L/SEC
FEFMAX-%PRED-PRE: 87 %
FEFMAX-PRE: 6.55 L/SEC
FEFMAX-PRED: 7.5 L/SEC
FEV1-%PRED-PRE: 88 %
FEV1-PRE: 2.64 L
FEV1FEV6-PRE: 80 %
FEV1FEV6-PRED: 82 %
FEV1FVC-PRE: 80 %
FEV1FVC-PRED: 81 %
FEV1SVC-PRE: 89 %
FEV1SVC-PRED: 77 %
FIFMAX-PRE: 5.49 L/SEC
FRCPLETH-%PRED-PRE: 98 %
FRCPLETH-PRE: 2.93 L
FRCPLETH-PRED: 2.98 L
FVC-%PRED-PRE: 87 %
FVC-PRE: 3.29 L
FVC-PRED: 3.74 L
IC-%PRED-PRE: 87 %
IC-PRE: 2.4 L
IC-PRED: 2.74 L
RVPLETH-%PRED-PRE: 119 %
RVPLETH-PRE: 2.37 L
RVPLETH-PRED: 1.99 L
TLCPLETH-%PRED-PRE: 92 %
TLCPLETH-PRE: 5.33 L
TLCPLETH-PRED: 5.78 L
VA-%PRED-PRE: 80 %
VA-PRE: 4.56 L
VC-%PRED-PRE: 75 %
VC-PRE: 2.96 L
VC-PRED: 3.9 L

## 2024-02-13 NOTE — TELEPHONE ENCOUNTER
[facility] phone call message- general phone call:    Reason for call:     Caller would like a new letter stating why  Dr. Bailon put her in a boot. Work place is asking what are her injures and what are her restrictions at work if theres any. Pt also advise that work place trying to make sure that it is not her ankle and it is the foot. Per patient she stated that it is not her ankle and it is her foot.      Spoke to Nga regarding about this and sending over to Dr. Garcia to review and rewrite the letter. Dr. Bailon and Dr. Cole are both out until the 21st of Feb and patient is needing it asap.    Please call patient back and advise if needed.     Return call needed: Yes    OK to leave a message on voice mail? Yes    Primary language: English      needed? No    Call taken on February 13, 2024 at 11:31 AM by Meredith Holder

## 2024-02-13 NOTE — LETTER
February 15, 2024      Darlene Hudson  [address] [place] [address]        To Whom It May Concern:    Darlene Hudson was seen in our clinic. She may return to work with the following: limited to light duty - no climbing, standing limited to 10 minutes per hour, and walking limited to 10 minutes per hour, no high impact activity. These restrictions continue until 21 Feb 24 when she is re-evaluated by Dr. Bailon.      Sincerely,           Arnaud Garcia III, MD, FAAFP  [facility]  02/15/24 5:20 PM

## 2024-02-15 NOTE — TELEPHONE ENCOUNTER
1. Contusion of right foot, initial encounter  New note completed.      Arnaud Garcia III, MD, FAAFP  [facility]  02/15/24 5:20 PM

## 2024-02-19 ENCOUNTER — PREP FOR PROCEDURE (OUTPATIENT)
Dept: SURGERY | Facility: CLINIC | Age: 49
End: 2024-02-19
Payer: COMMERCIAL

## 2024-02-19 ENCOUNTER — TELEPHONE (OUTPATIENT)
Dept: SURGERY | Facility: CLINIC | Age: 49
End: 2024-02-19
Payer: COMMERCIAL

## 2024-02-19 DIAGNOSIS — Z85.038 HISTORY OF COLON CANCER: Primary | ICD-10-CM

## 2024-02-19 NOTE — PROGRESS NOTES
ASSESSMENT/PLAN:    (S90.31XD) Contusion of right foot, subsequent encounter  (primary encounter diagnosis)  Comment:   Plan: resolved; note written to return to work without restrictions; we discussed disability and FMLA paperwork. She needs to see a mental health professional if she wishes to continue taking time from work for mental health as my documentation of her PTSD after her cancer dx was not sufficient for her to qualify for short term disability    (F41.1) Generalized anxiety disorder  Comment: see above; stable on current regiemen  Plan: ALPRAZolam (XANAX) 2 MG tablet, Adult Mental Health  Referral          (F40.01) Panic disorder with agoraphobia  Comment: see above  Plan: ALPRAZolam (XANAX) 2 MG tablet, Adult Mental Health  Referral          (Z98.890) Status post surgical manipulation of ankle joint  Comment: stable chronic ankle pain and swelling causing intermittent leave from work; I recommended she restart PT and progress to work conditioning/ functional capacity evaluation if she wishes to take time for this chronic msk issue; needs to wean down on opiates as well  Plan: oxyCODONE (ROXICODONE) 5 MG tablet, Physical Therapy  Referral          (M54.6,  G89.29) Chronic midline thoracic back pain  Comment: see above  Plan: oxyCODONE (ROXICODONE) 5 MG tablet          Robbie Bailon MD  February 21, 2024  1:34 PM        Pt is a 48 year old female last seen on 2/7/24 here today for:     1) R foot - Was in boot x 2 weeks, 1st day out is today; when she walks she hears in crack in the middle of her foot and this is painful; no swelling;     2) clearance for colonoscopy - sent Dr Charles a message that we could schedule Karoline; is now scheduled for 3/12/24    PFTs on 2/9/24:  Impression: Normal pulmonary function tests but results should be interpreted with caution since ATS criteria not met  Echo - 2/1/24  Interpretation Summary   1. Normal left ventricular size and systolic  performance with a visually  estimated ejection fraction of 55-60%.  2. No significant valvular heart disease is identified on this study.  3. Normal right ventricular size and systolic performance.    3) Mood - needs new therapist; needs to see psychiatrist as well   PHQ-9: 5  ACE-7: 6    4) Forms - needs FMLA form filled; is deferring any disability paperwork for now; had an   Feels like she is able to do her full job once she is able to go to physical therapy and see a psychiatrist      Per my last note:  (S90.31XA) Contusion of right foot, initial encounter  (primary encounter diagnosis)  Comment: neg for fx; placed in walking boot; note for work for sedentary activity; f/up in 2 wks; precautions given  Plan: XR Foot Right G/E 3 Views    Past Medical History:   Diagnosis Date    Abdominal pain 06/29/2015    Abnormal cervical Papanicolaou smear 11/09/2014    Overview:  ACUS/HPV positive    Abnormal cytology finding 11/09/2014    Overview:  ACUS/HPV positive    Acute pericardial effusion 02/06/2017    Agoraphobia with panic attacks     Anxiety     Arthralgia of both lower legs 05/29/2020    Bilateral achy knee pain that is chronic in nature going on for years. Most likely osteoarthritis in knees related to obesity. Previously injected in both knees with good relief. 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  Has controlled substance agreement with them.  On Vicodin, Valium, Klonopin prescribed only from there.        Coronary artery disease     Cough 02/09/2020    Family history of colon cancer 10/24/2020    Hx of seasonal allergies     Infection due to 2019 novel coronavirus 09/20/2022    Infectious pericarditis     Lipoma     Low back pain 07/13/2015    Major depressive disorder, recurrent episode, moderate (H) 09/05/2006    Menorrhagia with irregular cycle 07/15/2022    Added automatically from request for surgery [id number]    Moderate persistent asthma without complication 09/29/2020    Nondependent alcohol abuse, episodic drinking behavior 10/03/2012    Noninflammatory disorder of vagina 02/20/2015    Other chronic pain     Other long term (current) drug therapy 01/28/2013    Overview:  Vicodin and cyclobenzaprine monthly    Panic disorder with agoraphobia 09/05/2006    Pap smear for cervical cancer screening 10/31/2016    03/29/2010  Normal cytology, HPV ot done, repeat in 3 years.    Pericarditis 2017    Physiologic disturbance of temperature regulation 10/24/2020    PONV (postoperative nausea and vomiting)     Right ankle swelling 06/07/2022    Added automatically from request for surgery [id number]    Tobacco abuse     Tobacco use disorder 07/13/2015      Past Surgical History:   Procedure Laterality Date    ANKLE SURGERY Right 05/30/2019    ARTHROSCOPY ANKLE Right 6/21/2023    Procedure: right ankle arthroscopy with debridement;  Surgeon: Kareem Bashir MD;  Location: [facility] OR    BIOPSY BREAST Right 1990    benign    COLONOSCOPY N/A 1/3/2023    Procedure: COLONOSCOPY WITH BIOPSY OF COLON MASS, POLYPECTOMY;  Surgeon: Kris Charles MD;  Location: [facility] OR    CREATION PERICARDIAL WINDOW  02/10/2017    [facility]    DILATION AND CURETTAGE N/A 7/22/2022    Procedure: DILATION AND CURETTAGE, UTERUS;  Surgeon: Lesly Holland;  Location: [facility] OR    HEMORRHOIDECTOMY EXTERNAL      HYSTEROSCOPY, WITH ENDOMETRIAL RADIOFREQUENCY ABLATION - NOVASURE N/A 7/22/2022    Procedure: HYSTEROSCOPY,  "WITH ENDOMETRIAL RADIOFREQUENCY ABLATION - NOVASURE DILATION AND CURETTAGE, UTERUS;  Surgeon: Lesly Holland;  Location: [facility] OR    LAPAROSCOPIC ASSISTED SIGMOID COLECTOMY N/A 1/6/2023    Procedure: LAPAROSCOPIC ASSISTED DESCENDING COLELCTOMY SPLENIC FLEXURE MOBILIZATION ;  Surgeon: Kris Charles MD;  Location: [facility] OR    LAPAROSCOPIC LYSIS ADHESIONS N/A 1/6/2023    Procedure: LYSIS OF ADHESIONS;  Surgeon: Kris Charles MD;  Location: [facility] OR    TUMOR REMOVAL      Has had 3. In the right breast and \"inside of rib cage.\"      Current Outpatient Medications   Medication Sig Dispense Refill    albuterol (PROAIR HFA/PROVENTIL HFA/VENTOLIN HFA) 108 (90 Base) MCG/ACT inhaler Inhale 2 puffs into the lungs every 6 hours as needed for shortness of breath, wheezing or cough 18 g 11    ALPRAZolam (XANAX) 2 MG tablet Take 1 tablet (2 mg) by mouth 2 times daily as needed for anxiety 60 tablet 5    cetirizine (ZYRTEC) 10 MG tablet Take 1 tablet (10 mg) by mouth daily 30 tablet 11    doxylamine (UNISOM) 25 MG TABS tablet Take 1 tablet (25 mg) by mouth nightly as needed for other (nausea) 7 tablet 1    DULoxetine (CYMBALTA) 20 MG capsule Take 1 capsule (20 mg) by mouth 2 times daily 60 capsule 1    fluticasone (FLONASE) 50 MCG/ACT nasal spray Spray 1 spray into both nostrils daily      fluticasone (FLONASE) 50 MCG/ACT nasal spray Spray 2 sprays into both nostrils daily 9.9 mL 11    hydrOXYzine (ATARAX) 25 MG tablet Take 1 tablet (25 mg) by mouth nightly as needed for anxiety 30 tablet 1    meloxicam (MOBIC) 15 MG tablet Take 1 tablet (15 mg) by mouth daily 30 tablet 1    montelukast (SINGULAIR) 10 MG tablet Take 1 tablet (10 mg) by mouth At Bedtime 30 tablet 11    naloxone (NARCAN) 4 MG/0.1ML nasal spray Spray 1 spray (4 mg) into one nostril alternating nostrils as needed for opioid reversal every 2-3 minutes until assistance arrives 0.2 mL 1    ondansetron (ZOFRAN) 8 MG tablet Take " "0.5-1 tablets (4-8 mg) by mouth every 8 hours as needed for nausea 30 tablet 1    oxyCODONE (ROXICODONE) 5 MG tablet Take 1 tablet (5 mg) by mouth 3 times daily as needed for severe pain 78 tablet 0    PROAIR  (90 Base) MCG/ACT inhaler Inhale 2 puffs into the lungs every 4 hours as needed for shortness of breath or wheezing 8 g 11    spacer (OPTICHAMBER BINA) holding chamber For use w/ rescue inhaler 1 each 0    sucralfate (CARAFATE) 1 GM/10ML suspension Take 10 mLs (1 g) by mouth 4 times daily as needed for nausea 414 mL 0    tiotropium (SPIRIVA RESPIMAT) 1.25 MCG/ACT inhaler Inhale 2 puffs into the lungs daily 12 g 3    tiotropium (SPIRIVA) 18 MCG inhaled capsule Inhale 18 mcg into the lungs daily      trimethoprim-polymyxin b (POLYTRIM) 11961-1.1 UNIT/ML-% ophthalmic solution Place 1-2 drops into the right eye every 4 hours 10 mL 0      Allergies   Allergen Reactions    Gabapentin Other (See Comments)     Mental status is changed     Lidocaine Other (See Comments)     \"my jaw stopped moving\"  Other reaction(s): Dystonia    Penicillins Hives, Rash and Shortness Of Breath    Lidocaine-Epinephrine Other (See Comments) and Muscle Pain (Myalgia)     Severe jaw cramping, double vision  Jaw locking        ROS:   Gen- no weight change, no fevers/chills   Cardiac - no palpitations, no chest pain   Respiratory - no shortness of breath , no wheezing   Remainder of ROS negative.     Exam:   BP (!) 127/92 (BP Location: Left arm, Patient Position: Sitting, Cuff Size: Adult Large)   Pulse 82   Temp 98.7  F (37.1  C) (Tympanic)   SpO2 98%    Alert and oriented x 3; No acute distress   Extrem: no clubbing/cyanosis/edema   MSK: R foot: + mild TTP over midfoot w/ single leg stance  Neuro: no focal deficits   Derm: no rashes       "

## 2024-02-19 NOTE — LETTER
Pre-op Physical: Schedule a pre-op physical with your primary care doctor if not internal to [facility].  If internal, we have scheduled this.   The pre-op physical must be 10-30 days before surgery and since it is required by anesthesia, your surgery will be cancelled if it's not done.      Surgery Date: 3/12/2024     Location: [place] [address]    Approximate Arrival Time: 8:00 am  (Unless instructed differently by the pre-op call nurse)       Pre-Surgical Tasks:       Review all medications with your primary care or prescribing physician; they will advise you which meds to stop and when, and when you can resume taking.  Certain medications like blood thinners and weight loss medications need to be stopped in advance of surgery to proceed safely.      Blood thinners including but not exclusive to drugs like Xarelto, Eliquis, Warfarin and Aspirin, should be stopped five days before surgery, if your prescribing provider agrees. Follow your provider's advice on stopping blood thinners because they know you best.  If you are unsure if your medication is a blood thinner, ask your prescribing provider.    Weight loss medications: There are multiple medications being used for weight management and diabetes today, and the list is growing.  Phentermine, Ozempic, Wegovy, Trulicity, and other similar medications need to be stopped one week before surgery to avoid being cancelled.  Victoza and Saxenda can be continued longer but must be stopped one full day before surgery.  Please ask your prescribing provider for advice.    Diabetic medications: in addition to the medications talked about above that are used for either weight loss or diabetes, some people are on insulin that may require adjustment.  Please discuss managing diabetic medications with your prescribing doctor as these medications may require modification prior to surgery.     Fasting  instructions will be provided by the pre-op nurse who will call you 1-3 days before surgery.  Typically, we advise normal food up to 8 hours before you arrive for surgery. Clear liquids only from then until 2 hours before you arrive surgery, then nothing at all by mouth.  The nurse will review your specific instructions with you at the call.      Smoking impacts your body's ability to heal properly so we advise patients to quit if possible before surgery.  Plastic Surgery patients are required to be nicotine free for at least 8 weeks before surgery.      You will need an adult to drive you home and stay with you 24 hours after surgery. Public transportation or Medical Van Services are not permitted.    Visitor restrictions are subject to change, please verify with the pre-op nurse when they call how many people are permitted to accompany you.    We always encourage you to notify your insurance any time you have medical tests or procedures scheduled including surgery. The number is usually right on the back of your insurance card. To obtain pricing for surgery, please call  [employer] [place] [facility] at [phone] or email [email or web address].        Call our office if you have any questions! Thank you!     Brittanie Kerr MA  [facility]   (General Surgery/ ENT/ Plastics)  Direct Office: [phone]       COLONOSCOPY INSTRUCTIONS:        Before your colonoscopy, you will need to prep your colon.  It is important you follow the instructions below carefully.   the following over-the-counter items at your local pharmacy.     2 Bisacodyl 5 mg tablets (Dulcolax laxative, NOT stool softener)     Miralax powder, 8.3 ounce bottle     Gatorade or PowerAde 64 ounce bottle (NOT red and NOT powered)     One 10-ounce bottle of Magnesium Citrate          14 Days Prior to Colonoscopy   Stop taking any fiber supplements and any medications, including vitamins, which contain  iron.     Stop taking any Vitamin E (other than that included in a multivitamin), fish oil, diet and herbal supplements.          7 Days Prior to Colonoscopy   Stop taking any blood thinners, including aspirin and ibuprofen. Contact your prescribing physician for instructions before discontinuing anticoagulants such as Coumadin, Ticlid and Plavix.          3 Days Prior to Colonoscopy   Start a low-residue diet. Avoid high-fiber foods.          2 Days Prior to Colonoscopy   Continue with low-residue diet. Drink at least 8 glasses of water     May chill the Gatorade/PowerAde if desired. No solid food after 11:00pm          1 Day Prior to Colonoscopy   Start a clear liquid diet. Drink at least 8 glasses of water     At 12:00pm, take the 2 Bisacodyl tablets     At 4:00pm mix the entire bottle of Miralax with the 64 ounces of Gatorade     Drink one 8 ounce glass of the solution every 15 minutes until the solution is gone.      This will give you diarrhea; stay near a toilet. You will be uncomfortable and may vomit. If this happens, rinse your mouth with water and take a 15-minute break. It is important to drink ALL of the solution. When finished, continue with clear liquids for remainder of the day.          Day of Colonoscopy Procedure   CLEAR LIQUIDS ONLY!   Take your morning medication(s) as usual.  If you are a diabetic, ask your physician if you should modify your medication or dose of medication.     4 hours prior to procedure: drink entire bottle of magnesium citrate.      2 hours prior: nothing at all by mouth. Including no water or gum.           Bring up to date insurance information, including your insurance card, and photo ID.  Wear comfortable, loose fitting clothing.  Do not wear jewelry and do not bring any valuables.          *If you have any questions regarding your colonoscopy or the prep, or if you need to reschedule your procedure, please call our office at [phone].     **You must arrange for a  responsible adult to drive you and stay with you for 24hours after the procedure or your procedure will be cancelled**               Date of Procedure: ___________________________          Approximate arrival time: ______________________          Surgeon performing procedure: _____________________________          You will receive a phone call from the facility approximately 1-3 days before the procedure for exact arrival time.     The following foods are allowed in a clear liquid diet:     Water (plain, carbonated or flavored)   Fruit juices without pulp, such as apple or white grape   Fruit-flavored beverages, such as fruit punch or lemonade   Carbonated drinks, including dark sodas (cola and root beer)   Gelatin   Tea or coffee without milk or cream   Strained tomato or vegetable juice   Sports drinks   Clear, fat-free broth (bouillon or consomme)   Honey or sugar   Hard candy, such as lemon drops or peppermint rounds   Ice pops without milk, bits of fruit, seeds or nuts          Foods that are generally allowed on a low-fiber diet include:     White bread without nuts and seeds   White rice, plain white pasta, and crackers   Refined hot cereals, such as Cream of Wheat, or cold cereals with less than 1 gram of fiber per serving   Pancakes or waffles made from white refined flour   Most canned or well-cooked vegetables and fruits without skins or seeds   Fruit and vegetable juice with little or no pulp, fruit-flavored drinks, and flavored kaye   Tender meat, poultry, fish, eggs and tofu   Milk and foods made from milk -- such as yogurt, pudding, ice cream, cheeses and sour cream -- if tolerated   Butter, margarine, oils and salad dressings without seeds

## 2024-02-19 NOTE — TELEPHONE ENCOUNTER
Spoke with patient today regarding surgery scheduling     Went over details/instructions.    Surgery Letter sent via [employer]  (Please see LETTERS TAB in chart to retrieve a copy of this letter)      Patient was given earlier dates but declined due to her travel schedule.

## 2024-02-21 ENCOUNTER — DOCUMENTATION ONLY (OUTPATIENT)
Dept: ORTHOPEDICS | Facility: CLINIC | Age: 49
End: 2024-02-21

## 2024-02-21 ENCOUNTER — OFFICE VISIT (OUTPATIENT)
Dept: FAMILY MEDICINE | Facility: CLINIC | Age: 49
End: 2024-02-21
Payer: COMMERCIAL

## 2024-02-21 VITALS
TEMPERATURE: 98.7 F | DIASTOLIC BLOOD PRESSURE: 92 MMHG | HEART RATE: 82 BPM | SYSTOLIC BLOOD PRESSURE: 127 MMHG | OXYGEN SATURATION: 98 %

## 2024-02-21 DIAGNOSIS — F41.1 GENERALIZED ANXIETY DISORDER: ICD-10-CM

## 2024-02-21 DIAGNOSIS — M54.6 CHRONIC MIDLINE THORACIC BACK PAIN: ICD-10-CM

## 2024-02-21 DIAGNOSIS — G89.29 CHRONIC MIDLINE THORACIC BACK PAIN: ICD-10-CM

## 2024-02-21 DIAGNOSIS — Z98.890 STATUS POST SURGICAL MANIPULATION OF ANKLE JOINT: ICD-10-CM

## 2024-02-21 DIAGNOSIS — F40.01 PANIC DISORDER WITH AGORAPHOBIA: ICD-10-CM

## 2024-02-21 DIAGNOSIS — S90.31XD CONTUSION OF RIGHT FOOT, SUBSEQUENT ENCOUNTER: Primary | ICD-10-CM

## 2024-02-21 PROCEDURE — 99214 OFFICE O/P EST MOD 30 MIN: CPT | Performed by: FAMILY MEDICINE

## 2024-02-21 RX ORDER — ALPRAZOLAM 2 MG
2 TABLET ORAL 2 TIMES DAILY PRN
Qty: 60 TABLET | Refills: 3 | Status: SHIPPED | OUTPATIENT
Start: 2024-02-21 | End: 2024-03-20

## 2024-02-21 RX ORDER — OXYCODONE HYDROCHLORIDE 5 MG/1
5 TABLET ORAL 3 TIMES DAILY PRN
Qty: 90 TABLET | Refills: 0 | Status: SHIPPED | OUTPATIENT
Start: 2024-02-21 | End: 2024-03-20

## 2024-02-21 ASSESSMENT — ANXIETY QUESTIONNAIRES
3. WORRYING TOO MUCH ABOUT DIFFERENT THINGS: SEVERAL DAYS
7. FEELING AFRAID AS IF SOMETHING AWFUL MIGHT HAPPEN: NOT AT ALL
5. BEING SO RESTLESS THAT IT IS HARD TO SIT STILL: SEVERAL DAYS
GAD7 TOTAL SCORE: 6
1. FEELING NERVOUS, ANXIOUS, OR ON EDGE: SEVERAL DAYS
GAD7 TOTAL SCORE: 6
6. BECOMING EASILY ANNOYED OR IRRITABLE: SEVERAL DAYS
2. NOT BEING ABLE TO STOP OR CONTROL WORRYING: SEVERAL DAYS

## 2024-02-21 ASSESSMENT — PATIENT HEALTH QUESTIONNAIRE - PHQ9
SUM OF ALL RESPONSES TO PHQ QUESTIONS 1-9: 5
5. POOR APPETITE OR OVEREATING: SEVERAL DAYS

## 2024-02-21 NOTE — LETTER
WORK MEDICAL NOTE   2024     Seen today: Yes    Patient:  Darlene Hudson  :   1975  MRN:     [id number]  Physician: ROBBIE BAILONtiffany RAMOS Hudson may return to work on Date: 24 WITHOUT LIMITATIONS.      The next clinic appointment is scheduled for (date/time) 4 weeks.    Patient limitations:  NONE              Robbie Bailon MD  2024  1:32 PM

## 2024-02-21 NOTE — PROGRESS NOTES
Received Completed forms Yes   Faxed Forms Faxed To: [employer]  Fax Number: [phone]   Sent to HIM (Date) 2/21/24

## 2024-02-22 ENCOUNTER — TELEPHONE (OUTPATIENT)
Dept: FAMILY MEDICINE | Facility: CLINIC | Age: 49
End: 2024-02-22
Payer: COMMERCIAL

## 2024-02-22 NOTE — TELEPHONE ENCOUNTER
Forms Request  Name of form/paperwork:   ADA information    Do we have the form:   Yes     When is form needed by:     Lyn 7 days     How would you like the form returned:   Fax oril and call patient to  copy    Fax:   [phone].    Patient Notified form requests are processed in 10 business days: yes    Okay to leave a detailed message? Yes. Copy made and place in C C. Patient also fill out an LUDWIN to send some documentation with the form. Place Ludwin in medication form

## 2024-02-28 ENCOUNTER — OFFICE VISIT (OUTPATIENT)
Dept: FAMILY MEDICINE | Facility: CLINIC | Age: 49
End: 2024-02-28
Payer: COMMERCIAL

## 2024-02-28 VITALS
OXYGEN SATURATION: 97 % | TEMPERATURE: 97.3 F | DIASTOLIC BLOOD PRESSURE: 86 MMHG | HEART RATE: 84 BPM | RESPIRATION RATE: 20 BRPM | SYSTOLIC BLOOD PRESSURE: 125 MMHG | HEIGHT: 69 IN | BODY MASS INDEX: 38.69 KG/M2

## 2024-02-28 DIAGNOSIS — J01.01 ACUTE RECURRENT MAXILLARY SINUSITIS: Primary | ICD-10-CM

## 2024-02-28 PROBLEM — Z85.038 HISTORY OF COLON CANCER: Status: ACTIVE | Noted: 2024-02-19

## 2024-02-28 PROCEDURE — 99213 OFFICE O/P EST LOW 20 MIN: CPT | Performed by: FAMILY MEDICINE

## 2024-02-28 PROCEDURE — 87637 SARSCOV2&INF A&B&RSV AMP PRB: CPT | Performed by: FAMILY MEDICINE

## 2024-02-28 RX ORDER — DOXYCYCLINE 100 MG/1
100 CAPSULE ORAL 2 TIMES DAILY
Qty: 14 CAPSULE | Refills: 0 | Status: SHIPPED | OUTPATIENT
Start: 2024-02-28 | End: 2024-03-08

## 2024-02-28 NOTE — LETTER
WORK MEDICAL NOTE  2024     Seen today: Yes    Patient:  Darlene Hudson  :   1975  MRN:     [id number]  Physician: JULIETH BAILON    Darlene Hudson is currently ill and being tested for covid/influenza.  It is recommended that she isolate while symptomatic for 5 days. She is being treated with medication for a sinus infection. She will be out of work from 24 to 3/3/24. She may return to work on Date: 3/4/24.      The next clinic appointment is scheduled for (date/time) 3/6/24.    Patient limitations:  None when she returns            Julieth Bailon MD  2024  2:25 PM

## 2024-02-28 NOTE — PROGRESS NOTES
ASSESSMENT/PLAN:    (J01.01) Acute recurrent maxillary sinusitis  (primary encounter diagnosis)  Comment: will tx given chronicity of symptoms; note for work written  Plan: doxycycline hyclate (VIBRAMYCIN) 100 MG capsule          Robbie Bailon MD  February 28, 2024  2:22 PM        Pt is a 48 year old female last seen on 2/21/24 here today for:     Sick visit - 3 days  Got sick when she went to [place] and [place]  Flew back last night  +HA x 3 days  Congested  Coughing/ sneezing, eyes watering   No wheezing   +sinus congestion       Per my last note:  (S90.31XD) Contusion of right foot, subsequent encounter  (primary encounter diagnosis)  Comment:   Plan: resolved; note written to return to work without restrictions; we discussed disability and FMLA paperwork. She needs to see a mental health professional if she wishes to continue taking time from work for mental health as my documentation of her PTSD after her cancer dx was not sufficient for her to qualify for short term disability     (F41.1) Generalized anxiety disorder  Comment: see above; stable on current regiemen  Plan: ALPRAZolam (XANAX) 2 MG tablet, Adult Mental Health  Referral          (F40.01) Panic disorder with agoraphobia  Comment: see above  Plan: ALPRAZolam (XANAX) 2 MG tablet, Adult Mental Health  Referral          (Z98.890) Status post surgical manipulation of ankle joint  Comment: stable chronic ankle pain and swelling causing intermittent leave from work; I recommended she restart PT and progress to work conditioning/ functional capacity evaluation if she wishes to take time for this chronic msk issue; needs to wean down on opiates as well  Plan: oxyCODONE (ROXICODONE) 5 MG tablet, Physical Therapy  Referral          (M54.6,  G89.29) Chronic midline thoracic back pain  Comment: see above  Plan: oxyCODONE (ROXICODONE) 5 MG tablet      Past Medical History:   Diagnosis Date     Abdominal pain 06/29/2015     Abnormal  cervical Papanicolaou smear 11/09/2014    Overview:  ACUS/HPV positive     Abnormal cytology finding 11/09/2014    Overview:  ACUS/HPV positive     Acute pericardial effusion 02/06/2017     Agoraphobia with panic attacks      Anxiety      Arthralgia of both lower legs 05/29/2020    Bilateral achy knee pain that is chronic in nature going on for years. Most likely osteoarthritis in knees related to obesity. Previously injected in both knees with good relief. 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  Has controlled substance agreement with them.  On Vicodin, Valium, Klonopin prescribed only from there.        Coronary artery disease      Cough 02/09/2020     Family history of colon cancer 10/24/2020     Hx of seasonal allergies      Infection due to 2019 novel coronavirus 09/20/2022     Infectious pericarditis      Lipoma      Low back pain 07/13/2015     Major depressive disorder, recurrent episode, moderate (H) 09/05/2006     Menorrhagia with irregular cycle 07/15/2022    Added automatically from request for surgery [id number]     Moderate persistent asthma without complication 09/29/2020     Nondependent alcohol abuse, episodic drinking behavior 10/03/2012     Noninflammatory disorder of vagina 02/20/2015     Other chronic pain      Other long term (current) drug therapy 01/28/2013    Overview:  Vicodin and cyclobenzaprine monthly     Panic disorder with agoraphobia 09/05/2006     Pap smear for cervical cancer  "screening 10/31/2016    03/29/2010  Normal cytology, HPV ot done, repeat in 3 years.     Pericarditis 2017     Physiologic disturbance of temperature regulation 10/24/2020     PONV (postoperative nausea and vomiting)      Right ankle swelling 06/07/2022    Added automatically from request for surgery [id number]     Tobacco abuse      Tobacco use disorder 07/13/2015      Past Surgical History:   Procedure Laterality Date     ANKLE SURGERY Right 05/30/2019     ARTHROSCOPY ANKLE Right 6/21/2023    Procedure: right ankle arthroscopy with debridement;  Surgeon: Kareem Bashir MD;  Location: [facility] OR     BIOPSY BREAST Right 1990    benign     COLONOSCOPY N/A 1/3/2023    Procedure: COLONOSCOPY WITH BIOPSY OF COLON MASS, POLYPECTOMY;  Surgeon: Kris Charles MD;  Location: [facility] OR     CREATION PERICARDIAL WINDOW  02/10/2017    [facility]     DILATION AND CURETTAGE N/A 7/22/2022    Procedure: DILATION AND CURETTAGE, UTERUS;  Surgeon: Lesly Holland;  Location: [facility] OR     HEMORRHOIDECTOMY EXTERNAL       HYSTEROSCOPY, WITH ENDOMETRIAL RADIOFREQUENCY ABLATION - NOVASURE N/A 7/22/2022    Procedure: HYSTEROSCOPY, WITH ENDOMETRIAL RADIOFREQUENCY ABLATION - NOVASURE DILATION AND CURETTAGE, UTERUS;  Surgeon: Lesly Holland;  Location: [facility] OR     LAPAROSCOPIC ASSISTED SIGMOID COLECTOMY N/A 1/6/2023    Procedure: LAPAROSCOPIC ASSISTED DESCENDING COLELCTOMY SPLENIC FLEXURE MOBILIZATION ;  Surgeon: Kris Charles MD;  Location: [facility] OR     LAPAROSCOPIC LYSIS ADHESIONS N/A 1/6/2023    Procedure: LYSIS OF ADHESIONS;  Surgeon: Kris Charles MD;  Location: [facility] OR     TUMOR REMOVAL      Has had 3. In the right breast and \"inside of rib cage.\"      Current Outpatient Medications   Medication Sig Dispense Refill     albuterol (PROAIR HFA/PROVENTIL HFA/VENTOLIN HFA) 108 (90 Base) MCG/ACT inhaler Inhale 2 puffs into the lungs every 6 hours as needed for " shortness of breath, wheezing or cough 18 g 11     ALPRAZolam (XANAX) 2 MG tablet Take 1 tablet (2 mg) by mouth 2 times daily as needed for anxiety 60 tablet 3     cetirizine (ZYRTEC) 10 MG tablet Take 1 tablet (10 mg) by mouth daily 30 tablet 11     doxylamine (UNISOM) 25 MG TABS tablet Take 1 tablet (25 mg) by mouth nightly as needed for other (nausea) 7 tablet 1     DULoxetine (CYMBALTA) 20 MG capsule Take 1 capsule (20 mg) by mouth 2 times daily 60 capsule 1     fluticasone (FLONASE) 50 MCG/ACT nasal spray Spray 1 spray into both nostrils daily       fluticasone (FLONASE) 50 MCG/ACT nasal spray Spray 2 sprays into both nostrils daily 9.9 mL 11     hydrOXYzine (ATARAX) 25 MG tablet Take 1 tablet (25 mg) by mouth nightly as needed for anxiety 30 tablet 1     meloxicam (MOBIC) 15 MG tablet Take 1 tablet (15 mg) by mouth daily 30 tablet 1     montelukast (SINGULAIR) 10 MG tablet Take 1 tablet (10 mg) by mouth At Bedtime 30 tablet 11     naloxone (NARCAN) 4 MG/0.1ML nasal spray Spray 1 spray (4 mg) into one nostril alternating nostrils as needed for opioid reversal every 2-3 minutes until assistance arrives 0.2 mL 1     ondansetron (ZOFRAN) 8 MG tablet Take 0.5-1 tablets (4-8 mg) by mouth every 8 hours as needed for nausea 30 tablet 1     oxyCODONE (ROXICODONE) 5 MG tablet Take 1 tablet (5 mg) by mouth 3 times daily as needed for severe pain 90 tablet 0     PROAIR  (90 Base) MCG/ACT inhaler Inhale 2 puffs into the lungs every 4 hours as needed for shortness of breath or wheezing 8 g 11     spacer (OPTICHAMBER BINA) holding chamber For use w/ rescue inhaler 1 each 0     sucralfate (CARAFATE) 1 GM/10ML suspension Take 10 mLs (1 g) by mouth 4 times daily as needed for nausea 414 mL 0     tiotropium (SPIRIVA RESPIMAT) 1.25 MCG/ACT inhaler Inhale 2 puffs into the lungs daily 12 g 3     tiotropium (SPIRIVA) 18 MCG inhaled capsule Inhale 18 mcg into the lungs daily       trimethoprim-polymyxin b (POLYTRIM)  "05233-0.1 UNIT/ML-% ophthalmic solution Place 1-2 drops into the right eye every 4 hours 10 mL 0      Allergies   Allergen Reactions     Gabapentin Other (See Comments)     Mental status is changed      Lidocaine Other (See Comments)     \"my jaw stopped moving\"  Other reaction(s): Dystonia     Penicillins Hives, Rash and Shortness Of Breath     Lidocaine-Epinephrine Other (See Comments) and Muscle Pain (Myalgia)     Severe jaw cramping, double vision  Jaw locking        ROS:   Gen- no weight change, no fevers/chills   Head/ Eyes- no blurred vision, no headaches   ENT- see HPI  Cardiac - no palpitations, no chest pain   Respiratory - no shortness of breath , no wheezing   GI - no nausea, no vomiting, no diarrhea, no constipation   Remainder of ROS negative.     Exam:   /86 (BP Location: Right arm, Patient Position: Sitting, Cuff Size: Adult Large)   Pulse 84   Temp 97.3  F (36.3  C)   Resp 20   Ht 1.753 m (5' 9\")   SpO2 97%   BMI 38.69 kg/m     Alert and oriented x 3; No acute distress   HEENT: extraocular movements intact, tympanic membranes clear, oropharynx erythematous; +TTP over maxillary sinuses  Neck: no masses, no lymphadenopathy   Resp: clear to auscultation bilaterally, no wheezing/ronchi   CV: rate/rhythm regular, no murmurs/rubs/gallops   Derm: no rashes         "

## 2024-03-04 ENCOUNTER — ONCOLOGY VISIT (OUTPATIENT)
Dept: ONCOLOGY | Facility: HOSPITAL | Age: 49
End: 2024-03-04
Attending: PSYCHOLOGIST
Payer: COMMERCIAL

## 2024-03-04 ENCOUNTER — HOSPITAL ENCOUNTER (OUTPATIENT)
Dept: CT IMAGING | Facility: CLINIC | Age: 49
Discharge: HOME | End: 2024-03-04
Attending: INTERNAL MEDICINE | Admitting: INTERNAL MEDICINE
Payer: COMMERCIAL

## 2024-03-04 DIAGNOSIS — C18.6 ADENOCARCINOMA OF DESCENDING COLON (H): ICD-10-CM

## 2024-03-04 DIAGNOSIS — F43.10 POSTTRAUMATIC STRESS DISORDER: Primary | ICD-10-CM

## 2024-03-04 PROCEDURE — 250N000011 HC RX IP 250 OP 636: Performed by: INTERNAL MEDICINE

## 2024-03-04 PROCEDURE — 90832 PSYTX W PT 30 MINUTES: CPT | Performed by: PSYCHOLOGIST

## 2024-03-04 PROCEDURE — 71260 CT THORAX DX C+: CPT

## 2024-03-04 RX ORDER — IOPAMIDOL 755 MG/ML
90 INJECTION, SOLUTION INTRAVASCULAR ONCE
Status: COMPLETED | OUTPATIENT
Start: 2024-03-04 | End: 2024-03-04

## 2024-03-04 RX ADMIN — IOPAMIDOL 90 ML: 755 INJECTION, SOLUTION INTRAVENOUS at 17:41

## 2024-03-04 NOTE — CONFIDENTIAL NOTE
"[facility] Oncology- Psychotherapy                                                 Progress Note                 Patient Name: Darlene Hudson                  Date:  3/4/2024                                             Service Type: Individual                            Session Start Time:  817             Session End Time:    853                Session Length:        36 mins     Attendees:     Client attended alone     Service Modality:  In-person                 DATA              Interactive Complexity: No              Crisis: No                               Progress Since Last Session (Related to Symptoms / Goals / Homework):               Symptoms: Pt reports anxiety and stress related to her work. She reports things there are \"horrible.\"                                  Episode of Care Goals: Satisfactory progress - ACTION (Actively working towards change); Intervened by reinforcing change plan / affirming steps taken.                    Current / Ongoing Stressors and Concerns:     Pt reports she has anxiety and stress related to work. She reports a supervisor is stressing her quite a bit.  Pt reports she feels attacked there and she is struggling to cope.     Pt reports she is angry at work.     Pt reports stress [facility].     Pt is unsure how to cope.      Social Hx   Pt reports she has a daughter age 28 who is with a man who has a hx of bank robbery. She has one child age 6. Pt reports her grandchild is used as a way to manipulate her.      Pt reports she has a 27 year old daughter.      Pt reports a 21 year old daughter. She lives in [place] and is doing well. She has a child and lives with her spouse in a [facility].      Pt reports a 17 year old son. He was raised by his grand parents until age 11. Pt mother  (his grandparent) and he was devastated by that. He has been seemingly angry since the death a year ago.      Pt reports a 12 year old son " dx with ADHD, LD, and a speech disorder.      Pt reports her mother had a brain tumor, and she worries about having a brain tumor that has not been caught.      Pt reports she has been cancer free since 1/2023.                     Treatment Objective(s) Addressed in This Session:          Education regarding sx, .                 Intervention:              CBT: ,              Emotion Focused Therapy: ,              Solution Focused: ,                 Assessments completed prior to visit:              none                                ASSESSMENT: Current Emotional / Mental Status (status of significant symptoms):              Risk status (Self / Other harm or suicidal ideation)              Patient denies current fears or concerns for personal safety.              Patient denies current or recent suicidal ideation or behaviors.              Patient denies current or recent homicidal ideation or behaviors.              Patient denies current or recent self injurious behavior or ideation.              Patient denies other safety concerns.              Patient reports there has been no change in risk factors since their last session.                Patient reports there has been no change in protective factors since their last session.                Recommended that patient call 911 or go to the local ED should there be a change in any of these risk factors.                 Appearance:                            Appropriate               Eye Contact:                           Good               Psychomotor Behavior:          Normal               Attitude:                                   Cooperative               Orientation:                             All              Speech                          Rate / Production:       normal                           Volume:                       Normal               Mood:                                      Anxious               Affect:                                       Appropriate               Thought Content:                    Clear               Thought Form:                        Coherent               Insight:                                    Good                  Medication Review:              Xanax per pt for sleep/anxiety.                 Medication Compliance:              yes                 Changes in Health Issues:              Yes: ankle surgery completed, pt is cancer free she reports.                  Chemical Use Review:              Substance Use: Chemical use reviewed, no active concerns identified. Hx of abuse, but no current issues.                  Tobacco Use: No current tobacco use.       Diagnosis:  1. Adenocarcinoma of descending colon (H)    F43.10 PTSD     Collateral Reports Completed:              Not Applicable                 PLAN: (Patient Tasks / Therapist Tasks / Other)              Return in a week                 Dorian Davis MA [facility]                                                           ______________________________________________________________________     Individual Treatment Plan     Patient's Name: Darlene Hudson                YOB: 1975     Date of Creation: 6/12/2023  Date Treatment Plan Last Reviewed/Revised: 3/4/2024  DSM5 Diagnoses: F43.10 PTSD  Psychosocial / Contextual Factors: trauma hx including deaths and cancer hx.  PROMIS (reviewed every 90 days):      Referral / Collaboration:  The following referral(s) was/were discussed but patient declines follow up at this time. IOP MH [place], PHP.     Anticipated number of session for this episode of care: 9-12 sessions  Anticipation frequency of session: Weekly  Anticipated Duration of each session: 53 or more minutes  Treatment plan will be reviewed in 90 days or when goals have been changed.         MeasurableTreatment Goal(s) related to diagnosis / functional impairment(s)  Goal 1: Patient will reduce anxiety due to trauma sx     Objective #A (Patient Action)                           Patient will learn about sx of trauma. .  Status: Reviewed -3/4/2024    Intervention(s)  Therapist will teach about the sx and effects of PTSD.     Objective #B  Patient will teach coping strategies to manage sx.  Status: Reviewed - 3/4/2024     Intervention(s)  Therapist will teach emotional regulation skills. ,.     Objective #C  Patient will provide distraction techniques to manage sx.  Status: Reviewed - 3/4/2024     Intervention(s)  Therapist will teach distraction skills. ,.        Patient has reviewed and agreed to the above plan.      3/4/2024   8/2/23

## 2024-03-04 NOTE — LETTER
3/4/2024         RE: Darlene Hudson  [address]  [facility] [place] [address]        Dear Colleague,    Thank you for referring your patient, Darlene Hudson, to the [facility]. Please see a copy of my visit note below.    See confidential Note      Again, thank you for allowing me to participate in the care of your patient.        Sincerely,        Mejia Davis LP

## 2024-03-06 ENCOUNTER — LAB (OUTPATIENT)
Dept: INFUSION THERAPY | Facility: HOSPITAL | Age: 49
End: 2024-03-06
Attending: INTERNAL MEDICINE
Payer: COMMERCIAL

## 2024-03-06 ENCOUNTER — ONCOLOGY VISIT (OUTPATIENT)
Dept: ONCOLOGY | Facility: HOSPITAL | Age: 49
End: 2024-03-06
Attending: INTERNAL MEDICINE
Payer: COMMERCIAL

## 2024-03-06 ENCOUNTER — TELEPHONE (OUTPATIENT)
Dept: FAMILY MEDICINE | Facility: CLINIC | Age: 49
End: 2024-03-06

## 2024-03-06 VITALS
DIASTOLIC BLOOD PRESSURE: 80 MMHG | OXYGEN SATURATION: 100 % | HEIGHT: 69 IN | WEIGHT: 255 LBS | SYSTOLIC BLOOD PRESSURE: 137 MMHG | RESPIRATION RATE: 18 BRPM | BODY MASS INDEX: 37.77 KG/M2 | TEMPERATURE: 97.3 F | HEART RATE: 64 BPM

## 2024-03-06 DIAGNOSIS — C18.6 ADENOCARCINOMA OF DESCENDING COLON (H): ICD-10-CM

## 2024-03-06 DIAGNOSIS — C18.6 ADENOCARCINOMA OF DESCENDING COLON (H): Primary | ICD-10-CM

## 2024-03-06 DIAGNOSIS — R91.1 PULMONARY NODULE, RIGHT: ICD-10-CM

## 2024-03-06 DIAGNOSIS — J34.9 PARANASAL SINUS DISEASE: ICD-10-CM

## 2024-03-06 LAB
ALBUMIN SERPL BCG-MCNC: 3.8 G/DL (ref 3.5–5.2)
ALP SERPL-CCNC: 100 U/L (ref 40–150)
ALT SERPL W P-5'-P-CCNC: 17 U/L (ref 0–50)
ANION GAP SERPL CALCULATED.3IONS-SCNC: 9 MMOL/L (ref 7–15)
AST SERPL W P-5'-P-CCNC: 21 U/L (ref 0–45)
BASOPHILS # BLD AUTO: 0 10E3/UL (ref 0–0.2)
BASOPHILS NFR BLD AUTO: 1 %
BILIRUB SERPL-MCNC: 0.3 MG/DL
BUN SERPL-MCNC: 13.5 MG/DL (ref 6–20)
CALCIUM SERPL-MCNC: 9.1 MG/DL (ref 8.6–10)
CHLORIDE SERPL-SCNC: 106 MMOL/L (ref 98–107)
CREAT SERPL-MCNC: 0.92 MG/DL (ref 0.51–0.95)
DEPRECATED HCO3 PLAS-SCNC: 27 MMOL/L (ref 22–29)
EGFRCR SERPLBLD CKD-EPI 2021: 75 ML/MIN/1.73M2
EOSINOPHIL # BLD AUTO: 0.6 10E3/UL (ref 0–0.7)
EOSINOPHIL NFR BLD AUTO: 9 %
ERYTHROCYTE [DISTWIDTH] IN BLOOD BY AUTOMATED COUNT: 12.2 % (ref 10–15)
GLUCOSE SERPL-MCNC: 94 MG/DL (ref 70–99)
HCT VFR BLD AUTO: 38.7 % (ref 35–47)
HGB BLD-MCNC: 12.6 G/DL (ref 11.7–15.7)
IMM GRANULOCYTES # BLD: 0 10E3/UL
IMM GRANULOCYTES NFR BLD: 0 %
LYMPHOCYTES # BLD AUTO: 2.3 10E3/UL (ref 0.8–5.3)
LYMPHOCYTES NFR BLD AUTO: 35 %
MCH RBC QN AUTO: 29.2 PG (ref 26.5–33)
MCHC RBC AUTO-ENTMCNC: 32.6 G/DL (ref 31.5–36.5)
MCV RBC AUTO: 90 FL (ref 78–100)
MONOCYTES # BLD AUTO: 0.4 10E3/UL (ref 0–1.3)
MONOCYTES NFR BLD AUTO: 6 %
NEUTROPHILS # BLD AUTO: 3.2 10E3/UL (ref 1.6–8.3)
NEUTROPHILS NFR BLD AUTO: 49 %
NRBC # BLD AUTO: 0 10E3/UL
NRBC BLD AUTO-RTO: 0 /100
PLATELET # BLD AUTO: 272 10E3/UL (ref 150–450)
POTASSIUM SERPL-SCNC: 3.7 MMOL/L (ref 3.4–5.3)
PROT SERPL-MCNC: 7.5 G/DL (ref 6.4–8.3)
RBC # BLD AUTO: 4.32 10E6/UL (ref 3.8–5.2)
SODIUM SERPL-SCNC: 142 MMOL/L (ref 135–145)
WBC # BLD AUTO: 6.4 10E3/UL (ref 4–11)

## 2024-03-06 PROCEDURE — 99214 OFFICE O/P EST MOD 30 MIN: CPT | Performed by: INTERNAL MEDICINE

## 2024-03-06 PROCEDURE — 99213 OFFICE O/P EST LOW 20 MIN: CPT | Performed by: INTERNAL MEDICINE

## 2024-03-06 PROCEDURE — 85025 COMPLETE CBC W/AUTO DIFF WBC: CPT

## 2024-03-06 PROCEDURE — 36415 COLL VENOUS BLD VENIPUNCTURE: CPT

## 2024-03-06 PROCEDURE — 80053 COMPREHEN METABOLIC PANEL: CPT

## 2024-03-06 ASSESSMENT — PAIN SCALES - GENERAL: PAINLEVEL: NO PAIN (0)

## 2024-03-06 NOTE — Clinical Note
3/6/2024         RE: Darlene Hudson  [address]  [place] [address]        Dear Colleague,    Thank you for referring your patient, Darlene Hudson, to the [facility]. Please see a copy of my visit note below.    [facility] Hematology and Oncology Progress Note    Patient: Darlene Hudson  MRN: [id number]  Date of Service: Mar 6, 2024        Assessment and Plan:     Cancer Staging   Adenocarcinoma of descending colon (H)  Staging form: Colon and Rectum, AJCC 8th Edition  - Pathologic stage from 2/7/2023: Stage I (pT2, pN0, cM0) - Signed by Arnaud Valdez MD on 2/7/2023    1.  Adenocarcinoma of the descending colon:       She had a follow-up CT scan on December 4.  I personally reviewed the images and shared them with [facility].  There is no evidence of recurrent colon cancer.  She has a stable left pelvic node.  Seems to be stable going back to January.  She will be due for colonoscopy in January.  I reviewed with her pathologic findings of surgery and a relatively low risk of recurrence.  Repeat imaging in 6 months, June, 2024.     2.  Left external iliac lymph node seen on imaging: Stable on current CT when compared to January 2023.  We will continue to monitor radiographically.  Most likely benign. There was moderate follicular lymphoid hyperplasia noted in some of her mesenteric lymph nodes on surgery.     3.  Right lung nodules: There is a cluster of lung nodules in the upper right lower lobe.  These were personally reviewed and shared with Karoline.  She had a upper respiratory tract infection last week.  This is not a typical pattern or appearance of colon cancer metastases.  I think they are infectious/inflammatory.  We are going to repeat a CT scan in 3 months.    I spent 32 minutes in the care of this patient today, which included time necessary for preparation for the visit, face to face time with the patient, communication of recommendations to the care team,  and documentation time.    ECOG Performance  0    Diagnosis:    1.  Adenocarcinoma of the descending colon: Diagnosed January 2023. Surgical pathology revealed an invasive moderately differentiated adenocarcinoma. 47 x 43.14 mm tumor invading into, but not through, the muscularis propria.  Margins negative.  48 nodes taken, all negative.   No perforation, lymphovascular invasion, or perineural invasion noted on pathology. pMMR.     Treatment:    Transverse and descending segmental colectomy January 6, 2023.   No adjuvant therapy was given.     Interim History:    Karoline returns for follow-up visit.  Overall she has been doing okay.  She reports no new abdominal pain or change in her bowel habits.  She does note that she has a upper respiratory type infection last week.    Review of Systems:    As above in the history.     Review of Systems otherwise Negative for:  General: chills, fever or night sweats  Psychological: depression  Ophthalmic: blurry vision, double vision or loss of vision, vision change  ENT: epistaxis, oral lesions, hearing changes  Hematological and Lymphatic: bleeding, bruising, jaundice, swollen lymph nodes  Endocrine: hot flashes, unexpected weight changes  Respiratory: cough, hemoptysis, orthopnea or shortness of breath/ROPER  Cardiovascular: chest pain, edema, palpitations or PND  Gastrointestinal: abdominal pain, blood in stools, change in bowel habits, constipation, diarrhea or nausea/vomiting  Genito-Urinary: change in urinary stream, incontinence, frequency/urgency  Musculoskeletal: joint pain, stiffness, swelling, muscle pain  Neurological: dizziness, headaches, numbness/tingling  Dermatological: lumps and rash    Past History:    Past Medical History:   Diagnosis Date    Abdominal pain 06/29/2015    Abnormal cervical Papanicolaou smear 11/09/2014    Overview:  ACUS/HPV positive    Abnormal cytology finding 11/09/2014    Overview:  ACUS/HPV positive    Acute pericardial effusion 02/06/2017     Agoraphobia with panic attacks     Anxiety     Arthralgia of both lower legs 05/29/2020    Bilateral achy knee pain that is chronic in nature going on for years. Most likely osteoarthritis in knees related to obesity. Previously injected in both knees with good relief. 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  Has controlled substance agreement with them.  On Vicodin, Valium, Klonopin prescribed only from there.       Coronary artery disease     Cough 02/09/2020    Family history of colon cancer 10/24/2020    Hx of seasonal allergies     Infection due to 2019 novel coronavirus 09/20/2022    Infectious pericarditis     Lipoma     Low back pain 07/13/2015    Major depressive disorder, recurrent episode, moderate (H) 09/05/2006    Menorrhagia with irregular cycle 07/15/2022    Added automatically from request for surgery [id number]    Moderate persistent asthma without complication 09/29/2020    Nondependent alcohol abuse, episodic drinking behavior 10/03/2012    Noninflammatory disorder of vagina 02/20/2015    Other chronic pain     Other long term (current) drug therapy 01/28/2013    Overview:  Vicodin and cyclobenzaprine monthly    Panic disorder with agoraphobia 09/05/2006    Pap smear for cervical cancer screening 10/31/2016    03/29/2010  Normal cytology, HPV ot done, repeat in 3 years.    Pericarditis 2017    Physiologic disturbance of temperature regulation 10/24/2020    PONV (postoperative nausea and vomiting)     Right  ankle swelling 06/07/2022    Added automatically from request for surgery [id number]    Tobacco abuse     Tobacco use disorder 07/13/2015     Physical Exam:    There were no vitals taken for this visit.    General: patient appears stated age of 48 year old. Nontoxic and in no distress.   HEENT: Head: atraumatic, normocephalic. Sclerae anicteric.  Chest:  Normal respiratory effort  Cardiac:  No edema.   Abdomen: abdomen is non-distended  Extremities: normal tone and muscle bulk.  Skin: no lesions or rash on visible skin. Warm and dry.   CNS: alert and oriented. Grossly non-focal.   Psychiatric: normal mood and affect.     Lab Results:    Recent Results (from the past 168 hour(s))   Symptomatic Influenza A/B, RSV, & SARS-CoV2 PCR (COVID-19) Nasopharyngeal    Specimen: Nasopharyngeal; Swab   Result Value Ref Range    Influenza A PCR Negative Negative    Influenza B PCR Negative Negative    RSV PCR Negative Negative    SARS CoV2 PCR Negative Negative     Imaging:    CT Chest w Contrast    Result Date: 3/5/2024  EXAM: CT CHEST W CONTRAST LOCATION: [facility] DATE: 3/4/2024 INDICATION: follow up right lung nodules. COMPARISON: 02/04/2023 TECHNIQUE: CT chest with IV contrast. Multiplanar reformats were obtained. Dose reduction techniques were used. CONTRAST: 90ml Isovue 370 FINDINGS: LUNGS AND PLEURA: Mild emphysematous changes stable. Previously noted nodules in the superior segment of the right lower lobe are no longer seen. Otherwise, Lungs are clear. No pleural effusion. MEDIASTINUM/AXILLAE: No lymphadenopathy. No thoracic aneurysm. No pericardial effusion. Thyroid gland appears within normal limits. Small hiatal hernia. Otherwise the esophagus is unremarkable. CORONARY ARTERY CALCIFICATION: None. UPPER ABDOMEN: No concerning findings in the upper abdomen. MUSCULOSKELETAL: No concerning osseous abnormalities     IMPRESSION: 1.  Previously noted nodules in the superior segment of the right lower  "lobe are no longer seen. 2.  Mild emphysematous changes of the lungs stable. 3.  No lymphadenopathy. 4.  Small hiatal hernia.     XR Foot Right G/E 3 Views    Result Date: 2/7/2024  EXAM: XR FOOT RIGHT G/E 3 VIEWS LOCATION: [facility] DATE: 2/7/2024 INDICATION:  Contusion of right foot, initial encounter COMPARISON: None.     IMPRESSION: Degenerative change at the first MTP joint. No evidence for fracture. Right foot otherwise negative.       Signed by: Arnaud Valdez MD      Oncology Rooming Note    March 6, 2024 3:48 PM   Darlene Hudson is a 48 year old female who presents for:    Chief Complaint   Patient presents with    Oncology Clinic Visit     3 month follow up with labs and prior CT review related to Adenocarcinoma of descending colon     Initial Vitals: /80 (BP Location: Right arm, Patient Position: Sitting, Cuff Size: Adult Large)   Pulse 64   Temp 97.3  F (36.3  C) (Tympanic)   Resp 18   Ht 1.753 m (5' 9\")   Wt 115.7 kg (255 lb)   LMP  (LMP Unknown)   SpO2 100%   BMI 37.66 kg/m   Estimated body mass index is 37.66 kg/m  as calculated from the following:    Height as of this encounter: 1.753 m (5' 9\").    Weight as of this encounter: 115.7 kg (255 lb). Body surface area is 2.37 meters squared.  No Pain (0) Comment: Data Unavailable   No LMP recorded (lmp unknown). Patient has had an ablation.  Allergies reviewed: Yes  Medications reviewed: Yes    Medications: Medication refills not needed today.  Pharmacy name entered into [employer]:    [id number] - [place], [place] - [address]  AT [place], [place] - [address]    Frailty Screening:   Is the patient here for a new oncology consult visit in cancer care? 2. No      Clinical concerns: 3 month follow up with labs and prior CT review related to Adenocarcinoma of descending colon      ILANA GRANADOS CMA                  Again, " thank you for allowing me to participate in the care of your patient.        Sincerely,        Arnaud Valdez MD

## 2024-03-06 NOTE — PROGRESS NOTES
"Oncology Rooming Note    March 6, 2024 3:48 PM   Darlene Hudson is a 48 year old female who presents for:    Chief Complaint   Patient presents with    Oncology Clinic Visit     3 month follow up with labs and prior CT review related to Adenocarcinoma of descending colon     Initial Vitals: /80 (BP Location: Right arm, Patient Position: Sitting, Cuff Size: Adult Large)   Pulse 64   Temp 97.3  F (36.3  C) (Tympanic)   Resp 18   Ht 1.753 m (5' 9\")   Wt 115.7 kg (255 lb)   LMP  (LMP Unknown)   SpO2 100%   BMI 37.66 kg/m   Estimated body mass index is 37.66 kg/m  as calculated from the following:    Height as of this encounter: 1.753 m (5' 9\").    Weight as of this encounter: 115.7 kg (255 lb). Body surface area is 2.37 meters squared.  No Pain (0) Comment: Data Unavailable   No LMP recorded (lmp unknown). Patient has had an ablation.  Allergies reviewed: Yes  Medications reviewed: Yes    Medications: Medication refills not needed today.  Pharmacy name entered into [employer]:    [id number] [place], [place] - [address] AT [place], [place] - [address]    Frailty Screening:   Is the patient here for a new oncology consult visit in cancer care? 2. No      Clinical concerns: 3 month follow up with labs and prior CT review related to Adenocarcinoma of descending colon      ILANA GRANADOS CMA              "

## 2024-03-06 NOTE — TELEPHONE ENCOUNTER
Patient called in today to cancel appt with you -- which she said was a pre-op but it was a return? I rescheduled with Dr Cochran on Friday -- patient wanted you to know and asked for you to call her back

## 2024-03-06 NOTE — PROGRESS NOTES
[facility] Hematology and Oncology Progress Note    Patient: Darlene Hudson  MRN: [id number]  Date of Service: Mar 6, 2024        Assessment and Plan:     Cancer Staging   Adenocarcinoma of descending colon (H)  Staging form: Colon and Rectum, AJCC 8th Edition  - Pathologic stage from 2/7/2023: Stage I (pT2, pN0, cM0) - Signed by Arnaud Valdez MD on 2/7/2023    1.  Adenocarcinoma of the descending colon: There is no clinical evidence of recurrent disease.  She had a CT scan of the chest on March 4 which showed no evidence of metastases.  Previous nodules in the right lower lobe have resolved.  We will see her back in 6 months, September, with labs and imaging.  She will be getting a colonoscopy in a week or 2.       2.  Left external iliac lymph node seen on imaging:  We will continue to monitor radiographically.  Most likely benign. There was moderate follicular lymphoid hyperplasia noted in some of her mesenteric lymph nodes on surgery.     3.  Right lung nodules: Recently had a repeat CT of the chest.  I personally viewed the images and shared them with Karoline and interpreted them for her.  These nodules have resolved.    ECOG Performance  0    Diagnosis:    1.  Adenocarcinoma of the descending colon: Diagnosed January 2023. Surgical pathology revealed an invasive moderately differentiated adenocarcinoma. 47 x 43.14 mm tumor invading into, but not through, the muscularis propria.  Margins negative.  48 nodes taken, all negative.   No perforation, lymphovascular invasion, or perineural invasion noted on pathology. pMMR.     Treatment:    Transverse and descending segmental colectomy January 6, 2023.   No adjuvant therapy was given.     Interim History:    Karoline returns for follow-up visit.  Generally doing okay.  No acute complaints today.  Energy and appetite are okay.  No abdominal symptoms.    Review of Systems:    As above in the history.     Review of Systems otherwise Negative for:  General: chills,  fever or night sweats  Psychological: depression  Ophthalmic: blurry vision, double vision or loss of vision, vision change  ENT: epistaxis, oral lesions, hearing changes  Hematological and Lymphatic: bleeding, bruising, jaundice, swollen lymph nodes  Endocrine: hot flashes, unexpected weight changes  Respiratory: cough, hemoptysis, orthopnea or shortness of breath/ROPER  Cardiovascular: chest pain, edema, palpitations or PND  Gastrointestinal: abdominal pain, blood in stools, change in bowel habits, constipation, diarrhea or nausea/vomiting  Genito-Urinary: change in urinary stream, incontinence, frequency/urgency  Musculoskeletal: joint pain, stiffness, swelling, muscle pain  Neurological: dizziness, headaches, numbness/tingling  Dermatological: lumps and rash    Past History:    Past Medical History:   Diagnosis Date    Abdominal pain 06/29/2015    Abnormal cervical Papanicolaou smear 11/09/2014    Overview:  ACUS/HPV positive    Abnormal cytology finding 11/09/2014    Overview:  ACUS/HPV positive    Acute pericardial effusion 02/06/2017    Agoraphobia with panic attacks     Anxiety     Arthralgia of both lower legs 05/29/2020    Bilateral achy knee pain that is chronic in nature going on for years. Most likely osteoarthritis in knees related to obesity. Previously injected in both knees with good relief. 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  Has controlled substance agreement with them.  On Vicodin, Valium, Klonopin  prescribed only from there.       Coronary artery disease     Cough 02/09/2020    Family history of colon cancer 10/24/2020    Hx of seasonal allergies     Infection due to 2019 novel coronavirus 09/20/2022    Infectious pericarditis     Lipoma     Low back pain 07/13/2015    Major depressive disorder, recurrent episode, moderate (H) 09/05/2006    Menorrhagia with irregular cycle 07/15/2022    Added automatically from request for surgery [id number]    Moderate persistent asthma without complication 09/29/2020    Nondependent alcohol abuse, episodic drinking behavior 10/03/2012    Noninflammatory disorder of vagina 02/20/2015    Other chronic pain     Other long term (current) drug therapy 01/28/2013    Overview:  Vicodin and cyclobenzaprine monthly    Panic disorder with agoraphobia 09/05/2006    Pap smear for cervical cancer screening 10/31/2016    03/29/2010  Normal cytology, HPV ot done, repeat in 3 years.    Pericarditis 2017    Physiologic disturbance of temperature regulation 10/24/2020    PONV (postoperative nausea and vomiting)     Right ankle swelling 06/07/2022    Added automatically from request for surgery [id number]    Tobacco abuse     Tobacco use disorder 07/13/2015     Physical Exam:    There were no vitals taken for this visit.    General: patient appears stated age of 48 year old. Nontoxic and in no distress.   HEENT: Head: atraumatic, normocephalic. Sclerae anicteric.  Chest:  Normal respiratory effort  Cardiac:  No edema.   Abdomen: abdomen is non-distended  Extremities: normal tone and muscle bulk.  Skin: no lesions or rash on visible skin. Warm and dry.   CNS: alert and oriented. Grossly non-focal.   Psychiatric: normal mood and affect.     Lab Results:    Recent Results (from the past 168 hour(s))   Symptomatic Influenza A/B, RSV, & SARS-CoV2 PCR (COVID-19) Nasopharyngeal    Specimen: Nasopharyngeal; Swab   Result Value Ref Range    Influenza A PCR Negative Negative    Influenza B PCR Negative  Negative    RSV PCR Negative Negative    SARS CoV2 PCR Negative Negative     Imaging:    CT Chest w Contrast    Result Date: 3/5/2024  EXAM: CT CHEST W CONTRAST LOCATION: [facility] DATE: 3/4/2024 INDICATION: follow up right lung nodules. COMPARISON: 02/04/2023 TECHNIQUE: CT chest with IV contrast. Multiplanar reformats were obtained. Dose reduction techniques were used. CONTRAST: 90ml Isovue 370 FINDINGS: LUNGS AND PLEURA: Mild emphysematous changes stable. Previously noted nodules in the superior segment of the right lower lobe are no longer seen. Otherwise, Lungs are clear. No pleural effusion. MEDIASTINUM/AXILLAE: No lymphadenopathy. No thoracic aneurysm. No pericardial effusion. Thyroid gland appears within normal limits. Small hiatal hernia. Otherwise the esophagus is unremarkable. CORONARY ARTERY CALCIFICATION: None. UPPER ABDOMEN: No concerning findings in the upper abdomen. MUSCULOSKELETAL: No concerning osseous abnormalities     IMPRESSION: 1.  Previously noted nodules in the superior segment of the right lower lobe are no longer seen. 2.  Mild emphysematous changes of the lungs stable. 3.  No lymphadenopathy. 4.  Small hiatal hernia.     XR Foot Right G/E 3 Views    Result Date: 2/7/2024  EXAM: XR FOOT RIGHT G/E 3 VIEWS LOCATION: [facility] DATE: 2/7/2024 INDICATION:  Contusion of right foot, initial encounter COMPARISON: None.     IMPRESSION: Degenerative change at the first MTP joint. No evidence for fracture. Right foot otherwise negative.       Signed by: Arnaud Valdez MD

## 2024-03-07 DIAGNOSIS — C18.6 ADENOCARCINOMA OF DESCENDING COLON (H): Primary | ICD-10-CM

## 2024-03-07 NOTE — PROGRESS NOTES
Preoperative Evaluation  [facility] [address]  [place] [address]  Phone: [phone]  Fax: [phone]  Primary Provider: Robbie Bailon  Pre-op Performing Provider: RADHA ALVAREZ  Mar 8, 2024   {Provider  Link to PREOP SmartSet  Use this to apply standard patient instructions to AVS; includes medication directions, common orders, guidelines for anemia, warfarin, additional testing   :862501}    Karoline is a 48 year old, presenting for the following:  No chief complaint on file.    {(!) Visit Details have not yet been documented.  Please enter Visit Details and then use this list to pull in documentation. (Optional):771161}  Surgical Information  Surgery/Procedure: Coloscopyy  Surgery Location: [place]  Surgeon:   Surgery Date: 3/12/24  Time of Surgery:   Where patient plans to recover: {Preop post recovery plans :462795}  Fax number for surgical facility: {:108601}    {2021 Provider Charting Preference for Preop :484503}    Subjective       HPI related to upcoming procedure: ***    {Click here to pull in Questionnaire Data after Qnr completed :308103}  Health Care Directive  Patient does not have a Health Care Directive or Living Will: {ADVANCE_DIRECTIVE_STATUS:760456}    Preoperative Review of   {Mnpmpreport:779895}  {Review MNPMP for all patients per ICSI MNPMP Profile:444854}    {Chronic problem details (Optional) :211072}    Patient Active Problem List    Diagnosis Date Noted    History of colon cancer 02/19/2024     Priority: Medium    History of ankle surgery 09/25/2023     Priority: Medium    Adenocarcinoma of descending colon (H) 02/07/2023     Priority: Medium    Infection due to 2019 novel coronavirus 09/20/2022     Priority: Medium    Menorrhagia with irregular cycle 07/15/2022     Priority: Medium     Added automatically from request for surgery [id number]      Right ankle swelling 06/07/2022     Priority: Medium     Added automatically from request  for surgery [id number]      Physiologic disturbance of temperature regulation 10/24/2020     Priority: Medium    Family history of colon cancer 10/24/2020     Priority: Medium    Moderate persistent asthma without complication 09/29/2020     Priority: Medium    Arthralgia of both lower legs 05/29/2020     Priority: Medium     Bilateral achy knee pain that is chronic in nature going on for years. Most likely osteoarthritis in knees related to obesity. Previously injected in both knees with good relief. 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  Has controlled substance agreement with them.  On Vicodin, Valium, Klonopin prescribed only from there.        Anxiety 02/20/2015     Priority: Medium    Abnormal cervical Papanicolaou smear 11/09/2014     Priority: Medium     Overview:   ACUS/HPV positive      Chronic sinusitis 10/26/2014     Priority: Medium    Other long term (current) drug therapy 01/28/2013     Priority: Medium     Overview:   Vicodin and cyclobenzaprine monthly      Nondependent alcohol abuse, episodic drinking behavior 10/03/2012     Priority: Medium    Major depressive disorder, recurrent episode, moderate (H) 09/05/2006     Priority: Medium    Panic disorder with agoraphobia 09/05/2006     Priority: Medium      Past Medical History:   Diagnosis Date    Abdominal pain 06/29/2015    Abnormal cervical Papanicolaou smear 11/09/2014    Overview:  ACUS/HPV positive    Abnormal cytology finding 11/09/2014    Overview:  ACUS/HPV positive     Acute pericardial effusion 02/06/2017    Agoraphobia with panic attacks     Anxiety     Arthralgia of both lower legs 05/29/2020    Bilateral achy knee pain that is chronic in nature going on for years. Most likely osteoarthritis in knees related to obesity. Previously injected in both knees with good relief. 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  Has controlled substance agreement with them.  On Vicodin, Valium, Klonopin prescribed only from there.       Coronary artery disease     Cough 02/09/2020    Family history of colon cancer 10/24/2020    Hx of seasonal allergies     Infection due to 2019 novel coronavirus 09/20/2022    Infectious pericarditis     Lipoma     Low back pain 07/13/2015    Major depressive disorder, recurrent episode, moderate (H) 09/05/2006    Menorrhagia with irregular cycle 07/15/2022    Added automatically from request for surgery [id number]    Moderate persistent asthma without complication 09/29/2020    Nondependent alcohol abuse, episodic drinking behavior 10/03/2012    Noninflammatory disorder of vagina 02/20/2015    Other chronic pain     Other long term (current) drug therapy 01/28/2013    Overview:  Vicodin and cyclobenzaprine monthly    Panic disorder with agoraphobia 09/05/2006    Pap smear for cervical cancer screening 10/31/2016    03/29/2010  Normal cytology, HPV ot done, repeat in 3 years.    Pericarditis 2017    Physiologic disturbance of temperature regulation 10/24/2020    PONV  "(postoperative nausea and vomiting)     Right ankle swelling 06/07/2022    Added automatically from request for surgery [id number]    Tobacco abuse     Tobacco use disorder 07/13/2015     Past Surgical History:   Procedure Laterality Date    ANKLE SURGERY Right 05/30/2019    ARTHROSCOPY ANKLE Right 6/21/2023    Procedure: right ankle arthroscopy with debridement;  Surgeon: Kareem Bashir MD;  Location: [facility] OR    BIOPSY BREAST Right 1990    benign    COLONOSCOPY N/A 1/3/2023    Procedure: COLONOSCOPY WITH BIOPSY OF COLON MASS, POLYPECTOMY;  Surgeon: Kris Charles MD;  Location: [facility] OR    CREATION PERICARDIAL WINDOW  02/10/2017    [facility]    DILATION AND CURETTAGE N/A 7/22/2022    Procedure: DILATION AND CURETTAGE, UTERUS;  Surgeon: Lesly Holland;  Location: [facility]    HEMORRHOIDECTOMY EXTERNAL      HYSTEROSCOPY, WITH ENDOMETRIAL RADIOFREQUENCY ABLATION - NOVASURE N/A 7/22/2022    Procedure: HYSTEROSCOPY, WITH ENDOMETRIAL RADIOFREQUENCY ABLATION - NOVASURE DILATION AND CURETTAGE, UTERUS;  Surgeon: Lesly Holland;  Location: [facility]    LAPAROSCOPIC ASSISTED SIGMOID COLECTOMY N/A 1/6/2023    Procedure: LAPAROSCOPIC ASSISTED DESCENDING COLELCTOMY SPLENIC FLEXURE MOBILIZATION ;  Surgeon: Kris Charles MD;  Location: [facility] OR    LAPAROSCOPIC LYSIS ADHESIONS N/A 1/6/2023    Procedure: LYSIS OF ADHESIONS;  Surgeon: Kris Charles MD;  Location: [facility] OR    TUMOR REMOVAL      Has had 3. In the right breast and \"inside of rib cage.\"     Current Outpatient Medications   Medication Sig Dispense Refill    albuterol (PROAIR HFA/PROVENTIL HFA/VENTOLIN HFA) 108 (90 Base) MCG/ACT inhaler Inhale 2 puffs into the lungs every 6 hours as needed for shortness of breath, wheezing or cough 18 g 11    ALPRAZolam (XANAX) 2 MG tablet Take 1 tablet (2 mg) by mouth 2 times daily as needed for anxiety 60 tablet 3    cetirizine (ZYRTEC) 10 MG tablet Take " 1 tablet (10 mg) by mouth daily 30 tablet 11    doxylamine (UNISOM) 25 MG TABS tablet Take 1 tablet (25 mg) by mouth nightly as needed for other (nausea) 7 tablet 1    DULoxetine (CYMBALTA) 20 MG capsule Take 1 capsule (20 mg) by mouth 2 times daily 60 capsule 1    fluticasone (FLONASE) 50 MCG/ACT nasal spray Spray 1 spray into both nostrils daily (Patient not taking: Reported on 3/6/2024)      fluticasone (FLONASE) 50 MCG/ACT nasal spray Spray 2 sprays into both nostrils daily 9.9 mL 11    hydrOXYzine (ATARAX) 25 MG tablet Take 1 tablet (25 mg) by mouth nightly as needed for anxiety 30 tablet 1    meloxicam (MOBIC) 15 MG tablet Take 1 tablet (15 mg) by mouth daily 30 tablet 1    montelukast (SINGULAIR) 10 MG tablet Take 1 tablet (10 mg) by mouth At Bedtime 30 tablet 11    naloxone (NARCAN) 4 MG/0.1ML nasal spray Spray 1 spray (4 mg) into one nostril alternating nostrils as needed for opioid reversal every 2-3 minutes until assistance arrives (Patient not taking: Reported on 3/6/2024) 0.2 mL 1    ondansetron (ZOFRAN) 8 MG tablet Take 0.5-1 tablets (4-8 mg) by mouth every 8 hours as needed for nausea 30 tablet 1    oxyCODONE (ROXICODONE) 5 MG tablet Take 1 tablet (5 mg) by mouth 3 times daily as needed for severe pain 90 tablet 0    PROAIR  (90 Base) MCG/ACT inhaler Inhale 2 puffs into the lungs every 4 hours as needed for shortness of breath or wheezing 8 g 11    spacer (OPTICHAMBER BINA) holding chamber For use w/ rescue inhaler 1 each 0    sucralfate (CARAFATE) 1 GM/10ML suspension Take 10 mLs (1 g) by mouth 4 times daily as needed for nausea 414 mL 0    tiotropium (SPIRIVA RESPIMAT) 1.25 MCG/ACT inhaler Inhale 2 puffs into the lungs daily 12 g 3    tiotropium (SPIRIVA) 18 MCG inhaled capsule Inhale 18 mcg into the lungs daily (Patient not taking: Reported on 3/6/2024)      trimethoprim-polymyxin b (POLYTRIM) 70274-9.1 UNIT/ML-% ophthalmic solution Place 1-2 drops into the right eye every 4 hours 10 mL 0  "      Allergies   Allergen Reactions    Gabapentin Other (See Comments)     Mental status is changed     Lidocaine Other (See Comments)     \"my jaw stopped moving\"  Other reaction(s): Dystonia    Penicillins Hives, Rash and Shortness Of Breath    Lidocaine-Epinephrine Other (See Comments) and Muscle Pain (Myalgia)     Severe jaw cramping, double vision  Jaw locking        Social History     Tobacco Use    Smoking status: Former     Packs/day: 0.10     Years: 30.00     Additional pack years: 0.00     Total pack years: 3.00     Types: Cigarettes     Quit date: 2020     Years since quitting: 3.7     Passive exposure: Past    Smokeless tobacco: Never    Tobacco comments:     2-3 cig/day, Seen IP by TTS on 23 - states she is done but declined our services and resource materials stating that she did not need them. Stopped smoking in 2023   Substance Use Topics    Alcohol use: Not Currently     Comment: Occasiaonlly.      {FAMILY HISTORY (Optional):329738609}  History   Drug Use Unknown         Review of Systems  {ROS Picklists (Optional):463609}    Objective    LMP  (LMP Unknown)    Estimated body mass index is 37.66 kg/m  as calculated from the following:    Height as of 3/6/24: 1.753 m (5' 9\").    Weight as of 3/6/24: 115.7 kg (255 lb).  Physical Exam  {Exam List (Optional):829581}    Recent Labs   Lab Test 24  1440 24  1054 23  1608   HGB 12.6 12.6 13.0     --  275     --  139   POTASSIUM 3.7  --  3.8   CR 0.92  --  1.00*        Diagnostics  {LABS:102358}   {EK}    Revised Cardiac Risk Index (RCRI)  The patient has the following serious cardiovascular risks for perioperative complications:  {PREOP REVISED CARDIAC RISK INDEX (RCRI) :737110}     RCRI Interpretation: {REVISED CARDIAC RISK INTERPRETATION :822993}         Signed Electronically by: Dagoberto Cochran MD  Copy of this evaluation report is provided to requesting physician.    {Provider Resources  Preop " [facility] Preop Guidelines  Revised Cardiac Risk Index :145238}   {Email feedback regarding this note to [email or web address]   :365157}

## 2024-03-08 ENCOUNTER — PREP FOR PROCEDURE (OUTPATIENT)
Dept: SURGERY | Facility: CLINIC | Age: 49
End: 2024-03-08

## 2024-03-08 ENCOUNTER — OFFICE VISIT (OUTPATIENT)
Dept: FAMILY MEDICINE | Facility: CLINIC | Age: 49
End: 2024-03-08
Payer: COMMERCIAL

## 2024-03-08 VITALS
SYSTOLIC BLOOD PRESSURE: 122 MMHG | WEIGHT: 260 LBS | HEART RATE: 95 BPM | OXYGEN SATURATION: 99 % | BODY MASS INDEX: 38.4 KG/M2 | RESPIRATION RATE: 16 BRPM | DIASTOLIC BLOOD PRESSURE: 88 MMHG

## 2024-03-08 DIAGNOSIS — F33.1 MAJOR DEPRESSIVE DISORDER, RECURRENT EPISODE, MODERATE (H): ICD-10-CM

## 2024-03-08 DIAGNOSIS — Z01.818 PREOP GENERAL PHYSICAL EXAM: Primary | ICD-10-CM

## 2024-03-08 DIAGNOSIS — F41.1 GENERALIZED ANXIETY DISORDER: ICD-10-CM

## 2024-03-08 DIAGNOSIS — C18.6 ADENOCARCINOMA OF DESCENDING COLON (H): ICD-10-CM

## 2024-03-08 DIAGNOSIS — M79.89 SOFT TISSUE MASS: ICD-10-CM

## 2024-03-08 DIAGNOSIS — J45.40 MODERATE PERSISTENT ASTHMA WITHOUT COMPLICATION: ICD-10-CM

## 2024-03-08 PROCEDURE — 99214 OFFICE O/P EST MOD 30 MIN: CPT | Mod: GC

## 2024-03-08 NOTE — H&P (VIEW-ONLY)
Preoperative Evaluation  [facility] [address] [place] [address]  Phone: [phone]  Fax: [phone]  Primary Provider: Robbie Bailon  Pre-op Performing Provider: RADHA ALVAREZ 8, 2024       Karoline is a 48 year old, presenting for the following:  Pre-Op Exam (Preop: colonoscopy/ removing a fatty tumor, on 3/12 Dr. Charles)      Surgical Information  Surgery/Procedure: Colonoscopy, possible lipoma excision  Surgery Location: [facility]   Surgeon: Dr Charles   Surgery Date: 3/12/2024  Time of Surgery: 9:30AM  Where patient plans to recover: At home with family  Fax number for surgical facility: Note does not need to be faxed, will be available electronically in Epic.    Assessment & Plan     The proposed surgical procedure is considered LOW risk.    Preop general physical exam  Upcoming surveillance colonoscopy with history of adenocarcinoma as below.  Possible lipoma excision per patient.  Chronic medical conditions are stable at this time.  Medically optimized for upcoming procedure.    Adenocarcinoma of descending colon (H)  Diagnosed in January 2023, status postresection.  Recent visits with oncology/imaging without evidence of recurrence.  -Surveillance colonoscopy  -Follow-up with oncology    Soft tissue mass  Soft subcutaneous mobile mass on the left lower back.  Has been present since 2017.  Likely lipoma, patient reports Dr. Charles would be willing to resect this during her upcoming colonoscopy    Moderate persistent asthma without complication  Symptoms well-controlled.  Stable on current regimen.  CT has demonstrated stable mild emphysematous changes.  -Continue inhalers    Generalized anxiety disorder  Fairly well-controlled.  On Xanax, hydroxyzine.    Major depressive disorder, recurrent episode, moderate (H)  Mood overall stable.  Continue PTA regimen continue current regimen.        Possible Sleep Apnea: Does snore loudly.   Will need to consider additional workup for obstructive sleep apnea.        - No identified additional risk factors other than previously addressed    Antiplatelet or Anticoagulation Medication Instructions   - Patient is on no antiplatelet or anticoagulation medications.    Additional Medication Instructions   - ibuprofen (Advil, Motrin): HOLD 1 day before surgery.    - meloxicam (Mobic): HOLD 10 days before surgery.    - Benzodiazepines: Continue without modification.   - SSRIs, SNRIs, TCAs, Antipsychotics: Continue without modification.   - Hold oxycodone day of surgery     Recommendation  APPROVAL GIVEN to proceed with proposed procedure, without further diagnostic evaluation.      Subjective       HPI related to upcoming procedure:   Surveillance colonoscopy.  Colonoscopy in January 2023 found to have a colonic mass in the descending colon.  Diagnosed adenocarcinoma.  Underwent resection on January 6, 2023.    Repeat CT scan in December of 2023 without evidence of recurrence.    Has lipoma since 2017 on left posterior back. Patient requesting surgeon remove it during colonoscopy.         3/8/2024     8:06 AM   Preop Questions   1. Have you ever had a heart attack or stroke? No   2. Have you ever had surgery on your heart or blood vessels, such as a stent placement, a coronary artery bypass, or surgery on an artery in your head, neck, heart, or legs? YES - pericardial window for pericarditis 2/2017   3. Do you have chest pain with activity? No   4. Do you have a history of  heart failure? No   5. Do you currently have a cold, bronchitis or symptoms of other infection? No   6. Do you have a cough, shortness of breath, or wheezing? No   7. Do you or anyone in your family have previous history of blood clots? No   8. Do you or does anyone in your family have a serious bleeding problem such as prolonged bleeding following surgeries or cuts? No   9. Have you ever had problems with anemia or been told to take iron  pills? No   10. Have you had any abnormal blood loss such as black, tarry or bloody stools, or abnormal vaginal bleeding? No   11. Have you ever had a blood transfusion? No   12. Are you willing to have a blood transfusion if it is medically needed before, during, or after your surgery? Yes   13. Have you or any of your relatives ever had problems with anesthesia? No   14. Do you have sleep apnea, excessive snoring or daytime drowsiness? YES - Snores loudly. Has never been diagnosed with NIDIA. Not using CPAP   14a. Do you have a CPAP machine? No   15. Do you have any artifical heart valves or other implanted medical devices like a pacemaker, defibrillator, or continuous glucose monitor? No   16. Do you have artificial joints? No   17. Are you allergic to latex? No   18. Is there any chance that you may be pregnant? No       Health Care Directive  Patient does not have a Health Care Directive or Living Will: Patient states has Advance Directive and will bring in a copy to clinic.    Preoperative Review of    reviewed - controlled substances reflected in medication list.      Status of Chronic Conditions:  ASTHMA - Patient has a longstanding history of moderate-severe Asthma . Patient has been doing well overall noting NO SYMPTOMS Currently. Feeling well. and continues on medication regimen consisting of Albuterol, Spiriva, Singulair.  without adverse reactions or side effects.     Anxiety - well controlled on current regimen including xanax, hydroxizine    SLEEP PROBLEM - Patient has a longstanding history of snoring.. Patient has tried OTC medications with limited success.     Patient Active Problem List    Diagnosis Date Noted    History of colon cancer 02/19/2024     Priority: Medium    History of ankle surgery 09/25/2023     Priority: Medium    Adenocarcinoma of descending colon (H) 02/07/2023     Priority: Medium    Infection due to 2019 novel coronavirus 09/20/2022     Priority: Medium    Menorrhagia with  irregular cycle 07/15/2022     Priority: Medium     Added automatically from request for surgery [id number]      Right ankle swelling 06/07/2022     Priority: Medium     Added automatically from request for surgery [id number]      Physiologic disturbance of temperature regulation 10/24/2020     Priority: Medium    Family history of colon cancer 10/24/2020     Priority: Medium    Moderate persistent asthma without complication 09/29/2020     Priority: Medium    Arthralgia of both lower legs 05/29/2020     Priority: Medium     Bilateral achy knee pain that is chronic in nature going on for years. Most likely osteoarthritis in knees related to obesity. Previously injected in both knees with good relief. 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  Has controlled substance agreement with them.  On Vicodin, Valium, Klonopin prescribed only from there.        Anxiety 02/20/2015     Priority: Medium    Abnormal cervical Papanicolaou smear 11/09/2014     Priority: Medium     Overview:   ACUS/HPV positive      Chronic sinusitis 10/26/2014     Priority: Medium    Other long term (current) drug therapy 01/28/2013     Priority: Medium     Overview:   Vicodin and cyclobenzaprine monthly      Nondependent alcohol abuse, episodic drinking behavior 10/03/2012     Priority: Medium    Major depressive disorder, recurrent episode, moderate (H) 09/05/2006     Priority: Medium    Panic disorder with agoraphobia 09/05/2006     Priority: Medium      Past Medical History:    Diagnosis Date    Abdominal pain 06/29/2015    Abnormal cervical Papanicolaou smear 11/09/2014    Overview:  ACUS/HPV positive    Abnormal cytology finding 11/09/2014    Overview:  ACUS/HPV positive    Acute pericardial effusion 02/06/2017    Agoraphobia with panic attacks     Anxiety     Arthralgia of both lower legs 05/29/2020    Bilateral achy knee pain that is chronic in nature going on for years. Most likely osteoarthritis in knees related to obesity. Previously injected in both knees with good relief. 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  Has controlled substance agreement with them.  On Vicodin, Valium, Klonopin prescribed only from there.       Coronary artery disease     Cough 02/09/2020    Family history of colon cancer 10/24/2020    Hx of seasonal allergies     Infection due to 2019 novel coronavirus 09/20/2022    Infectious pericarditis     Lipoma     Low back pain 07/13/2015    Major depressive disorder, recurrent episode, moderate (H) 09/05/2006    Menorrhagia with irregular cycle 07/15/2022    Added automatically from request for surgery [id number]    Moderate persistent asthma without complication 09/29/2020    Nondependent alcohol abuse, episodic drinking behavior 10/03/2012    Noninflammatory disorder of vagina 02/20/2015    Other chronic pain     Other long term (current) drug therapy 01/28/2013    Overview:  Vicodin and cyclobenzaprine monthly    Panic disorder with agoraphobia 09/05/2006    Pap  "smear for cervical cancer screening 10/31/2016    03/29/2010  Normal cytology, HPV ot done, repeat in 3 years.    Pericarditis 2017    Physiologic disturbance of temperature regulation 10/24/2020    PONV (postoperative nausea and vomiting)     Right ankle swelling 06/07/2022    Added automatically from request for surgery [id number]    Tobacco abuse     Tobacco use disorder 07/13/2015     Past Surgical History:   Procedure Laterality Date    ANKLE SURGERY Right 05/30/2019    ARTHROSCOPY ANKLE Right 6/21/2023    Procedure: right ankle arthroscopy with debridement;  Surgeon: Kareem Bashir MD;  Location: [facility] OR    BIOPSY BREAST Right 1990    benign    COLONOSCOPY N/A 1/3/2023    Procedure: COLONOSCOPY WITH BIOPSY OF COLON MASS, POLYPECTOMY;  Surgeon: Kris Charles MD;  Location: [place] [facility]    CREATION PERICARDIAL WINDOW  02/10/2017    [facility]    DILATION AND CURETTAGE N/A 7/22/2022    Procedure: DILATION AND CURETTAGE, UTERUS;  Surgeon: Lesly Holland;  Location: [place] [facility]    HEMORRHOIDECTOMY EXTERNAL      HYSTEROSCOPY, WITH ENDOMETRIAL RADIOFREQUENCY ABLATION - NOVASURE N/A 7/22/2022    Procedure: HYSTEROSCOPY, WITH ENDOMETRIAL RADIOFREQUENCY ABLATION - NOVASURE DILATION AND CURETTAGE, UTERUS;  Surgeon: Lesly Holland;  Location: [place] [facility]    LAPAROSCOPIC ASSISTED SIGMOID COLECTOMY N/A 1/6/2023    Procedure: LAPAROSCOPIC ASSISTED DESCENDING COLELCTOMY SPLENIC FLEXURE MOBILIZATION ;  Surgeon: Kris Charles MD;  Location: [facility] OR    LAPAROSCOPIC LYSIS ADHESIONS N/A 1/6/2023    Procedure: LYSIS OF ADHESIONS;  Surgeon: Kris Charles MD;  Location: [facility] OR    TUMOR REMOVAL      Has had 3. In the right breast and \"inside of rib cage.\"     Current Outpatient Medications   Medication Sig Dispense Refill    albuterol (PROAIR HFA/PROVENTIL HFA/VENTOLIN HFA) 108 (90 Base) MCG/ACT inhaler Inhale 2 puffs into the lungs every 6 hours as needed " for shortness of breath, wheezing or cough 18 g 11    ALPRAZolam (XANAX) 2 MG tablet Take 1 tablet (2 mg) by mouth 2 times daily as needed for anxiety 60 tablet 3    cetirizine (ZYRTEC) 10 MG tablet Take 1 tablet (10 mg) by mouth daily 30 tablet 11    doxylamine (UNISOM) 25 MG TABS tablet Take 1 tablet (25 mg) by mouth nightly as needed for other (nausea) 7 tablet 1    DULoxetine (CYMBALTA) 20 MG capsule Take 1 capsule (20 mg) by mouth 2 times daily 60 capsule 1    fluticasone (FLONASE) 50 MCG/ACT nasal spray Spray 1 spray into both nostrils daily (Patient not taking: Reported on 3/6/2024)      fluticasone (FLONASE) 50 MCG/ACT nasal spray Spray 2 sprays into both nostrils daily 9.9 mL 11    hydrOXYzine (ATARAX) 25 MG tablet Take 1 tablet (25 mg) by mouth nightly as needed for anxiety 30 tablet 1    meloxicam (MOBIC) 15 MG tablet Take 1 tablet (15 mg) by mouth daily 30 tablet 1    montelukast (SINGULAIR) 10 MG tablet Take 1 tablet (10 mg) by mouth At Bedtime 30 tablet 11    naloxone (NARCAN) 4 MG/0.1ML nasal spray Spray 1 spray (4 mg) into one nostril alternating nostrils as needed for opioid reversal every 2-3 minutes until assistance arrives (Patient not taking: Reported on 3/6/2024) 0.2 mL 1    oxyCODONE (ROXICODONE) 5 MG tablet Take 1 tablet (5 mg) by mouth 3 times daily as needed for severe pain 90 tablet 0    PROAIR  (90 Base) MCG/ACT inhaler Inhale 2 puffs into the lungs every 4 hours as needed for shortness of breath or wheezing 8 g 11    spacer (OPTICHAMBER BINA) holding chamber For use w/ rescue inhaler 1 each 0    sucralfate (CARAFATE) 1 GM/10ML suspension Take 10 mLs (1 g) by mouth 4 times daily as needed for nausea 414 mL 0    tiotropium (SPIRIVA RESPIMAT) 1.25 MCG/ACT inhaler Inhale 2 puffs into the lungs daily 12 g 3    tiotropium (SPIRIVA) 18 MCG inhaled capsule Inhale 18 mcg into the lungs daily (Patient not taking: Reported on 3/6/2024)         Allergies   Allergen Reactions    Gabapentin  "Other (See Comments)     Mental status is changed     Lidocaine Other (See Comments)     \"my jaw stopped moving\"  Other reaction(s): Dystonia    Penicillins Hives, Rash and Shortness Of Breath    Lidocaine-Epinephrine Other (See Comments) and Muscle Pain (Myalgia)     Severe jaw cramping, double vision  Jaw locking        Social History     Tobacco Use    Smoking status: Former     Packs/day: 0.10     Years: 30.00     Additional pack years: 0.00     Total pack years: 3.00     Types: Cigarettes     Quit date: 5/28/2020     Years since quitting: 3.7     Passive exposure: Past    Smokeless tobacco: Never    Tobacco comments:     2-3 cig/day, Seen IP by TTS on 1/11/23 - states she is done but declined our services and resource materials stating that she did not need them. Stopped smoking in Jan 2023   Substance Use Topics    Alcohol use: Not Currently     Comment: Occasiaonlly.        History   Drug Use Unknown         Review of Systems    Review of Systems  CONSTITUTIONAL: NEGATIVE for fever, chills, change in weight  INTEGUMENTARY/SKIN: NEGATIVE for worrisome rashes, moles or lesions  EYES: NEGATIVE for vision changes or irritation  ENT/MOUTH: NEGATIVE for ear, mouth and throat problems. Nasal congestion, chronic.  RESP: NEGATIVE for significant cough or SOB  BREAST: NEGATIVE for masses, tenderness or discharge  CV: NEGATIVE for chest pain, palpitations or peripheral edema  GI: NEGATIVE for nausea, abdominal pain, heartburn, or change in bowel habits  : NEGATIVE for frequency, dysuria, or hematuria  MUSCULOSKELETAL: NEGATIVE for significant arthralgias or myalgia  NEURO: NEGATIVE for weakness, dizziness or paresthesias  ENDOCRINE: NEGATIVE for temperature intolerance, skin/hair changes  HEME: NEGATIVE for bleeding problems  PSYCHIATRIC: NEGATIVE for changes in mood or affect    Objective    /88   Pulse 95   Resp 16   Wt 117.9 kg (260 lb)   LMP  (LMP Unknown)   SpO2 99%   BMI 38.40 kg/m     Estimated body " "mass index is 38.4 kg/m  as calculated from the following:    Height as of 3/6/24: 1.753 m (5' 9\").    Weight as of this encounter: 117.9 kg (260 lb).  Physical Exam  GENERAL: alert and no distress  EYES: Eyes grossly normal to inspection, PERRL and conjunctivae and sclerae normal  HENT: ear canals and TM's normal, nose and mouth without ulcers or lesions  NECK: no adenopathy, no asymmetry, masses, or scars  RESP: lungs clear to auscultation - no rales, rhonchi or wheezes  CV: regular rate and rhythm, normal S1 S2, no S3 or S4, no murmur, click or rub, no peripheral edema  ABDOMEN: soft, nontender, no hepatosplenomegaly, no masses and bowel sounds normal  MS: no gross musculoskeletal defects noted, no edema  SKIN: no suspicious lesions or rashes. Small soft subqutaneous mobile smooth mass on the left lower back  NEURO: Normal strength and tone, mentation intact and speech normal  PSYCH: mentation appears normal, affect normal/bright    Recent Labs   Lab Test 03/06/24  1440 02/09/24  1054 12/06/23  1608   HGB 12.6 12.6 13.0     --  275     --  139   POTASSIUM 3.7  --  3.8   CR 0.92  --  1.00*        Diagnostics  No labs were ordered during this visit., recent labs reviewed   No EKG required for low risk surgery (cataract, skin procedure, breast biopsy, etc).    Revised Cardiac Risk Index (RCRI)  The patient has the following serious cardiovascular risks for perioperative complications:   - No serious cardiac risks = 0 points     RCRI Interpretation: 0 points: Class I (very low risk - 0.4% complication rate)         Signed Electronically by: Dagoberto Cochran MD  Copy of this evaluation report is provided to requesting physician.      "

## 2024-03-08 NOTE — LETTER
[facility] [address] [place] [address]  Phone: [phone]  Fax: [phone]    March 8, 2024        Darlene Hudson  [address] [facility] [address]          To whom it may concern:    RE: Darlene Hudson    Patient was seen and treated today at our clinic.  Due to upcoming procedure, Karoline will need to be off work on Monday, 3/11/2024, and Tuesday, 3/12/2024.    Please contact me for questions or concerns.      Sincerely,      Dagoberto Cochran

## 2024-03-08 NOTE — PATIENT INSTRUCTIONS
Preparing for Your Surgery  Getting started  A nurse will call you to review your health history and instructions. They will give you an arrival time based on your scheduled surgery time. Please be ready to share:  Your doctor's clinic name and phone number  Your medical, surgical, and anesthesia history  A list of allergies and sensitivities  A list of medicines, including herbal treatments and over-the-counter drugs  Whether the patient has a legal guardian (ask how to send us the papers in advance)  Please tell us if you're pregnant--or if there's any chance you might be pregnant. Some surgeries may injure a fetus (unborn baby), so they require a pregnancy test. Surgeries that are safe for a fetus don't always need a test, and you can choose whether to have one.   If you have a child who's having surgery, please ask for a copy of Preparing for Your Child's Surgery.    Preparing for surgery  Within 10 to 30 days of surgery: Have a pre-op exam (sometimes called an H&P, or History and Physical). This can be done at a clinic or pre-operative center.  If you're having a , you may not need this exam. Talk to your care team.  At your pre-op exam, talk to your care team about all medicines you take. If you need to stop any medicines before surgery, ask when to start taking them again.  We do this for your safety. Many medicines can make you bleed too much during surgery. Some change how well surgery (anesthesia) drugs work.  Call your insurance company to let them know you're having surgery. (If you don't have insurance, call [phone].)  Call your clinic if there's any change in your health. This includes signs of a cold or flu (sore throat, runny nose, cough, rash, fever). It also includes a scrape or scratch near the surgery site.  If you have questions on the day of surgery, call your hospital or surgery center.  Eating and drinking guidelines  For your safety: Unless your surgeon tells you otherwise,  follow the guidelines below.  Eat and drink as usual until 8 hours before you arrive for surgery. After that, no food or milk.  Drink clear liquids until 2 hours before you arrive. These are liquids you can see through, like water, Gatorade, and Propel Water. They also include plain black coffee and tea (no cream or milk), candy, and breath mints. You can spit out gum when you arrive.  If you drink alcohol: Stop drinking it the night before surgery.  If your care team tells you to take medicine on the morning of surgery, it's okay to take it with a sip of water.  Preventing infection  Shower or bathe the night before and morning of your surgery. Follow the instructions your clinic gave you. (If no instructions, use regular soap.)  Don't shave or clip hair near your surgery site. We'll remove the hair if needed.  Don't smoke or vape the morning of surgery. You may chew nicotine gum up to 2 hours before surgery. A nicotine patch is okay.  Note: Some surgeries require you to completely quit smoking and nicotine. Check with your surgeon.  Your care team will make every effort to keep you safe from infection. We will:  Clean our hands often with soap and water (or an alcohol-based hand rub).  Clean the skin at your surgery site with a special soap that kills germs.  Give you a special gown to keep you warm. (Cold raises the risk of infection.)  Wear special hair covers, masks, gowns and gloves during surgery.  Give antibiotic medicine, if prescribed. Not all surgeries need antibiotics.  What to bring on the day of surgery  Photo ID and insurance card  Copy of your health care directive, if you have one  Glasses and hearing aids (bring cases)  You can't wear contacts during surgery  Inhaler and eye drops, if you use them (tell us about these when you arrive)  CPAP machine or breathing device, if you use them  A few personal items, if spending the night  If you have . . .  A pacemaker, ICD (cardiac defibrillator) or other  implant: Bring the ID card.  An implanted stimulator: Bring the remote control.  A legal guardian: Bring a copy of the certified (court-stamped) guardianship papers.  Please remove any jewelry, including body piercings. Leave jewelry and other valuables at home.  If you're going home the day of surgery  You must have a responsible adult drive you home. They should stay with you overnight as well.  If you don't have someone to stay with you, and you aren't safe to go home alone, we may keep you overnight. Insurance often won't pay for this.  After surgery  If it's hard to control your pain or you need more pain medicine, please call your surgeon's office.  Questions?   If you have any questions for your care team, list them here: _________________________________________________________________________________________________________________________________________________________________________ ____________________________________ ____________________________________ ____________________________________  For informational purposes only. Not to replace the advice of your health care provider. Copyright   2003, 2019 [employer]. All rights reserved. Clinically reviewed by Kymberly Cabral MD. [employer] 545650 - REV 12/22.    How to Take Your Medication Before Surgery     - ibuprofen (Advil, Motrin): HOLD 1 day before surgery.    - meloxicam (Mobic): HOLD 10 days before surgery.    - Benzodiazepines: Continue without modification.   - SSRIs, SNRIs, TCAs, Antipsychotics: Continue without modification.   Take your other medications as prescribed the day of surgery

## 2024-03-08 NOTE — PROGRESS NOTES
Preoperative Evaluation  [facility] [address] [place] [address]  Phone: [phone]  Fax: [phone]  Primary Provider: Robbie Bailon  Pre-op Performing Provider: RADHA ALVAREZ 8, 2024       Karoline is a 48 year old, presenting for the following:  Pre-Op Exam (Preop: colonoscopy/ removing a fatty tumor, on 3/12 Dr. Charles)      Surgical Information  Surgery/Procedure: Colonoscopy, possible lipoma excision  Surgery Location: [facility]   Surgeon: Dr Charles   Surgery Date: 3/12/2024  Time of Surgery: 9:30AM  Where patient plans to recover: At home with family  Fax number for surgical facility: Note does not need to be faxed, will be available electronically in Epic.    Assessment & Plan     The proposed surgical procedure is considered LOW risk.    Preop general physical exam  Upcoming surveillance colonoscopy with history of adenocarcinoma as below.  Possible lipoma excision per patient.  Chronic medical conditions are stable at this time.  Medically optimized for upcoming procedure.    Adenocarcinoma of descending colon (H)  Diagnosed in January 2023, status postresection.  Recent visits with oncology/imaging without evidence of recurrence.  -Surveillance colonoscopy  -Follow-up with oncology    Soft tissue mass  Soft subcutaneous mobile mass on the left lower back.  Has been present since 2017.  Likely lipoma, patient reports Dr. Chalres would be willing to resect this during her upcoming colonoscopy    Moderate persistent asthma without complication  Symptoms well-controlled.  Stable on current regimen.  CT has demonstrated stable mild emphysematous changes.  -Continue inhalers    Generalized anxiety disorder  Fairly well-controlled.  On Xanax, hydroxyzine.    Major depressive disorder, recurrent episode, moderate (H)  Mood overall stable.  Continue PTA regimen continue current regimen.        Possible Sleep Apnea: Does snore loudly.   Will need to consider additional workup for obstructive sleep apnea.        - No identified additional risk factors other than previously addressed    Antiplatelet or Anticoagulation Medication Instructions   - Patient is on no antiplatelet or anticoagulation medications.    Additional Medication Instructions   - ibuprofen (Advil, Motrin): HOLD 1 day before surgery.    - meloxicam (Mobic): HOLD 10 days before surgery.    - Benzodiazepines: Continue without modification.   - SSRIs, SNRIs, TCAs, Antipsychotics: Continue without modification.   - Hold oxycodone day of surgery     Recommendation  APPROVAL GIVEN to proceed with proposed procedure, without further diagnostic evaluation.      Subjective       HPI related to upcoming procedure:   Surveillance colonoscopy.  Colonoscopy in January 2023 found to have a colonic mass in the descending colon.  Diagnosed adenocarcinoma.  Underwent resection on January 6, 2023.    Repeat CT scan in December of 2023 without evidence of recurrence.    Has lipoma since 2017 on left posterior back. Patient requesting surgeon remove it during colonoscopy.         3/8/2024     8:06 AM   Preop Questions   1. Have you ever had a heart attack or stroke? No   2. Have you ever had surgery on your heart or blood vessels, such as a stent placement, a coronary artery bypass, or surgery on an artery in your head, neck, heart, or legs? YES - pericardial window for pericarditis 2/2017   3. Do you have chest pain with activity? No   4. Do you have a history of  heart failure? No   5. Do you currently have a cold, bronchitis or symptoms of other infection? No   6. Do you have a cough, shortness of breath, or wheezing? No   7. Do you or anyone in your family have previous history of blood clots? No   8. Do you or does anyone in your family have a serious bleeding problem such as prolonged bleeding following surgeries or cuts? No   9. Have you ever had problems with anemia or been told to take iron  pills? No   10. Have you had any abnormal blood loss such as black, tarry or bloody stools, or abnormal vaginal bleeding? No   11. Have you ever had a blood transfusion? No   12. Are you willing to have a blood transfusion if it is medically needed before, during, or after your surgery? Yes   13. Have you or any of your relatives ever had problems with anesthesia? No   14. Do you have sleep apnea, excessive snoring or daytime drowsiness? YES - Snores loudly. Has never been diagnosed with NIDIA. Not using CPAP   14a. Do you have a CPAP machine? No   15. Do you have any artifical heart valves or other implanted medical devices like a pacemaker, defibrillator, or continuous glucose monitor? No   16. Do you have artificial joints? No   17. Are you allergic to latex? No   18. Is there any chance that you may be pregnant? No       Health Care Directive  Patient does not have a Health Care Directive or Living Will: Patient states has Advance Directive and will bring in a copy to clinic.    Preoperative Review of    reviewed - controlled substances reflected in medication list.      Status of Chronic Conditions:  ASTHMA - Patient has a longstanding history of moderate-severe Asthma . Patient has been doing well overall noting NO SYMPTOMS Currently. Feeling well. and continues on medication regimen consisting of Albuterol, Spiriva, Singulair.  without adverse reactions or side effects.     Anxiety - well controlled on current regimen including xanax, hydroxizine    SLEEP PROBLEM - Patient has a longstanding history of snoring.. Patient has tried OTC medications with limited success.     Patient Active Problem List    Diagnosis Date Noted    History of colon cancer 02/19/2024     Priority: Medium    History of ankle surgery 09/25/2023     Priority: Medium    Adenocarcinoma of descending colon (H) 02/07/2023     Priority: Medium    Infection due to 2019 novel coronavirus 09/20/2022     Priority: Medium    Menorrhagia with  irregular cycle 07/15/2022     Priority: Medium     Added automatically from request for surgery [id number]      Right ankle swelling 06/07/2022     Priority: Medium     Added automatically from request for surgery [id number]      Physiologic disturbance of temperature regulation 10/24/2020     Priority: Medium    Family history of colon cancer 10/24/2020     Priority: Medium    Moderate persistent asthma without complication 09/29/2020     Priority: Medium    Arthralgia of both lower legs 05/29/2020     Priority: Medium     Bilateral achy knee pain that is chronic in nature going on for years. Most likely osteoarthritis in knees related to obesity. Previously injected in both knees with good relief. 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  Has controlled substance agreement with them.  On Vicodin, Valium, Klonopin prescribed only from there.        Anxiety 02/20/2015     Priority: Medium    Abnormal cervical Papanicolaou smear 11/09/2014     Priority: Medium     Overview:   ACUS/HPV positive      Chronic sinusitis 10/26/2014     Priority: Medium    Other long term (current) drug therapy 01/28/2013     Priority: Medium     Overview:   Vicodin and cyclobenzaprine monthly      Nondependent alcohol abuse, episodic drinking behavior 10/03/2012     Priority: Medium    Major depressive disorder, recurrent episode, moderate (H) 09/05/2006     Priority: Medium    Panic disorder with agoraphobia 09/05/2006     Priority: Medium      Past Medical History:    Diagnosis Date    Abdominal pain 06/29/2015    Abnormal cervical Papanicolaou smear 11/09/2014    Overview:  ACUS/HPV positive    Abnormal cytology finding 11/09/2014    Overview:  ACUS/HPV positive    Acute pericardial effusion 02/06/2017    Agoraphobia with panic attacks     Anxiety     Arthralgia of both lower legs 05/29/2020    Bilateral achy knee pain that is chronic in nature going on for years. Most likely osteoarthritis in knees related to obesity. Previously injected in both knees with good relief. 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  Has controlled substance agreement with them.  On Vicodin, Valium, Klonopin prescribed only from there.       Coronary artery disease     Cough 02/09/2020    Family history of colon cancer 10/24/2020    Hx of seasonal allergies     Infection due to 2019 novel coronavirus 09/20/2022    Infectious pericarditis     Lipoma     Low back pain 07/13/2015    Major depressive disorder, recurrent episode, moderate (H) 09/05/2006    Menorrhagia with irregular cycle 07/15/2022    Added automatically from request for surgery [id number]    Moderate persistent asthma without complication 09/29/2020    Nondependent alcohol abuse, episodic drinking behavior 10/03/2012    Noninflammatory disorder of vagina 02/20/2015    Other chronic pain     Other long term (current) drug therapy 01/28/2013    Overview:  Vicodin and cyclobenzaprine monthly    Panic disorder with agoraphobia 09/05/2006    Pap  "smear for cervical cancer screening 10/31/2016    03/29/2010  Normal cytology, HPV ot done, repeat in 3 years.    Pericarditis 2017    Physiologic disturbance of temperature regulation 10/24/2020    PONV (postoperative nausea and vomiting)     Right ankle swelling 06/07/2022    Added automatically from request for surgery [id number]    Tobacco abuse     Tobacco use disorder 07/13/2015     Past Surgical History:   Procedure Laterality Date    ANKLE SURGERY Right 05/30/2019    ARTHROSCOPY ANKLE Right 6/21/2023    Procedure: right ankle arthroscopy with debridement;  Surgeon: Kareem Bashir MD;  Location: [facility] OR    BIOPSY BREAST Right 1990    benign    COLONOSCOPY N/A 1/3/2023    Procedure: COLONOSCOPY WITH BIOPSY OF COLON MASS, POLYPECTOMY;  Surgeon: Kris Charles MD;  Location: [facility]    CREATION PERICARDIAL WINDOW  02/10/2017    [facility]    DILATION AND CURETTAGE N/A 7/22/2022    Procedure: DILATION AND CURETTAGE, UTERUS;  Surgeon: Lesly Holland;  Location: [facility]    HEMORRHOIDECTOMY EXTERNAL      HYSTEROSCOPY, WITH ENDOMETRIAL RADIOFREQUENCY ABLATION - NOVASURE N/A 7/22/2022    Procedure: HYSTEROSCOPY, WITH ENDOMETRIAL RADIOFREQUENCY ABLATION - NOVASURE DILATION AND CURETTAGE, UTERUS;  Surgeon: Lesly Holland;  Location: [facility]    LAPAROSCOPIC ASSISTED SIGMOID COLECTOMY N/A 1/6/2023    Procedure: LAPAROSCOPIC ASSISTED DESCENDING COLELCTOMY SPLENIC FLEXURE MOBILIZATION ;  Surgeon: Kris Charles MD;  Location: [facility] OR    LAPAROSCOPIC LYSIS ADHESIONS N/A 1/6/2023    Procedure: LYSIS OF ADHESIONS;  Surgeon: Kris Charles MD;  Location: [facility] OR    TUMOR REMOVAL      Has had 3. In the right breast and \"inside of rib cage.\"     Current Outpatient Medications   Medication Sig Dispense Refill    albuterol (PROAIR HFA/PROVENTIL HFA/VENTOLIN HFA) 108 (90 Base) MCG/ACT inhaler Inhale 2 puffs into the lungs every 6 hours as needed " for shortness of breath, wheezing or cough 18 g 11    ALPRAZolam (XANAX) 2 MG tablet Take 1 tablet (2 mg) by mouth 2 times daily as needed for anxiety 60 tablet 3    cetirizine (ZYRTEC) 10 MG tablet Take 1 tablet (10 mg) by mouth daily 30 tablet 11    doxylamine (UNISOM) 25 MG TABS tablet Take 1 tablet (25 mg) by mouth nightly as needed for other (nausea) 7 tablet 1    DULoxetine (CYMBALTA) 20 MG capsule Take 1 capsule (20 mg) by mouth 2 times daily 60 capsule 1    fluticasone (FLONASE) 50 MCG/ACT nasal spray Spray 1 spray into both nostrils daily (Patient not taking: Reported on 3/6/2024)      fluticasone (FLONASE) 50 MCG/ACT nasal spray Spray 2 sprays into both nostrils daily 9.9 mL 11    hydrOXYzine (ATARAX) 25 MG tablet Take 1 tablet (25 mg) by mouth nightly as needed for anxiety 30 tablet 1    meloxicam (MOBIC) 15 MG tablet Take 1 tablet (15 mg) by mouth daily 30 tablet 1    montelukast (SINGULAIR) 10 MG tablet Take 1 tablet (10 mg) by mouth At Bedtime 30 tablet 11    naloxone (NARCAN) 4 MG/0.1ML nasal spray Spray 1 spray (4 mg) into one nostril alternating nostrils as needed for opioid reversal every 2-3 minutes until assistance arrives (Patient not taking: Reported on 3/6/2024) 0.2 mL 1    oxyCODONE (ROXICODONE) 5 MG tablet Take 1 tablet (5 mg) by mouth 3 times daily as needed for severe pain 90 tablet 0    PROAIR  (90 Base) MCG/ACT inhaler Inhale 2 puffs into the lungs every 4 hours as needed for shortness of breath or wheezing 8 g 11    spacer (OPTICHAMBER BINA) holding chamber For use w/ rescue inhaler 1 each 0    sucralfate (CARAFATE) 1 GM/10ML suspension Take 10 mLs (1 g) by mouth 4 times daily as needed for nausea 414 mL 0    tiotropium (SPIRIVA RESPIMAT) 1.25 MCG/ACT inhaler Inhale 2 puffs into the lungs daily 12 g 3    tiotropium (SPIRIVA) 18 MCG inhaled capsule Inhale 18 mcg into the lungs daily (Patient not taking: Reported on 3/6/2024)         Allergies   Allergen Reactions    Gabapentin  "Other (See Comments)     Mental status is changed     Lidocaine Other (See Comments)     \"my jaw stopped moving\"  Other reaction(s): Dystonia    Penicillins Hives, Rash and Shortness Of Breath    Lidocaine-Epinephrine Other (See Comments) and Muscle Pain (Myalgia)     Severe jaw cramping, double vision  Jaw locking        Social History     Tobacco Use    Smoking status: Former     Packs/day: 0.10     Years: 30.00     Additional pack years: 0.00     Total pack years: 3.00     Types: Cigarettes     Quit date: 5/28/2020     Years since quitting: 3.7     Passive exposure: Past    Smokeless tobacco: Never    Tobacco comments:     2-3 cig/day, Seen IP by TTS on 1/11/23 - states she is done but declined our services and resource materials stating that she did not need them. Stopped smoking in Jan 2023   Substance Use Topics    Alcohol use: Not Currently     Comment: Occasiaonlly.        History   Drug Use Unknown         Review of Systems    Review of Systems  CONSTITUTIONAL: NEGATIVE for fever, chills, change in weight  INTEGUMENTARY/SKIN: NEGATIVE for worrisome rashes, moles or lesions  EYES: NEGATIVE for vision changes or irritation  ENT/MOUTH: NEGATIVE for ear, mouth and throat problems. Nasal congestion, chronic.  RESP: NEGATIVE for significant cough or SOB  BREAST: NEGATIVE for masses, tenderness or discharge  CV: NEGATIVE for chest pain, palpitations or peripheral edema  GI: NEGATIVE for nausea, abdominal pain, heartburn, or change in bowel habits  : NEGATIVE for frequency, dysuria, or hematuria  MUSCULOSKELETAL: NEGATIVE for significant arthralgias or myalgia  NEURO: NEGATIVE for weakness, dizziness or paresthesias  ENDOCRINE: NEGATIVE for temperature intolerance, skin/hair changes  HEME: NEGATIVE for bleeding problems  PSYCHIATRIC: NEGATIVE for changes in mood or affect    Objective    /88   Pulse 95   Resp 16   Wt 117.9 kg (260 lb)   LMP  (LMP Unknown)   SpO2 99%   BMI 38.40 kg/m     Estimated body " "mass index is 38.4 kg/m  as calculated from the following:    Height as of 3/6/24: 1.753 m (5' 9\").    Weight as of this encounter: 117.9 kg (260 lb).  Physical Exam  GENERAL: alert and no distress  EYES: Eyes grossly normal to inspection, PERRL and conjunctivae and sclerae normal  HENT: ear canals and TM's normal, nose and mouth without ulcers or lesions  NECK: no adenopathy, no asymmetry, masses, or scars  RESP: lungs clear to auscultation - no rales, rhonchi or wheezes  CV: regular rate and rhythm, normal S1 S2, no S3 or S4, no murmur, click or rub, no peripheral edema  ABDOMEN: soft, nontender, no hepatosplenomegaly, no masses and bowel sounds normal  MS: no gross musculoskeletal defects noted, no edema  SKIN: no suspicious lesions or rashes. Small soft subqutaneous mobile smooth mass on the left lower back  NEURO: Normal strength and tone, mentation intact and speech normal  PSYCH: mentation appears normal, affect normal/bright    Recent Labs   Lab Test 03/06/24  1440 02/09/24  1054 12/06/23  1608   HGB 12.6 12.6 13.0     --  275     --  139   POTASSIUM 3.7  --  3.8   CR 0.92  --  1.00*        Diagnostics  No labs were ordered during this visit., recent labs reviewed   No EKG required for low risk surgery (cataract, skin procedure, breast biopsy, etc).    Revised Cardiac Risk Index (RCRI)  The patient has the following serious cardiovascular risks for perioperative complications:   - No serious cardiac risks = 0 points     RCRI Interpretation: 0 points: Class I (very low risk - 0.4% complication rate)         Signed Electronically by: Dagoberto Cochran MD  Copy of this evaluation report is provided to requesting physician.      "

## 2024-03-11 ENCOUNTER — ANESTHESIA EVENT (OUTPATIENT)
Dept: SURGERY | Facility: AMBULATORY SURGERY CENTER | Age: 49
End: 2024-03-11
Payer: COMMERCIAL

## 2024-03-11 NOTE — TELEPHONE ENCOUNTER
FUTURE VISIT INFORMATION      FUTURE VISIT INFORMATION:  Date: 6/7/24  Time: 2:40 PM  Location: [facility]  REFERRAL INFORMATION:  Referring provider:  Arnaud Valdez MD  Referring providers clinic:  [facility]   Reason for visit/diagnosis:  Paranasal sinus disease [J34.9]. mouth breathing. previoussius surgery. Ref by Arnaud Valdez MD. [facility] verified. Records in [facility]     RECORDS REQUESTED FROM      Clinic name Comments Records Status Imaging Status   [facility]  3/6/24 referral/ OV notes- Arnaud Valdez MD  [facility] 11/2/14 CT facial [facility] PACS   [facility] 1/15/2019 CT head   10/25/2014 CT neck + CT head  10/7/2014 CT sinus CE 5/9/24- Pending req  PACS    [facility]   10/7/2014 OV with Yeni Doll MD  CE    Procedure -  10/24/2014 SEPTOPLASTY WITH TURBINOPLASTY with Yeni Doll MD   CE            May 9, 2024 4:44 PM - Faxed a request to [facility] to push Image to [facility] PACS- Alycia   May 10, 2024 4:17 PM - Received image in pacs and attached it to patient- Alycia

## 2024-03-12 ENCOUNTER — HOSPITAL ENCOUNTER (OUTPATIENT)
Facility: AMBULATORY SURGERY CENTER | Age: 49
Discharge: HOME | End: 2024-03-12
Attending: SURGERY
Payer: COMMERCIAL

## 2024-03-12 ENCOUNTER — TRANSFERRED RECORDS (OUTPATIENT)
Dept: HEALTH INFORMATION MANAGEMENT | Facility: CLINIC | Age: 49
End: 2024-03-12

## 2024-03-12 ENCOUNTER — ANESTHESIA (OUTPATIENT)
Dept: SURGERY | Facility: AMBULATORY SURGERY CENTER | Age: 49
End: 2024-03-12
Payer: COMMERCIAL

## 2024-03-12 VITALS
HEIGHT: 69 IN | HEART RATE: 73 BPM | OXYGEN SATURATION: 97 % | DIASTOLIC BLOOD PRESSURE: 86 MMHG | BODY MASS INDEX: 37.03 KG/M2 | RESPIRATION RATE: 18 BRPM | TEMPERATURE: 97.2 F | SYSTOLIC BLOOD PRESSURE: 125 MMHG | WEIGHT: 250 LBS

## 2024-03-12 DIAGNOSIS — Z85.038 HISTORY OF COLON CANCER: ICD-10-CM

## 2024-03-12 LAB — COLONOSCOPY: NORMAL

## 2024-03-12 PROCEDURE — 45385 COLONOSCOPY W/LESION REMOVAL: CPT | Mod: 51 | Performed by: SURGERY

## 2024-03-12 PROCEDURE — 21931 EXC BACK LES SC 3 CM/>: CPT | Performed by: SURGERY

## 2024-03-12 RX ORDER — ONDANSETRON 4 MG/1
4 TABLET, ORALLY DISINTEGRATING ORAL EVERY 30 MIN PRN
Status: DISCONTINUED | OUTPATIENT
Start: 2024-03-12 | End: 2024-03-13 | Stop reason: HOSPADM

## 2024-03-12 RX ORDER — PROPOFOL 10 MG/ML
INJECTION, EMULSION INTRAVENOUS PRN
Status: DISCONTINUED | OUTPATIENT
Start: 2024-03-12 | End: 2024-03-12

## 2024-03-12 RX ORDER — DEXMEDETOMIDINE HYDROCHLORIDE 4 UG/ML
INJECTION, SOLUTION INTRAVENOUS PRN
Status: DISCONTINUED | OUTPATIENT
Start: 2024-03-12 | End: 2024-03-12

## 2024-03-12 RX ORDER — ONDANSETRON 2 MG/ML
4 INJECTION INTRAMUSCULAR; INTRAVENOUS ONCE
Status: COMPLETED | OUTPATIENT
Start: 2024-03-12 | End: 2024-03-12

## 2024-03-12 RX ORDER — ONDANSETRON 2 MG/ML
4 INJECTION INTRAMUSCULAR; INTRAVENOUS EVERY 30 MIN PRN
Status: DISCONTINUED | OUTPATIENT
Start: 2024-03-12 | End: 2024-03-13 | Stop reason: HOSPADM

## 2024-03-12 RX ORDER — FENTANYL CITRATE 0.05 MG/ML
50 INJECTION, SOLUTION INTRAMUSCULAR; INTRAVENOUS
Status: DISCONTINUED | OUTPATIENT
Start: 2024-03-12 | End: 2024-03-13 | Stop reason: HOSPADM

## 2024-03-12 RX ORDER — NALOXONE HYDROCHLORIDE 0.4 MG/ML
0.1 INJECTION, SOLUTION INTRAMUSCULAR; INTRAVENOUS; SUBCUTANEOUS
Status: DISCONTINUED | OUTPATIENT
Start: 2024-03-12 | End: 2024-03-13 | Stop reason: HOSPADM

## 2024-03-12 RX ORDER — BUPIVACAINE HYDROCHLORIDE 2.5 MG/ML
INJECTION, SOLUTION INFILTRATION; PERINEURAL PRN
Status: DISCONTINUED | OUTPATIENT
Start: 2024-03-12 | End: 2024-03-12 | Stop reason: HOSPADM

## 2024-03-12 RX ORDER — SODIUM CHLORIDE, SODIUM LACTATE, POTASSIUM CHLORIDE, CALCIUM CHLORIDE 600; 310; 30; 20 MG/100ML; MG/100ML; MG/100ML; MG/100ML
INJECTION, SOLUTION INTRAVENOUS CONTINUOUS
Status: DISCONTINUED | OUTPATIENT
Start: 2024-03-12 | End: 2024-03-13 | Stop reason: HOSPADM

## 2024-03-12 RX ORDER — PROPOFOL 10 MG/ML
INJECTION, EMULSION INTRAVENOUS CONTINUOUS PRN
Status: DISCONTINUED | OUTPATIENT
Start: 2024-03-12 | End: 2024-03-12

## 2024-03-12 RX ORDER — GLYCOPYRROLATE 0.2 MG/ML
INJECTION, SOLUTION INTRAMUSCULAR; INTRAVENOUS PRN
Status: DISCONTINUED | OUTPATIENT
Start: 2024-03-12 | End: 2024-03-12

## 2024-03-12 RX ORDER — OXYCODONE HYDROCHLORIDE 5 MG/1
5 TABLET ORAL
Status: COMPLETED | OUTPATIENT
Start: 2024-03-12 | End: 2024-03-12

## 2024-03-12 RX ADMIN — ONDANSETRON 4 MG: 2 INJECTION INTRAMUSCULAR; INTRAVENOUS at 09:11

## 2024-03-12 RX ADMIN — ONDANSETRON 4 MG: 2 INJECTION INTRAMUSCULAR; INTRAVENOUS at 11:00

## 2024-03-12 RX ADMIN — PROPOFOL 30 MG: 10 INJECTION, EMULSION INTRAVENOUS at 10:22

## 2024-03-12 RX ADMIN — PROPOFOL 40 MG: 10 INJECTION, EMULSION INTRAVENOUS at 09:33

## 2024-03-12 RX ADMIN — OXYCODONE HYDROCHLORIDE 5 MG: 5 TABLET ORAL at 11:15

## 2024-03-12 RX ADMIN — SODIUM CHLORIDE, SODIUM LACTATE, POTASSIUM CHLORIDE, CALCIUM CHLORIDE: 600; 310; 30; 20 INJECTION, SOLUTION INTRAVENOUS at 09:02

## 2024-03-12 RX ADMIN — GLYCOPYRROLATE 0.2 MG: 0.2 INJECTION, SOLUTION INTRAMUSCULAR; INTRAVENOUS at 09:35

## 2024-03-12 RX ADMIN — PROPOFOL 10 MG: 10 INJECTION, EMULSION INTRAVENOUS at 09:35

## 2024-03-12 RX ADMIN — DEXMEDETOMIDINE HYDROCHLORIDE 8 MCG: 4 INJECTION, SOLUTION INTRAVENOUS at 10:17

## 2024-03-12 RX ADMIN — PROPOFOL 20 MG: 10 INJECTION, EMULSION INTRAVENOUS at 09:34

## 2024-03-12 RX ADMIN — DEXMEDETOMIDINE HYDROCHLORIDE 4 MCG: 4 INJECTION, SOLUTION INTRAVENOUS at 10:22

## 2024-03-12 RX ADMIN — PROPOFOL 200 MCG/KG/MIN: 10 INJECTION, EMULSION INTRAVENOUS at 09:33

## 2024-03-12 NOTE — OP NOTE
General Surgery Operative Note    Date of Surgery: 3-     Surgeon: Kris Charles MD    Assistant: None    Preoperative Diagnosis: Subcutaneous mass of left back    Postoperative Diagnosis: Subcutaneous mass left back    Procedure: Excision subcutaneous mass left back    EBL: 10 cc    Findings: 2.5 x 3 cm firm fatty lesion possibly consistent with angiolipoma    Indications:  Patient is a 48-year-old woman who is well-known to me who presented for a surveillance colonoscopy a year after her colon resection for cancer.  She mentioned a painful nodule in her left mid back.  This felt consistent with a lipoma or angiolipoma.  After discussion the risks and benefits, the patient consented to the procedure.    Details of operation:  After completion of the colonoscopy which is documented elsewhere, the patient was placed in the right lateral decubitus position.  The area was prepped and draped sterilely.  A timeout was performed.    I made a transverse incision was carried down through the subcutaneous tissue and through Luciano's fascia.  I then encountered a firm nodular subcutaneous fatty mass that I was able to excise.  The measurements were 2.5 x 3 cm.  I do not feel any residual mass.  Hemostasis was achieved.  I closed the wound in 3 layers.  Luciano's was closed with 2-0 Vicryl suture.  Deep dermal 3-0 Vicryl sutures and then a running 4 Monocryl subcuticular stitch were then placed.  Dermabond was used as a dressing.  The patient tolerated the procedure well.  There were no immediately apparent complications.    Kris Charles MD  General Surgeon  [address] [address]?  Office: [phone]

## 2024-03-12 NOTE — ANESTHESIA POSTPROCEDURE EVALUATION
Patient: Darlene Hudson    Procedure: Procedure(s):  COLONOSCOPY WITH POLYPECTOMY and EXCISION, LEFT, LESION, BACK       Anesthesia Type:  No value filed.    Note:  Disposition: Outpatient   Postop Pain Control: Uneventful            Sign Out: Well controlled pain   PONV: No   Neuro/Psych: Uneventful            Sign Out: Acceptable/Baseline neuro status   Airway/Respiratory: Uneventful            Sign Out: Acceptable/Baseline resp. status   CV/Hemodynamics: Uneventful            Sign Out: Acceptable CV status; No obvious hypovolemia; No obvious fluid overload   Other NRE:    DID A NON-ROUTINE EVENT OCCUR? No           Last vitals:  Vitals Value Taken Time   /86 03/12/24 1130   Temp 97.2  F (36.2  C) 03/12/24 1130   Pulse 74 03/12/24 1132   Resp 18 03/12/24 1130   SpO2 97 % 03/12/24 1132   Vitals shown include unfiled device data.    Electronically Signed By: Yolanda Guillen MD  March 12, 2024  11:41 AM

## 2024-03-12 NOTE — ANESTHESIA PREPROCEDURE EVALUATION
Anesthesia Pre-Procedure Evaluation    Patient: Darlene Hudson   MRN: [id number] : 1975        Procedure : Procedure(s):  COLONOSCOPY and  EXCISION, LESION, BACK          Past Medical History:   Diagnosis Date    Abdominal pain 2015    Abnormal cervical Papanicolaou smear 2014    Overview:  ACUS/HPV positive    Abnormal cytology finding 2014    Overview:  ACUS/HPV positive    Acute pericardial effusion 2017    Agoraphobia with panic attacks     Anxiety     Arthralgia of both lower legs 2020    Bilateral achy knee pain that is chronic in nature going on for years. Most likely osteoarthritis in knees related to obesity. Previously injected in both knees with good relief. Injected last on 20    Arthritis     of back    Asthma in adult, moderate persistent, uncomplicated     Atopic rhinitis 2017    Chronic infectious pericarditis 2019    Chronic infectious pericarditis 2019    Chronic low back pain 2017    Chronic pain     Chronic sinusitis     Cocaine abuse (H)     Colonic mass 2022    Added automatically from request for surgery [id number]    Controlled substance agreement signed 2015    Overview:  Patient has chronic pain and is seen at [facility] for this.  Has controlled substance agreement with them.  On Vicodin, Valium, Klonopin prescribed only from there.       Coronary artery disease     Cough 2020    Family history of colon cancer 10/24/2020    Hx of seasonal allergies     Infection due to 2019 novel coronavirus 2022    Infectious pericarditis     Lipoma     Low back pain 2015    Major depressive disorder, recurrent episode, moderate (H) 2006    Menorrhagia with irregular cycle 07/15/2022    Added automatically from request for surgery [id number]    Moderate persistent asthma without complication 2020    Nondependent alcohol abuse, episodic drinking behavior 10/03/2012    Noninflammatory disorder  "of vagina 02/20/2015    Other chronic pain     Other long term (current) drug therapy 01/28/2013    Overview:  Vicodin and cyclobenzaprine monthly    Panic disorder with agoraphobia 09/05/2006    Pap smear for cervical cancer screening 10/31/2016    03/29/2010  Normal cytology, HPV ot done, repeat in 3 years.    Pericarditis 2017    Physiologic disturbance of temperature regulation 10/24/2020    PONV (postoperative nausea and vomiting)     Right ankle swelling 06/07/2022    Added automatically from request for surgery [id number]    Tobacco abuse     Tobacco use disorder 07/13/2015      Past Surgical History:   Procedure Laterality Date    ANKLE SURGERY Right 05/30/2019    ARTHROSCOPY ANKLE Right 6/21/2023    Procedure: right ankle arthroscopy with debridement;  Surgeon: Kareem Bashir MD;  Location: [facility] OR    BIOPSY BREAST Right 1990    benign    COLONOSCOPY N/A 1/3/2023    Procedure: COLONOSCOPY WITH BIOPSY OF COLON MASS, POLYPECTOMY;  Surgeon: Kris Charles MD;  Location: [facility] OR    CREATION PERICARDIAL WINDOW  02/10/2017    [facility]    DILATION AND CURETTAGE N/A 7/22/2022    Procedure: DILATION AND CURETTAGE, UTERUS;  Surgeon: Lesly Holland;  Location: [facility] OR    HEMORRHOIDECTOMY EXTERNAL      HYSTEROSCOPY, WITH ENDOMETRIAL RADIOFREQUENCY ABLATION - NOVASURE N/A 7/22/2022    Procedure: HYSTEROSCOPY, WITH ENDOMETRIAL RADIOFREQUENCY ABLATION - NOVASURE DILATION AND CURETTAGE, UTERUS;  Surgeon: Lesly Holland;  Location: [facility] OR    LAPAROSCOPIC ASSISTED SIGMOID COLECTOMY N/A 1/6/2023    Procedure: LAPAROSCOPIC ASSISTED DESCENDING COLELCTOMY SPLENIC FLEXURE MOBILIZATION ;  Surgeon: Kris Charles MD;  Location: [facility] OR    LAPAROSCOPIC LYSIS ADHESIONS N/A 1/6/2023    Procedure: LYSIS OF ADHESIONS;  Surgeon: Kris Charles MD;  Location: [facility] OR    TUMOR REMOVAL      Has had 3. In the right breast and \"inside of rib cage.\"    " "  Allergies   Allergen Reactions    Gabapentin Other (See Comments)     Mental status is changed     Lidocaine Other (See Comments)     \"my jaw stopped moving\"  Other reaction(s): Dystonia    Penicillins Hives, Rash and Shortness Of Breath    Lidocaine-Epinephrine Other (See Comments) and Muscle Pain (Myalgia)     Severe jaw cramping, double vision  Jaw locking      Social History     Tobacco Use    Smoking status: Former     Packs/day: 0.10     Years: 30.00     Additional pack years: 0.00     Total pack years: 3.00     Types: Cigarettes     Quit date: 5/28/2020     Years since quitting: 3.7     Passive exposure: Past    Smokeless tobacco: Never    Tobacco comments:     2-3 cig/day, Seen IP by TTS on 1/11/23 - states she is done but declined our services and resource materials stating that she did not need them. Stopped smoking in Jan 2023   Substance Use Topics    Alcohol use: Yes     Comment: Occasiaonlly, not since 3/11/2023 at of 3/12/24      Wt Readings from Last 1 Encounters:   03/12/24 113.4 kg (250 lb)        Anesthesia Evaluation   Pt has had prior anesthetic.     No history of anesthetic complications       ROS/MED HX  ENT/Pulmonary:     (+)                      asthma                  Neurologic:  - neg neurologic ROS     Cardiovascular: Comment: H/o pericarditis and effusion requiring pericardial window; echo 02/2024 unremarkable      METS/Exercise Tolerance:     Hematologic:  - neg hematologic  ROS     Musculoskeletal:  - neg musculoskeletal ROS     GI/Hepatic:  - neg GI/hepatic ROS     Renal/Genitourinary:  - neg Renal ROS     Endo:     (+)               Obesity,       Psychiatric/Substance Use:     (+) psychiatric history anxiety and depression       Infectious Disease:  - neg infectious disease ROS     Malignancy:   (+) Malignancy (colon cancer hx),     Other:            Physical Exam    Airway        Mallampati: II   TM distance: > 3 FB   Neck ROM: full   Mouth opening: > 3 cm    Respiratory Devices " "and Support         Dental     Comment: Fair condition, no loose teeth      B=Bridge, C=Chipped, L=Loose, M=Missing    Cardiovascular          Rhythm and rate: regular and normal     Pulmonary           breath sounds clear to auscultation           OUTSIDE LABS:  CBC:   Lab Results   Component Value Date    WBC 6.4 03/06/2024    WBC 6.7 12/06/2023    HGB 12.6 03/06/2024    HGB 12.6 02/09/2024    HCT 38.7 03/06/2024    HCT 40.4 12/06/2023     03/06/2024     12/06/2023     BMP:   Lab Results   Component Value Date     03/06/2024     12/06/2023    POTASSIUM 3.7 03/06/2024    POTASSIUM 3.8 12/06/2023    CHLORIDE 106 03/06/2024    CHLORIDE 107 12/06/2023    CO2 27 03/06/2024    CO2 21 (L) 12/06/2023    BUN 13.5 03/06/2024    BUN 25.9 (H) 12/06/2023    CR 0.92 03/06/2024    CR 1.00 (H) 12/06/2023    GLC 94 03/06/2024     (H) 12/06/2023     COAGS: No results found for: \"PTT\", \"INR\", \"FIBR\"  POC:   Lab Results   Component Value Date    HCG Negative 07/22/2022    HCGS Negative 02/23/2023     HEPATIC:   Lab Results   Component Value Date    ALBUMIN 3.8 03/06/2024    PROTTOTAL 7.5 03/06/2024    ALT 17 03/06/2024    AST 21 03/06/2024    ALKPHOS 100 03/06/2024    BILITOTAL 0.3 03/06/2024    BILIDIRECT <0.1 01/28/2019     OTHER:   Lab Results   Component Value Date    LACT 1.2 01/31/2023    MAURY 9.1 03/06/2024    PHOS 3.8 01/10/2023    MAG 1.9 01/31/2023    LIPASE 23 10/03/2023    AMYLASE 41 11/02/2017    TSH 2.04 12/06/2023    CRP 0.6 08/26/2021    SED 61 (H) 08/26/2021       Anesthesia Plan    ASA Status:  2       Anesthesia Type: MAC.              Consents    Anesthesia Plan(s) and associated risks, benefits, and realistic alternatives discussed. Questions answered and patient/representative(s) expressed understanding.     - Discussed: Risks, Benefits and Alternatives for BOTH SEDATION and the PROCEDURE were discussed     - Discussed with:  Patient            Postoperative Care        " "    Comments:    Other Comments: Consented for MAC anesthesia, including the possibility of intraoperative awareness.  Patient expressed understanding and agrees to proceed.             Yolanda Guillen MD    I have reviewed the pertinent notes and labs in the chart from the past 30 days and (re)examined the patient.  Any updates or changes from those notes are reflected in this note.              # Obesity: Estimated body mass index is 36.9 kg/m  as calculated from the following:    Height as of this encounter: 1.753 m (5' 9.02\").    Weight as of this encounter: 113.4 kg (250 lb).      "

## 2024-03-12 NOTE — DISCHARGE INSTRUCTIONS
If you have any questions or concerns regarding your procedure, please contact Dr. Charles, his office number is [phone].     5mg of oxycodone given at 11:15am.     Discharge Instructions:    Take you medications as directed and follow up with you primary provider as scheduled.   You should expect to pass air from your rectum after the procedure.   Follow these care guidelines during your recovery for the next 24 hours.   If you have any questions or concerns please contact the provider that performed your procedure.     You were given medicine for sedation:  You have been given medicines during your procedure that might make you sleepy and weak. To prevent problems:    *Rest for the rest of the day after you are home. You should be back to your normal activity tomorrow.  *For the next 24 hours:   -Do not drink alcoholic beverages.   -Do not make any important decisions or sign any legal forms.   -Do not work around machinery or power equipment.     The medicines used for sedation may make you feel nauseated.   *Start with clear liquids, such as tea, jell-o, broth and ginger ale. As you feel better you may add soft foods such as pudding and ice cream.   *When you no longer feel nauseated, you may try your normal diet.     You should be back to eating your normal after 24 hours.     Call if you have any of these problems:  *Fever of 101 degree F or 38 degrees C  *Bleeding from the rectum  *Black stool or blood in your bowel movements  *Nausea with vomiting that does not ease after a few hours.  *Abdominal pain or bloating  *Fainting

## 2024-03-12 NOTE — INTERVAL H&P NOTE
"I have reviewed the surgical (or preoperative) H&P that is linked to this encounter, and examined the patient. There are no significant changes    Clinical Conditions Present on Arrival:  Clinically Significant Risk Factors Present on Admission                  # Obesity: Estimated body mass index is 36.9 kg/m  as calculated from the following:    Height as of this encounter: 1.753 m (5' 9.02\").    Weight as of this encounter: 113.4 kg (250 lb).       "

## 2024-03-12 NOTE — LETTER
[facility] [place]  [address]  [place] [address]  Phone: [phone]  Fax: [phone]    March 12, 2024        Darlene Hudson  [address] [place] [address]          To whom it may concern:    RE: Darlene Hudson    This patient had surgery on 3/12/2024.  She will be able to return to her full duties 1 week from the date of surgery.    Please contact me for questions or concerns.      Sincerely,      Kris Charles MD  General Surgeon  [facility]  [facility]  [address]  [place] [address]  Office: [phone]

## 2024-03-12 NOTE — ANESTHESIA CARE TRANSFER NOTE
Patient: Darlene Hudson    Procedure: Procedure(s):  COLONOSCOPY WITH POLYPECTOMY and EXCISION, LEFT, LESION, BACK       Diagnosis: History of colon cancer [Z85.038]  Diagnosis Additional Information: No value filed.    Anesthesia Type:   No value filed.     Note:    Oropharynx: oropharynx clear of all foreign objects and spontaneously breathing  Level of Consciousness: awake  Oxygen Supplementation: room air    Independent Airway: airway patency satisfactory and stable  Dentition: dentition unchanged  Vital Signs Stable: post-procedure vital signs reviewed and stable  Report to RN Given: handoff report given  Patient transferred to: Phase II    Handoff Report: Identifed the Patient, Identified the Reponsible Provider, Reviewed the pertinent medical history, Discussed the surgical course, Reviewed Intra-OP anesthesia mangement and issues during anesthesia, Set expectations for post-procedure period and Allowed opportunity for questions and acknowledgement of understanding      Vitals:  Vitals Value Taken Time   /62 03/12/24 1050   Temp 97.1 03/12/24   Pulse 99 03/12/24 1050   Resp 16 03/12/24   SpO2 97 % 03/12/24 1050       Electronically Signed By: PEPE Parikh CRNA  March 12, 2024  10:53 AM

## 2024-03-15 ENCOUNTER — TELEPHONE (OUTPATIENT)
Dept: SURGERY | Facility: CLINIC | Age: 49
End: 2024-03-15
Payer: COMMERCIAL

## 2024-03-15 NOTE — TELEPHONE ENCOUNTER
Tissue from subcutaneous mass on back was sent to pathology after procedure on 3/12/2024 and Dr Hodgson would like Dr Charles to know that it's being sent to [facility] for consult. He can be reached at [phone] with any questions.

## 2024-03-18 NOTE — PROGRESS NOTES
ASSESSMENT/PLAN:    (C18.6) Cancer of descending colon (H)  (primary encounter diagnosis)  Comment:   Plan: stable surveillance c-scope; appreciate Dr Marmolejo's care    (Z90.49) Status post partial colectomy  Comment:   Plan: see above    (D17.1) Lipoma of back  Comment:   Plan: stable surgical site on exam today but quite tender over scar; she states that she will be unable to wear her bulletproof suit at work til this improves; work note written; f/up in 1 wk    (Z98.890) Status post surgical manipulation of ankle joint  Comment: stable  Plan: oxyCODONE (ROXICODONE) 5 MG tablet          (M54.6,  G89.29) Chronic midline thoracic back pain  Comment: stable  Plan: oxyCODONE (ROXICODONE) 5 MG tablet          (F41.1) Generalized anxiety disorder  Comment: anxiety has been worse; pt amenable to starting Zoloft; precautions given  Plan: ALPRAZolam (XANAX) 2 MG tablet, sertraline (ZOLOFT) 50 MG tablet       (F40.01) Panic disorder with agoraphobia  Comment: see above  Plan: ALPRAZolam (XANAX) 2 MG tablet, sertraline         (ZOLOFT) 50 MG tablet          Robbie Bailon MD  March 20, 2024  3:19 PM        Pt is a 49 year old female last seen on 2/28/24 by Dr Cochran for preop clearance for her colonoscopy here today for:     1) MSK - ankle is swollen but feels ok    2) Mood - anxious, up and down; ran out of xanax 1 day early; needs refill today; is stressed out due to supervisor at work and needs to take time off for mental health     3) Colon CA - s/p colonoscopy on 3/12/24;  path showed 1 sessile polyp; will repeat yearly until able to space out    4) Lesion on back - s/p excision by Dr Charles at same visit for c-scope; path consistent w/ lipoma  Lump next to it now is painful   Worried about pain as she would be unable to put on bullet-proof vest       Past Medical History:   Diagnosis Date    Abdominal pain 06/29/2015    Abnormal cervical Papanicolaou smear 11/09/2014    Overview:  ACUS/HPV positive    Abnormal  cytology finding 11/09/2014    Overview:  ACUS/HPV positive    Acute pericardial effusion 02/06/2017    Agoraphobia with panic attacks     Anxiety     Arthralgia of both lower legs 05/29/2020    Bilateral achy knee pain that is chronic in nature going on for years. Most likely osteoarthritis in knees related to obesity. Previously injected in both knees with good relief. Injected last on 5/29/20    Arthritis     of back    Asthma in adult, moderate persistent, uncomplicated     Atopic rhinitis 01/27/2017    Chronic infectious pericarditis 02/21/2019    Chronic infectious pericarditis 02/21/2019    Chronic low back pain 01/27/2017    Chronic pain     Chronic sinusitis     Cocaine abuse (H)     Colonic mass 12/28/2022    Added automatically from request for surgery [id number]    Controlled substance agreement signed 06/30/2015    Overview:  Patient has chronic pain and is seen at [facility] for this.  Has controlled substance agreement with them.  On Vicodin, Valium, Klonopin prescribed only from there.       Coronary artery disease     Cough 02/09/2020    Family history of colon cancer 10/24/2020    Hx of seasonal allergies     Infection due to 2019 novel coronavirus 09/20/2022    Infectious pericarditis     Lipoma     Low back pain 07/13/2015    Major depressive disorder, recurrent episode, moderate (H) 09/05/2006    Menorrhagia with irregular cycle 07/15/2022    Added automatically from request for surgery [id number]    Moderate persistent asthma without complication 09/29/2020    Nondependent alcohol abuse, episodic drinking behavior 10/03/2012    Noninflammatory disorder of vagina 02/20/2015    Other chronic pain     Other long term (current) drug therapy 01/28/2013    Overview:  Vicodin and cyclobenzaprine monthly    Panic disorder with agoraphobia 09/05/2006    Pap smear for cervical cancer screening 10/31/2016    03/29/2010  Normal cytology, HPV ot done, repeat in 3 years.    Pericarditis 2017     "Physiologic disturbance of temperature regulation 10/24/2020    PONV (postoperative nausea and vomiting)     Right ankle swelling 06/07/2022    Added automatically from request for surgery [id number]    Tobacco abuse     Tobacco use disorder 07/13/2015      Past Surgical History:   Procedure Laterality Date    ANKLE SURGERY Right 05/30/2019    ARTHROSCOPY ANKLE Right 6/21/2023    Procedure: right ankle arthroscopy with debridement;  Surgeon: Kareem Bashir MD;  Location: [facility] OR    BIOPSY BREAST Right 1990    benign    COLONOSCOPY N/A 1/3/2023    Procedure: COLONOSCOPY WITH BIOPSY OF COLON MASS, POLYPECTOMY;  Surgeon: Kris Charles MD;  Location: [facility] OR    COLONOSCOPY N/A 3/12/2024    Procedure: COLONOSCOPY WITH POLYPECTOMY and EXCISION, LEFT, LESION, BACK;  Surgeon: Kris Charles MD;  Location: [facility] OR    CREATION PERICARDIAL WINDOW  02/10/2017    [facility]    DILATION AND CURETTAGE N/A 7/22/2022    Procedure: DILATION AND CURETTAGE, UTERUS;  Surgeon: Lesly Holland;  Location: [facility] OR    HEMORRHOIDECTOMY EXTERNAL      HYSTEROSCOPY, WITH ENDOMETRIAL RADIOFREQUENCY ABLATION - NOVASURE N/A 7/22/2022    Procedure: HYSTEROSCOPY, WITH ENDOMETRIAL RADIOFREQUENCY ABLATION - NOVASURE DILATION AND CURETTAGE, UTERUS;  Surgeon: Lesly Holland;  Location: [facility] OR    LAPAROSCOPIC ASSISTED SIGMOID COLECTOMY N/A 1/6/2023    Procedure: LAPAROSCOPIC ASSISTED DESCENDING COLELCTOMY SPLENIC FLEXURE MOBILIZATION ;  Surgeon: Kris Charles MD;  Location: [facility] OR    LAPAROSCOPIC LYSIS ADHESIONS N/A 1/6/2023    Procedure: LYSIS OF ADHESIONS;  Surgeon: Kris Charles MD;  Location: [facility] OR    TUMOR REMOVAL      Has had 3. In the right breast and \"inside of rib cage.\"      Current Outpatient Medications   Medication Sig Dispense Refill    ALPRAZolam (XANAX) 2 MG tablet Take 1 tablet (2 mg) by mouth 2 times daily as needed for " "anxiety 60 tablet 3    oxyCODONE (ROXICODONE) 5 MG tablet Take 1 tablet (5 mg) by mouth 3 times daily as needed for severe pain 90 tablet 0    albuterol (PROAIR HFA/PROVENTIL HFA/VENTOLIN HFA) 108 (90 Base) MCG/ACT inhaler Inhale 2 puffs into the lungs every 6 hours as needed for shortness of breath, wheezing or cough 18 g 11    cetirizine (ZYRTEC) 10 MG tablet Take 1 tablet (10 mg) by mouth daily 30 tablet 11    DULoxetine (CYMBALTA) 20 MG capsule Take 1 capsule (20 mg) by mouth 2 times daily 60 capsule 1    fluticasone (FLONASE) 50 MCG/ACT nasal spray Spray 1 spray into both nostrils daily      fluticasone (FLONASE) 50 MCG/ACT nasal spray Spray 2 sprays into both nostrils daily 9.9 mL 11    meloxicam (MOBIC) 15 MG tablet Take 1 tablet (15 mg) by mouth daily 30 tablet 1    montelukast (SINGULAIR) 10 MG tablet Take 1 tablet (10 mg) by mouth At Bedtime 30 tablet 11    naloxone (NARCAN) 4 MG/0.1ML nasal spray Spray 1 spray (4 mg) into one nostril alternating nostrils as needed for opioid reversal every 2-3 minutes until assistance arrives (Patient not taking: Reported on 3/6/2024) 0.2 mL 1    PROAIR  (90 Base) MCG/ACT inhaler Inhale 2 puffs into the lungs every 4 hours as needed for shortness of breath or wheezing 8 g 11    spacer (OPTICHAMBER BINA) holding chamber For use w/ rescue inhaler 1 each 0    sucralfate (CARAFATE) 1 GM/10ML suspension Take 10 mLs (1 g) by mouth 4 times daily as needed for nausea 414 mL 0    tiotropium (SPIRIVA RESPIMAT) 1.25 MCG/ACT inhaler Inhale 2 puffs into the lungs daily 12 g 3    tiotropium (SPIRIVA) 18 MCG inhaled capsule Inhale 18 mcg into the lungs daily (Patient not taking: Reported on 3/6/2024)        Allergies   Allergen Reactions    Gabapentin Other (See Comments)     Mental status is changed     Lidocaine Other (See Comments)     \"my jaw stopped moving\"  Other reaction(s): Dystonia    Penicillins Hives, Rash and Shortness Of Breath    Lidocaine-Epinephrine Other (See " Comments) and Muscle Pain (Myalgia)     Severe jaw cramping, double vision  Jaw locking        ROS:   Gen- no weight change, no fevers/chills   Head/ Eyes- no blurred vision, no headaches   ENT- no cough, no congestion, no URI symptoms   Cardiac - no palpitations, no chest pain   Respiratory - no shortness of breath , no wheezing   GI - no nausea, no vomiting, no diarrhea, no constipation   Urinary - no dysuria, no polyuria   Remainder of ROS negative.     Exam:   /78 (BP Location: Right arm, Patient Position: Sitting, Cuff Size: Adult Large)   Pulse 71   Temp 98  F (36.7  C) (Oral)   Resp 16   LMP  (LMP Unknown)   SpO2 97%    Alert and oriented x 3; No acute distress   Neuro: no focal deficits   Derm: see below: incision is C/D/I w/o fluctuance/ erythema; +TTP along scar; otherwise benign

## 2024-03-20 ENCOUNTER — OFFICE VISIT (OUTPATIENT)
Dept: FAMILY MEDICINE | Facility: CLINIC | Age: 49
End: 2024-03-20
Payer: COMMERCIAL

## 2024-03-20 ENCOUNTER — TELEPHONE (OUTPATIENT)
Dept: FAMILY MEDICINE | Facility: CLINIC | Age: 49
End: 2024-03-20

## 2024-03-20 VITALS
DIASTOLIC BLOOD PRESSURE: 78 MMHG | HEART RATE: 71 BPM | TEMPERATURE: 98 F | RESPIRATION RATE: 16 BRPM | OXYGEN SATURATION: 97 % | SYSTOLIC BLOOD PRESSURE: 118 MMHG

## 2024-03-20 DIAGNOSIS — Z98.890 STATUS POST SURGICAL MANIPULATION OF ANKLE JOINT: ICD-10-CM

## 2024-03-20 DIAGNOSIS — G89.29 CHRONIC MIDLINE THORACIC BACK PAIN: ICD-10-CM

## 2024-03-20 DIAGNOSIS — Z90.49 STATUS POST PARTIAL COLECTOMY: ICD-10-CM

## 2024-03-20 DIAGNOSIS — F40.01 PANIC DISORDER WITH AGORAPHOBIA: ICD-10-CM

## 2024-03-20 DIAGNOSIS — C18.6 CANCER OF DESCENDING COLON (H): Primary | ICD-10-CM

## 2024-03-20 DIAGNOSIS — D17.1 LIPOMA OF BACK: ICD-10-CM

## 2024-03-20 DIAGNOSIS — M54.6 CHRONIC MIDLINE THORACIC BACK PAIN: ICD-10-CM

## 2024-03-20 DIAGNOSIS — F41.1 GENERALIZED ANXIETY DISORDER: ICD-10-CM

## 2024-03-20 PROCEDURE — 99214 OFFICE O/P EST MOD 30 MIN: CPT | Mod: 24 | Performed by: FAMILY MEDICINE

## 2024-03-20 RX ORDER — OXYCODONE HYDROCHLORIDE 5 MG/1
5 TABLET ORAL 3 TIMES DAILY PRN
Qty: 90 TABLET | Refills: 0 | Status: SHIPPED | OUTPATIENT
Start: 2024-03-20 | End: 2024-04-19

## 2024-03-20 RX ORDER — OXYCODONE HYDROCHLORIDE 5 MG/1
5 TABLET ORAL 3 TIMES DAILY PRN
Qty: 90 TABLET | Refills: 0 | Status: SHIPPED | OUTPATIENT
Start: 2024-03-20 | End: 2024-03-20

## 2024-03-20 RX ORDER — ALPRAZOLAM 2 MG
2 TABLET ORAL 2 TIMES DAILY PRN
Qty: 60 TABLET | Refills: 3 | Status: SHIPPED | OUTPATIENT
Start: 2024-03-20 | End: 2024-04-19

## 2024-03-20 RX ORDER — ALPRAZOLAM 2 MG
2 TABLET ORAL 2 TIMES DAILY PRN
Qty: 60 TABLET | Refills: 3 | Status: SHIPPED | OUTPATIENT
Start: 2024-03-20 | End: 2024-03-20

## 2024-03-20 NOTE — TELEPHONE ENCOUNTER
Returning after hours clinic call. Karoline is a 49 F w/ pmh of colon cancer s/p partial colectomy, panic disorder, and chronic back pain. Has been on fairly long course term of oxycodone for post op ankle pain, post op colonoscopy pain, and chronic back pain. Saw her PCP Dr. Bailon earlier today in clinic where refill of this was sent to her preferred pharmacy. Unfortunately after waiting in line for sometime this afternoon was told they do not have this in stock, now calling in requesting we change this to script to be sent to [facility] on [address] in [place]. Discussed case w/ Dr. Bailon who is re sending script to new pharmacy, pt called back and updated on this plan.

## 2024-03-20 NOTE — LETTER
WORK MEDICAL NOTE  2024     Seen today: Yes    Patient:  Darlene Hudson  :   1975  MRN:     [id number]  Physician: JULIETH BAILON    Darlene Hudson is under my care. She recently underwent a procedure on her back which is still healing. This will prevent her from wearing safety equipment required for her job. Therefore, I am recommending she be out of work x 1 week until she returns for follow-up.      The next clinic appointment is scheduled for (date/time) 3/27/24.    Patient limitations:  see above          Julieth Bailon MD  2024  3:53 PM

## 2024-03-27 ENCOUNTER — OFFICE VISIT (OUTPATIENT)
Dept: FAMILY MEDICINE | Facility: CLINIC | Age: 49
End: 2024-03-27
Payer: COMMERCIAL

## 2024-03-27 VITALS
HEART RATE: 88 BPM | SYSTOLIC BLOOD PRESSURE: 106 MMHG | OXYGEN SATURATION: 98 % | DIASTOLIC BLOOD PRESSURE: 73 MMHG | RESPIRATION RATE: 16 BRPM

## 2024-03-27 DIAGNOSIS — F33.1 MAJOR DEPRESSIVE DISORDER, RECURRENT EPISODE, MODERATE (H): Primary | ICD-10-CM

## 2024-03-27 PROCEDURE — 99214 OFFICE O/P EST MOD 30 MIN: CPT | Performed by: FAMILY MEDICINE

## 2024-03-27 NOTE — PROGRESS NOTES
"ASSESSMENT/PLAN:    (F33.1) Major depressive disorder, recurrent episode, moderate (H)  (primary encounter diagnosis)  Comment:   Plan: lengthy discussion regarding her mood, recent cutting behavior, and concerns surrounding ability to work; will support her short term disability; she is not suicidal and is engaged in regular therapy; will see her back in 2-3 weeks.     >30 min was spent on the day of visit in review of records, direct patient care, documentation, and care coordination.     Robbie Bailon MD  March 27, 2024  2:58 PM        Pt is a 49 year old female last seen on 3/20/24 here today for:     1) obesity - down 10lbs from [facility]   Interested in pursuing wt loss surgery as she does not qualify for glp-1i  Wt Readings from Last 3 Encounters:   03/12/24 113.4 kg (250 lb)   03/08/24 117.9 kg (260 lb)   03/06/24 115.7 kg (255 lb)     2) Mood - has been cutting recently due to emotional distress; \"I had scissors in my hand and was going to cut my hair and then\" ... started slashing at her chest instead; felt better afterwards  Boyfriend saw it and left her saying he would come back when she got help  A lot of this stems from work conflict -> being accused of \"playing the victim\"   Started zoloft at last visit and feels it is helping   has 10 appts set up   Needs to be out of work  Does not feel mentally stable  Is slipping into a depression  Denies SI; was not a suicidal attempt   Will need paperwork to go out on temporary disability for mental health  Therapist will write new letter supporting this     3) Follow-up c-scope and back lesion:  Per Dr Charles -  \"Kris Charles MD Kapur, Rahul, MD  Called patient and discussed results. Polyps were fairly large and sessile serrated lesions. Will plan for a repeat colonoscopy in 1 year. The subcutaneous back lesion was an angiolipoma and some benign lymphoid tissue was also found in the specimen. No further workup, treatment, or follow up needed for " "that.\"    Per my last note:  (C18.6) Cancer of descending colon (H)  (primary encounter diagnosis)  Comment:   Plan: stable surveillance c-scope; appreciate Dr Marmolejo's care     (Z90.49) Status post partial colectomy  Comment:   Plan: see above     (D17.1) Lipoma of back  Comment:   Plan: stable surgical site on exam today but quite tender over scar; she states that she will be unable to wear her bulletproof suit at work til this improves; work note written; f/up in 1 wk     (Z98.890) Status post surgical manipulation of ankle joint  Comment: stable  Plan: oxyCODONE (ROXICODONE) 5 MG tablet          (M54.6,  G89.29) Chronic midline thoracic back pain  Comment: stable  Plan: oxyCODONE (ROXICODONE) 5 MG tablet          (F41.1) Generalized anxiety disorder  Comment: anxiety has been worse; pt amenable to starting Zoloft; precautions given  Plan: ALPRAZolam (XANAX) 2 MG tablet, sertraline (ZOLOFT) 50 MG tablet        (F40.01) Panic disorder with agoraphobia  Comment: see above  Plan: ALPRAZolam (XANAX) 2 MG tablet, sertraline (ZOLOFT) 50 MG tablet            Past Medical History:   Diagnosis Date    Abdominal pain 06/29/2015    Abnormal cervical Papanicolaou smear 11/09/2014    Overview:  ACUS/HPV positive    Abnormal cytology finding 11/09/2014    Overview:  ACUS/HPV positive    Acute pericardial effusion 02/06/2017    Agoraphobia with panic attacks     Anxiety     Arthralgia of both lower legs 05/29/2020    Bilateral achy knee pain that is chronic in nature going on for years. Most likely osteoarthritis in knees related to obesity. Previously injected in both knees with good relief. 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  Has controlled substance agreement with them.  On Vicodin, Valium, Klonopin prescribed only from there.       Coronary artery disease     Cough 02/09/2020    Family history of colon cancer 10/24/2020    Hx of seasonal allergies     Infection due to 2019 novel coronavirus 09/20/2022    Infectious pericarditis     Lipoma     Low back pain 07/13/2015    Major depressive disorder, recurrent episode, moderate (H) 09/05/2006    Menorrhagia with irregular cycle 07/15/2022    Added automatically from request for surgery [id number]    Moderate persistent asthma without complication 09/29/2020    Nondependent alcohol abuse, episodic drinking behavior 10/03/2012    Noninflammatory disorder of vagina 02/20/2015    Other chronic pain     Other long term (current) drug therapy 01/28/2013    Overview:  Vicodin and cyclobenzaprine monthly    Panic disorder with agoraphobia 09/05/2006    Pap smear for cervical cancer screening 10/31/2016    03/29/2010  Normal cytology, HPV ot done, repeat in 3 years.    Pericarditis 2017    Physiologic disturbance of temperature regulation 10/24/2020    PONV (postoperative nausea and vomiting)     Right ankle swelling 06/07/2022    Added automatically from request for surgery [id number]    Tobacco abuse     Tobacco use disorder 07/13/2015      Past Surgical History:   Procedure Laterality Date    ANKLE SURGERY Right 05/30/2019    ARTHROSCOPY ANKLE Right 6/21/2023    Procedure: right ankle arthroscopy with debridement;  Surgeon: Kareem Bashir MD;  Location: [facility] OR    BIOPSY BREAST Right 1990    benign    COLONOSCOPY N/A 1/3/2023    Procedure: COLONOSCOPY WITH BIOPSY OF COLON MASS, POLYPECTOMY;  Surgeon: Kris Charles MD;  Location: [facility] OR    COLONOSCOPY N/A 3/12/2024  "   Procedure: COLONOSCOPY WITH POLYPECTOMY and EXCISION, LEFT, LESION, BACK;  Surgeon: Kris Charles MD;  Location: [facility] OR    CREATION PERICARDIAL WINDOW  02/10/2017    [facility]    DILATION AND CURETTAGE N/A 7/22/2022    Procedure: DILATION AND CURETTAGE, UTERUS;  Surgeon: Lesly Holland;  Location: [facility] OR    HEMORRHOIDECTOMY EXTERNAL      HYSTEROSCOPY, WITH ENDOMETRIAL RADIOFREQUENCY ABLATION - NOVASURE N/A 7/22/2022    Procedure: HYSTEROSCOPY, WITH ENDOMETRIAL RADIOFREQUENCY ABLATION - NOVASURE DILATION AND CURETTAGE, UTERUS;  Surgeon: Lesly Holland;  Location: [facility] OR    LAPAROSCOPIC ASSISTED SIGMOID COLECTOMY N/A 1/6/2023    Procedure: LAPAROSCOPIC ASSISTED DESCENDING COLELCTOMY SPLENIC FLEXURE MOBILIZATION ;  Surgeon: Kris Charles MD;  [place] [facility] [place]    LAPAROSCOPIC LYSIS ADHESIONS N/A 1/6/2023    Procedure: LYSIS OF ADHESIONS;  Surgeon: Kris Charles MD;  [place] [facility] [place]    TUMOR REMOVAL      Has had 3. In the right breast and \"inside of rib cage.\"      Current Outpatient Medications   Medication Sig Dispense Refill    albuterol (PROAIR HFA/PROVENTIL HFA/VENTOLIN HFA) 108 (90 Base) MCG/ACT inhaler Inhale 2 puffs into the lungs every 6 hours as needed for shortness of breath, wheezing or cough 18 g 11    ALPRAZolam (XANAX) 2 MG tablet Take 1 tablet (2 mg) by mouth 2 times daily as needed for anxiety 60 tablet 3    cetirizine (ZYRTEC) 10 MG tablet Take 1 tablet (10 mg) by mouth daily 30 tablet 11    fluticasone (FLONASE) 50 MCG/ACT nasal spray Spray 1 spray into both nostrils daily      fluticasone (FLONASE) 50 MCG/ACT nasal spray Spray 2 sprays into both nostrils daily 9.9 mL 11    montelukast (SINGULAIR) 10 MG tablet Take 1 tablet (10 mg) by mouth At Bedtime 30 tablet 11    naloxone (NARCAN) 4 MG/0.1ML nasal spray Spray 1 spray (4 mg) into one nostril alternating nostrils as needed for opioid reversal every 2-3 " "minutes until assistance arrives (Patient not taking: Reported on 3/6/2024) 0.2 mL 1    oxyCODONE (ROXICODONE) 5 MG tablet Take 1 tablet (5 mg) by mouth 3 times daily as needed for severe pain 90 tablet 0    PROAIR  (90 Base) MCG/ACT inhaler Inhale 2 puffs into the lungs every 4 hours as needed for shortness of breath or wheezing 8 g 11    sertraline (ZOLOFT) 50 MG tablet Take 1 tablet (50 mg) by mouth daily 30 tablet 1    spacer (OPTICHAMBER BINA) holding chamber For use w/ rescue inhaler 1 each 0    sucralfate (CARAFATE) 1 GM/10ML suspension Take 10 mLs (1 g) by mouth 4 times daily as needed for nausea 414 mL 0    tiotropium (SPIRIVA RESPIMAT) 1.25 MCG/ACT inhaler Inhale 2 puffs into the lungs daily 12 g 3    tiotropium (SPIRIVA) 18 MCG inhaled capsule Inhale 18 mcg into the lungs daily (Patient not taking: Reported on 3/6/2024)        Allergies   Allergen Reactions    Gabapentin Other (See Comments)     Mental status is changed     Lidocaine Other (See Comments)     \"my jaw stopped moving\"  Other reaction(s): Dystonia    Penicillins Hives, Rash and Shortness Of Breath    Lidocaine-Epinephrine Other (See Comments) and Muscle Pain (Myalgia)     Severe jaw cramping, double vision  Jaw locking        ROS:   Gen-  no fevers/chills   Cardiac - no palpitations, no chest pain   Respiratory - no shortness of breath , no wheezing   Remainder of ROS negative.     Exam:   /73 (BP Location: Right arm, Patient Position: Sitting, Cuff Size: Adult Regular)   Pulse 88   Resp 16   LMP  (LMP Unknown)   SpO2 98%    Alert, somber, anxious    "

## 2024-03-27 NOTE — LETTER
WORK MEDICAL NOTE  2024     Seen today: Yes    Patient:  Darlene Hudson  :   1975  MRN:     [id number]  Physician: JULIETH BAILON    Darlene Hudson is under my care. Unfortunately she is suffering from a medical condition that will require a prolonged absence from work. Her time away began on 3/11/24 and will continue for at least 8 weeks. She will be seeking short term disability as well as time away through [facility]. I will be filling out necessary paperwork in the near future.    Next appointment: 24            Julieth Bailon MD  2024  2:57 PM

## 2024-03-28 ENCOUNTER — TELEPHONE (OUTPATIENT)
Dept: FAMILY MEDICINE | Facility: CLINIC | Age: 49
End: 2024-03-28
Payer: COMMERCIAL

## 2024-03-28 NOTE — LETTER
WORK MEDICAL NOTE  2024                                                           Seen today: Yes     Patient:  Darlene Hudson  :     1975  MRN:     [id number]  Physician: JULIETH BAILON     Darlene Hudson is under my care. Unfortunately she is suffering from a mental health condition that will require intensive therapy, medications and a prolonged absence from work. Her time away began on 3/11/24 and will continue for at least 8 weeks. She will be seeking short term disability as well as time away through [facility]. I will be filling out necessary paperwork in the near future.     Next appointment: 24                 Julieth Bailon MD  2024  2:57 PM

## 2024-03-28 NOTE — TELEPHONE ENCOUNTER
"Alma called today from Patient's place of work with questions about the letter you sent yesterday -- she is requesting the \"medical condition\" be specified in the letter.  If you are able to, please adjust the letter and let me know so I can fax it to her  ([phone])    Thank you  "

## 2024-03-29 NOTE — TELEPHONE ENCOUNTER
Left voicemail for pt requesting permission to addend work note and specify diagnosis for medical leave. She will call back with what she is comfortable disclosing in the letter and we can addend accordingly. Team - if I am not here (after 12p today), it is ok to copy/ addend my previous letter substituting more specific diagnosis for medical condition if patient agrees. Thank you!    Robbie Bailon MD  March 29, 2024  10:29 AM

## 2024-03-29 NOTE — TELEPHONE ENCOUNTER
Patient is not comfortable with specific condition listed. Is ok to state mental health only and seeing a therapist. Please addend the letter.     Lima Hendrickson CMA

## 2024-04-02 ENCOUNTER — ONCOLOGY VISIT (OUTPATIENT)
Dept: ONCOLOGY | Facility: HOSPITAL | Age: 49
End: 2024-04-02
Payer: COMMERCIAL

## 2024-04-02 DIAGNOSIS — F43.10 POSTTRAUMATIC STRESS DISORDER: Primary | ICD-10-CM

## 2024-04-02 PROCEDURE — 90837 PSYTX W PT 60 MINUTES: CPT | Performed by: PSYCHOLOGIST

## 2024-04-02 NOTE — CONFIDENTIAL NOTE
[facility] Oncology- Psychotherapy                                                 Progress Note                 Patient Name: Darlene Hudson                  Date: 4/2/2024                                              Service Type: Individual                            Session Start Time:  1350             Session End Time:    1445                Session Length:        55 mins     Attendees:     Client attended alone     Service Modality:  In-person                 DATA              Interactive Complexity: No              Crisis: No                               Progress Since Last Session (Related to Symptoms / Goals / Homework):               Symptoms: Pt reports feeling stressed at work and feels traumatized by her work situation. Pt reports poor concentration, anxiety, feeling on edge.     Pt reports she had an incident at work where her supervisor was berating her and pt became so upset she cut herself with a scissors on her chest. She denies it was S/I but it was a way to cope with her internal dysphoria.                                Episode of Care Goals: Satisfactory progress - ACTION (Actively working towards change); Intervened by reinforcing change plan / affirming steps taken.                    Current / Ongoing Stressors and Concerns:     Pt reports she has anxiety and stress related to work. She reports she is off work now due to her mental health issues.     Pt reports she was berated and told she was playing the victim by her supervisor.     Pt reports her MD started Zoloft and pt feels it is starting to work.     Pt reports she is off work for now.     Pt reports she feels stressed and anxious thinking about returning to work with the same supervisor.     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     We will work on stress inoculation strategies for when she returns to work.     Social Hx   Pt reports she has a daughter age  28 who is with a man who has a hx of bank robbery. She has one child age 6. Pt reports her grandchild is used as a way to manipulate her.      Pt reports she has a 27 year old daughter.      Pt reports a 21 year old daughter. She lives in [place] and is doing well. She has a child and lives with her spouse in a [facility].      Pt reports a 17 year old son. He was raised by his grand parents until age 11. Pt mother  (his grandparent) and he was devastated by that. He has been seemingly angry since the death a year ago.      Pt reports a 12 year old son dx with ADHD, LD, and a speech disorder.      Pt reports her mother had a brain tumor, and she worries about having a brain tumor that has not been caught.      Pt reports she has been cancer free since 2023.                     Treatment Objective(s) Addressed in This Session:          Education regarding sx, .                 Intervention:              CBT: ,              Emotion Focused Therapy: ,              Solution Focused: ,                 Assessments completed prior to visit:              none                                ASSESSMENT: Current Emotional / Mental Status (status of significant symptoms):              Risk status (Self / Other harm or suicidal ideation)              Patient denies current fears or concerns for personal safety.              Patient denies current or recent suicidal ideation or behaviors.              Patient denies current or recent homicidal ideation or behaviors.              Patient denies current or recent self injurious behavior or ideation.              Patient denies other safety concerns.              Patient reports there has been no change in risk factors since their last session.                Patient reports there has been no change in protective factors since their last session.                Recommended that patient call 911 or go to the local ED should there be a change in any of these risk factors.                  Appearance:                            Appropriate               Eye Contact:                           Good               Psychomotor Behavior:          Normal               Attitude:                                   Cooperative               Orientation:                             All              Speech                          Rate / Production:       normal                           Volume:                       Normal               Mood:                                      Anxious               Affect:                                      Appropriate               Thought Content:                    Clear               Thought Form:                        Coherent               Insight:                                    Good                  Medication Review:              Xanax per pt for sleep/anxiety.                 Medication Compliance:              yes                 Changes in Health Issues:              Yes: ankle surgery completed, pt is cancer free she reports.                  Chemical Use Review:              Substance Use: Chemical use reviewed, no active concerns identified. Hx of abuse, but no current issues.                  Tobacco Use: No current tobacco use.       Diagnosis:  1. Adenocarcinoma of descending colon (H)    F43.10 PTSD     Collateral Reports Completed:              Not Applicable                 PLAN: (Patient Tasks / Therapist Tasks / Other)              Return in a week                 Dorian Davis MA [facility]                                                           ______________________________________________________________________     Individual Treatment Plan     Patient's Name: Darlene Hudson                YOB: 1975     Date of Creation: 6/12/2023  Date Treatment Plan Last Reviewed/Revised: 4/2/2024    DSM5 Diagnoses: F43.10 PTSD  Psychosocial / Contextual Factors: trauma hx including deaths and cancer hx.  PROMIS (reviewed every 90 days):       Referral / Collaboration:  The following referral(s) was/were discussed but patient declines follow up at this time. IOP MH [place], PHP.     Anticipated number of session for this episode of care: 9-12 sessions  Anticipation frequency of session: Weekly  Anticipated Duration of each session: 53 or more minutes  Treatment plan will be reviewed in 90 days or when goals have been changed.         MeasurableTreatment Goal(s) related to diagnosis / functional impairment(s)  Goal 1: Patient will reduce anxiety due to trauma sx     Objective #A (Patient Action)                          Patient will learn about sx of trauma. .  Status: Reviewed -4/2/2024     Intervention(s)  Therapist will teach about the sx and effects of PTSD.     Objective #B  Patient will teach coping strategies to manage sx.  Status: Reviewed - 4/2/2024       Intervention(s)  Therapist will teach emotional regulation skills. ,.     Objective #C  Patient will provide distraction techniques to manage sx.  Status: Reviewed - 4/2/2024     Intervention(s)  Therapist will teach distraction skills. ,.        Patient has reviewed and agreed to the above plan.     4/2/2024

## 2024-04-04 ENCOUNTER — TELEPHONE (OUTPATIENT)
Dept: FAMILY MEDICINE | Facility: CLINIC | Age: 49
End: 2024-04-04
Payer: MEDICAID

## 2024-04-08 ENCOUNTER — ONCOLOGY VISIT (OUTPATIENT)
Dept: ONCOLOGY | Facility: HOSPITAL | Age: 49
End: 2024-04-08
Payer: MEDICAID

## 2024-04-08 DIAGNOSIS — F43.10 POSTTRAUMATIC STRESS DISORDER: Primary | ICD-10-CM

## 2024-04-08 PROCEDURE — 90837 PSYTX W PT 60 MINUTES: CPT | Performed by: PSYCHOLOGIST

## 2024-04-08 NOTE — LETTER
4/8/2024         RE: Darlene Hudson  [address]  [facility] [address]        Dear Colleague,    Thank you for referring your patient, Darlene Hudson, to the [facility] [place]. Please see a copy of my visit note below.                                [facility]- Psychotherapy                                                 Progress Note                 Patient Name: Darlene Hudson                  Date: 4/8/2024                                              Service Type: Individual                            Session Start Time:  0940             Session End Time:    1033                Session Length:        53 mins     Attendees:     Client attended alone     Service Modality:  In-person                 DATA              Interactive Complexity: No              Crisis: No                               Progress Since Last Session (Related to Symptoms / Goals / Homework):               Symptoms: Pt reports feeling stressed at work and feels traumatized by her work situation. Pt reports poor concentration, anxiety, feeling on edge.      Pt reports she feels stress, pressure, and anxiety. Pt reports poor sleep due to inability to stop thinking about her job and how she has been treated.                              Episode of Care Goals: Satisfactory progress - ACTION (Actively working towards change); Intervened by reinforcing change plan / affirming steps taken.                    Current / Ongoing Stressors and Concerns:  Pt reports she feels stress, pressure, and anxiety. Pt reports poor sleep due to inability to stop thinking about her job and how she has been treated.      Pt reports she has anxiety and stress related to work. She reports she is off work now due to her mental health issues.      Pt reports she was berated and told she was playing the victim by her supervisor.       Pt reports she is off work for now.      Pt reports she journals, is taking an  anti-depressant, and advocating for herself to cope.    Social Hx   Pt reports she has a daughter age 28 who is with a man who has a hx of bank robbery. She has one child age 6. Pt reports her grandchild is used as a way to manipulate her.      Pt reports she has a 27 year old daughter.      Pt reports a 21 year old daughter. She lives in [place] and is doing well. She has a child and lives with her spouse in a [facility].      Pt reports a 17 year old son. He was raised by his grand parents until age 11. Pt mother  (his grandparent) and he was devastated by that. He has been seemingly angry since the death a year ago.      Pt reports a 12 year old son dx with ADHD, LD, and a speech disorder.      Pt reports her mother had a brain tumor, and she worries about having a brain tumor that has not been caught.      Pt reports she has been cancer free since 2023.                     Treatment Objective(s) Addressed in This Session:          Education regarding sx, .                 Intervention:              CBT: ,              Emotion Focused Therapy: ,              Solution Focused: ,                 Assessments completed prior to visit:              none                                ASSESSMENT: Current Emotional / Mental Status (status of significant symptoms):              Risk status (Self / Other harm or suicidal ideation)              Patient denies current fears or concerns for personal safety.              Patient denies current or recent suicidal ideation or behaviors.              Patient denies current or recent homicidal ideation or behaviors.              Patient denies current or recent self injurious behavior or ideation.              Patient denies other safety concerns.              Patient reports there has been no change in risk factors since their last session.                Patient reports there has been no change in protective factors since their last session.                Recommended that  patient call 911 or go to the local ED should there be a change in any of these risk factors.                 Appearance:                            Appropriate               Eye Contact:                           Good               Psychomotor Behavior:          Normal               Attitude:                                   Cooperative               Orientation:                             All              Speech                          Rate / Production:       normal                           Volume:                       Normal               Mood:                                      Anxious               Affect:                                      Appropriate               Thought Content:                    Clear               Thought Form:                        Coherent               Insight:                                    Good                  Medication Review:              Xanax per pt for sleep/anxiety.                 Medication Compliance:              yes                 Changes in Health Issues:              Yes: ankle surgery completed, pt is cancer free she reports.                  Chemical Use Review:              Substance Use: Chemical use reviewed, no active concerns identified. Hx of abuse, but no current issues.                  Tobacco Use: No current tobacco use.       Diagnosis:  1. Adenocarcinoma of descending colon (H)    F43.10 PTSD     Collateral Reports Completed:              Not Applicable                 PLAN: (Patient Tasks / Therapist Tasks / Other)              Return in a week                 Dorian Davis MA [facility]                                                           ______________________________________________________________________     Individual Treatment Plan     Patient's Name: Darlene Hudson                YOB: 1975     Date of Creation: 6/12/2023  Date Treatment Plan Last Reviewed/Revised: 4/2/2024     DSM5 Diagnoses: F43.10  PTSD  Psychosocial / Contextual Factors: trauma hx including deaths and cancer hx.  PROMIS (reviewed every 90 days):      Referral / Collaboration:  The following referral(s) was/were discussed but patient declines follow up at this time. IOP MH TX, PHP.     Anticipated number of session for this episode of care: 9-12 sessions  Anticipation frequency of session: Weekly  Anticipated Duration of each session: 53 or more minutes  Treatment plan will be reviewed in 90 days or when goals have been changed.         MeasurableTreatment Goal(s) related to diagnosis / functional impairment(s)  Goal 1: Patient will reduce anxiety due to trauma sx     Objective #A (Patient Action)                          Patient will learn about sx of trauma. .  Status: Reviewed -4/2/2024     Intervention(s)  Therapist will teach about the sx and effects of PTSD.     Objective #B  Patient will teach coping strategies to manage sx.  Status: Reviewed - 4/2/2024        Intervention(s)  Therapist will teach emotional regulation skills. ,.     Objective #C  Patient will provide distraction techniques to manage sx.  Status: Reviewed - 4/2/2024     Intervention(s)  Therapist will teach distraction skills. ,.        Patient has reviewed and agreed to the above plan.     Mejia Davis MA, LP [facility]                  Again, thank you for allowing me to participate in the care of your patient.        Sincerely,        Mejia Davis, RAMAKRISHNA

## 2024-04-08 NOTE — CONFIDENTIAL NOTE
[facility]- Psychotherapy                                                 Progress Note                 Patient Name: Darlene Hudson                  Date: 4/8/2024                                              Service Type: Individual                            Session Start Time:  0940             Session End Time:    1033                Session Length:        53 mins     Attendees:     Client attended alone     Service Modality:  In-person                 DATA              Interactive Complexity: No              Crisis: No                               Progress Since Last Session (Related to Symptoms / Goals / Homework):               Symptoms: Pt reports feeling stressed at work and feels traumatized by her work situation. Pt reports poor concentration, anxiety, feeling on edge.      Pt reports she feels stress, pressure, and anxiety. Pt reports poor sleep due to inability to stop thinking about her job and how she has been treated.                              Episode of Care Goals: Satisfactory progress - ACTION (Actively working towards change); Intervened by reinforcing change plan / affirming steps taken.                    Current / Ongoing Stressors and Concerns:  Pt reports she feels stress, pressure, and anxiety. Pt reports poor sleep due to inability to stop thinking about her job and how she has been treated.      Pt reports she has anxiety and stress related to work. She reports she is off work now due to her mental health issues.      Pt reports she was berated and told she was playing the victim by her supervisor.       Pt reports she is off work for now.      Pt reports she journals, is taking an anti-depressant, and advocating for herself to cope.    Social Hx   Pt reports she has a daughter age 28 who is with a man who has a hx of bank robbery. She has one child age 6. Pt reports her grandchild is used as a way to manipulate her.      Pt reports she has a 27  year old daughter.      Pt reports a 21 year old daughter. She lives in [place] and is doing well. She has a child and lives with her spouse in a [facility].      Pt reports a 17 year old son. He was raised by his grand parents until age 11. Pt mother  (his grandparent) and he was devastated by that. He has been seemingly angry since the death a year ago.      Pt reports a 12 year old son dx with ADHD, LD, and a speech disorder.      Pt reports her mother had a brain tumor, and she worries about having a brain tumor that has not been caught.      Pt reports she has been cancer free since 2023.                     Treatment Objective(s) Addressed in This Session:          Education regarding sx, .                 Intervention:              CBT: ,              Emotion Focused Therapy: ,              Solution Focused: ,                 Assessments completed prior to visit:              none                                ASSESSMENT: Current Emotional / Mental Status (status of significant symptoms):              Risk status (Self / Other harm or suicidal ideation)              Patient denies current fears or concerns for personal safety.              Patient denies current or recent suicidal ideation or behaviors.              Patient denies current or recent homicidal ideation or behaviors.              Patient denies current or recent self injurious behavior or ideation.              Patient denies other safety concerns.              Patient reports there has been no change in risk factors since their last session.                Patient reports there has been no change in protective factors since their last session.                Recommended that patient call 911 or go to the local ED should there be a change in any of these risk factors.                 Appearance:                            Appropriate               Eye Contact:                           Good               Psychomotor Behavior:          Normal                Attitude:                                   Cooperative               Orientation:                             All              Speech                          Rate / Production:       normal                           Volume:                       Normal               Mood:                                      Anxious               Affect:                                      Appropriate               Thought Content:                    Clear               Thought Form:                        Coherent               Insight:                                    Good                  Medication Review:              Xanax per pt for sleep/anxiety.                 Medication Compliance:              yes                 Changes in Health Issues:              Yes: ankle surgery completed, pt is cancer free she reports.                  Chemical Use Review:              Substance Use: Chemical use reviewed, no active concerns identified. Hx of abuse, but no current issues.                  Tobacco Use: No current tobacco use.       Diagnosis:  1. Adenocarcinoma of descending colon (H)    F43.10 PTSD     Collateral Reports Completed:              Not Applicable                 PLAN: (Patient Tasks / Therapist Tasks / Other)              Return in a week                 Dorian Davis MA [facility]                                                           ______________________________________________________________________     Individual Treatment Plan     Patient's Name: Darlene Hudson                YOB: 1975     Date of Creation: 6/12/2023  Date Treatment Plan Last Reviewed/Revised: 4/2/2024     DSM5 Diagnoses: F43.10 PTSD  Psychosocial / Contextual Factors: trauma hx including deaths and cancer hx.  PROMIS (reviewed every 90 days):      Referral / Collaboration:  The following referral(s) was/were discussed but patient declines follow up at this time. IOP MH TX, PHP.     Anticipated number of  session for this episode of care: 9-12 sessions  Anticipation frequency of session: Weekly  Anticipated Duration of each session: 53 or more minutes  Treatment plan will be reviewed in 90 days or when goals have been changed.         MeasurableTreatment Goal(s) related to diagnosis / functional impairment(s)  Goal 1: Patient will reduce anxiety due to trauma sx     Objective #A (Patient Action)                          Patient will learn about sx of trauma. .  Status: Reviewed -4/2/2024     Intervention(s)  Therapist will teach about the sx and effects of PTSD.     Objective #B  Patient will teach coping strategies to manage sx.  Status: Reviewed - 4/2/2024        Intervention(s)  Therapist will teach emotional regulation skills. ,.     Objective #C  Patient will provide distraction techniques to manage sx.  Status: Reviewed - 4/2/2024     Intervention(s)  Therapist will teach distraction skills. ,.        Patient has reviewed and agreed to the above plan.     Mejia Davis MA [facility]

## 2024-04-19 ENCOUNTER — OFFICE VISIT (OUTPATIENT)
Dept: FAMILY MEDICINE | Facility: CLINIC | Age: 49
End: 2024-04-19
Payer: MEDICAID

## 2024-04-19 VITALS
OXYGEN SATURATION: 98 % | DIASTOLIC BLOOD PRESSURE: 74 MMHG | RESPIRATION RATE: 18 BRPM | SYSTOLIC BLOOD PRESSURE: 107 MMHG | HEART RATE: 97 BPM

## 2024-04-19 DIAGNOSIS — G89.29 CHRONIC MIDLINE THORACIC BACK PAIN: ICD-10-CM

## 2024-04-19 DIAGNOSIS — M54.6 CHRONIC MIDLINE THORACIC BACK PAIN: ICD-10-CM

## 2024-04-19 DIAGNOSIS — Z98.890 STATUS POST SURGICAL MANIPULATION OF ANKLE JOINT: ICD-10-CM

## 2024-04-19 DIAGNOSIS — F41.1 GENERALIZED ANXIETY DISORDER: ICD-10-CM

## 2024-04-19 DIAGNOSIS — F40.01 PANIC DISORDER WITH AGORAPHOBIA: ICD-10-CM

## 2024-04-19 PROCEDURE — 99214 OFFICE O/P EST MOD 30 MIN: CPT | Performed by: FAMILY MEDICINE

## 2024-04-19 RX ORDER — OXYCODONE HYDROCHLORIDE 5 MG/1
5 TABLET ORAL 3 TIMES DAILY PRN
Qty: 90 TABLET | Refills: 0 | Status: SHIPPED | OUTPATIENT
Start: 2024-04-19 | End: 2024-04-24

## 2024-04-19 RX ORDER — HYDROXYZINE PAMOATE 25 MG/1
25 CAPSULE ORAL
Qty: 30 CAPSULE | Refills: 3 | Status: SHIPPED | OUTPATIENT
Start: 2024-04-19 | End: 2024-05-29

## 2024-04-19 RX ORDER — ALPRAZOLAM 2 MG
2 TABLET ORAL 2 TIMES DAILY PRN
Qty: 60 TABLET | Refills: 3 | Status: SHIPPED | OUTPATIENT
Start: 2024-04-19 | End: 2024-05-29

## 2024-04-19 NOTE — LETTER
{[facility] ORTHO WORKSLIP/VERIFICATION:[id number]}  2024     Seen today: {YES NO:365044}    Patient:  Darlene Hudson  :   1975  MRN:     [id number]  Physician: JULIETH BAILON {MAY:[id number]} return to {[facility] ORTHO WORK/school:470667831} {ON/UNTIL:0652643} Date: ***.      The next clinic appointment is scheduled for (date/time) ***.    Patient limitations:  ***        Fax to: ***      Electronically signed by Julieth Bailon MD

## 2024-04-19 NOTE — PROGRESS NOTES
ASSESSMENT/PLAN:    (F40.01) Panic disorder with agoraphobia  Comment: mood is still very labile; she was asking to increase her xanax or add a second benzo which I am not comfortable with; will inc her zoloft to 100 and add hydroxyzine at night; also placed a new mental health referral as she would like to see another therapist; will see her back in 5 days as she needs work forms done  Plan: ALPRAZolam (XANAX) 2 MG tablet, sertraline (ZOLOFT) 50 MG tablet, Adult Mental Health  Referral          (F41.1) Generalized anxiety disorder  Comment: see above  Plan: ALPRAZolam (XANAX) 2 MG tablet, hydrOXYzine carlos        (VISTARIL) 25 MG capsule, sertraline (ZOLOFT)         50 MG tablet, Adult Mental Health  Referral          (Z98.890) Status post surgical manipulation of ankle joint  Comment: meds refilled; we discussed starting a wean soon; agreed to wait for 2 months til mood is hopefully more stable  Plan: oxyCODONE (ROXICODONE) 5 MG tablet          (M54.6,  G89.29) Chronic midline thoracic back pain  Comment: see above  Plan: oxyCODONE (ROXICODONE) 5 MG tablet                >30 min was spent on the day of visit in review of records, direct patient care, documentation, and care coordination.     Robbie Bailon MD  April 19, 2024  10:18 AM        Pt is a 49 year old female last seen on 3/27/24 here today for:     Mood - feeling more anxious; poor sleep  Taking both xanax at night and then have none during the day  Feels like zoloft not working as well as it did   Sees Dr Davis and feels like those sessions are just venting  Needs more coping strategies     Work situation - has an ; now will be a work comp case  Unsure what will happen w/ short term disability/ FMLA  Released from her position   Now will get new position at same salary and one that requires same training      Per my last note:  (F33.1) Major depressive disorder, recurrent episode, moderate (H)  (primary encounter  diagnosis)  Comment:   Plan: lengthy discussion regarding her mood, recent cutting behavior, and concerns surrounding ability to work; will support her short term disability; she is not suicidal and is engaged in regular therapy; will see her back in 2-3 weeks    Past Medical History:   Diagnosis Date    Abdominal pain 06/29/2015    Abnormal cervical Papanicolaou smear 11/09/2014    Overview:  ACUS/HPV positive    Abnormal cytology finding 11/09/2014    Overview:  ACUS/HPV positive    Acute pericardial effusion 02/06/2017    Agoraphobia with panic attacks     Anxiety     Arthralgia of both lower legs 05/29/2020    Bilateral achy knee pain that is chronic in nature going on for years. Most likely osteoarthritis in knees related to obesity. Previously injected in both knees with good relief. 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  Has controlled substance agreement with them.  On Vicodin, Valium, Klonopin prescribed only from there.       Coronary artery disease     Cough 02/09/2020    Family history of colon cancer 10/24/2020    Hx of seasonal allergies     Infection due to 2019 novel coronavirus 09/20/2022    Infectious pericarditis     Lipoma     Low back pain 07/13/2015    Major depressive disorder, recurrent episode, moderate (H) 09/05/2006    Menorrhagia with irregular cycle 07/15/2022    Added automatically from request for surgery [id number]    Moderate persistent asthma without complication 09/29/2020    Nondependent  alcohol abuse, episodic drinking behavior 10/03/2012    Noninflammatory disorder of vagina 02/20/2015    Other chronic pain     Other long term (current) drug therapy 01/28/2013    Overview:  Vicodin and cyclobenzaprine monthly    Panic disorder with agoraphobia 09/05/2006    Pap smear for cervical cancer screening 10/31/2016    03/29/2010  Normal cytology, HPV ot done, repeat in 3 years.    Pericarditis 2017    Physiologic disturbance of temperature regulation 10/24/2020    PONV (postoperative nausea and vomiting)     Right ankle swelling 06/07/2022    Added automatically from request for surgery [id number]    Tobacco abuse     Tobacco use disorder 07/13/2015      Past Surgical History:   Procedure Laterality Date    ANKLE SURGERY Right 05/30/2019    ARTHROSCOPY ANKLE Right 6/21/2023    Procedure: right ankle arthroscopy with debridement;  Surgeon: Kareem Bashir MD;  Location: [facility] OR    BIOPSY BREAST Right 1990    benign    COLONOSCOPY N/A 1/3/2023    Procedure: COLONOSCOPY WITH BIOPSY OF COLON MASS, POLYPECTOMY;  Surgeon: Kris Charles MD;  Location: [facility] OR    COLONOSCOPY N/A 3/12/2024    Procedure: COLONOSCOPY WITH POLYPECTOMY and EXCISION, LEFT, LESION, BACK;  Surgeon: Kris Charles MD;  Location: [facility] OR    CREATION PERICARDIAL WINDOW  02/10/2017    [facility]    DILATION AND CURETTAGE N/A 7/22/2022    Procedure: DILATION AND CURETTAGE, UTERUS;  Surgeon: Lesly Holland;  Location: [facility] OR    HEMORRHOIDECTOMY EXTERNAL      HYSTEROSCOPY, WITH ENDOMETRIAL RADIOFREQUENCY ABLATION - NOVASURE N/A 7/22/2022    Procedure: HYSTEROSCOPY, WITH ENDOMETRIAL RADIOFREQUENCY ABLATION - NOVASURE DILATION AND CURETTAGE, UTERUS;  Surgeon: Lesly Holland;  Location: [facility] OR    LAPAROSCOPIC ASSISTED SIGMOID COLECTOMY N/A 1/6/2023    Procedure: LAPAROSCOPIC ASSISTED DESCENDING COLELCTOMY SPLENIC FLEXURE MOBILIZATION ;  Surgeon: Kris Charles MD;   "Location: [facility] OR    LAPAROSCOPIC LYSIS ADHESIONS N/A 1/6/2023    Procedure: LYSIS OF ADHESIONS;  Surgeon: Kris Charles MD;  Location: [facility] OR    TUMOR REMOVAL      Has had 3. In the right breast and \"inside of rib cage.\"      Current Outpatient Medications   Medication Sig Dispense Refill    ALPRAZolam (XANAX) 2 MG tablet Take 1 tablet (2 mg) by mouth 2 times daily as needed for anxiety 60 tablet 3    hydrOXYzine carlos (VISTARIL) 25 MG capsule Take 1 capsule (25 mg) by mouth nightly as needed for anxiety 30 capsule 3    oxyCODONE (ROXICODONE) 5 MG tablet Take 1 tablet (5 mg) by mouth 3 times daily as needed for severe pain 90 tablet 0    sertraline (ZOLOFT) 50 MG tablet Take 2 tablets (100 mg) by mouth daily 60 tablet 3    albuterol (PROAIR HFA/PROVENTIL HFA/VENTOLIN HFA) 108 (90 Base) MCG/ACT inhaler Inhale 2 puffs into the lungs every 6 hours as needed for shortness of breath, wheezing or cough 18 g 11    cetirizine (ZYRTEC) 10 MG tablet Take 1 tablet (10 mg) by mouth daily 30 tablet 11    fluticasone (FLONASE) 50 MCG/ACT nasal spray Spray 1 spray into both nostrils daily      fluticasone (FLONASE) 50 MCG/ACT nasal spray Spray 2 sprays into both nostrils daily 9.9 mL 11    montelukast (SINGULAIR) 10 MG tablet Take 1 tablet (10 mg) by mouth At Bedtime 30 tablet 11    naloxone (NARCAN) 4 MG/0.1ML nasal spray Spray 1 spray (4 mg) into one nostril alternating nostrils as needed for opioid reversal every 2-3 minutes until assistance arrives (Patient not taking: Reported on 3/6/2024) 0.2 mL 1    PROAIR  (90 Base) MCG/ACT inhaler Inhale 2 puffs into the lungs every 4 hours as needed for shortness of breath or wheezing 8 g 11    spacer (OPTICHAMBER BINA) holding chamber For use w/ rescue inhaler 1 each 0    sucralfate (CARAFATE) 1 GM/10ML suspension Take 10 mLs (1 g) by mouth 4 times daily as needed for nausea 414 mL 0    tiotropium (SPIRIVA RESPIMAT) 1.25 MCG/ACT inhaler Inhale 2 " "puffs into the lungs daily 12 g 3    tiotropium (SPIRIVA) 18 MCG inhaled capsule Inhale 18 mcg into the lungs daily (Patient not taking: Reported on 3/6/2024)        Allergies   Allergen Reactions    Gabapentin Other (See Comments)     Mental status is changed     Lidocaine Other (See Comments)     \"my jaw stopped moving\"  Other reaction(s): Dystonia    Penicillins Hives, Rash and Shortness Of Breath    Lidocaine-Epinephrine Other (See Comments) and Muscle Pain (Myalgia)     Severe jaw cramping, double vision  Jaw locking        ROS:   Gen- no weight change, no fevers/chills   Remainder of ROS negative.     Exam:   /74 (BP Location: Right arm, Patient Position: Sitting, Cuff Size: Adult Large)   Pulse 97   Resp 18   LMP  (LMP Unknown)   SpO2 98%    Alert and oriented x 3; Anxious affect    "

## 2024-04-19 NOTE — LETTER
July 27, 2021      Darlene Hudson  [address] [facility] [address]        Dear ,    We are writing to inform you of your test results.    Your pelvic ultrasound was normal.      Resulted Orders   US Pelvis Complete without Transvaginal    Narrative    EXAM: US PELVIS COMPLETE WITHOUT TRANSVAGINAL  LOCATION: [facility]  DATE/TIME: 7/22/2021 11:17 AM    INDICATION: Menorrhagia.  COMPARISON: CT 2/8/2020  TECHNIQUE: Transabdominal scans were performed. Patient declined transvaginal ultrasound.    FINDINGS:    UTERUS: 9.4 x 7.0 x 5.1 cm. Normal in size and position with no masses.    ENDOMETRIUM: 13 mm. Tiny cysts are present within the endometrial stripe and within the junctional zone.    RIGHT OVARY: 2.4 x 1.9 x 1.2 cm. Normal.     LEFT OVARY: 3.3 x 3.0 x 2.2 cm. 2.6 x 2.1 x 1.7 cm anechoic dominant follicle within left ovary.    No significant free fluid.      Impression    IMPRESSION:  1.  Normal endometrial thickness. Tiny cysts are present within the endometrial stripe and junctional zone. Junctional zone cysts can be seen in adenomyosis. Cysts within the endometrial stripe can be seen in cystic hyperplasia and tamoxifen therapy.             If you have any questions or concerns, please call the clinic at the number listed above.       Sincerely,      Robbie Bailon MD          
Universal Safety Interventions

## 2024-04-24 ENCOUNTER — OFFICE VISIT (OUTPATIENT)
Dept: FAMILY MEDICINE | Facility: CLINIC | Age: 49
End: 2024-04-24
Payer: MEDICAID

## 2024-04-24 ENCOUNTER — TELEPHONE (OUTPATIENT)
Dept: FAMILY MEDICINE | Facility: CLINIC | Age: 49
End: 2024-04-24

## 2024-04-24 VITALS
RESPIRATION RATE: 18 BRPM | DIASTOLIC BLOOD PRESSURE: 72 MMHG | HEART RATE: 97 BPM | OXYGEN SATURATION: 96 % | SYSTOLIC BLOOD PRESSURE: 113 MMHG

## 2024-04-24 DIAGNOSIS — Z98.890 STATUS POST SURGICAL MANIPULATION OF ANKLE JOINT: ICD-10-CM

## 2024-04-24 DIAGNOSIS — M54.6 CHRONIC MIDLINE THORACIC BACK PAIN: ICD-10-CM

## 2024-04-24 DIAGNOSIS — F41.1 GENERALIZED ANXIETY DISORDER: ICD-10-CM

## 2024-04-24 DIAGNOSIS — M54.50 CHRONIC LOW BACK PAIN, UNSPECIFIED BACK PAIN LATERALITY, UNSPECIFIED WHETHER SCIATICA PRESENT: Primary | ICD-10-CM

## 2024-04-24 DIAGNOSIS — G89.29 CHRONIC LOW BACK PAIN, UNSPECIFIED BACK PAIN LATERALITY, UNSPECIFIED WHETHER SCIATICA PRESENT: Primary | ICD-10-CM

## 2024-04-24 DIAGNOSIS — F43.10 POST TRAUMATIC STRESS DISORDER: Primary | ICD-10-CM

## 2024-04-24 DIAGNOSIS — G89.29 CHRONIC MIDLINE THORACIC BACK PAIN: ICD-10-CM

## 2024-04-24 PROCEDURE — 99214 OFFICE O/P EST MOD 30 MIN: CPT | Performed by: FAMILY MEDICINE

## 2024-04-24 RX ORDER — OXYCODONE HYDROCHLORIDE 5 MG/1
5 TABLET ORAL 3 TIMES DAILY PRN
Qty: 75 TABLET | Refills: 0 | Status: SHIPPED | OUTPATIENT
Start: 2024-04-24 | End: 2024-04-24

## 2024-04-24 RX ORDER — OXYCODONE HYDROCHLORIDE 5 MG/1
5 TABLET ORAL 3 TIMES DAILY PRN
Qty: 75 TABLET | Refills: 0 | Status: SHIPPED | OUTPATIENT
Start: 2024-04-24 | End: 2024-05-14

## 2024-04-24 NOTE — PROGRESS NOTES
"ASSESSMENT/PLAN:    (F43.10) Post traumatic stress disorder  (primary encounter diagnosis)  Comment:   Plan: will complete workability forms on 4/26; she will better organize other paperwork and drop off for completion    (F41.1) Generalized anxiety disorder  Comment:   Plan: see above    (Z98.890) Status post surgical manipulation of ankle joint  Comment:   Plan: partial refill sent for lost med supply; we reviewed the substance agreement again and she understands that this will not be tolerated again    (M54.6,  G89.29) Chronic midline thoracic back pain  Comment:   Plan: see above    >30 min was spent on the day of visit in review of records, direct patient care, documentation, and care coordination.     Robbie Bailon MD  April 26, 2024  4:59 PM        Pt is a 49 year old female last seen on 4/19/24 here today for:     Forms - several to complete   1) Report of Workability for [employer] -> labeled \"foot injury 2/7) related to 2 weeks missed starting 2/7/24-2/21/24 for R foot contusion   2) Report of workability for [employer] -> labeled \"current\" is related to mood/ptsd/mental health  Start is 3/11/24 - TBD; filing for work comp and short term disability   3) FMLA - related to current issue -> start was 3/11; estimated end date of 5/29/24        Per my last note:  (F40.01) Panic disorder with agoraphobia  Comment: mood is still very labile; she was asking to increase her xanax or add a second benzo which I am not comfortable with; will inc her zoloft to 100 and add hydroxyzine at night; also placed a new mental health referral as she would like to see another therapist; will see her back in 5 days as she needs work forms done  Plan: ALPRAZolam (XANAX) 2 MG tablet, sertraline (ZOLOFT) 50 MG tablet, Adult Mental Health  Referral          (F41.1) Generalized anxiety disorder  Comment: see above  Plan: ALPRAZolam (XANAX) 2 MG tablet, hydrOXYzine carlos        (VISTARIL) 25 MG capsule, sertraline (ZOLOFT)         50 MG " tablet, Adult Mental Health  Referral          (Z98.890) Status post surgical manipulation of ankle joint  Comment: meds refilled; we discussed starting a wean soon; agreed to wait for 2 months til mood is hopefully more stable  Plan: oxyCODONE (ROXICODONE) 5 MG tablet          (M54.6,  G89.29) Chronic midline thoracic back pain  Comment: see above  Plan: oxyCODONE (ROXICODONE) 5 MG tablet                >30 min was spent on the day of visit in review of records, direct patient care, documentation, and care coordination.       Past Medical History:   Diagnosis Date    Abdominal pain 06/29/2015    Abnormal cervical Papanicolaou smear 11/09/2014    Overview:  ACUS/HPV positive    Abnormal cytology finding 11/09/2014    Overview:  ACUS/HPV positive    Acute pericardial effusion 02/06/2017    Agoraphobia with panic attacks     Anxiety     Arthralgia of both lower legs 05/29/2020    Bilateral achy knee pain that is chronic in nature going on for years. Most likely osteoarthritis in knees related to obesity. Previously injected in both knees with good relief. 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  Has controlled substance agreement with them.  On Vicodin, Valium, Klonopin prescribed only from there.       Coronary artery disease     Cough 02/09/2020    Family history of colon cancer 10/24/2020    Hx of seasonal allergies     Infection due to 2019 novel coronavirus 09/20/2022    Infectious pericarditis     Lipoma      Low back pain 07/13/2015    Major depressive disorder, recurrent episode, moderate (H) 09/05/2006    Menorrhagia with irregular cycle 07/15/2022    Added automatically from request for surgery [id number]    Moderate persistent asthma without complication 09/29/2020    Nondependent alcohol abuse, episodic drinking behavior 10/03/2012    Noninflammatory disorder of vagina 02/20/2015    Other chronic pain     Other long term (current) drug therapy 01/28/2013    Overview:  Vicodin and cyclobenzaprine monthly    Panic disorder with agoraphobia 09/05/2006    Pap smear for cervical cancer screening 10/31/2016    03/29/2010  Normal cytology, HPV ot done, repeat in 3 years.    Pericarditis 2017    Physiologic disturbance of temperature regulation 10/24/2020    PONV (postoperative nausea and vomiting)     Right ankle swelling 06/07/2022    Added automatically from request for surgery [id number]    Tobacco abuse     Tobacco use disorder 07/13/2015      Past Surgical History:   Procedure Laterality Date    ANKLE SURGERY Right 05/30/2019    ARTHROSCOPY ANKLE Right 6/21/2023    Procedure: right ankle arthroscopy with debridement;  Surgeon: Kareem Bashir MD;  Location: [facility] OR    BIOPSY BREAST Right 1990    benign    COLONOSCOPY N/A 1/3/2023    Procedure: COLONOSCOPY WITH BIOPSY OF COLON MASS, POLYPECTOMY;  Surgeon: Kris Charles MD;  Location: [facility] OR    COLONOSCOPY N/A 3/12/2024    Procedure: COLONOSCOPY WITH POLYPECTOMY and EXCISION, LEFT, LESION, BACK;  Surgeon: Kris Charles MD;  Location: [facility] OR    CREATION PERICARDIAL WINDOW  02/10/2017    [facility]    DILATION AND CURETTAGE N/A 7/22/2022    Procedure: DILATION AND CURETTAGE, UTERUS;  Surgeon: Lesly Holland;  Location: [facility] OR    HEMORRHOIDECTOMY EXTERNAL      HYSTEROSCOPY, WITH ENDOMETRIAL RADIOFREQUENCY ABLATION - NOVASURE N/A 7/22/2022    Procedure: HYSTEROSCOPY, WITH ENDOMETRIAL RADIOFREQUENCY ABLATION -  "NOVASURE DILATION AND CURETTAGE, UTERUS;  Surgeon: Lesly Holland;  Location: [facility] OR    LAPAROSCOPIC ASSISTED SIGMOID COLECTOMY N/A 1/6/2023    Procedure: LAPAROSCOPIC ASSISTED DESCENDING COLELCTOMY SPLENIC FLEXURE MOBILIZATION ;  Surgeon: Kris Charles MD;  Location: [facility] OR    LAPAROSCOPIC LYSIS ADHESIONS N/A 1/6/2023    Procedure: LYSIS OF ADHESIONS;  Surgeon: Kris Charles MD;  Location: [facility] OR    TUMOR REMOVAL      Has had 3. In the right breast and \"inside of rib cage.\"      Current Outpatient Medications   Medication Sig Dispense Refill    albuterol (PROAIR HFA/PROVENTIL HFA/VENTOLIN HFA) 108 (90 Base) MCG/ACT inhaler Inhale 2 puffs into the lungs every 6 hours as needed for shortness of breath, wheezing or cough 18 g 11    ALPRAZolam (XANAX) 2 MG tablet Take 1 tablet (2 mg) by mouth 2 times daily as needed for anxiety 60 tablet 3    cetirizine (ZYRTEC) 10 MG tablet Take 1 tablet (10 mg) by mouth daily 30 tablet 11    fluticasone (FLONASE) 50 MCG/ACT nasal spray Spray 1 spray into both nostrils daily      fluticasone (FLONASE) 50 MCG/ACT nasal spray Spray 2 sprays into both nostrils daily 9.9 mL 11    hydrOXYzine carlos (VISTARIL) 25 MG capsule Take 1 capsule (25 mg) by mouth nightly as needed for anxiety 30 capsule 3    montelukast (SINGULAIR) 10 MG tablet Take 1 tablet (10 mg) by mouth At Bedtime 30 tablet 11    naloxone (NARCAN) 4 MG/0.1ML nasal spray Spray 1 spray (4 mg) into one nostril alternating nostrils as needed for opioid reversal every 2-3 minutes until assistance arrives (Patient not taking: Reported on 3/6/2024) 0.2 mL 1    oxyCODONE (ROXICODONE) 5 MG tablet Take 1 tablet (5 mg) by mouth 3 times daily as needed for severe pain 90 tablet 0    PROAIR  (90 Base) MCG/ACT inhaler Inhale 2 puffs into the lungs every 4 hours as needed for shortness of breath or wheezing 8 g 11    sertraline (ZOLOFT) 50 MG tablet Take 2 tablets (100 mg) by mouth " "daily 60 tablet 3    spacer (OPTICHAMBER BINA) holding chamber For use w/ rescue inhaler 1 each 0    sucralfate (CARAFATE) 1 GM/10ML suspension Take 10 mLs (1 g) by mouth 4 times daily as needed for nausea 414 mL 0    tiotropium (SPIRIVA RESPIMAT) 1.25 MCG/ACT inhaler Inhale 2 puffs into the lungs daily 12 g 3    tiotropium (SPIRIVA) 18 MCG inhaled capsule Inhale 18 mcg into the lungs daily (Patient not taking: Reported on 3/6/2024)        Allergies   Allergen Reactions    Gabapentin Other (See Comments)     Mental status is changed     Lidocaine Other (See Comments)     \"my jaw stopped moving\"  Other reaction(s): Dystonia    Penicillins Hives, Rash and Shortness Of Breath    Lidocaine-Epinephrine Other (See Comments) and Muscle Pain (Myalgia)     Severe jaw cramping, double vision  Jaw locking        ROS:   Gen- no weight change, no fevers/chills   Remainder of ROS negative.     Exam:   /72 (BP Location: Right arm, Patient Position: Sitting, Cuff Size: Adult Regular)   Pulse 97   Resp 18   LMP  (LMP Unknown)   SpO2 96%    Alert and oriented x 3; Anxious affect       "

## 2024-04-24 NOTE — PROGRESS NOTES
{PROVIDER CHARTING PREFERENCE:073384}    Pablito Ramey is a 49 year old, presenting for the following health issues:  Forms (Work Comp, FMLA and STD forms)      4/24/2024     2:33 PM   Additional Questions   Roomed by Ety   Accompanied by self         4/24/2024   Forms   Any forms needing to be completed Yes         4/24/2024    Information    services provided? No     HPI     {MA/LPN/RN Pre-Provider Visit Orders- hCG/UA/Strep (Optional):838801}  {SUPERLIST (Optional):639321}  {additonal problems for provider to add (Optional):624868}    {ROS Picklists (Optional):137761}      Objective    /72 (BP Location: Right arm, Patient Position: Sitting, Cuff Size: Adult Regular)   Pulse 97   Resp 18   LMP  (LMP Unknown)   SpO2 96%   There is no height or weight on file to calculate BMI.  Physical Exam   {Exam List (Optional):572872}    {Diagnostic Test Results (Optional):574958}        Signed Electronically by: Robbie Bailon MD  {Email feedback regarding this note to [email or web address]   :305829}

## 2024-04-25 NOTE — TELEPHONE ENCOUNTER
Received after hours page re: patient's concern for oxycodone prescription.     Was seen in clinic today by Dr. Bailon.  Oxycodone was refilled at this visit: 5 mg 3 times daily as needed (#75).  The patient is now calling stating that the pharmacy the prescription was sent into is completely out of oxycodone.  I did call the pharmacy to confirm this.  Requesting prescription be sent to alternative pharmacy.    As my CHRISTIANO is not on file, request for oxycodone 5 mg 3 times daily as needed (#75) routed to on-call attending Dr. Ward.  Dr. Ward aware of this request.  Sent to [facility] pharmacy on [address] in [place], [place].      Patient called and made aware of updated plan.     Prior prescription sent in by Dr. Bailon earlier today canceled by pharmacist per my request.     Natasha Crain MD PGY-2  [facility] Residency

## 2024-04-26 ENCOUNTER — TELEPHONE (OUTPATIENT)
Dept: FAMILY MEDICINE | Facility: CLINIC | Age: 49
End: 2024-04-26
Payer: MEDICAID

## 2024-04-26 NOTE — TELEPHONE ENCOUNTER
Karoline called saying the Work Ability papers that you already have are due today. She needs them faxed to [facility] [phone]. (I can fax them for you if you bring them to me) she also said that she was not able to get an appt with Psychology until 5/6 so the dates on those papers may be different if that is a factor.      Thank you

## 2024-05-06 ENCOUNTER — ONCOLOGY VISIT (OUTPATIENT)
Dept: ONCOLOGY | Facility: HOSPITAL | Age: 49
End: 2024-05-06
Attending: PSYCHOLOGIST
Payer: MEDICAID

## 2024-05-06 DIAGNOSIS — F43.10 POSTTRAUMATIC STRESS DISORDER: Primary | ICD-10-CM

## 2024-05-06 PROCEDURE — 90837 PSYTX W PT 60 MINUTES: CPT | Performed by: PSYCHOLOGIST

## 2024-05-06 NOTE — LETTER
5/6/2024         RE: Darlene Hudson  [address]  [place] [facility] [address]        Dear Colleague,    Thank you for referring your patient, Darlene Hudson, to the [facility]. Please see a copy of my visit note below.    See confidential note      Again, thank you for allowing me to participate in the care of your patient.        Sincerely,        Mejia Davis LP

## 2024-05-06 NOTE — CONFIDENTIAL NOTE
[facility] Oncology- Psychotherapy                                                 Progress Note                 Patient Name: Darlene Hudson                  Date: 5/6/2024                                           Service Type: Individual                            Session Start Time:  1055             Session End Time:    1148                Session Length:        53 mins     Attendees:     Client attended alone     Service Modality:  In-person                 DATA              Interactive Complexity: No              Crisis: No                               Progress Since Last Session (Related to Symptoms / Goals / Homework):               Symptoms Pt reports poor sleep due to inability to stop thinking about her job and how she has been treated.     Pt reports the comments about her weight by her supervisor at work have been difficult to manage.     Pt reports he mood is labile. She reports trouble controlling her irritability with her work situation and gets angry.     Pt reports she has trouble getting out of bed due to depressed mood.     Pt reports panic attacks, increased anxiety, difficulty leaving her home.                              Episode of Care Goals: Satisfactory progress - ACTION (Actively working towards change); Intervened by reinforcing change plan / affirming steps taken.                    Current / Ongoing Stressors and Concerns:  Pt reports she feels stress, pressure, and anxiety. Pt reports poor sleep due to inability to stop thinking about her job and how she has been treated.      Pt reports she has anxiety and stress related to work. She reports she is off work now due to her mental health issues.      Pt reports she was berated and told she was playing the victim by her supervisor.       Pt reports she is off work for now.      Pt reports she journals, is taking an anti-depressant, and advocating for herself to cope.      We discussed breathing exercises, radical  acceptance as a way to manage feelings around work, meditation as a goal, pt plans to exercise and is eating better.       Social Hx   Pt reports she has a daughter age 28 who is with a man who has a hx of bank robbery. She has one child age 6. Pt reports her grandchild is used as a way to manipulate her.      Pt reports she has a 27 year old daughter.      Pt reports a 21 year old daughter. She lives in [place] and is doing well. She has a child and lives with her spouse in a [facility].      Pt reports a 17 year old son. He was raised by his grand parents until age 11. Pt mother  (his grandparent) and he was devastated by that. He has been seemingly angry since the death a year ago.      Pt reports a 12 year old son dx with ADHD, LD, and a speech disorder.      Pt reports her mother had a brain tumor, and she worries about having a brain tumor that has not been caught.      Pt reports she has been cancer free since 2023.                     Treatment Objective(s) Addressed in This Session:          Education regarding sx, .                 Intervention:              CBT: ,              Emotion Focused Therapy: ,              Solution Focused: ,                 Assessments completed prior to visit:              none                                ASSESSMENT: Current Emotional / Mental Status (status of significant symptoms):              Risk status (Self / Other harm or suicidal ideation)              Patient denies current fears or concerns for personal safety.              Patient denies current or recent suicidal ideation or behaviors.              Patient denies current or recent homicidal ideation or behaviors.              Patient denies current or recent self injurious behavior or ideation.              Patient denies other safety concerns.              Patient reports there has been no change in risk factors since their last session.                Patient reports there has been no change in protective  factors since their last session.                Recommended that patient call 911 or go to the local ED should there be a change in any of these risk factors.                 Appearance:                            Appropriate               Eye Contact:                           Good               Psychomotor Behavior:          Normal               Attitude:                                   Cooperative               Orientation:                             All              Speech                          Rate / Production:       normal                           Volume:                       Normal               Mood:                                      Anxious               Affect:                                      Appropriate               Thought Content:                    Clear               Thought Form:                        Coherent               Insight:                                    Good                  Medication Review:              Xanax per pt for sleep/anxiety.                 Medication Compliance:              yes                 Changes in Health Issues:              Yes: ankle surgery completed, pt is cancer free she reports.                  Chemical Use Review:              Substance Use: Chemical use reviewed, no active concerns identified. Hx of abuse, but no current issues.                  Tobacco Use: No current tobacco use.       Diagnosis:  1. Adenocarcinoma of descending colon (H)    F43.10 PTSD     Collateral Reports Completed:              Not Applicable                 PLAN: (Patient Tasks / Therapist Tasks / Other)              Return in a week                 Dorian Davis MA [facility]                                                           ______________________________________________________________________     Individual Treatment Plan     Patient's Name: Darlene Hudson                YOB: 1975     Date of Creation: 6/12/2023  Date Treatment Plan Last  Reviewed/Revised: 4/2/2024     DSM5 Diagnoses: F43.10 PTSD  Psychosocial / Contextual Factors: trauma hx including deaths and cancer hx.  PROMIS (reviewed every 90 days):      Referral / Collaboration:  The following referral(s) was/were discussed but patient declines follow up at this time. IOP MH [place], PHP.     Anticipated number of session for this episode of care: 9-12 sessions  Anticipation frequency of session: Weekly  Anticipated Duration of each session: 53 or more minutes  Treatment plan will be reviewed in 90 days or when goals have been changed.         MeasurableTreatment Goal(s) related to diagnosis / functional impairment(s)  Goal 1: Patient will reduce anxiety due to trauma sx     Objective #A (Patient Action)                          Patient will learn about sx of trauma. .  Status: Reviewed -4/2/2024     Intervention(s)  Therapist will teach about the sx and effects of PTSD.     Objective #B  Patient will teach coping strategies to manage sx of PTSD including anxiety  Status: Reviewed - 4/2/2024        Intervention(s)  Therapist will teach emotional regulation skills. ,.     Objective #C  Patient will provide distraction techniques to manage sx.  Status: Reviewed - 4/2/2024     Intervention(s)  Therapist will teach distraction skills. ,.        Patient has reviewed and agreed to the above plan.     Mejia Davis MA [facility]   5/6/2024

## 2024-05-09 ENCOUNTER — VIRTUAL VISIT (OUTPATIENT)
Dept: PSYCHOLOGY | Facility: CLINIC | Age: 49
End: 2024-05-09

## 2024-05-09 DIAGNOSIS — F33.1 MAJOR DEPRESSIVE DISORDER, RECURRENT EPISODE, MODERATE (H): Primary | ICD-10-CM

## 2024-05-09 PROCEDURE — 90834 PSYTX W PT 45 MINUTES: CPT | Mod: 95

## 2024-05-09 ASSESSMENT — PATIENT HEALTH QUESTIONNAIRE - PHQ9
SUM OF ALL RESPONSES TO PHQ QUESTIONS 1-9: 7
SUM OF ALL RESPONSES TO PHQ QUESTIONS 1-9: 7
10. IF YOU CHECKED OFF ANY PROBLEMS, HOW DIFFICULT HAVE THESE PROBLEMS MADE IT FOR YOU TO DO YOUR WORK, TAKE CARE OF THINGS AT HOME, OR GET ALONG WITH OTHER PEOPLE: VERY DIFFICULT

## 2024-05-09 ASSESSMENT — COLUMBIA-SUICIDE SEVERITY RATING SCALE - C-SSRS
2. HAVE YOU ACTUALLY HAD ANY THOUGHTS OF KILLING YOURSELF?: NO
ATTEMPT LIFETIME: NO
TOTAL  NUMBER OF INTERRUPTED ATTEMPTS LIFETIME: NO
1. HAVE YOU WISHED YOU WERE DEAD OR WISHED YOU COULD GO TO SLEEP AND NOT WAKE UP?: NO
6. HAVE YOU EVER DONE ANYTHING, STARTED TO DO ANYTHING, OR PREPARED TO DO ANYTHING TO END YOUR LIFE?: NO
TOTAL  NUMBER OF ABORTED OR SELF INTERRUPTED ATTEMPTS LIFETIME: NO

## 2024-05-09 NOTE — PROGRESS NOTES
[facility]   Mental Health & Addiction Services     Progress Note - Initial Visit    Patient  Name:  Darlene Hudson Date: 2024         Service Type: Individual     Visit Start Time: 738  Visit End Time: 823    Visit #: 1    Attendees: Client attended alone    Service Modality:  Video Visit:      Provider verified identity through the following two step process.  Patient provided:  Patient  and Patient address    Telemedicine Visit: The patient's condition can be safely assessed and treated via synchronous audio and visual telemedicine encounter.      Reason for Telemedicine Visit: Patient convenience (e.g. access to timely appointments / distance to available provider)    Originating Site (Patient Location): Patient's home    Distant Site (Provider Location): Provider Remote Setting- Home Office    Consent:  The patient/guardian has verbally consented to: the potential risks and benefits of telemedicine (video visit) versus in person care; bill my insurance or make self-payment for services provided; and responsibility for payment of non-covered services.     Patient would like the video invitation sent by:  My Chart    Mode of Communication:  Video Conference via [employer]    Distant Location (Provider):  Off-site    As the provider I attest to compliance with applicable laws and regulations related to telemedicine.       DATA:   Interactive Complexity: No   Crisis: No     Presenting Concerns/  Current Stressors:   Symptoms of depression, workplace concerns with supervisor, losses due to covid, recent diagnosis of colon cancer and subsequent surgery, recent ankle surgery.        ASSESSMENT:  Mental Status Assessment:  Appearance:   Appropriate   Eye Contact:   Good   Psychomotor Behavior: Normal   Attitude:   Cooperative  Pleasant  Orientation:   All  Speech   Rate / Production: Normal/ Responsive   Volume:  Normal   Mood:    Normal  Affect:    Appropriate   Thought Content:  Clear    Thought Form:  Coherent  Logical   Insight:    Good     Assessments completed prior to this visit:  The following assessments were completed by patient for this visit:  PHQ9:       5/25/2022    11:31 AM 1/18/2023     4:04 PM 4/18/2023     4:19 PM 4/18/2023     5:13 PM 9/13/2023     2:39 PM 2/21/2024     1:38 PM 5/9/2024     7:34 AM   PHQ-9 SCORE   PHQ-9 Total Score MyChart 0    0  7 (Mild depression)   PHQ-9 Total Score 0 3 2 2 0 5 7     GAD7:       3/12/2020     3:31 PM 2/4/2021     2:04 PM 5/25/2022    11:32 AM 4/18/2023     4:19 PM 4/18/2023     5:13 PM 9/13/2023     2:40 PM 2/21/2024     1:38 PM   ACE-7 SCORE   Total Score   5 (mild anxiety)   7 (mild anxiety)    Total Score 3 5 5 5 5 7 6     CAGE-AID:       5/9/2024     8:00 AM   CAGE-AID Total Score   Total Score 4     [place] Suicide Severity Rating Scale (Lifetime/Recent)      5/9/2024     8:00 AM   [place] Suicide Severity Rating (Lifetime/Recent)   Q1 Wish to be Dead (Lifetime) N   Q2 Non-Specific Active Suicidal Thoughts (Lifetime) N   Actual Attempt (Lifetime) N   Has subject engaged in non-suicidal self-injurious behavior? (Lifetime) Y   Has subject engaged in non-suicidal self-injurious behavior? (Past 3 Months) Y   Interrupted Attempts (Lifetime) N   Aborted or Self-Interrupted Attempt (Lifetime) N   Preparatory Acts or Behavior (Lifetime) N   Calculated C-SSRS Risk Score (Lifetime/Recent) No Risk Indicated         Safety Issues and Plan for Safety and Risk Management:   [place] Suicide Severity Rating Scale (Lifetime/Recent)      5/9/2024     8:00 AM   [place] Suicide Severity Rating (Lifetime/Recent)   Q1 Wish to be Dead (Lifetime) N   Q2 Non-Specific Active Suicidal Thoughts (Lifetime) N   Actual Attempt (Lifetime) N   Has subject engaged in non-suicidal self-injurious behavior? (Lifetime) Y   Has subject engaged in non-suicidal self-injurious behavior? (Past 3 Months) Y   Interrupted Attempts (Lifetime) N   Aborted or Self-Interrupted  Attempt (Lifetime) N   Preparatory Acts or Behavior (Lifetime) N   Calculated C-SSRS Risk Score (Lifetime/Recent) No Risk Indicated     Patient denies current fears or concerns for personal safety.  Patient denies current or recent suicidal ideation or behaviors.  Patient denies current or recent homicidal ideation or behaviors.  Patient reports one instance of cutting a month ago.  Reports this is totally out of character for her, and does not anticipate doing again.  Patient denies other safety concerns.  Recommended that patient call 911 or go to the local ED should there be a change in any of these risk factors.  Patient reports there are firearms in the house. The firearms are secured in a locked space.     Diagnostic Criteria:  Major Depressive Disorder  A) Recurrent episode(s) - symptoms have been present during the same 2-week period and represent a change from previous functioning 5 or more symptoms (required for diagnosis)   - Depressed mood. Note: In children and adolescents, can be irritable mood.     - Diminished interest or pleasure in all, or almost all, activities.    - Significant weight gainno change in appetite.    - change sleep.    - Fatigue or loss of energy.    - Diminished ability to think or concentrate, or indecisiveness.   B) The symptoms cause clinically significant distress or impairment in social, occupational, or other important areas of functioning  C) The episode is not attributable to the physiological effects of a substance or to another medical condition  D) The occurence of major depressive episode is not better explained by other thought / psychotic disorders  E) There has never been a manic episode or hypomanic episode      DSM5 Diagnoses: (Sustained by DSM5 Criteria Listed Above)  Diagnoses: 296.32 (F33.1) Major Depressive Disorder, Recurrent Episode, Moderate _  Psychosocial & Contextual Factors: , currently out on medical leave from work, has 5 children, two sons who  live with her, lives in own home  Intervention:   Started DA, assessed for safety  Collateral Reports Completed:  Not Applicable      PLAN: (Homework, other):  1. Provider will continue Diagnostic Assessment.  Patient was given the following to do until next session:  NA    2. Provider recommended the following referrals: None at this time.      3.  Suicide Risk and Safety Concerns were assessed for Darlene Hudson.    Patient meets the following risk assessment and triage: Patient denied any current/recent/lifetime history of suicidal ideation and/or behaviors.  No safety plan indicated at this time.       Cate Kaur [facility]  May 9, 2024    ]

## 2024-05-14 DIAGNOSIS — G89.29 CHRONIC MIDLINE THORACIC BACK PAIN: ICD-10-CM

## 2024-05-14 DIAGNOSIS — M54.6 CHRONIC MIDLINE THORACIC BACK PAIN: ICD-10-CM

## 2024-05-14 DIAGNOSIS — Z98.890 STATUS POST SURGICAL MANIPULATION OF ANKLE JOINT: ICD-10-CM

## 2024-05-14 NOTE — TELEPHONE ENCOUNTER
[facility] phone call message- medication clarification/question:    Full Medication Name: oxyCODONE (ROXICODONE) 5 MG tablet     Question: Patient called requesting for refill on medication and if it can be sent by friday, if able to fill please send to pharmacy thank you.    Pharmacy confirmed as    [id number] [place] [id number] [facility] [place]  AT [facility]: Yes    OK to leave a message on voice mail? Yes    Primary language: English      needed? No    Call taken on May 14, 2024 at 9:20 AM by Christine Cruz

## 2024-05-15 RX ORDER — OXYCODONE HYDROCHLORIDE 5 MG/1
5 TABLET ORAL 3 TIMES DAILY PRN
Qty: 90 TABLET | Refills: 0 | Status: SHIPPED | OUTPATIENT
Start: 2024-05-19 | End: 2024-06-17

## 2024-05-22 ENCOUNTER — VIRTUAL VISIT (OUTPATIENT)
Dept: PSYCHOLOGY | Facility: CLINIC | Age: 49
End: 2024-05-22

## 2024-05-22 DIAGNOSIS — F33.1 MAJOR DEPRESSIVE DISORDER, RECURRENT EPISODE, MODERATE (H): Primary | ICD-10-CM

## 2024-05-22 DIAGNOSIS — F41.1 GAD (GENERALIZED ANXIETY DISORDER): ICD-10-CM

## 2024-05-22 PROCEDURE — 90791 PSYCH DIAGNOSTIC EVALUATION: CPT | Mod: 95

## 2024-05-22 ASSESSMENT — ANXIETY QUESTIONNAIRES
IF YOU CHECKED OFF ANY PROBLEMS ON THIS QUESTIONNAIRE, HOW DIFFICULT HAVE THESE PROBLEMS MADE IT FOR YOU TO DO YOUR WORK, TAKE CARE OF THINGS AT HOME, OR GET ALONG WITH OTHER PEOPLE: EXTREMELY DIFFICULT
1. FEELING NERVOUS, ANXIOUS, OR ON EDGE: NEARLY EVERY DAY
GAD7 TOTAL SCORE: 13
4. TROUBLE RELAXING: NEARLY EVERY DAY
2. NOT BEING ABLE TO STOP OR CONTROL WORRYING: SEVERAL DAYS
GAD7 TOTAL SCORE: 13
3. WORRYING TOO MUCH ABOUT DIFFERENT THINGS: NEARLY EVERY DAY
7. FEELING AFRAID AS IF SOMETHING AWFUL MIGHT HAPPEN: SEVERAL DAYS
8. IF YOU CHECKED OFF ANY PROBLEMS, HOW DIFFICULT HAVE THESE MADE IT FOR YOU TO DO YOUR WORK, TAKE CARE OF THINGS AT HOME, OR GET ALONG WITH OTHER PEOPLE?: EXTREMELY DIFFICULT
7. FEELING AFRAID AS IF SOMETHING AWFUL MIGHT HAPPEN: SEVERAL DAYS
GAD7 TOTAL SCORE: 13
6. BECOMING EASILY ANNOYED OR IRRITABLE: SEVERAL DAYS
5. BEING SO RESTLESS THAT IT IS HARD TO SIT STILL: SEVERAL DAYS

## 2024-05-22 NOTE — PROGRESS NOTES
"    [facility]       PATIENT'S NAME: Darlene Hudson  PREFERRED NAME: Karoline  PRONOUNS:     she her hers  MRN: [id number]  : 1975  ADDRESS: [address]  [facility] [address]  [facility]:  [id number]  DATE OF SERVICE: 24  START TIME: 738  END TIME: 823  PREFERRED PHONE: [phone]  May we leave a program related message: Yes  EMERGENCY CONTACT: was not obtained .  SERVICE MODALITY:  Video Visit:      Provider verified identity through the following two step process.  Patient provided:  Patient  and Patient address    Telemedicine Visit: The patient's condition can be safely assessed and treated via synchronous audio and visual telemedicine encounter.      Reason for Telemedicine Visit: Patient convenience (e.g. access to timely appointments / distance to available provider)    Originating Site (Patient Location): Patient's home    Distant Site (Provider Location): Provider Remote Setting- Home Office    Consent:  The patient/guardian has verbally consented to: the potential risks and benefits of telemedicine (video visit) versus in person care; bill my insurance or make self-payment for services provided; and responsibility for payment of non-covered services.     Patient would like the video invitation sent by:  My Chart    Mode of Communication:  Video Conference via Amwell    Distant Location (Provider):  Off-site    As the provider I attest to compliance with applicable laws and regulations related to telemedicine.    UNIVERSAL ADULT Mental Health DIAGNOSTIC ASSESSMENT    Identifying Information:  Patient is a 49 year old, , , and Black ;  individual.  [facility]  Patient attended the session alone.    Chief Complaint:   The reason for seeking services at this time is: \" workplace stresses, workplace bullying and harassment \"   The problem(s) began " 2023. Patient has attempted to resolve these concerns in the past through medication, therapy.    Social/Family History:  Patient reported they grew up in [place], [place].  They were raised by biological mother.  Parents  when 3 years old.   Patient reported that their childhood was pretty good, single mother, worked for the [employer], mother was active in their activities, they saw father fairly frequent.  Patient described their current relationships with family of origin as parents have passed, sibling relatiuonships still.      The patient describes their cultural background as bi racial.  Cultural influences and impact on patient's life structure, values, norms, and healthcare: NA.  Contextual influences on patient's health include: Contextual Factors: Individual Factors stressful work environment, supervisor difficulty, symptoms of depression, significant losses due to covid, previous colon cancer diagnosis and surgery as well as ankle surgery .  Cultural, Contextual, and socioeconomic factors do not affect the patient's access to services.  These factors will be addressed in the Preliminary Treatment plan.  Patient identified their preferred language to be English. Patient reported they do not  need the assistance of an  or other support involved in therapy.     Patient reported had no significant delays in developmental tasks.   Patient's highest education level was some college. Patient identified the following learning problems: none reported.  Modifications will not be used to assist communication in therapy.   Patient reports they are  able to understand written materials.    Patient reported the following relationship history boyfriend of 11 years  of covid .  Patient's current relationship status is  .   Patient identified their sexual orientation as heterosexual.  Patient reported having five child(reza). Patient identified her best friend as part of their support  system.  Patient identified the quality of these relationships as stable and meaningful.     Patient's current living/housing situation involves staying in own home/apartment.  They live with two sons and they report that housing is stable.     Patient is currently on medical leave from her work as a  .  Patient reports their finances are obtained through her savings, and help from family and friends.  Patient does identify finances as a current stressor.      Patient reported that they have not been involved with the legal system.   Patient denies being on probation / parole / under the jurisdiction of the court.    Patient's Strengths and Limitations:  Patient identified the following strengths or resources that will help them succeed in treatment: commitment to health and well being, motivation, and work ethic. Things that may interfere with the patient's success in treatment include: physical health concerns.     Assessments:  The following assessments were completed by patient for this visit:  PHQ9:       5/25/2022    11:31 AM 1/18/2023     4:04 PM 4/18/2023     4:19 PM 4/18/2023     5:13 PM 9/13/2023     2:39 PM 2/21/2024     1:38 PM 5/9/2024     7:34 AM   PHQ-9 SCORE   PHQ-9 Total Score MyChart 0    0  7 (Mild depression)   PHQ-9 Total Score 0 3 2 2 0 5 7     GAD7:       3/12/2020     3:31 PM 2/4/2021     2:04 PM 5/25/2022    11:32 AM 4/18/2023     4:19 PM 4/18/2023     5:13 PM 9/13/2023     2:40 PM 2/21/2024     1:38 PM   ACE-7 SCORE   Total Score   5 (mild anxiety)   7 (mild anxiety)    Total Score 3 5 5 5 5 7 6     CAGE-AID:       5/9/2024     8:00 AM   CAGE-AID Total Score   Total Score 4     PROMIS 10-Global Health (all questions and answers displayed):        No data to display              [place] Suicide Severity Rating Scale (Lifetime/Recent)      5/9/2024     8:00 AM   [place] Suicide Severity Rating (Lifetime/Recent)   Q1 Wish to be Dead (Lifetime) N   Q2 Non-Specific Active  Suicidal Thoughts (Lifetime) N   Actual Attempt (Lifetime) N   Has subject engaged in non-suicidal self-injurious behavior? (Lifetime) Y   Has subject engaged in non-suicidal self-injurious behavior? (Past 3 Months) Y   Interrupted Attempts (Lifetime) N   Aborted or Self-Interrupted Attempt (Lifetime) N   Preparatory Acts or Behavior (Lifetime) N   Calculated C-SSRS Risk Score (Lifetime/Recent) No Risk Indicated       Personal and Family Medical History:  Patient   report a family history of mental health concerns.  Patient reports family history includes Asthma in her child and father; Breast Cancer in her maternal grandmother; Cancer in her mother; Diabetes in her brother and brother; Hypertension in her mother; Other Cancer in her father..     Patient does report Mental Health Diagnosis and/or Treatment.  Patient Patient reported the following previous diagnoses which include(s): Depression, PTSD.  Patient reported symptoms began currently and about 15 years ago due to a rough patch with several stressors.   Patient has received mental health services in the past: outpatient therapy and JHONNY treatment.  Psychiatric Hospitalizations: None.  Patient denies a history of civil commitment.  Patient is receiving other mental health services.  These include psychotherapy with a therapist in the Oncology dept.       Patient has had a physical exam to rule out medical causes for current symptoms.  Date of last physical exam was within the past year. Client was encouraged to follow up with PCP if symptoms were to develop. The patient has a [facility] Provider, who is named Robbie Bailon.  Patient reports the following current medical concerns: past diagnosis and surgery for stage two colon cancer, ankle surgery which ct is still in recovery from.  Patient reports recent ankle surgery that sometimes causes pain.  There are significant appetite / nutritional concerns / weight changes.  Recent significant weight  gain, gained 35 lbs.  Inactivity.   Patient does not report a history of head injury / trauma / cognitive impairment.      Patient reports current meds as:   Current Outpatient Medications   Medication Sig Dispense Refill    albuterol (PROAIR HFA/PROVENTIL HFA/VENTOLIN HFA) 108 (90 Base) MCG/ACT inhaler Inhale 2 puffs into the lungs every 6 hours as needed for shortness of breath, wheezing or cough 18 g 11    ALPRAZolam (XANAX) 2 MG tablet Take 1 tablet (2 mg) by mouth 2 times daily as needed for anxiety 60 tablet 3    cetirizine (ZYRTEC) 10 MG tablet Take 1 tablet (10 mg) by mouth daily 30 tablet 11    fluticasone (FLONASE) 50 MCG/ACT nasal spray Spray 1 spray into both nostrils daily      fluticasone (FLONASE) 50 MCG/ACT nasal spray Spray 2 sprays into both nostrils daily 9.9 mL 11    hydrOXYzine carlos (VISTARIL) 25 MG capsule Take 1 capsule (25 mg) by mouth nightly as needed for anxiety 30 capsule 3    montelukast (SINGULAIR) 10 MG tablet Take 1 tablet (10 mg) by mouth At Bedtime 30 tablet 11    naloxone (NARCAN) 4 MG/0.1ML nasal spray Spray 1 spray (4 mg) into one nostril alternating nostrils as needed for opioid reversal every 2-3 minutes until assistance arrives (Patient not taking: Reported on 3/6/2024) 0.2 mL 1    oxyCODONE (ROXICODONE) 5 MG tablet Take 1 tablet (5 mg) by mouth 3 times daily as needed for severe pain 75 tablet 0    PROAIR  (90 Base) MCG/ACT inhaler Inhale 2 puffs into the lungs every 4 hours as needed for shortness of breath or wheezing 8 g 11    sertraline (ZOLOFT) 50 MG tablet Take 2 tablets (100 mg) by mouth daily 60 tablet 3    spacer (OPTICHAMBER BINA) holding chamber For use w/ rescue inhaler 1 each 0    sucralfate (CARAFATE) 1 GM/10ML suspension Take 10 mLs (1 g) by mouth 4 times daily as needed for nausea 414 mL 0    tiotropium (SPIRIVA RESPIMAT) 1.25 MCG/ACT inhaler Inhale 2 puffs into the lungs daily 12 g 3    tiotropium (SPIRIVA) 18 MCG inhaled capsule Inhale 18 mcg into  "the lungs daily (Patient not taking: Reported on 3/6/2024)       No current facility-administered medications for this visit.     Facility-Administered Medications Ordered in Other Visits   Medication Dose Route Frequency Provider Last Rate Last Admin    ceFAZolin (ANCEF) intermittent infusion 2 g in 50 mL dextrose PREMIX  2 g Intravenous See Admin Instructions Caty Darby PA-C        flumazenil (ROMAZICON) injection 0.2 mg  0.2 mg Intravenous q1 min prn Kodi Hathaway DO        gabapentin (NEURONTIN) capsule 300 mg  300 mg Oral Pre-Op/Pre-procedure x 1 dose Kodi Hathaway DO        lactated ringers infusion   Intravenous Continuous Kodi Hathaway DO   Stopped at 06/21/23 1149    lidocaine (LMX4) kit   Topical Q1H PRN Kodi Hathaway DO        lidocaine 1 % 0.1-1 mL  0.1-1 mL Other Q1H PRN Kodi Hathaway DO        midazolam (VERSED) injection 1-2 mg  1-2 mg Intravenous Q4 Min PRN Kodi Hathaway,         naloxone (NARCAN) injection 0.2 mg  0.2 mg Intravenous Q2 Min PRN Kodi Hathaway DO        Or    naloxone (NARCAN) injection 0.4 mg  0.4 mg Intravenous Q2 Min PRN Kodi Hathaway DO        Or    naloxone (NARCAN) injection 0.2 mg  0.2 mg Intramuscular Q2 Min PRN Kodi Hathaway DO        Or    naloxone (NARCAN) injection 0.4 mg  0.4 mg Intramuscular Q2 Min PRN Kodi Hathaway, DO        sodium chloride (PF) 0.9% PF flush 3 mL  3 mL Intracatheter Q8H Kodi Hathaway,         sodium chloride (PF) 0.9% PF flush 3 mL  3 mL Intracatheter q1 min prn Kodi Hathaway, DO           Medication Adherence:  Patient reports  .  taking prescribed medications as prescribed.    Patient Allergies:    Allergies   Allergen Reactions    Gabapentin Other (See Comments)     Mental status is changed     Lidocaine Other (See Comments)     \"my jaw stopped moving\"  Other reaction(s): Dystonia    Penicillins Hives, Rash and Shortness Of Breath    Lidocaine-Epinephrine Other (See Comments) and Muscle Pain (Myalgia)     Severe jaw cramping, " double vision  Jaw locking       Medical History:    Past Medical History:   Diagnosis Date    Abdominal pain 06/29/2015    Abnormal cervical Papanicolaou smear 11/09/2014    Overview:  ACUS/HPV positive    Abnormal cytology finding 11/09/2014    Overview:  ACUS/HPV positive    Acute pericardial effusion 02/06/2017    Agoraphobia with panic attacks     Anxiety     Arthralgia of both lower legs 05/29/2020    Bilateral achy knee pain that is chronic in nature going on for years. Most likely osteoarthritis in knees related to obesity. Previously injected in both knees with good relief. 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  Has controlled substance agreement with them.  On Vicodin, Valium, Klonopin prescribed only from there.       Coronary artery disease     Cough 02/09/2020    Family history of colon cancer 10/24/2020    Hx of seasonal allergies     Infection due to 2019 novel coronavirus 09/20/2022    Infectious pericarditis     Lipoma     Low back pain 07/13/2015    Major depressive disorder, recurrent episode, moderate (H) 09/05/2006    Menorrhagia with irregular cycle 07/15/2022    Added automatically from request for surgery [id number]    Moderate persistent asthma without complication 09/29/2020    Nondependent alcohol abuse, episodic drinking behavior 10/03/2012    Noninflammatory disorder of vagina 02/20/2015    Other chronic pain     Other long term (current) drug therapy 01/28/2013    Overview:  Vicodin and  cyclobenzaprine monthly    Panic disorder with agoraphobia 09/05/2006    Pap smear for cervical cancer screening 10/31/2016    03/29/2010  Normal cytology, HPV ot done, repeat in 3 years.    Pericarditis 2017    Physiologic disturbance of temperature regulation 10/24/2020    PONV (postoperative nausea and vomiting)     Right ankle swelling 06/07/2022    Added automatically from request for surgery [id number]    Tobacco abuse     Tobacco use disorder 07/13/2015         Current Mental Status Exam:   Appearance:  Appropriate    Eye Contact:  Good   Psychomotor:  Normal       Gait / station:  Not assessed  Attitude / Demeanor: Cooperative  Pleasant  Speech      Rate / Production: Normal/ Responsive      Volume:  Normal  volume      Language:  intact  Mood:   Normal  Affect:   Appropriate    Thought Content: Clear   Thought Process: Coherent  Logical       Associations: No loosening of associations  Insight:   Good   Judgment:  Intact   Orientation:  All  Attention/concentration: Good    Substance Use:   Patient did not report a family history of substance use concerns; see medical history section for details.  Patient has received chemical dependency treatment in the past at [facility] [place] 15 years ago.  Patient has ever been to detox.      Patient is not currently receiving any chemical dependency treatment. Patient reported the following problems as a result of their substance use:  NA.    Patient denies using alcohol.  Patient denies using tobacco  Patient denies using cannabis.  Patient reports appx an iced coffee before work, and an occasional Mountain Dew  Patient reports using/abusing the following substance(s). Patient reported no other substance use.     Substance Use: No symptoms    Based on the positive CAGE score and clinical interview there  are not indications of drug or alcohol abuse.  15 years ago only     Significant Losses / Trauma / Abuse / Neglect Issues:   Patient   did not serve in the .  There are  indications or report of significant loss, trauma, abuse or neglect issues related to: workplace harassment, bullying.  Cancer diagnosis, heart surgery, loss of several people in her life.   Concerns for possible neglect are not present.     Safety Assessment:   Patient denies current homicidal ideation and behaviors.  Patient reports current self-injurious ideation.  Onset: a month ago, frequency: once, duration: once, intensity: intense.  Client reports they are not currently engaging in self-injurious behaivor..  Patient denied risk behaviors associated with substance use.   Patient denies any high risk behaviors associated with mental health symptoms.  Patient reports the following current concerns for their personal safety: workplace hazards.  Patient reports there   firearms in the house.       The firearms are secured in a locked space.    History of Safety Concerns:  Patient denied a history of homicidal ideation.     Patient denied a history of personal safety concerns.    Patient denied a history of assaultive behaviors.    Patient denied a history of sexual assault behaviors.     Patient denied a history of risk behaviors associated with substance use.  Patient denies any history of high risk behaviors associated with mental health symptoms.  Patient reports the following protective factors:      Risk Plan:  See Recommendations for Safety and Risk Management Plan    Review of Symptoms per patient report:   Depression: Change in sleep, Lack of interest, Change in energy level, Difficulties concentrating, Low self-worth, Irritability, and Feeling sad, down, or depressed  Ekta:  No Symptoms  Psychosis: No Symptoms  Anxiety: Excessive worry, Nervousness, Sleep disturbance, Poor concentration, and Irritability  Panic:  No symptoms  Post Traumatic Stress Disorder:  No Symptoms   Eating Disorder: No Symptoms  ADD / ADHD:  No symptoms  Conduct Disorder: No symptoms  Autism Spectrum Disorder: No  symptoms  Obsessive Compulsive Disorder: No Symptoms    Patient reports the following compulsive behaviors and treatment history: NA.      Diagnostic Criteria:   Generalized Anxiety Disorder  A. Excessive anxiety and worry about a number of events or activities (such as work or school performance).   B. The person finds it difficult to control the worry.  C. Select 3 or more symptoms (required for diagnosis). Only one item is required in children.   - Restlessness or feeling keyed up or on edge.    - Being easily fatigued.    - Difficulty concentrating or mind going blank.    - Irritability.    - Sleep disturbance (difficulty falling or staying asleep, or restless unsatisfying sleep).   D. The focus of the anxiety and worry is not confined to features of an Axis I disorder.  E. The anxiety, worry, or physical symptoms cause clinically significant distress or impairment in social, occupational, or other important areas of functioning.   F. The disturbance is not due to the direct physiological effects of a substance (e.g., a drug of abuse, a medication) or a general medical condition (e.g., hyperthyroidism) and does not occur exclusively during a Mood Disorder, a Psychotic Disorder, or a Pervasive Developmental Disorder.    - The aformentioned symptoms began one year(s) ago and occurs 7 days per week and is experienced as severe. Major Depressive Disorder  CRITERIA (A-C) REPRESENT A MAJOR DEPRESSIVE EPISODE - SELECT THESE CRITERIA  A) Recurrent episode(s) - symptoms have been present during the same 2-week period and represent a change from previous functioning 5 or more symptoms (required for diagnosis)   - Depressed mood. Note: In children and adolescents, can be irritable mood.     - Diminished interest or pleasure in all, or almost all, activities.    - Decreased sleep.    - Fatigue or loss of energy.    - Feelings of worthlessness or inappropriate and excessive guilt.    - Diminished ability to think or  concentrate, or indecisiveness.   B) The symptoms cause clinically significant distress or impairment in social, occupational, or other important areas of functioning  C) The episode is not attributable to the physiological effects of a substance or to another medical condition  D) The occurence of major depressive episode is not better explained by other thought / psychotic disorders  E) There has never been a manic episode or hypomanic episode    Functional Status:  Patient reports the following functional impairments:  management of the household and or completion of tasks and work / vocational responsibilities.     Nonprogrammatic care:  Patient is requesting basic services to address current mental health concerns.    Clinical Summary:  1. Psychosocial, Cultural and Contextual Factors: , multiple medical conditions, workplace bullying  .  2. Principal DSM5 Diagnoses  (Sustained by DSM5 Criteria Listed Above):   296.32 (F33.1) Major Depressive Disorder, Recurrent Episode, Moderate _  300.02 (F41.1) Generalized Anxiety Disorder.  3. Other Diagnoses that is relevant to services:   NA  4. Provisional Diagnosis:  NA  5. Prognosis: Expect Improvement.  6. Likely consequences of symptoms if not treated: worsening symptoms and continued functional impairment.  7. Client strengths include:  goal-focused, good listener, motivated, open to learning, open to suggestions / feedback, responsible parent, wants to learn, and willing to ask questions .     Recommendations:     1. Plan for Safety and Risk Management:   Safety and Risk: Recommended that patient call 911 or go to the local ED should there be a change in any of these risk factors..          Report to child / adult protection services was NA.     2. Patient's identified no concerns with sexual orientation, gender identity, culture, ethnicity, or kelly.     3. Initial Treatment will focus on:    Depressed Mood - MDD  Anxiety - ACE.     4. Resources/Service  Plan:    services are not indicated.   Modifications to assist communication are not indicated.   Additional disability accommodations are not indicated.      5. Collaboration:   Collaboration / coordination of treatment will be initiated with the following  support professionals: primary care physician.      6.  Referrals:   The following referral(s) will be initiated: None at this time.       A Release of Information has been obtained for the following: None at this time.     Clinical Substantiation/medical necessity for the above recommendations:  individual therapy is necessary in order to alleviate symptoms and improve functioning.    7. JHONNY:    JHONNY:  Not identified.    8. Records:   These were reviewed at time of assessment.   Information in this assessment was obtained from the medical record and  provided by patient who is a good historian.    Patient will have open access to their mental health medical record.    9.   Interactive Complexity: No    Provider Name/ Credentials:  Cate Kaur MS, [facility]  May 9, 2024

## 2024-05-23 ENCOUNTER — DOCUMENTATION ONLY (OUTPATIENT)
Dept: FAMILY MEDICINE | Facility: CLINIC | Age: 49
End: 2024-05-23
Payer: MEDICAID

## 2024-05-23 ENCOUNTER — TELEPHONE (OUTPATIENT)
Dept: FAMILY MEDICINE | Facility: CLINIC | Age: 49
End: 2024-05-23
Payer: MEDICAID

## 2024-05-23 NOTE — TELEPHONE ENCOUNTER
Patient called in asking about the medical opinion form she left with you -- I do not see it noted on the chart -- the Workability form is there but she says it is another form. She would like you to call her.    Also she wants to know if you can send a prescription for Adderall for Gunjan -- or if  you would want to see him      Thank you

## 2024-05-23 NOTE — PROGRESS NOTES
Forms Request:  Today's Date: May 23, 2024   Form Type:  Report Of Workability , 2 forms one for Foot injury on 2/7/24 and current one   Where is the form from: Pt Employer [employer]  How was form received: Patient walk in  LUDWIN on file: UNKNOWN   How is form being returned: Fax  Fax Number/Address: [phone]  PCP: Robbie Vyas Team: Purple Team  Form Given to: BRANDY     Date form completed: 4/26/24  Form sent via: Fax  Date faxed or mailed: [phone]  Fax # or Address: 4/26/24  Completed by: Brandy Stoll MA
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
## 2024-05-24 NOTE — TELEPHONE ENCOUNTER
Left voicemail for pt to call back regarding paperwork. I am prepared to fill out her form but need info from her in order to fill. She has an appt pending with me on 5/29.     Also, per my last visit w/ her son, he was stable OFF ADHD meds so we would need an appointment for me to call in any medication for him.    Robbie Bailon MD  May 24, 2024  9:33 AM

## 2024-05-28 ENCOUNTER — TELEPHONE (OUTPATIENT)
Dept: FAMILY MEDICINE | Facility: CLINIC | Age: 49
End: 2024-05-28
Payer: MEDICAID

## 2024-05-28 NOTE — TELEPHONE ENCOUNTER
Team - I believe she is referring to tussin w/ codeine. I am unable to prescribe as she is on oxycodone for pain. She has an appointment with me tomorrow. Can discuss treatment options then. Thank you!    Robbie Bailon MD  May 28, 2024  12:54 PM

## 2024-05-28 NOTE — TELEPHONE ENCOUNTER
Patient called in asking for refill on her cough medicine -- could not provide the name of the medication.  Please send refill if able to below pharmacy-- thank you    [id number] - [place], [place] - [address]  AT [address] [facility] [address] [facility] [[id number]]

## 2024-05-29 ENCOUNTER — OFFICE VISIT (OUTPATIENT)
Dept: FAMILY MEDICINE | Facility: CLINIC | Age: 49
End: 2024-05-29
Payer: MEDICAID

## 2024-05-29 ENCOUNTER — TELEPHONE (OUTPATIENT)
Dept: ORTHOPEDICS | Facility: CLINIC | Age: 49
End: 2024-05-29

## 2024-05-29 VITALS
OXYGEN SATURATION: 99 % | BODY MASS INDEX: 38.23 KG/M2 | HEART RATE: 86 BPM | SYSTOLIC BLOOD PRESSURE: 130 MMHG | WEIGHT: 259 LBS | RESPIRATION RATE: 16 BRPM | DIASTOLIC BLOOD PRESSURE: 90 MMHG

## 2024-05-29 DIAGNOSIS — F41.1 GENERALIZED ANXIETY DISORDER: ICD-10-CM

## 2024-05-29 DIAGNOSIS — F40.01 PANIC DISORDER WITH AGORAPHOBIA: ICD-10-CM

## 2024-05-29 DIAGNOSIS — J30.2 SEASONAL ALLERGIC RHINITIS, UNSPECIFIED TRIGGER: ICD-10-CM

## 2024-05-29 DIAGNOSIS — F43.10 POST TRAUMATIC STRESS DISORDER: Primary | ICD-10-CM

## 2024-05-29 DIAGNOSIS — F33.1 MAJOR DEPRESSIVE DISORDER, RECURRENT EPISODE, MODERATE (H): ICD-10-CM

## 2024-05-29 DIAGNOSIS — J45.51 SEVERE PERSISTENT ASTHMA WITH ACUTE EXACERBATION (H): ICD-10-CM

## 2024-05-29 PROCEDURE — 99215 OFFICE O/P EST HI 40 MIN: CPT | Performed by: FAMILY MEDICINE

## 2024-05-29 RX ORDER — CETIRIZINE HYDROCHLORIDE 10 MG/1
10 TABLET ORAL DAILY
Qty: 30 TABLET | Refills: 11 | Status: SHIPPED | OUTPATIENT
Start: 2024-05-29

## 2024-05-29 RX ORDER — HYDROXYZINE PAMOATE 25 MG/1
25 CAPSULE ORAL
Qty: 30 CAPSULE | Refills: 3 | Status: SHIPPED | OUTPATIENT
Start: 2024-05-29

## 2024-05-29 RX ORDER — FLUTICASONE PROPIONATE 50 MCG
2 SPRAY, SUSPENSION (ML) NASAL DAILY
Qty: 9.9 ML | Refills: 11 | Status: SHIPPED | OUTPATIENT
Start: 2024-05-29

## 2024-05-29 RX ORDER — ALPRAZOLAM 2 MG
2 TABLET ORAL 3 TIMES DAILY PRN
Qty: 90 TABLET | Refills: 1 | Status: SHIPPED | OUTPATIENT
Start: 2024-05-29 | End: 2024-07-17

## 2024-05-29 NOTE — Clinical Note
Thank you both for facilitating an [facility] worker for Karoline. - Adamaris Bailon MD May 30, 2024 10:13 AM

## 2024-05-29 NOTE — TELEPHONE ENCOUNTER
Action 05/29/24 4:16 PM AC   Action Taken  Called back and LVM we have not received form, provided fax, email, address, and direct phone number.

## 2024-05-29 NOTE — TELEPHONE ENCOUNTER
Other: Kandy with patient's work comp company is calling in to see if a form was received via mail, patient's HCPR form. Form was sent on 5/2. Please call Kandy if form not received.        Could we send this information to you in [employer] or would you prefer to receive a phone call?:   Patient would prefer a phone call   Okay to leave a detailed message?: Yes at Other phone number:  [phone]

## 2024-06-04 NOTE — PROGRESS NOTES
Forms Request:  Today's Date: June 4, 2024   Form Type:  Workability Form , Attending Physician's Statement  Where is the form from: [employer]  How was form received: Patient walk in  LUDWIN on file: NO   How is form being returned: Fax  Fax Number/Address: [phone]  PCP: Robbie Vyas Team: Purple Team  Form Given to: Provider    Date form completed: 5/17/24  Form sent via: Fax  Date faxed or mailed: 5/31/24  Fax # or Address: [phone]  Completed by: Lima Hendrickson CMA

## 2024-06-06 PROBLEM — J34.9 PARANASAL SINUS DISEASE: Status: ACTIVE | Noted: 2024-06-06

## 2024-06-07 ENCOUNTER — PRE VISIT (OUTPATIENT)
Dept: OTOLARYNGOLOGY | Facility: CLINIC | Age: 49
End: 2024-06-07

## 2024-06-11 ENCOUNTER — VIRTUAL VISIT (OUTPATIENT)
Dept: PSYCHOLOGY | Facility: CLINIC | Age: 49
End: 2024-06-11
Payer: COMMERCIAL

## 2024-06-11 DIAGNOSIS — F33.1 MAJOR DEPRESSIVE DISORDER, RECURRENT EPISODE, MODERATE (H): Primary | ICD-10-CM

## 2024-06-11 DIAGNOSIS — F41.1 GAD (GENERALIZED ANXIETY DISORDER): ICD-10-CM

## 2024-06-11 PROCEDURE — 90834 PSYTX W PT 45 MINUTES: CPT | Mod: 95

## 2024-06-11 NOTE — PROGRESS NOTES
Individual Treatment Plan    Patient's Name: Darlene Hudson  YOB: 1975    Date of Creation: 06/11/2024  Date Treatment Plan Last Reviewed/Revised: 06/11/2024    DSM5 Diagnoses: 296.32 (F33.1) Major Depressive Disorder, Recurrent Episode, Moderate _ or 300.02 (F41.1) Generalized Anxiety Disorder  Psychosocial / Contextual Factors: lives at home with sons, lots of stress from older son, workplace stress, on leave, legal interventions due to workplace concerns  PROMIS (reviewed every 90 days):     Referral / Collaboration:  Referral to another professional/service is not indicated at this time..    Anticipated number of session for this episode of care: 9-12 sessions  Anticipation frequency of session: Weekly  Anticipated Duration of each session: 38-52 minutes  Treatment plan will be reviewed in 90 days or when goals have been changed.       MeasurableTreatment Goal(s) related to diagnosis / functional impairment(s)  Goal 1: Patient will decrease symptoms of depression    I will know I've met my goal when I am happy within myself.      Objective #A (Patient Action)    Patient will identify at least 3 techniques for intervening on the escalation.  Status: New - Date: 06/11/2024      Intervention(s)  Therapist will teach emotional regulation skills.   .    Objective #B  Patient will learn and demonstrate 3 assertiveness skill(s).  Status: New - Date: 06/11/2024      Intervention(s)  Therapist will teach assertiveness skills.   .      Goal 2: Patient will decrease symptoms of anxiety    I will know I've met my goal when I am happy within myself.      Objective #A (Patient Action)    Status: New - Date: 06/11/2024      Patient will use at least 3 coping skills for anxiety management in the next 12 weeks.    Intervention(s)  Therapist will teach coping skills for anxiety .    Objective #B  Patient will use cognitive strategies identified in therapy to challenge  anxious thoughts.    Status: New - Date: 2024      Intervention(s)  Therapist will teach cognitive restructuring .    Patient has reviewed and agreed to the above plan.      Cate Kaur, [facility]  2024    [facility]       PATIENT'S NAME: Darlene Hudson  PREFERRED NAME: Karoline  PRONOUNS:     she her hers  MRN: [id number]  : 1975  ADDRESS: [address] [place] [id number]  [facility] NUM[facility]:  [id number]  DATE OF SERVICE: 24  START TIME: 738  END TIME: 823  PREFERRED PHONE: [phone]  May we leave a program related message: Yes  EMERGENCY CONTACT: was not obtained .  SERVICE MODALITY:  Video Visit:      Provider verified identity through the following two step process.  Patient provided:  Patient  and Patient address    Telemedicine Visit: The patient's condition can be safely assessed and treated via synchronous audio and visual telemedicine encounter.      Reason for Telemedicine Visit: Patient convenience (e.g. access to timely appointments / distance to available provider)    Originating Site (Patient Location): Patient's home    Distant Site (Provider Location): Provider Remote Setting- Home Office    Consent:  The patient/guardian has verbally consented to: the potential risks and benefits of telemedicine (video visit) versus in person care; bill my insurance or make self-payment for services provided; and responsibility for payment of non-covered services.     Patient would like the video invitation sent by:  My Chart    Mode of Communication:  Video Conference via Amwell    Distant Location (Provider):  Off-site    As the provider I attest to compliance with applicable laws and regulations related to telemedicine.    UNIVERSAL ADULT Mental Health DIAGNOSTIC ASSESSMENT    Identifying Information:  Patient is a 49 year old, , , and Black ;  individual.  Patient was referred for an assessment by primary care  "[facility].  Patient attended the session alone.    Chief Complaint:   The reason for seeking services at this time is: \" workplace stresses, workplace bullying and harassment \"   The problem(s) began 2023. Patient has attempted to resolve these concerns in the past through medication, therapy.    Social/Family History:  Patient reported they grew up in [place], [place].  They were raised by biological mother.  Parents  when 3 years old.   Patient reported that their childhood was pretty good, single mother, worked for the [employer], mother was active in their activities, they saw father fairly frequent.  Patient described their current relationships with family of origin as parents have passed, sibling relatiuonships still.      The patient describes their cultural background as bi racial.  Cultural influences and impact on patient's life structure, values, norms, and healthcare: NA.  Contextual influences on patient's health include: Contextual Factors: Individual Factors stressful work environment, supervisor difficulty, symptoms of depression, significant losses due to covid, previous colon cancer diagnosis and surgery as well as ankle surgery .  Cultural, Contextual, and socioeconomic factors do not affect the patient's access to services.  These factors will be addressed in the Preliminary Treatment plan.  Patient identified their preferred language to be English. Patient reported they do not  need the assistance of an  or other support involved in therapy.     Patient reported had no significant delays in developmental tasks.   Patient's highest education level was some college. Patient identified the following learning problems: none reported.  Modifications will not be used to assist communication in therapy.   Patient reports they are  able to understand written materials.    Patient reported the following relationship history boyfriend of 11 years  of covid " 2021.  Patient's current relationship status is  .   Patient identified their sexual orientation as heterosexual.  Patient reported having five child(reza). Patient identified her best friend as part of their support system.  Patient identified the quality of these relationships as stable and meaningful.     Patient's current living/housing situation involves staying in own home/apartment.  They live with two sons and they report that housing is stable.     Patient is currently on medical leave from her work as a  .  Patient reports their finances are obtained through her savings, and help from family and friends.  Patient does identify finances as a current stressor.      Patient reported that they have not been involved with the legal system.   Patient denies being on probation / parole / under the jurisdiction of the court.    Patient's Strengths and Limitations:  Patient identified the following strengths or resources that will help them succeed in treatment: commitment to health and well being, motivation, and work ethic. Things that may interfere with the patient's success in treatment include: physical health concerns.     Assessments:  The following assessments were completed by patient for this visit:  PHQ9:       5/25/2022    11:31 AM 1/18/2023     4:04 PM 4/18/2023     4:19 PM 4/18/2023     5:13 PM 9/13/2023     2:39 PM 2/21/2024     1:38 PM 5/9/2024     7:34 AM   PHQ-9 SCORE   PHQ-9 Total Score MyChart 0    0  7 (Mild depression)   PHQ-9 Total Score 0 3 2 2 0 5 7     GAD7:       3/12/2020     3:31 PM 2/4/2021     2:04 PM 5/25/2022    11:32 AM 4/18/2023     4:19 PM 4/18/2023     5:13 PM 9/13/2023     2:40 PM 2/21/2024     1:38 PM   ACE-7 SCORE   Total Score   5 (mild anxiety)   7 (mild anxiety)    Total Score 3 5 5 5 5 7 6     CAGE-AID:       5/9/2024     8:00 AM   CAGE-AID Total Score   Total Score 4     PROMIS 10-Global Health (all questions and answers displayed):        No data  to display              [place] Suicide Severity Rating Scale (Lifetime/Recent)      5/9/2024     8:00 AM   [place] Suicide Severity Rating (Lifetime/Recent)   Q1 Wish to be Dead (Lifetime) N   Q2 Non-Specific Active Suicidal Thoughts (Lifetime) N   Actual Attempt (Lifetime) N   Has subject engaged in non-suicidal self-injurious behavior? (Lifetime) Y   Has subject engaged in non-suicidal self-injurious behavior? (Past 3 Months) Y   Interrupted Attempts (Lifetime) N   Aborted or Self-Interrupted Attempt (Lifetime) N   Preparatory Acts or Behavior (Lifetime) N   Calculated C-SSRS Risk Score (Lifetime/Recent) No Risk Indicated       Personal and Family Medical History:  Patient   report a family history of mental health concerns.  Patient reports family history includes Asthma in her child and father; Breast Cancer in her maternal grandmother; Cancer in her mother; Diabetes in her brother and brother; Hypertension in her mother; Other Cancer in her father..     Patient does report Mental Health Diagnosis and/or Treatment.  Patient Patient reported the following previous diagnoses which include(s): Depression, PTSD.  Patient reported symptoms began currently and about 15 years ago due to a rough patch with several stressors.   Patient has received mental health services in the past: outpatient therapy and [facility] treatment.  Psychiatric Hospitalizations: None.  Patient denies a history of civil commitment.  Patient is receiving other mental health services.  These include psychotherapy with a therapist in the Oncology dept.       Patient has had a physical exam to rule out medical causes for current symptoms.  Date of last physical exam was within the past year. Client was encouraged to follow up with PCP if symptoms were to develop. The patient has a [place] Primary Care Provider, who is named Robbie Bailon.  Patient reports the following current medical concerns: past diagnosis and surgery for stage two colon cancer,  ankle surgery which ct is still in recovery from.  Patient reports recent ankle surgery that sometimes causes pain.  There are significant appetite / nutritional concerns / weight changes.  Recent significant weight gain, gained 35 lbs.  Inactivity.   Patient does not report a history of head injury / trauma / cognitive impairment.      Patient reports current meds as:   Current Outpatient Medications   Medication Sig Dispense Refill    albuterol (PROAIR HFA/PROVENTIL HFA/VENTOLIN HFA) 108 (90 Base) MCG/ACT inhaler Inhale 2 puffs into the lungs every 6 hours as needed for shortness of breath, wheezing or cough 18 g 11    ALPRAZolam (XANAX) 2 MG tablet Take 1 tablet (2 mg) by mouth 2 times daily as needed for anxiety 60 tablet 3    cetirizine (ZYRTEC) 10 MG tablet Take 1 tablet (10 mg) by mouth daily 30 tablet 11    fluticasone (FLONASE) 50 MCG/ACT nasal spray Spray 1 spray into both nostrils daily      fluticasone (FLONASE) 50 MCG/ACT nasal spray Spray 2 sprays into both nostrils daily 9.9 mL 11    hydrOXYzine carlos (VISTARIL) 25 MG capsule Take 1 capsule (25 mg) by mouth nightly as needed for anxiety 30 capsule 3    montelukast (SINGULAIR) 10 MG tablet Take 1 tablet (10 mg) by mouth At Bedtime 30 tablet 11    naloxone (NARCAN) 4 MG/0.1ML nasal spray Spray 1 spray (4 mg) into one nostril alternating nostrils as needed for opioid reversal every 2-3 minutes until assistance arrives (Patient not taking: Reported on 3/6/2024) 0.2 mL 1    oxyCODONE (ROXICODONE) 5 MG tablet Take 1 tablet (5 mg) by mouth 3 times daily as needed for severe pain 75 tablet 0    PROAIR  (90 Base) MCG/ACT inhaler Inhale 2 puffs into the lungs every 4 hours as needed for shortness of breath or wheezing 8 g 11    sertraline (ZOLOFT) 50 MG tablet Take 2 tablets (100 mg) by mouth daily 60 tablet 3    spacer (OPTICHAMBER BINA) holding chamber For use w/ rescue inhaler 1 each 0    sucralfate (CARAFATE) 1 GM/10ML suspension Take 10 mLs (1 g)  by mouth 4 times daily as needed for nausea 414 mL 0    tiotropium (SPIRIVA RESPIMAT) 1.25 MCG/ACT inhaler Inhale 2 puffs into the lungs daily 12 g 3    tiotropium (SPIRIVA) 18 MCG inhaled capsule Inhale 18 mcg into the lungs daily (Patient not taking: Reported on 3/6/2024)       No current facility-administered medications for this visit.     Facility-Administered Medications Ordered in Other Visits   Medication Dose Route Frequency Provider Last Rate Last Admin    ceFAZolin (ANCEF) intermittent infusion 2 g in 50 mL dextrose PREMIX  2 g Intravenous See Admin Instructions Caty Darby PA-C        flumazenil (ROMAZICON) injection 0.2 mg  0.2 mg Intravenous q1 min prn Kodi Hathaway DO        gabapentin (NEURONTIN) capsule 300 mg  300 mg Oral Pre-Op/Pre-procedure x 1 dose Kodi Hathaway DO        lactated ringers infusion   Intravenous Continuous Kodi Hathaway DO   Stopped at 06/21/23 1149    lidocaine (LMX4) kit   Topical Q1H PRN Kodi Hathaway DO        lidocaine 1 % 0.1-1 mL  0.1-1 mL Other Q1H PRN Kodi Hathaway DO        midazolam (VERSED) injection 1-2 mg  1-2 mg Intravenous Q4 Min PRN Kodi Hathaway DO        naloxone (NARCAN) injection 0.2 mg  0.2 mg Intravenous Q2 Min PRN Kodi Hathaway DO        Or    naloxone (NARCAN) injection 0.4 mg  0.4 mg Intravenous Q2 Min PRN Kodi Hathaway DO        Or    naloxone (NARCAN) injection 0.2 mg  0.2 mg Intramuscular Q2 Min PRN Kodi Hathaway DO        Or    naloxone (NARCAN) injection 0.4 mg  0.4 mg Intramuscular Q2 Min PRN Kodi Hathaway DO        sodium chloride (PF) 0.9% PF flush 3 mL  3 mL Intracatheter Q8H Kodi Hathaway DO        sodium chloride (PF) 0.9% PF flush 3 mL  3 mL Intracatheter q1 min prn Kodi Hathaway DO           Medication Adherence:  Patient reports  .  taking prescribed medications as prescribed.    Patient Allergies:    Allergies   Allergen Reactions    Gabapentin Other (See Comments)     Mental status is changed     Lidocaine Other (See  "Comments)     \"my jaw stopped moving\"  Other reaction(s): Dystonia    Penicillins Hives, Rash and Shortness Of Breath    Lidocaine-Epinephrine Other (See Comments) and Muscle Pain (Myalgia)     Severe jaw cramping, double vision  Jaw locking       Medical History:    Past Medical History:   Diagnosis Date    Abdominal pain 06/29/2015    Abnormal cervical Papanicolaou smear 11/09/2014    Overview:  ACUS/HPV positive    Abnormal cytology finding 11/09/2014    Overview:  ACUS/HPV positive    Acute pericardial effusion 02/06/2017    Agoraphobia with panic attacks     Anxiety     Arthralgia of both lower legs 05/29/2020    Bilateral achy knee pain that is chronic in nature going on for years. Most likely osteoarthritis in knees related to obesity. Previously injected in both knees with good relief. 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  Has controlled substance agreement with them.  On Vicodin, Valium, Klonopin prescribed only from there.       Coronary artery disease     Cough 02/09/2020    Family history of colon cancer 10/24/2020    Hx of seasonal allergies     Infection due to 2019 novel coronavirus 09/20/2022    Infectious pericarditis     Lipoma     Low back pain 07/13/2015    Major depressive disorder, recurrent episode, moderate (H) 09/05/2006    Menorrhagia with irregular cycle 07/15/2022    Added automatically from request for surgery [id number]    Moderate persistent asthma without complication 09/29/2020    " Nondependent alcohol abuse, episodic drinking behavior 10/03/2012    Noninflammatory disorder of vagina 02/20/2015    Other chronic pain     Other long term (current) drug therapy 01/28/2013    Overview:  Vicodin and cyclobenzaprine monthly    Panic disorder with agoraphobia 09/05/2006    Pap smear for cervical cancer screening 10/31/2016    03/29/2010  Normal cytology, HPV ot done, repeat in 3 years.    Pericarditis 2017    Physiologic disturbance of temperature regulation 10/24/2020    PONV (postoperative nausea and vomiting)     Right ankle swelling 06/07/2022    Added automatically from request for surgery [id number]    Tobacco abuse     Tobacco use disorder 07/13/2015         Current Mental Status Exam:   Appearance:  Appropriate    Eye Contact:  Good   Psychomotor:  Normal       Gait / station:  Not assessed  Attitude / Demeanor: Cooperative  Pleasant  Speech      Rate / Production: Normal/ Responsive      Volume:  Normal  volume      Language:  intact  Mood:   Normal  Affect:   Appropriate    Thought Content: Clear   Thought Process: Coherent  Logical       Associations: No loosening of associations  Insight:   Good   Judgment:  Intact   Orientation:  All  Attention/concentration: Good    Substance Use:   Patient did not report a family history of substance use concerns; see medical history section for details.  Patient has received chemical dependency treatment in the past at [facility] years ago.  Patient has ever been to detox.      Patient is not currently receiving any chemical dependency treatment. Patient reported the following problems as a result of their substance use:  NA.    Patient denies using alcohol.  Patient denies using tobacco  Patient denies using cannabis.  Patient reports appx an iced coffee before work, and an occasional Mountain Dew  Patient reports using/abusing the following substance(s). Patient reported no other substance use.     Substance Use: No symptoms    Based on the positive  CAGE score and clinical interview there  are not indications of drug or alcohol abuse.  15 years ago only     Significant Losses / Trauma / Abuse / Neglect Issues:   Patient   did not serve in the .  There are indications or report of significant loss, trauma, abuse or neglect issues related to: workplace harassment, bullying.  Cancer diagnosis, heart surgery, loss of several people in her life.   Concerns for possible neglect are not present.     Safety Assessment:   Patient denies current homicidal ideation and behaviors.  Patient reports current self-injurious ideation.  Onset: a month ago, frequency: once, duration: once, intensity: intense.  Client reports they are not currently engaging in self-injurious behaivor..  Patient denied risk behaviors associated with substance use.   Patient denies any high risk behaviors associated with mental health symptoms.  Patient reports the following current concerns for their personal safety: workplace hazards.  Patient reports there   firearms in the house.       The firearms are secured in a locked space.    History of Safety Concerns:  Patient denied a history of homicidal ideation.     Patient denied a history of personal safety concerns.    Patient denied a history of assaultive behaviors.    Patient denied a history of sexual assault behaviors.     Patient denied a history of risk behaviors associated with substance use.  Patient denies any history of high risk behaviors associated with mental health symptoms.  Patient reports the following protective factors:      Risk Plan:  See Recommendations for Safety and Risk Management Plan    Review of Symptoms per patient report:   Depression: Change in sleep, Lack of interest, Change in energy level, Difficulties concentrating, Low self-worth, Irritability, and Feeling sad, down, or depressed  Ekta:  No Symptoms  Psychosis: No Symptoms  Anxiety: Excessive worry, Nervousness, Sleep disturbance, Poor concentration,  and Irritability  Panic:  No symptoms  Post Traumatic Stress Disorder:  No Symptoms   Eating Disorder: No Symptoms  ADD / ADHD:  No symptoms  Conduct Disorder: No symptoms  Autism Spectrum Disorder: No symptoms  Obsessive Compulsive Disorder: No Symptoms    Patient reports the following compulsive behaviors and treatment history: NA.      Diagnostic Criteria:   Generalized Anxiety Disorder  A. Excessive anxiety and worry about a number of events or activities (such as work or school performance).   B. The person finds it difficult to control the worry.  C. Select 3 or more symptoms (required for diagnosis). Only one item is required in children.   - Restlessness or feeling keyed up or on edge.    - Being easily fatigued.    - Difficulty concentrating or mind going blank.    - Irritability.    - Sleep disturbance (difficulty falling or staying asleep, or restless unsatisfying sleep).   D. The focus of the anxiety and worry is not confined to features of an Axis I disorder.  E. The anxiety, worry, or physical symptoms cause clinically significant distress or impairment in social, occupational, or other important areas of functioning.   F. The disturbance is not due to the direct physiological effects of a substance (e.g., a drug of abuse, a medication) or a general medical condition (e.g., hyperthyroidism) and does not occur exclusively during a Mood Disorder, a Psychotic Disorder, or a Pervasive Developmental Disorder.    - The aformentioned symptoms began one year(s) ago and occurs 7 days per week and is experienced as severe. Major Depressive Disorder  CRITERIA (A-C) REPRESENT A MAJOR DEPRESSIVE EPISODE - SELECT THESE CRITERIA  A) Recurrent episode(s) - symptoms have been present during the same 2-week period and represent a change from previous functioning 5 or more symptoms (required for diagnosis)   - Depressed mood. Note: In children and adolescents, can be irritable mood.     - Diminished interest or pleasure  in all, or almost all, activities.    - Decreased sleep.    - Fatigue or loss of energy.    - Feelings of worthlessness or inappropriate and excessive guilt.    - Diminished ability to think or concentrate, or indecisiveness.   B) The symptoms cause clinically significant distress or impairment in social, occupational, or other important areas of functioning  C) The episode is not attributable to the physiological effects of a substance or to another medical condition  D) The occurence of major depressive episode is not better explained by other thought / psychotic disorders  E) There has never been a manic episode or hypomanic episode    Functional Status:  Patient reports the following functional impairments:  management of the household and or completion of tasks and work / vocational responsibilities.     Nonprogrammatic care:  Patient is requesting basic services to address current mental health concerns.    Clinical Summary:  1. Psychosocial, Cultural and Contextual Factors: , multiple medical conditions, workplace bullying  .  2. Principal DSM5 Diagnoses  (Sustained by DSM5 Criteria Listed Above):   296.32 (F33.1) Major Depressive Disorder, Recurrent Episode, Moderate _  300.02 (F41.1) Generalized Anxiety Disorder.  3. Other Diagnoses that is relevant to services:   NA  4. Provisional Diagnosis:  NA  5. Prognosis: Expect Improvement.  6. Likely consequences of symptoms if not treated: worsening symptoms and continued functional impairment.  7. Client strengths include:  goal-focused, good listener, motivated, open to learning, open to suggestions / feedback, responsible parent, wants to learn, and willing to ask questions .     Recommendations:     1. Plan for Safety and Risk Management:   Safety and Risk: Recommended that patient call 911 or go to the local ED should there be a change in any of these risk factors..          Report to child / adult protection services was NA.     2. Patient's  identified no concerns with sexual orientation, gender identity, culture, ethnicity, or kelly.     3. Initial Treatment will focus on:    Depressed Mood - MDD  Anxiety - ACE.     4. Resources/Service Plan:    services are not indicated.   Modifications to assist communication are not indicated.   Additional disability accommodations are not indicated.      5. Collaboration:   Collaboration / coordination of treatment will be initiated with the following  support professionals: primary care physician.      6.  Referrals:   The following referral(s) will be initiated: None at this time.       A Release of Information has been obtained for the following: None at this time.     Clinical Substantiation/medical necessity for the above recommendations:  individual therapy is necessary in order to alleviate symptoms and improve functioning.    7. JHONNY:    JHONNY:  Not identified.    8. Records:   These were reviewed at time of assessment.   Information in this assessment was obtained from the medical record and  provided by patient who is a good historian.    Patient will have open access to their mental health medical record.    9.   Interactive Complexity: No    Provider Name/ Credentials:  Cate Kaur MS, [facility]  May 9, 2024

## 2024-06-17 DIAGNOSIS — G89.29 CHRONIC MIDLINE THORACIC BACK PAIN: ICD-10-CM

## 2024-06-17 DIAGNOSIS — M54.6 CHRONIC MIDLINE THORACIC BACK PAIN: ICD-10-CM

## 2024-06-17 DIAGNOSIS — Z98.890 STATUS POST SURGICAL MANIPULATION OF ANKLE JOINT: ICD-10-CM

## 2024-06-17 RX ORDER — OXYCODONE HYDROCHLORIDE 5 MG/1
5 TABLET ORAL 3 TIMES DAILY PRN
Qty: 90 TABLET | Refills: 0 | Status: SHIPPED | OUTPATIENT
Start: 2024-06-17 | End: 2024-07-15

## 2024-06-17 NOTE — TELEPHONE ENCOUNTER
[facility] phone call message- medication clarification/question:    Full Medication Name: oxyCODONE (ROXICODONE) 5 MG tablet     Question: Patient called requesting for refill on medication, if able to fill please send to pharmacy thank you.    [id number] [address] [place] [facility]: Yes    OK to leave a message on voice mail? Yes    Primary language: English      needed? No    Call taken on June 17, 2024 at 11:03 AM by Christine Cruz

## 2024-06-21 ENCOUNTER — MYC MEDICAL ADVICE (OUTPATIENT)
Dept: CARE COORDINATION | Facility: CLINIC | Age: 49
End: 2024-06-21
Payer: COMMERCIAL

## 2024-07-03 ENCOUNTER — TELEPHONE (OUTPATIENT)
Dept: PSYCHOLOGY | Facility: CLINIC | Age: 49
End: 2024-07-03

## 2024-07-03 ENCOUNTER — OFFICE VISIT (OUTPATIENT)
Dept: FAMILY MEDICINE | Facility: CLINIC | Age: 49
End: 2024-07-03
Payer: MEDICAID

## 2024-07-03 DIAGNOSIS — F43.21 GRIEF REACTION: Primary | ICD-10-CM

## 2024-07-03 PROCEDURE — 99214 OFFICE O/P EST MOD 30 MIN: CPT | Performed by: FAMILY MEDICINE

## 2024-07-03 NOTE — PROGRESS NOTES
ASSESSMENT/PLAN:     (F43.21) Grief reaction  (primary encounter diagnosis)  Comment:   Plan: lengthy discussion regarding her acute exacerbation of mood after death of previous partner/ father of one of her children; she will setup another therapy session asap; will hold on adjusting meds; precautions given; note for work accommodations written     >30 min was spent by me personally on the day of visit in review of records, direct patient care, documentation, and care coordination.     Robbie Bailon MD  July 3, 2024  4:12 PM           Pt is a 49 year old female last seen on 5/29/24 here today for:      Grief reaction - father of 22 yr old daughter passed away  Heart attack   6/12/24  Not coping well  Super depressed  Not talking to anybody  Was supposed to do virtual therapy but has not been able to talk to her about this   Taking zoloft and xanax   Needs space to g[place]   [place] has been tough      Per my last note:  (F43.10) Post traumatic stress disorder  (primary encounter diagnosis)  Comment:   Plan: lengthy visit again today discussing her recent acute trauma involving her son; Reviewed her case w/ Dr Lechuga and Cruzito Genao; Recommended an [facility] worker; will send chart to both to help facilitate; filled out workability form; she wishes to return to work part-time starting next week despite recent exacerbation of mood disorder; will trial per pt request and follow-up on 6/19/24     (F41.1) Generalized anxiety disorder  Comment: see above; needs to restart her zoloft asap; short course of increased benzo (bid to tid) while she uptitrates her selective serotonin reuptake inhibitor; precautions given; explained that long term plan will be to get back to her bid dosing asap  Plan: ALPRAZolam (XANAX) 2 MG tablet, sertraline (ZOLOFT) 50 MG tablet, hydrOXYzine carlos (VISTARIL) 25 MG capsule          (F33.1) Major depressive disorder, recurrent episode, moderate (H)  Comment: see above  Plan: sertraline (ZOLOFT) 50  MG tablet          (F40.01) Panic disorder with agoraphobia  Comment: see above  Plan: ALPRAZolam (XANAX) 2 MG tablet, sertraline (ZOLOFT) 50 MG tablet          (J45.51) Severe persistent asthma with acute exacerbation (H28)  Comment:   Plan: now that insurance issue resolved will  inhalers     (J30.2) Seasonal allergic rhinitis, unspecified trigger  Comment: refilled  Plan: cetirizine (ZYRTEC) 10 MG tablet, fluticasone (FLONASE) 50 MCG/ACT nasal spray     Past Medical History        Past Medical History:   Diagnosis Date    Abdominal pain 06/29/2015    Abnormal cervical Papanicolaou smear 11/09/2014     Overview:  ACUS/HPV positive    Abnormal cytology finding 11/09/2014     Overview:  ACUS/HPV positive    Acute pericardial effusion 02/06/2017    Agoraphobia with panic attacks      Anxiety      Arthralgia of both lower legs 05/29/2020     Bilateral achy knee pain that is chronic in nature going on for years. Most likely osteoarthritis in knees related to obesity. Previously injected in both knees with good relief. 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  Has controlled substance agreement with them.  On Vicodin, Valium, Klonopin prescribed only from there.       Coronary artery disease      Cough 02/09/2020    Family history of colon cancer 10/24/2020    Hx of seasonal allergies      Infection due to 2019 novel coronavirus 09/20/2022    Infectious pericarditis      Lipoma      Low back pain 07/13/2015     Major depressive disorder, recurrent episode, moderate (H) 09/05/2006    Menorrhagia with irregular cycle 07/15/2022     Added automatically from request for surgery [id number]    Moderate persistent asthma without complication 09/29/2020    Nondependent alcohol abuse, episodic drinking behavior 10/03/2012    Noninflammatory disorder of vagina 02/20/2015    Other chronic pain      Other long term (current) drug therapy 01/28/2013     Overview:  Vicodin and cyclobenzaprine monthly    Panic disorder with agoraphobia 09/05/2006    Pap smear for cervical cancer screening 10/31/2016     03/29/2010  Normal cytology, HPV ot done, repeat in 3 years.    Pericarditis 2017    Physiologic disturbance of temperature regulation 10/24/2020    PONV (postoperative nausea and vomiting)      Right ankle swelling 06/07/2022     Added automatically from request for surgery [id number]    Tobacco abuse      Tobacco use disorder 07/13/2015         Past Surgical History         Past Surgical History:   Procedure Laterality Date    ANKLE SURGERY Right 05/30/2019    ARTHROSCOPY ANKLE Right 6/21/2023     Procedure: right ankle arthroscopy with debridement;  Surgeon: Kareem Bashir MD;  Location: [facility] OR    BIOPSY BREAST Right 1990     benign    COLONOSCOPY N/A 1/3/2023     Procedure: COLONOSCOPY WITH BIOPSY OF COLON MASS, POLYPECTOMY;  Surgeon: Kris Charles MD;  Location: [facility] OR    COLONOSCOPY N/A 3/12/2024     Procedure: COLONOSCOPY WITH POLYPECTOMY and EXCISION, LEFT, LESION, BACK;  Surgeon: Kris Charles MD;  Location: [facility] OR    CREATION PERICARDIAL WINDOW   02/10/2017     [facility]    DILATION AND CURETTAGE N/A 7/22/2022     Procedure: DILATION AND CURETTAGE, UTERUS;  Surgeon: Lesly Holland;  Location: [facility] OR    HEMORRHOIDECTOMY EXTERNAL        HYSTEROSCOPY, WITH ENDOMETRIAL RADIOFREQUENCY ABLATION - NOVASURE N/A 7/22/2022     Procedure: HYSTEROSCOPY, WITH ENDOMETRIAL  "RADIOFREQUENCY ABLATION - NOVASURE DILATION AND CURETTAGE, UTERUS;  Surgeon: Lesly Holland;  Location: [facility] OR    LAPAROSCOPIC ASSISTED SIGMOID COLECTOMY N/A 1/6/2023     Procedure: LAPAROSCOPIC ASSISTED DESCENDING COLELCTOMY SPLENIC FLEXURE MOBILIZATION ;  Surgeon: Kris Charles MD;  Location: [facility] OR    LAPAROSCOPIC LYSIS ADHESIONS N/A 1/6/2023     Procedure: LYSIS OF ADHESIONS;  Surgeon: Kris Charles MD;  Location: [facility] OR    TUMOR REMOVAL         Has had 3. In the right breast and \"inside of rib cage.\"         Current Outpatient Prescriptions          Current Outpatient Medications   Medication Sig Dispense Refill    albuterol (PROAIR HFA/PROVENTIL HFA/VENTOLIN HFA) 108 (90 Base) MCG/ACT inhaler Inhale 2 puffs into the lungs every 6 hours as needed for shortness of breath, wheezing or cough 18 g 11    ALPRAZolam (XANAX) 2 MG tablet Take 1 tablet (2 mg) by mouth 3 times daily as needed for anxiety 90 tablet 1    cetirizine (ZYRTEC) 10 MG tablet Take 1 tablet (10 mg) by mouth daily 30 tablet 11    fluticasone (FLONASE) 50 MCG/ACT nasal spray Spray 2 sprays into both nostrils daily 9.9 mL 11    fluticasone (FLONASE) 50 MCG/ACT nasal spray Spray 1 spray into both nostrils daily        hydrOXYzine carlos (VISTARIL) 25 MG capsule Take 1 capsule (25 mg) by mouth nightly as needed for anxiety 30 capsule 3    montelukast (SINGULAIR) 10 MG tablet Take 1 tablet (10 mg) by mouth At Bedtime 30 tablet 11    naloxone (NARCAN) 4 MG/0.1ML nasal spray Spray 1 spray (4 mg) into one nostril alternating nostrils as needed for opioid reversal every 2-3 minutes until assistance arrives (Patient not taking: Reported on 3/6/2024) 0.2 mL 1    oxyCODONE (ROXICODONE) 5 MG tablet Take 1 tablet (5 mg) by mouth 3 times daily as needed for severe pain 90 tablet 0    PROAIR  (90 Base) MCG/ACT inhaler Inhale 2 puffs into the lungs every 4 hours as needed for shortness of breath or wheezing 8 " "g 11    sertraline (ZOLOFT) 50 MG tablet Take 2 tablets (100 mg) by mouth daily 60 tablet 3    spacer (OPTICHAMBER BINA) holding chamber For use w/ rescue inhaler 1 each 0    sucralfate (CARAFATE) 1 GM/10ML suspension Take 10 mLs (1 g) by mouth 4 times daily as needed for nausea 414 mL 0    tiotropium (SPIRIVA RESPIMAT) 1.25 MCG/ACT inhaler Inhale 2 puffs into the lungs daily 12 g 3    tiotropium (SPIRIVA) 18 MCG inhaled capsule Inhale 18 mcg into the lungs daily (Patient not taking: Reported on 3/6/2024)             Allergies         Allergies   Allergen Reactions    Gabapentin Other (See Comments)       Mental status is changed     Lidocaine Other (See Comments)       \"my jaw stopped moving\"  Other reaction(s): Dystonia    Penicillins Hives, Rash and Shortness Of Breath    Lidocaine-Epinephrine Other (See Comments) and Muscle Pain (Myalgia)       Severe jaw cramping, double vision  Jaw locking            ROS:   Gen- no fevers/chills   Remainder of ROS negative.      Exam:   There were no vitals taken for this visit.    Alert, oriented, tearful, emotionally labile throughout visit.       "

## 2024-07-03 NOTE — TELEPHONE ENCOUNTER
7/3/24-called pt at 2:45 regarding her concern that her visits were being marked no show despite her noting she has been checking in for the visits.  It seems that something is going wrong with her ability to join the visits. Devised a plan with client for 7/8 appt to send links again, and that client would  my phone call if I do not see she has joined.

## 2024-07-08 ENCOUNTER — VIRTUAL VISIT (OUTPATIENT)
Dept: PSYCHOLOGY | Facility: CLINIC | Age: 49
End: 2024-07-08
Payer: COMMERCIAL

## 2024-07-08 DIAGNOSIS — F41.1 GAD (GENERALIZED ANXIETY DISORDER): ICD-10-CM

## 2024-07-08 DIAGNOSIS — F33.1 MAJOR DEPRESSIVE DISORDER, RECURRENT EPISODE, MODERATE (H): Primary | ICD-10-CM

## 2024-07-08 PROCEDURE — 90834 PSYTX W PT 45 MINUTES: CPT | Mod: 95

## 2024-07-08 NOTE — PROGRESS NOTES
"    [facility]                                     Progress Note    Patient Name: Darlene Hudson  Date: 7/8/24         Service Type: Individual      Session Start Time: 933  Session End Time: 1023     Session Length: 50    Session #: 4    Attendees: Client attended alone    Service Modality:  Phone Visit:      Provider verified identity through the following two step process.  Patient provided:  Patient photo and Patient is known previously to provider    Telephone Visit: The patient's condition can be safely assessed and treated via synchronous audio telemedicine encounter.      Reason for Audio Telemedicine Visit: Patient has requested telehealth visit    Originating Site (Patient Location): Patient's home    Distant Site (Provider Location): Provider Remote Setting- Home Office    Consent:  The patient/guardian has verbally consented to:     1. The potential risks and benefits of telemedicine (telephone visit) versus in person care;    The patient has been notified of the following:      \"We have found that certain health care needs can be provided without the need for a face to face visit.  This service lets us provide the care you need with a phone conversation.       I will have full access to your [facility] medical record during this entire phone call.   I will be taking notes for your medical record.      Since this is like an office visit, we will bill your insurance company for this service.       There are potential benefits and risks of telephone visits (e.g. limits to patient confidentiality) that differ from in-person visits.?Confidentiality still applies for telephone services, and nobody will record the visit.  It is important to be in a quiet, private space that is free of distractions (including cell phone or other devices) during the visit.??      If during the course of the call I believe a telephone visit is not appropriate, you will not be charged for this " "service\"     Consent has been obtained for this service by care team member: Yes     DATA  Interactive Complexity: No  Crisis: No        Progress Since Last Session (Related to Symptoms / Goals / Homework):   Symptoms: No change overall    Homework:  had not been assigned      Episode of Care Goals: Satisfactory progress - PREPARATION (Decided to change - considering how); Intervened by negotiating a change plan and determining options / strategies for behavior change, identifying triggers, exploring social supports, and working towards setting a date to begin behavior change     Current / Ongoing Stressors and Concerns:   Many stressors including prior cancer diagnosis and treatment, losses through death of family and friends, her 17 year old son and her relationship concerns, as well as current stress with her job, which she identified as most stressful at this time.      Treatment Objective(s) Addressed in This Session:   Build rapport.      Intervention:   Build rapport.  Explored thoughts and emotions regarding various stressors.  Explored pros and cons of possible move to [place] in the future with respect to impact on her children, living situation, and job prospects.     Assessments completed prior to visit:  The following assessments were completed by patient for this visit:  PHQ9:       4/18/2023     4:19 PM 4/18/2023     5:13 PM 9/13/2023     2:39 PM 2/21/2024     1:38 PM 5/9/2024     7:34 AM 5/22/2024    11:00 AM 5/28/2024     9:31 AM   PHQ-9 SCORE   PHQ-9 Total Score MyChart   0  7 (Mild depression)  7 (Mild depression)   PHQ-9 Total Score 2 2 0 5 7 8 7     GAD7:       5/25/2022    11:32 AM 4/18/2023     4:19 PM 4/18/2023     5:13 PM 9/13/2023     2:40 PM 2/21/2024     1:38 PM 5/22/2024    11:00 AM 5/22/2024    11:03 AM   ACE-7 SCORE   Total Score 5 (mild anxiety)   7 (mild anxiety)   13 (moderate anxiety)   Total Score 5 5 5 7 6 19 13    13     PROMIS 10-Global Health (all questions and answers " displayed):       5/22/2024    11:00 AM 5/22/2024    11:04 AM   PROMIS 10   In general, would you say your health is:  Fair   In general, would you say your quality of life is:  Fair   In general, how would you rate your physical health?  Fair   In general, how would you rate your mental health, including your mood and your ability to think?  Poor   In general, how would you rate your satisfaction with your social activities and relationships?  Fair   In general, please rate how well you carry out your usual social activities and roles  Fair   To what extent are you able to carry out your everyday physical activities such as walking, climbing stairs, carrying groceries, or moving a chair?  Moderately   In the past 7 days, how often have you been bothered by emotional problems such as feeling anxious, depressed, or irritable?  Always   In the past 7 days, how would you rate your fatigue on average?  Mild   In the past 7 days, how would you rate your pain on average, where 0 means no pain, and 10 means worst imaginable pain?  5   In general, would you say your health is: 2 2   In general, would you say your quality of life is:  2   In general, how would you rate your physical health?  2   In general, how would you rate your mental health, including your mood and your ability to think?  1   In general, how would you rate your satisfaction with your social activities and relationships?  2   In general, please rate how well you carry out your usual social activities and roles. (This includes activities at home, at work and in your community, and responsibilities as a parent, child, spouse, employee, friend, etc.)  2   To what extent are you able to carry out your everyday physical activities such as walking, climbing stairs, carrying groceries, or moving a chair?  3   In the past 7 days, how often have you been bothered by emotional problems such as feeling anxious, depressed, or irritable?  5   In the past 7 days, how  would you rate your fatigue on average?  2   In the past 7 days, how would you rate your pain on average, where 0 means no pain, and 10 means worst imaginable pain?  5   Global Mental Health Score  6    6   Global Physical Health Score  12    12   PROMIS TOTAL - SUBSCORES  18    18         ASSESSMENT: Current Emotional / Mental Status (status of significant symptoms):   Risk status (Self / Other harm or suicidal ideation)   Patient denies current fears or concerns for personal safety.   Patient denies current or recent suicidal ideation or behaviors.   Patient denies current or recent homicidal ideation or behaviors.   Patient denies current or recent self injurious behavior or ideation.   Patient denies other safety concerns.   Patient reports there has been no change in risk factors since their last session.     Patient reports there has been no change in protective factors since their last session.     Recommended that patient call 911 or go to the local ED should there be a change in any of these risk factors.     Appearance:   Unable to assess at length due to technology issues-appropriate first 2 minutes before video cut out   Eye Contact:   Unable to assess at length due to technology issues -appropriate first 2 minutes before video cut out   Psychomotor Behavior: Unable to assess at length due to technology issues-appropriate first 2 minutes before video cut out   Attitude:   Cooperative    Orientation:   All   Speech    Rate / Production: Normal     Volume:  Normal    Mood:    Normal   Affect:    Unable to assess at length due to technology issues-appropriate first 2 minutes before video cut out   Thought Content:  Clear    Thought Form:  Coherent    Insight:    Fair      Medication Review:   No changes to current psychiatric medication(s)     Medication Compliance:   Yes     Changes in Health Issues:   None reported     Chemical Use Review:   Substance Use: Chemical use reviewed, no active concerns  identified      Tobacco Use: No current tobacco use.      Diagnosis:  1. Major depressive disorder, recurrent episode, moderate (H)    2. ACE (generalized anxiety disorder)        Collateral Reports Completed:   Not Applicable    PLAN: (Patient Tasks / Therapist Tasks / Other)  Rtn one and a half weeks.  Soonest available.        Cate Kaur, [facility]                                                         ______________________________________________________________________    Individual Treatment Plan    Patient's Name: Darlene Hudson  YOB: 1975    Date of Creation: 06/11/2024  Date Treatment Plan Last Reviewed/Revised: 06/11/2024    DSM5 Diagnoses: 296.32 (F33.1) Major Depressive Disorder, Recurrent Episode, Moderate _ or 300.02 (F41.1) Generalized Anxiety Disorder  Psychosocial / Contextual Factors: lives at home with sons, lots of stress from older son, workplace stress, on leave, legal interventions due to workplace concerns  PROMIS (reviewed every 90 days): 5/22/24    Referral / Collaboration:  Referral to another professional/service is not indicated at this time..    Anticipated number of session for this episode of care: 9-12 sessions  Anticipation frequency of session: Weekly  Anticipated Duration of each session: 38-52 minutes  Treatment plan will be reviewed in 90 days or when goals have been changed.       MeasurableTreatment Goal(s) related to diagnosis / functional impairment(s)  Goal 1: Patient will decrease symptoms of depression    I will know I've met my goal when I am happy within myself.      Objective #A (Patient Action)    Patient will identify at least 3 techniques for intervening on the escalation.  Status: New - Date: 06/11/2024      Intervention(s)  Therapist will teach emotional regulation skills.   .    Objective #B  Patient will learn and demonstrate 3 assertiveness skill(s).  Status: New - Date: 06/11/2024      Intervention(s)  Therapist will teach assertiveness  skills.   .      Goal 2: Patient will decrease symptoms of anxiety    I will know I've met my goal when I am happy within myself.      Objective #A (Patient Action)    Status: New - Date: 2024      Patient will use at least 3 coping skills for anxiety management in the next 12 weeks.    Intervention(s)  Therapist will teach coping skills for anxiety .    Objective #B  Patient will use cognitive strategies identified in therapy to challenge anxious thoughts.    Status: New - Date: 2024      Intervention(s)  Therapist will teach cognitive restructuring .    Patient has reviewed and agreed to the above plan.      Cate Kaur, [facility]  2024    [facility]       PATIENT'S NAME: Darlene Hudson  PREFERRED NAME: Karoline  PRONOUNS:     she her hers  MRN: [id number]  : 1975  ADDRESS: [address] [place], [place] [id number]  [id number]  DATE OF SERVICE: 24  START TIME: 738  END TIME: 823  PREFERRED PHONE: [phone]  May we leave a program related message: Yes  EMERGENCY CONTACT: was not obtained .  SERVICE MODALITY:  Video Visit:      Provider verified identity through the following two step process.  Patient provided:  Patient  and Patient address    Telemedicine Visit: The patient's condition can be safely assessed and treated via synchronous audio and visual telemedicine encounter.      Reason for Telemedicine Visit: Patient convenience (e.g. access to timely appointments / distance to available provider)    Originating Site (Patient Location): Patient's home    Distant Site (Provider Location): Provider Remote Setting- Home Office    Consent:  The patient/guardian has verbally consented to: the potential risks and benefits of telemedicine (video visit) versus in person care; bill my insurance or make self-payment for services provided; and responsibility for payment of non-covered services.     Patient would like the video invitation sent by:  My  "Chart    Mode of Communication:  Video Conference via [employer]ight    Distant Location (Provider):  Off-site    As the provider I attest to compliance with applicable laws and regulations related to telemedicine.    UNIVERSAL ADULT Mental Health DIAGNOSTIC ASSESSMENT    Identifying Information:  Patient is a 49 year old, , , and Black ;  individual.  [facility]  Patient attended the session alone.    Chief Complaint:   The reason for seeking services at this time is: \" workplace stresses, workplace bullying and harassment \"   The problem(s) began August 28, 2023. Patient has attempted to resolve these concerns in the past through medication, therapy.    Social/Family History:  Patient reported they grew up in [place], [place].  They were raised by biological mother.  Parents  when 3 years old.   Patient reported that their childhood was pretty good, single mother, worked for the [employer], mother was active in their activities, they saw father fairly frequent.  Patient described their current relationships with family of origin as parents have passed, sibling relatiuonships still.      The patient describes their cultural background as bi racial.  Cultural influences and impact on patient's life structure, values, norms, and healthcare: NA.  Contextual influences on patient's health include: Contextual Factors: Individual Factors stressful work environment, supervisor difficulty, symptoms of depression, significant losses due to covid, previous colon cancer diagnosis and surgery as well as ankle surgery .  Cultural, Contextual, and socioeconomic factors do not affect the patient's access to services.  These factors will be addressed in the Preliminary Treatment plan.  Patient identified their preferred language to be English. Patient reported they do not  need the assistance of an  or " other support involved in therapy.     Patient reported had no significant delays in developmental tasks.   Patient's highest education level was some college. Patient identified the following learning problems: none reported.  Modifications will not be used to assist communication in therapy.   Patient reports they are  able to understand written materials.    Patient reported the following relationship history boyfriend of 11 years  of covid .  Patient's current relationship status is  .   Patient identified their sexual orientation as heterosexual.  Patient reported having five child(reza). Patient identified her best friend as part of their support system.  Patient identified the quality of these relationships as stable and meaningful.     Patient's current living/housing situation involves staying in own home/apartment.  They live with two sons and they report that housing is stable.     Patient is currently on medical leave from her work as a  .  Patient reports their finances are obtained through her savings, and help from family and friends.  Patient does identify finances as a current stressor.      Patient reported that they have not been involved with the legal system.   Patient denies being on probation / parole / under the jurisdiction of the court.    Patient's Strengths and Limitations:  Patient identified the following strengths or resources that will help them succeed in treatment: commitment to health and well being, motivation, and work ethic. Things that may interfere with the patient's success in treatment include: physical health concerns.     Assessments:  The following assessments were completed by patient for this visit:  PHQ9:       2022    11:31 AM 2023     4:04 PM 2023     4:19 PM 2023     5:13 PM 2023     2:39 PM 2024     1:38 PM 2024     7:34 AM   PHQ-9 SCORE   PHQ-9 Total Score MyChart 0    0  7 (Mild depression)   PHQ-9  Total Score 0 3 2 2 0 5 7     GAD7:       3/12/2020     3:31 PM 2/4/2021     2:04 PM 5/25/2022    11:32 AM 4/18/2023     4:19 PM 4/18/2023     5:13 PM 9/13/2023     2:40 PM 2/21/2024     1:38 PM   ACE-7 SCORE   Total Score   5 (mild anxiety)   7 (mild anxiety)    Total Score 3 5 5 5 5 7 6     CAGE-AID:       5/9/2024     8:00 AM   CAGE-AID Total Score   Total Score 4     PROMIS 10-Global Health (all questions and answers displayed):        No data to display              [place] Suicide Severity Rating Scale (Lifetime/Recent)      5/9/2024     8:00 AM   [place] Suicide Severity Rating (Lifetime/Recent)   Q1 Wish to be Dead (Lifetime) N   Q2 Non-Specific Active Suicidal Thoughts (Lifetime) N   Actual Attempt (Lifetime) N   Has subject engaged in non-suicidal self-injurious behavior? (Lifetime) Y   Has subject engaged in non-suicidal self-injurious behavior? (Past 3 Months) Y   Interrupted Attempts (Lifetime) N   Aborted or Self-Interrupted Attempt (Lifetime) N   Preparatory Acts or Behavior (Lifetime) N   Calculated C-SSRS Risk Score (Lifetime/Recent) No Risk Indicated       Personal and Family Medical History:  Patient   report a family history of mental health concerns.  Patient reports family history includes Asthma in her child and father; Breast Cancer in her maternal grandmother; Cancer in her mother; Diabetes in her brother and brother; Hypertension in her mother; Other Cancer in her father..     Patient does report Mental Health Diagnosis and/or Treatment.  Patient Patient reported the following previous diagnoses which include(s): Depression, PTSD.  Patient reported symptoms began currently and about 15 years ago due to a rough patch with several stressors.   Patient has received mental health services in the past: outpatient therapy and [facility] treatment.  Psychiatric Hospitalizations: None.  Patient denies a history of civil commitment.  Patient is receiving other mental health services.  These include  psychotherapy with a therapist in the Oncology dept.       Patient has had a physical exam to rule out medical causes for current symptoms.  Date of last physical exam was within the past year. Client was encouraged to follow up with PCP if symptoms were to develop. The patient has a [place] Primary Care Provider, who is named Robbie Bailon.  Patient reports the following current medical concerns: past diagnosis and surgery for stage two colon cancer, ankle surgery which ct is still in recovery from.  Patient reports recent ankle surgery that sometimes causes pain.  There are significant appetite / nutritional concerns / weight changes.  Recent significant weight gain, gained 35 lbs.  Inactivity.   Patient does not report a history of head injury / trauma / cognitive impairment.      Patient reports current meds as:   Current Outpatient Medications   Medication Sig Dispense Refill    albuterol (PROAIR HFA/PROVENTIL HFA/VENTOLIN HFA) 108 (90 Base) MCG/ACT inhaler Inhale 2 puffs into the lungs every 6 hours as needed for shortness of breath, wheezing or cough 18 g 11    ALPRAZolam (XANAX) 2 MG tablet Take 1 tablet (2 mg) by mouth 2 times daily as needed for anxiety 60 tablet 3    cetirizine (ZYRTEC) 10 MG tablet Take 1 tablet (10 mg) by mouth daily 30 tablet 11    fluticasone (FLONASE) 50 MCG/ACT nasal spray Spray 1 spray into both nostrils daily      fluticasone (FLONASE) 50 MCG/ACT nasal spray Spray 2 sprays into both nostrils daily 9.9 mL 11    hydrOXYzine carlos (VISTARIL) 25 MG capsule Take 1 capsule (25 mg) by mouth nightly as needed for anxiety 30 capsule 3    montelukast (SINGULAIR) 10 MG tablet Take 1 tablet (10 mg) by mouth At Bedtime 30 tablet 11    naloxone (NARCAN) 4 MG/0.1ML nasal spray Spray 1 spray (4 mg) into one nostril alternating nostrils as needed for opioid reversal every 2-3 minutes until assistance arrives (Patient not taking: Reported on 3/6/2024) 0.2 mL 1    oxyCODONE (ROXICODONE) 5 MG tablet  Take 1 tablet (5 mg) by mouth 3 times daily as needed for severe pain 75 tablet 0    PROAIR  (90 Base) MCG/ACT inhaler Inhale 2 puffs into the lungs every 4 hours as needed for shortness of breath or wheezing 8 g 11    sertraline (ZOLOFT) 50 MG tablet Take 2 tablets (100 mg) by mouth daily 60 tablet 3    spacer (OPTICHAMBER IBNA) holding chamber For use w/ rescue inhaler 1 each 0    sucralfate (CARAFATE) 1 GM/10ML suspension Take 10 mLs (1 g) by mouth 4 times daily as needed for nausea 414 mL 0    tiotropium (SPIRIVA RESPIMAT) 1.25 MCG/ACT inhaler Inhale 2 puffs into the lungs daily 12 g 3    tiotropium (SPIRIVA) 18 MCG inhaled capsule Inhale 18 mcg into the lungs daily (Patient not taking: Reported on 3/6/2024)       No current facility-administered medications for this visit.     Facility-Administered Medications Ordered in Other Visits   Medication Dose Route Frequency Provider Last Rate Last Admin    ceFAZolin (ANCEF) intermittent infusion 2 g in 50 mL dextrose PREMIX  2 g Intravenous See Admin Instructions Caty Darby PA-C        flumazenil (ROMAZICON) injection 0.2 mg  0.2 mg Intravenous q1 min prn Kodi Hathaway DO        gabapentin (NEURONTIN) capsule 300 mg  300 mg Oral Pre-Op/Pre-procedure x 1 dose Kodi Hathaway DO        lactated ringers infusion   Intravenous Continuous Kodi Hathaway DO   Stopped at 06/21/23 1149    lidocaine (LMX4) kit   Topical Q1H PRN Kodi Hathaway DO        lidocaine 1 % 0.1-1 mL  0.1-1 mL Other Q1H PRN Kodi Hathaway DO        midazolam (VERSED) injection 1-2 mg  1-2 mg Intravenous Q4 Min PRN Kodi Hathaway DO        naloxone (NARCAN) injection 0.2 mg  0.2 mg Intravenous Q2 Min PRN Kodi Hathaway DO        Or    naloxone (NARCAN) injection 0.4 mg  0.4 mg Intravenous Q2 Min PRN Kodi Hathaway DO        Or    naloxone (NARCAN) injection 0.2 mg  0.2 mg Intramuscular Q2 Min PRN Kodi Hathaway DO        Or    naloxone (NARCAN) injection 0.4 mg  0.4 mg Intramuscular Q2  "Min PRN Kodi Hathaway, DO        sodium chloride (PF) 0.9% PF flush 3 mL  3 mL Intracatheter Q8H Kodi Hathaway, DO        sodium chloride (PF) 0.9% PF flush 3 mL  3 mL Intracatheter q1 min prn Kodi Hathaway, DO           Medication Adherence:  Patient reports  .  taking prescribed medications as prescribed.    Patient Allergies:    Allergies   Allergen Reactions    Gabapentin Other (See Comments)     Mental status is changed     Lidocaine Other (See Comments)     \"my jaw stopped moving\"  Other reaction(s): Dystonia    Penicillins Hives, Rash and Shortness Of Breath    Lidocaine-Epinephrine Other (See Comments) and Muscle Pain (Myalgia)     Severe jaw cramping, double vision  Jaw locking       Medical History:    Past Medical History:   Diagnosis Date    Abdominal pain 06/29/2015    Abnormal cervical Papanicolaou smear 11/09/2014    Overview:  ACUS/HPV positive    Abnormal cytology finding 11/09/2014    Overview:  ACUS/HPV positive    Acute pericardial effusion 02/06/2017    Agoraphobia with panic attacks     Anxiety     Arthralgia of both lower legs 05/29/2020    Bilateral achy knee pain that is chronic in nature going on for years. Most likely osteoarthritis in knees related to obesity. Previously injected in both knees with good relief. 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  Has controlled substance agreement with them.  On Vicodin, Valium, Klonopin prescribed only from there.       Coronary artery disease     Cough " 02/09/2020    Family history of colon cancer 10/24/2020    Hx of seasonal allergies     Infection due to 2019 novel coronavirus 09/20/2022    Infectious pericarditis     Lipoma     Low back pain 07/13/2015    Major depressive disorder, recurrent episode, moderate (H) 09/05/2006    Menorrhagia with irregular cycle 07/15/2022    Added automatically from request for surgery [id number]    Moderate persistent asthma without complication 09/29/2020    Nondependent alcohol abuse, episodic drinking behavior 10/03/2012    Noninflammatory disorder of vagina 02/20/2015    Other chronic pain     Other long term (current) drug therapy 01/28/2013    Overview:  Vicodin and cyclobenzaprine monthly    Panic disorder with agoraphobia 09/05/2006    Pap smear for cervical cancer screening 10/31/2016    03/29/2010  Normal cytology, HPV ot done, repeat in 3 years.    Pericarditis 2017    Physiologic disturbance of temperature regulation 10/24/2020    PONV (postoperative nausea and vomiting)     Right ankle swelling 06/07/2022    Added automatically from request for surgery [id number]    Tobacco abuse     Tobacco use disorder 07/13/2015         Current Mental Status Exam:   Appearance:  Appropriate    Eye Contact:  Good   Psychomotor:  Normal       Gait / station:  Not assessed  Attitude / Demeanor: Cooperative  Pleasant  Speech      Rate / Production: Normal/ Responsive      Volume:  Normal  volume      Language:  intact  Mood:   Normal  Affect:   Appropriate    Thought Content: Clear   Thought Process: Coherent  Logical       Associations: No loosening of associations  Insight:   Good   Judgment:  Intact   Orientation:  All  Attention/concentration: Good    Substance Use:   Patient did not report a family history of substance use concerns; see medical history section for details.  Patient has received chemical dependency treatment in the past at [facility], [place] 15 years ago.  Patient has ever been to detox.      Patient is not currently receiving  any chemical dependency treatment. Patient reported the following problems as a result of their substance use:  NA.    Patient denies using alcohol.  Patient denies using tobacco  Patient denies using cannabis.  Patient reports appx an iced coffee before work, and an occasional Mountain Dew  Patient reports using/abusing the following substance(s). Patient reported no other substance use.     Substance Use: No symptoms    Based on the positive CAGE score and clinical interview there  are not indications of drug or alcohol abuse.  15 years ago only     Significant Losses / Trauma / Abuse / Neglect Issues:   Patient   did not serve in the .  There are indications or report of significant loss, trauma, abuse or neglect issues related to: workplace harassment, bullying.  Cancer diagnosis, heart surgery, loss of several people in her life.   Concerns for possible neglect are not present.     Safety Assessment:   Patient denies current homicidal ideation and behaviors.  Patient reports current self-injurious ideation.  Onset: a month ago, frequency: once, duration: once, intensity: intense.  Client reports they are not currently engaging in self-injurious behaivor..  Patient denied risk behaviors associated with substance use.   Patient denies any high risk behaviors associated with mental health symptoms.  Patient reports the following current concerns for their personal safety: workplace hazards.  Patient reports there   firearms in the house.       The firearms are secured in a locked space.    History of Safety Concerns:  Patient denied a history of homicidal ideation.     Patient denied a history of personal safety concerns.    Patient denied a history of assaultive behaviors.    Patient denied a history of sexual assault behaviors.     Patient denied a history of risk behaviors associated with substance use.  Patient denies any history of high risk behaviors associated with mental health symptoms.  Patient  reports the following protective factors:      Risk Plan:  See Recommendations for Safety and Risk Management Plan    Review of Symptoms per patient report:   Depression: Change in sleep, Lack of interest, Change in energy level, Difficulties concentrating, Low self-worth, Irritability, and Feeling sad, down, or depressed  Ekta:  No Symptoms  Psychosis: No Symptoms  Anxiety: Excessive worry, Nervousness, Sleep disturbance, Poor concentration, and Irritability  Panic:  No symptoms  Post Traumatic Stress Disorder:  No Symptoms   Eating Disorder: No Symptoms  ADD / ADHD:  No symptoms  Conduct Disorder: No symptoms  Autism Spectrum Disorder: No symptoms  Obsessive Compulsive Disorder: No Symptoms    Patient reports the following compulsive behaviors and treatment history: NA.      Diagnostic Criteria:   Generalized Anxiety Disorder  A. Excessive anxiety and worry about a number of events or activities (such as work or school performance).   B. The person finds it difficult to control the worry.  C. Select 3 or more symptoms (required for diagnosis). Only one item is required in children.   - Restlessness or feeling keyed up or on edge.    - Being easily fatigued.    - Difficulty concentrating or mind going blank.    - Irritability.    - Sleep disturbance (difficulty falling or staying asleep, or restless unsatisfying sleep).   D. The focus of the anxiety and worry is not confined to features of an Axis I disorder.  E. The anxiety, worry, or physical symptoms cause clinically significant distress or impairment in social, occupational, or other important areas of functioning.   F. The disturbance is not due to the direct physiological effects of a substance (e.g., a drug of abuse, a medication) or a general medical condition (e.g., hyperthyroidism) and does not occur exclusively during a Mood Disorder, a Psychotic Disorder, or a Pervasive Developmental Disorder.    - The aformentioned symptoms began one year(s) ago and  occurs 7 days per week and is experienced as severe. Major Depressive Disorder  CRITERIA (A-C) REPRESENT A MAJOR DEPRESSIVE EPISODE - SELECT THESE CRITERIA  A) Recurrent episode(s) - symptoms have been present during the same 2-week period and represent a change from previous functioning 5 or more symptoms (required for diagnosis)   - Depressed mood. Note: In children and adolescents, can be irritable mood.     - Diminished interest or pleasure in all, or almost all, activities.    - Decreased sleep.    - Fatigue or loss of energy.    - Feelings of worthlessness or inappropriate and excessive guilt.    - Diminished ability to think or concentrate, or indecisiveness.   B) The symptoms cause clinically significant distress or impairment in social, occupational, or other important areas of functioning  C) The episode is not attributable to the physiological effects of a substance or to another medical condition  D) The occurence of major depressive episode is not better explained by other thought / psychotic disorders  E) There has never been a manic episode or hypomanic episode    Functional Status:  Patient reports the following functional impairments:  management of the household and or completion of tasks and work / vocational responsibilities.     Nonprogrammatic care:  Patient is requesting basic services to address current mental health concerns.    Clinical Summary:  1. Psychosocial, Cultural and Contextual Factors: , multiple medical conditions, workplace bullying  .  2. Principal DSM5 Diagnoses  (Sustained by DSM5 Criteria Listed Above):   296.32 (F33.1) Major Depressive Disorder, Recurrent Episode, Moderate _  300.02 (F41.1) Generalized Anxiety Disorder.  3. Other Diagnoses that is relevant to services:   NA  4. Provisional Diagnosis:  NA  5. Prognosis: Expect Improvement.  6. Likely consequences of symptoms if not treated: worsening symptoms and continued functional impairment.  7. Client strengths  include:  goal-focused, good listener, motivated, open to learning, open to suggestions / feedback, responsible parent, wants to learn, and willing to ask questions .     Recommendations:     1. Plan for Safety and Risk Management:   Safety and Risk: Recommended that patient call 911 or go to the local ED should there be a change in any of these risk factors..          Report to child / adult protection services was NA.     2. Patient's identified no concerns with sexual orientation, gender identity, culture, ethnicity, or kelly.     3. Initial Treatment will focus on:    Depressed Mood - MDD  Anxiety - ACE.     4. Resources/Service Plan:    services are not indicated.   Modifications to assist communication are not indicated.   Additional disability accommodations are not indicated.      5. Collaboration:   Collaboration / coordination of treatment will be initiated with the following  support professionals: primary care physician.      6.  Referrals:   The following referral(s) will be initiated: None at this time.       A Release of Information has been obtained for the following: None at this time.     Clinical Substantiation/medical necessity for the above recommendations:  individual therapy is necessary in order to alleviate symptoms and improve functioning.    7. JHONNY:    JHONNY:  Not identified.    8. Records:   These were reviewed at time of assessment.   Information in this assessment was obtained from the medical record and  provided by patient who is a good historian.    Patient will have open access to their mental health medical record.    9.   Interactive Complexity: No    Provider Name/ Credentials:  Cate Kaur MS, [facility]  May 9, 2024

## 2024-07-15 DIAGNOSIS — Z98.890 STATUS POST SURGICAL MANIPULATION OF ANKLE JOINT: ICD-10-CM

## 2024-07-15 DIAGNOSIS — M54.6 CHRONIC MIDLINE THORACIC BACK PAIN: ICD-10-CM

## 2024-07-15 DIAGNOSIS — G89.29 CHRONIC MIDLINE THORACIC BACK PAIN: ICD-10-CM

## 2024-07-15 RX ORDER — OXYCODONE HYDROCHLORIDE 5 MG/1
5 TABLET ORAL 3 TIMES DAILY PRN
Qty: 90 TABLET | Refills: 0 | Status: SHIPPED | OUTPATIENT
Start: 2024-07-15 | End: 2024-09-11 | Stop reason: DRUGHIGH

## 2024-07-16 ENCOUNTER — PATIENT OUTREACH (OUTPATIENT)
Dept: CARE COORDINATION | Facility: CLINIC | Age: 49
End: 2024-07-16
Payer: COMMERCIAL

## 2024-07-16 ENCOUNTER — TELEPHONE (OUTPATIENT)
Dept: FAMILY MEDICINE | Facility: CLINIC | Age: 49
End: 2024-07-16
Payer: COMMERCIAL

## 2024-07-16 NOTE — TELEPHONE ENCOUNTER
Prior Authorization Retail Medication Request    Medication/Dose: SERTRALINE 50 MG  Diagnosis and ICD code (if different than what is on RX):    New/renewal/insurance change PA/secondary ins. PA:  Previously Tried and Failed:    Rationale:      Insurance   Primary: [phone]  Insurance ID:  [id number]    Secondary (if applicable):  Insurance ID:      Pharmacy Information (if different than what is on RX)  Name:    Phone:    Fax:

## 2024-07-16 NOTE — PROGRESS NOTES
Clinic Care Coordination Contact  Follow Up Progress Note      Assessment: I got a fax from [facility] about the pt. They needed a referral from  for Oncologist, Bariatric, ENT, and Orthopedic. I did not see any order for the other places, but bariatric. I filled out the referral form for the bariatric, and faxed it to [facility]. Once I get the other order for the pt, I will fill out the referral for them.     Care Gaps:    Health Maintenance Due   Topic Date Due    DEPRESSION ACTION PLAN  Never done    ASTHMA ACTION PLAN  01/31/2021    ZOSTER IMMUNIZATION (1 of 2) Never done    COVID-19 Vaccine (5 - 2023-24 season) 09/01/2023    YEARLY PREVENTIVE VISIT  10/05/2023    HEPATITIS B IMMUNIZATION (2 of 3 - 19+ 3-dose series) 10/11/2023    ASTHMA CONTROL TEST  07/26/2024

## 2024-07-17 DIAGNOSIS — M54.6 CHRONIC MIDLINE THORACIC BACK PAIN: ICD-10-CM

## 2024-07-17 DIAGNOSIS — Z98.890 STATUS POST SURGICAL MANIPULATION OF ANKLE JOINT: ICD-10-CM

## 2024-07-17 DIAGNOSIS — G89.29 CHRONIC MIDLINE THORACIC BACK PAIN: ICD-10-CM

## 2024-07-17 DIAGNOSIS — F41.1 GENERALIZED ANXIETY DISORDER: ICD-10-CM

## 2024-07-17 DIAGNOSIS — F40.01 PANIC DISORDER WITH AGORAPHOBIA: ICD-10-CM

## 2024-07-17 RX ORDER — ALPRAZOLAM 2 MG
2 TABLET ORAL 3 TIMES DAILY PRN
Qty: 90 TABLET | Refills: 1 | Status: SHIPPED | OUTPATIENT
Start: 2024-08-07 | End: 2024-08-14

## 2024-07-17 RX ORDER — OXYCODONE HYDROCHLORIDE 5 MG/1
5 TABLET ORAL EVERY 6 HOURS PRN
Qty: 69 TABLET | Refills: 0 | Status: SHIPPED | OUTPATIENT
Start: 2024-07-22 | End: 2024-08-14

## 2024-07-18 ENCOUNTER — VIRTUAL VISIT (OUTPATIENT)
Dept: PSYCHOLOGY | Facility: CLINIC | Age: 49
End: 2024-07-18
Payer: COMMERCIAL

## 2024-07-18 DIAGNOSIS — F41.1 GAD (GENERALIZED ANXIETY DISORDER): ICD-10-CM

## 2024-07-18 DIAGNOSIS — F33.1 MAJOR DEPRESSIVE DISORDER, RECURRENT EPISODE, MODERATE (H): Primary | ICD-10-CM

## 2024-07-18 PROCEDURE — 90834 PSYTX W PT 45 MINUTES: CPT | Mod: 95

## 2024-07-18 NOTE — PROGRESS NOTES
"    [facility]                                     Progress Note    Patient Name: Darlene Hudson  Date: 24         Service Type: Individual      Session Start Time: 1233  Session End Time: 124     Session Length: 51    Session #: 5    Attendees: Client attended alone    Service Modality:  Phone Visit:      Provider verified identity through the following two step process.  Patient provided:  Patient  and Patient address    Telephone Visit: The patient's condition can be safely assessed and treated via synchronous audio telemedicine encounter.      Reason for Audio Telemedicine Visit: Patient has requested telehealth visit    Originating Site (Patient Location): Patient's home    Distant Site (Provider Location): Provider Remote Setting- Home Office    Consent:  The patient/guardian has verbally consented to:     1. The potential risks and benefits of telemedicine (telephone visit) versus in person care;    The patient has been notified of the following:      \"We have found that certain health care needs can be provided without the need for a face to face visit.  This service lets us provide the care you need with a phone conversation.       I will have full access to your [facility] medical record during this entire phone call.   I will be taking notes for your medical record.      Since this is like an office visit, we will bill your insurance company for this service.       There are potential benefits and risks of telephone visits (e.g. limits to patient confidentiality) that differ from in-person visits.?Confidentiality still applies for telephone services, and nobody will record the visit.  It is important to be in a quiet, private space that is free of distractions (including cell phone or other devices) during the visit.??      If during the course of the call I believe a telephone visit is not appropriate, you will not be charged for this service\"     Consent has been " obtained for this service by care team member: Yes     DATA  Interactive Complexity: No  Crisis: No        Progress Since Last Session (Related to Symptoms / Goals / Homework):   Symptoms: No change overall    Homework:  had not been assigned      Episode of Care Goals: Satisfactory progress - PREPARATION (Decided to change - considering how); Intervened by negotiating a change plan and determining options / strategies for behavior change, identifying triggers, exploring social supports, and working towards setting a date to begin behavior change     Current / Ongoing Stressors and Concerns:   Many stressors including prior cancer diagnosis and treatment, losses through death of family and friends, her 17 year old son and her relationship concerns, as well as current stress with her job, which she identified as most stressful at this time.      Treatment Objective(s) Addressed in This Session:   Build rapport. Assertiveness skills and boundary setting     Intervention:   Build rapport.  Provided support to client as she navigates many current stressors.  Explored boundaries she could set with her sons.  Goal in session.   Client to explore possible self care activities she could engage in.        Assessments completed prior to visit:  The following assessments were completed by patient for this visit:  PHQ9:       4/18/2023     4:19 PM 4/18/2023     5:13 PM 9/13/2023     2:39 PM 2/21/2024     1:38 PM 5/9/2024     7:34 AM 5/22/2024    11:00 AM 5/28/2024     9:31 AM   PHQ-9 SCORE   PHQ-9 Total Score MyChart   0  7 (Mild depression)  7 (Mild depression)   PHQ-9 Total Score 2 2 0 5 7 8 7     GAD7:       5/25/2022    11:32 AM 4/18/2023     4:19 PM 4/18/2023     5:13 PM 9/13/2023     2:40 PM 2/21/2024     1:38 PM 5/22/2024    11:00 AM 5/22/2024    11:03 AM   ACE-7 SCORE   Total Score 5 (mild anxiety)   7 (mild anxiety)   13 (moderate anxiety)   Total Score 5 5 5 7 6 19 13    13     PROMIS 10-Global Health (all questions  and answers displayed):       5/22/2024    11:00 AM 5/22/2024    11:04 AM   PROMIS 10   In general, would you say your health is:  Fair   In general, would you say your quality of life is:  Fair   In general, how would you rate your physical health?  Fair   In general, how would you rate your mental health, including your mood and your ability to think?  Poor   In general, how would you rate your satisfaction with your social activities and relationships?  Fair   In general, please rate how well you carry out your usual social activities and roles  Fair   To what extent are you able to carry out your everyday physical activities such as walking, climbing stairs, carrying groceries, or moving a chair?  Moderately   In the past 7 days, how often have you been bothered by emotional problems such as feeling anxious, depressed, or irritable?  Always   In the past 7 days, how would you rate your fatigue on average?  Mild   In the past 7 days, how would you rate your pain on average, where 0 means no pain, and 10 means worst imaginable pain?  5   In general, would you say your health is: 2 2   In general, would you say your quality of life is:  2   In general, how would you rate your physical health?  2   In general, how would you rate your mental health, including your mood and your ability to think?  1   In general, how would you rate your satisfaction with your social activities and relationships?  2   In general, please rate how well you carry out your usual social activities and roles. (This includes activities at home, at work and in your community, and responsibilities as a parent, child, spouse, employee, friend, etc.)  2   To what extent are you able to carry out your everyday physical activities such as walking, climbing stairs, carrying groceries, or moving a chair?  3   In the past 7 days, how often have you been bothered by emotional problems such as feeling anxious, depressed, or irritable?  5   In the past 7  days, how would you rate your fatigue on average?  2   In the past 7 days, how would you rate your pain on average, where 0 means no pain, and 10 means worst imaginable pain?  5   Global Mental Health Score  6    6   Global Physical Health Score  12    12   PROMIS TOTAL - SUBSCORES  18    18         ASSESSMENT: Current Emotional / Mental Status (status of significant symptoms):   Risk status (Self / Other harm or suicidal ideation)   Patient denies current fears or concerns for personal safety.   Patient denies current or recent suicidal ideation or behaviors.   Patient denies current or recent homicidal ideation or behaviors.   Patient denies current or recent self injurious behavior or ideation.   Patient denies other safety concerns.   Patient reports there has been no change in risk factors since their last session.     Patient reports there has been no change in protective factors since their last session.     Recommended that patient call 911 or go to the local ED should there be a change in any of these risk factors.     Appearance:   Unable to assess    Eye Contact:   Unable to assess    Psychomotor Behavior: Unable to assess    Attitude:   Cooperative    Orientation:   All   Speech    Rate / Production: Normal     Volume:  Normal    Mood:    Normal   Affect:    Unable to assess    Thought Content:  Clear    Thought Form:  Coherent    Insight:    Fair      Medication Review:   No changes to current psychiatric medication(s)     Medication Compliance:   Yes     Changes in Health Issues:   None reported     Chemical Use Review:   Substance Use: Chemical use reviewed, no active concerns identified      Tobacco Use: No current tobacco use.      Diagnosis:  1. Major depressive disorder, recurrent episode, moderate (H)    2. ACE (generalized anxiety disorder)          Collateral Reports Completed:   Not Applicable    PLAN: (Patient Tasks / Therapist Tasks / Other)  Rtn one week.  Soonest available.  Client to  think of boundaries, self care.        Cate Kaur, [facility]                                                         ______________________________________________________________________    Individual Treatment Plan    Patient's Name: Darlene Hudson  YOB: 1975    Date of Creation: 06/11/2024  Date Treatment Plan Last Reviewed/Revised: 06/11/2024    DSM5 Diagnoses: 296.32 (F33.1) Major Depressive Disorder, Recurrent Episode, Moderate _ or 300.02 (F41.1) Generalized Anxiety Disorder  Psychosocial / Contextual Factors: lives at home with sons, lots of stress from older son, workplace stress, on leave, legal interventions due to workplace concerns  PROMIS (reviewed every 90 days): 5/22/24    Referral / Collaboration:  Referral to another professional/service is not indicated at this time..    Anticipated number of session for this episode of care: 9-12 sessions  Anticipation frequency of session: Weekly  Anticipated Duration of each session: 38-52 minutes  Treatment plan will be reviewed in 90 days or when goals have been changed.       MeasurableTreatment Goal(s) related to diagnosis / functional impairment(s)  Goal 1: Patient will decrease symptoms of depression    I will know I've met my goal when I am happy within myself.      Objective #A (Patient Action)    Patient will identify at least 3 techniques for intervening on the escalation.  Status: New - Date: 06/11/2024      Intervention(s)  Therapist will teach emotional regulation skills.   .    Objective #B  Patient will learn and demonstrate 3 assertiveness skill(s).  Status: New - Date: 06/11/2024      Intervention(s)  Therapist will teach assertiveness skills.   .      Goal 2: Patient will decrease symptoms of anxiety    I will know I've met my goal when I am happy within myself.      Objective #A (Patient Action)    Status: New - Date: 06/11/2024      Patient will use at least 3 coping skills for anxiety management in the next 12  weeks.    Intervention(s)  Therapist will teach coping skills for anxiety .    Objective #B  Patient will use cognitive strategies identified in therapy to challenge anxious thoughts.    Status: New - Date: 2024      Intervention(s)  Therapist will teach cognitive restructuring .    Patient has reviewed and agreed to the above plan.      Cate Kaur, [facility]  2024    [facility]       PATIENT'S NAME: Darlene Hudson  PREFERRED NAME: Karoline  PRONOUNS:     she her hers  MRN: [id number]  : 1975  ADDRESS: [address] [place] [id number]  [facility] NUM[facility]:  [id number]  DATE OF SERVICE: 24  START TIME: 738  END TIME: 823  PREFERRED PHONE: [phone]  May we leave a program related message: Yes  EMERGENCY CONTACT: was not obtained .  SERVICE MODALITY:  Video Visit:      Provider verified identity through the following two step process.  Patient provided:  Patient  and Patient address    Telemedicine Visit: The patient's condition can be safely assessed and treated via synchronous audio and visual telemedicine encounter.      Reason for Telemedicine Visit: Patient convenience (e.g. access to timely appointments / distance to available provider)    Originating Site (Patient Location): Patient's home    Distant Site (Provider Location): Provider Remote Setting- Home Office    Consent:  The patient/guardian has verbally consented to: the potential risks and benefits of telemedicine (video visit) versus in person care; bill my insurance or make self-payment for services provided; and responsibility for payment of non-covered services.     Patient would like the video invitation sent by:  My Chart    Mode of Communication:  Video Conference via [facility]    Distant Location (Provider):  Off-site    As the provider I attest to compliance with applicable laws and regulations related to telemedicine.    UNIVERSAL ADULT Mental Health DIAGNOSTIC ASSESSMENT    Identifying  "Information:  Patient is a 49 year old, , , and Black ;  individual.  [facility]  Patient attended the session alone.    Chief Complaint:   The reason for seeking services at this time is: \" workplace stresses, workplace bullying and harassment \"   The problem(s) began August 28, 2023. Patient has attempted to resolve these concerns in the past through medication, therapy.    Social/Family History:  Patient reported they grew up in [place], [place].  They were raised by biological mother.  Parents  when 3 years old.   Patient reported that their childhood was pretty good, single mother, worked for the [employer], mother was active in their activities, they saw father fairly frequent.  Patient described their current relationships with family of origin as parents have passed, sibling relatiuonships still.      The patient describes their cultural background as bi racial.  Cultural influences and impact on patient's life structure, values, norms, and healthcare: NA.  Contextual influences on patient's health include: Contextual Factors: Individual Factors stressful work environment, supervisor difficulty, symptoms of depression, significant losses due to covid, previous colon cancer diagnosis and surgery as well as ankle surgery .  Cultural, Contextual, and socioeconomic factors do not affect the patient's access to services.  These factors will be addressed in the Preliminary Treatment plan.  Patient identified their preferred language to be English. Patient reported they do not  need the assistance of an  or other support involved in therapy.     Patient reported had no significant delays in developmental tasks.   Patient's highest education level was some college. Patient identified the following learning problems: none reported.  Modifications will not be used to assist " communication in therapy.   Patient reports they are  able to understand written materials.    Patient reported the following relationship history boyfriend of 11 years  of covid .  Patient's current relationship status is  .   Patient identified their sexual orientation as heterosexual.  Patient reported having five child(reza). Patient identified her best friend as part of their support system.  Patient identified the quality of these relationships as stable and meaningful.     Patient's current living/housing situation involves staying in own home/apartment.  They live with two sons and they report that housing is stable.     Patient is currently on medical leave from her work as a  .  Patient reports their finances are obtained through her savings, and help from family and friends.  Patient does identify finances as a current stressor.      Patient reported that they have not been involved with the legal system.   Patient denies being on probation / parole / under the jurisdiction of the court.    Patient's Strengths and Limitations:  Patient identified the following strengths or resources that will help them succeed in treatment: commitment to health and well being, motivation, and work ethic. Things that may interfere with the patient's success in treatment include: physical health concerns.     Assessments:  The following assessments were completed by patient for this visit:  PHQ9:       2022    11:31 AM 2023     4:04 PM 2023     4:19 PM 2023     5:13 PM 2023     2:39 PM 2024     1:38 PM 2024     7:34 AM   PHQ-9 SCORE   PHQ-9 Total Score MyChart 0    0  7 (Mild depression)   PHQ-9 Total Score 0 3 2 2 0 5 7     GAD7:       3/12/2020     3:31 PM 2021     2:04 PM 2022    11:32 AM 2023     4:19 PM 2023     5:13 PM 2023     2:40 PM 2024     1:38 PM   ACE-7 SCORE   Total Score   5 (mild anxiety)   7 (mild anxiety)    Total  Score 3 5 5 5 5 7 6     CAGE-AID:       5/9/2024     8:00 AM   CAGE-AID Total Score   Total Score 4     PROMIS 10-Global Health (all questions and answers displayed):        No data to display              Warner Suicide Severity Rating Scale (Lifetime/Recent)      5/9/2024     8:00 AM   Warner Suicide Severity Rating (Lifetime/Recent)   Q1 Wish to be Dead (Lifetime) N   Q2 Non-Specific Active Suicidal Thoughts (Lifetime) N   Actual Attempt (Lifetime) N   Has subject engaged in non-suicidal self-injurious behavior? (Lifetime) Y   Has subject engaged in non-suicidal self-injurious behavior? (Past 3 Months) Y   Interrupted Attempts (Lifetime) N   Aborted or Self-Interrupted Attempt (Lifetime) N   Preparatory Acts or Behavior (Lifetime) N   Calculated C-SSRS Risk Score (Lifetime/Recent) No Risk Indicated       Personal and Family Medical History:  Patient   report a family history of mental health concerns.  Patient reports family history includes Asthma in her child and father; Breast Cancer in her maternal grandmother; Cancer in her mother; Diabetes in her brother and brother; Hypertension in her mother; Other Cancer in her father..     Patient does report Mental Health Diagnosis and/or Treatment.  Patient Patient reported the following previous diagnoses which include(s): Depression, PTSD.  Patient reported symptoms began currently and about 15 years ago due to a rough patch with several stressors.   Patient has received mental health services in the past: outpatient therapy and [facility] treatment.  Psychiatric Hospitalizations: None.  Patient denies a history of civil commitment.  Patient is receiving other mental health services.  These include psychotherapy with a therapist in the Oncology dept.       Patient has had a physical exam to rule out medical causes for current symptoms.  Date of last physical exam was within the past year. Client was encouraged to follow up with PCP if symptoms were to develop. The  patient has a [place] Primary Care Provider, who is named Robbie Bailon.  Patient reports the following current medical concerns: past diagnosis and surgery for stage two colon cancer, ankle surgery which ct is still in recovery from.  Patient reports recent ankle surgery that sometimes causes pain.  There are significant appetite / nutritional concerns / weight changes.  Recent significant weight gain, gained 35 lbs.  Inactivity.   Patient does not report a history of head injury / trauma / cognitive impairment.      Patient reports current meds as:   Current Outpatient Medications   Medication Sig Dispense Refill    albuterol (PROAIR HFA/PROVENTIL HFA/VENTOLIN HFA) 108 (90 Base) MCG/ACT inhaler Inhale 2 puffs into the lungs every 6 hours as needed for shortness of breath, wheezing or cough 18 g 11    ALPRAZolam (XANAX) 2 MG tablet Take 1 tablet (2 mg) by mouth 2 times daily as needed for anxiety 60 tablet 3    cetirizine (ZYRTEC) 10 MG tablet Take 1 tablet (10 mg) by mouth daily 30 tablet 11    fluticasone (FLONASE) 50 MCG/ACT nasal spray Spray 1 spray into both nostrils daily      fluticasone (FLONASE) 50 MCG/ACT nasal spray Spray 2 sprays into both nostrils daily 9.9 mL 11    hydrOXYzine carlos (VISTARIL) 25 MG capsule Take 1 capsule (25 mg) by mouth nightly as needed for anxiety 30 capsule 3    montelukast (SINGULAIR) 10 MG tablet Take 1 tablet (10 mg) by mouth At Bedtime 30 tablet 11    naloxone (NARCAN) 4 MG/0.1ML nasal spray Spray 1 spray (4 mg) into one nostril alternating nostrils as needed for opioid reversal every 2-3 minutes until assistance arrives (Patient not taking: Reported on 3/6/2024) 0.2 mL 1    oxyCODONE (ROXICODONE) 5 MG tablet Take 1 tablet (5 mg) by mouth 3 times daily as needed for severe pain 75 tablet 0    PROAIR  (90 Base) MCG/ACT inhaler Inhale 2 puffs into the lungs every 4 hours as needed for shortness of breath or wheezing 8 g 11    sertraline (ZOLOFT) 50 MG tablet Take 2 tablets  (100 mg) by mouth daily 60 tablet 3    spacer (OPTICHAMBER BINA) holding chamber For use w/ rescue inhaler 1 each 0    sucralfate (CARAFATE) 1 GM/10ML suspension Take 10 mLs (1 g) by mouth 4 times daily as needed for nausea 414 mL 0    tiotropium (SPIRIVA RESPIMAT) 1.25 MCG/ACT inhaler Inhale 2 puffs into the lungs daily 12 g 3    tiotropium (SPIRIVA) 18 MCG inhaled capsule Inhale 18 mcg into the lungs daily (Patient not taking: Reported on 3/6/2024)       No current facility-administered medications for this visit.     Facility-Administered Medications Ordered in Other Visits   Medication Dose Route Frequency Provider Last Rate Last Admin    ceFAZolin (ANCEF) intermittent infusion 2 g in 50 mL dextrose PREMIX  2 g Intravenous See Admin Instructions Caty Darby PA-C        flumazenil (ROMAZICON) injection 0.2 mg  0.2 mg Intravenous q1 min prn Kodi Hathaway DO        gabapentin (NEURONTIN) capsule 300 mg  300 mg Oral Pre-Op/Pre-procedure x 1 dose Kodi Hathaway DO        lactated ringers infusion   Intravenous Continuous Kodi Hathaway DO   Stopped at 06/21/23 1149    lidocaine (LMX4) kit   Topical Q1H PRN Kodi Hathaway DO        lidocaine 1 % 0.1-1 mL  0.1-1 mL Other Q1H PRN Kodi Hathaway DO        midazolam (VERSED) injection 1-2 mg  1-2 mg Intravenous Q4 Min PRN Kodi Hathaway DO        naloxone (NARCAN) injection 0.2 mg  0.2 mg Intravenous Q2 Min PRN Kodi Hathaway DO        Or    naloxone (NARCAN) injection 0.4 mg  0.4 mg Intravenous Q2 Min PRN Kodi Hathaway DO        Or    naloxone (NARCAN) injection 0.2 mg  0.2 mg Intramuscular Q2 Min PRN Kodi Hathaway DO        Or    naloxone (NARCAN) injection 0.4 mg  0.4 mg Intramuscular Q2 Min PRN Kodi Hathaway DO        sodium chloride (PF) 0.9% PF flush 3 mL  3 mL Intracatheter Q8H Kodi Hathaway DO        sodium chloride (PF) 0.9% PF flush 3 mL  3 mL Intracatheter q1 min prn Kodi Hathaway DO           Medication Adherence:  Patient reports  .  taking  "prescribed medications as prescribed.    Patient Allergies:    Allergies   Allergen Reactions    Gabapentin Other (See Comments)     Mental status is changed     Lidocaine Other (See Comments)     \"my jaw stopped moving\"  Other reaction(s): Dystonia    Penicillins Hives, Rash and Shortness Of Breath    Lidocaine-Epinephrine Other (See Comments) and Muscle Pain (Myalgia)     Severe jaw cramping, double vision  Jaw locking       Medical History:    Past Medical History:   Diagnosis Date    Abdominal pain 06/29/2015    Abnormal cervical Papanicolaou smear 11/09/2014    Overview:  ACUS/HPV positive    Abnormal cytology finding 11/09/2014    Overview:  ACUS/HPV positive    Acute pericardial effusion 02/06/2017    Agoraphobia with panic attacks     Anxiety     Arthralgia of both lower legs 05/29/2020    Bilateral achy knee pain that is chronic in nature going on for years. Most likely osteoarthritis in knees related to obesity. Previously injected in both knees with good relief. 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  Has controlled substance agreement with them.  On Vicodin, Valium, Klonopin prescribed only from there.       Coronary artery disease     Cough 02/09/2020    Family history of colon cancer 10/24/2020    Hx of seasonal allergies     Infection due to 2019 novel coronavirus 09/20/2022    Infectious pericarditis     Lipoma     Low back pain 07/13/2015    Major depressive disorder, recurrent episode, moderate (H) " 09/05/2006    Menorrhagia with irregular cycle 07/15/2022    Added automatically from request for surgery [id number]    Moderate persistent asthma without complication 09/29/2020    Nondependent alcohol abuse, episodic drinking behavior 10/03/2012    Noninflammatory disorder of vagina 02/20/2015    Other chronic pain     Other long term (current) drug therapy 01/28/2013    Overview:  Vicodin and cyclobenzaprine monthly    Panic disorder with agoraphobia 09/05/2006    Pap smear for cervical cancer screening 10/31/2016    03/29/2010  Normal cytology, HPV ot done, repeat in 3 years.    Pericarditis 2017    Physiologic disturbance of temperature regulation 10/24/2020    PONV (postoperative nausea and vomiting)     Right ankle swelling 06/07/2022    Added automatically from request for surgery [id number]    Tobacco abuse     Tobacco use disorder 07/13/2015         Current Mental Status Exam:   Appearance:  Appropriate    Eye Contact:  Good   Psychomotor:  Normal       Gait / station:  Not assessed  Attitude / Demeanor: Cooperative  Pleasant  Speech      Rate / Production: Normal/ Responsive      Volume:  Normal  volume      Language:  intact  Mood:   Normal  Affect:   Appropriate    Thought Content: Clear   Thought Process: Coherent  Logical       Associations: No loosening of associations  Insight:   Good   Judgment:  Intact   Orientation:  All  Attention/concentration: Good    Substance Use:   Patient did not report a family history of substance use concerns; see medical history section for details.  Patient has received chemical dependency treatment in the past at [facility] 15 years ago.  Patient has ever been to detox.      Patient is not currently receiving any chemical dependency treatment. Patient reported the following problems as a result of their substance use:  NA.    Patient denies using alcohol.  Patient denies using tobacco  Patient denies using cannabis.  Patient reports appx an iced coffee before work, and an  occasional Mountain Dew  Patient reports using/abusing the following substance(s). Patient reported no other substance use.     Substance Use: No symptoms    Based on the positive CAGE score and clinical interview there  are not indications of drug or alcohol abuse.  15 years ago only     Significant Losses / Trauma / Abuse / Neglect Issues:   Patient   did not serve in the .  There are indications or report of significant loss, trauma, abuse or neglect issues related to: workplace harassment, bullying.  Cancer diagnosis, heart surgery, loss of several people in her life.   Concerns for possible neglect are not present.     Safety Assessment:   Patient denies current homicidal ideation and behaviors.  Patient reports current self-injurious ideation.  Onset: a month ago, frequency: once, duration: once, intensity: intense.  Client reports they are not currently engaging in self-injurious behaivor..  Patient denied risk behaviors associated with substance use.   Patient denies any high risk behaviors associated with mental health symptoms.  Patient reports the following current concerns for their personal safety: workplace hazards.  Patient reports there   firearms in the house.       The firearms are secured in a locked space.    History of Safety Concerns:  Patient denied a history of homicidal ideation.     Patient denied a history of personal safety concerns.    Patient denied a history of assaultive behaviors.    Patient denied a history of sexual assault behaviors.     Patient denied a history of risk behaviors associated with substance use.  Patient denies any history of high risk behaviors associated with mental health symptoms.  Patient reports the following protective factors:      Risk Plan:  See Recommendations for Safety and Risk Management Plan    Review of Symptoms per patient report:   Depression: Change in sleep, Lack of interest, Change in energy level, Difficulties concentrating, Low  self-worth, Irritability, and Feeling sad, down, or depressed  Ekta:  No Symptoms  Psychosis: No Symptoms  Anxiety: Excessive worry, Nervousness, Sleep disturbance, Poor concentration, and Irritability  Panic:  No symptoms  Post Traumatic Stress Disorder:  No Symptoms   Eating Disorder: No Symptoms  ADD / ADHD:  No symptoms  Conduct Disorder: No symptoms  Autism Spectrum Disorder: No symptoms  Obsessive Compulsive Disorder: No Symptoms    Patient reports the following compulsive behaviors and treatment history: NA.      Diagnostic Criteria:   Generalized Anxiety Disorder  A. Excessive anxiety and worry about a number of events or activities (such as work or school performance).   B. The person finds it difficult to control the worry.  C. Select 3 or more symptoms (required for diagnosis). Only one item is required in children.   - Restlessness or feeling keyed up or on edge.    - Being easily fatigued.    - Difficulty concentrating or mind going blank.    - Irritability.    - Sleep disturbance (difficulty falling or staying asleep, or restless unsatisfying sleep).   D. The focus of the anxiety and worry is not confined to features of an Axis I disorder.  E. The anxiety, worry, or physical symptoms cause clinically significant distress or impairment in social, occupational, or other important areas of functioning.   F. The disturbance is not due to the direct physiological effects of a substance (e.g., a drug of abuse, a medication) or a general medical condition (e.g., hyperthyroidism) and does not occur exclusively during a Mood Disorder, a Psychotic Disorder, or a Pervasive Developmental Disorder.    - The aformentioned symptoms began one year(s) ago and occurs 7 days per week and is experienced as severe. Major Depressive Disorder  CRITERIA (A-C) REPRESENT A MAJOR DEPRESSIVE EPISODE - SELECT THESE CRITERIA  A) Recurrent episode(s) - symptoms have been present during the same 2-week period and represent a change  from previous functioning 5 or more symptoms (required for diagnosis)   - Depressed mood. Note: In children and adolescents, can be irritable mood.     - Diminished interest or pleasure in all, or almost all, activities.    - Decreased sleep.    - Fatigue or loss of energy.    - Feelings of worthlessness or inappropriate and excessive guilt.    - Diminished ability to think or concentrate, or indecisiveness.   B) The symptoms cause clinically significant distress or impairment in social, occupational, or other important areas of functioning  C) The episode is not attributable to the physiological effects of a substance or to another medical condition  D) The occurence of major depressive episode is not better explained by other thought / psychotic disorders  E) There has never been a manic episode or hypomanic episode    Functional Status:  Patient reports the following functional impairments:  management of the household and or completion of tasks and work / vocational responsibilities.     Nonprogrammatic care:  Patient is requesting basic services to address current mental health concerns.    Clinical Summary:  1. Psychosocial, Cultural and Contextual Factors: , multiple medical conditions, workplace bullying  .  2. Principal DSM5 Diagnoses  (Sustained by DSM5 Criteria Listed Above):   296.32 (F33.1) Major Depressive Disorder, Recurrent Episode, Moderate _  300.02 (F41.1) Generalized Anxiety Disorder.  3. Other Diagnoses that is relevant to services:   NA  4. Provisional Diagnosis:  NA  5. Prognosis: Expect Improvement.  6. Likely consequences of symptoms if not treated: worsening symptoms and continued functional impairment.  7. Client strengths include:  goal-focused, good listener, motivated, open to learning, open to suggestions / feedback, responsible parent, wants to learn, and willing to ask questions .     Recommendations:     1. Plan for Safety and Risk Management:   Safety and Risk: Recommended  that patient call 911 or go to the local ED should there be a change in any of these risk factors..          Report to child / adult protection services was NA.     2. Patient's identified no concerns with sexual orientation, gender identity, culture, ethnicity, or kelly.     3. Initial Treatment will focus on:    Depressed Mood - MDD  Anxiety - ACE.     4. Resources/Service Plan:    services are not indicated.   Modifications to assist communication are not indicated.   Additional disability accommodations are not indicated.      5. Collaboration:   Collaboration / coordination of treatment will be initiated with the following  support professionals: primary care physician.      6.  Referrals:   The following referral(s) will be initiated: None at this time.       A Release of Information has been obtained for the following: None at this time.     Clinical Substantiation/medical necessity for the above recommendations:  individual therapy is necessary in order to alleviate symptoms and improve functioning.    7. JHONNY:    JHONNY:  Not identified.    8. Records:   These were reviewed at time of assessment.   Information in this assessment was obtained from the medical record and  provided by patient who is a good historian.    Patient will have open access to their mental health medical record.    9.   Interactive Complexity: No    Provider Name/ Credentials:  Cate Kaur MS, [facility]  May 9, 2024

## 2024-07-19 ENCOUNTER — DOCUMENTATION ONLY (OUTPATIENT)
Dept: FAMILY MEDICINE | Facility: CLINIC | Age: 49
End: 2024-07-19
Payer: COMMERCIAL

## 2024-07-19 NOTE — PROGRESS NOTES
Forms Request:  Today's Date: July 19, 2024   Form Type:  Managed Care Referral Form ,   Where is the form from: [facility]  How was form received: Fax  LUDWIN on file: UNKNOWN   How is form being returned: Fax  Fax Number/Address: [phone]  PCP: Robbie Vyas Team: Purple Team  Form Given to: Adamaris Elizondo form completed: 7/19/24  Form sent via: Fax  Date faxed or mailed: 7/19/2024  Fax # or Address: [phone]  Completed by: Juventino Stoll MA

## 2024-07-20 NOTE — TELEPHONE ENCOUNTER
Central Prior Authorization Team   Phone: [phone]    PA Initiation    Medication: SERTRALINE 50 MG  Insurance Company: [employer] Part D - Phone [phone] Fax [phone]  Pharmacy Filling the Rx: [id number] [address]  AT [facility]  Filling Pharmacy Phone: [phone]  Filling Pharmacy Fax:    Start Date: 7/20/2024

## 2024-07-22 DIAGNOSIS — F41.1 GENERALIZED ANXIETY DISORDER: ICD-10-CM

## 2024-07-22 DIAGNOSIS — F40.01 PANIC DISORDER WITH AGORAPHOBIA: ICD-10-CM

## 2024-07-22 DIAGNOSIS — F33.1 MAJOR DEPRESSIVE DISORDER, RECURRENT EPISODE, MODERATE (H): ICD-10-CM

## 2024-07-22 NOTE — TELEPHONE ENCOUNTER
"Message to physician:     Date of last visit: 7/3/2024    Date of next visit if scheduled:     Potassium   Date Value Ref Range Status   03/06/2024 3.7 3.4 - 5.3 mmol/L Final   01/31/2023 4.1 3.5 - 5.0 mmol/L Final   10/23/2019 3.7 3.4 - 5.3 mmol/L Final     Creatinine   Date Value Ref Range Status   03/06/2024 0.92 0.51 - 0.95 mg/dL Final   10/23/2019 1.0 0.6 - 1.3 mg/dL Final     GFR Estimate   Date Value Ref Range Status   03/06/2024 75 >60 mL/min/1.73m2 Final   02/09/2021 >60 >60 mL/min/1.73m2 Final   02/02/2017 >60 >60 mL/min/1.73m2 Final       BP Readings from Last 3 Encounters:   07/03/24 117/83   05/29/24 (!) 130/90   04/24/24 113/72       No results found for: \"A1C\"    Please complete refill and CLOSE ENCOUNTER.  Closing the encounter signifies the refill is complete.  " 5  37+3 weeks gestation here for NST for missing routine OB appointment. 1720 Pt placed on FHM.

## 2024-07-23 ENCOUNTER — OFFICE VISIT (OUTPATIENT)
Dept: SURGERY | Facility: CLINIC | Age: 49
End: 2024-07-23
Payer: COMMERCIAL

## 2024-07-23 VITALS
DIASTOLIC BLOOD PRESSURE: 70 MMHG | SYSTOLIC BLOOD PRESSURE: 122 MMHG | HEIGHT: 69 IN | BODY MASS INDEX: 36.87 KG/M2 | WEIGHT: 248.9 LBS

## 2024-07-23 DIAGNOSIS — E66.812 CLASS 2 OBESITY DUE TO EXCESS CALORIES WITHOUT SERIOUS COMORBIDITY WITH BODY MASS INDEX (BMI) OF 38.0 TO 38.9 IN ADULT: ICD-10-CM

## 2024-07-23 DIAGNOSIS — E66.01 MORBID OBESITY (H): Primary | ICD-10-CM

## 2024-07-23 DIAGNOSIS — G89.29 CHRONIC LOW BACK PAIN, UNSPECIFIED BACK PAIN LATERALITY, UNSPECIFIED WHETHER SCIATICA PRESENT: ICD-10-CM

## 2024-07-23 DIAGNOSIS — E66.09 CLASS 2 OBESITY DUE TO EXCESS CALORIES WITHOUT SERIOUS COMORBIDITY WITH BODY MASS INDEX (BMI) OF 38.0 TO 38.9 IN ADULT: ICD-10-CM

## 2024-07-23 DIAGNOSIS — M54.50 CHRONIC LOW BACK PAIN, UNSPECIFIED BACK PAIN LATERALITY, UNSPECIFIED WHETHER SCIATICA PRESENT: ICD-10-CM

## 2024-07-23 DIAGNOSIS — M25.562 ARTHRALGIA OF BOTH LOWER LEGS: ICD-10-CM

## 2024-07-23 DIAGNOSIS — Z86.69 HISTORY OF MIGRAINE HEADACHES: ICD-10-CM

## 2024-07-23 DIAGNOSIS — J45.40 MODERATE PERSISTENT ASTHMA WITHOUT COMPLICATION: ICD-10-CM

## 2024-07-23 DIAGNOSIS — M25.561 ARTHRALGIA OF BOTH LOWER LEGS: ICD-10-CM

## 2024-07-23 PROCEDURE — 99204 OFFICE O/P NEW MOD 45 MIN: CPT | Performed by: FAMILY MEDICINE

## 2024-07-23 RX ORDER — TOPIRAMATE 50 MG/1
TABLET, FILM COATED ORAL
Qty: 90 TABLET | Refills: 0 | Status: SHIPPED | OUTPATIENT
Start: 2024-07-23 | End: 2024-09-11

## 2024-07-23 ASSESSMENT — SLEEP AND FATIGUE QUESTIONNAIRES
HOW LIKELY ARE YOU TO NOD OFF OR FALL ASLEEP WHILE SITTING AND TALKING TO SOMEONE: WOULD NEVER DOZE
HOW LIKELY ARE YOU TO NOD OFF OR FALL ASLEEP WHEN YOU ARE A PASSENGER IN A CAR FOR AN HOUR WITHOUT A BREAK: WOULD NEVER DOZE
HOW LIKELY ARE YOU TO NOD OFF OR FALL ASLEEP WHILE SITTING AND READING: WOULD NEVER DOZE
HOW LIKELY ARE YOU TO NOD OFF OR FALL ASLEEP IN A CAR, WHILE STOPPED FOR A FEW MINUTES IN TRAFFIC: WOULD NEVER DOZE
HOW LIKELY ARE YOU TO NOD OFF OR FALL ASLEEP WHILE SITTING QUIETLY AFTER LUNCH WITHOUT ALCOHOL: MODERATE CHANCE OF DOZING
HOW LIKELY ARE YOU TO NOD OFF OR FALL ASLEEP WHILE SITTING INACTIVE IN A PUBLIC PLACE: WOULD NEVER DOZE
HOW LIKELY ARE YOU TO NOD OFF OR FALL ASLEEP WHILE WATCHING TV: MODERATE CHANCE OF DOZING
HOW LIKELY ARE YOU TO NOD OFF OR FALL ASLEEP WHILE LYING DOWN TO REST IN THE AFTERNOON WHEN CIRCUMSTANCES PERMIT: MODERATE CHANCE OF DOZING

## 2024-07-23 NOTE — PATIENT INSTRUCTIONS
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
## 2024-07-23 NOTE — PROGRESS NOTES
"ASSESSMENT/PLAN:    (F41.1) Generalized anxiety disorder  (primary encounter diagnosis)  Comment: adjusted zoloft dosage to 75mg daily as insurance won't cover higher dosage?   Plan: sertraline (ZOLOFT) 50 MG tablet          (F33.1) Major depressive disorder, recurrent episode, moderate (H)  Comment: see above  Plan: sertraline (ZOLOFT) 50 MG tablet          (F40.01) Panic disorder with agoraphobia  Comment: see above  Plan: sertraline (ZOLOFT) 50 MG tablet          (Z98.890) Status post surgical manipulation of ankle joint  Comment:   Plan: majority of visit spent filling forms for workability/ disability     >30 min was spent by me personally on the day of visit in review of records, direct patient care, documentation, and care coordination.     Robbie Bailon MD  July 24, 2024  4:59 PM        Pt is a 49 year old female last seen on 7/3/24 here today for:     1) Mood - tearful and \"not feeling myself\"  Was off Zoloft because of insurance denial; will approve 75 mg daily and not 100?      5/22/2024    11:00 AM 5/28/2024     9:31 AM 7/24/2024     3:59 PM   PHQ   PHQ-9 Total Score 8 7 6    6   Q9: Thoughts of better off dead/self-harm past 2 weeks Not at all Not at all Not at all        Per Dr Kelley's note (bariatric surgery) on 7/23/24:  ASSESSMENT/PLAN:  1. Morbid obesity (H)  We discussed healthy habits to assist with weight loss. She will work on planning meals ahead of time using the plate method for portion control and macronutrient proportions. She will try eating 3 small meals per day and work on cutting out soda and juice. She will try keeping a food journal. She will start doing some muscle building activity. Patient was given the list of youtube sites with exercise videos put together by one of our dieticians. We discussed medication that may assist with weight loss. Topamax was prescribed. Risks/ benefits and possible side effects were discussed and questions were answered. Written information was given.  "      2. Chronic low back pain, unspecified back pain laterality, unspecified whether sciatica present  This may improve with healthy habits and weight loss.      3. Moderate persistent asthma without complication  This may improve with healthy habits and weight loss.      4. Arthralgia of both lower legs  This may improve with healthy habits and weight loss.     Per my last note:  (F43.21) Grief reaction  (primary encounter diagnosis)  Comment:   Plan: lengthy discussion regarding her acute exacerbation of mood after death of previous partner/ father of one of her children; she will setup another therapy session asap; will hold on adjusting meds; precautions given; note for work accommodations written    Past Medical History:   Diagnosis Date    Abdominal pain 06/29/2015    Abnormal cervical Papanicolaou smear 11/09/2014    Overview:  ACUS/HPV positive    Abnormal cytology finding 11/09/2014    Overview:  ACUS/HPV positive    Acute pericardial effusion 02/06/2017    Agoraphobia with panic attacks     Anxiety     Arthralgia of both lower legs 05/29/2020    Bilateral achy knee pain that is chronic in nature going on for years. Most likely osteoarthritis in knees related to obesity. Previously injected in both knees with good relief. 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  Has controlled substance agreement with them.  On Vicodin, Valium, Klonopin prescribed only from there.       Coronary artery disease     Cough  02/09/2020    Family history of colon cancer 10/24/2020    Hx of seasonal allergies     Infection due to 2019 novel coronavirus 09/20/2022    Infectious pericarditis     Lipoma     Low back pain 07/13/2015    Major depressive disorder, recurrent episode, moderate (H) 09/05/2006    Menorrhagia with irregular cycle 07/15/2022    Added automatically from request for surgery [id number]    Moderate persistent asthma without complication 09/29/2020    Nondependent alcohol abuse, episodic drinking behavior 10/03/2012    Noninflammatory disorder of vagina 02/20/2015    Other chronic pain     Other long term (current) drug therapy 01/28/2013    Overview:  Vicodin and cyclobenzaprine monthly    Panic disorder with agoraphobia 09/05/2006    Pap smear for cervical cancer screening 10/31/2016    03/29/2010  Normal cytology, HPV ot done, repeat in 3 years.    Pericarditis 2017    Physiologic disturbance of temperature regulation 10/24/2020    PONV (postoperative nausea and vomiting)     Right ankle swelling 06/07/2022    Added automatically from request for surgery [id number]    Tobacco abuse     Tobacco use disorder 07/13/2015      Past Surgical History:   Procedure Laterality Date    ANKLE SURGERY Right 05/30/2019    ARTHROSCOPY ANKLE Right 6/21/2023    Procedure: right ankle arthroscopy with debridement;  Surgeon: Kareem Bashir MD;  Location: [facility] OR    BIOPSY BREAST Right 1990    benign    COLONOSCOPY N/A 1/3/2023    Procedure: COLONOSCOPY WITH BIOPSY OF COLON MASS, POLYPECTOMY;  Surgeon: Kris Charles MD;  [facility]    COLONOSCOPY N/A 3/12/2024    Procedure: COLONOSCOPY WITH POLYPECTOMY and EXCISION, LEFT, LESION, BACK;  Surgeon: Kris Charles MD;  [facility]: [facility]    CREATION PERICARDIAL WINDOW  02/10/2017    [facility]    DILATION AND CURETTAGE N/A 7/22/2022    Procedure: DILATION AND CURETTAGE, UTERUS;  Surgeon: Lesly Holland;  Location: [facility]     "HEMORRHOIDECTOMY EXTERNAL      HYSTEROSCOPY, WITH ENDOMETRIAL RADIOFREQUENCY ABLATION - NOVASURE N/A 7/22/2022    Procedure: HYSTEROSCOPY, WITH ENDOMETRIAL RADIOFREQUENCY ABLATION - NOVASURE DILATION AND CURETTAGE, UTERUS;  Surgeon: Lesly Holland;  Location: [facility] OR    LAPAROSCOPIC ASSISTED SIGMOID COLECTOMY N/A 1/6/2023    Procedure: LAPAROSCOPIC ASSISTED DESCENDING COLELCTOMY SPLENIC FLEXURE MOBILIZATION ;  Surgeon: Kris Charles MD;  Location: [facility] OR    LAPAROSCOPIC LYSIS ADHESIONS N/A 1/6/2023    Procedure: LYSIS OF ADHESIONS;  Surgeon: Kris Charles MD;  Location: [facility] OR    TUMOR REMOVAL      Has had 3. In the right breast and \"inside of rib cage.\"      Current Outpatient Medications   Medication Sig Dispense Refill    sertraline (ZOLOFT) 50 MG tablet Take 1.5 tablets (75 mg) by mouth daily 135 tablet 3    albuterol (PROAIR HFA/PROVENTIL HFA/VENTOLIN HFA) 108 (90 Base) MCG/ACT inhaler Inhale 2 puffs into the lungs every 6 hours as needed for shortness of breath, wheezing or cough 18 g 11    [START ON 8/7/2024] ALPRAZolam (XANAX) 2 MG tablet Take 1 tablet (2 mg) by mouth 3 times daily as needed for anxiety 90 tablet 1    cetirizine (ZYRTEC) 10 MG tablet Take 1 tablet (10 mg) by mouth daily 30 tablet 11    fluticasone (FLONASE) 50 MCG/ACT nasal spray Spray 2 sprays into both nostrils daily 9.9 mL 11    hydrOXYzine carlos (VISTARIL) 25 MG capsule Take 1 capsule (25 mg) by mouth nightly as needed for anxiety 30 capsule 3    montelukast (SINGULAIR) 10 MG tablet Take 1 tablet (10 mg) by mouth At Bedtime 30 tablet 11    naloxone (NARCAN) 4 MG/0.1ML nasal spray Spray 1 spray (4 mg) into one nostril alternating nostrils as needed for opioid reversal every 2-3 minutes until assistance arrives (Patient not taking: Reported on 3/6/2024) 0.2 mL 1    oxyCODONE (ROXICODONE) 5 MG tablet Take 1 tablet (5 mg) by mouth every 6 hours as needed for pain 69 tablet 0    oxyCODONE " "(ROXICODONE) 5 MG tablet Take 1 tablet (5 mg) by mouth 3 times daily as needed for severe pain 90 tablet 0    PROAIR  (90 Base) MCG/ACT inhaler Inhale 2 puffs into the lungs every 4 hours as needed for shortness of breath or wheezing 8 g 11    spacer (OPTICHAMBER BINA) holding chamber For use w/ rescue inhaler 1 each 0    tiotropium (SPIRIVA RESPIMAT) 1.25 MCG/ACT inhaler Inhale 2 puffs into the lungs daily 12 g 3    tiotropium (SPIRIVA) 18 MCG inhaled capsule Inhale 18 mcg into the lungs daily      topiramate (TOPAMAX) 50 MG tablet 1/2 tablet in the evening. You may increase to 1 full tablet after 1 week. 90 tablet 0      Allergies   Allergen Reactions    Gabapentin Other (See Comments)     Mental status is changed     Lidocaine Other (See Comments)     \"my jaw stopped moving\"  Other reaction(s): Dystonia    Penicillins Hives, Rash and Shortness Of Breath    Lidocaine-Epinephrine Other (See Comments) and Muscle Pain (Myalgia)     Severe jaw cramping, double vision  Jaw locking        ROS:   Gen- no weight change, no fevers/chills   Cardiac - no palpitations, no chest pain   Respiratory - no shortness of breath , no wheezing   Remainder of ROS negative.     Exam:   /73 (BP Location: Left arm, Patient Position: Sitting, Cuff Size: Adult Large)   Pulse 95   Temp 98.1  F (36.7  C)   Resp 18   SpO2 96%    Alert and oriented x 3; tearful        "

## 2024-07-23 NOTE — TELEPHONE ENCOUNTER
PRIOR AUTHORIZATION DENIED    Medication: SERTRALINE 50 MG-PA DENIED     Denial Date: 7/20/2024    Denial Rational:           Appeal Information:
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
## 2024-07-24 ENCOUNTER — OFFICE VISIT (OUTPATIENT)
Dept: FAMILY MEDICINE | Facility: CLINIC | Age: 49
End: 2024-07-24
Payer: COMMERCIAL

## 2024-07-24 VITALS
SYSTOLIC BLOOD PRESSURE: 107 MMHG | DIASTOLIC BLOOD PRESSURE: 73 MMHG | OXYGEN SATURATION: 96 % | HEART RATE: 95 BPM | RESPIRATION RATE: 18 BRPM | TEMPERATURE: 98.1 F

## 2024-07-24 DIAGNOSIS — F41.1 GENERALIZED ANXIETY DISORDER: Primary | ICD-10-CM

## 2024-07-24 DIAGNOSIS — F33.1 MAJOR DEPRESSIVE DISORDER, RECURRENT EPISODE, MODERATE (H): ICD-10-CM

## 2024-07-24 DIAGNOSIS — Z98.890 STATUS POST SURGICAL MANIPULATION OF ANKLE JOINT: ICD-10-CM

## 2024-07-24 DIAGNOSIS — F40.01 PANIC DISORDER WITH AGORAPHOBIA: ICD-10-CM

## 2024-07-24 PROCEDURE — 99214 OFFICE O/P EST MOD 30 MIN: CPT | Performed by: FAMILY MEDICINE

## 2024-07-24 ASSESSMENT — PATIENT HEALTH QUESTIONNAIRE - PHQ9
SUM OF ALL RESPONSES TO PHQ QUESTIONS 1-9: 6
10. IF YOU CHECKED OFF ANY PROBLEMS, HOW DIFFICULT HAVE THESE PROBLEMS MADE IT FOR YOU TO DO YOUR WORK, TAKE CARE OF THINGS AT HOME, OR GET ALONG WITH OTHER PEOPLE: SOMEWHAT DIFFICULT
SUM OF ALL RESPONSES TO PHQ QUESTIONS 1-9: 6
10. IF YOU CHECKED OFF ANY PROBLEMS, HOW DIFFICULT HAVE THESE PROBLEMS MADE IT FOR YOU TO DO YOUR WORK, TAKE CARE OF THINGS AT HOME, OR GET ALONG WITH OTHER PEOPLE: SOMEWHAT DIFFICULT

## 2024-07-24 ASSESSMENT — ASTHMA QUESTIONNAIRES
QUESTION_4 LAST FOUR WEEKS HOW OFTEN HAVE YOU USED YOUR RESCUE INHALER OR NEBULIZER MEDICATION (SUCH AS ALBUTEROL): ONE OR TWO TIMES PER DAY
ACT_TOTALSCORE: 18
QUESTION_1 LAST FOUR WEEKS HOW MUCH OF THE TIME DID YOUR ASTHMA KEEP YOU FROM GETTING AS MUCH DONE AT WORK, SCHOOL OR AT HOME: A LITTLE OF THE TIME
ACT_TOTALSCORE: 18
QUESTION_5 LAST FOUR WEEKS HOW WOULD YOU RATE YOUR ASTHMA CONTROL: WELL CONTROLLED
QUESTION_2 LAST FOUR WEEKS HOW OFTEN HAVE YOU HAD SHORTNESS OF BREATH: ONCE OR TWICE A WEEK
EMERGENCY_ROOM_LAST_YEAR_TOTAL: ONE
QUESTION_3 LAST FOUR WEEKS HOW OFTEN DID YOUR ASTHMA SYMPTOMS (WHEEZING, COUGHING, SHORTNESS OF BREATH, CHEST TIGHTNESS OR PAIN) WAKE YOU UP AT NIGHT OR EARLIER THAN USUAL IN THE MORNING: ONCE OR TWICE

## 2024-07-25 ENCOUNTER — VIRTUAL VISIT (OUTPATIENT)
Dept: PSYCHOLOGY | Facility: CLINIC | Age: 49
End: 2024-07-25
Payer: MEDICAID

## 2024-07-25 DIAGNOSIS — F41.1 GAD (GENERALIZED ANXIETY DISORDER): ICD-10-CM

## 2024-07-25 DIAGNOSIS — F33.1 MAJOR DEPRESSIVE DISORDER, RECURRENT EPISODE, MODERATE (H): Primary | ICD-10-CM

## 2024-07-25 PROCEDURE — 90837 PSYTX W PT 60 MINUTES: CPT | Mod: 95

## 2024-07-25 NOTE — TELEPHONE ENCOUNTER
Saw pt on 7/24/24 and I changed the prescription to 1.5 tabs daily which is covered under her plan.    Robbie Bailon MD  July 25, 2024  2:08 PM

## 2024-07-25 NOTE — PROGRESS NOTES
[facility]                                     Progress Note    Patient Name: Darlene Hudson  Date: 7/25/24         Service Type: Individual      Session Start Time: 101 Session End Time: 156     Session Length: 55    Session #: 6    Attendees: Client attended alone    Service Modality:  Video Visit:      Provider verified identity through the following two step process.  Patient provided:  Patient is known previously to provider    Telemedicine Visit: The patient's condition can be safely assessed and treated via synchronous audio and visual telemedicine encounter.      Reason for Telemedicine Visit: Patient has requested telehealth visit    Originating Site (Patient Location): Patient's home    Distant Site (Provider Location): Provider Remote Setting- Home Office    Consent:  The patient/guardian has verbally consented to: the potential risks and benefits of telemedicine (video visit) versus in person care; bill my insurance or make self-payment for services provided; and responsibility for payment of non-covered services.     Patient would like the video invitation sent by:  My Chart    Mode of Communication:  Video Conference via Amwell    Distant Location (Provider):  Off-site    As the provider I attest to compliance with applicable laws and regulations related to telemedicine.    DATA  Interactive Complexity: No  Crisis: No        Progress Since Last Session (Related to Symptoms / Goals / Homework):   Symptoms: Improving slightly    Homework:  contemplating self care activities      Episode of Care Goals: Satisfactory progress - ACTION (Actively working towards change); Intervened by reinforcing change plan / affirming steps taken     Current / Ongoing Stressors and Concerns:   Many stressors including prior cancer diagnosis and treatment, losses through death of family and friends, her 17 year old son and her relationship concerns, as well as current stress with her job, which she  identified as most stressful at this time.      Treatment Objective(s) Addressed in This Session:   Build rapport. Assertiveness skills and boundary setting, coping strategies for stressors     Intervention:   Build rapport.  Exploration of the lifting of stressor involved with her older son.  Exploration of boundaries she set with him recently.  Processing thoughts and emotions related to the many hardships client has had to endure recently.  Exploration of therapeutic journaling as a way to aid in processing strong emotions.      Assessments completed prior to visit:  The following assessments were completed by patient for this visit:  PHQ9:       4/18/2023     5:13 PM 9/13/2023     2:39 PM 2/21/2024     1:38 PM 5/9/2024     7:34 AM 5/22/2024    11:00 AM 5/28/2024     9:31 AM 7/24/2024     3:59 PM   PHQ-9 SCORE   PHQ-9 Total Score MyChart  0  7 (Mild depression)  7 (Mild depression) 6 (Mild depression)   PHQ-9 Total Score 2 0 5 7 8 7 6    6     GAD7:       5/25/2022    11:32 AM 4/18/2023     4:19 PM 4/18/2023     5:13 PM 9/13/2023     2:40 PM 2/21/2024     1:38 PM 5/22/2024    11:00 AM 5/22/2024    11:03 AM   ACE-7 SCORE   Total Score 5 (mild anxiety)   7 (mild anxiety)   13 (moderate anxiety)   Total Score 5 5 5 7 6 19 13    13     PROMIS 10-Global Health (all questions and answers displayed):       5/22/2024    11:00 AM 5/22/2024    11:04 AM 7/23/2024     9:34 AM   PROMIS 10   In general, would you say your health is:  Fair Poor   In general, would you say your quality of life is:  Fair Fair   In general, how would you rate your physical health?  Fair Poor   In general, how would you rate your mental health, including your mood and your ability to think?  Poor Fair   In general, how would you rate your satisfaction with your social activities and relationships?  Fair Fair   In general, please rate how well you carry out your usual social activities and roles  Fair Good   To what extent are you able to carry out  your everyday physical activities such as walking, climbing stairs, carrying groceries, or moving a chair?  Moderately Moderately   In the past 7 days, how often have you been bothered by emotional problems such as feeling anxious, depressed, or irritable?  Always Always   In the past 7 days, how would you rate your fatigue on average?  Mild Moderate   In the past 7 days, how would you rate your pain on average, where 0 means no pain, and 10 means worst imaginable pain?  5 5   In general, would you say your health is: 2 2 1   In general, would you say your quality of life is:  2 2   In general, how would you rate your physical health?  2 1   In general, how would you rate your mental health, including your mood and your ability to think?  1 2   In general, how would you rate your satisfaction with your social activities and relationships?  2 2   In general, please rate how well you carry out your usual social activities and roles. (This includes activities at home, at work and in your community, and responsibilities as a parent, child, spouse, employee, friend, etc.)  2 3   To what extent are you able to carry out your everyday physical activities such as walking, climbing stairs, carrying groceries, or moving a chair?  3 3   In the past 7 days, how often have you been bothered by emotional problems such as feeling anxious, depressed, or irritable?  5 5   In the past 7 days, how would you rate your fatigue on average?  2 3   In the past 7 days, how would you rate your pain on average, where 0 means no pain, and 10 means worst imaginable pain?  5 5   Global Mental Health Score  6    6 7   Global Physical Health Score  12    12 10   PROMIS TOTAL - SUBSCORES  18    18 17         ASSESSMENT: Current Emotional / Mental Status (status of significant symptoms):   Risk status (Self / Other harm or suicidal ideation)   Patient denies current fears or concerns for personal safety.   Patient denies current or recent suicidal  ideation or behaviors.   Patient denies current or recent homicidal ideation or behaviors.   Patient denies current or recent self injurious behavior or ideation.   Patient denies other safety concerns.   Patient reports there has been no change in risk factors since their last session.     Patient reports there has been no change in protective factors since their last session.     Recommended that patient call 911 or go to the local ED should there be a change in any of these risk factors.     Appearance:   normal    Eye Contact:   Good    Psychomotor Behavior: normal    Attitude:   Cooperative    Orientation:   All   Speech    Rate / Production: Normal     Volume:  Normal    Mood:    Normal   Affect:    normal   Thought Content:  Clear    Thought Form:  Coherent    Insight:    Fair      Medication Review:   Changes to psychiatric medications, see updated Medication List in EPIC.      Medication Compliance:   Yes     Changes in Health Issues:   None reported     Chemical Use Review:   Substance Use: Chemical use reviewed, no active concerns identified      Tobacco Use: No current tobacco use.      Diagnosis:  1. Major depressive disorder, recurrent episode, moderate (H)    2. ACE (generalized anxiety disorder)            Collateral Reports Completed:   Not Applicable    PLAN: (Patient Tasks / Therapist Tasks / Other)  Rtn one week.  Soonest available.  Client to continue boundaries, continue taking medication as prescribed, continue processing recent hardships.        Cate Kaur [facility]                                                         ______________________________________________________________________    Individual Treatment Plan    Patient's Name: Darlene Hudson  YOB: 1975    Date of Creation: 06/11/2024  Date Treatment Plan Last Reviewed/Revised: 06/11/2024    DSM5 Diagnoses: 296.32 (F33.1) Major Depressive Disorder, Recurrent Episode, Moderate _ or 300.02 (F41.1) Generalized  Anxiety Disorder  Psychosocial / Contextual Factors: lives at home with sons, lots of stress from older son, workplace stress, on leave, legal interventions due to workplace concerns  PROMIS (reviewed every 90 days): 5/22/24    Referral / Collaboration:  Referral to another professional/service is not indicated at this time..    Anticipated number of session for this episode of care: 9-12 sessions  Anticipation frequency of session: Weekly  Anticipated Duration of each session: 38-52 minutes  Treatment plan will be reviewed in 90 days or when goals have been changed.       MeasurableTreatment Goal(s) related to diagnosis / functional impairment(s)  Goal 1: Patient will decrease symptoms of depression    I will know I've met my goal when I am happy within myself.      Objective #A (Patient Action)    Patient will identify at least 3 techniques for intervening on the escalation.  Status: New - Date: 06/11/2024      Intervention(s)  Therapist will teach emotional regulation skills.   .    Objective #B  Patient will learn and demonstrate 3 assertiveness skill(s).  Status: New - Date: 06/11/2024      Intervention(s)  Therapist will teach assertiveness skills.   .      Goal 2: Patient will decrease symptoms of anxiety    I will know I've met my goal when I am happy within myself.      Objective #A (Patient Action)    Status: New - Date: 06/11/2024      Patient will use at least 3 coping skills for anxiety management in the next 12 weeks.    Intervention(s)  Therapist will teach coping skills for anxiety .    Objective #B  Patient will use cognitive strategies identified in therapy to challenge anxious thoughts.    Status: New - Date: 06/11/2024      Intervention(s)  Therapist will teach cognitive restructuring .    Patient has reviewed and agreed to the above plan.      Cate Kaur [facility]  June 11, 2024    [facility]       PATIENT'S NAME: Darlene Hudson  PREFERRED NAME: Karoline  PRONOUNS:     she her  "hers  MRN: [id number]  : 1975  ADDRESS: [address]  [facility] [address]  ACCT. NUMBER:  [id number]  DATE OF SERVICE: 24  START TIME: 738  END TIME: 823  PREFERRED PHONE: [phone]  May we leave a program related message: Yes  EMERGENCY CONTACT: was not obtained .  SERVICE MODALITY:  Video Visit:      Provider verified identity through the following two step process.  Patient provided:  Patient  and Patient address    Telemedicine Visit: The patient's condition can be safely assessed and treated via synchronous audio and visual telemedicine encounter.      Reason for Telemedicine Visit: Patient convenience (e.g. access to timely appointments / distance to available provider)    Originating Site (Patient Location): Patient's home    Distant Site (Provider Location): Provider Remote Setting- Home Office    Consent:  The patient/guardian has verbally consented to: the potential risks and benefits of telemedicine (video visit) versus in person care; bill my insurance or make self-payment for services provided; and responsibility for payment of non-covered services.     Patient would like the video invitation sent by:  My Chart    Mode of Communication:  Video Conference via Amwell    Distant Location (Provider):  Off-site    As the provider I attest to compliance with applicable laws and regulations related to telemedicine.    UNIVERSAL ADULT Mental Health DIAGNOSTIC ASSESSMENT    Identifying Information:  Patient is a 49 year old, , , and Black ;  individual.  [facility]  Patient attended the session alone.    Chief Complaint:   The reason for seeking services at this time is: \" workplace stresses, workplace bullying and harassment \"   The problem(s) began 2023. Patient has attempted to resolve these concerns in the past through medication, " therapy.    Social/Family History:  Patient reported they grew up in [place], [place].  They were raised by biological mother.  Parents  when 3 years old.   Patient reported that their childhood was pretty good, single mother, worked for the [employer], mother was active in their activities, they saw father fairly frequent.  Patient described their current relationships with family of origin as parents have passed, sibling relatiuonships still.      The patient describes their cultural background as bi racial.  Cultural influences and impact on patient's life structure, values, norms, and healthcare: NA.  Contextual influences on patient's health include: Contextual Factors: Individual Factors stressful work environment, supervisor difficulty, symptoms of depression, significant losses due to covid, previous colon cancer diagnosis and surgery as well as ankle surgery .  Cultural, Contextual, and socioeconomic factors do not affect the patient's access to services.  These factors will be addressed in the Preliminary Treatment plan.  Patient identified their preferred language to be English. Patient reported they do not  need the assistance of an  or other support involved in therapy.     Patient reported had no significant delays in developmental tasks.   Patient's highest education level was some college. Patient identified the following learning problems: none reported.  Modifications will not be used to assist communication in therapy.   Patient reports they are  able to understand written materials.    Patient reported the following relationship history boyfriend of 11 years  of covid .  Patient's current relationship status is  .   Patient identified their sexual orientation as heterosexual.  Patient reported having five child(reza). Patient identified her best friend as part of their support system.  Patient identified the quality of these relationships as stable and meaningful.      Patient's current living/housing situation involves staying in own home/apartment.  They live with two sons and they report that housing is stable.     Patient is currently on medical leave from her work as a  .  Patient reports their finances are obtained through her savings, and help from family and friends.  Patient does identify finances as a current stressor.      Patient reported that they have not been involved with the legal system.   Patient denies being on probation / parole / under the jurisdiction of the court.    Patient's Strengths and Limitations:  Patient identified the following strengths or resources that will help them succeed in treatment: commitment to health and well being, motivation, and work ethic. Things that may interfere with the patient's success in treatment include: physical health concerns.     Assessments:  The following assessments were completed by patient for this visit:  PHQ9:       5/25/2022    11:31 AM 1/18/2023     4:04 PM 4/18/2023     4:19 PM 4/18/2023     5:13 PM 9/13/2023     2:39 PM 2/21/2024     1:38 PM 5/9/2024     7:34 AM   PHQ-9 SCORE   PHQ-9 Total Score MyChart 0    0  7 (Mild depression)   PHQ-9 Total Score 0 3 2 2 0 5 7     GAD7:       3/12/2020     3:31 PM 2/4/2021     2:04 PM 5/25/2022    11:32 AM 4/18/2023     4:19 PM 4/18/2023     5:13 PM 9/13/2023     2:40 PM 2/21/2024     1:38 PM   ACE-7 SCORE   Total Score   5 (mild anxiety)   7 (mild anxiety)    Total Score 3 5 5 5 5 7 6     CAGE-AID:       5/9/2024     8:00 AM   CAGE-AID Total Score   Total Score 4     PROMIS 10-Global Health (all questions and answers displayed):        No data to display              [place] Suicide Severity Rating Scale (Lifetime/Recent)      5/9/2024     8:00 AM   [place] Suicide Severity Rating (Lifetime/Recent)   Q1 Wish to be Dead (Lifetime) N   Q2 Non-Specific Active Suicidal Thoughts (Lifetime) N   Actual Attempt (Lifetime) N   Has subject engaged in  non-suicidal self-injurious behavior? (Lifetime) Y   Has subject engaged in non-suicidal self-injurious behavior? (Past 3 Months) Y   Interrupted Attempts (Lifetime) N   Aborted or Self-Interrupted Attempt (Lifetime) N   Preparatory Acts or Behavior (Lifetime) N   Calculated C-SSRS Risk Score (Lifetime/Recent) No Risk Indicated       Personal and Family Medical History:  Patient   report a family history of mental health concerns.  Patient reports family history includes Asthma in her child and father; Breast Cancer in her maternal grandmother; Cancer in her mother; Diabetes in her brother and brother; Hypertension in her mother; Other Cancer in her father..     Patient does report Mental Health Diagnosis and/or Treatment.  Patient Patient reported the following previous diagnoses which include(s): Depression, PTSD.  Patient reported symptoms began currently and about 15 years ago due to a rough patch with several stressors.   Patient has received mental health services in the past: outpatient therapy and [facility] treatment.  Psychiatric Hospitalizations: None.  Patient denies a history of civil commitment.  Patient is receiving other mental health services.  These include psychotherapy with a therapist in the Oncology dept.       Patient has had a physical exam to rule out medical causes for current symptoms.  Date of last physical exam was within the past year. Client was encouraged to follow up with PCP if symptoms were to develop. The patient has a [facility] Primary Care Provider, who is named Robbie Bailon.  Patient reports the following current medical concerns: past diagnosis and surgery for stage two colon cancer, ankle surgery which ct is still in recovery from.  Patient reports recent ankle surgery that sometimes causes pain.  There are significant appetite / nutritional concerns / weight changes.  Recent significant weight gain, gained 35 lbs.  Inactivity.   Patient does not report a history of head injury /  trauma / cognitive impairment.      Patient reports current meds as:   Current Outpatient Medications   Medication Sig Dispense Refill    albuterol (PROAIR HFA/PROVENTIL HFA/VENTOLIN HFA) 108 (90 Base) MCG/ACT inhaler Inhale 2 puffs into the lungs every 6 hours as needed for shortness of breath, wheezing or cough 18 g 11    ALPRAZolam (XANAX) 2 MG tablet Take 1 tablet (2 mg) by mouth 2 times daily as needed for anxiety 60 tablet 3    cetirizine (ZYRTEC) 10 MG tablet Take 1 tablet (10 mg) by mouth daily 30 tablet 11    fluticasone (FLONASE) 50 MCG/ACT nasal spray Spray 1 spray into both nostrils daily      fluticasone (FLONASE) 50 MCG/ACT nasal spray Spray 2 sprays into both nostrils daily 9.9 mL 11    hydrOXYzine carlos (VISTARIL) 25 MG capsule Take 1 capsule (25 mg) by mouth nightly as needed for anxiety 30 capsule 3    montelukast (SINGULAIR) 10 MG tablet Take 1 tablet (10 mg) by mouth At Bedtime 30 tablet 11    naloxone (NARCAN) 4 MG/0.1ML nasal spray Spray 1 spray (4 mg) into one nostril alternating nostrils as needed for opioid reversal every 2-3 minutes until assistance arrives (Patient not taking: Reported on 3/6/2024) 0.2 mL 1    oxyCODONE (ROXICODONE) 5 MG tablet Take 1 tablet (5 mg) by mouth 3 times daily as needed for severe pain 75 tablet 0    PROAIR  (90 Base) MCG/ACT inhaler Inhale 2 puffs into the lungs every 4 hours as needed for shortness of breath or wheezing 8 g 11    sertraline (ZOLOFT) 50 MG tablet Take 2 tablets (100 mg) by mouth daily 60 tablet 3    spacer (OPTICHAMBER BINA) holding chamber For use w/ rescue inhaler 1 each 0    sucralfate (CARAFATE) 1 GM/10ML suspension Take 10 mLs (1 g) by mouth 4 times daily as needed for nausea 414 mL 0    tiotropium (SPIRIVA RESPIMAT) 1.25 MCG/ACT inhaler Inhale 2 puffs into the lungs daily 12 g 3    tiotropium (SPIRIVA) 18 MCG inhaled capsule Inhale 18 mcg into the lungs daily (Patient not taking: Reported on 3/6/2024)       No current  "facility-administered medications for this visit.     Facility-Administered Medications Ordered in Other Visits   Medication Dose Route Frequency Provider Last Rate Last Admin    ceFAZolin (ANCEF) intermittent infusion 2 g in 50 mL dextrose PREMIX  2 g Intravenous See Admin Instructions Caty Darby PA-C        flumazenil (ROMAZICON) injection 0.2 mg  0.2 mg Intravenous q1 min prn Kodi Hathaway DO        gabapentin (NEURONTIN) capsule 300 mg  300 mg Oral Pre-Op/Pre-procedure x 1 dose Kdoi Hathaway DO        lactated ringers infusion   Intravenous Continuous Kodi Hathaway DO   Stopped at 06/21/23 1149    lidocaine (LMX4) kit   Topical Q1H PRN Kodi Hathaway DO        lidocaine 1 % 0.1-1 mL  0.1-1 mL Other Q1H PRN Kodi Hathaway DO        midazolam (VERSED) injection 1-2 mg  1-2 mg Intravenous Q4 Min PRN Kodi Hathaway,         naloxone (NARCAN) injection 0.2 mg  0.2 mg Intravenous Q2 Min PRN Kodi Hathaway DO        Or    naloxone (NARCAN) injection 0.4 mg  0.4 mg Intravenous Q2 Min PRN Kodi Hathaway DO        Or    naloxone (NARCAN) injection 0.2 mg  0.2 mg Intramuscular Q2 Min PRN Kodi Hathaway DO        Or    naloxone (NARCAN) injection 0.4 mg  0.4 mg Intramuscular Q2 Min PRN Kodi Hathaway, DO        sodium chloride (PF) 0.9% PF flush 3 mL  3 mL Intracatheter Q8H Kodi Hathaway DO        sodium chloride (PF) 0.9% PF flush 3 mL  3 mL Intracatheter q1 min prn Kodi Hathaway, DO           Medication Adherence:  Patient reports  .  taking prescribed medications as prescribed.    Patient Allergies:    Allergies   Allergen Reactions    Gabapentin Other (See Comments)     Mental status is changed     Lidocaine Other (See Comments)     \"my jaw stopped moving\"  Other reaction(s): Dystonia    Penicillins Hives, Rash and Shortness Of Breath    Lidocaine-Epinephrine Other (See Comments) and Muscle Pain (Myalgia)     Severe jaw cramping, double vision  Jaw locking       Medical History:    Past Medical History: "   Diagnosis Date    Abdominal pain 06/29/2015    Abnormal cervical Papanicolaou smear 11/09/2014    Overview:  ACUS/HPV positive    Abnormal cytology finding 11/09/2014    Overview:  ACUS/HPV positive    Acute pericardial effusion 02/06/2017    Agoraphobia with panic attacks     Anxiety     Arthralgia of both lower legs 05/29/2020    Bilateral achy knee pain that is chronic in nature going on for years. Most likely osteoarthritis in knees related to obesity. Previously injected in both knees with good relief. 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  Has controlled substance agreement with them.  On Vicodin, Valium, Klonopin prescribed only from there.       Coronary artery disease     Cough 02/09/2020    Family history of colon cancer 10/24/2020    Hx of seasonal allergies     Infection due to 2019 novel coronavirus 09/20/2022    Infectious pericarditis     Lipoma     Low back pain 07/13/2015    Major depressive disorder, recurrent episode, moderate (H) 09/05/2006    Menorrhagia with irregular cycle 07/15/2022    Added automatically from request for surgery [id number]    Moderate persistent asthma without complication 09/29/2020    Nondependent alcohol abuse, episodic drinking behavior 10/03/2012    Noninflammatory disorder of vagina 02/20/2015    Other chronic pain     Other long term (current) drug therapy 01/28/2013    Overview:  Vicodin and cyclobenzaprine monthly    Panic disorder with agoraphobia 09/05/2006    Pap  smear for cervical cancer screening 10/31/2016    03/29/2010  Normal cytology, HPV ot done, repeat in 3 years.    Pericarditis 2017    Physiologic disturbance of temperature regulation 10/24/2020    PONV (postoperative nausea and vomiting)     Right ankle swelling 06/07/2022    Added automatically from request for surgery [id number]    Tobacco abuse     Tobacco use disorder 07/13/2015         Current Mental Status Exam:   Appearance:  Appropriate    Eye Contact:  Good   Psychomotor:  Normal       Gait / station:  Not assessed  Attitude / Demeanor: Cooperative  Pleasant  Speech      Rate / Production: Normal/ Responsive      Volume:  Normal  volume      Language:  intact  Mood:   Normal  Affect:   Appropriate    Thought Content: Clear   Thought Process: Coherent  Logical       Associations: No loosening of associations  Insight:   Good   Judgment:  Intact   Orientation:  All  Attention/concentration: Good    Substance Use:   Patient did not report a family history of substance use concerns; see medical history section for details.  Patient has received chemical dependency treatment in the past at [facility] 15 years ago.  Patient has ever been to detox.      Patient is not currently receiving any chemical dependency treatment. Patient reported the following problems as a result of their substance use:  NA.    Patient denies using alcohol.  Patient denies using tobacco  Patient denies using cannabis.  Patient reports appx an iced coffee before work, and an occasional Mountain Dew  Patient reports using/abusing the following substance(s). Patient reported no other substance use.     Substance Use: No symptoms    Based on the positive CAGE score and clinical interview there  are not indications of drug or alcohol abuse.  15 years ago only     Significant Losses / Trauma / Abuse / Neglect Issues:   Patient   did not serve in the .  There are indications or report of significant loss, trauma, abuse or neglect issues  related to: workplace harassment, bullying.  Cancer diagnosis, heart surgery, loss of several people in her life.   Concerns for possible neglect are not present.     Safety Assessment:   Patient denies current homicidal ideation and behaviors.  Patient reports current self-injurious ideation.  Onset: a month ago, frequency: once, duration: once, intensity: intense.  Client reports they are not currently engaging in self-injurious behaivor..  Patient denied risk behaviors associated with substance use.   Patient denies any high risk behaviors associated with mental health symptoms.  Patient reports the following current concerns for their personal safety: workplace hazards.  Patient reports there   firearms in the house.       The firearms are secured in a locked space.    History of Safety Concerns:  Patient denied a history of homicidal ideation.     Patient denied a history of personal safety concerns.    Patient denied a history of assaultive behaviors.    Patient denied a history of sexual assault behaviors.     Patient denied a history of risk behaviors associated with substance use.  Patient denies any history of high risk behaviors associated with mental health symptoms.  Patient reports the following protective factors:      Risk Plan:  See Recommendations for Safety and Risk Management Plan    Review of Symptoms per patient report:   Depression: Change in sleep, Lack of interest, Change in energy level, Difficulties concentrating, Low self-worth, Irritability, and Feeling sad, down, or depressed  Ekta:  No Symptoms  Psychosis: No Symptoms  Anxiety: Excessive worry, Nervousness, Sleep disturbance, Poor concentration, and Irritability  Panic:  No symptoms  Post Traumatic Stress Disorder:  No Symptoms   Eating Disorder: No Symptoms  ADD / ADHD:  No symptoms  Conduct Disorder: No symptoms  Autism Spectrum Disorder: No symptoms  Obsessive Compulsive Disorder: No Symptoms    Patient reports the following  compulsive behaviors and treatment history: NA.      Diagnostic Criteria:   Generalized Anxiety Disorder  A. Excessive anxiety and worry about a number of events or activities (such as work or school performance).   B. The person finds it difficult to control the worry.  C. Select 3 or more symptoms (required for diagnosis). Only one item is required in children.   - Restlessness or feeling keyed up or on edge.    - Being easily fatigued.    - Difficulty concentrating or mind going blank.    - Irritability.    - Sleep disturbance (difficulty falling or staying asleep, or restless unsatisfying sleep).   D. The focus of the anxiety and worry is not confined to features of an Axis I disorder.  E. The anxiety, worry, or physical symptoms cause clinically significant distress or impairment in social, occupational, or other important areas of functioning.   F. The disturbance is not due to the direct physiological effects of a substance (e.g., a drug of abuse, a medication) or a general medical condition (e.g., hyperthyroidism) and does not occur exclusively during a Mood Disorder, a Psychotic Disorder, or a Pervasive Developmental Disorder.    - The aformentioned symptoms began one year(s) ago and occurs 7 days per week and is experienced as severe. Major Depressive Disorder  CRITERIA (A-C) REPRESENT A MAJOR DEPRESSIVE EPISODE - SELECT THESE CRITERIA  A) Recurrent episode(s) - symptoms have been present during the same 2-week period and represent a change from previous functioning 5 or more symptoms (required for diagnosis)   - Depressed mood. Note: In children and adolescents, can be irritable mood.     - Diminished interest or pleasure in all, or almost all, activities.    - Decreased sleep.    - Fatigue or loss of energy.    - Feelings of worthlessness or inappropriate and excessive guilt.    - Diminished ability to think or concentrate, or indecisiveness.   B) The symptoms cause clinically significant distress or  impairment in social, occupational, or other important areas of functioning  C) The episode is not attributable to the physiological effects of a substance or to another medical condition  D) The occurence of major depressive episode is not better explained by other thought / psychotic disorders  E) There has never been a manic episode or hypomanic episode    Functional Status:  Patient reports the following functional impairments:  management of the household and or completion of tasks and work / vocational responsibilities.     Nonprogrammatic care:  Patient is requesting basic services to address current mental health concerns.    Clinical Summary:  1. Psychosocial, Cultural and Contextual Factors: , multiple medical conditions, workplace bullying  .  2. Principal DSM5 Diagnoses  (Sustained by DSM5 Criteria Listed Above):   296.32 (F33.1) Major Depressive Disorder, Recurrent Episode, Moderate _  300.02 (F41.1) Generalized Anxiety Disorder.  3. Other Diagnoses that is relevant to services:   NA  4. Provisional Diagnosis:  NA  5. Prognosis: Expect Improvement.  6. Likely consequences of symptoms if not treated: worsening symptoms and continued functional impairment.  7. Client strengths include:  goal-focused, good listener, motivated, open to learning, open to suggestions / feedback, responsible parent, wants to learn, and willing to ask questions .     Recommendations:     1. Plan for Safety and Risk Management:   Safety and Risk: Recommended that patient call 911 or go to the local ED should there be a change in any of these risk factors..          Report to child / adult protection services was NA.     2. Patient's identified no concerns with sexual orientation, gender identity, culture, ethnicity, or kelly.     3. Initial Treatment will focus on:    Depressed Mood - MDD  Anxiety - ACE.     4. Resources/Service Plan:    services are not indicated.   Modifications to assist communication are  not indicated.   Additional disability accommodations are not indicated.      5. Collaboration:   Collaboration / coordination of treatment will be initiated with the following  support professionals: primary care physician.      6.  Referrals:   The following referral(s) will be initiated: None at this time.       A Release of Information has been obtained for the following: None at this time.     Clinical Substantiation/medical necessity for the above recommendations:  individual therapy is necessary in order to alleviate symptoms and improve functioning.    7. JHONNY:    JHONNY:  Not identified.    8. Records:   These were reviewed at time of assessment.   Information in this assessment was obtained from the medical record and  provided by patient who is a good historian.    Patient will have open access to their mental health medical record.    9.   Interactive Complexity: No    Provider Name/ Credentials:  Cate Kaur MS, [facility]  May 9, 2024

## 2024-07-26 ENCOUNTER — VIRTUAL VISIT (OUTPATIENT)
Dept: SURGERY | Facility: CLINIC | Age: 49
End: 2024-07-26
Payer: COMMERCIAL

## 2024-07-26 DIAGNOSIS — E66.9 OBESITY (BMI 30-39.9): Primary | ICD-10-CM

## 2024-07-26 DIAGNOSIS — E66.09 CLASS 2 OBESITY DUE TO EXCESS CALORIES WITH BODY MASS INDEX (BMI) OF 36.0 TO 36.9 IN ADULT, UNSPECIFIED WHETHER SERIOUS COMORBIDITY PRESENT: ICD-10-CM

## 2024-07-26 DIAGNOSIS — Z71.3 NUTRITIONAL COUNSELING: ICD-10-CM

## 2024-07-26 DIAGNOSIS — E66.812 CLASS 2 OBESITY DUE TO EXCESS CALORIES WITH BODY MASS INDEX (BMI) OF 36.0 TO 36.9 IN ADULT, UNSPECIFIED WHETHER SERIOUS COMORBIDITY PRESENT: ICD-10-CM

## 2024-07-26 PROCEDURE — 97802 MEDICAL NUTRITION INDIV IN: CPT | Mod: 95 | Performed by: DIETITIAN, REGISTERED

## 2024-07-26 NOTE — PATIENT INSTRUCTIONS
Plant Based Blog  [email or web address]  [email or web address]  [email or web address]    Eat Better ? Move More ? Live Well    Eat 3 nutrient-rich meals each day     Don't skip meals--it will cause you to overeat later in the day!     Eating fiber (vegetables/fruits/whole grains) and protein with meals helps you stay full longer     Choose foods with less than 10 grams of sugar and 5 grams of fat per serving to prevent excess calories and weight re-gain   Eat around the same times each day to develop a routine eating schedule    Avoid snacking unless physically hungry.   Planned snacks: 1-2 times per day and no more than 150 calories    Eat protein first    Protein helps with healing, maintaining adequate muscle mass, reducing hunger and optimizing nutritional status    Aim for 90 grams of protein per day   Fill up on Fiber    Fiber comes from plants--fruits, veggies, whole grains, nuts/seeds and beans    Fiber is low in calories, high in phytonutrients and helps you stay full longer    Aim for 25-35 grams per day by eating fiber with meals and snacks  Eat [id number]    Take 20-30 minutes to eat each meal by taking small bites, chewing foods to applesauce consistency or 20-30 times before you swallow    Eating foods too fast can delay satiety/fullness signals and increase overeating   Slow down your eating by using toddler utensils, putting your fork/spoon down between bites and not watching TV or emailing during meals!   Keep a Journal          Writing down what you eat, how you feel and when you are active helps you identify new changes to work on from week to week          Look for ways to cut 100 calories from your current diet 2-3 times per day  Drink 64 ounces of 0-Calorie drinks between meals    Water    Zero calorie Propel  or Vitamin Water      SoBe Lifewater  Zero Calories    Crystal Light , Sugar-Free Pop-Aid , and other sugar-free lemonade or flavored kaye    Keep  Caffeine to less than 300mg per day ie: 3-6oz cups coffee     Work up to 45-60 minutes of physical activity most days of the week    Helps with losing weight and prevent regaining those extra pounds!     Do a combo of cardio (walking/water exercises) and strength training (lifting weights/Vinyasa yoga)    Avoid Mindless Eating    Be present when you eat--take note of the smell, taste and quality of your food    Make a list of alternative activities you could do to prevent eating out of boredom/stress  Go for a walk, call a friend, chew gum, paint your nails, re-organize the garage, etc      LEAN PROTEIN SOURCES    Protein Source Portion Calories Grams of Protein                           Nonfat, plain Greek yogurt    (10 grams sugar or less) 3/4 cup (6 oz)  12-17   Light Yogurt (10 grams sugar or less) 3/4 cup (6 oz)  6-8   Protein Shake 1 shake 110-180 15-30   Skim/1% Milk or lactose-free milk 1 cup ( 8 oz)  8   Plain or light, flavored soymilk 1 cup  7-8   Plain or light, hemp milk 1 cup 110 6   Fat Free or 1% Cottage Cheese 1/2 cup 90 15   Part skim ricotta cheese 1/2 cup 100 14   Part skim or reduced fat cheese slices 1/4cup, 3 dice 65-80 8     Mozzarella String Cheese 1 80 8   Canned tuna, chicken, crab or salmon  (canned in water)  1/2 cup 100 15-20   White fish (broiled, grilled, baked) 3 ounces 100 21   [place]/Tuna (broiled, grilled, baked) 3 ounces 150-180 21   Shrimp, Scallops, Lobster, Crab 3 ounces 100 21   Pork loin, Pork Tenderloin 3 ounces 150 21   Boneless, skinless chicken /turkey breast                          (broiled, grilled, baked) 3 ounces 120 21   Fyffe, Peach, Leon, and Venison 3 ounces 120 21   Lean cuts of red meat and pork (sirloin,   round, tenderloin, flank, ground 93%-96%) 3 ounces 170 21   Lean or Extra Lean Ground Turkey 1/2 cup 150 20   90-95% Lean [place] Burger 1 [place] 140-180 21   Low-fat casserole with lean meat 3/4 cup 200 17   Luncheon Meats                                                         (turkey, lean ham, roast beef, chicken) 3 ounces 100 21   Egg (boiled, poached, scrambled) 1 Egg 60 7   Egg Substitute 1/2 cup 70 10   Nuts (limit to 1 serving per day)  3 Tbsp. 150 7   Nut Robinette (peanut, almond)  Limit to 1 serving or less daily 1 Tbsp. 90 4   Soy Burger (varies) 1  10-15   Edamame  1/2 cup ~95 9   Garbanzo, Black, Bradley Beans 1/2 cup 110 7   Refried Beans 1/2 cup 100 7   Kidney and Lima beans 1/2 cup 110 7   Tempeh 3 oz 175 18   Vegan crumbles 1/2 cup 100 14   Tofu 1/2 cup 110 14   Chili (beans and extra lean beef or turkey) 1 cup 200 23   Lentil Stew/Soup 1 cup 150 12   Black Bean Soup 1 cup 175 12     Carbohydrates  Carbohydrates fuel your body with glucose (sugar)--the energy your body needs so you can do your daily activities.  Carbohydrates offer an immediate source of energy for your body. They provide the fuel for your muscles and organs, such as your brain.     Types of Carbohydrates     Complex Carbohydrates are higher in fiber and keep you feeling full longer--helping you eat less.   These are found in nearly all plant-based foods and usually take longer for the body to digest.  They are most commonly found in whole-wheat bread, whole-grain pasta, brown rice, starchy vegetables,   and fruits  Refined Carbohydrates require almost NO WORK for digestion and break down into glucose more quickly   than complex carbohydrates. Refined carbohydrates are usually high in calories and low in nutrients and fiber--  eating more of these can lead to weight gain.  Thinking about eliminating carbohydrates???  If you do not eat enough carbohydrates, the following can occur:  Fatigue  Muscle cramps  Poor mental function  Fatigue easily results from deprivation of carbohydrates, which is seen in people who fast, possibly interfering with activities of daily living.      Thinking about eliminating carbohydrates???  If you do not eat enough carbohydrates,  the following can occur:  Fatigue  Muscle cramps  Poor mental function  Fatigue easily results from deprivation of carbohydrates, which is seen in people who fast, possibly interfering with activities of daily living    Carbohydrates are your body's first choice for fuel. If given a choice of several types of foods simultaneously, your body will use the energy from carbohydrates first.    What foods contain carbohydrates?  Choose the following foods containing carbohydrates (the BEST ones to eat):   Fruit-fresh, frozen, canned in their own juices  Whole grains:  Whole-wheat breads  Brown rice  Oatmeal  Whole-grain cereals  Other starchy foods containing a minimum of 3 grams (g) fiber/100 calories  The ingredient label should list whole wheat or whole grain as one of the first ingredients (bulgur, quinoa, buckwheat, millet, spelt, faro, kasha)  Milk or yogurt (a natural source of carbohydrates):  Low-fat milk  Fat-free milk  Yogurt   Beans or legumes     Starchy vegetables, raw or frozen:  Potatoes  Peas  Corn    AVOID or limit the following foods containing carbohydrates:  Refined sugars, such as in:  Candy  Desserts-ice cream, cakes, pies, brownies, frozen yogurt, sherbet/sorbet  Cookies  White flour: bread/pasta/crackers/rice/tortillas  Sugary snacks: sweetened cereal, granola bars, cereal bars, donuts, muffins, bagels  Sugary Drinks:  Fruit Juice, Smoothies  Sports Drinks  Regular Soda    What are typical serving sizes or portions?  The following are some serving and portion sizes for foods containing carbohydrates:  One medium piece of fruit, about 4?5 ounces (oz) (-tennis ball)  1 cup (C) berries or melon    C canned fruit    C juice (100% vegetable)    C starchy vegetables, cooked or chopped  One slice whole-grain bread  ? C brown rice, quinoa, buckwheat, millet, spelt, faro, kasha    C oatmeal (dry)    C bulgur  One small tortilla (less than 6inch diameter)    C wheat germ  1 oz pretzels     C flaked  cereal        Calorie-Controlled Sample Meal Plans    Examples of small healthy meals    Breakfast   Omelet made with   cup to   cup egg substitute or 2 eggs    cup chopped vegetables  1-2 tbsp. of light cheese     cup salsa  Medium banana    1 cup non-fat plain, Greek yogurt mixed with 1 cup berries and 1-2 Tbsp nuts or cereal   -3/4 cup skim or 1% cottage cheese    cup unsweetened whole-grain cereal  1/2 cup of fresh strawberries  Whole-wheat English muffin or mini bagel, 1 scrambled egg and 1 slice Swiss cheese   Small orange  Protein Bar or Shake (15-30 grams protein and 15-25 grams Carbohydrates)    cup cottage cheese, low-fat    cup fresh fruit    11 ounces of Slim Fast Low Carb (only), Harrison's Advantage, EAS Carb Control    Lunch/Dinner  2-3 slices roasted turkey breast  1 tbsp. of fat free mayonnaise  2 slices of  whole-wheat bread, Medium apple  10 baby carrots with 1 tbsp. of low-fat dip     cup water packed tuna or chicken  1 tablespoons of low-fat mayonnaise  1-2 tbsp. dill relish  1 serving of whole-grain crackers  1 cup of strawberries   6 inch turkey sub sandwich with light mayonnaise,   cup cottage cheese                                                                                                                                                      Black bean and low-fat cheese on a whole wheat tortilla with salsa and light sour cream  Grilled chicken sandwich  Tossed salad with light dressing    Baked potato with 3/4 cup of extra lean ground beef, light shredded cheese and salsa  Fresh fruit                                                 Chicken chunks with lettuce and vegetables stuffed in laine  Steamed broccoli                                                 3 oz boneless/skinless chicken breast  1/2 cup brown rice with stir-fried vegetables    grapefruit  3 ounces of salmon, trout, or tuna  1 cup of steamed asparagus  1 small slice whole grain Italian bread  Broiled white or pink fish  3/4  cup whole wheat pasta with tomatoes  3/4 cup of roasted red peppers  3 oz. of extra lean (93/7) hamburger on a Arnold's [place] Thins  Tossed salad with light dressing       Black bean or Tuscan bean soup with grated mozzarella cheese    of a flour tortilla    3 ounces of grilled pork loin with 1 tbsp. of low-sugar barbeque sauce, 1 cup of green beans seasoned with pepper  Small dinner roll or   cup of grapefruit sections    1-2 cups of torn norberto    cup of garbanzo beans or diced skinless chicken breast  5-6 cherry tomatoes  1  tbsp. of crumbled feta cheese  1 tbsp. of roasted soy nuts  1 tsp. of olive oil and 2-3 Tbsp. of balsamic or red wine vinegar  Small whole-wheat dinner roll or   cup of cut up pineapple         Protein Supplements     Type Serving Calories Protein Grams Sugar Grams Where Available   Muscle Milk Pro Series Shake 1 bottle  170 32 1 [facility], [employer]   Premier Protein Shake 1 bottle 160 30 1 [employer]  [employer], [employer], [employer]   Equate High Performance Shake 1 bottle  160 30 1 [facility]   [facility] Nutrition Plan Shake 1 bottle 150 30 2 [employer], [employer]   Ensure Max Protein  Shake 1 bottle 150 30 1 [employer], Target   [facility] Core Power Shake 1 bottle 170 26 5 [employer]   Pure Protein Shake 1 bottle 110-170 23 1  [employer], [employer], Target   Slim Fast   High Protein Shake 1 bottle 180 20 1 [facility], Target, Grocery/Pharmacy   100kcal Muscle Milk Shake 1 bottle 100 20 0 [employer], [facility], [facility],Grocery/Pharmacy   Harrison's Lift Water 1 bottle 90 20 0 [facility], [employer]   Isopure Zero Carb Water   bottle 80 20 0 [employer], Vitamin Shoppe, online   Premier Protein Clear Water 1 bottle 90 20 0 [facility]    Unjury Protein+ Powder 1 scoop 90 20 0 www.Hycretejury.com  www.[employer]kit   Advantage Shake  1 bottle 160 15-17 1 [facility], Target  Grocery/Pharmacy   100kcal Muscle Milk Powder 1 scoop 100 15 0 [facility], musclemilk.com   Protein 2-0 Water 1 bottle 60-70 15 0 [facility]       Plant-Based Protein  Supplements     Type Serving  Calories Protein   Grams Sugar Grams  Where Available    Garden of Life Raw Protein Powder Powder 1 scoop 110 22 0 Whole Foods, Vitamin Shoppe, www.Oriel Sea Salt   Orgain Organic Protein Powder Powder 2 scoops 150 21 0 Target, [employer], [employer]   Planted by Unjury Powder 1 scoop 130 20 3 www.MediaBoost  www.amazon.com   Protein and Greens by Smith Powder  1 scoop 120 20 0 [employer], Target, Grocery/Pharmacy   Essentials Shake by Smith Powder 1 scoop 130 20 0 [employer], Target, Grocery/Pharmacy     Plant Fusion Orgain Plant Protein Powder 1 scoop 120 20 0 www.plantfusion.net   Orgain Grass Fed Shake Shake 1 bottle 140 20 4 Target, [employer]   Evolve Shake 1 bottle 150 20 4 Target, [employer], [employer]   Sun Warrior Powder 1 scoop 100 17-18 0 [employer], Whole Foods   Whole Foods Fit Protein  Powder 1 scoop 110 17 0 Whole Foods   Garden of Life Plant Protein  Powder 1 scoop 90 15 0 Whole Foods, Vitamin Shoppe,   www.amazon.com     Look for (per serving):  -100-200 calories  -15-30 grams protein   -Less than 10 grams total carbohydrate  -10/10 Rule

## 2024-07-26 NOTE — PROGRESS NOTES
Darlene Hudson is a 49 year old who is being evaluated via a billable video visit.      How would you like to obtain your AVS? MyChart  If the video visit is dropped, the invitation should be resent by: Text to cell phone: [phone]  Will anyone else be joining your video visit? No          Medical Weight Loss Initial Diet Evaluation  Assessment:  This patient was referred by Dr. Kelley for MNT as treatment for Obesity which is impacting asthma, knee pain, depression    Darlene is presenting today for a new weight management nutrition consultation. Pt has had an initial appointment with Dr. Kelley.    Weight loss medication:  Topamax  - planning to start it Monday (7/29/2024)    Personal Goals: Patient has been having weight gain follow cancer dx and ankle     Anthropometrics:    Initial weight: 248.9 lbs  BMI: 36.87  Ideal body weight: 66 kg (145 lb 6.8 oz)  Adjusted ideal body weight: 84.7 kg (186 lb 13.1 oz)  Estimated RMR (Woodford-St Jeor equation):  1,809 kcals x 1.2 (sedentary) = 2,171 kcals (for weight maintenance)  1,809 kcals x 1.3 (light active) = 2,488 kcals (for weight maintenance)    Recommended Protein Intake: 90 grams of protein/day    Medical History:  Patient Active Problem List   Diagnosis    Nondependent alcohol abuse, episodic drinking behavior    Controlled substance agreement signed    Chronic sinusitis    Major depressive disorder, recurrent episode, moderate (H)    Tobacco use disorder    Abnormal cervical Papanicolaou smear    Panic disorder with agoraphobia    Atopic rhinitis    Chronic low back pain    Other long term (current) drug therapy    Acute pericardial effusion    Anxiety    Chronic infectious pericarditis    Cough    Arthralgia of both lower legs    Moderate persistent asthma without complication    Physiologic disturbance of temperature regulation    Family history of colon cancer    Right ankle swelling    Menorrhagia with irregular cycle    Infection due to 2019 novel  coronavirus    Adenocarcinoma of descending colon (H)    History of ankle surgery    History of colon cancer    Paranasal sinus disease   Diabetes: no    Nutrition History:   Food allergies/intolerances/cultural or religous food customs: No - does not like corned beef, sauerkraut, brussels sprouts, lima beans, ham, pork products, pizza, cheese, fried foods - stated that she is a picky eater    Weight loss history:  exercise, meal replacements, fasting    -patient was diagnosed with colon cancer in December 2022 and had surgery in January 2023 - no chemo and radiation - only surgery to remove the tumor.     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 jobs his makes it difficult to have a full meal at work    -Adding cottage cheese with fruit in the morning and then adding salad or protein shake for lunch    -patient would like to transition out of eating meat d/t her colon cancer dx    Vitamins/Mineral Supplementation: none    Dietary Recall:  Breakfast: skips  Lunch: skips  Dinner (5-6 PM): steak OR tacos OR ribs OR chicken strips OR chicken wings  Typical Snacks: donuts, cheesecake    Overnight eating: Yes - has not happened for awhile but has not happened lately per patient  Eating out: 2-3x/week - Canes, McDonal[employer], [employer], pizza    Beverages:   Monster Drinks  Pop - Orange crush or Sunkist - 4 cans/day  Water - only likes it ice cold  1/2 lemonade and 1/2 sparkling water    Exercise:   No set routine at this time    Planning to start walking her dog Monday    Nutrition Diagnosis (PES statement):     Obesity related to excessive energy intake as evidence by BMI of 36.87     Disordered Eating Pattern (NB 1.5) related to obsessive desire, intake of food exceeding RMR as evidenced by skipping meals     Nutrition Intervention  Food and/or Nutrient Delivery   Placed emphasis on importance of developing a healthy meal routine, aiming for 3 meals a day and limited  snacks.  Fuel every 4-5 hours  Discussed using a protein supplement as nutrition support  Nutrition Education   Discussed with patient how to build a meal: the importance of including a lean/low fat protein at each meal, include a source of vegetables at a minimum of lunch and dinner and limiting carbohydrate intake  Educated on sources of lean protein, portion sizes, the amount of grams found in each source. Recommend patient to aim for 20-30g protein at each meal.  Discussed the importance of adequate hydration, with emphasis on drinking 64oz of water or zero calorie beverages per day.  Nutrition Counseling   Encouraged importance of developing routine exercise for health benefits and weight loss.    Goals established by patient:   Aim to increase consistency with meals  Aim to have 90 grams of protein per meal    Handouts provided:  Intro to [facility]  Protein Supplements  Plant based RD blogs    Assessment/Plan:    Pt will follow up in 5 month(s) with bariatrician and 3 month(s) with dietitian.     Video-Visit Details    Type of service:  Video Visit    Video Start Time (time video started): 4:25 PM    Video End Time (time video stopped): 4:49 PM    Originating Location (pt. Location): Home      Distant Location (provider location):  Off-site    Mode of Communication:  Video Conference via [facility]    Physician has received verbal consent for a Video Visit from the patient? No      Violette Corbett RD

## 2024-07-26 NOTE — LETTER
7/26/2024      Darlene Hudson  [address]  [facility] [address]      Dear Colleague,    Thank you for referring your patient, Darlene Hudson, to the [facility] [place]. Please see a copy of my visit note below.    Darlene Hudson is a 49 year old who is being evaluated via a billable video visit.      How would you like to obtain your AVS? MyChart  If the video visit is dropped, the invitation should be resent by: Text to cell phone: [phone]  Will anyone else be joining your video visit? No          Medical Weight Loss Initial Diet Evaluation  Assessment:  This patient was referred by Dr. Kelley for MNT as treatment for Obesity which is impacting asthma, knee pain, depression    Darlene is presenting today for a new weight management nutrition consultation. Pt has had an initial appointment with Dr. Kelley.    Weight loss medication:  Topamax  - planning to start it Monday (7/29/2024)    Personal Goals: Patient has been having weight gain follow cancer dx and ankle     Anthropometrics:    Initial weight: 248.9 lbs  BMI: 36.87  Ideal body weight: 66 kg (145 lb 6.8 oz)  Adjusted ideal body weight: 84.7 kg (186 lb 13.1 oz)  Estimated RMR ([place]-St Jeor equation):  1,809 kcals x 1.2 (sedentary) = 2,171 kcals (for weight maintenance)  1,809 kcals x 1.3 (light active) = 2,488 kcals (for weight maintenance)    Recommended Protein Intake: 90 grams of protein/day    Medical History:  Patient Active Problem List   Diagnosis     Nondependent alcohol abuse, episodic drinking behavior     Controlled substance agreement signed     Chronic sinusitis     Major depressive disorder, recurrent episode, moderate (H)     Tobacco use disorder     Abnormal cervical Papanicolaou smear     Panic disorder with agoraphobia     Atopic rhinitis     Chronic low back pain     Other long term (current) drug therapy     Acute pericardial effusion     Anxiety     Chronic infectious  pericarditis     Cough     Arthralgia of both lower legs     Moderate persistent asthma without complication     Physiologic disturbance of temperature regulation     Family history of colon cancer     Right ankle swelling     Menorrhagia with irregular cycle     Infection due to 2019 novel coronavirus     Adenocarcinoma of descending colon (H)     History of ankle surgery     History of colon cancer     Paranasal sinus disease   Diabetes: no    Nutrition History:   Food allergies/intolerances/cultural or religous food customs: No - does not like corned beef, sauerkraut, brussels sprouts, lima beans, ham, pork products, pizza, cheese, fried foods - stated that she is a picky eater    Weight loss history:  exercise, meal replacements, fasting    -patient was diagnosed with colon cancer in December 2022 and had surgery in January 2023 - no chemo and radiation - only surgery to remove the tumor.     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 a full meal at work    -Adding cottage cheese with fruit in the morning and then adding salad or protein shake for lunch    -patient would like to transition out of eating meat d/t her colon cancer dx    Vitamins/Mineral Supplementation: none    Dietary Recall:  Breakfast: skips  Lunch: skips  Dinner (5-6 PM): steak OR tacos OR ribs OR chicken strips OR chicken wings  Typical Snacks: donuts, cheesecake    Overnight eating: Yes - has not happened for awhile but has not happened lately per patient  Eating out: 2-3x/week - Canes, McDonalds, [employer], pizza    Beverages:   Monster Drinks  Pop - Orange crush or Sunkist - 4 cans/day  Water - only likes it ice cold  1/2 lemonade and 1/2 sparkling water    Exercise:   No set routine at this time    Planning to start walking her dog Monday    Nutrition Diagnosis (PES statement):     Obesity related to excessive energy intake as evidence by BMI of 36.87      Disordered Eating Pattern (NB 1.5) related to obsessive desire, intake of food exceeding RMR as evidenced by skipping meals     Nutrition Intervention  Food and/or Nutrient Delivery   Placed emphasis on importance of developing a healthy meal routine, aiming for 3 meals a day and limited snacks.  Fuel every 4-5 hours  Discussed using a protein supplement as nutrition support  Nutrition Education   Discussed with patient how to build a meal: the importance of including a lean/low fat protein at each meal, include a source of vegetables at a minimum of lunch and dinner and limiting carbohydrate intake  Educated on sources of lean protein, portion sizes, the amount of grams found in each source. Recommend patient to aim for 20-30g protein at each meal.  Discussed the importance of adequate hydration, with emphasis on drinking 64oz of water or zero calorie beverages per day.  Nutrition Counseling   Encouraged importance of developing routine exercise for health benefits and weight loss.    Goals established by patient:   Aim to increase consistency with meals  Aim to have 90 grams of protein per meal    Handouts provided:  Intro to [facility]  Protein Supplements  Plant based RD blogs    Assessment/Plan:    Pt will follow up in 5 month(s) with bariatrician and 3 month(s) with dietitian.     Video-Visit Details    Type of service:  Video Visit    Video Start Time (time video started): 4:25 PM    Video End Time (time video stopped): 4:49 PM    Originating Location (pt. Location): Home      Distant Location (provider location):  Off-site    Mode of Communication:  Video Conference via [employer]    Physician has received verbal consent for a Video Visit from the patient? No      Violette Corbett RD         Again, thank you for allowing me to participate in the care of your patient.        Sincerely,        Violette Corbett RD

## 2024-07-26 NOTE — NURSING NOTE
Forms Request:  Today's Date: July 26, 2024   Form Type:  Medical Statement ,   Where is the form from: [place] [employer]   How was form received: Patient in appointment  LUDWIN on file: UNKNOWN   How is form being returned: Fax  Fax Number/Address: [phone]  PCP: Robbie Vyas Team: Purple Team  Form Given to: ADAMARIS Moody Request:  Today's Date: July 26, 2024   Form Type:  Workability Form ,   Where is the form from: [facility]   How was form received: Patient in appointment  LUDWIN on file: UNKNOWN   How is form being returned: Fax  Fax Number/Address: [phone]  PCP: Robbie Vyas Team: Purple Team  Form Given to: Adamaris Elizondo form completed: 7/24/24  Form sent via: Fax  Date faxed or mailed: 7/24/24  Fax # or Address: Both forms faxed to fax numbers Original forms were given to Pt, Copy was made for us and into Med Rec  Completed by: Juventino Stoll MA       Medical statement [place] [employer]   fax [phone]   [facility] Report of Workability   fax [phone]

## 2024-08-01 ENCOUNTER — VIRTUAL VISIT (OUTPATIENT)
Dept: PSYCHOLOGY | Facility: CLINIC | Age: 49
End: 2024-08-01
Payer: MEDICAID

## 2024-08-01 ENCOUNTER — MYC MEDICAL ADVICE (OUTPATIENT)
Dept: SURGERY | Facility: CLINIC | Age: 49
End: 2024-08-01
Payer: COMMERCIAL

## 2024-08-01 DIAGNOSIS — F41.1 GAD (GENERALIZED ANXIETY DISORDER): ICD-10-CM

## 2024-08-01 DIAGNOSIS — F33.1 MAJOR DEPRESSIVE DISORDER, RECURRENT EPISODE, MODERATE (H): Primary | ICD-10-CM

## 2024-08-01 PROCEDURE — 90837 PSYTX W PT 60 MINUTES: CPT | Mod: 95

## 2024-08-01 ASSESSMENT — ANXIETY QUESTIONNAIRES
4. TROUBLE RELAXING: NEARLY EVERY DAY
2. NOT BEING ABLE TO STOP OR CONTROL WORRYING: NEARLY EVERY DAY
GAD7 TOTAL SCORE: 19
7. FEELING AFRAID AS IF SOMETHING AWFUL MIGHT HAPPEN: NEARLY EVERY DAY
5. BEING SO RESTLESS THAT IT IS HARD TO SIT STILL: SEVERAL DAYS
6. BECOMING EASILY ANNOYED OR IRRITABLE: NEARLY EVERY DAY
1. FEELING NERVOUS, ANXIOUS, OR ON EDGE: NEARLY EVERY DAY
GAD7 TOTAL SCORE: 19
3. WORRYING TOO MUCH ABOUT DIFFERENT THINGS: NEARLY EVERY DAY

## 2024-08-01 NOTE — PROGRESS NOTES
[facility]                                     Progress Note    Patient Name: Darlene Hudson  Date: 8/1/24         Service Type: Individual      Session Start Time: 1132  Session End Time: 1225     Session Length: 53    Session #: 7    Attendees: Client attended alone    Service Modality:  Video Visit:      Provider verified identity through the following two step process.  Patient provided:  Patient is known previously to provider    Telemedicine Visit: The patient's condition can be safely assessed and treated via synchronous audio and visual telemedicine encounter.      Reason for Telemedicine Visit: Patient has requested telehealth visit    Originating Site (Patient Location): Patient's home    Distant Site (Provider Location): Provider Remote Setting- Home Office    Consent:  The patient/guardian has verbally consented to: the potential risks and benefits of telemedicine (video visit) versus in person care; bill my insurance or make self-payment for services provided; and responsibility for payment of non-covered services.     Patient would like the video invitation sent by:  My Chart    Mode of Communication:  Video Conference via Amwell    Distant Location (Provider):  Off-site    As the provider I attest to compliance with applicable laws and regulations related to telemedicine.    DATA  Interactive Complexity: No  Crisis: No        Progress Since Last Session (Related to Symptoms / Goals / Homework):   Symptoms: No change overall    Homework:  completing self care activities      Episode of Care Goals: Satisfactory progress - ACTION (Actively working towards change); Intervened by reinforcing change plan / affirming steps taken     Current / Ongoing Stressors and Concerns:   Many stressors including prior cancer diagnosis and treatment, losses through death of family and friends, her 17 year old son and her relationship concerns, as well as current stress with her job, which she  identified as most stressful at this time noting that it has taken quite a toll on her mental health.  Setting boundaries with others she feels are unhealthy.     Treatment Objective(s) Addressed in This Session:   Build rapport. Assertiveness skills and boundary setting, coping strategies for stressors     Intervention:   Build rapport.  Extensive processing of thoughts and emotions connected with her work stressor.  Discussion of productive vs unproductive worry and how client is focusing on what she is able to control within her situation.  Supported client in her ability to lean on support people when she needs help.  Discussed challenging unhelpful thinking related to her situation when she begins to feel hopeless.  Supported client in her ability to sset boundaries with people in her life who she felt were not healthy for her.       Assessments completed prior to visit:  The following assessments were completed by patient for this visit:  PHQ9:       4/18/2023     5:13 PM 9/13/2023     2:39 PM 2/21/2024     1:38 PM 5/9/2024     7:34 AM 5/22/2024    11:00 AM 5/28/2024     9:31 AM 7/24/2024     3:59 PM   PHQ-9 SCORE   PHQ-9 Total Score MyChart  0  7 (Mild depression)  7 (Mild depression) 6 (Mild depression)   PHQ-9 Total Score 2 0 5 7 8 7 6    6     GAD7:       4/18/2023     4:19 PM 4/18/2023     5:13 PM 9/13/2023     2:40 PM 2/21/2024     1:38 PM 5/22/2024    11:00 AM 5/22/2024    11:03 AM 8/1/2024    11:00 AM   ACE-7 SCORE   Total Score   7 (mild anxiety)   13 (moderate anxiety)    Total Score 5 5 7 6 19 13    13 19     PROMIS 10-Global Health (all questions and answers displayed):       5/22/2024    11:00 AM 5/22/2024    11:04 AM 7/23/2024     9:34 AM   PROMIS 10   In general, would you say your health is:  Fair Poor   In general, would you say your quality of life is:  Fair Fair   In general, how would you rate your physical health?  Fair Poor   In general, how would you rate your mental health, including  your mood and your ability to think?  Poor Fair   In general, how would you rate your satisfaction with your social activities and relationships?  Fair Fair   In general, please rate how well you carry out your usual social activities and roles  Fair Good   To what extent are you able to carry out your everyday physical activities such as walking, climbing stairs, carrying groceries, or moving a chair?  Moderately Moderately   In the past 7 days, how often have you been bothered by emotional problems such as feeling anxious, depressed, or irritable?  Always Always   In the past 7 days, how would you rate your fatigue on average?  Mild Moderate   In the past 7 days, how would you rate your pain on average, where 0 means no pain, and 10 means worst imaginable pain?  5 5   In general, would you say your health is: 2 2 1   In general, would you say your quality of life is:  2 2   In general, how would you rate your physical health?  2 1   In general, how would you rate your mental health, including your mood and your ability to think?  1 2   In general, how would you rate your satisfaction with your social activities and relationships?  2 2   In general, please rate how well you carry out your usual social activities and roles. (This includes activities at home, at work and in your community, and responsibilities as a parent, child, spouse, employee, friend, etc.)  2 3   To what extent are you able to carry out your everyday physical activities such as walking, climbing stairs, carrying groceries, or moving a chair?  3 3   In the past 7 days, how often have you been bothered by emotional problems such as feeling anxious, depressed, or irritable?  5 5   In the past 7 days, how would you rate your fatigue on average?  2 3   In the past 7 days, how would you rate your pain on average, where 0 means no pain, and 10 means worst imaginable pain?  5 5   Global Mental Health Score  6    6 7   Global Physical Health Score  12     12 10   PROMIS TOTAL - SUBSCORES  18    18 17         ASSESSMENT: Current Emotional / Mental Status (status of significant symptoms):   Risk status (Self / Other harm or suicidal ideation)   Patient denies current fears or concerns for personal safety.   Patient denies current or recent suicidal ideation or behaviors.   Patient denies current or recent homicidal ideation or behaviors.   Patient denies current or recent self injurious behavior or ideation.   Patient denies other safety concerns.   Patient reports there has been no change in risk factors since their last session.     Patient reports there has been no change in protective factors since their last session.     Recommended that patient call 911 or go to the local ED should there be a change in any of these risk factors.     Appearance:   normal    Eye Contact:   Good    Psychomotor Behavior: normal    Attitude:   Cooperative    Orientation:   All   Speech    Rate / Production: Normal     Volume:  Normal    Mood:    Normal   Affect:    normal   Thought Content:  Clear    Thought Form:  Coherent    Insight:    Fair      Medication Review:   No changes to current psychiatric medication(s)     Medication Compliance:   Yes     Changes in Health Issues:   None reported     Chemical Use Review:   Substance Use: Chemical use reviewed, no active concerns identified      Tobacco Use: No current tobacco use.      Diagnosis:  1. Major depressive disorder, recurrent episode, moderate (H)    2. ACE (generalized anxiety disorder)              Collateral Reports Completed:   Not Applicable    PLAN: (Patient Tasks / Therapist Tasks / Other)  Rtn one week.   Client to continue boundaries, continue taking medication as prescribed, continue processing recent hardships.        SWETA Borjas                                                         ______________________________________________________________________    Individual Treatment Plan    Patient's Name:  Darlene Hudson  YOB: 1975    Date of Creation: 06/11/2024  Date Treatment Plan Last Reviewed/Revised: 06/11/2024    DSM5 Diagnoses: 296.32 (F33.1) Major Depressive Disorder, Recurrent Episode, Moderate _ or 300.02 (F41.1) Generalized Anxiety Disorder  Psychosocial / Contextual Factors: lives at home with sons, lots of stress from older son, workplace stress, on leave, legal interventions due to workplace concerns  PROMIS (reviewed every 90 days): 5/22/24    Referral / Collaboration:  Referral to another professional/service is not indicated at this time..    Anticipated number of session for this episode of care: 9-12 sessions  Anticipation frequency of session: Weekly  Anticipated Duration of each session: 38-52 minutes  Treatment plan will be reviewed in 90 days or when goals have been changed.       MeasurableTreatment Goal(s) related to diagnosis / functional impairment(s)  Goal 1: Patient will decrease symptoms of depression    I will know I've met my goal when I am happy within myself.      Objective #A (Patient Action)    Patient will identify at least 3 techniques for intervening on the escalation.  Status: New - Date: 06/11/2024      Intervention(s)  Therapist will teach emotional regulation skills.   .    Objective #B  Patient will learn and demonstrate 3 assertiveness skill(s).  Status: New - Date: 06/11/2024      Intervention(s)  Therapist will teach assertiveness skills.   .      Goal 2: Patient will decrease symptoms of anxiety    I will know I've met my goal when I am happy within myself.      Objective #A (Patient Action)    Status: New - Date: 06/11/2024      Patient will use at least 3 coping skills for anxiety management in the next 12 weeks.    Intervention(s)  Therapist will teach coping skills for anxiety .    Objective #B  Patient will use cognitive strategies identified in therapy to challenge anxious thoughts.    Status: New - Date: 06/11/2024       Intervention(s)  Therapist will teach cognitive restructuring .    Patient has reviewed and agreed to the above plan.      Cate Kaur, [facility]  2024    [facility]       PATIENT'S NAME: Darlene Hudson  PREFERRED NAME: Karoline  PRONOUNS:     she her hers  MRN: [id number]  : 1975  ADDRESS: [address] [place] [address] [place]  [facility] [address]  ACCT. NUMBER:  [id number]  DATE OF SERVICE: 24  START TIME: 738  END TIME: 823  PREFERRED PHONE: [phone]  May we leave a program related message: Yes  EMERGENCY CONTACT: was not obtained .  SERVICE MODALITY:  Video Visit:      Provider verified identity through the following two step process.  Patient provided:  Patient  and Patient address    Telemedicine Visit: The patient's condition can be safely assessed and treated via synchronous audio and visual telemedicine encounter.      Reason for Telemedicine Visit: Patient convenience (e.g. access to timely appointments / distance to available provider)    Originating Site (Patient Location): Patient's home    Distant Site (Provider Location): Provider Remote Setting- Home Office    Consent:  The patient/guardian has verbally consented to: the potential risks and benefits of telemedicine (video visit) versus in person care; bill my insurance or make self-payment for services provided; and responsibility for payment of non-covered services.     Patient would like the video invitation sent by:  My Chart    Mode of Communication:  Video Conference via Amwell    Distant Location (Provider):  Off-site    As the provider I attest to compliance with applicable laws and regulations related to telemedicine.    UNIVERSAL ADULT Mental Health DIAGNOSTIC ASSESSMENT    Identifying Information:  Patient is a 49 year old, , , and Black ;  individual.  [facility]  Patient attended the  "session alone.    Chief Complaint:   The reason for seeking services at this time is: \" workplace stresses, workplace bullying and harassment \"   The problem(s) began 2023. Patient has attempted to resolve these concerns in the past through medication, therapy.    Social/Family History:  Patient reported they grew up in [place], [place].  They were raised by biological mother.  Parents  when 3 years old.   Patient reported that their childhood was pretty good, single mother, worked for the [employer], mother was active in their activities, they saw father fairly frequent.  Patient described their current relationships with family of origin as parents have passed, sibling relatiuonships still.      The patient describes their cultural background as bi racial.  Cultural influences and impact on patient's life structure, values, norms, and healthcare: NA.  Contextual influences on patient's health include: Contextual Factors: Individual Factors stressful work environment, supervisor difficulty, symptoms of depression, significant losses due to covid, previous colon cancer diagnosis and surgery as well as ankle surgery .  Cultural, Contextual, and socioeconomic factors do not affect the patient's access to services.  These factors will be addressed in the Preliminary Treatment plan.  Patient identified their preferred language to be English. Patient reported they do not  need the assistance of an  or other support involved in therapy.     Patient reported had no significant delays in developmental tasks.   Patient's highest education level was some college. Patient identified the following learning problems: none reported.  Modifications will not be used to assist communication in therapy.   Patient reports they are  able to understand written materials.    Patient reported the following relationship history boyfriend of 11 years  of covid .  Patient's current relationship status is  . "   Patient identified their sexual orientation as heterosexual.  Patient reported having five child(reza). Patient identified her best friend as part of their support system.  Patient identified the quality of these relationships as stable and meaningful.     Patient's current living/housing situation involves staying in own home/apartment.  They live with two sons and they report that housing is stable.     Patient is currently on medical leave from her work as a  .  Patient reports their finances are obtained through her savings, and help from family and friends.  Patient does identify finances as a current stressor.      Patient reported that they have not been involved with the legal system.   Patient denies being on probation / parole / under the jurisdiction of the court.    Patient's Strengths and Limitations:  Patient identified the following strengths or resources that will help them succeed in treatment: commitment to health and well being, motivation, and work ethic. Things that may interfere with the patient's success in treatment include: physical health concerns.     Assessments:  The following assessments were completed by patient for this visit:  PHQ9:       5/25/2022    11:31 AM 1/18/2023     4:04 PM 4/18/2023     4:19 PM 4/18/2023     5:13 PM 9/13/2023     2:39 PM 2/21/2024     1:38 PM 5/9/2024     7:34 AM   PHQ-9 SCORE   PHQ-9 Total Score MyChart 0    0  7 (Mild depression)   PHQ-9 Total Score 0 3 2 2 0 5 7     GAD7:       3/12/2020     3:31 PM 2/4/2021     2:04 PM 5/25/2022    11:32 AM 4/18/2023     4:19 PM 4/18/2023     5:13 PM 9/13/2023     2:40 PM 2/21/2024     1:38 PM   ACE-7 SCORE   Total Score   5 (mild anxiety)   7 (mild anxiety)    Total Score 3 5 5 5 5 7 6     CAGE-AID:       5/9/2024     8:00 AM   CAGE-AID Total Score   Total Score 4     PROMIS 10-Global Health (all questions and answers displayed):        No data to display              [place] Suicide Severity Rating  Scale (Lifetime/Recent)      5/9/2024     8:00 AM   [place] Suicide Severity Rating (Lifetime/Recent)   Q1 Wish to be Dead (Lifetime) N   Q2 Non-Specific Active Suicidal Thoughts (Lifetime) N   Actual Attempt (Lifetime) N   Has subject engaged in non-suicidal self-injurious behavior? (Lifetime) Y   Has subject engaged in non-suicidal self-injurious behavior? (Past 3 Months) Y   Interrupted Attempts (Lifetime) N   Aborted or Self-Interrupted Attempt (Lifetime) N   Preparatory Acts or Behavior (Lifetime) N   Calculated C-SSRS Risk Score (Lifetime/Recent) No Risk Indicated       Personal and Family Medical History:  Patient   report a family history of mental health concerns.  Patient reports family history includes Asthma in her child and father; Breast Cancer in her maternal grandmother; Cancer in her mother; Diabetes in her brother and brother; Hypertension in her mother; Other Cancer in her father..     Patient does report Mental Health Diagnosis and/or Treatment.  Patient Patient reported the following previous diagnoses which include(s): Depression, PTSD.  Patient reported symptoms began currently and about 15 years ago due to a rough patch with several stressors.   Patient has received mental health services in the past: outpatient therapy and [facility] treatment.  Psychiatric Hospitalizations: None.  Patient denies a history of civil commitment.  Patient is receiving other mental health services.  These include psychotherapy with a therapist in the Oncology dept.       Patient has had a physical exam to rule out medical causes for current symptoms.  Date of last physical exam was within the past year. Client was encouraged to follow up with PCP if symptoms were to develop. The patient has a [place] Primary Care Provider, who is named Robbie Bailon.  Patient reports the following current medical concerns: past diagnosis and surgery for stage two colon cancer, ankle surgery which ct is still in recovery from.  Patient  reports recent ankle surgery that sometimes causes pain.  There are significant appetite / nutritional concerns / weight changes.  Recent significant weight gain, gained 35 lbs.  Inactivity.   Patient does not report a history of head injury / trauma / cognitive impairment.      Patient reports current meds as:   Current Outpatient Medications   Medication Sig Dispense Refill    albuterol (PROAIR HFA/PROVENTIL HFA/VENTOLIN HFA) 108 (90 Base) MCG/ACT inhaler Inhale 2 puffs into the lungs every 6 hours as needed for shortness of breath, wheezing or cough 18 g 11    ALPRAZolam (XANAX) 2 MG tablet Take 1 tablet (2 mg) by mouth 2 times daily as needed for anxiety 60 tablet 3    cetirizine (ZYRTEC) 10 MG tablet Take 1 tablet (10 mg) by mouth daily 30 tablet 11    fluticasone (FLONASE) 50 MCG/ACT nasal spray Spray 1 spray into both nostrils daily      fluticasone (FLONASE) 50 MCG/ACT nasal spray Spray 2 sprays into both nostrils daily 9.9 mL 11    hydrOXYzine carlos (VISTARIL) 25 MG capsule Take 1 capsule (25 mg) by mouth nightly as needed for anxiety 30 capsule 3    montelukast (SINGULAIR) 10 MG tablet Take 1 tablet (10 mg) by mouth At Bedtime 30 tablet 11    naloxone (NARCAN) 4 MG/0.1ML nasal spray Spray 1 spray (4 mg) into one nostril alternating nostrils as needed for opioid reversal every 2-3 minutes until assistance arrives (Patient not taking: Reported on 3/6/2024) 0.2 mL 1    oxyCODONE (ROXICODONE) 5 MG tablet Take 1 tablet (5 mg) by mouth 3 times daily as needed for severe pain 75 tablet 0    PROAIR  (90 Base) MCG/ACT inhaler Inhale 2 puffs into the lungs every 4 hours as needed for shortness of breath or wheezing 8 g 11    sertraline (ZOLOFT) 50 MG tablet Take 2 tablets (100 mg) by mouth daily 60 tablet 3    spacer (OPTICHAMBER BINA) holding chamber For use w/ rescue inhaler 1 each 0    sucralfate (CARAFATE) 1 GM/10ML suspension Take 10 mLs (1 g) by mouth 4 times daily as needed for nausea 414 mL 0     "tiotropium (SPIRIVA RESPIMAT) 1.25 MCG/ACT inhaler Inhale 2 puffs into the lungs daily 12 g 3    tiotropium (SPIRIVA) 18 MCG inhaled capsule Inhale 18 mcg into the lungs daily (Patient not taking: Reported on 3/6/2024)       No current facility-administered medications for this visit.     Facility-Administered Medications Ordered in Other Visits   Medication Dose Route Frequency Provider Last Rate Last Admin    ceFAZolin (ANCEF) intermittent infusion 2 g in 50 mL dextrose PREMIX  2 g Intravenous See Admin Instructions Caty Darby PA-C        flumazenil (ROMAZICON) injection 0.2 mg  0.2 mg Intravenous q1 min prn Kodi Hathaway DO        gabapentin (NEURONTIN) capsule 300 mg  300 mg Oral Pre-Op/Pre-procedure x 1 dose Kodi Hathaway DO        lactated ringers infusion   Intravenous Continuous Kodi Hathwaay DO   Stopped at 06/21/23 1149    lidocaine (LMX4) kit   Topical Q1H PRN Kodi Hathaway DO        lidocaine 1 % 0.1-1 mL  0.1-1 mL Other Q1H PRN Kodi Hathaway DO        midazolam (VERSED) injection 1-2 mg  1-2 mg Intravenous Q4 Min PRN Kodi Hathaway DO        naloxone (NARCAN) injection 0.2 mg  0.2 mg Intravenous Q2 Min PRN Kodi Hathaway DO        Or    naloxone (NARCAN) injection 0.4 mg  0.4 mg Intravenous Q2 Min PRN Kodi Hathaway DO        Or    naloxone (NARCAN) injection 0.2 mg  0.2 mg Intramuscular Q2 Min PRN Kodi Hathaway DO        Or    naloxone (NARCAN) injection 0.4 mg  0.4 mg Intramuscular Q2 Min PRN Kodi Hathaway DO        sodium chloride (PF) 0.9% PF flush 3 mL  3 mL Intracatheter Q8H Kodi Hathaway DO        sodium chloride (PF) 0.9% PF flush 3 mL  3 mL Intracatheter q1 min prn Kodi Hathaway DO           Medication Adherence:  Patient reports  .  taking prescribed medications as prescribed.    Patient Allergies:    Allergies   Allergen Reactions    Gabapentin Other (See Comments)     Mental status is changed     Lidocaine Other (See Comments)     \"my jaw stopped moving\"  Other reaction(s): " Dystonia    Penicillins Hives, Rash and Shortness Of Breath    Lidocaine-Epinephrine Other (See Comments) and Muscle Pain (Myalgia)     Severe jaw cramping, double vision  Jaw locking       Medical History:    Past Medical History:   Diagnosis Date    Abdominal pain 06/29/2015    Abnormal cervical Papanicolaou smear 11/09/2014    Overview:  ACUS/HPV positive    Abnormal cytology finding 11/09/2014    Overview:  ACUS/HPV positive    Acute pericardial effusion 02/06/2017    Agoraphobia with panic attacks     Anxiety     Arthralgia of both lower legs 05/29/2020    Bilateral achy knee pain that is chronic in nature going on for years. Most likely osteoarthritis in knees related to obesity. Previously injected in both knees with good relief. 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  Has controlled substance agreement with them.  On Vicodin, Valium, Klonopin prescribed only from there.       Coronary artery disease     Cough 02/09/2020    Family history of colon cancer 10/24/2020    Hx of seasonal allergies     Infection due to 2019 novel coronavirus 09/20/2022    Infectious pericarditis     Lipoma     Low back pain 07/13/2015    Major depressive disorder, recurrent episode, moderate (H) 09/05/2006    Menorrhagia with irregular cycle 07/15/2022    Added automatically from request for surgery [id number]    Moderate persistent asthma without complication 09/29/2020    Nondependent alcohol abuse, episodic drinking behavior 10/03/2012     Noninflammatory disorder of vagina 02/20/2015    Other chronic pain     Other long term (current) drug therapy 01/28/2013    Overview:  Vicodin and cyclobenzaprine monthly    Panic disorder with agoraphobia 09/05/2006    Pap smear for cervical cancer screening 10/31/2016    03/29/2010  Normal cytology, HPV ot done, repeat in 3 years.    Pericarditis 2017    Physiologic disturbance of temperature regulation 10/24/2020    PONV (postoperative nausea and vomiting)     Right ankle swelling 06/07/2022    Added automatically from request for surgery [id number]    Tobacco abuse     Tobacco use disorder 07/13/2015         Current Mental Status Exam:   Appearance:  Appropriate    Eye Contact:  Good   Psychomotor:  Normal       Gait / station:  Not assessed  Attitude / Demeanor: Cooperative  Pleasant  Speech      Rate / Production: Normal/ Responsive      Volume:  Normal  volume      Language:  intact  Mood:   Normal  Affect:   Appropriate    Thought Content: Clear   Thought Process: Coherent  Logical       Associations: No loosening of associations  Insight:   Good   Judgment:  Intact   Orientation:  All  Attention/concentration: Good    Substance Use:   Patient did not report a family history of substance use concerns; see medical history section for details.  Patient has received chemical dependency treatment in the past at [facility] 15 years ago.  Patient has ever been to detox.      Patient is not currently receiving any chemical dependency treatment. Patient reported the following problems as a result of their substance use:  NA.    Patient denies using alcohol.  Patient denies using tobacco  Patient denies using cannabis.  Patient reports appx an iced coffee before work, and an occasional Mountain Dew  Patient reports using/abusing the following substance(s). Patient reported no other substance use.     Substance Use: No symptoms    Based on the positive CAGE score and clinical interview there  are not indications of  drug or alcohol abuse.  15 years ago only     Significant Losses / Trauma / Abuse / Neglect Issues:   Patient   did not serve in the .  There are indications or report of significant loss, trauma, abuse or neglect issues related to: workplace harassment, bullying.  Cancer diagnosis, heart surgery, loss of several people in her life.   Concerns for possible neglect are not present.     Safety Assessment:   Patient denies current homicidal ideation and behaviors.  Patient reports current self-injurious ideation.  Onset: a month ago, frequency: once, duration: once, intensity: intense.  Client reports they are not currently engaging in self-injurious behaivor..  Patient denied risk behaviors associated with substance use.   Patient denies any high risk behaviors associated with mental health symptoms.  Patient reports the following current concerns for their personal safety: workplace hazards.  Patient reports there   firearms in the house.       The firearms are secured in a locked space.    History of Safety Concerns:  Patient denied a history of homicidal ideation.     Patient denied a history of personal safety concerns.    Patient denied a history of assaultive behaviors.    Patient denied a history of sexual assault behaviors.     Patient denied a history of risk behaviors associated with substance use.  Patient denies any history of high risk behaviors associated with mental health symptoms.  Patient reports the following protective factors:      Risk Plan:  See Recommendations for Safety and Risk Management Plan    Review of Symptoms per patient report:   Depression: Change in sleep, Lack of interest, Change in energy level, Difficulties concentrating, Low self-worth, Irritability, and Feeling sad, down, or depressed  Ekta:  No Symptoms  Psychosis: No Symptoms  Anxiety: Excessive worry, Nervousness, Sleep disturbance, Poor concentration, and Irritability  Panic:  No symptoms  Post Traumatic Stress  Disorder:  No Symptoms   Eating Disorder: No Symptoms  ADD / ADHD:  No symptoms  Conduct Disorder: No symptoms  Autism Spectrum Disorder: No symptoms  Obsessive Compulsive Disorder: No Symptoms    Patient reports the following compulsive behaviors and treatment history: NA.      Diagnostic Criteria:   Generalized Anxiety Disorder  A. Excessive anxiety and worry about a number of events or activities (such as work or school performance).   B. The person finds it difficult to control the worry.  C. Select 3 or more symptoms (required for diagnosis). Only one item is required in children.   - Restlessness or feeling keyed up or on edge.    - Being easily fatigued.    - Difficulty concentrating or mind going blank.    - Irritability.    - Sleep disturbance (difficulty falling or staying asleep, or restless unsatisfying sleep).   D. The focus of the anxiety and worry is not confined to features of an Axis I disorder.  E. The anxiety, worry, or physical symptoms cause clinically significant distress or impairment in social, occupational, or other important areas of functioning.   F. The disturbance is not due to the direct physiological effects of a substance (e.g., a drug of abuse, a medication) or a general medical condition (e.g., hyperthyroidism) and does not occur exclusively during a Mood Disorder, a Psychotic Disorder, or a Pervasive Developmental Disorder.    - The aformentioned symptoms began one year(s) ago and occurs 7 days per week and is experienced as severe. Major Depressive Disorder  CRITERIA (A-C) REPRESENT A MAJOR DEPRESSIVE EPISODE - SELECT THESE CRITERIA  A) Recurrent episode(s) - symptoms have been present during the same 2-week period and represent a change from previous functioning 5 or more symptoms (required for diagnosis)   - Depressed mood. Note: In children and adolescents, can be irritable mood.     - Diminished interest or pleasure in all, or almost all, activities.    - Decreased sleep.    -  Fatigue or loss of energy.    - Feelings of worthlessness or inappropriate and excessive guilt.    - Diminished ability to think or concentrate, or indecisiveness.   B) The symptoms cause clinically significant distress or impairment in social, occupational, or other important areas of functioning  C) The episode is not attributable to the physiological effects of a substance or to another medical condition  D) The occurence of major depressive episode is not better explained by other thought / psychotic disorders  E) There has never been a manic episode or hypomanic episode    Functional Status:  Patient reports the following functional impairments:  management of the household and or completion of tasks and work / vocational responsibilities.     Nonprogrammatic care:  Patient is requesting basic services to address current mental health concerns.    Clinical Summary:  1. Psychosocial, Cultural and Contextual Factors: , multiple medical conditions, workplace bullying  .  2. Principal DSM5 Diagnoses  (Sustained by DSM5 Criteria Listed Above):   296.32 (F33.1) Major Depressive Disorder, Recurrent Episode, Moderate _  300.02 (F41.1) Generalized Anxiety Disorder.  3. Other Diagnoses that is relevant to services:   NA  4. Provisional Diagnosis:  NA  5. Prognosis: Expect Improvement.  6. Likely consequences of symptoms if not treated: worsening symptoms and continued functional impairment.  7. Client strengths include:  goal-focused, good listener, motivated, open to learning, open to suggestions / feedback, responsible parent, wants to learn, and willing to ask questions .     Recommendations:     1. Plan for Safety and Risk Management:   Safety and Risk: Recommended that patient call 911 or go to the local ED should there be a change in any of these risk factors..          Report to child / adult protection services was NA.     2. Patient's identified no concerns with sexual orientation, gender identity,  culture, ethnicity, or kelly.     3. Initial Treatment will focus on:    Depressed Mood - MDD  Anxiety - ACE.     4. Resources/Service Plan:    services are not indicated.   Modifications to assist communication are not indicated.   Additional disability accommodations are not indicated.      5. Collaboration:   Collaboration / coordination of treatment will be initiated with the following  support professionals: primary care physician.      6.  Referrals:   The following referral(s) will be initiated: None at this time.       A Release of Information has been obtained for the following: None at this time.     Clinical Substantiation/medical necessity for the above recommendations:  individual therapy is necessary in order to alleviate symptoms and improve functioning.    7. JHONNY:    JHONNY:  Not identified.    8. Records:   These were reviewed at time of assessment.   Information in this assessment was obtained from the medical record and  provided by patient who is a good historian.    Patient will have open access to their mental health medical record.    9.   Interactive Complexity: No    Provider Name/ Credentials:  Cate Kaur MS, [facility]  May 9, 2024

## 2024-08-05 ENCOUNTER — TELEPHONE (OUTPATIENT)
Dept: SURGERY | Facility: CLINIC | Age: 49
End: 2024-08-05
Payer: COMMERCIAL

## 2024-08-05 NOTE — TELEPHONE ENCOUNTER
Patient has some concerns that she does not want to discuss with anyone except Dr Charles. She does not want to wait for Dr Charles's next clinic appt and is requesting a call back from him as soon as possible. [phone].

## 2024-08-09 ENCOUNTER — TELEPHONE (OUTPATIENT)
Dept: FAMILY MEDICINE | Facility: CLINIC | Age: 49
End: 2024-08-09
Payer: COMMERCIAL

## 2024-08-09 NOTE — TELEPHONE ENCOUNTER
Forms Request:  Today's Date: August 9, 2024   Form Type:  Disability claim ,  Where is the form from: [facility]  How was form received: Fax  LUDWIN on file: UNKNOWN   How is form being returned: Fax  Fax Number/Address: [phone]  PCP: [unfilled]   Color Team: Purple Team  Form Given to: Adamaris Pompa

## 2024-08-09 NOTE — TELEPHONE ENCOUNTER
Forms/Letter Request    Type of form/letter: Disability      Do we have the form/letter: Yes: [employer] [facility]     Who is the form from? Insurance comp    Where did/will the form come from? form was faxed in    When is form/letter needed by:     How would you like the form/letter returned: Fax : [phone]    Patient Notified form requests are processed in 5-7 business days:No    Could we send this information to you in [facility] or would you prefer to receive a phone call?:   No preference   Okay to leave a detailed message?: No at Other phone number:

## 2024-08-09 NOTE — PROGRESS NOTES
"  Assessment & Plan     Generalized anxiety disorder  Grief reaction  Post traumatic stress disorder  Patient with history of MDD, ACE, panic disorder, PTSD. Reports feeling 'stable' today, but still having issues with anxiety. Has had multiple traumatic experiences, and has had multiple grievances in the past that she has had to cope through. Seeing psychology and had visit virtually yesterday, 8/13/24. Since last visit with PCP, had increased Zoloft to 75 mg, which patient reports she feels has been helping. No reported adverse effects from the medication. Last PHQ from 7/24/24 was 6, GAD7 was 19 last done 8/1/24. Will have patient update these today. Recommended continued medication management with Zoloft 75mg as well as regularly psychology visit. Patient with regular follow up with PCP.     Encounter for medication refill  Status post surgical manipulation of ankle joint  Chronic midline thoracic back pain  Patient due for medication refill today. Utilizing Oxycodone for chronic back pain as well as ankle pain after surgical procedure. Goal is to continue to down titrate on these medications for pain. Recommend continued close follow up with PCP.   - oxyCODONE (ROXICODONE) 5 MG tablet; Take 1 tablet (5 mg) by mouth 3 times daily as needed for severe pain  - oxyCODONE (ROXICODONE) 5 MG tablet; Take 1 tablet (5 mg) by mouth every 6 hours as needed for pain    Encounter for completion of form with patient  Patient with forms to be filled out for work. Paperwork completed with precepting PCP today in clinic.     BMI  Estimated body mass index is 36.87 kg/m  as calculated from the following:    Height as of 7/23/24: 1.75 m (5' 8.9\").    Weight as of 7/23/24: 112.9 kg (248 lb 14.4 oz).       No follow-ups on file.    Pablito Ramey is a 49 year old, presenting for the following health issues:  RECHECK and Refill Request        8/14/2024     3:56 PM   Additional Questions   Roomed by Osmin   Accompanied by self " "        8/14/2024   Forms   Any forms needing to be completed Yes          8/14/2024   Declines Weight   Did patient decline having their weight taken? Yes          8/14/2024    Information    services provided? No      HPI     49 year old here for follow up on mood and for refills.    Overall, doing \"okay\". Feels mood is stable. Having some anxiety still, but overall doing okay. Seeing therapy regularly. Has increased to 75 mg of zoloft (1.5 pills), tolerating without issues.     In need of medication refills - oxycodone today.     Has paperwork to be filled out for work.       Review of Systems  Constitutional, neuro, ENT, endocrine, pulmonary, cardiac, gastrointestinal, genitourinary, musculoskeletal, integument and psychiatric systems are negative, except as otherwise noted.      Objective    /84 (BP Location: Right arm, Patient Position: Sitting)   Pulse 74   Temp 98.4  F (36.9  C) (Tympanic)   Resp 14   SpO2 99%   There is no height or weight on file to calculate BMI.  Physical Exam   GENERAL: alert and no distress  RESP: no increased work of breathing, speaking in full sentences  CV: extremities well perfused          Signed Electronically by: Kandy Edmonds, DO    "

## 2024-08-13 ENCOUNTER — VIRTUAL VISIT (OUTPATIENT)
Dept: PSYCHOLOGY | Facility: CLINIC | Age: 49
End: 2024-08-13
Payer: COMMERCIAL

## 2024-08-13 DIAGNOSIS — F33.1 MAJOR DEPRESSIVE DISORDER, RECURRENT EPISODE, MODERATE (H): Primary | ICD-10-CM

## 2024-08-13 DIAGNOSIS — F41.1 GAD (GENERALIZED ANXIETY DISORDER): ICD-10-CM

## 2024-08-13 PROCEDURE — 90832 PSYTX W PT 30 MINUTES: CPT | Mod: 95

## 2024-08-13 NOTE — PROGRESS NOTES
[facility]                                     Progress Note    Patient Name: Darlene Hudson  Date: 8/13/24         Service Type: Individual      Session Start Time: 210  Session End Time: 244     Session Length: 34    Session #: 8    Attendees: Client attended alone    Service Modality:  Video Visit:      Provider verified identity through the following two step process.  Patient provided:  Patient is known previously to provider    Telemedicine Visit: The patient's condition can be safely assessed and treated via synchronous audio and visual telemedicine encounter.      Reason for Telemedicine Visit: Patient has requested telehealth visit    Originating Site (Patient Location): Patient's other parked car in parking lot    Distant Site (Provider Location): Provider Remote Setting- Home Office    Consent:  The patient/guardian has verbally consented to: the potential risks and benefits of telemedicine (video visit) versus in person care; bill my insurance or make self-payment for services provided; and responsibility for payment of non-covered services.     Patient would like the video invitation sent by:  My Chart    Mode of Communication:  Video Conference via Amwell    Distant Location (Provider):  Off-site    As the provider I attest to compliance with applicable laws and regulations related to telemedicine.    DATA  Interactive Complexity: No  Crisis: No        Progress Since Last Session (Related to Symptoms / Goals / Homework):   Symptoms: Improving      Homework:  completing self care activities      Episode of Care Goals: Satisfactory progress - ACTION (Actively working towards change); Intervened by reinforcing change plan / affirming steps taken     Current / Ongoing Stressors and Concerns:   Many stressors including prior cancer diagnosis and treatment, losses through death of family and friends, her 17 year old son and her relationship concerns, as well as current stress with  her job, which she identified as most stressful at this time noting that it has taken quite a toll on her mental health.  Setting boundaries with others she feels are unhealthy.  Taking action on the treatment she received at work, wants to have justice in this case, committed to getting her voice heard     Treatment Objective(s) Addressed in This Session:   Build rapport. Assertiveness skills and boundary setting, coping strategies for stressors     Intervention:   Build rapport.  Discussed previous week's missed appt.  Client notes she was sick and turned off her phone.  Provided support to client as she explored the ways she is taking her own power back with regard to her job situation.  Processed thoughts and emotions regarding this shift and how this aids in her ability to be assertive, set boundaries, and cope with stress.       Assessments completed prior to visit:  The following assessments were completed by patient for this visit:  PHQ9:       4/18/2023     5:13 PM 9/13/2023     2:39 PM 2/21/2024     1:38 PM 5/9/2024     7:34 AM 5/22/2024    11:00 AM 5/28/2024     9:31 AM 7/24/2024     3:59 PM   PHQ-9 SCORE   PHQ-9 Total Score MyChart  0  7 (Mild depression)  7 (Mild depression) 6 (Mild depression)   PHQ-9 Total Score 2 0 5 7 8 7 6    6     GAD7:       4/18/2023     4:19 PM 4/18/2023     5:13 PM 9/13/2023     2:40 PM 2/21/2024     1:38 PM 5/22/2024    11:00 AM 5/22/2024    11:03 AM 8/1/2024    11:00 AM   ACE-7 SCORE   Total Score   7 (mild anxiety)   13 (moderate anxiety)    Total Score 5 5 7 6 19 13    13 19     PROMIS 10-Global Health (all questions and answers displayed):       5/22/2024    11:00 AM 5/22/2024    11:04 AM 7/23/2024     9:34 AM   PROMIS 10   In general, would you say your health is:  Fair Poor   In general, would you say your quality of life is:  Fair Fair   In general, how would you rate your physical health?  Fair Poor   In general, how would you rate your mental health, including your  mood and your ability to think?  Poor Fair   In general, how would you rate your satisfaction with your social activities and relationships?  Fair Fair   In general, please rate how well you carry out your usual social activities and roles  Fair Good   To what extent are you able to carry out your everyday physical activities such as walking, climbing stairs, carrying groceries, or moving a chair?  Moderately Moderately   In the past 7 days, how often have you been bothered by emotional problems such as feeling anxious, depressed, or irritable?  Always Always   In the past 7 days, how would you rate your fatigue on average?  Mild Moderate   In the past 7 days, how would you rate your pain on average, where 0 means no pain, and 10 means worst imaginable pain?  5 5   In general, would you say your health is: 2 2 1   In general, would you say your quality of life is:  2 2   In general, how would you rate your physical health?  2 1   In general, how would you rate your mental health, including your mood and your ability to think?  1 2   In general, how would you rate your satisfaction with your social activities and relationships?  2 2   In general, please rate how well you carry out your usual social activities and roles. (This includes activities at home, at work and in your community, and responsibilities as a parent, child, spouse, employee, friend, etc.)  2 3   To what extent are you able to carry out your everyday physical activities such as walking, climbing stairs, carrying groceries, or moving a chair?  3 3   In the past 7 days, how often have you been bothered by emotional problems such as feeling anxious, depressed, or irritable?  5 5   In the past 7 days, how would you rate your fatigue on average?  2 3   In the past 7 days, how would you rate your pain on average, where 0 means no pain, and 10 means worst imaginable pain?  5 5   Global Mental Health Score  6    6 7   Global Physical Health Score  12    12  10   PROMIS TOTAL - SUBSCORES  18    18 17         ASSESSMENT: Current Emotional / Mental Status (status of significant symptoms):   Risk status (Self / Other harm or suicidal ideation)   Patient denies current fears or concerns for personal safety.   Patient denies current or recent suicidal ideation or behaviors.   Patient denies current or recent homicidal ideation or behaviors.   Patient denies current or recent self injurious behavior or ideation.   Patient denies other safety concerns.   Patient reports there has been no change in risk factors since their last session.     Patient reports there has been no change in protective factors since their last session.     Recommended that patient call 911 or go to the local ED should there be a change in any of these risk factors.     Appearance:   normal    Eye Contact:   Good    Psychomotor Behavior: normal    Attitude:   Cooperative    Orientation:   All   Speech    Rate / Production: Normal     Volume:  Normal    Mood:    Normal   Affect:    normal   Thought Content:  Clear    Thought Form:  Coherent    Insight:    Fair      Medication Review:   No changes to current psychiatric medication(s)     Medication Compliance:   Yes     Changes in Health Issues:   None reported     Chemical Use Review:   Substance Use: Chemical use reviewed, no active concerns identified      Tobacco Use: No current tobacco use.      Diagnosis:  1. Major depressive disorder, recurrent episode, moderate (H)    2. ACE (generalized anxiety disorder)                Collateral Reports Completed:   Not Applicable    PLAN: (Patient Tasks / Therapist Tasks / Other)  Rtn one week.   Client to continue boundaries, continue taking medication as prescribed, continue processing recent hardships.        SWETA Borjas                                                         ______________________________________________________________________    Individual Treatment Plan    Patient's Name:  Darlene Hudson  YOB: 1975    Date of Creation: 06/11/2024  Date Treatment Plan Last Reviewed/Revised: 06/11/2024    DSM5 Diagnoses: 296.32 (F33.1) Major Depressive Disorder, Recurrent Episode, Moderate _ or 300.02 (F41.1) Generalized Anxiety Disorder  Psychosocial / Contextual Factors: lives at home with sons, lots of stress from older son, workplace stress, on leave, legal interventions due to workplace concerns  PROMIS (reviewed every 90 days): 5/22/24    Referral / Collaboration:  Referral to another professional/service is not indicated at this time..    Anticipated number of session for this episode of care: 9-12 sessions  Anticipation frequency of session: Weekly  Anticipated Duration of each session: 38-52 minutes  Treatment plan will be reviewed in 90 days or when goals have been changed.       MeasurableTreatment Goal(s) related to diagnosis / functional impairment(s)  Goal 1: Patient will decrease symptoms of depression    I will know I've met my goal when I am happy within myself.      Objective #A (Patient Action)    Patient will identify at least 3 techniques for intervening on the escalation.  Status: New - Date: 06/11/2024      Intervention(s)  Therapist will teach emotional regulation skills.   .    Objective #B  Patient will learn and demonstrate 3 assertiveness skill(s).  Status: New - Date: 06/11/2024      Intervention(s)  Therapist will teach assertiveness skills.   .      Goal 2: Patient will decrease symptoms of anxiety    I will know I've met my goal when I am happy within myself.      Objective #A (Patient Action)    Status: New - Date: 06/11/2024      Patient will use at least 3 coping skills for anxiety management in the next 12 weeks.    Intervention(s)  Therapist will teach coping skills for anxiety .    Objective #B  Patient will use cognitive strategies identified in therapy to challenge anxious thoughts.    Status: New - Date: 06/11/2024       Intervention(s)  Therapist will teach cognitive restructuring .    Patient has reviewed and agreed to the above plan.      Cate Kaur, [facility]  2024    [facility]       PATIENT'S NAME: Darlene Hudson  PREFERRED NAME: Karoline  PRONOUNS:     she her hers  MRN: [id number]  : 1975  ADDRESS: [address] [place] [address] [place]  [facility] [address]  ACCT. NUMBER:  [id number]  DATE OF SERVICE: 24  START TIME: 738  END TIME: 823  PREFERRED PHONE: [phone]  May we leave a program related message: Yes  EMERGENCY CONTACT: was not obtained .  SERVICE MODALITY:  Video Visit:      Provider verified identity through the following two step process.  Patient provided:  Patient  and Patient address    Telemedicine Visit: The patient's condition can be safely assessed and treated via synchronous audio and visual telemedicine encounter.      Reason for Telemedicine Visit: Patient convenience (e.g. access to timely appointments / distance to available provider)    Originating Site (Patient Location): Patient's home    Distant Site (Provider Location): Provider Remote Setting- Home Office    Consent:  The patient/guardian has verbally consented to: the potential risks and benefits of telemedicine (video visit) versus in person care; bill my insurance or make self-payment for services provided; and responsibility for payment of non-covered services.     Patient would like the video invitation sent by:  My Chart    Mode of Communication:  Video Conference via Amwell    Distant Location (Provider):  Off-site    As the provider I attest to compliance with applicable laws and regulations related to telemedicine.    UNIVERSAL ADULT Mental Health DIAGNOSTIC ASSESSMENT    Identifying Information:  Patient is a 49 year old, , , and Black ;  individual.  [facility]  Patient attended the  "session alone.    Chief Complaint:   The reason for seeking services at this time is: \" workplace stresses, workplace bullying and harassment \"   The problem(s) began 2023. Patient has attempted to resolve these concerns in the past through medication, therapy.    Social/Family History:  Patient reported they grew up in [place], [place].  They were raised by biological mother.  Parents  when 3 years old.   Patient reported that their childhood was pretty good, single mother, worked for the [employer], mother was active in their activities, they saw father fairly frequent.  Patient described their current relationships with family of origin as parents have passed, sibling relatiuonships still.      The patient describes their cultural background as bi racial.  Cultural influences and impact on patient's life structure, values, norms, and healthcare: NA.  Contextual influences on patient's health include: Contextual Factors: Individual Factors stressful work environment, supervisor difficulty, symptoms of depression, significant losses due to covid, previous colon cancer diagnosis and surgery as well as ankle surgery .  Cultural, Contextual, and socioeconomic factors do not affect the patient's access to services.  These factors will be addressed in the Preliminary Treatment plan.  Patient identified their preferred language to be English. Patient reported they do not  need the assistance of an  or other support involved in therapy.     Patient reported had no significant delays in developmental tasks.   Patient's highest education level was some college. Patient identified the following learning problems: none reported.  Modifications will not be used to assist communication in therapy.   Patient reports they are  able to understand written materials.    Patient reported the following relationship history boyfriend of 11 years  of covid .  Patient's current relationship status is  . "   Patient identified their sexual orientation as heterosexual.  Patient reported having five child(reza). Patient identified her best friend as part of their support system.  Patient identified the quality of these relationships as stable and meaningful.     Patient's current living/housing situation involves staying in own home/apartment.  They live with two sons and they report that housing is stable.     Patient is currently on medical leave from her work as a  .  Patient reports their finances are obtained through her savings, and help from family and friends.  Patient does identify finances as a current stressor.      Patient reported that they have not been involved with the legal system.   Patient denies being on probation / parole / under the jurisdiction of the court.    Patient's Strengths and Limitations:  Patient identified the following strengths or resources that will help them succeed in treatment: commitment to health and well being, motivation, and work ethic. Things that may interfere with the patient's success in treatment include: physical health concerns.     Assessments:  The following assessments were completed by patient for this visit:  PHQ9:       5/25/2022    11:31 AM 1/18/2023     4:04 PM 4/18/2023     4:19 PM 4/18/2023     5:13 PM 9/13/2023     2:39 PM 2/21/2024     1:38 PM 5/9/2024     7:34 AM   PHQ-9 SCORE   PHQ-9 Total Score MyChart 0    0  7 (Mild depression)   PHQ-9 Total Score 0 3 2 2 0 5 7     GAD7:       3/12/2020     3:31 PM 2/4/2021     2:04 PM 5/25/2022    11:32 AM 4/18/2023     4:19 PM 4/18/2023     5:13 PM 9/13/2023     2:40 PM 2/21/2024     1:38 PM   ACE-7 SCORE   Total Score   5 (mild anxiety)   7 (mild anxiety)    Total Score 3 5 5 5 5 7 6     CAGE-AID:       5/9/2024     8:00 AM   CAGE-AID Total Score   Total Score 4     PROMIS 10-Global Health (all questions and answers displayed):        No data to display              [place] Suicide Severity Rating  Scale (Lifetime/Recent)      5/9/2024     8:00 AM   [place] Suicide Severity Rating (Lifetime/Recent)   Q1 Wish to be Dead (Lifetime) N   Q2 Non-Specific Active Suicidal Thoughts (Lifetime) N   Actual Attempt (Lifetime) N   Has subject engaged in non-suicidal self-injurious behavior? (Lifetime) Y   Has subject engaged in non-suicidal self-injurious behavior? (Past 3 Months) Y   Interrupted Attempts (Lifetime) N   Aborted or Self-Interrupted Attempt (Lifetime) N   Preparatory Acts or Behavior (Lifetime) N   Calculated C-SSRS Risk Score (Lifetime/Recent) No Risk Indicated       Personal and Family Medical History:  Patient   report a family history of mental health concerns.  Patient reports family history includes Asthma in her child and father; Breast Cancer in her maternal grandmother; Cancer in her mother; Diabetes in her brother and brother; Hypertension in her mother; Other Cancer in her father..     Patient does report Mental Health Diagnosis and/or Treatment.  Patient Patient reported the following previous diagnoses which include(s): Depression, PTSD.  Patient reported symptoms began currently and about 15 years ago due to a rough patch with several stressors.   Patient has received mental health services in the past: outpatient therapy and [facility] treatment.  Psychiatric Hospitalizations: None.  Patient denies a history of civil commitment.  Patient is receiving other mental health services.  These include psychotherapy with a therapist in the Oncology dept.       Patient has had a physical exam to rule out medical causes for current symptoms.  Date of last physical exam was within the past year. Client was encouraged to follow up with PCP if symptoms were to develop. The patient has a [facility] Provider, who is named Robbie Bailon.  Patient reports the following current medical concerns: past diagnosis and surgery for stage two colon cancer, ankle surgery which ct is still in recovery from.  Patient  reports recent ankle surgery that sometimes causes pain.  There are significant appetite / nutritional concerns / weight changes.  Recent significant weight gain, gained 35 lbs.  Inactivity.   Patient does not report a history of head injury / trauma / cognitive impairment.      Patient reports current meds as:   Current Outpatient Medications   Medication Sig Dispense Refill    albuterol (PROAIR HFA/PROVENTIL HFA/VENTOLIN HFA) 108 (90 Base) MCG/ACT inhaler Inhale 2 puffs into the lungs every 6 hours as needed for shortness of breath, wheezing or cough 18 g 11    ALPRAZolam (XANAX) 2 MG tablet Take 1 tablet (2 mg) by mouth 2 times daily as needed for anxiety 60 tablet 3    cetirizine (ZYRTEC) 10 MG tablet Take 1 tablet (10 mg) by mouth daily 30 tablet 11    fluticasone (FLONASE) 50 MCG/ACT nasal spray Spray 1 spray into both nostrils daily      fluticasone (FLONASE) 50 MCG/ACT nasal spray Spray 2 sprays into both nostrils daily 9.9 mL 11    hydrOXYzine carlos (VISTARIL) 25 MG capsule Take 1 capsule (25 mg) by mouth nightly as needed for anxiety 30 capsule 3    montelukast (SINGULAIR) 10 MG tablet Take 1 tablet (10 mg) by mouth At Bedtime 30 tablet 11    naloxone (NARCAN) 4 MG/0.1ML nasal spray Spray 1 spray (4 mg) into one nostril alternating nostrils as needed for opioid reversal every 2-3 minutes until assistance arrives (Patient not taking: Reported on 3/6/2024) 0.2 mL 1    oxyCODONE (ROXICODONE) 5 MG tablet Take 1 tablet (5 mg) by mouth 3 times daily as needed for severe pain 75 tablet 0    PROAIR  (90 Base) MCG/ACT inhaler Inhale 2 puffs into the lungs every 4 hours as needed for shortness of breath or wheezing 8 g 11    sertraline (ZOLOFT) 50 MG tablet Take 2 tablets (100 mg) by mouth daily 60 tablet 3    spacer (OPTICHAMBER BINA) holding chamber For use w/ rescue inhaler 1 each 0    sucralfate (CARAFATE) 1 GM/10ML suspension Take 10 mLs (1 g) by mouth 4 times daily as needed for nausea 414 mL 0     "tiotropium (SPIRIVA RESPIMAT) 1.25 MCG/ACT inhaler Inhale 2 puffs into the lungs daily 12 g 3    tiotropium (SPIRIVA) 18 MCG inhaled capsule Inhale 18 mcg into the lungs daily (Patient not taking: Reported on 3/6/2024)       No current facility-administered medications for this visit.     Facility-Administered Medications Ordered in Other Visits   Medication Dose Route Frequency Provider Last Rate Last Admin    ceFAZolin (ANCEF) intermittent infusion 2 g in 50 mL dextrose PREMIX  2 g Intravenous See Admin Instructions Caty Darby PA-C        flumazenil (ROMAZICON) injection 0.2 mg  0.2 mg Intravenous q1 min prn Kodi Hathaway DO        gabapentin (NEURONTIN) capsule 300 mg  300 mg Oral Pre-Op/Pre-procedure x 1 dose Kodi Hathaway DO        lactated ringers infusion   Intravenous Continuous Kodi Hathaway DO   Stopped at 06/21/23 1149    lidocaine (LMX4) kit   Topical Q1H PRN Kodi Hathaway DO        lidocaine 1 % 0.1-1 mL  0.1-1 mL Other Q1H PRN Kodi Hathaway DO        midazolam (VERSED) injection 1-2 mg  1-2 mg Intravenous Q4 Min PRN Kodi Hathaway DO        naloxone (NARCAN) injection 0.2 mg  0.2 mg Intravenous Q2 Min PRN Kodi Hathaway DO        Or    naloxone (NARCAN) injection 0.4 mg  0.4 mg Intravenous Q2 Min PRN Kodi Hathaway DO        Or    naloxone (NARCAN) injection 0.2 mg  0.2 mg Intramuscular Q2 Min PRN Kodi Hathaway DO        Or    naloxone (NARCAN) injection 0.4 mg  0.4 mg Intramuscular Q2 Min PRN Kodi Hathaway DO        sodium chloride (PF) 0.9% PF flush 3 mL  3 mL Intracatheter Q8H Kodi Hathaway DO        sodium chloride (PF) 0.9% PF flush 3 mL  3 mL Intracatheter q1 min prn Kodi Hathaway DO           Medication Adherence:  Patient reports  .  taking prescribed medications as prescribed.    Patient Allergies:    Allergies   Allergen Reactions    Gabapentin Other (See Comments)     Mental status is changed     Lidocaine Other (See Comments)     \"my jaw stopped moving\"  Other reaction(s): " Dystonia    Penicillins Hives, Rash and Shortness Of Breath    Lidocaine-Epinephrine Other (See Comments) and Muscle Pain (Myalgia)     Severe jaw cramping, double vision  Jaw locking       Medical History:    Past Medical History:   Diagnosis Date    Abdominal pain 06/29/2015    Abnormal cervical Papanicolaou smear 11/09/2014    Overview:  ACUS/HPV positive    Abnormal cytology finding 11/09/2014    Overview:  ACUS/HPV positive    Acute pericardial effusion 02/06/2017    Agoraphobia with panic attacks     Anxiety     Arthralgia of both lower legs 05/29/2020    Bilateral achy knee pain that is chronic in nature going on for years. Most likely osteoarthritis in knees related to obesity. Previously injected in both knees with good relief. 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  Has controlled substance agreement with them.  On Vicodin, Valium, Klonopin prescribed only from there.       Coronary artery disease     Cough 02/09/2020    Family history of colon cancer 10/24/2020    Hx of seasonal allergies     Infection due to 2019 novel coronavirus 09/20/2022    Infectious pericarditis     Lipoma     Low back pain 07/13/2015    Major depressive disorder, recurrent episode, moderate (H) 09/05/2006    Menorrhagia with irregular cycle 07/15/2022    Added automatically from request for surgery [id number]    Moderate persistent asthma without complication 09/29/2020    Nondependent alcohol abuse, episodic drinking behavior 10/03/2012     Noninflammatory disorder of vagina 02/20/2015    Other chronic pain     Other long term (current) drug therapy 01/28/2013    Overview:  Vicodin and cyclobenzaprine monthly    Panic disorder with agoraphobia 09/05/2006    Pap smear for cervical cancer screening 10/31/2016    03/29/2010  Normal cytology, HPV ot done, repeat in 3 years.    Pericarditis 2017    Physiologic disturbance of temperature regulation 10/24/2020    PONV (postoperative nausea and vomiting)     Right ankle swelling 06/07/2022    Added automatically from request for surgery [id number]    Tobacco abuse     Tobacco use disorder 07/13/2015         Current Mental Status Exam:   Appearance:  Appropriate    Eye Contact:  Good   Psychomotor:  Normal       Gait / station:  Not assessed  Attitude / Demeanor: Cooperative  Pleasant  Speech      Rate / Production: Normal/ Responsive      Volume:  Normal  volume      Language:  intact  Mood:   Normal  Affect:   Appropriate    Thought Content: Clear   Thought Process: Coherent  Logical       Associations: No loosening of associations  Insight:   Good   Judgment:  Intact   Orientation:  All  Attention/concentration: Good    Substance Use:   Patient did not report a family history of substance use concerns; see medical history section for details.  Patient has received chemical dependency treatment in the past at [facility] 15 years ago.  Patient has ever been to detox.      Patient is not currently receiving any chemical dependency treatment. Patient reported the following problems as a result of their substance use:  NA.    Patient denies using alcohol.  Patient denies using tobacco  Patient denies using cannabis.  Patient reports appx an iced coffee before work, and an occasional Mountain Dew  Patient reports using/abusing the following substance(s). Patient reported no other substance use.     Substance Use: No symptoms    Based on the positive CAGE score and clinical interview there  are not indications of  drug or alcohol abuse.  15 years ago only     Significant Losses / Trauma / Abuse / Neglect Issues:   Patient   did not serve in the .  There are indications or report of significant loss, trauma, abuse or neglect issues related to: workplace harassment, bullying.  Cancer diagnosis, heart surgery, loss of several people in her life.   Concerns for possible neglect are not present.     Safety Assessment:   Patient denies current homicidal ideation and behaviors.  Patient reports current self-injurious ideation.  Onset: a month ago, frequency: once, duration: once, intensity: intense.  Client reports they are not currently engaging in self-injurious behaivor..  Patient denied risk behaviors associated with substance use.   Patient denies any high risk behaviors associated with mental health symptoms.  Patient reports the following current concerns for their personal safety: workplace hazards.  Patient reports there   firearms in the house.       The firearms are secured in a locked space.    History of Safety Concerns:  Patient denied a history of homicidal ideation.     Patient denied a history of personal safety concerns.    Patient denied a history of assaultive behaviors.    Patient denied a history of sexual assault behaviors.     Patient denied a history of risk behaviors associated with substance use.  Patient denies any history of high risk behaviors associated with mental health symptoms.  Patient reports the following protective factors:      Risk Plan:  See Recommendations for Safety and Risk Management Plan    Review of Symptoms per patient report:   Depression: Change in sleep, Lack of interest, Change in energy level, Difficulties concentrating, Low self-worth, Irritability, and Feeling sad, down, or depressed  Ekta:  No Symptoms  Psychosis: No Symptoms  Anxiety: Excessive worry, Nervousness, Sleep disturbance, Poor concentration, and Irritability  Panic:  No symptoms  Post Traumatic Stress  Disorder:  No Symptoms   Eating Disorder: No Symptoms  ADD / ADHD:  No symptoms  Conduct Disorder: No symptoms  Autism Spectrum Disorder: No symptoms  Obsessive Compulsive Disorder: No Symptoms    Patient reports the following compulsive behaviors and treatment history: NA.      Diagnostic Criteria:   Generalized Anxiety Disorder  A. Excessive anxiety and worry about a number of events or activities (such as work or school performance).   B. The person finds it difficult to control the worry.  C. Select 3 or more symptoms (required for diagnosis). Only one item is required in children.   - Restlessness or feeling keyed up or on edge.    - Being easily fatigued.    - Difficulty concentrating or mind going blank.    - Irritability.    - Sleep disturbance (difficulty falling or staying asleep, or restless unsatisfying sleep).   D. The focus of the anxiety and worry is not confined to features of an Axis I disorder.  E. The anxiety, worry, or physical symptoms cause clinically significant distress or impairment in social, occupational, or other important areas of functioning.   F. The disturbance is not due to the direct physiological effects of a substance (e.g., a drug of abuse, a medication) or a general medical condition (e.g., hyperthyroidism) and does not occur exclusively during a Mood Disorder, a Psychotic Disorder, or a Pervasive Developmental Disorder.    - The aformentioned symptoms began one year(s) ago and occurs 7 days per week and is experienced as severe. Major Depressive Disorder  CRITERIA (A-C) REPRESENT A MAJOR DEPRESSIVE EPISODE - SELECT THESE CRITERIA  A) Recurrent episode(s) - symptoms have been present during the same 2-week period and represent a change from previous functioning 5 or more symptoms (required for diagnosis)   - Depressed mood. Note: In children and adolescents, can be irritable mood.     - Diminished interest or pleasure in all, or almost all, activities.    - Decreased sleep.    -  Fatigue or loss of energy.    - Feelings of worthlessness or inappropriate and excessive guilt.    - Diminished ability to think or concentrate, or indecisiveness.   B) The symptoms cause clinically significant distress or impairment in social, occupational, or other important areas of functioning  C) The episode is not attributable to the physiological effects of a substance or to another medical condition  D) The occurence of major depressive episode is not better explained by other thought / psychotic disorders  E) There has never been a manic episode or hypomanic episode    Functional Status:  Patient reports the following functional impairments:  management of the household and or completion of tasks and work / vocational responsibilities.     Nonprogrammatic care:  Patient is requesting basic services to address current mental health concerns.    Clinical Summary:  1. Psychosocial, Cultural and Contextual Factors: , multiple medical conditions, workplace bullying  .  2. Principal DSM5 Diagnoses  (Sustained by DSM5 Criteria Listed Above):   296.32 (F33.1) Major Depressive Disorder, Recurrent Episode, Moderate _  300.02 (F41.1) Generalized Anxiety Disorder.  3. Other Diagnoses that is relevant to services:   NA  4. Provisional Diagnosis:  NA  5. Prognosis: Expect Improvement.  6. Likely consequences of symptoms if not treated: worsening symptoms and continued functional impairment.  7. Client strengths include:  goal-focused, good listener, motivated, open to learning, open to suggestions / feedback, responsible parent, wants to learn, and willing to ask questions .     Recommendations:     1. Plan for Safety and Risk Management:   Safety and Risk: Recommended that patient call 911 or go to the local ED should there be a change in any of these risk factors..          Report to child / adult protection services was NA.     2. Patient's identified no concerns with sexual orientation, gender identity,  culture, ethnicity, or kelly.     3. Initial Treatment will focus on:    Depressed Mood - MDD  Anxiety - ACE.     4. Resources/Service Plan:    services are not indicated.   Modifications to assist communication are not indicated.   Additional disability accommodations are not indicated.      5. Collaboration:   Collaboration / coordination of treatment will be initiated with the following  support professionals: primary care physician.      6.  Referrals:   The following referral(s) will be initiated: None at this time.       A Release of Information has been obtained for the following: None at this time.     Clinical Substantiation/medical necessity for the above recommendations:  individual therapy is necessary in order to alleviate symptoms and improve functioning.    7. JHONNY:    JHONNY:  Not identified.    8. Records:   These were reviewed at time of assessment.   Information in this assessment was obtained from the medical record and  provided by patient who is a good historian.    Patient will have open access to their mental health medical record.    9.   Interactive Complexity: No    Provider Name/ Credentials:  Cate Kaur MS, [facility]  May 9, 2024

## 2024-08-14 ENCOUNTER — OFFICE VISIT (OUTPATIENT)
Dept: FAMILY MEDICINE | Facility: CLINIC | Age: 49
End: 2024-08-14
Payer: COMMERCIAL

## 2024-08-14 VITALS
OXYGEN SATURATION: 99 % | HEART RATE: 74 BPM | DIASTOLIC BLOOD PRESSURE: 84 MMHG | TEMPERATURE: 98.4 F | SYSTOLIC BLOOD PRESSURE: 131 MMHG | RESPIRATION RATE: 14 BRPM

## 2024-08-14 DIAGNOSIS — Z98.890 STATUS POST SURGICAL MANIPULATION OF ANKLE JOINT: ICD-10-CM

## 2024-08-14 DIAGNOSIS — F43.10 POST TRAUMATIC STRESS DISORDER: ICD-10-CM

## 2024-08-14 DIAGNOSIS — G89.29 CHRONIC MIDLINE THORACIC BACK PAIN: ICD-10-CM

## 2024-08-14 DIAGNOSIS — Z02.89 ENCOUNTER FOR COMPLETION OF FORM WITH PATIENT: ICD-10-CM

## 2024-08-14 DIAGNOSIS — F41.1 GENERALIZED ANXIETY DISORDER: Primary | ICD-10-CM

## 2024-08-14 DIAGNOSIS — M54.6 CHRONIC MIDLINE THORACIC BACK PAIN: ICD-10-CM

## 2024-08-14 DIAGNOSIS — Z76.0 ENCOUNTER FOR MEDICATION REFILL: ICD-10-CM

## 2024-08-14 DIAGNOSIS — F43.21 GRIEF REACTION: ICD-10-CM

## 2024-08-14 PROCEDURE — 99214 OFFICE O/P EST MOD 30 MIN: CPT | Mod: GC

## 2024-08-14 RX ORDER — OXYCODONE HYDROCHLORIDE 5 MG/1
5 TABLET ORAL EVERY 6 HOURS PRN
Qty: 85 TABLET | Refills: 0 | Status: SHIPPED | OUTPATIENT
Start: 2024-08-14 | End: 2024-09-11

## 2024-08-14 RX ORDER — OXYCODONE HYDROCHLORIDE 5 MG/1
5 TABLET ORAL 3 TIMES DAILY PRN
Qty: 90 TABLET | Refills: 0 | Status: CANCELLED | OUTPATIENT
Start: 2024-08-14

## 2024-08-14 RX ORDER — ALPRAZOLAM 2 MG
2 TABLET ORAL 3 TIMES DAILY PRN
Qty: 30 TABLET | Refills: 0 | Status: SHIPPED | OUTPATIENT
Start: 2024-08-23 | End: 2024-09-11

## 2024-08-14 ASSESSMENT — ANXIETY QUESTIONNAIRES
IF YOU CHECKED OFF ANY PROBLEMS ON THIS QUESTIONNAIRE, HOW DIFFICULT HAVE THESE PROBLEMS MADE IT FOR YOU TO DO YOUR WORK, TAKE CARE OF THINGS AT HOME, OR GET ALONG WITH OTHER PEOPLE: EXTREMELY DIFFICULT
3. WORRYING TOO MUCH ABOUT DIFFERENT THINGS: MORE THAN HALF THE DAYS
6. BECOMING EASILY ANNOYED OR IRRITABLE: MORE THAN HALF THE DAYS
GAD7 TOTAL SCORE: 15
2. NOT BEING ABLE TO STOP OR CONTROL WORRYING: MORE THAN HALF THE DAYS
GAD7 TOTAL SCORE: 15
1. FEELING NERVOUS, ANXIOUS, OR ON EDGE: NEARLY EVERY DAY
5. BEING SO RESTLESS THAT IT IS HARD TO SIT STILL: MORE THAN HALF THE DAYS
7. FEELING AFRAID AS IF SOMETHING AWFUL MIGHT HAPPEN: MORE THAN HALF THE DAYS

## 2024-08-14 ASSESSMENT — PATIENT HEALTH QUESTIONNAIRE - PHQ9
5. POOR APPETITE OR OVEREATING: MORE THAN HALF THE DAYS
SUM OF ALL RESPONSES TO PHQ QUESTIONS 1-9: 7

## 2024-08-14 NOTE — PROGRESS NOTES
I have personally reviewed the history and examination as documented by Dr. Edmonds.  I was present during key portions of the visit and agree with the assessment and plan as documented for 49 yr old female with anxiety, chronic ankle pain, grief reaction here for follow-up. Improved mood w/ inc Zoloft. Refills sent. Paperwork filled. Precautions given. Anticipatory guidance given.     Robbie Bailon MD  August 14, 2024  4:32 PM

## 2024-08-23 NOTE — TELEPHONE ENCOUNTER
Received this paperwork a second time on 8/23 - faxed the original back to them with notation that it was already faxed on 8/14.

## 2024-08-30 DIAGNOSIS — C18.6 ADENOCARCINOMA OF DESCENDING COLON (H): Primary | ICD-10-CM

## 2024-09-10 ENCOUNTER — TELEPHONE (OUTPATIENT)
Dept: SURGERY | Facility: CLINIC | Age: 49
End: 2024-09-10
Payer: COMMERCIAL

## 2024-09-10 NOTE — TELEPHONE ENCOUNTER
Pt called in to report that since her upcoming appointment in December was r/s to February, she would like to discuss starting a different medication. She was prescribed Topamax in July but stopped taking it d/t new onset of headaches. She says she cannot take Semaglutide d/t hx of pancreatitis and colon CA but thought the doctor had mentioned a different medication that she could prescribe. I let her know that I will relay the message to  and she verbalized understanding.    Sindy Rodriguez RN, CBN  [facility]  P [phone]  F [phone]

## 2024-09-11 ENCOUNTER — OFFICE VISIT (OUTPATIENT)
Dept: FAMILY MEDICINE | Facility: CLINIC | Age: 49
End: 2024-09-11
Payer: COMMERCIAL

## 2024-09-11 VITALS
RESPIRATION RATE: 18 BRPM | OXYGEN SATURATION: 96 % | SYSTOLIC BLOOD PRESSURE: 120 MMHG | HEART RATE: 89 BPM | DIASTOLIC BLOOD PRESSURE: 83 MMHG

## 2024-09-11 DIAGNOSIS — E66.9 OBESITY (BMI 30-39.9): ICD-10-CM

## 2024-09-11 DIAGNOSIS — E66.9 OBESITY (BMI 30-39.9): Primary | ICD-10-CM

## 2024-09-11 DIAGNOSIS — J01.11 ACUTE RECURRENT FRONTAL SINUSITIS: ICD-10-CM

## 2024-09-11 DIAGNOSIS — Z98.890 STATUS POST SURGICAL MANIPULATION OF ANKLE JOINT: ICD-10-CM

## 2024-09-11 DIAGNOSIS — F43.21 GRIEF REACTION: ICD-10-CM

## 2024-09-11 DIAGNOSIS — M54.6 CHRONIC MIDLINE THORACIC BACK PAIN: ICD-10-CM

## 2024-09-11 DIAGNOSIS — F41.1 GENERALIZED ANXIETY DISORDER: Primary | ICD-10-CM

## 2024-09-11 DIAGNOSIS — G89.29 CHRONIC MIDLINE THORACIC BACK PAIN: ICD-10-CM

## 2024-09-11 PROCEDURE — 99214 OFFICE O/P EST MOD 30 MIN: CPT | Performed by: FAMILY MEDICINE

## 2024-09-11 RX ORDER — METFORMIN HCL 500 MG
TABLET, EXTENDED RELEASE 24 HR ORAL
Qty: 180 TABLET | Refills: 1 | Status: SHIPPED | OUTPATIENT
Start: 2024-09-11

## 2024-09-11 RX ORDER — OXYCODONE HYDROCHLORIDE 5 MG/1
5 TABLET ORAL EVERY 6 HOURS PRN
Qty: 80 TABLET | Refills: 0 | Status: SHIPPED | OUTPATIENT
Start: 2024-09-11

## 2024-09-11 ASSESSMENT — ASTHMA QUESTIONNAIRES
QUESTION_4 LAST FOUR WEEKS HOW OFTEN HAVE YOU USED YOUR RESCUE INHALER OR NEBULIZER MEDICATION (SUCH AS ALBUTEROL): ONE OR TWO TIMES PER DAY
QUESTION_1 LAST FOUR WEEKS HOW MUCH OF THE TIME DID YOUR ASTHMA KEEP YOU FROM GETTING AS MUCH DONE AT WORK, SCHOOL OR AT HOME: MOST OF THE TIME
ACT_TOTALSCORE: 12
QUESTION_3 LAST FOUR WEEKS HOW OFTEN DID YOUR ASTHMA SYMPTOMS (WHEEZING, COUGHING, SHORTNESS OF BREATH, CHEST TIGHTNESS OR PAIN) WAKE YOU UP AT NIGHT OR EARLIER THAN USUAL IN THE MORNING: TWO OR THREE NIGHTS A WEEK
QUESTION_2 LAST FOUR WEEKS HOW OFTEN HAVE YOU HAD SHORTNESS OF BREATH: THREE TO SIX TIMES A WEEK
QUESTION_5 LAST FOUR WEEKS HOW WOULD YOU RATE YOUR ASTHMA CONTROL: SOMEWHAT CONTROLLED
ACT_TOTALSCORE: 12

## 2024-09-11 NOTE — PROGRESS NOTES
ASSESSMENT/PLAN:    (F41.1) Generalized anxiety disorder  (primary encounter diagnosis)  Comment:   Plan: stable; encouraged her to setup therapy w/ her therapist asap; f/up in 4 wks    (F43.21) Grief reaction  Comment:   Plan: see above    (Z98.890) Status post surgical manipulation of ankle joint  Comment: stable; will continue wean; once she is requiring less medication, will transition to suboxone  Plan: oxyCODONE (ROXICODONE) 5 MG tablet          (M54.6,  G89.29) Chronic midline thoracic back pain  Comment: see above  Plan: oxyCODONE (ROXICODONE) 5 MG tablet         (E66.9) Obesity (BMI 30-39.9)  Comment:   Plan: did not tolerate topamax so switched to metformin; appreciate bariatric medicine mgmt    (J01.11) Acute recurrent frontal sinusitis  Comment:   Plan: will hold on Abx or steroids and monitor; precautions/ anticipatory guidance given    Robbie Bailon MD  September 11, 2024  6:15 PM        Pt is a 49 year old female last seen on 8/14/24 by Dr Edmonds and myself here today for:     1) URI symptoms - bad cold/ sinus infection; Sick x 3 wks; fatigued    2) Weight mgmt - was getting HA from topamax so stopped   Metformin sent in     3) Mood - irritable w/ kids but she feels like mood is ok   Good days and bad days  Had a friend her age pass away from a stroke  On medical leave  Missed a couple appointments due to illness -> likes her therapist     4) Pain - ok w/ continued wean    Per Dr Edmonds's note:  Generalized anxiety disorder  Grief reaction  Post traumatic stress disorder  Patient with history of MDD, ACE, panic disorder, PTSD. Reports feeling 'stable' today, but still having issues with anxiety. Has had multiple traumatic experiences, and has had multiple grievances in the past that she has had to cope through. Seeing psychology and had visit virtually yesterday, 8/13/24. Since last visit with PCP, had increased Zoloft to 75 mg, which patient reports she feels has been helping. No reported adverse effects  from the medication. Last PHQ from 7/24/24 was 6, GAD7 was 19 last done 8/1/24. Will have patient update these today. Recommended continued medication management with Zoloft 75mg as well as regularly psychology visit. Patient with regular follow up with PCP.      Encounter for medication refill  Status post surgical manipulation of ankle joint  Chronic midline thoracic back pain  Patient due for medication refill today. Utilizing Oxycodone for chronic back pain as well as ankle pain after surgical procedure. Goal is to continue to down titrate on these medications for pain. Recommend continued close follow up with PCP.   - oxyCODONE (ROXICODONE) 5 MG tablet; Take 1 tablet (5 mg) by mouth 3 times daily as needed for severe pain  - oxyCODONE (ROXICODONE) 5 MG tablet; Take 1 tablet (5 mg) by mouth every 6 hours as needed for pain     Encounter for completion of form with patient  Patient with forms to be filled out for work. Paperwork completed with precepting PCP today in clinic.     Past Medical History:   Diagnosis Date    Abdominal pain 06/29/2015    Abnormal cervical Papanicolaou smear 11/09/2014    Overview:  ACUS/HPV positive    Abnormal cytology finding 11/09/2014    Overview:  ACUS/HPV positive    Acute pericardial effusion 02/06/2017    Agoraphobia with panic attacks     Anxiety     Arthralgia of both lower legs 05/29/2020    Bilateral achy knee pain that is chronic in nature going on for years. Most likely osteoarthritis in knees related to obesity. Previously injected in both knees with good relief. 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  Has controlled substance agreement with them.  On Vicodin, Valium, Klonopin prescribed only from there.       Coronary artery disease     Cough 02/09/2020    Family history of colon cancer 10/24/2020    Hx of seasonal allergies     Infection due to 2019 novel coronavirus 09/20/2022    Infectious pericarditis     Lipoma     Low back pain 07/13/2015    Major depressive disorder, recurrent episode, moderate (H) 09/05/2006    Menorrhagia with irregular cycle 07/15/2022    Added automatically from request for surgery [id number]    Moderate persistent asthma without complication 09/29/2020    Nondependent alcohol abuse, episodic drinking behavior 10/03/2012    Noninflammatory disorder of vagina 02/20/2015    Other chronic pain     Other long term (current) drug therapy 01/28/2013    Overview:  Vicodin and cyclobenzaprine monthly    Panic disorder with agoraphobia 09/05/2006    Pap smear for cervical cancer screening 10/31/2016    03/29/2010  Normal cytology, HPV ot done, repeat in 3 years.    Pericarditis 2017    Physiologic disturbance of temperature regulation 10/24/2020    PONV (postoperative nausea and vomiting)     Right ankle swelling 06/07/2022    Added automatically from request for surgery [id number]    Tobacco abuse     Tobacco use disorder 07/13/2015      Past Surgical History:   Procedure Laterality Date    ANKLE SURGERY Right 05/30/2019    ARTHROSCOPY ANKLE Right 6/21/2023    Procedure: right ankle arthroscopy with debridement;  Surgeon: Kareem Bashir MD;  Location: [facility] OR    BIOPSY BREAST Right 1990    benign    COLONOSCOPY N/A 1/3/2023    Procedure: COLONOSCOPY WITH BIOPSY OF COLON MASS, POLYPECTOMY;  Surgeon: Kris Charles MD;  Location: [facility] OR    COLONOSCOPY N/A 3/12/2024    Procedure: COLONOSCOPY WITH POLYPECTOMY and EXCISION, LEFT, LESION, BACK;  " Surgeon: Kris Charles MD;  Location: [facility] OR    CREATION PERICARDIAL WINDOW  02/10/2017    [facility]    DILATION AND CURETTAGE N/A 7/22/2022    Procedure: DILATION AND CURETTAGE, UTERUS;  Surgeon: Lesly Holland;  Location: [facility] OR    HEMORRHOIDECTOMY EXTERNAL      HYSTEROSCOPY, WITH ENDOMETRIAL RADIOFREQUENCY ABLATION - NOVASURE N/A 7/22/2022    Procedure: HYSTEROSCOPY, WITH ENDOMETRIAL RADIOFREQUENCY ABLATION - NOVASURE DILATION AND CURETTAGE, UTERUS;  Surgeon: Lesly Holland;  Location: [facility] OR    LAPAROSCOPIC ASSISTED SIGMOID COLECTOMY N/A 1/6/2023    Procedure: LAPAROSCOPIC ASSISTED DESCENDING COLELCTOMY SPLENIC FLEXURE MOBILIZATION ;  Surgeon: Kris Charles MD;  Location: [facility] OR    LAPAROSCOPIC LYSIS ADHESIONS N/A 1/6/2023    Procedure: LYSIS OF ADHESIONS;  Surgeon: Kris Charles MD;  Location: [facility] OR    TUMOR REMOVAL      Has had 3. In the right breast and \"inside of rib cage.\"      Current Outpatient Medications   Medication Sig Dispense Refill    oxyCODONE (ROXICODONE) 5 MG tablet Take 1 tablet (5 mg) by mouth every 6 hours as needed for pain. 80 tablet 0    albuterol (PROAIR HFA/PROVENTIL HFA/VENTOLIN HFA) 108 (90 Base) MCG/ACT inhaler Inhale 2 puffs into the lungs every 6 hours as needed for shortness of breath, wheezing or cough 18 g 11    cetirizine (ZYRTEC) 10 MG tablet Take 1 tablet (10 mg) by mouth daily 30 tablet 11    fluticasone (FLONASE) 50 MCG/ACT nasal spray Spray 2 sprays into both nostrils daily 9.9 mL 11    hydrOXYzine carlos (VISTARIL) 25 MG capsule Take 1 capsule (25 mg) by mouth nightly as needed for anxiety 30 capsule 3    metFORMIN (GLUCOPHAGE XR) 500 MG 24 hr tablet 1 tablet with dinner daily for 2 weeks then 2 tablets with dinner 180 tablet 1    montelukast (SINGULAIR) 10 MG tablet Take 1 tablet (10 mg) by mouth At Bedtime 30 tablet 11    naloxone (NARCAN) 4 MG/0.1ML nasal spray Spray 1 spray (4 mg) " "into one nostril alternating nostrils as needed for opioid reversal every 2-3 minutes until assistance arrives (Patient not taking: Reported on 3/6/2024) 0.2 mL 1    PROAIR  (90 Base) MCG/ACT inhaler Inhale 2 puffs into the lungs every 4 hours as needed for shortness of breath or wheezing 8 g 11    sertraline (ZOLOFT) 50 MG tablet Take 1.5 tablets (75 mg) by mouth daily 135 tablet 3    spacer (OPTICHAMBER BINA) holding chamber For use w/ rescue inhaler 1 each 0    tiotropium (SPIRIVA RESPIMAT) 1.25 MCG/ACT inhaler Inhale 2 puffs into the lungs daily 12 g 3    tiotropium (SPIRIVA) 18 MCG inhaled capsule Inhale 18 mcg into the lungs daily        Allergies   Allergen Reactions    Gabapentin Other (See Comments)     Mental status is changed     Lidocaine Other (See Comments)     \"my jaw stopped moving\"  Other reaction(s): Dystonia    Penicillins Hives, Rash and Shortness Of Breath    Lidocaine-Epinephrine Other (See Comments) and Muscle Pain (Myalgia)     Severe jaw cramping, double vision  Jaw locking    Topamax [Topiramate] Headache        ROS:   Gen- no weight change, no fevers/chills   Head/ Eyes- no blurred vision, + headaches -> see HPI  ENT- see HPI   Cardiac - no palpitations, no chest pain   Respiratory - no shortness of breath , no wheezing   GI - no nausea, no vomiting, no diarrhea, no constipation   Remainder of ROS negative.     Exam:   /83   Pulse 89   Resp 18   SpO2 96%    Alert and oriented x 3; No acute distress   HEENT: oropharynx clear   Neck: no masses, no lymphadenopathy   Resp: clear to auscultation bilaterally, no wheezing/ronchi   Extrem: no clubbing/cyanosis/edema   MSK: R ankle swelling over lateral malleolus   Derm: no rashes       "

## 2024-09-26 ENCOUNTER — TELEPHONE (OUTPATIENT)
Dept: SURGERY | Facility: CLINIC | Age: 49
End: 2024-09-26

## 2024-09-26 NOTE — TELEPHONE ENCOUNTER
Patient needs to be rescheduled for their virtual visit due to Reason for Reschedule: Out-of-State    Pt requests call from clinic to reschedule after Tuesday 10/1/24 since that is when pt is back in [place]    Scheduling team, please refer to service line late cancellation/no-show policies and reach out to patient at a later date for rescheduling.    Appointment mode: Video  Provider: Violette Corbett

## 2024-09-28 ENCOUNTER — DOCUMENTATION ONLY (OUTPATIENT)
Dept: FAMILY MEDICINE | Facility: CLINIC | Age: 49
End: 2024-09-28
Payer: COMMERCIAL

## 2024-09-28 DIAGNOSIS — J45.40 MODERATE PERSISTENT ASTHMA WITHOUT COMPLICATION: ICD-10-CM

## 2024-09-28 DIAGNOSIS — J30.2 SEASONAL ALLERGIC RHINITIS, UNSPECIFIED TRIGGER: ICD-10-CM

## 2024-09-28 DIAGNOSIS — J45.51 SEVERE PERSISTENT ASTHMA WITH ACUTE EXACERBATION (H): ICD-10-CM

## 2024-09-28 RX ORDER — TIOTROPIUM BROMIDE INHALATION SPRAY 1.56 UG/1
2 SPRAY, METERED RESPIRATORY (INHALATION) DAILY
Qty: 1 G | Refills: 0 | Status: SHIPPED | OUTPATIENT
Start: 2024-09-28

## 2024-09-28 RX ORDER — ALBUTEROL SULFATE 90 UG/1
2 AEROSOL, METERED RESPIRATORY (INHALATION) EVERY 6 HOURS PRN
Qty: 18 G | Refills: 0 | Status: SHIPPED | OUTPATIENT
Start: 2024-09-28

## 2024-09-28 RX ORDER — TIOTROPIUM BROMIDE 18 UG/1
18 CAPSULE ORAL; RESPIRATORY (INHALATION) DAILY
Qty: 7 CAPSULE | Refills: 0 | Status: SHIPPED | OUTPATIENT
Start: 2024-09-28

## 2024-09-28 NOTE — CONFIDENTIAL NOTE
Received clinic page re: inhaler prescription. Per chart review, patient is currently prescribed albuterol q6h PRN inhaler and Spiriva 2 puffs daily.     Called patient and she requested both inhalers be sent to [place] where she is traveling. The heat in [place] is causing worsening symptoms and she did not bring her inhalers (tells me she afraid she was not allowed to bring them through [facility] due to liquid component).     I will attempt to refill both Spiriva and Albuterol inhaler today. Instructed the patient call back if she has trouble filling these. Also reviewed return precautions and signs/symptoms that should bring her into an emergency department where she is traveling. Patient agrees to seek evaluation if she has shortness of breath that cannot be relieved with inhaler or if her inhalers are never filled and she becomes symptomatic.     Pharmacy:   [facility]   [address] [place] [address]    Natasha Crain MD PGY-3  [facility]

## 2024-09-30 ENCOUNTER — TELEPHONE (OUTPATIENT)
Dept: FAMILY MEDICINE | Facility: CLINIC | Age: 49
End: 2024-09-30
Payer: COMMERCIAL

## 2024-09-30 DIAGNOSIS — J45.40 MODERATE PERSISTENT ASTHMA WITHOUT COMPLICATION: ICD-10-CM

## 2024-09-30 DIAGNOSIS — J45.51 SEVERE PERSISTENT ASTHMA WITH ACUTE EXACERBATION (H): ICD-10-CM

## 2024-09-30 RX ORDER — TIOTROPIUM BROMIDE 18 UG/1
18 CAPSULE ORAL; RESPIRATORY (INHALATION) DAILY
Qty: 7 CAPSULE | Refills: 0 | Status: CANCELLED | OUTPATIENT
Start: 2024-09-30

## 2024-09-30 RX ORDER — TIOTROPIUM BROMIDE INHALATION SPRAY 1.56 UG/1
2 SPRAY, METERED RESPIRATORY (INHALATION) DAILY
Qty: 1 G | Refills: 0 | Status: CANCELLED | OUTPATIENT
Start: 2024-09-30

## 2024-09-30 RX ORDER — ALBUTEROL SULFATE 90 UG/1
2 AEROSOL, METERED RESPIRATORY (INHALATION) EVERY 6 HOURS PRN
Qty: 18 G | Refills: 0 | Status: CANCELLED | OUTPATIENT
Start: 2024-09-30

## 2024-09-30 NOTE — TELEPHONE ENCOUNTER
Received a fax from [facility] stating that all medications prescribed by Dr. Crain are declined due to the patient being on the restricted recipient program. Also patient isn't able to  medication at the pharmacy in [place]. She can only use [facility] on [address]. You can re-prescribe the medication to her lock-in pharmacy, OR fill out form to have the new pharmacy added to her approved pharmacies.

## 2024-10-02 NOTE — PROGRESS NOTES
Copy found in Media tab and refaxed to [phone]  Answers submitted by the patient for this visit:  Patient Health Questionnaire (Submitted on 7/24/2024)  If you checked off any problems, how difficult have these problems made it for you to do your work, take care of things at home, or get along with other people?: Somewhat difficult  PHQ9 TOTAL SCORE: 6

## 2024-10-08 ENCOUNTER — HOSPITAL ENCOUNTER (OUTPATIENT)
Dept: CT IMAGING | Facility: HOSPITAL | Age: 49
Discharge: HOME | End: 2024-10-08
Attending: INTERNAL MEDICINE | Admitting: INTERNAL MEDICINE
Payer: COMMERCIAL

## 2024-10-08 DIAGNOSIS — C18.6 ADENOCARCINOMA OF DESCENDING COLON (H): ICD-10-CM

## 2024-10-08 PROCEDURE — 74177 CT ABD & PELVIS W/CONTRAST: CPT

## 2024-10-08 PROCEDURE — 999N000248 HC STATISTIC IV INSERT WITH US BY RN

## 2024-10-08 PROCEDURE — 250N000011 HC RX IP 250 OP 636: Performed by: INTERNAL MEDICINE

## 2024-10-08 RX ORDER — IOPAMIDOL 755 MG/ML
100 INJECTION, SOLUTION INTRAVASCULAR ONCE
Status: COMPLETED | OUTPATIENT
Start: 2024-10-08 | End: 2024-10-08

## 2024-10-08 RX ADMIN — IOPAMIDOL 100 ML: 755 INJECTION, SOLUTION INTRAVENOUS at 16:18

## 2024-10-08 NOTE — PROGRESS NOTES
"ASSESSMENT/PLAN:    (R15.9) Full incontinence of feces  (primary encounter diagnosis)  Comment: uro-gyn referral placed as she states that her insurance requires I place all these orders because she is restricted ?  Plan: Adult Urology  Referral          (R32) Urinary incontinence, unspecified type  Comment: see above  Plan: Adult Urology  Referral          (Z98.890) Status post surgical manipulation of ankle joint  Comment: will continue wean and refer to Dr Garcia for suboxone induction once she is ready  Plan: oxyCODONE (ROXICODONE) 5 MG tablet          (M54.6,  G89.29) Chronic midline thoracic back pain  Comment:   Plan: oxyCODONE (ROXICODONE) 5 MG tablet          (F13.20) Benzodiazepine dependence (H)  Comment:   Plan: continues to struggle w/ anxiety; encouraged her to schedule with her therapist; f/up in 3 wks    Robbie Bailon MD  October 9, 2024  4:08 PM        Pt is a 49 year old female last seen on 9/11/24 here today for:     1) Oncology - colon CA is stable but she just disclosed that she has been having incontinence of stool and urine for 8 months! Per Dr Valdez will check MRI pelvis and refer to uro-gyn; He does not feel this is related to her cancer at this time  She is embarrassed by this. \"My son makes fun of me and tells his friends \"my mom sh** her pants\"\" Occasional components of both stress and urge incontinence but also can happen w/o trigger or warning    From Dr Valdez note  from earlier today-   1.  Adenocarcinoma of the descending colon: She had a CT scan yesterday.  I personally reviewed the images and shared them with Karoline and interpreted them for her.  There is no evidence of recurrent colon cancer.  Generally the scans look normal.  Continue to follow her with imaging.  Will see her again in 6 months.  She is approaching 1 year from diagnosis.      2.  Fecal incontinence: Has been present now for about 8 months.  She is just mentioning it now.  Does not happen all the " "time she needs to have a bowel movement.  Can happen in middle of the night.  I am going to send her to the pelvic floor urogynecology clinic at the [facility] for an evaluation.  She has no signs or symptoms of other systemic disease.  Will also get a get an MRI of the pelvis to evaluate the anatomy.  I told her is not likely to be due to her cancer or previous surgery.    2)  Mood - has not scheduled to see her therapist yet   \"I needed a break\"  No SI    3) Ankle - worse since trip to [place] so not ready to jump to suboxone        Per my last note:  (F41.1) Generalized anxiety disorder  (primary encounter diagnosis)  Comment:   Plan: stable; encouraged her to setup therapy w/ her therapist asap; f/up in 4 wks     (F43.21) Grief reaction  Comment:   Plan: see above     (Z98.890) Status post surgical manipulation of ankle joint  Comment: stable; will continue wean; once she is requiring less medication, will transition to suboxone  Plan: oxyCODONE (ROXICODONE) 5 MG tablet          (M54.6,  G89.29) Chronic midline thoracic back pain  Comment: see above  Plan: oxyCODONE (ROXICODONE) 5 MG tablet         (E66.9) Obesity (BMI 30-39.9)  Comment:   Plan: did not tolerate topamax so switched to metformin; appreciate bariatric medicine mgmt     (J01.11) Acute recurrent frontal sinusitis  Comment:   Plan: will hold on Abx or steroids and monitor; precautions/ anticipatory guidance given    Past Medical History:   Diagnosis Date    Abdominal pain 06/29/2015    Abnormal cervical Papanicolaou smear 11/09/2014    Overview:  ACUS/HPV positive    Abnormal cytology finding 11/09/2014    Overview:  ACUS/HPV positive    Acute pericardial effusion 02/06/2017    Agoraphobia with panic attacks     Anxiety     Arthralgia of both lower legs 05/29/2020    Bilateral achy knee pain that is chronic in nature going on for years. Most likely osteoarthritis in knees related to obesity. Previously injected in both knees with good relief. 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  Has controlled substance agreement with them.  On Vicodin, Valium, Klonopin prescribed only from there.       Coronary artery disease     Cough 02/09/2020    Family history of colon cancer 10/24/2020    Hx of seasonal allergies     Infection due to 2019 novel coronavirus 09/20/2022    Infectious pericarditis     Lipoma     Low back pain 07/13/2015    Major depressive disorder, recurrent episode, moderate (H) 09/05/2006    Menorrhagia with irregular cycle 07/15/2022    Added automatically from request for surgery [id number]    Moderate persistent asthma without complication 09/29/2020    Nondependent alcohol abuse, episodic drinking behavior 10/03/2012    Noninflammatory disorder of vagina 02/20/2015    Other chronic pain     Other long term (current) drug therapy 01/28/2013    Overview:  Vicodin and cyclobenzaprine monthly    Panic disorder with agoraphobia 09/05/2006    Pap smear for cervical cancer screening 10/31/2016    03/29/2010  Normal cytology, HPV ot done, repeat in 3 years.    Pericarditis 2017    Physiologic disturbance of temperature regulation 10/24/2020    PONV (postoperative nausea and vomiting)     Right ankle swelling 06/07/2022    Added automatically from request for surgery [id number]    Tobacco abuse     Tobacco use disorder 07/13/2015      Past Surgical History:   Procedure Laterality Date    ANKLE SURGERY Right 05/30/2019    ARTHROSCOPY ANKLE Right 6/21/2023    Procedure:  "right ankle arthroscopy with debridement;  Surgeon: Kareem Bashir MD;  Location: [facility] OR    BIOPSY BREAST Right 1990    benign    COLONOSCOPY N/A 1/3/2023    Procedure: COLONOSCOPY WITH BIOPSY OF COLON MASS, POLYPECTOMY;  Surgeon: Kris Charles MD;  Location: [facility] OR    COLONOSCOPY N/A 3/12/2024    Procedure: COLONOSCOPY WITH POLYPECTOMY and EXCISION, LEFT, LESION, BACK;  Surgeon: Kris Charles MD;  Location: [facility] OR    CREATION PERICARDIAL WINDOW  02/10/2017    [facility]    DILATION AND CURETTAGE N/A 7/22/2022    Procedure: DILATION AND CURETTAGE, UTERUS;  Surgeon: Lesly Holland;  Location: [facility] OR    HEMORRHOIDECTOMY EXTERNAL      HYSTEROSCOPY, WITH ENDOMETRIAL RADIOFREQUENCY ABLATION - NOVASURE N/A 7/22/2022    Procedure: HYSTEROSCOPY, WITH ENDOMETRIAL RADIOFREQUENCY ABLATION - NOVASURE DILATION AND CURETTAGE, UTERUS;  Surgeon: Lesly Holland;  Location: [facility] OR    LAPAROSCOPIC ASSISTED SIGMOID COLECTOMY N/A 1/6/2023    Procedure: LAPAROSCOPIC ASSISTED DESCENDING COLELCTOMY SPLENIC FLEXURE MOBILIZATION ;  Surgeon: Kris Charles MD;  Location: [facility] OR    LAPAROSCOPIC LYSIS ADHESIONS N/A 1/6/2023    Procedure: LYSIS OF ADHESIONS;  Surgeon: Kris Charles MD;  Location: [facility] OR    TUMOR REMOVAL      Has had 3. In the right breast and \"inside of rib cage.\"      Current Outpatient Medications   Medication Sig Dispense Refill    oxyCODONE (ROXICODONE) 5 MG tablet Take 1 tablet (5 mg) by mouth every 6 hours as needed for pain. 80 tablet 0    albuterol (PROAIR HFA/PROVENTIL HFA/VENTOLIN HFA) 108 (90 Base) MCG/ACT inhaler Inhale 2 puffs into the lungs every 6 hours as needed for shortness of breath, wheezing or cough. 18 g 0    ALPRAZolam (XANAX) 2 MG tablet Take 2 mg by mouth as needed.      cetirizine (ZYRTEC) 10 MG tablet Take 1 tablet (10 mg) by mouth daily 30 tablet 11    fluticasone (FLONASE) 50 " "MCG/ACT nasal spray Spray 2 sprays into both nostrils daily 9.9 mL 11    hydrOXYzine carlos (VISTARIL) 25 MG capsule Take 1 capsule (25 mg) by mouth nightly as needed for anxiety 30 capsule 3    metFORMIN (GLUCOPHAGE XR) 500 MG 24 hr tablet 1 tablet with dinner daily for 2 weeks then 2 tablets with dinner 180 tablet 1    montelukast (SINGULAIR) 10 MG tablet Take 1 tablet (10 mg) by mouth At Bedtime 30 tablet 11    naloxone (NARCAN) 4 MG/0.1ML nasal spray Spray 1 spray (4 mg) into one nostril alternating nostrils as needed for opioid reversal every 2-3 minutes until assistance arrives (Patient not taking: Reported on 3/6/2024) 0.2 mL 1    PROAIR  (90 Base) MCG/ACT inhaler Inhale 2 puffs into the lungs every 4 hours as needed for shortness of breath or wheezing 8 g 11    sertraline (ZOLOFT) 50 MG tablet Take 1.5 tablets (75 mg) by mouth daily 135 tablet 3    spacer (OPTICHAMBER BINA) holding chamber For use w/ rescue inhaler 1 each 0    tiotropium (SPIRIVA RESPIMAT) 1.25 MCG/ACT inhaler Inhale 2 puffs into the lungs daily. 1 g 0    tiotropium (SPIRIVA) 18 MCG inhaled capsule Inhale 1 capsule (18 mcg) into the lungs daily. 7 capsule 0      Allergies   Allergen Reactions    Gabapentin Other (See Comments)     Mental status is changed     Lidocaine Other (See Comments)     \"my jaw stopped moving\"  Other reaction(s): Dystonia    Penicillins Hives, Rash and Shortness Of Breath    Lidocaine-Epinephrine Other (See Comments) and Muscle Pain (Myalgia)     Severe jaw cramping, double vision  Jaw locking    Topamax [Topiramate] Headache        ROS:   Gen- no weight change, no fevers/chills   Cardiac - no palpitations, no chest pain   Respiratory - no shortness of breath , no wheezing   GI - no nausea, no vomiting, no diarrhea, no constipation; see HPI   Urinary - no dysuria, no polyuria; see HPI  Remainder of ROS negative.     Exam:   BP (!) 135/92   Pulse 78   Temp 98  F (36.7  C)   Resp 18   SpO2 100%    Alert and " oriented x 3; No acute distress

## 2024-10-09 ENCOUNTER — LAB (OUTPATIENT)
Dept: INFUSION THERAPY | Facility: HOSPITAL | Age: 49
End: 2024-10-09
Attending: INTERNAL MEDICINE
Payer: COMMERCIAL

## 2024-10-09 ENCOUNTER — ONCOLOGY VISIT (OUTPATIENT)
Dept: ONCOLOGY | Facility: HOSPITAL | Age: 49
End: 2024-10-09
Attending: INTERNAL MEDICINE
Payer: COMMERCIAL

## 2024-10-09 ENCOUNTER — OFFICE VISIT (OUTPATIENT)
Dept: FAMILY MEDICINE | Facility: CLINIC | Age: 49
End: 2024-10-09
Payer: COMMERCIAL

## 2024-10-09 VITALS
SYSTOLIC BLOOD PRESSURE: 135 MMHG | HEART RATE: 78 BPM | OXYGEN SATURATION: 100 % | DIASTOLIC BLOOD PRESSURE: 92 MMHG | TEMPERATURE: 98 F | RESPIRATION RATE: 18 BRPM

## 2024-10-09 VITALS
WEIGHT: 254.7 LBS | SYSTOLIC BLOOD PRESSURE: 142 MMHG | TEMPERATURE: 97.3 F | HEART RATE: 87 BPM | HEIGHT: 69 IN | DIASTOLIC BLOOD PRESSURE: 92 MMHG | RESPIRATION RATE: 20 BRPM | BODY MASS INDEX: 37.72 KG/M2 | OXYGEN SATURATION: 100 %

## 2024-10-09 DIAGNOSIS — R15.9 FULL INCONTINENCE OF FECES: ICD-10-CM

## 2024-10-09 DIAGNOSIS — C18.6 ADENOCARCINOMA OF DESCENDING COLON (H): Primary | ICD-10-CM

## 2024-10-09 DIAGNOSIS — G89.29 CHRONIC MIDLINE THORACIC BACK PAIN: ICD-10-CM

## 2024-10-09 DIAGNOSIS — M54.6 CHRONIC MIDLINE THORACIC BACK PAIN: ICD-10-CM

## 2024-10-09 DIAGNOSIS — Z98.890 STATUS POST SURGICAL MANIPULATION OF ANKLE JOINT: ICD-10-CM

## 2024-10-09 DIAGNOSIS — R15.9 FULL INCONTINENCE OF FECES: Primary | ICD-10-CM

## 2024-10-09 DIAGNOSIS — R32 URINARY INCONTINENCE, UNSPECIFIED TYPE: ICD-10-CM

## 2024-10-09 DIAGNOSIS — F13.20 BENZODIAZEPINE DEPENDENCE (H): ICD-10-CM

## 2024-10-09 LAB
ALBUMIN SERPL BCG-MCNC: 3.9 G/DL (ref 3.5–5.2)
ALP SERPL-CCNC: 94 U/L (ref 40–150)
ALT SERPL W P-5'-P-CCNC: 10 U/L (ref 0–50)
ANION GAP SERPL CALCULATED.3IONS-SCNC: 11 MMOL/L (ref 7–15)
AST SERPL W P-5'-P-CCNC: 13 U/L (ref 0–45)
BASOPHILS # BLD AUTO: 0 10E3/UL (ref 0–0.2)
BASOPHILS NFR BLD AUTO: 1 %
BILIRUB SERPL-MCNC: 0.3 MG/DL
BUN SERPL-MCNC: 13.4 MG/DL (ref 6–20)
CALCIUM SERPL-MCNC: 8.9 MG/DL (ref 8.8–10.4)
CEA SERPL-MCNC: 1.5 NG/ML
CHLORIDE SERPL-SCNC: 108 MMOL/L (ref 98–107)
CREAT SERPL-MCNC: 0.78 MG/DL (ref 0.51–0.95)
EGFRCR SERPLBLD CKD-EPI 2021: >90 ML/MIN/1.73M2
EOSINOPHIL # BLD AUTO: 0.6 10E3/UL (ref 0–0.7)
EOSINOPHIL NFR BLD AUTO: 10 %
ERYTHROCYTE [DISTWIDTH] IN BLOOD BY AUTOMATED COUNT: 13 % (ref 10–15)
GLUCOSE SERPL-MCNC: 120 MG/DL (ref 70–99)
HCO3 SERPL-SCNC: 24 MMOL/L (ref 22–29)
HCT VFR BLD AUTO: 38 % (ref 35–47)
HGB BLD-MCNC: 12.7 G/DL (ref 11.7–15.7)
IMM GRANULOCYTES # BLD: 0 10E3/UL
IMM GRANULOCYTES NFR BLD: 0 %
LYMPHOCYTES # BLD AUTO: 1.8 10E3/UL (ref 0.8–5.3)
LYMPHOCYTES NFR BLD AUTO: 30 %
MCH RBC QN AUTO: 29.5 PG (ref 26.5–33)
MCHC RBC AUTO-ENTMCNC: 33.4 G/DL (ref 31.5–36.5)
MCV RBC AUTO: 88 FL (ref 78–100)
MONOCYTES # BLD AUTO: 0.4 10E3/UL (ref 0–1.3)
MONOCYTES NFR BLD AUTO: 6 %
NEUTROPHILS # BLD AUTO: 3.2 10E3/UL (ref 1.6–8.3)
NEUTROPHILS NFR BLD AUTO: 54 %
NRBC # BLD AUTO: 0 10E3/UL
NRBC BLD AUTO-RTO: 0 /100
PLATELET # BLD AUTO: 270 10E3/UL (ref 150–450)
POTASSIUM SERPL-SCNC: 3.6 MMOL/L (ref 3.4–5.3)
PROT SERPL-MCNC: 7.4 G/DL (ref 6.4–8.3)
RBC # BLD AUTO: 4.31 10E6/UL (ref 3.8–5.2)
SODIUM SERPL-SCNC: 143 MMOL/L (ref 135–145)
WBC # BLD AUTO: 5.9 10E3/UL (ref 4–11)

## 2024-10-09 PROCEDURE — G2211 COMPLEX E/M VISIT ADD ON: HCPCS | Performed by: INTERNAL MEDICINE

## 2024-10-09 PROCEDURE — 85025 COMPLETE CBC W/AUTO DIFF WBC: CPT | Performed by: INTERNAL MEDICINE

## 2024-10-09 PROCEDURE — 99215 OFFICE O/P EST HI 40 MIN: CPT | Performed by: INTERNAL MEDICINE

## 2024-10-09 PROCEDURE — 99214 OFFICE O/P EST MOD 30 MIN: CPT | Performed by: FAMILY MEDICINE

## 2024-10-09 PROCEDURE — 36415 COLL VENOUS BLD VENIPUNCTURE: CPT | Performed by: INTERNAL MEDICINE

## 2024-10-09 PROCEDURE — G0463 HOSPITAL OUTPT CLINIC VISIT: HCPCS | Performed by: INTERNAL MEDICINE

## 2024-10-09 PROCEDURE — 80053 COMPREHEN METABOLIC PANEL: CPT | Performed by: INTERNAL MEDICINE

## 2024-10-09 PROCEDURE — 82378 CARCINOEMBRYONIC ANTIGEN: CPT | Performed by: INTERNAL MEDICINE

## 2024-10-09 RX ORDER — ALPRAZOLAM 2 MG/1
2 TABLET ORAL PRN
COMMUNITY
Start: 2024-10-08 | End: 2024-10-30

## 2024-10-09 RX ORDER — OXYCODONE HYDROCHLORIDE 5 MG/1
5 TABLET ORAL EVERY 6 HOURS PRN
Qty: 80 TABLET | Refills: 0 | Status: SHIPPED | OUTPATIENT
Start: 2024-10-09 | End: 2024-10-30

## 2024-10-09 ASSESSMENT — PAIN SCALES - GENERAL: PAINLEVEL: MILD PAIN (3)

## 2024-10-09 NOTE — PROGRESS NOTES
[facility] Hematology and Oncology Progress Note    Patient: Darlene Hudson  MRN: [id number]  Date of Service: Oct 9, 2024        Assessment and Plan:     Cancer Staging   Adenocarcinoma of descending colon (H)  Staging form: Colon and Rectum, AJCC 8th Edition  - Pathologic stage from 2/7/2023: Stage I (pT2, pN0, cM0) - Signed by Arnaud Valdez MD on 2/7/2023    1.  Adenocarcinoma of the descending colon: She had a CT scan yesterday.  I personally reviewed the images and shared them with Karoline and interpreted them for her.  There is no evidence of recurrent colon cancer.  Generally the scans look normal.  Continue to follow her with imaging.  Will see her again in 6 months.  She is approaching 1 year from diagnosis.      2.  Fecal incontinence: Has been present now for about 8 months.  She is just mentioning it now.  Does not happen all the time she needs to have a bowel movement.  Can happen in middle of the night.  I am going to send her to the pelvic floor urogynecology clinic at the [facility] for an evaluation.  She has no signs or symptoms of other systemic disease.  Will also get a get an MRI of the pelvis to evaluate the anatomy.  I told her is not likely to be due to her cancer or previous surgery.    Medical decision Making:  I spent 46 minutes in the care of this patient today, which included time necessary for preparation for the visit, face to face time with the patient, communication of recommendations to the care team, and documentation time.  Today's visit was centered around a cancer diagnosis in the setting of additional medical comorbidities. Complex medical     ECOG Performance  0    Diagnosis:    1.  Adenocarcinoma of the descending colon: Diagnosed January 2023. Surgical pathology revealed an invasive moderately differentiated adenocarcinoma. 47 x 43.14 mm tumor invading into, but not through, the muscularis propria. Margins negative.  48 nodes taken, all negative.   No perforation,  lymphovascular invasion, or perineural invasion noted on pathology. pMMR.     Treatment:    Transverse and descending segmental colectomy January 6, 2023.   No adjuvant therapy was given.     Interim History:    Karoline returns for follow-up visit.  She was last seen in clinic about 7 months ago.  She notes today that she has been having episodes of fecal incontinence.  This has been going on since February.  Happens intermittently.  Often she has no urge to defecate before it happens.  It does happen in the middle of the night.  Nothing seems to be associated with it in terms of activity, food, etc.  Stools both formed and liquid.  No abdominal pain.  No other acute complaints.  She has had 5 vaginal deliveries.    Review of Systems:    As above in the history.     Review of Systems otherwise Negative for:  General: chills, fever or night sweats  Psychological: depression  Ophthalmic: blurry vision, double vision or loss of vision, vision change  ENT: epistaxis, oral lesions, hearing changes  Hematological and Lymphatic: bleeding, bruising, jaundice, swollen lymph nodes  Endocrine: hot flashes, unexpected weight changes  Respiratory: cough, hemoptysis, orthopnea or shortness of breath/ROPER  Cardiovascular: chest pain, edema, palpitations or PND  Gastrointestinal: abdominal pain, blood in stools, constipation, diarrhea or nausea/vomiting  Genito-Urinary: change in urinary stream, incontinence, frequency/urgency  Musculoskeletal: joint pain, stiffness, swelling, muscle pain  Neurological: dizziness, headaches, numbness/tingling  Dermatological: lumps and rash    Past History:    Past Medical History:   Diagnosis Date    Abdominal pain 06/29/2015    Abnormal cervical Papanicolaou smear 11/09/2014    Overview:  ACUS/HPV positive    Abnormal cytology finding 11/09/2014    Overview:  ACUS/HPV positive    Acute pericardial effusion 02/06/2017    Agoraphobia with panic attacks     Anxiety     Arthralgia of both lower legs  05/29/2020    Bilateral achy knee pain that is chronic in nature going on for years. Most likely osteoarthritis in knees related to obesity. Previously injected in both knees with good relief. 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  Has controlled substance agreement with them.  On Vicodin, Valium, Klonopin prescribed only from there.       Coronary artery disease     Cough 02/09/2020    Family history of colon cancer 10/24/2020    Hx of seasonal allergies     Infection due to 2019 novel coronavirus 09/20/2022    Infectious pericarditis     Lipoma     Low back pain 07/13/2015    Major depressive disorder, recurrent episode, moderate (H) 09/05/2006    Menorrhagia with irregular cycle 07/15/2022    Added automatically from request for surgery [id number]    Moderate persistent asthma without complication 09/29/2020    Nondependent alcohol abuse, episodic drinking behavior 10/03/2012    Noninflammatory disorder of vagina 02/20/2015    Other chronic pain     Other long term (current) drug therapy 01/28/2013    Overview:  Vicodin and cyclobenzaprine monthly    Panic disorder with agoraphobia 09/05/2006    Pap smear for cervical cancer screening 10/31/2016    03/29/2010  Normal cytology, HPV ot done, repeat in 3 years.    Pericarditis 2017    Physiologic disturbance of temperature regulation 10/24/2020    PONV (postoperative nausea and vomiting)     Right ankle swelling 06/07/2022    Added automatically from request for surgery  "[id number]    Tobacco abuse     Tobacco use disorder 07/13/2015     Physical Exam:    BP (!) 142/92 (BP Location: Left arm, Patient Position: Sitting, Cuff Size: Adult Large)   Pulse 87   Temp 97.3  F (36.3  C) (Tympanic)   Resp 20   Ht 1.753 m (5' 9\")   Wt 115.5 kg (254 lb 11.2 oz)   LMP  (LMP Unknown)   SpO2 100%   BMI 37.61 kg/m      General: patient appears stated age of 48 year old. Nontoxic and in no distress.   HEENT: Head: atraumatic, normocephalic. Sclerae anicteric.  Chest:  Normal respiratory effort  Cardiac:  No edema.   Abdomen: abdomen is non-distended  Extremities: normal tone and muscle bulk.  Skin: no lesions or rash on visible skin. Warm and dry.   CNS: alert and oriented. Grossly non-focal.   Psychiatric: normal mood and affect.     Lab Results:    Recent Results (from the past 168 hour(s))   Comprehensive metabolic panel   Result Value Ref Range    Sodium 143 135 - 145 mmol/L    Potassium 3.6 3.4 - 5.3 mmol/L    Carbon Dioxide (CO2) 24 22 - 29 mmol/L    Anion Gap 11 7 - 15 mmol/L    Urea Nitrogen 13.4 6.0 - 20.0 mg/dL    Creatinine 0.78 0.51 - 0.95 mg/dL    GFR Estimate >90 >60 mL/min/1.73m2    Calcium 8.9 8.8 - 10.4 mg/dL    Chloride 108 (H) 98 - 107 mmol/L    Glucose 120 (H) 70 - 99 mg/dL    Alkaline Phosphatase 94 40 - 150 U/L    AST 13 0 - 45 U/L    ALT 10 0 - 50 U/L    Protein Total 7.4 6.4 - 8.3 g/dL    Albumin 3.9 3.5 - 5.2 g/dL    Bilirubin Total 0.3 <=1.2 mg/dL   CBC with platelets and differential   Result Value Ref Range    WBC Count 5.9 4.0 - 11.0 10e3/uL    RBC Count 4.31 3.80 - 5.20 10e6/uL    Hemoglobin 12.7 11.7 - 15.7 g/dL    Hematocrit 38.0 35.0 - 47.0 %    MCV 88 78 - 100 fL    MCH 29.5 26.5 - 33.0 pg    MCHC 33.4 31.5 - 36.5 g/dL    RDW 13.0 10.0 - 15.0 %    Platelet Count 270 150 - 450 10e3/uL    % Neutrophils 54 %    % Lymphocytes 30 %    % Monocytes 6 %    % Eosinophils 10 %    % Basophils 1 %    % Immature Granulocytes 0 %    NRBCs per 100 WBC 0 <1 /100    Absolute " Neutrophils 3.2 1.6 - 8.3 10e3/uL    Absolute Lymphocytes 1.8 0.8 - 5.3 10e3/uL    Absolute Monocytes 0.4 0.0 - 1.3 10e3/uL    Absolute Eosinophils 0.6 0.0 - 0.7 10e3/uL    Absolute Basophils 0.0 0.0 - 0.2 10e3/uL    Absolute Immature Granulocytes 0.0 <=0.4 10e3/uL    Absolute NRBCs 0.0 10e3/uL     Imaging:    CT Chest/Abdomen/Pelvis w Contrast    Result Date: 10/9/2024  EXAM: CT CHEST/ABDOMEN/PELVIS W CONTRAST LOCATION: [facility] DATE: 10/8/2024 INDICATION: Stage I adenocarcinoma the descending colon diagnosed January 2023. Posttreatment surveillance. COMPARISON: CT chest 3/4/2024, CT CAP 12/4/2023 and older studies going back to December 2022 TECHNIQUE: CT scan of the chest, abdomen, and pelvis was performed following injection of IV contrast. Multiplanar reformats were obtained. Dose reduction techniques were used. CONTRAST: 100 MLS of Isovue 300 FINDINGS: LUNGS AND PLEURA: No suspicious pulmonary nodules or effusions. Upper lobe emphysema. MEDIASTINUM/AXILLAE: No suspicious adenopathy. Few, right pericardiophrenic nodes are unchanged (axial images 104-107). Largest measures 1.2 x 0.7 cm. There is a very small hiatal hernia. No central pulmonary emboli. Aorta and branches are normal. CORONARY ARTERY CALCIFICATION: None. HEPATOBILIARY: Normal. PANCREAS: Normal. SPLEEN: Normal. ADRENAL GLANDS: Normal. KIDNEYS/BLADDER: Small left renal cyst noted which needs no follow-up. BOWEL: Descending colon staple line. No ascites or peritoneal tumor nodules. Bowel is of normal caliber. Lipomatous ileocecal valve. Appendix is normal. LYMPH NODES: Normal. VASCULATURE: Normal. PELVIC ORGANS: Normal. MUSCULOSKELETAL: No suspicious lesions.     IMPRESSION: 1.  No evidence of recurrent tumor in the chest, abdomen or pelvis. 2.  Emphysema. 3.  Small hiatal hernia. 4.  Other noncritical findings as noted above.       Signed by: Arnaud Valdez MD

## 2024-10-09 NOTE — PROGRESS NOTES
"Oncology Rooming Note    October 9, 2024 9:47 AM   Darlene Hudson is a 49 year old female who presents for:    Chief Complaint   Patient presents with    Oncology Clinic Visit     Return visit with lab. CT yesterday. Adenocarcinoma of descending colon.     Initial Vitals: BP (!) 142/92 (BP Location: Left arm, Patient Position: Sitting, Cuff Size: Adult Large)   Pulse 87   Temp 97.3  F (36.3  C) (Tympanic)   Resp 20   Ht 1.753 m (5' 9\")   Wt 115.5 kg (254 lb 11.2 oz)   LMP  (LMP Unknown)   SpO2 100%   BMI 37.61 kg/m   Estimated body mass index is 37.61 kg/m  as calculated from the following:    Height as of this encounter: 1.753 m (5' 9\").    Weight as of this encounter: 115.5 kg (254 lb 11.2 oz). Body surface area is 2.37 meters squared.  Mild Pain (3) Comment: Data Unavailable   No LMP recorded (lmp unknown). Patient has had an ablation.  Allergies reviewed: Yes  Medications reviewed: Yes    Medications: Medication refills not needed today.  Pharmacy name entered into [employer]:    [id number] [place] [id number] AT [place], [place] - [address] [place] [address] [phone]  [id number] - [place], [place] - [address]  [facility] AT [facility]    Frailty Screening:   Is the patient here for a new oncology consult visit in cancer care? 2. No      Clinical concerns:       ILANA GRANADOS CMA            "

## 2024-10-09 NOTE — LETTER
"10/9/2024      Darlene Hudson  [address]  [facility] [address]      Dear Colleague,    Thank you for referring your patient, Darlene Hudson, to the [facility]. Please see a copy of my visit note below.    Oncology Rooming Note    October 9, 2024 9:47 AM   Darlene Hudson is a 49 year old female who presents for:    Chief Complaint   Patient presents with     Oncology Clinic Visit     Return visit with lab. CT yesterday. Adenocarcinoma of descending colon.     Initial Vitals: BP (!) 142/92 (BP Location: Left arm, Patient Position: Sitting, Cuff Size: Adult Large)   Pulse 87   Temp 97.3  F (36.3  C) (Tympanic)   Resp 20   Ht 1.753 m (5' 9\")   Wt 115.5 kg (254 lb 11.2 oz)   LMP  (LMP Unknown)   SpO2 100%   BMI 37.61 kg/m   Estimated body mass index is 37.61 kg/m  as calculated from the following:    Height as of this encounter: 1.753 m (5' 9\").    Weight as of this encounter: 115.5 kg (254 lb 11.2 oz). Body surface area is 2.37 meters squared.  Mild Pain (3) Comment: Data Unavailable   No LMP recorded (lmp unknown). Patient has had an ablation.  Allergies reviewed: Yes  Medications reviewed: Yes    Medications: Medication refills not needed today.  Pharmacy name entered into EPIC:    [id number] [place] [id number] AT [facility] [place], [place] - [address] [place] [address] [phone]  [id number] [place], [place] - [address]  [address] AT [facility]    Frailty Screening:   Is the patient here for a new oncology consult visit in cancer care? 2. No      Clinical concerns:       ILANA GRANADOS CMA              [facility] Progress Note    Patient: Darlene Hudson  MRN: [id number]  Date of Service: Oct 9, 2024        Assessment and Plan:     Cancer Staging   Adenocarcinoma of descending colon (H)  Staging form: Colon and Rectum, AJCC 8th Edition  - " Pathologic stage from 2/7/2023: Stage I (pT2, pN0, cM0) - Signed by Arnaud Valdez MD on 2/7/2023    1.  Adenocarcinoma of the descending colon: She had a CT scan yesterday.  I personally reviewed the images and shared them with Karoline and interpreted them for her.  There is no evidence of recurrent colon cancer.  Generally the scans look normal.  Continue to follow her with imaging.  Will see her again in 6 months.  She is approaching 1 year from diagnosis.      2.  Fecal incontinence: Has been present now for about 8 months.  She is just mentioning it now.  Does not happen all the time she needs to have a bowel movement.  Can happen in middle of the night.  I am going to send her to the pelvic floor urogynecology clinic at the [facility] for an evaluation.  She has no signs or symptoms of other systemic disease.  Will also get a get an MRI of the pelvis to evaluate the anatomy.  I told her is not likely to be due to her cancer or previous surgery.    Medical decision Making:  I spent 46 minutes in the care of this patient today, which included time necessary for preparation for the visit, face to face time with the patient, communication of recommendations to the care team, and documentation time.  Today's visit was centered around a cancer diagnosis in the setting of additional medical comorbidities. Complex medical     ECOG Performance  0    Diagnosis:    1.  Adenocarcinoma of the descending colon: Diagnosed January 2023. Surgical pathology revealed an invasive moderately differentiated adenocarcinoma. 47 x 43.14 mm tumor invading into, but not through, the muscularis propria. Margins negative.  48 nodes taken, all negative.   No perforation, lymphovascular invasion, or perineural invasion noted on pathology. pMMR.     Treatment:    Transverse and descending segmental colectomy January 6, 2023.   No adjuvant therapy was given.     Interim History:    Karoline returns for follow-up visit.  She was last seen in clinic  about 7 months ago.  She notes today that she has been having episodes of fecal incontinence.  This has been going on since February.  Happens intermittently.  Often she has no urge to defecate before it happens.  It does happen in the middle of the night.  Nothing seems to be associated with it in terms of activity, food, etc.  Stools both formed and liquid.  No abdominal pain.  No other acute complaints.  She has had 5 vaginal deliveries.    Review of Systems:    As above in the history.     Review of Systems otherwise Negative for:  General: chills, fever or night sweats  Psychological: depression  Ophthalmic: blurry vision, double vision or loss of vision, vision change  ENT: epistaxis, oral lesions, hearing changes  Hematological and Lymphatic: bleeding, bruising, jaundice, swollen lymph nodes  Endocrine: hot flashes, unexpected weight changes  Respiratory: cough, hemoptysis, orthopnea or shortness of breath/ROPER  Cardiovascular: chest pain, edema, palpitations or PND  Gastrointestinal: abdominal pain, blood in stools, constipation, diarrhea or nausea/vomiting  Genito-Urinary: change in urinary stream, incontinence, frequency/urgency  Musculoskeletal: joint pain, stiffness, swelling, muscle pain  Neurological: dizziness, headaches, numbness/tingling  Dermatological: lumps and rash    Past History:    Past Medical History:   Diagnosis Date     Abdominal pain 06/29/2015     Abnormal cervical Papanicolaou smear 11/09/2014    Overview:  ACUS/HPV positive     Abnormal cytology finding 11/09/2014    Overview:  ACUS/HPV positive     Acute pericardial effusion 02/06/2017     Agoraphobia with panic attacks      Anxiety      Arthralgia of both lower legs 05/29/2020    Bilateral achy knee pain that is chronic in nature going on for years. Most likely osteoarthritis in knees related to obesity. Previously injected in both knees with good relief. 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  Has controlled substance agreement with them.  On Vicodin, Valium, Klonopin prescribed only from there.        Coronary artery disease      Cough 02/09/2020     Family history of colon cancer 10/24/2020     Hx of seasonal allergies      Infection due to 2019 novel coronavirus 09/20/2022     Infectious pericarditis      Lipoma      Low back pain 07/13/2015     Major depressive disorder, recurrent episode, moderate (H) 09/05/2006     Menorrhagia with irregular cycle 07/15/2022    Added automatically from request for surgery [id number]     Moderate persistent asthma without complication 09/29/2020     Nondependent alcohol abuse, episodic drinking behavior 10/03/2012     Noninflammatory disorder of vagina 02/20/2015     Other chronic pain      Other long term (current) drug therapy 01/28/2013    Overview:  Vicodin and cyclobenzaprine monthly     Panic disorder with agoraphobia 09/05/2006     Pap smear for cervical cancer screening 10/31/2016    03/29/2010  Normal cytology, HPV ot done, repeat in 3 years.     Pericarditis 2017     Physiologic disturbance of temperature regulation 10/24/2020     PONV (postoperative nausea and vomiting)      Right ankle swelling 06/07/2022    Added automatically from request for surgery [id number]     Tobacco abuse      Tobacco use disorder 07/13/2015     Physical Exam:    BP (!) 142/92 (BP Location: Left arm, Patient Position: Sitting, Cuff Size: Adult Large)   Pulse 87   Temp 97.3  F (36.3  C) (Tympanic)   Resp 20   " Ht 1.753 m (5' 9\")   Wt 115.5 kg (254 lb 11.2 oz)   LMP  (LMP Unknown)   SpO2 100%   BMI 37.61 kg/m      General: patient appears stated age of 48 year old. Nontoxic and in no distress.   HEENT: Head: atraumatic, normocephalic. Sclerae anicteric.  Chest:  Normal respiratory effort  Cardiac:  No edema.   Abdomen: abdomen is non-distended  Extremities: normal tone and muscle bulk.  Skin: no lesions or rash on visible skin. Warm and dry.   CNS: alert and oriented. Grossly non-focal.   Psychiatric: normal mood and affect.     Lab Results:    Recent Results (from the past 168 hour(s))   Comprehensive metabolic panel   Result Value Ref Range    Sodium 143 135 - 145 mmol/L    Potassium 3.6 3.4 - 5.3 mmol/L    Carbon Dioxide (CO2) 24 22 - 29 mmol/L    Anion Gap 11 7 - 15 mmol/L    Urea Nitrogen 13.4 6.0 - 20.0 mg/dL    Creatinine 0.78 0.51 - 0.95 mg/dL    GFR Estimate >90 >60 mL/min/1.73m2    Calcium 8.9 8.8 - 10.4 mg/dL    Chloride 108 (H) 98 - 107 mmol/L    Glucose 120 (H) 70 - 99 mg/dL    Alkaline Phosphatase 94 40 - 150 U/L    AST 13 0 - 45 U/L    ALT 10 0 - 50 U/L    Protein Total 7.4 6.4 - 8.3 g/dL    Albumin 3.9 3.5 - 5.2 g/dL    Bilirubin Total 0.3 <=1.2 mg/dL   CBC with platelets and differential   Result Value Ref Range    WBC Count 5.9 4.0 - 11.0 10e3/uL    RBC Count 4.31 3.80 - 5.20 10e6/uL    Hemoglobin 12.7 11.7 - 15.7 g/dL    Hematocrit 38.0 35.0 - 47.0 %    MCV 88 78 - 100 fL    MCH 29.5 26.5 - 33.0 pg    MCHC 33.4 31.5 - 36.5 g/dL    RDW 13.0 10.0 - 15.0 %    Platelet Count 270 150 - 450 10e3/uL    % Neutrophils 54 %    % Lymphocytes 30 %    % Monocytes 6 %    % Eosinophils 10 %    % Basophils 1 %    % Immature Granulocytes 0 %    NRBCs per 100 WBC 0 <1 /100    Absolute Neutrophils 3.2 1.6 - 8.3 10e3/uL    Absolute Lymphocytes 1.8 0.8 - 5.3 10e3/uL    Absolute Monocytes 0.4 0.0 - 1.3 10e3/uL    Absolute Eosinophils 0.6 0.0 - 0.7 10e3/uL    Absolute Basophils 0.0 0.0 - 0.2 10e3/uL    Absolute Immature " Granulocytes 0.0 <=0.4 10e3/uL    Absolute NRBCs 0.0 10e3/uL     Imaging:    CT Chest/Abdomen/Pelvis w Contrast    Result Date: 10/9/2024  EXAM: CT CHEST/ABDOMEN/PELVIS W CONTRAST LOCATION: [facility] DATE: 10/8/2024 INDICATION: Stage I adenocarcinoma the descending colon diagnosed January 2023. Posttreatment surveillance. COMPARISON: CT chest 3/4/2024, CT CAP 12/4/2023 and older studies going back to December 2022 TECHNIQUE: CT scan of the chest, abdomen, and pelvis was performed following injection of IV contrast. Multiplanar reformats were obtained. Dose reduction techniques were used. CONTRAST: 100 MLS of Isovue 300 FINDINGS: LUNGS AND PLEURA: No suspicious pulmonary nodules or effusions. Upper lobe emphysema. MEDIASTINUM/AXILLAE: No suspicious adenopathy. Few, right pericardiophrenic nodes are unchanged (axial images 104-107). Largest measures 1.2 x 0.7 cm. There is a very small hiatal hernia. No central pulmonary emboli. Aorta and branches are normal. CORONARY ARTERY CALCIFICATION: None. HEPATOBILIARY: Normal. PANCREAS: Normal. SPLEEN: Normal. ADRENAL GLANDS: Normal. KIDNEYS/BLADDER: Small left renal cyst noted which needs no follow-up. BOWEL: Descending colon staple line. No ascites or peritoneal tumor nodules. Bowel is of normal caliber. Lipomatous ileocecal valve. Appendix is normal. LYMPH NODES: Normal. VASCULATURE: Normal. PELVIC ORGANS: Normal. MUSCULOSKELETAL: No suspicious lesions.     IMPRESSION: 1.  No evidence of recurrent tumor in the chest, abdomen or pelvis. 2.  Emphysema. 3.  Small hiatal hernia. 4.  Other noncritical findings as noted above.       Signed by: Arnaud Valdez MD      Again, thank you for allowing me to participate in the care of your patient.        Sincerely,        Arnaud Valdez MD

## 2024-10-10 ENCOUNTER — TELEPHONE (OUTPATIENT)
Dept: UROLOGY | Facility: CLINIC | Age: 49
End: 2024-10-10
Payer: COMMERCIAL

## 2024-10-10 NOTE — TELEPHONE ENCOUNTER
Health Call Center    Phone Message    May a detailed message be left on voicemail: no     Reason for Call: Appointment Intake      Referring Provider Name: Robbie Bailon MD in [facility]  Diagnosis and/or Symptoms: Full incontinence of feces [R15.9] Urinary incontinence, unspecified type [R32]   My Clinical Question Is: urine and fecal incontinence; + hx of colon CA -> recent surveillance by colorectal surgeon and oncologist is benign     Referring Provider Name: Arnaud Valdez MD in [facility]  Diagnosis and/or Symptoms: Full incontinence of feces [R15.9]  My Clinical Question Is: Female incontinence/pelvic floor issues  Requesting Dr. Martinez at the pelvic floor urogynecology clinic please.    Urology referrals from 2 providers, routing to clinic to triage and assist patient with scheduling.    Action Taken: Message routed to:  [facility]    Travel Screening: Not Applicable     Date of Service:             No need to reply to sender of message

## 2024-10-14 ENCOUNTER — VIRTUAL VISIT (OUTPATIENT)
Dept: PSYCHOLOGY | Facility: CLINIC | Age: 49
End: 2024-10-14
Payer: COMMERCIAL

## 2024-10-14 ENCOUNTER — TELEPHONE (OUTPATIENT)
Dept: UROLOGY | Facility: CLINIC | Age: 49
End: 2024-10-14
Payer: COMMERCIAL

## 2024-10-14 DIAGNOSIS — F33.1 MAJOR DEPRESSIVE DISORDER, RECURRENT EPISODE, MODERATE (H): Primary | ICD-10-CM

## 2024-10-14 DIAGNOSIS — F41.1 GAD (GENERALIZED ANXIETY DISORDER): ICD-10-CM

## 2024-10-14 PROCEDURE — 90834 PSYTX W PT 45 MINUTES: CPT | Mod: 95

## 2024-10-14 NOTE — TELEPHONE ENCOUNTER
Left Voicemail (1st Attempt) for the patient to call back and schedule the following:    Appointment type: NEW VV  Provider: Juan  Return date: Next available  Specialty phone number: [phone]  Additional appointment(s) needed: NA  Additonal Notes: NA

## 2024-10-14 NOTE — PROGRESS NOTES
[facility]                                     Progress Note    Patient Name: Darlene Hudson  Date: 10/14/24         Service Type: Individual      Session Start Time: 732  Session End Time: 812     Session Length: 40    Session #: 9    Attendees: Client attended alone    Service Modality:  Video Visit:      Provider verified identity through the following two step process.  Patient provided:  Patient is known previously to provider    Telemedicine Visit: The patient's condition can be safely assessed and treated via synchronous audio and visual telemedicine encounter.      Reason for Telemedicine Visit: Patient has requested telehealth visit    Originating Site (Patient Location): Patient's home    Distant Site (Provider Location): Provider Remote Setting- Home Office    Consent:  The patient/guardian has verbally consented to: the potential risks and benefits of telemedicine (video visit) versus in person care; bill my insurance or make self-payment for services provided; and responsibility for payment of non-covered services.     Patient would like the video invitation sent by:  My Chart    Mode of Communication:  Video Conference via Amwell    Distant Location (Provider):  Off-site    As the provider I attest to compliance with applicable laws and regulations related to telemedicine.    DATA  Interactive Complexity: No  Crisis: No        Progress Since Last Session (Related to Symptoms / Goals / Homework):   Symptoms: No change overall    Homework:  continues completing self care activities      Episode of Care Goals: Satisfactory progress - ACTION (Actively working towards change); Intervened by reinforcing change plan / affirming steps taken     Current / Ongoing Stressors and Concerns:   Many stressors including prior cancer diagnosis and treatment, losses through death of family and friends. Current stress with her job, which she identified as most stressful at this time noting that  it has taken quite a toll on her mental health.   Taking action on the treatment she received at work, wants to have justice in this case, committed to getting her voice heard.     Treatment Objective(s) Addressed in This Session:   Build rapport. Review of treatment plan      Intervention:   Build rapport.  Extensive discussion and review today regarding treatment plan and how current stressors are impacting her as well as how to move forward in therapy.  Provided support for current stressors and validated emotions.       Assessments completed prior to visit:  The following assessments were completed by patient for this visit:  PHQ9:       9/13/2023     2:39 PM 2/21/2024     1:38 PM 5/9/2024     7:34 AM 5/22/2024    11:00 AM 5/28/2024     9:31 AM 7/24/2024     3:59 PM 8/14/2024     5:10 PM   PHQ-9 SCORE   PHQ-9 Total Score MyChart 0  7 (Mild depression)  7 (Mild depression) 6 (Mild depression)    PHQ-9 Total Score 0 5 7 8 7 6    6 7     GAD7:       4/18/2023     5:13 PM 9/13/2023     2:40 PM 2/21/2024     1:38 PM 5/22/2024    11:00 AM 5/22/2024    11:03 AM 8/1/2024    11:00 AM 8/14/2024     5:10 PM   ACE-7 SCORE   Total Score  7 (mild anxiety)   13 (moderate anxiety)     Total Score 5 7 6 19 13    13 19 15     PROMIS 10-Global Health (all questions and answers displayed):       5/22/2024    11:00 AM 5/22/2024    11:04 AM 7/23/2024     9:34 AM   PROMIS 10   In general, would you say your health is:  Fair Poor   In general, would you say your quality of life is:  Fair Fair   In general, how would you rate your physical health?  Fair Poor   In general, how would you rate your mental health, including your mood and your ability to think?  Poor Fair   In general, how would you rate your satisfaction with your social activities and relationships?  Fair Fair   In general, please rate how well you carry out your usual social activities and roles  Fair Good   To what extent are you able to carry out your everyday physical  activities such as walking, climbing stairs, carrying groceries, or moving a chair?  Moderately Moderately   In the past 7 days, how often have you been bothered by emotional problems such as feeling anxious, depressed, or irritable?  Always Always   In the past 7 days, how would you rate your fatigue on average?  Mild Moderate   In the past 7 days, how would you rate your pain on average, where 0 means no pain, and 10 means worst imaginable pain?  5 5   In general, would you say your health is: 2 2 1   In general, would you say your quality of life is:  2 2   In general, how would you rate your physical health?  2 1   In general, how would you rate your mental health, including your mood and your ability to think?  1 2   In general, how would you rate your satisfaction with your social activities and relationships?  2 2   In general, please rate how well you carry out your usual social activities and roles. (This includes activities at home, at work and in your community, and responsibilities as a parent, child, spouse, employee, friend, etc.)  2 3   To what extent are you able to carry out your everyday physical activities such as walking, climbing stairs, carrying groceries, or moving a chair?  3 3   In the past 7 days, how often have you been bothered by emotional problems such as feeling anxious, depressed, or irritable?  5 5   In the past 7 days, how would you rate your fatigue on average?  2 3   In the past 7 days, how would you rate your pain on average, where 0 means no pain, and 10 means worst imaginable pain?  5 5   Global Mental Health Score  6    6 7   Global Physical Health Score  12    12 10   PROMIS TOTAL - SUBSCORES  18    18 17         ASSESSMENT: Current Emotional / Mental Status (status of significant symptoms):   Risk status (Self / Other harm or suicidal ideation)   Patient denies current fears or concerns for personal safety.   Patient denies current or recent suicidal ideation or  behaviors.   Patient denies current or recent homicidal ideation or behaviors.   Patient denies current or recent self injurious behavior or ideation.   Patient denies other safety concerns.   Patient reports there has been no change in risk factors since their last session.     Patient reports there has been no change in protective factors since their last session.     Recommended that patient call 911 or go to the local ED should there be a change in any of these risk factors.     Appearance:   normal    Eye Contact:   Good    Psychomotor Behavior: normal    Attitude:   Cooperative    Orientation:   All   Speech    Rate / Production: Normal     Volume:  Normal    Mood:    Normal   Affect:    normal   Thought Content:  Clear    Thought Form:  Coherent    Insight:    Fair      Medication Review:   No changes to current psychiatric medication(s)     Medication Compliance:   Yes     Changes in Health Issues:   None reported     Chemical Use Review:   Substance Use: Chemical use reviewed, no active concerns identified      Tobacco Use: No current tobacco use.      Diagnosis:  1. Major depressive disorder, recurrent episode, moderate (H)    2. ACE (generalized anxiety disorder)                  Collateral Reports Completed:   Not Applicable    PLAN: (Patient Tasks / Therapist Tasks / Other)  Rtn 3 weeks.   Management of symptoms related to current stressors.        Cate Kaur [facility]                                                         ______________________________________________________________________    Individual Treatment Plan    Patient's Name: Darlene Hudson  YOB: 1975    Date of Creation: 06/11/2024  Date Treatment Plan Last Reviewed/Revised: 06/11/2024, 10/14/2024    DSM5 Diagnoses: 296.32 (F33.1) Major Depressive Disorder, Recurrent Episode, Moderate _ or 300.02 (F41.1) Generalized Anxiety Disorder  Psychosocial / Contextual Factors: lives at home with sons, lots of stress from older  son, workplace stress, on leave, legal interventions due to workplace concerns  PROMIS (reviewed every 90 days): 5/22/24    Referral / Collaboration:  Referral to another professional/service is not indicated at this time..    Anticipated number of session for this episode of care: 9-12 sessions  Anticipation frequency of session: Every other week  Anticipated Duration of each session: 38-52 minutes  Treatment plan will be reviewed in 90 days or when goals have been changed.       MeasurableTreatment Goal(s) related to diagnosis / functional impairment(s)  Goal 1: Patient will decrease symptoms of depression    I will know I've met my goal when I am happy within myself.      Objective #A (Patient Action)    Patient will identify at least 3 techniques for intervening on the escalation.  Status: New - Date: 06/11/2024  , 10/14/2024    Intervention(s)  Therapist will teach emotional regulation skills.   .    Objective #B  Patient will learn and demonstrate 3 assertiveness skill(s).  Status: New - Date: 06/11/2024  , 10/14/2024    Intervention(s)  Therapist will teach assertiveness skills.   .      Goal 2: Patient will decrease symptoms of anxiety    I will know I've met my goal when I am happy within myself.      Objective #A (Patient Action)    Status: New - Date: 06/11/2024  , 10/14/2024    Patient will use at least 3 coping skills for anxiety management in the next 12 weeks.    Intervention(s)  Therapist will teach coping skills for anxiety .    Objective #B  Patient will use cognitive strategies identified in therapy to challenge anxious thoughts.    Status: New - Date: 06/11/2024  , 10/14/2024    Intervention(s)  Therapist will teach cognitive restructuring .    Patient has reviewed and agreed to the above plan.      Cate Kaur, [facility]  June 11, 2024, 10/14/2024    [facility]       PATIENT'S NAME: Darlene Hudson  PREFERRED NAME: Karoline  PRONOUNS:     she her  "hers  MRN: [id number]  : 1975  ADDRESS: [address]  [facility] [address]  ACCT. NUMBER:  [id number]  DATE OF SERVICE: 24  START TIME: 738  END TIME: 823  PREFERRED PHONE: [phone]  May we leave a program related message: Yes  EMERGENCY CONTACT: was not obtained .  SERVICE MODALITY:  Video Visit:      Provider verified identity through the following two step process.  Patient provided:  Patient  and Patient address    Telemedicine Visit: The patient's condition can be safely assessed and treated via synchronous audio and visual telemedicine encounter.      Reason for Telemedicine Visit: Patient convenience (e.g. access to timely appointments / distance to available provider)    Originating Site (Patient Location): Patient's home    Distant Site (Provider Location): Provider Remote Setting- Home Office    Consent:  The patient/guardian has verbally consented to: the potential risks and benefits of telemedicine (video visit) versus in person care; bill my insurance or make self-payment for services provided; and responsibility for payment of non-covered services.     Patient would like the video invitation sent by:  My Chart    Mode of Communication:  Video Conference via Amwell    Distant Location (Provider):  Off-site    As the provider I attest to compliance with applicable laws and regulations related to telemedicine.    UNIVERSAL ADULT Mental Health DIAGNOSTIC ASSESSMENT    Identifying Information:  Patient is a 49 year old, , , and Black ;  individual.  [facility]  Patient attended the session alone.    Chief Complaint:   The reason for seeking services at this time is: \" workplace stresses, workplace bullying and harassment \"   The problem(s) began 2023. Patient has attempted to resolve these concerns in the past through medication, " therapy.    Social/Family History:  Patient reported they grew up in [place], [place].  They were raised by biological mother.  Parents  when 3 years old.   Patient reported that their childhood was pretty good, single mother, worked for the [employer], mother was active in their activities, they saw father fairly frequent.  Patient described their current relationships with family of origin as parents have passed, sibling relatiuonships still.      The patient describes their cultural background as bi racial.  Cultural influences and impact on patient's life structure, values, norms, and healthcare: NA.  Contextual influences on patient's health include: Contextual Factors: Individual Factors stressful work environment, supervisor difficulty, symptoms of depression, significant losses due to covid, previous colon cancer diagnosis and surgery as well as ankle surgery .  Cultural, Contextual, and socioeconomic factors do not affect the patient's access to services.  These factors will be addressed in the Preliminary Treatment plan.  Patient identified their preferred language to be English. Patient reported they do not  need the assistance of an  or other support involved in therapy.     Patient reported had no significant delays in developmental tasks.   Patient's highest education level was some college. Patient identified the following learning problems: none reported.  Modifications will not be used to assist communication in therapy.   Patient reports they are  able to understand written materials.    Patient reported the following relationship history boyfriend of 11 years  of covid .  Patient's current relationship status is  .   Patient identified their sexual orientation as heterosexual.  Patient reported having five child(reza). Patient identified her best friend as part of their support system.  Patient identified the quality of these relationships as stable and meaningful.      Patient's current living/housing situation involves staying in own home/apartment.  They live with two sons and they report that housing is stable.     Patient is currently on medical leave from her work as a  .  Patient reports their finances are obtained through her savings, and help from family and friends.  Patient does identify finances as a current stressor.      Patient reported that they have not been involved with the legal system.   Patient denies being on probation / parole / under the jurisdiction of the court.    Patient's Strengths and Limitations:  Patient identified the following strengths or resources that will help them succeed in treatment: commitment to health and well being, motivation, and work ethic. Things that may interfere with the patient's success in treatment include: physical health concerns.     Assessments:  The following assessments were completed by patient for this visit:  PHQ9:       5/25/2022    11:31 AM 1/18/2023     4:04 PM 4/18/2023     4:19 PM 4/18/2023     5:13 PM 9/13/2023     2:39 PM 2/21/2024     1:38 PM 5/9/2024     7:34 AM   PHQ-9 SCORE   PHQ-9 Total Score MyChart 0    0  7 (Mild depression)   PHQ-9 Total Score 0 3 2 2 0 5 7     GAD7:       3/12/2020     3:31 PM 2/4/2021     2:04 PM 5/25/2022    11:32 AM 4/18/2023     4:19 PM 4/18/2023     5:13 PM 9/13/2023     2:40 PM 2/21/2024     1:38 PM   ACE-7 SCORE   Total Score   5 (mild anxiety)   7 (mild anxiety)    Total Score 3 5 5 5 5 7 6     CAGE-AID:       5/9/2024     8:00 AM   CAGE-AID Total Score   Total Score 4     PROMIS 10-Global Health (all questions and answers displayed):        No data to display              [place]son Suicide Severity Rating Scale (Lifetime/Recent)      5/9/2024     8:00 AM   [place] Suicide Severity Rating (Lifetime/Recent)   Q1 Wish to be Dead (Lifetime) N   Q2 Non-Specific Active Suicidal Thoughts (Lifetime) N   Actual Attempt (Lifetime) N   Has subject engaged in  non-suicidal self-injurious behavior? (Lifetime) Y   Has subject engaged in non-suicidal self-injurious behavior? (Past 3 Months) Y   Interrupted Attempts (Lifetime) N   Aborted or Self-Interrupted Attempt (Lifetime) N   Preparatory Acts or Behavior (Lifetime) N   Calculated C-SSRS Risk Score (Lifetime/Recent) No Risk Indicated       Personal and Family Medical History:  Patient   report a family history of mental health concerns.  Patient reports family history includes Asthma in her child and father; Breast Cancer in her maternal grandmother; Cancer in her mother; Diabetes in her brother and brother; Hypertension in her mother; Other Cancer in her father..     Patient does report Mental Health Diagnosis and/or Treatment.  Patient Patient reported the following previous diagnoses which include(s): Depression, PTSD.  Patient reported symptoms began currently and about 15 years ago due to a rough patch with several stressors.   Patient has received mental health services in the past: outpatient therapy and [facility] treatment.  Psychiatric Hospitalizations: None.  Patient denies a history of civil commitment.  Patient is receiving other mental health services.  These include psychotherapy with a therapist in the Oncology dept.       Patient has had a physical exam to rule out medical causes for current symptoms.  Date of last physical exam was within the past year. Client was encouraged to follow up with PCP if symptoms were to develop. The patient has a [facility] Primary Care Provider, who is named Robbie Bailon.  Patient reports the following current medical concerns: past diagnosis and surgery for stage two colon cancer, ankle surgery which ct is still in recovery from.  Patient reports recent ankle surgery that sometimes causes pain.  There are significant appetite / nutritional concerns / weight changes.  Recent significant weight gain, gained 35 lbs.  Inactivity.   Patient does not report a history of head injury /  trauma / cognitive impairment.      Patient reports current meds as:   Current Outpatient Medications   Medication Sig Dispense Refill    albuterol (PROAIR HFA/PROVENTIL HFA/VENTOLIN HFA) 108 (90 Base) MCG/ACT inhaler Inhale 2 puffs into the lungs every 6 hours as needed for shortness of breath, wheezing or cough 18 g 11    ALPRAZolam (XANAX) 2 MG tablet Take 1 tablet (2 mg) by mouth 2 times daily as needed for anxiety 60 tablet 3    cetirizine (ZYRTEC) 10 MG tablet Take 1 tablet (10 mg) by mouth daily 30 tablet 11    fluticasone (FLONASE) 50 MCG/ACT nasal spray Spray 1 spray into both nostrils daily      fluticasone (FLONASE) 50 MCG/ACT nasal spray Spray 2 sprays into both nostrils daily 9.9 mL 11    hydrOXYzine carlos (VISTARIL) 25 MG capsule Take 1 capsule (25 mg) by mouth nightly as needed for anxiety 30 capsule 3    montelukast (SINGULAIR) 10 MG tablet Take 1 tablet (10 mg) by mouth At Bedtime 30 tablet 11    naloxone (NARCAN) 4 MG/0.1ML nasal spray Spray 1 spray (4 mg) into one nostril alternating nostrils as needed for opioid reversal every 2-3 minutes until assistance arrives (Patient not taking: Reported on 3/6/2024) 0.2 mL 1    oxyCODONE (ROXICODONE) 5 MG tablet Take 1 tablet (5 mg) by mouth 3 times daily as needed for severe pain 75 tablet 0    PROAIR  (90 Base) MCG/ACT inhaler Inhale 2 puffs into the lungs every 4 hours as needed for shortness of breath or wheezing 8 g 11    sertraline (ZOLOFT) 50 MG tablet Take 2 tablets (100 mg) by mouth daily 60 tablet 3    spacer (OPTICHAMBER BINA) holding chamber For use w/ rescue inhaler 1 each 0    sucralfate (CARAFATE) 1 GM/10ML suspension Take 10 mLs (1 g) by mouth 4 times daily as needed for nausea 414 mL 0    tiotropium (SPIRIVA RESPIMAT) 1.25 MCG/ACT inhaler Inhale 2 puffs into the lungs daily 12 g 3    tiotropium (SPIRIVA) 18 MCG inhaled capsule Inhale 18 mcg into the lungs daily (Patient not taking: Reported on 3/6/2024)       No current  "facility-administered medications for this visit.     Facility-Administered Medications Ordered in Other Visits   Medication Dose Route Frequency Provider Last Rate Last Admin    ceFAZolin (ANCEF) intermittent infusion 2 g in 50 mL dextrose PREMIX  2 g Intravenous See Admin Instructions Caty Darby PA-C        flumazenil (ROMAZICON) injection 0.2 mg  0.2 mg Intravenous q1 min prn Kodi Hathaway DO        gabapentin (NEURONTIN) capsule 300 mg  300 mg Oral Pre-Op/Pre-procedure x 1 dose Kodi Hathaway DO        lactated ringers infusion   Intravenous Continuous Kodi Hathaway DO   Stopped at 06/21/23 1149    lidocaine (LMX4) kit   Topical Q1H PRN Kodi Hathaway DO        lidocaine 1 % 0.1-1 mL  0.1-1 mL Other Q1H PRN Kodi Hathaway DO        midazolam (VERSED) injection 1-2 mg  1-2 mg Intravenous Q4 Min PRN Kodi Hathaway,         naloxone (NARCAN) injection 0.2 mg  0.2 mg Intravenous Q2 Min PRN Kodi Hathaway DO        Or    naloxone (NARCAN) injection 0.4 mg  0.4 mg Intravenous Q2 Min PRN Kodi Hathaway DO        Or    naloxone (NARCAN) injection 0.2 mg  0.2 mg Intramuscular Q2 Min PRN Kodi Hathaway DO        Or    naloxone (NARCAN) injection 0.4 mg  0.4 mg Intramuscular Q2 Min PRN Kodi Hathaway, DO        sodium chloride (PF) 0.9% PF flush 3 mL  3 mL Intracatheter Q8H Kodi Hathaway DO        sodium chloride (PF) 0.9% PF flush 3 mL  3 mL Intracatheter q1 min prn Kodi Hathaway, DO           Medication Adherence:  Patient reports  .  taking prescribed medications as prescribed.    Patient Allergies:    Allergies   Allergen Reactions    Gabapentin Other (See Comments)     Mental status is changed     Lidocaine Other (See Comments)     \"my jaw stopped moving\"  Other reaction(s): Dystonia    Penicillins Hives, Rash and Shortness Of Breath    Lidocaine-Epinephrine Other (See Comments) and Muscle Pain (Myalgia)     Severe jaw cramping, double vision  Jaw locking       Medical History:    Past Medical History: "   Diagnosis Date    Abdominal pain 06/29/2015    Abnormal cervical Papanicolaou smear 11/09/2014    Overview:  ACUS/HPV positive    Abnormal cytology finding 11/09/2014    Overview:  ACUS/HPV positive    Acute pericardial effusion 02/06/2017    Agoraphobia with panic attacks     Anxiety     Arthralgia of both lower legs 05/29/2020    Bilateral achy knee pain that is chronic in nature going on for years. Most likely osteoarthritis in knees related to obesity. Previously injected in both knees with good relief. 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  Has controlled substance agreement with them.  On Vicodin, Valium, Klonopin prescribed only from there.       Coronary artery disease     Cough 02/09/2020    Family history of colon cancer 10/24/2020    Hx of seasonal allergies     Infection due to 2019 novel coronavirus 09/20/2022    Infectious pericarditis     Lipoma     Low back pain 07/13/2015    Major depressive disorder, recurrent episode, moderate (H) 09/05/2006    Menorrhagia with irregular cycle 07/15/2022    Added automatically from request for surgery [id number]    Moderate persistent asthma without complication 09/29/2020    Nondependent alcohol abuse, episodic drinking behavior 10/03/2012    Noninflammatory disorder of vagina 02/20/2015    Other chronic pain     Other long term (current) drug therapy 01/28/2013    Overview:  Vicodin and cyclobenzaprine monthly    Panic disorder with agoraphobia 09/05/2006    Pap  smear for cervical cancer screening 10/31/2016    03/29/2010  Normal cytology, HPV ot done, repeat in 3 years.    Pericarditis 2017    Physiologic disturbance of temperature regulation 10/24/2020    PONV (postoperative nausea and vomiting)     Right ankle swelling 06/07/2022    Added automatically from request for surgery [id number]    Tobacco abuse     Tobacco use disorder 07/13/2015         Current Mental Status Exam:   Appearance:  Appropriate    Eye Contact:  Good   Psychomotor:  Normal       Gait / station:  Not assessed  Attitude / Demeanor: Cooperative  Pleasant  Speech      Rate / Production: Normal/ Responsive      Volume:  Normal  volume      Language:  intact  Mood:   Normal  Affect:   Appropriate    Thought Content: Clear   Thought Process: Coherent  Logical       Associations: No loosening of associations  Insight:   Good   Judgment:  Intact   Orientation:  All  Attention/concentration: Good    Substance Use:   Patient did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

## 2024-10-15 ENCOUNTER — VIRTUAL VISIT (OUTPATIENT)
Dept: UROLOGY | Facility: CLINIC | Age: 49
End: 2024-10-15
Attending: FAMILY MEDICINE
Payer: COMMERCIAL

## 2024-10-15 DIAGNOSIS — N39.46 MIXED INCONTINENCE: Primary | ICD-10-CM

## 2024-10-15 DIAGNOSIS — R15.9 INCONTINENCE OF FECES, UNSPECIFIED FECAL INCONTINENCE TYPE: ICD-10-CM

## 2024-10-15 PROCEDURE — 99214 OFFICE O/P EST MOD 30 MIN: CPT | Mod: 95 | Performed by: OBSTETRICS & GYNECOLOGY

## 2024-10-15 RX ORDER — OXYBUTYNIN CHLORIDE 5 MG/1
5 TABLET ORAL 3 TIMES DAILY
Qty: 180 TABLET | Refills: 3 | Status: SHIPPED | OUTPATIENT
Start: 2024-10-15

## 2024-10-15 ASSESSMENT — PAIN SCALES - GENERAL: PAINLEVEL: NO PAIN (0)

## 2024-10-15 NOTE — NURSING NOTE
Is the patient currently in the state of [place]? YES    Visit mode:VIDEO    If the visit is dropped, the patient can be reconnected by: VIDEO VISIT: Text to cell phone:   Telephone Information:   Mobile [phone]       Will anyone else be joining the visit? NO  (If patient encounters technical issues they should call [phone]    How would you like to obtain your AVS? MyChart    Are changes needed to the allergy or medication list? No    Are refills needed on medications prescribed by this physician? NO    Reason for visit: Consult    Camille WHITE

## 2024-10-15 NOTE — LETTER
10/15/2024       RE: Darlene Hudson  [address]  [facility] [address]     Dear Colleague,    Thank you for referring your patient, Darlene Hudson, to the [facility] [place] at [facility]. Please see a copy of my visit note below.      Video-Visit Details    Type of service:  Video Visit    Video Start Time: 4:36 PM    Video End Time:5:09 PM      Chief complaint: urinary and fecal incontinence    Subjective     Darlene Hudson is a 49 year old  ( last delivery complicated with what sounds like pubic symphysis diathesis necessitating using a walker for a while) with medical hx sig for colon cancer (Stage I adenocarcinoma the descending colon s/p sigmoid colectomy in ), Asthma, depression, anxiety, substance and tobacco use disorder (occasionally still smokes), infectious pericarditis, who presents for a video visit today for urinary and fecal incontinence. She has primarily urgency urinary leakage and leakage of formed stool without sensory awareness since 2024.     Drinks water/soda/energy drinks/ice coffee/lemonade . She says in May she cut out her soda and that didn't help her bladder although she was still drinking lemonade and sparkling water.       Urinary symptoms  Urgency urinary leaks . Usually uses pads when she leaves the house or at night. When she is at home, she just changes her underwear. Sometimes leaks with stress triggers like coughing, laughing etc. This is also bothersome to her even though it doesn't happen all the time. Denies any voiding dysfunction, Janet or gross hematuria.    Prolapse symptoms  Denies vaginal bulge, pressure sensation or protrusion.    GI symptoms  In the last few months since february, she has been having bowel accidents without sensation of urgency. This happens with formed or loose stool. The bowel accidents happen once every couple of weeks irrespective of stool  consistency.  Hx of stage 1 adenocarcinoma of the colon s/p sigmoid colectomy in 2023. Had CT abd/pelvix/chest for surveillance on 10.8.24 which was negative. Is scheduling MRI for further evaluation.     EXAM: CT CHEST/ABDOMEN/PELVIS W CONTRAST  DATE: 10/8/2024                                                      IMPRESSION:  1.  No evidence of recurrent tumor in the chest, abdomen or pelvis.  2.  Emphysema.  3.  Small hiatal hernia.  4.  Other noncritical findings as noted above    Sexual health/Pelvic floor  Not been sexually active since February. Broke up with her boyfriend primarily due to the fecal incontinence.     Relevant Medical History:    Diabetes? no  High Blood pressure? no     Recurrent UTIs? no  Sleep Apnea? no  Obesity? There is no height or weight on file to calculate BMI.  History of Blood clots? no  Other medical problems: as above    Surgical History:      Past Surgical History:   Procedure Laterality Date     ANKLE SURGERY Right 05/30/2019     ARTHROSCOPY ANKLE Right 6/21/2023    Procedure: right ankle arthroscopy with debridement;  Surgeon: Kareem Bashir MD;  Location: [facility] OR     BIOPSY BREAST Right 1990    benign     COLONOSCOPY N/A 1/3/2023    Procedure: COLONOSCOPY WITH BIOPSY OF COLON MASS, POLYPECTOMY;  Surgeon: Kris Charles MD;  Location: [facility] OR     COLONOSCOPY N/A 3/12/2024    Procedure: COLONOSCOPY WITH POLYPECTOMY and EXCISION, LEFT, LESION, BACK;  Surgeon: Kris Charles MD;  Location: [facility] OR     CREATION PERICARDIAL WINDOW  02/10/2017    [facility]     DILATION AND CURETTAGE N/A 7/22/2022    Procedure: DILATION AND CURETTAGE, UTERUS;  Surgeon: Lesly Holland;  Location: [facility] OR     HEMORRHOIDECTOMY EXTERNAL       HYSTEROSCOPY, WITH ENDOMETRIAL RADIOFREQUENCY ABLATION - NOVASURE N/A 7/22/2022    Procedure: HYSTEROSCOPY, WITH ENDOMETRIAL RADIOFREQUENCY ABLATION - NOVASURE DILATION AND CURETTAGE, UTERUS;  Surgeon:  "Lesly Holland;  Location: [facility] OR     LAPAROSCOPIC ASSISTED SIGMOID COLECTOMY N/A 2023    Procedure: LAPAROSCOPIC ASSISTED DESCENDING COLELCTOMY SPLENIC FLEXURE MOBILIZATION ;  Surgeon: Kris Charles MD;  Location: [facility] OR     LAPAROSCOPIC LYSIS ADHESIONS N/A 2023    Procedure: LYSIS OF ADHESIONS;  Surgeon: Kris Charles MD;  Location: [facility] OR     TUMOR REMOVAL      Has had 3. In the right breast and \"inside of rib cage.\"       OB/Gyn History:  OB History    Para Term  AB Living   5 5 5 0 0 0   SAB IAB Ectopic Multiple Live Births   0 0 0 0 0      # Outcome Date GA Lbr Alexi/2nd Weight Sex Type Anes PTL Lv   5 Term            4 Term            3 Term            2 Term            1 Term                Medications/Vitamins/Supplements:   Current Outpatient Medications   Medication Sig Dispense Refill     oxyBUTYnin (DITROPAN) 5 MG tablet Take 1 tablet (5 mg) by mouth 3 times daily. 180 tablet 3     albuterol (PROAIR HFA/PROVENTIL HFA/VENTOLIN HFA) 108 (90 Base) MCG/ACT inhaler Inhale 2 puffs into the lungs every 6 hours as needed for shortness of breath, wheezing or cough. 18 g 0     ALPRAZolam (XANAX) 2 MG tablet Take 2 mg by mouth as needed.       cetirizine (ZYRTEC) 10 MG tablet Take 1 tablet (10 mg) by mouth daily 30 tablet 11     fluticasone (FLONASE) 50 MCG/ACT nasal spray Spray 2 sprays into both nostrils daily 9.9 mL 11     hydrOXYzine carlos (VISTARIL) 25 MG capsule Take 1 capsule (25 mg) by mouth nightly as needed for anxiety 30 capsule 3     metFORMIN (GLUCOPHAGE XR) 500 MG 24 hr tablet 1 tablet with dinner daily for 2 weeks then 2 tablets with dinner 180 tablet 1     montelukast (SINGULAIR) 10 MG tablet Take 1 tablet (10 mg) by mouth At Bedtime 30 tablet 11     naloxone (NARCAN) 4 MG/0.1ML nasal spray Spray 1 spray (4 mg) into one nostril alternating nostrils as needed for opioid reversal every 2-3 minutes until assistance arrives (Patient " not taking: Reported on 3/6/2024) 0.2 mL 1     oxyCODONE (ROXICODONE) 5 MG tablet Take 1 tablet (5 mg) by mouth every 6 hours as needed for pain. 80 tablet 0     PROAIR  (90 Base) MCG/ACT inhaler Inhale 2 puffs into the lungs every 4 hours as needed for shortness of breath or wheezing 8 g 11     sertraline (ZOLOFT) 50 MG tablet Take 1.5 tablets (75 mg) by mouth daily 135 tablet 3     spacer (OPTICHAMBER BINA) holding chamber For use w/ rescue inhaler 1 each 0     tiotropium (SPIRIVA RESPIMAT) 1.25 MCG/ACT inhaler Inhale 2 puffs into the lungs daily. 1 g 0     tiotropium (SPIRIVA) 18 MCG inhaled capsule Inhale 1 capsule (18 mcg) into the lungs daily. 7 capsule 0     No current facility-administered medications for this visit.     Facility-Administered Medications Ordered in Other Visits   Medication Dose Route Frequency Provider Last Rate Last Admin     ceFAZolin (ANCEF) intermittent infusion 2 g in 50 mL dextrose PREMIX  2 g Intravenous See Admin Instructions Caty Darby PA-C         flumazenil (ROMAZICON) injection 0.2 mg  0.2 mg Intravenous q1 min prn Kodi Hathaway DO         gabapentin (NEURONTIN) capsule 300 mg  300 mg Oral Pre-Op/Pre-procedure x 1 dose Kodi Hathaway DO         lactated ringers infusion   Intravenous Continuous Kodi Hathaway DO   Stopped at 06/21/23 1149     lidocaine (LMX4) kit   Topical Q1H PRN Kodi Hathaway DO         lidocaine 1 % 0.1-1 mL  0.1-1 mL Other Q1H PRN Kodi Hathaway DO         midazolam (VERSED) injection 1-2 mg  1-2 mg Intravenous Q4 Min PRN Kodi Hathaway DO         naloxone (NARCAN) injection 0.2 mg  0.2 mg Intravenous Q2 Min PRN Kodi Hathaway DO        Or     naloxone (NARCAN) injection 0.4 mg  0.4 mg Intravenous Q2 Min PRN Kodi Hathaway DO        Or     naloxone (NARCAN) injection 0.2 mg  0.2 mg Intramuscular Q2 Min PRN Kodi Hathaway DO        Or     naloxone (NARCAN) injection 0.4 mg  0.4 mg Intramuscular Q2 Min PRN Kodi Hathaway DO         sodium  chloride (PF) 0.9% PF flush 3 mL  3 mL Intracatheter Q8H Kodi Hathaway,          sodium chloride (PF) 0.9% PF flush 3 mL  3 mL Intracatheter q1 min prn Kodi Hathaway DO             Medical History:      Past Medical History:   Diagnosis Date     Abdominal pain 06/29/2015     Abnormal cervical Papanicolaou smear 11/09/2014    Overview:  ACUS/HPV positive     Abnormal cytology finding 11/09/2014    Overview:  ACUS/HPV positive     Acute pericardial effusion 02/06/2017     Agoraphobia with panic attacks      Anxiety      Arthralgia of both lower legs 05/29/2020    Bilateral achy knee pain that is chronic in nature going on for years. Most likely osteoarthritis in knees related to obesity. Previously injected in both knees with good relief. 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  Has controlled substance agreement with them.  On Vicodin, Valium, Klonopin prescribed only from there.        Coronary artery disease      Cough 02/09/2020     Family history of colon cancer 10/24/2020     Hx of seasonal allergies      Infection due to 2019 novel coronavirus 09/20/2022     Infectious pericarditis      Lipoma      Low back pain 07/13/2015     Major depressive disorder, recurrent episode, moderate (H) 09/05/2006     Menorrhagia with irregular cycle 07/15/2022    Added automatically from request for surgery [id number]     Moderate persistent asthma without complication 09/29/2020     Nondependent alcohol abuse, episodic  drinking behavior 10/03/2012     Noninflammatory disorder of vagina 02/20/2015     Other chronic pain      Other long term (current) drug therapy 01/28/2013    Overview:  Vicodin and cyclobenzaprine monthly     Panic disorder with agoraphobia 09/05/2006     Pap smear for cervical cancer screening 10/31/2016    03/29/2010  Normal cytology, HPV ot done, repeat in 3 years.     Pericarditis 2017     Physiologic disturbance of temperature regulation 10/24/2020     PONV (postoperative nausea and vomiting)      Right ankle swelling 06/07/2022    Added automatically from request for surgery [id number]     Tobacco abuse      Tobacco use disorder 07/13/2015     ROS  Social History    Social History     Socioeconomic History     Marital status:      Spouse name: Not on file     Number of children: Not on file     Years of education: Not on file     Highest education level: Not on file   Occupational History     Not on file   Tobacco Use     Smoking status: Some Days     Current packs/day: 0.10     Average packs/day: 0.1 packs/day for 34.4 years (3.4 ttl pk-yrs)     Types: Cigarettes     Start date: 5/28/1990     Passive exposure: Past     Smokeless tobacco: Never     Tobacco comments:     Currently smoking 1 cigarette or so a week   Vaping Use     Vaping status: Former   Substance and Sexual Activity     Alcohol use: Not Currently     Comment: Occasiaonlly, not since 3/2023     Drug use: Not Currently     Comment: I am prescribed Xanax and oxycodone     Sexual activity: Yes     Partners: Male     Birth control/protection: Female Surgical   Other Topics Concern     Parent/sibling w/ CABG, MI or angioplasty before 65F 55M? Not Asked   Social History Narrative     Not on file     Social Determinants of Health     Financial Resource Strain: Low Risk  (10/13/2023)    Financial Resource Strain      Within the past 12 months, have you or your family members you live with been unable to get utilities (heat, electricity) when it  "was really needed?: No   Food Insecurity: Low Risk  (10/13/2023)    Food Insecurity      Within the past 12 months, did you worry that your food would run out before you got money to buy more?: No      Within the past 12 months, did the food you bought just not last and you didn t have money to get more?: No   Transportation Needs: Low Risk  (10/13/2023)    Transportation Needs      Within the past 12 months, has lack of transportation kept you from medical appointments, getting your medicines, non-medical meetings or appointments, work, or from getting things that you need?: No   Physical Activity: Not on file   Stress: Not on file   Social Connections: Not on file   Interpersonal Safety: Low Risk  (7/3/2024)    Interpersonal Safety      Do you feel physically and emotionally safe where you currently live?: Yes      Within the past 12 months, have you been hit, slapped, kicked or otherwise physically hurt by someone?: No      Within the past 12 months, have you been humiliated or emotionally abused in other ways by your partner or ex-partner?: No   Housing Stability: Low Risk  (10/13/2023)    Housing Stability      Do you have housing? : Yes      Are you worried about losing your housing?: No       Family History  Family History   Problem Relation Age of Onset     Hypertension Mother      Cancer Mother         cervical      Asthma Father      Diabetes Brother      Diabetes Brother      Breast Cancer Maternal Grandmother      Asthma Child      Other Cancer No family hx of        Allergy    Allergies   Allergen Reactions     Gabapentin Other (See Comments)     Mental status is changed      Lidocaine Other (See Comments)     \"my jaw stopped moving\"  Other reaction(s): Dystonia     Penicillins Hives, Rash and Shortness Of Breath     Lidocaine-Epinephrine (Pf) Other (See Comments) and Muscle Pain (Myalgia)     Severe jaw cramping, double vision  Jaw locking     Topamax [Topiramate] Headache       Current Outpatient " Medications   Medication Sig Dispense Refill     albuterol (PROAIR HFA/PROVENTIL HFA/VENTOLIN HFA) 108 (90 Base) MCG/ACT inhaler Inhale 2 puffs into the lungs every 6 hours as needed for shortness of breath, wheezing or cough. 18 g 0     ALPRAZolam (XANAX) 2 MG tablet Take 2 mg by mouth as needed.       cetirizine (ZYRTEC) 10 MG tablet Take 1 tablet (10 mg) by mouth daily 30 tablet 11     fluticasone (FLONASE) 50 MCG/ACT nasal spray Spray 2 sprays into both nostrils daily 9.9 mL 11     hydrOXYzine carlos (VISTARIL) 25 MG capsule Take 1 capsule (25 mg) by mouth nightly as needed for anxiety 30 capsule 3     metFORMIN (GLUCOPHAGE XR) 500 MG 24 hr tablet 1 tablet with dinner daily for 2 weeks then 2 tablets with dinner 180 tablet 1     montelukast (SINGULAIR) 10 MG tablet Take 1 tablet (10 mg) by mouth At Bedtime 30 tablet 11     naloxone (NARCAN) 4 MG/0.1ML nasal spray Spray 1 spray (4 mg) into one nostril alternating nostrils as needed for opioid reversal every 2-3 minutes until assistance arrives (Patient not taking: Reported on 3/6/2024) 0.2 mL 1     oxyCODONE (ROXICODONE) 5 MG tablet Take 1 tablet (5 mg) by mouth every 6 hours as needed for pain. 80 tablet 0     PROAIR  (90 Base) MCG/ACT inhaler Inhale 2 puffs into the lungs every 4 hours as needed for shortness of breath or wheezing 8 g 11     sertraline (ZOLOFT) 50 MG tablet Take 1.5 tablets (75 mg) by mouth daily 135 tablet 3     spacer (OPTICHAMBER BINA) holding chamber For use w/ rescue inhaler 1 each 0     tiotropium (SPIRIVA RESPIMAT) 1.25 MCG/ACT inhaler Inhale 2 puffs into the lungs daily. 1 g 0     tiotropium (SPIRIVA) 18 MCG inhaled capsule Inhale 1 capsule (18 mcg) into the lungs daily. 7 capsule 0     No current facility-administered medications for this visit.     Facility-Administered Medications Ordered in Other Visits   Medication Dose Route Frequency Provider Last Rate Last Admin     ceFAZolin (ANCEF) intermittent infusion 2 g in 50 mL  dextrose PREMIX  2 g Intravenous See Admin Instructions Caty Darby PA-C         flumazenil (ROMAZICON) injection 0.2 mg  0.2 mg Intravenous q1 min prn Kodi Hathaway DO         gabapentin (NEURONTIN) capsule 300 mg  300 mg Oral Pre-Op/Pre-procedure x 1 dose Kodi Hathaway DO         lactated ringers infusion   Intravenous Continuous Kodi Hathaway DO   Stopped at 06/21/23 1149     lidocaine (LMX4) kit   Topical Q1H PRN Kodi Hathaway DO         lidocaine 1 % 0.1-1 mL  0.1-1 mL Other Q1H PRN Kodi Hathaway DO         midazolam (VERSED) injection 1-2 mg  1-2 mg Intravenous Q4 Min PRN Kodi Hathaway DO         naloxone (NARCAN) injection 0.2 mg  0.2 mg Intravenous Q2 Min PRN Kodi Hathaway DO        Or     naloxone (NARCAN) injection 0.4 mg  0.4 mg Intravenous Q2 Min PRN Kodi Hathaway DO        Or     naloxone (NARCAN) injection 0.2 mg  0.2 mg Intramuscular Q2 Min PRN Kodi Hathaway DO        Or     naloxone (NARCAN) injection 0.4 mg  0.4 mg Intramuscular Q2 Min PRN Kodi Hathaway DO         sodium chloride (PF) 0.9% PF flush 3 mL  3 mL Intracatheter Q8H Kodi Hathaway DO         sodium chloride (PF) 0.9% PF flush 3 mL  3 mL Intracatheter q1 min prn Kodi Hathaway DO           Objective:   Deferred ( virtual visit)     Asssessment and plan  Karoline was seen today for consult.    Diagnoses and all orders for this visit:    Mixed incontinence  -     oxyBUTYnin (DITROPAN) 5 MG tablet; Take 1 tablet (5 mg) by mouth 3 times daily.    Incontinence of feces, unspecified fecal incontinence type    Other orders  -     Adult Urology  Referral      Fecal incontinence of formed stool without any sensory awareness. We discussed daily fiber supplement for her bowels and following up with her oncologist to make sure that there is no evidence for recurrent of her colon cancer given her bowel control issues in the last 6-8 months. She had normal CT abd/pelvis and is scheduled for MRI as well. If there is not evidence of  cancer and fiber does not help, may benefit from interstim ( sacral nerve stimulation) which can address both bladder and bowel incontinence.     I offered referral for pelvic PT but she did not want to do this. Says it did not help in the past when she did it for her pubic symphesis diathesis post partum    Discussed reducing bladder irritants, in her case caffeine, carbonation and acidic fluids.     Will bring her in for pelvic exam to rule out other issues such as prolapse that may be contributing for both her bladder and bowel issues. No prolapse symptoms however which is good.    A trial of anticholinergics for her urge incontinence . Discussed possible side effects of medication.            I spent a total of 30 minutes on  Video with  Darlene Hudson on the date of the encounter in chart review, patient visit, review of tests, documentation and/or discussion with other providers about the issues documented above.       Again, thank you for allowing me to participate in the care of your patient.      Sincerely,    Jyotsna Mcgill MD

## 2024-10-15 NOTE — PROGRESS NOTES
Video-Visit Details    Type of service:  Video Visit    Video Start Time: 4:36 PM    Video End Time:5:09 PM      Chief complaint: urinary and fecal incontinence    Subjective     Darlene Hudson is a 49 year old  ( last delivery complicated with what sounds like pubic symphysis diathesis necessitating using a walker for a while) with medical hx sig for colon cancer (Stage I adenocarcinoma the descending colon s/p sigmoid colectomy in ), Asthma, depression, anxiety, substance and tobacco use disorder (occasionally still smokes), infectious pericarditis, who presents for a video visit today for urinary and fecal incontinence. She has primarily urgency urinary leakage and leakage of formed stool without sensory awareness since 2024.     Drinks water/soda/energy drinks/ice coffee/lemonade . She says in May she cut out her soda and that didn't help her bladder although she was still drinking lemonade and sparkling water.       Urinary symptoms  Urgency urinary leaks . Usually uses pads when she leaves the house or at night. When she is at home, she just changes her underwear. Sometimes leaks with stress triggers like coughing, laughing etc. This is also bothersome to her even though it doesn't happen all the time. Denies any voiding dysfunction, Janet or gross hematuria.    Prolapse symptoms  Denies vaginal bulge, pressure sensation or protrusion.    GI symptoms  In the last few months since february, she has been having bowel accidents without sensation of urgency. This happens with formed or loose stool. The bowel accidents happen once every couple of weeks irrespective of stool consistency.  Hx of stage 1 adenocarcinoma of the colon s/p sigmoid colectomy in . Had CT abd/pelvix/chest for surveillance on 10.8.24 which was negative. Is scheduling MRI for further evaluation.     EXAM: CT CHEST/ABDOMEN/PELVIS W CONTRAST  DATE: 10/8/2024                                                      IMPRESSION:  1.   No evidence of recurrent tumor in the chest, abdomen or pelvis.  2.  Emphysema.  3.  Small hiatal hernia.  4.  Other noncritical findings as noted above    Sexual health/Pelvic floor  Not been sexually active since February. Broke up with her boyfriend primarily due to the fecal incontinence.     Relevant Medical History:    Diabetes? no  High Blood pressure? no     Recurrent UTIs? no  Sleep Apnea? no  Obesity? There is no height or weight on file to calculate BMI.  History of Blood clots? no  Other medical problems: as above    Surgical History:      Past Surgical History:   Procedure Laterality Date    ANKLE SURGERY Right 05/30/2019    ARTHROSCOPY ANKLE Right 6/21/2023    Procedure: right ankle arthroscopy with debridement;  Surgeon: Kareem Bashir MD;  Location: [facility] OR    BIOPSY BREAST Right 1990    benign    COLONOSCOPY N/A 1/3/2023    Procedure: COLONOSCOPY WITH BIOPSY OF COLON MASS, POLYPECTOMY;  Surgeon: Kris Charles MD;  Location: [facility] OR    COLONOSCOPY N/A 3/12/2024    Procedure: COLONOSCOPY WITH POLYPECTOMY and EXCISION, LEFT, LESION, BACK;  Surgeon: Kris Charles MD;  Location: [facility] OR    CREATION PERICARDIAL WINDOW  02/10/2017    [facility]    DILATION AND CURETTAGE N/A 7/22/2022    Procedure: DILATION AND CURETTAGE, UTERUS;  Surgeon: Lesly Holland;  Location: [facility] OR    HEMORRHOIDECTOMY EXTERNAL      HYSTEROSCOPY, WITH ENDOMETRIAL RADIOFREQUENCY ABLATION - NOVASURE N/A 7/22/2022    Procedure: HYSTEROSCOPY, WITH ENDOMETRIAL RADIOFREQUENCY ABLATION - NOVASURE DILATION AND CURETTAGE, UTERUS;  Surgeon: Lesly Holland;  Location: [facility] OR    LAPAROSCOPIC ASSISTED SIGMOID COLECTOMY N/A 1/6/2023    Procedure: LAPAROSCOPIC ASSISTED DESCENDING COLELCTOMY SPLENIC FLEXURE MOBILIZATION ;  Surgeon: Kris Charles MD;  Location: [facility] OR    LAPAROSCOPIC LYSIS ADHESIONS N/A 1/6/2023    Procedure: LYSIS OF ADHESIONS;   "Surgeon: Kris Charles MD;  Location: [facility] OR    TUMOR REMOVAL      Has had 3. In the right breast and \"inside of rib cage.\"       OB/Gyn History:  OB History    Para Term  AB Living   5 5 5 0 0 0   SAB IAB Ectopic Multiple Live Births   0 0 0 0 0      # Outcome Date GA Lbr Alexi/2nd Weight Sex Type Anes PTL Lv   5 Term            4 Term            3 Term            2 Term            1 Term                Medications/Vitamins/Supplements:   Current Outpatient Medications   Medication Sig Dispense Refill    oxyBUTYnin (DITROPAN) 5 MG tablet Take 1 tablet (5 mg) by mouth 3 times daily. 180 tablet 3    albuterol (PROAIR HFA/PROVENTIL HFA/VENTOLIN HFA) 108 (90 Base) MCG/ACT inhaler Inhale 2 puffs into the lungs every 6 hours as needed for shortness of breath, wheezing or cough. 18 g 0    ALPRAZolam (XANAX) 2 MG tablet Take 2 mg by mouth as needed.      cetirizine (ZYRTEC) 10 MG tablet Take 1 tablet (10 mg) by mouth daily 30 tablet 11    fluticasone (FLONASE) 50 MCG/ACT nasal spray Spray 2 sprays into both nostrils daily 9.9 mL 11    hydrOXYzine carlos (VISTARIL) 25 MG capsule Take 1 capsule (25 mg) by mouth nightly as needed for anxiety 30 capsule 3    metFORMIN (GLUCOPHAGE XR) 500 MG 24 hr tablet 1 tablet with dinner daily for 2 weeks then 2 tablets with dinner 180 tablet 1    montelukast (SINGULAIR) 10 MG tablet Take 1 tablet (10 mg) by mouth At Bedtime 30 tablet 11    naloxone (NARCAN) 4 MG/0.1ML nasal spray Spray 1 spray (4 mg) into one nostril alternating nostrils as needed for opioid reversal every 2-3 minutes until assistance arrives (Patient not taking: Reported on 3/6/2024) 0.2 mL 1    oxyCODONE (ROXICODONE) 5 MG tablet Take 1 tablet (5 mg) by mouth every 6 hours as needed for pain. 80 tablet 0    PROAIR  (90 Base) MCG/ACT inhaler Inhale 2 puffs into the lungs every 4 hours as needed for shortness of breath or wheezing 8 g 11    sertraline (ZOLOFT) 50 MG tablet Take 1.5 " tablets (75 mg) by mouth daily 135 tablet 3    spacer (OPTICHAMBER BINA) holding chamber For use w/ rescue inhaler 1 each 0    tiotropium (SPIRIVA RESPIMAT) 1.25 MCG/ACT inhaler Inhale 2 puffs into the lungs daily. 1 g 0    tiotropium (SPIRIVA) 18 MCG inhaled capsule Inhale 1 capsule (18 mcg) into the lungs daily. 7 capsule 0     No current facility-administered medications for this visit.     Facility-Administered Medications Ordered in Other Visits   Medication Dose Route Frequency Provider Last Rate Last Admin    ceFAZolin (ANCEF) intermittent infusion 2 g in 50 mL dextrose PREMIX  2 g Intravenous See Admin Instructions Caty Darby PA-C        flumazenil (ROMAZICON) injection 0.2 mg  0.2 mg Intravenous q1 min prn Kodi Hathaway DO        gabapentin (NEURONTIN) capsule 300 mg  300 mg Oral Pre-Op/Pre-procedure x 1 dose Kodi Hathaway DO        lactated ringers infusion   Intravenous Continuous Kodi Hathaway DO   Stopped at 06/21/23 1149    lidocaine (LMX4) kit   Topical Q1H PRN Kodi Hathaway DO        lidocaine 1 % 0.1-1 mL  0.1-1 mL Other Q1H PRN Kodi Hathaway DO        midazolam (VERSED) injection 1-2 mg  1-2 mg Intravenous Q4 Min PRN Kodi Hathaway DO        naloxone (NARCAN) injection 0.2 mg  0.2 mg Intravenous Q2 Min PRN Kodi Hathaway DO        Or    naloxone (NARCAN) injection 0.4 mg  0.4 mg Intravenous Q2 Min PRN Kodi Hathaway DO        Or    naloxone (NARCAN) injection 0.2 mg  0.2 mg Intramuscular Q2 Min PRN Kodi Hathaway DO        Or    naloxone (NARCAN) injection 0.4 mg  0.4 mg Intramuscular Q2 Min PRN Kodi Hathaway DO        sodium chloride (PF) 0.9% PF flush 3 mL  3 mL Intracatheter Q8H Kodi Hathaway DO        sodium chloride (PF) 0.9% PF flush 3 mL  3 mL Intracatheter q1 min prn Kodi Hathaway DO             Medical History:      Past Medical History:   Diagnosis Date    Abdominal pain 06/29/2015    Abnormal cervical Papanicolaou smear 11/09/2014    Overview:  ACUS/HPV positive     Abnormal cytology finding 11/09/2014    Overview:  ACUS/HPV positive    Acute pericardial effusion 02/06/2017    Agoraphobia with panic attacks     Anxiety     Arthralgia of both lower legs 05/29/2020    Bilateral achy knee pain that is chronic in nature going on for years. Most likely osteoarthritis in knees related to obesity. Previously injected in both knees with good relief. 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  Has controlled substance agreement with them.  On Vicodin, Valium, Klonopin prescribed only from there.       Coronary artery disease     Cough 02/09/2020    Family history of colon cancer 10/24/2020    Hx of seasonal allergies     Infection due to 2019 novel coronavirus 09/20/2022    Infectious pericarditis     Lipoma     Low back pain 07/13/2015    Major depressive disorder, recurrent episode, moderate (H) 09/05/2006    Menorrhagia with irregular cycle 07/15/2022    Added automatically from request for surgery [id number]    Moderate persistent asthma without complication 09/29/2020    Nondependent alcohol abuse, episodic drinking behavior 10/03/2012    Noninflammatory disorder of vagina 02/20/2015    Other chronic pain     Other long term (current) drug therapy 01/28/2013    Overview:  Vicodin and cyclobenzaprine monthly    Panic disorder with agoraphobia 09/05/2006    Pap smear for cervical cancer screening 10/31/2016    03/29/2010  Normal cytology, HPV ot done, repeat in 3 years.    Pericarditis 2017     Physiologic disturbance of temperature regulation 10/24/2020    PONV (postoperative nausea and vomiting)     Right ankle swelling 06/07/2022    Added automatically from request for surgery [id number]    Tobacco abuse     Tobacco use disorder 07/13/2015     ROS  Social History    Social History     Socioeconomic History    Marital status:      Spouse name: Not on file    Number of children: Not on file    Years of education: Not on file    Highest education level: Not on file   Occupational History    Not on file   Tobacco Use    Smoking status: Some Days     Current packs/day: 0.10     Average packs/day: 0.1 packs/day for 34.4 years (3.4 ttl pk-yrs)     Types: Cigarettes     Start date: 5/28/1990     Passive exposure: Past    Smokeless tobacco: Never    Tobacco comments:     Currently smoking 1 cigarette or so a week   Vaping Use    Vaping status: Former   Substance and Sexual Activity    Alcohol use: Not Currently     Comment: Occasiaonlly, not since 3/2023    Drug use: Not Currently     Comment: I am prescribed Xanax and oxycodone    Sexual activity: Yes     Partners: Male     Birth control/protection: Female Surgical   Other Topics Concern    Parent/sibling w/ CABG, MI or angioplasty before 65F 55M? Not Asked   Social History Narrative    Not on file     Social Determinants of Health     Financial Resource Strain: Low Risk  (10/13/2023)    Financial Resource Strain     Within the past 12 months, have you or your family members you live with been unable to get utilities (heat, electricity) when it was really needed?: No   Food Insecurity: Low Risk  (10/13/2023)    Food Insecurity     Within the past 12 months, did you worry that your food would run out before you got money to buy more?: No     Within the past 12 months, did the food you bought just not last and you didn t have money to get more?: No   Transportation Needs: Low Risk  (10/13/2023)    Transportation Needs     Within the past 12 months, has lack  "of transportation kept you from medical appointments, getting your medicines, non-medical meetings or appointments, work, or from getting things that you need?: No   Physical Activity: Not on file   Stress: Not on file   Social Connections: Not on file   Interpersonal Safety: Low Risk  (7/3/2024)    Interpersonal Safety     Do you feel physically and emotionally safe where you currently live?: Yes     Within the past 12 months, have you been hit, slapped, kicked or otherwise physically hurt by someone?: No     Within the past 12 months, have you been humiliated or emotionally abused in other ways by your partner or ex-partner?: No   Housing Stability: Low Risk  (10/13/2023)    Housing Stability     Do you have housing? : Yes     Are you worried about losing your housing?: No       Family History  Family History   Problem Relation Age of Onset    Hypertension Mother     Cancer Mother         cervical     Asthma Father     Diabetes Brother     Diabetes Brother     Breast Cancer Maternal Grandmother     Asthma Child     Other Cancer No family hx of        Allergy    Allergies   Allergen Reactions    Gabapentin Other (See Comments)     Mental status is changed     Lidocaine Other (See Comments)     \"my jaw stopped moving\"  Other reaction(s): Dystonia    Penicillins Hives, Rash and Shortness Of Breath    Lidocaine-Epinephrine (Pf) Other (See Comments) and Muscle Pain (Myalgia)     Severe jaw cramping, double vision  Jaw locking    Topamax [Topiramate] Headache       Current Outpatient Medications   Medication Sig Dispense Refill    albuterol (PROAIR HFA/PROVENTIL HFA/VENTOLIN HFA) 108 (90 Base) MCG/ACT inhaler Inhale 2 puffs into the lungs every 6 hours as needed for shortness of breath, wheezing or cough. 18 g 0    ALPRAZolam (XANAX) 2 MG tablet Take 2 mg by mouth as needed.      cetirizine (ZYRTEC) 10 MG tablet Take 1 tablet (10 mg) by mouth daily 30 tablet 11    fluticasone (FLONASE) 50 MCG/ACT nasal spray Spray 2 " sprays into both nostrils daily 9.9 mL 11    hydrOXYzine carlos (VISTARIL) 25 MG capsule Take 1 capsule (25 mg) by mouth nightly as needed for anxiety 30 capsule 3    metFORMIN (GLUCOPHAGE XR) 500 MG 24 hr tablet 1 tablet with dinner daily for 2 weeks then 2 tablets with dinner 180 tablet 1    montelukast (SINGULAIR) 10 MG tablet Take 1 tablet (10 mg) by mouth At Bedtime 30 tablet 11    naloxone (NARCAN) 4 MG/0.1ML nasal spray Spray 1 spray (4 mg) into one nostril alternating nostrils as needed for opioid reversal every 2-3 minutes until assistance arrives (Patient not taking: Reported on 3/6/2024) 0.2 mL 1    oxyCODONE (ROXICODONE) 5 MG tablet Take 1 tablet (5 mg) by mouth every 6 hours as needed for pain. 80 tablet 0    PROAIR  (90 Base) MCG/ACT inhaler Inhale 2 puffs into the lungs every 4 hours as needed for shortness of breath or wheezing 8 g 11    sertraline (ZOLOFT) 50 MG tablet Take 1.5 tablets (75 mg) by mouth daily 135 tablet 3    spacer (OPTICHAMBER BINA) holding chamber For use w/ rescue inhaler 1 each 0    tiotropium (SPIRIVA RESPIMAT) 1.25 MCG/ACT inhaler Inhale 2 puffs into the lungs daily. 1 g 0    tiotropium (SPIRIVA) 18 MCG inhaled capsule Inhale 1 capsule (18 mcg) into the lungs daily. 7 capsule 0     No current facility-administered medications for this visit.     Facility-Administered Medications Ordered in Other Visits   Medication Dose Route Frequency Provider Last Rate Last Admin    ceFAZolin (ANCEF) intermittent infusion 2 g in 50 mL dextrose PREMIX  2 g Intravenous See Admin Instructions Caty Darby PA-C        flumazenil (ROMAZICON) injection 0.2 mg  0.2 mg Intravenous q1 min prn Kodi Hathaway DO        gabapentin (NEURONTIN) capsule 300 mg  300 mg Oral Pre-Op/Pre-procedure x 1 dose Kodi Hathaway DO        lactated ringers infusion   Intravenous Continuous Kodi Hathaway DO   Stopped at 06/21/23 1149    lidocaine (LMX4) kit   Topical Q1H PRN Kodi Hathaway DO         lidocaine 1 % 0.1-1 mL  0.1-1 mL Other Q1H PRN Hathaway, Kodi, DO        midazolam (VERSED) injection 1-2 mg  1-2 mg Intravenous Q4 Min PRN Hathaway, Kodi, DO        naloxone (NARCAN) injection 0.2 mg  0.2 mg Intravenous Q2 Min PRN Hathaway, Kodi, DO        Or    naloxone (NARCAN) injection 0.4 mg  0.4 mg Intravenous Q2 Min PRN Hathaway, Kodi, DO        Or    naloxone (NARCAN) injection 0.2 mg  0.2 mg Intramuscular Q2 Min PRN Hathaway, Kodi, DO        Or    naloxone (NARCAN) injection 0.4 mg  0.4 mg Intramuscular Q2 Min PRN Hathaway, Kodi, DO        sodium chloride (PF) 0.9% PF flush 3 mL  3 mL Intracatheter Q8H Hathaway, Kodi, DO        sodium chloride (PF) 0.9% PF flush 3 mL  3 mL Intracatheter q1 min prn Rivas, Kodi, DO           Objective:   Deferred ( virtual visit)     Asssessment and plan  Karoline was seen today for consult.    Diagnoses and all orders for this visit:    Mixed incontinence  -     oxyBUTYnin (DITROPAN) 5 MG tablet; Take 1 tablet (5 mg) by mouth 3 times daily.    Incontinence of feces, unspecified fecal incontinence type    Other orders  -     Adult Urology  Referral      Fecal incontinence of formed stool without any sensory awareness. We discussed daily fiber supplement for her bowels and following up with her oncologist to make sure that there is no evidence for recurrent of her colon cancer given her bowel control issues in the last 6-8 months. She had normal CT abd/pelvis and is scheduled for MRI as well. If there is not evidence of cancer and fiber does not help, may benefit from interstim ( sacral nerve stimulation) which can address both bladder and bowel incontinence.     I offered referral for pelvic PT but she did not want to do this. Says it did not help in the past when she did it for her pubic symphesis diathesis post partum    Discussed reducing bladder irritants, in her case caffeine, carbonation and acidic fluids.     Will bring her in for pelvic exam to rule out other issues  such as prolapse that may be contributing for both her bladder and bowel issues. No prolapse symptoms however which is good.    A trial of anticholinergics for her urge incontinence . Discussed possible side effects of medication.            I spent a total of 30 minutes on  Video with  Darlene Hudson on the date of the encounter in chart review, patient visit, review of tests, documentation and/or discussion with other providers about the issues documented above.

## 2024-10-17 ENCOUNTER — TELEPHONE (OUTPATIENT)
Dept: FAMILY MEDICINE | Facility: CLINIC | Age: 49
End: 2024-10-17
Payer: COMMERCIAL

## 2024-10-17 NOTE — TELEPHONE ENCOUNTER
Forms/Letter Request    Type of form/letter: OTHER:     Do we have the form/letter: Yes: [place] [employer]  [facility] referral form    Who is the form from? [employer]     Where did/will the form come from? form was faxed in    When is form/letter needed by: ASAP    How would you like the form/letter returned: Fax : [phone]    Patient Notified form requests are processed in 5-7 business days:No    Could we send this information to you in [employer] or would you prefer to receive a phone call?:   No preference   Okay to leave a detailed message?: No

## 2024-10-17 NOTE — TELEPHONE ENCOUNTER
CMA filled out form to best of ability, confirmed Urology clinic fax number. MD to fill out referral date, sign and date form.     Form on MD desk, upper shelf basket.

## 2024-10-18 NOTE — TELEPHONE ENCOUNTER
Date form completed: 10/18/2024   Form sent via: Fax  Date faxed or mailed: 10/18/24  Fax # or Address: [phone]  Completed by: Juventino Stoll MA

## 2024-10-21 ENCOUNTER — APPOINTMENT (OUTPATIENT)
Dept: CT IMAGING | Facility: HOSPITAL | Age: 49
End: 2024-10-21
Payer: COMMERCIAL

## 2024-10-21 ENCOUNTER — APPOINTMENT (OUTPATIENT)
Dept: RADIOLOGY | Facility: HOSPITAL | Age: 49
End: 2024-10-21
Payer: COMMERCIAL

## 2024-10-21 ENCOUNTER — TELEPHONE (OUTPATIENT)
Dept: FAMILY MEDICINE | Facility: CLINIC | Age: 49
End: 2024-10-21

## 2024-10-21 ENCOUNTER — HOSPITAL ENCOUNTER (EMERGENCY)
Facility: HOSPITAL | Age: 49
Discharge: HOME | End: 2024-10-21
Attending: EMERGENCY MEDICINE | Admitting: EMERGENCY MEDICINE
Payer: COMMERCIAL

## 2024-10-21 VITALS
TEMPERATURE: 98.7 F | HEIGHT: 69 IN | WEIGHT: 260 LBS | HEART RATE: 103 BPM | RESPIRATION RATE: 24 BRPM | SYSTOLIC BLOOD PRESSURE: 101 MMHG | OXYGEN SATURATION: 94 % | BODY MASS INDEX: 38.51 KG/M2 | DIASTOLIC BLOOD PRESSURE: 55 MMHG

## 2024-10-21 DIAGNOSIS — J18.9 PNEUMONIA DUE TO INFECTIOUS ORGANISM, UNSPECIFIED LATERALITY, UNSPECIFIED PART OF LUNG: Primary | ICD-10-CM

## 2024-10-21 DIAGNOSIS — F11.90 OPIOID USE DISORDER: Primary | ICD-10-CM

## 2024-10-21 DIAGNOSIS — F11.29 OPIOID DEPENDENCE WITH OPIOID-INDUCED DISORDER (H): ICD-10-CM

## 2024-10-21 DIAGNOSIS — J45.51 SEVERE PERSISTENT ASTHMA WITH ACUTE EXACERBATION (H): ICD-10-CM

## 2024-10-21 LAB
ALBUMIN SERPL BCG-MCNC: 3.7 G/DL (ref 3.5–5.2)
ALP SERPL-CCNC: 112 U/L (ref 40–150)
ALT SERPL W P-5'-P-CCNC: 40 U/L (ref 0–50)
ANION GAP SERPL CALCULATED.3IONS-SCNC: 11 MMOL/L (ref 7–15)
AST SERPL W P-5'-P-CCNC: 76 U/L (ref 0–45)
BILIRUB DIRECT SERPL-MCNC: <0.2 MG/DL (ref 0–0.3)
BILIRUB SERPL-MCNC: 0.3 MG/DL
BUN SERPL-MCNC: 19.5 MG/DL (ref 6–20)
CALCIUM SERPL-MCNC: 9.1 MG/DL (ref 8.8–10.4)
CHLORIDE SERPL-SCNC: 108 MMOL/L (ref 98–107)
CREAT SERPL-MCNC: 0.82 MG/DL (ref 0.51–0.95)
D DIMER PPP FEU-MCNC: 2.11 UG/ML FEU (ref 0–0.5)
EGFRCR SERPLBLD CKD-EPI 2021: 86 ML/MIN/1.73M2
ERYTHROCYTE [DISTWIDTH] IN BLOOD BY AUTOMATED COUNT: 13.1 % (ref 10–15)
FLUAV RNA SPEC QL NAA+PROBE: NEGATIVE
FLUBV RNA RESP QL NAA+PROBE: NEGATIVE
GLUCOSE SERPL-MCNC: 109 MG/DL (ref 70–99)
HCO3 SERPL-SCNC: 22 MMOL/L (ref 22–29)
HCT VFR BLD AUTO: 34.9 % (ref 35–47)
HGB BLD-MCNC: 11.6 G/DL (ref 11.7–15.7)
LIPASE SERPL-CCNC: 29 U/L (ref 13–60)
MAGNESIUM SERPL-MCNC: 1.4 MG/DL (ref 1.7–2.3)
MCH RBC QN AUTO: 30 PG (ref 26.5–33)
MCHC RBC AUTO-ENTMCNC: 33.2 G/DL (ref 31.5–36.5)
MCV RBC AUTO: 90 FL (ref 78–100)
NT-PROBNP SERPL-MCNC: <36 PG/ML (ref 0–900)
PLATELET # BLD AUTO: 210 10E3/UL (ref 150–450)
POTASSIUM SERPL-SCNC: 3.6 MMOL/L (ref 3.4–5.3)
PROT SERPL-MCNC: 7 G/DL (ref 6.4–8.3)
RBC # BLD AUTO: 3.87 10E6/UL (ref 3.8–5.2)
RSV RNA SPEC NAA+PROBE: NEGATIVE
SARS-COV-2 RNA RESP QL NAA+PROBE: NEGATIVE
SODIUM SERPL-SCNC: 141 MMOL/L (ref 135–145)
TROPONIN T SERPL HS-MCNC: 8 NG/L
WBC # BLD AUTO: 8.7 10E3/UL (ref 4–11)

## 2024-10-21 PROCEDURE — 96366 THER/PROPH/DIAG IV INF ADDON: CPT

## 2024-10-21 PROCEDURE — 83690 ASSAY OF LIPASE: CPT

## 2024-10-21 PROCEDURE — 96375 TX/PRO/DX INJ NEW DRUG ADDON: CPT

## 2024-10-21 PROCEDURE — 93005 ELECTROCARDIOGRAM TRACING: CPT

## 2024-10-21 PROCEDURE — 84484 ASSAY OF TROPONIN QUANT: CPT

## 2024-10-21 PROCEDURE — 83735 ASSAY OF MAGNESIUM: CPT

## 2024-10-21 PROCEDURE — 85027 COMPLETE CBC AUTOMATED: CPT

## 2024-10-21 PROCEDURE — 85379 FIBRIN DEGRADATION QUANT: CPT

## 2024-10-21 PROCEDURE — 83880 ASSAY OF NATRIURETIC PEPTIDE: CPT

## 2024-10-21 PROCEDURE — 99285 EMERGENCY DEPT VISIT HI MDM: CPT | Mod: 25

## 2024-10-21 PROCEDURE — 250N000013 HC RX MED GY IP 250 OP 250 PS 637

## 2024-10-21 PROCEDURE — 74177 CT ABD & PELVIS W/CONTRAST: CPT

## 2024-10-21 PROCEDURE — 71046 X-RAY EXAM CHEST 2 VIEWS: CPT

## 2024-10-21 PROCEDURE — 71275 CT ANGIOGRAPHY CHEST: CPT

## 2024-10-21 PROCEDURE — 250N000011 HC RX IP 250 OP 636: Performed by: EMERGENCY MEDICINE

## 2024-10-21 PROCEDURE — 82248 BILIRUBIN DIRECT: CPT

## 2024-10-21 PROCEDURE — 96365 THER/PROPH/DIAG IV INF INIT: CPT | Mod: 59

## 2024-10-21 PROCEDURE — 87637 SARSCOV2&INF A&B&RSV AMP PRB: CPT

## 2024-10-21 PROCEDURE — 250N000011 HC RX IP 250 OP 636

## 2024-10-21 PROCEDURE — 36415 COLL VENOUS BLD VENIPUNCTURE: CPT

## 2024-10-21 RX ORDER — BUPRENORPHINE HYDROCHLORIDE AND NALOXONE HYDROCHLORIDE DIHYDRATE 2; .5 MG/1; MG/1
2 TABLET SUBLINGUAL DAILY
Qty: 14 TABLET | Refills: 0 | Status: SHIPPED | OUTPATIENT
Start: 2024-10-21 | End: 2024-10-21

## 2024-10-21 RX ORDER — OXYCODONE HYDROCHLORIDE 5 MG/1
5 TABLET ORAL ONCE
Status: COMPLETED | OUTPATIENT
Start: 2024-10-21 | End: 2024-10-21

## 2024-10-21 RX ORDER — IOPAMIDOL 755 MG/ML
90 INJECTION, SOLUTION INTRAVASCULAR ONCE
Status: COMPLETED | OUTPATIENT
Start: 2024-10-21 | End: 2024-10-21

## 2024-10-21 RX ORDER — PREDNISONE 20 MG/1
TABLET ORAL
Qty: 30 TABLET | Refills: 0 | Status: SHIPPED | OUTPATIENT
Start: 2024-10-21 | End: 2024-11-05

## 2024-10-21 RX ORDER — HYDROMORPHONE HYDROCHLORIDE 1 MG/ML
0.5 INJECTION, SOLUTION INTRAMUSCULAR; INTRAVENOUS; SUBCUTANEOUS ONCE
Status: COMPLETED | OUTPATIENT
Start: 2024-10-21 | End: 2024-10-21

## 2024-10-21 RX ORDER — BUPRENORPHINE HYDROCHLORIDE AND NALOXONE HYDROCHLORIDE DIHYDRATE 2; .5 MG/1; MG/1
2 TABLET SUBLINGUAL DAILY
Qty: 14 TABLET | Refills: 0 | Status: SHIPPED | OUTPATIENT
Start: 2024-10-21

## 2024-10-21 RX ORDER — MAGNESIUM SULFATE HEPTAHYDRATE 40 MG/ML
2 INJECTION, SOLUTION INTRAVENOUS ONCE
Status: COMPLETED | OUTPATIENT
Start: 2024-10-21 | End: 2024-10-21

## 2024-10-21 RX ORDER — LEVOFLOXACIN 750 MG/1
750 TABLET, FILM COATED ORAL DAILY
Qty: 5 TABLET | Refills: 0 | Status: SHIPPED | OUTPATIENT
Start: 2024-10-21 | End: 2024-10-26

## 2024-10-21 RX ORDER — BUPRENORPHINE HYDROCHLORIDE AND NALOXONE HYDROCHLORIDE DIHYDRATE 2; .5 MG/1; MG/1
2 TABLET SUBLINGUAL DAILY PRN
Qty: 14 TABLET | Refills: 0 | Status: SHIPPED | OUTPATIENT
Start: 2024-10-21 | End: 2024-11-13

## 2024-10-21 RX ADMIN — HYDROMORPHONE HYDROCHLORIDE 0.5 MG: 1 INJECTION, SOLUTION INTRAMUSCULAR; INTRAVENOUS; SUBCUTANEOUS at 09:09

## 2024-10-21 RX ADMIN — IOPAMIDOL 90 ML: 755 INJECTION, SOLUTION INTRAVENOUS at 09:36

## 2024-10-21 RX ADMIN — OXYCODONE HYDROCHLORIDE 5 MG: 5 TABLET ORAL at 07:25

## 2024-10-21 RX ADMIN — MAGNESIUM SULFATE HEPTAHYDRATE 2 G: 40 INJECTION, SOLUTION INTRAVENOUS at 07:40

## 2024-10-21 ASSESSMENT — ACTIVITIES OF DAILY LIVING (ADL)
ADLS_ACUITY_SCORE: 38
ADLS_ACUITY_SCORE: 40
ADLS_ACUITY_SCORE: 38
ADLS_ACUITY_SCORE: 40
ADLS_ACUITY_SCORE: 38

## 2024-10-21 NOTE — TELEPHONE ENCOUNTER
Patient called back to check on status of message that was routed this morning. Patient Rep notified Patient, can take up to 72 hours for a response. Dr. Bailon is not in office today. Patient asked to speak with Dr. Garcia, also not in office today. Patient stated was released from ED unwillingly. Patient states has a fever of 103, chest pains, difficulty breathing. Patient Rep transfered call to RN.

## 2024-10-21 NOTE — TELEPHONE ENCOUNTER
Writer spoke with patient to obtain further information. Karoline was seen and evaluated in ED, dx pneumonia. In ED, temperature measured 98.7 F. Prescribed Levaquin 750 mg take 1 tablet once daily x 5 days. Karoline reports she has a fever, 100.3 at this time, states she is nervous with what had happened in the past when she was diagnosed with pneumonia, afraid she may also experience same difficulty with breathing. Reassurance given and reviewed red flags to present back into ED if symptoms not improved or should worsen. Karoline is requesting this message be routed to Dr Bailon, would like Dr Bailon's input. Karoline has been informed Dr Bailon is not in clinic today. Arelis VÁSQUEZ

## 2024-10-21 NOTE — ED NOTES
Patient agitated with the amount of pain medication given. Patient states she is not going through withdrawal and that there is something wrong with her. Patient states she works in [employer] and was not able to take pain medication yesterday due to having to work. Patient states she is not only here due to pain, she is here because she has a fever, vomiting and back pain. Patient states pain is similar to kidney stones which she has had before. Patient requesting for new MD. Lozano notified.

## 2024-10-21 NOTE — ED NOTES
Bed: JNED-07  Expected date: 10/21/24  Expected time:   Means of arrival:   Comments:  Shayy  49 female  Flu like symptoms

## 2024-10-21 NOTE — ED TRIAGE NOTES
Pt arrived via EMS at 0600. Pt awoke around 0200 and was feeling nauseous with generalized abd pain that radiates to L low back. Vomited around 5 times at home. EMS gave 4 mg IM zofran en route.

## 2024-10-21 NOTE — TELEPHONE ENCOUNTER
Patient called stating she was taken by ambulance to ED [facility]- says she is in room 7 in the ED. They have told her she has pneumonia and want to send her home but she does not want to go home -- afraid since her breathing is only at 89-90. Wants  to call her on her cell phone as soon as possible.  Thank you

## 2024-10-21 NOTE — TELEPHONE ENCOUNTER
Spoke to pt and encouraged her to take the prescribed medications. I added a pred taper given her asthma hx. Precautions given.     Team - please schedule Karoline for telephone follow-up on 10/23. Thank you!    Robbie Bailon MD  October 21, 2024  4:08 PM

## 2024-10-21 NOTE — DISCHARGE INSTRUCTIONS
As we discussed today, you have pneumonia in both of your lungs. Please take the antibiotic (levofloxacin) as prescribed for the next 5 days.     Return to the emergency department for any worsening symptoms such as difficulty breathing or severe chest pain or other emergency concerns.     Take the Suboxone once daily as prescribed.  Start this approximately 24 hours from your last oxycodone tablet.  Take 4 mg once daily.  You can take an additional 4 mg once daily as needed for any withdrawal symptoms.  Call the [place] recovery clinic for ongoing medication assisted/Suboxone therapy.

## 2024-10-21 NOTE — ED PROVIDER NOTES
ISophie have reviewed the documentation, personally taken the patient's history, performed an exam and agree with the physical finds, diagnosis and management plan.    HPI:  48 y/o f woke at 0200 w chills, subj fever, nauseated vomited x4-5 times.  Given zofran per EMS w improved nauseated.  Also c/o mild sob, abd pain, and acute on chronic back pain.  Hx of asthma, chronic pain.  Hasn't taken oxycodone in >36hrs, takes oxycodone prn but usually 3 times/day.  States didn't take yesterday because she was at work.     Physical Exam: Chronically ill-appearing female in no acute distress.  Initially tachycardic in the 120s though normalized into the 100s on recheck really without any intervention.  Regular rate, rhythm.  No respiratory distress.  Breath sounds are clear throughout.  Abdomen is obese, but soft and entirely nontender on my examination.  Maybe minimal right sided CVA tenderness, no left-sided CVA tenderness.  No midline CT or L-spine tenderness that is reproducible or focal.  Ambulatory without difficulty    MDM: Has been greater than 36 hours since her last oxycodone which she takes 3 times daily, concern for potential opioid withdrawal.  Differential clues viral syndrome, influenza, COVID-19, asthma exacerbation, pyelonephritis.  My concern for ureterolithiasis, peritoneal etiology, hepatobiliary pathology, pancreatitis, PE is low.    ED Course/workup: Patient placed on monitor, twelve-lead EKG shows sinus tachycardia.  COVID flu RSV negative.  CBC, BMP, LFTs, lipase notable only for mild anemia.  Magnesium low at 1.4 replaced IV.  Troponin gender normal at 8 and does not warrant repeat.  BNP less than 36.  D-dimer elevated elevated at 2.1.  Chest x-ray with question of pulmonary edema.  Her nausea has improved after Zofran per EMS.  Given oral oxycodone,  0.5 mg IV Dilaudid. CT PE study, CT abdomen pelvis notable for bilateral pneumonia.  On recheck tachycardia resolved.  She is not hypoxic and  is safe for discharge to home with outpatient management of her pneumonia.    Patient additionally states that she would like to get off the oxycodone and has previously been referred for to Suboxone within her primary care clinic, but apparently the physician who prescribes it is deployed and so she cannot get it.  I was happy to prescribe her with an initial week supply of the Suboxone, and will refer her to the [facility] for ongoing MAT.      ED Course as of 10/21/24 1026   Mon Oct 21, 2024   0722 Hemoglobin(!): 11.6  mild   0722 Magnesium(!): 1.4   0812 D-Dimer Quantitative(!): 2.11       EKG:  EKG individually read and interpreted by myself:  Sinus tachycardia rate 117.  Possible left atrial enlargement.  No notable ST abnormalities.  QRS 80 QTc 449.  Compared to previous from 10/3/2023, inferior T wave inversions have resolved.    Final Diagnosis:     ICD-10-CM    1. Pneumonia due to infectious organism, unspecified laterality, unspecified part of lung  J18.9       2. Opioid dependence with opioid-induced disorder (H)  F11.29             I personally saw the patient and performed a substantive portion of the visit including all aspects of the medical decision making.  I personally made/approved the management plan and take responsibility for the patient management.     MD Ruslan Acuña, Sophie ROGERS MD  10/21/24 1026

## 2024-10-21 NOTE — ED PROVIDER NOTES
Emergency Department Encounter   NAME: Darlene Hudson ; AGE: 49 year old female ; YOB: 1975 ; MRN: [id number] ; PCP: Robbie Bailon   ED PROVIDER: Smita Marti PA-C    Evaluation Date & Time:   10/21/2024  6:08 AM    CHIEF COMPLAINT:  Nausea & Vomiting, Abdominal Pain, and Back Pain      Impression and Plan   MDM: Darlene Hudson is a 49 year old female who presents to the ED for evaluation of fever, chills, nausea, vomiting, chest pain, shortness of breath.   Patient appears uncomfortable on initial exam.  Vitals significant for tachycardia 129.  Physical exam significant for a lumbar musculature tenderness and intermittent expiratory wheezes.  Differential diagnosis includes influenza, COVID, ACS, PE, pneumonia, UTI.     Oxycodone ordered for pain 5 mg- patient takes this at home but has not taken since 10/19- usually takes this 3x per day. We did discuss symptoms of withdrawal however patient states she has been in withdrawal before and this does not feel similar. Patient given dilaudid 0.5 mg for pain as she has ongoing back pain after oxycodone. She does have improvement after this.     Initial lab work significant for magnesium 1.4.  This is repleted with magnesium sulfate 2 g.  Influenza, COVID are negative.   Initial troponin is 8.  EKG is without ST or T wave changes or elevations. Repeat troponin not done as EKG is nonischemic.   CBC  unremarkable. Hepatic panel significant for AST 76 which is elevated from 12 days ago, however, other liver enzymes within normal limits.     Chest xray shows pulmonary edema and cardiomegaly. BNP <36 so low likelihood of new onset CHF.     D Dimer elevated at 2.11. CT PE study ordered. CT Abdomen pelvis ordered as well with patient's abdominal pain. CT PE shows bilateral pneumonia without PE. Abdominal CT unremarkable without acute findings.     Patient unable to give UA during ED course.     Patient did have some subjective blurry vision although normal  neuro exam and this seemed to resolve throughout ED course.     At this point patient will be discharged with levofloxacin for bilateral pneumonia treatment.  We did consider admission although patient is not hypoxic and is afebrile.  She is tachycardic initially and this does decrease to low 100s.  Her pulse upon discharge is 103.    We discussed returning to the emergency department for worsening shortness of breath, chest pain, or other emergency concerns.  Patient unable to provide urinalysis throughout ED course, she only had the 1 episode of urgency today and it seems like her associated back pain is more chronic rather than something like CVA tenderness.  If symptoms such as urgency, frequency, dysuria return she can return to the emergency department for reassessment of the symptoms.  Patient also talked with Dr. Lozano regarding her intake of oxycodone and wanting to decrease her oxycodone and start Suboxone.  Dr. Lozano provided a Suboxone prescription for patient.    Patient understands and is discharged in stable condition.    Medical Decision Making  Obtained supplemental history:Supplemental history obtained?: No  Reviewed external records: External records reviewed?: Documented in chart and Other: Oncology visit 10/9  Care impacted by chronic illness:Documented in Chart, Alcohol and/or Drug Abuse or Dependence, and Chronic Pain  Did you consider but not order tests?: Work up considered but not performed and documented in chart, if applicable  Did you interpret images independently?: Independent interpretation of ECG and images noted in documentation, when applicable.  Consultation discussion with other provider:Did you involve another provider (consultant, MH, pharmacy, etc.)?: I discussed the care with another health care provider, see documentation for details.  Discharge. I prescribed additional prescription strength medication(s) as charted. See documentation for any additional details.    MIPS: CT  Pulmonary Angiogram:The patient had an abnormal d-dimer.      ED COURSE:  6:14 AM I met and introduced myself to the patient. I gathered initial history and performed my physical exam. We discussed plan for initial workup.    I have staffed the patient with Dr. Mercer, ED MD, who has evaluated the patient and agrees with all aspects of today's care.    I rechecked the patient and discussed results, discharge, follow up, and reasons to return to the ED.     At the conclusion of the encounter I discussed the results of all the tests and the disposition. The questions were answered. The patient or family acknowledged understanding and was agreeable with the care plan.    FINAL IMPRESSION:    ICD-10-CM    1. Pneumonia due to infectious organism, unspecified laterality, unspecified part of lung  J18.9       2. Opioid dependence with opioid-induced disorder (H)  F11.29         MEDICATIONS GIVEN IN THE EMERGENCY DEPARTMENT:  Medications   oxyCODONE (ROXICODONE) tablet 5 mg (5 mg Oral $Given 10/21/24 0725)   magnesium sulfate 2 g in 50 mL sterile water intermittent infusion (0 g Intravenous Stopped 10/21/24 0912)   HYDROmorphone (PF) (DILAUDID) injection 0.5 mg (0.5 mg Intravenous $Given 10/21/24 0909)   iopamidol (ISOVUE-370) solution 90 mL (90 mLs Intravenous $Given 10/21/24 0936)       NEW PRESCRIPTIONS STARTED AT TODAY'S ED VISIT:  Discharge Medication List as of 10/21/2024 11:05 AM        START taking these medications    Details   !! buprenorphine-naloxone (SUBOXONE) 2-0.5 MG SUBL sublingual tablet Place 2 tablets under the tongue daily for 7 days., Disp-14 tablet, R-0, Local Print      !! buprenorphine-naloxone (SUBOXONE) 2-0.5 MG SUBL sublingual tablet Place 2 tablets under the tongue daily as needed for severe pain (withdrawal symptoms)., Disp-14 tablet, R-0, Local Print      levofloxacin (LEVAQUIN) 750 MG tablet Take 1 tablet (750 mg) by mouth daily for 5 days., Disp-5 tablet, R-0, E-Prescribe       !! - Potential  "duplicate medications found. Please discuss with provider.          HPI   Use of Intrepreter: N/A     Darlene Hudson is a 49 year old female with a pertinent history of asthma, chronic pain, history of  colon malignancy who presents to the ED by EMS for evaluation of chills, fever, vomiting.  Patient states that she woke up at about 2 AM and felt chills and feverish.  She was nauseous at the time and vomited 4-5 times.  She has not been sick the past few days except for some seasonal allergies.  After waking up she had a \"coughing fit.\" She feels like she has been having chest pain and short of breath since then. She endorses abdominal pain in the suprapubic area. Endorses back pain that feels like her normal back pain but worse.   She did take a xanax at 2 am when all of these symptoms started because she thought maybe she was having some anxiety however this did not improve her symptoms. She reports she has not taken anything for pain since 10/19.   She did have an episode this morning as well with urinary urgency and felt like she could not hold it in. States that she has had this in the past with kidney infections.   Prior to this morning at 2 am patient reports feeling well.       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    Chronic low back pain 01/27/2017    Chronic pain     Chronic sinusitis     Cocaine abuse (H)     Colonic mass 12/28/2022    Controlled substance agreement signed 06/30/2015    Coronary artery disease     Cough 02/09/2020    Family " history of colon cancer 10/24/2020    Hx of seasonal allergies     Infection due to 2019 novel coronavirus 09/20/2022    Infectious pericarditis     Lipoma     Low back pain 07/13/2015    Major depressive disorder, recurrent episode, moderate (H) 09/05/2006    Menorrhagia with irregular cycle 07/15/2022    Moderate persistent asthma without complication 09/29/2020    Nondependent alcohol abuse, episodic drinking behavior 10/03/2012    Noninflammatory disorder of vagina 02/20/2015    Other chronic pain     Other long term (current) drug therapy 01/28/2013    Panic disorder with agoraphobia 09/05/2006    Pap smear for cervical cancer screening 10/31/2016    Pericarditis 2017    Physiologic disturbance of temperature regulation 10/24/2020    PONV (postoperative nausea and vomiting)     Right ankle swelling 06/07/2022    Tobacco abuse     Tobacco use disorder 07/13/2015       Past Surgical History:   Procedure Laterality Date    ANKLE SURGERY Right 05/30/2019    ARTHROSCOPY ANKLE Right 6/21/2023    Procedure: right ankle arthroscopy with debridement;  Surgeon: Kareem Bashir MD;  Location: [facility] OR    BIOPSY BREAST Right 1990    benign    COLONOSCOPY N/A 1/3/2023    Procedure: COLONOSCOPY WITH BIOPSY OF COLON MASS, POLYPECTOMY;  Surgeon: Kris Charles MD;  Location: [facility] OR    COLONOSCOPY N/A 3/12/2024    Procedure: COLONOSCOPY WITH POLYPECTOMY and EXCISION, LEFT, LESION, BACK;  Surgeon: Kris Charles MD;  Location: [facility] OR    CREATION PERICARDIAL WINDOW  02/10/2017    [facility]    DILATION AND CURETTAGE N/A 7/22/2022    Procedure: DILATION AND CURETTAGE, UTERUS;  Surgeon: Lesly Holland;  Location: [facility] OR    HEMORRHOIDECTOMY EXTERNAL      HYSTEROSCOPY, WITH ENDOMETRIAL RADIOFREQUENCY ABLATION - NOVASURE N/A 7/22/2022    Procedure: HYSTEROSCOPY, WITH ENDOMETRIAL RADIOFREQUENCY ABLATION - NOVASURE DILATION AND CURETTAGE, UTERUS;  Surgeon: Lesly Holland;   "Location: [facility] OR    LAPAROSCOPIC ASSISTED SIGMOID COLECTOMY N/A 1/6/2023    Procedure: LAPAROSCOPIC ASSISTED DESCENDING COLELCTOMY SPLENIC FLEXURE MOBILIZATION ;  Surgeon: Kris Charles MD;  Location: [facility] OR    LAPAROSCOPIC LYSIS ADHESIONS N/A 1/6/2023    Procedure: LYSIS OF ADHESIONS;  Surgeon: Kris Charles MD;  Location: [facility] OR    TUMOR REMOVAL      Has had 3. In the right breast and \"inside of rib cage.\"       Family History   Problem Relation Age of Onset    Hypertension Mother     Cancer Mother         cervical     Asthma Father     Diabetes Brother     Diabetes Brother     Breast Cancer Maternal Grandmother     Asthma Child     Other Cancer No family hx of        Social History     Tobacco Use    Smoking status: Some Days     Current packs/day: 0.10     Average packs/day: 0.1 packs/day for 34.4 years (3.4 ttl pk-yrs)     Types: Cigarettes     Start date: 5/28/1990     Passive exposure: Past    Smokeless tobacco: Never    Tobacco comments:     Currently smoking 1 cigarette or so a week   Vaping Use    Vaping status: Former   Substance Use Topics    Alcohol use: Not Currently     Comment: Occasiaonlly, not since 3/2023    Drug use: Not Currently     Comment: I am prescribed Xanax and oxycodone       buprenorphine-naloxone (SUBOXONE) 2-0.5 MG SUBL sublingual tablet  buprenorphine-naloxone (SUBOXONE) 2-0.5 MG SUBL sublingual tablet  levofloxacin (LEVAQUIN) 750 MG tablet  albuterol (PROAIR HFA/PROVENTIL HFA/VENTOLIN HFA) 108 (90 Base) MCG/ACT inhaler  ALPRAZolam (XANAX) 2 MG tablet  cetirizine (ZYRTEC) 10 MG tablet  fluticasone (FLONASE) 50 MCG/ACT nasal spray  hydrOXYzine carlos (VISTARIL) 25 MG capsule  metFORMIN (GLUCOPHAGE XR) 500 MG 24 hr tablet  montelukast (SINGULAIR) 10 MG tablet  naloxone (NARCAN) 4 MG/0.1ML nasal spray  oxyBUTYnin (DITROPAN) 5 MG tablet  oxyCODONE (ROXICODONE) 5 MG tablet  PROAIR  (90 Base) MCG/ACT inhaler  sertraline (ZOLOFT) 50 " "MG tablet  spacer (OPTICHAMBER BINA) holding chamber  tiotropium (SPIRIVA RESPIMAT) 1.25 MCG/ACT inhaler  tiotropium (SPIRIVA) 18 MCG inhaled capsule          Physical Exam     First Vitals:  Patient Vitals for the past 24 hrs:   BP Temp Temp src Pulse Resp SpO2 Height Weight   10/21/24 1017 -- 98.7  F (37.1  C) -- -- -- -- -- --   10/21/24 1015 101/55 -- -- 103 -- 94 % -- --   10/21/24 0618 -- -- -- -- -- -- 1.753 m (5' 9\") 117.9 kg (260 lb)   10/21/24 0610 (!) 146/74 99.5  F (37.5  C) Oral (!) 129 24 98 % -- --         PHYSICAL EXAM:   Physical Exam    Constitutional: alert, no acute distress, uncomfortable   Head: normocephalic, atraumatic  Eyes: EOM intact  Neck: ROM normal  Cardio: regular rate, regular rhythm, no murmurs   Pulmonary: effort normal, breath sounds normal, intermittent left lower lung expiratory wheezing   Abdominal: flat, soft, mild tenderness diffusely, no guarding   MSK: tenderness to musculature of lumbar spine   Skin: no lesions, rashes, or erythema  Neuro: no focal deficit, at baseline, no cranial nerve weakness, no sensation deficit, no weakness, patient having some subjective blurry vision   Psychiatric: mood and behavior within normal limit    Results     LAB:  All pertinent labs reviewed and interpreted  Labs Ordered and Resulted from Time of ED Arrival to Time of ED Departure   CBC WITH PLATELETS - Abnormal       Result Value    WBC Count 8.7      RBC Count 3.87      Hemoglobin 11.6 (*)     Hematocrit 34.9 (*)     MCV 90      MCH 30.0      MCHC 33.2      RDW 13.1      Platelet Count 210     BASIC METABOLIC PANEL - Abnormal    Sodium 141      Potassium 3.6      Chloride 108 (*)     Carbon Dioxide (CO2) 22      Anion Gap 11      Urea Nitrogen 19.5      Creatinine 0.82      GFR Estimate 86      Calcium 9.1      Glucose 109 (*)    HEPATIC FUNCTION PANEL - Abnormal    Protein Total 7.0      Albumin 3.7      Bilirubin Total 0.3      Alkaline Phosphatase 112      AST 76 (*)     ALT 40      " Bilirubin Direct <0.20     MAGNESIUM - Abnormal    Magnesium 1.4 (*)    D DIMER QUANTITATIVE - Abnormal    D-Dimer Quantitative 2.11 (*)    TROPONIN T, HIGH SENSITIVITY - Normal    Troponin T, High Sensitivity 8     INFLUENZA A/B, RSV, & SARS-COV2 PCR - Normal    Influenza A PCR Negative      Influenza B PCR Negative      RSV PCR Negative      SARS CoV2 PCR Negative     LIPASE - Normal    Lipase 29     NT PROBNP INPATIENT - Normal    N terminal Pro BNP Inpatient <36          RADIOLOGY:  CT Abdomen Pelvis w Contrast   Final Result   IMPRESSION:    1.  Findings suspicious for bilateral pneumonia   2.  No acute intra-abdominal abnormalities       CT Chest Pulmonary Embolism w Contrast   Final Result   IMPRESSION:   1.  No pulmonary embolism demonstrated.   2.  Moderately extensive bilateral pulmonary infiltrates.      Chest XR,  PA & LAT   Final Result   IMPRESSION: Low lung volumes. Mild cardiomegaly. Congestion of the central vessels. Interstitial prominence both lungs compatible with edema. No airspace infiltrates or pleural effusions.        EKG results reviewed and interpreted by Dr. Ruslan ED MD.     PROCEDURES:  None     Smita Marti PA-C   Emergency Medicine   [facility] EMERGENCY DEPARTMENT       Smita Marti PA-C  10/21/24 1534

## 2024-10-21 NOTE — TELEPHONE ENCOUNTER
Called pt to schedule appt for 10/23 for either 1pm or 4:20pm. But patient did not answer, LVMTCB to schedue phone visit.

## 2024-10-22 ENCOUNTER — PATIENT OUTREACH (OUTPATIENT)
Dept: CARE COORDINATION | Facility: CLINIC | Age: 49
End: 2024-10-22
Payer: COMMERCIAL

## 2024-10-22 NOTE — PROGRESS NOTES
Clinic Care Coordination Contact  Follow Up Progress Note      Assessment:  The pt was recently in the ED, I called to check up on the pt, and help the pt setup a ED follow. The pt was at [facility] for nausea, vomiting, abdomina, pain,and back pain. I called and talked to the pt, pt stated that she is not feeling good. Pt stated that she might have to go to the ED, again. I told her that if she feels that she needs to go, then go. I told her that she does have a follow up with  on 10/30/2024 at 4:00pm.       Care Gaps:    Health Maintenance Due   Topic Date Due    DEPRESSION ACTION PLAN  Never done    HEPATITIS A IMMUNIZATION (1 of 2 - Risk 2-dose series) Never done    ASTHMA ACTION PLAN  01/31/2021    ZOSTER IMMUNIZATION (1 of 2) Never done    NICOTINE/TOBACCO CESSATION COUNSELING Q 1 YR  10/05/2023    YEARLY PREVENTIVE VISIT  10/05/2023    HEPATITIS B IMMUNIZATION (2 of 3 - 19+ 3-dose series) 10/11/2023    INFLUENZA VACCINE (1) 09/01/2024    COVID-19 Vaccine (4 - 2024-25 season) 09/01/2024    URINE DRUG SCREEN  10/23/2024            duplicate

## 2024-10-23 ENCOUNTER — VIRTUAL VISIT (OUTPATIENT)
Dept: FAMILY MEDICINE | Facility: CLINIC | Age: 49
End: 2024-10-23
Payer: COMMERCIAL

## 2024-10-23 DIAGNOSIS — J18.9 PNEUMONIA OF BOTH LOWER LOBES DUE TO INFECTIOUS ORGANISM: Primary | ICD-10-CM

## 2024-10-23 PROCEDURE — 99442 PR PHYSICIAN TELEPHONE EVALUATION 11-20 MIN: CPT | Mod: 93 | Performed by: FAMILY MEDICINE

## 2024-10-23 NOTE — PROGRESS NOTES
Family Medicine Telephone Visit Note    Chief Complaint   Patient presents with    RECHECK     Pt sick, has double Pneumonia. Feeling a little better.            HPI   Patients name: Karoline  Appointment start time:  4:56 PM    Pneumonia - started Abx yesterday  Also started on pred taper  Shortness of breath was up and down  Still upset about her interactions at the ED  Overall is improving  No CP/palp  No F/C  +cough but improving      Current Outpatient Medications   Medication Sig Dispense Refill    albuterol (PROAIR HFA/PROVENTIL HFA/VENTOLIN HFA) 108 (90 Base) MCG/ACT inhaler Inhale 2 puffs into the lungs every 6 hours as needed for shortness of breath, wheezing or cough. 18 g 0    ALPRAZolam (XANAX) 2 MG tablet Take 2 mg by mouth as needed.      buprenorphine-naloxone (SUBOXONE) 2-0.5 MG SUBL sublingual tablet Place 2 tablets under the tongue daily as needed for severe pain (withdrawal symptoms). 14 tablet 0    buprenorphine-naloxone (SUBOXONE) 2-0.5 MG SUBL sublingual tablet Place 2 tablets under the tongue daily. 14 tablet 0    cetirizine (ZYRTEC) 10 MG tablet Take 1 tablet (10 mg) by mouth daily 30 tablet 11    fluticasone (FLONASE) 50 MCG/ACT nasal spray Spray 2 sprays into both nostrils daily 9.9 mL 11    hydrOXYzine carlos (VISTARIL) 25 MG capsule Take 1 capsule (25 mg) by mouth nightly as needed for anxiety 30 capsule 3    levofloxacin (LEVAQUIN) 750 MG tablet Take 1 tablet (750 mg) by mouth daily for 5 days. 5 tablet 0    metFORMIN (GLUCOPHAGE XR) 500 MG 24 hr tablet 1 tablet with dinner daily for 2 weeks then 2 tablets with dinner 180 tablet 1    montelukast (SINGULAIR) 10 MG tablet Take 1 tablet (10 mg) by mouth At Bedtime 30 tablet 11    naloxone (NARCAN) 4 MG/0.1ML nasal spray Spray 1 spray (4 mg) into one nostril alternating nostrils as needed for opioid reversal every 2-3 minutes until assistance arrives (Patient not taking: Reported on 3/6/2024) 0.2 mL 1    oxyBUTYnin (DITROPAN) 5 MG tablet Take 1  "tablet (5 mg) by mouth 3 times daily. 180 tablet 3    oxyCODONE (ROXICODONE) 5 MG tablet Take 1 tablet (5 mg) by mouth every 6 hours as needed for pain. 80 tablet 0    predniSONE (DELTASONE) 20 MG tablet Take 3 tablets (60 mg) by mouth daily for 5 days, THEN 2 tablets (40 mg) daily for 5 days, THEN 1 tablet (20 mg) daily for 5 days. 30 tablet 0    PROAIR  (90 Base) MCG/ACT inhaler Inhale 2 puffs into the lungs every 4 hours as needed for shortness of breath or wheezing 8 g 11    sertraline (ZOLOFT) 50 MG tablet Take 1.5 tablets (75 mg) by mouth daily 135 tablet 3    spacer (OPTICHAMBER BINA) holding chamber For use w/ rescue inhaler 1 each 0    tiotropium (SPIRIVA RESPIMAT) 1.25 MCG/ACT inhaler Inhale 2 puffs into the lungs daily. 1 g 0    tiotropium (SPIRIVA) 18 MCG inhaled capsule Inhale 1 capsule (18 mcg) into the lungs daily. 7 capsule 0     Allergies   Allergen Reactions    Gabapentin Other (See Comments)     Mental status is changed     Lidocaine Other (See Comments)     \"my jaw stopped moving\"  Other reaction(s): Dystonia    Penicillins Hives, Rash and Shortness Of Breath    Lidocaine-Epinephrine (Pf) Other (See Comments) and Muscle Pain (Myalgia)     Severe jaw cramping, double vision  Jaw locking    Topamax [Topiramate] Headache              Review of Systems:     Constitutional, HEENT, cardiovascular, pulmonary, gi and gu systems are negative, except as otherwise noted.         Physical Exam:     LMP  (LMP Unknown)   Estimated body mass index is 38.4 kg/m  as calculated from the following:    Height as of 10/21/24: 1.753 m (5' 9\").    Weight as of 10/21/24: 117.9 kg (260 lb).    Exam:  Constitutional: alert and mild distress  Psychiatric: mentation appears normal, judgment and insight intact, and worried          Assessment and Plan   Karoline was seen today for recheck.    Diagnoses and all orders for this visit:    Pneumonia of both lower lobes due to infectious organism - stable/ improving on " Abx/pred; precautions given; has appt w/ me on 10/30      Refilled medications that would be required in the next 3 months.     After Visit Information:  Patient declined AVS     Return in about 1 week (around 10/30/2024).    Appointment end time: 5:09 PM  This is a telephone visit that took 13 minutes.      Clinician location:  [facility]     Robbie Bailon MD  October 23, 2024  5:16 PM

## 2024-10-25 LAB
ATRIAL RATE - MUSE: 117 BPM
DIASTOLIC BLOOD PRESSURE - MUSE: 74 MMHG
INTERPRETATION ECG - MUSE: NORMAL
P AXIS - MUSE: 60 DEGREES
PR INTERVAL - MUSE: 162 MS
QRS DURATION - MUSE: 80 MS
QT - MUSE: 322 MS
QTC - MUSE: 449 MS
R AXIS - MUSE: 71 DEGREES
SYSTOLIC BLOOD PRESSURE - MUSE: 129 MMHG
T AXIS - MUSE: 36 DEGREES
VENTRICULAR RATE- MUSE: 117 BPM

## 2024-10-29 NOTE — PROGRESS NOTES
"TELEPHONE VISIT:  Call start: 4:16PM  Call stop: 4:29PM  Call duration: 13 min  Pt location: Home  Provider location: [place]    ASSESSMENT/PLAN:    (F41.1) Generalized anxiety disorder  (primary encounter diagnosis)  Comment: leaving town so will call in early script; f/up in 2 wks  Plan: ALPRAZolam (XANAX) 2 MG tablet          (Z98.890) Status post surgical manipulation of ankle joint  Comment: leaving town so will call in early script; f/up in 2 wks  Plan: oxyCODONE (ROXICODONE) 5 MG tablet          (M54.6,  G89.29) Chronic midline thoracic back pain  Comment: see above  Plan: oxyCODONE (ROXICODONE) 5 MG tablet          (J45.40) Moderate persistent asthma without complication  Comment: refilled; stable s/p pneumonia  Plan: PROAIR  (90 Base) MCG/ACT inhaler          Robbie Bailon MD  October 30, 2024  4:29 PM        Pt is a 49 year old female last seen on 10/23/24 via virtual visit here today for:     Stomach flu - \"whole family is sick\"  Keeping some fluids down   Leaving [facility] this weekend for a week so will need refills early  No other concerns    Per virtual visit w/ Dr Mcgill on 10/15/24:  Mixed incontinence  -     oxyBUTYnin (DITROPAN) 5 MG tablet; Take 1 tablet (5 mg) by mouth 3 times daily.     Incontinence of feces, unspecified fecal incontinence type     Other orders  -     Adult Urology  Referral        Fecal incontinence of formed stool without any sensory awareness. We discussed daily fiber supplement for her bowels and following up with her oncologist to make sure that there is no evidence for recurrent of her colon cancer given her bowel control issues in the last 6-8 months. She had normal CT abd/pelvis and is scheduled for MRI as well. If there is not evidence of cancer and fiber does not help, may benefit from interstim ( sacral nerve stimulation) which can address both bladder and bowel incontinence.      I offered referral for pelvic PT but she did not want to do this. Says it did " not help in the past when she did it for her pubic symphesis diathesis post partum     Discussed reducing bladder irritants, in her case caffeine, carbonation and acidic fluids.      Will bring her in for pelvic exam to rule out other issues such as prolapse that may be contributing for both her bladder and bowel issues. No prolapse symptoms however which is good.     A trial of anticholinergics for her urge incontinence . Discussed possible side effects of medication.     Per my note on 10/9/24:  (R15.9) Full incontinence of feces  (primary encounter diagnosis)  Comment: uro-gyn referral placed as she states that her insurance requires I place all these orders because she is restricted ?  Plan: Adult Urology  Referral          (R32) Urinary incontinence, unspecified type  Comment: see above  Plan: Adult Urology  Referral          (Z98.890) Status post surgical manipulation of ankle joint  Comment: will continue wean and refer to Dr Garcia for suboxone induction once she is ready  Plan: oxyCODONE (ROXICODONE) 5 MG tablet          (M54.6,  G89.29) Chronic midline thoracic back pain  Comment:   Plan: oxyCODONE (ROXICODONE) 5 MG tablet          (F13.20) Benzodiazepine dependence (H)  Comment:   Plan: continues to struggle w/ anxiety; encouraged her to schedule with her therapist; f/up in 3 wks    Past Medical History:   Diagnosis Date    Abdominal pain 06/29/2015    Abnormal cervical Papanicolaou smear 11/09/2014    Overview:  ACUS/HPV positive    Abnormal cytology finding 11/09/2014    Overview:  ACUS/HPV positive    Acute pericardial effusion 02/06/2017    Agoraphobia with panic attacks     Anxiety     Arthralgia of both lower legs 05/29/2020    Bilateral achy knee pain that is chronic in nature going on for years. Most likely osteoarthritis in knees related to obesity. Previously injected in both knees with good relief. 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  Has controlled substance agreement with them.  On Vicodin, Valium, Klonopin prescribed only from there.       Coronary artery disease     Cough 02/09/2020    Family history of colon cancer 10/24/2020    Hx of seasonal allergies     Infection due to 2019 novel coronavirus 09/20/2022    Infectious pericarditis     Lipoma     Low back pain 07/13/2015    Major depressive disorder, recurrent episode, moderate (H) 09/05/2006    Menorrhagia with irregular cycle 07/15/2022    Added automatically from request for surgery [id number]    Moderate persistent asthma without complication 09/29/2020    Nondependent alcohol abuse, episodic drinking behavior 10/03/2012    Noninflammatory disorder of vagina 02/20/2015    Other chronic pain     Other long term (current) drug therapy 01/28/2013    Overview:  Vicodin and cyclobenzaprine monthly    Panic disorder with agoraphobia 09/05/2006    Pap smear for cervical cancer screening 10/31/2016    03/29/2010  Normal cytology, HPV ot done, repeat in 3 years.    Pericarditis 2017    Physiologic disturbance of temperature regulation 10/24/2020    PONV (postoperative nausea and vomiting)     Right ankle swelling 06/07/2022    Added automatically from request for surgery [id number]    Tobacco abuse     Tobacco use disorder 07/13/2015      Past Surgical History:   Procedure Laterality Date    ANKLE SURGERY Right 05/30/2019    ARTHROSCOPY ANKLE Right 6/21/2023    Procedure: right ankle arthroscopy with debridement;  Surgeon: Kareem Bashir  "MD Mk;  Location: [facility] OR    BIOPSY BREAST Right 1990    benign    COLONOSCOPY N/A 1/3/2023    Procedure: COLONOSCOPY WITH BIOPSY OF COLON MASS, POLYPECTOMY;  Surgeon: Kris Charles MD;  Location: [facility] OR    COLONOSCOPY N/A 3/12/2024    Procedure: COLONOSCOPY WITH POLYPECTOMY and EXCISION, LEFT, LESION, BACK;  Surgeon: Kris Charles MD;  Location: [facility] OR    CREATION PERICARDIAL WINDOW  02/10/2017    [facility] [place]    DILATION AND CURETTAGE N/A 7/22/2022    Procedure: DILATION AND CURETTAGE, UTERUS;  Surgeon: Lesly Holland;  Location: [facility] OR    HEMORRHOIDECTOMY EXTERNAL      HYSTEROSCOPY, WITH ENDOMETRIAL RADIOFREQUENCY ABLATION - NOVASURE N/A 7/22/2022    Procedure: HYSTEROSCOPY, WITH ENDOMETRIAL RADIOFREQUENCY ABLATION - NOVASURE DILATION AND CURETTAGE, UTERUS;  Surgeon: Lesly Holland;  Location: [facility] OR    LAPAROSCOPIC ASSISTED SIGMOID COLECTOMY N/A 1/6/2023    Procedure: LAPAROSCOPIC ASSISTED DESCENDING COLELCTOMY SPLENIC FLEXURE MOBILIZATION ;  Surgeon: Kris Charles MD;  Location: [facility] OR    LAPAROSCOPIC LYSIS ADHESIONS N/A 1/6/2023    Procedure: LYSIS OF ADHESIONS;  Surgeon: Kris Charles MD;  Location: [facility] OR    TUMOR REMOVAL      Has had 3. In the right breast and \"inside of rib cage.\"      Current Outpatient Medications   Medication Sig Dispense Refill    albuterol (PROAIR HFA/PROVENTIL HFA/VENTOLIN HFA) 108 (90 Base) MCG/ACT inhaler Inhale 2 puffs into the lungs every 6 hours as needed for shortness of breath, wheezing or cough. 18 g 0    ALPRAZolam (XANAX) 2 MG tablet Take 2 mg by mouth as needed.      buprenorphine-naloxone (SUBOXONE) 2-0.5 MG SUBL sublingual tablet Place 2 tablets under the tongue daily as needed for severe pain (withdrawal symptoms). 14 tablet 0    buprenorphine-naloxone (SUBOXONE) 2-0.5 MG SUBL sublingual tablet Place 2 tablets under the tongue daily. 14 tablet 0    " "cetirizine (ZYRTEC) 10 MG tablet Take 1 tablet (10 mg) by mouth daily 30 tablet 11    fluticasone (FLONASE) 50 MCG/ACT nasal spray Spray 2 sprays into both nostrils daily 9.9 mL 11    hydrOXYzine carlos (VISTARIL) 25 MG capsule Take 1 capsule (25 mg) by mouth nightly as needed for anxiety 30 capsule 3    metFORMIN (GLUCOPHAGE XR) 500 MG 24 hr tablet 1 tablet with dinner daily for 2 weeks then 2 tablets with dinner 180 tablet 1    montelukast (SINGULAIR) 10 MG tablet Take 1 tablet (10 mg) by mouth At Bedtime 30 tablet 11    naloxone (NARCAN) 4 MG/0.1ML nasal spray Spray 1 spray (4 mg) into one nostril alternating nostrils as needed for opioid reversal every 2-3 minutes until assistance arrives (Patient not taking: Reported on 3/6/2024) 0.2 mL 1    oxyBUTYnin (DITROPAN) 5 MG tablet Take 1 tablet (5 mg) by mouth 3 times daily. 180 tablet 3    oxyCODONE (ROXICODONE) 5 MG tablet Take 1 tablet (5 mg) by mouth every 6 hours as needed for pain. 80 tablet 0    predniSONE (DELTASONE) 20 MG tablet Take 3 tablets (60 mg) by mouth daily for 5 days, THEN 2 tablets (40 mg) daily for 5 days, THEN 1 tablet (20 mg) daily for 5 days. 30 tablet 0    PROAIR  (90 Base) MCG/ACT inhaler Inhale 2 puffs into the lungs every 4 hours as needed for shortness of breath or wheezing 8 g 11    sertraline (ZOLOFT) 50 MG tablet Take 1.5 tablets (75 mg) by mouth daily 135 tablet 3    spacer (OPTICHAMBER BINA) holding chamber For use w/ rescue inhaler 1 each 0    tiotropium (SPIRIVA RESPIMAT) 1.25 MCG/ACT inhaler Inhale 2 puffs into the lungs daily. 1 g 0    tiotropium (SPIRIVA) 18 MCG inhaled capsule Inhale 1 capsule (18 mcg) into the lungs daily. 7 capsule 0      Allergies   Allergen Reactions    Gabapentin Other (See Comments)     Mental status is changed     Lidocaine Other (See Comments)     \"my jaw stopped moving\"  Other reaction(s): Dystonia    Penicillins Hives, Rash and Shortness Of Breath    Lidocaine-Epinephrine (Pf) Other (See " Comments) and Muscle Pain (Myalgia)     Severe jaw cramping, double vision  Jaw locking    Topamax [Topiramate] Headache      ROS - per HPI  Exam: deferred as telephone encounter

## 2024-10-30 ENCOUNTER — VIRTUAL VISIT (OUTPATIENT)
Dept: FAMILY MEDICINE | Facility: CLINIC | Age: 49
End: 2024-10-30
Payer: COMMERCIAL

## 2024-10-30 DIAGNOSIS — F41.1 GENERALIZED ANXIETY DISORDER: Primary | ICD-10-CM

## 2024-10-30 DIAGNOSIS — J45.40 MODERATE PERSISTENT ASTHMA WITHOUT COMPLICATION: ICD-10-CM

## 2024-10-30 DIAGNOSIS — Z98.890 STATUS POST SURGICAL MANIPULATION OF ANKLE JOINT: ICD-10-CM

## 2024-10-30 DIAGNOSIS — M54.6 CHRONIC MIDLINE THORACIC BACK PAIN: ICD-10-CM

## 2024-10-30 DIAGNOSIS — G89.29 CHRONIC MIDLINE THORACIC BACK PAIN: ICD-10-CM

## 2024-10-30 PROCEDURE — 99442 PR PHYSICIAN TELEPHONE EVALUATION 11-20 MIN: CPT | Mod: 93 | Performed by: FAMILY MEDICINE

## 2024-10-30 RX ORDER — ALPRAZOLAM 2 MG/1
2 TABLET ORAL 3 TIMES DAILY PRN
Qty: 30 TABLET | Refills: 0 | Status: SHIPPED | OUTPATIENT
Start: 2024-11-02 | End: 2024-10-31

## 2024-10-30 RX ORDER — OXYCODONE HYDROCHLORIDE 5 MG/1
5 TABLET ORAL EVERY 6 HOURS PRN
Qty: 30 TABLET | Refills: 0 | Status: SHIPPED | OUTPATIENT
Start: 2024-11-02 | End: 2024-11-13

## 2024-10-30 RX ORDER — ALBUTEROL SULFATE 90 UG/1
2 AEROSOL, METERED RESPIRATORY (INHALATION) EVERY 4 HOURS PRN
Qty: 8 G | Refills: 11 | Status: SHIPPED | OUTPATIENT
Start: 2024-10-30

## 2024-10-30 NOTE — PROGRESS NOTES
"Family Medicine Telephone Visit Note        Chief Complaint   Patient presents with    RECHECK       {If Provider starts visit, do consent and meds as above using \"VIRTUAL tab.\" For calls - Use [employer] nikkie to use their \"\" function to call without revealing your cell phone # and show your clinics phone number. Or use *67 before dialing the 10 digit #. DELETE THIS TEXT.}              HPI   Patients name: Karoline  Appointment start time:  { :3876087}    {Superlists ():762760}      Current Outpatient Medications   Medication Sig Dispense Refill    albuterol (PROAIR HFA/PROVENTIL HFA/VENTOLIN HFA) 108 (90 Base) MCG/ACT inhaler Inhale 2 puffs into the lungs every 6 hours as needed for shortness of breath, wheezing or cough. 18 g 0    ALPRAZolam (XANAX) 2 MG tablet Take 2 mg by mouth as needed.      buprenorphine-naloxone (SUBOXONE) 2-0.5 MG SUBL sublingual tablet Place 2 tablets under the tongue daily as needed for severe pain (withdrawal symptoms). 14 tablet 0    buprenorphine-naloxone (SUBOXONE) 2-0.5 MG SUBL sublingual tablet Place 2 tablets under the tongue daily. 14 tablet 0    cetirizine (ZYRTEC) 10 MG tablet Take 1 tablet (10 mg) by mouth daily 30 tablet 11    fluticasone (FLONASE) 50 MCG/ACT nasal spray Spray 2 sprays into both nostrils daily 9.9 mL 11    hydrOXYzine carlos (VISTARIL) 25 MG capsule Take 1 capsule (25 mg) by mouth nightly as needed for anxiety 30 capsule 3    metFORMIN (GLUCOPHAGE XR) 500 MG 24 hr tablet 1 tablet with dinner daily for 2 weeks then 2 tablets with dinner 180 tablet 1    montelukast (SINGULAIR) 10 MG tablet Take 1 tablet (10 mg) by mouth At Bedtime 30 tablet 11    naloxone (NARCAN) 4 MG/0.1ML nasal spray Spray 1 spray (4 mg) into one nostril alternating nostrils as needed for opioid reversal every 2-3 minutes until assistance arrives (Patient not taking: Reported on 3/6/2024) 0.2 mL 1    oxyBUTYnin (DITROPAN) 5 MG tablet Take 1 tablet (5 mg) by mouth 3 times daily. 180 tablet 3    " "oxyCODONE (ROXICODONE) 5 MG tablet Take 1 tablet (5 mg) by mouth every 6 hours as needed for pain. 80 tablet 0    predniSONE (DELTASONE) 20 MG tablet Take 3 tablets (60 mg) by mouth daily for 5 days, THEN 2 tablets (40 mg) daily for 5 days, THEN 1 tablet (20 mg) daily for 5 days. 30 tablet 0    PROAIR  (90 Base) MCG/ACT inhaler Inhale 2 puffs into the lungs every 4 hours as needed for shortness of breath or wheezing 8 g 11    sertraline (ZOLOFT) 50 MG tablet Take 1.5 tablets (75 mg) by mouth daily 135 tablet 3    spacer (OPTICHAMBER BINA) holding chamber For use w/ rescue inhaler 1 each 0    tiotropium (SPIRIVA RESPIMAT) 1.25 MCG/ACT inhaler Inhale 2 puffs into the lungs daily. 1 g 0    tiotropium (SPIRIVA) 18 MCG inhaled capsule Inhale 1 capsule (18 mcg) into the lungs daily. 7 capsule 0     Allergies   Allergen Reactions    Gabapentin Other (See Comments)     Mental status is changed     Lidocaine Other (See Comments)     \"my jaw stopped moving\"  Other reaction(s): Dystonia    Penicillins Hives, Rash and Shortness Of Breath    Lidocaine-Epinephrine (Pf) Other (See Comments) and Muscle Pain (Myalgia)     Severe jaw cramping, double vision  Jaw locking    Topamax [Topiramate] Headache              Review of Systems:     {ROS COMP (Optional):896878}         Physical Exam:     LMP  (LMP Unknown)   Estimated body mass index is 38.4 kg/m  as calculated from the following:    Height as of 10/21/24: 1.753 m (5' 9\").    Weight as of 10/21/24: 117.9 kg (260 lb).    Exam:  Constitutional: {:792430}  Psychiatric: {:321194}    {Result Choices:600417}        Assessment and Plan   {Diag Picklist:196470}    Refilled medications that would be required in the next 3 months.     After Visit Information:  {avs options:570429}    No follow-ups on file.    Appointment end time: { :0470502}  This is a telephone visit that took *** minutes.      Clinician location:  [facility] [place]     Robbie Bailon MD  I " "precepted today with ***.    {Bill telephone visit time codes. Coding will change to an E&M code if the patient's insurance allows for this.  However, documentation should support E&M code billing.  28999: 5-10 min  97928: 11-20 min  62828: 21-30 min}      {[employer] Language [employer] Access (Help text- F2 to highlight then hit delete)    Dial [phone]    Answer  Welcome to the Language Line Services.  Please enter your six-digit client ID.    [place] enter [id number]  [place] enter [id number]  [facility] enter [id number]  [place] enter [id number]    Answer  \"For [place]ian, press 1.  For all other languages, press 2.\"   they will ask you to say the name of the language.    Press either 1 (yes, correct) or 2 (no, incorrect).}   "

## 2024-10-31 ENCOUNTER — TELEPHONE (OUTPATIENT)
Dept: FAMILY MEDICINE | Facility: CLINIC | Age: 49
End: 2024-10-31
Payer: COMMERCIAL

## 2024-10-31 DIAGNOSIS — F41.1 GENERALIZED ANXIETY DISORDER: ICD-10-CM

## 2024-10-31 RX ORDER — ALPRAZOLAM 2 MG/1
2 TABLET ORAL 4 TIMES DAILY PRN
Qty: 30 TABLET | Refills: 0 | Status: SHIPPED | OUTPATIENT
Start: 2024-11-02 | End: 2024-11-13

## 2024-10-31 NOTE — TELEPHONE ENCOUNTER
Spoke to pt. Sent in script w/ frequency changed to qid. She is aware that I am away starting tomorrow so if this issue is not resolved, I will not be able to help til I return.     Robbie Bailon MD  October 31, 2024  5:21 PM

## 2024-10-31 NOTE — TELEPHONE ENCOUNTER
[facility] phone call message- medication clarification/question:    Full Medication Name:     ALPRAZolam (XANAX) 2 MG tablet         Question: Patient called stating she is unable to  this medication, pharmacy informed her it is too early of a fill, in order to fill pharmacy told her the dosage and instructions needed to be changed or provider would have to call insurance if they can over ride it. Patient stated she is leaving out of [place] and needs her medication. Please call and advise thank you.    Pharmacy confirmed as    [id number] [address]  AT [facility]: Yes    OK to leave a message on voice mail? Yes    Primary language: English      needed? No    Call taken on October 31, 2024 at 2:33 PM by Christine Cruz

## 2024-11-13 ENCOUNTER — TELEPHONE (OUTPATIENT)
Dept: FAMILY MEDICINE | Facility: CLINIC | Age: 49
End: 2024-11-13

## 2024-11-13 DIAGNOSIS — F11.90 OPIOID USE DISORDER: Primary | ICD-10-CM

## 2024-11-13 DIAGNOSIS — F41.1 GENERALIZED ANXIETY DISORDER: ICD-10-CM

## 2024-11-13 RX ORDER — ALPRAZOLAM 2 MG/1
2 TABLET ORAL 4 TIMES DAILY PRN
Qty: 90 TABLET | Refills: 0 | Status: SHIPPED | OUTPATIENT
Start: 2024-11-13

## 2024-11-13 RX ORDER — BUPRENORPHINE HYDROCHLORIDE AND NALOXONE HYDROCHLORIDE DIHYDRATE 2; .5 MG/1; MG/1
2 TABLET SUBLINGUAL DAILY PRN
Qty: 60 TABLET | Refills: 0 | Status: SHIPPED | OUTPATIENT
Start: 2024-11-13

## 2024-11-13 NOTE — TELEPHONE ENCOUNTER
FYI - Status Update    Who is Calling: patient    Update: Patient is requesting that you see Patient's Son (Gunjan Clayton) today in place of patient's appointment at 4:20PM. Patient mentioned Son needs to be seen and that Patient will schedule another time to come in. If this is allowed, please let FD/CC know to schedule. Thank you!     Does caller want a call/response back: Yes     Could we send this information to you in [place] [employer] or would you prefer to receive a phone call?:     Patient would prefer a phone call

## 2024-11-13 NOTE — TELEPHONE ENCOUNTER
That is fine. Team - please change 4:20 appt to Gunjan Clayton. Thank you!    Robbie Bailon MD  November 13, 2024  3:23 PM

## 2024-11-20 ENCOUNTER — VIRTUAL VISIT (OUTPATIENT)
Dept: PSYCHOLOGY | Facility: CLINIC | Age: 49
End: 2024-11-20
Payer: COMMERCIAL

## 2024-11-20 DIAGNOSIS — F33.1 MAJOR DEPRESSIVE DISORDER, RECURRENT EPISODE, MODERATE (H): Primary | ICD-10-CM

## 2024-11-20 DIAGNOSIS — F41.1 GAD (GENERALIZED ANXIETY DISORDER): ICD-10-CM

## 2024-11-20 PROCEDURE — 90834 PSYTX W PT 45 MINUTES: CPT | Mod: 95

## 2024-11-20 NOTE — PROGRESS NOTES
[facility]                                     Progress Note    Patient Name: Darlene Hudson  Date: 11/20/24         Service Type: Individual      Session Start Time: 1006  Session End Time: 1056     Session Length: 50    Session #: 10    Attendees: Client attended alone    Service Modality:  Video Visit:      Provider verified identity through the following two step process.  Patient provided:  Patient is known previously to provider    Telemedicine Visit: The patient's condition can be safely assessed and treated via synchronous audio and visual telemedicine encounter.      Reason for Telemedicine Visit: Patient has requested telehealth visit    Originating Site (Patient Location): Patient's home    Distant Site (Provider Location): Provider Remote Setting- Home Office    Consent:  The patient/guardian has verbally consented to: the potential risks and benefits of telemedicine (video visit) versus in person care; bill my insurance or make self-payment for services provided; and responsibility for payment of non-covered services.     Patient would like the video invitation sent by:  My Chart    Mode of Communication:  Video Conference via Amwell    Distant Location (Provider):  Off-site    As the provider I attest to compliance with applicable laws and regulations related to telemedicine.    DATA  Interactive Complexity: No  Crisis: No        Progress Since Last Session (Related to Symptoms / Goals / Homework):   Symptoms: No change overall    Homework:  continues completing self care activities      Episode of Care Goals: Satisfactory progress - ACTION (Actively working towards change); Intervened by reinforcing change plan / affirming steps taken     Current / Ongoing Stressors and Concerns:   Many stressors including prior cancer diagnosis and treatment, losses through death of family and friends. Current stress with her job, which she identified as most stressful at this time noting  that it has taken quite a toll on her mental health.   Taking action on the treatment she received at work, wants to have justice in this case, committed to getting her voice heard.     Treatment Objective(s) Addressed in This Session:   Build rapport. Strategies to address anxiety and stress.     Intervention:   Build rapport.  Review of symptoms since last contact.   Review of strategies client has utilized in order to reduce stress in her household.  Provided support for current stressors.      Assessments completed prior to visit:  The following assessments were completed by patient for this visit:  PHQ9:       9/13/2023     2:39 PM 2/21/2024     1:38 PM 5/9/2024     7:34 AM 5/22/2024    11:00 AM 5/28/2024     9:31 AM 7/24/2024     3:59 PM 8/14/2024     5:10 PM   PHQ-9 SCORE   PHQ-9 Total Score MyChart 0  7 (Mild depression)  7 (Mild depression) 6 (Mild depression)    PHQ-9 Total Score 0 5 7 8 7 6    6 7     GAD7:       4/18/2023     5:13 PM 9/13/2023     2:40 PM 2/21/2024     1:38 PM 5/22/2024    11:00 AM 5/22/2024    11:03 AM 8/1/2024    11:00 AM 8/14/2024     5:10 PM   ACE-7 SCORE   Total Score  7 (mild anxiety)   13 (moderate anxiety)     Total Score 5 7 6 19 13    13 19 15     PROMIS 10-Global Health (all questions and answers displayed):       5/22/2024    11:00 AM 5/22/2024    11:04 AM 7/23/2024     9:34 AM   PROMIS 10   In general, would you say your health is:  Fair Poor   In general, would you say your quality of life is:  Fair Fair   In general, how would you rate your physical health?  Fair Poor   In general, how would you rate your mental health, including your mood and your ability to think?  Poor Fair   In general, how would you rate your satisfaction with your social activities and relationships?  Fair Fair   In general, please rate how well you carry out your usual social activities and roles  Fair Good   To what extent are you able to carry out your everyday physical activities such as walking,  climbing stairs, carrying groceries, or moving a chair?  Moderately Moderately   In the past 7 days, how often have you been bothered by emotional problems such as feeling anxious, depressed, or irritable?  Always Always   In the past 7 days, how would you rate your fatigue on average?  Mild Moderate   In the past 7 days, how would you rate your pain on average, where 0 means no pain, and 10 means worst imaginable pain?  5 5   In general, would you say your health is: 2 2  1    In general, would you say your quality of life is:  2  2    In general, how would you rate your physical health?  2  1    In general, how would you rate your mental health, including your mood and your ability to think?  1  2    In general, how would you rate your satisfaction with your social activities and relationships?  2  2    In general, please rate how well you carry out your usual social activities and roles. (This includes activities at home, at work and in your community, and responsibilities as a parent, child, spouse, employee, friend, etc.)  2  3    To what extent are you able to carry out your everyday physical activities such as walking, climbing stairs, carrying groceries, or moving a chair?  3  3    In the past 7 days, how often have you been bothered by emotional problems such as feeling anxious, depressed, or irritable?  5  5    In the past 7 days, how would you rate your fatigue on average?  2  3    In the past 7 days, how would you rate your pain on average, where 0 means no pain, and 10 means worst imaginable pain?  5  5    Global Mental Health Score  6    6 7   Global Physical Health Score  12    12 10   PROMIS TOTAL - SUBSCORES  18    18 17       Patient-reported    Multiple values from one day are sorted in reverse-chronological order         ASSESSMENT: Current Emotional / Mental Status (status of significant symptoms):   Risk status (Self / Other harm or suicidal ideation)   Patient denies current fears or concerns  for personal safety.   Patient denies current or recent suicidal ideation or behaviors.   Patient denies current or recent homicidal ideation or behaviors.   Patient denies current or recent self injurious behavior or ideation.   Patient denies other safety concerns.   Patient reports there has been no change in risk factors since their last session.     Patient reports there has been no change in protective factors since their last session.     Recommended that patient call 911 or go to the local ED should there be a change in any of these risk factors     Appearance:   normal    Eye Contact:   Good    Psychomotor Behavior: normal    Attitude:   Cooperative    Orientation:   All   Speech    Rate / Production: Normal     Volume:  Normal    Mood:    Normal   Affect:    normal   Thought Content:  Clear    Thought Form:  Coherent    Insight:    Fair      Medication Review:   No changes to current psychiatric medication(s)     Medication Compliance:   Yes     Changes in Health Issues:   None reported     Chemical Use Review:   Substance Use: Chemical use reviewed, no active concerns identified      Tobacco Use: No current tobacco use.      Diagnosis:  1. Major depressive disorder, recurrent episode, moderate (H)    2. ACE (generalized anxiety disorder)      Collateral Reports Completed:   Not Applicable    PLAN: (Patient Tasks / Therapist Tasks / Other)  Rtn 2 weeks.   Management of symptoms related to current stressors.        Cate Kaur [facility]                                                         ______________________________________________________________________    Individual Treatment Plan    Patient's Name: Darlene Hudson  YOB: 1975    Date of Creation: 06/11/2024  Date Treatment Plan Last Reviewed/Revised: 06/11/2024, 10/14/2024    DSM5 Diagnoses: 296.32 (F33.1) Major Depressive Disorder, Recurrent Episode, Moderate _ or 300.02 (F41.1) Generalized Anxiety Disorder  Psychosocial /  Contextual Factors: lives at home with sons, lots of stress from older son, workplace stress, on leave, legal interventions due to workplace concerns  PROMIS (reviewed every 90 days): 5/22/24    Referral / Collaboration:  Referral to another professional/service is not indicated at this time..    Anticipated number of session for this episode of care: 9-12 sessions  Anticipation frequency of session: Every other week  Anticipated Duration of each session: 38-52 minutes  Treatment plan will be reviewed in 90 days or when goals have been changed.       MeasurableTreatment Goal(s) related to diagnosis / functional impairment(s)  Goal 1: Patient will decrease symptoms of depression    I will know I've met my goal when I am happy within myself.      Objective #A (Patient Action)    Patient will identify at least 3 techniques for intervening on the escalation.  Status: New - Date: 06/11/2024  , 10/14/2024    Intervention(s)  Therapist will teach emotional regulation skills.   .    Objective #B  Patient will learn and demonstrate 3 assertiveness skill(s).  Status: New - Date: 06/11/2024  , 10/14/2024    Intervention(s)  Therapist will teach assertiveness skills.   .      Goal 2: Patient will decrease symptoms of anxiety    I will know I've met my goal when I am happy within myself.      Objective #A (Patient Action)    Status: New - Date: 06/11/2024  , 10/14/2024    Patient will use at least 3 coping skills for anxiety management in the next 12 weeks.    Intervention(s)  Therapist will teach coping skills for anxiety .    Objective #B  Patient will use cognitive strategies identified in therapy to challenge anxious thoughts.    Status: New - Date: 06/11/2024  , 10/14/2024    Intervention(s)  Therapist will teach cognitive restructuring .    Patient has reviewed and agreed to the above plan.      Cate Kaur, [facility]  June 11, 2024, 10/14/2024    [facility]       PATIENT'S NAME: Darlene RAMOS  "Tristan  PREFERRED NAME: Karoline  PRONOUNS:     she her hers  MRN: [id number]  : 1975  ADDRESS: [address]  [place] [address]  [facility]. NUMBER:  [id number]  DATE OF SERVICE: 24  START TIME: 738  END TIME: 823  PREFERRED PHONE: [phone]  May we leave a program related message: Yes  EMERGENCY CONTACT: was not obtained .  SERVICE MODALITY:  Video Visit:      Provider verified identity through the following two step process.  Patient provided:  Patient  and Patient address    Telemedicine Visit: The patient's condition can be safely assessed and treated via synchronous audio and visual telemedicine encounter.      Reason for Telemedicine Visit: Patient convenience (e.g. access to timely appointments / distance to available provider)    Originating Site (Patient Location): Patient's home    Distant Site (Provider Location): Provider Remote Setting- Home Office    Consent:  The patient/guardian has verbally consented to: the potential risks and benefits of telemedicine (video visit) versus in person care; bill my insurance or make self-payment for services provided; and responsibility for payment of non-covered services.     Patient would like the video invitation sent by:  My Chart    Mode of Communication:  Video Conference via AmSpockly    Distant Location (Provider):  Off-site    As the provider I attest to compliance with applicable laws and regulations related to telemedicine.    UNIVERSAL ADULT Mental Health DIAGNOSTIC ASSESSMENT    Identifying Information:  Patient is a 49 year old, , , and Black ;  individual.  [facility]  Patient attended the session alone.    Chief Complaint:   The reason for seeking services at this time is: \" workplace stresses, workplace bullying and harassment \"   The problem(s) began 2023. Patient has attempted to resolve these concerns in " the past through medication, therapy.    Social/Family History:  Patient reported they grew up in [place], [place].  They were raised by biological mother.  Parents  when 3 years old.   Patient reported that their childhood was pretty good, single mother, worked for the [employer], mother was active in their activities, they saw father fairly frequent.  Patient described their current relationships with family of origin as parents have passed, sibling relatiuonships still.      The patient describes their cultural background as bi racial.  Cultural influences and impact on patient's life structure, values, norms, and healthcare: NA.  Contextual influences on patient's health include: Contextual Factors: Individual Factors stressful work environment, supervisor difficulty, symptoms of depression, significant losses due to covid, previous colon cancer diagnosis and surgery as well as ankle surgery .  Cultural, Contextual, and socioeconomic factors do not affect the patient's access to services.  These factors will be addressed in the Preliminary Treatment plan.  Patient identified their preferred language to be English. Patient reported they do not  need the assistance of an  or other support involved in therapy.     Patient reported had no significant delays in developmental tasks.   Patient's highest education level was some college. Patient identified the following learning problems: none reported.  Modifications will not be used to assist communication in therapy.   Patient reports they are  able to understand written materials.    Patient reported the following relationship history boyfriend of 11 years  of covid .  Patient's current relationship status is  .   Patient identified their sexual orientation as heterosexual.  Patient reported having five child(reza). Patient identified her best friend as part of their support system.  Patient identified the quality of these relationships as  stable and meaningful.     Patient's current living/housing situation involves staying in own home/apartment.  They live with two sons and they report that housing is stable.     Patient is currently on medical leave from her work as a  .  Patient reports their finances are obtained through her savings, and help from family and friends.  Patient does identify finances as a current stressor.      Patient reported that they have not been involved with the legal system.   Patient denies being on probation / parole / under the jurisdiction of the court.    Patient's Strengths and Limitations:  Patient identified the following strengths or resources that will help them succeed in treatment: commitment to health and well being, motivation, and work ethic. Things that may interfere with the patient's success in treatment include: physical health concerns.     Assessments:  The following assessments were completed by patient for this visit:  PHQ9:       5/25/2022    11:31 AM 1/18/2023     4:04 PM 4/18/2023     4:19 PM 4/18/2023     5:13 PM 9/13/2023     2:39 PM 2/21/2024     1:38 PM 5/9/2024     7:34 AM   PHQ-9 SCORE   PHQ-9 Total Score MyChart 0    0  7 (Mild depression)   PHQ-9 Total Score 0 3 2 2 0 5 7     GAD7:       3/12/2020     3:31 PM 2/4/2021     2:04 PM 5/25/2022    11:32 AM 4/18/2023     4:19 PM 4/18/2023     5:13 PM 9/13/2023     2:40 PM 2/21/2024     1:38 PM   ACE-7 SCORE   Total Score   5 (mild anxiety)   7 (mild anxiety)    Total Score 3 5 5 5 5 7 6     CAGE-AID:       5/9/2024     8:00 AM   CAGE-AID Total Score   Total Score 4     PROMIS 10-Global Health (all questions and answers displayed):        No data to display              [place]ill Suicide Severity Rating Scale (Lifetime/Recent)      5/9/2024     8:00 AM   Traill Suicide Severity Rating (Lifetime/Recent)   Q1 Wish to be Dead (Lifetime) N   Q2 Non-Specific Active Suicidal Thoughts (Lifetime) N   Actual Attempt (Lifetime) N   Has  subject engaged in non-suicidal self-injurious behavior? (Lifetime) Y   Has subject engaged in non-suicidal self-injurious behavior? (Past 3 Months) Y   Interrupted Attempts (Lifetime) N   Aborted or Self-Interrupted Attempt (Lifetime) N   Preparatory Acts or Behavior (Lifetime) N   Calculated C-SSRS Risk Score (Lifetime/Recent) No Risk Indicated       Personal and Family Medical History:  Patient   report a family history of mental health concerns.  Patient reports family history includes Asthma in her child and father; Breast Cancer in her maternal grandmother; Cancer in her mother; Diabetes in her brother and brother; Hypertension in her mother; Other Cancer in her father..     Patient does report Mental Health Diagnosis and/or Treatment.  Patient Patient reported the following previous diagnoses which include(s): Depression, PTSD.  Patient reported symptoms began currently and about 15 years ago due to a rough patch with several stressors.   Patient has received mental health services in the past: outpatient therapy and [facility] treatment.  Psychiatric Hospitalizations: None.  Patient denies a history of civil commitment.  Patient is receiving other mental health services.  These include psychotherapy with a therapist in the Oncology dept.       Patient has had a physical exam to rule out medical causes for current symptoms.  Date of last physical exam was within the past year. Client was encouraged to follow up with PCP if symptoms were to develop. The patient has a [facility] Primary Care Provider, who is named Robbie Bailon.  Patient reports the following current medical concerns: past diagnosis and surgery for stage two colon cancer, ankle surgery which ct is still in recovery from.  Patient reports recent ankle surgery that sometimes causes pain.  There are significant appetite / nutritional concerns / weight changes.  Recent significant weight gain, gained 35 lbs.  Inactivity.   Patient does not report a history  of head injury / trauma / cognitive impairment.      Patient reports current meds as:   Current Outpatient Medications   Medication Sig Dispense Refill    albuterol (PROAIR HFA/PROVENTIL HFA/VENTOLIN HFA) 108 (90 Base) MCG/ACT inhaler Inhale 2 puffs into the lungs every 6 hours as needed for shortness of breath, wheezing or cough 18 g 11    ALPRAZolam (XANAX) 2 MG tablet Take 1 tablet (2 mg) by mouth 2 times daily as needed for anxiety 60 tablet 3    cetirizine (ZYRTEC) 10 MG tablet Take 1 tablet (10 mg) by mouth daily 30 tablet 11    fluticasone (FLONASE) 50 MCG/ACT nasal spray Spray 1 spray into both nostrils daily      fluticasone (FLONASE) 50 MCG/ACT nasal spray Spray 2 sprays into both nostrils daily 9.9 mL 11    hydrOXYzine carlos (VISTARIL) 25 MG capsule Take 1 capsule (25 mg) by mouth nightly as needed for anxiety 30 capsule 3    montelukast (SINGULAIR) 10 MG tablet Take 1 tablet (10 mg) by mouth At Bedtime 30 tablet 11    naloxone (NARCAN) 4 MG/0.1ML nasal spray Spray 1 spray (4 mg) into one nostril alternating nostrils as needed for opioid reversal every 2-3 minutes until assistance arrives (Patient not taking: Reported on 3/6/2024) 0.2 mL 1    oxyCODONE (ROXICODONE) 5 MG tablet Take 1 tablet (5 mg) by mouth 3 times daily as needed for severe pain 75 tablet 0    PROAIR  (90 Base) MCG/ACT inhaler Inhale 2 puffs into the lungs every 4 hours as needed for shortness of breath or wheezing 8 g 11    sertraline (ZOLOFT) 50 MG tablet Take 2 tablets (100 mg) by mouth daily 60 tablet 3    spacer (OPTICHAMBER BINA) holding chamber For use w/ rescue inhaler 1 each 0    sucralfate (CARAFATE) 1 GM/10ML suspension Take 10 mLs (1 g) by mouth 4 times daily as needed for nausea 414 mL 0    tiotropium (SPIRIVA RESPIMAT) 1.25 MCG/ACT inhaler Inhale 2 puffs into the lungs daily 12 g 3    tiotropium (SPIRIVA) 18 MCG inhaled capsule Inhale 18 mcg into the lungs daily (Patient not taking: Reported on 3/6/2024)       No  "current facility-administered medications for this visit.     Facility-Administered Medications Ordered in Other Visits   Medication Dose Route Frequency Provider Last Rate Last Admin    ceFAZolin (ANCEF) intermittent infusion 2 g in 50 mL dextrose PREMIX  2 g Intravenous See Admin Instructions Caty Daryb PA-C        flumazenil (ROMAZICON) injection 0.2 mg  0.2 mg Intravenous q1 min prn Kodi Hathaway,         gabapentin (NEURONTIN) capsule 300 mg  300 mg Oral Pre-Op/Pre-procedure x 1 dose Kodi Hathaway DO        lactated ringers infusion   Intravenous Continuous Kodi Hathaway DO   Stopped at 06/21/23 1149    lidocaine (LMX4) kit   Topical Q1H PRN Kodi Hathaway,         lidocaine 1 % 0.1-1 mL  0.1-1 mL Other Q1H PRN Kodi Hathaway DO        midazolam (VERSED) injection 1-2 mg  1-2 mg Intravenous Q4 Min PRN Kodi Hathaway,         naloxone (NARCAN) injection 0.2 mg  0.2 mg Intravenous Q2 Min PRN Kodi Hathaway DO        Or    naloxone (NARCAN) injection 0.4 mg  0.4 mg Intravenous Q2 Min PRN Kodi Hathaway,         Or    naloxone (NARCAN) injection 0.2 mg  0.2 mg Intramuscular Q2 Min PRN Kodi Hathaway,         Or    naloxone (NARCAN) injection 0.4 mg  0.4 mg Intramuscular Q2 Min PRN Kodi Hathaway, DO        sodium chloride (PF) 0.9% PF flush 3 mL  3 mL Intracatheter Q8H Kodi Hathaway,         sodium chloride (PF) 0.9% PF flush 3 mL  3 mL Intracatheter q1 min prn Kodi Hathaway, DO           Medication Adherence:  Patient reports  .  taking prescribed medications as prescribed.    Patient Allergies:    Allergies   Allergen Reactions    Gabapentin Other (See Comments)     Mental status is changed     Lidocaine Other (See Comments)     \"my jaw stopped moving\"  Other reaction(s): Dystonia    Penicillins Hives, Rash and Shortness Of Breath    Lidocaine-Epinephrine Other (See Comments) and Muscle Pain (Myalgia)     Severe jaw cramping, double vision  Jaw locking       Medical History:    Past Medical " History:   Diagnosis Date    Abdominal pain 06/29/2015    Abnormal cervical Papanicolaou smear 11/09/2014    Overview:  ACUS/HPV positive    Abnormal cytology finding 11/09/2014    Overview:  ACUS/HPV positive    Acute pericardial effusion 02/06/2017    Agoraphobia with panic attacks     Anxiety     Arthralgia of both lower legs 05/29/2020    Bilateral achy knee pain that is chronic in nature going on for years. Most likely osteoarthritis in knees related to obesity. Previously injected in both knees with good relief. 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  Has controlled substance agreement with them.  On Vicodin, Valium, Klonopin prescribed only from there.       Coronary artery disease     Cough 02/09/2020    Family history of colon cancer 10/24/2020    Hx of seasonal allergies     Infection due to 2019 novel coronavirus 09/20/2022    Infectious pericarditis     Lipoma     Low back pain 07/13/2015    Major depressive disorder, recurrent episode, moderate (H) 09/05/2006    Menorrhagia with irregular cycle 07/15/2022    Added automatically from request for surgery [id number]    Moderate persistent asthma without complication 09/29/2020    Nondependent alcohol abuse, episodic drinking behavior 10/03/2012    Noninflammatory disorder of vagina 02/20/2015    Other chronic pain     Other long term (current) drug therapy 01/28/2013    Overview:  Vicodin and cyclobenzaprine monthly    Panic disorder with agoraphobia 09/05/2006     Pap smear for cervical cancer screening 10/31/2016    03/29/2010  Normal cytology, HPV ot done, repeat in 3 years.    Pericarditis 2017    Physiologic disturbance of temperature regulation 10/24/2020    PONV (postoperative nausea and vomiting)     Right ankle swelling 06/07/2022    Added automatically from request for surgery [id number]    Tobacco abuse     Tobacco use disorder 07/13/2015         Current Mental Status Exam:   Appearance:  Appropriate    Eye Contact:  Good   Psychomotor:  Normal       Gait / station:  Not assessed  Attitude / Demeanor: Cooperative  Pleasant  Speech      Rate / Production: Normal/ Responsive      Volume:  Normal  volume      Language:  intact  Mood:   Normal  Affect:   Appropriate    Thought Content: Clear   Thought Process: Coherent  Logical       Associations: No loosening of associations  Insight:   Good   Judgment:  Intact   Orientation:  All  Attention/concentration: Good    Substance Use:   Patient did not report a family history of substance use concerns; see medical history section for details.  Patient has received chemical dependency treatment in the past at [facility] 15 years ago.  Patient has ever been to detox.      Patient is not currently receiving any chemical dependency treatment. Patient reported the following problems as a result of their substance use:  NA.    Patient denies using alcohol.  Patient denies using tobacco  Patient denies using cannabis.  Patient reports appx an iced coffee before work, and an occasional Mountain Dew  Patient reports using/abusing the following substance(s). Patient reported no other substance use.     Substance Use: No symptoms    Based on the positive CAGE score and clinical interview there  are not indications of drug or alcohol abuse.  15 years ago only     Significant Losses / Trauma / Abuse / Neglect Issues:   Patient   did not serve in the .  There are indications or report of significant loss, trauma, abuse or neglect  issues related to: workplace harassment, bullying.  Cancer diagnosis, heart surgery, loss of several people in her life.   Concerns for possible neglect are not present.     Safety Assessment:   Patient denies current homicidal ideation and behaviors.  Patient reports current self-injurious ideation.  Onset: a month ago, frequency: once, duration: once, intensity: intense.  Client reports they are not currently engaging in self-injurious behaivor..  Patient denied risk behaviors associated with substance use.   Patient denies any high risk behaviors associated with mental health symptoms.  Patient reports the following current concerns for their personal safety: workplace hazards.  Patient reports there   firearms in the house.       The firearms are secured in a locked space.    History of Safety Concerns:  Patient denied a history of homicidal ideation.     Patient denied a history of personal safety concerns.    Patient denied a history of assaultive behaviors.    Patient denied a history of sexual assault behaviors.     Patient denied a history of risk behaviors associated with substance use.  Patient denies any history of high risk behaviors associated with mental health symptoms.  Patient reports the following protective factors:      Risk Plan:  See Recommendations for Safety and Risk Management Plan    Review of Symptoms per patient report:   Depression: Change in sleep, Lack of interest, Change in energy level, Difficulties concentrating, Low self-worth, Irritability, and Feeling sad, down, or depressed  Ekta:  No Symptoms  Psychosis: No Symptoms  Anxiety: Excessive worry, Nervousness, Sleep disturbance, Poor concentration, and Irritability  Panic:  No symptoms  Post Traumatic Stress Disorder:  No Symptoms   Eating Disorder: No Symptoms  ADD / ADHD:  No symptoms  Conduct Disorder: No symptoms  Autism Spectrum Disorder: No symptoms  Obsessive Compulsive Disorder: No Symptoms    Patient reports the following  compulsive behaviors and treatment history: NA.      Diagnostic Criteria:   Generalized Anxiety Disorder  A. Excessive anxiety and worry about a number of events or activities (such as work or school performance).   B. The person finds it difficult to control the worry.  C. Select 3 or more symptoms (required for diagnosis). Only one item is required in children.   - Restlessness or feeling keyed up or on edge.    - Being easily fatigued.    - Difficulty concentrating or mind going blank.    - Irritability.    - Sleep disturbance (difficulty falling or staying asleep, or restless unsatisfying sleep).   D. The focus of the anxiety and worry is not confined to features of an Axis I disorder.  E. The anxiety, worry, or physical symptoms cause clinically significant distress or impairment in social, occupational, or other important areas of functioning.   F. The disturbance is not due to the direct physiological effects of a substance (e.g., a drug of abuse, a medication) or a general medical condition (e.g., hyperthyroidism) and does not occur exclusively during a Mood Disorder, a Psychotic Disorder, or a Pervasive Developmental Disorder.    - The aformentioned symptoms began one year(s) ago and occurs 7 days per week and is experienced as severe. Major Depressive Disorder  CRITERIA (A-C) REPRESENT A MAJOR DEPRESSIVE EPISODE - SELECT THESE CRITERIA  A) Recurrent episode(s) - symptoms have been present during the same 2-week period and represent a change from previous functioning 5 or more symptoms (required for diagnosis)   - Depressed mood. Note: In children and adolescents, can be irritable mood.     - Diminished interest or pleasure in all, or almost all, activities.    - Decreased sleep.    - Fatigue or loss of energy.    - Feelings of worthlessness or inappropriate and excessive guilt.    - Diminished ability to think or concentrate, or indecisiveness.   B) The symptoms cause clinically significant distress or  impairment in social, occupational, or other important areas of functioning  C) The episode is not attributable to the physiological effects of a substance or to another medical condition  D) The occurence of major depressive episode is not better explained by other thought / psychotic disorders  E) There has never been a manic episode or hypomanic episode    Functional Status:  Patient reports the following functional impairments:  management of the household and or completion of tasks and work / vocational responsibilities.     Nonprogrammatic care:  Patient is requesting basic services to address current mental health concerns.    Clinical Summary:  1. Psychosocial, Cultural and Contextual Factors: , multiple medical conditions, workplace bullying  .  2. Principal DSM5 Diagnoses  (Sustained by DSM5 Criteria Listed Above):   296.32 (F33.1) Major Depressive Disorder, Recurrent Episode, Moderate _  300.02 (F41.1) Generalized Anxiety Disorder.  3. Other Diagnoses that is relevant to services:   NA  4. Provisional Diagnosis:  NA  5. Prognosis: Expect Improvement.  6. Likely consequences of symptoms if not treated: worsening symptoms and continued functional impairment.  7. Client strengths include:  goal-focused, good listener, motivated, open to learning, open to suggestions / feedback, responsible parent, wants to learn, and willing to ask questions .     Recommendations:     1. Plan for Safety and Risk Management:   Safety and Risk: Recommended that patient call 911 or go to the local ED should there be a change in any of these risk factors..          Report to child / adult protection services was NA.     2. Patient's identified no concerns with sexual orientation, gender identity, culture, ethnicity, or kelly.     3. Initial Treatment will focus on:    Depressed Mood - MDD  Anxiety - ACE.     4. Resources/Service Plan:    services are not indicated.   Modifications to assist communication are  not indicated.   Additional disability accommodations are not indicated.      5. Collaboration:   Collaboration / coordination of treatment will be initiated with the following  support professionals: primary care physician.      6.  Referrals:   The following referral(s) will be initiated: None at this time.       A Release of Information has been obtained for the following: None at this time.     Clinical Substantiation/medical necessity for the above recommendations:  individual therapy is necessary in order to alleviate symptoms and improve functioning.    7. JHONNY:    JHONNY:  Not identified.    8. Records:   These were reviewed at time of assessment.   Information in this assessment was obtained from the medical record and  provided by patient who is a good historian.    Patient will have open access to their mental health medical record.    9.   Interactive Complexity: No    Provider Name/ Credentials:  Cate Kaur MS, [facility]  May 9, 2024

## 2024-11-21 ENCOUNTER — ALLIED HEALTH/NURSE VISIT (OUTPATIENT)
Dept: FAMILY MEDICINE | Facility: CLINIC | Age: 49
End: 2024-11-21
Payer: COMMERCIAL

## 2024-11-21 ENCOUNTER — LAB (OUTPATIENT)
Dept: LAB | Facility: CLINIC | Age: 49
End: 2024-11-21
Payer: COMMERCIAL

## 2024-11-21 DIAGNOSIS — F11.90 OPIOID USE DISORDER: Primary | ICD-10-CM

## 2024-11-21 DIAGNOSIS — G89.29 CHRONIC LOW BACK PAIN, UNSPECIFIED BACK PAIN LATERALITY, UNSPECIFIED WHETHER SCIATICA PRESENT: Primary | ICD-10-CM

## 2024-11-21 DIAGNOSIS — Z79.899 CONTROLLED SUBSTANCE AGREEMENT SIGNED: ICD-10-CM

## 2024-11-21 DIAGNOSIS — F40.01 PANIC DISORDER WITH AGORAPHOBIA: ICD-10-CM

## 2024-11-21 DIAGNOSIS — F33.1 MAJOR DEPRESSIVE DISORDER, RECURRENT EPISODE, MODERATE (H): ICD-10-CM

## 2024-11-21 DIAGNOSIS — F41.9 ANXIETY: ICD-10-CM

## 2024-11-21 DIAGNOSIS — F11.90 OPIOID USE DISORDER: ICD-10-CM

## 2024-11-21 DIAGNOSIS — M54.50 CHRONIC LOW BACK PAIN, UNSPECIFIED BACK PAIN LATERALITY, UNSPECIFIED WHETHER SCIATICA PRESENT: Primary | ICD-10-CM

## 2024-11-21 LAB
AMPHETAMINES UR QL: NOT DETECTED
BARBITURATES UR QL SCN: NOT DETECTED
BENZODIAZ UR QL SCN: DETECTED
BUPRENORPHINE UR QL: DETECTED
CANNABINOIDS UR QL: NOT DETECTED
COCAINE UR QL SCN: NOT DETECTED
D-METHAMPHET UR QL: NOT DETECTED
METHADONE UR QL SCN: NOT DETECTED
OPIATES UR QL SCN: NOT DETECTED
OXYCODONE UR QL SCN: NOT DETECTED
PCP UR QL SCN: NOT DETECTED
TRICYCLICS UR QL SCN: NOT DETECTED

## 2024-11-21 NOTE — PROGRESS NOTES
Clinic Care Coordination Contact  Follow Up Progress Note      Assessment:     Patient presented to appointment this date:    Appearance: ***  Speech: ***  Emotion: ***  Perception: ***  Thought Content: ***  Insight & Judgement: ***  Cognition: ***        Care Gaps:    Health Maintenance Due   Topic Date Due    DEPRESSION ACTION PLAN  Never done    HEPATITIS A IMMUNIZATION (1 of 2 - Risk 2-dose series) Never done    ASTHMA ACTION PLAN  01/31/2021    ZOSTER IMMUNIZATION (1 of 2) Never done    NICOTINE/TOBACCO CESSATION COUNSELING Q 1 YR  10/05/2023    YEARLY PREVENTIVE VISIT  10/05/2023    HEPATITIS B IMMUNIZATION (2 of 3 - 19+ 3-dose series) 10/11/2023    INFLUENZA VACCINE (1) 09/01/2024    COVID-19 Vaccine (5 - 2024-25 season) 09/01/2024    URINE DRUG SCREEN  10/23/2024       {Care Gaps:179870}    Care Plans  {Care Plan:696226}    Intervention/Education provided during outreach: ***          {cc resource consent:559479}    Plan:   ***  Care Coordinator will follow up in ***     IMMUNIZATION (1 of 2) Never done    NICOTINE/TOBACCO CESSATION COUNSELING Q 1 YR  10/05/2023    YEARLY PREVENTIVE VISIT  10/05/2023    HEPATITIS B IMMUNIZATION (2 of 3 - 19+ 3-dose series) 10/11/2023    INFLUENZA VACCINE (1) 09/01/2024    COVID-19 Vaccine (5 - 2024-25 season) 09/01/2024    URINE DRUG SCREEN  10/23/2024       Scheduled 12/4/24 at [facility] with PCP.      Care Plans  Care Plan: Mental Health       Problem: Mental Health Symptoms Need Improvement       Goal: Improve management of mental health symptoms and establish with mental health/psychosocial supports       Start Date: 6/7/2023    This Visit's Progress: 0% Recent Progress: 100%    Priority: High    Note:     I will continue to see mental health professional via [facility] as scheduled..                              Intervention/Education provided during outreach: Mental health symptoms screen; controlled substance agreement completion    Plan:   Patient to adhere to Controlled Substance Agreement.     Patient to follow-up with PCP 12/4/24 at [facility].    Patient to continue therapy as scheduled.    Care Coordinator will follow up 12/4/24 at [facility].    JERAMY GARCIA, ARACELISW, LADC

## 2024-11-21 NOTE — LETTER
Opioid / Opioid Plus Controlled Substance Agreement    This is an agreement between you and your provider about the safe and appropriate use of controlled substance/opioids prescribed by your care team. Controlled substances are medicines that can cause physical and mental dependence (abuse).    There are strict laws about having and using these medicines. We here at [facility] are committing to working with you in your efforts to get better. To support you in this work, we ll help you schedule regular office appointments for medicine refills. If we must cancel or change your appointment for any reason, we ll make sure you have enough medicine to last until your next appointment.     As a Provider, I will:  Listen carefully to your concerns and treat you with respect.   Recommend a treatment plan that I believe is in your best interest. This plan may involve therapies other than opioid pain medication.   Talk with you often about the possible benefits, and the risk of harm of any medicine that we prescribe for you.   Provide a plan on how to taper (discontinue or go off) using this medicine if the decision is made to stop its use.    As a Patient, I understand that opioid(s):   Are a controlled substance prescribed by my care team to help me function or work and manage my condition(s).   Are strong medicines and can cause serious side effects such as:  Drowsiness, which can seriously affect my driving ability  A lower breathing rate, enough to cause death  Harm to my thinking ability   Depression   Abuse of and addiction to this medicine  Need to be taken exactly as prescribed. Combining opioids with certain medicines or chemicals (such as illegal drugs, sedatives, sleeping pills, and benzodiazepines) can be dangerous or even fatal. If I stop opioids suddenly, I may have severe withdrawal symptoms.  Do not work for all types of pain nor for all patients. If they re not helpful, I may be asked to stop  them.    I am also being prescribed a benzodiazepine (tranquilizer) controlled substance: I understand this type of medicine is sedating and can increase the risk of death when taken together with opioids. I have talked to my care team about having prescription Narcan available for reversing the opioid medicine and have been instructed in its use. I will be very careful to take my medicines only as directed    The risks, benefits and side effects of these medicine(s) were explained to me. I agree that:  I will take part in other treatments as advised by my care team. This may be psychiatry or counseling, physical therapy, behavioral therapy, group treatment or a referral to a specialist.     I will keep all my appointments. I understand that this is part of the monitoring of opioids. My care team may require an office visit for EVERY opioid/controlled substance refill. If I miss appointments or don t follow instructions, my care team may stop my medicine.    I will take my medicines as prescribed. I will not change the dose or schedule unless my care team tells me to. There will be no refills if I run out early.     I may be asked to come to the clinic and complete a urine drug test or complete a pill count at any time. If I don t give a urine sample or participate in a pill count, the care team may stop my medicine.    I will only receive prescriptions from this clinic for chronic pain. If I am treated by another provider for acute pain issues, I will tell them that I am taking opioid pain medication for chronic pain and that I have a treatment agreement with this provider. I will inform my [facility] care team within one business day if I am given a prescription for any pain medication by another healthcare provider. My [facility] care team can contact other providers and pharmacists about my use of any medicines.    It is up to me to make sure that I don t run out of my medicines on weekends or  holidays. If my care team is willing to refill my opioid prescription without a visit, I must request refills only during office hours. Refills may take up to 3 business days to process. I will use one pharmacy to fill all my opioid and other controlled substance prescriptions. I will notify the clinic about any changes to my insurance or medication availability.    I am responsible for my prescriptions. If the medicine/prescription is lost, stolen or destroyed, it will not be replaced. I also agree not to share controlled substance medicines with anyone.    I am aware I should not use any illegal or recreational drugs. I agree not to drink alcohol unless my care team says I can.       If I enroll in the [place] [facility] program, I will tell my care team prior to my next refill.     I will tell my care team right away if I become pregnant, have a new medical problem treated outside of my regular clinic, or have a change in my medications.    I understand that this medicine can affect my thinking, judgment and reaction time. Alcohol and drugs affect the brain and body, which can affect the safety of my driving. Being under the influence of alcohol or drugs can affect my decision-making, behaviors, personal safety, and the safety of others. Driving while impaired (DWI) can occur if a person is driving, operating, or in physical control of a car, motorcycle, boat, snowmobile, ATV, motorbike, off-road vehicle, or any other motor vehicle ([place] Statute 169A.20). I understand the risk if I choose to drive or operate any vehicle or machinery.    I understand that if I do not follow any of the conditions above, my prescriptions or treatment may be stopped or changed.          Opioids  What You Need to Know    What are opioids?   Opioids are pain medicines that must be prescribed by a doctor. They are also known as narcotics.     Examples are:   morphine (MS Contin, Tatianna)  oxycodone (Oxycontin)  oxycodone and  acetaminophen (Percocet)  hydrocodone and acetaminophen (Vicodin, Norco)   fentanyl patch (Duragesic)   hydromorphone (Dilaudid)   methadone  codeine (Tylenol #3)     What do opioids do well?   Opioids are best for severe short-term pain such as after a surgery or injury. They may work well for cancer pain. They may help some people with long-lasting (chronic) pain.     What do opioids NOT do well?   Opioids never get rid of pain entirely, and they don t work well for most patients with chronic pain. Opioids don t reduce swelling, one of the causes of pain.                                    Other ways to manage chronic pain and improve function include:     Treat the health problem that may be causing pain  Anti-inflammation medicines, which reduce swelling and tenderness, such as ibuprofen (Advil, Motrin) or naproxen (Aleve)  Acetaminophen (Tylenol)  Antidepressants and anti-seizure medicines, especially for nerve pain  Topical treatments such as patches or creams  Injections or nerve blocks  Chiropractic or osteopathic treatment  Acupuncture, massage, deep breathing, meditation, visual imagery, aromatherapy  Use heat or ice at the pain site  Physical therapy   Exercise  Stop smoking  Take part in therapy       Risks and side effects     Talk to your doctor before you start or decide to keep taking opioids. Possible side effects include:    Lowering your breathing rate enough to cause death  Overdose, including death, especially if taking higher than prescribed doses  Worse depression symptoms; less pleasure in things you usually enjoy  Feeling tired or sluggish  Slower thoughts or cloudy thinking  Being more sensitive to pain over time; pain is harder to control  Trouble sleeping or restless sleep  Changes in hormone levels (for example, less testosterone)  Changes in sex drive or ability to have sex  Constipation  Unsafe driving  Itching and sweating  Dizziness  Nausea, throwing up and dry mouth    What else  should I know about opioids?    Opioids may lead to dependence, tolerance, or addiction.    Dependence means that if you stop or reduce the medicine too quickly, you will have withdrawal symptoms. These include loose poop (diarrhea), jitters, flu-like symptoms, nervousness and tremors. Dependence is not the same as addiction.                     Tolerance means needing higher doses over time to get the same effect. This may increase the chance of serious side effects.    Addiction is when people improperly use a substance that harms their body, their mind or their relations with others. Use of opiates can cause a relapse of addiction if you have a history of drug or alcohol abuse.    People who have used opioids for a long time may have a lower quality of life, worse depression, higher levels of pain and more visits to doctors.    You can overdose on opioids. Take these steps to lower your risk of overdose:    Recognize the signs:  Signs of overdose include decrease or loss of consciousness (blackout), slowed breathing, trouble waking up and blue lips. If someone is worried about overdose, they should call 911.    Talk to your doctor about Narcan (naloxone).   If you are at risk for overdose, you may be given a prescription for Narcan. This medicine very quickly reverses the effects of opioids.   If you overdose, a friend or family member can give you Narcan while waiting for the ambulance. They need to know the signs of overdose and how to give Narcan.     Don't use alcohol or street drugs.   Taking them with opioids can cause death.    Do not take any of these medicines unless your doctor says it s OK. Taking these with opioids can cause death:  Benzodiazepines, such as lorazepam (Ativan), alprazolam (Xanax) or diazepam (Valium)  Muscle relaxers, such as cyclobenzaprine (Flexeril)  Sleeping pills like zolpidem (Ambien)   Other opioids      How to keep you and other people safe while taking opioids:    Never share  your opioids with others.  Opioid medicines are regulated by the Drug Enforcement Agency ([place]). Selling or sharing medications is a criminal act.    2. Be sure to store opioids in a secure place, locked up if possible. Young children can easily swallow them and overdose.    3. When you are traveling with your medicines, keep them in the original bottles. If you use a pill box, be sure you also carry a copy of your medicine list from your clinic or pharmacy.    4. Safe disposal of opioids    Most pharmacies have places to get rid of medicine, called disposal kiosks. Medicine disposal options are also available in every [facility]. Search your [facility] and  medication disposal  to find more options. You can find more details at:  https://www.pca.[email or web address].[email or web address]./living-green/managing-unwanted-medications     I agree that my provider, clinic care team, and pharmacy may work with any city, state or federal law enforcement agency that investigates the misuse, sale, or other diversion of my controlled medicine. I will allow my provider to discuss my care with, or share a copy of, this agreement with any other treating provider, pharmacy or emergency room where I receive care.    I have read this agreement and have asked questions about anything I did not understand.    _______________________________________________________  Patient Signature - Darlene Hudson _____________________                   Date     _______________________________________________________  Provider Signature -    _____________________                   Date     _______________________________________________________  Witness Signature (required if provider not present while patient signing)   _____________________                   Date

## 2024-11-25 ENCOUNTER — TELEPHONE (OUTPATIENT)
Dept: FAMILY MEDICINE | Facility: CLINIC | Age: 49
End: 2024-11-25
Payer: COMMERCIAL

## 2024-11-25 DIAGNOSIS — F11.90 OPIOID USE DISORDER: Primary | ICD-10-CM

## 2024-11-25 RX ORDER — BUPRENORPHINE AND NALOXONE 8; 2 MG/1; MG/1
1 FILM, SOLUBLE BUCCAL; SUBLINGUAL 2 TIMES DAILY
Qty: 28 FILM | Refills: 1 | Status: SHIPPED | OUTPATIENT
Start: 2024-11-25

## 2024-11-25 NOTE — TELEPHONE ENCOUNTER
Patient called asking for call back regarding the results from her UA and to discuss her Suboxone -- she says she will run out and is questioning the dosage- states it was changed from 8mg to 2 mg.  Please call her when able --thank you.

## 2024-11-25 NOTE — TELEPHONE ENCOUNTER
Spoke to patient. Corrected dosing of suboxone which should be 8mg bid, not 2mg tabs. New script sent. She has appt w/ me on 12/4.    Robbie Bailon MD  November 25, 2024  1:56 PM

## 2024-11-26 ENCOUNTER — TELEPHONE (OUTPATIENT)
Dept: FAMILY MEDICINE | Facility: CLINIC | Age: 49
End: 2024-11-26
Payer: COMMERCIAL

## 2024-11-26 DIAGNOSIS — F11.90 OPIOID USE DISORDER: Primary | ICD-10-CM

## 2024-11-26 DIAGNOSIS — F11.90 OPIOID USE DISORDER: ICD-10-CM

## 2024-11-26 RX ORDER — BUPRENORPHINE HYDROCHLORIDE AND NALOXONE HYDROCHLORIDE DIHYDRATE 8; 2 MG/1; MG/1
1 TABLET SUBLINGUAL 2 TIMES DAILY
Qty: 60 TABLET | Refills: 1 | Status: SHIPPED | OUTPATIENT
Start: 2024-11-26

## 2024-11-26 NOTE — TELEPHONE ENCOUNTER
General Call    Contacts       Contact Date/Time Type Contact Phone/Fax    11/26/2024 08:56 AM CST Phone (Incoming) Karoline Hudson (Self) [phone] (M)          Reason for Call:  Prior Auth    What are your questions or concerns:  Insurance is requesting a prior authorization on the Suboxone tablets. They told the pharmacy that it was outside the prescribed amount.

## 2024-11-26 NOTE — TELEPHONE ENCOUNTER
General Call    Contacts       Contact Date/Time Type Contact Phone/Fax    11/26/2024 08:56 AM CST Phone (Incoming) Karoline Hudson (Self) [phone] (M)    11/26/2024 02:38 PM CST Phone (Incoming) [id number] [place], [place] - [facility] [facility]  AT [facility] (Pharmacy) [phone]          Reason for Call:  Following up on suboxone PA    What are your questions or concerns:  Pt is requesting to speak to someone immediately. Pt spoke to clinic supervisor. Pt is requesting a change is dosage, sig or for someone to complete the PA asap. I explained to the patient that I will have to speak to a faculty staff here that may be able to help with prescription. Faculty was not able to resend prescription and will have to go through the PA process.    Writer returned call to pt and requested that Dr Bailon call insurance company to over ride PA. Writer let the patient know that I will send a message with the request to Dr. Bailon.

## 2024-11-26 NOTE — TELEPHONE ENCOUNTER
Prior Authorization Retail Medication Request    Medication/Dose: Buprenorphine/nalox 8-2mg  Diagnosis and ICD code (if different than what is on RX):    New/renewal/insurance change PA/secondary ins. PA:  Previously Tried and Failed:    Rationale:      Insurance   Primary: [phone]  Insurance ID: [id number]    Secondary (if applicable):  Insurance ID:      Pharmacy Information (if different than what is on RX)  Name:    Phone:    Fax:    Clinic Information  [facility]

## 2024-11-26 NOTE — TELEPHONE ENCOUNTER
Spent > 1 hr with insurance. Unable to override. Expedited prior auth placed. Should be resolved in 24 hours.    Robbie Bailon MD  November 26, 2024  4:46 PM

## 2024-11-26 NOTE — TELEPHONE ENCOUNTER
Patient called in stating that the new prescription for Suboxone was sent as the films-- patient says she cannot use the films -- they make her sick-- needs the tablet form instead.  She did  the films and pharmacy said cannot take back. She will need advice of how to dispose of the films as well.  Thank you

## 2024-11-26 NOTE — PROGRESS NOTES
Per patient needs tablets because the strips make her feel sick, patient asked to have MD call pharmacy because there might be an issue with prescription coverage. CMA stated that she will pass message along.

## 2024-11-27 ENCOUNTER — TELEPHONE (OUTPATIENT)
Dept: FAMILY MEDICINE | Facility: CLINIC | Age: 49
End: 2024-11-27
Payer: COMMERCIAL

## 2024-11-27 NOTE — TELEPHONE ENCOUNTER
"  FYI - Status Update    Who is Calling: patient    Update: Patient would like to express gratitude for handling medication refill. Patient would like to notify you that medication is ready for  and Patient is relieved. Patient would like to say   Happy holidays and see you next week\"    Does caller want a call/response back: No   "

## 2024-11-29 NOTE — TELEPHONE ENCOUNTER
Central Prior Authorization Team   Phone: [phone]    PA Initiation    Medication: Buprenorphine/nalox 8-2mg tablet  Insurance Company: [place] Medicaid ([facility]) - Phone [phone] Fax [phone]  Pharmacy Filling the Rx: [id number] [place], [place] - [address] AT [facility]  Filling Pharmacy Phone: [phone]  Filling Pharmacy Fax:    Start Date: 11/29/2024

## 2024-11-29 NOTE — TELEPHONE ENCOUNTER
Prior Authorization Not Needed per Insurance    Medication: Buprenorphine/nalox 8-2mg tablet  Insurance Company: [place] Medicaid ([facility]) - Phone [phone] Fax [phone]  Expected CoPay:      Pharmacy Filling the Rx: [id number] [place] [id number] [facility]  AT [facility]  Pharmacy Notified:  yes  Patient Notified:  yes- Pharmacy will contact patient when ready to /ship    No additional PA can be done-    Medication buprenorphine-naloxone 8-2mg sublingual tablets quantity 2 per day is covered under patient's plan.     Unable to assist with early fill request- because patient picked up the suboxone films insurance is not allowing patient to fill the tablets.     Pharmacy will need to call for a override on their end.   No further action can be taken by PA team, medication and quantity are covered. Any override for lost or stolen medication will need to be completed by pharmacy staff

## 2024-12-02 NOTE — PROGRESS NOTES
ASSESSMENT/PLAN:    (F41.1) Generalized anxiety disorder  Comment: stable; med refilled   Plan: ALPRAZolam (XANAX) 2 MG tablet    (F11.90) Opioid use disorder  Comment:   Plan: stable          Robbie Bailon MD  December 4, 2024  4:37 PM      Pt is a 49 year old female last seen on 10/30/24 here today for:     1) recovering from her pneumonia     2) opioid use disorder - was able to get her tabs; returned films today     3) anxiety has worsened off her percocets  Still working in corrections  Seeing therapy next week  Lots of trouble with [place]       Per my last note:  (F41.1) Generalized anxiety disorder  (primary encounter diagnosis)  Comment: leaving town so will call in early script; f/up in 2 wks  Plan: ALPRAZolam (XANAX) 2 MG tablet          (Z98.890) Status post surgical manipulation of ankle joint  Comment: leaving town so will call in early script; f/up in 2 wks  Plan: oxyCODONE (ROXICODONE) 5 MG tablet          (M54.6,  G89.29) Chronic midline thoracic back pain  Comment: see above  Plan: oxyCODONE (ROXICODONE) 5 MG tablet          (J45.40) Moderate persistent asthma without complication  Comment: refilled; stable s/p pneumonia  Plan: PROAIR  (90 Base) MCG/ACT inhaler    Past Medical History:   Diagnosis Date    Abdominal pain 06/29/2015    Abnormal cervical Papanicolaou smear 11/09/2014    Overview:  ACUS/HPV positive    Abnormal cytology finding 11/09/2014    Overview:  ACUS/HPV positive    Acute pericardial effusion 02/06/2017    Agoraphobia with panic attacks     Anxiety     Arthralgia of both lower legs 05/29/2020    Bilateral achy knee pain that is chronic in nature going on for years. Most likely osteoarthritis in knees related to obesity. Previously injected in both knees with good relief. 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  Has controlled substance agreement with them.  On Vicodin, Valium, Klonopin prescribed only from there.       Coronary artery disease     Cough 02/09/2020    Family history of colon cancer 10/24/2020    Hx of seasonal allergies     Infection due to 2019 novel coronavirus 09/20/2022    Infectious pericarditis     Lipoma     Low back pain 07/13/2015    Major depressive disorder, recurrent episode, moderate (H) 09/05/2006    Menorrhagia with irregular cycle 07/15/2022    Added automatically from request for surgery [id number]    Moderate persistent asthma without complication 09/29/2020    Nondependent alcohol abuse, episodic drinking behavior 10/03/2012    Noninflammatory disorder of vagina 02/20/2015    Other chronic pain     Other long term (current) drug therapy 01/28/2013    Overview:  Vicodin and cyclobenzaprine monthly    Panic disorder with agoraphobia 09/05/2006    Pap smear for cervical cancer screening 10/31/2016    03/29/2010  Normal cytology, HPV ot done, repeat in 3 years.    Pericarditis 2017    Physiologic disturbance of temperature regulation 10/24/2020    PONV (postoperative nausea and vomiting)     Right ankle swelling 06/07/2022    Added automatically from request for surgery [id number]    Tobacco abuse     Tobacco use disorder 07/13/2015      Past Surgical History:   Procedure Laterality Date    ANKLE SURGERY Right 05/30/2019    ARTHROSCOPY ANKLE Right 6/21/2023    Procedure: right ankle arthroscopy with debridement;  Surgeon: Kareem Bashir MD;  Location: [facility] OR    BIOPSY BREAST Right 1990    benign    COLONOSCOPY N/A 1/3/2023    Procedure: COLONOSCOPY WITH  "BIOPSY OF COLON MASS, POLYPECTOMY;  Surgeon: Kris Charles MD;  Location: [facility] OR    COLONOSCOPY N/A 3/12/2024    Procedure: COLONOSCOPY WITH POLYPECTOMY and EXCISION, LEFT, LESION, BACK;  Surgeon: Kris Charles MD;  Location: [facility] OR    CREATION PERICARDIAL WINDOW  02/10/2017    [facility]    DILATION AND CURETTAGE N/A 7/22/2022    Procedure: DILATION AND CURETTAGE, UTERUS;  Surgeon: Lesly Holland;  Location: [facility] OR    HEMORRHOIDECTOMY EXTERNAL      HYSTEROSCOPY, WITH ENDOMETRIAL RADIOFREQUENCY ABLATION - NOVASURE N/A 7/22/2022    Procedure: HYSTEROSCOPY, WITH ENDOMETRIAL RADIOFREQUENCY ABLATION - NOVASURE DILATION AND CURETTAGE, UTERUS;  Surgeon: Lesly Holland;  Location: [facility] OR    LAPAROSCOPIC ASSISTED SIGMOID COLECTOMY N/A 1/6/2023    Procedure: LAPAROSCOPIC ASSISTED DESCENDING COLELCTOMY SPLENIC FLEXURE MOBILIZATION ;  Surgeon: Kris Charles MD;  Location: [facility] OR    LAPAROSCOPIC LYSIS ADHESIONS N/A 1/6/2023    Procedure: LYSIS OF ADHESIONS;  Surgeon: Kris Charles MD;  Location: [facility] OR    TUMOR REMOVAL      Has had 3. In the right breast and \"inside of rib cage.\"      Current Outpatient Medications   Medication Sig Dispense Refill    [START ON 12/9/2024] ALPRAZolam (XANAX) 2 MG tablet Take 1 tablet (2 mg) by mouth 4 times daily as needed for anxiety. 90 tablet 0    albuterol (PROAIR HFA/PROVENTIL HFA/VENTOLIN HFA) 108 (90 Base) MCG/ACT inhaler Inhale 2 puffs into the lungs every 6 hours as needed for shortness of breath, wheezing or cough. 18 g 0    buprenorphine-naloxone (SUBOXONE) 2-0.5 MG SUBL sublingual tablet Place 2 tablets under the tongue daily as needed for severe pain (withdrawal symptoms). 60 tablet 0    buprenorphine-naloxone (SUBOXONE) 2-0.5 MG SUBL sublingual tablet Place 2 tablets under the tongue daily. 14 tablet 0    buprenorphine-naloxone (SUBOXONE) 8-2 MG SUBL sublingual tablet " "Place 1 tablet under the tongue 2 times daily. 60 tablet 1    cetirizine (ZYRTEC) 10 MG tablet Take 1 tablet (10 mg) by mouth daily 30 tablet 11    fluticasone (FLONASE) 50 MCG/ACT nasal spray Spray 2 sprays into both nostrils daily 9.9 mL 11    hydrOXYzine carlos (VISTARIL) 25 MG capsule Take 1 capsule (25 mg) by mouth nightly as needed for anxiety 30 capsule 3    metFORMIN (GLUCOPHAGE XR) 500 MG 24 hr tablet 1 tablet with dinner daily for 2 weeks then 2 tablets with dinner 180 tablet 1    montelukast (SINGULAIR) 10 MG tablet Take 1 tablet (10 mg) by mouth At Bedtime 30 tablet 11    naloxone (NARCAN) 4 MG/0.1ML nasal spray Spray 1 spray (4 mg) into one nostril alternating nostrils as needed for opioid reversal every 2-3 minutes until assistance arrives (Patient not taking: Reported on 3/6/2024) 0.2 mL 1    oxyBUTYnin (DITROPAN) 5 MG tablet Take 1 tablet (5 mg) by mouth 3 times daily. 180 tablet 3    PROAIR  (90 Base) MCG/ACT inhaler Inhale 2 puffs into the lungs every 4 hours as needed for shortness of breath or wheezing. 8 g 11    sertraline (ZOLOFT) 50 MG tablet Take 1.5 tablets (75 mg) by mouth daily 135 tablet 3    spacer (OPTICHAMBER BINA) holding chamber For use w/ rescue inhaler 1 each 0    tiotropium (SPIRIVA RESPIMAT) 1.25 MCG/ACT inhaler Inhale 2 puffs into the lungs daily. 1 g 0    tiotropium (SPIRIVA) 18 MCG inhaled capsule Inhale 1 capsule (18 mcg) into the lungs daily. 7 capsule 0      Allergies   Allergen Reactions    Gabapentin Other (See Comments)     Mental status is changed     Lidocaine Other (See Comments)     \"my jaw stopped moving\"  Other reaction(s): Dystonia    Penicillins Hives, Rash and Shortness Of Breath    Lidocaine-Epinephrine (Pf) Other (See Comments) and Muscle Pain (Myalgia)     Severe jaw cramping, double vision  Jaw locking    Topamax [Topiramate] Headache        ROS:   Gen- no weight change, no fevers/chills   Remainder of ROS negative.     Exam:   /79 (BP Location: " Right arm, Patient Position: Sitting, Cuff Size: Adult Regular)   Pulse 72   Temp 98  F (36.7  C)   Resp 22   LMP  (LMP Unknown)   SpO2 100%    Alert and oriented x 3; No acute distress

## 2024-12-04 ENCOUNTER — ALLIED HEALTH/NURSE VISIT (OUTPATIENT)
Dept: FAMILY MEDICINE | Facility: CLINIC | Age: 49
End: 2024-12-04
Payer: COMMERCIAL

## 2024-12-04 ENCOUNTER — OFFICE VISIT (OUTPATIENT)
Dept: FAMILY MEDICINE | Facility: CLINIC | Age: 49
End: 2024-12-04
Payer: COMMERCIAL

## 2024-12-04 VITALS
DIASTOLIC BLOOD PRESSURE: 79 MMHG | SYSTOLIC BLOOD PRESSURE: 118 MMHG | HEART RATE: 72 BPM | TEMPERATURE: 98 F | OXYGEN SATURATION: 100 % | RESPIRATION RATE: 22 BRPM

## 2024-12-04 DIAGNOSIS — F40.01 PANIC DISORDER WITH AGORAPHOBIA: ICD-10-CM

## 2024-12-04 DIAGNOSIS — F41.1 GENERALIZED ANXIETY DISORDER: Primary | ICD-10-CM

## 2024-12-04 DIAGNOSIS — M54.50 CHRONIC LOW BACK PAIN, UNSPECIFIED BACK PAIN LATERALITY, UNSPECIFIED WHETHER SCIATICA PRESENT: ICD-10-CM

## 2024-12-04 DIAGNOSIS — F41.9 ANXIETY: ICD-10-CM

## 2024-12-04 DIAGNOSIS — G89.29 CHRONIC LOW BACK PAIN, UNSPECIFIED BACK PAIN LATERALITY, UNSPECIFIED WHETHER SCIATICA PRESENT: ICD-10-CM

## 2024-12-04 DIAGNOSIS — F11.90 OPIOID USE DISORDER: ICD-10-CM

## 2024-12-04 DIAGNOSIS — J45.40 MODERATE PERSISTENT ASTHMA WITHOUT COMPLICATION: Primary | ICD-10-CM

## 2024-12-04 PROCEDURE — 99207 PR NO CHARGE NURSE ONLY: CPT

## 2024-12-04 PROCEDURE — 99214 OFFICE O/P EST MOD 30 MIN: CPT | Performed by: FAMILY MEDICINE

## 2024-12-04 RX ORDER — ALPRAZOLAM 2 MG/1
2 TABLET ORAL 4 TIMES DAILY PRN
Qty: 90 TABLET | Refills: 0 | Status: SHIPPED | OUTPATIENT
Start: 2024-12-09

## 2024-12-04 ASSESSMENT — PATIENT HEALTH QUESTIONNAIRE - PHQ9
SUM OF ALL RESPONSES TO PHQ QUESTIONS 1-9: 6
SUM OF ALL RESPONSES TO PHQ QUESTIONS 1-9: 6
10. IF YOU CHECKED OFF ANY PROBLEMS, HOW DIFFICULT HAVE THESE PROBLEMS MADE IT FOR YOU TO DO YOUR WORK, TAKE CARE OF THINGS AT HOME, OR GET ALONG WITH OTHER PEOPLE: SOMEWHAT DIFFICULT

## 2024-12-04 ASSESSMENT — ASTHMA QUESTIONNAIRES
QUESTION_4 LAST FOUR WEEKS HOW OFTEN HAVE YOU USED YOUR RESCUE INHALER OR NEBULIZER MEDICATION (SUCH AS ALBUTEROL): ONE OR TWO TIMES PER DAY
QUESTION_5 LAST FOUR WEEKS HOW WOULD YOU RATE YOUR ASTHMA CONTROL: WELL CONTROLLED
QUESTION_3 LAST FOUR WEEKS HOW OFTEN DID YOUR ASTHMA SYMPTOMS (WHEEZING, COUGHING, SHORTNESS OF BREATH, CHEST TIGHTNESS OR PAIN) WAKE YOU UP AT NIGHT OR EARLIER THAN USUAL IN THE MORNING: ONCE OR TWICE
QUESTION_1 LAST FOUR WEEKS HOW MUCH OF THE TIME DID YOUR ASTHMA KEEP YOU FROM GETTING AS MUCH DONE AT WORK, SCHOOL OR AT HOME: A LITTLE OF THE TIME
EMERGENCY_ROOM_LAST_YEAR_TOTAL: ONE
QUESTION_2 LAST FOUR WEEKS HOW OFTEN HAVE YOU HAD SHORTNESS OF BREATH: ONCE OR TWICE A WEEK
ACT_TOTALSCORE: 18
ACT_TOTALSCORE: 18

## 2024-12-05 NOTE — NURSING NOTE
Controlled Substance Prescription Disposal   Date: 12/05/24  Time: 1020  Epic prescription number: [id number]   Medication name: Buprenorphine HCl naloxone sublingual film   Strength: 8-25 mg per film  Prescription quantity: Patient was prescribed #28 films, #26 films were in container for disposal  Reason for disposal: Patient was given tablets instead, gave sublingual film to physician to dispose so patient did not have two forms of suboxone  Prescription shredded:  Not applicable, prescription is via e-prescribe. Sublingual film prescription has been discontinued in patient's EMR and replaced with tablets  Witnessed by: Mendy Adorno MBA Senior Clinic Manager    Medication was disposed and flushed in the toilet per Suboxone's insert instructions for Suboxone sublingual films. The suboxone was removed from the foil packaging individually and #26 films were disposed while Mendy Adorno witnessed disposal.   Adri Marc RN

## 2024-12-09 ENCOUNTER — VIRTUAL VISIT (OUTPATIENT)
Dept: PSYCHOLOGY | Facility: CLINIC | Age: 49
End: 2024-12-09
Payer: COMMERCIAL

## 2024-12-09 DIAGNOSIS — F41.1 GAD (GENERALIZED ANXIETY DISORDER): ICD-10-CM

## 2024-12-09 DIAGNOSIS — F33.1 MAJOR DEPRESSIVE DISORDER, RECURRENT EPISODE, MODERATE (H): Primary | ICD-10-CM

## 2024-12-09 PROCEDURE — 90834 PSYTX W PT 45 MINUTES: CPT | Mod: 95

## 2024-12-09 ASSESSMENT — PATIENT HEALTH QUESTIONNAIRE - PHQ9
SUM OF ALL RESPONSES TO PHQ QUESTIONS 1-9: 9
10. IF YOU CHECKED OFF ANY PROBLEMS, HOW DIFFICULT HAVE THESE PROBLEMS MADE IT FOR YOU TO DO YOUR WORK, TAKE CARE OF THINGS AT HOME, OR GET ALONG WITH OTHER PEOPLE: SOMEWHAT DIFFICULT
SUM OF ALL RESPONSES TO PHQ QUESTIONS 1-9: 9

## 2024-12-09 ASSESSMENT — ACTIVITIES OF DAILY LIVING (ADL): DEPENDENT_IADLS:: INDEPENDENT

## 2024-12-09 NOTE — PROGRESS NOTES
Clinic Care Coordination Contact  Follow Up Progress Note      Assessment:     Patient presented to appointment this date:    Appearance: alert; well-groomed; broad affect; eye contact appropriate; self-ambulatory without device  Speech: no slurred or pressured speech; rapid speech;   Emotion: positive; stable  Perception: no reported or presenting symptoms suggestive of hallucinations, delusions or episodes of dissociation this date.  Thought Content: Patient reported no suicidal or homicidal ideation or intent this date.  Insight & Judgement: insight fair; judgement fair; impulse control moderate to fair  Cognition: oriented x3; memory appeared intact short-term and long-term    SW confirmed with patient that patient remains scheduled with therapy via telehealth. SW confirmed with patient that no assistance needed with logging into telehealth therapy appointments.     Patient reported Suboxone maintenance adherence. Patient reported wanting to continue to reduce opioid pain medications. Patient reported using acetaminophen to manage pain alongside Suboxone. Patient and SW briefly processed lower pain threshold and one comes off opioid medications. Patient reported learning to tolerate manageable pain levels.     Care Gaps:    Health Maintenance Due   Topic Date Due    DEPRESSION ACTION PLAN  Never done    HEPATITIS A IMMUNIZATION (1 of 2 - Risk 2-dose series) Never done    ASTHMA ACTION PLAN  01/31/2021    ZOSTER IMMUNIZATION (1 of 2) Never done    NICOTINE/TOBACCO CESSATION COUNSELING Q 1 YR  10/05/2023    YEARLY PREVENTIVE VISIT  10/05/2023    HEPATITIS B IMMUNIZATION (2 of 3 - 19+ 3-dose series) 10/11/2023    INFLUENZA VACCINE (1) 09/01/2024    COVID-19 Vaccine (5 - 2024-25 season) 09/01/2024       Care Gaps Last addressed on this date at [facility] with PCP.    Care Plans  Care Plan: Mental Health       Problem: Mental Health Symptoms Need Improvement       Goal: Improve management of mental health symptoms and  establish with mental health/psychosocial supports       Start Date: 6/7/2023    This Visit's Progress: 100% Recent Progress: 0%    Priority: High    Note:     I will continue to see my mental health provider for therapy as recommended and scheduled.                              Intervention/Education provided during outreach: Mental Health symptom screen; processing emotions; discussion on physical pain in discontinuing opioid pain medications.     Outreach Frequency: monthly, more frequently as needed    Plan:   Patient to continue therapy as recommended/scheduled.    Patient to continue to follow-up with PCP as recommended at [facility].    Care Coordinator will follow up in one month.    JERAMY GARCIA, VENUS, [facility]

## 2024-12-09 NOTE — PROGRESS NOTES
[facility]                                     Progress Note    Patient Name: Darlene Hudson  Date: 12/09/24         Service Type: Individual      Session Start Time:  1035 Session End Time: 1119     Session Length: 44    Session #: 11    Attendees: Client attended alone    Service Modality:  Video Visit:      Provider verified identity through the following two step process.  Patient provided:  Patient is known previously to provider    Telemedicine Visit: The patient's condition can be safely assessed and treated via synchronous audio and visual telemedicine encounter.      Reason for Telemedicine Visit: Patient has requested telehealth visit    Originating Site (Patient Location): Patient's home    Distant Site (Provider Location): Provider Remote Setting- Home Office    Consent:  The patient/guardian has verbally consented to: the potential risks and benefits of telemedicine (video visit) versus in person care; bill my insurance or make self-payment for services provided; and responsibility for payment of non-covered services.     Patient would like the video invitation sent by:  My Chart    Mode of Communication:  Video Conference via [facility]    Distant Location (Provider):  Off-site    As the provider I attest to compliance with applicable laws and regulations related to telemedicine.    DATA  Interactive Complexity: No  Crisis: No        Progress Since Last Session (Related to Symptoms / Goals / Homework):   Symptoms: No change overall    Homework:  continues completing self care activities      Episode of Care Goals: Satisfactory progress - ACTION (Actively working towards change); Intervened by reinforcing change plan / affirming steps taken     Current / Ongoing Stressors and Concerns:   Many stressors including prior cancer diagnosis and treatment, losses through death of family and friends. Client notes she is ready to get back to a routine, feeling cooped up at home too  frequently.  Returned medications she had been prescribed and taking for some time, she feels good about this. Older son causing much stress, setting boundaries with him and sometimes this can be difficult but overall helping with anxiety and stress.     Treatment Objective(s) Addressed in This Session:   Build rapport. Strategies to address anxiety and stress.     Intervention:   Build rapport.  Review of symptoms since last contact.  Review of strategies client has utilized in order to reduce stress in her household.  Discussed setting boundaries with her oldest son.  Provided support for current stressors.      Assessments completed prior to visit:  The following assessments were completed by patient for this visit:  PHQ9:       5/9/2024     7:34 AM 5/22/2024    11:00 AM 5/28/2024     9:31 AM 7/24/2024     3:59 PM 8/14/2024     5:10 PM 12/4/2024     3:16 PM 12/9/2024    10:29 AM   PHQ-9 SCORE   PHQ-9 Total Score MyChart 7 (Mild depression)  7 (Mild depression) 6 (Mild depression)  6 (Mild depression) 9 (Mild depression)   PHQ-9 Total Score 7 8 7 6    6 7 6  9        Patient-reported    Multiple values from one day are sorted in reverse-chronological order     GAD7:       4/18/2023     5:13 PM 9/13/2023     2:40 PM 2/21/2024     1:38 PM 5/22/2024    11:00 AM 5/22/2024    11:03 AM 8/1/2024    11:00 AM 8/14/2024     5:10 PM   ACE-7 SCORE   Total Score  7 (mild anxiety)   13 (moderate anxiety)     Total Score 5 7 6 19 13    13 19 15     PROMIS 10-Global Health (all questions and answers displayed):       5/22/2024    11:00 AM 5/22/2024    11:04 AM 7/23/2024     9:34 AM 12/9/2024    10:34 AM   PROMIS 10   In general, would you say your health is:  Fair Poor Good   In general, would you say your quality of life is:  Fair Fair Fair   In general, how would you rate your physical health?  Fair Poor Good   In general, how would you rate your mental health, including your mood and your ability to think?  Poor Fair Good   In  general, how would you rate your satisfaction with your social activities and relationships?  Fair Fair Good   In general, please rate how well you carry out your usual social activities and roles  Fair Good Good   To what extent are you able to carry out your everyday physical activities such as walking, climbing stairs, carrying groceries, or moving a chair?  Moderately Moderately Moderately   In the past 7 days, how often have you been bothered by emotional problems such as feeling anxious, depressed, or irritable?  Always Always Sometimes   In the past 7 days, how would you rate your fatigue on average?  Mild Moderate Mild   In the past 7 days, how would you rate your pain on average, where 0 means no pain, and 10 means worst imaginable pain?  5 5 8   In general, would you say your health is: 2 2  1  3    In general, would you say your quality of life is:  2  2  2    In general, how would you rate your physical health?  2  1  3    In general, how would you rate your mental health, including your mood and your ability to think?  1  2  3    In general, how would you rate your satisfaction with your social activities and relationships?  2  2  3    In general, please rate how well you carry out your usual social activities and roles. (This includes activities at home, at work and in your community, and responsibilities as a parent, child, spouse, employee, friend, etc.)  2  3  3    To what extent are you able to carry out your everyday physical activities such as walking, climbing stairs, carrying groceries, or moving a chair?  3  3  3    In the past 7 days, how often have you been bothered by emotional problems such as feeling anxious, depressed, or irritable?  5  5  3    In the past 7 days, how would you rate your fatigue on average?  2  3  2    In the past 7 days, how would you rate your pain on average, where 0 means no pain, and 10 means worst imaginable pain?  5  5  8    Global Mental Health Score  6    6 7 11     Global Physical Health Score  12    12 10 12    PROMIS TOTAL - SUBSCORES  18    18 17 23        Patient-reported    Multiple values from one day are sorted in reverse-chronological order         ASSESSMENT: Current Emotional / Mental Status (status of significant symptoms):   Risk status (Self / Other harm or suicidal ideation)   Patient denies current fears or concerns for personal safety.   Patient denies current or recent suicidal ideation or behaviors.   Patient denies current or recent homicidal ideation or behaviors.   Patient denies current or recent self injurious behavior or ideation.   Patient denies other safety concerns.   Patient reports there has been no change in risk factors since their last session.     Patient reports there has been no change in protective factors since their last session.     Recommended that patient call 911 or go to the local ED should there be a change in any of these risk factors     Appearance:   normal    Eye Contact:   Good    Psychomotor Behavior: normal    Attitude:   Cooperative    Orientation:   All   Speech    Rate / Production: Normal     Volume:  Normal    Mood:    Normal   Affect:    normal   Thought Content:  Clear    Thought Form:  Coherent    Insight:    Fair      Medication Review:   No changes to current psychiatric medication(s)     Medication Compliance:   Yes     Changes in Health Issues:   None reported     Chemical Use Review:   Substance Use: Chemical use reviewed, no active concerns identified      Tobacco Use: No current tobacco use.      Diagnosis:  1. Major depressive disorder, recurrent episode, moderate (H)    2. ACE (generalized anxiety disorder)        Collateral Reports Completed:   Not Applicable    PLAN: (Patient Tasks / Therapist Tasks / Other)  Rtn 1 weeks.   Management of symptoms related to current stressors.        Cate Kaur [facility]                                                          ______________________________________________________________________    Individual Treatment Plan    Patient's Name: Darlene Hudson  YOB: 1975    Date of Creation: 06/11/2024  Date Treatment Plan Last Reviewed/Revised: 06/11/2024, 10/14/2024, 12/09/24    DSM5 Diagnoses: 296.32 (F33.1) Major Depressive Disorder, Recurrent Episode, Moderate _ or 300.02 (F41.1) Generalized Anxiety Disorder  Psychosocial / Contextual Factors: lives at home with sons, lots of stress from older son, workplace stress, on leave, legal interventions due to workplace concerns  PROMIS (reviewed every 90 days): 5/22/24, 12/09/24    Referral / Collaboration:  Referral to another professional/service is not indicated at this time..    Anticipated number of session for this episode of care:  24-36  Anticipation frequency of session: Every other week  Anticipated Duration of each session: 38-52 minutes  Treatment plan will be reviewed in 90 days or when goals have been changed.       MeasurableTreatment Goal(s) related to diagnosis / functional impairment(s)  Goal 1: Patient will decrease symptoms of depression    I will know I've met my goal when I am happy within myself.      Objective #A (Patient Action)    Patient will identify at least 3 techniques for intervening on the escalation.  Status: New - Date: 06/11/2024  , 10/14/2024    Intervention(s)  Therapist will teach emotional regulation skills.   .    Objective #B  Patient will learn and demonstrate 3 assertiveness skill(s).  Status: New - Date: 06/11/2024  , 10/14/2024    Intervention(s)  Therapist will teach assertiveness skills.   .      Goal 2: Patient will decrease symptoms of anxiety    I will know I've met my goal when I am happy within myself.      Objective #A (Patient Action)    Status: New - Date: 06/11/2024  , 10/14/2024    Patient will use at least 3 coping skills for anxiety management in the next 12 weeks.    Intervention(s)  Therapist will teach coping  skills for anxiety .    Objective #B  Patient will use cognitive strategies identified in therapy to challenge anxious thoughts.    Status: New - Date: 2024  , 10/14/2024    Intervention(s)  Therapist will teach cognitive restructuring .    Patient has reviewed and agreed to the above plan.      Cate Kaur, [facility]  2024, 10/14/2024    [facility]       PATIENT'S NAME: Darlene Hudson  PREFERRED NAME: Karoline  PRONOUNS:     she her hers  MRN: [id number]  : 1975  ADDRESS: [address] [place] [id number]  ACCT. NUMBER:  [id number]  DATE OF SERVICE: 24  START TIME: 738  END TIME: 823  PREFERRED PHONE: [phone]  May we leave a program related message: Yes  EMERGENCY CONTACT: was not obtained .  SERVICE MODALITY:  Video Visit:      Provider verified identity through the following two step process.  Patient provided:  Patient  and Patient address    Telemedicine Visit: The patient's condition can be safely assessed and treated via synchronous audio and visual telemedicine encounter.      Reason for Telemedicine Visit: Patient convenience (e.g. access to timely appointments / distance to available provider)    Originating Site (Patient Location): Patient's home    Distant Site (Provider Location): Provider Remote Setting- Home Office    Consent:  The patient/guardian has verbally consented to: the potential risks and benefits of telemedicine (video visit) versus in person care; bill my insurance or make self-payment for services provided; and responsibility for payment of non-covered services.     Patient would like the video invitation sent by:  My Chart    Mode of Communication:  Video Conference via [facility]    Distant Location (Provider):  Off-site    As the provider I attest to compliance with applicable laws and regulations related to telemedicine.    UNIVERSAL ADULT Mental Health DIAGNOSTIC ASSESSMENT    Identifying Information:  Patient is a 49 year old,  ", , and Black ;  individual.  [facility]  Patient attended the session alone.    Chief Complaint:   The reason for seeking services at this time is: \" workplace stresses, workplace bullying and harassment \"   The problem(s) began August 28, 2023. Patient has attempted to resolve these concerns in the past through medication, therapy.    Social/Family History:  Patient reported they grew up in [place], [place].  They were raised by biological mother.  Parents  when 3 years old.   Patient reported that their childhood was pretty good, single mother, worked for the [employer], mother was active in their activities, they saw father fairly frequent.  Patient described their current relationships with family of origin as parents have passed, sibling relatiuonships still.      The patient describes their cultural background as bi racial.  Cultural influences and impact on patient's life structure, values, norms, and healthcare: NA.  Contextual influences on patient's health include: Contextual Factors: Individual Factors stressful work environment, supervisor difficulty, symptoms of depression, significant losses due to covid, previous colon cancer diagnosis and surgery as well as ankle surgery .  Cultural, Contextual, and socioeconomic factors do not affect the patient's access to services.  These factors will be addressed in the Preliminary Treatment plan.  Patient identified their preferred language to be English. Patient reported they do not  need the assistance of an  or other support involved in therapy.     Patient reported had no significant delays in developmental tasks.   Patient's highest education level was some college. Patient identified the following learning problems: none reported.  Modifications will not be used to assist communication in therapy.   Patient reports they are "  able to understand written materials.    Patient reported the following relationship history boyfriend of 11 years  of covid .  Patient's current relationship status is  .   Patient identified their sexual orientation as heterosexual.  Patient reported having five child(reza). Patient identified her best friend as part of their support system.  Patient identified the quality of these relationships as stable and meaningful.     Patient's current living/housing situation involves staying in own home/apartment.  They live with two sons and they report that housing is stable.     Patient is currently on medical leave from her work as a  .  Patient reports their finances are obtained through her savings, and help from family and friends.  Patient does identify finances as a current stressor.      Patient reported that they have not been involved with the legal system.   Patient denies being on probation / parole / under the jurisdiction of the court.    Patient's Strengths and Limitations:  Patient identified the following strengths or resources that will help them succeed in treatment: commitment to health and well being, motivation, and work ethic. Things that may interfere with the patient's success in treatment include: physical health concerns.     Assessments:  The following assessments were completed by patient for this visit:  PHQ9:       2022    11:31 AM 2023     4:04 PM 2023     4:19 PM 2023     5:13 PM 2023     2:39 PM 2024     1:38 PM 2024     7:34 AM   PHQ-9 SCORE   PHQ-9 Total Score MyChart 0    0  7 (Mild depression)   PHQ-9 Total Score 0 3 2 2 0 5 7     GAD7:       3/12/2020     3:31 PM 2021     2:04 PM 2022    11:32 AM 2023     4:19 PM 2023     5:13 PM 2023     2:40 PM 2024     1:38 PM   ACE-7 SCORE   Total Score   5 (mild anxiety)   7 (mild anxiety)    Total Score 3 5 5 5 5 7 6     CAGE-AID:       2024      8:00 AM   CAGE-AID Total Score   Total Score 4     PROMIS 10-Global Health (all questions and answers displayed):        No data to display              [place] Suicide Severity Rating Scale (Lifetime/Recent)      5/9/2024     8:00 AM   [place] Suicide Severity Rating (Lifetime/Recent)   Q1 Wish to be Dead (Lifetime) N   Q2 Non-Specific Active Suicidal Thoughts (Lifetime) N   Actual Attempt (Lifetime) N   Has subject engaged in non-suicidal self-injurious behavior? (Lifetime) Y   Has subject engaged in non-suicidal self-injurious behavior? (Past 3 Months) Y   Interrupted Attempts (Lifetime) N   Aborted or Self-Interrupted Attempt (Lifetime) N   Preparatory Acts or Behavior (Lifetime) N   Calculated C-SSRS Risk Score (Lifetime/Recent) No Risk Indicated       Personal and Family Medical History:  Patient   report a family history of mental health concerns.  Patient reports family history includes Asthma in her child and father; Breast Cancer in her maternal grandmother; Cancer in her mother; Diabetes in her brother and brother; Hypertension in her mother; Other Cancer in her father..     Patient does report Mental Health Diagnosis and/or Treatment.  Patient Patient reported the following previous diagnoses which include(s): Depression, PTSD.  Patient reported symptoms began currently and about 15 years ago due to a rough patch with several stressors.   Patient has received mental health services in the past: outpatient therapy and [facility] treatment.  Psychiatric Hospitalizations: None.  Patient denies a history of civil commitment.  Patient is receiving other mental health services.  These include psychotherapy with a therapist in the Oncology dept.       Patient has had a physical exam to rule out medical causes for current symptoms.  Date of last physical exam was within the past year. Client was encouraged to follow up with PCP if symptoms were to develop. The patient has a [facility] Provider, who is  named Robbie Bailon.  Patient reports the following current medical concerns: past diagnosis and surgery for stage two colon cancer, ankle surgery which ct is still in recovery from.  Patient reports recent ankle surgery that sometimes causes pain.  There are significant appetite / nutritional concerns / weight changes.  Recent significant weight gain, gained 35 lbs.  Inactivity.   Patient does not report a history of head injury / trauma / cognitive impairment.      Patient reports current meds as:   Current Outpatient Medications   Medication Sig Dispense Refill    albuterol (PROAIR HFA/PROVENTIL HFA/VENTOLIN HFA) 108 (90 Base) MCG/ACT inhaler Inhale 2 puffs into the lungs every 6 hours as needed for shortness of breath, wheezing or cough 18 g 11    ALPRAZolam (XANAX) 2 MG tablet Take 1 tablet (2 mg) by mouth 2 times daily as needed for anxiety 60 tablet 3    cetirizine (ZYRTEC) 10 MG tablet Take 1 tablet (10 mg) by mouth daily 30 tablet 11    fluticasone (FLONASE) 50 MCG/ACT nasal spray Spray 1 spray into both nostrils daily      fluticasone (FLONASE) 50 MCG/ACT nasal spray Spray 2 sprays into both nostrils daily 9.9 mL 11    hydrOXYzine carlos (VISTARIL) 25 MG capsule Take 1 capsule (25 mg) by mouth nightly as needed for anxiety 30 capsule 3    montelukast (SINGULAIR) 10 MG tablet Take 1 tablet (10 mg) by mouth At Bedtime 30 tablet 11    naloxone (NARCAN) 4 MG/0.1ML nasal spray Spray 1 spray (4 mg) into one nostril alternating nostrils as needed for opioid reversal every 2-3 minutes until assistance arrives (Patient not taking: Reported on 3/6/2024) 0.2 mL 1    oxyCODONE (ROXICODONE) 5 MG tablet Take 1 tablet (5 mg) by mouth 3 times daily as needed for severe pain 75 tablet 0    PROAIR  (90 Base) MCG/ACT inhaler Inhale 2 puffs into the lungs every 4 hours as needed for shortness of breath or wheezing 8 g 11    sertraline (ZOLOFT) 50 MG tablet Take 2 tablets (100 mg) by mouth daily 60 tablet 3    spacer  (OPTICHAMBER BINA) holding chamber For use w/ rescue inhaler 1 each 0    sucralfate (CARAFATE) 1 GM/10ML suspension Take 10 mLs (1 g) by mouth 4 times daily as needed for nausea 414 mL 0    tiotropium (SPIRIVA RESPIMAT) 1.25 MCG/ACT inhaler Inhale 2 puffs into the lungs daily 12 g 3    tiotropium (SPIRIVA) 18 MCG inhaled capsule Inhale 18 mcg into the lungs daily (Patient not taking: Reported on 3/6/2024)       No current facility-administered medications for this visit.     Facility-Administered Medications Ordered in Other Visits   Medication Dose Route Frequency Provider Last Rate Last Admin    ceFAZolin (ANCEF) intermittent infusion 2 g in 50 mL dextrose PREMIX  2 g Intravenous See Admin Instructions Caty Darby PA-C        flumazenil (ROMAZICON) injection 0.2 mg  0.2 mg Intravenous q1 min prn Kodi Hathaway DO        gabapentin (NEURONTIN) capsule 300 mg  300 mg Oral Pre-Op/Pre-procedure x 1 dose Kodi Hathaway DO        lactated ringers infusion   Intravenous Continuous Kodi Hathaway DO   Stopped at 06/21/23 1149    lidocaine (LMX4) kit   Topical Q1H PRN Kodi Hathaway DO        lidocaine 1 % 0.1-1 mL  0.1-1 mL Other Q1H PRN Kodi Hathaway DO        midazolam (VERSED) injection 1-2 mg  1-2 mg Intravenous Q4 Min PRN Kodi Hathaway DO        naloxone (NARCAN) injection 0.2 mg  0.2 mg Intravenous Q2 Min PRN Kodi Hathaway DO        Or    naloxone (NARCAN) injection 0.4 mg  0.4 mg Intravenous Q2 Min PRN Kodi Hathaway DO        Or    naloxone (NARCAN) injection 0.2 mg  0.2 mg Intramuscular Q2 Min PRN Kodi Hathaway DO        Or    naloxone (NARCAN) injection 0.4 mg  0.4 mg Intramuscular Q2 Min PRN Kodi Hathaway DO        sodium chloride (PF) 0.9% PF flush 3 mL  3 mL Intracatheter Q8H Kodi Hathaway DO        sodium chloride (PF) 0.9% PF flush 3 mL  3 mL Intracatheter q1 min prn Kodi Hathaway DO           Medication Adherence:  Patient reports  .  taking prescribed medications as prescribed.    Patient  "Allergies:    Allergies   Allergen Reactions    Gabapentin Other (See Comments)     Mental status is changed     Lidocaine Other (See Comments)     \"my jaw stopped moving\"  Other reaction(s): Dystonia    Penicillins Hives, Rash and Shortness Of Breath    Lidocaine-Epinephrine Other (See Comments) and Muscle Pain (Myalgia)     Severe jaw cramping, double vision  Jaw locking       Medical History:    Past Medical History:   Diagnosis Date    Abdominal pain 06/29/2015    Abnormal cervical Papanicolaou smear 11/09/2014    Overview:  ACUS/HPV positive    Abnormal cytology finding 11/09/2014    Overview:  ACUS/HPV positive    Acute pericardial effusion 02/06/2017    Agoraphobia with panic attacks     Anxiety     Arthralgia of both lower legs 05/29/2020    Bilateral achy knee pain that is chronic in nature going on for years. Most likely osteoarthritis in knees related to obesity. Previously injected in both knees with good relief. 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  Has controlled substance agreement with them.  On Vicodin, Valium, Klonopin prescribed only from there.       Coronary artery disease     Cough 02/09/2020    Family history of colon cancer 10/24/2020    Hx of seasonal allergies     Infection due to 2019 novel coronavirus 09/20/2022    Infectious pericarditis     Lipoma     Low back pain 07/13/2015    Major depressive disorder, recurrent episode, moderate (H) 09/05/2006    Menorrhagia with irregular cycle " 07/15/2022    Added automatically from request for surgery [id number]    Moderate persistent asthma without complication 09/29/2020    Nondependent alcohol abuse, episodic drinking behavior 10/03/2012    Noninflammatory disorder of vagina 02/20/2015    Other chronic pain     Other long term (current) drug therapy 01/28/2013    Overview:  Vicodin and cyclobenzaprine monthly    Panic disorder with agoraphobia 09/05/2006    Pap smear for cervical cancer screening 10/31/2016    03/29/2010  Normal cytology, HPV ot done, repeat in 3 years.    Pericarditis 2017    Physiologic disturbance of temperature regulation 10/24/2020    PONV (postoperative nausea and vomiting)     Right ankle swelling 06/07/2022    Added automatically from request for surgery [id number]    Tobacco abuse     Tobacco use disorder 07/13/2015         Current Mental Status Exam:   Appearance:  Appropriate    Eye Contact:  Good   Psychomotor:  Normal       Gait / station:  Not assessed  Attitude / Demeanor: Cooperative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

## 2024-12-16 ENCOUNTER — VIRTUAL VISIT (OUTPATIENT)
Dept: PSYCHOLOGY | Facility: CLINIC | Age: 49
End: 2024-12-16
Payer: COMMERCIAL

## 2024-12-16 DIAGNOSIS — F33.1 MAJOR DEPRESSIVE DISORDER, RECURRENT EPISODE, MODERATE (H): Primary | ICD-10-CM

## 2024-12-16 DIAGNOSIS — F41.1 GAD (GENERALIZED ANXIETY DISORDER): ICD-10-CM

## 2024-12-16 PROCEDURE — 90832 PSYTX W PT 30 MINUTES: CPT | Mod: 95

## 2024-12-16 NOTE — PROGRESS NOTES
[facility]                                     Progress Note    Patient Name: Darlene Hudson  Date: 12/16/24         Service Type: Individual      Session Start Time:  933 Session End Time: 956     Session Length: 23    Session #: 12    Attendees: Client attended alone    Service Modality:  Video Visit:      Provider verified identity through the following two step process.  Patient provided:  Patient is known previously to provider    Telemedicine Visit: The patient's condition can be safely assessed and treated via synchronous audio and visual telemedicine encounter.      Reason for Telemedicine Visit: Patient has requested telehealth visit    Originating Site (Patient Location): Patient's home    Distant Site (Provider Location): Provider Remote Setting- Home Office    Consent:  The patient/guardian has verbally consented to: the potential risks and benefits of telemedicine (video visit) versus in person care; bill my insurance or make self-payment for services provided; and responsibility for payment of non-covered services.     Patient would like the video invitation sent by:  My Chart    Mode of Communication:  Video Conference via [facility]    Distant Location (Provider):  Off-site    As the provider I attest to compliance with applicable laws and regulations related to telemedicine.    DATA  Interactive Complexity: No  Crisis: No        Progress Since Last Session (Related to Symptoms / Goals / Homework):   Symptoms: No change overall    Homework:  continues completing self care activities as she is able.      Episode of Care Goals: Satisfactory progress - ACTION (Actively working towards change); Intervened by reinforcing change plan / affirming steps taken     Current / Ongoing Stressors and Concerns:   Many stressors including prior cancer diagnosis and treatment, losses through death of family and friends. Client notes she is ready to get back to a routine, feeling cooped up at home  too frequently.  Returned medications she had been prescribed and taking for some time, she feels good about this. Older son causing much stress, setting boundaries with him and sometimes this can be difficult but overall helping with anxiety and stress.     Treatment Objective(s) Addressed in This Session:   Build rapport.      Intervention:   Build rapport.  Exploration of job search.  Discussion of ideal jobs client is interested in.  Client to set up appt with [employer] career counselor.  Client was dozing off today, notes that she had to bring her daughter to the ED last night, so didn't sleep.  Opted to end early and re-schedule.        Assessments completed prior to visit:  The following assessments were completed by patient for this visit:  PHQ9:       5/9/2024     7:34 AM 5/22/2024    11:00 AM 5/28/2024     9:31 AM 7/24/2024     3:59 PM 8/14/2024     5:10 PM 12/4/2024     3:16 PM 12/9/2024    10:29 AM   PHQ-9 SCORE   PHQ-9 Total Score MyChart 7 (Mild depression)  7 (Mild depression) 6 (Mild depression)  6 (Mild depression) 9 (Mild depression)   PHQ-9 Total Score 7 8 7 6    6 7 6  9        Patient-reported    Multiple values from one day are sorted in reverse-chronological order     GAD7:       4/18/2023     5:13 PM 9/13/2023     2:40 PM 2/21/2024     1:38 PM 5/22/2024    11:00 AM 5/22/2024    11:03 AM 8/1/2024    11:00 AM 8/14/2024     5:10 PM   ACE-7 SCORE   Total Score  7 (mild anxiety)   13 (moderate anxiety)     Total Score 5 7 6 19 13    13 19 15     PROMIS 10-Global Health (all questions and answers displayed):       5/22/2024    11:00 AM 5/22/2024    11:04 AM 7/23/2024     9:34 AM 12/9/2024    10:34 AM   PROMIS 10   In general, would you say your health is:  Fair Poor Good   In general, would you say your quality of life is:  Fair Fair Fair   In general, how would you rate your physical health?  Fair Poor Good   In general, how would you rate your mental health, including your mood and your  ability to think?  Poor Fair Good   In general, how would you rate your satisfaction with your social activities and relationships?  Fair Fair Good   In general, please rate how well you carry out your usual social activities and roles  Fair Good Good   To what extent are you able to carry out your everyday physical activities such as walking, climbing stairs, carrying groceries, or moving a chair?  Moderately Moderately Moderately   In the past 7 days, how often have you been bothered by emotional problems such as feeling anxious, depressed, or irritable?  Always Always Sometimes   In the past 7 days, how would you rate your fatigue on average?  Mild Moderate Mild   In the past 7 days, how would you rate your pain on average, where 0 means no pain, and 10 means worst imaginable pain?  5 5 8   In general, would you say your health is: 2 2  1  3    In general, would you say your quality of life is:  2  2  2    In general, how would you rate your physical health?  2  1  3    In general, how would you rate your mental health, including your mood and your ability to think?  1  2  3    In general, how would you rate your satisfaction with your social activities and relationships?  2  2  3    In general, please rate how well you carry out your usual social activities and roles. (This includes activities at home, at work and in your community, and responsibilities as a parent, child, spouse, employee, friend, etc.)  2  3  3    To what extent are you able to carry out your everyday physical activities such as walking, climbing stairs, carrying groceries, or moving a chair?  3  3  3    In the past 7 days, how often have you been bothered by emotional problems such as feeling anxious, depressed, or irritable?  5  5  3    In the past 7 days, how would you rate your fatigue on average?  2  3  2    In the past 7 days, how would you rate your pain on average, where 0 means no pain, and 10 means worst imaginable pain?  5  5  8     Global Mental Health Score  6    6 7 11    Global Physical Health Score  12    12 10 12    PROMIS TOTAL - SUBSCORES  18    18 17 23        Patient-reported    Multiple values from one day are sorted in reverse-chronological order         ASSESSMENT: Current Emotional / Mental Status (status of significant symptoms):   Risk status (Self / Other harm or suicidal ideation)   Patient denies current fears or concerns for personal safety.   Patient denies current or recent suicidal ideation or behaviors.   Patient denies current or recent homicidal ideation or behaviors.   Patient denies current or recent self injurious behavior or ideation.   Patient denies other safety concerns.   Patient reports there has been no change in risk factors since their last session.     Patient reports there has been no change in protective factors since their last session.     Recommended that patient call 911 or go to the local ED should there be a change in any of these risk factors     Appearance:   normal    Eye Contact:   Good    Psychomotor Behavior: normal    Attitude:   Cooperative    Orientation:   All   Speech    Rate / Production: Normal     Volume:  Normal    Mood:    Normal   Affect:    normal   Thought Content:  Clear    Thought Form:  Coherent    Insight:    Fair      Medication Review:   No changes to current psychiatric medication(s)     Medication Compliance:   Yes     Changes in Health Issues:   None reported     Chemical Use Review:   Substance Use: Chemical use reviewed, no active concerns identified      Tobacco Use: No current tobacco use.      Diagnosis:  1. Major depressive disorder, recurrent episode, moderate (H)    2. CAE (generalized anxiety disorder)          Collateral Reports Completed:   Not Applicable    PLAN: (Patient Tasks / Therapist Tasks / Other)  Rtn 1 weeks.   Management of symptoms related to current stressors.        Cate Kaur [facility]                                                          ______________________________________________________________________    Individual Treatment Plan    Patient's Name: Darlene Hudson  YOB: 1975    Date of Creation: 06/11/2024  Date Treatment Plan Last Reviewed/Revised: 06/11/2024, 10/14/2024, 12/09/24    DSM5 Diagnoses: 296.32 (F33.1) Major Depressive Disorder, Recurrent Episode, Moderate _ or 300.02 (F41.1) Generalized Anxiety Disorder  Psychosocial / Contextual Factors: lives at home with sons, lots of stress from older son, workplace stress, on leave, legal interventions due to workplace concerns  PROMIS (reviewed every 90 days): 5/22/24, 12/09/24    Referral / Collaboration:  Referral to another professional/service is not indicated at this time..    Anticipated number of session for this episode of care:  24-36  Anticipation frequency of session: Every other week  Anticipated Duration of each session: 38-52 minutes  Treatment plan will be reviewed in 90 days or when goals have been changed.       MeasurableTreatment Goal(s) related to diagnosis / functional impairment(s)  Goal 1: Patient will decrease symptoms of depression    I will know I've met my goal when I am happy within myself.      Objective #A (Patient Action)    Patient will identify at least 3 techniques for intervening on the escalation.  Status: New - Date: 06/11/2024  , 10/14/2024    Intervention(s)  Therapist will teach emotional regulation skills.   .    Objective #B  Patient will learn and demonstrate 3 assertiveness skill(s).  Status: New - Date: 06/11/2024  , 10/14/2024    Intervention(s)  Therapist will teach assertiveness skills.   .      Goal 2: Patient will decrease symptoms of anxiety    I will know I've met my goal when I am happy within myself.      Objective #A (Patient Action)    Status: New - Date: 06/11/2024  , 10/14/2024    Patient will use at least 3 coping skills for anxiety management in the next 12 weeks.    Intervention(s)  Therapist will teach coping  skills for anxiety .    Objective #B  Patient will use cognitive strategies identified in therapy to challenge anxious thoughts.    Status: New - Date: 2024  , 10/14/2024    Intervention(s)  Therapist will teach cognitive restructuring .    Patient has reviewed and agreed to the above plan.      Cate Kaur, [facility]  2024, 10/14/2024    [facility]       PATIENT'S NAME: Darlene Hudson  PREFERRED NAME: Karoline  PRONOUNS:     she her hers  MRN: [id number]  : 1975  ADDRESS: [address] [place] [id number]  ACCT. NUMBER:  [id number]  DATE OF SERVICE: 24  START TIME: 738  END TIME: 823  PREFERRED PHONE: [phone]  May we leave a program related message: Yes  EMERGENCY CONTACT: was not obtained .  SERVICE MODALITY:  Video Visit:      Provider verified identity through the following two step process.  Patient provided:  Patient  and Patient address    Telemedicine Visit: The patient's condition can be safely assessed and treated via synchronous audio and visual telemedicine encounter.      Reason for Telemedicine Visit: Patient convenience (e.g. access to timely appointments / distance to available provider)    Originating Site (Patient Location): Patient's home    Distant Site (Provider Location): Provider Remote Setting- Home Office    Consent:  The patient/guardian has verbally consented to: the potential risks and benefits of telemedicine (video visit) versus in person care; bill my insurance or make self-payment for services provided; and responsibility for payment of non-covered services.     Patient would like the video invitation sent by:  My Chart    Mode of Communication:  Video Conference via [facility]    Distant Location (Provider):  Off-site    As the provider I attest to compliance with applicable laws and regulations related to telemedicine.    UNIVERSAL ADULT Mental Health DIAGNOSTIC ASSESSMENT    Identifying Information:  Patient is a 49 year old,  ", , and Black ;  individual.  [facility]  Patient attended the session alone.    Chief Complaint:   The reason for seeking services at this time is: \" workplace stresses, workplace bullying and harassment \"   The problem(s) began August 28, 2023. Patient has attempted to resolve these concerns in the past through medication, therapy.    Social/Family History:  Patient reported they grew up in [place], [place].  They were raised by biological mother.  Parents  when 3 years old.   Patient reported that their childhood was pretty good, single mother, worked for the [employer], mother was active in their activities, they saw father fairly frequent.  Patient described their current relationships with family of origin as parents have passed, sibling relatiuonships still.      The patient describes their cultural background as bi racial.  Cultural influences and impact on patient's life structure, values, norms, and healthcare: NA.  Contextual influences on patient's health include: Contextual Factors: Individual Factors stressful work environment, supervisor difficulty, symptoms of depression, significant losses due to covid, previous colon cancer diagnosis and surgery as well as ankle surgery .  Cultural, Contextual, and socioeconomic factors do not affect the patient's access to services.  These factors will be addressed in the Preliminary Treatment plan.  Patient identified their preferred language to be English. Patient reported they do not  need the assistance of an  or other support involved in therapy.     Patient reported had no significant delays in developmental tasks.   Patient's highest education level was some college. Patient identified the following learning problems: none reported.  Modifications will not be used to assist communication in therapy.   Patient reports they are "  able to understand written materials.    Patient reported the following relationship history boyfriend of 11 years  of covid .  Patient's current relationship status is  .   Patient identified their sexual orientation as heterosexual.  Patient reported having five child(reza). Patient identified her best friend as part of their support system.  Patient identified the quality of these relationships as stable and meaningful.     Patient's current living/housing situation involves staying in own home/apartment.  They live with two sons and they report that housing is stable.     Patient is currently on medical leave from her work as a  .  Patient reports their finances are obtained through her savings, and help from family and friends.  Patient does identify finances as a current stressor.      Patient reported that they have not been involved with the legal system.   Patient denies being on probation / parole / under the jurisdiction of the court.    Patient's Strengths and Limitations:  Patient identified the following strengths or resources that will help them succeed in treatment: commitment to health and well being, motivation, and work ethic. Things that may interfere with the patient's success in treatment include: physical health concerns.     Assessments:  The following assessments were completed by patient for this visit:  PHQ9:       2022    11:31 AM 2023     4:04 PM 2023     4:19 PM 2023     5:13 PM 2023     2:39 PM 2024     1:38 PM 2024     7:34 AM   PHQ-9 SCORE   PHQ-9 Total Score MyChart 0    0  7 (Mild depression)   PHQ-9 Total Score 0 3 2 2 0 5 7     GAD7:       3/12/2020     3:31 PM 2021     2:04 PM 2022    11:32 AM 2023     4:19 PM 2023     5:13 PM 2023     2:40 PM 2024     1:38 PM   ACE-7 SCORE   Total Score   5 (mild anxiety)   7 (mild anxiety)    Total Score 3 5 5 5 5 7 6     CAGE-AID:       2024      8:00 AM   CAGE-AID Total Score   Total Score 4     PROMIS 10-Global Health (all questions and answers displayed):        No data to display              [place] Suicide Severity Rating Scale (Lifetime/Recent)      5/9/2024     8:00 AM   [place] Suicide Severity Rating (Lifetime/Recent)   Q1 Wish to be Dead (Lifetime) N   Q2 Non-Specific Active Suicidal Thoughts (Lifetime) N   Actual Attempt (Lifetime) N   Has subject engaged in non-suicidal self-injurious behavior? (Lifetime) Y   Has subject engaged in non-suicidal self-injurious behavior? (Past 3 Months) Y   Interrupted Attempts (Lifetime) N   Aborted or Self-Interrupted Attempt (Lifetime) N   Preparatory Acts or Behavior (Lifetime) N   Calculated C-SSRS Risk Score (Lifetime/Recent) No Risk Indicated       Personal and Family Medical History:  Patient   report a family history of mental health concerns.  Patient reports family history includes Asthma in her child and father; Breast Cancer in her maternal grandmother; Cancer in her mother; Diabetes in her brother and brother; Hypertension in her mother; Other Cancer in her father..     Patient does report Mental Health Diagnosis and/or Treatment.  Patient Patient reported the following previous diagnoses which include(s): Depression, PTSD.  Patient reported symptoms began currently and about 15 years ago due to a rough patch with several stressors.   Patient has received mental health services in the past: outpatient therapy and [facility] treatment.  Psychiatric Hospitalizations: None.  Patient denies a history of civil commitment.  Patient is receiving other mental health services.  These include psychotherapy with a therapist in the Oncology dept.       Patient has had a physical exam to rule out medical causes for current symptoms.  Date of last physical exam was within the past year. Client was encouraged to follow up with PCP if symptoms were to develop. The patient has a [facility] Provider, who is  named Robbie Bailon.  Patient reports the following current medical concerns: past diagnosis and surgery for stage two colon cancer, ankle surgery which ct is still in recovery from.  Patient reports recent ankle surgery that sometimes causes pain.  There are significant appetite / nutritional concerns / weight changes.  Recent significant weight gain, gained 35 lbs.  Inactivity.   Patient does not report a history of head injury / trauma / cognitive impairment.      Patient reports current meds as:   Current Outpatient Medications   Medication Sig Dispense Refill    albuterol (PROAIR HFA/PROVENTIL HFA/VENTOLIN HFA) 108 (90 Base) MCG/ACT inhaler Inhale 2 puffs into the lungs every 6 hours as needed for shortness of breath, wheezing or cough 18 g 11    ALPRAZolam (XANAX) 2 MG tablet Take 1 tablet (2 mg) by mouth 2 times daily as needed for anxiety 60 tablet 3    cetirizine (ZYRTEC) 10 MG tablet Take 1 tablet (10 mg) by mouth daily 30 tablet 11    fluticasone (FLONASE) 50 MCG/ACT nasal spray Spray 1 spray into both nostrils daily      fluticasone (FLONASE) 50 MCG/ACT nasal spray Spray 2 sprays into both nostrils daily 9.9 mL 11    hydrOXYzine carlos (VISTARIL) 25 MG capsule Take 1 capsule (25 mg) by mouth nightly as needed for anxiety 30 capsule 3    montelukast (SINGULAIR) 10 MG tablet Take 1 tablet (10 mg) by mouth At Bedtime 30 tablet 11    naloxone (NARCAN) 4 MG/0.1ML nasal spray Spray 1 spray (4 mg) into one nostril alternating nostrils as needed for opioid reversal every 2-3 minutes until assistance arrives (Patient not taking: Reported on 3/6/2024) 0.2 mL 1    oxyCODONE (ROXICODONE) 5 MG tablet Take 1 tablet (5 mg) by mouth 3 times daily as needed for severe pain 75 tablet 0    PROAIR  (90 Base) MCG/ACT inhaler Inhale 2 puffs into the lungs every 4 hours as needed for shortness of breath or wheezing 8 g 11    sertraline (ZOLOFT) 50 MG tablet Take 2 tablets (100 mg) by mouth daily 60 tablet 3    spacer  (OPTICHAMBER BINA) holding chamber For use w/ rescue inhaler 1 each 0    sucralfate (CARAFATE) 1 GM/10ML suspension Take 10 mLs (1 g) by mouth 4 times daily as needed for nausea 414 mL 0    tiotropium (SPIRIVA RESPIMAT) 1.25 MCG/ACT inhaler Inhale 2 puffs into the lungs daily 12 g 3    tiotropium (SPIRIVA) 18 MCG inhaled capsule Inhale 18 mcg into the lungs daily (Patient not taking: Reported on 3/6/2024)       No current facility-administered medications for this visit.     Facility-Administered Medications Ordered in Other Visits   Medication Dose Route Frequency Provider Last Rate Last Admin    ceFAZolin (ANCEF) intermittent infusion 2 g in 50 mL dextrose PREMIX  2 g Intravenous See Admin Instructions Caty Darby PA-C        flumazenil (ROMAZICON) injection 0.2 mg  0.2 mg Intravenous q1 min prn Kodi Hathaway DO        gabapentin (NEURONTIN) capsule 300 mg  300 mg Oral Pre-Op/Pre-procedure x 1 dose Kodi Hathaway DO        lactated ringers infusion   Intravenous Continuous Kodi Hathaway DO   Stopped at 06/21/23 1149    lidocaine (LMX4) kit   Topical Q1H PRN Kodi Hathaway DO        lidocaine 1 % 0.1-1 mL  0.1-1 mL Other Q1H PRN Kodi Hathaway DO        midazolam (VERSED) injection 1-2 mg  1-2 mg Intravenous Q4 Min PRN Kodi Hathaway DO        naloxone (NARCAN) injection 0.2 mg  0.2 mg Intravenous Q2 Min PRN Kodi Hathaway DO        Or    naloxone (NARCAN) injection 0.4 mg  0.4 mg Intravenous Q2 Min PRN Kodi Hathaway DO        Or    naloxone (NARCAN) injection 0.2 mg  0.2 mg Intramuscular Q2 Min PRN Kodi Hathaway DO        Or    naloxone (NARCAN) injection 0.4 mg  0.4 mg Intramuscular Q2 Min PRN Kodi Hathaway DO        sodium chloride (PF) 0.9% PF flush 3 mL  3 mL Intracatheter Q8H Kodi Hathaway DO        sodium chloride (PF) 0.9% PF flush 3 mL  3 mL Intracatheter q1 min prn Kodi Hathaway DO           Medication Adherence:  Patient reports  .  taking prescribed medications as prescribed.    Patient  "Allergies:    Allergies   Allergen Reactions    Gabapentin Other (See Comments)     Mental status is changed     Lidocaine Other (See Comments)     \"my jaw stopped moving\"  Other reaction(s): Dystonia    Penicillins Hives, Rash and Shortness Of Breath    Lidocaine-Epinephrine Other (See Comments) and Muscle Pain (Myalgia)     Severe jaw cramping, double vision  Jaw locking       Medical History:    Past Medical History:   Diagnosis Date    Abdominal pain 06/29/2015    Abnormal cervical Papanicolaou smear 11/09/2014    Overview:  ACUS/HPV positive    Abnormal cytology finding 11/09/2014    Overview:  ACUS/HPV positive    Acute pericardial effusion 02/06/2017    Agoraphobia with panic attacks     Anxiety     Arthralgia of both lower legs 05/29/2020    Bilateral achy knee pain that is chronic in nature going on for years. Most likely osteoarthritis in knees related to obesity. Previously injected in both knees with good relief. 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  Has controlled substance agreement with them.  On Vicodin, Valium, Klonopin prescribed only from there.       Coronary artery disease     Cough 02/09/2020    Family history of colon cancer 10/24/2020    Hx of seasonal allergies     Infection due to 2019 novel coronavirus 09/20/2022    Infectious pericarditis     Lipoma     Low back pain 07/13/2015    Major depressive disorder, recurrent episode, moderate (H) 09/05/2006    Menorrhagia with irregular cycle " 07/15/2022    Added automatically from request for surgery [id number]    Moderate persistent asthma without complication 09/29/2020    Nondependent alcohol abuse, episodic drinking behavior 10/03/2012    Noninflammatory disorder of vagina 02/20/2015    Other chronic pain     Other long term (current) drug therapy 01/28/2013    Overview:  Vicodin and cyclobenzaprine monthly    Panic disorder with agoraphobia 09/05/2006    Pap smear for cervical cancer screening 10/31/2016    03/29/2010  Normal cytology, HPV ot done, repeat in 3 years.    Pericarditis 2017    Physiologic disturbance of temperature regulation 10/24/2020    PONV (postoperative nausea and vomiting)     Right ankle swelling 06/07/2022    Added automatically from request for surgery [id number]    Tobacco abuse     Tobacco use disorder 07/13/2015         Current Mental Status Exam:   Appearance:  Appropriate    Eye Contact:  Good   Psychomotor:  Normal       Gait / station:  Not assessed  Attitude / Demeanor: Cooperative  Pleasant  Speech      Rate / Production: Normal/ Responsive      Volume:  Normal  volume      Language:  intact  Mood:   Normal  Affect:   Appropriate    Thought Content: Clear   Thought Process: Coherent  Logical       Associations: No loosening of associations  Insight:   Good   Judgment:  Intact   Orientation:  All  Attention/concentration: Good    Substance Use:   Patient did not report a family history of substance use concerns; see medical history section for details.  Patient has received chemical dependency treatment in the past at [facility] 15 years ago.  Patient has ever been to detox.      Patient is not currently receiving any chemical dependency treatment. Patient reported the following problems as a result of their substance use:  NA.    Patient denies using alcohol.  Patient denies using tobacco  Patient denies using cannabis.  Patient reports appx an iced coffee before work, and an occasional Mountain Dew  Patient reports  using/abusing the following substance(s). Patient reported no other substance use.     Substance Use: No symptoms    Based on the positive CAGE score and clinical interview there  are not indications of drug or alcohol abuse.  15 years ago only     Significant Losses / Trauma / Abuse / Neglect Issues:   Patient   did not serve in the .  There are indications or report of significant loss, trauma, abuse or neglect issues related to: workplace harassment, bullying.  Cancer diagnosis, heart surgery, loss of several people in her life.   Concerns for possible neglect are not present.     Safety Assessment:   Patient denies current homicidal ideation and behaviors.  Patient reports current self-injurious ideation.  Onset: a month ago, frequency: once, duration: once, intensity: intense.  Client reports they are not currently engaging in self-injurious behaivor..  Patient denied risk behaviors associated with substance use.   Patient denies any high risk behaviors associated with mental health symptoms.  Patient reports the following current concerns for their personal safety: workplace hazards.  Patient reports there   firearms in the house.       The firearms are secured in a locked space.    History of Safety Concerns:  Patient denied a history of homicidal ideation.     Patient denied a history of personal safety concerns.    Patient denied a history of assaultive behaviors.    Patient denied a history of sexual assault behaviors.     Patient denied a history of risk behaviors associated with substance use.  Patient denies any history of high risk behaviors associated with mental health symptoms.  Patient reports the following protective factors:      Risk Plan:  See Recommendations for Safety and Risk Management Plan    Review of Symptoms per patient report:   Depression: Change in sleep, Lack of interest, Change in energy level, Difficulties concentrating, Low self-worth, Irritability, and Feeling sad, down,  or depressed  Ekta:  No Symptoms  Psychosis: No Symptoms  Anxiety: Excessive worry, Nervousness, Sleep disturbance, Poor concentration, and Irritability  Panic:  No symptoms  Post Traumatic Stress Disorder:  No Symptoms   Eating Disorder: No Symptoms  ADD / ADHD:  No symptoms  Conduct Disorder: No symptoms  Autism Spectrum Disorder: No symptoms  Obsessive Compulsive Disorder: No Symptoms    Patient reports the following compulsive behaviors and treatment history: NA.      Diagnostic Criteria:   Generalized Anxiety Disorder  A. Excessive anxiety and worry about a number of events or activities (such as work or school performance).   B. The person finds it difficult to control the worry.  C. Select 3 or more symptoms (required for diagnosis). Only one item is required in children.   - Restlessness or feeling keyed up or on edge.    - Being easily fatigued.    - Difficulty concentrating or mind going blank.    - Irritability.    - Sleep disturbance (difficulty falling or staying asleep, or restless unsatisfying sleep).   D. The focus of the anxiety and worry is not confined to features of an Axis I disorder.  E. The anxiety, worry, or physical symptoms cause clinically significant distress or impairment in social, occupational, or other important areas of functioning.   F. The disturbance is not due to the direct physiological effects of a substance (e.g., a drug of abuse, a medication) or a general medical condition (e.g., hyperthyroidism) and does not occur exclusively during a Mood Disorder, a Psychotic Disorder, or a Pervasive Developmental Disorder.    - The aformentioned symptoms began one year(s) ago and occurs 7 days per week and is experienced as severe. Major Depressive Disorder  CRITERIA (A-C) REPRESENT A MAJOR DEPRESSIVE EPISODE - SELECT THESE CRITERIA  A) Recurrent episode(s) - symptoms have been present during the same 2-week period and represent a change from previous functioning 5 or more symptoms  (required for diagnosis)   - Depressed mood. Note: In children and adolescents, can be irritable mood.     - Diminished interest or pleasure in all, or almost all, activities.    - Decreased sleep.    - Fatigue or loss of energy.    - Feelings of worthlessness or inappropriate and excessive guilt.    - Diminished ability to think or concentrate, or indecisiveness.   B) The symptoms cause clinically significant distress or impairment in social, occupational, or other important areas of functioning  C) The episode is not attributable to the physiological effects of a substance or to another medical condition  D) The occurence of major depressive episode is not better explained by other thought / psychotic disorders  E) There has never been a manic episode or hypomanic episode    Functional Status:  Patient reports the following functional impairments:  management of the household and or completion of tasks and work / vocational responsibilities.     Nonprogrammatic care:  Patient is requesting basic services to address current mental health concerns.    Clinical Summary:  1. Psychosocial, Cultural and Contextual Factors: , multiple medical conditions, workplace bullying  .  2. Principal DSM5 Diagnoses  (Sustained by DSM5 Criteria Listed Above):   296.32 (F33.1) Major Depressive Disorder, Recurrent Episode, Moderate _  300.02 (F41.1) Generalized Anxiety Disorder.  3. Other Diagnoses that is relevant to services:   NA  4. Provisional Diagnosis:  NA  5. Prognosis: Expect Improvement.  6. Likely consequences of symptoms if not treated: worsening symptoms and continued functional impairment.  7. Client strengths include:  goal-focused, good listener, motivated, open to learning, open to suggestions / feedback, responsible parent, wants to learn, and willing to ask questions .     Recommendations:     1. Plan for Safety and Risk Management:   Safety and Risk: Recommended that patient call 911 or go to the local ED  should there be a change in any of these risk factors..          Report to child / adult protection services was NA.     2. Patient's identified no concerns with sexual orientation, gender identity, culture, ethnicity, or kelly.     3. Initial Treatment will focus on:    Depressed Mood - MDD  Anxiety - ACE.     4. Resources/Service Plan:    services are not indicated.   Modifications to assist communication are not indicated.   Additional disability accommodations are not indicated.      5. Collaboration:   Collaboration / coordination of treatment will be initiated with the following  support professionals: primary care physician.      6.  Referrals:   The following referral(s) will be initiated: None at this time.       A Release of Information has been obtained for the following: None at this time.     Clinical Substantiation/medical necessity for the above recommendations:  individual therapy is necessary in order to alleviate symptoms and improve functioning.    7. JHONNY:    JHONNY:  Not identified.    8. Records:   These were reviewed at time of assessment.   Information in this assessment was obtained from the medical record and  provided by patient who is a good historian.    Patient will have open access to their mental health medical record.    9.   Interactive Complexity: No    Provider Name/ Credentials:  Cate Kaur MS, [facility]  May 9, 2024

## 2024-12-18 NOTE — PROGRESS NOTES
ASSESSMENT/PLAN:    (S60.211D) Contusion of right wrist, subsequent encounter  (primary encounter diagnosis)  Comment: stable/improved; note for work written  Plan: oxyCODONE-acetaminophen (PERCOCET) 5-325 MG tablet          (Z12.31) Encounter for screening mammogram for breast cancer  Comment:   Plan: MA SCREENING DIGITAL BILAT    F/u in 5 weeks for well visit          Robbie Bailon MD  August 4, 2022  1:30 PM      Pt is a 47 year old female last seen on 7/27/22 via virtual visit here for follow up of:     1) wrist pain - wrist is still sore but improving; less swelling w/ steroids  Wants to return to work  Not interested in hand therapy      Per my last note:  (S60.211D) Contusion of right wrist, subsequent encounter  (primary encounter diagnosis)  Comment: reviewed MRI at length; likely contusion/ swelling from fall. Luckily no fracture or scapholunate tear; will wear brace x 1 more week and hopefully the prednisone I am prescribing for her asthma exacerbation will help the swelling in the wrist; she has already had a workup for an inflammatory arthropathy; f/u already scheduled in 2 weeks  Plan: predniSONE (DELTASONE) 50 MG tablet, oxyCODONE-acetaminophen (PERCOCET) 5-325 MG tablet             (J45.31) Mild persistent asthma with exacerbation  Comment: see above; 5 days of pred; counseled regarding precautions and specifically to hold all nsaids while on prednisone  Plan: predniSONE (DELTASONE) 50 MG tablet    Past Medical History:   Diagnosis Date     Agoraphobia with panic attacks      Anxiety      Anxiety      Arthritis     of back     Asthma      Asthma in adult, moderate persistent, uncomplicated      Chronic pain      Chronic sinusitis      Cocaine abuse (H)      Controlled substance agreement signed 06/30/2015    Overview:  Patient has chronic pain and is seen at [facility] for this.  Has controlled substance agreement with them.  On Vicodin, Valium, Klonopin prescribed only from there.         "Depression      Hx of seasonal allergies      Infectious pericarditis      Lipoma      Tobacco abuse       Current Outpatient Medications   Medication Sig Dispense Refill     acetaminophen (TYLENOL) 325 MG tablet Take 3 tablets (975 mg) by mouth every 6 hours as needed for mild pain 50 tablet 0     ALPRAZolam (XANAX) 2 MG tablet Take 1 tablet (2 mg) by mouth 2 times daily as needed for anxiety 60 tablet 3     cetirizine (ZYRTEC) 10 MG tablet Take 1 tablet (10 mg) by mouth daily 30 tablet 11     fluticasone (FLONASE) 50 MCG/ACT nasal spray Spray 2 sprays into both nostrils daily 9.9 mL 11     fluticasone-salmeterol (ADVAIR) 500-50 MCG/DOSE inhaler Inhale 1 puff into the lungs every 12 hours 60 each 11     ibuprofen (ADVIL/MOTRIN) 600 MG tablet Take 1 tablet (600 mg) by mouth 3 times daily as needed for moderate pain 90 tablet 4     ibuprofen (ADVIL/MOTRIN) 800 MG tablet Take 1 tablet (800 mg) by mouth every 6 hours as needed for other (mild and/or inflammatory pain) 30 tablet 0     montelukast (SINGULAIR) 10 MG tablet Take 1 tablet (10 mg) by mouth At Bedtime 30 tablet 11     oxyCODONE-acetaminophen (PERCOCET) 5-325 MG tablet Take 1 tablet by mouth 2 times daily as needed for breakthrough pain or severe pain 28 tablet 0     PROAIR  (90 Base) MCG/ACT inhaler Inhale 2 puffs into the lungs every 4 hours as needed for shortness of breath / dyspnea or wheezing 8 g 11     spacer (OPTICHAMBER BINA) holding chamber For use w/ rescue inhaler 1 each 0     tiotropium (SPIRIVA RESPIMAT) 1.25 MCG/ACT inhaler Inhale 2 puffs into the lungs daily 12 g 3     tiotropium (SPIRIVA RESPIMAT) 2.5 MCG/ACT inhaler Inhale 2 puffs into the lungs daily 4 g 5     triamcinolone (KENALOG) 0.1 % external cream Apply topically 2 times daily 100 g 1      Allergies   Allergen Reactions     Lidocaine Other (See Comments)     \"my jaw stopped moving\"  Other reaction(s): Dystonia     Penicillins Hives, Rash and Shortness Of Breath     " Epinephrine-Lidocaine-Na Metabisulfite Other (See Comments) and Muscle Pain (Myalgia)     Severe jaw cramping, double vision  Jaw locking      ROS:   Gen- no fevers/chills   Rheum - no morning stiffness   Derm - no rash/ redness   Neuro - no numbness, no tingling   Remainder of ROS negative.     Exam:      222

## 2024-12-30 DIAGNOSIS — F41.1 GENERALIZED ANXIETY DISORDER: ICD-10-CM

## 2024-12-30 RX ORDER — ALPRAZOLAM 2 MG/1
2 TABLET ORAL 4 TIMES DAILY PRN
Qty: 90 TABLET | Refills: 1 | Status: SHIPPED | OUTPATIENT
Start: 2024-12-30

## 2025-01-05 ENCOUNTER — HEALTH MAINTENANCE LETTER (OUTPATIENT)
Age: 50
End: 2025-01-05

## 2025-01-08 ENCOUNTER — OFFICE VISIT (OUTPATIENT)
Dept: FAMILY MEDICINE | Facility: CLINIC | Age: 50
End: 2025-01-08
Payer: MEDICAID

## 2025-01-08 ENCOUNTER — VIRTUAL VISIT (OUTPATIENT)
Dept: PSYCHOLOGY | Facility: CLINIC | Age: 50
End: 2025-01-08
Payer: MEDICAID

## 2025-01-08 VITALS
TEMPERATURE: 98 F | BODY MASS INDEX: 38.4 KG/M2 | HEART RATE: 86 BPM | SYSTOLIC BLOOD PRESSURE: 120 MMHG | DIASTOLIC BLOOD PRESSURE: 84 MMHG | HEIGHT: 69 IN | OXYGEN SATURATION: 97 % | RESPIRATION RATE: 18 BRPM

## 2025-01-08 DIAGNOSIS — S39.012A STRAIN OF LUMBAR REGION, INITIAL ENCOUNTER: Primary | ICD-10-CM

## 2025-01-08 DIAGNOSIS — Z23 NEED FOR PROPHYLACTIC VACCINATION AND INOCULATION AGAINST INFLUENZA: ICD-10-CM

## 2025-01-08 DIAGNOSIS — F41.1 GENERALIZED ANXIETY DISORDER: ICD-10-CM

## 2025-01-08 DIAGNOSIS — F11.90 OPIOID USE DISORDER: ICD-10-CM

## 2025-01-08 DIAGNOSIS — F33.1 MAJOR DEPRESSIVE DISORDER, RECURRENT EPISODE, MODERATE (H): Primary | ICD-10-CM

## 2025-01-08 DIAGNOSIS — F41.1 GAD (GENERALIZED ANXIETY DISORDER): ICD-10-CM

## 2025-01-08 PROCEDURE — 90834 PSYTX W PT 45 MINUTES: CPT | Mod: 95

## 2025-01-08 RX ORDER — CYCLOBENZAPRINE HCL 10 MG
10 TABLET ORAL
Qty: 14 TABLET | Refills: 1 | Status: SHIPPED | OUTPATIENT
Start: 2025-01-08

## 2025-01-08 ASSESSMENT — ASTHMA QUESTIONNAIRES
QUESTION_3 LAST FOUR WEEKS HOW OFTEN DID YOUR ASTHMA SYMPTOMS (WHEEZING, COUGHING, SHORTNESS OF BREATH, CHEST TIGHTNESS OR PAIN) WAKE YOU UP AT NIGHT OR EARLIER THAN USUAL IN THE MORNING: ONCE OR TWICE
ACT_TOTALSCORE: 15
QUESTION_5 LAST FOUR WEEKS HOW WOULD YOU RATE YOUR ASTHMA CONTROL: SOMEWHAT CONTROLLED
QUESTION_4 LAST FOUR WEEKS HOW OFTEN HAVE YOU USED YOUR RESCUE INHALER OR NEBULIZER MEDICATION (SUCH AS ALBUTEROL): ONE OR TWO TIMES PER DAY
QUESTION_1 LAST FOUR WEEKS HOW MUCH OF THE TIME DID YOUR ASTHMA KEEP YOU FROM GETTING AS MUCH DONE AT WORK, SCHOOL OR AT HOME: SOME OF THE TIME
ACT_TOTALSCORE: 15
QUESTION_2 LAST FOUR WEEKS HOW OFTEN HAVE YOU HAD SHORTNESS OF BREATH: THREE TO SIX TIMES A WEEK

## 2025-01-08 NOTE — PROGRESS NOTES
[facility]                                     Progress Note    Patient Name: Darlene Hudson  Date: 1/8/25         Service Type: Individual      Session Start Time:  1000 Session End Time: 1050     Session Length: 50    Session #: 13    Attendees: Client attended alone    Service Modality:  Video Visit:      Provider verified identity through the following two step process.  Patient provided:  Patient is known previously to provider    Telemedicine Visit: The patient's condition can be safely assessed and treated via synchronous audio and visual telemedicine encounter.      Reason for Telemedicine Visit: Patient has requested telehealth visit    Originating Site (Patient Location): Patient's home    Distant Site (Provider Location): Provider Remote Setting- Home Office    Consent:  The patient/guardian has verbally consented to: the potential risks and benefits of telemedicine (video visit) versus in person care; bill my insurance or make self-payment for services provided; and responsibility for payment of non-covered services.     Patient would like the video invitation sent by:  My Chart    Mode of Communication:  Video Conference via [facility]    Distant Location (Provider):  Off-site    As the provider I attest to compliance with applicable laws and regulations related to telemedicine.    DATA  Interactive Complexity: No  Crisis: No        Progress Since Last Session (Related to Symptoms / Goals / Homework):   Symptoms: No change overall    Homework:  continues completing self care activities as she is able.      Episode of Care Goals: Satisfactory progress - ACTION (Actively working towards change); Intervened by reinforcing change plan / affirming steps taken     Current / Ongoing Stressors and Concerns:   Many stressors including prior cancer diagnosis and treatment, losses through death of family and friends. Client notes she is ready to get back to a routine, feeling cooped up at home  too frequently.  Returned medications she had been prescribed and taking for some time, she feels good about this. Older son causing much stress, setting boundaries with him and sometimes this can be difficult but overall helping with anxiety and stress.  Client notes weaning herself off of suboxone.     Treatment Objective(s) Addressed in This Session:   Assertiveness and boundary setting     Intervention:   Discussion of boundaries client is setting for herself in the new year.  Exploration of healthy habits client is engaging in at this time.  Discussed importance of sharing with her doctor that she is weaning self off of suboxone.  Ct  notes she will share this with her  At next appt.     Assessments completed prior to visit:  The following assessments were completed by patient for this visit:  PHQ9:       5/9/2024     7:34 AM 5/22/2024    11:00 AM 5/28/2024     9:31 AM 7/24/2024     3:59 PM 8/14/2024     5:10 PM 12/4/2024     3:16 PM 12/9/2024    10:29 AM   PHQ-9 SCORE   PHQ[facility]9 [facility] 7 (Mild depression)  7 (Mild depression) 6 (Mild depression)  6 (Mild depression) 9 (Mild depression)   PHQ-9 Total Score 7 8 7 6    6 7 6  9        Patient-reported    Proxy-reported     GAD7:       4/18/2023     5:13 PM 9/13/2023     2:40 PM 2/21/2024     1:38 PM 5/22/2024    11:00 AM 5/22/2024    11:03 AM 8/1/2024    11:00 AM 8/14/2024     5:10 PM   ACE-7 SCORE   Total Score  7 (mild anxiety)   13 (moderate anxiety)     Total Score 5 7 6 19 13    13 19 15     PROMIS 10-Global Health (all questions and answers displayed):       5/22/2024    11:00 AM 5/22/2024    11:04 AM 7/23/2024     9:34 AM 12/9/2024    10:34 AM   PROMIS 10   In general, would you say your health is:  Fair Poor Good   In general, would you say your quality of life is:  Fair Fair Fair   In general, how would you rate your physical health?  Fair Poor Good   In general, how would you rate your mental health, including your mood and your ability to  think?  Poor Fair Good   In general, how would you rate your satisfaction with your social activities and relationships?  Fair Fair Good   In general, please rate how well you carry out your usual social activities and roles  Fair Good Good   To what extent are you able to carry out your everyday physical activities such as walking, climbing stairs, carrying groceries, or moving a chair?  Moderately Moderately Moderately   In the past 7 days, how often have you been bothered by emotional problems such as feeling anxious, depressed, or irritable?  Always Always Sometimes   In the past 7 days, how would you rate your fatigue on average?  Mild Moderate Mild   In the past 7 days, how would you rate your pain on average, where 0 means no pain, and 10 means worst imaginable pain?  5 5 8   In general, would you say your health is: 2 2 1  3   In general, would you say your quality of life is:  2 2  2   In general, how would you rate your physical health?  2 1  3   In general, how would you rate your mental health, including your mood and your ability to think?  1 2  3   In general, how would you rate your satisfaction with your social activities and relationships?  2 2  3   In general, please rate how well you carry out your usual social activities and roles. (This includes activities at home, at work and in your community, and responsibilities as a parent, child, spouse, employee, friend, etc.)  2 3  3   To what extent are you able to carry out your everyday physical activities such as walking, climbing stairs, carrying groceries, or moving a chair?  3 3  3   In the past 7 days, how often have you been bothered by emotional problems such as feeling anxious, depressed, or irritable?  5 5  3   In the past 7 days, how would you rate your fatigue on average?  2 3  2   In the past 7 days, how would you rate your pain on average, where 0 means no pain, and 10 means worst imaginable pain?  5 5  8   Global Mental Health Score  6     6 7 11    Global Physical Health Score  12    12 10 12    PROMIS TOTAL - SUBSCORES  18    18 17 23        Patient-reported    Proxy-reported         ASSESSMENT: Current Emotional / Mental Status (status of significant symptoms):   Risk status (Self / Other harm or suicidal ideation)   Patient denies current fears or concerns for personal safety.   Patient denies current or recent suicidal ideation or behaviors.   Patient denies current or recent homicidal ideation or behaviors.   Patient denies current or recent self injurious behavior or ideation.   Patient denies other safety concerns.   Patient reports there has been no change in risk factors since their last session.     Patient reports there has been no change in protective factors since their last session.     Recommended that patient call 911 or go to the local ED should there be a change in any of these risk factors     Appearance:   normal    Eye Contact:   Good    Psychomotor Behavior: normal    Attitude:   Cooperative    Orientation:   All   Speech    Rate / Production: Normal     Volume:  Normal    Mood:    Normal   Affect:    normal   Thought Content:  Clear    Thought Form:  Coherent    Insight:    Fair      Medication Review:   No changes to current psychiatric medication(s)     Medication Compliance:   Yes     Changes in Health Issues:   None reported     Chemical Use Review:   Substance Use: Chemical use reviewed, no active concerns identified      Tobacco Use: No current tobacco use.      Diagnosis:  1. Major depressive disorder, recurrent episode, moderate (H)    2. ACE (generalized anxiety disorder)            Collateral Reports Completed:   Not Applicable    PLAN: (Patient Tasks / Therapist Tasks / Other)  Rtn 1 weeks.   Management of symptoms related to current stressors.        SWETA Borjas                                                         ______________________________________________________________________    Individual  Treatment Plan    Patient's Name: Darlene Hudson  YOB: 1975    Date of Creation: 06/11/2024  Date Treatment Plan Last Reviewed/Revised: 06/11/2024, 10/14/2024, 12/09/24    DSM5 Diagnoses: 296.32 (F33.1) Major Depressive Disorder, Recurrent Episode, Moderate _ or 300.02 (F41.1) Generalized Anxiety Disorder  Psychosocial / Contextual Factors: lives at home with sons, lots of stress from older son, workplace stress, on leave, legal interventions due to workplace concerns  PROMIS (reviewed every 90 days): 5/22/24, 12/09/24    Referral / Collaboration:  Referral to another professional/service is not indicated at this time..    Anticipated number of session for this episode of care:  24-36  Anticipation frequency of session: Every other week  Anticipated Duration of each session: 38-52 minutes  Treatment plan will be reviewed in 90 days or when goals have been changed.       MeasurableTreatment Goal(s) related to diagnosis / functional impairment(s)  Goal 1: Patient will decrease symptoms of depression    I will know I've met my goal when I am happy within myself.      Objective #A (Patient Action)    Patient will identify at least 3 techniques for intervening on the escalation.  Status: New - Date: 06/11/2024  , 10/14/2024    Intervention(s)  Therapist will teach emotional regulation skills.   .    Objective #B  Patient will learn and demonstrate 3 assertiveness skill(s).  Status: New - Date: 06/11/2024  , 10/14/2024    Intervention(s)  Therapist will teach assertiveness skills.   .      Goal 2: Patient will decrease symptoms of anxiety    I will know I've met my goal when I am happy within myself.      Objective #A (Patient Action)    Status: New - Date: 06/11/2024  , 10/14/2024    Patient will use at least 3 coping skills for anxiety management in the next 12 weeks.    Intervention(s)  Therapist will teach coping skills for anxiety .    Objective #B  Patient will use cognitive strategies identified  in therapy to challenge anxious thoughts.    Status: New - Date: 2024  , 10/14/2024    Intervention(s)  Therapist will teach cognitive restructuring .    Patient has reviewed and agreed to the above plan.      Cate Kaur, [facility]  2024, 10/14/2024    [facility]       PATIENT'S NAME: Darlene Hudson  PREFERRED NAME: Karoline  PRONOUNS:     she her hers  MRN: [id number]  : 1975  ADDRESS: [address] [place] [id number]  [facility]. NUMBER:  [id number]  DATE OF SERVICE: 24  START TIME: 738  END TIME: 823  PREFERRED PHONE: [phone]  May we leave a program related message: Yes  EMERGENCY CONTACT: was not obtained .  SERVICE MODALITY:  Video Visit:      Provider verified identity through the following two step process.  Patient provided:  Patient  and Patient address    Telemedicine Visit: The patient's condition can be safely assessed and treated via synchronous audio and visual telemedicine encounter.      Reason for Telemedicine Visit: Patient convenience (e.g. access to timely appointments / distance to available provider)    Originating Site (Patient Location): Patient's home    Distant Site (Provider Location): Provider Remote Setting- Home Office    Consent:  The patient/guardian has verbally consented to: the potential risks and benefits of telemedicine (video visit) versus in person care; bill my insurance or make self-payment for services provided; and responsibility for payment of non-covered services.     Patient would like the video invitation sent by:  My Chart    Mode of Communication:  [facility]    Distant Location (Provider):  Off-site    As the provider I attest to compliance with applicable laws and regulations related to telemedicine.    UNIVERSAL ADULT Mental Health DIAGNOSTIC ASSESSMENT    Identifying Information:  Patient is a 49 year old, , , and Black ;  individual.  [facility]  Patient attended the session alone.    Chief Complaint:   The reason for seeking services at this time is: \" workplace stresses, workplace bullying and harassment \"   The problem(s) began August 28, 2023. Patient has attempted to resolve these concerns in the past through medication, therapy.    Social/Family History:  Patient reported they grew up in [place], [place].  They were raised by biological mother.  Parents  when 3 years old.   Patient reported that their childhood was pretty good, single mother, worked for the [employer], mother was active in their activities, they saw father fairly frequent.  Patient described their current relationships with family of origin as parents have passed, sibling relatiuonships still.      The patient describes their cultural background as bi racial.  Cultural influences and impact on patient's life structure, values, norms, and healthcare: NA.  Contextual influences on patient's health include: Contextual Factors: Individual Factors stressful work environment, supervisor difficulty, symptoms of depression, significant losses due to covid, previous colon cancer diagnosis and surgery as well as ankle surgery .  Cultural, Contextual, and socioeconomic factors do not affect the patient's access to services.  These factors will be addressed in the Preliminary Treatment plan.  Patient identified their preferred language to be English. Patient reported they do not  need the assistance of an  or other support involved in therapy.     Patient reported had no significant delays in developmental tasks.   Patient's highest education level was some college. Patient identified the following learning problems: none reported.  Modifications will not be used to assist communication in therapy.   Patient reports they are  able to understand written materials.    Patient reported the following relationship " history boyfriend of 11 years  of covid .  Patient's current relationship status is  .   Patient identified their sexual orientation as heterosexual.  Patient reported having five child(reza). Patient identified her best friend as part of their support system.  Patient identified the quality of these relationships as stable and meaningful.     Patient's current living/housing situation involves staying in own home/apartment.  They live with two sons and they report that housing is stable.     Patient is currently on medical leave from her work as a  .  Patient reports their finances are obtained through her savings, and help from family and friends.  Patient does identify finances as a current stressor.      Patient reported that they have not been involved with the legal system.   Patient denies being on probation / parole / under the jurisdiction of the court.    Patient's Strengths and Limitations:  Patient identified the following strengths or resources that will help them succeed in treatment: commitment to health and well being, motivation, and work ethic. Things that may interfere with the patient's success in treatment include: physical health concerns.     Assessments:  The following assessments were completed by patient for this visit:  PHQ9:       2022    11:31 AM 2023     4:04 PM 2023     4:19 PM 2023     5:13 PM 2023     2:39 PM 2024     1:38 PM 2024     7:34 AM   PHQ-9 SCORE   PHQ-9 Total Score MyChart 0    0  7 (Mild depression)   PHQ-9 Total Score 0 3 2 2 0 5 7     GAD7:       3/12/2020     3:31 PM 2021     2:04 PM 2022    11:32 AM 2023     4:19 PM 2023     5:13 PM 2023     2:40 PM 2024     1:38 PM   ACE-7 SCORE   Total Score   5 (mild anxiety)   7 (mild anxiety)    Total Score 3 5 5 5 5 7 6     CAGE-AID:       2024     8:00 AM   CAGE-AID Total Score   Total Score 4     PROMIS 10-Global Health (all  questions and answers displayed):        No data to display              [place] Suicide Severity Rating Scale (Lifetime/Recent)      5/9/2024     8:00 AM   [place] Suicide Severity Rating (Lifetime/Recent)   Q1 Wish to be Dead (Lifetime) N   Q2 Non-Specific Active Suicidal Thoughts (Lifetime) N   Actual Attempt (Lifetime) N   Has subject engaged in non-suicidal self-injurious behavior? (Lifetime) Y   Has subject engaged in non-suicidal self-injurious behavior? (Past 3 Months) Y   Interrupted Attempts (Lifetime) N   Aborted or Self-Interrupted Attempt (Lifetime) N   Preparatory Acts or Behavior (Lifetime) N   Calculated C-SSRS Risk Score (Lifetime/Recent) No Risk Indicated       Personal and Family Medical History:  Patient   report a family history of mental health concerns.  Patient reports family history includes Asthma in her child and father; Breast Cancer in her maternal grandmother; Cancer in her mother; Diabetes in her brother and brother; Hypertension in her mother; Other Cancer in her father..     Patient does report Mental Health Diagnosis and/or Treatment.  Patient Patient reported the following previous diagnoses which include(s): Depression, PTSD.  Patient reported symptoms began currently and about 15 years ago due to a rough patch with several stressors.   Patient has received mental health services in the past: outpatient therapy and [facility] treatment.  Psychiatric Hospitalizations: None.  Patient denies a history of civil commitment.  Patient is receiving other mental health services.  These include psychotherapy with a therapist in the Oncology dept.       Patient has had a physical exam to rule out medical causes for current symptoms.  Date of last physical exam was within the past year. Client was encouraged to follow up with PCP if symptoms were to develop. The patient has a [facility] Provider, who is named Robbie Bailon.  Patient reports the following current medical concerns: past  diagnosis and surgery for stage two colon cancer, ankle surgery which ct is still in recovery from.  Patient reports recent ankle surgery that sometimes causes pain.  There are significant appetite / nutritional concerns / weight changes.  Recent significant weight gain, gained 35 lbs.  Inactivity.   Patient does not report a history of head injury / trauma / cognitive impairment.      Patient reports current meds as:   Current Outpatient Medications   Medication Sig Dispense Refill    albuterol (PROAIR HFA/PROVENTIL HFA/VENTOLIN HFA) 108 (90 Base) MCG/ACT inhaler Inhale 2 puffs into the lungs every 6 hours as needed for shortness of breath, wheezing or cough 18 g 11    ALPRAZolam (XANAX) 2 MG tablet Take 1 tablet (2 mg) by mouth 2 times daily as needed for anxiety 60 tablet 3    cetirizine (ZYRTEC) 10 MG tablet Take 1 tablet (10 mg) by mouth daily 30 tablet 11    fluticasone (FLONASE) 50 MCG/ACT nasal spray Spray 1 spray into both nostrils daily      fluticasone (FLONASE) 50 MCG/ACT nasal spray Spray 2 sprays into both nostrils daily 9.9 mL 11    hydrOXYzine carlos (VISTARIL) 25 MG capsule Take 1 capsule (25 mg) by mouth nightly as needed for anxiety 30 capsule 3    montelukast (SINGULAIR) 10 MG tablet Take 1 tablet (10 mg) by mouth At Bedtime 30 tablet 11    naloxone (NARCAN) 4 MG/0.1ML nasal spray Spray 1 spray (4 mg) into one nostril alternating nostrils as needed for opioid reversal every 2-3 minutes until assistance arrives (Patient not taking: Reported on 3/6/2024) 0.2 mL 1    oxyCODONE (ROXICODONE) 5 MG tablet Take 1 tablet (5 mg) by mouth 3 times daily as needed for severe pain 75 tablet 0    PROAIR  (90 Base) MCG/ACT inhaler Inhale 2 puffs into the lungs every 4 hours as needed for shortness of breath or wheezing 8 g 11    sertraline (ZOLOFT) 50 MG tablet Take 2 tablets (100 mg) by mouth daily 60 tablet 3    spacer (OPTICHAMBER BINA) holding chamber For use w/ rescue inhaler 1 each 0    sucralfate  (CARAFATE) 1 GM/10ML suspension Take 10 mLs (1 g) by mouth 4 times daily as needed for nausea 414 mL 0    tiotropium (SPIRIVA RESPIMAT) 1.25 MCG/ACT inhaler Inhale 2 puffs into the lungs daily 12 g 3    tiotropium (SPIRIVA) 18 MCG inhaled capsule Inhale 18 mcg into the lungs daily (Patient not taking: Reported on 3/6/2024)       No current facility-administered medications for this visit.     Facility-Administered Medications Ordered in Other Visits   Medication Dose Route Frequency Provider Last Rate Last Admin    ceFAZolin (ANCEF) intermittent infusion 2 g in 50 mL dextrose PREMIX  2 g Intravenous See Admin Instructions Caty Darby PA-C        flumazenil (ROMAZICON) injection 0.2 mg  0.2 mg Intravenous q1 min prn Kodi Hathaway DO        gabapentin (NEURONTIN) capsule 300 mg  300 mg Oral Pre-Op/Pre-procedure x 1 dose Kodi Hathaway DO        lactated ringers infusion   Intravenous Continuous Kodi Hathaway DO   Stopped at 06/21/23 1149    lidocaine (LMX4) kit   Topical Q1H PRN Kodi Hathaway DO        lidocaine 1 % 0.1-1 mL  0.1-1 mL Other Q1H PRN Kodi Hathaway DO        midazolam (VERSED) injection 1-2 mg  1-2 mg Intravenous Q4 Min PRN Kodi Hathaway DO        naloxone (NARCAN) injection 0.2 mg  0.2 mg Intravenous Q2 Min PRN Kodi Hathaway DO        Or    naloxone (NARCAN) injection 0.4 mg  0.4 mg Intravenous Q2 Min PRN Kodi Hathaway DO        Or    naloxone (NARCAN) injection 0.2 mg  0.2 mg Intramuscular Q2 Min PRN Kodi Hathaway DO        Or    naloxone (NARCAN) injection 0.4 mg  0.4 mg Intramuscular Q2 Min PRN Kodi Hathaway, DO        sodium chloride (PF) 0.9% PF flush 3 mL  3 mL Intracatheter Q8H Kodi Hathaway DO        sodium chloride (PF) 0.9% PF flush 3 mL  3 mL Intracatheter q1 min prn Kodi Hathaway DO           Medication Adherence:  Patient reports  .  taking prescribed medications as prescribed.    Patient Allergies:    Allergies   Allergen Reactions    Gabapentin Other (See Comments)      "Mental status is changed     Lidocaine Other (See Comments)     \"my jaw stopped moving\"  Other reaction(s): Dystonia    Penicillins Hives, Rash and Shortness Of Breath    Lidocaine-Epinephrine Other (See Comments) and Muscle Pain (Myalgia)     Severe jaw cramping, double vision  Jaw locking       Medical History:    Past Medical History:   Diagnosis Date    Abdominal pain 06/29/2015    Abnormal cervical Papanicolaou smear 11/09/2014    Overview:  ACUS/HPV positive    Abnormal cytology finding 11/09/2014    Overview:  ACUS/HPV positive    Acute pericardial effusion 02/06/2017    Agoraphobia with panic attacks     Anxiety     Arthralgia of both lower legs 05/29/2020    Bilateral achy knee pain that is chronic in nature going on for years. Most likely osteoarthritis in knees related to obesity. Previously injected in both knees with good relief. 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  Has controlled substance agreement with them.  On Vicodin, Valium, Klonopin prescribed only from there.       Coronary artery disease     Cough 02/09/2020    Family history of colon cancer 10/24/2020    Hx of seasonal allergies     Infection due to 2019 novel coronavirus 09/20/2022    Infectious pericarditis     Lipoma     Low back pain 07/13/2015    Major depressive disorder, recurrent episode, moderate (H) 09/05/2006    Menorrhagia with irregular cycle 07/15/2022    Added automatically from request for surgery [id number]    Moderate persistent " asthma without complication 09/29/2020    Nondependent alcohol abuse, episodic drinking behavior 10/03/2012    Noninflammatory disorder of vagina 02/20/2015    Other chronic pain     Other long term (current) drug therapy 01/28/2013    Overview:  Vicodin and cyclobenzaprine monthly    Panic disorder with agoraphobia 09/05/2006    Pap smear for cervical cancer screening 10/31/2016    03/29/2010  Normal cytology, HPV ot done, repeat in 3 years.    Pericarditis 2017    Physiologic disturbance of temperature regulation 10/24/2020    PONV (postoperative nausea and vomiting)     Right ankle swelling 06/07/2022    Added automatically from request for surgery [id number]    Tobacco abuse     Tobacco use disorder 07/13/2015         Current Mental Status Exam:   Appearance:  Appropriate    Eye Contact:  Good   Psychomotor:  Normal       Gait / station:  Not assessed  Attitude / Demeanor: Cooperative  Pleasant  Speech      Rate / Production: Normal/ Responsive      Volume:  Normal  volume      Language:  intact  Mood:   Normal  Affect:   Appropriate    Thought Content: Clear   Thought Process: Coherent  Logical       Associations: No loosening of associations  Insight:   Good   Judgment:  Intact   Orientation:  All  Attention/concentration: Good    Substance Use:   Patient did not report a family history of substance use concerns; see medical history section for details.  Patient has received chemical dependency treatment in the past at [facility] 15 years ago.  Patient has ever been to detox.      Patient is not currently receiving any chemical dependency treatment. Patient reported the following problems as a result of their substance use:  NA.    Patient denies using alcohol.  Patient denies using tobacco  Patient denies using cannabis.  Patient reports appx an iced coffee before work, and an occasional Mountain Dew  Patient reports using/abusing the following substance(s). Patient reported no other substance use.     Substance  Use: No symptoms    Based on the positive CAGE score and clinical interview there  are not indications of drug or alcohol abuse.  15 years ago only     Significant Losses / Trauma / Abuse / Neglect Issues:   Patient   did not serve in the .  There are indications or report of significant loss, trauma, abuse or neglect issues related to: workplace harassment, bullying.  Cancer diagnosis, heart surgery, loss of several people in her life.   Concerns for possible neglect are not present.     Safety Assessment:   Patient denies current homicidal ideation and behaviors.  Patient reports current self-injurious ideation.  Onset: a month ago, frequency: once, duration: once, intensity: intense.  Client reports they are not currently engaging in self-injurious behaivor..  Patient denied risk behaviors associated with substance use.   Patient denies any high risk behaviors associated with mental health symptoms.  Patient reports the following current concerns for their personal safety: workplace hazards.  Patient reports there   firearms in the house.       The firearms are secured in a locked space.    History of Safety Concerns:  Patient denied a history of homicidal ideation.     Patient denied a history of personal safety concerns.    Patient denied a history of assaultive behaviors.    Patient denied a history of sexual assault behaviors.     Patient denied a history of risk behaviors associated with substance use.  Patient denies any history of high risk behaviors associated with mental health symptoms.  Patient reports the following protective factors:      Risk Plan:  See Recommendations for Safety and Risk Management Plan    Review of Symptoms per patient report:   Depression: Change in sleep, Lack of interest, Change in energy level, Difficulties concentrating, Low self-worth, Irritability, and Feeling sad, down, or depressed  Ekta:  No Symptoms  Psychosis: No Symptoms  Anxiety: Excessive worry,  Nervousness, Sleep disturbance, Poor concentration, and Irritability  Panic:  No symptoms  Post Traumatic Stress Disorder:  No Symptoms   Eating Disorder: No Symptoms  ADD / ADHD:  No symptoms  Conduct Disorder: No symptoms  Autism Spectrum Disorder: No symptoms  Obsessive Compulsive Disorder: No Symptoms    Patient reports the following compulsive behaviors and treatment history: NA.      Diagnostic Criteria:   Generalized Anxiety Disorder  A. Excessive anxiety and worry about a number of events or activities (such as work or school performance).   B. The person finds it difficult to control the worry.  C. Select 3 or more symptoms (required for diagnosis). Only one item is required in children.   - Restlessness or feeling keyed up or on edge.    - Being easily fatigued.    - Difficulty concentrating or mind going blank.    - Irritability.    - Sleep disturbance (difficulty falling or staying asleep, or restless unsatisfying sleep).   D. The focus of the anxiety and worry is not confined to features of an Axis I disorder.  E. The anxiety, worry, or physical symptoms cause clinically significant distress or impairment in social, occupational, or other important areas of functioning.   F. The disturbance is not due to the direct physiological effects of a substance (e.g., a drug of abuse, a medication) or a general medical condition (e.g., hyperthyroidism) and does not occur exclusively during a Mood Disorder, a Psychotic Disorder, or a Pervasive Developmental Disorder.    - The aformentioned symptoms began one year(s) ago and occurs 7 days per week and is experienced as severe. Major Depressive Disorder  CRITERIA (A-C) REPRESENT A MAJOR DEPRESSIVE EPISODE - SELECT THESE CRITERIA  A) Recurrent episode(s) - symptoms have been present during the same 2-week period and represent a change from previous functioning 5 or more symptoms (required for diagnosis)   - Depressed mood. Note: In children and adolescents, can be  irritable mood.     - Diminished interest or pleasure in all, or almost all, activities.    - Decreased sleep.    - Fatigue or loss of energy.    - Feelings of worthlessness or inappropriate and excessive guilt.    - Diminished ability to think or concentrate, or indecisiveness.   B) The symptoms cause clinically significant distress or impairment in social, occupational, or other important areas of functioning  C) The episode is not attributable to the physiological effects of a substance or to another medical condition  D) The occurence of major depressive episode is not better explained by other thought / psychotic disorders  E) There has never been a manic episode or hypomanic episode    Functional Status:  Patient reports the following functional impairments:  management of the household and or completion of tasks and work / vocational responsibilities.     Nonprogrammatic care:  Patient is requesting basic services to address current mental health concerns.    Clinical Summary:  1. Psychosocial, Cultural and Contextual Factors: , multiple medical conditions, workplace bullying  .  2. Principal DSM5 Diagnoses  (Sustained by DSM5 Criteria Listed Above):   296.32 (F33.1) Major Depressive Disorder, Recurrent Episode, Moderate _  300.02 (F41.1) Generalized Anxiety Disorder.  3. Other Diagnoses that is relevant to services:   NA  4. Provisional Diagnosis:  NA  5. Prognosis: Expect Improvement.  6. Likely consequences of symptoms if not treated: worsening symptoms and continued functional impairment.  7. Client strengths include:  goal-focused, good listener, motivated, open to learning, open to suggestions / feedback, responsible parent, wants to learn, and willing to ask questions .     Recommendations:     1. Plan for Safety and Risk Management:   Safety and Risk: Recommended that patient call 911 or go to the local ED should there be a change in any of these risk factors..          Report to child / adult  protection services was NA.     2. Patient's identified no concerns with sexual orientation, gender identity, culture, ethnicity, or kelly.     3. Initial Treatment will focus on:    Depressed Mood - MDD  Anxiety - ACE.     4. Resources/Service Plan:    services are not indicated.   Modifications to assist communication are not indicated.   Additional disability accommodations are not indicated.      5. Collaboration:   Collaboration / coordination of treatment will be initiated with the following  support professionals: primary care physician.      6.  Referrals:   The following referral(s) will be initiated: None at this time.       A Release of Information has been obtained for the following: None at this time.     Clinical Substantiation/medical necessity for the above recommendations:  individual therapy is necessary in order to alleviate symptoms and improve functioning.    7. JHONNY:    JHONNY:  Not identified.    8. Records:   These were reviewed at time of assessment.   Information in this assessment was obtained from the medical record and  provided by patient who is a good historian.    Patient will have open access to their mental health medical record.    9.   Interactive Complexity: No    Provider Name/ Credentials:  Cate Kaur MS, [facility]  May 9, 2024

## 2025-01-08 NOTE — PROGRESS NOTES
ASSESSMENT/PLAN:    (S39.012A) Strain of lumbar region, initial encounter  (primary encounter diagnosis)  Comment:   Plan: stable, no red flags; flexeril script sent in; precautions given    (Z23) Need for prophylactic vaccination and inoculation against influenza  Comment:   Plan: agreed to flu shot; will think more about the covid shot    (F41.1) Generalized anxiety disorder  Comment: doing really well w/ counseling  Plan: stable; not ready to wean down benzo    (F11.90) Opioid use disorder  Comment:   Plan: will plan to start weaning down suboxone next; her pain is well-controlled    F/up in 4 wks    Robbie Bailon MD  January 8, 2025  1:36 PM        Pt is a 49 year old female last seen on 12/4/24 here today for:     1) fall down stairs - tripped over her dog  4 days ago  Low back and R leg hurt  Worse w/ sleep - waking her up at night  No numbness/tingling  No shooting pain  Some soreness in ankle but not bad  Wondering if she can get a muscle relaxant  Daughter Jersey was in hospital - needed emergency surgery for rectal abscess? - hospital x 3 days -> lots of stress for family   Was admitted to [facility]    2) Mood - has been doing really well  Hoping to wean off suboxone after next script    Past Medical History:   Diagnosis Date    Abdominal pain 06/29/2015    Abnormal cervical Papanicolaou smear 11/09/2014    Overview:  ACUS/HPV positive    Abnormal cytology finding 11/09/2014    Overview:  ACUS/HPV positive    Acute pericardial effusion 02/06/2017    Agoraphobia with panic attacks     Anxiety     Arthralgia of both lower legs 05/29/2020    Bilateral achy knee pain that is chronic in nature going on for years. Most likely osteoarthritis in knees related to obesity. Previously injected in both knees with good relief. 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  Has controlled substance agreement with them.  On Vicodin, Valium, Klonopin prescribed only from there.       Coronary artery disease     Cough 02/09/2020    Family history of colon cancer 10/24/2020    Hx of seasonal allergies     Infection due to 2019 novel coronavirus 09/20/2022    Infectious pericarditis     Lipoma     Low back pain 07/13/2015    Major depressive disorder, recurrent episode, moderate (H) 09/05/2006    Menorrhagia with irregular cycle 07/15/2022    Added automatically from request for surgery [id number]    Moderate persistent asthma without complication 09/29/2020    Nondependent alcohol abuse, episodic drinking behavior 10/03/2012    Noninflammatory disorder of vagina 02/20/2015    Other chronic pain     Other long term (current) drug therapy 01/28/2013    Overview:  Vicodin and cyclobenzaprine monthly    Panic disorder with agoraphobia 09/05/2006    Pap smear for cervical cancer screening 10/31/2016    03/29/2010  Normal cytology, HPV ot done, repeat in 3 years.    Pericarditis 2017    Physiologic disturbance of temperature regulation 10/24/2020    PONV (postoperative nausea and vomiting)     Right ankle swelling 06/07/2022    Added automatically from request for surgery [id number]    Tobacco abuse     Tobacco use disorder 07/13/2015      Past Surgical History:   Procedure Laterality Date    ANKLE SURGERY Right 05/30/2019    ARTHROSCOPY ANKLE Right 6/21/2023    Procedure: right ankle arthroscopy with debridement;  Surgeon: Kareem Bashir MD;  Location: [facility] OR    BIOPSY BREAST Right 1990    benign    COLONOSCOPY N/A 1/3/2023    Procedure:  "COLONOSCOPY WITH BIOPSY OF COLON MASS, POLYPECTOMY;  Surgeon: Kris Charles MD;  Location: [facility] OR    COLONOSCOPY N/A 3/12/2024    Procedure: COLONOSCOPY WITH POLYPECTOMY and EXCISION, LEFT, LESION, BACK;  Surgeon: Kris Charles MD;  Location: [facility] OR    CREATION PERICARDIAL WINDOW  02/10/2017    [facility]    DILATION AND CURETTAGE N/A 7/22/2022    Procedure: DILATION AND CURETTAGE, UTERUS;  Surgeon: Lesly Holland;  Location: [facility] OR    HEMORRHOIDECTOMY EXTERNAL      HYSTEROSCOPY, WITH ENDOMETRIAL RADIOFREQUENCY ABLATION - NOVASURE N/A 7/22/2022    Procedure: HYSTEROSCOPY, WITH ENDOMETRIAL RADIOFREQUENCY ABLATION - NOVASURE DILATION AND CURETTAGE, UTERUS;  Surgeon: Lesly Holland;  Location: [facility] OR    LAPAROSCOPIC ASSISTED SIGMOID COLECTOMY N/A 1/6/2023    Procedure: LAPAROSCOPIC ASSISTED DESCENDING COLELCTOMY SPLENIC FLEXURE MOBILIZATION ;  Surgeon: Kris Charles MD;  Location: [facility] OR    LAPAROSCOPIC LYSIS ADHESIONS N/A 1/6/2023    Procedure: LYSIS OF ADHESIONS;  Surgeon: Kris Charles MD;  Location: [facility] OR    TUMOR REMOVAL      Has had 3. In the right breast and \"inside of rib cage.\"      Current Outpatient Medications   Medication Sig Dispense Refill    albuterol (PROAIR HFA/PROVENTIL HFA/VENTOLIN HFA) 108 (90 Base) MCG/ACT inhaler Inhale 2 puffs into the lungs every 6 hours as needed for shortness of breath, wheezing or cough. 18 g 0    ALPRAZolam (XANAX) 2 MG tablet Take 1 tablet (2 mg) by mouth 4 times daily as needed for anxiety. 90 tablet 1    buprenorphine-naloxone (SUBOXONE) 2-0.5 MG SUBL sublingual tablet Place 2 tablets under the tongue daily as needed for severe pain (withdrawal symptoms). 60 tablet 0    buprenorphine-naloxone (SUBOXONE) 2-0.5 MG SUBL sublingual tablet Place 2 tablets under the tongue daily. 14 tablet 0    buprenorphine-naloxone (SUBOXONE) 8-2 MG SUBL sublingual tablet Place 1 " "tablet under the tongue 2 times daily. 60 tablet 1    cetirizine (ZYRTEC) 10 MG tablet Take 1 tablet (10 mg) by mouth daily 30 tablet 11    fluticasone (FLONASE) 50 MCG/ACT nasal spray Spray 2 sprays into both nostrils daily 9.9 mL 11    hydrOXYzine carlos (VISTARIL) 25 MG capsule Take 1 capsule (25 mg) by mouth nightly as needed for anxiety 30 capsule 3    metFORMIN (GLUCOPHAGE XR) 500 MG 24 hr tablet 1 tablet with dinner daily for 2 weeks then 2 tablets with dinner 180 tablet 1    montelukast (SINGULAIR) 10 MG tablet Take 1 tablet (10 mg) by mouth At Bedtime 30 tablet 11    naloxone (NARCAN) 4 MG/0.1ML nasal spray Spray 1 spray (4 mg) into one nostril alternating nostrils as needed for opioid reversal every 2-3 minutes until assistance arrives (Patient not taking: Reported on 3/6/2024) 0.2 mL 1    oxyBUTYnin (DITROPAN) 5 MG tablet Take 1 tablet (5 mg) by mouth 3 times daily. 180 tablet 3    PROAIR  (90 Base) MCG/ACT inhaler Inhale 2 puffs into the lungs every 4 hours as needed for shortness of breath or wheezing. 8 g 11    sertraline (ZOLOFT) 50 MG tablet Take 1.5 tablets (75 mg) by mouth daily 135 tablet 3    spacer (OPTICHAMBER BINA) holding chamber For use w/ rescue inhaler 1 each 0    tiotropium (SPIRIVA RESPIMAT) 1.25 MCG/ACT inhaler Inhale 2 puffs into the lungs daily. 1 g 0    tiotropium (SPIRIVA) 18 MCG inhaled capsule Inhale 1 capsule (18 mcg) into the lungs daily. 7 capsule 0      Allergies   Allergen Reactions    Gabapentin Other (See Comments)     Mental status is changed     Lidocaine Other (See Comments)     \"my jaw stopped moving\"  Other reaction(s): Dystonia    Penicillins Hives, Rash and Shortness Of Breath    Lidocaine-Epinephrine (Pf) Other (See Comments) and Muscle Pain (Myalgia)     Severe jaw cramping, double vision  Jaw locking    Topamax [Topiramate] Headache        ROS:   Gen- no weight change, no fevers/chills   Head/ Eyes- no blurred vision, no headaches   ENT- no cough, no " "congestion, no URI symptoms   Cardiac - no palpitations, no chest pain   Respiratory - no shortness of breath , no wheezing   GI - no nausea, no vomiting, no diarrhea, no constipation   Neuro - no numbness, no tingling   Remainder of ROS negative.     Exam:   /84 (BP Location: Right arm, Patient Position: Sitting, Cuff Size: Adult Large)   Pulse 86   Temp 98  F (36.7  C)   Resp 18   Ht 1.753 m (5' 9\")   SpO2 97%   BMI 38.40 kg/m     Alert and oriented x 3; No acute distress   MSK: lumbar spine -> full range of motion,  ++midline lower paraspinal tenderness; sensation/ strength intact; Neg slump test  Neuro: no focal deficits   Derm: no rashes       "

## 2025-01-14 NOTE — PROGRESS NOTES
[facility] Care Clinic Non Surgical Follow up Visit   Date of visit: 1/21/2025  Physician: JEAN CLAUDE Kelley MD, MD  Primary Care is Robbie Bailon.  Darlene [place]   49 year old  female     Initial Weight: 249#  Initial BMI: 36.87  Today's Weight:   Wt Readings from Last 1 Encounters:   01/21/25 119.7 kg (264 lb)     Body mass index is 38.99 kg/m .           Assessment and Plan   Assessment: Darlene is a 49 year old year old female who presents for medical weight management.      Plan:    1. Morbid obesity (H) (Primary)  Patient was congratulated on the healthy changes she has made  thus far. Healthy habits to assist with further weight loss were discussed. She plans to join a gym and will set a goal of getting there 3 days per week.  She is going to decrease her juice and increase her water intake.  We discussed the patient's co-morbid conditions including asthma and migraine headaches. These likely will improve with healthy habits and weight loss.     2. Moderate persistent asthma without complication  This may improve with healthy habits and weight loss.     3. History of migraine headaches  This may improve with healthy habits and weight loss.      Follow up next available with the dietician and myself            INTERIM HISTORY  Patient started topamax after her initial visit in July. She felt it gave her headaches so she stopped it. She then started taking metformin and she stopped it due to nausea.    DIETARY HISTORY  Meals Per Day: 1-3  Eating Protein First?: no  Food Diary: B:skips or a piece of fruit L:skips or sandwich or pizza  D:sandwich or pizza or chipotle bowl (1/2)  Snacks Per Day: evenings, middle of the night  Typical Snack: entire cheesecake  Fluid Intake: very little water  Portion Control: no  Calorie Containing Beverages: stopped drinking soda, apple juice 32-40 oz per day  Meals at Restaurant per week:2-3    Positive Changes Since Last Visit: cut out soda  Struggling With: sugar cravings in  the middle of the night    Knowledgeable in Reading Food Labels: no  Getting Adequate Protein: no    PHYSICAL ACTIVITY PATTERNS:  None, poor motivation    REVIEW OF SYSTEMS  GENERAL/CONSTITUTIONAL:  Fatigue: yes  HEENT:   glaucoma: no  CARDIOVASCULAR:  History of heart disease: no  GI:  Pancreatitis: yes  PSYCHIATRIC:  Moods: depression, anxiety  ENDOCRINE:  Monitoring Blood Sugars: no  Sugars Well Controlled: na  No personal or family history of medullary thyroid cancer no  :  Birth control: Not discussed   History of kidney stones: yes     Patient Profile   Social History     Social History Narrative    Not on file        Past Medical History   Past Medical History:   Diagnosis Date    Abdominal pain 06/29/2015    Abnormal cervical Papanicolaou smear 11/09/2014    Overview:  ACUS/HPV positive    Abnormal cytology finding 11/09/2014    Overview:  ACUS/HPV positive    Acute pericardial effusion 02/06/2017    Agoraphobia with panic attacks     Anxiety     Arthralgia of both lower legs 05/29/2020    Bilateral achy knee pain that is chronic in nature going on for years. Most likely osteoarthritis in knees related to obesity. Previously injected in both knees with good relief. 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  Has controlled substance agreement with them.  On Vicodin, Valium, Klonopin prescribed only from there.       Coronary artery disease     Cough 02/09/2020    Family history of colon cancer 10/24/2020    Hx of  seasonal allergies     Infection due to 2019 novel coronavirus 09/20/2022    Infectious pericarditis     Lipoma     Low back pain 07/13/2015    Major depressive disorder, recurrent episode, moderate (H) 09/05/2006    Menorrhagia with irregular cycle 07/15/2022    Added automatically from request for surgery [id number]    Moderate persistent asthma without complication 09/29/2020    Nondependent alcohol abuse, episodic drinking behavior 10/03/2012    Noninflammatory disorder of vagina 02/20/2015    Other chronic pain     Other long term (current) drug therapy 01/28/2013    Overview:  Vicodin and cyclobenzaprine monthly    Panic disorder with agoraphobia 09/05/2006    Pap smear for cervical cancer screening 10/31/2016    03/29/2010  Normal cytology, HPV ot done, repeat in 3 years.    Pericarditis 2017    Physiologic disturbance of temperature regulation 10/24/2020    PONV (postoperative nausea and vomiting)     Right ankle swelling 06/07/2022    Added automatically from request for surgery [id number]    Tobacco abuse     Tobacco use disorder 07/13/2015     Patient Active Problem List   Diagnosis    Nondependent alcohol abuse, episodic drinking behavior    Controlled substance agreement signed    Chronic sinusitis    Major depressive disorder, recurrent episode, moderate (H)    Tobacco use disorder    Abnormal cervical Papanicolaou smear    Panic disorder with agoraphobia    Atopic rhinitis    Chronic low back pain    Other long term (current) drug therapy    Acute pericardial effusion    Anxiety    Chronic infectious pericarditis    Cough    Arthralgia of both lower legs    Moderate persistent asthma without complication    Physiologic disturbance of temperature regulation    Family history of colon cancer    Right ankle swelling    Menorrhagia with irregular cycle    Infection due to 2019 novel coronavirus    Adenocarcinoma of descending colon (H)    History of ankle surgery    History of colon cancer    Paranasal sinus  "disease    Benzodiazepine dependence (H)    Incontinence of feces, unspecified fecal incontinence type       Past Surgical History  She has a past surgical history that includes Ankle surgery (Right, 05/30/2019); Tumor Removal; Hemorrhoidectomy external; Biopsy breast (Right, 1990); Creation pericardial window (02/10/2017); Hysteroscopy, With Endometrial Radiofrequency Ablation - Novasure (N/A, 7/22/2022); Dilation and curettage (N/A, 7/22/2022); Colonoscopy (N/A, 1/3/2023); Laparoscopic Assisted Sigmoid Colectomy (N/A, 1/6/2023); Laparoscopic lysis adhesions (N/A, 1/6/2023); Arthroscopy ankle (Right, 6/21/2023); and Colonoscopy (N/A, 3/12/2024).     Examination   /78   Ht 1.753 m (5' 9\")   Wt 119.7 kg (264 lb)   BMI 38.99 kg/m    Wt Readings from Last 4 Encounters:   01/21/25 119.7 kg (264 lb)   10/21/24 117.9 kg (260 lb)   10/09/24 115.5 kg (254 lb 11.2 oz)   07/23/24 112.9 kg (248 lb 14.4 oz)      BP Readings from Last 3 Encounters:   01/21/25 118/78   01/08/25 120/84   12/04/24 118/79      GEN: Alert and oriented in no acute distress.   HEENT: mucous membranes moist       Counseling:   We reviewed the important post op bariatric recommendations:  -eating 3 meals daily  -eating protein first, getting >60gm protein daily  -eating slowly, chewing food well  -avoiding/limiting calorie containing beverages  -limiting starchy vegetables and carbohydrates, choosing wheat, not white with breads,   crackers, pastas, laine, bagels, tortillas, rice  -limiting restaurant or cafeteria eating to twice a week or less    We discussed the importance of restorative sleep and stress management in maintaining a healthy weight.  We discussed the National Weight Control Registry healthy weight maintenance strategies and ways to optimize metabolism.  We discussed the importance of physical activity including cardiovascular and strength training in maintaining a healthier weight.    Total time spent on the date of this " encounter doing: chart review, review of test results, patient visit, physical exam, education, counseling, developing plan of care and documenting = 20 minutes.         JEAN CLAUDE Kelley MD  [facility]

## 2025-01-21 ENCOUNTER — OFFICE VISIT (OUTPATIENT)
Dept: SURGERY | Facility: CLINIC | Age: 50
End: 2025-01-21
Payer: MEDICAID

## 2025-01-21 VITALS
DIASTOLIC BLOOD PRESSURE: 78 MMHG | BODY MASS INDEX: 39.1 KG/M2 | SYSTOLIC BLOOD PRESSURE: 118 MMHG | WEIGHT: 264 LBS | HEIGHT: 69 IN

## 2025-01-21 DIAGNOSIS — F41.1 GENERALIZED ANXIETY DISORDER: ICD-10-CM

## 2025-01-21 DIAGNOSIS — F11.90 OPIOID USE DISORDER: ICD-10-CM

## 2025-01-21 DIAGNOSIS — J45.40 MODERATE PERSISTENT ASTHMA WITHOUT COMPLICATION: ICD-10-CM

## 2025-01-21 DIAGNOSIS — E66.01 MORBID OBESITY (H): Primary | ICD-10-CM

## 2025-01-21 DIAGNOSIS — Z86.69 HISTORY OF MIGRAINE HEADACHES: ICD-10-CM

## 2025-01-21 PROCEDURE — 99213 OFFICE O/P EST LOW 20 MIN: CPT | Performed by: FAMILY MEDICINE

## 2025-01-21 RX ORDER — ALPRAZOLAM 2 MG/1
2 TABLET ORAL 4 TIMES DAILY PRN
Qty: 90 TABLET | Refills: 3 | Status: SHIPPED | OUTPATIENT
Start: 2025-01-21 | End: 2025-01-21

## 2025-01-21 RX ORDER — BUPRENORPHINE HYDROCHLORIDE AND NALOXONE HYDROCHLORIDE DIHYDRATE 2; .5 MG/1; MG/1
2 TABLET SUBLINGUAL DAILY
Qty: 30 TABLET | Refills: 3 | Status: SHIPPED | OUTPATIENT
Start: 2025-01-21

## 2025-01-21 NOTE — PATIENT INSTRUCTIONS

## 2025-01-21 NOTE — TELEPHONE ENCOUNTER
Patient would like ALPRAZolam (XANAX) 2 MG tablet to be changed to taking 3x daily and not 4. Please review and send if able thank you.

## 2025-01-21 NOTE — LETTER
1/21/2025      Darlene [place]  [address] [place] [address]      Dear Colleague,    Thank you for referring your patient, Darlene [place], to the [facility] [place]. Please see a copy of my visit note below.    Bariatric Care Clinic Non Surgical Follow up Visit   Date of visit: 1/21/2025  Physician: JEAN CLAUDE Kelley MD, MD  Primary Care is Robbie Bailon.  Darlene [place]uds[place]   49 year old  female     Initial Weight: 249#  Initial BMI: 36.87  Today's Weight:   Wt Readings from Last 1 Encounters:   01/21/25 119.7 kg (264 lb)     Body mass index is 38.99 kg/m .           Assessment and Plan   Assessment: Darlene is a 49 year old year old female who presents for medical weight management.      Plan:    1. Morbid obesity (H) (Primary)  Patient was congratulated on the healthy changes she has made  thus far. Healthy habits to assist with further weight loss were discussed. She plans to join a gym and will set a goal of getting there 3 days per week.  She is going to decrease her juice and increase her water intake.  We discussed the patient's co-morbid conditions including asthma and migraine headaches. These likely will improve with healthy habits and weight loss.     2. Moderate persistent asthma without complication  This may improve with healthy habits and weight loss.     3. History of migraine headaches  This may improve with healthy habits and weight loss.      Follow up next available with the dietician and myself            INTERIM HISTORY  Patient started topamax after her initial visit in July. She felt it gave her headaches so she stopped it. She then started taking metformin and she stopped it due to nausea.    DIETARY HISTORY  Meals Per Day: 1-3  Eating Protein First?: no  Food Diary: B:skips or a piece of fruit L:skips or sandwich or pizza  D:sandwich or pizza or chipotle bowl (1/2)  Snacks Per Day: evenings, middle of the night  Typical Snack: entire  cheesecake  Fluid Intake: very little water  Portion Control: no  Calorie Containing Beverages: stopped drinking soda, apple juice 32-40 oz per day  Meals at Restaurant per week:2-3    Positive Changes Since Last Visit: cut out soda  Struggling With: sugar cravings in the middle of the night    Knowledgeable in Reading Food Labels: no  Getting Adequate Protein: no    PHYSICAL ACTIVITY PATTERNS:  None, poor motivation    REVIEW OF SYSTEMS  GENERAL/CONSTITUTIONAL:  Fatigue: yes  HEENT:   glaucoma: no  CARDIOVASCULAR:  History of heart disease: no  GI:  Pancreatitis: yes  PSYCHIATRIC:  Moods: depression, anxiety  ENDOCRINE:  Monitoring Blood Sugars: no  Sugars Well Controlled: na  No personal or family history of medullary thyroid cancer no  :  Birth control: Not discussed   History of kidney stones: yes     Patient Profile   Social History     Social History Narrative     Not on file        Past Medical History   Past Medical History:   Diagnosis Date     Abdominal pain 06/29/2015     Abnormal cervical Papanicolaou smear 11/09/2014    Overview:  ACUS/HPV positive     Abnormal cytology finding 11/09/2014    Overview:  ACUS/HPV positive     Acute pericardial effusion 02/06/2017     Agoraphobia with panic attacks      Anxiety      Arthralgia of both lower legs 05/29/2020    Bilateral achy knee pain that is chronic in nature going on for years. Most likely osteoarthritis in knees related to obesity. Previously injected in both knees with good relief. 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  Has controlled substance agreement with them.  On Vicodin, Valium, Klonopin prescribed only from there.        Coronary artery disease      Cough 02/09/2020     Family history of colon cancer 10/24/2020     Hx of seasonal allergies      Infection due to 2019 novel coronavirus 09/20/2022     Infectious pericarditis      Lipoma      Low back pain 07/13/2015     Major depressive disorder, recurrent episode, moderate (H) 09/05/2006     Menorrhagia with irregular cycle 07/15/2022    Added automatically from request for surgery [id number]     Moderate persistent asthma without complication 09/29/2020     Nondependent alcohol abuse, episodic drinking behavior 10/03/2012     Noninflammatory disorder of vagina 02/20/2015     Other chronic pain      Other long term (current) drug therapy 01/28/2013    Overview:  Vicodin and cyclobenzaprine monthly     Panic disorder with agoraphobia 09/05/2006     Pap smear for cervical cancer screening 10/31/2016    03/29/2010  Normal cytology, HPV ot done, repeat in 3 years.     Pericarditis 2017     Physiologic disturbance of temperature regulation 10/24/2020     PONV (postoperative nausea and vomiting)      Right ankle swelling 06/07/2022    Added automatically from request for surgery [id number]     Tobacco abuse      Tobacco use disorder 07/13/2015     Patient Active Problem List   Diagnosis     Nondependent alcohol abuse, episodic drinking behavior     Controlled substance agreement signed     Chronic sinusitis     Major depressive disorder, recurrent episode, moderate (H)     Tobacco use disorder     Abnormal cervical Papanicolaou smear     Panic disorder with agoraphobia     Atopic rhinitis     Chronic low back pain     Other long term (current) drug therapy     Acute pericardial effusion     Anxiety     Chronic infectious pericarditis     Cough     Arthralgia of both lower legs     Moderate persistent asthma  "without complication     Physiologic disturbance of temperature regulation     Family history of colon cancer     Right ankle swelling     Menorrhagia with irregular cycle     Infection due to 2019 novel coronavirus     Adenocarcinoma of descending colon (H)     History of ankle surgery     History of colon cancer     Paranasal sinus disease     Benzodiazepine dependence (H)     Incontinence of feces, unspecified fecal incontinence type       Past Surgical History  She has a past surgical history that includes Ankle surgery (Right, 05/30/2019); Tumor Removal; Hemorrhoidectomy external; Biopsy breast (Right, 1990); Creation pericardial window (02/10/2017); Hysteroscopy, With Endometrial Radiofrequency Ablation - Novasure (N/A, 7/22/2022); Dilation and curettage (N/A, 7/22/2022); Colonoscopy (N/A, 1/3/2023); Laparoscopic Assisted Sigmoid Colectomy (N/A, 1/6/2023); Laparoscopic lysis adhesions (N/A, 1/6/2023); Arthroscopy ankle (Right, 6/21/2023); and Colonoscopy (N/A, 3/12/2024).     Examination   /78   Ht 1.753 m (5' 9\")   Wt 119.7 kg (264 lb)   BMI 38.99 kg/m    Wt Readings from Last 4 Encounters:   01/21/25 119.7 kg (264 lb)   10/21/24 117.9 kg (260 lb)   10/09/24 115.5 kg (254 lb 11.2 oz)   07/23/24 112.9 kg (248 lb 14.4 oz)      BP Readings from Last 3 Encounters:   01/21/25 118/78   01/08/25 120/84   12/04/24 118/79      GEN: Alert and oriented in no acute distress.   HEENT: mucous membranes moist       Counseling:   We reviewed the important post op bariatric recommendations:  -eating 3 meals daily  -eating protein first, getting >60gm protein daily  -eating slowly, chewing food well  -avoiding/limiting calorie containing beverages  -limiting starchy vegetables and carbohydrates, choosing wheat, not white with breads,   crackers, pastas, laine, bagels, tortillas, rice  -limiting restaurant or cafeteria eating to twice a week or less    We discussed the importance of restorative sleep and stress " management in maintaining a healthy weight.  We discussed the National Weight Control Registry healthy weight maintenance strategies and ways to optimize metabolism.  We discussed the importance of physical activity including cardiovascular and strength training in maintaining a healthier weight.    Total time spent on the date of this encounter doing: chart review, review of test results, patient visit, physical exam, education, counseling, developing plan of care and documenting = 20 minutes.         JEAN CLAUDE Kelley MD  [facility]               Again, thank you for allowing me to participate in the care of your patient.        Sincerely,        JEAN CLAUDE Kelley MD    Electronically signed

## 2025-01-22 ENCOUNTER — TELEPHONE (OUTPATIENT)
Dept: FAMILY MEDICINE | Facility: CLINIC | Age: 50
End: 2025-01-22
Payer: MEDICAID

## 2025-01-22 NOTE — TELEPHONE ENCOUNTER
Prior Authorization Retail Medication Request    Medication/Dose: Alprazolam 2 mg tablets  Diagnosis and ICD code (if different than what is on RX):  Generalized anxiety disorder [F41.1]    New/renewal/insurance change PA/secondary ins. PA:  Previously Tried and Failed:    Rationale:      Insurance   Primary: CMM  Insurance ID:  [id number]    Clinic Information  Preferred routing pool for dept communication: [facility]  [place]

## 2025-01-24 ENCOUNTER — ANCILLARY PROCEDURE (OUTPATIENT)
Dept: GENERAL RADIOLOGY | Facility: CLINIC | Age: 50
End: 2025-01-24
Attending: FAMILY MEDICINE
Payer: MEDICAID

## 2025-01-24 ENCOUNTER — OFFICE VISIT (OUTPATIENT)
Dept: FAMILY MEDICINE | Facility: CLINIC | Age: 50
End: 2025-01-24
Payer: MEDICAID

## 2025-01-24 VITALS
DIASTOLIC BLOOD PRESSURE: 66 MMHG | RESPIRATION RATE: 18 BRPM | OXYGEN SATURATION: 93 % | HEART RATE: 96 BPM | TEMPERATURE: 97.9 F | SYSTOLIC BLOOD PRESSURE: 91 MMHG

## 2025-01-24 DIAGNOSIS — S91.311A LACERATION OF RIGHT FOOT, INITIAL ENCOUNTER: ICD-10-CM

## 2025-01-24 DIAGNOSIS — S91.311A LACERATION OF RIGHT FOOT, INITIAL ENCOUNTER: Primary | ICD-10-CM

## 2025-01-24 PROCEDURE — 73630 X-RAY EXAM OF FOOT: CPT | Mod: TC | Performed by: RADIOLOGY

## 2025-01-25 NOTE — PROGRESS NOTES
Assessment & Plan     Laceration of right foot, initial encounter  Two small, 1 cm linear lacerations that already appear to be healing with no active bleeding. Wound cleaned and dressed. Foot XR without any obvious glass, will follow-up on formal read. Encouraged to keep clean and dry and follow-up if pain persists.   - X-ray rt Foot G/E 3 vws*; Future  - DME crutches        Subjective   Karoline is a 49 year old, presenting for the following health issues:  - stepped on glass in her basement last night and states it was gushing blood. Called EMS who wrapped her foot and encouraged her to see her PCP.   - states bleeding has stopped but foot is still painful.      1/24/2025    11:44 AM   Additional Questions   Roomed by Hser   Accompanied by Self         1/24/2025    Information    services provided? No     HPI                   Objective    BP 91/66   Pulse 96   Temp 97.9  F (36.6  C) (Oral)   Resp 18   SpO2 93%   There is no height or weight on file to calculate BMI.  Physical Exam   Foot: two small, 1 cm linear lacerations, well-approximated, no active bleeding. No glass shards seen on external exam.     Xray - Reviewed and interpreted by me.  No obvious glass fragments in the foot.         Signed Electronically by: Chadwick Hoffman MD

## 2025-01-26 NOTE — TELEPHONE ENCOUNTER
Retail Pharmacy Prior Authorization Team   Phone: [phone]    Prior Authorization Not Needed per Insurance    Medication: ALPRAZOLAM 2 MG PO TABS  Insurance Company: [employer] [place] - Phone [phone] Fax [phone]  Expected CoPay: $    Pharmacy Filling the Rx: [id number] [place], [place] - [facility] [address] [facility]  Pharmacy Notified: YES  Patient Notified: YES- prescriber wrote new rx for 3 times daily dosing and patient is aware

## 2025-01-27 ENCOUNTER — VIRTUAL VISIT (OUTPATIENT)
Dept: PSYCHOLOGY | Facility: CLINIC | Age: 50
End: 2025-01-27
Payer: MEDICAID

## 2025-01-27 DIAGNOSIS — F41.1 GAD (GENERALIZED ANXIETY DISORDER): ICD-10-CM

## 2025-01-27 DIAGNOSIS — F33.1 MAJOR DEPRESSIVE DISORDER, RECURRENT EPISODE, MODERATE (H): Primary | ICD-10-CM

## 2025-01-27 PROCEDURE — 90834 PSYTX W PT 45 MINUTES: CPT | Mod: 95
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
## 2025-01-27 NOTE — PROGRESS NOTES
I have personally reviewed the history and examination as documented by Dr. Hoffman.  I was present during key portions of the visit and agree with the assessment and plan as documented for 49 yr old female here for laceration and concern for foreign body in her foot; Wound cleaned. X-ray negative for glass.  Precautions given. Anticipatory guidance given.     Robbie Bailon MD  January 27, 2025  11:08 AM

## 2025-03-11 ENCOUNTER — TELEPHONE (OUTPATIENT)
Dept: FAMILY MEDICINE | Facility: CLINIC | Age: 50
End: 2025-03-11
Payer: COMMERCIAL

## 2025-03-11 DIAGNOSIS — F41.1 GENERALIZED ANXIETY DISORDER: ICD-10-CM

## 2025-03-11 RX ORDER — ALPRAZOLAM 1 MG/1
1 TABLET ORAL 3 TIMES DAILY PRN
Qty: 42 TABLET | Refills: 0 | Status: SHIPPED | OUTPATIENT
Start: 2025-03-25

## 2025-03-11 NOTE — TELEPHONE ENCOUNTER
Spoke to pt during her visit w/ her son. Has weaned off suboxone and oxycodone successfully. Unfortunately has needed more xanax. Needs bridge til next refill is due.     Robbie Bailon MD  March 11, 2025  10:37 AM

## 2025-03-12 ENCOUNTER — OFFICE VISIT (OUTPATIENT)
Dept: FAMILY MEDICINE | Facility: CLINIC | Age: 50
End: 2025-03-12
Payer: COMMERCIAL

## 2025-03-12 VITALS — OXYGEN SATURATION: 94 % | DIASTOLIC BLOOD PRESSURE: 82 MMHG | SYSTOLIC BLOOD PRESSURE: 113 MMHG | HEART RATE: 89 BPM

## 2025-03-12 DIAGNOSIS — R25.2 MUSCLE CRAMPS AT NIGHT: ICD-10-CM

## 2025-03-12 DIAGNOSIS — S86.899A ANTERIOR SHIN SPLINTS: Primary | ICD-10-CM

## 2025-03-12 RX ORDER — IBUPROFEN 600 MG/1
600 TABLET, FILM COATED ORAL EVERY 8 HOURS PRN
Qty: 90 TABLET | Refills: 2 | Status: SHIPPED | OUTPATIENT
Start: 2025-03-12

## 2025-03-12 RX ORDER — CYCLOBENZAPRINE HCL 10 MG
10 TABLET ORAL
Qty: 30 TABLET | Refills: 1 | Status: SHIPPED | OUTPATIENT
Start: 2025-03-12

## 2025-03-12 NOTE — PROGRESS NOTES
ASSESSMENT/PLAN:    (S86.890Z) Anterior shin splints  (primary encounter diagnosis)  Comment: handout w/ exercises provided; will use voltaren gel prn; precautions/ anticipatory guidance given  Plan: diclofenac (VOLTAREN) 1 % topical gel          (R25.2) Muscle cramps at night  Comment: reassured pt she did not have muscular dystrophy  Plan: cyclobenzaprine (FLEXERIL) 10 MG tablet          Robbie Bailon MD  March 12, 2025  4:41 PM      Pt is a 49 year old female last seen on 2/5/25 here today for:     R shin - had a fall over the winter   Never got any imaging  Knee swells intermittently   Pain shooting to R ankle  Muscle spasms in calves      Per my last note:  (M75.51) Bursitis of right shoulder  (primary encounter diagnosis)  Comment: cortisone injection; handout w/ exercises; precautions/ anticipatory guidance given; f/up in 4 wks  Plan: DRAIN LARGE JOINT/BURSA, triamcinolone  (KENALOG-40) injection 40 mg          (F13.20) Benzodiazepine dependence (H)  Comment:   Plan: anxiety has been up and down; has successfully weaned off oxycodone to suboxone; will start to taper her benzos once mood is more stable     (C18.6) Adenocarcinoma of descending colon (H)  Comment:   Plan: is s/p partial colectomy in 1/2023; gets surveillance w/ colorectal surgery and oncology     Past Medical History:   Diagnosis Date    Abdominal pain 06/29/2015    Abnormal cervical Papanicolaou smear 11/09/2014    Overview:  ACUS/HPV positive    Abnormal cytology finding 11/09/2014    Overview:  ACUS/HPV positive    Acute pericardial effusion 02/06/2017    Agoraphobia with panic attacks     Anxiety     Arthralgia of both lower legs 05/29/2020    Bilateral achy knee pain that is chronic in nature going on for years. Most likely osteoarthritis in knees related to obesity. Previously injected in both knees with good relief. 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  Has controlled substance agreement with them.  On Vicodin, Valium, Klonopin prescribed only from there.       Coronary artery disease     Cough 02/09/2020    Family history of colon cancer 10/24/2020    Hx of seasonal allergies     Infection due to 2019 novel coronavirus 09/20/2022    Infectious pericarditis     Lipoma     Low back pain 07/13/2015    Major depressive disorder, recurrent episode, moderate (H) 09/05/2006    Menorrhagia with irregular cycle 07/15/2022    Added automatically from request for surgery [id number]    Moderate persistent asthma without complication 09/29/2020    Nondependent alcohol abuse, episodic drinking behavior 10/03/2012    Noninflammatory disorder of vagina 02/20/2015    Other chronic pain     Other long term (current) drug therapy 01/28/2013    Overview:  Vicodin and cyclobenzaprine monthly    Panic disorder with agoraphobia 09/05/2006    Pap smear for cervical cancer screening 10/31/2016    03/29/2010  Normal cytology, HPV ot done, repeat in 3 years.    Pericarditis 2017    Physiologic disturbance of temperature regulation 10/24/2020    PONV (postoperative nausea and vomiting)     Right ankle swelling 06/07/2022    Added automatically from request for surgery [id number]    Tobacco abuse     Tobacco use disorder 07/13/2015      Past Surgical History:   Procedure Laterality Date    ANKLE SURGERY Right 05/30/2019    ARTHROSCOPY ANKLE Right 6/21/2023    Procedure: right ankle arthroscopy with debridement;  Surgeon: Kareem Bashir MD;  Location: [facility] OR    BIOPSY BREAST  "Right 1990    benign    COLONOSCOPY N/A 1/3/2023    Procedure: COLONOSCOPY WITH BIOPSY OF COLON MASS, POLYPECTOMY;  Surgeon: Kris Charles MD;  Location: [facility] OR    COLONOSCOPY N/A 3/12/2024    Procedure: COLONOSCOPY WITH POLYPECTOMY and EXCISION, LEFT, LESION, BACK;  Surgeon: Kris Charles MD;  Location: [facility] OR    CREATION PERICARDIAL WINDOW  02/10/2017    [facility]    DILATION AND CURETTAGE N/A 7/22/2022    Procedure: DILATION AND CURETTAGE, UTERUS;  Surgeon: Lesly Holland;  Location: [facility] OR    HEMORRHOIDECTOMY EXTERNAL      HYSTEROSCOPY, WITH ENDOMETRIAL RADIOFREQUENCY ABLATION - NOVASURE N/A 7/22/2022    Procedure: HYSTEROSCOPY, WITH ENDOMETRIAL RADIOFREQUENCY ABLATION - NOVASURE DILATION AND CURETTAGE, UTERUS;  Surgeon: Lesly Holland;  Location: [facility] OR    LAPAROSCOPIC ASSISTED SIGMOID COLECTOMY N/A 1/6/2023    Procedure: LAPAROSCOPIC ASSISTED DESCENDING COLELCTOMY SPLENIC FLEXURE MOBILIZATION ;  Surgeon: Kris Charles MD;  Location: [facility] OR    LAPAROSCOPIC LYSIS ADHESIONS N/A 1/6/2023    Procedure: LYSIS OF ADHESIONS;  Surgeon: Kris Charles MD;  Location: [facility] OR    TUMOR REMOVAL      Has had 3. In the right breast and \"inside of rib cage.\"      Current Outpatient Medications   Medication Sig Dispense Refill    cyclobenzaprine (FLEXERIL) 10 MG tablet Take 1 tablet (10 mg) by mouth nightly as needed for muscle spasms. 30 tablet 1    diclofenac (VOLTAREN) 1 % topical gel Apply 2 g topically 4 times daily. For both shins 100 g 3    ibuprofen (ADVIL/MOTRIN) 600 MG tablet Take 1 tablet (600 mg) by mouth every 8 hours as needed for moderate pain. 90 tablet 2    albuterol (PROAIR HFA/PROVENTIL HFA/VENTOLIN HFA) 108 (90 Base) MCG/ACT inhaler Inhale 2 puffs into the lungs every 6 hours as needed for shortness of breath, wheezing or cough. 18 g 0    [START ON 3/25/2025] ALPRAZolam (XANAX) 1 MG tablet Take 1 " tablet (1 mg) by mouth 3 times daily as needed for anxiety. 42 tablet 0    ALPRAZolam (XANAX) 2 MG tablet Take 1 tablet (2 mg) by mouth 3 times daily as needed for anxiety. 90 tablet 3    buprenorphine-naloxone (SUBOXONE) 2-0.5 MG SUBL sublingual tablet Place 2 tablets under the tongue daily. 30 tablet 3    buprenorphine-naloxone (SUBOXONE) 2-0.5 MG SUBL sublingual tablet Place 2 tablets under the tongue daily as needed for severe pain (withdrawal symptoms). 60 tablet 0    buprenorphine-naloxone (SUBOXONE) 8-2 MG SUBL sublingual tablet Place 1 tablet under the tongue 2 times daily. 60 tablet 1    cetirizine (ZYRTEC) 10 MG tablet Take 1 tablet (10 mg) by mouth daily 30 tablet 11    fluticasone (FLONASE) 50 MCG/ACT nasal spray Spray 2 sprays into both nostrils daily 9.9 mL 11    hydrOXYzine carols (VISTARIL) 25 MG capsule Take 1 capsule (25 mg) by mouth nightly as needed for anxiety 30 capsule 3    metFORMIN (GLUCOPHAGE XR) 500 MG 24 hr tablet 1 tablet with dinner daily for 2 weeks then 2 tablets with dinner 180 tablet 1    montelukast (SINGULAIR) 10 MG tablet Take 1 tablet (10 mg) by mouth At Bedtime 30 tablet 11    naloxone (NARCAN) 4 MG/0.1ML nasal spray Spray 1 spray (4 mg) into one nostril alternating nostrils as needed for opioid reversal every 2-3 minutes until assistance arrives 0.2 mL 1    PROAIR  (90 Base) MCG/ACT inhaler Inhale 2 puffs into the lungs every 4 hours as needed for shortness of breath or wheezing. 8 g 11    sertraline (ZOLOFT) 50 MG tablet Take 1.5 tablets (75 mg) by mouth daily 135 tablet 3    spacer (OPTICHAMBER BINA) holding chamber For use w/ rescue inhaler 1 each 0    tiotropium (SPIRIVA RESPIMAT) 1.25 MCG/ACT inhaler Inhale 2 puffs into the lungs daily. 1 g 0    tiotropium (SPIRIVA) 18 MCG inhaled capsule Inhale 1 capsule (18 mcg) into the lungs daily. 7 capsule 0      Allergies   Allergen Reactions    Gabapentin Other (See Comments)     Mental status is changed     Lidocaine Other  "(See Comments)     \"my jaw stopped moving\"  Other reaction(s): Dystonia    Penicillins Hives, Rash and Shortness Of Breath    Lidocaine-Epinephrine (Pf) Other (See Comments) and Muscle Pain (Myalgia)     Severe jaw cramping, double vision  Jaw locking    Topamax [Topiramate] Headache        ROS:   Gen- no weight change, no fevers/chills   Neuro - no numbness, no tingling   Remainder of ROS negative.     Exam:   /82 (BP Location: Right arm, Patient Position: Sitting, Cuff Size: Adult Large)   Pulse 89   SpO2 94%    Alert and oriented x 3; No acute distress   MSK: full range of motion, +TTP along medial distal tibia on R   Neuro: no focal deficits   Derm: no rashes       "

## 2025-03-20 ENCOUNTER — TELEPHONE (OUTPATIENT)
Dept: FAMILY MEDICINE | Facility: CLINIC | Age: 50
End: 2025-03-20
Payer: COMMERCIAL

## 2025-03-20 NOTE — TELEPHONE ENCOUNTER
General Call      Reason for Call:  Patient called requesting a message sent to Dr. Bailon if he can squeeze her in to see him before 4/2/25. Please advise further thank you.        Could we send this information to you in [employer] or would you prefer to receive a phone call?:   Patient would prefer a phone call   Okay to leave a detailed message?: Yes at Cell number on file:    Telephone Information:   Mobile [phone]

## 2025-03-20 NOTE — TELEPHONE ENCOUNTER
Team - I am confused by this message. I currently have 7 open slots on my schedule on 4/2. Please schedule patient as requested on 4/2. Thank you!    Robbie Bailon MD  March 20, 2025  6:55 PM

## 2025-03-21 NOTE — TELEPHONE ENCOUNTER
She requested a message sent to ask if she can see you before 4/2/25. I told her no openings on 3/26/25, next one possibly can be 3/28/25 with resident while precepting. Unless someone cancels 3/26/25. I told her this and she still wants a messaged sent to see you sooner. Please let me know if this makes sense. I'm just sending a message per patient request. Thank you.

## 2025-03-22 NOTE — TELEPHONE ENCOUNTER
Left mychart message recommending she move her appointment from 4/9 to 4/2. I am unable to add her in on 3/26.    Robbie Bailon MD  March 22, 2025  6:56 PM

## 2025-03-25 ENCOUNTER — TELEPHONE (OUTPATIENT)
Dept: FAMILY MEDICINE | Facility: CLINIC | Age: 50
End: 2025-03-25
Payer: COMMERCIAL

## 2025-03-25 NOTE — TELEPHONE ENCOUNTER
Karoline scheduled with Dr Everett tomorrow at 2pm for her allergies/asthma- she said she knows you will come in.  Also wanted you to know that Kenneth was here today and they think he rebroke his thumb-- couldn't do an xray today but coming back tomorrow for xray?? I don't see him scheduled though... MIGUE

## 2025-03-26 ENCOUNTER — ANCILLARY PROCEDURE (OUTPATIENT)
Dept: GENERAL RADIOLOGY | Facility: CLINIC | Age: 50
End: 2025-03-26
Attending: FAMILY MEDICINE
Payer: COMMERCIAL

## 2025-03-26 ENCOUNTER — OFFICE VISIT (OUTPATIENT)
Dept: FAMILY MEDICINE | Facility: CLINIC | Age: 50
End: 2025-03-26
Payer: COMMERCIAL

## 2025-03-26 VITALS
RESPIRATION RATE: 18 BRPM | OXYGEN SATURATION: 99 % | HEART RATE: 104 BPM | TEMPERATURE: 98.8 F | SYSTOLIC BLOOD PRESSURE: 107 MMHG | DIASTOLIC BLOOD PRESSURE: 75 MMHG

## 2025-03-26 DIAGNOSIS — M25.571 PAIN IN JOINT INVOLVING ANKLE AND FOOT, RIGHT: ICD-10-CM

## 2025-03-26 DIAGNOSIS — M25.571 PAIN IN JOINT INVOLVING ANKLE AND FOOT, RIGHT: Primary | ICD-10-CM

## 2025-03-26 DIAGNOSIS — S93.401A SPRAIN OF RIGHT ANKLE, UNSPECIFIED LIGAMENT, INITIAL ENCOUNTER: ICD-10-CM

## 2025-03-26 PROCEDURE — 99214 OFFICE O/P EST MOD 30 MIN: CPT | Mod: GC

## 2025-03-26 NOTE — TELEPHONE ENCOUNTER
See [employer] message below sent to patient on 3/22/25:    Karoline - I cannot see you sooner than 4/2. You were on my schedule on 3/19 and were unable to make it in. I'm not upset but I do have other patients and my schedule is completely full on Wednesday this week. You are currently on the schedule for 4/9. Gunjan is on the schedule for 4/2. I recommend you call and move your appointment to 4/2 as well. Respectfully, Dr Adamaris Bailon MD  March 22, 2025  6:55 PM
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
## 2025-03-27 DIAGNOSIS — C18.6 ADENOCARCINOMA OF DESCENDING COLON (H): Primary | ICD-10-CM

## 2025-03-31 NOTE — PROGRESS NOTES
Preceptor Attestation:   Patient seen, evaluated and discussed with the resident. I personally reviewed the imaging and agree with the interpretation documented by the resident. I have verified the content of the note, which accurately reflects my assessment of the patient and the plan of care.  Supervising Physician:Arnaud Garcia MD  [facility]

## 2025-04-09 ENCOUNTER — ONCOLOGY VISIT (OUTPATIENT)
Dept: ONCOLOGY | Facility: HOSPITAL | Age: 50
End: 2025-04-09
Attending: INTERNAL MEDICINE
Payer: COMMERCIAL

## 2025-04-09 ENCOUNTER — LAB (OUTPATIENT)
Dept: INFUSION THERAPY | Facility: HOSPITAL | Age: 50
End: 2025-04-09
Attending: INTERNAL MEDICINE
Payer: COMMERCIAL

## 2025-04-09 VITALS
SYSTOLIC BLOOD PRESSURE: 113 MMHG | DIASTOLIC BLOOD PRESSURE: 69 MMHG | OXYGEN SATURATION: 98 % | TEMPERATURE: 97.8 F | RESPIRATION RATE: 18 BRPM | HEIGHT: 69 IN | HEART RATE: 87 BPM | WEIGHT: 269.2 LBS | BODY MASS INDEX: 39.87 KG/M2

## 2025-04-09 DIAGNOSIS — C18.6 ADENOCARCINOMA OF DESCENDING COLON (H): Primary | ICD-10-CM

## 2025-04-09 DIAGNOSIS — C18.6 ADENOCARCINOMA OF DESCENDING COLON (H): ICD-10-CM

## 2025-04-09 LAB
ALBUMIN SERPL BCG-MCNC: 4.1 G/DL (ref 3.5–5.2)
ALP SERPL-CCNC: 105 U/L (ref 40–150)
ALT SERPL W P-5'-P-CCNC: 18 U/L (ref 0–50)
ANION GAP SERPL CALCULATED.3IONS-SCNC: 7 MMOL/L (ref 7–15)
AST SERPL W P-5'-P-CCNC: 15 U/L (ref 0–45)
BASOPHILS # BLD AUTO: 0.1 10E3/UL (ref 0–0.2)
BASOPHILS NFR BLD AUTO: 1 %
BILIRUB SERPL-MCNC: 0.2 MG/DL
BUN SERPL-MCNC: 19.1 MG/DL (ref 6–20)
CALCIUM SERPL-MCNC: 9.5 MG/DL (ref 8.8–10.4)
CEA SERPL-MCNC: 1.3 NG/ML
CHLORIDE SERPL-SCNC: 104 MMOL/L (ref 98–107)
CREAT SERPL-MCNC: 0.96 MG/DL (ref 0.51–0.95)
EGFRCR SERPLBLD CKD-EPI 2021: 71 ML/MIN/1.73M2
EOSINOPHIL # BLD AUTO: 0.9 10E3/UL (ref 0–0.7)
EOSINOPHIL NFR BLD AUTO: 12 %
ERYTHROCYTE [DISTWIDTH] IN BLOOD BY AUTOMATED COUNT: 12.5 % (ref 10–15)
GLUCOSE SERPL-MCNC: 71 MG/DL (ref 70–99)
HCO3 SERPL-SCNC: 31 MMOL/L (ref 22–29)
HCT VFR BLD AUTO: 37.3 % (ref 35–47)
HGB BLD-MCNC: 12.3 G/DL (ref 11.7–15.7)
IMM GRANULOCYTES # BLD: 0 10E3/UL
IMM GRANULOCYTES NFR BLD: 0 %
LYMPHOCYTES # BLD AUTO: 2.4 10E3/UL (ref 0.8–5.3)
LYMPHOCYTES NFR BLD AUTO: 34 %
MCH RBC QN AUTO: 29.6 PG (ref 26.5–33)
MCHC RBC AUTO-ENTMCNC: 33 G/DL (ref 31.5–36.5)
MCV RBC AUTO: 90 FL (ref 78–100)
MONOCYTES # BLD AUTO: 0.4 10E3/UL (ref 0–1.3)
MONOCYTES NFR BLD AUTO: 6 %
NEUTROPHILS # BLD AUTO: 3.3 10E3/UL (ref 1.6–8.3)
NEUTROPHILS NFR BLD AUTO: 47 %
NRBC # BLD AUTO: 0 10E3/UL
NRBC BLD AUTO-RTO: 0 /100
PLATELET # BLD AUTO: 285 10E3/UL (ref 150–450)
POTASSIUM SERPL-SCNC: 4.5 MMOL/L (ref 3.4–5.3)
PROT SERPL-MCNC: 7.7 G/DL (ref 6.4–8.3)
RBC # BLD AUTO: 4.15 10E6/UL (ref 3.8–5.2)
SODIUM SERPL-SCNC: 142 MMOL/L (ref 135–145)
VIT D+METAB SERPL-MCNC: 9 NG/ML (ref 20–50)
WBC # BLD AUTO: 7.1 10E3/UL (ref 4–11)

## 2025-04-09 PROCEDURE — 36415 COLL VENOUS BLD VENIPUNCTURE: CPT

## 2025-04-09 PROCEDURE — 82310 ASSAY OF CALCIUM: CPT

## 2025-04-09 PROCEDURE — 82378 CARCINOEMBRYONIC ANTIGEN: CPT

## 2025-04-09 PROCEDURE — 82247 BILIRUBIN TOTAL: CPT

## 2025-04-09 PROCEDURE — 85014 HEMATOCRIT: CPT

## 2025-04-09 PROCEDURE — 82306 VITAMIN D 25 HYDROXY: CPT

## 2025-04-09 PROCEDURE — 85004 AUTOMATED DIFF WBC COUNT: CPT

## 2025-04-09 ASSESSMENT — PAIN SCALES - GENERAL: PAINLEVEL_OUTOF10: NO PAIN (0)

## 2025-04-09 NOTE — Clinical Note
"4/9/2025      Darlene Hudson  [address]  [facility] [address]      Dear Colleague,    Thank you for referring your patient, Darlene Hudson, to the [facility]. Please see a copy of my visit note below.    Oncology Rooming Note    April 9, 2025 11:15 AM   Darlene Hudson is a 50 year old female who presents for:    Chief Complaint   Patient presents with    Oncology Clinic Visit     History of colon cancer  Adenocarcinoma of descending colon (H)       Initial Vitals: /69   Pulse 87   Temp 97.8  F (36.6  C)   Resp 18   Ht 1.753 m (5' 9\")   Wt 122.1 kg (269 lb 3.2 oz)   SpO2 98%   BMI 39.75 kg/m   Estimated body mass index is 39.75 kg/m  as calculated from the following:    Height as of this encounter: 1.753 m (5' 9\").    Weight as of this encounter: 122.1 kg (269 lb 3.2 oz). Body surface area is 2.44 meters squared.  No Pain (0) Comment: Data Unavailable   No LMP recorded. Patient has had an ablation.  Allergies reviewed: Yes  Medications reviewed: Yes    Medications: Medication refills not needed today.  Pharmacy name entered into [facility]: [id number] [address]  AT [facility]    Frailty Screening:   Is the patient here for a new oncology consult visit in cancer care? 2. No    PHQ9:  Did this patient require a PHQ9?: No      Clinical concerns: None      Brigette Aparicio LPN               [facility] Hematology and Oncology Progress Note    Patient: Darlene Hudson  MRN: [id number]  Date of Service: Apr 9, 2025        Assessment and Plan:     Cancer Staging   Adenocarcinoma of descending colon (H)  Staging form: Colon and Rectum, AJCC 8th Edition  - Pathologic stage from 2/7/2023: Stage I (pT2, pN0, cM0) - Signed by Arnaud Valdez MD on 2/7/2023    1.  Adenocarcinoma of the descending colon: She had a CT scan yesterday.  I personally reviewed the images and shared them with Karoline and interpreted them " for her.  There is no evidence of recurrent colon cancer.  Generally the scans look normal.  Continue to follow her with imaging.  Will see her again in 6 months.  She is approaching 1 year from diagnosis.                    2.  Fecal incontinence: Has been present now for about 8 months.  She is just mentioning it now.  Does not happen all the time she needs to have a bowel movement.  Can happen in middle of the night.  I am going to send her to the pelvic floor urogynecology clinic at the [facility] for an evaluation.  She has no signs or symptoms of other systemic disease.  Will also get a get an MRI of the pelvis to evaluate the anatomy.  I told her is not likely to be due to her cancer or previous surgery.      [place] to see?      ECOG Performance  0    Diagnosis:    1.  Adenocarcinoma of the descending colon: Diagnosed January 2023. Surgical pathology revealed an invasive moderately differentiated adenocarcinoma. 47 x 43.14 mm tumor invading into, but not through, the muscularis propria. Margins negative.  48 nodes taken, all negative.   No perforation, lymphovascular invasion, or perineural invasion noted on pathology. pMMR.     Treatment:    Transverse and descending segmental colectomy January 6, 2023.   No adjuvant therapy was given.     Interim History:    Karoline returns for follow-up visit.          She was last seen in clinic about 7 months ago.  She notes today that she has been having episodes of fecal incontinence.  This has been going on since February.  Happens intermittently.  Often she has no urge to defecate before it happens.  It does happen in the middle of the night.  Nothing seems to be associated with it in terms of activity, food, etc.  Stools both formed and liquid.  No abdominal pain.  No other acute complaints.  She has had 5 vaginal deliveries.    2 months muscle spasms, likel contractions. Muscle spasm. Really painful., 15-20 min  Also some leg cramps.  Nausea    Thirsty at  night    Review of Systems:    As above in the history.     Review of Systems otherwise Negative for:  General: chills, fever or night sweats  Psychological: depression  Ophthalmic: blurry vision, double vision or loss of vision, vision change  ENT: epistaxis, oral lesions, hearing changes  Hematological and Lymphatic: bleeding, bruising, jaundice, swollen lymph nodes  Endocrine: hot flashes, unexpected weight changes  Respiratory: cough, hemoptysis, orthopnea or shortness of breath/ROPER  Cardiovascular: chest pain, edema, palpitations or PND  Gastrointestinal: abdominal pain, blood in stools, constipation, diarrhea or nausea/vomiting  Genito-Urinary: change in urinary stream, incontinence, frequency/urgency  Musculoskeletal: joint pain, stiffness, swelling, muscle pain  Neurological: dizziness, headaches, numbness/tingling  Dermatological: lumps and rash    Past History:    Past Medical History:   Diagnosis Date    Abdominal pain 06/29/2015    Abnormal cervical Papanicolaou smear 11/09/2014    Overview:  ACUS/HPV positive    Abnormal cytology finding 11/09/2014    Overview:  ACUS/HPV positive    Acute pericardial effusion 02/06/2017    Agoraphobia with panic attacks     Anxiety     Arthralgia of both lower legs 05/29/2020    Bilateral achy knee pain that is chronic in nature going on for years. Most likely osteoarthritis in knees related to obesity. Previously injected in both knees with good relief. 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  Has controlled substance agreement with them.  On Vicodin, Valium, Klonopin prescribed only from there.       Coronary artery disease     Cough 02/09/2020    Family history of colon cancer 10/24/2020    Hx of seasonal allergies     Infection due to 2019 novel coronavirus 09/20/2022    Infectious pericarditis     Lipoma     Low back pain 07/13/2015    Major depressive disorder, recurrent episode, moderate (H) 09/05/2006    Menorrhagia with irregular cycle 07/15/2022    Added automatically from request for surgery [id number]    Moderate persistent asthma without complication 09/29/2020    Nondependent alcohol abuse, episodic drinking behavior 10/03/2012    Noninflammatory disorder of vagina 02/20/2015    Other chronic pain     Other long term (current) drug therapy 01/28/2013    Overview:  Vicodin and cyclobenzaprine monthly    Panic disorder with agoraphobia 09/05/2006    Pap smear for cervical cancer screening 10/31/2016    03/29/2010  Normal cytology, HPV ot done, repeat in 3 years.    Pericarditis 2017    Physiologic disturbance of temperature regulation 10/24/2020    PONV (postoperative nausea and vomiting)     Right ankle swelling 06/07/2022    Added automatically from request for surgery [id number]    Tobacco abuse     Tobacco use disorder 07/13/2015     Physical Exam:    /69   Pulse 87   Temp 97.8  F (36.6  C)   Resp 18   Ht 1.753 m (5' 9\")   Wt 122.1 kg (269 lb 3.2 oz)   SpO2 98%   BMI 39.75 kg/m      General: patient appears stated age of 48 year old. Nontoxic and in no distress.   HEENT: Head: atraumatic, normocephalic. Sclerae anicteric.  Chest:  Normal respiratory effort  Cardiac:  No edema.   Abdomen: abdomen is non-distended  Extremities: normal tone and muscle bulk.  Skin: no lesions or rash on visible skin. Warm and dry.   CNS: alert and oriented. Grossly non-focal.   Psychiatric: normal mood and affect.     Lab Results:    Recent Results (from the past week) "   Comprehensive metabolic panel   Result Value Ref Range    Sodium 142 135 - 145 mmol/L    Potassium 4.5 3.4 - 5.3 mmol/L    Carbon Dioxide (CO2) 31 (H) 22 - 29 mmol/L    Anion Gap 7 7 - 15 mmol/L    Urea Nitrogen 19.1 6.0 - 20.0 mg/dL    Creatinine 0.96 (H) 0.51 - 0.95 mg/dL    GFR Estimate 71 >60 mL/min/1.73m2    Calcium 9.5 8.8 - 10.4 mg/dL    Chloride 104 98 - 107 mmol/L    Glucose 71 70 - 99 mg/dL    Alkaline Phosphatase 105 40 - 150 U/L    AST 15 0 - 45 U/L    ALT 18 0 - 50 U/L    Protein Total 7.7 6.4 - 8.3 g/dL    Albumin 4.1 3.5 - 5.2 g/dL    Bilirubin Total 0.2 <=1.2 mg/dL   CBC with platelets and differential   Result Value Ref Range    WBC Count 7.1 4.0 - 11.0 10e3/uL    RBC Count 4.15 3.80 - 5.20 10e6/uL    Hemoglobin 12.3 11.7 - 15.7 g/dL    Hematocrit 37.3 35.0 - 47.0 %    MCV 90 78 - 100 fL    MCH 29.6 26.5 - 33.0 pg    MCHC 33.0 31.5 - 36.5 g/dL    RDW 12.5 10.0 - 15.0 %    Platelet Count 285 150 - 450 10e3/uL    % Neutrophils 47 %    % Lymphocytes 34 %    % Monocytes 6 %    % Eosinophils 12 %    % Basophils 1 %    % Immature Granulocytes 0 %    NRBCs per 100 WBC 0 <1 /100    Absolute Neutrophils 3.3 1.6 - 8.3 10e3/uL    Absolute Lymphocytes 2.4 0.8 - 5.3 10e3/uL    Absolute Monocytes 0.4 0.0 - 1.3 10e3/uL    Absolute Eosinophils 0.9 (H) 0.0 - 0.7 10e3/uL    Absolute Basophils 0.1 0.0 - 0.2 10e3/uL    Absolute Immature Granulocytes 0.0 <=0.4 10e3/uL    Absolute NRBCs 0.0 10e3/uL     Imaging:    XR Ankle Right G/E 3 Views    Result Date: 3/26/2025  EXAM: XR FOOT RIGHT G/E 3 VIEWS, XR ANKLE RIGHT G/E 3 VIEWS LOCATION: [facility] DATE: 3/26/2025 INDICATION:  Pain in joint involving ankle and foot, right COMPARISON: None.     IMPRESSION: No acute fracture. Small well-corticated ossicle at the tip of the medial malleolus, likely sequela of old trauma. Tiny plantar and Achilles calcaneal spurs. Soft tissue swelling about the dorsal ankle.    XR FOOT RT G/E 3 VW    Result Date:  3/26/2025  EXAM: XR FOOT RIGHT G/E 3 VIEWS, XR ANKLE RIGHT G/E 3 VIEWS LOCATION: [facility] DATE: 3/26/2025 INDICATION:  Pain in joint involving ankle and foot, right COMPARISON: None.     IMPRESSION: No acute fracture. Small well-corticated ossicle at the tip of the medial malleolus, likely sequela of old trauma. Tiny plantar and Achilles calcaneal spurs. Soft tissue swelling about the dorsal ankle.       Signed by: Arnaud Valdez MD        Again, thank you for allowing me to participate in the care of your patient.        Sincerely,        Arnaud Valdez MD    Electronically signed

## 2025-04-09 NOTE — PROGRESS NOTES
[facility] Hematology and Oncology Progress Note    Patient: Darlene Hudson  MRN: [id number]  Date of Service: Apr 9, 2025        Assessment and Plan:     Cancer Staging   Adenocarcinoma of descending colon (H)  Staging form: Colon and Rectum, AJCC 8th Edition  - Pathologic stage from 2/7/2023: Stage I (pT2, pN0, cM0) - Signed by Arnaud Valdez MD on 2/7/2023    1.  Adenocarcinoma of the descending colon: She had a CT scan yesterday.  I personally reviewed the images and shared them with Karoline and interpreted them for her.  There is no evidence of recurrent colon cancer.  Generally the scans look normal.  Continue to follow her with imaging.  Will see her again in 6 months.  She is approaching 1 year from diagnosis.                    2.  Fecal incontinence: Has been present now for about 8 months.  She is just mentioning it now.  Does not happen all the time she needs to have a bowel movement.  Can happen in middle of the night.  I am going to send her to the pelvic floor urogynecology clinic at the [facility] for an evaluation.  She has no signs or symptoms of other systemic disease.  Will also get a get an MRI of the pelvis to evaluate the anatomy.  I told her is not likely to be due to her cancer or previous surgery.      [place] to see?      ECOG Performance  0    Diagnosis:    1.  Adenocarcinoma of the descending colon: Diagnosed January 2023. Surgical pathology revealed an invasive moderately differentiated adenocarcinoma. 47 x 43.14 mm tumor invading into, but not through, the muscularis propria. Margins negative.  48 nodes taken, all negative.   No perforation, lymphovascular invasion, or perineural invasion noted on pathology. pMMR.     Treatment:    Transverse and descending segmental colectomy January 6, 2023.   No adjuvant therapy was given.     Interim History:    Karoline returns for follow-up visit.          She was last seen in clinic about 7 months ago.  She notes today that she has been  having episodes of fecal incontinence.  This has been going on since February.  Happens intermittently.  Often she has no urge to defecate before it happens.  It does happen in the middle of the night.  Nothing seems to be associated with it in terms of activity, food, etc.  Stools both formed and liquid.  No abdominal pain.  No other acute complaints.  She has had 5 vaginal deliveries.    2 months muscle spasms, likel contractions. Muscle spasm. Really painful., 15-20 min  Also some leg cramps.  Nausea    Thirsty at night    Review of Systems:    As above in the history.     Review of Systems otherwise Negative for:  General: chills, fever or night sweats  Psychological: depression  Ophthalmic: blurry vision, double vision or loss of vision, vision change  ENT: epistaxis, oral lesions, hearing changes  Hematological and Lymphatic: bleeding, bruising, jaundice, swollen lymph nodes  Endocrine: hot flashes, unexpected weight changes  Respiratory: cough, hemoptysis, orthopnea or shortness of breath/ROPER  Cardiovascular: chest pain, edema, palpitations or PND  Gastrointestinal: abdominal pain, blood in stools, constipation, diarrhea or nausea/vomiting  Genito-Urinary: change in urinary stream, incontinence, frequency/urgency  Musculoskeletal: joint pain, stiffness, swelling, muscle pain  Neurological: dizziness, headaches, numbness/tingling  Dermatological: lumps and rash    Past History:    Past Medical History:   Diagnosis Date    Abdominal pain 06/29/2015    Abnormal cervical Papanicolaou smear 11/09/2014    Overview:  ACUS/HPV positive    Abnormal cytology finding 11/09/2014    Overview:  ACUS/HPV positive    Acute pericardial effusion 02/06/2017    Agoraphobia with panic attacks     Anxiety     Arthralgia of both lower legs 05/29/2020    Bilateral achy knee pain that is chronic in nature going on for years. Most likely osteoarthritis in knees related to obesity. Previously injected in both knees with good relief.  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  Has controlled substance agreement with them.  On Vicodin, Valium, Klonopin prescribed only from there.       Coronary artery disease     Cough 02/09/2020    Family history of colon cancer 10/24/2020    Hx of seasonal allergies     Infection due to 2019 novel coronavirus 09/20/2022    Infectious pericarditis     Lipoma     Low back pain 07/13/2015    Major depressive disorder, recurrent episode, moderate (H) 09/05/2006    Menorrhagia with irregular cycle 07/15/2022    Added automatically from request for surgery [id number]    Moderate persistent asthma without complication 09/29/2020    Nondependent alcohol abuse, episodic drinking behavior 10/03/2012    Noninflammatory disorder of vagina 02/20/2015    Other chronic pain     Other long term (current) drug therapy 01/28/2013    Overview:  Vicodin and cyclobenzaprine monthly    Panic disorder with agoraphobia 09/05/2006    Pap smear for cervical cancer screening 10/31/2016    03/29/2010  Normal cytology, HPV ot done, repeat in 3 years.    Pericarditis 2017    Physiologic disturbance of temperature regulation 10/24/2020    PONV (postoperative nausea and vomiting)     Right ankle swelling 06/07/2022    Added automatically from request for surgery [id number]    Tobacco abuse     Tobacco use disorder 07/13/2015     Physical Exam:    /69   Pulse 87   Temp 97.8  F (36.6  C)   Resp 18   Ht 1.753 m (5' 9\")   Wt 122.1 kg (269 lb 3.2 oz)  "  SpO2 98%   BMI 39.75 kg/m      General: patient appears stated age of 48 year old. Nontoxic and in no distress.   HEENT: Head: atraumatic, normocephalic. Sclerae anicteric.  Chest:  Normal respiratory effort  Cardiac:  No edema.   Abdomen: abdomen is non-distended  Extremities: normal tone and muscle bulk.  Skin: no lesions or rash on visible skin. Warm and dry.   CNS: alert and oriented. Grossly non-focal.   Psychiatric: normal mood and affect.     Lab Results:    Recent Results (from the past week)   Comprehensive metabolic panel   Result Value Ref Range    Sodium 142 135 - 145 mmol/L    Potassium 4.5 3.4 - 5.3 mmol/L    Carbon Dioxide (CO2) 31 (H) 22 - 29 mmol/L    Anion Gap 7 7 - 15 mmol/L    Urea Nitrogen 19.1 6.0 - 20.0 mg/dL    Creatinine 0.96 (H) 0.51 - 0.95 mg/dL    GFR Estimate 71 >60 mL/min/1.73m2    Calcium 9.5 8.8 - 10.4 mg/dL    Chloride 104 98 - 107 mmol/L    Glucose 71 70 - 99 mg/dL    Alkaline Phosphatase 105 40 - 150 U/L    AST 15 0 - 45 U/L    ALT 18 0 - 50 U/L    Protein Total 7.7 6.4 - 8.3 g/dL    Albumin 4.1 3.5 - 5.2 g/dL    Bilirubin Total 0.2 <=1.2 mg/dL   CBC with platelets and differential   Result Value Ref Range    WBC Count 7.1 4.0 - 11.0 10e3/uL    RBC Count 4.15 3.80 - 5.20 10e6/uL    Hemoglobin 12.3 11.7 - 15.7 g/dL    Hematocrit 37.3 35.0 - 47.0 %    MCV 90 78 - 100 fL    MCH 29.6 26.5 - 33.0 pg    MCHC 33.0 31.5 - 36.5 g/dL    RDW 12.5 10.0 - 15.0 %    Platelet Count 285 150 - 450 10e3/uL    % Neutrophils 47 %    % Lymphocytes 34 %    % Monocytes 6 %    % Eosinophils 12 %    % Basophils 1 %    % Immature Granulocytes 0 %    NRBCs per 100 WBC 0 <1 /100    Absolute Neutrophils 3.3 1.6 - 8.3 10e3/uL    Absolute Lymphocytes 2.4 0.8 - 5.3 10e3/uL    Absolute Monocytes 0.4 0.0 - 1.3 10e3/uL    Absolute Eosinophils 0.9 (H) 0.0 - 0.7 10e3/uL    Absolute Basophils 0.1 0.0 - 0.2 10e3/uL    Absolute Immature Granulocytes 0.0 <=0.4 10e3/uL    Absolute NRBCs 0.0 10e3/uL     Imaging:    XR  Ankle Right G/E 3 Views    Result Date: 3/26/2025  EXAM: XR FOOT RIGHT G/E 3 VIEWS, XR ANKLE RIGHT G/E 3 VIEWS LOCATION: [facility] DATE: 3/26/2025 INDICATION:  Pain in joint involving ankle and foot, right COMPARISON: None.     IMPRESSION: No acute fracture. Small well-corticated ossicle at the tip of the medial malleolus, likely sequela of old trauma. Tiny plantar and Achilles calcaneal spurs. Soft tissue swelling about the dorsal ankle.    XR FOOT RT G/E 3 VW    Result Date: 3/26/2025  EXAM: XR FOOT RIGHT G/E 3 VIEWS, XR ANKLE RIGHT G/E 3 VIEWS LOCATION: [facility] DATE: 3/26/2025 INDICATION:  Pain in joint involving ankle and foot, right COMPARISON: None.     IMPRESSION: No acute fracture. Small well-corticated ossicle at the tip of the medial malleolus, likely sequela of old trauma. Tiny plantar and Achilles calcaneal spurs. Soft tissue swelling about the dorsal ankle.       Signed by: Arnaud Valdez MD

## 2025-04-13 ENCOUNTER — APPOINTMENT (OUTPATIENT)
Dept: RADIOLOGY | Facility: HOSPITAL | Age: 50
End: 2025-04-13
Payer: COMMERCIAL

## 2025-04-13 ENCOUNTER — APPOINTMENT (OUTPATIENT)
Dept: ULTRASOUND IMAGING | Facility: HOSPITAL | Age: 50
End: 2025-04-13
Payer: COMMERCIAL

## 2025-04-13 ENCOUNTER — HOSPITAL ENCOUNTER (EMERGENCY)
Facility: HOSPITAL | Age: 50
Discharge: HOME | End: 2025-04-13
Payer: COMMERCIAL

## 2025-04-13 VITALS
WEIGHT: 260 LBS | TEMPERATURE: 98.1 F | OXYGEN SATURATION: 97 % | RESPIRATION RATE: 16 BRPM | HEIGHT: 69 IN | BODY MASS INDEX: 38.51 KG/M2 | HEART RATE: 107 BPM | SYSTOLIC BLOOD PRESSURE: 132 MMHG | DIASTOLIC BLOOD PRESSURE: 70 MMHG

## 2025-04-13 DIAGNOSIS — M25.562 LEFT KNEE PAIN: ICD-10-CM

## 2025-04-13 PROCEDURE — 73562 X-RAY EXAM OF KNEE 3: CPT | Mod: LT

## 2025-04-13 PROCEDURE — 99284 EMERGENCY DEPT VISIT MOD MDM: CPT | Mod: 25

## 2025-04-13 PROCEDURE — 73610 X-RAY EXAM OF ANKLE: CPT | Mod: LT

## 2025-04-13 PROCEDURE — 73590 X-RAY EXAM OF LOWER LEG: CPT | Mod: LT

## 2025-04-13 PROCEDURE — 250N000013 HC RX MED GY IP 250 OP 250 PS 637

## 2025-04-13 PROCEDURE — 93971 EXTREMITY STUDY: CPT | Mod: LT

## 2025-04-13 RX ORDER — ACETAMINOPHEN 325 MG/1
650 TABLET ORAL ONCE
Status: COMPLETED | OUTPATIENT
Start: 2025-04-13 | End: 2025-04-13

## 2025-04-13 RX ORDER — OXYCODONE AND ACETAMINOPHEN 5; 325 MG/1; MG/1
1 TABLET ORAL ONCE
Status: COMPLETED | OUTPATIENT
Start: 2025-04-13 | End: 2025-04-13

## 2025-04-13 RX ORDER — ALPRAZOLAM 0.5 MG
1 TABLET ORAL ONCE
Status: DISCONTINUED | OUTPATIENT
Start: 2025-04-13 | End: 2025-04-13

## 2025-04-13 RX ADMIN — OXYCODONE HYDROCHLORIDE AND ACETAMINOPHEN 1 TABLET: 5; 325 TABLET ORAL at 17:09

## 2025-04-13 RX ADMIN — ACETAMINOPHEN 650 MG: 325 TABLET ORAL at 17:09

## 2025-04-13 ASSESSMENT — COLUMBIA-SUICIDE SEVERITY RATING SCALE - C-SSRS
2. HAVE YOU ACTUALLY HAD ANY THOUGHTS OF KILLING YOURSELF IN THE PAST MONTH?: NO
1. IN THE PAST MONTH, HAVE YOU WISHED YOU WERE DEAD OR WISHED YOU COULD GO TO SLEEP AND NOT WAKE UP?: NO
6. HAVE YOU EVER DONE ANYTHING, STARTED TO DO ANYTHING, OR PREPARED TO DO ANYTHING TO END YOUR LIFE?: NO

## 2025-04-13 ASSESSMENT — ACTIVITIES OF DAILY LIVING (ADL)
ADLS_ACUITY_SCORE: 55
ADLS_ACUITY_SCORE: 55

## 2025-04-13 NOTE — ED TRIAGE NOTES
Patient presents to ED for left knee and lower left leg pain that started 2 months ago after falling on ice.  Patient states that pain has increased the last 2 days.  States has not been seen for the pain before.  Last ibuprofen at 1530.     Triage Assessment (Adult)       Row Name 04/13/25 2026          Triage Assessment    Airway WDL WDL        Respiratory WDL    Respiratory WDL WDL        Cardiac WDL    Cardiac WDL WDL        Peripheral/Neurovascular WDL    Peripheral Neurovascular WDL WDL        Cognitive/Neuro/Behavioral WDL    Cognitive/Neuro/Behavioral WDL WDL

## 2025-04-13 NOTE — DISCHARGE INSTRUCTIONS
You were seen in the Emergency Department for left knee pain.  Your exam here is very reassuring and does not show anything consistent with a serious process including an infection, fracture, dislocation and your ultrasound was negative for blood clot.  I do not know exactly was causing your left knee pain here today but it does not seem to be anything urgent/emergent.  You can follow-up with [facility] for ongoing left knee pain.  You can take 1000 mg of Tylenol and 600 of ibuprofen.  Return to the emergency department if you develop any new or worsening symptoms.

## 2025-04-13 NOTE — ED PROVIDER NOTES
EMERGENCY DEPARTMENT ENCOUNTER      NAME: Darlene Hudson  AGE: 50 year old female  YOB: 1975  MRN: [id number]  EVALUATION DATE & TIME: 4/13/2025  4:47 PM    PCP: Robbie Bailon    ED PROVIDER: Luba Cabral PA-C    CHIEF COMPLAINT  Knee Injury      FINAL IMPRESSION:      ICD-10-CM    1. Left knee pain  M25.562           MEDICAL DECISION MAKING AND ED COURSE:  Pertinent Labs & Imaging studies reviewed (See chart for details)    50-year-old female history of adenocarcinoma of the colon currently in remission, alcohol use, tobacco use, moderate persistent asthma who presents to the ER today for left leg pain.  Reports that she did have a fall 2 months ago and initially had some pain in her leg which resolved.  But 2 days ago noticed that she started to have a lot of left leg pain again.  She mostly feels it behind her knee but it does shoot down her entire leg and it is painful to walk.  No recent injuries.  Not on thinners.  No history of DVT.  No numbness or tingling. Vitals reviewed, tachycardic at 107 but otherwise unremarkable vitals.  On exam, left leg without swelling, erythema, or ecchymosis. No pitting edema. Positive calf tenderness.  DP pulse 2+.  knee is tender to palpation along the inferior and lateral aspect of the knee. normal flexion and extension at the knee, no obvious effusion.  Tender to palpation along the lateral aspect of the ankle without swelling, ecchymosis, erythema, or warmth. Normal ROM of the ankle and foot. Cardiopulmonary examination unremarkable.    Considering fracture, DVT, metastatic lesions to the bone, gout, septic joint. Will obtain xray and US and give meds for pain. No DVT in the LLE.  X-ray without any fracture, dislocation, large effusions.  On exam, patient did not have any erythema, warmth, or skin changes to suggest cellulitis or septic joint and has normal knee flexion/extension.  She had no actual trauma or injury recently and the injury she describes  "happened 2 months ago. Unclear etiology to patients pain here today but no fracture, dislocation, DVT, bakers cyst, she's neurovascularly intact. No evidence of septic joint or cellulitis. No notable knee effusion. And is neurovascularly intact. Pain improved with regimen here. Discussed tylenol and ibuprofen at home and [facility] up [facility] if ongoing symptoms. She was agreeable and discharged in stable condition. All questions answered        MEDICATIONS GIVEN IN THE EMERGENCY:  Medications   oxyCODONE-acetaminophen (PERCOCET) 5-325 MG per tablet 1 tablet (1 tablet Oral $Given 4/13/25 1709)   acetaminophen (TYLENOL) tablet 650 mg (650 mg Oral $Given 4/13/25 1709)       NEW PRESCRIPTIONS STARTED AT TODAY'S ER VISIT  Discharge Medication List as of 4/13/2025  6:43 PM        Discharge Medication List as of 4/13/2025  6:43 PM          =================================================================    HPI    Patient information was obtained from: patient   Use of : N/A     Darlene Hudson is a 50 year old female with a pertinent history of coronary artery disease, asthma, and colon cancer s/p partial colectomy who presents to this ED by car for evaluation of knee pain.    Patient had a fall 1.5 months ago wherein she injured her left knee. She has had \"really bad\" pain inside her left knee for a couple of days. The pain sometimes radiates down to her left foot and she sometimes gets spasms to her posterior left knee, which she has never had before. Her knees look the same size to her. Patient notes lack of sleep for the past couple of nights secondary to pain. She took ibuprofen PTA. Her daughter will drive her home.    Patient notes that her asthma has been \"up and down.\" She had an oxygen saturation of 94-95% in triage which improved after she took her inhaler. Reports a history of colon cancer in remission. Patient is no longer on treatment for cancer.     No history of blood clots, or injuries or " "surgeries to her left knee. Patient denies recent falls of travel, left hip pain, use of blood thinners, and any other symptoms or complaints at this time.     PHYSICAL EXAM    /70   Pulse 107   Temp 98.1  F (36.7  C) (Oral)   Resp 16   Ht 1.753 m (5' 9\")   Wt 117.9 kg (260 lb)   SpO2 97%   BMI 38.40 kg/m    Physical Exam  Vitals and nursing note reviewed.   Constitutional:       General: She is not in acute distress.     Appearance: Normal appearance. She is not ill-appearing or toxic-appearing.   HENT:      Nose: Nose normal.      Mouth/Throat:      Mouth: Mucous membranes are moist.   Cardiovascular:      Rate and Rhythm: Normal rate.      Pulses: Normal pulses.   Pulmonary:      Effort: Pulmonary effort is normal.   Musculoskeletal:         General: Tenderness present. No swelling, deformity or signs of injury. Normal range of motion.      Cervical back: Normal range of motion and neck supple.      Left knee: Bony tenderness present. No swelling, deformity, effusion, erythema, ecchymosis or lacerations. Normal range of motion. Tenderness present over the lateral joint line. Normal alignment, normal meniscus and normal patellar mobility. Normal pulse.      Right lower leg: No edema.      Left lower leg: Tenderness and bony tenderness present. No swelling or deformity. No edema.      Left ankle: Normal. No swelling, deformity or ecchymosis. No tenderness. Normal range of motion.      Left Achilles Tendon: Normal. No tenderness or defects.      Left foot: Tenderness and bony tenderness present. No swelling or deformity.      Comments: left leg without swelling, erythema, or ecchymosis. No pitting edema. Positive calf tenderness to palpation. knee is tender to palpation along the inferior and lateral aspect of the knee.  She has normal flexion and extension at the knee and there is no obvious effusion.  Tender to palpation along the lateral aspect of the ankle without swelling, ecchymosis, erythema, or " warmth. Normal ROM of the ankle and foot.     Skin:     General: Skin is warm.      Capillary Refill: Capillary refill takes less than 2 seconds.      Findings: No bruising, erythema, lesion or rash.   Neurological:      General: No focal deficit present.      Mental Status: She is alert and oriented to person, place, and time. Mental status is at baseline.      Sensory: No sensory deficit.      Motor: No weakness.      Gait: Gait normal.            LAB:  All pertinent labs reviewed and interpreted.  Results for orders placed or performed during the hospital encounter of 04/13/25   XR Knee Left 3 Views    Impression    IMPRESSION: No acute fracture. Knee and ankle joint alignment is normal. Trace left knee joint effusion.   US Lower Extremity Venous Duplex Left    Impression    IMPRESSION:  1.  No deep venous thrombosis in the left lower extremity.   XR Tibia and Fibula Left 2 Views    Impression    IMPRESSION: No acute fracture. Knee and ankle joint alignment is normal. Trace left knee joint effusion.   XR Ankle Left G/E 3 Views    Impression    IMPRESSION: No acute fracture. Knee and ankle joint alignment is normal. Trace left knee joint effusion.       RADIOLOGY:  Reviewed all pertinent imaging. Please see official radiology report.  XR Tibia and Fibula Left 2 Views   Final Result   IMPRESSION: No acute fracture. Knee and ankle joint alignment is normal. Trace left knee joint effusion.      XR Knee Left 3 Views   Final Result   IMPRESSION: No acute fracture. Knee and ankle joint alignment is normal. Trace left knee joint effusion.      XR Ankle Left G/E 3 Views   Final Result   IMPRESSION: No acute fracture. Knee and ankle joint alignment is normal. Trace left knee joint effusion.      US Lower Extremity Venous Duplex Left   Final Result   IMPRESSION:   1.  No deep venous thrombosis in the left lower extremity.          Medical Decision Making  {DID YOU REMEMBER TO DOCUMENT...?:748594}  {ADMIT VS  "D/C:391724}    MIPS (CTPE, Dental pain, Cruz, Sinusitis, Asthma/COPD, Head Trauma): {ECC MIPS DOCUMENTATION:943983}    SEPSIS: {Sepsis/Stemi/Stroke:531874::\"None\"}      I, Flores Campbell, am serving as a scribe to document services personally performed by Luba Cabral PA-C based on my observation and the provider's statements to me. I, CHUCKIE Rodríguez, attest that Flores Campbell is acting in a scribe capacity, has observed my performance of the services and has documented them in accordance with my direction.    CHUCKIE Rodríguez  [facility] EMERGENCY DEPARTMENT  [address] [place] [id number] [phone]     "

## 2025-04-14 ENCOUNTER — TELEPHONE (OUTPATIENT)
Dept: FAMILY MEDICINE | Facility: CLINIC | Age: 50
End: 2025-04-14

## 2025-04-14 NOTE — TELEPHONE ENCOUNTER
Forms/Letter     Verify patient name and date of birth.  Was this done?: Yes, Disability/handicap sticker completed and picked up     Verify Photo ID needed of person  item.  Name of person picking up: Darlene Cruz

## 2025-04-21 ENCOUNTER — PATIENT OUTREACH (OUTPATIENT)
Dept: ONCOLOGY | Facility: HOSPITAL | Age: 50
End: 2025-04-21
Payer: COMMERCIAL

## 2025-04-21 NOTE — PROGRESS NOTES
It was a ct dr healy ordered.    [place] of going [place]  Decided to reschedule with us now. Deductible is met.    We set up labs for tomorrow.  I sent swapna ponce to set up ct soon.    Order re entered as internal.     [facility]: Cancer Care                                                                                          Writer sent patient mychart message:    Dr. José Miguel Abdul wanted to let you know that your vitamin D level was low, so he sent a vitamin D prescription to your pharmacy. Please start taking 1,000 units by mouth daily. Let me know if you have any questions.    Signature:  Mariella Madison RN

## 2025-04-29 ENCOUNTER — OFFICE VISIT (OUTPATIENT)
Dept: ORTHOPEDICS | Facility: CLINIC | Age: 50
End: 2025-04-29
Attending: FAMILY MEDICINE
Payer: COMMERCIAL

## 2025-04-29 ENCOUNTER — HOSPITAL ENCOUNTER (OUTPATIENT)
Dept: CT IMAGING | Facility: CLINIC | Age: 50
Discharge: HOME | End: 2025-04-29
Attending: INTERNAL MEDICINE | Admitting: INTERNAL MEDICINE
Payer: COMMERCIAL

## 2025-04-29 DIAGNOSIS — M25.871 IMPINGEMENT SYNDROME OF RIGHT ANKLE: ICD-10-CM

## 2025-04-29 DIAGNOSIS — C18.6 ADENOCARCINOMA OF DESCENDING COLON (H): ICD-10-CM

## 2025-04-29 PROCEDURE — 20606 DRAIN/INJ JOINT/BURSA W/US: CPT | Mod: RT | Performed by: FAMILY MEDICINE

## 2025-04-29 PROCEDURE — 99207 PR DROP WITH A PROCEDURE: CPT | Performed by: FAMILY MEDICINE

## 2025-04-29 PROCEDURE — 71250 CT THORAX DX C-: CPT

## 2025-04-29 RX ORDER — IOPAMIDOL 755 MG/ML
90 INJECTION, SOLUTION INTRAVASCULAR ONCE
Status: DISCONTINUED | OUTPATIENT
Start: 2025-04-29 | End: 2025-04-29

## 2025-04-29 RX ORDER — TRIAMCINOLONE ACETONIDE 40 MG/ML
40 INJECTION, SUSPENSION INTRA-ARTICULAR; INTRAMUSCULAR
Status: COMPLETED | OUTPATIENT
Start: 2025-04-29 | End: 2025-04-29

## 2025-04-29 RX ORDER — LIDOCAINE HYDROCHLORIDE 10 MG/ML
2 INJECTION, SOLUTION EPIDURAL; INFILTRATION; INTRACAUDAL; PERINEURAL
Status: COMPLETED | OUTPATIENT
Start: 2025-04-29 | End: 2025-04-29

## 2025-04-29 RX ADMIN — TRIAMCINOLONE ACETONIDE 40 MG: 40 INJECTION, SUSPENSION INTRA-ARTICULAR; INTRAMUSCULAR at 15:39

## 2025-04-29 RX ADMIN — LIDOCAINE HYDROCHLORIDE 2 ML: 10 INJECTION, SOLUTION EPIDURAL; INFILTRATION; INTRACAUDAL; PERINEURAL at 15:39

## 2025-04-29 NOTE — NURSING NOTE
[address] [address]  Dept: [phone]  ______________________________________________________________________________    Patient: Darlene Hudson   : 1975   MRN: [id number]   2025    INVASIVE PROCEDURE SAFETY CHECKLIST    Date: 25   Procedure: Right USG ankle kenalog injection  Patient Name: Darlene Hudson  MRN: [id number]  YOB: 1975    Action: Complete sections as appropriate. Any discrepancy results in a HARD COPY until resolved.     PRE PROCEDURE:  Patient ID verified with 2 identifiers (name and  or MRN): Yes  Procedure and site verified with patient/designee (when able): Yes  Accurate consent documentation in medical record: Yes  H&P (or appropriate assessment) documented in medical record: Yes  H&P must be up to 20 days prior to procedure and updates within 24 hours of procedure as applicable: NA  Relevant diagnostic and radiology test results appropriately labeled and displayed as applicable: Yes  Procedure site(s) marked with provider initials: NA    TIMEOUT:  Time-Out performed immediately prior to starting procedure, including verbal and active participation of all team members addressing the following:Yes  * Correct patient identify  * Confirmed that the correct side and site are marked  * An accurate procedure consent form  * Agreement on the procedure to be done  * Correct patient position  * Relevant images and results are properly labeled and appropriately displayed  * The need to administer antibiotics or fluids for irrigation purposes during the procedure as applicable   * Safety precautions based on patient history or medication use    DURING PROCEDURE: Verification of correct person, site, and procedures any time the responsibility for care of the patient is transferred to another member of the care team.       Prior to injection, verified patient identity using patient's name and date  of birth.  Due to injection administration, patient instructed to remain in clinic for 15 minutes  afterwards, and to report any adverse reaction to me immediately.    Joint injection was performed.      Drug Amount Wasted:  Yes: 3 mg/ml  lidocaine  Vial/Syringe: Single dose vial  Expiration Date:  10/2028      Carito Ambriz, [facility]  April 29, 2025

## 2025-04-29 NOTE — LETTER
2025      RE: Darlene Hudson  [address]  [facility] [address]     Dear Colleague,    Thank you for referring your patient, Darlene Hudson, to the [facility]. Please see a copy of my visit note below.    PROCEDURE ENCOUNTER    [facility]  Matthew Holcomb, DO  2025     Name: Darlene Hudson  MRN: [id number]  Age: 50 year old  : 1975    Referring provider: Dr. Robbie Bailon MD  Diagnosis: (M25.871) Impingement syndrome of right ankle    Pertinent history: History of right ankle soft tissue impingement, right ankle bony impingement status post arthroscopic debridement and partial tibia and talus excision on 23.    Tibiotalar joint (ankle) Injection- Ultrasound Guided    The patient was informed of the risks and the benefits of the procedure and a written consent was signed.    The patient s Right ankle was prepped with chlorhexidine in sterile fashion.     40 mg of triamcinolone acetate suspension was drawn up into a 3 mL syringe with 2 mL of 1% lidocaine.    Injection was performed using sterile technique.  Under ultrasound guidance a 1.5-inch 25-gauge needle was used to enter the tibiotalar joint of  right ankle using medial approach.  Needle placement was visualized and documented with ultrasound.  Needle placed in short axis to the probe.  Ultrasound visualization was necessary due to decreased joint space in the setting of osteoarthritis.  Images were permanently stored for the patient's record. There were no complications. The patient tolerated the procedure well. There was negligible bleeding.    The patient was instructed to ice the toe upon leaving clinic and refrain from overuse over the next 3 days.     The patient was instructed to call or go to the emergency room with any unusual pain, swelling, redness, or if otherwise concerned.    Medium Joint Injection: R ankle    Date/Time: 2025 3:39  PM    Performed by: Matthew Holcomb DO  Authorized by: Matthew Holcomb DO    Indications:  Pain  Needle Size comment:  23g  Guidance: ultrasound    Approach:  Anteromedial  Location:  Ankle  Site:  R ankle  Medications:  40 mg triamcinolone 40 MG/ML; 2 mL lidocaine (PF) 1 %  Outcome:  Tolerated well, no immediate complications  Procedure discussed: discussed risks, benefits, and alternatives    Consent Given by:  Patient  Timeout: timeout called immediately prior to procedure    Prep: patient was prepped and draped in usual sterile fashion          Matthew Holcomb DO [facility]  Primary Care Sports Medicine  [facility]      Again, thank you for allowing me to participate in the care of your patient.      Sincerely,    Matthew Holcomb DO

## 2025-04-29 NOTE — PROGRESS NOTES
PROCEDURE ENCOUNTER    [facility]  Sports Medicine Clinic  Matthew Holcomb DO  2025     Name: Darlene Hudson  MRN: [id number]  Age: 50 year old  : 1975    Referring provider: Dr. Robbie Bailon MD  Diagnosis: (M25.871) Impingement syndrome of right ankle    Pertinent history: History of right ankle soft tissue impingement, right ankle bony impingement status post arthroscopic debridement and partial tibia and talus excision on 23.    Tibiotalar joint (ankle) Injection- Ultrasound Guided    The patient was informed of the risks and the benefits of the procedure and a written consent was signed.    The patient s Right ankle was prepped with chlorhexidine in sterile fashion.     40 mg of triamcinolone acetate suspension was drawn up into a 3 mL syringe with 2 mL of 1% lidocaine.    Injection was performed using sterile technique.  Under ultrasound guidance a 1.5-inch 25-gauge needle was used to enter the tibiotalar joint of  right ankle using medial approach.  Needle placement was visualized and documented with ultrasound.  Needle placed in short axis to the probe.  Ultrasound visualization was necessary due to decreased joint space in the setting of osteoarthritis.  Images were permanently stored for the patient's record. There were no complications. The patient tolerated the procedure well. There was negligible bleeding.    The patient was instructed to ice the toe upon leaving clinic and refrain from overuse over the next 3 days.     The patient was instructed to call or go to the emergency room with any unusual pain, swelling, redness, or if otherwise concerned.    Medium Joint Injection: R ankle    Date/Time: 2025 3:39 PM    Performed by: Matthew Holcomb DO  Authorized by: Matthew Holcomb DO    Indications:  Pain  Needle Size comment:  23g  Guidance: ultrasound    Approach:  Anteromedial  Location:  Ankle  Site:  R ankle  Medications:  40 mg triamcinolone 40 MG/ML; 2 mL  lidocaine (PF) 1 %  Outcome:  Tolerated well, no immediate complications  Procedure discussed: discussed risks, benefits, and alternatives    Consent Given by:  Patient  Timeout: timeout called immediately prior to procedure    Prep: patient was prepped and draped in usual sterile fashion          Matthew Holcomb DO [facility]  Primary Care Sports Medicine  [facility]

## 2025-05-05 ENCOUNTER — TELEPHONE (OUTPATIENT)
Dept: ONCOLOGY | Facility: HOSPITAL | Age: 50
End: 2025-05-05
Payer: COMMERCIAL

## 2025-05-05 NOTE — TELEPHONE ENCOUNTER
Reviewed with Dr. Valdez, who states he wrote a weight management referral for this patient, but that the PCP is who wrote the colonoscopy referral.     Attempted to call back the number for General Surgery triage line that THALIA Armstrong left, but no answer. Left detailed VM as the triage line requested with the clinic call back number for someone to call back to verify this information they are calling about. Dr. Valdez would like verified that they are calling our clinic about the correct referral.            Anahi Pena RN on 5/5/2025 at 2:07 PM

## 2025-05-05 NOTE — TELEPHONE ENCOUNTER
Received a call from Patti, a nurse in Dr. Charles's general surgery office. She states that a referral was placed by Dr. Valdez for a colonoscopy, but she is wanting to clarify this. She said the referral is for colorectal, but Dr. Charles is general surgery. She is wanting to clarify who Dr. Valdez wants to be doing this colonoscopy so that the right office can take care of it.     Patti states that she can be reached at [phone]    Will send this to Dr. Valdez to review and advise.       Anahi Pena RN on 5/5/2025 at 11:43 AM

## 2025-05-05 NOTE — TELEPHONE ENCOUNTER
Left a detailed VM for Patti letting her know that this referral was placed by patient's PCP, Dr. Bailon and not Dr. Valdez. Encouraged her to call back if she has any other questions.     Ludmila Osorio RN on 5/5/2025 at 2:48 PM

## 2025-05-06 ENCOUNTER — PREP FOR PROCEDURE (OUTPATIENT)
Dept: SURGERY | Facility: CLINIC | Age: 50
End: 2025-05-06
Payer: COMMERCIAL

## 2025-05-06 ENCOUNTER — TELEPHONE (OUTPATIENT)
Dept: SURGERY | Facility: CLINIC | Age: 50
End: 2025-05-06
Payer: COMMERCIAL

## 2025-05-06 DIAGNOSIS — Z85.038 HISTORY OF COLON CANCER: Primary | ICD-10-CM

## 2025-05-06 RX ORDER — LIDOCAINE 40 MG/G
CREAM TOPICAL
OUTPATIENT
Start: 2025-05-06

## 2025-05-06 NOTE — PROGRESS NOTES
ASSESSMENT/PLAN:    (F41.1) Generalized anxiety disorder  (primary encounter diagnosis)  Comment: med refilled; will discuss w/ our clinical pharmacist how we will manage her meds over the summer while she is out of state as she is restricted to me and may be restricted to a specific pharmacy   Plan: ALPRAZolam (XANAX) 2 MG tablet          Robbie Bailon MD  May 7, 2025  4:44 PM        Pt is a 50 year old female last seen on 4/16/25 here today for:     Mood - stable  SH: will be leaving 6/6 for the summer! We need to figure out a medication plan from June through August    Per my last note:  (S80.02XD) Contusion of left knee, subsequent encounter  (primary encounter diagnosis)  Comment: given normal x-ray and refractory pain/ meniscal signs on exam, will check MRI of knee  Plan: MR Knee Left w/o Contrast          (M21.612) Bunion, left foot  Comment:   Plan: recommended toe spacer and topical nsaid for bunion     Past Medical History:   Diagnosis Date    Abdominal pain 06/29/2015    Abnormal cervical Papanicolaou smear 11/09/2014    Overview:  ACUS/HPV positive    Abnormal cytology finding 11/09/2014    Overview:  ACUS/HPV positive    Acute pericardial effusion 02/06/2017    Agoraphobia with panic attacks     Anxiety     Arthralgia of both lower legs 05/29/2020    Bilateral achy knee pain that is chronic in nature going on for years. Most likely osteoarthritis in knees related to obesity. Previously injected in both knees with good relief. 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  Has controlled substance agreement with them.  On Vicodin, Valium, Klonopin prescribed only from there.       Coronary artery disease     Cough 02/09/2020    Family history of colon cancer 10/24/2020    Hx of seasonal allergies     Infection due to 2019 novel coronavirus 09/20/2022    Infectious pericarditis     Lipoma     Low back pain 07/13/2015    Major depressive disorder, recurrent episode, moderate (H) 09/05/2006    Menorrhagia with irregular cycle 07/15/2022    Added automatically from request for surgery [id number]    Moderate persistent asthma without complication 09/29/2020    Nondependent alcohol abuse, episodic drinking behavior 10/03/2012    Noninflammatory disorder of vagina 02/20/2015    Other chronic pain     Other long term (current) drug therapy 01/28/2013    Overview:  Vicodin and cyclobenzaprine monthly    Panic disorder with agoraphobia 09/05/2006    Pap smear for cervical cancer screening 10/31/2016    03/29/2010  Normal cytology, HPV ot done, repeat in 3 years.    Pericarditis 2017    Physiologic disturbance of temperature regulation 10/24/2020    PONV (postoperative nausea and vomiting)     Right ankle swelling 06/07/2022    Added automatically from request for surgery [id number]    Tobacco abuse     Tobacco use disorder 07/13/2015      Past Surgical History:   Procedure Laterality Date    ANKLE SURGERY Right 05/30/2019    ARTHROSCOPY ANKLE Right 6/21/2023    Procedure: right ankle arthroscopy with debridement;  Surgeon: Kareem Bashir MD;  Location: [facility] OR    BIOPSY BREAST Right 1990    benign    COLONOSCOPY N/A 1/3/2023    Procedure: COLONOSCOPY WITH BIOPSY OF COLON MASS, POLYPECTOMY;  Surgeon: Kris Charles MD;  Location: [facility]    COLONOSCOPY N/A 3/12/2024    Procedure: COLONOSCOPY WITH POLYPECTOMY and EXCISION, LEFT, LESION, BACK;  Surgeon: Kris Charles MD;  Location: [facility] OR     "CREATION PERICARDIAL WINDOW  02/10/2017    [facility]    DILATION AND CURETTAGE N/A 7/22/2022    Procedure: DILATION AND CURETTAGE, UTERUS;  Surgeon: Lesly Holland;  Location: [facility]    HEMORRHOIDECTOMY EXTERNAL      HYSTEROSCOPY, WITH ENDOMETRIAL RADIOFREQUENCY ABLATION - NOVASURE N/A 7/22/2022    Procedure: HYSTEROSCOPY, WITH ENDOMETRIAL RADIOFREQUENCY ABLATION - NOVASURE DILATION AND CURETTAGE, UTERUS;  Surgeon: Lesly Holland;  Location: [facility]    LAPAROSCOPIC ASSISTED SIGMOID COLECTOMY N/A 1/6/2023    Procedure: LAPAROSCOPIC ASSISTED DESCENDING COLELCTOMY SPLENIC FLEXURE MOBILIZATION ;  Surgeon: Kirs Charles MD;  [place] [facility] [place]    LAPAROSCOPIC LYSIS ADHESIONS N/A 1/6/2023    Procedure: LYSIS OF ADHESIONS;  Surgeon: Kris Charles MD;  [place] [facility] [place]    TUMOR REMOVAL      Has had 3. In the right breast and \"inside of rib cage.\"      Current Outpatient Medications   Medication Sig Dispense Refill    [START ON 5/12/2025] ALPRAZolam (XANAX) 2 MG tablet Take 1 tablet (2 mg) by mouth 3 times daily as needed for anxiety. 90 tablet 3    albuterol (PROAIR HFA/PROVENTIL HFA/VENTOLIN HFA) 108 (90 Base) MCG/ACT inhaler Inhale 2 puffs into the lungs every 6 hours as needed for shortness of breath, wheezing or cough. 18 g 0    buprenorphine-naloxone (SUBOXONE) 8-2 MG SUBL sublingual tablet Place 1 tablet under the tongue 2 times daily. 60 tablet 1    cetirizine (ZYRTEC) 10 MG tablet Take 1 tablet (10 mg) by mouth daily 30 tablet 11    cyclobenzaprine (FLEXERIL) 10 MG tablet Take 1 tablet (10 mg) by mouth nightly as needed for muscle spasms. 30 tablet 1    diclofenac (VOLTAREN) 1 % topical gel Apply 2 g topically 4 times daily. For both shins 100 g 3    fluticasone (FLONASE) 50 MCG/ACT nasal spray Spray 2 sprays into both nostrils daily 9.9 mL 11    hydrOXYzine carlos (VISTARIL) 25 MG capsule Take 1 capsule (25 mg) by mouth nightly as needed for anxiety 30 " "capsule 3    ibuprofen (ADVIL/MOTRIN) 600 MG tablet Take 1 tablet (600 mg) by mouth every 8 hours as needed for moderate pain. 90 tablet 2    metFORMIN (GLUCOPHAGE XR) 500 MG 24 hr tablet 1 tablet with dinner daily for 2 weeks then 2 tablets with dinner 180 tablet 1    montelukast (SINGULAIR) 10 MG tablet Take 1 tablet (10 mg) by mouth At Bedtime 30 tablet 11    naloxone (NARCAN) 4 MG/0.1ML nasal spray Spray 1 spray (4 mg) into one nostril alternating nostrils as needed for opioid reversal every 2-3 minutes until assistance arrives 0.2 mL 1    PROAIR  (90 Base) MCG/ACT inhaler Inhale 2 puffs into the lungs every 4 hours as needed for shortness of breath or wheezing. 8 g 11    sertraline (ZOLOFT) 50 MG tablet Take 1.5 tablets (75 mg) by mouth daily 135 tablet 3    spacer (OPTICHAMBER BINA) holding chamber For use w/ rescue inhaler 1 each 0    tiotropium (SPIRIVA RESPIMAT) 1.25 MCG/ACT inhaler Inhale 2 puffs into the lungs daily. 1 g 0    tiotropium (SPIRIVA) 18 MCG inhaled capsule Inhale 1 capsule (18 mcg) into the lungs daily. 7 capsule 0    Vitamin D, Cholecalciferol, 25 MCG (1000 UT) CAPS Take 1,000 Units by mouth daily. 90 capsule 1      Allergies   Allergen Reactions    Gabapentin Other (See Comments)     Mental status is changed     Lidocaine Other (See Comments)     \"my jaw stopped moving\"  Other reaction(s): Dystonia    Penicillins Hives, Rash and Shortness Of Breath    Lidocaine-Epinephrine (Pf) Other (See Comments) and Muscle Pain (Myalgia)     Severe jaw cramping, double vision  Jaw locking    Topamax [Topiramate] Headache          Exam:   /67   Pulse 108   Temp 97.8  F (36.6  C) (Oral)   Resp 18   SpO2 96%    Alert and oriented x 3; No acute distress   "

## 2025-05-06 NOTE — LETTER
Pre-op Physical: Schedule a pre-op physical with your primary care doctor if not internal to [facility].  If internal, we have scheduled this.   The pre-op physical must be 10-30 days before surgery and since it is required by anesthesia, your surgery will be cancelled if it's not done.      Surgery Date: 5/20/2025     Location: [facility] [address] [place] [address]    Approximate Arrival Time: 8:00 am  (Unless instructed differently by the pre-op call nurse)         Pre-Surgical Tasks:     Review all medications with your primary care or prescribing physician; they will advise you which meds to stop and when, and when you can resume taking.  Certain medications like blood thinners and weight loss medications need to be stopped in advance of surgery to proceed safely.      Blood thinners including but not exclusive to drugs like Xarelto, Eliquis, Warfarin and Aspirin, should be stopped five days before surgery, if your prescribing provider agrees. Follow your provider's advice on stopping blood thinners because they know you best.  If you are unsure if your medication is a blood thinner, ask your prescribing provider.    Weight loss medications: There are multiple medications being used for weight management and diabetes today, and the list is growing.  Phentermine, Ozempic, Wegovy, Trulicity, and other similar medications need to be stopped one week before surgery to avoid being cancelled.  Victoza and Saxenda can be continued longer but must be stopped one full day before surgery.  Please ask your prescribing provider for advice.    Diabetic medications: in addition to the medications talked about above that are used for either weight loss or diabetes, some people are on insulin that may require adjustment.  Please discuss managing diabetic medications with your prescribing doctor as these medications may require modification prior to surgery.       Fasting  instructions will be provided by the pre-op nurse who will call you 1-3 days before surgery.  Typically, we advise normal food up to 8 hours before you arrive for surgery. Clear liquids only from then until 2 hours before you arrive surgery, then nothing at all by mouth.  The nurse will review your specific instructions with you at the call.      Smoking impacts your body's ability to heal properly so we advise patients to quit if possible before surgery.  Plastic Surgery patients are required to be nicotine free for at least 8 weeks before surgery.      You will need an adult to drive you home and stay with you 24 hours after surgery. Public transportation or Medical Van Services are not permitted.    Visitor restrictions are subject to change, please verify with the pre-op nurse when they call how many people are permitted to accompany you.    We always encourage you to notify your insurance any time you have medical tests or procedures scheduled including surgery. The number is usually right on the back of your insurance card. To obtain pricing for surgery, please call [facility] Cost of Care at [phone] or email [email or web address].        Call our office if you have any questions! Thank you!     Brittanie Kerr MA  [facility]   (General Surgery/ ENT/ Plastics)  Direct Office: [phone]      Electronically signed

## 2025-05-06 NOTE — TELEPHONE ENCOUNTER
Spoke with patient today to schedule surgery as ordered by the provider.    WC/MVA Related?: No    Went over details/instructions as written on the letter.    Pre Op By: H&P by Primary MD  Post Op Scheduled : Not applicable    Medications:  Blood Thinners? No  Weight Loss Meds? No  Diabetes Meds? No    Surgery Letter sent via [place]      (Please see LETTERS TAB in chart to retrieve a copy of this letter)

## 2025-05-07 ENCOUNTER — OFFICE VISIT (OUTPATIENT)
Dept: FAMILY MEDICINE | Facility: CLINIC | Age: 50
End: 2025-05-07
Payer: COMMERCIAL

## 2025-05-07 VITALS
DIASTOLIC BLOOD PRESSURE: 67 MMHG | OXYGEN SATURATION: 96 % | HEART RATE: 108 BPM | RESPIRATION RATE: 18 BRPM | TEMPERATURE: 97.8 F | SYSTOLIC BLOOD PRESSURE: 108 MMHG

## 2025-05-07 DIAGNOSIS — F41.1 GENERALIZED ANXIETY DISORDER: Primary | ICD-10-CM

## 2025-05-07 RX ORDER — ALPRAZOLAM 2 MG/1
2 TABLET ORAL 3 TIMES DAILY PRN
Qty: 90 TABLET | Refills: 3 | Status: SHIPPED | OUTPATIENT
Start: 2025-05-12

## 2025-05-10 NOTE — PROGRESS NOTES
Virtual Visit Details    Type of service:  Video Visit   Video Start Time: 8:38 AM  Video End Time:9:09 AM    Originating Location (pt. Location): Other in her car in [place]    Distant Location (provider location):  Off-site  Platform used for Video Visit: [facility]    Bariat[facility] Care [facility] Non Surgical Follow up Visit   Date of visit: 5/12/2025  Physician: JEAN CLAUDE Kelley MD, MD  Primary Care is Robbie Bailon.  Darlene Dasilvalivan   50 year old  female     Initial Weight: 250#  Initial BMI: 36.87  Today's Weight:   Wt Readings from Last 1 Encounters:   05/12/25 117.9 kg (260 lb)     Body mass index is 38.4 kg/m .           Assessment and Plan   Assessment: Darlene is a 50 year old year old female who presents for medical weight management.      Plan:    1. Morbid obesity (H) (Primary)  Patient was congratulated on her success thus far. Healthy habits to assist with further weight loss were discussed. She will focus on drinking more water and plans to start using her exercise bike in the mornings for 30-45 minutes 5 days a week in the mornings. She would like to try zepbound. She was told by her oncologist that she never had pancreatitis. I was uable to full up the labs from [facility] in 2017 to confirm this. She is aware that there is a higher risk of pancreatitis on the medication and is willing to take that risk.  Risks, benefits and possible side effects discussed  We discussed the patient's co-morbid conditions including asthma. This likely will improve with healthy habits and weight loss.     2. Moderate persistent asthma without complication  This may improve with healthy habits and weight loss.     3. History of migraine headaches  This may improve with healthy habits and weight loss.      Follow up next available with the dietician and myself            INTERIM HISTORY  Patient is working on healthy habits for weight loss and she is frustrated that she is not losing weight. She stopped the metformin due to nausea.      DIETARY HISTORY  Meals Per Day: 1  Eating Protein First?: no  Food Diary: B:skips, drinks iced mocha coffee L:beef jerky or crackers D:sandwich or baked chicken, sometimes vegetables  Snacks Per Day: snacking for lunch  Typical Snack: crackers, veggies and dip, hard boiled eggs  Fluid Intake: working on water intake  Portion Control: sometimes  Calorie Containing Beverages: iced mocha every morning, rare soda, apple juice (trying to dilute)    Meals at Restaurant per week:most of her meals are from [employer]    Positive Changes Since Last Visit: she has stopped soda  Struggling With: water intake, exercise, not eating 3 meals    Knowledgeable in Reading Food Labels: not sure  Getting Adequate Protein: no      PHYSICAL ACTIVITY PATTERNS:  Walking her dog every day, 15-60 minutes    REVIEW OF SYSTEMS  GENERAL/CONSTITUTIONAL:  Fatigue: yes  HEENT:   glaucoma: no  CARDIOVASCULAR:  History of heart disease: no  GI:  Pancreatitis: no per patient, wrong in chart  NEUROLOGIC:  Paresthesias: no  History of migraine headaches: no  ENDOCRINE:  Monitoring Blood Sugars: no  Sugars Well Controlled: na  No personal or family history of medullary thyroid cancer no  na  :  Birth control: not discussed  History of kidney stones: yes during pregnancy     Patient Profile   Social History     Social History Narrative    Not on file        Past Medical History   Past Medical History:   Diagnosis Date    Abdominal pain 06/29/2015    Abnormal cervical Papanicolaou smear 11/09/2014    Overview:  ACUS/HPV positive    Abnormal cytology finding 11/09/2014    Overview:  ACUS/HPV positive    Acute pericardial effusion 02/06/2017    Agoraphobia with panic attacks     Anxiety     Arthralgia of both lower legs 05/29/2020    Bilateral achy knee pain that is chronic in nature going on for years. Most likely osteoarthritis in knees related to obesity. Previously injected in both knees with good relief. 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  Has controlled substance agreement with them.  On Vicodin, Valium, Klonopin prescribed only from there.       Coronary artery disease     Cough 02/09/2020    Family history of colon cancer 10/24/2020    Hx of seasonal allergies     Infection due to 2019 novel coronavirus 09/20/2022    Infectious pericarditis     Lipoma     Low back pain 07/13/2015    Major depressive disorder, recurrent episode, moderate (H) 09/05/2006    Menorrhagia with irregular cycle 07/15/2022    Added automatically from request for surgery [id number]    Moderate persistent asthma without complication 09/29/2020    Nondependent alcohol abuse, episodic drinking behavior 10/03/2012    Noninflammatory disorder of vagina 02/20/2015    Other chronic pain     Other long term (current) drug therapy 01/28/2013    Overview:  Vicodin and cyclobenzaprine monthly    Panic disorder with agoraphobia 09/05/2006    Pap smear for cervical cancer screening 10/31/2016    03/29/2010  Normal cytology, HPV ot done, repeat in 3 years.    Pericarditis 2017    Physiologic disturbance of temperature regulation 10/24/2020    PONV (postoperative nausea and vomiting)     Right ankle swelling 06/07/2022    Added automatically from request for surgery [id number]    Tobacco abuse     Tobacco use disorder 07/13/2015     Patient Active Problem List   Diagnosis    Nondependent alcohol abuse, episodic drinking behavior    Controlled substance agreement signed    Chronic sinusitis    Major  depressive disorder, recurrent episode, moderate (H)    Tobacco use disorder    Abnormal cervical Papanicolaou smear    Panic disorder with agoraphobia    Atopic rhinitis    Chronic low back pain    Other long term (current) drug therapy    Acute pericardial effusion    Anxiety    Chronic infectious pericarditis    Cough    Arthralgia of both lower legs    Moderate persistent asthma without complication    Physiologic disturbance of temperature regulation    Family history of colon cancer    Right ankle swelling    Menorrhagia with irregular cycle    Infection due to 2019 novel coronavirus    Adenocarcinoma of descending colon (H)    History of ankle surgery    History of colon cancer    Paranasal sinus disease    Benzodiazepine dependence (H)    Incontinence of feces, unspecified fecal incontinence type       Past Surgical History  She has a past surgical history that includes Ankle surgery (Right, 05/30/2019); Tumor Removal; Hemorrhoidectomy external; Biopsy breast (Right, 1990); Creation pericardial window (02/10/2017); Hysteroscopy, With Endometrial Radiofrequency Ablation - Novasure (N/A, 7/22/2022); Dilation and curettage (N/A, 7/22/2022); Colonoscopy (N/A, 1/3/2023); Laparoscopic Assisted Sigmoid Colectomy (N/A, 1/6/2023); Laparoscopic lysis adhesions (N/A, 1/6/2023); Arthroscopy ankle (Right, 6/21/2023); and Colonoscopy (N/A, 3/12/2024).     Examination   Wt 117.9 kg (260 lb)   BMI 38.40 kg/m    Wt Readings from Last 4 Encounters:   05/12/25 117.9 kg (260 lb)   04/16/25 117.9 kg (260 lb)   04/13/25 117.9 kg (260 lb)   04/09/25 122.1 kg (269 lb 3.2 oz)      BP Readings from Last 3 Encounters:   05/07/25 108/67   04/16/25 99/68   04/13/25 132/70      GENERAL: alert and no distress  EYES: Eyes grossly normal to inspection.  No discharge or erythema, or obvious scleral/conjunctival abnormalities.  RESP: No audible wheeze, cough, or visible cyanosis.    SKIN: Visible skin clear. No significant rash, abnormal  pigmentation or lesions.  NEURO: Cranial nerves grossly intact.  Mentation and speech appropriate for age.  PSYCH: Appropriate affect, tone, and pace of words        Counseling:   We reviewed the important post op bariatric recommendations:  -eating 3 meals daily  -eating protein first, getting >60gm protein daily  -eating slowly, chewing food well  -avoiding/limiting calorie containing beverages  -limiting starchy vegetables and carbohydrates, choosing wheat, not white with breads,   crackers, pastas, laine, bagels, tortillas, rice  -limiting restaurant or cafeteria eating to twice a week or less    We discussed the importance of restorative sleep and stress management in maintaining a healthy weight.  We discussed the National Weight Control Registry healthy weight maintenance strategies and ways to optimize metabolism.  We discussed the importance of physical activity including cardiovascular and strength training in maintaining a healthier weight.    Total time spent on the date of this encounter doing: chart review, review of test results, patient visit, physical exam, education, counseling, developing plan of care and documenting = 39 minutes.         JEAN CLAUDE Kelley MD  [facility]

## 2025-05-12 ENCOUNTER — VIRTUAL VISIT (OUTPATIENT)
Dept: SURGERY | Facility: CLINIC | Age: 50
End: 2025-05-12
Payer: COMMERCIAL

## 2025-05-12 ENCOUNTER — TELEPHONE (OUTPATIENT)
Dept: SURGERY | Facility: CLINIC | Age: 50
End: 2025-05-12

## 2025-05-12 VITALS — BODY MASS INDEX: 38.4 KG/M2 | WEIGHT: 260 LBS

## 2025-05-12 DIAGNOSIS — J45.40 MODERATE PERSISTENT ASTHMA WITHOUT COMPLICATION: ICD-10-CM

## 2025-05-12 DIAGNOSIS — Z86.69 HISTORY OF MIGRAINE HEADACHES: ICD-10-CM

## 2025-05-12 DIAGNOSIS — E66.01 MORBID OBESITY (H): Primary | ICD-10-CM

## 2025-05-12 PROCEDURE — 98006 SYNCH AUDIO-VIDEO EST MOD 30: CPT | Performed by: FAMILY MEDICINE

## 2025-05-12 NOTE — NURSING NOTE
Is the patient currently in the state of [place]? YES    Location: home    Visit mode:VIDEO    If the visit is dropped, the patient can be reconnected by: VIDEO VISIT: Text to cell phone:   Telephone Information:   Mobile [phone]    and VIDEO VISIT: Send to e-mail at: [email or web address]    Will anyone else be joining the visit? NO  (If patient encounters technical issues they should call [phone]    Are changes needed to the allergy or medication list? No    Are refills needed on medications prescribed by this physician? NO    Reason for visit: VICTORINO Zapata, Virtual Visit Facilitator    QNR Status: NA

## 2025-05-12 NOTE — LETTER
5/12/2025      Darlene Hudson  [address]  [facility] [address]      Dear Colleague,    Thank you for referring your patient, Darlene Hudson, to the [facility] [place]. Please see a copy of my visit note below.    Virtual Visit Details    Type of service:  Video Visit   Video Start Time: 8:38 AM  Video End Time:9:09 AM    Originating Location (pt. Location): Other in her car in [place]    Distant Location (provider location):  Off-site  Platform used for Video Visit: [facility]    Bariatric [facility] Non Surgical Follow up Visit   Date of visit: 5/12/2025  Physician: JEAN CLAUDE Kelley MD, MD  Primary Care is Robbie Bailon.  Darlene Hudson   50 year old  female     Initial Weight: 250#  Initial BMI: 36.87  Today's Weight:   Wt Readings from Last 1 Encounters:   05/12/25 117.9 kg (260 lb)     Body mass index is 38.4 kg/m .           Assessment and Plan   Assessment: Darlene is a 50 year old year old female who presents for medical weight management.      Plan:    1. Morbid obesity (H) (Primary)  Patient was congratulated on her success thus far. Healthy habits to assist with further weight loss were discussed. She will focus on drinking more water and plans to start using her exercise bike in the mornings for 30-45 minutes 5 days a week in the mornings. She would like to try zepbound. She was told by her oncologist that she never had pancreatitis. I was uable to full up the labs from [facility] in 2017 to confirm this. She is aware that there is a higher risk of pancreatitis on the medication and is willing to take that risk.  Risks, benefits and possible side effects discussed  We discussed the patient's co-morbid conditions including asthma. This likely will improve with healthy habits and weight loss.     2. Moderate persistent asthma without complication  This may improve with healthy habits and weight loss.     3. History of migraine headaches  This may improve with  healthy habits and weight loss.      Follow up next available with the dietician and myself            INTERIM HISTORY  Patient is working on healthy habits for weight loss and she is frustrated that she is not losing weight. She stopped the metformin due to nausea.     DIETARY HISTORY  Meals Per Day: 1  Eating Protein First?: no  Food Diary: B:skips, drinks iced mocha coffee L:beef jerky or crackers D:sandwich or baked chicken, sometimes vegetables  Snacks Per Day: snacking for lunch  Typical Snack: crackers, veggies and dip, hard boiled eggs  Fluid Intake: working on water intake  Portion Control: sometimes  Calorie Containing Beverages: iced mocha every morning, rare soda, apple juice (trying to dilute)    Meals at Restaurant per week:most of her meals are from [employer] station    Positive Changes Since Last Visit: she has stopped soda  Struggling With: water intake, exercise, not eating 3 meals    Knowledgeable in Reading Food Labels: not sure  Getting Adequate Protein: no      PHYSICAL ACTIVITY PATTERNS:  Walking her dog every day, 15-60 minutes    REVIEW OF SYSTEMS  GENERAL/CONSTITUTIONAL:  Fatigue: yes  HEENT:   glaucoma: no  CARDIOVASCULAR:  History of heart disease: no  GI:  Pancreatitis: no per patient, wrong in chart  NEUROLOGIC:  Paresthesias: no  History of migraine headaches: no  ENDOCRINE:  Monitoring Blood Sugars: no  Sugars Well Controlled: na  No personal or family history of medullary thyroid cancer no  na  :  Birth control: not discussed  History of kidney stones: yes during pregnancy     Patient Profile   Social History     Social History Narrative     Not on file        Past Medical History   Past Medical History:   Diagnosis Date     Abdominal pain 06/29/2015     Abnormal cervical Papanicolaou smear 11/09/2014    Overview:  ACUS/HPV positive     Abnormal cytology finding 11/09/2014    Overview:  ACUS/HPV positive     Acute pericardial effusion 02/06/2017     Agoraphobia with panic attacks       Anxiety      Arthralgia of both lower legs 05/29/2020    Bilateral achy knee pain that is chronic in nature going on for years. Most likely osteoarthritis in knees related to obesity. Previously injected in both knees with good relief. 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  Has controlled substance agreement with them.  On Vicodin, Valium, Klonopin prescribed only from there.        Coronary artery disease      Cough 02/09/2020     Family history of colon cancer 10/24/2020     Hx of seasonal allergies      Infection due to 2019 novel coronavirus 09/20/2022     Infectious pericarditis      Lipoma      Low back pain 07/13/2015     Major depressive disorder, recurrent episode, moderate (H) 09/05/2006     Menorrhagia with irregular cycle 07/15/2022    Added automatically from request for surgery [id number]     Moderate persistent asthma without complication 09/29/2020     Nondependent alcohol abuse, episodic drinking behavior 10/03/2012     Noninflammatory disorder of vagina 02/20/2015     Other chronic pain      Other long term (current) drug therapy 01/28/2013    Overview:  Vicodin and cyclobenzaprine monthly     Panic disorder with agoraphobia 09/05/2006     Pap smear for cervical cancer screening 10/31/2016    03/29/2010  Normal cytology, HPV ot done, repeat in 3 years.     Pericarditis 2017     Physiologic disturbance of temperature regulation 10/24/2020     PONV (postoperative nausea and vomiting)      Right  ankle swelling 06/07/2022    Added automatically from request for surgery [id number]     Tobacco abuse      Tobacco use disorder 07/13/2015     Patient Active Problem List   Diagnosis     Nondependent alcohol abuse, episodic drinking behavior     Controlled substance agreement signed     Chronic sinusitis     Major depressive disorder, recurrent episode, moderate (H)     Tobacco use disorder     Abnormal cervical Papanicolaou smear     Panic disorder with agoraphobia     Atopic rhinitis     Chronic low back pain     Other long term (current) drug therapy     Acute pericardial effusion     Anxiety     Chronic infectious pericarditis     Cough     Arthralgia of both lower legs     Moderate persistent asthma without complication     Physiologic disturbance of temperature regulation     Family history of colon cancer     Right ankle swelling     Menorrhagia with irregular cycle     Infection due to 2019 novel coronavirus     Adenocarcinoma of descending colon (H)     History of ankle surgery     History of colon cancer     Paranasal sinus disease     Benzodiazepine dependence (H)     Incontinence of feces, unspecified fecal incontinence type       Past Surgical History  She has a past surgical history that includes Ankle surgery (Right, 05/30/2019); Tumor Removal; Hemorrhoidectomy external; Biopsy breast (Right, 1990); Creation pericardial window (02/10/2017); Hysteroscopy, With Endometrial Radiofrequency Ablation - Novasure (N/A, 7/22/2022); Dilation and curettage (N/A, 7/22/2022); Colonoscopy (N/A, 1/3/2023); Laparoscopic Assisted Sigmoid Colectomy (N/A, 1/6/2023); Laparoscopic lysis adhesions (N/A, 1/6/2023); Arthroscopy ankle (Right, 6/21/2023); and Colonoscopy (N/A, 3/12/2024).     Examination   Wt 117.9 kg (260 lb)   BMI 38.40 kg/m    Wt Readings from Last 4 Encounters:   05/12/25 117.9 kg (260 lb)   04/16/25 117.9 kg (260 lb)   04/13/25 117.9 kg (260 lb)   04/09/25 122.1 kg (269 lb 3.2 oz)      BP Readings from  Last 3 Encounters:   05/07/25 108/67   04/16/25 99/68   04/13/25 132/70      GENERAL: alert and no distress  EYES: Eyes grossly normal to inspection.  No discharge or erythema, or obvious scleral/conjunctival abnormalities.  RESP: No audible wheeze, cough, or visible cyanosis.    SKIN: Visible skin clear. No significant rash, abnormal pigmentation or lesions.  NEURO: Cranial nerves grossly intact.  Mentation and speech appropriate for age.  PSYCH: Appropriate affect, tone, and pace of words        Counseling:   We reviewed the important post op bariatric recommendations:  -eating 3 meals daily  -eating protein first, getting >60gm protein daily  -eating slowly, chewing food well  -avoiding/limiting calorie containing beverages  -limiting starchy vegetables and carbohydrates, choosing wheat, not white with breads,   crackers, pastas, laine, bagels, tortillas, rice  -limiting restaurant or cafeteria eating to twice a week or less    We discussed the importance of restorative sleep and stress management in maintaining a healthy weight.  We discussed the National Weight Control Registry healthy weight maintenance strategies and ways to optimize metabolism.  We discussed the importance of physical activity including cardiovascular and strength training in maintaining a healthier weight.    Total time spent on the date of this encounter doing: chart review, review of test results, patient visit, physical exam, education, counseling, developing plan of care and documenting = 39 minutes.         JEAN CLAUDE Kelley MD  [facility]             Again, thank you for allowing me to participate in the care of your patient.        Sincerely,        JEAN CLAUDE Kelley MD    Electronically signed

## 2025-05-12 NOTE — PATIENT INSTRUCTIONS
Eat Better ? Move More ? Live Well    Eat 3 nutrient-rich meals each day    Don t skip meals--it will cause you to overeat later in the day!    Eating fiber (vegetables/fruits/whole grains) and protein with meals helps you stay full longer    Choose foods with less than 10 grams of sugar and 5 grams of fat per serving to prevent excess calories and weight gain  Eat around the same times each day to develop a routine eating schedule   Avoid snacking unless physically hungry.   Planned snacks: 1-2 times per day and no more than 150 calories    Eat protein first   Protein helps with healing, maintaining adequate muscle mass, reducing hunger and optimizing nutritional status   Aim for 60-80 grams of protein per day   Fill up on Fiber   Fiber comes from plants--fruits, veggies, whole grains, nuts/seeds and beans   Fiber is low in calories, high in phytonutrients and helps you stay full longer   Aim for 25-35 grams per day by eating fiber with meals and snacks  Eat S-L-O-W-L-Y   Take 20-30 minutes to eat each meal by taking small bites, chewing foods to applesauce consistency or 20-30 times before you swallow   Eating foods too fast can delay satiety/fullness signals and increase overeating   Slow down your eating by using toddler utensils, putting your fork/spoon down between bites and not watching TV or emailing during meals!   Keep a Journal         Writing down what you eat, how you feel and when you are active helps you identify new changes to work on from week to week         Look for ways to cut 100 calories from your current diet 2-3 times per day  Drink 64 ounces of 0-Calorie drinks between meals   Water   Zero calorie Propel  or Vitamin Water     SoBe [employer]  Zero Calories   Crystal Light , Sugar-Free Pop-Aid , and other sugar-free lemonade or flavored kaye   Keep Caffeine to less than 300mg per day ie: 3-6oz cups coffee     Work up to 45-60 minutes of physical activity most days of the week   Helps with  losing weight and prevent regaining those extra pounds!    Do a combo of cardio (walking/water exercises) and strength training (lifting weights/Vinyasa yoga)    Avoid Mindless Eating   Be present when you eat--take note of the smell, taste and quality of your food   Make a list of alternative activities you could do to prevent eating out of boredom/stress  Go for a walk, call a friend, chew gum, paint your nails, re-organize the garage, etc     Zepbound (Tirzepatide)    Zepbound is an FDA approved drug for weight loss. It is also used for diabetes under the name Mounjaro.     It has also been found to be an excellent drug for treating obesity.     Tirzepatide is given as a shot once a week. Typically we will try to increase the dose monthly but the titration can be done slower if you are suffering from side effects. It works on two naturally occurring hormones that help tell the brain that you are full. It may be as successful as weight-loss surgery. A recent study done by the drug maker [employer] looked at people who are obese but didn't have diabetes. It found that they lost even more weight than what was seen in the diabetes studies.    The study was presented at the annual meeting of the American Diabetes Association, in [place] June 4. It was also published in the [place] Journal of Medicine at that same time.    The study showed that 9 of 10 people lost weight. The average weight loss at the drug s highest dose was 22.5%. This is something that has not been seen before.    The study was 72 weeks long. During the study, more than 2,500 overweight or obese adults received either 5, 10 or 15 mg of the new drug or placebo each week. The average weight loss for the highest dose was about 52 pounds. People who took the 10 mg dose lost about 49 pounds. Those in the 5 mg group shed about 35 pounds. People given a placebo lost slightly more than 5 pounds. Study volunteers were also taught how to eat healthy  and the importance of exercise.    People in the study kept the weight off for the full 72-weeks. Because obesity is a chronic disease that can be treated, people may need to stay on the medication indefinitely.    Common side effects include constipation, bloating, nausea and reflux/heartburn. Paradoxically, some people experience diarrhea. Often these side effects can be mitigated by eating very small portions, not eating before bed and avoiding high fat/ high sugar foods. It is ok to take OTC medications for the side effects if needed.     Some insurance companies with cover the Zepbound but many do not cover an medicine for weight loss and it is very expensive.

## 2025-05-12 NOTE — TELEPHONE ENCOUNTER
Attempted to reach pt to make follow up with , LM with  appt details and that she has a follow up with another provider ; call back to cancel that if its not needed.

## 2025-05-14 ENCOUNTER — TELEPHONE (OUTPATIENT)
Dept: FAMILY MEDICINE | Facility: CLINIC | Age: 50
End: 2025-05-14
Payer: COMMERCIAL

## 2025-05-14 NOTE — TELEPHONE ENCOUNTER
Prior Authorization Retail Medication Request    Medication/Dose: ZEPBOUND 2.5MG/0.5ML  Diagnosis and ICD code (if different than what is on RX):    New/renewal/insurance change PA/secondary ins. PA:  Previously Tried and Failed:    Rationale:      Insurance   Primary: [phone]  Insurance ID:  [id number]        Clinic Information  [facility]

## 2025-05-16 NOTE — TELEPHONE ENCOUNTER
PA Initiation    Medication: ZEPBOUND 2.5 MG/0.5ML SC SOAJ  Insurance Company: [employer] - Phone [phone] Fax [phone]  Pharmacy Filling the Rx: [id number] [place], [place] - [address] [place] [address]  AT [facility]  Filling Pharmacy Phone: [phone]  Filling Pharmacy Fax: [phone]  Start Date: 5/16/2025

## 2025-05-20 NOTE — TELEPHONE ENCOUNTER
PRIOR AUTHORIZATION DENIED    Medication: ZEPBOUND 2.5 MG/0.5ML SC SOAJ  Insurance Company: [employer] - Phone [phone] Fax [phone]  Denial Date: 5/16/2025  Denial Reason(s):                 Appeal Information:         Patient Notified: NO

## 2025-05-28 NOTE — MR AVS SNAPSHOT
After Visit Summary   7/11/2018    Darlene Hudson    MRN: [id number]           Patient Information     Date Of Birth          1975        Visit Information        Provider Department      7/11/2018 2:20 PM Chadwick Granados MD [facility]        Today's Diagnoses     Preop general physical exam    -  1    Transient insomnia          Care Instructions      Presurgery Checklist  You are scheduled to have surgery. The healthcare staff will try to make your stay comfortable. Use the guidelines below to remind yourself what to do before surgery. Be sure to follow any specific pre-op instructions from your surgeon or nurse.   Preparing for Surgery  Ask your surgeon if you ll need a blood transfusion during surgery and if so, how to prepare for it. In some cases, you can donate blood before surgery. If needed, this blood can be given back (transfused) to you during or after surgery.  If you are having abdominal surgery, ask what you need to do to clear your bowel.  Tell your surgeon if you have allergies to any medications or foods.  Arrange for an adult family member or friend to drive you home after surgery. If possible, have someone ready to help you at home as you recover.  Call the surgeon if you get a cold, fever, sore throat, diarrhea, or other health problem just before surgery. Your surgeon can decide whether or not to postpone the surgery.  Medications  Tell your surgeon about all medications you take, including prescription and over-the-counter products such as herbal remedies and vitamins. Ask if you should continue taking them.  If you take ibuprofen, naproxen, or  blood thinners  such as aspirin, clopidogrel (Plavix), or warfarin (Coumadin), ask your surgeon whether you should stop taking them and how long before surgery you should stop.  You may be told to take antibiotics just before surgery to prevent infection. If so, follow instructions carefully on how to take them.  If you  are told to take medications called anticoagulants to prevent blood clots after surgery, be sure to follow the instructions on how to take them.  Stop Smoking  If you smoke, healing may take longer. So at least 2 week(s) before surgery, stop smoking.  Bathing or Showering Before Surgery  If instructed, wash with antibacterial soap. Afterward, do not use lotions or powders.  If you are having surgery on the head, you may be asked to shampoo with antibacterial soap. Follow instructions for doing so.  Do Not Remove Hair from the Surgery Site  Do not shave hair from the incision site, unless you are given specific instructions to do so. Usually, if hair needs to be removed, it will be done at the hospital right before surgery.  Don t Eat or Drink  Your doctor will tell you when to stop eating and drinking. If you do not follow your doctor's instructions, your procedure may be postponed or rescheduled for another day.  If your surgeon tells you to continue any medications, take them with small sips of water.  You can brush your teeth and rinse your mouth, but don t swallow any water.  Day of Surgery  Do not wear makeup. Do not use perfume, deodorant, or hairspray. Remove nail polish and artificial nails.  Leave jewelry (including rings), watches, and other valuables at home.  Be sure to bring health insurance cards or forms and a photo ID.  Bring a list of your medications (include the name, dose, how often you take them, and the time last dose was taken).  Arrive on time at the hospital or surgery facility.          Follow-ups after your visit        Who to contact     Please call your clinic at [phone] to:    Ask questions about your health    Make or cancel appointments    Discuss your medicines    Learn about your test results    Speak to your doctor            Additional Information About Your Visit        Care EveryWhere ID     This is your Care EveryWhere ID. This could be used by other organizations to  access your [facility] medical records  [id number]        Your Vitals Were     Pulse Temperature Pulse Oximetry BMI (Body Mass Index)          97 98  F (36.7  C) (Oral) 95% 36.68 kg/m2         Blood Pressure from Last 3 Encounters:   07/11/18 107/77   06/04/18 122/87   04/05/18 123/85    Weight from Last 3 Encounters:   07/11/18 252 lb (114.3 kg)   10/30/17 251 lb 6.4 oz (114 kg)   07/24/17 238 lb 6.4 oz (108.1 kg)              Today, you had the following     No orders found for display         Where to get your medicines      Some of these will need a paper prescription and others can be bought over the counter.  Ask your nurse if you have questions.     Bring a paper prescription for each of these medications     zolpidem 10 MG tablet          Primary Care Provider Office Phone # Fax #    Oscar Aparicio [phone]       [facility] [place] [address]        Equal Access to Services     JAMIE ALBERTS : Hadii aad ku hadasho Soomaali, waaxda luqadaha, qaybta kaalmada adeegyada, waxay babatundein haylucho macdonald . So [facility] [phone].    ATENCIÓN: Si habla español, tiene a boswell disposición servicios gratuitos de asistencia lingüística. [facility] [phone].    We comply with applicable federal civil rights laws and [place] laws. We do not discriminate on the basis of race, color, national origin, age, disability, sex, sexual orientation, or gender identity.            Thank you!     Thank you for choosing [facility]  for your care. Our goal is always to provide you with excellent care. Hearing back from our patients is one way we can continue to improve our services. Please take a few minutes to complete the written survey that you may receive in the mail after your visit with us. Thank you!             Your Updated Medication List - Protect others around you: Learn how to safely use, store and throw away your medicines at www.disposemymeds.org.          This list is  accurate as of 7/11/18  3:42 PM.  Always use your most recent med list.                   Brand Name Dispense Instructions for use Diagnosis    albuterol 108 (90 Base) MCG/ACT Inhaler    PROAIR HFA/PROVENTIL HFA/VENTOLIN HFA    1 Inhaler    Inhale 2 puffs into the lungs every 4 hours as needed for shortness of breath / dyspnea or wheezing    Moderate persistent asthma without complication       fluticasone 50 MCG/ACT spray    FLONASE    1 Bottle    Spray 1-2 sprays into both nostrils daily    Congestion of paranasal sinus       fluticasone-salmeterol 500-50 MCG/DOSE diskus inhaler    ADVAIR DISKUS    60 Inhaler    Inhale 1 puff into the lungs 2 times daily    Moderate persistent asthma without complication       gabapentin 300 MG capsule    NEURONTIN    270 capsule    Take 3 capsules (900 mg) by mouth 3 times daily    Chronic bilateral low back pain without sciatica       metroNIDAZOLE 0.75 % topical gel    METROGEL    45 g    Apply topically 2 times daily    Acne vulgaris       montelukast 10 MG tablet    SINGULAIR    60 tablet    Take 1 tablet (10 mg) by mouth At Bedtime    Moderate persistent asthma without complication       oxyCODONE-acetaminophen 5-325 MG per tablet    PERCOCET    40 tablet    Take 2 tablets by mouth 2 times daily maximum 4 tablet(s) per day    Acute post-operative pain       ranitidine 300 MG tablet    ZANTAC    60 tablet    Take 1 tablet (300 mg) by mouth At Bedtime    Transient insomnia       zolpidem 10 MG tablet    AMBIEN    30 tablet    Take 1 tablet (10 mg) by mouth nightly as needed for sleep    Transient insomnia          No

## 2025-06-04 ENCOUNTER — OFFICE VISIT (OUTPATIENT)
Dept: FAMILY MEDICINE | Facility: CLINIC | Age: 50
End: 2025-06-04
Payer: MEDICAID

## 2025-06-04 ENCOUNTER — VIRTUAL VISIT (OUTPATIENT)
Dept: SURGERY | Facility: CLINIC | Age: 50
End: 2025-06-04
Payer: MEDICAID

## 2025-06-04 VITALS
DIASTOLIC BLOOD PRESSURE: 76 MMHG | HEIGHT: 69 IN | RESPIRATION RATE: 18 BRPM | BODY MASS INDEX: 38.4 KG/M2 | OXYGEN SATURATION: 94 % | TEMPERATURE: 98 F | HEART RATE: 108 BPM | SYSTOLIC BLOOD PRESSURE: 110 MMHG

## 2025-06-04 VITALS — BODY MASS INDEX: 38.51 KG/M2 | HEIGHT: 69 IN

## 2025-06-04 DIAGNOSIS — K59.03 DRUG-INDUCED CONSTIPATION: ICD-10-CM

## 2025-06-04 DIAGNOSIS — R25.2 MUSCLE CRAMPS AT NIGHT: ICD-10-CM

## 2025-06-04 DIAGNOSIS — M25.871 IMPINGEMENT SYNDROME OF RIGHT ANKLE: ICD-10-CM

## 2025-06-04 DIAGNOSIS — F41.1 GENERALIZED ANXIETY DISORDER: Primary | ICD-10-CM

## 2025-06-04 DIAGNOSIS — R12 HEARTBURN: ICD-10-CM

## 2025-06-04 DIAGNOSIS — R11.0 NAUSEA: ICD-10-CM

## 2025-06-04 DIAGNOSIS — E66.01 SEVERE OBESITY (H): Primary | ICD-10-CM

## 2025-06-04 DIAGNOSIS — E66.01 MORBID OBESITY (H): ICD-10-CM

## 2025-06-04 RX ORDER — ONDANSETRON 4 MG/1
4 TABLET, ORALLY DISINTEGRATING ORAL EVERY 8 HOURS PRN
Qty: 30 TABLET | Refills: 1 | Status: SHIPPED | OUTPATIENT
Start: 2025-06-04 | End: 2025-09-02

## 2025-06-04 RX ORDER — POLYETHYLENE GLYCOL 3350 17 G/17G
1 POWDER, FOR SOLUTION ORAL DAILY PRN
Qty: 578 G | Refills: 3 | Status: SHIPPED | OUTPATIENT
Start: 2025-06-04 | End: 2026-06-04

## 2025-06-04 RX ORDER — CYCLOBENZAPRINE HCL 10 MG
10 TABLET ORAL 2 TIMES DAILY PRN
Qty: 60 TABLET | Refills: 3 | Status: SHIPPED | OUTPATIENT
Start: 2025-06-04

## 2025-06-04 RX ORDER — OMEPRAZOLE 20 MG/1
20 CAPSULE, DELAYED RELEASE ORAL DAILY
Qty: 30 CAPSULE | Refills: 1 | Status: SHIPPED | OUTPATIENT
Start: 2025-06-04

## 2025-06-04 ASSESSMENT — ANXIETY QUESTIONNAIRES
8. IF YOU CHECKED OFF ANY PROBLEMS, HOW DIFFICULT HAVE THESE MADE IT FOR YOU TO DO YOUR WORK, TAKE CARE OF THINGS AT HOME, OR GET ALONG WITH OTHER PEOPLE?: SOMEWHAT DIFFICULT
2. NOT BEING ABLE TO STOP OR CONTROL WORRYING: SEVERAL DAYS
3. WORRYING TOO MUCH ABOUT DIFFERENT THINGS: SEVERAL DAYS
GAD7 TOTAL SCORE: 7
7. FEELING AFRAID AS IF SOMETHING AWFUL MIGHT HAPPEN: SEVERAL DAYS
GAD7 TOTAL SCORE: 7
GAD7 TOTAL SCORE: 7
5. BEING SO RESTLESS THAT IT IS HARD TO SIT STILL: SEVERAL DAYS
1. FEELING NERVOUS, ANXIOUS, OR ON EDGE: SEVERAL DAYS
6. BECOMING EASILY ANNOYED OR IRRITABLE: SEVERAL DAYS
7. FEELING AFRAID AS IF SOMETHING AWFUL MIGHT HAPPEN: SEVERAL DAYS
IF YOU CHECKED OFF ANY PROBLEMS ON THIS QUESTIONNAIRE, HOW DIFFICULT HAVE THESE PROBLEMS MADE IT FOR YOU TO DO YOUR WORK, TAKE CARE OF THINGS AT HOME, OR GET ALONG WITH OTHER PEOPLE: SOMEWHAT DIFFICULT
4. TROUBLE RELAXING: SEVERAL DAYS

## 2025-06-04 ASSESSMENT — PAIN SCALES - GENERAL: PAINLEVEL_OUTOF10: NO PAIN (0)

## 2025-06-04 ASSESSMENT — PATIENT HEALTH QUESTIONNAIRE - PHQ9
SUM OF ALL RESPONSES TO PHQ QUESTIONS 1-9: 7
10. IF YOU CHECKED OFF ANY PROBLEMS, HOW DIFFICULT HAVE THESE PROBLEMS MADE IT FOR YOU TO DO YOUR WORK, TAKE CARE OF THINGS AT HOME, OR GET ALONG WITH OTHER PEOPLE: SOMEWHAT DIFFICULT
SUM OF ALL RESPONSES TO PHQ QUESTIONS 1-9: 7

## 2025-06-04 ASSESSMENT — ASTHMA QUESTIONNAIRES
QUESTION_1 LAST FOUR WEEKS HOW MUCH OF THE TIME DID YOUR ASTHMA KEEP YOU FROM GETTING AS MUCH DONE AT WORK, SCHOOL OR AT HOME: NONE OF THE TIME
QUESTION_4 LAST FOUR WEEKS HOW OFTEN HAVE YOU USED YOUR RESCUE INHALER OR NEBULIZER MEDICATION (SUCH AS ALBUTEROL): ONE OR TWO TIMES PER DAY
QUESTION_5 LAST FOUR WEEKS HOW WOULD YOU RATE YOUR ASTHMA CONTROL: WELL CONTROLLED
ACT_TOTALSCORE: 20
QUESTION_3 LAST FOUR WEEKS HOW OFTEN DID YOUR ASTHMA SYMPTOMS (WHEEZING, COUGHING, SHORTNESS OF BREATH, CHEST TIGHTNESS OR PAIN) WAKE YOU UP AT NIGHT OR EARLIER THAN USUAL IN THE MORNING: NOT AT ALL
QUESTION_2 LAST FOUR WEEKS HOW OFTEN HAVE YOU HAD SHORTNESS OF BREATH: ONCE OR TWICE A WEEK

## 2025-06-04 NOTE — PROGRESS NOTES
Bariatric Follow-up    50 year old  female BMI:Body mass index is 38.51 kg/m .    Weight:   Wt Readings from Last 1 Encounters:   05/12/25 117.9 kg (260 lb)    pounds      Comorbidities:  Patient Active Problem List   Diagnosis    Nondependent alcohol abuse, episodic drinking behavior    Controlled substance agreement signed    Chronic sinusitis    Major depressive disorder, recurrent episode, moderate (H)    Tobacco use disorder    Abnormal cervical Papanicolaou smear    Panic disorder with agoraphobia    Atopic rhinitis    Chronic low back pain    Other long term (current) drug therapy    Acute pericardial effusion    Anxiety    Chronic infectious pericarditis    Cough    Arthralgia of both lower legs    Moderate persistent asthma without complication    Physiologic disturbance of temperature regulation    Family history of colon cancer    Right ankle swelling    Menorrhagia with irregular cycle    Infection due to 2019 novel coronavirus    Adenocarcinoma of descending colon (H)    History of ankle surgery    History of colon cancer    Paranasal sinus disease    Benzodiazepine dependence (H)    Incontinence of feces, unspecified fecal incontinence type       Interim: Darlene needs follow up on a prior authorization for Wegovy prescribed by Dr. Bailon. The number to call is [phone]. She explains that all prescriptions must come from Dr. Bailon, even non-narcotic medications. She saw Dr. Kelley 21/2 weeks ago. History of pancreatitis was discussed with Dr. Kelley from an ED visit resulting in admission 1/26/2017. Amylase was 676 and normal the next day at 17 on 1/17/2017. Dr. Kelley prescribed Wegovy after shared decision making and discussing the risks, benefits and potential side effects. Patient has not yet received her PA answer or the medication and she is hoping to resolve this situation.    Plan:  DIET Will have team call to reschedule RD appointment which was missed as patient was out of  state   EXERCISE walking as able   PHARMACOTHERAPY Wegovy start 0.25mg weekly RX provided by Dr. Bailon. Phone call made and was told that no prior auth has been initiated. Requested prior auth form to be faxed here and will complete and follow through. Zofran for potential nausea and miralax for potential constipation.    We discussed the importance of hydration, staying ahead of potential constipation, and consuming 3 protein containing, nutrient dense meals while taking GLP-1 RA medications. * addendum: day following visit-alerted to update that Karoline saw Dr. Bailon and PA was initiated by Dr. Bailon.       -We reviewed her medications and whether associated with weight gain.  Current Outpatient Medications   Medication Sig Dispense Refill    albuterol (PROAIR HFA/PROVENTIL HFA/VENTOLIN HFA) 108 (90 Base) MCG/ACT inhaler Inhale 2 puffs into the lungs every 6 hours as needed for shortness of breath, wheezing or cough. 18 g 0    ALPRAZolam (XANAX) 2 MG tablet Take 1 tablet (2 mg) by mouth 3 times daily as needed for anxiety. 90 tablet 3    buprenorphine-naloxone (SUBOXONE) 8-2 MG SUBL sublingual tablet Place 1 tablet under the tongue 2 times daily. 60 tablet 1    cetirizine (ZYRTEC) 10 MG tablet Take 1 tablet (10 mg) by mouth daily 30 tablet 11    fluticasone (FLONASE) 50 MCG/ACT nasal spray Spray 2 sprays into both nostrils daily 9.9 mL 11    ibuprofen (ADVIL/MOTRIN) 600 MG tablet Take 1 tablet (600 mg) by mouth every 8 hours as needed for moderate pain. 90 tablet 2    ondansetron (ZOFRAN ODT) 4 MG ODT tab Take 1 tablet (4 mg) by mouth every 8 hours as needed for nausea. 30 tablet 1    polyethylene glycol (MIRALAX) 17 GM/Dose powder Take 17 g (1 Capful) by mouth daily as needed for constipation. 578 g 3    cyclobenzaprine (FLEXERIL) 10 MG tablet Take 1 tablet (10 mg) by mouth 2 times daily as needed for muscle spasms. 60 tablet 3    diclofenac (VOLTAREN) 1 % topical gel Apply 2 g topically 4 times daily. For  both shins (Patient not taking: Reported on 6/4/2025) 100 g 3    hydrOXYzine carlos (VISTARIL) 25 MG capsule Take 1 capsule (25 mg) by mouth nightly as needed for anxiety (Patient not taking: Reported on 6/4/2025) 30 capsule 3    metFORMIN (GLUCOPHAGE XR) 500 MG 24 hr tablet 1 tablet with dinner daily for 2 weeks then 2 tablets with dinner (Patient not taking: Reported on 6/4/2025) 180 tablet 1    montelukast (SINGULAIR) 10 MG tablet Take 1 tablet (10 mg) by mouth At Bedtime (Patient not taking: Reported on 6/4/2025) 30 tablet 11    naloxone (NARCAN) 4 MG/0.1ML nasal spray Spray 1 spray (4 mg) into one nostril alternating nostrils as needed for opioid reversal every 2-3 minutes until assistance arrives (Patient not taking: Reported on 6/4/2025) 0.2 mL 1    omeprazole (PRILOSEC) 20 MG DR capsule Take 1 capsule (20 mg) by mouth daily. 30 capsule 1    PROAIR  (90 Base) MCG/ACT inhaler Inhale 2 puffs into the lungs every 4 hours as needed for shortness of breath or wheezing. (Patient not taking: Reported on 6/4/2025) 8 g 11    semaglutide-weight management (WEGOVY) 0.25 MG/0.5ML pen Inject 0.5 mLs (0.25 mg) subcutaneously every 7 days. (Patient not taking: Reported on 6/4/2025) 2 mL 0    sertraline (ZOLOFT) 50 MG tablet Take 1.5 tablets (75 mg) by mouth daily (Patient not taking: Reported on 6/4/2025) 135 tablet 3    spacer (OPTICHAMBER BINA) holding chamber For use w/ rescue inhaler 1 each 0    tiotropium (SPIRIVA RESPIMAT) 1.25 MCG/ACT inhaler Inhale 2 puffs into the lungs daily. (Patient not taking: Reported on 6/4/2025) 1 g 0    tiotropium (SPIRIVA) 18 MCG inhaled capsule Inhale 1 capsule (18 mcg) into the lungs daily. (Patient not taking: Reported on 6/4/2025) 7 capsule 0    Vitamin D, Cholecalciferol, 25 MCG (1000 UT) CAPS Take 1,000 Units by mouth daily. (Patient not taking: Reported on 6/4/2025) 90 capsule 1     No current facility-administered medications for this visit.     Facility-Administered  Medications Ordered in Other Visits   Medication Dose Route Frequency Provider Last Rate Last Admin    ceFAZolin (ANCEF) intermittent infusion 2 g in 50 mL dextrose PREMIX  2 g Intravenous See Admin Instructions Caty Darby PA-C        flumazenil (ROMAZICON) injection 0.2 mg  0.2 mg Intravenous q1 min prn Kodi Hathaway DO        gabapentin (NEURONTIN) capsule 300 mg  300 mg Oral Pre-Op/Pre-procedure x 1 dose Kodi Hathaway DO        lactated ringers infusion   Intravenous Continuous Kodi Hathaway DO   Stopped at 06/21/23 1149    lidocaine (LMX4) kit   Topical Q1H PRN Kodi Hathaway DO        lidocaine 1 % 0.1-1 mL  0.1-1 mL Other Q1H PRN Kodi Hathaway DO        midazolam (VERSED) injection 1-2 mg  1-2 mg Intravenous Q4 Min PRN Kodi Hathaway DO        naloxone (NARCAN) injection 0.2 mg  0.2 mg Intravenous Q2 Min PRN Kodi Hathaway DO        Or    naloxone (NARCAN) injection 0.4 mg  0.4 mg Intravenous Q2 Min PRN Kodi Hathaway DO        Or    naloxone (NARCAN) injection 0.2 mg  0.2 mg Intramuscular Q2 Min PRN Kodi Hathaway DO        Or    naloxone (NARCAN) injection 0.4 mg  0.4 mg Intramuscular Q2 Min PRN Kodi Hathaway DO        sodium chloride (PF) 0.9% PF flush 3 mL  3 mL Intracatheter Q8H Kodi Hathaway DO        sodium chloride (PF) 0.9% PF flush 3 mL  3 mL Intracatheter q1 min prn Kodi Hathaway DO            We discussed [facility] Bariatric Basics including:  -eating 3 meals daily  -eating protein first  -eating slowly, chewing food well  -avoiding/limiting calorie containing beverages  -choosing wheat, not white with breads, crackers, pastas, laine, bagels, tortillas, rice  -limiting restaurant or cafeteria eating to twice a week or less  -We discussed the importance of restorative sleep and stress management in maintaining a healthy weight.  -We discussed insulin resistance and glycemic index as it relates to appetite and weight control  -We discussed the National Weight Control Registry healthy  "weight maintenance strategies and ways to optimize metabolism.  -We discussed the importance of physical activity including cardiovascular and strength training in maintaining a healthier weight and explored viable options.    Most recent labs:  Recent Labs   Lab Test 11/17/21  1232   CHOL 162   HDL 40*      TRIG 76      No results found for: \"A1C\"   TSH   Date Value Ref Range Status   12/06/2023 2.04 0.30 - 4.20 uIU/mL Final   03/08/2022 0.60 0.30 - 5.00 uIU/mL Final      BP Readings from Last 3 Encounters:   06/04/25 110/76   05/07/25 108/67   04/16/25 99/68          DIETARY HISTORY    PHYSICAL ACTIVITY PATTERNS:  Cardiovascular:   Strength Training:     REVIEW OF SYSTEMS    ROS    PHYSICAL EXAM:  Vitals: Ht 1.75 m (5' 8.9\")   BMI 38.51 kg/m        GEN: Pleasant in no acute distress  LUNGS: Normal Respiratory effort  PSYCH: Euthymic  NEURO: Alert and Oriented    Total time spent on the date of this encounter doing: chart review, review of test results, patient visit, physical exam, education, counseling, developing plan of care, and documenting = 30 minutes.        Virtual Visit Details    Type of service:  Video Visit   Video Start Time: 2:15  Video End Time:2:45    Originating Location (pt. Location): Home    Distant Location (provider location):  On-site  Platform used for Video Visit: AmWell and Doximity  "

## 2025-06-04 NOTE — PROGRESS NOTES
ASSESSMENT/PLAN:    (F41.1) Generalized anxiety disorder  (primary encounter diagnosis)  Comment:   Plan: stable; we discussed plan for refills while she is away over the summer    (E66.01) Morbid obesity (H)  Comment:   Plan: Wegivy prior auth initiated    (R25.2) Muscle cramps at night  Comment: refilled  Plan: cyclobenzaprine (FLEXERIL) 10 MG tablet            (R12) Heartburn  Comment: states that her suboxone gives her heartburn  Plan: omeprazole (PRILOSEC) 20 MG DR capsule          (M25.871) Impingement syndrome of right ankle  Comment:   Plan: swollen today from prolonged sitting; precautions given            Robbie Bailon MD  June 4, 2025  4:37 PM      Pt is a 50 year old female last seen on 5/7/25 here today for:     Mood -       12/9/2024    10:29 AM 1/24/2025    11:36 AM 6/4/2025     3:56 PM   PHQ   PHQ-9 Total Score 9  6  7    Q9: Thoughts of better off dead/self-harm past 2 weeks Several days Not at all Not at all   F/U: Thoughts of suicide or self-harm No     F/U: Safety concerns No         Patient-reported         8/1/2024    11:00 AM 8/14/2024     5:10 PM 6/4/2025     3:56 PM   ACE-7 SCORE   Total Score   7 (mild anxiety)   Total Score 19 15 7        Patient-reported       Obesity -  Prior-authorization for WeGovy pending; my office will work on it; Emmy is aware      Per my last note:  (F41.1) Generalized anxiety disorder  (primary encounter diagnosis)  Comment: med refilled; will discuss w/ our clinical pharmacist how we will manage her meds over the summer while she is out of state as she is restricted to me and may be restricted to a specific pharmacy   Plan: ALPRAZolam (XANAX) 2 MG tablet    Past Medical History:   Diagnosis Date    Abdominal pain 06/29/2015    Abnormal cervical Papanicolaou smear 11/09/2014    Overview:  ACUS/HPV positive    Abnormal cytology finding 11/09/2014    Overview:  ACUS/HPV positive    Acute pericardial effusion 02/06/2017    Agoraphobia with panic attacks     Anxiety      Arthralgia of both lower legs 05/29/2020    Bilateral achy knee pain that is chronic in nature going on for years. Most likely osteoarthritis in knees related to obesity. Previously injected in both knees with good relief. Injected last on 5/29/20    Arthritis     of back    Asthma in adult, moderate persistent, uncomplicated     Atopic rhinitis 01/27/2017    Chronic infectious pericarditis 02/21/2019    Chronic infectious pericarditis 02/21/2019    Chronic low back pain 01/27/2017    Chronic pain     Chronic sinusitis     Cocaine abuse (H)     Colonic mass 12/28/2022    Added automatically from request for surgery [id number]    Controlled substance agreement signed 06/30/2015    Overview:  Patient has chronic pain and is seen at [facility] for this.  Has controlled substance agreement with them.  On Vicodin, Valium, Klonopin prescribed only from there.       Coronary artery disease     Cough 02/09/2020    Family history of colon cancer 10/24/2020    Hx of seasonal allergies     Infection due to 2019 novel coronavirus 09/20/2022    Infectious pericarditis     Lipoma     Low back pain 07/13/2015    Major depressive disorder, recurrent episode, moderate (H) 09/05/2006    Menorrhagia with irregular cycle 07/15/2022    Added automatically from request for surgery [id number]    Moderate persistent asthma without complication 09/29/2020    Nondependent alcohol abuse, episodic drinking behavior 10/03/2012    Noninflammatory disorder of vagina 02/20/2015    Other chronic pain     Other long term (current) drug therapy 01/28/2013    Overview:  Vicodin and cyclobenzaprine monthly    Panic disorder with agoraphobia 09/05/2006    Pap smear for cervical cancer screening 10/31/2016    03/29/2010  Normal cytology, HPV ot done, repeat in 3 years.    Pericarditis 2017    Physiologic disturbance of temperature regulation 10/24/2020    PONV (postoperative nausea and vomiting)     Right ankle swelling 06/07/2022    Added  "automatically from request for surgery [id number]    Tobacco abuse     Tobacco use disorder 07/13/2015      Past Surgical History:   Procedure Laterality Date    ANKLE SURGERY Right 05/30/2019    ARTHROSCOPY ANKLE Right 6/21/2023    Procedure: right ankle arthroscopy with debridement;  Surgeon: Kareem Bashir MD;  Location: [facility] OR    BIOPSY BREAST Right 1990    benign    COLONOSCOPY N/A 1/3/2023    Procedure: COLONOSCOPY WITH BIOPSY OF COLON MASS, POLYPECTOMY;  Surgeon: Kris Charles MD;  Location: [facility]    COLONOSCOPY N/A 3/12/2024    Procedure: COLONOSCOPY WITH POLYPECTOMY and EXCISION, LEFT, LESION, BACK;  Surgeon: Kris Charles MD;  Location: [facility] OR    CREATION PERICARDIAL WINDOW  02/10/2017    [facility]    DILATION AND CURETTAGE N/A 7/22/2022    Procedure: DILATION AND CURETTAGE, UTERUS;  Surgeon: Lesly Holland;  Location: [facility]    HEMORRHOIDECTOMY EXTERNAL      HYSTEROSCOPY, WITH ENDOMETRIAL RADIOFREQUENCY ABLATION - NOVASURE N/A 7/22/2022    Procedure: HYSTEROSCOPY, WITH ENDOMETRIAL RADIOFREQUENCY ABLATION - NOVASURE DILATION AND CURETTAGE, UTERUS;  Surgeon: Lesly Holland;  Location: [facility]    LAPAROSCOPIC ASSISTED SIGMOID COLECTOMY N/A 1/6/2023    Procedure: LAPAROSCOPIC ASSISTED DESCENDING COLELCTOMY SPLENIC FLEXURE MOBILIZATION ;  Surgeon: Kris Charles MD;  Location: [facility] OR    LAPAROSCOPIC LYSIS ADHESIONS N/A 1/6/2023    Procedure: LYSIS OF ADHESIONS;  Surgeon: Kris Charles MD;  Location: [facility] OR    TUMOR REMOVAL      Has had 3. In the right breast and \"inside of rib cage.\"      Current Outpatient Medications   Medication Sig Dispense Refill    cyclobenzaprine (FLEXERIL) 10 MG tablet Take 1 tablet (10 mg) by mouth 2 times daily as needed for muscle spasms. 60 tablet 3    omeprazole (PRILOSEC) 20 MG DR capsule Take 1 capsule (20 mg) by mouth daily. 30 capsule 1    albuterol (PROAIR " HFA/PROVENTIL HFA/VENTOLIN HFA) 108 (90 Base) MCG/ACT inhaler Inhale 2 puffs into the lungs every 6 hours as needed for shortness of breath, wheezing or cough. 18 g 0    ALPRAZolam (XANAX) 2 MG tablet Take 1 tablet (2 mg) by mouth 3 times daily as needed for anxiety. 90 tablet 3    buprenorphine-naloxone (SUBOXONE) 8-2 MG SUBL sublingual tablet Place 1 tablet under the tongue 2 times daily. 60 tablet 1    cetirizine (ZYRTEC) 10 MG tablet Take 1 tablet (10 mg) by mouth daily 30 tablet 11    diclofenac (VOLTAREN) 1 % topical gel Apply 2 g topically 4 times daily. For both shins (Patient not taking: Reported on 6/4/2025) 100 g 3    fluticasone (FLONASE) 50 MCG/ACT nasal spray Spray 2 sprays into both nostrils daily 9.9 mL 11    hydrOXYzine carlos (VISTARIL) 25 MG capsule Take 1 capsule (25 mg) by mouth nightly as needed for anxiety (Patient not taking: Reported on 6/4/2025) 30 capsule 3    ibuprofen (ADVIL/MOTRIN) 600 MG tablet Take 1 tablet (600 mg) by mouth every 8 hours as needed for moderate pain. 90 tablet 2    metFORMIN (GLUCOPHAGE XR) 500 MG 24 hr tablet 1 tablet with dinner daily for 2 weeks then 2 tablets with dinner (Patient not taking: Reported on 6/4/2025) 180 tablet 1    montelukast (SINGULAIR) 10 MG tablet Take 1 tablet (10 mg) by mouth At Bedtime (Patient not taking: Reported on 6/4/2025) 30 tablet 11    naloxone (NARCAN) 4 MG/0.1ML nasal spray Spray 1 spray (4 mg) into one nostril alternating nostrils as needed for opioid reversal every 2-3 minutes until assistance arrives (Patient not taking: Reported on 6/4/2025) 0.2 mL 1    ondansetron (ZOFRAN ODT) 4 MG ODT tab Take 1 tablet (4 mg) by mouth every 8 hours as needed for nausea. 30 tablet 1    polyethylene glycol (MIRALAX) 17 GM/Dose powder Take 17 g (1 Capful) by mouth daily as needed for constipation. 578 g 3    PROAIR  (90 Base) MCG/ACT inhaler Inhale 2 puffs into the lungs every 4 hours as needed for shortness of breath or wheezing. (Patient not  "taking: Reported on 6/4/2025) 8 g 11    semaglutide-weight management (WEGOVY) 0.25 MG/0.5ML pen Inject 0.5 mLs (0.25 mg) subcutaneously every 7 days. (Patient not taking: Reported on 6/4/2025) 2 mL 0    sertraline (ZOLOFT) 50 MG tablet Take 1.5 tablets (75 mg) by mouth daily (Patient not taking: Reported on 6/4/2025) 135 tablet 3    spacer (OPTICHAMBER BINA) holding chamber For use w/ rescue inhaler 1 each 0    tiotropium (SPIRIVA RESPIMAT) 1.25 MCG/ACT inhaler Inhale 2 puffs into the lungs daily. (Patient not taking: Reported on 6/4/2025) 1 g 0    tiotropium (SPIRIVA) 18 MCG inhaled capsule Inhale 1 capsule (18 mcg) into the lungs daily. (Patient not taking: Reported on 6/4/2025) 7 capsule 0    Vitamin D, Cholecalciferol, 25 MCG (1000 UT) CAPS Take 1,000 Units by mouth daily. (Patient not taking: Reported on 6/4/2025) 90 capsule 1      Allergies   Allergen Reactions    Gabapentin Other (See Comments)     Mental status is changed     Lidocaine Other (See Comments)     \"my jaw stopped moving\"  Other reaction(s): Dystonia    Penicillins Hives, Rash and Shortness Of Breath    Lidocaine-Epinephrine (Pf) Other (See Comments) and Muscle Pain (Myalgia)     Severe jaw cramping, double vision  Jaw locking    Topamax [Topiramate] Headache        ROS:   Gen- no weight change, no fevers/chills   ENT- no cough, no congestion, no URI symptoms   Cardiac - no palpitations, no chest pain   Respiratory - no shortness of breath , no wheezing   GI - + nausea, no vomiting, no diarrhea, no constipation   Remainder of ROS negative.     Exam:   /76 (BP Location: Right arm, Patient Position: Sitting, Cuff Size: Adult Regular)   Pulse 108   Temp 98  F (36.7  C)   Resp 18   Ht 1.753 m (5' 9\")   SpO2 94%   BMI 38.40 kg/m     Alert and oriented x 3; No acute distress     "

## 2025-06-04 NOTE — PATIENT INSTRUCTIONS
complains ofNausea and constipation are the most common side effects with the GLP-1/GIP medications.     Drinking water throughout the day, preventing and or treating constipation, and eating 3 nutrient dense meals containing protein is important while on GLP-1 RA/GIP medications. It can mitigate these side effects.     For Prevention and Treatment of Constipation    From least aggressive to most aggressive:    Move: wallking-the more we move, the more our bowels move  Water: Drink water-64oz+ day  Go when you need to go. Don't wait. The longer you wait, the harder it gets.  Fiber: Fruit, raw veggies, nuts, whole grains,   Stool Softeners: if constipation is mild and for maintenance  Gentle laxatives: Miralax, senokot, dulcolax , Smooth move tea as needed    More aggressive (and typically won't get to this point)  Milk of Magnesia  Mag Citrate (what you drink before a colonoscopy)  Suppositories  Enema

## 2025-06-04 NOTE — NURSING NOTE
Current patient location: in parked car    Is the patient currently in the state of [place]? YES    Visit mode: VIDEO    If the visit is dropped, the patient can be reconnected by:VIDEO VISIT: Text to cell phone:   Telephone Information:   Mobile [phone]       Will anyone else be joining the visit? NO  (If patient encounters technical issues they should call [phone]    Are changes needed to the allergy or medication list? No    Are refills needed on medications prescribed by this physician? NO    Rooming Documentation:  Questionnaire(s) completed    Reason for visit: RECHECK    Maegan Larose VVF    Per pt needs a PA for weight loss med injectables

## 2025-06-04 NOTE — LETTER
6/4/2025      Darlene Hudson  [address]  [facility] [address]      Dear Colleague,    Thank you for referring your patient, Darlene Hudson, to the [facility] AND [facility]RICS [facility]ARE [place]. Please see a copy of my visit note below.    Bariatric Follow-up    50 year old  female BMI:Body mass index is 38.51 kg/m .    Weight:   Wt Readings from Last 1 Encounters:   05/12/25 117.9 kg (260 lb)    pounds      Comorbidities:  Patient Active Problem List   Diagnosis     Nondependent alcohol abuse, episodic drinking behavior     Controlled substance agreement signed     Chronic sinusitis     Major depressive disorder, recurrent episode, moderate (H)     Tobacco use disorder     Abnormal cervical Papanicolaou smear     Panic disorder with agoraphobia     Atopic rhinitis     Chronic low back pain     Other long term (current) drug therapy     Acute pericardial effusion     Anxiety     Chronic infectious pericarditis     Cough     Arthralgia of both lower legs     Moderate persistent asthma without complication     Physiologic disturbance of temperature regulation     Family history of colon cancer     Right ankle swelling     Menorrhagia with irregular cycle     Infection due to 2019 novel coronavirus     Adenocarcinoma of descending colon (H)     History of ankle surgery     History of colon cancer     Paranasal sinus disease     Benzodiazepine dependence (H)     Incontinence of feces, unspecified fecal incontinence type       Interim: Darlene needs follow up on a prior authorization for Wegovy prescribed by Dr. Bailon. The number to call is [phone]. She explains that all prescriptions must come from Dr. Bailon, even non-narcotic medications. She saw Dr. Kelley 21/2 weeks ago. History of pancreatitis was discussed with Dr. Kelley from an ED visit resulting in admission 1/26/2017. Amylase was 676 and normal the next day at 17 on 1/17/2017. Dr. Kelley prescribed Wegovy after shared decision  making and discussing the risks, benefits and potential side effects. Patient has not yet received her PA answer or the medication and she is hoping to resolve this situation.    Plan:  DIET Will have team call to reschedule RD appointment which was missed as [place] was out of [place]   EXERCISE walking as able   PHARMACOTHERAPY Wegovy start 0.25mg weekly RX provided by Dr. Bailon. Phone call made and was told that no prior auth has been initiated. Requested prior auth form to be faxed here and will complete and follow through. Zofran for potential nausea and miralax for potential constipation.    We discussed the importance of hydration, staying ahead of potential constipation, and consuming 3 protein containing, nutrient dense meals while taking GLP-1 RA medications. * addendum: day following visit-alerted to update that Karoline saw Dr. Bailon and PA was initiated by Dr. Bailon.       -We reviewed her medications and whether associated with weight gain.  Current Outpatient Medications   Medication Sig Dispense Refill     albuterol (PROAIR HFA/PROVENTIL HFA/VENTOLIN HFA) 108 (90 Base) MCG/ACT inhaler Inhale 2 puffs into the lungs every 6 hours as needed for shortness of breath, wheezing or cough. 18 g 0     ALPRAZolam (XANAX) 2 MG tablet Take 1 tablet (2 mg) by mouth 3 times daily as needed for anxiety. 90 tablet 3     buprenorphine-naloxone (SUBOXONE) 8-2 MG SUBL sublingual tablet Place 1 tablet under the tongue 2 times daily. 60 tablet 1     cetirizine (ZYRTEC) 10 MG tablet Take 1 tablet (10 mg) by mouth daily 30 tablet 11     fluticasone (FLONASE) 50 MCG/ACT nasal spray Spray 2 sprays into both nostrils daily 9.9 mL 11     ibuprofen (ADVIL/MOTRIN) 600 MG tablet Take 1 tablet (600 mg) by mouth every 8 hours as needed for moderate pain. 90 tablet 2     ondansetron (ZOFRAN ODT) 4 MG ODT tab Take 1 tablet (4 mg) by mouth every 8 hours as needed for nausea. 30 tablet 1     polyethylene glycol (MIRALAX) 17 GM/Dose  powder Take 17 g (1 Capful) by mouth daily as needed for constipation. 578 g 3     cyclobenzaprine (FLEXERIL) 10 MG tablet Take 1 tablet (10 mg) by mouth 2 times daily as needed for muscle spasms. 60 tablet 3     diclofenac (VOLTAREN) 1 % topical gel Apply 2 g topically 4 times daily. For both shins (Patient not taking: Reported on 6/4/2025) 100 g 3     hydrOXYzine carlos (VISTARIL) 25 MG capsule Take 1 capsule (25 mg) by mouth nightly as needed for anxiety (Patient not taking: Reported on 6/4/2025) 30 capsule 3     metFORMIN (GLUCOPHAGE XR) 500 MG 24 hr tablet 1 tablet with dinner daily for 2 weeks then 2 tablets with dinner (Patient not taking: Reported on 6/4/2025) 180 tablet 1     montelukast (SINGULAIR) 10 MG tablet Take 1 tablet (10 mg) by mouth At Bedtime (Patient not taking: Reported on 6/4/2025) 30 tablet 11     naloxone (NARCAN) 4 MG/0.1ML nasal spray Spray 1 spray (4 mg) into one nostril alternating nostrils as needed for opioid reversal every 2-3 minutes until assistance arrives (Patient not taking: Reported on 6/4/2025) 0.2 mL 1     omeprazole (PRILOSEC) 20 MG DR capsule Take 1 capsule (20 mg) by mouth daily. 30 capsule 1     PROAIR  (90 Base) MCG/ACT inhaler Inhale 2 puffs into the lungs every 4 hours as needed for shortness of breath or wheezing. (Patient not taking: Reported on 6/4/2025) 8 g 11     semaglutide-weight management (WEGOVY) 0.25 MG/0.5ML pen Inject 0.5 mLs (0.25 mg) subcutaneously every 7 days. (Patient not taking: Reported on 6/4/2025) 2 mL 0     sertraline (ZOLOFT) 50 MG tablet Take 1.5 tablets (75 mg) by mouth daily (Patient not taking: Reported on 6/4/2025) 135 tablet 3     spacer (OPTICHAMBER BINA) holding chamber For use w/ rescue inhaler 1 each 0     tiotropium (SPIRIVA RESPIMAT) 1.25 MCG/ACT inhaler Inhale 2 puffs into the lungs daily. (Patient not taking: Reported on 6/4/2025) 1 g 0     tiotropium (SPIRIVA) 18 MCG inhaled capsule Inhale 1 capsule (18 mcg) into the lungs  daily. (Patient not taking: Reported on 6/4/2025) 7 capsule 0     Vitamin D, Cholecalciferol, 25 MCG (1000 UT) CAPS Take 1,000 Units by mouth daily. (Patient not taking: Reported on 6/4/2025) 90 capsule 1     No current facility-administered medications for this visit.     Facility-Administered Medications Ordered in Other Visits   Medication Dose Route Frequency Provider Last Rate Last Admin     ceFAZolin (ANCEF) intermittent infusion 2 g in 50 mL dextrose PREMIX  2 g Intravenous See Admin Instructions Caty Darby PA-C         flumazenil (ROMAZICON) injection 0.2 mg  0.2 mg Intravenous q1 min prn Kodi Hathaway DO         gabapentin (NEURONTIN) capsule 300 mg  300 mg Oral Pre-Op/Pre-procedure x 1 dose Kodi Hathaway DO         lactated ringers infusion   Intravenous Continuous Kdoi Hathaway DO   Stopped at 06/21/23 1149     lidocaine (LMX4) kit   Topical Q1H PRN Kodi Hathaway DO         lidocaine 1 % 0.1-1 mL  0.1-1 mL Other Q1H PRN Kodi Hathaway DO         midazolam (VERSED) injection 1-2 mg  1-2 mg Intravenous Q4 Min PRN Kodi Hathaway DO         naloxone (NARCAN) injection 0.2 mg  0.2 mg Intravenous Q2 Min PRN Kodi Hathaway DO        Or     naloxone (NARCAN) injection 0.4 mg  0.4 mg Intravenous Q2 Min PRN Kodi Hathaway DO        Or     naloxone (NARCAN) injection 0.2 mg  0.2 mg Intramuscular Q2 Min PRN Kodi Hathaway DO        Or     naloxone (NARCAN) injection 0.4 mg  0.4 mg Intramuscular Q2 Min PRN Kodi Hathaway DO         sodium chloride (PF) 0.9% PF flush 3 mL  3 mL Intracatheter Q8H Kodi Hathaway DO         sodium chloride (PF) 0.9% PF flush 3 mL  3 mL Intracatheter q1 min prn Kodi Hathaway DO            We discussed [facility] Bariatric Basics including:  -eating 3 meals daily  -eating protein first  -eating slowly, chewing food well  -avoiding/limiting calorie containing beverages  -choosing wheat, not white with breads, crackers, pastas, laine, bagels, tortillas, rice  -limiting restaurant  "or cafeteria eating to twice a week or less  -We discussed the importance of restorative sleep and stress management in maintaining a healthy weight.  -We discussed insulin resistance and glycemic index as it relates to appetite and weight control  -We discussed the National Weight Control Registry healthy weight maintenance strategies and ways to optimize metabolism.  -We discussed the importance of physical activity including cardiovascular and strength training in maintaining a healthier weight and explored viable options.    Most recent labs:  Recent Labs   Lab Test 11/17/21  1232   CHOL 162   HDL 40*      TRIG 76      No results found for: \"A1C\"   TSH   Date Value Ref Range Status   12/06/2023 2.04 0.30 - 4.20 uIU/mL Final   03/08/2022 0.60 0.30 - 5.00 uIU/mL Final      BP Readings from Last 3 Encounters:   06/04/25 110/76   05/07/25 108/67   04/16/25 99/68          DIETARY HISTORY    PHYSICAL ACTIVITY PATTERNS:  Cardiovascular:   Strength Training:     REVIEW OF SYSTEMS    ROS    PHYSICAL EXAM:  Vitals: Ht 1.75 m (5' 8.9\")   BMI 38.51 kg/m        GEN: Pleasant in no acute distress  LUNGS: Normal Respiratory effort  PSYCH: Euthymic  NEURO: Alert and Oriented    Total time spent on the date of this encounter doing: chart review, review of test results, patient visit, physical exam, education, counseling, developing plan of care, and documenting = 30 minutes.        Virtual Visit Details    Type of service:  Video Visit   Video Start Time: 2:15  Video End Time:2:45    Originating Location (pt. Location): Home    Distant Location (provider location):  On-site  Platform used for Video Visit: AmWell and Doximity    Again, thank you for allowing me to participate in the care of your patient.        Sincerely,        Marcie Bond MD    Electronically signed"

## 2025-06-05 ENCOUNTER — TELEPHONE (OUTPATIENT)
Dept: FAMILY MEDICINE | Facility: CLINIC | Age: 50
End: 2025-06-05
Payer: MEDICAID

## 2025-06-05 NOTE — TELEPHONE ENCOUNTER
Prior Authorization Retail Medication Request    Medication/Dose: Wegovy 0.25mg/0.5ml  Diagnosis and ICD code (if different than what is on RX):    New/renewal/insurance change PA/secondary ins. PA:  Previously Tried and Failed:    Rationale:  See note from Prior Auth denial from 5/14 from OM Latam    Insurance   Primary: [phone]  Insurance ID:  [id number]    Clinic Information  Preferred routing pool for dept communication: leyda

## 2025-06-06 NOTE — TELEPHONE ENCOUNTER
Retail Pharmacy Prior Authorization Team   Phone: [phone]    PA Initiation    Medication: WEGOVY 0.25 MG/0.5ML SC SOAJ  Insurance Company: [employer] for [place] Medicaid Phone [phone] Fax [phone]  Pharmacy Filling the Rx: [id number] [place] [address] [address] AT [facility]  Filling Pharmacy Phone: [phone]  Filling Pharmacy Fax:    Start Date: 6/6/2025    [id number]

## 2025-06-09 ENCOUNTER — TELEPHONE (OUTPATIENT)
Dept: SURGERY | Facility: CLINIC | Age: 50
End: 2025-06-09

## 2025-06-09 NOTE — TELEPHONE ENCOUNTER
ASSESSMENT AND PLAN:  1. Fever and chills  Patient been seen twice this week by myself he is not recovering.  He still very fatigued he is noticing fever and chills throughout the day.  Hemogram was sent for further analysis to central lab mono cytosis was noted.  Monospot test negative.  Urinalysis unremarkable.  Patient was given a work note I will call with further results  - Comprehensive Metabolic Panel  - HM1(CBC and Differential)  - Culture, Blood  - Urinalysis-UC if Indicated  - HM1 (CBC with Diff)  - C-Reactive Protein(CRP)  - Erythrocyte Sedimentation Rate  - Blood culture from PERIPHERAL SITE; Future  - Manual Differential  - Mononucleosis Screen    2. Cough  Nonproductive cough is present he notes no emesis with coughing given him Tessalon Perles for this symptom  - benzonatate (TESSALON) 200 MG capsule; Take 1 capsule (200 mg total) by mouth 3 (three) times a day as needed for cough.  Dispense: 30 capsule; Refill: 0            Orders Placed This Encounter   Procedures     Comprehensive Metabolic Panel     Culture, Blood     Urinalysis-UC if Indicated     HM1 (CBC with Diff)     C-Reactive Protein(CRP)     Erythrocyte Sedimentation Rate     Blood culture from PERIPHERAL SITE     Standing Status:   Future     Standing Expiration Date:   11/8/2020     Manual Differential     Mononucleosis Screen     There are no discontinued medications.    No follow-ups on file.    CHIEF COMPLAINT:  Chief Complaint   Patient presents with     Fever     X 1 week       HISTORY OF PRESENT ILLNESS:  Paresh is a 29 y.o. male who presents to the clinic today for follow up on fever. He is still having high fevers up to 102 degrees. I saw him three days ago at which time he tested negative for Strep. Prior to this, the patient had negative influenza screen in the ED. He last took Tylenol around 9:45am this morning. Paresh has been able to keep food down. He fluctuates from feeling hot to having chills. The patient has a bit of  Patient needs to be rescheduled for their virtual visit due to Reason for Reschedule: Patient Request    Appointment mode: Telephone  Provider: Dunia Pierson    "a cough that is worse at night. His sleep has been poor with waking every few hours due to the fever. He denies any abdominal pain, dysuria, chest pain, or shortness of breath. Paresh has been resting at home and trying to keep up with fluids. Of note, he is accompanied by his wife today who has had flu-like illness for 3 weeks. She has been evaluated and was recommended to treat with symptomatic care measures. She no longer has a fever though is still coughing.     REVIEW OF SYSTEMS:   General: Fever, chills, and fatigue.  Respiratory: Cough. No shortness of breath.  CV: No chest pain.  GI: No abdominal pain.  : No dysuria.  Neuro: Poor sleep.  All other 10 point review of systems are negative.    PFS:  He is employed at the [employer]. He is  and has a 4-year-old son. He is accompanied by his wife today. Reviewed as below.     TOBACCO USE:  Social History     Tobacco Use   Smoking Status Former Smoker   Smokeless Tobacco Never Used       VITALS:  Vitals:    11/08/19 1306   BP: 102/60   Pulse: 76   Resp: 16   Temp: 98.2  F (36.8  C)   TempSrc: Oral   Weight: 165 lb (74.8 kg)   Height: 5' 6.75\" (1.695 m)     Wt Readings from Last 3 Encounters:   11/08/19 165 lb (74.8 kg)   11/05/19 166 lb 9 oz (75.6 kg)   02/07/17 172 lb (78 kg)     Body mass index is 26.04 kg/m .    PHYSICAL EXAM:  General: Alert, cooperative, no distress, appears stated age  Head: Normocephalic, without obvious abnormality, atraumatic, moist mucous membranes  Eyes: PERRL, conjunctiva/cornea clear, EOM's intact  Lungs: Clear to auscultation bilaterally, respirations unlabored  Heart: Regular rate and rhythm, S1 and S2 normal, no murmur, rub, or gallop  Neurologic:  A & O x 3.  No tremor, no focal findings.  Normal gait.     LABS:   Recent Results (from the past 24 hour(s))   Urinalysis-UC if Indicated    Collection Time: 11/08/19  1:39 PM   Result Value Ref Range    Color, UA Yellow Colorless, Yellow, Straw, Light " Yellow    Clarity, UA Clear Clear    Glucose, UA Negative Negative    Bilirubin, UA Negative Negative    Ketones, UA Negative Negative    Specific Gravity, UA <=1.005 1.005 - 1.030    Blood, UA Negative Negative    pH, UA 6.0 5.0 - 8.0    Protein, UA Negative Negative mg/dL    Urobilinogen, UA 0.2 E.U./dL 0.2 E.U./dL, 1.0 E.U./dL    Nitrite, UA Negative Negative    Leukocytes, UA Negative Negative       DATA REVIEWED:  Additional History from Old Records Summarized (2): none  Decision to Obtain Records (1): none  Radiology Tests Summarized or Ordered (1): none  Labs Reviewed or Ordered (1): Labs ordered.  Medicine Test Summarized or Ordered (1): none  Independent Review of EKG or X-RAY(2 each): none    15 minutes spent with this patient greater than 50% discussion regarding above issues.     I, Gisselle Metcalf, am scribing for and in the presence of, Dr. Guevara.    I, Dr. Guevara, personally performed the services described in this documentation, as scribed by Gisselle Metcalf in my presence, and it is both accurate and complete.  Total amount time spent with the patient today was 25 minutes given 50% of this time was spent discussing his lab tests and the etiology of fever chills and respiratory infection.    MEDICATIONS:  Current Outpatient Medications   Medication Sig Dispense Refill     benzonatate (TESSALON) 200 MG capsule Take 1 capsule (200 mg total) by mouth 3 (three) times a day as needed for cough. 30 capsule 0     No current facility-administered medications for this visit.        Total Data Points: 1    Please note that this clinical encounter uses voice recognition software, there may be typographical errors present

## 2025-06-10 NOTE — TELEPHONE ENCOUNTER
Prior Authorization Approval    Medication: WEGOVY 0.25 MG/0.5ML SC SOAJ  Authorization Effective Date: 6/6/2025  Authorization Expiration Date: 12/3/2025  Approved Dose/Quantity:   Reference #:     Insurance Company: [employer] for [place] Medicaid Phone [phone] Fax [phone]  Expected CoPay: $    CoPay Card Available:      Financial Assistance Needed:   Which Pharmacy is filling the prescription: [id number] [address]  AT [facility] OF [facility]  Pharmacy Notified: Yes  Patient Notified: **Instructed pharmacy to notify patient when script is ready to /ship.**

## 2025-06-16 DIAGNOSIS — S86.899A ANTERIOR SHIN SPLINTS: ICD-10-CM

## 2025-06-17 RX ORDER — IBUPROFEN 600 MG/1
600 TABLET, FILM COATED ORAL EVERY 8 HOURS PRN
Qty: 90 TABLET | Refills: 3 | Status: SHIPPED | OUTPATIENT
Start: 2025-06-17

## 2025-07-03 ENCOUNTER — TELEPHONE (OUTPATIENT)
Dept: FAMILY MEDICINE | Facility: CLINIC | Age: 50
End: 2025-07-03
Payer: MEDICAID

## 2025-07-03 NOTE — TELEPHONE ENCOUNTER
Patient Quality Outreach    Patient is due for the following:   Asthma  -  Asthma follow-up visit    Action(s) Taken:   No follow up needed at this time. Patient stated was moving states    Type of outreach:    Chart review performed, no outreach needed.    Questions for provider review:    None         Juventino Stoll MA  Chart routed to None.

## 2025-07-15 ENCOUNTER — TELEPHONE (OUTPATIENT)
Dept: FAMILY MEDICINE | Facility: CLINIC | Age: 50
End: 2025-07-15
Payer: COMMERCIAL

## 2025-07-15 NOTE — TELEPHONE ENCOUNTER
Prior Authorization Retail Medication Request    Medication/Dose: WEGOVY 0.5MG//0.5ML  Diagnosis and ICD code (if different than what is on RX):    New/renewal/insurance change PA/secondary ins. PA:  Previously Tried and Failed:    Rationale:      Insurance   Primary: CMM: [id number]    Clinic Information  Preferred routing pool for dept communication: ELODIA

## 2025-07-18 NOTE — TELEPHONE ENCOUNTER
PA Initiation    Medication: WEGOVY 0.5 MG/0.5ML SC SOAJ  Insurance Company: [employer] for [place] Medicaid Phone [phone] Fax [phone]  Pharmacy Filling the Rx: [id number] [address]  AT [facility]  Filling Pharmacy Phone: [phone]  Filling Pharmacy Fax: [phone]  Start Date: 7/18/2025

## 2025-07-21 NOTE — TELEPHONE ENCOUNTER
Prior Authorization Approval    Medication: WEGOVY 0.5 MG/0.5ML SC SOAJ  Authorization Effective Date: 7/1/2025  Authorization Expiration Date: 1/18/2026  Approved Dose/Quantity: 2 mol per 28 days  Reference #: [id number]   Insurance Company: [employer] for [place] Medicaid Phone [phone] Fax [phone]  Which Pharmacy is filling the prescription: [facility] #[id number]  AT [facility] OF [facility]  Pharmacy Notified: YES  Patient Notified: YES, Instructed pharmacy to notify patient once order is ready.

## 2025-07-23 ENCOUNTER — OFFICE VISIT (OUTPATIENT)
Dept: FAMILY MEDICINE | Facility: CLINIC | Age: 50
End: 2025-07-23
Payer: COMMERCIAL

## 2025-07-23 VITALS
HEART RATE: 101 BPM | DIASTOLIC BLOOD PRESSURE: 65 MMHG | RESPIRATION RATE: 18 BRPM | OXYGEN SATURATION: 97 % | TEMPERATURE: 98 F | SYSTOLIC BLOOD PRESSURE: 99 MMHG

## 2025-07-23 DIAGNOSIS — F41.1 GENERALIZED ANXIETY DISORDER: Primary | ICD-10-CM

## 2025-07-23 DIAGNOSIS — G47.9 SLEEP DISORDER WITH MOOD COMPLAINTS: ICD-10-CM

## 2025-07-23 DIAGNOSIS — M25.871 IMPINGEMENT SYNDROME OF RIGHT ANKLE: ICD-10-CM

## 2025-07-23 DIAGNOSIS — E66.01 MORBID OBESITY (H): ICD-10-CM

## 2025-07-23 RX ORDER — QUETIAPINE FUMARATE 25 MG/1
25 TABLET, FILM COATED ORAL AT BEDTIME
Qty: 30 TABLET | Refills: 1 | Status: SHIPPED | OUTPATIENT
Start: 2025-07-23

## 2025-07-23 NOTE — PROGRESS NOTES
ASSESSMENT/PLAN:    (F41.1) Generalized anxiety disorder  (primary encounter diagnosis)  Comment:   Plan: acute stress reaction given whiplash from plan to relocate to [place] and abrupt change on plan; daughter notes repetitive awakenings at night recently concerning for psychotic features; will add low dose of seroquel at night; precautions given; f/up in 4 wks    (G47.9) Sleep disorder with mood complaints  Comment: see above  Plan: QUEtiapine (SEROQUEL) 25 MG tablet          (M25.871) Impingement syndrome of right ankle  Comment:   Plan: continues to have chronic swelling and difficulty w/ prolonged standing; will address chronic work restrictions at next visit prior to her restarting work at the end of the summer    (E66.01) Morbid obesity (H)  Comment:   Plan: just started higher dose of wegovey; will plan to check weight at next visit and titrate as necessary    The longitudinal plan of care for the diagnosis(es)/condition(s) as documented were addressed during this visit. Due to the added complexity in care, I will continue to support Karoline in the subsequent management and with ongoing continuity of care.    Robbie Bailon MD  July 23, 2025  3:20 PM        Pt is a 50 year old female last seen on 6/4/25 here today for:     1) anxiety - was planning to move to [place] but made it to [place]. They stayed there overnight and had a difficult trip and decided to turn back ; had a flat tire, difficulty w/ getting a hotel room; she feels like her mom was intervening and saying not to go. Gunjan said the same thing  Daughter says that she is waking at night w/ delusions? Fighting in her sleep, looking for people who are not here    2) obesity - wegovy approved; started higher dose yesterday!     Per my last note:  (F41.1) Generalized anxiety disorder  (primary encounter diagnosis)  Comment:   Plan: stable; we discussed plan for refills while she is away over the summer     (E66.01) Morbid obesity (H)  Comment:   Plan:  Zack prior auth initiated     (R25.2) Muscle cramps at night  Comment: refilled  Plan: cyclobenzaprine (FLEXERIL) 10 MG tablet          (R12) Heartburn  Comment: states that her suboxone gives her heartburn  Plan: omeprazole (PRILOSEC) 20 MG DR capsule          (M25.871) Impingement syndrome of right ankle  Comment:   Plan: swollen today from prolonged sitting; precautions given    Past Medical History:   Diagnosis Date    Abdominal pain 06/29/2015    Abnormal cervical Papanicolaou smear 11/09/2014    Overview:  ACUS/HPV positive    Abnormal cytology finding 11/09/2014    Overview:  ACUS/HPV positive    Acute pericardial effusion 02/06/2017    Agoraphobia with panic attacks     Anxiety     Arthralgia of both lower legs 05/29/2020    Bilateral achy knee pain that is chronic in nature going on for years. Most likely osteoarthritis in knees related to obesity. Previously injected in both knees with good relief. 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  Has controlled substance agreement with them.  On Vicodin, Valium, Klonopin prescribed only from there.       Coronary artery disease     Cough 02/09/2020    Family history of colon cancer 10/24/2020    Hiatal hernia     Hx of seasonal allergies     Infection due to 2019 novel coronavirus 09/20/2022    Infectious pericarditis     Lipoma     Low back pain 07/13/2015    Major depressive disorder, recurrent episode, moderate (H) 09/05/2006    Menorrhagia with irregular cycle  07/15/2022    Added automatically from request for surgery [id number]    Moderate persistent asthma without complication 09/29/2020    Nondependent alcohol abuse, episodic drinking behavior 10/03/2012    Noninflammatory disorder of vagina 02/20/2015    Other chronic pain     Other long term (current) drug therapy 01/28/2013    Overview:  Vicodin and cyclobenzaprine monthly    Panic disorder with agoraphobia 09/05/2006    Pap smear for cervical cancer screening 10/31/2016    03/29/2010  Normal cytology, HPV ot done, repeat in 3 years.    Pericarditis 2017    Physiologic disturbance of temperature regulation 10/24/2020    PONV (postoperative nausea and vomiting)     Right ankle swelling 06/07/2022    Added automatically from request for surgery [id number]    Splenomegaly     Tobacco abuse     Tobacco use disorder 07/13/2015      Past Surgical History:   Procedure Laterality Date    ANKLE SURGERY Right 05/30/2019    ARTHROSCOPY ANKLE Right 6/21/2023    Procedure: right ankle arthroscopy with debridement;  Surgeon: Kareem Bashir MD;  Location: [facility] OR    BIOPSY BREAST Right 1990    benign    COLONOSCOPY N/A 1/3/2023    Procedure: COLONOSCOPY WITH BIOPSY OF COLON MASS, POLYPECTOMY;  Surgeon: Kris Charles MD;  Location: [facility] OR    COLONOSCOPY N/A 3/12/2024    Procedure: COLONOSCOPY WITH POLYPECTOMY and EXCISION, LEFT, LESION, BACK;  Surgeon: Kris Charles MD;  Location: [facility] OR    CREATION PERICARDIAL WINDOW  02/10/2017    [facility]    DILATION AND CURETTAGE N/A 7/22/2022    Procedure: DILATION AND CURETTAGE, UTERUS;  Surgeon: Lesly Holland;  Location: [facility] OR    HEMORRHOIDECTOMY EXTERNAL      HYSTEROSCOPY, WITH ENDOMETRIAL RADIOFREQUENCY ABLATION - NOVASURE N/A 7/22/2022    Procedure: HYSTEROSCOPY, WITH ENDOMETRIAL RADIOFREQUENCY ABLATION - NOVASURE DILATION AND CURETTAGE, UTERUS;  Surgeon: Lesly Holland;  Location: [facility] OR    LAPAROSCOPIC ASSISTED  "SIGMOID COLECTOMY N/A 1/6/2023    Procedure: LAPAROSCOPIC ASSISTED DESCENDING COLELCTOMY SPLENIC FLEXURE MOBILIZATION ;  Surgeon: Kris Charles MD;  Location: [facility] OR    LAPAROSCOPIC LYSIS ADHESIONS N/A 1/6/2023    Procedure: LYSIS OF ADHESIONS;  Surgeon: Kris Charles MD;  Location: [facility] OR    TUMOR REMOVAL      Has had 3. In the right breast and \"inside of rib cage.\"      Current Outpatient Medications   Medication Sig Dispense Refill    albuterol (PROAIR HFA/PROVENTIL HFA/VENTOLIN HFA) 108 (90 Base) MCG/ACT inhaler Inhale 2 puffs into the lungs every 6 hours as needed for shortness of breath, wheezing or cough. 18 g 0    ALPRAZolam (XANAX) 2 MG tablet Take 1 tablet (2 mg) by mouth 3 times daily as needed for anxiety. 90 tablet 3    buprenorphine-naloxone (SUBOXONE) 8-2 MG SUBL sublingual tablet Place 1 tablet under the tongue 2 times daily. 60 tablet 2    cetirizine (ZYRTEC) 10 MG tablet Take 1 tablet (10 mg) by mouth daily 30 tablet 11    cyclobenzaprine (FLEXERIL) 10 MG tablet Take 1 tablet (10 mg) by mouth 2 times daily as needed for muscle spasms. 60 tablet 3    fluticasone (FLONASE) 50 MCG/ACT nasal spray Spray 2 sprays into both nostrils daily 9.9 mL 11    ibuprofen (ADVIL/MOTRIN) 600 MG tablet Take 1 tablet (600 mg) by mouth every 8 hours as needed for moderate pain. 90 tablet 3    naloxone (NARCAN) 4 MG/0.1ML nasal spray Spray 1 spray (4 mg) into one nostril alternating nostrils as needed for opioid reversal every 2-3 minutes until assistance arrives (Patient not taking: Reported on 6/4/2025) 0.2 mL 1    omeprazole (PRILOSEC) 20 MG DR capsule Take 1 capsule (20 mg) by mouth daily. 30 capsule 1    ondansetron (ZOFRAN ODT) 4 MG ODT tab Take 1 tablet (4 mg) by mouth every 8 hours as needed for nausea. 30 tablet 1    polyethylene glycol (MIRALAX) 17 GM/Dose powder Take 17 g (1 Capful) by mouth daily as needed for constipation. 578 g 3    Semaglutide-Weight Management " "(WEGOVY) 0.5 MG/0.5ML pen Inject 0.5 mg subcutaneously once a week. 2 mL 1    sertraline (ZOLOFT) 50 MG tablet Take 1.5 tablets (75 mg) by mouth daily (Patient not taking: Reported on 6/4/2025) 135 tablet 3    spacer (OPTICHAMBER BINA) holding chamber For use w/ rescue inhaler 1 each 0    Vitamin D, Cholecalciferol, 25 MCG (1000 UT) CAPS Take 1,000 Units by mouth daily. (Patient not taking: Reported on 6/4/2025) 90 capsule 1      Allergies   Allergen Reactions    Gabapentin Other (See Comments)     Mental status is changed     Lidocaine Other (See Comments)     \"my jaw stopped moving\"  Other reaction(s): Dystonia    Penicillins Hives, Rash and Shortness Of Breath    Lidocaine-Epinephrine (Pf) Other (See Comments) and Muscle Pain (Myalgia)     Severe jaw cramping, double vision  Jaw locking    Topamax [Topiramate] Headache        ROS:   Gen-  no fevers/chills   Cardiac - no palpitations, no chest pain   Respiratory - no shortness of breath , no wheezing   GI - no nausea, no vomiting, no diarrhea, no constipation   Neuro - no numbness, no tingling   Remainder of ROS negative.     Exam:   BP 99/65   Pulse 101   Temp 98  F (36.7  C)   Resp 18   SpO2 97%    Alert and oriented x 3; No acute distress   MSK: R lateral ankle swelling, limited motion, and tenderness - chronic  Neuro: no focal deficits   Derm: no rashes       "

## 2025-07-23 NOTE — Clinical Note
7/23/2025      RE: Darlene Hudson  [address] [place] [place]  [place] [address]     Dear Colleague,    Thank you for referring your patient, Darlene Hudson, to the [facility]. Please see a copy of my visit note below.    ASSESSMENT/PLAN:    (F41.1) Generalized anxiety disorder  (primary encounter diagnosis)  Comment: ***  Plan: ***    (G47.9) Sleep disorder with mood complaints  Comment: ***  Plan: QUEtiapine (SEROQUEL) 25 MG tablet        ***    (M25.871) Impingement syndrome of right ankle  Comment: ***  Plan: ***    (E66.01) Morbid obesity (H)  Comment: ***  Plan: ***    Robbie Bailon MD  July 23, 2025  3:20 PM        Pt is a 50 year old female last seen on 6/4/25 here today for:     1) anxiety - was planning to move to [place] but made it to [place]. They stayed there overnight and had a difficult trip and decided to turn back ; had a flat tire, difficulty w/ getting a hotel room; she feels like her mom was intervening and saying not to go. Gunjan said the same thing  Daughter says that she is waking at night w/ delusions? Fighting in her sleep, looking for people who are not here    2) obesity - wegovy approved; started higher dose yesterday!     Per my last note:  (F41.1) Generalized anxiety disorder  (primary encounter diagnosis)  Comment:   Plan: stable; we discussed plan for refills while she is away over the summer     (E66.01) Morbid obesity (H)  Comment:   Plan: Wegivy prior auth initiated     (R25.2) Muscle cramps at night  Comment: refilled  Plan: cyclobenzaprine (FLEXERIL) 10 MG tablet          (R12) Heartburn  Comment: states that her suboxone gives her heartburn  Plan: omeprazole (PRILOSEC) 20 MG DR capsule          (M25.871) Impingement syndrome of right ankle  Comment:   Plan: swollen today from prolonged sitting; precautions given    Past Medical History:   Diagnosis Date    Abdominal pain 06/29/2015    Abnormal cervical Papanicolaou smear 11/09/2014    Overview:   ACUS/HPV positive    Abnormal cytology finding 11/09/2014    Overview:  ACUS/HPV positive    Acute pericardial effusion 02/06/2017    Agoraphobia with panic attacks     Anxiety     Arthralgia of both lower legs 05/29/2020    Bilateral achy knee pain that is chronic in nature going on for years. Most likely osteoarthritis in knees related to obesity. Previously injected in both knees with good relief. 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  Has controlled substance agreement with them.  On Vicodin, Valium, Klonopin prescribed only from there.       Coronary artery disease     Cough 02/09/2020    Family history of colon cancer 10/24/2020    Hiatal hernia     Hx of seasonal allergies     Infection due to 2019 novel coronavirus 09/20/2022    Infectious pericarditis     Lipoma     Low back pain 07/13/2015    Major depressive disorder, recurrent episode, moderate (H) 09/05/2006    Menorrhagia with irregular cycle 07/15/2022    Added automatically from request for surgery [id number]    Moderate persistent asthma without complication 09/29/2020    Nondependent alcohol abuse, episodic drinking behavior 10/03/2012    Noninflammatory disorder of vagina 02/20/2015    Other chronic pain     Other long term (current) drug therapy 01/28/2013    Overview:  Vicodin and cyclobenzaprine monthly    Panic disorder with agoraphobia 09/05/2006    Pap smear for cervical cancer screening 10/31/2016    03/29/2010  Normal cytology, HPV ot done,  "repeat in 3 years.    Pericarditis 2017    Physiologic disturbance of temperature regulation 10/24/2020    PONV (postoperative nausea and vomiting)     Right ankle swelling 06/07/2022    Added automatically from request for surgery [id number]    Splenomegaly     Tobacco abuse     Tobacco use disorder 07/13/2015      Past Surgical History:   Procedure Laterality Date    ANKLE SURGERY Right 05/30/2019    ARTHROSCOPY ANKLE Right 6/21/2023    Procedure: right ankle arthroscopy with debridement;  Surgeon: Kareem Bashir MD;  Location: [facility] OR    BIOPSY BREAST Right 1990    benign    COLONOSCOPY N/A 1/3/2023    Procedure: COLONOSCOPY WITH BIOPSY OF COLON MASS, POLYPECTOMY;  Surgeon: Kris Charles MD;  Location: [facility] OR    COLONOSCOPY N/A 3/12/2024    Procedure: COLONOSCOPY WITH POLYPECTOMY and EXCISION, LEFT, LESION, BACK;  Surgeon: Kris Charles MD;  Location: [facility] OR    CREATION PERICARDIAL WINDOW  02/10/2017    [facility]    DILATION AND CURETTAGE N/A 7/22/2022    Procedure: DILATION AND CURETTAGE, UTERUS;  Surgeon: Lesly Holland;  Location: [facility] OR    HEMORRHOIDECTOMY EXTERNAL      HYSTEROSCOPY, WITH ENDOMETRIAL RADIOFREQUENCY ABLATION - NOVASURE N/A 7/22/2022    Procedure: HYSTEROSCOPY, WITH ENDOMETRIAL RADIOFREQUENCY ABLATION - NOVASURE DILATION AND CURETTAGE, UTERUS;  Surgeon: Lesly Holland;  Location: [facility] OR    LAPAROSCOPIC ASSISTED SIGMOID COLECTOMY N/A 1/6/2023    Procedure: LAPAROSCOPIC ASSISTED DESCENDING COLELCTOMY SPLENIC FLEXURE MOBILIZATION ;  Surgeon: Kris Charles MD;  Location: [facility] OR    LAPAROSCOPIC LYSIS ADHESIONS N/A 1/6/2023    Procedure: LYSIS OF ADHESIONS;  Surgeon: Kris Charles MD;  Location: [facility] OR    TUMOR REMOVAL      Has had 3. In the right breast and \"inside of rib cage.\"      Current Outpatient Medications   Medication Sig Dispense Refill    albuterol (PROAIR HFA/PROVENTIL " HFA/VENTOLIN HFA) 108 (90 Base) MCG/ACT inhaler Inhale 2 puffs into the lungs every 6 hours as needed for shortness of breath, wheezing or cough. 18 g 0    ALPRAZolam (XANAX) 2 MG tablet Take 1 tablet (2 mg) by mouth 3 times daily as needed for anxiety. 90 tablet 3    buprenorphine-naloxone (SUBOXONE) 8-2 MG SUBL sublingual tablet Place 1 tablet under the tongue 2 times daily. 60 tablet 2    cetirizine (ZYRTEC) 10 MG tablet Take 1 tablet (10 mg) by mouth daily 30 tablet 11    cyclobenzaprine (FLEXERIL) 10 MG tablet Take 1 tablet (10 mg) by mouth 2 times daily as needed for muscle spasms. 60 tablet 3    fluticasone (FLONASE) 50 MCG/ACT nasal spray Spray 2 sprays into both nostrils daily 9.9 mL 11    ibuprofen (ADVIL/MOTRIN) 600 MG tablet Take 1 tablet (600 mg) by mouth every 8 hours as needed for moderate pain. 90 tablet 3    naloxone (NARCAN) 4 MG/0.1ML nasal spray Spray 1 spray (4 mg) into one nostril alternating nostrils as needed for opioid reversal every 2-3 minutes until assistance arrives (Patient not taking: Reported on 6/4/2025) 0.2 mL 1    omeprazole (PRILOSEC) 20 MG DR capsule Take 1 capsule (20 mg) by mouth daily. 30 capsule 1    ondansetron (ZOFRAN ODT) 4 MG ODT tab Take 1 tablet (4 mg) by mouth every 8 hours as needed for nausea. 30 tablet 1    polyethylene glycol (MIRALAX) 17 GM/Dose powder Take 17 g (1 Capful) by mouth daily as needed for constipation. 578 g 3    Semaglutide-Weight Management (WEGOVY) 0.5 MG/0.5ML pen Inject 0.5 mg subcutaneously once a week. 2 mL 1    sertraline (ZOLOFT) 50 MG tablet Take 1.5 tablets (75 mg) by mouth daily (Patient not taking: Reported on 6/4/2025) 135 tablet 3    spacer (OPTICHAMBER BINA) holding chamber For use w/ rescue inhaler 1 each 0    Vitamin D, Cholecalciferol, 25 MCG (1000 UT) CAPS Take 1,000 Units by mouth daily. (Patient not taking: Reported on 6/4/2025) 90 capsule 1      Allergies   Allergen Reactions    Gabapentin Other (See Comments)     Mental  "status is changed     Lidocaine Other (See Comments)     \"my jaw stopped moving\"  Other reaction(s): Dystonia    Penicillins Hives, Rash and Shortness Of Breath    Lidocaine-Epinephrine (Pf) Other (See Comments) and Muscle Pain (Myalgia)     Severe jaw cramping, double vision  Jaw locking    Topamax [Topiramate] Headache        ROS:   Gen- no weight change, no fevers/chills   Head/ Eyes- no blurred vision, no headaches   ENT- no cough, no congestion, no URI symptoms   Cardiac - no palpitations, no chest pain   Respiratory - no shortness of breath , no wheezing   GI - no nausea, no vomiting, no diarrhea, no constipation   Urinary - no dysuria, no polyuria   Neuro - no numbness, no tingling   Remainder of ROS negative.     Exam:   BP 99/65   Pulse 101   Temp 98  F (36.7  C)   Resp 18   SpO2 97%    Alert and oriented x 3; No acute distress   HEENT: extraocular movements intact, tympanic membranes clear, oropharynx clear   Neck: no masses, no lymphadenopathy   Resp: clear to auscultation bilaterally, no wheezing/ronchi   CV: rate/rhythm regular, no murmurs/rubs/gallops   ABd: soft, + bowel sounds, nontender/nondistended, no rebound/guarding   Extrem: no clubbing/cyanosis/edema   MSK: full range of motion, nontender   Neuro: no focal deficits   Derm: no rashes           Again, thank you for allowing me to participate in the care of your patient.      Sincerely,    Robbie Bailon MD  "

## 2025-07-29 ENCOUNTER — MYC MEDICAL ADVICE (OUTPATIENT)
Dept: FAMILY MEDICINE | Facility: CLINIC | Age: 50
End: 2025-07-29
Payer: COMMERCIAL

## 2025-08-20 ENCOUNTER — VIRTUAL VISIT (OUTPATIENT)
Dept: FAMILY MEDICINE | Facility: CLINIC | Age: 50
End: 2025-08-20
Payer: COMMERCIAL

## 2025-08-20 DIAGNOSIS — E66.01 MORBID OBESITY (H): ICD-10-CM

## 2025-08-20 DIAGNOSIS — J44.1 COPD EXACERBATION (H): Primary | ICD-10-CM

## 2025-08-20 RX ORDER — AZITHROMYCIN 250 MG/1
TABLET, FILM COATED ORAL
Qty: 6 TABLET | Refills: 0 | Status: SHIPPED | OUTPATIENT
Start: 2025-08-20 | End: 2025-08-25

## 2025-08-20 RX ORDER — PREDNISONE 20 MG/1
TABLET ORAL
Qty: 15 TABLET | Refills: 0 | Status: SHIPPED | OUTPATIENT
Start: 2025-08-20 | End: 2025-08-30

## 2025-09-03 ENCOUNTER — VIRTUAL VISIT (OUTPATIENT)
Dept: FAMILY MEDICINE | Facility: CLINIC | Age: 50
End: 2025-09-03
Payer: COMMERCIAL

## 2025-09-03 DIAGNOSIS — U09.9 POST-COVID SYNDROME: Primary | ICD-10-CM

## 2025-09-03 DIAGNOSIS — F41.1 GENERALIZED ANXIETY DISORDER: ICD-10-CM

## 2025-09-03 RX ORDER — ALPRAZOLAM 2 MG/1
2 TABLET ORAL 3 TIMES DAILY PRN
Qty: 90 TABLET | Refills: 3 | Status: SHIPPED | OUTPATIENT
Start: 2025-09-03

## (undated) DEVICE — SUCTION MANIFOLD NEPTUNE 2 SYS 1 PORT 702-025-000

## (undated) DEVICE — TUBING SMOKE EVAC PNEUMOCLEAR HIGH FLOW 0620050250

## (undated) DEVICE — LEGGINGS 31X48" LF KM89408

## (undated) DEVICE — LUBRICANT SURG 2 OZ STRL FLIP TOP 2OZLUB

## (undated) DEVICE — GLOVE BIOGEL PI ULTRATOUCH G SZ 7.0 42170

## (undated) DEVICE — ESU PENCIL SMOKE EVAC W/ROCKER SWITCH 0703-047-000

## (undated) DEVICE — STPL RELOAD REG TISSUE ECHELON 60 X 3.6MM BLUE GST60B

## (undated) DEVICE — PLATE GROUNDING ADULT W/CORD 9165L

## (undated) DEVICE — BLADE KNIFE SURG 15 371115

## (undated) DEVICE — BULB W/BLADDER TUBING STRL DISP

## (undated) DEVICE — SU VICRYL+ 2-0 TIES 18 UND VCP111G

## (undated) DEVICE — DRAPE TIBURON TOP SHEET 100X60" 29352

## (undated) DEVICE — SU MONOCRYL+ 4-0 18IN PS2 UND MCP496G

## (undated) DEVICE — CUSTOM PACK COLON CLOSING SBA5BCCHEA

## (undated) DEVICE — SYR 50ML LL W/O NDL 309653

## (undated) DEVICE — TUBING SUCTION MEDI-VAC 1/4"X20' N620A - HE

## (undated) DEVICE — DRAPE IOBAN INCISE 23X17" 6650EZ

## (undated) DEVICE — SUTURE VICRYL+ 3-0 18 SH/CR UND VCP864

## (undated) DEVICE — SU VICRYL+ 3-0 27IN SH UND VCP416H

## (undated) DEVICE — LINEN ORTHO PACK 5446

## (undated) DEVICE — Device

## (undated) DEVICE — NDL SPINAL 18GA 3.5" 405184

## (undated) DEVICE — SU VICRYL 2-0 CT-2 27" UND J269H

## (undated) DEVICE — SOL NACL 0.9% IRRIG 1000ML BOTTLE 2F7124

## (undated) DEVICE — ENDO TROCAR SLEEVE KII Z-THREADED 05X100MM CTS02

## (undated) DEVICE — SOL WATER IRRIG 1000ML BOTTLE 2F7114

## (undated) DEVICE — ENDO TROCAR FIRST ENTRY KII FIOS Z-THRD 05X100MM CTF03

## (undated) DEVICE — SOL NACL 0.9% IRRIG 3000ML BAG 2B7477

## (undated) DEVICE — SUTURE PDS 0 27IN CT1 + VIOLET PDP340H

## (undated) DEVICE — CATH TRAY FOLEY SURESTEP 16FR LF A947316

## (undated) DEVICE — DECANTER VIAL 2006S

## (undated) DEVICE — ENDO SHEARS RENEW LAP ENDOCUT SCISSOR TIP 16.5MM 3142

## (undated) DEVICE — PACK LOWER EXTREMITY CUSTOM ASC

## (undated) DEVICE — ESU LIGASURE MARYLAND LAPAROSCOPIC SLR/DVDR 5MMX37CM LF1937

## (undated) DEVICE — GLOVE BIOGEL PI MICRO SZ 7.5 48575

## (undated) DEVICE — SOL RINGERS LACTATED 1000ML BAG 2B2324X

## (undated) DEVICE — SUCTION TIP POOLE STERILE 35040

## (undated) DEVICE — STPL POWERED ECHELON LONG 60MM PLEE60A

## (undated) DEVICE — GLOVE BIOGEL PI INDICATOR 8.0 LF 41680

## (undated) DEVICE — TUBING SYSTEM ARTHREX PATIENT REDEUCE AR-6421

## (undated) DEVICE — GOWN XLG DISP 9545

## (undated) DEVICE — BLADE CLIPPER PIVOT PURPLE DISP 9660

## (undated) DEVICE — GLOVE BIOGEL PI MICRO SZ 6.5 48565

## (undated) DEVICE — GLOVE BIOGEL PI MICRO INDICATOR UNDERGLOVE SZ 7.0 48970

## (undated) DEVICE — STPL LINEAR 90 X 3.5MM TA9035S

## (undated) DEVICE — TRAY PREP DRY SKIN SCRUB 067

## (undated) DEVICE — NDL INSUFFLATION 13GA 120MM C2201

## (undated) DEVICE — TUBING SYSTEM ARTHREX PUMP REDEUCE AR-6411

## (undated) DEVICE — ADH SKIN CLOSURE PREMIERPRO EXOFIN 1.0ML 3470

## (undated) DEVICE — GLOVE BIOGEL PI ULTRATOUCH G SZ 7.5 42175

## (undated) DEVICE — BLADE KNIFE SURG 10 371110

## (undated) DEVICE — CUSTOM PACK LAP CHOLE SBA5BLCHEA

## (undated) DEVICE — DRAPE UNDER BUTTOCK 89415

## (undated) DEVICE — TUBING LAP SUCT/IRRIG STRYKER 250070500

## (undated) DEVICE — DRAPE U-POUCH 34X29" 1067

## (undated) DEVICE — PREP CHLORAPREP 26ML TINTED ORANGE  260815

## (undated) DEVICE — ENDO TROCAR SHIELDED BLADED KII Z-THRD 12X100MM CTB73

## (undated) DEVICE — SYSTEM LAPAROVUE VISIBILITY LAPVUE10

## (undated) DEVICE — PREP POVIDONE IODINE SOLUTION 10% 4OZ BOTTLE 29906-004

## (undated) DEVICE — PREP CHLORAPREP 26ML TINTED HI-LITE ORANGE 930815

## (undated) DEVICE — SU PDS II 0 CT-1 27" Z340H

## (undated) DEVICE — DRAPE CONVERTORS U-DRAPE 60X72" 8476

## (undated) DEVICE — PAD POS XL 1X20X40IN PINK PIGAZZI

## (undated) DEVICE — GLOVE BIOGEL PI MICRO INDICATOR UNDERGLOVE SZ 8.0 48980

## (undated) RX ORDER — PROPOFOL 10 MG/ML
INJECTION, EMULSION INTRAVENOUS
Status: DISPENSED
Start: 2023-01-06

## (undated) RX ORDER — BUPIVACAINE HYDROCHLORIDE AND EPINEPHRINE 2.5; 5 MG/ML; UG/ML
INJECTION, SOLUTION INFILTRATION; PERINEURAL
Status: DISPENSED
Start: 2023-01-06

## (undated) RX ORDER — DEXAMETHASONE SODIUM PHOSPHATE 10 MG/ML
INJECTION, SOLUTION INTRAMUSCULAR; INTRAVENOUS
Status: DISPENSED
Start: 2023-01-06

## (undated) RX ORDER — INDOCYANINE GREEN AND WATER 25 MG
KIT INJECTION
Status: DISPENSED
Start: 2023-01-06

## (undated) RX ORDER — ONDANSETRON 2 MG/ML
INJECTION INTRAMUSCULAR; INTRAVENOUS
Status: DISPENSED
Start: 2023-01-06

## (undated) RX ORDER — FENTANYL CITRATE-0.9 % NACL/PF 10 MCG/ML
PLASTIC BAG, INJECTION (ML) INTRAVENOUS
Status: DISPENSED
Start: 2023-01-06

## (undated) RX ORDER — TRIAMCINOLONE ACETONIDE 40 MG/ML
INJECTION, SUSPENSION INTRA-ARTICULAR; INTRAMUSCULAR
Status: DISPENSED
Start: 2025-04-29

## (undated) RX ORDER — LIDOCAINE HYDROCHLORIDE 10 MG/ML
INJECTION, SOLUTION EPIDURAL; INFILTRATION; INTRACAUDAL; PERINEURAL
Status: DISPENSED
Start: 2025-04-29

## (undated) RX ORDER — LIDOCAINE HYDROCHLORIDE 10 MG/ML
INJECTION, SOLUTION EPIDURAL; INFILTRATION; INTRACAUDAL; PERINEURAL
Status: DISPENSED
Start: 2022-09-13

## (undated) RX ORDER — CEFAZOLIN SODIUM 1 G/3ML
INJECTION, POWDER, FOR SOLUTION INTRAMUSCULAR; INTRAVENOUS
Status: DISPENSED
Start: 2023-01-06

## (undated) RX ORDER — KETAMINE HYDROCHLORIDE 10 MG/ML
INJECTION INTRAMUSCULAR; INTRAVENOUS
Status: DISPENSED
Start: 2023-01-06

## (undated) RX ORDER — FENTANYL CITRATE 50 UG/ML
INJECTION, SOLUTION INTRAMUSCULAR; INTRAVENOUS
Status: DISPENSED
Start: 2023-01-06